# Patient Record
Sex: MALE | Race: WHITE | Employment: OTHER | URBAN - METROPOLITAN AREA
[De-identification: names, ages, dates, MRNs, and addresses within clinical notes are randomized per-mention and may not be internally consistent; named-entity substitution may affect disease eponyms.]

---

## 2018-02-28 ENCOUNTER — HOSPITAL ENCOUNTER (OUTPATIENT)
Dept: GENERAL RADIOLOGY | Age: 68
Discharge: HOME | End: 2018-03-02
Payer: MEDICARE

## 2018-02-28 DIAGNOSIS — J69.0 ASPIRATION PNEUMONIA, UNSPECIFIED ASPIRATION PNEUMONIA TYPE, UNSPECIFIED LATERALITY, UNSPECIFIED PART OF LUNG (HCC): ICD-10-CM

## 2018-02-28 PROCEDURE — 71046 X-RAY EXAM CHEST 2 VIEWS: CPT

## 2018-03-05 ENCOUNTER — HOSPITAL ENCOUNTER (OUTPATIENT)
Dept: GENERAL RADIOLOGY | Age: 68
Discharge: HOME | End: 2018-03-07
Payer: MEDICARE

## 2018-03-05 DIAGNOSIS — R91.8 LUNG INFILTRATE: ICD-10-CM

## 2018-03-05 PROCEDURE — 71046 X-RAY EXAM CHEST 2 VIEWS: CPT

## 2018-03-15 ENCOUNTER — HOSPITAL ENCOUNTER (OUTPATIENT)
Dept: CT IMAGING | Age: 68
Discharge: HOME | DRG: 194 | End: 2018-03-17
Payer: MEDICARE

## 2018-03-15 ENCOUNTER — HOSPITAL ENCOUNTER (INPATIENT)
Age: 68
LOS: 5 days | Discharge: HOME | DRG: 194 | End: 2018-03-20
Attending: INTERNAL MEDICINE | Admitting: INTERNAL MEDICINE
Payer: MEDICARE

## 2018-03-15 VITALS — BODY MASS INDEX: 30.88 KG/M2 | HEIGHT: 73 IN | WEIGHT: 233 LBS

## 2018-03-15 DIAGNOSIS — J18.9 PNEUMONIA DUE TO INFECTIOUS ORGANISM, UNSPECIFIED LATERALITY, UNSPECIFIED PART OF LUNG: ICD-10-CM

## 2018-03-15 DIAGNOSIS — R05.9 COUGH: ICD-10-CM

## 2018-03-15 LAB
ALBUMIN SERPL-MCNC: 4 G/DL (ref 3.9–4.9)
ALP BLD-CCNC: 85 U/L (ref 35–104)
ALT SERPL-CCNC: 7 U/L (ref 0–41)
ANION GAP SERPL CALCULATED.3IONS-SCNC: 15 MEQ/L (ref 7–13)
AST SERPL-CCNC: 10 U/L (ref 0–40)
BASOPHILS ABSOLUTE: 0 K/UL (ref 0–0.2)
BASOPHILS RELATIVE PERCENT: 0.3 %
BILIRUB SERPL-MCNC: 0.6 MG/DL (ref 0–1.2)
BUN BLDV-MCNC: 30 MG/DL (ref 8–23)
CALCIUM SERPL-MCNC: 9 MG/DL (ref 8.6–10.2)
CHLORIDE BLD-SCNC: 101 MEQ/L (ref 98–107)
CO2: 24 MEQ/L (ref 22–29)
CREAT SERPL-MCNC: 1.6 MG/DL (ref 0.7–1.2)
EOSINOPHILS ABSOLUTE: 0.9 K/UL (ref 0–0.7)
EOSINOPHILS RELATIVE PERCENT: 10.7 %
GFR AFRICAN AMERICAN: 52.6
GFR NON-AFRICAN AMERICAN: 43.4
GLOBULIN: 2.9 G/DL (ref 2.3–3.5)
GLUCOSE BLD-MCNC: 213 MG/DL (ref 74–109)
HCT VFR BLD CALC: 41 % (ref 42–52)
HEMOGLOBIN: 13.1 G/DL (ref 14–18)
LYMPHOCYTES ABSOLUTE: 0.6 K/UL (ref 1–4.8)
LYMPHOCYTES RELATIVE PERCENT: 6.7 %
MCH RBC QN AUTO: 28.2 PG (ref 27–31.3)
MCHC RBC AUTO-ENTMCNC: 32 % (ref 33–37)
MCV RBC AUTO: 88.4 FL (ref 80–100)
MONOCYTES ABSOLUTE: 0.8 K/UL (ref 0.2–0.8)
MONOCYTES RELATIVE PERCENT: 9.5 %
NEUTROPHILS ABSOLUTE: 6.4 K/UL (ref 1.4–6.5)
NEUTROPHILS RELATIVE PERCENT: 72.8 %
PDW BLD-RTO: 15.4 % (ref 11.5–14.5)
PLATELET # BLD: 206 K/UL (ref 130–400)
POTASSIUM SERPL-SCNC: 4.7 MEQ/L (ref 3.5–5.1)
RBC # BLD: 4.64 M/UL (ref 4.7–6.1)
SODIUM BLD-SCNC: 140 MEQ/L (ref 132–144)
TOTAL PROTEIN: 6.9 G/DL (ref 6.4–8.1)
WBC # BLD: 8.7 K/UL (ref 4.8–10.8)

## 2018-03-15 PROCEDURE — 85025 COMPLETE CBC W/AUTO DIFF WBC: CPT

## 2018-03-15 PROCEDURE — 87040 BLOOD CULTURE FOR BACTERIA: CPT

## 2018-03-15 PROCEDURE — 94664 DEMO&/EVAL PT USE INHALER: CPT

## 2018-03-15 PROCEDURE — 80053 COMPREHEN METABOLIC PANEL: CPT

## 2018-03-15 PROCEDURE — 6370000000 HC RX 637 (ALT 250 FOR IP): Performed by: INTERNAL MEDICINE

## 2018-03-15 PROCEDURE — 36415 COLL VENOUS BLD VENIPUNCTURE: CPT

## 2018-03-15 PROCEDURE — 2500000003 HC RX 250 WO HCPCS: Performed by: INTERNAL MEDICINE

## 2018-03-15 PROCEDURE — 1210000000 HC MED SURG R&B

## 2018-03-15 PROCEDURE — 71250 CT THORAX DX C-: CPT

## 2018-03-15 RX ORDER — MYCOPHENOLATE MOFETIL 250 MG/1
250 CAPSULE ORAL 2 TIMES DAILY
Status: DISCONTINUED | OUTPATIENT
Start: 2018-03-15 | End: 2018-03-20 | Stop reason: HOSPADM

## 2018-03-15 RX ORDER — GUAIFENESIN 400 MG/1
400 TABLET ORAL 4 TIMES DAILY PRN
COMMUNITY
End: 2019-04-02

## 2018-03-15 RX ORDER — CARVEDILOL 6.25 MG/1
6.25 TABLET ORAL 2 TIMES DAILY WITH MEALS
Status: ON HOLD | COMMUNITY
End: 2022-06-23 | Stop reason: HOSPADM

## 2018-03-15 RX ORDER — BENZONATATE 200 MG/1
200 CAPSULE ORAL 4 TIMES DAILY PRN
Status: ON HOLD | COMMUNITY
Start: 2011-05-26 | End: 2019-07-17

## 2018-03-15 RX ORDER — PREDNISONE 1 MG/1
5 TABLET ORAL DAILY
Status: DISCONTINUED | OUTPATIENT
Start: 2018-03-15 | End: 2018-03-20 | Stop reason: HOSPADM

## 2018-03-15 RX ORDER — CLARITHROMYCIN 500 MG/1
500 TABLET, COATED ORAL 2 TIMES DAILY
Status: ON HOLD | COMMUNITY
End: 2018-03-16

## 2018-03-15 RX ORDER — CALCIUM CARBONATE 200(500)MG
1 TABLET,CHEWABLE ORAL DAILY
Status: ON HOLD | COMMUNITY
End: 2019-07-17 | Stop reason: ALTCHOICE

## 2018-03-15 RX ORDER — AMLODIPINE BESYLATE 5 MG/1
5 TABLET ORAL DAILY
Status: DISCONTINUED | OUTPATIENT
Start: 2018-03-15 | End: 2018-03-20 | Stop reason: HOSPADM

## 2018-03-15 RX ORDER — NICOTINE POLACRILEX 4 MG
15 LOZENGE BUCCAL PRN
Status: DISCONTINUED | OUTPATIENT
Start: 2018-03-15 | End: 2018-03-20 | Stop reason: HOSPADM

## 2018-03-15 RX ORDER — GUAIFENESIN/DEXTROMETHORPHAN 100-10MG/5
5 SYRUP ORAL EVERY 4 HOURS PRN
Status: DISCONTINUED | OUTPATIENT
Start: 2018-03-15 | End: 2018-03-20 | Stop reason: HOSPADM

## 2018-03-15 RX ORDER — CARVEDILOL 6.25 MG/1
6.25 TABLET ORAL 2 TIMES DAILY WITH MEALS
Status: DISCONTINUED | OUTPATIENT
Start: 2018-03-15 | End: 2018-03-20 | Stop reason: HOSPADM

## 2018-03-15 RX ORDER — DEXTROSE MONOHYDRATE 25 G/50ML
12.5 INJECTION, SOLUTION INTRAVENOUS PRN
Status: DISCONTINUED | OUTPATIENT
Start: 2018-03-15 | End: 2018-03-20 | Stop reason: HOSPADM

## 2018-03-15 RX ORDER — ACETAMINOPHEN 325 MG/1
650 TABLET ORAL EVERY 4 HOURS PRN
Status: DISCONTINUED | OUTPATIENT
Start: 2018-03-15 | End: 2018-03-20 | Stop reason: HOSPADM

## 2018-03-15 RX ORDER — PROMETHAZINE HYDROCHLORIDE AND CODEINE PHOSPHATE 6.25; 1 MG/5ML; MG/5ML
5 SYRUP ORAL 4 TIMES DAILY PRN
COMMUNITY
End: 2019-04-02

## 2018-03-15 RX ORDER — CYCLOSPORINE 25 MG/1
25 CAPSULE, LIQUID FILLED ORAL 2 TIMES DAILY
Status: DISCONTINUED | OUTPATIENT
Start: 2018-03-15 | End: 2018-03-20 | Stop reason: HOSPADM

## 2018-03-15 RX ORDER — BENZONATATE 100 MG/1
100 CAPSULE ORAL 3 TIMES DAILY PRN
Status: DISCONTINUED | OUTPATIENT
Start: 2018-03-15 | End: 2018-03-20 | Stop reason: HOSPADM

## 2018-03-15 RX ORDER — DEXTROSE MONOHYDRATE 50 MG/ML
100 INJECTION, SOLUTION INTRAVENOUS PRN
Status: DISCONTINUED | OUTPATIENT
Start: 2018-03-15 | End: 2018-03-20 | Stop reason: HOSPADM

## 2018-03-15 RX ADMIN — BENZONATATE 100 MG: 100 CAPSULE ORAL at 21:51

## 2018-03-15 RX ADMIN — GUAIFENESIN AND DEXTROMETHORPHAN 5 ML: 100; 10 SYRUP ORAL at 21:51

## 2018-03-15 RX ADMIN — TAZOBACTAM SODIUM AND PIPERACILLIN SODIUM 3.38 G: 375; 3 INJECTION, SOLUTION INTRAVENOUS at 21:51

## 2018-03-15 ASSESSMENT — PAIN SCALES - GENERAL: PAINLEVEL_OUTOF10: 0

## 2018-03-16 PROBLEM — J18.9 PNEUMONIA: Status: ACTIVE | Noted: 2018-03-16

## 2018-03-16 LAB
BASOPHILS ABSOLUTE: 0.1 K/UL (ref 0–0.2)
BASOPHILS RELATIVE PERCENT: 1.1 %
EOSINOPHILS ABSOLUTE: 1.1 K/UL (ref 0–0.7)
EOSINOPHILS RELATIVE PERCENT: 13 %
GLUCOSE BLD-MCNC: 119 MG/DL (ref 60–115)
GLUCOSE BLD-MCNC: 130 MG/DL (ref 60–115)
GLUCOSE BLD-MCNC: 145 MG/DL (ref 60–115)
GLUCOSE BLD-MCNC: 75 MG/DL (ref 60–115)
GLUCOSE BLD-MCNC: 98 MG/DL (ref 60–115)
HCT VFR BLD CALC: 36.1 % (ref 42–52)
HEMOGLOBIN: 11.9 G/DL (ref 14–18)
LYMPHOCYTES ABSOLUTE: 0.6 K/UL (ref 1–4.8)
LYMPHOCYTES RELATIVE PERCENT: 6.3 %
MCH RBC QN AUTO: 28.9 PG (ref 27–31.3)
MCHC RBC AUTO-ENTMCNC: 33 % (ref 33–37)
MCV RBC AUTO: 87.6 FL (ref 80–100)
MONOCYTES ABSOLUTE: 1.1 K/UL (ref 0.2–0.8)
MONOCYTES RELATIVE PERCENT: 12.5 %
NEUTROPHILS ABSOLUTE: 6 K/UL (ref 1.4–6.5)
NEUTROPHILS RELATIVE PERCENT: 67.1 %
PDW BLD-RTO: 14.9 % (ref 11.5–14.5)
PERFORMED ON: ABNORMAL
PERFORMED ON: NORMAL
PERFORMED ON: NORMAL
PLATELET # BLD: 180 K/UL (ref 130–400)
RBC # BLD: 4.12 M/UL (ref 4.7–6.1)
WBC # BLD: 8.9 K/UL (ref 4.8–10.8)

## 2018-03-16 PROCEDURE — 94668 MNPJ CHEST WALL SBSQ: CPT

## 2018-03-16 PROCEDURE — 94640 AIRWAY INHALATION TREATMENT: CPT

## 2018-03-16 PROCEDURE — 1210000000 HC MED SURG R&B

## 2018-03-16 PROCEDURE — 6370000000 HC RX 637 (ALT 250 FOR IP): Performed by: INTERNAL MEDICINE

## 2018-03-16 PROCEDURE — 94667 MNPJ CHEST WALL 1ST: CPT

## 2018-03-16 PROCEDURE — 36415 COLL VENOUS BLD VENIPUNCTURE: CPT

## 2018-03-16 PROCEDURE — 6360000002 HC RX W HCPCS: Performed by: INTERNAL MEDICINE

## 2018-03-16 PROCEDURE — 85025 COMPLETE CBC W/AUTO DIFF WBC: CPT

## 2018-03-16 PROCEDURE — 2500000003 HC RX 250 WO HCPCS: Performed by: INTERNAL MEDICINE

## 2018-03-16 RX ORDER — FUROSEMIDE 40 MG/1
40 TABLET ORAL 2 TIMES DAILY
Status: DISCONTINUED | OUTPATIENT
Start: 2018-03-16 | End: 2018-03-20 | Stop reason: HOSPADM

## 2018-03-16 RX ORDER — PANTOPRAZOLE SODIUM 40 MG/1
40 TABLET, DELAYED RELEASE ORAL
Status: DISCONTINUED | OUTPATIENT
Start: 2018-03-17 | End: 2018-03-20 | Stop reason: HOSPADM

## 2018-03-16 RX ORDER — SIMVASTATIN 10 MG
10 TABLET ORAL NIGHTLY
Status: DISCONTINUED | OUTPATIENT
Start: 2018-03-16 | End: 2018-03-20 | Stop reason: HOSPADM

## 2018-03-16 RX ORDER — ASPIRIN 81 MG/1
81 TABLET, CHEWABLE ORAL DAILY
Status: DISCONTINUED | OUTPATIENT
Start: 2018-03-16 | End: 2018-03-20 | Stop reason: HOSPADM

## 2018-03-16 RX ORDER — FUROSEMIDE 40 MG/1
40 TABLET ORAL DAILY
Status: ON HOLD | COMMUNITY
End: 2022-10-27 | Stop reason: HOSPADM

## 2018-03-16 RX ORDER — ALBUTEROL SULFATE 2.5 MG/3ML
2.5 SOLUTION RESPIRATORY (INHALATION) EVERY 4 HOURS PRN
Status: DISCONTINUED | OUTPATIENT
Start: 2018-03-16 | End: 2018-03-18

## 2018-03-16 RX ADMIN — ALBUTEROL SULFATE 2.5 MG: 2.5 SOLUTION RESPIRATORY (INHALATION) at 21:40

## 2018-03-16 RX ADMIN — GUAIFENESIN AND DEXTROMETHORPHAN 5 ML: 100; 10 SYRUP ORAL at 21:55

## 2018-03-16 RX ADMIN — ALBUTEROL SULFATE 2.5 MG: 2.5 SOLUTION RESPIRATORY (INHALATION) at 00:51

## 2018-03-16 RX ADMIN — TAZOBACTAM SODIUM AND PIPERACILLIN SODIUM 3.38 G: 375; 3 INJECTION, SOLUTION INTRAVENOUS at 09:17

## 2018-03-16 RX ADMIN — AMLODIPINE BESYLATE 5 MG: 5 TABLET ORAL at 09:25

## 2018-03-16 RX ADMIN — ACETAMINOPHEN 650 MG: 325 TABLET, FILM COATED ORAL at 23:39

## 2018-03-16 RX ADMIN — ALBUTEROL SULFATE 2.5 MG: 2.5 SOLUTION RESPIRATORY (INHALATION) at 15:09

## 2018-03-16 RX ADMIN — BENZONATATE 100 MG: 100 CAPSULE ORAL at 21:54

## 2018-03-16 RX ADMIN — CYCLOSPORINE 25 MG: 25 CAPSULE, LIQUID FILLED ORAL at 23:36

## 2018-03-16 ASSESSMENT — PAIN SCALES - GENERAL: PAINLEVEL_OUTOF10: 0

## 2018-03-16 NOTE — H&P
wheezing or  accessory muscle use. HEART:  Regular with 1/6 systolic murmur. ABDOMEN:  Soft. No guarding or rigidity. Bowel sounds are present. EXTREMITIES:  Lower limbs show no edema. SKIN:  Warm and dry. IMPRESSION:  1. Pneumonia suspected on CT scan. 2.  Status post kidney transplant, immunosuppressed. 3.  Diabetes mellitus type 2.  4.  Hypertension. 5.  Hyperlipidemia. PLAN:  Orders written. Zosyn to be given IV. Aerosol treatments.           Emory Arvizu DO    D: 03/16/2018 9:52:49       T: 03/16/2018 9:54:03     [id number]  Job#: [id number]     Doc#: [id number]    CC:

## 2018-03-17 ENCOUNTER — APPOINTMENT (OUTPATIENT)
Dept: GENERAL RADIOLOGY | Age: 68
DRG: 194 | End: 2018-03-17
Attending: INTERNAL MEDICINE
Payer: MEDICARE

## 2018-03-17 LAB
ANION GAP SERPL CALCULATED.3IONS-SCNC: 13 MEQ/L (ref 7–13)
BASOPHILS ABSOLUTE: 0.1 K/UL (ref 0–0.2)
BASOPHILS RELATIVE PERCENT: 0.8 %
BUN BLDV-MCNC: 28 MG/DL (ref 8–23)
CALCIUM SERPL-MCNC: 8.9 MG/DL (ref 8.6–10.2)
CHLORIDE BLD-SCNC: 106 MEQ/L (ref 98–107)
CO2: 25 MEQ/L (ref 22–29)
CREAT SERPL-MCNC: 1.61 MG/DL (ref 0.7–1.2)
EOSINOPHILS ABSOLUTE: 1.1 K/UL (ref 0–0.7)
EOSINOPHILS RELATIVE PERCENT: 12.5 %
GFR AFRICAN AMERICAN: 52.2
GFR NON-AFRICAN AMERICAN: 43.1
GLUCOSE BLD-MCNC: 108 MG/DL (ref 60–115)
GLUCOSE BLD-MCNC: 112 MG/DL (ref 60–115)
GLUCOSE BLD-MCNC: 114 MG/DL (ref 74–109)
GLUCOSE BLD-MCNC: 155 MG/DL (ref 60–115)
GLUCOSE BLD-MCNC: 201 MG/DL (ref 60–115)
HCT VFR BLD CALC: 37.3 % (ref 42–52)
HEMOGLOBIN: 12.5 G/DL (ref 14–18)
LYMPHOCYTES ABSOLUTE: 0.5 K/UL (ref 1–4.8)
LYMPHOCYTES RELATIVE PERCENT: 5.5 %
MCH RBC QN AUTO: 29.4 PG (ref 27–31.3)
MCHC RBC AUTO-ENTMCNC: 33.5 % (ref 33–37)
MCV RBC AUTO: 87.7 FL (ref 80–100)
MONOCYTES ABSOLUTE: 1.2 K/UL (ref 0.2–0.8)
MONOCYTES RELATIVE PERCENT: 13.5 %
NEUTROPHILS ABSOLUTE: 5.9 K/UL (ref 1.4–6.5)
NEUTROPHILS RELATIVE PERCENT: 67.7 %
PDW BLD-RTO: 14.8 % (ref 11.5–14.5)
PERFORMED ON: ABNORMAL
PERFORMED ON: ABNORMAL
PERFORMED ON: NORMAL
PERFORMED ON: NORMAL
PLATELET # BLD: 197 K/UL (ref 130–400)
POTASSIUM SERPL-SCNC: 4.3 MEQ/L (ref 3.5–5.1)
RBC # BLD: 4.25 M/UL (ref 4.7–6.1)
SODIUM BLD-SCNC: 144 MEQ/L (ref 132–144)
WBC # BLD: 8.7 K/UL (ref 4.8–10.8)

## 2018-03-17 PROCEDURE — 1210000000 HC MED SURG R&B

## 2018-03-17 PROCEDURE — 6370000000 HC RX 637 (ALT 250 FOR IP): Performed by: INTERNAL MEDICINE

## 2018-03-17 PROCEDURE — 6360000002 HC RX W HCPCS: Performed by: INTERNAL MEDICINE

## 2018-03-17 PROCEDURE — 80048 BASIC METABOLIC PNL TOTAL CA: CPT

## 2018-03-17 PROCEDURE — 99223 1ST HOSP IP/OBS HIGH 75: CPT | Performed by: INTERNAL MEDICINE

## 2018-03-17 PROCEDURE — 36415 COLL VENOUS BLD VENIPUNCTURE: CPT

## 2018-03-17 PROCEDURE — 71046 X-RAY EXAM CHEST 2 VIEWS: CPT

## 2018-03-17 PROCEDURE — 94668 MNPJ CHEST WALL SBSQ: CPT

## 2018-03-17 PROCEDURE — 85025 COMPLETE CBC W/AUTO DIFF WBC: CPT

## 2018-03-17 PROCEDURE — 94640 AIRWAY INHALATION TREATMENT: CPT

## 2018-03-17 PROCEDURE — 2500000003 HC RX 250 WO HCPCS: Performed by: INTERNAL MEDICINE

## 2018-03-17 RX ORDER — GUAIFENESIN 600 MG/1
600 TABLET, EXTENDED RELEASE ORAL 2 TIMES DAILY
Status: DISCONTINUED | OUTPATIENT
Start: 2018-03-17 | End: 2018-03-20 | Stop reason: HOSPADM

## 2018-03-17 RX ORDER — IPRATROPIUM BROMIDE AND ALBUTEROL SULFATE 2.5; .5 MG/3ML; MG/3ML
1 SOLUTION RESPIRATORY (INHALATION) 4 TIMES DAILY
Status: DISCONTINUED | OUTPATIENT
Start: 2018-03-17 | End: 2018-03-19

## 2018-03-17 RX ORDER — ALBUTEROL SULFATE 90 UG/1
4 AEROSOL, METERED RESPIRATORY (INHALATION) 4 TIMES DAILY
Status: DISCONTINUED | OUTPATIENT
Start: 2018-03-17 | End: 2018-03-18

## 2018-03-17 RX ADMIN — GUAIFENESIN AND DEXTROMETHORPHAN 5 ML: 100; 10 SYRUP ORAL at 21:55

## 2018-03-17 RX ADMIN — GUAIFENESIN 600 MG: 600 TABLET, EXTENDED RELEASE ORAL at 23:40

## 2018-03-17 RX ADMIN — TAZOBACTAM SODIUM AND PIPERACILLIN SODIUM 3.38 G: 375; 3 INJECTION, SOLUTION INTRAVENOUS at 05:26

## 2018-03-17 RX ADMIN — CYCLOSPORINE 25 MG: 25 CAPSULE, LIQUID FILLED ORAL at 08:47

## 2018-03-17 RX ADMIN — ALBUTEROL SULFATE 2.5 MG: 2.5 SOLUTION RESPIRATORY (INHALATION) at 20:38

## 2018-03-17 RX ADMIN — PREDNISONE 5 MG: 1 TABLET ORAL at 08:53

## 2018-03-17 RX ADMIN — PANTOPRAZOLE SODIUM 40 MG: 40 TABLET, DELAYED RELEASE ORAL at 05:26

## 2018-03-17 RX ADMIN — ALBUTEROL SULFATE 2.5 MG: 2.5 SOLUTION RESPIRATORY (INHALATION) at 20:44

## 2018-03-17 RX ADMIN — ALBUTEROL SULFATE 2.5 MG: 2.5 SOLUTION RESPIRATORY (INHALATION) at 08:07

## 2018-03-17 RX ADMIN — MYCOPHENOLATE MOFETIL 250 MG: 250 CAPSULE ORAL at 08:47

## 2018-03-17 RX ADMIN — TAZOBACTAM SODIUM AND PIPERACILLIN SODIUM 3.38 G: 375; 3 INJECTION, SOLUTION INTRAVENOUS at 12:52

## 2018-03-17 RX ADMIN — ALBUTEROL SULFATE 2.5 MG: 2.5 SOLUTION RESPIRATORY (INHALATION) at 14:03

## 2018-03-17 RX ADMIN — ALBUTEROL SULFATE 2.5 MG: 2.5 SOLUTION RESPIRATORY (INHALATION) at 08:04

## 2018-03-17 RX ADMIN — TAZOBACTAM SODIUM AND PIPERACILLIN SODIUM 3.38 G: 375; 3 INJECTION, SOLUTION INTRAVENOUS at 21:27

## 2018-03-17 ASSESSMENT — ENCOUNTER SYMPTOMS: SHORTNESS OF BREATH: 0

## 2018-03-17 ASSESSMENT — PAIN SCALES - GENERAL: PAINLEVEL_OUTOF10: 0

## 2018-03-17 NOTE — FLOWSHEET NOTE
Patient resting in bed. Attempted to restart new SL but unsuccessful. Old Sl from left arm today infiltrated this morning for day shift. . Large bruised noted and swollen. Swelling decreased some by this evening. Harish Moise RN will try. Patient taking own medication while in the hospital. T102.6 medicated with tylenol 650mg. po. Room is very hot and patient has several heavy blankets on.

## 2018-03-18 LAB
GLUCOSE BLD-MCNC: 115 MG/DL (ref 60–115)
GLUCOSE BLD-MCNC: 116 MG/DL (ref 60–115)
GLUCOSE BLD-MCNC: 125 MG/DL (ref 60–115)
GLUCOSE BLD-MCNC: 89 MG/DL (ref 60–115)
PERFORMED ON: ABNORMAL
PERFORMED ON: ABNORMAL
PERFORMED ON: NORMAL
PERFORMED ON: NORMAL

## 2018-03-18 PROCEDURE — 87070 CULTURE OTHR SPECIMN AEROBIC: CPT

## 2018-03-18 PROCEDURE — 99232 SBSQ HOSP IP/OBS MODERATE 35: CPT | Performed by: INTERNAL MEDICINE

## 2018-03-18 PROCEDURE — 6370000000 HC RX 637 (ALT 250 FOR IP): Performed by: INTERNAL MEDICINE

## 2018-03-18 PROCEDURE — 1210000000 HC MED SURG R&B

## 2018-03-18 PROCEDURE — 87205 SMEAR GRAM STAIN: CPT

## 2018-03-18 PROCEDURE — 2500000003 HC RX 250 WO HCPCS: Performed by: INTERNAL MEDICINE

## 2018-03-18 PROCEDURE — 6360000002 HC RX W HCPCS: Performed by: INTERNAL MEDICINE

## 2018-03-18 PROCEDURE — 94640 AIRWAY INHALATION TREATMENT: CPT

## 2018-03-18 PROCEDURE — 94668 MNPJ CHEST WALL SBSQ: CPT

## 2018-03-18 PROCEDURE — 94760 N-INVAS EAR/PLS OXIMETRY 1: CPT

## 2018-03-18 RX ORDER — ALBUTEROL SULFATE 2.5 MG/3ML
2.5 SOLUTION RESPIRATORY (INHALATION)
Status: DISCONTINUED | OUTPATIENT
Start: 2018-03-18 | End: 2018-03-19

## 2018-03-18 RX ADMIN — GUAIFENESIN AND DEXTROMETHORPHAN 5 ML: 100; 10 SYRUP ORAL at 20:09

## 2018-03-18 RX ADMIN — TAZOBACTAM SODIUM AND PIPERACILLIN SODIUM 3.38 G: 375; 3 INJECTION, SOLUTION INTRAVENOUS at 12:26

## 2018-03-18 RX ADMIN — TAZOBACTAM SODIUM AND PIPERACILLIN SODIUM 3.38 G: 375; 3 INJECTION, SOLUTION INTRAVENOUS at 21:12

## 2018-03-18 RX ADMIN — GUAIFENESIN 600 MG: 600 TABLET, EXTENDED RELEASE ORAL at 08:20

## 2018-03-18 RX ADMIN — BENZONATATE 100 MG: 100 CAPSULE ORAL at 20:15

## 2018-03-18 RX ADMIN — IPRATROPIUM BROMIDE AND ALBUTEROL SULFATE 1 AMPULE: .5; 3 SOLUTION RESPIRATORY (INHALATION) at 19:43

## 2018-03-18 RX ADMIN — IPRATROPIUM BROMIDE AND ALBUTEROL SULFATE 1 AMPULE: .5; 3 SOLUTION RESPIRATORY (INHALATION) at 15:12

## 2018-03-18 RX ADMIN — PANTOPRAZOLE SODIUM 40 MG: 40 TABLET, DELAYED RELEASE ORAL at 05:35

## 2018-03-18 RX ADMIN — TAZOBACTAM SODIUM AND PIPERACILLIN SODIUM 3.38 G: 375; 3 INJECTION, SOLUTION INTRAVENOUS at 05:35

## 2018-03-18 RX ADMIN — IPRATROPIUM BROMIDE AND ALBUTEROL SULFATE 1 AMPULE: .5; 3 SOLUTION RESPIRATORY (INHALATION) at 10:57

## 2018-03-18 RX ADMIN — ALBUTEROL SULFATE 2.5 MG: 2.5 SOLUTION RESPIRATORY (INHALATION) at 06:04

## 2018-03-18 RX ADMIN — GUAIFENESIN 600 MG: 600 TABLET, EXTENDED RELEASE ORAL at 20:09

## 2018-03-18 ASSESSMENT — ENCOUNTER SYMPTOMS: SHORTNESS OF BREATH: 1

## 2018-03-18 ASSESSMENT — PAIN SCALES - GENERAL: PAINLEVEL_OUTOF10: 0

## 2018-03-18 NOTE — CONSULTS
Consults   Pulmonary Medicine  Consult Note      Reason for consultation: Pneumonia    Consulting physician: Dr. Roberth Gregory:      This is a 62-year-old who was admitted directly to the hospital after a CT scan showed evidence of probable  infiltrates - pneumonia. The patient has been suffering from upper respiratory tract symptoms for a month. He was given 2 antibiotics without improvement. he said he has been having cough with thick mucus. Chest x-ray initially showed an infiltrate and repeat chest x-ray showed resolution. 10 days later, CT scan was performed which again showed infiltrates. He was admitted to the hospital for treatment. Started on IV Zosyn 3.375 g every 8 hourly . he has been feeling cold but not having any fever. No chest pain or pleuritic pain. No nausea vomiting diarrhea or abdominal pain. No hemoptysis     He has a kidney transplant and is on immunosuppressive drugs. Transplant was in 2015. Past Medical History:    No past medical history on file. Past Surgical History:        Procedure Laterality Date    KIDNEY TRANSPLANT  2015       Social History:     reports that he quit smoking about 2 years ago. He does not have any smokeless tobacco history on file. Family History:   No family history on file. Allergies:  Patient has no known allergies.         MEDICATIONS during current hospitalization:    Continuous Infusions:   dextrose         Scheduled Meds:   albuterol  2.5 mg Nebulization Q6H WA    pantoprazole  40 mg Oral QAM AC    simvastatin  10 mg Oral Nightly    aspirin  81 mg Oral Daily    furosemide  40 mg Oral BID    piperacillin-tazobactam  3.375 g Intravenous Q8H    mycophenolate  250 mg Oral BID    insulin lispro  0-12 Units Subcutaneous TID WC    insulin lispro  0-6 Units Subcutaneous Nightly    amLODIPine  5 mg Oral Daily    predniSONE  5 mg Oral Daily    carvedilol  6.25 mg Oral BID WC    cycloSPORINE modified  25 mg

## 2018-03-19 ENCOUNTER — APPOINTMENT (OUTPATIENT)
Dept: GENERAL RADIOLOGY | Age: 68
DRG: 194 | End: 2018-03-19
Attending: INTERNAL MEDICINE
Payer: MEDICARE

## 2018-03-19 LAB
GLUCOSE BLD-MCNC: 209 MG/DL (ref 60–115)
PERFORMED ON: ABNORMAL

## 2018-03-19 PROCEDURE — 1210000000 HC MED SURG R&B

## 2018-03-19 PROCEDURE — 71046 X-RAY EXAM CHEST 2 VIEWS: CPT

## 2018-03-19 PROCEDURE — 2500000003 HC RX 250 WO HCPCS: Performed by: INTERNAL MEDICINE

## 2018-03-19 PROCEDURE — 6370000000 HC RX 637 (ALT 250 FOR IP): Performed by: INTERNAL MEDICINE

## 2018-03-19 PROCEDURE — APPSS30 APP SPLIT SHARED TIME 16-30 MINUTES: Performed by: PHYSICIAN ASSISTANT

## 2018-03-19 PROCEDURE — 99233 SBSQ HOSP IP/OBS HIGH 50: CPT | Performed by: INTERNAL MEDICINE

## 2018-03-19 PROCEDURE — 94664 DEMO&/EVAL PT USE INHALER: CPT

## 2018-03-19 RX ORDER — IPRATROPIUM BROMIDE AND ALBUTEROL SULFATE 2.5; .5 MG/3ML; MG/3ML
1 SOLUTION RESPIRATORY (INHALATION) EVERY 4 HOURS PRN
Status: DISCONTINUED | OUTPATIENT
Start: 2018-03-19 | End: 2018-03-20 | Stop reason: HOSPADM

## 2018-03-19 RX ADMIN — TAZOBACTAM SODIUM AND PIPERACILLIN SODIUM 3.38 G: 375; 3 INJECTION, SOLUTION INTRAVENOUS at 05:21

## 2018-03-19 RX ADMIN — GUAIFENESIN AND DEXTROMETHORPHAN 5 ML: 100; 10 SYRUP ORAL at 23:21

## 2018-03-19 RX ADMIN — TAZOBACTAM SODIUM AND PIPERACILLIN SODIUM 3.38 G: 375; 3 INJECTION, SOLUTION INTRAVENOUS at 22:53

## 2018-03-19 RX ADMIN — GUAIFENESIN 600 MG: 600 TABLET, EXTENDED RELEASE ORAL at 10:42

## 2018-03-19 RX ADMIN — GUAIFENESIN 600 MG: 600 TABLET, EXTENDED RELEASE ORAL at 22:53

## 2018-03-19 RX ADMIN — TAZOBACTAM SODIUM AND PIPERACILLIN SODIUM 3.38 G: 375; 3 INJECTION, SOLUTION INTRAVENOUS at 13:47

## 2018-03-19 ASSESSMENT — PAIN SCALES - GENERAL
PAINLEVEL_OUTOF10: 0
PAINLEVEL_OUTOF10: 0

## 2018-03-19 ASSESSMENT — ENCOUNTER SYMPTOMS: SHORTNESS OF BREATH: 0

## 2018-03-19 NOTE — FLOWSHEET NOTE
Patient up and about in room. Lung clear and diminished with some rales in the bases. Lung doctor visited and will possibly do a Bronoscopy; so will be NPO after midnight.

## 2018-03-20 ENCOUNTER — ANESTHESIA (OUTPATIENT)
Dept: OPERATING ROOM | Age: 68
DRG: 194 | End: 2018-03-20
Payer: MEDICARE

## 2018-03-20 ENCOUNTER — ANESTHESIA EVENT (OUTPATIENT)
Dept: OPERATING ROOM | Age: 68
DRG: 194 | End: 2018-03-20
Payer: MEDICARE

## 2018-03-20 VITALS
SYSTOLIC BLOOD PRESSURE: 163 MMHG | OXYGEN SATURATION: 94 % | RESPIRATION RATE: 14 BRPM | TEMPERATURE: 97.2 F | HEART RATE: 63 BPM | WEIGHT: 221.12 LBS | DIASTOLIC BLOOD PRESSURE: 75 MMHG | BODY MASS INDEX: 29.17 KG/M2

## 2018-03-20 VITALS — SYSTOLIC BLOOD PRESSURE: 162 MMHG | OXYGEN SATURATION: 96 % | DIASTOLIC BLOOD PRESSURE: 76 MMHG

## 2018-03-20 LAB
CULTURE, RESPIRATORY: ABNORMAL
CULTURE, RESPIRATORY: ABNORMAL
EKG ATRIAL RATE: 56 BPM
EKG P AXIS: 61 DEGREES
EKG P-R INTERVAL: 162 MS
EKG Q-T INTERVAL: 434 MS
EKG QRS DURATION: 72 MS
EKG QTC CALCULATION (BAZETT): 418 MS
EKG R AXIS: 17 DEGREES
EKG T AXIS: 50 DEGREES
EKG VENTRICULAR RATE: 56 BPM
GLUCOSE BLD-MCNC: 109 MG/DL (ref 60–115)
GLUCOSE BLD-MCNC: 112 MG/DL (ref 60–115)
GLUCOSE BLD-MCNC: 119 MG/DL (ref 60–115)
GLUCOSE BLD-MCNC: 99 MG/DL (ref 60–115)
GRAM STAIN RESULT: ABNORMAL
ORGANISM: ABNORMAL
PERFORMED ON: ABNORMAL
PERFORMED ON: NORMAL

## 2018-03-20 PROCEDURE — 0BC58ZZ EXTIRPATION OF MATTER FROM RIGHT MIDDLE LOBE BRONCHUS, VIA NATURAL OR ARTIFICIAL OPENING ENDOSCOPIC: ICD-10-PCS | Performed by: INTERNAL MEDICINE

## 2018-03-20 PROCEDURE — 87116 MYCOBACTERIA CULTURE: CPT

## 2018-03-20 PROCEDURE — 88312 SPECIAL STAINS GROUP 1: CPT

## 2018-03-20 PROCEDURE — 88305 TISSUE EXAM BY PATHOLOGIST: CPT

## 2018-03-20 PROCEDURE — 6370000000 HC RX 637 (ALT 250 FOR IP): Performed by: INTERNAL MEDICINE

## 2018-03-20 PROCEDURE — 99232 SBSQ HOSP IP/OBS MODERATE 35: CPT | Performed by: INTERNAL MEDICINE

## 2018-03-20 PROCEDURE — 93005 ELECTROCARDIOGRAM TRACING: CPT

## 2018-03-20 PROCEDURE — 88112 CYTOPATH CELL ENHANCE TECH: CPT

## 2018-03-20 PROCEDURE — 3600000013 HC SURGERY LEVEL 3 ADDTL 15MIN: Performed by: INTERNAL MEDICINE

## 2018-03-20 PROCEDURE — 87496 CYTOMEG DNA AMP PROBE: CPT

## 2018-03-20 PROCEDURE — 2500000003 HC RX 250 WO HCPCS: Performed by: INTERNAL MEDICINE

## 2018-03-20 PROCEDURE — 87070 CULTURE OTHR SPECIMN AEROBIC: CPT

## 2018-03-20 PROCEDURE — 2580000003 HC RX 258: Performed by: INTERNAL MEDICINE

## 2018-03-20 PROCEDURE — 6360000002 HC RX W HCPCS: Performed by: NURSE ANESTHETIST, CERTIFIED REGISTERED

## 2018-03-20 PROCEDURE — 31646 BRNCHSC W/THER ASPIR SBSQ: CPT | Performed by: INTERNAL MEDICINE

## 2018-03-20 PROCEDURE — 0BC68ZZ EXTIRPATION OF MATTER FROM RIGHT LOWER LOBE BRONCHUS, VIA NATURAL OR ARTIFICIAL OPENING ENDOSCOPIC: ICD-10-PCS | Performed by: INTERNAL MEDICINE

## 2018-03-20 PROCEDURE — 7100000000 HC PACU RECOVERY - FIRST 15 MIN: Performed by: INTERNAL MEDICINE

## 2018-03-20 PROCEDURE — 2580000003 HC RX 258: Performed by: ANESTHESIOLOGY

## 2018-03-20 PROCEDURE — 0B968ZX DRAINAGE OF RIGHT LOWER LOBE BRONCHUS, VIA NATURAL OR ARTIFICIAL OPENING ENDOSCOPIC, DIAGNOSTIC: ICD-10-PCS | Performed by: INTERNAL MEDICINE

## 2018-03-20 PROCEDURE — 7100000001 HC PACU RECOVERY - ADDTL 15 MIN: Performed by: INTERNAL MEDICINE

## 2018-03-20 PROCEDURE — 3700000001 HC ADD 15 MINUTES (ANESTHESIA): Performed by: INTERNAL MEDICINE

## 2018-03-20 PROCEDURE — 3700000000 HC ANESTHESIA ATTENDED CARE: Performed by: INTERNAL MEDICINE

## 2018-03-20 PROCEDURE — 2500000003 HC RX 250 WO HCPCS: Performed by: NURSE ANESTHETIST, CERTIFIED REGISTERED

## 2018-03-20 PROCEDURE — 87102 FUNGUS ISOLATION CULTURE: CPT

## 2018-03-20 PROCEDURE — 3600000003 HC SURGERY LEVEL 3 BASE: Performed by: INTERNAL MEDICINE

## 2018-03-20 PROCEDURE — 87252 VIRUS INOCULATION TISSUE: CPT

## 2018-03-20 PROCEDURE — 87205 SMEAR GRAM STAIN: CPT

## 2018-03-20 RX ORDER — LIDOCAINE HYDROCHLORIDE 40 MG/ML
INJECTION, SOLUTION RETROBULBAR; TOPICAL PRN
Status: DISCONTINUED | OUTPATIENT
Start: 2018-03-20 | End: 2018-03-20 | Stop reason: HOSPADM

## 2018-03-20 RX ORDER — KETAMINE HYDROCHLORIDE 100 MG/ML
INJECTION, SOLUTION INTRAMUSCULAR; INTRAVENOUS PRN
Status: DISCONTINUED | OUTPATIENT
Start: 2018-03-20 | End: 2018-03-20 | Stop reason: SDUPTHER

## 2018-03-20 RX ORDER — FENTANYL CITRATE 50 UG/ML
50 INJECTION, SOLUTION INTRAMUSCULAR; INTRAVENOUS EVERY 10 MIN PRN
Status: DISCONTINUED | OUTPATIENT
Start: 2018-03-20 | End: 2018-03-20 | Stop reason: HOSPADM

## 2018-03-20 RX ORDER — HYDROCODONE BITARTRATE AND ACETAMINOPHEN 5; 325 MG/1; MG/1
1 TABLET ORAL PRN
Status: DISCONTINUED | OUTPATIENT
Start: 2018-03-20 | End: 2018-03-20 | Stop reason: HOSPADM

## 2018-03-20 RX ORDER — MAGNESIUM HYDROXIDE 1200 MG/15ML
LIQUID ORAL CONTINUOUS PRN
Status: DISCONTINUED | OUTPATIENT
Start: 2018-03-20 | End: 2018-03-20 | Stop reason: HOSPADM

## 2018-03-20 RX ORDER — MEPERIDINE HYDROCHLORIDE 25 MG/ML
12.5 INJECTION INTRAMUSCULAR; INTRAVENOUS; SUBCUTANEOUS EVERY 5 MIN PRN
Status: DISCONTINUED | OUTPATIENT
Start: 2018-03-20 | End: 2018-03-20 | Stop reason: HOSPADM

## 2018-03-20 RX ORDER — METOCLOPRAMIDE HYDROCHLORIDE 5 MG/ML
10 INJECTION INTRAMUSCULAR; INTRAVENOUS
Status: DISCONTINUED | OUTPATIENT
Start: 2018-03-20 | End: 2018-03-20 | Stop reason: HOSPADM

## 2018-03-20 RX ORDER — HYDROCODONE BITARTRATE AND ACETAMINOPHEN 5; 325 MG/1; MG/1
2 TABLET ORAL PRN
Status: DISCONTINUED | OUTPATIENT
Start: 2018-03-20 | End: 2018-03-20 | Stop reason: HOSPADM

## 2018-03-20 RX ORDER — LEVOFLOXACIN 500 MG/1
500 TABLET, FILM COATED ORAL DAILY
Qty: 28 TABLET | Refills: 0 | Status: SHIPPED | OUTPATIENT
Start: 2018-03-20 | End: 2018-04-03

## 2018-03-20 RX ORDER — GLYCOPYRROLATE 1 MG/5 ML
SYRINGE (ML) INTRAVENOUS PRN
Status: DISCONTINUED | OUTPATIENT
Start: 2018-03-20 | End: 2018-03-20 | Stop reason: SDUPTHER

## 2018-03-20 RX ORDER — ONDANSETRON 2 MG/ML
4 INJECTION INTRAMUSCULAR; INTRAVENOUS
Status: DISCONTINUED | OUTPATIENT
Start: 2018-03-20 | End: 2018-03-20 | Stop reason: HOSPADM

## 2018-03-20 RX ORDER — MIDAZOLAM HYDROCHLORIDE 1 MG/ML
INJECTION INTRAMUSCULAR; INTRAVENOUS PRN
Status: DISCONTINUED | OUTPATIENT
Start: 2018-03-20 | End: 2018-03-20 | Stop reason: SDUPTHER

## 2018-03-20 RX ORDER — SODIUM CHLORIDE, SODIUM LACTATE, POTASSIUM CHLORIDE, CALCIUM CHLORIDE 600; 310; 30; 20 MG/100ML; MG/100ML; MG/100ML; MG/100ML
INJECTION, SOLUTION INTRAVENOUS CONTINUOUS
Status: DISCONTINUED | OUTPATIENT
Start: 2018-03-20 | End: 2018-03-20 | Stop reason: HOSPADM

## 2018-03-20 RX ORDER — LIDOCAINE HYDROCHLORIDE 20 MG/ML
INJECTION, SOLUTION EPIDURAL; INFILTRATION; INTRACAUDAL; PERINEURAL PRN
Status: DISCONTINUED | OUTPATIENT
Start: 2018-03-20 | End: 2018-03-20 | Stop reason: HOSPADM

## 2018-03-20 RX ORDER — DIPHENHYDRAMINE HYDROCHLORIDE 50 MG/ML
12.5 INJECTION INTRAMUSCULAR; INTRAVENOUS
Status: DISCONTINUED | OUTPATIENT
Start: 2018-03-20 | End: 2018-03-20 | Stop reason: HOSPADM

## 2018-03-20 RX ORDER — PROPOFOL 10 MG/ML
INJECTION, EMULSION INTRAVENOUS PRN
Status: DISCONTINUED | OUTPATIENT
Start: 2018-03-20 | End: 2018-03-20 | Stop reason: SDUPTHER

## 2018-03-20 RX ADMIN — CYCLOSPORINE 25 MG: 25 CAPSULE, LIQUID FILLED ORAL at 08:31

## 2018-03-20 RX ADMIN — GUAIFENESIN 600 MG: 600 TABLET, EXTENDED RELEASE ORAL at 09:24

## 2018-03-20 RX ADMIN — SODIUM CHLORIDE, POTASSIUM CHLORIDE, SODIUM LACTATE AND CALCIUM CHLORIDE: 600; 310; 30; 20 INJECTION, SOLUTION INTRAVENOUS at 13:52

## 2018-03-20 RX ADMIN — Medication 0.2 MG: at 14:31

## 2018-03-20 RX ADMIN — PROPOFOL 10 MG: 10 INJECTION, EMULSION INTRAVENOUS at 14:58

## 2018-03-20 RX ADMIN — PROPOFOL 40 MG: 10 INJECTION, EMULSION INTRAVENOUS at 14:49

## 2018-03-20 RX ADMIN — CARVEDILOL 6.25 MG: 6.25 TABLET, FILM COATED ORAL at 08:31

## 2018-03-20 RX ADMIN — KETAMINE HYDROCHLORIDE 25 MG: 100 INJECTION, SOLUTION, CONCENTRATE INTRAMUSCULAR; INTRAVENOUS at 14:49

## 2018-03-20 RX ADMIN — MYCOPHENOLATE MOFETIL 250 MG: 250 CAPSULE ORAL at 08:31

## 2018-03-20 RX ADMIN — TAZOBACTAM SODIUM AND PIPERACILLIN SODIUM 3.38 G: 375; 3 INJECTION, SOLUTION INTRAVENOUS at 09:14

## 2018-03-20 RX ADMIN — PROPOFOL 10 MG: 10 INJECTION, EMULSION INTRAVENOUS at 14:54

## 2018-03-20 RX ADMIN — MIDAZOLAM HYDROCHLORIDE 2 MG: 1 INJECTION, SOLUTION INTRAMUSCULAR; INTRAVENOUS at 14:31

## 2018-03-20 RX ADMIN — PREDNISONE 5 MG: 1 TABLET ORAL at 08:32

## 2018-03-20 ASSESSMENT — PULMONARY FUNCTION TESTS
PIF_VALUE: 0

## 2018-03-20 ASSESSMENT — ENCOUNTER SYMPTOMS: SHORTNESS OF BREATH: 0

## 2018-03-20 ASSESSMENT — PAIN SCALES - GENERAL
PAINLEVEL_OUTOF10: 0

## 2018-03-20 NOTE — PROCEDURES
withdrawn out to the galileo and completely withdrawn out of the patient. Patient tolerated the procedure very well with no complications noted. Bronchial washing sent to lab for Gram stain and culture, fungus culture, AFB stain and culture, CMV culture, PCP and cytology     Postoperative diagnosis:   right middle and lower lobe and left lower lobe pneumonia with bronchitis with moderate amount of thick mucous in tracheobronchial tree.     Verna Marquez MD  3/20/2018

## 2018-03-20 NOTE — PROGRESS NOTES
Assessment documented. Pt continues to refuse to take the facilities meds and will only take his own which he will not allow us to send to pharmacy. He denies and pain at this time. Moist productive cough noted. Lungs had fine diminished crackles present. Denies any other needs. Will monitor. Sputum culture sent.  Electronically signed by Sabrina Ash RN on 3/18/2018 at 10:23 AM
Assessment documented. Pt resting in bed alert. Pt refusing to take our medication. Stated \"I will take my own when I want and they are not being sent to pharmacy\". Pt also stated \"If you have a problem with it I will get on the phone right now and call Ed Ohley\". Denies any pain or needs. Will monitor.  Electronically signed by Maru Dunn RN on 3/17/2018 at 10:07 AM
Assumed care of patient from [facility]. Agree with assessment. Patient very independent and bale to be up around the room. Patient is coughing up think green sputum in large amounts. Lungs sounds clear but diminished throughout. No edema to lower extremities. Heart Sounds Regular.
Lennie Monge is a 78 y.o. male patient. doing better still with cough CXR basilar infiltrates    Current Facility-Administered Medications   Medication Dose Route Frequency Provider Last Rate Last Dose    albuterol sulfate  (90 Base) MCG/ACT inhaler 4 puff  4 puff Inhalation 4x daily MD Chuyita Grier [facility]) extended release tablet 600 mg  600 mg Oral BID Madi Puga MD   600 mg at 03/18/18 0820    ipratropium-albuterol (DUONEB) nebulizer solution 1 ampule  1 ampule Inhalation 4x daily Madi Puga MD   1 ampule at 03/18/18 1057    albuterol (PROVENTIL) nebulizer solution 2.5 mg  2.5 mg Nebulization Q4H PRN Ti Ugalde, DO   2.5 mg at 03/18/18 0604    albuterol (PROVENTIL) nebulizer solution 2.5 mg  2.5 mg Nebulization Q6H WA Sofie Peal Bescak, DO   2.5 mg at 03/17/18 2044    pantoprazole (PROTONIX) tablet 40 mg  40 mg Oral QAM AC Sofie Peal Bescak, DO   40 mg at 03/18/18 0535    simvastatin (ZOCOR) tablet 10 mg  10 mg Oral Nightly Ti Ugalde, DO        aspirin chewable tablet 81 mg  81 mg Oral Daily Tico M Bescak, DO        furosemide (LASIX) tablet 40 mg  40 mg Oral BID Sofie Peal Bescak, DO        piperacillin-tazobactam (ZOSYN) 3.375 g in dextrose 50 mL IVPB extended infusion (premix)  3.375 g Intravenous Q8H Sofie Peal Bescak, DO 12.5 mL/hr at 03/18/18 0535 3.375 g at 03/18/18 0535    mycophenolate (CELLCEPT) capsule 250 mg  250 mg Oral BID Sofie Peal Bescak, DO   250 mg at 03/17/18 0847    insulin lispro (HUMALOG) injection vial 0-12 Units  0-12 Units Subcutaneous TID WC Sofie Peal Bescak, DO        insulin lispro (HUMALOG) injection vial 0-6 Units  0-6 Units Subcutaneous Nightly Sofie Peal Bescak, DO        amLODIPine (NORVASC) tablet 5 mg  5 mg Oral Daily Sofie Peal Bescak, DO   5 mg at 03/16/18 7892    predniSONE (DELTASONE) tablet 5 mg  5 mg Oral Daily Sofie Peal Bescak, DO   5 mg at 03/17/18 0853    acetaminophen (TYLENOL) tablet 650 mg  650 mg Oral
[facility] [place] Respiratory Therapy Evaluation   Current Order:  Albuterol Q PRN     Home Regimen: none     Ordering Physician: Marylene Clarke  Re-evaluation Date:  3/18     Diagnosis: na      Patient Status: Stable / Unstable + Physician notified    The following MDI Criteria must be met in order to convert aerosol to MDI with spacer. If unable to meet, MDI will be converted to aerosol:  []  Patient able to demonstrate the ability to use MDI effectively  []  Patient alert and cooperative  []  Patient able to take deep breath with 5-10 second hold  []  Medication(s) available in this delivery method   []  Peak flow greater than or equal to 200 ml/min            Current Order Substituted To  (same drug, same frequency)   Aerosol to MDI [] Albuterol Sulfate 0.083% unit dose by aerosol Albuterol Sulfate MDI 2 puffs by inhalation with spacer    [] Levalbuterol 1.25 mg unit dose by aerosol Levalbuterol MDI 2 puffs by inhalation with spacer    [] Levalbuterol 0.63 mg unit dose by aerosol Levalbuterol MDI 2 puffs by inhalation with spacer    [] Ipratropium Bromide 0.02% unit dose by aerosol Ipratropium Bromide MDI 2 puffs by inhalation with spacer    [] Duoneb (Ipratropium + Albuterol) unit dose by aerosol Ipratropium MDI + Albuterol MDI 2 puffs by inhalation w/spacer   MDI to Aerosol [] Albuterol Sulfate MDI Albuterol Sulfate 0.083% unit dose by aerosol    [] Levalbuterol MDI 2 puffs by inhalation Levalbuterol 1.25 mg unit dose by aerosol    [] Ipratropium Bromide MDI by inhalation Ipratropium Bromide 0.02% unit dose by aerosol    [] Combivent (Ipratropium + Albuterol) MDI by inhalation Duoneb (Ipratropium + Albuterol) unit dose by aerosol   Treatment Assessment [Frequency/Schedule]:  Change frequency to: albuterol q2 prn __________________________________________________per Protocol, P&T, MEC      Points 0 1 2 3 4   Pulmonary Status  Non-Smoker  []   Smoking history   < 20 pack years  []   Smoking history  ?  20 pack years  [x]
but consider follow-up in selected patients at high risk at 2 and 4 years (recommendation 5A). Note. --These recommendations do not apply to lung cancer screening, patients with immunosuppression, or patients with known primary cancer. * Dimensions are average of long and short axes, rounded to the nearest millimeter. ** Consider all relevant risk factors (see Risk Factors). ____________________________________________________________ All CT scans at this facility use dose modulation, iterative reconstruction, and/or weight based dosing when appropriate to reduce radiation dose to as low as reasonably achievable. ASSESSMENT /Plan:  1. Right middle, right lower lobe and left lower lobe pneumonia. Patient is on chronic immunosuppressive therapy as he is status post kidney transplant in 2015  2. Immunosuppressed state, rule out atypical infection  3. COPD, emphysema  4. Status post  kidney transplant  5. Diabetes  6. Hypertension    Bronchoscopic scheduled due to Due to immunosuppressed state and persistent symptoms with right middle lower leg and infiltration-NPO after midnight due to Bronchoscopy . Patient was explained about risks benefits and options of the procedure and patient agree for bronchoscopy. Continue current treatment plan for now.      Electronically signed by Yasmine Shen MD on 3/20/2018 at 3:26 PM
as left lower lobe atelectasis or infiltration. Continue nebulizer but changed to DuoNeb 4 times a day and albuterol every 2 hours when necessary. Add Mucinex 600 mg twice a day. .  Acapella.   Will follow         Electronically signed by Don Orozco MD, FCCP on 3/18/2018 at 3:33 PM

## 2018-03-21 LAB — CULTURE, BLOOD 2: NORMAL

## 2018-03-21 PROCEDURE — 93010 ELECTROCARDIOGRAM REPORT: CPT | Performed by: INTERNAL MEDICINE

## 2018-03-21 NOTE — DISCHARGE SUMMARY
prior to discharge. Given Levaquin by Dr Laurel Haddad and will follow with Pulmonary for results of their testing. Assessment:  Active Hospital Problems    Diagnosis Date Noted    Pneumonia [J18.9] 03/16/2018         Plan:   Medications:  Discharge Medication List as of 3/20/2018  5:46 PM      START taking these medications    Details   levofloxacin (LEVAQUIN) 500 MG tablet Take 1 tablet by mouth daily for 14 days, Disp-28 tablet, R-0Normal         CONTINUE these medications which have NOT CHANGED    Details   furosemide (LASIX) 40 MG tablet Take 40 mg by mouth See Admin InstructionsHistorical Med      carvedilol (COREG) 6.25 MG tablet Take 6.25 mg by mouth 2 times daily (with meals)Historical Med      calcium carbonate (TUMS) 500 MG chewable tablet Take 1 tablet by mouth dailyHistorical Med      guaiFENesin 400 MG tablet Take 400 mg by mouth 4 times daily as needed for CoughHistorical Med      promethazine-codeine (PHENERGAN WITH CODEINE) 6.25-10 MG/5ML syrup Take 5 mLs by mouth 4 times daily as needed for Cough. Historical Med      benzonatate (TESSALON) 200 MG capsule Take 200 mg by mouth 4 times daily as neededHistorical Med      amLODIPine (NORVASC) 10 MG tablet Take 10 mg by mouth daily , R-1Historical Med      aspirin 81 MG EC tablet R-3      cyclobenzaprine (FLEXERIL) 10 MG tablet Take 10 mg by mouth , R-0Historical Med      cycloSPORINE modified (NEORAL) 25 MG capsule R-2      gabapentin (NEURONTIN) 300 MG capsule R-2      mycophenolate (CELLCEPT) 250 MG capsule R-1      pantoprazole (PROTONIX) 40 MG tablet R-4      predniSONE (DELTASONE) 5 MG tablet R-2      simvastatin (ZOCOR) 10 MG tablet R-1         STOP taking these medications       clarithromycin (BIAXIN) 500 MG tablet Comments:   Reason for Stopping:         glipiZIDE (GLUCOTROL) 10 MG tablet Comments:   Reason for Stopping:         sodium bicarbonate 650 MG tablet Comments:   Reason for Stopping:              Follow-up visits: Yefri Wheeler

## 2018-03-22 LAB — GRAM STAIN RESULT: NORMAL

## 2018-03-23 LAB
CULTURE, RESPIRATORY: ABNORMAL
CULTURE, RESPIRATORY: ABNORMAL
CYTOMEGALOVIRUS PCR DETECTION: NOT DETECTED
CYTOMEGALOVIRUS SOURCE: NORMAL
ORGANISM: ABNORMAL

## 2018-03-29 ENCOUNTER — OFFICE VISIT (OUTPATIENT)
Dept: PULMONOLOGY | Age: 68
End: 2018-03-29
Payer: MEDICARE

## 2018-03-29 VITALS
OXYGEN SATURATION: 98 % | DIASTOLIC BLOOD PRESSURE: 72 MMHG | HEART RATE: 63 BPM | HEIGHT: 74 IN | WEIGHT: 229 LBS | SYSTOLIC BLOOD PRESSURE: 132 MMHG | TEMPERATURE: 95.8 F | BODY MASS INDEX: 29.39 KG/M2

## 2018-03-29 DIAGNOSIS — J44.9 CHRONIC OBSTRUCTIVE PULMONARY DISEASE, UNSPECIFIED COPD TYPE (HCC): ICD-10-CM

## 2018-03-29 DIAGNOSIS — J18.9 PNEUMONIA OF RIGHT LUNG DUE TO INFECTIOUS ORGANISM, UNSPECIFIED PART OF LUNG: Primary | ICD-10-CM

## 2018-03-29 DIAGNOSIS — D84.9 IMMUNOCOMPROMISED PATIENT (HCC): ICD-10-CM

## 2018-03-29 PROCEDURE — G8419 CALC BMI OUT NRM PARAM NOF/U: HCPCS | Performed by: INTERNAL MEDICINE

## 2018-03-29 PROCEDURE — G8926 SPIRO NO PERF OR DOC: HCPCS | Performed by: INTERNAL MEDICINE

## 2018-03-29 PROCEDURE — 1036F TOBACCO NON-USER: CPT | Performed by: INTERNAL MEDICINE

## 2018-03-29 PROCEDURE — 3017F COLORECTAL CA SCREEN DOC REV: CPT | Performed by: INTERNAL MEDICINE

## 2018-03-29 PROCEDURE — 99214 OFFICE O/P EST MOD 30 MIN: CPT | Performed by: INTERNAL MEDICINE

## 2018-03-29 PROCEDURE — 1123F ACP DISCUSS/DSCN MKR DOCD: CPT | Performed by: INTERNAL MEDICINE

## 2018-03-29 PROCEDURE — G8427 DOCREV CUR MEDS BY ELIG CLIN: HCPCS | Performed by: INTERNAL MEDICINE

## 2018-03-29 PROCEDURE — 3023F SPIROM DOC REV: CPT | Performed by: INTERNAL MEDICINE

## 2018-03-29 PROCEDURE — 1111F DSCHRG MED/CURRENT MED MERGE: CPT | Performed by: INTERNAL MEDICINE

## 2018-03-29 PROCEDURE — 4040F PNEUMOC VAC/ADMIN/RCVD: CPT | Performed by: INTERNAL MEDICINE

## 2018-03-29 PROCEDURE — G8484 FLU IMMUNIZE NO ADMIN: HCPCS | Performed by: INTERNAL MEDICINE

## 2018-03-29 ASSESSMENT — ENCOUNTER SYMPTOMS
NAUSEA: 0
ABDOMINAL PAIN: 0
RHINORRHEA: 0
SORE THROAT: 0
WHEEZING: 0
EYE ITCHING: 0
SHORTNESS OF BREATH: 0
VOICE CHANGE: 0
DIARRHEA: 0
CHEST TIGHTNESS: 0
COUGH: 1
VOMITING: 0

## 2018-04-02 LAB
FINAL REPORT: NORMAL
PRELIMINARY: NORMAL

## 2018-04-05 ENCOUNTER — HOSPITAL ENCOUNTER (OUTPATIENT)
Dept: GENERAL RADIOLOGY | Age: 68
Discharge: HOME | End: 2018-04-07
Payer: MEDICARE

## 2018-04-05 DIAGNOSIS — J18.9 PNEUMONIA OF RIGHT LUNG DUE TO INFECTIOUS ORGANISM, UNSPECIFIED PART OF LUNG: ICD-10-CM

## 2018-04-05 PROCEDURE — 71046 X-RAY EXAM CHEST 2 VIEWS: CPT

## 2018-04-17 LAB
FINAL REPORT: NORMAL
PRELIMINARY: NORMAL

## 2018-04-24 ENCOUNTER — HOSPITAL ENCOUNTER (OUTPATIENT)
Dept: CT IMAGING | Age: 68
Discharge: HOME | End: 2018-04-26
Payer: MEDICARE

## 2018-04-24 ENCOUNTER — OFFICE VISIT (OUTPATIENT)
Dept: PULMONOLOGY | Age: 68
End: 2018-04-24
Payer: MEDICARE

## 2018-04-24 VITALS
HEART RATE: 73 BPM | DIASTOLIC BLOOD PRESSURE: 84 MMHG | BODY MASS INDEX: 29.77 KG/M2 | RESPIRATION RATE: 16 BRPM | WEIGHT: 232 LBS | SYSTOLIC BLOOD PRESSURE: 186 MMHG | HEIGHT: 74 IN

## 2018-04-24 VITALS
HEART RATE: 58 BPM | SYSTOLIC BLOOD PRESSURE: 128 MMHG | OXYGEN SATURATION: 98 % | HEIGHT: 73 IN | BODY MASS INDEX: 31.28 KG/M2 | WEIGHT: 236 LBS | DIASTOLIC BLOOD PRESSURE: 74 MMHG | TEMPERATURE: 96.2 F

## 2018-04-24 DIAGNOSIS — J18.9 ATYPICAL PNEUMONIA: ICD-10-CM

## 2018-04-24 DIAGNOSIS — J18.9 ATYPICAL PNEUMONIA: Primary | ICD-10-CM

## 2018-04-24 PROCEDURE — 99214 OFFICE O/P EST MOD 30 MIN: CPT | Performed by: INTERNAL MEDICINE

## 2018-04-24 PROCEDURE — G8417 CALC BMI ABV UP PARAM F/U: HCPCS | Performed by: INTERNAL MEDICINE

## 2018-04-24 PROCEDURE — G8427 DOCREV CUR MEDS BY ELIG CLIN: HCPCS | Performed by: INTERNAL MEDICINE

## 2018-04-24 PROCEDURE — 4040F PNEUMOC VAC/ADMIN/RCVD: CPT | Performed by: INTERNAL MEDICINE

## 2018-04-24 PROCEDURE — 1123F ACP DISCUSS/DSCN MKR DOCD: CPT | Performed by: INTERNAL MEDICINE

## 2018-04-24 PROCEDURE — 1036F TOBACCO NON-USER: CPT | Performed by: INTERNAL MEDICINE

## 2018-04-24 PROCEDURE — 71250 CT THORAX DX C-: CPT

## 2018-04-24 PROCEDURE — 3017F COLORECTAL CA SCREEN DOC REV: CPT | Performed by: INTERNAL MEDICINE

## 2018-04-24 ASSESSMENT — ENCOUNTER SYMPTOMS
RHINORRHEA: 0
DIARRHEA: 0
CHEST TIGHTNESS: 0
SHORTNESS OF BREATH: 0
ABDOMINAL PAIN: 0
COUGH: 0
SORE THROAT: 0
NAUSEA: 0
VOICE CHANGE: 0
EYE ITCHING: 0
WHEEZING: 0
VOMITING: 0

## 2018-05-01 ENCOUNTER — OFFICE VISIT (OUTPATIENT)
Dept: PULMONOLOGY | Age: 68
End: 2018-05-01
Payer: MEDICARE

## 2018-05-01 VITALS
BODY MASS INDEX: 30.24 KG/M2 | WEIGHT: 235.6 LBS | SYSTOLIC BLOOD PRESSURE: 180 MMHG | TEMPERATURE: 95.9 F | OXYGEN SATURATION: 95 % | HEIGHT: 74 IN | DIASTOLIC BLOOD PRESSURE: 72 MMHG | HEART RATE: 57 BPM

## 2018-05-01 DIAGNOSIS — J18.9 ATYPICAL PNEUMONIA: Primary | ICD-10-CM

## 2018-05-01 PROCEDURE — 1123F ACP DISCUSS/DSCN MKR DOCD: CPT | Performed by: INTERNAL MEDICINE

## 2018-05-01 PROCEDURE — G8417 CALC BMI ABV UP PARAM F/U: HCPCS | Performed by: INTERNAL MEDICINE

## 2018-05-01 PROCEDURE — 4040F PNEUMOC VAC/ADMIN/RCVD: CPT | Performed by: INTERNAL MEDICINE

## 2018-05-01 PROCEDURE — 3017F COLORECTAL CA SCREEN DOC REV: CPT | Performed by: INTERNAL MEDICINE

## 2018-05-01 PROCEDURE — 1036F TOBACCO NON-USER: CPT | Performed by: INTERNAL MEDICINE

## 2018-05-01 PROCEDURE — 99214 OFFICE O/P EST MOD 30 MIN: CPT | Performed by: INTERNAL MEDICINE

## 2018-05-01 PROCEDURE — G8427 DOCREV CUR MEDS BY ELIG CLIN: HCPCS | Performed by: INTERNAL MEDICINE

## 2018-05-01 ASSESSMENT — ENCOUNTER SYMPTOMS
RHINORRHEA: 0
NAUSEA: 0
VOMITING: 0
EYE ITCHING: 0
DIARRHEA: 0
CHEST TIGHTNESS: 0
COUGH: 0
ABDOMINAL PAIN: 0
SORE THROAT: 0
SHORTNESS OF BREATH: 0
WHEEZING: 0
VOICE CHANGE: 0

## 2018-05-15 ENCOUNTER — OFFICE VISIT (OUTPATIENT)
Dept: INFECTIOUS DISEASES | Age: 68
End: 2018-05-15
Payer: MEDICARE

## 2018-05-15 VITALS
SYSTOLIC BLOOD PRESSURE: 174 MMHG | WEIGHT: 238 LBS | HEART RATE: 60 BPM | HEIGHT: 74 IN | DIASTOLIC BLOOD PRESSURE: 75 MMHG | TEMPERATURE: 97.4 F | RESPIRATION RATE: 20 BRPM | BODY MASS INDEX: 30.54 KG/M2

## 2018-05-15 DIAGNOSIS — J18.9 PNEUMONIA OF RIGHT LUNG DUE TO INFECTIOUS ORGANISM, UNSPECIFIED PART OF LUNG: Primary | ICD-10-CM

## 2018-05-15 DIAGNOSIS — B44.9 POSITIVE SPUTUM CULTURE FOR ASPERGILLUS (HCC): ICD-10-CM

## 2018-05-15 PROCEDURE — 1123F ACP DISCUSS/DSCN MKR DOCD: CPT | Performed by: INTERNAL MEDICINE

## 2018-05-15 PROCEDURE — 3017F COLORECTAL CA SCREEN DOC REV: CPT | Performed by: INTERNAL MEDICINE

## 2018-05-15 PROCEDURE — 1036F TOBACCO NON-USER: CPT | Performed by: INTERNAL MEDICINE

## 2018-05-15 PROCEDURE — 99203 OFFICE O/P NEW LOW 30 MIN: CPT | Performed by: INTERNAL MEDICINE

## 2018-05-15 PROCEDURE — G8427 DOCREV CUR MEDS BY ELIG CLIN: HCPCS | Performed by: INTERNAL MEDICINE

## 2018-05-15 PROCEDURE — G8417 CALC BMI ABV UP PARAM F/U: HCPCS | Performed by: INTERNAL MEDICINE

## 2018-05-15 PROCEDURE — 4040F PNEUMOC VAC/ADMIN/RCVD: CPT | Performed by: INTERNAL MEDICINE

## 2018-05-16 LAB
AFB STAIN: NORMAL
FINAL REPORT: NORMAL
PRELIMINARY: NORMAL

## 2018-08-14 ENCOUNTER — HOSPITAL ENCOUNTER (OUTPATIENT)
Dept: PHYSICAL THERAPY | Age: 68
Setting detail: THERAPIES SERIES
Discharge: HOME | End: 2018-08-14
Payer: MEDICARE

## 2018-08-14 PROCEDURE — G8978 MOBILITY CURRENT STATUS: HCPCS

## 2018-08-14 PROCEDURE — 97162 PT EVAL MOD COMPLEX 30 MIN: CPT

## 2018-08-14 PROCEDURE — G8979 MOBILITY GOAL STATUS: HCPCS

## 2018-08-14 NOTE — PROGRESS NOTES
decreased balance and gait deviations. Pt would benefit from further skilled PT to improve his strength, balance and overall mobility. Prognosis: Good  Discharge Recommendations: Continue to assess pending progress    G-Codes  PT G-Codes  Functional Assessment Tool Used: Hemphill and clinical judgement  Score: 44/56  Functional Limitation: Mobility: Walking and moving around  Mobility: Walking and Moving Around Current Status (): At least 20 percent but less than 40 percent impaired, limited or restricted  Mobility: Walking and Moving Around Goal Status (): At least 1 percent but less than 20 percent impaired, limited or restricted    PLAN: [x] Evaluate and Treat  Frequency/Duration:  Plan  Times per week: 1-2  Plan weeks: 4-5  Current Treatment Recommendations: Strengthening, Balance Training, Functional Mobility Training, Gait Training, Neuromuscular Re-education, Manual Therapy - Soft Tissue Mobilization, Home Exercise Program, Stair training, Patient/Caregiver Education & Training, Equipment Evaluation, Education, & procurement, Modalities    Patient Status:[x] Continue/ Initiate plan of Care    [] Discharge PT. Recommend pt continue with HEP. [] Additional visits requested, Please re-certify for additional visits:          Signature: Electronically signed by Margie Thomson PT on 8/14/18 at 12:00 PM      If you have any questions or concerns, please don't hesitate to call. Thank you for your referral.    I have reviewed this plan of care and certify a need for medically necessary rehabilitation services.     Physician Signature:__________________________________________________________  Date:  Please sign and return

## 2019-02-14 ENCOUNTER — CLINICAL DOCUMENTATION (OUTPATIENT)
Dept: PHYSICAL THERAPY | Age: 69
End: 2019-02-14

## 2019-04-02 ENCOUNTER — OFFICE VISIT (OUTPATIENT)
Dept: ENDOCRINOLOGY | Age: 69
End: 2019-04-02
Payer: COMMERCIAL

## 2019-04-02 VITALS
HEIGHT: 74 IN | BODY MASS INDEX: 30.42 KG/M2 | WEIGHT: 237 LBS | SYSTOLIC BLOOD PRESSURE: 164 MMHG | DIASTOLIC BLOOD PRESSURE: 77 MMHG | HEART RATE: 63 BPM

## 2019-04-02 DIAGNOSIS — E11.65 UNCONTROLLED TYPE 2 DIABETES MELLITUS WITH HYPERGLYCEMIA (HCC): ICD-10-CM

## 2019-04-02 DIAGNOSIS — E11.65 UNCONTROLLED TYPE 2 DIABETES MELLITUS WITH HYPERGLYCEMIA (HCC): Primary | ICD-10-CM

## 2019-04-02 LAB
ANION GAP SERPL CALCULATED.3IONS-SCNC: 13 MEQ/L (ref 9–15)
BUN BLDV-MCNC: 35 MG/DL (ref 8–23)
CALCIUM SERPL-MCNC: 9.4 MG/DL (ref 8.5–9.9)
CHLORIDE BLD-SCNC: 105 MEQ/L (ref 95–107)
CO2: 24 MEQ/L (ref 20–31)
CREAT SERPL-MCNC: 1.7 MG/DL (ref 0.7–1.2)
GFR AFRICAN AMERICAN: 48.8
GFR NON-AFRICAN AMERICAN: 40.4
GLUCOSE BLD-MCNC: 123 MG/DL (ref 70–99)
GLUCOSE BLD-MCNC: 170 MG/DL
HBA1C MFR BLD: 7.4 % (ref 4.8–5.9)
POTASSIUM SERPL-SCNC: 6.1 MEQ/L (ref 3.4–4.9)
SODIUM BLD-SCNC: 142 MEQ/L (ref 135–144)

## 2019-04-02 PROCEDURE — 3045F PR MOST RECENT HEMOGLOBIN A1C LEVEL 7.0-9.0%: CPT | Performed by: INTERNAL MEDICINE

## 2019-04-02 PROCEDURE — 82962 GLUCOSE BLOOD TEST: CPT | Performed by: INTERNAL MEDICINE

## 2019-04-02 PROCEDURE — 1123F ACP DISCUSS/DSCN MKR DOCD: CPT | Performed by: INTERNAL MEDICINE

## 2019-04-02 PROCEDURE — 2022F DILAT RTA XM EVC RTNOPTHY: CPT | Performed by: INTERNAL MEDICINE

## 2019-04-02 PROCEDURE — G8427 DOCREV CUR MEDS BY ELIG CLIN: HCPCS | Performed by: INTERNAL MEDICINE

## 2019-04-02 PROCEDURE — 3017F COLORECTAL CA SCREEN DOC REV: CPT | Performed by: INTERNAL MEDICINE

## 2019-04-02 PROCEDURE — 1036F TOBACCO NON-USER: CPT | Performed by: INTERNAL MEDICINE

## 2019-04-02 PROCEDURE — G8417 CALC BMI ABV UP PARAM F/U: HCPCS | Performed by: INTERNAL MEDICINE

## 2019-04-02 PROCEDURE — 99203 OFFICE O/P NEW LOW 30 MIN: CPT | Performed by: INTERNAL MEDICINE

## 2019-04-02 PROCEDURE — 4040F PNEUMOC VAC/ADMIN/RCVD: CPT | Performed by: INTERNAL MEDICINE

## 2019-04-02 NOTE — PROGRESS NOTES
Subjective:      Patient ID: Misael Hernandez is a 80 y.o. male. Pt referred for uncontrolled diabetes   Diabetes   He presents for his initial diabetic visit. He has type 2 diabetes mellitus. Onset time: 13 yrs plus diagnosed in 2005  Associated symptoms include fatigue. Diabetic complications include nephropathy and peripheral neuropathy. Risk factors for coronary artery disease include dyslipidemia. Current diabetic treatment includes insulin injections (treshiba/novolog plus trulicity ). He is currently taking insulin pre-breakfast, pre-lunch, pre-dinner and at bedtime. His overall blood glucose range is 140-180 mg/dl. (Lab Results       Component                Value               Date                       LABA1C                   7.4 (H)             04/02/2019            )       Reviewed labs  Results for Santino Berkowitz (MRN [id number]) as of 4/7/2019 18:05   Ref.  Range 4/2/2019 17:17   Sodium Latest Ref Range: 135 - 144 mEq/L 142   Potassium Latest Ref Range: 3.4 - 4.9 mEq/L 6.1 (HH)   Chloride Latest Ref Range: 95 - 107 mEq/L 105   CO2 Latest Ref Range: 20 - 31 mEq/L 24   BUN Latest Ref Range: 8 - 23 mg/dL 35 (H)   Creatinine Latest Ref Range: 0.70 - 1.20 mg/dL 1.70 (H)   Anion Gap Latest Ref Range: 9 - 15 mEq/L 13   GFR Non- Latest Ref Range: >60  40.4 (L)   GFR  Latest Ref Range: >60  48.8 (L)   Glucose Latest Ref Range: 70 - 99 mg/dL 123 (H)   Calcium Latest Ref Range: 8.5 - 9.9 mg/dL 9.4   Hemoglobin A1C Latest Ref Range: 4.8 - 5.9 % 7.4 (H)         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    Anemia    Essential hypertension    Nonspecific abnormal results of thyroid function study    Mixed hyperlipidemia    Kidney replaced by transplant    Intra-abdominal abscess post-procedure    Infection due to enterococcus    Fever and other physiologic disturbances of temperature regulation    Chronic kidney disease (CKD)    Type II or unspecified type diabetes mellitus without mention of complication, uncontrolled    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     Social History     Socioeconomic History    Marital status:      Spouse name: Not on file    Number of children: Not on file    Years of education: Not on file    Highest education level: Not on file   Occupational History    Not on file   Social Needs    Financial resource strain: Not on file    Food insecurity:     Worry: Not on file     Inability: Not on file    Transportation needs:     Medical: Not on file     Non-medical: Not on file   Tobacco Use    Smoking status: Former Smoker     Last attempt to quit: 6/17/2015     Years since quitting: 3.7    Smokeless tobacco: Former User   Substance and Sexual Activity    Alcohol use: No     Alcohol/week: 0.0 oz    Drug use: No    Sexual activity: Not on file   Lifestyle    Physical activity:     Days per week: Not on file     Minutes per session: Not on file    Stress: Not on file   Relationships    Social connections:     Talks on phone: Not on file     Gets together: Not on file     Attends Restorationist service: Not on file     Active member of club or organization: Not on file     Attends meetings of clubs or organizations: Not on file     Relationship status: Not on file    Intimate partner violence:     Fear of current or ex partner: Not on file     Emotionally abused: Not on file     Physically abused: Not on file     Forced sexual activity: Not on file   Other Topics Concern    Not on file   Social History Narrative    Not on file     Past Surgical History:   Procedure Laterality Date    KIDNEY TRANSPLANT  2015    [place] [address] INCL FLUOR GDNCE DX W/CELL [address] N/A 3/20/2018    BRONCHOSCOPY performed by [id number] Caitlyn Arizmendi Se, MD at [facility]         Review of Systems   Constitutional: Positive for fatigue. Cardiovascular: Negative. Endocrine: Negative. All other systems reviewed and are negative. Vitals:    04/02/19 1556   BP: (!) 164/77   Site: Left Upper Arm   Position: Sitting   Cuff Size: Large Adult   Pulse: 63   Weight: 237 lb (107.5 kg)   Height: 6' 1.5\" (1.867 m)       Objective:   Physical Exam   Constitutional: He appears well-developed and well-nourished. Eyes: EOM are normal.   Neck: Neck supple. Cardiovascular: Normal rate, regular rhythm, normal heart sounds and intact distal pulses. Musculoskeletal: He exhibits edema. Feet:    Skin: Skin is warm. Results for Santino Berkowitz (MRN [id number]) as of 4/2/2019 16:38   Ref. Range 3/20/2019 12:31 3/20/2019 13:23   Sodium Latest Ref Range: 135 - 144 mEq/L 143    Potassium Latest Ref Range: 3.4 - 4.9 mEq/L 5.1 (H)    Chloride Latest Ref Range: 95 - 107 mEq/L 106    CO2 Latest Ref Range: 20 - 31 mEq/L 23    BUN Latest Ref Range: 8 - 23 mg/dL 43 (H)    Creatinine Latest Ref Range: 0.70 - 1.20 mg/dL 1.68 (H)    Anion Gap Latest Ref Range: 9 - 15 mEq/L 14    GFR Non- Latest Ref Range: >60  40.9 (L)    GFR  Latest Ref Range: >60  49.5 (L)    Glucose Latest Ref Range: 70 - 99 mg/dL 169 (H)    Calcium Latest Ref Range: 8.5 - 9.9 mg/dL 9.4    Phosphorus Latest Ref Range: 2.3 - 4.8 mg/dL 3.6    Albumin Latest Ref Range: 3.5 - 4.6 g/dL 4.2    WBC Latest Ref Range: 4.8 - 10.8 K/uL  7.2   RBC Latest Ref Range: 4.70 - 6.10 M/uL  4.40 (L)   Hemoglobin Quant Latest Ref Range: 14.0 - 18.0 g/dL  13.1 (L)   Hematocrit Latest Ref Range: 42.0 - 52.0 %  40.3 (L)   MCV Latest Ref Range: 80.0 - 100.0 fL  91.6   MCH Latest Ref Range: 27.0 - 31.3 pg  29.9   MCHC Latest Ref Range: 33.0 - 37.0 %  32.6 (L)   RDW Latest Ref Range: 11.5 - 14.5 %  14.6 (H)   Platelet Count Latest Ref Range: 130 - 400 K/uL  203     Assessment:       Diagnosis Orders   1.  Uncontrolled type 2 diabetes mellitus with hyperglycemia (HCC)  POCT Glucose    Basic Metabolic Panel    Hemoglobin A1C           Plan:      Orders Placed This Encounter   Procedures    Basic Metabolic Panel     Standing Status:   Future     Standing Expiration Date:   2020    Hemoglobin A1C     Standing Status:   Future     Standing Expiration Date:   2020    POCT Glucose     Orders Placed This Encounter   Medications    Insulin Degludec (TRESIBA FLEXTOUCH) 100 UNIT/ML SOPN     Si units at night     Dispense:  5 pen     Refill:  2    insulin aspart (NOVOLOG) 100 UNIT/ML injection vial     Sig: Indications: sliding fee scale 4 units am 6 units lunch and 6 with dinner     Dispense:  1 vial     Refill:  2    insulin NPH (NOVOLIN N) 100 UNIT/ML injection vial     Si units at bedtime     Dispense:  1 vial     Refill:  3    insulin regular (NOVOLIN R) 100 UNIT/ML injection     Si units am and 6 units lunch and dinner     Dispense:  1 vial     Refill:  3     Medications Discontinued During This Encounter   Medication Reason    promethazine-codeine (PHENERGAN WITH CODEINE) 6.25-10 MG/5ML syrup LIST CLEANUP    guaiFENesin 400 MG tablet LIST CLEANUP    Dulaglutide (TRULICITY) 5.57 OE/1.2YG SOPN Therapy completed    Insulin Degludec (TRESIBA FLEXTOUCH) 100 UNIT/ML SOPN REORDER    insulin aspart (NOVOLOG) 100 UNIT/ML injection vial REORDER     Stop trulicity   Discussed new insulin regimen   hbaic goal of 6.5 or lower   Low potassium diet  More than 50 % of 30 min spend in pt education/counseling         Veronica Byers MD

## 2019-04-08 ENCOUNTER — TELEPHONE (OUTPATIENT)
Dept: ENDOCRINOLOGY | Age: 69
End: 2019-04-08

## 2019-04-08 DIAGNOSIS — E87.5 HIGH POTASSIUM: Primary | ICD-10-CM

## 2019-04-08 NOTE — TELEPHONE ENCOUNTER
----- Message from Maria Fernanda Hill MD sent at 4/7/2019  6:14 PM EDT -----  Call pt last potassium was 6.1 from 4/2/19  Call in kayexalate 30 gm one dose and rpt bmp in 7 days

## 2019-04-09 DIAGNOSIS — E87.5 HIGH POTASSIUM: ICD-10-CM

## 2019-04-09 LAB
ANION GAP SERPL CALCULATED.3IONS-SCNC: 17 MEQ/L (ref 9–15)
BUN BLDV-MCNC: 44 MG/DL (ref 8–23)
CALCIUM SERPL-MCNC: 9.3 MG/DL (ref 8.5–9.9)
CHLORIDE BLD-SCNC: 107 MEQ/L (ref 95–107)
CO2: 23 MEQ/L (ref 20–31)
CREAT SERPL-MCNC: 1.78 MG/DL (ref 0.7–1.2)
GFR AFRICAN AMERICAN: 46.3
GFR NON-AFRICAN AMERICAN: 38.3
GLUCOSE BLD-MCNC: 118 MG/DL (ref 70–99)
POTASSIUM SERPL-SCNC: 4.4 MEQ/L (ref 3.4–4.9)
SODIUM BLD-SCNC: 147 MEQ/L (ref 135–144)

## 2019-04-30 ENCOUNTER — OFFICE VISIT (OUTPATIENT)
Dept: ENDOCRINOLOGY | Age: 69
End: 2019-04-30
Payer: COMMERCIAL

## 2019-04-30 VITALS
SYSTOLIC BLOOD PRESSURE: 185 MMHG | WEIGHT: 238 LBS | DIASTOLIC BLOOD PRESSURE: 90 MMHG | HEIGHT: 74 IN | HEART RATE: 65 BPM | BODY MASS INDEX: 30.54 KG/M2

## 2019-04-30 DIAGNOSIS — E11.65 UNCONTROLLED TYPE 2 DIABETES MELLITUS WITH HYPERGLYCEMIA (HCC): Primary | ICD-10-CM

## 2019-04-30 LAB
CHP ED QC CHECK: NORMAL
GLUCOSE BLD-MCNC: 243 MG/DL

## 2019-04-30 PROCEDURE — G8427 DOCREV CUR MEDS BY ELIG CLIN: HCPCS | Performed by: INTERNAL MEDICINE

## 2019-04-30 PROCEDURE — 82962 GLUCOSE BLOOD TEST: CPT | Performed by: INTERNAL MEDICINE

## 2019-04-30 PROCEDURE — 3017F COLORECTAL CA SCREEN DOC REV: CPT | Performed by: INTERNAL MEDICINE

## 2019-04-30 PROCEDURE — 3045F PR MOST RECENT HEMOGLOBIN A1C LEVEL 7.0-9.0%: CPT | Performed by: INTERNAL MEDICINE

## 2019-04-30 PROCEDURE — 1123F ACP DISCUSS/DSCN MKR DOCD: CPT | Performed by: INTERNAL MEDICINE

## 2019-04-30 PROCEDURE — 99213 OFFICE O/P EST LOW 20 MIN: CPT | Performed by: INTERNAL MEDICINE

## 2019-04-30 PROCEDURE — G8417 CALC BMI ABV UP PARAM F/U: HCPCS | Performed by: INTERNAL MEDICINE

## 2019-04-30 PROCEDURE — 4040F PNEUMOC VAC/ADMIN/RCVD: CPT | Performed by: INTERNAL MEDICINE

## 2019-04-30 PROCEDURE — 2022F DILAT RTA XM EVC RTNOPTHY: CPT | Performed by: INTERNAL MEDICINE

## 2019-04-30 PROCEDURE — 1036F TOBACCO NON-USER: CPT | Performed by: INTERNAL MEDICINE

## 2019-04-30 NOTE — PROGRESS NOTES
Subjective:      Patient ID: Kortney William is a 80 y.o. male. 4 week f/u on diabetes   Diabetes   He presents for his follow-up diabetic visit. He has type 2 diabetes mellitus. Diabetic complications include nephropathy and peripheral neuropathy. Current diabetic treatment includes insulin injections (novolin n plus r ). He is currently taking insulin pre-breakfast, pre-lunch, pre-dinner and at bedtime. Frequency home blood tests: Patient testing 4 times a day. His overall blood glucose range is 140-180 mg/dl. (Average glucose in 170-180     Taking 4 injections a day and also testing 4 times a day to make frequent adjustment to his insulin)     Reviewed labs potassium was high recently     Results for Lavelle Kocher (MRN [id number]) as of 5/5/2019 15:33   Ref.  Range 4/2/2019 17:17 4/9/2019 07:34   Sodium Latest Ref Range: 135 - 144 mEq/L 142 147 (H)   Potassium Latest Ref Range: 3.4 - 4.9 mEq/L 6.1 (HH) 4.4   Chloride Latest Ref Range: 95 - 107 mEq/L 105 107   CO2 Latest Ref Range: 20 - 31 mEq/L 24 23   BUN Latest Ref Range: 8 - 23 mg/dL 35 (H) 44 (H)   Creatinine Latest Ref Range: 0.70 - 1.20 mg/dL 1.70 (H) 1.78 (H)   Anion Gap Latest Ref Range: 9 - 15 mEq/L 13 17 (H)   GFR Non- Latest Ref Range: >60  40.4 (L) 38.3 (L)   GFR  Latest Ref Range: >60  48.8 (L) 46.3 (L)   Glucose Latest Ref Range: 70 - 99 mg/dL 123 (H) 118 (H)   Calcium Latest Ref Range: 8.5 - 9.9 mg/dL 9.4 9.3   Hemoglobin A1C Latest Ref Range: 4.8 - 5.9 % 7.4 (H)          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    Anemia    Essential hypertension    Nonspecific abnormal results of thyroid function study    Mixed hyperlipidemia    Kidney replaced by transplant    Intra-abdominal abscess post-procedure    Infection due to enterococcus    Fever and other physiologic disturbances of temperature regulation    Chronic kidney disease (CKD)    Type II or unspecified type diabetes mellitus without mention of complication, uncontrolled    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 Endocrine: Negative. All other systems reviewed and are negative. Vitals:    04/30/19 1551   BP: (!) 185/90   Pulse: 65   Weight: 238 lb (108 kg)   Height: 6' 1.5\" (1.867 m)       Objective:   Physical Exam   Constitutional: He appears well-developed and well-nourished. Cardiovascular: Normal rate. Musculoskeletal: He exhibits edema. Skin: Skin is warm. Assessment:       Diagnosis Orders   1.  Uncontrolled type 2 diabetes mellitus with hyperglycemia (HCC)  Basic Metabolic Panel    Microalbumin / Creatinine Urine Ratio     DIABETES FOOT EXAM    Hemoglobin A1C    POCT Glucose           Plan:      Orders Placed This Encounter   Procedures    Basic Metabolic Panel     Standing Status:   Future     Standing Expiration Date:   4/30/2020    Microalbumin / Creatinine Urine Ratio     Standing Status:   Future     Standing Expiration Date:   4/30/2020    Hemoglobin A1C     Standing Status:   Future     Standing Expiration Date:   4/30/2020    POCT Glucose     DIABETES FOOT EXAM     Continue novolin n 16 units hs plus novolin r 4 am 6 units lunch and dinner     Tomas Hunter MD

## 2019-06-27 ENCOUNTER — TELEPHONE (OUTPATIENT)
Dept: ENDOCRINOLOGY | Age: 69
End: 2019-06-27

## 2019-06-27 NOTE — TELEPHONE ENCOUNTER
Loose stools for about two weeks but now is completely diarrhea started this morning. He is wondering if one of his medications is causing this.     Please advise at [phone]

## 2019-07-01 DIAGNOSIS — E11.65 UNCONTROLLED TYPE 2 DIABETES MELLITUS WITH HYPERGLYCEMIA (HCC): ICD-10-CM

## 2019-07-01 LAB
ANION GAP SERPL CALCULATED.3IONS-SCNC: 15 MEQ/L (ref 9–15)
BUN BLDV-MCNC: 35 MG/DL (ref 8–23)
CALCIUM SERPL-MCNC: 8.9 MG/DL (ref 8.5–9.9)
CHLORIDE BLD-SCNC: 113 MEQ/L (ref 95–107)
CO2: 20 MEQ/L (ref 20–31)
CREAT SERPL-MCNC: 1.95 MG/DL (ref 0.7–1.2)
GFR AFRICAN AMERICAN: 41.7
GFR NON-AFRICAN AMERICAN: 34.4
GLUCOSE BLD-MCNC: 133 MG/DL (ref 70–99)
HBA1C MFR BLD: 7.1 % (ref 4.8–5.9)
POTASSIUM SERPL-SCNC: 4.9 MEQ/L (ref 3.4–4.9)
SODIUM BLD-SCNC: 148 MEQ/L (ref 135–144)

## 2019-07-02 LAB
CREATININE URINE: 85.5 MG/DL
MICROALBUMIN UR-MCNC: 2.8 MG/DL
MICROALBUMIN/CREAT UR-RTO: 32.7 MG/G (ref 0–30)

## 2019-07-17 ENCOUNTER — APPOINTMENT (OUTPATIENT)
Dept: GENERAL RADIOLOGY | Age: 69
End: 2019-07-17
Payer: MEDICARE

## 2019-07-17 ENCOUNTER — HOSPITAL ENCOUNTER (OUTPATIENT)
Age: 69
Setting detail: OBSERVATION
Discharge: HOME | End: 2019-07-18
Attending: STUDENT IN AN ORGANIZED HEALTH CARE EDUCATION/TRAINING PROGRAM | Admitting: INTERNAL MEDICINE
Payer: MEDICARE

## 2019-07-17 DIAGNOSIS — I20.8 ANGINAL CHEST PAIN AT REST (HCC): ICD-10-CM

## 2019-07-17 DIAGNOSIS — I20.9 ANGINA PECTORIS (HCC): Primary | ICD-10-CM

## 2019-07-17 DIAGNOSIS — E11.65 TYPE 2 DIABETES MELLITUS WITH HYPERGLYCEMIA, UNSPECIFIED WHETHER LONG TERM INSULIN USE (HCC): ICD-10-CM

## 2019-07-17 DIAGNOSIS — N18.2 CHRONIC RENAL IMPAIRMENT, STAGE 2 (MILD): ICD-10-CM

## 2019-07-17 PROBLEM — I20.89 ANGINAL CHEST PAIN AT REST: Status: ACTIVE | Noted: 2019-07-17

## 2019-07-17 LAB
ALBUMIN SERPL-MCNC: 4.2 G/DL (ref 3.5–4.6)
ALP BLD-CCNC: 84 U/L (ref 35–104)
ALT SERPL-CCNC: 11 U/L (ref 0–41)
ANION GAP SERPL CALCULATED.3IONS-SCNC: 14 MEQ/L (ref 9–15)
APTT: 31.5 SEC (ref 24.4–36.8)
AST SERPL-CCNC: 12 U/L (ref 0–40)
BASOPHILS ABSOLUTE: 0 K/UL (ref 0–0.2)
BASOPHILS RELATIVE PERCENT: 0.5 %
BILIRUB SERPL-MCNC: 1.1 MG/DL (ref 0.2–0.7)
BUN BLDV-MCNC: 31 MG/DL (ref 8–23)
C-REACTIVE PROTEIN, HIGH SENSITIVITY: 177.5 MG/L (ref 0–5)
CALCIUM SERPL-MCNC: 9.1 MG/DL (ref 8.5–9.9)
CHLORIDE BLD-SCNC: 106 MEQ/L (ref 95–107)
CHOLESTEROL, TOTAL: 160 MG/DL (ref 0–199)
CHP ED QC CHECK: NORMAL
CO2: 20 MEQ/L (ref 20–31)
CREAT SERPL-MCNC: 1.77 MG/DL (ref 0.7–1.2)
EOSINOPHILS ABSOLUTE: 0.4 K/UL (ref 0–0.7)
EOSINOPHILS RELATIVE PERCENT: 4.1 %
GFR AFRICAN AMERICAN: 46.6
GFR NON-AFRICAN AMERICAN: 38.5
GLOBULIN: 3.4 G/DL (ref 2.3–3.5)
GLUCOSE BLD-MCNC: 131 MG/DL (ref 60–115)
GLUCOSE BLD-MCNC: 140 MG/DL
GLUCOSE BLD-MCNC: 140 MG/DL (ref 60–115)
GLUCOSE BLD-MCNC: 167 MG/DL (ref 70–99)
HCT VFR BLD CALC: 35.3 % (ref 42–52)
HDLC SERPL-MCNC: 44 MG/DL (ref 40–59)
HEMOGLOBIN: 11.8 G/DL (ref 14–18)
INR BLD: 1.2
LDL CHOLESTEROL CALCULATED: 92 MG/DL (ref 0–129)
LYMPHOCYTES ABSOLUTE: 0.3 K/UL (ref 1–4.8)
LYMPHOCYTES RELATIVE PERCENT: 3.2 %
MAGNESIUM: 1.9 MG/DL (ref 1.7–2.4)
MCH RBC QN AUTO: 30 PG (ref 27–31.3)
MCHC RBC AUTO-ENTMCNC: 33.4 % (ref 33–37)
MCV RBC AUTO: 90 FL (ref 80–100)
MONOCYTES ABSOLUTE: 1 K/UL (ref 0.2–0.8)
MONOCYTES RELATIVE PERCENT: 9.9 %
NEUTROPHILS ABSOLUTE: 8 K/UL (ref 1.4–6.5)
NEUTROPHILS RELATIVE PERCENT: 82.3 %
PDW BLD-RTO: 14.7 % (ref 11.5–14.5)
PERFORMED ON: ABNORMAL
PERFORMED ON: ABNORMAL
PLATELET # BLD: 167 K/UL (ref 130–400)
POTASSIUM SERPL-SCNC: 4.6 MEQ/L (ref 3.4–4.9)
PRO-BNP: 1451 PG/ML
PRO-BNP: 1618 PG/ML
PROTHROMBIN TIME: 15.3 SEC (ref 12.3–14.9)
RBC # BLD: 3.92 M/UL (ref 4.7–6.1)
SODIUM BLD-SCNC: 140 MEQ/L (ref 135–144)
TOTAL CK: 45 U/L (ref 0–190)
TOTAL CK: 54 U/L (ref 0–190)
TOTAL PROTEIN: 7.6 G/DL (ref 6.3–8)
TRIGL SERPL-MCNC: 120 MG/DL (ref 0–150)
TROPONIN: <0.01 NG/ML (ref 0–0.01)
TSH SERPL DL<=0.05 MIU/L-ACNC: 0.4 UIU/ML (ref 0.44–3.86)
WBC # BLD: 9.8 K/UL (ref 4.8–10.8)

## 2019-07-17 PROCEDURE — 80061 LIPID PANEL: CPT

## 2019-07-17 PROCEDURE — 99285 EMERGENCY DEPT VISIT HI MDM: CPT

## 2019-07-17 PROCEDURE — 84443 ASSAY THYROID STIM HORMONE: CPT

## 2019-07-17 PROCEDURE — 36415 COLL VENOUS BLD VENIPUNCTURE: CPT

## 2019-07-17 PROCEDURE — 85730 THROMBOPLASTIN TIME PARTIAL: CPT

## 2019-07-17 PROCEDURE — 85610 PROTHROMBIN TIME: CPT

## 2019-07-17 PROCEDURE — 80053 COMPREHEN METABOLIC PANEL: CPT

## 2019-07-17 PROCEDURE — 2580000003 HC RX 258: Performed by: INTERNAL MEDICINE

## 2019-07-17 PROCEDURE — 85025 COMPLETE CBC W/AUTO DIFF WBC: CPT

## 2019-07-17 PROCEDURE — 80180 DRUG SCRN QUAN MYCOPHENOLATE: CPT

## 2019-07-17 PROCEDURE — 86141 C-REACTIVE PROTEIN HS: CPT

## 2019-07-17 PROCEDURE — G0378 HOSPITAL OBSERVATION PER HR: HCPCS

## 2019-07-17 PROCEDURE — 6370000000 HC RX 637 (ALT 250 FOR IP): Performed by: INTERNAL MEDICINE

## 2019-07-17 PROCEDURE — 84484 ASSAY OF TROPONIN QUANT: CPT

## 2019-07-17 PROCEDURE — 99204 OFFICE O/P NEW MOD 45 MIN: CPT | Performed by: INTERNAL MEDICINE

## 2019-07-17 PROCEDURE — 83880 ASSAY OF NATRIURETIC PEPTIDE: CPT

## 2019-07-17 PROCEDURE — 6360000002 HC RX W HCPCS: Performed by: INTERNAL MEDICINE

## 2019-07-17 PROCEDURE — 82550 ASSAY OF CK (CPK): CPT

## 2019-07-17 PROCEDURE — 6370000000 HC RX 637 (ALT 250 FOR IP): Performed by: STUDENT IN AN ORGANIZED HEALTH CARE EDUCATION/TRAINING PROGRAM

## 2019-07-17 PROCEDURE — 71046 X-RAY EXAM CHEST 2 VIEWS: CPT

## 2019-07-17 PROCEDURE — 93005 ELECTROCARDIOGRAM TRACING: CPT | Performed by: STUDENT IN AN ORGANIZED HEALTH CARE EDUCATION/TRAINING PROGRAM

## 2019-07-17 PROCEDURE — 83735 ASSAY OF MAGNESIUM: CPT

## 2019-07-17 RX ORDER — CYCLOSPORINE 25 MG/1
75 CAPSULE, LIQUID FILLED ORAL 2 TIMES DAILY
Status: DISCONTINUED | OUTPATIENT
Start: 2019-07-17 | End: 2019-07-18 | Stop reason: HOSPADM

## 2019-07-17 RX ORDER — ASPIRIN 81 MG/1
81 TABLET, CHEWABLE ORAL DAILY
Status: DISCONTINUED | OUTPATIENT
Start: 2019-07-17 | End: 2019-07-18 | Stop reason: HOSPADM

## 2019-07-17 RX ORDER — ASPIRIN 81 MG/1
81 TABLET, CHEWABLE ORAL DAILY
Status: DISCONTINUED | OUTPATIENT
Start: 2019-07-18 | End: 2019-07-17 | Stop reason: SDUPTHER

## 2019-07-17 RX ORDER — SODIUM CHLORIDE 0.9 % (FLUSH) 0.9 %
10 SYRINGE (ML) INJECTION EVERY 12 HOURS SCHEDULED
Status: DISCONTINUED | OUTPATIENT
Start: 2019-07-17 | End: 2019-07-18 | Stop reason: HOSPADM

## 2019-07-17 RX ORDER — NICOTINE POLACRILEX 4 MG
15 LOZENGE BUCCAL PRN
Status: DISCONTINUED | OUTPATIENT
Start: 2019-07-17 | End: 2019-07-18 | Stop reason: HOSPADM

## 2019-07-17 RX ORDER — FUROSEMIDE 20 MG/1
20 TABLET ORAL DAILY
Status: DISCONTINUED | OUTPATIENT
Start: 2019-07-17 | End: 2019-07-18 | Stop reason: HOSPADM

## 2019-07-17 RX ORDER — NITROGLYCERIN 0.4 MG/1
0.4 TABLET SUBLINGUAL EVERY 5 MIN PRN
Status: DISCONTINUED | OUTPATIENT
Start: 2019-07-17 | End: 2019-07-18 | Stop reason: HOSPADM

## 2019-07-17 RX ORDER — ONDANSETRON 2 MG/ML
4 INJECTION INTRAMUSCULAR; INTRAVENOUS EVERY 6 HOURS PRN
Status: DISCONTINUED | OUTPATIENT
Start: 2019-07-17 | End: 2019-07-18 | Stop reason: HOSPADM

## 2019-07-17 RX ORDER — DEXTROSE MONOHYDRATE 50 MG/ML
100 INJECTION, SOLUTION INTRAVENOUS PRN
Status: DISCONTINUED | OUTPATIENT
Start: 2019-07-17 | End: 2019-07-18 | Stop reason: HOSPADM

## 2019-07-17 RX ORDER — PREDNISONE 10 MG/1
5 TABLET ORAL DAILY
Status: DISCONTINUED | OUTPATIENT
Start: 2019-07-17 | End: 2019-07-18 | Stop reason: HOSPADM

## 2019-07-17 RX ORDER — ACETAMINOPHEN 325 MG/1
650 TABLET ORAL EVERY 4 HOURS PRN
Status: DISCONTINUED | OUTPATIENT
Start: 2019-07-17 | End: 2019-07-18 | Stop reason: HOSPADM

## 2019-07-17 RX ORDER — ASPIRIN 81 MG/1
81 TABLET, CHEWABLE ORAL ONCE
Status: COMPLETED | OUTPATIENT
Start: 2019-07-17 | End: 2019-07-17

## 2019-07-17 RX ORDER — DEXTROSE MONOHYDRATE 25 G/50ML
12.5 INJECTION, SOLUTION INTRAVENOUS PRN
Status: DISCONTINUED | OUTPATIENT
Start: 2019-07-17 | End: 2019-07-18 | Stop reason: HOSPADM

## 2019-07-17 RX ORDER — CARVEDILOL 6.25 MG/1
6.25 TABLET ORAL 2 TIMES DAILY WITH MEALS
Status: DISCONTINUED | OUTPATIENT
Start: 2019-07-17 | End: 2019-07-18 | Stop reason: HOSPADM

## 2019-07-17 RX ORDER — GABAPENTIN 300 MG/1
300 CAPSULE ORAL 3 TIMES DAILY
Status: DISCONTINUED | OUTPATIENT
Start: 2019-07-17 | End: 2019-07-18 | Stop reason: HOSPADM

## 2019-07-17 RX ORDER — MYCOPHENOLATE MOFETIL 250 MG/1
750 CAPSULE ORAL 2 TIMES DAILY
Status: DISCONTINUED | OUTPATIENT
Start: 2019-07-17 | End: 2019-07-18 | Stop reason: HOSPADM

## 2019-07-17 RX ORDER — ZOLPIDEM TARTRATE 5 MG/1
5 TABLET ORAL NIGHTLY PRN
Status: DISCONTINUED | OUTPATIENT
Start: 2019-07-17 | End: 2019-07-18 | Stop reason: HOSPADM

## 2019-07-17 RX ORDER — ATORVASTATIN CALCIUM 20 MG/1
20 TABLET, FILM COATED ORAL NIGHTLY
Status: DISCONTINUED | OUTPATIENT
Start: 2019-07-17 | End: 2019-07-17 | Stop reason: SDUPTHER

## 2019-07-17 RX ORDER — INSULIN GLARGINE 100 [IU]/ML
16 INJECTION, SOLUTION SUBCUTANEOUS NIGHTLY
Status: DISCONTINUED | OUTPATIENT
Start: 2019-07-17 | End: 2019-07-18 | Stop reason: HOSPADM

## 2019-07-17 RX ORDER — SODIUM CHLORIDE 0.9 % (FLUSH) 0.9 %
10 SYRINGE (ML) INJECTION PRN
Status: DISCONTINUED | OUTPATIENT
Start: 2019-07-17 | End: 2019-07-18 | Stop reason: HOSPADM

## 2019-07-17 RX ORDER — AMLODIPINE BESYLATE 5 MG/1
5 TABLET ORAL DAILY
Status: DISCONTINUED | OUTPATIENT
Start: 2019-07-17 | End: 2019-07-18 | Stop reason: HOSPADM

## 2019-07-17 RX ORDER — ATORVASTATIN CALCIUM 20 MG/1
20 TABLET, FILM COATED ORAL NIGHTLY
Status: DISCONTINUED | OUTPATIENT
Start: 2019-07-17 | End: 2019-07-18 | Stop reason: HOSPADM

## 2019-07-17 RX ORDER — ACETAMINOPHEN 500 MG
1000 TABLET ORAL ONCE
Status: COMPLETED | OUTPATIENT
Start: 2019-07-17 | End: 2019-07-17

## 2019-07-17 RX ORDER — ASPIRIN 81 MG/1
162 TABLET, CHEWABLE ORAL ONCE
Status: COMPLETED | OUTPATIENT
Start: 2019-07-17 | End: 2019-07-17

## 2019-07-17 RX ADMIN — MYCOPHENOLATE MOFETIL 750 MG: 250 CAPSULE ORAL at 21:02

## 2019-07-17 RX ADMIN — FUROSEMIDE 20 MG: 20 TABLET ORAL at 18:35

## 2019-07-17 RX ADMIN — Medication 10 ML: at 22:48

## 2019-07-17 RX ADMIN — INSULIN GLARGINE 16 UNITS: 100 INJECTION, SOLUTION SUBCUTANEOUS at 21:03

## 2019-07-17 RX ADMIN — GABAPENTIN 300 MG: 300 CAPSULE ORAL at 19:42

## 2019-07-17 RX ADMIN — NITROGLYCERIN 0.4 MG: 0.4 TABLET, ORALLY DISINTEGRATING SUBLINGUAL at 13:25

## 2019-07-17 RX ADMIN — Medication 10 ML: at 19:43

## 2019-07-17 RX ADMIN — ASPIRIN 81 MG 81 MG: 81 TABLET ORAL at 12:56

## 2019-07-17 RX ADMIN — CYCLOSPORINE 75 MG: 25 CAPSULE, LIQUID FILLED ORAL at 21:02

## 2019-07-17 RX ADMIN — NITROGLYCERIN 0.4 MG: 0.4 TABLET, ORALLY DISINTEGRATING SUBLINGUAL at 13:09

## 2019-07-17 RX ADMIN — ACETAMINOPHEN 1000 MG: 500 TABLET ORAL at 13:29

## 2019-07-17 RX ADMIN — ASPIRIN 81 MG 81 MG: 81 TABLET ORAL at 12:55

## 2019-07-17 RX ADMIN — ATORVASTATIN CALCIUM 20 MG: 20 TABLET, FILM COATED ORAL at 19:43

## 2019-07-17 RX ADMIN — NITROGLYCERIN 0.4 MG: 0.4 TABLET, ORALLY DISINTEGRATING SUBLINGUAL at 12:58

## 2019-07-17 RX ADMIN — ACETAMINOPHEN 650 MG: 325 TABLET ORAL at 21:02

## 2019-07-17 ASSESSMENT — PAIN DESCRIPTION - LOCATION
LOCATION: HEAD;CHEST
LOCATION: CHEST;HEAD
LOCATION: CHEST

## 2019-07-17 ASSESSMENT — PAIN SCALES - GENERAL
PAINLEVEL_OUTOF10: 6
PAINLEVEL_OUTOF10: 4
PAINLEVEL_OUTOF10: 7
PAINLEVEL_OUTOF10: 6
PAINLEVEL_OUTOF10: 7
PAINLEVEL_OUTOF10: 7
PAINLEVEL_OUTOF10: 8

## 2019-07-17 ASSESSMENT — ENCOUNTER SYMPTOMS
VOMITING: 0
WHEEZING: 0
EYES NEGATIVE: 1
ABDOMINAL PAIN: 0
SHORTNESS OF BREATH: 0
BACK PAIN: 0
COUGH: 1
SINUS PRESSURE: 0
GASTROINTESTINAL NEGATIVE: 1
DIARRHEA: 0
TROUBLE SWALLOWING: 0
NAUSEA: 0
ALLERGIC/IMMUNOLOGIC NEGATIVE: 1
CHEST TIGHTNESS: 0

## 2019-07-17 ASSESSMENT — PAIN DESCRIPTION - PAIN TYPE
TYPE: ACUTE PAIN
TYPE: ACUTE PAIN

## 2019-07-17 ASSESSMENT — PAIN DESCRIPTION - FREQUENCY
FREQUENCY: INTERMITTENT
FREQUENCY: INTERMITTENT
FREQUENCY: CONTINUOUS
FREQUENCY: INTERMITTENT

## 2019-07-17 ASSESSMENT — PAIN DESCRIPTION - ORIENTATION
ORIENTATION: LEFT;ANTERIOR
ORIENTATION: LEFT;MID

## 2019-07-17 ASSESSMENT — HEART SCORE
ECG: 0
ECG: 0

## 2019-07-17 ASSESSMENT — PAIN DESCRIPTION - DESCRIPTORS
DESCRIPTORS: SORE;PRESSURE
DESCRIPTORS: PRESSURE
DESCRIPTORS: ACHING

## 2019-07-17 ASSESSMENT — PAIN DESCRIPTION - ONSET: ONSET: ON-GOING

## 2019-07-17 ASSESSMENT — PAIN DESCRIPTION - PROGRESSION: CLINICAL_PROGRESSION: OTHER (COMMENT)

## 2019-07-17 NOTE — ED PROVIDER NOTES
for back pain and myalgias. Skin: Negative for pallor and rash. Neurological: Negative for syncope, weakness and headaches. Hematological: Does not bruise/bleed easily. All other systems reviewed and are negative. Except as noted above the remainder of the review of systems was reviewed and negative.        PAST MEDICAL HISTORY     Past Medical History:   Diagnosis Date    Diabetes mellitus ([facility]     Hypertension          SURGICALHISTORY       Past Surgical History:   Procedure Laterality Date    KIDNEY TRANSPLANT  2015    [facility] [address] INCL FLUOR GDNCE DX W/CELL WASHG SPX N/A 3/20/2018    BRONCHOSCOPY performed by Juany Chanec MD at [address] [place] [address]       Previous Medications    AMLODIPINE (NORVASC) 10 MG TABLET    Take 10 mg by mouth daily     ASPIRIN 81 MG EC TABLET        BENZONATATE (TESSALON) 200 MG CAPSULE    Take 200 mg by mouth 4 times daily as needed    CALCIUM CARBONATE (TUMS) 500 MG CHEWABLE TABLET    Take 1 tablet by mouth daily    CARVEDILOL (COREG) 6.25 MG TABLET    Take 6.25 mg by mouth 2 times daily (with meals)    CYCLOBENZAPRINE (FLEXERIL) 10 MG TABLET    Take 10 mg by mouth     CYCLOSPORINE MODIFIED (NEORAL) 25 MG CAPSULE        FUROSEMIDE (LASIX) 40 MG TABLET    Take 40 mg by mouth See Admin Instructions    GABAPENTIN (NEURONTIN) 300 MG CAPSULE        INSULIN ASPART (NOVOLOG) 100 UNIT/ML INJECTION VIAL    Indications: sliding fee scale 4 units am 6 units lunch and 6 with dinner    INSULIN DEGLUDEC (TRESIBA FLEXTOUCH) 100 UNIT/ML SOPN    16 units at night    INSULIN DEGLUDEC (TRESIBA FLEXTOUCH) 100 UNIT/ML SOPN    INJECT 16 UNITS UNDER SKIN AT NIGHT    INSULIN NPH (NOVOLIN N) 100 UNIT/ML INJECTION VIAL    16 units at bedtime    INSULIN REGULAR (NOVOLIN R) 100 UNIT/ML INJECTION    4 units am and 6 units lunch and dinner    MYCOPHENOLATE (CELLCEPT) 250 MG CAPSULE    Take 250 mg by mouth 2 times daily     PANTOPRAZOLE (PROTONIX) 40 MG TABLET        PREDNISONE (DELTASONE) 5 MG TABLET        SIMVASTATIN (ZOCOR) 10 MG TABLET           ALLERGIES     Patient has no known allergies. FAMILY HISTORY     History reviewed. No pertinent family history. SOCIAL HISTORY       Social History     Socioeconomic History    Marital status:      Spouse name: None    Number of children: None    Years of education: None    Highest education level: None   Occupational History    None   Social Needs    Financial resource strain: None    Food insecurity:     Worry: None     Inability: None    Transportation needs:     Medical: None     Non-medical: None   Tobacco Use    Smoking status: Former Smoker     Last attempt to quit: 2015     Years since quittin.0    Smokeless tobacco: Former User   Substance and Sexual Activity    Alcohol use: No     Alcohol/week: 0.0 standard drinks    Drug use: No    Sexual activity: None   Lifestyle    Physical activity:     Days per week: None     Minutes per session: None    Stress: None   Relationships    Social connections:     Talks on phone: None     Gets together: None     Attends [employer] service: None     Active member of club or organization: None     Attends meetings of clubs or organizations: None     Relationship status: None    Intimate partner violence:     Fear of current or ex partner: None     Emotionally abused: None     Physically abused: None     Forced sexual activity: None   Other Topics Concern    None   Social History Narrative    None       SCREENINGS      @FLOW([id number])@      PHYSICAL EXAM    (up to 7 for level 4, 8 or more for level 5)     ED Triage Vitals [19 1202]   BP Temp Temp Source Pulse Resp SpO2 Height Weight   (!) 162/67 99.4 °F (37.4 °C) Oral 63 17 96 % 6' 2\" (1.88 m) 233 lb (105.7 kg)       Physical Exam   Constitutional: He is oriented to person, place, and time. He appears well-developed and well-nourished. No distress. HENT:   Head: Normocephalic and atraumatic.  Head is MAGNESIUM   TROPONIN       All other labs were within normal range or not returned as of this dictation. EMERGENCY DEPARTMENT COURSE and DIFFERENTIAL DIAGNOSIS/MDM:   Vitals:    Vitals:    07/17/19 1304 07/17/19 1321 07/17/19 1332 07/17/19 1420   BP: (!) 162/73 (!) 161/79 (!) 153/82 137/60   Pulse: 58 55 58 55   Resp: 21 24 16 19   Temp:       TempSrc:       SpO2: 94% 94%     Weight:       Height:               MDM  Improvement of discomfort with aspirin and nitroglycerin. Patient has chronic renal failure. Physician spoke with Dr. Giovanna Johnson who  accepted the patient. He is requested [facility] cardiology for consultation, and for the ER physician to write transitional orders. Heart Score is 6. CONSULTS:  IP CONSULT TO HOSPITALIST  IP CONSULT TO CARDIOLOGY    PROCEDURES:  Unless otherwise noted below, none     Procedures    FINAL IMPRESSION      1. Angina pectoris ([address]    2. Chronic renal impairment, stage 2 (mild)    3. Type 2 diabetes mellitus with hyperglycemia, unspecified whether long term insulin use ([address]          DISPOSITION/PLAN   DISPOSITION Decision To Admit 07/17/2019 02:45:47 PM      PATIENT REFERRED TO:  No follow-up provider specified.     DISCHARGE MEDICATIONS:  New Prescriptions    No medications on file          (Please note that portions of this note were completed with a voice recognition program.  Efforts were made to edit the dictations but occasionally words are mis-transcribed.)    Paulette White DO (electronically signed)  Attending Emergency Physician          Paulette White DO  07/17/19 [id number]

## 2019-07-17 NOTE — CONSULTS
MD [address] INCL FLUOR GDNCE DX W/CELL WASHG SPX N/A 3/20/2018    BRONCHOSCOPY performed by Vale Berumen MD at [facility] History     Socioeconomic History    Marital status:      Spouse name: None    Number of children: None    Years of education: None    Highest education level: None   Occupational History    None   Social Needs    Financial resource strain: None    Food insecurity:     Worry: None     Inability: None    Transportation needs:     Medical: None     Non-medical: None   Tobacco Use    Smoking status: Former Smoker     Packs/day: 0.25     Types: Cigarettes     Last attempt to quit: 2015     Years since quittin.0    Smokeless tobacco: Former User   Substance and Sexual Activity    Alcohol use: No     Alcohol/week: 0.0 standard drinks    Drug use: No    Sexual activity: None   Lifestyle    Physical activity:     Days per week: None     Minutes per session: None    Stress: None   Relationships    Social connections:     Talks on phone: None     Gets together: None     Attends [employer] service: None     Active member of club or organization: None     Attends meetings of clubs or organizations: None     Relationship status: None    Intimate partner violence:     Fear of current or ex partner: None     Emotionally abused: None     Physically abused: None     Forced sexual activity: None   Other Topics Concern    None   Social History Narrative    None       History reviewed. No pertinent family history.     Current Facility-Administered Medications   Medication Dose Route Frequency Provider Last Rate Last Dose    nitroGLYCERIN (NITROSTAT) SL tablet 0.4 mg  0.4 mg Sublingual Q5 Min PRN Darl Loth, DO   0.4 mg at 19 1325    sodium chloride flush 0.9 % injection 10 mL  10 mL Intravenous 2 times per day Darl Loth, DO        sodium chloride flush 0.9 % injection 10 mL  10 mL Intravenous PRN Darl Loth, DO        magnesium hydroxide (MILK OF MAGNESIA) 400 MG/5ML suspension 30 mL  30 mL Oral Daily PRN Marda Bream, DO        ondansetron [facility]) injection 4 mg  4 mg Intravenous Q6H PRN Marda Bream, DO        nitroGLYCERIN (NITROSTAT) SL tablet 0.4 mg  0.4 mg Sublingual Q5 Min PRN Dianelys Orf Bescak, DO        insulin lispro (HUMALOG) injection vial 0-12 Units  0-12 Units Subcutaneous TID  Marda Bream, DO        insulin lispro (HUMALOG) injection vial 0-6 Units  0-6 Units Subcutaneous Nightly Marda Bream, DO        atorvastatin (LIPITOR) tablet 20 mg  20 mg Oral Nightly Marda Bream, DO        insulin glargine (LANTUS) injection vial 16 Units  16 Units Subcutaneous Nightly Marda Bream, DO        aspirin chewable tablet 81 mg  81 mg Oral Daily [facility] Bescak, DO        carvedilol (COREG) tablet 6.25 mg  6.25 mg Oral BID  Marda Bream, DO        furosemide (LASIX) tablet 20 mg  20 mg Oral Daily Dianelys Orf Bescak, DO        predniSONE (DELTASONE) tablet 5 mg  5 mg Oral Daily Marda Bream, DO        cycloSPORINE modified (NEORAL) capsule 75 mg  75 mg Oral BID Marda Bream, DO        gabapentin (NEURONTIN) capsule 300 mg  300 mg Oral TID Marda Bream, DO        amLODIPine (NORVASC) tablet 5 mg  5 mg Oral Daily Marda Bream, DO        zolpidem (AMBIEN) tablet 5 mg  5 mg Oral Nightly PRN Marda Bream, DO        acetaminophen (TYLENOL) tablet 650 mg  650 mg Oral Q4H PRN Dianelys Orf Bescak, DO        glucose (GLUTOSE) 40 % oral gel 15 g  15 g Oral PRN Marda Bream, DO        dextrose 50 % IV solution  12.5 g Intravenous PRN Marda Bream, DO        glucagon (rDNA) injection 1 mg  1 mg Intramuscular PRN Marda Bream, DO        dextrose 5 % solution  100 mL/hr Intravenous PRN Marda Bream, DO        mycophenolate (CELLCEPT) capsule 750 mg  750 mg Oral BID Marda Bream, DO           ALLERGIES: Patient has no known allergies. Review of Systems   Constitutional: Negative.   Negative for chills and fever. HENT: Negative. Eyes: Negative. Respiratory: Negative for shortness of breath and wheezing. Cardiovascular: Positive for chest pain. Negative for palpitations and leg swelling. Gastrointestinal: Negative. Negative for abdominal pain, nausea and vomiting. Endocrine: Negative. Genitourinary: Negative. Musculoskeletal: Negative. Skin: Negative. Negative for rash. Allergic/Immunologic: Negative. Neurological: Negative for dizziness, weakness and headaches. Hematological: Negative. Psychiatric/Behavioral: Negative. VITALS:  Blood pressure (!) 146/57, pulse 50, temperature 97.7 °F (36.5 °C), temperature source Oral, resp. rate 18, height 6' 2\" (1.88 m), weight 233 lb 4.8 oz (105.8 kg), SpO2 100 %. Body mass index is 29.95 kg/m². Physical Exam   Constitutional: He is oriented to person, place, and time. He appears well-developed and well-nourished. HENT:   Head: Normocephalic and atraumatic. Eyes: Pupils are equal, round, and reactive to light. Neck: Normal range of motion. Neck supple. No JVD present. No tracheal deviation present. No thyromegaly present. Cardiovascular: Normal rate, regular rhythm, normal heart sounds and intact distal pulses. PMI is not displaced. Exam reveals no gallop, no S3, no distant heart sounds and no friction rub. No murmur heard. Pulmonary/Chest: No respiratory distress. He has no wheezes. He has no rales. He exhibits no tenderness. Abdominal: Soft. Bowel sounds are normal. He exhibits no distension and no mass. There is no tenderness. There is no rebound and no guarding. Musculoskeletal: He exhibits no edema. Neurological: He is alert and oriented to person, place, and time. No cranial nerve deficit. Skin: Skin is warm and dry. No rash noted. He is not diaphoretic. No erythema. No pallor. Psychiatric: He has a normal mood and affect.  His behavior is normal. Judgment and thought content normal.       LABS:  Recent Results (from the past 24 hour(s))   EKG 12 Lead    Collection Time: 07/17/19 12:16 PM   Result Value Ref Range    Ventricular Rate 58 BPM    Atrial Rate 58 BPM    P-R Interval 166 ms    QRS Duration 74 ms    Q-T Interval 404 ms    QTc Calculation (Bazett) 396 ms    P Axis 49 degrees    R Axis 12 degrees    T Axis 47 degrees   Brain Natriuretic Peptide    Collection Time: 07/17/19 12:39 PM   Result Value Ref Range    Pro-BNP 1,451 pg/mL   CK    Collection Time: 07/17/19 12:39 PM   Result Value Ref Range    Total CK 54 0 - 190 U/L   Comprehensive Metabolic Panel    Collection Time: 07/17/19 12:39 PM   Result Value Ref Range    Sodium 140 135 - 144 mEq/L    Potassium 4.6 3.4 - 4.9 mEq/L    Chloride 106 95 - 107 mEq/L    CO2 20 20 - 31 mEq/L    Anion Gap 14 9 - 15 mEq/L    Glucose 167 (H) 70 - 99 mg/dL    BUN 31 (H) 8 - 23 mg/dL    CREATININE 1.77 (H) 0.70 - 1.20 mg/dL    GFR Non-African American 38.5 (L) >60    GFR  46.6 (L) >60    Calcium 9.1 8.5 - 9.9 mg/dL    Total Protein 7.6 6.3 - 8.0 g/dL    Alb 4.2 3.5 - 4.6 g/dL    Total Bilirubin 1.1 (H) 0.2 - 0.7 mg/dL    Alkaline Phosphatase 84 35 - 104 U/L    ALT 11 0 - 41 U/L    AST 12 0 - 40 U/L    Globulin 3.4 2.3 - 3.5 g/dL   Lipid Panel    Collection Time: 07/17/19 12:39 PM   Result Value Ref Range    Cholesterol, Total 160 0 - 199 mg/dL    Triglycerides 120 0 - 150 mg/dL    HDL 44 40 - 59 mg/dL    LDL Calculated 92 0 - 129 mg/dL   Magnesium    Collection Time: 07/17/19 12:39 PM   Result Value Ref Range    Magnesium 1.9 1.7 - 2.4 mg/dL   Troponin    Collection Time: 07/17/19 12:39 PM   Result Value Ref Range    Troponin <0.010 0.000 - 0.010 ng/mL   TSH without Reflex    Collection Time: 07/17/19 12:39 PM   Result Value Ref Range    TSH 0.397 (L) 0.440 - 3.860 uIU/mL   APTT    Collection Time: 07/17/19 12:40 PM   Result Value Ref Range    aPTT 31.5 24.4 - 36.8 sec   CBC Auto Differential    Collection Time: 07/17/19 12:40 PM   Result Value Ref Range

## 2019-07-18 VITALS
HEART RATE: 59 BPM | BODY MASS INDEX: 29.94 KG/M2 | OXYGEN SATURATION: 99 % | HEIGHT: 74 IN | SYSTOLIC BLOOD PRESSURE: 164 MMHG | WEIGHT: 233.3 LBS | TEMPERATURE: 98.4 F | RESPIRATION RATE: 19 BRPM | DIASTOLIC BLOOD PRESSURE: 45 MMHG

## 2019-07-18 LAB
ALBUMIN SERPL-MCNC: 3.7 G/DL (ref 3.5–4.6)
ALP BLD-CCNC: 77 U/L (ref 35–104)
ALT SERPL-CCNC: 11 U/L (ref 0–41)
ANION GAP SERPL CALCULATED.3IONS-SCNC: 13 MEQ/L (ref 9–15)
AST SERPL-CCNC: 11 U/L (ref 0–40)
BILIRUB SERPL-MCNC: 0.6 MG/DL (ref 0.2–0.7)
BUN BLDV-MCNC: 34 MG/DL (ref 8–23)
CALCIUM SERPL-MCNC: 9.4 MG/DL (ref 8.5–9.9)
CHLORIDE BLD-SCNC: 111 MEQ/L (ref 95–107)
CHOLESTEROL, TOTAL: 148 MG/DL (ref 0–199)
CO2: 20 MEQ/L (ref 20–31)
CREAT SERPL-MCNC: 1.76 MG/DL (ref 0.7–1.2)
EKG ATRIAL RATE: 58 BPM
EKG ATRIAL RATE: 63 BPM
EKG P AXIS: 49 DEGREES
EKG P AXIS: 53 DEGREES
EKG P-R INTERVAL: 160 MS
EKG P-R INTERVAL: 166 MS
EKG Q-T INTERVAL: 402 MS
EKG Q-T INTERVAL: 404 MS
EKG QRS DURATION: 72 MS
EKG QRS DURATION: 74 MS
EKG QTC CALCULATION (BAZETT): 396 MS
EKG QTC CALCULATION (BAZETT): 411 MS
EKG R AXIS: 12 DEGREES
EKG R AXIS: 25 DEGREES
EKG T AXIS: 36 DEGREES
EKG T AXIS: 47 DEGREES
EKG VENTRICULAR RATE: 58 BPM
EKG VENTRICULAR RATE: 63 BPM
GFR AFRICAN AMERICAN: 46.9
GFR NON-AFRICAN AMERICAN: 38.8
GLOBULIN: 3.5 G/DL (ref 2.3–3.5)
GLUCOSE BLD-MCNC: 135 MG/DL (ref 60–115)
GLUCOSE BLD-MCNC: 141 MG/DL (ref 70–99)
HCT VFR BLD CALC: 38.2 % (ref 42–52)
HDLC SERPL-MCNC: 41 MG/DL (ref 40–59)
HEMOGLOBIN: 12.5 G/DL (ref 14–18)
LDL CHOLESTEROL CALCULATED: 84 MG/DL (ref 0–129)
MAGNESIUM: 2.1 MG/DL (ref 1.7–2.4)
MCH RBC QN AUTO: 30.4 PG (ref 27–31.3)
MCHC RBC AUTO-ENTMCNC: 32.8 % (ref 33–37)
MCV RBC AUTO: 92.8 FL (ref 80–100)
PDW BLD-RTO: 14.7 % (ref 11.5–14.5)
PERFORMED ON: ABNORMAL
PLATELET # BLD: 171 K/UL (ref 130–400)
POTASSIUM SERPL-SCNC: 4.2 MEQ/L (ref 3.4–4.9)
RBC # BLD: 4.12 M/UL (ref 4.7–6.1)
SODIUM BLD-SCNC: 144 MEQ/L (ref 135–144)
TOTAL PROTEIN: 7.2 G/DL (ref 6.3–8)
TRIGL SERPL-MCNC: 114 MG/DL (ref 0–150)
WBC # BLD: 7.5 K/UL (ref 4.8–10.8)

## 2019-07-18 PROCEDURE — G0378 HOSPITAL OBSERVATION PER HR: HCPCS

## 2019-07-18 PROCEDURE — 80061 LIPID PANEL: CPT

## 2019-07-18 PROCEDURE — 36415 COLL VENOUS BLD VENIPUNCTURE: CPT

## 2019-07-18 PROCEDURE — 93005 ELECTROCARDIOGRAM TRACING: CPT | Performed by: INTERNAL MEDICINE

## 2019-07-18 PROCEDURE — 80053 COMPREHEN METABOLIC PANEL: CPT

## 2019-07-18 PROCEDURE — 83735 ASSAY OF MAGNESIUM: CPT

## 2019-07-18 PROCEDURE — 93010 ELECTROCARDIOGRAM REPORT: CPT | Performed by: INTERNAL MEDICINE

## 2019-07-18 PROCEDURE — 85027 COMPLETE CBC AUTOMATED: CPT

## 2019-07-18 RX ORDER — ISOSORBIDE MONONITRATE 30 MG/1
30 TABLET, EXTENDED RELEASE ORAL DAILY
Qty: 30 TABLET | Refills: 3 | Status: ON HOLD | OUTPATIENT
Start: 2019-07-18 | End: 2022-06-21

## 2019-07-18 RX ORDER — NITROGLYCERIN 0.4 MG/1
TABLET SUBLINGUAL
Qty: 25 TABLET | Refills: 3 | Status: ON HOLD | OUTPATIENT
Start: 2019-07-18 | End: 2022-10-28

## 2019-07-18 RX ORDER — ISOSORBIDE MONONITRATE 30 MG/1
30 TABLET, EXTENDED RELEASE ORAL DAILY
Status: DISCONTINUED | OUTPATIENT
Start: 2019-07-18 | End: 2019-07-18 | Stop reason: HOSPADM

## 2019-07-18 NOTE — PROGRESS NOTES
Chief Complaint   Patient presents with    Chest Pain     pt c/o chest pain for the past 3 days       SUBJECTIVE:  Pt denies chest pain, dyspnea, dyspnea on exertion, change in exercise capacity, fatigue,  nausea, vomiting, diarrhea, constipation, motor weakness, insomnia, weight loss, syncope, dizziness, lightheadedness, palpitations, PND, orthopnea, or claudication.     Past Medical History:   Diagnosis Date    [facility]     Hemodialysis access, fistula mature ([facility] 12/2002    Hypertension     Seizures ([facility]      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    Anemia    Essential hypertension    Nonspecific abnormal results of thyroid function study    Mixed hyperlipidemia    Kidney replaced by transplant    Intra-abdominal abscess post-procedure    Infection due to enterococcus    Fever and other physiologic disturbances of temperature regulation    Chronic kidney disease (CKD)    Type II or unspecified type diabetes mellitus without mention of complication, uncontrolled    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    Anginal chest pain at rest [facility])       Current Facility-Administered Medications   Medication Dose Route Frequency Provider Last Rate Last Dose    nitroGLYCERIN (NITROSTAT) SL tablet 0.4 mg  0.4 mg Sublingual Q5 Min PRN Dash Garcia Bescak, DO   0.4 mg at 07/17/19 1325    sodium chloride flush 0.9 % injection 10 mL  10 mL Intravenous 2 times per day Peter Hinds, DO   10 mL at 07/17/19 2248    sodium chloride flush 0.9 % injection 10 mL  10 mL Intravenous PRN Peter Hensleyot, DO   10 mL at 07/17/19 1943    magnesium hydroxide (MILK OF MAGNESIA) 400 MG/5ML suspension 30 mL  30 mL Oral Daily PRN Peter Hensleyot, DO        ondansetron [facility]) injection 4 mg  4 mg Intravenous Q6H PRN Peter Hoot, DO        Collection Time: 07/17/19 12:40 PM   Result Value Ref Range    aPTT 31.5 24.4 - 36.8 sec   CBC Auto Differential    Collection Time: 07/17/19 12:40 PM   Result Value Ref Range    WBC 9.8 4.8 - 10.8 K/uL    RBC 3.92 (L) 4.70 - 6.10 M/uL    Hemoglobin 11.8 (L) 14.0 - 18.0 g/dL    Hematocrit 35.3 (L) 42.0 - 52.0 %    MCV 90.0 80.0 - 100.0 fL    MCH 30.0 27.0 - 31.3 pg    MCHC 33.4 33.0 - 37.0 %    RDW 14.7 (H) 11.5 - 14.5 %    Platelets 403 543 - 560 K/uL    Neutrophils % 82.3 %    Lymphocytes % 3.2 %    Monocytes % 9.9 %    Eosinophils % 4.1 %    Basophils % 0.5 %    Neutrophils # 8.0 (H) 1.4 - 6.5 K/uL    Lymphocytes # 0.3 (L) 1.0 - 4.8 K/uL    Monocytes # 1.0 (H) 0.2 - 0.8 K/uL    Eosinophils # 0.4 0.0 - 0.7 K/uL    Basophils # 0.0 0.0 - 0.2 K/uL   Protime-INR    Collection Time: 07/17/19 12:40 PM   Result Value Ref Range    Protime 15.3 (H) 12.3 - 14.9 sec    INR 1.2    High sensitivity CRP    Collection Time: 07/17/19 12:42 PM   Result Value Ref Range    CRP High Sensitivity 177.5 (H) 0.0 - 5.0 mg/L   POCT Glucose    Collection Time: 07/17/19 12:43 PM   Result Value Ref Range    POC Glucose 140 (H) 60 - 115 mg/dl    Performed on ACCU-CHEK    POCT Glucose    Collection Time: 07/17/19 12:47 PM   Result Value Ref Range    Glucose 140 mg/dL    QC OK? ok    POCT Glucose    Collection Time: 07/17/19  4:17 PM   Result Value Ref Range    POC Glucose 131 (H) 60 - 115 mg/dl    Performed on ACCU-CHEK    Troponin    Collection Time: 07/17/19  4:28 PM   Result Value Ref Range    Troponin <0.010 0.000 - 0.010 ng/mL   CK    Collection Time: 07/17/19  4:28 PM   Result Value Ref Range    Total CK 45 0 - 190 U/L   Brain natriuretic peptide    Collection Time: 07/17/19  4:29 PM   Result Value Ref Range    Pro-BNP 1,618 pg/mL   MISC ARUP 1    Collection Time: 07/17/19  5:58 PM   Result Value Ref Range    [place] Prompt 5,114,[id number]    Troponin    Collection Time: 07/17/19  7:50 PM   Result Value Ref Range    Troponin <0.010 0.000 -

## 2019-07-18 NOTE — FLOWSHEET NOTE
Dr. Yari Domingo and cardiology saw patient. Patient ok for discharge and will follow up fopr out patient testing. SL and tele D/C'd. Patient and wife given discharge instructions and RX's . Both verbalized understanding of instructions. Patient D/C'd per w/c to wife's car.

## 2019-07-24 ENCOUNTER — OFFICE VISIT (OUTPATIENT)
Dept: PULMONOLOGY | Age: 69
End: 2019-07-24
Payer: MEDICARE

## 2019-07-24 VITALS
RESPIRATION RATE: 16 BRPM | WEIGHT: 228 LBS | BODY MASS INDEX: 29.26 KG/M2 | DIASTOLIC BLOOD PRESSURE: 60 MMHG | OXYGEN SATURATION: 95 % | HEART RATE: 66 BPM | TEMPERATURE: 97.9 F | HEIGHT: 74 IN | SYSTOLIC BLOOD PRESSURE: 122 MMHG

## 2019-07-24 DIAGNOSIS — R07.89 ATYPICAL CHEST PAIN: Primary | ICD-10-CM

## 2019-07-24 DIAGNOSIS — J20.9 ACUTE BRONCHITIS, UNSPECIFIED ORGANISM: ICD-10-CM

## 2019-07-24 PROBLEM — R05.3 CHRONIC COUGH: Status: ACTIVE | Noted: 2019-07-24

## 2019-07-24 LAB
MISCELLANEOUS LAB TEST ORDER: NORMAL
WHOPPER PROMPT: NORMAL

## 2019-07-24 PROCEDURE — 3017F COLORECTAL CA SCREEN DOC REV: CPT | Performed by: INTERNAL MEDICINE

## 2019-07-24 PROCEDURE — G8417 CALC BMI ABV UP PARAM F/U: HCPCS | Performed by: INTERNAL MEDICINE

## 2019-07-24 PROCEDURE — G8598 ASA/ANTIPLAT THER USED: HCPCS | Performed by: INTERNAL MEDICINE

## 2019-07-24 PROCEDURE — 99214 OFFICE O/P EST MOD 30 MIN: CPT | Performed by: INTERNAL MEDICINE

## 2019-07-24 PROCEDURE — G8427 DOCREV CUR MEDS BY ELIG CLIN: HCPCS | Performed by: INTERNAL MEDICINE

## 2019-07-24 PROCEDURE — 1123F ACP DISCUSS/DSCN MKR DOCD: CPT | Performed by: INTERNAL MEDICINE

## 2019-07-24 PROCEDURE — 1036F TOBACCO NON-USER: CPT | Performed by: INTERNAL MEDICINE

## 2019-07-24 PROCEDURE — 4040F PNEUMOC VAC/ADMIN/RCVD: CPT | Performed by: INTERNAL MEDICINE

## 2019-07-24 RX ORDER — DOXYCYCLINE HYCLATE 100 MG
100 TABLET ORAL 2 TIMES DAILY
Qty: 20 TABLET | Refills: 0 | Status: SHIPPED | OUTPATIENT
Start: 2019-07-24 | End: 2019-08-03

## 2019-07-24 ASSESSMENT — ENCOUNTER SYMPTOMS
COUGH: 1
NAUSEA: 0
WHEEZING: 0
RHINORRHEA: 0
EYE ITCHING: 0
VOMITING: 0
CHEST TIGHTNESS: 0
SHORTNESS OF BREATH: 0
ABDOMINAL PAIN: 0
SORE THROAT: 0
VOICE CHANGE: 0
DIARRHEA: 0

## 2019-07-24 NOTE — PROGRESS NOTES
Physical Exam   Constitutional: He is oriented to person, place, and time. He appears well-developed and well-nourished. HENT:   Head: Normocephalic and atraumatic. Nose: Nose normal.   Mouth/Throat: Oropharynx is clear and moist.   Eyes: Pupils are equal, round, and reactive to light. Conjunctivae and EOM are normal.   Neck: No JVD present. No tracheal deviation present. No thyromegaly present. Cardiovascular: Normal rate and regular rhythm. Exam reveals no gallop and no friction rub. No murmur heard. Pulmonary/Chest: Effort normal and breath sounds normal. No respiratory distress. He has no wheezes. He has no rales. He exhibits no tenderness. diminished Breath sound bilaterally. Abdominal: He exhibits no distension. Musculoskeletal: Normal range of motion. Lymphadenopathy:     He has no cervical adenopathy. Neurological: He is alert and oriented to person, place, and time. No cranial nerve deficit. Skin: Skin is warm and dry. No rash noted. Psychiatric: He has a normal mood and affect. His behavior is normal.       Current Outpatient Medications   Medication Sig Dispense Refill    doxycycline hyclate (VIBRA-TABS) 100 MG tablet Take 1 tablet by mouth 2 times daily for 10 days 20 tablet 0    isosorbide mononitrate (IMDUR) 30 MG extended release tablet Take 1 tablet by mouth daily 30 tablet 3    nitroGLYCERIN (NITROSTAT) 0.4 MG SL tablet up to max of 3 total doses.  If no relief after 1 dose, call 911. 25 tablet 3    Insulin Degludec (TRESIBA FLEXTOUCH) 100 UNIT/ML SOPN INJECT 16 UNITS UNDER SKIN AT NIGHT 45 pen 3    insulin aspart (NOVOLOG) 100 UNIT/ML injection vial Indications: sliding fee scale 4 units am 6 units lunch and 6 with dinner 1 vial 2    furosemide (LASIX) 40 MG tablet Take 40 mg by mouth See Admin Instructions      carvedilol (COREG) 6.25 MG tablet Take 6.25 mg by mouth 2 times daily (with meals)      aspirin 81 MG EC tablet Take 81 mg by mouth daily   3    clear. The areas of reticular nodular infiltrate in the right lower lobe has resolved. There is some chronic pleural-based soft tissue density posteriorly on the right with   pleural reaction consistent with an area of rounded atelectasis. The area of reticulonodular infiltrate at the left base has also improved. Residual density may reflect some underlying chronic atelectatic change at this area. Pleura: Right pleural thickening with atelectasis. Heart and mediastinum:Heart, mediastinum and gisell are unremarkable. Chest wall and lower neck:Normal                     Vessels:Thoracic aorta is intact. Bones:Normal      Impression SIGNIFICANT CLEARING OF BILATERAL INFILTRATES. PROBABLE CHRONIC ATELECTATIC CHANGES AT THE LUNG BASES RIGHT GREATER THAN LEFT. Assessment/Plan:     1. Atypical chest pain  He was admitted to the hospital with chest pain and found having atypical chest pain mostly musculoskeletal.  He had a chest x-ray showing mild pulmonary interstitial edema. No infiltration. 2.  Acute bronchitis, unspecified organism    He is been coughing for the last 3-week. He does have a cough with white to gray mucus. He has been having short of breath with exertion mostly when he has coughing jag. Chest x-ray showing no definite infiltration likely bronchitis related cough. I will start him on doxycycline. Patient advised to call office if no improvement otherwise I will see him in 6-month    - doxycycline hyclate (VIBRA-TABS) 100 MG tablet; Take 1 tablet by mouth 2 times daily for 10 days  Dispense: 20 tablet; Refill: 0      Return in about 6 months (around 1/24/2020) for cough.       Jennifer Guardado MD

## 2019-07-30 ENCOUNTER — OFFICE VISIT (OUTPATIENT)
Dept: ENDOCRINOLOGY | Age: 69
End: 2019-07-30
Payer: MEDICARE

## 2019-07-30 VITALS
HEIGHT: 74 IN | WEIGHT: 221 LBS | BODY MASS INDEX: 28.36 KG/M2 | DIASTOLIC BLOOD PRESSURE: 61 MMHG | SYSTOLIC BLOOD PRESSURE: 139 MMHG | HEART RATE: 64 BPM

## 2019-07-30 DIAGNOSIS — E11.65 UNCONTROLLED TYPE 2 DIABETES MELLITUS WITH HYPERGLYCEMIA (HCC): Primary | ICD-10-CM

## 2019-07-30 LAB
CHP ED QC CHECK: NORMAL
GLUCOSE BLD-MCNC: 230 MG/DL

## 2019-07-30 PROCEDURE — 1123F ACP DISCUSS/DSCN MKR DOCD: CPT | Performed by: INTERNAL MEDICINE

## 2019-07-30 PROCEDURE — G8428 CUR MEDS NOT DOCUMENT: HCPCS | Performed by: INTERNAL MEDICINE

## 2019-07-30 PROCEDURE — 99213 OFFICE O/P EST LOW 20 MIN: CPT | Performed by: INTERNAL MEDICINE

## 2019-07-30 PROCEDURE — 3017F COLORECTAL CA SCREEN DOC REV: CPT | Performed by: INTERNAL MEDICINE

## 2019-07-30 PROCEDURE — 1036F TOBACCO NON-USER: CPT | Performed by: INTERNAL MEDICINE

## 2019-07-30 PROCEDURE — 2022F DILAT RTA XM EVC RTNOPTHY: CPT | Performed by: INTERNAL MEDICINE

## 2019-07-30 PROCEDURE — G8417 CALC BMI ABV UP PARAM F/U: HCPCS | Performed by: INTERNAL MEDICINE

## 2019-07-30 PROCEDURE — 3045F PR MOST RECENT HEMOGLOBIN A1C LEVEL 7.0-9.0%: CPT | Performed by: INTERNAL MEDICINE

## 2019-07-30 PROCEDURE — 4040F PNEUMOC VAC/ADMIN/RCVD: CPT | Performed by: INTERNAL MEDICINE

## 2019-07-30 PROCEDURE — G8598 ASA/ANTIPLAT THER USED: HCPCS | Performed by: INTERNAL MEDICINE

## 2019-07-30 PROCEDURE — 82962 GLUCOSE BLOOD TEST: CPT | Performed by: INTERNAL MEDICINE

## 2019-07-30 ASSESSMENT — ENCOUNTER SYMPTOMS: COUGH: 1

## 2019-07-30 NOTE — PROGRESS NOTES
Subjective:      Patient ID: Lacho Morejon is a 80 y.o. male. 3 month f/u on diabetes   Diabetes   He presents for his follow-up diabetic visit. He has type 2 diabetes mellitus. Diabetic complications include heart disease and nephropathy. Current diabetic treatment includes insulin injections. He is currently taking insulin pre-breakfast, at bedtime, pre-dinner and pre-lunch. His overall blood glucose range is 180-200 mg/dl. 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    Anemia    Essential hypertension    Nonspecific abnormal results of thyroid function study    Mixed hyperlipidemia    Kidney replaced by transplant    Intra-abdominal abscess post-procedure    Infection due to enterococcus    Fever and other physiologic disturbances of temperature regulation    Chronic kidney disease (CKD)    Type II or unspecified type diabetes mellitus without mention of complication, uncontrolled    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

## 2019-08-02 ENCOUNTER — TELEPHONE (OUTPATIENT)
Dept: PULMONOLOGY | Age: 69
End: 2019-08-02

## 2019-08-05 ENCOUNTER — HOSPITAL ENCOUNTER (OUTPATIENT)
Dept: NUCLEAR MEDICINE | Age: 69
Discharge: HOME | End: 2019-08-07
Payer: MEDICARE

## 2019-08-05 DIAGNOSIS — I20.8 ANGINAL CHEST PAIN AT REST (HCC): ICD-10-CM

## 2019-08-05 PROCEDURE — 93017 CV STRESS TEST TRACING ONLY: CPT

## 2019-08-05 PROCEDURE — 2580000003 HC RX 258: Performed by: NURSE PRACTITIONER

## 2019-08-05 PROCEDURE — 78452 HT MUSCLE IMAGE SPECT MULT: CPT

## 2019-08-05 PROCEDURE — 3430000000 HC RX DIAGNOSTIC RADIOPHARMACEUTICAL: Performed by: NURSE PRACTITIONER

## 2019-08-05 PROCEDURE — A9502 TC99M TETROFOSMIN: HCPCS | Performed by: NURSE PRACTITIONER

## 2019-08-05 PROCEDURE — 6360000002 HC RX W HCPCS: Performed by: NURSE PRACTITIONER

## 2019-08-05 RX ORDER — SODIUM CHLORIDE 0.9 % (FLUSH) 0.9 %
10 SYRINGE (ML) INJECTION PRN
Status: DISCONTINUED | OUTPATIENT
Start: 2019-08-05 | End: 2019-08-08 | Stop reason: HOSPADM

## 2019-08-05 RX ADMIN — REGADENOSON 0.4 MG: 0.08 INJECTION, SOLUTION INTRAVENOUS at 10:15

## 2019-08-05 RX ADMIN — TETROFOSMIN 30.5 MILLICURIE: 1.38 INJECTION, POWDER, LYOPHILIZED, FOR SOLUTION INTRAVENOUS at 10:16

## 2019-08-05 RX ADMIN — Medication 10 ML: at 10:15

## 2019-08-05 RX ADMIN — Medication 10 ML: at 10:16

## 2019-08-05 RX ADMIN — Medication 10 ML: at 08:55

## 2019-08-05 RX ADMIN — TETROFOSMIN 11.6 MILLICURIE: 1.38 INJECTION, POWDER, LYOPHILIZED, FOR SOLUTION INTRAVENOUS at 08:55

## 2019-08-08 PROCEDURE — 93018 CV STRESS TEST I&R ONLY: CPT | Performed by: INTERNAL MEDICINE

## 2019-08-21 ENCOUNTER — TELEPHONE (OUTPATIENT)
Dept: ENDOCRINOLOGY | Age: 69
End: 2019-08-21

## 2019-08-21 NOTE — TELEPHONE ENCOUNTER
Patient came into office to talk to someone about his Free style harriett meter and readings. He is insisting on speaking to some one.  To help get clarification

## 2019-08-29 ENCOUNTER — OFFICE VISIT (OUTPATIENT)
Dept: CARDIOLOGY CLINIC | Age: 69
End: 2019-08-29
Payer: MEDICARE

## 2019-08-29 VITALS
OXYGEN SATURATION: 98 % | WEIGHT: 231.6 LBS | HEIGHT: 74 IN | HEART RATE: 84 BPM | BODY MASS INDEX: 29.72 KG/M2 | DIASTOLIC BLOOD PRESSURE: 80 MMHG | SYSTOLIC BLOOD PRESSURE: 120 MMHG

## 2019-08-29 DIAGNOSIS — Z94.0 KIDNEY TRANSPLANTED: ICD-10-CM

## 2019-08-29 DIAGNOSIS — I10 ESSENTIAL HYPERTENSION: ICD-10-CM

## 2019-08-29 DIAGNOSIS — N18.9 CHRONIC RENAL FAILURE, UNSPECIFIED CKD STAGE: Primary | ICD-10-CM

## 2019-08-29 DIAGNOSIS — R07.89 ATYPICAL CHEST PAIN: ICD-10-CM

## 2019-08-29 PROCEDURE — 3017F COLORECTAL CA SCREEN DOC REV: CPT | Performed by: INTERNAL MEDICINE

## 2019-08-29 PROCEDURE — G8427 DOCREV CUR MEDS BY ELIG CLIN: HCPCS | Performed by: INTERNAL MEDICINE

## 2019-08-29 PROCEDURE — 1036F TOBACCO NON-USER: CPT | Performed by: INTERNAL MEDICINE

## 2019-08-29 PROCEDURE — 99215 OFFICE O/P EST HI 40 MIN: CPT | Performed by: INTERNAL MEDICINE

## 2019-08-29 PROCEDURE — 1123F ACP DISCUSS/DSCN MKR DOCD: CPT | Performed by: INTERNAL MEDICINE

## 2019-08-29 PROCEDURE — 4040F PNEUMOC VAC/ADMIN/RCVD: CPT | Performed by: INTERNAL MEDICINE

## 2019-08-29 PROCEDURE — G8417 CALC BMI ABV UP PARAM F/U: HCPCS | Performed by: INTERNAL MEDICINE

## 2019-08-29 PROCEDURE — G8598 ASA/ANTIPLAT THER USED: HCPCS | Performed by: INTERNAL MEDICINE

## 2019-08-29 ASSESSMENT — ENCOUNTER SYMPTOMS
VOMITING: 0
CHEST TIGHTNESS: 0
DIARRHEA: 0
NAUSEA: 0
APNEA: 0
ABDOMINAL DISTENTION: 0
SHORTNESS OF BREATH: 0
COLOR CHANGE: 0

## 2019-08-29 NOTE — PROGRESS NOTES
distension and no mass. There is no splenomegaly or hepatomegaly. There is no tenderness. No hernia. Neurological: He is alert and oriented to person, place, and time. He has normal motor skills. Gait normal.   Skin: Skin is warm and dry. No cyanosis. No jaundice. Nails show no clubbing. 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    Anemia    Essential hypertension    Nonspecific abnormal results of thyroid function study    Mixed hyperlipidemia    Kidney replaced by transplant    Intra-abdominal abscess post-procedure    Infection due to enterococcus    Fever and other physiologic disturbances of temperature regulation    Chronic kidney disease (CKD)    Type II or unspecified type diabetes mellitus without mention of complication, uncontrolled    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 No orders of the defined types were placed in this encounter. Assessment:    1. Chronic renal failure, unspecified CKD stage    2. Kidney transplanted    3. Essential hypertension    4. Atypical chest pain       Plan:   Stay on same medications. See me in 6 months. This note was partially generated using Dragon voice recognition system, and there may be some incorrect words, spellings, punctuation that were not noticed in checking the note before saving.         Electronically signed by Felipe Ayala MD on 8/30/2019 at 3:02 PM

## 2019-11-18 ENCOUNTER — OFFICE VISIT (OUTPATIENT)
Dept: ENDOCRINOLOGY | Age: 69
End: 2019-11-18
Payer: MEDICARE

## 2019-11-18 VITALS
HEART RATE: 65 BPM | HEIGHT: 74 IN | SYSTOLIC BLOOD PRESSURE: 167 MMHG | OXYGEN SATURATION: 98 % | DIASTOLIC BLOOD PRESSURE: 83 MMHG | RESPIRATION RATE: 16 BRPM | BODY MASS INDEX: 31.06 KG/M2 | WEIGHT: 242 LBS

## 2019-11-18 DIAGNOSIS — E11.65 UNCONTROLLED TYPE 2 DIABETES MELLITUS WITH HYPERGLYCEMIA (HCC): ICD-10-CM

## 2019-11-18 DIAGNOSIS — Z23 ENCOUNTER FOR IMMUNIZATION: ICD-10-CM

## 2019-11-18 LAB — HBA1C MFR BLD: 8.6 % (ref 4.8–5.9)

## 2019-11-18 PROCEDURE — G8417 CALC BMI ABV UP PARAM F/U: HCPCS | Performed by: INTERNAL MEDICINE

## 2019-11-18 PROCEDURE — 2022F DILAT RTA XM EVC RTNOPTHY: CPT | Performed by: INTERNAL MEDICINE

## 2019-11-18 PROCEDURE — G8427 DOCREV CUR MEDS BY ELIG CLIN: HCPCS | Performed by: INTERNAL MEDICINE

## 2019-11-18 PROCEDURE — 90662 IIV NO PRSV INCREASED AG IM: CPT | Performed by: INTERNAL MEDICINE

## 2019-11-18 PROCEDURE — 4040F PNEUMOC VAC/ADMIN/RCVD: CPT | Performed by: INTERNAL MEDICINE

## 2019-11-18 PROCEDURE — 1123F ACP DISCUSS/DSCN MKR DOCD: CPT | Performed by: INTERNAL MEDICINE

## 2019-11-18 PROCEDURE — G8482 FLU IMMUNIZE ORDER/ADMIN: HCPCS | Performed by: INTERNAL MEDICINE

## 2019-11-18 PROCEDURE — G8598 ASA/ANTIPLAT THER USED: HCPCS | Performed by: INTERNAL MEDICINE

## 2019-11-18 PROCEDURE — 99213 OFFICE O/P EST LOW 20 MIN: CPT | Performed by: INTERNAL MEDICINE

## 2019-11-18 PROCEDURE — G0008 ADMIN INFLUENZA VIRUS VAC: HCPCS | Performed by: INTERNAL MEDICINE

## 2019-11-18 PROCEDURE — 3017F COLORECTAL CA SCREEN DOC REV: CPT | Performed by: INTERNAL MEDICINE

## 2019-11-18 PROCEDURE — 1036F TOBACCO NON-USER: CPT | Performed by: INTERNAL MEDICINE

## 2019-11-18 PROCEDURE — 3045F PR MOST RECENT HEMOGLOBIN A1C LEVEL 7.0-9.0%: CPT | Performed by: INTERNAL MEDICINE

## 2019-11-18 RX ORDER — CYCLOSPORINE 25 MG/1
CAPSULE, GELATIN COATED ORAL
Refills: 3 | Status: ON HOLD | COMMUNITY
Start: 2019-11-15 | End: 2022-06-21

## 2019-11-18 RX ORDER — PANTOPRAZOLE SODIUM 40 MG/1
40 TABLET, DELAYED RELEASE ORAL DAILY
Status: ON HOLD | COMMUNITY
Start: 2019-11-16

## 2019-11-18 RX ORDER — FLASH GLUCOSE SENSOR
KIT MISCELLANEOUS
Qty: 2 EACH | Refills: 6 | Status: SHIPPED | OUTPATIENT
Start: 2019-11-18 | End: 2019-11-27 | Stop reason: SDUPTHER

## 2019-11-22 ENCOUNTER — APPOINTMENT (OUTPATIENT)
Dept: CT IMAGING | Age: 69
End: 2019-11-22
Payer: MEDICARE

## 2019-11-22 ENCOUNTER — APPOINTMENT (OUTPATIENT)
Dept: GENERAL RADIOLOGY | Age: 69
End: 2019-11-22
Payer: MEDICARE

## 2019-11-22 ENCOUNTER — HOSPITAL ENCOUNTER (EMERGENCY)
Age: 69
Discharge: HOME | End: 2019-11-22
Attending: EMERGENCY MEDICINE
Payer: MEDICARE

## 2019-11-22 VITALS
OXYGEN SATURATION: 100 % | BODY MASS INDEX: 31.18 KG/M2 | WEIGHT: 243 LBS | DIASTOLIC BLOOD PRESSURE: 68 MMHG | TEMPERATURE: 96.8 F | RESPIRATION RATE: 18 BRPM | SYSTOLIC BLOOD PRESSURE: 157 MMHG | HEIGHT: 74 IN | HEART RATE: 63 BPM

## 2019-11-22 DIAGNOSIS — R10.9 ABDOMINAL PAIN, UNSPECIFIED ABDOMINAL LOCATION: ICD-10-CM
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
## 2019-11-22 DIAGNOSIS — N30.00 ACUTE CYSTITIS WITHOUT HEMATURIA: ICD-10-CM

## 2019-11-22 DIAGNOSIS — M25.561 ACUTE PAIN OF RIGHT KNEE: ICD-10-CM

## 2019-11-22 LAB
ALBUMIN SERPL-MCNC: 4.6 G/DL (ref 3.5–4.6)
ALP BLD-CCNC: 92 U/L (ref 35–104)
ALT SERPL-CCNC: 17 U/L (ref 0–41)
ANION GAP SERPL CALCULATED.3IONS-SCNC: 14 MEQ/L (ref 9–15)
AST SERPL-CCNC: 17 U/L (ref 0–40)
BACTERIA: ABNORMAL /HPF
BASOPHILS ABSOLUTE: 0.1 K/UL (ref 0–0.2)
BASOPHILS RELATIVE PERCENT: 1.1 %
BILIRUB SERPL-MCNC: 0.7 MG/DL (ref 0.2–0.7)
BILIRUBIN URINE: NEGATIVE
BLOOD, URINE: NEGATIVE
BUN BLDV-MCNC: 41 MG/DL (ref 8–23)
CALCIUM SERPL-MCNC: 9.6 MG/DL (ref 8.5–9.9)
CHLORIDE BLD-SCNC: 104 MEQ/L (ref 95–107)
CLARITY: ABNORMAL
CO2: 26 MEQ/L (ref 20–31)
COLOR: YELLOW
CREAT SERPL-MCNC: 2.1 MG/DL (ref 0.7–1.2)
EOSINOPHILS ABSOLUTE: 0.3 K/UL (ref 0–0.7)
EOSINOPHILS RELATIVE PERCENT: 3.5 %
EPITHELIAL CELLS, UA: ABNORMAL /HPF
GFR AFRICAN AMERICAN: 38.2
GFR NON-AFRICAN AMERICAN: 31.6
GLOBULIN: 3 G/DL (ref 2.3–3.5)
GLUCOSE BLD-MCNC: 159 MG/DL (ref 70–99)
GLUCOSE URINE: NEGATIVE MG/DL
HCT VFR BLD CALC: 40.8 % (ref 42–52)
HEMOGLOBIN: 12.8 G/DL (ref 14–18)
KETONES, URINE: NEGATIVE MG/DL
LEUKOCYTE ESTERASE, URINE: ABNORMAL
LIPASE: 29 U/L (ref 12–95)
LYMPHOCYTES ABSOLUTE: 0.4 K/UL (ref 1–4.8)
LYMPHOCYTES RELATIVE PERCENT: 4.4 %
MCH RBC QN AUTO: 28.9 PG (ref 27–31.3)
MCHC RBC AUTO-ENTMCNC: 31.4 % (ref 33–37)
MCV RBC AUTO: 92 FL (ref 80–100)
MONOCYTES ABSOLUTE: 0.6 K/UL (ref 0.2–0.8)
MONOCYTES RELATIVE PERCENT: 7.4 %
NEUTROPHILS ABSOLUTE: 7.2 K/UL (ref 1.4–6.5)
NEUTROPHILS RELATIVE PERCENT: 83.6 %
NITRITE, URINE: NEGATIVE
PDW BLD-RTO: 15.4 % (ref 11.5–14.5)
PH UA: 6 (ref 5–9)
PLATELET # BLD: 184 K/UL (ref 130–400)
POC CREATININE WHOLE BLOOD: 2.2
POTASSIUM SERPL-SCNC: 5.1 MEQ/L (ref 3.4–4.9)
PROTEIN UA: NEGATIVE MG/DL
RBC # BLD: 4.43 M/UL (ref 4.7–6.1)
SODIUM BLD-SCNC: 144 MEQ/L (ref 135–144)
SPECIFIC GRAVITY UA: 1.01 (ref 1–1.03)
TOTAL PROTEIN: 7.6 G/DL (ref 6.3–8)
UROBILINOGEN, URINE: 0.2 E.U./DL
WBC # BLD: 8.6 K/UL (ref 4.8–10.8)
WBC UA: ABNORMAL /HPF (ref 0–5)

## 2019-11-22 PROCEDURE — 99284 EMERGENCY DEPT VISIT MOD MDM: CPT

## 2019-11-22 PROCEDURE — 6360000002 HC RX W HCPCS: Performed by: EMERGENCY MEDICINE

## 2019-11-22 PROCEDURE — 73562 X-RAY EXAM OF KNEE 3: CPT

## 2019-11-22 PROCEDURE — 2580000003 HC RX 258: Performed by: EMERGENCY MEDICINE

## 2019-11-22 PROCEDURE — 85025 COMPLETE CBC W/AUTO DIFF WBC: CPT

## 2019-11-22 PROCEDURE — 81001 URINALYSIS AUTO W/SCOPE: CPT

## 2019-11-22 PROCEDURE — 96365 THER/PROPH/DIAG IV INF INIT: CPT

## 2019-11-22 PROCEDURE — 74176 CT ABD & PELVIS W/O CONTRAST: CPT

## 2019-11-22 PROCEDURE — 36415 COLL VENOUS BLD VENIPUNCTURE: CPT

## 2019-11-22 PROCEDURE — 80053 COMPREHEN METABOLIC PANEL: CPT

## 2019-11-22 PROCEDURE — 83690 ASSAY OF LIPASE: CPT

## 2019-11-22 PROCEDURE — 96361 HYDRATE IV INFUSION ADD-ON: CPT

## 2019-11-22 RX ORDER — MORPHINE SULFATE 2 MG/ML
4 INJECTION, SOLUTION INTRAMUSCULAR; INTRAVENOUS
Status: DISCONTINUED | OUTPATIENT
Start: 2019-11-22 | End: 2019-11-22 | Stop reason: HOSPADM

## 2019-11-22 RX ORDER — ONDANSETRON 2 MG/ML
4 INJECTION INTRAMUSCULAR; INTRAVENOUS ONCE
Status: DISCONTINUED | OUTPATIENT
Start: 2019-11-22 | End: 2019-11-22 | Stop reason: HOSPADM

## 2019-11-22 RX ORDER — CIPROFLOXACIN 250 MG/1
250 TABLET, FILM COATED ORAL 2 TIMES DAILY
Qty: 14 TABLET | Refills: 0 | Status: SHIPPED | OUTPATIENT
Start: 2019-11-22 | End: 2019-11-29

## 2019-11-22 RX ORDER — 0.9 % SODIUM CHLORIDE 0.9 %
1000 INTRAVENOUS SOLUTION INTRAVENOUS ONCE
Status: COMPLETED | OUTPATIENT
Start: 2019-11-22 | End: 2019-11-22

## 2019-11-22 RX ADMIN — CEFTRIAXONE SODIUM 1 G: 1 INJECTION, POWDER, FOR SOLUTION INTRAMUSCULAR; INTRAVENOUS at 14:21

## 2019-11-22 RX ADMIN — SODIUM CHLORIDE 1000 ML: 9 INJECTION, SOLUTION INTRAVENOUS at 12:00

## 2019-11-22 ASSESSMENT — PAIN DESCRIPTION - DESCRIPTORS: DESCRIPTORS: ACHING

## 2019-11-22 ASSESSMENT — PAIN DESCRIPTION - LOCATION: LOCATION: KNEE;HAND

## 2019-11-22 ASSESSMENT — PAIN DESCRIPTION - PAIN TYPE: TYPE: ACUTE PAIN

## 2019-11-22 ASSESSMENT — ENCOUNTER SYMPTOMS
NAUSEA: 0
BACK PAIN: 0
SHORTNESS OF BREATH: 0
SORE THROAT: 0
COUGH: 0
VOMITING: 0
DIARRHEA: 0
ABDOMINAL PAIN: 1

## 2019-11-22 ASSESSMENT — PAIN DESCRIPTION - ORIENTATION: ORIENTATION: RIGHT;LEFT

## 2019-11-22 ASSESSMENT — PAIN DESCRIPTION - FREQUENCY: FREQUENCY: CONTINUOUS

## 2019-11-22 ASSESSMENT — PAIN SCALES - GENERAL: PAINLEVEL_OUTOF10: 1

## 2019-11-23 LAB
GFR AFRICAN AMERICAN: 36
GFR NON-AFRICAN AMERICAN: 30
PERFORMED ON: ABNORMAL
POC CREATININE: 2.2 MG/DL (ref 0.8–1.3)
POC SAMPLE TYPE: ABNORMAL

## 2019-11-24 ASSESSMENT — ENCOUNTER SYMPTOMS
WHEEZING: 1
SHORTNESS OF BREATH: 1

## 2019-11-27 RX ORDER — FLASH GLUCOSE SENSOR
KIT MISCELLANEOUS
Qty: 2 EACH | Refills: 6 | OUTPATIENT
Start: 2019-11-27 | End: 2020-05-20 | Stop reason: SDUPTHER

## 2019-11-29 ENCOUNTER — TELEPHONE (OUTPATIENT)
Dept: ENDOCRINOLOGY | Age: 69
End: 2019-11-29

## 2019-12-04 ENCOUNTER — TELEPHONE (OUTPATIENT)
Dept: ENDOCRINOLOGY | Age: 69
End: 2019-12-04

## 2019-12-11 ENCOUNTER — TELEPHONE (OUTPATIENT)
Dept: ENDOCRINOLOGY | Age: 69
End: 2019-12-11

## 2020-01-22 ENCOUNTER — TELEPHONE (OUTPATIENT)
Dept: ENDOCRINOLOGY | Age: 70
End: 2020-01-22

## 2020-01-23 ENCOUNTER — TELEPHONE (OUTPATIENT)
Dept: ENDOCRINOLOGY | Age: 70
End: 2020-01-23

## 2020-01-23 NOTE — TELEPHONE ENCOUNTER
Patient called for the 2nd time - his BG's  Have beenAre ranging mid to high 300's w/no change in diet or medication. He is currently staying with his daughter in [place] for the winter.   Please advise - he is very anxious

## 2020-01-27 ENCOUNTER — TELEPHONE (OUTPATIENT)
Dept: ENDOCRINOLOGY | Age: 70
End: 2020-01-27

## 2020-01-27 NOTE — TELEPHONE ENCOUNTER
Patient has increased his medication sussy instructed and he is still having spikes up to 300 and one went to 500. He wants advise as to what to do now? He did go to urgent care yesterday and he has a urinary tract infection and is now on Bactrim DS for 7 days to treat that.     Please advise patient at [phone]

## 2020-01-28 ENCOUNTER — TELEPHONE (OUTPATIENT)
Dept: ENDOCRINOLOGY | Age: 70
End: 2020-01-28

## 2020-01-28 NOTE — TELEPHONE ENCOUNTER
Increase Tresiba to 60 units at bedtime plus NovoLog 20 with each meals patient also should see somebody in [place] if there is any infection or any reason for high sugars

## 2020-01-28 NOTE — TELEPHONE ENCOUNTER
Patient states that he increased his novolog and tresiba medications. He is not feeling well. Lightheaded.      [facility]  Call to advise [phone]

## 2020-01-29 NOTE — TELEPHONE ENCOUNTER
Spoke to pt sugars are back down to a normal range around  does not want to increase insulin any more he will try and find an endocrinologist while he is down in [place] since he stays there 6 months out of the year

## 2020-04-06 ENCOUNTER — TELEPHONE (OUTPATIENT)
Dept: ENDOCRINOLOGY | Age: 70
End: 2020-04-06

## 2020-04-07 ENCOUNTER — TELEPHONE (OUTPATIENT)
Dept: ENDOCRINOLOGY | Age: 70
End: 2020-04-07

## 2020-05-18 ENCOUNTER — OFFICE VISIT (OUTPATIENT)
Dept: ENDOCRINOLOGY | Age: 70
End: 2020-05-18
Payer: MEDICARE

## 2020-05-18 VITALS
HEART RATE: 61 BPM | SYSTOLIC BLOOD PRESSURE: 130 MMHG | BODY MASS INDEX: 31.53 KG/M2 | OXYGEN SATURATION: 97 % | WEIGHT: 245.6 LBS | DIASTOLIC BLOOD PRESSURE: 80 MMHG

## 2020-05-18 LAB
CHP ED QC CHECK: NORMAL
GLUCOSE BLD-MCNC: 220 MG/DL
HBA1C MFR BLD: 8.7 %

## 2020-05-18 PROCEDURE — 82962 GLUCOSE BLOOD TEST: CPT | Performed by: INTERNAL MEDICINE

## 2020-05-18 PROCEDURE — 1123F ACP DISCUSS/DSCN MKR DOCD: CPT | Performed by: INTERNAL MEDICINE

## 2020-05-18 PROCEDURE — 2022F DILAT RTA XM EVC RTNOPTHY: CPT | Performed by: INTERNAL MEDICINE

## 2020-05-18 PROCEDURE — 4040F PNEUMOC VAC/ADMIN/RCVD: CPT | Performed by: INTERNAL MEDICINE

## 2020-05-18 PROCEDURE — G8427 DOCREV CUR MEDS BY ELIG CLIN: HCPCS | Performed by: INTERNAL MEDICINE

## 2020-05-18 PROCEDURE — 83036 HEMOGLOBIN GLYCOSYLATED A1C: CPT | Performed by: INTERNAL MEDICINE

## 2020-05-18 PROCEDURE — 3017F COLORECTAL CA SCREEN DOC REV: CPT | Performed by: INTERNAL MEDICINE

## 2020-05-18 PROCEDURE — 99213 OFFICE O/P EST LOW 20 MIN: CPT | Performed by: INTERNAL MEDICINE

## 2020-05-18 PROCEDURE — 1036F TOBACCO NON-USER: CPT | Performed by: INTERNAL MEDICINE

## 2020-05-18 PROCEDURE — G8417 CALC BMI ABV UP PARAM F/U: HCPCS | Performed by: INTERNAL MEDICINE

## 2020-05-18 PROCEDURE — 3052F HG A1C>EQUAL 8.0%<EQUAL 9.0%: CPT | Performed by: INTERNAL MEDICINE

## 2020-05-18 RX ORDER — HUMAN INSULIN 100 [IU]/ML
INJECTION, SOLUTION SUBCUTANEOUS
Qty: 5 PEN | Refills: 3 | Status: ON HOLD | OUTPATIENT
Start: 2020-05-18

## 2020-05-18 RX ORDER — FLASH GLUCOSE SENSOR
KIT MISCELLANEOUS
Qty: 2 EACH | Refills: 6 | Status: ON HOLD | OUTPATIENT
Start: 2020-05-18

## 2020-05-18 RX ORDER — HUMAN INSULIN 100 [IU]/ML
INJECTION, SUSPENSION SUBCUTANEOUS
Qty: 5 PEN | Refills: 3 | Status: ON HOLD | OUTPATIENT
Start: 2020-05-18

## 2020-05-18 NOTE — PROGRESS NOTES
Subjective:      Patient ID: Mortimer Can is a 77 y.o. male. 6 months follow-up on type 2 diabetes patient concerned about cost of insulin wants to get less expensive insulin using freestyle harriett testing blood sugars only to 6 PM after that he does not test his sugars averages have been around 187 occasionally also has had hypoglycemia complications diabetes include chronic kidney disease  Diabetes   He presents for his follow-up diabetic visit. He has type 2 diabetes mellitus. Diabetic complications include nephropathy. Current diabetic treatment includes insulin injections Kenny Graf plus NovoLog). He is currently taking insulin pre-breakfast, pre-lunch, pre-dinner and at bedtime. His overall blood glucose range is 180-200 mg/dl. (Using freestyle harriett scanning multiple times daily only to 6 PM  Lab Results       Component                Value               Date                       LABA1C                   8.7                 05/18/2020              )       Results for Aditi Culp (MRN [id number]) as of 5/18/2020 09:15   Ref.  Range 4/6/2020 09:32   Sodium Latest Ref Range: 135 - 144 mEq/L 145 (H)   Potassium Latest Ref Range: 3.4 - 4.9 mEq/L 4.5   Chloride Latest Ref Range: 95 - 107 mEq/L 108 (H)   CO2 Latest Ref Range: 20 - 31 mEq/L 22   BUN Latest Ref Range: 8 - 23 mg/dL 35 (H)   Creatinine Latest Ref Range: 0.70 - 1.20 mg/dL 1.90 (H)   Anion Gap Latest Ref Range: 9 - 15 mEq/L 15   GFR Non- Latest Ref Range: >60  35.4 (L)   GFR  Latest Ref Range: >60  42.8 (L)   Glucose Latest Ref Range: 70 - 99 mg/dL 154 (H)   Calcium Latest Ref Range: 8.5 - 9.9 mg/dL 9.1   Phosphorus Latest Ref Range: 2.3 - 4.8 mg/dL 3.5   Albumin Latest Ref Range: 3.5 - 4.6 g/dL 4.0     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)    Atypical chest pain    Chronic cough     No Known Allergies      Review of Systems    Vitals:    20 0855   BP: 130/80   Site: Left Upper Arm   Position: Sitting   Cuff Size: Large Adult   Pulse: 61   SpO2: 97%   Weight: 245 lb 9.6 oz (111.4 kg)       Objective:   Physical Exam  Constitutional:       Appearance: Normal appearance. HENT:      Head: Normocephalic and atraumatic. Neck:      Musculoskeletal: Normal range of motion and neck supple. Cardiovascular:      Rate and Rhythm: Normal rate. Musculoskeletal: Normal range of motion. Neurological:      Mental Status: He is alert. Psychiatric:         Mood and Affect: Mood normal.         Assessment:       Diagnosis Orders   1.  Uncontrolled type 2 diabetes mellitus with hyperglycemia (HCC)  POCT Glucose    POCT glycosylated hemoglobin (Hb A1C)           Plan:      Orders Placed This Encounter   Procedures    POCT Glucose    POCT glycosylated hemoglobin (Hb A1C)     Orders Placed This Encounter   Medications    insulin NPH (NOVOLIN N FLEXPEN) 100 UNIT/ML injection pen     Si units at bedtime     Dispense:  5 pen     Refill:  3    Insulin Regular Human (NOVOLIN R FLEXPEN) 100 UNIT/ML SOPN     Si units am 8 units lunch and dinner     Dispense:  5 pen     Refill:  3    Continuous Blood Gluc Sensor (FREESTYLE KEV 14 DAY SENSOR) MISC     Sig: Every 2 weeks     Dispense:  2 each     Refill:  06     Patient to switch to Novolin NPH and

## 2020-05-20 ENCOUNTER — TELEPHONE (OUTPATIENT)
Dept: ENDOCRINOLOGY | Age: 70
End: 2020-05-20

## 2020-05-20 RX ORDER — FLASH GLUCOSE SENSOR
KIT MISCELLANEOUS
Qty: 2 EACH | Refills: 6 | Status: ON HOLD | OUTPATIENT
Start: 2020-05-20

## 2020-05-20 RX ORDER — FLASH GLUCOSE SENSOR
KIT MISCELLANEOUS
Qty: 2 EACH | Refills: 6 | Status: CANCELLED | OUTPATIENT
Start: 2020-05-20

## 2020-05-29 ENCOUNTER — TELEPHONE (OUTPATIENT)
Dept: ENDOCRINOLOGY | Age: 70
End: 2020-05-29

## 2020-05-29 RX ORDER — BLOOD-GLUCOSE TRANSMITTER
EACH MISCELLANEOUS
Qty: 1 EACH | Refills: 3 | Status: ON HOLD | OUTPATIENT
Start: 2020-05-29

## 2020-05-29 RX ORDER — BLOOD-GLUCOSE,RECEIVER,CONT
EACH MISCELLANEOUS
Qty: 1 DEVICE | Refills: 0 | Status: ON HOLD | OUTPATIENT
Start: 2020-05-29 | End: 2022-09-15

## 2020-05-29 RX ORDER — BLOOD-GLUCOSE SENSOR
EACH MISCELLANEOUS
Qty: 1 EACH | Refills: 11 | Status: ON HOLD | OUTPATIENT
Start: 2020-05-29 | End: 2022-09-15

## 2020-06-06 ENCOUNTER — HOSPITAL ENCOUNTER (OUTPATIENT)
Dept: GENERAL RADIOLOGY | Age: 70
Discharge: HOME | End: 2020-06-08
Payer: MEDICARE

## 2020-06-06 PROCEDURE — 73501 X-RAY EXAM HIP UNI 1 VIEW: CPT

## 2020-06-06 PROCEDURE — 73630 X-RAY EXAM OF FOOT: CPT

## 2020-06-06 PROCEDURE — 73030 X-RAY EXAM OF SHOULDER: CPT

## 2020-11-09 ENCOUNTER — OFFICE VISIT (OUTPATIENT)
Dept: FAMILY MEDICINE CLINIC | Age: 70
End: 2020-11-09
Payer: MEDICARE

## 2020-11-09 VITALS
BODY MASS INDEX: 29.4 KG/M2 | TEMPERATURE: 98.7 F | SYSTOLIC BLOOD PRESSURE: 138 MMHG | WEIGHT: 229 LBS | DIASTOLIC BLOOD PRESSURE: 62 MMHG | HEART RATE: 82 BPM | RESPIRATION RATE: 18 BRPM | OXYGEN SATURATION: 96 %

## 2020-11-09 PROCEDURE — 90471 IMMUNIZATION ADMIN: CPT | Performed by: PHYSICIAN ASSISTANT

## 2020-11-09 PROCEDURE — 90715 TDAP VACCINE 7 YRS/> IM: CPT | Performed by: PHYSICIAN ASSISTANT

## 2020-11-09 PROCEDURE — 99213 OFFICE O/P EST LOW 20 MIN: CPT | Performed by: PHYSICIAN ASSISTANT

## 2020-11-09 RX ORDER — CEPHALEXIN 500 MG/1
500 CAPSULE ORAL 4 TIMES DAILY
Qty: 28 CAPSULE | Refills: 0 | Status: SHIPPED | OUTPATIENT
Start: 2020-11-09 | End: 2020-11-16

## 2020-11-09 RX ORDER — SULFAMETHOXAZOLE AND TRIMETHOPRIM 800; 160 MG/1; MG/1
1 TABLET ORAL 2 TIMES DAILY
Qty: 20 TABLET | Refills: 0 | Status: SHIPPED | OUTPATIENT
Start: 2020-11-09 | End: 2020-11-19

## 2020-11-09 ASSESSMENT — PATIENT HEALTH QUESTIONNAIRE - PHQ9
2. FEELING DOWN, DEPRESSED OR HOPELESS: 0
SUM OF ALL RESPONSES TO PHQ QUESTIONS 1-9: 0
1. LITTLE INTEREST OR PLEASURE IN DOING THINGS: 0
SUM OF ALL RESPONSES TO PHQ QUESTIONS 1-9: 0
SUM OF ALL RESPONSES TO PHQ QUESTIONS 1-9: 0
SUM OF ALL RESPONSES TO PHQ9 QUESTIONS 1 & 2: 0

## 2020-11-09 ASSESSMENT — ENCOUNTER SYMPTOMS
COUGH: 0
VOMITING: 0
SINUS PRESSURE: 0
EYE PAIN: 0
ABDOMINAL PAIN: 0
EYE DISCHARGE: 0
CHEST TIGHTNESS: 0
BACK PAIN: 0
EYE ITCHING: 0
SHORTNESS OF BREATH: 0
DIARRHEA: 0
TROUBLE SWALLOWING: 0

## 2020-11-09 NOTE — PROGRESS NOTES
Subjective:      Patient ID: Luis Felipe Hou is a 77 y.o. male who presents today for:  Chief Complaint   Patient presents with    Hand Injury     Presents today for an injury on right thhumb requesting a tetnus shot        HPI  77year old male who presents after cutting the tip of his thumb while using a power tool. He is a kidney transplant patient and is concerned about tetanus.      Past Medical History:   Diagnosis Date    Diabetes mellitus ([facility]     Hemodialysis access, fistula mature ([facility] 2002    Hypertension     Seizures ([facility]      Past Surgical History:   Procedure Laterality Date    BRAIN SURGERY Left 1990    to eliminate seizures    KIDNEY TRANSPLANT      ME [address] INCL FLUOR GDNCE DX W/CELL WASHG SPX N/A 3/20/2018    BRONCHOSCOPY performed by Ti Boss MD at [address] [place] [address] Marital status:      Spouse name: Not on file    Number of children: Not on file    Years of education: Not on file    Highest education level: Not on file   Occupational History    Not on file   Social Needs    Financial resource strain: Not on file    Food insecurity     Worry: Not on file     Inability: Not on file    Transportation needs     Medical: Not on file     Non-medical: Not on file   Tobacco Use    Smoking status: Former Smoker     Packs/day: 0.25     Types: Cigarettes     Last attempt to quit: 2015     Years since quittin.4    Smokeless tobacco: Former User   Substance and Sexual Activity    Alcohol use: No     Alcohol/week: 0.0 standard drinks    Drug use: No    Sexual activity: Not on file   Lifestyle    Physical activity     Days per week: Not on file     Minutes per session: Not on file    Stress: Not on file   Relationships    Social connections     Talks on phone: Not on file     Gets together: Not on file     Attends [employer] service: Not on file     Active member of club or organization: Not on file Attends meetings of clubs or organizations: Not on file     Relationship status: Not on file    Intimate partner violence     Fear of current or ex partner: Not on file     Emotionally abused: Not on file     Physically abused: Not on file     Forced sexual activity: Not on file   Other Topics Concern    Not on file   Social History Narrative    Not on file     No family history on file. No Known Allergies  Current Outpatient Medications   Medication Sig Dispense Refill    cephALEXin (KEFLEX) 500 MG capsule Take 1 capsule by mouth 4 times daily for 7 days 28 capsule 0    sulfamethoxazole-trimethoprim (BACTRIM DS;SEPTRA DS) 800-160 MG per tablet Take 1 tablet by mouth 2 times daily for 10 days 20 tablet 0    Continuous Blood Gluc Transmit (DEXCOM G6 TRANSMITTER) MISC Change every 3 months 1 each 3    Continuous Blood Gluc Sensor (DEXCOM G6 SENSOR) MISC Change every 10 days 1 each 11    Continuous Blood Gluc  (DEXCOM G6 ) YANIRA Continuous 1 Device 0    Continuous Blood Gluc Sensor (FREESTYLE KEV 14 DAY SENSOR) MISC Every 2 weeks Dx E11.65 2 each 06    insulin NPH (NOVOLIN N FLEXPEN) 100 UNIT/ML injection pen 16 units at bedtime 5 pen 3    Insulin Regular Human (NOVOLIN R FLEXPEN) 100 UNIT/ML SOPN 6 units am 8 units lunch and dinner 5 pen 3    Continuous Blood Gluc Sensor (FREESTYLE KEV 14 DAY SENSOR) MISC Every 2 weeks 2 each 06    insulin aspart (NOVOLOG) 100 UNIT/ML injection vial 6 units am 8 units lunch and 8 with dinner Dx E11.65 1 vial 2    cycloSPORINE (SANDIMMUNE) 25 MG capsule TAKE 3 CAPSULES BY MOUTH TWICE DAILY FOR 90 DAYS  3    pantoprazole (PROTONIX) 40 MG tablet       Insulin Degludec (TRESIBA FLEXTOUCH) 100 UNIT/ML SOPN INJECT 16 UNITS UNDER SKIN AT NIGHT 45 pen 1    isosorbide mononitrate (IMDUR) 30 MG extended release tablet Take 1 tablet by mouth daily 30 tablet 3    nitroGLYCERIN (NITROSTAT) 0.4 MG SL tablet up to max of 3 total doses.  If no relief after 1 dose, call 911. 25 tablet 3    Insulin Degludec (TRESIBA FLEXTOUCH) 100 UNIT/ML SOPN INJECT 16 UNITS UNDER SKIN AT NIGHT 45 pen 3    furosemide (LASIX) 40 MG tablet Take 40 mg by mouth See Admin Instructions      carvedilol (COREG) 6.25 MG tablet Take 6.25 mg by mouth 2 times daily (with meals)      aspirin 81 MG EC tablet Take 81 mg by mouth daily   3    cyclobenzaprine (FLEXERIL) 10 MG tablet Take 10 mg by mouth daily as needed Indications: as needed. 0    cycloSPORINE modified (NEORAL) 25 MG capsule Take 75 mg by mouth 2 times daily   2    gabapentin (NEURONTIN) 300 MG capsule Take 300 mg by mouth 3 times daily. 2    mycophenolate (CELLCEPT) 250 MG capsule Take 750 mg by mouth 2 times daily   1    predniSONE (DELTASONE) 5 MG tablet Take 5 mg by mouth daily   2    simvastatin (ZOCOR) 10 MG tablet Take 10 mg by mouth nightly   1     No current facility-administered medications for this visit. Review of Systems   Constitutional: Negative for activity change, appetite change, chills, fever and unexpected weight change. HENT: Negative for drooling, ear pain, nosebleeds, sinus pressure and trouble swallowing. Eyes: Negative for pain, discharge and itching. Respiratory: Negative for cough, chest tightness and shortness of breath. Cardiovascular: Negative for chest pain and leg swelling. Gastrointestinal: Negative for abdominal pain, diarrhea and vomiting. Endocrine: Negative for polydipsia and polyphagia. Genitourinary: Negative for dysuria, flank pain and frequency. Musculoskeletal: Negative for back pain and myalgias. Skin: Negative for pallor and rash. Neurological: Negative for syncope, weakness and headaches. Hematological: Does not bruise/bleed easily. Psychiatric/Behavioral: Negative for agitation, behavioral problems and confusion. All other systems reviewed and are negative.       Objective:   /62   Pulse 82   Temp 98.7 °F (37.1 °C)   Resp 18   Wt 229 lb (103.9 kg)   SpO2 96%   BMI 29.40 kg/m²     Physical Exam  Vitals signs and nursing note reviewed. Constitutional:       General: He is awake. He is not in acute distress. Appearance: Normal appearance. He is well-developed and normal weight. He is not ill-appearing, toxic-appearing or diaphoretic. Comments: No photophobia. No phonophobia. HENT:      Head: Normocephalic and atraumatic. No Miller's sign. Right Ear: External ear normal.      Left Ear: External ear normal.      Nose: Nose normal. No congestion or rhinorrhea. Mouth/Throat:      Mouth: Mucous membranes are moist.      Pharynx: Oropharynx is clear. No oropharyngeal exudate or posterior oropharyngeal erythema. Eyes:      General: No scleral icterus. Right eye: No foreign body or discharge. Left eye: No discharge. Extraocular Movements: Extraocular movements intact. Conjunctiva/sclera: Conjunctivae normal.      Left eye: No exudate. Pupils: Pupils are equal, round, and reactive to light. Neck:      Musculoskeletal: Normal range of motion and neck supple. No neck rigidity. Vascular: No JVD. Trachea: No tracheal deviation. Comments: No meningismus. Cardiovascular:      Rate and Rhythm: Normal rate and regular rhythm. Heart sounds: Normal heart sounds. Heart sounds not distant. No murmur. No friction rub. No gallop. Pulmonary:      Effort: Pulmonary effort is normal. No respiratory distress. Breath sounds: Normal breath sounds. No stridor. No wheezing, rhonchi or rales. Chest:      Chest wall: No tenderness. Abdominal:      General: Abdomen is flat. Bowel sounds are normal. There is no distension. Palpations: Abdomen is soft. There is no mass. Tenderness: There is no abdominal tenderness. There is no right CVA tenderness, left CVA tenderness, guarding or rebound. Hernia: No hernia is present. Musculoskeletal: Normal range of motion.          General: No swelling, tenderness, deformity or signs of injury. Lymphadenopathy:      Head:      Right side of head: No submental adenopathy. Left side of head: No submental adenopathy. Skin:     General: Skin is warm and dry. Capillary Refill: Capillary refill takes less than 2 seconds. Coloration: Skin is not jaundiced or pale. Findings: Laceration (tip of thumb) present. No bruising, erythema, lesion or rash. Neurological:      General: No focal deficit present. Mental Status: He is alert and oriented to person, place, and time. Mental status is at baseline. Cranial Nerves: No cranial nerve deficit. Sensory: No sensory deficit. Motor: No weakness. Coordination: Coordination normal.      Deep Tendon Reflexes: Reflexes are normal and symmetric. Psychiatric:         Mood and Affect: Mood normal.         Behavior: Behavior normal. Behavior is cooperative. Thought Content: Thought content normal.         Judgment: Judgment normal.         Assessment:       Diagnosis Orders   1. Laceration of right thumb without foreign body without damage to nail, initial encounter  Tdap (age 6y and older) IM (BOOSTRIX)    cephALEXin (KEFLEX) 500 MG capsule    sulfamethoxazole-trimethoprim (BACTRIM DS;SEPTRA DS) 800-160 MG per tablet     No results found for this visit on 11/09/20. Plan:     Assessment & Plan   Fly Brooks was seen today for hand injury. Diagnoses and all orders for this visit:    Laceration of right thumb without foreign body without damage to nail, initial encounter  -     Tdap (age 6y and older) IM (BOOSTRIX)  -     cephALEXin (KEFLEX) 500 MG capsule; Take 1 capsule by mouth 4 times daily for 7 days  -     sulfamethoxazole-trimethoprim (BACTRIM DS;SEPTRA DS) 800-160 MG per tablet;  Take 1 tablet by mouth 2 times daily for 10 days      Orders Placed This Encounter   Procedures    Tdap (age 6y and older) IM ([address])     Orders Placed This Encounter Medications    cephALEXin (KEFLEX) 500 MG capsule     Sig: Take 1 capsule by mouth 4 times daily for 7 days     Dispense:  28 capsule     Refill:  0    sulfamethoxazole-trimethoprim (BACTRIM DS;SEPTRA DS) 800-160 MG per tablet     Sig: Take 1 tablet by mouth 2 times daily for 10 days     Dispense:  20 tablet     Refill:  0     There are no discontinued medications. Return if symptoms worsen or fail to improve. Reviewed with the patient/family: current clinical status & medications. Side effects of the medication prescribed today, as well as treatment plan/rationale and result expectations have been discussed with the patient/family who expresses understanding. Patient will be discharged home in stable condition. Follow up with PCP to evaluate treatment results or return if symptoms worsen or fail to improve. Discussed signs and symptoms which require immediate follow-up in ED/call to 911. Understanding verbalized. I have reviewed the patient's medical history in detail and updated the computerized patient record.     PATRICA Wadsworth

## 2020-12-10 ENCOUNTER — HOSPITAL ENCOUNTER (EMERGENCY)
Age: 70
Discharge: HOME | End: 2020-12-10
Payer: MEDICARE

## 2020-12-10 ENCOUNTER — APPOINTMENT (OUTPATIENT)
Dept: CT IMAGING | Age: 70
End: 2020-12-10
Payer: MEDICARE

## 2020-12-10 VITALS
HEIGHT: 74 IN | RESPIRATION RATE: 18 BRPM | DIASTOLIC BLOOD PRESSURE: 89 MMHG | BODY MASS INDEX: 29 KG/M2 | HEART RATE: 63 BPM | WEIGHT: 226 LBS | TEMPERATURE: 98 F | OXYGEN SATURATION: 99 % | SYSTOLIC BLOOD PRESSURE: 174 MMHG

## 2020-12-10 LAB
ALBUMIN SERPL-MCNC: 4.3 G/DL (ref 3.5–4.6)
ALP BLD-CCNC: 73 U/L (ref 35–104)
ALT SERPL-CCNC: 10 U/L (ref 0–41)
ANION GAP SERPL CALCULATED.3IONS-SCNC: 12 MEQ/L (ref 9–15)
AST SERPL-CCNC: 12 U/L (ref 0–40)
BASOPHILS ABSOLUTE: 0.1 K/UL (ref 0–0.2)
BASOPHILS RELATIVE PERCENT: 0.8 %
BILIRUB SERPL-MCNC: 0.5 MG/DL (ref 0.2–0.7)
BILIRUBIN URINE: NEGATIVE
BLOOD, URINE: NEGATIVE
BUN BLDV-MCNC: 45 MG/DL (ref 8–23)
C-REACTIVE PROTEIN: 0.7 MG/L (ref 0–5)
CALCIUM SERPL-MCNC: 8.9 MG/DL (ref 8.5–9.9)
CHLORIDE BLD-SCNC: 100 MEQ/L (ref 95–107)
CLARITY: CLEAR
CO2: 27 MEQ/L (ref 20–31)
COLOR: YELLOW
CREAT SERPL-MCNC: 2.01 MG/DL (ref 0.7–1.2)
EKG ATRIAL RATE: 63 BPM
EKG P AXIS: 74 DEGREES
EKG P-R INTERVAL: 182 MS
EKG Q-T INTERVAL: 408 MS
EKG QRS DURATION: 76 MS
EKG QTC CALCULATION (BAZETT): 417 MS
EKG R AXIS: 11 DEGREES
EKG T AXIS: 49 DEGREES
EKG VENTRICULAR RATE: 63 BPM
EOSINOPHILS ABSOLUTE: 0.2 K/UL (ref 0–0.7)
EOSINOPHILS RELATIVE PERCENT: 2.9 %
GFR AFRICAN AMERICAN: 40.1
GFR NON-AFRICAN AMERICAN: 33.1
GLOBULIN: 2.8 G/DL (ref 2.3–3.5)
GLUCOSE BLD-MCNC: 267 MG/DL (ref 70–99)
GLUCOSE URINE: 100 MG/DL
HCT VFR BLD CALC: 39.5 % (ref 42–52)
HEMOGLOBIN: 12.9 G/DL (ref 14–18)
KETONES, URINE: NEGATIVE MG/DL
LACTIC ACID: 1 MMOL/L (ref 0.5–2.2)
LEUKOCYTE ESTERASE, URINE: NEGATIVE
LYMPHOCYTES ABSOLUTE: 0.6 K/UL (ref 1–4.8)
LYMPHOCYTES RELATIVE PERCENT: 7.5 %
MAGNESIUM: 2.1 MG/DL (ref 1.7–2.4)
MCH RBC QN AUTO: 30.1 PG (ref 27–31.3)
MCHC RBC AUTO-ENTMCNC: 32.6 % (ref 33–37)
MCV RBC AUTO: 92.3 FL (ref 80–100)
MONOCYTES ABSOLUTE: 0.4 K/UL (ref 0.2–0.8)
MONOCYTES RELATIVE PERCENT: 4.8 %
NEUTROPHILS ABSOLUTE: 7.1 K/UL (ref 1.4–6.5)
NEUTROPHILS RELATIVE PERCENT: 84 %
NITRITE, URINE: NEGATIVE
PDW BLD-RTO: 14.4 % (ref 11.5–14.5)
PH UA: 5 (ref 5–9)
PLATELET # BLD: 155 K/UL (ref 130–400)
POC CREATININE WHOLE BLOOD: 1.9
POTASSIUM SERPL-SCNC: 4.6 MEQ/L (ref 3.4–4.9)
PROTEIN UA: NEGATIVE MG/DL
RBC # BLD: 4.28 M/UL (ref 4.7–6.1)
SEDIMENTATION RATE, ERYTHROCYTE: 17 MM (ref 0–20)
SODIUM BLD-SCNC: 139 MEQ/L (ref 135–144)
SPECIFIC GRAVITY UA: 1.01 (ref 1–1.03)
TOTAL PROTEIN: 7.1 G/DL (ref 6.3–8)
TROPONIN: 0.01 NG/ML (ref 0–0.01)
URINE REFLEX TO CULTURE: ABNORMAL
UROBILINOGEN, URINE: 0.2 E.U./DL
WBC # BLD: 8.4 K/UL (ref 4.8–10.8)

## 2020-12-10 PROCEDURE — 85025 COMPLETE CBC W/AUTO DIFF WBC: CPT

## 2020-12-10 PROCEDURE — 83735 ASSAY OF MAGNESIUM: CPT

## 2020-12-10 PROCEDURE — 84484 ASSAY OF TROPONIN QUANT: CPT

## 2020-12-10 PROCEDURE — 83605 ASSAY OF LACTIC ACID: CPT

## 2020-12-10 PROCEDURE — 93005 ELECTROCARDIOGRAM TRACING: CPT | Performed by: NURSE PRACTITIONER

## 2020-12-10 PROCEDURE — 81003 URINALYSIS AUTO W/O SCOPE: CPT

## 2020-12-10 PROCEDURE — 86140 C-REACTIVE PROTEIN: CPT

## 2020-12-10 PROCEDURE — 85652 RBC SED RATE AUTOMATED: CPT

## 2020-12-10 PROCEDURE — 99285 EMERGENCY DEPT VISIT HI MDM: CPT

## 2020-12-10 PROCEDURE — 80053 COMPREHEN METABOLIC PANEL: CPT

## 2020-12-10 PROCEDURE — 36415 COLL VENOUS BLD VENIPUNCTURE: CPT

## 2020-12-10 PROCEDURE — 74176 CT ABD & PELVIS W/O CONTRAST: CPT

## 2020-12-10 ASSESSMENT — ENCOUNTER SYMPTOMS
COUGH: 0
ABDOMINAL PAIN: 1
EYE PAIN: 0
VOMITING: 0
SHORTNESS OF BREATH: 0
CONSTIPATION: 0
RHINORRHEA: 0
DIARRHEA: 0
COLOR CHANGE: 0
TROUBLE SWALLOWING: 0
SORE THROAT: 0
BLOOD IN STOOL: 0
EYE DISCHARGE: 0
WHEEZING: 0
BACK PAIN: 0
EYE REDNESS: 0
NAUSEA: 0

## 2020-12-10 ASSESSMENT — PAIN SCALES - GENERAL: PAINLEVEL_OUTOF10: 5

## 2020-12-10 NOTE — ED NOTES
Pt to have CT without contrast per NP Cleatus Fill after she was made aware of Creat.      Robert Kraft RN  12/10/20 [id number]

## 2020-12-10 NOTE — ED TRIAGE NOTES
Pt comes to er with c/o lower left sided abdominal pain. Pt has had 2 kidney transplants. Last one was 2015. Denies n/v/d.  Denies dysuria

## 2020-12-10 NOTE — ED NOTES
Pt requesting diet pepsi. Aware he is having CT abdomen and needs to wait for results. Pt verbalized understanding.      Ana Laura Cai RN  12/10/20 [id number]

## 2020-12-10 NOTE — ED NOTES
Returned from [employer]. Placed back onto monitor. No acute distress noted at this time. Aware we are waiting on results. Call light within reach. Lights turned off.       Ana Laura Cai RN  12/10/20 0112

## 2020-12-10 NOTE — ED PROVIDER NOTES
[facility] ED  EMERGENCY DEPARTMENT ENCOUNTER      Pt Name: Sofía Ponce  MRN: [id number]  [place] 1950  Date of evaluation: 12/10/2020  Provider: Richie Coombs, [address] [place] [address]       Chief Complaint   Patient presents with    Abdominal Pain     left sided abdominal pain.  h/o kidney transplant in 2015. sent over by Stu Lorenz         HISTORY OF PRESENT ILLNESS   (Location/Symptom, Timing/Onset,Context/Setting, Quality, Duration, Modifying Factors, Severity)  Note limiting factors. Sofía Ponce is a 77 y.o. male who presents to the emergency department with a chart reviewed past medical history of pneumonia, hypertension, kidney replaced by transplant,  intra-abdominal abscess, and diabetes for chief complaint of left lower quadrant 5 out of 10 constant abdominal pain that has been ongoing since 10 AM this morning. Patient reports that the pain has not improved or changed since then. He reports that he called 's office and that his nurse Keri told him to come to the emergency department. He denies any fevers, chills, nausea vomiting, or other complaints. Nursing Notes were reviewed. REVIEW OF SYSTEMS    (2-9 systems for level 4, 10 or more for level 5)     Review of Systems   Constitutional: Negative for activity change, appetite change, fatigue and fever. HENT: Negative for congestion, ear pain, rhinorrhea, sore throat and trouble swallowing. Eyes: Negative for pain, discharge and redness. Respiratory: Negative for cough, shortness of breath and wheezing. Cardiovascular: Negative for chest pain and palpitations. Gastrointestinal: Positive for abdominal pain. Negative for blood in stool, constipation, diarrhea, nausea and vomiting. Endocrine: Negative for polydipsia and polyuria. Genitourinary: Negative for decreased urine volume, dysuria, flank pain and hematuria. Musculoskeletal: Negative for arthralgias, back pain and myalgias.    Skin: Negative for color change, rash and wound. Neurological: Negative for dizziness, syncope, weakness, light-headedness and headaches. Psychiatric/Behavioral: Negative for behavioral problems. All other systems reviewed and are negative. Except as noted above the remainder of the review of systems was reviewed and negative.        PAST MEDICAL HISTORY     Past Medical History:   Diagnosis Date    [facility]     Hemodialysis access, fistula mature ([facility] 2002    Hypertension     [facility]      Past Surgical History:   Procedure Laterality Date    BRAIN SURGERY Left 1990    to eliminate seizures    KIDNEY TRANSPLANT      [address] INCL FLUOR GDNCE DX W/CELL WASHG SPX N/A 3/20/2018    [facility] History     Socioeconomic History    Marital status:      Spouse name: None    Number of children: None    Years of education: None    Highest education level: None   Occupational History    None   Social Needs    Financial resource strain: None    Food insecurity     Worry: None     Inability: None    Transportation needs     Medical: None     Non-medical: None   Tobacco Use    Smoking status: Former Smoker     Packs/day: 0.25     Types: Cigarettes     Last attempt to quit: 2015     Years since quittin.4    Smokeless tobacco: Former User   Substance and Sexual Activity    Alcohol use: No     Alcohol/week: 0.0 standard drinks    Drug use: No    Sexual activity: None   Lifestyle    Physical activity     Days per week: None     Minutes per session: None    Stress: None   Relationships    Social connections     Talks on phone: None     Gets together: None     Attends Anglican service: None     Active member of club or organization: None     Attends meetings of clubs or organizations: None     Relationship status: None    Intimate partner violence     Fear of current or ex partner: None the time ofthis note:    CT ABDOMEN PELVIS WO CONTRAST Additional Contrast? None   Final Result      Atrophic bilateral native kidneys. Atrophic right pelvic transplant kidney. Left pelvic transplant kidney without hydronephrosis, masses, or calculi. Transplant left ureter without calculi, and decompressed. Partially imaged subsegmental right lower lobe atelectasis/pneumonia. All CT scans at this facility use dose modulation, iterative reconstruction, and/or weight based dosing when appropriate to reduce radiation dose to as low as reasonably achievable. ED BEDSIDE ULTRASOUND:   Performed by ED Physician - none    LABS:  Labs Reviewed   COMPREHENSIVE METABOLIC PANEL - Abnormal; Notable for the following components:       Result Value    Glucose 267 (*)     BUN 45 (*)     CREATININE 2.01 (*)     GFR Non- 33.1 (*)     GFR  40.1 (*)     All other components within normal limits   CBC WITH AUTO DIFFERENTIAL - Abnormal; Notable for the following components:    RBC 4.28 (*)     Hemoglobin 12.9 (*)     Hematocrit 39.5 (*)     MCHC 32.6 (*)     Neutrophils Absolute 7.1 (*)     Lymphocytes Absolute 0.6 (*)     All other components within normal limits   TROPONIN - Abnormal; Notable for the following components:    Troponin 0.011 (*)     All other components within normal limits    Narrative:     Maricel Leyva tel. [phone],  TROP results called to and read back by Preston Oh, 12/10/2020 18:23, by  Tomy Wood   URINE RT REFLEX TO CULTURE - Abnormal; Notable for the following components:    Glucose, Ur 100 (*)     All other components within normal limits   POCT CREATININE - URINE - Normal   SEDIMENTATION RATE   C-REACTIVE PROTEIN   LACTIC ACID, PLASMA   MAGNESIUM       All other labs were within normal range or not returned as of this dictation.     EMERGENCY DEPARTMENT COURSE and DIFFERENTIAL DIAGNOSIS/MDM:   Vitals:    Vitals:    12/10/20 1743 12/10/20 1800 12/10/20 1830 12/10/20 1900   BP: (!) 146/95 (!) 193/81 (!) 159/81 (!) 166/76   Pulse: 66 65 66 61   Resp: 18 21 18 15   Temp:       TempSrc:       SpO2: 98% 97% 98% 98%   Weight:       Height:                MDM   Patient is a 70-year-old male presenting to the ER with a chief complaint of left lower quadrant abdominal pain sent over from Dr. Mckeon Conception nurse office. Patient is hemodynamically stable nontoxic-appearing. He was saying that it might be a kidney transplant issue and he is not sure. CT abdomen shows no acute process. BUN 45, creatinine 2.01 (baseline), troponin 0.01. Patient has no complaints of chest pain, shortness of breath. EKG is normal sinus rhythm. Elevated troponin most likely due to kidney function. Glucose 267. Patient on reassessment states pain has improved. There is no abdominal tenderness to palpation. He appears nontoxic and apparent distress. Standard anticipatory guidance given to patient upon discharge. Have given them a specific time frame in which to follow-up and who to follow-up with. I have also advised them that they should return to the emergency department if they get worse, or not getting better or develop any new or concerning symptoms. Patient demonstrates understanding. CRITICAL CARE TIME       CONSULTS:  None    PROCEDURES:  Unless otherwise noted below, none     Procedures    FINAL IMPRESSION      1. Abdominal pain, left lower quadrant          DISPOSITION/PLAN   DISPOSITION        PATIENT REFERRED TO:  Grzegorz Arceo, DO  [address] [facility] [place] [address] [phone]            DISCHARGE MEDICATIONS:  New Prescriptions    No medications on file          (Please notethat portions of this note were completed with a voice recognition program.  Efforts were made to edit the dictations but occasionally words are mis-transcribed. )    PATRICA Overton (electronically signed)  Attending Emergency Physician          Aydin Raymundo

## 2020-12-11 LAB
GFR AFRICAN AMERICAN: 43
GFR NON-AFRICAN AMERICAN: 35
PERFORMED ON: ABNORMAL
POC CREATININE: 1.9 MG/DL (ref 0.8–1.3)
POC SAMPLE TYPE: ABNORMAL

## 2020-12-11 PROCEDURE — 93010 ELECTROCARDIOGRAM REPORT: CPT | Performed by: INTERNAL MEDICINE

## 2021-06-22 ENCOUNTER — HOSPITAL ENCOUNTER (OUTPATIENT)
Dept: ULTRASOUND IMAGING | Age: 71
Discharge: HOME | End: 2021-06-24
Payer: MEDICARE

## 2021-06-22 DIAGNOSIS — R35.1 NOCTURIA MORE THAN TWICE PER NIGHT: ICD-10-CM

## 2021-06-22 PROCEDURE — 51798 US URINE CAPACITY MEASURE: CPT

## 2021-06-22 PROCEDURE — 76857 US EXAM PELVIC LIMITED: CPT

## 2021-07-28 NOTE — TELEPHONE ENCOUNTER
[facility] calling for the 5th or 6th time and complaining that they have not received their forms back yet - CMN for this patient. The man that called was rude and asking to speak to a supervisor. I offered repeatedly to take a message for her explaining that she is not in the office at this time. He refused and asked when she would be in the office. Again I explained that she is not in the office at regular times because she is over many offices. He stated he would prefer to call back when he could speak to her. I again explained and her said he would call back and told me just to get the forms done.     Please call them or send the CMN to them
This wasn't for our office and was faxed back to them by [facility] or [employer]
HLD (hyperlipidemia)

## 2021-08-17 ENCOUNTER — OFFICE VISIT (OUTPATIENT)
Dept: UROLOGY | Age: 71
End: 2021-08-17
Payer: MEDICARE

## 2021-08-17 VITALS
SYSTOLIC BLOOD PRESSURE: 128 MMHG | HEIGHT: 74 IN | WEIGHT: 209 LBS | HEART RATE: 64 BPM | DIASTOLIC BLOOD PRESSURE: 70 MMHG | BODY MASS INDEX: 26.82 KG/M2

## 2021-08-17 DIAGNOSIS — R35.0 FREQUENCY OF URINATION: Primary | ICD-10-CM

## 2021-08-17 LAB
BILIRUBIN, POC: ABNORMAL
BLOOD URINE, POC: ABNORMAL
CLARITY, POC: ABNORMAL
COLOR, POC: YELLOW
GLUCOSE URINE, POC: ABNORMAL
KETONES, POC: ABNORMAL
LEUKOCYTE EST, POC: ABNORMAL
NITRITE, POC: ABNORMAL
PH, POC: 6
PROTEIN, POC: ABNORMAL
SPECIFIC GRAVITY, POC: 1.01
UROBILINOGEN, POC: 0.2

## 2021-08-17 PROCEDURE — 3017F COLORECTAL CA SCREEN DOC REV: CPT | Performed by: UROLOGY

## 2021-08-17 PROCEDURE — G8427 DOCREV CUR MEDS BY ELIG CLIN: HCPCS | Performed by: UROLOGY

## 2021-08-17 PROCEDURE — 99204 OFFICE O/P NEW MOD 45 MIN: CPT | Performed by: UROLOGY

## 2021-08-17 PROCEDURE — 81003 URINALYSIS AUTO W/O SCOPE: CPT | Performed by: UROLOGY

## 2021-08-17 PROCEDURE — 1123F ACP DISCUSS/DSCN MKR DOCD: CPT | Performed by: UROLOGY

## 2021-08-17 PROCEDURE — 4040F PNEUMOC VAC/ADMIN/RCVD: CPT | Performed by: UROLOGY

## 2021-08-17 PROCEDURE — G8417 CALC BMI ABV UP PARAM F/U: HCPCS | Performed by: UROLOGY

## 2021-08-17 PROCEDURE — 1036F TOBACCO NON-USER: CPT | Performed by: UROLOGY

## 2021-08-17 RX ORDER — CEPHALEXIN 500 MG/1
500 CAPSULE ORAL 2 TIMES DAILY
Qty: 20 CAPSULE | Refills: 0 | Status: SHIPPED | OUTPATIENT
Start: 2021-08-17 | End: 2021-09-02

## 2021-08-17 ASSESSMENT — ENCOUNTER SYMPTOMS
BACK PAIN: 1
ABDOMINAL DISTENTION: 0
RESPIRATORY NEGATIVE: 1
ABDOMINAL PAIN: 0

## 2021-08-17 NOTE — PROGRESS NOTES
Subjective:      Patient ID: Theresa Yuan is a 72 y.o. male. HPI  This is a 73 yo male with h/o DM, HTN/lasix, CAD, Seizures, CRF s/p kidney transplant (x 2 last at [facility]) on immunosuppressives, Anemia, and here for evaluation of frequency and incomplete bladder emptying noted on recent U/S (260 cc PVR). He has NF 2-3 and a fair flow. He has no hematuria but over the last 3-4 days has noticed some dysuria. He just traveled from [address] [place] [address]. He has no fever. Gets some Lt upper back pain after the drib=ve form [place]. He has no PVD and no intermittency. He takes no BPH medications. He reports that he gets about 1 UTI per year and is treated with Keflex normally. He is here with his wife. He does take prednisone daily. He quit ciggs yrs ago and has no family h/o prostate cancer. I reviewed the medical records from his PCP.       Past Medical History:   Diagnosis Date    Diabetes mellitus ([facility]     Hemodialysis access, fistula mature ([facility] 2002    Hypertension     [facility]      Past Surgical History:   Procedure Laterality Date    BRAIN SURGERY Left 1990    to eliminate seizures    KIDNEY TRANSPLANT      CT [address] INCL FLUOR GDNCE DX W/CELL WASHG SPX N/A 3/20/2018    [facility] History     Socioeconomic History    Marital status:      Spouse name: None    Number of children: None    Years of education: None    Highest education level: None   Occupational History    None   Tobacco Use    Smoking status: Former Smoker     Packs/day: 0.25     Types: Cigarettes     Quit date: 2015     Years since quittin.1    Smokeless tobacco: Former User   Vaping Use    Vaping Use: Never used   Substance and Sexual Activity    Alcohol use: No     Alcohol/week: 0.0 standard drinks    Drug use: No    Sexual activity: None   Other Topics Concern    None   Social History Narrative    None     Social Determinants of Health     Financial Resource Strain:     Difficulty of Paying Living Expenses:    Food Insecurity:     Worried About Running Out of Food in the Last Year:     [address] in the Last Year:    Transportation Needs:     Lack of Transportation (Medical):  Lack of Transportation (Non-Medical):    Physical Activity:     Days of Exercise per Week:     Minutes of Exercise per Session:    Stress:     Feeling of Stress :    Social Connections:     Frequency of Communication with Friends and Family:     Frequency of Social Gatherings with Friends and Family:     Attends Sikh Services:     Active Member of Clubs or Organizations:     Attends Club or Organization Meetings:     Marital Status:    Intimate Partner Violence:     Fear of Current or Ex-Partner:     Emotionally Abused:     Physically Abused:     Sexually Abused:      History reviewed. No pertinent family history.   Current Outpatient Medications   Medication Sig Dispense Refill    Continuous Blood Gluc Transmit (DEXCOM G6 TRANSMITTER) MISC Change every 3 months 1 each 3    Continuous Blood Gluc Sensor (DEXCOM G6 SENSOR) MISC Change every 10 days 1 each 11    Continuous Blood Gluc  (DEXCOM G6 ) YANIRA Continuous 1 Device 0    Continuous Blood Gluc Sensor (FREESTYLE KEV 14 DAY SENSOR) MISC Every 2 weeks Dx E11.65 2 each 06    insulin NPH (NOVOLIN N FLEXPEN) 100 UNIT/ML injection pen 16 units at bedtime 5 pen 3    Insulin Regular Human (NOVOLIN R FLEXPEN) 100 UNIT/ML SOPN 6 units am 8 units lunch and dinner 5 pen 3    Continuous Blood Gluc Sensor (FREESTYLE KEV 14 DAY SENSOR) MISC Every 2 weeks 2 each 06    insulin aspart (NOVOLOG) 100 UNIT/ML injection vial 6 units am 8 units lunch and 8 with dinner Dx E11.65 1 vial 2    cycloSPORINE (SANDIMMUNE) 25 MG capsule TAKE 3 CAPSULES BY MOUTH TWICE DAILY FOR 90 DAYS  3    pantoprazole (PROTONIX) 40 MG tablet       Insulin Degludec (TRESIBA FLEXTOUCH) 100 UNIT/ML SOPN INJECT 16 UNITS UNDER SKIN AT NIGHT 45 pen 1    isosorbide mononitrate (IMDUR) 30 MG extended release tablet Take 1 tablet by mouth daily 30 tablet 3    nitroGLYCERIN (NITROSTAT) 0.4 MG SL tablet up to max of 3 total doses. If no relief after 1 dose, call 911. 25 tablet 3    Insulin Degludec (TRESIBA FLEXTOUCH) 100 UNIT/ML SOPN INJECT 16 UNITS UNDER SKIN AT NIGHT 45 pen 3    furosemide (LASIX) 40 MG tablet Take 40 mg by mouth See Admin Instructions      carvedilol (COREG) 6.25 MG tablet Take 6.25 mg by mouth 2 times daily (with meals)      aspirin 81 MG EC tablet Take 81 mg by mouth daily   3    cyclobenzaprine (FLEXERIL) 10 MG tablet Take 10 mg by mouth daily as needed Indications: as needed. 0    cycloSPORINE modified (NEORAL) 25 MG capsule Take 75 mg by mouth 2 times daily   2    gabapentin (NEURONTIN) 300 MG capsule Take 300 mg by mouth 3 times daily. 2    mycophenolate (CELLCEPT) 250 MG capsule Take 750 mg by mouth 2 times daily   1    predniSONE (DELTASONE) 5 MG tablet Take 5 mg by mouth daily   2    simvastatin (ZOCOR) 10 MG tablet Take 10 mg by mouth nightly   1     No current facility-administered medications for this visit. Patient has no known allergies. reviewed      Review of Systems   Constitutional: Negative. Negative for fever. HENT: Negative. Eyes: Positive for visual disturbance. Respiratory: Negative. Cardiovascular: Negative. Gastrointestinal: Negative for abdominal distention and abdominal pain. Genitourinary: Positive for dysuria and frequency. Negative for decreased urine volume, enuresis, hematuria, penile pain, scrotal swelling and testicular pain. Musculoskeletal: Positive for back pain. Neurological: Positive for seizures. Hematological: Negative. Psychiatric/Behavioral: Negative. Objective:   Physical Exam  Constitutional:       Appearance: Normal appearance. HENT:      Head: Normocephalic and atraumatic.    Eyes:      Conjunctiva/sclera: Conjunctivae normal.   Cardiovascular:      Rate and Rhythm: Normal rate and regular rhythm. Heart sounds: Normal heart sounds. No murmur heard. Pulmonary:      Effort: Pulmonary effort is normal. No respiratory distress. Breath sounds: Normal breath sounds. No stridor. No wheezing, rhonchi or rales. Abdominal:      General: Abdomen is flat. Bowel sounds are normal. There is no distension. Palpations: Abdomen is soft. There is no mass. Tenderness: There is no abdominal tenderness. There is no right CVA tenderness, guarding or rebound. Hernia: No hernia is present. There is no hernia in the left inguinal area or right inguinal area. Genitourinary:     Pubic Area: No rash. Penis: Normal. No phimosis, hypospadias, erythema or discharge. Testes:         Right: Mass, tenderness, swelling, testicular hydrocele or varicocele not present. Right testis is descended. Left: Mass, tenderness, swelling, testicular hydrocele or varicocele not present. Left testis is descended. Epididymis:      Right: Not inflamed or enlarged. No mass or tenderness. Left: Not inflamed or enlarged. No mass or tenderness. Prostate: Enlarged. Not tender and no nodules present. Rectum: External hemorrhoid present. Comments: The testis are atrophic bilaterally. Musculoskeletal:      Cervical back: Neck supple. No tenderness. Neurological:      Mental Status: He is alert.    Psychiatric:         Mood and Affect: Mood normal.         Behavior: Behavior normal.         CT ABDOMEN PELVIS WO CONTRAST Additional Contrast? None  Status: Final result   Order Providers    Authorizing Billing   [facility], APRN - CNP Susanna Sullivan MD   Replaced: CT ABDOMEN PELVIS W IV CONTRAST Additional Contrast? None          Signed by    Signed Date/Time Phone Pager   Anthony Khalil 12/10/2020  6:03 [phone]    Reading Providers    Read Date Phone Pager   Anthony Khalil Thu Dec 10, 2020  6:03 [phone]    Radiation Dose Estimates    No radiation information found for this patient   Narrative   CT of the Abdomen and Pelvis without intravenous contrast medium       History:  Left-sided abdominal pain. History of renal transplant x2.       Technical Factors:       CT imaging of the abdomen and pelvis were obtained and formatted as 5 mm contiguous axial images from the domes of the diaphragm to the symphysis pubis.  Sagittal and coronal reconstructions were also obtained. CT abdomen pelvis, November 22, 2019, June 20, 2011   Oral contrast medium:  None. Intravenous contrast medium:  None.       Comparison:  CT abdomen pelvis, June 20, 2011.       Findings:       Lungs:  Initial image shows dependent consolidation, right lower lobe.       Liver:  Normal in size, shape, and attenuation.         Bile Ducts:  Normal in caliber.        Gallbladder:  No stones or wall thickening.        Pancreas:  Fatty infiltrated without masses, cysts, ductal dilatation or calcification.        Spleen:  Normal in size without masses or calcifications.  No splenules.        Kidneys:  Bilateral atrophic native kidneys no hydronephrosis or calculi. Bilateral renal vascular calcification. Noncalcified high attenuation exophytic cystlike area posterior mid to lower pole right kidney (series 2, image 31) not present in 2011.        Renal transplant, identified in right pelvis. Craniocaudal dimension 8.3 cm. Diffuse cortical thinning. Lateral mid to lower pole right transplant kidney shows fibrotic change, hearing to lateral right lower abdominal wall (series 601, images 39 through    42). No hydronephrosis, no calculi.       Renal transplant, identified in left pelvis.  No pelvocaliectasis, masses, or calculi identified.       Adrenals:  Normal.        Small bowel:  Normal in caliber.        Appendix:  Normal.        Colon:  Normal in caliber.        Peritoneum:  No ascites, free air, or fluid collections.      Vessels:  Aorta normal in course and caliber.   Portal vein, splenic vein, superior mesenteric vein are patent.        Lymph nodes:         Retroperitoneal:  No enlarged retroperitoneal lymph nodes. Mesenteric:  No enlarged mesenteric lymph nodes. Pelvic: No enlarged pelvic lymph nodes.        Ureters: Left pelvic transplant kidney ureter nondilated, decompressed, and without calcification.       Bladder: No wall thickening.        Reproductive organs: No pelvic masses.        Abdominal Wall: Focal curvilinear area high attenuation, right lower anterior abdominal wall, stable, most likely representing postsurgical change.       Bones:  No bone lesions. No degenerative changes.     No post operative changes.            Impression       Atrophic bilateral native kidneys.       Atrophic right pelvic transplant kidney.       Left pelvic transplant kidney without hydronephrosis, masses, or calculi.       Transplant left ureter without calculi, and decompressed.       Partially imaged subsegmental right lower lobe atelectasis/pneumonia.           All CT scans at this facility use dose modulation, iterative reconstruction, and/or weight based dosing when appropriate to reduce radiation dose to as low as reasonably achievable.         8/17/2021 11:01 AM - Gregoria Wang CMA ([facility])    Component Collected Lab   Color, UA 08/17/2021 11:00 AM Unknown   yellow    Clarity, UA 08/17/2021 11:00 AM Unknown   cloudy    Glucose, UA POC 08/17/2021 11:00 AM Unknown   neg    Bilirubin, UA 08/17/2021 11:00 AM Unknown   neg    Ketones, UA 08/17/2021 11:00 AM Unknown   neg    Spec Grav, UA 08/17/2021 11:00 AM Unknown   1.015    Blood, UA POC 08/17/2021 11:00 AM Unknown   trace    pH, UA 08/17/2021 11:00 AM Unknown   6.0    Protein, UA POC 08/17/2021 11:00 AM Unknown   small    Urobilinogen, UA 08/17/2021 11:00 AM Unknown   0.2    Leukocytes, UA 08/17/2021 11:00 AM Unknown   large    Nitrite, UA 08/17/2021 11:00 AM Unknown   neg Lab and Collection    POCT Urinalysis No Micro (Auto) - 8/17/2021      PSA   Date Value Ref Range Status   11/23/2020 0.07 0.00 - 5.40 ng/mL Final     Comment:     When the Total PSA is between 3.00 and 10.00 ng/mL, consider  requesting a Free PSA to aid in diagnosis. 11/18/2019 0.10 0.00 - 5.40 ng/mL Final     Comment:     When the Total PSA is between 3.00 and 10.00 ng/mL, consider  requesting a Free PSA to aid in diagnosis. 09/15/2019 0.08 0.00 - 5.40 ng/mL Final     Comment:     When the Total PSA is between 3.00 and 10.00 ng/mL, consider  requesting a Free PSA to aid in diagnosis. 09/10/2019 0.09 0.00 - 5.40 ng/mL Final     Comment:     When the Total PSA is between 3.00 and 10.00 ng/mL, consider  requesting a Free PSA to aid in diagnosis. Assessment: This is a 73 yo male with h/o DM, HTN/lasix, CAD, Seizures, CRF s/p kidney transplant (x 2 last at [facility]) on immunosuppressives, Anemia, and with baseline NF and BPH with LUTs and now has some dysuria and may have a UTI given U/A. He wants to start Keflex 500 mg po BID as this has worked for his UTI in past and will check a C/S and call if need to change antibiotic. He may benefit from an ALpha blocker trial in the future given incomplete emptying and BPH symptoms. Plan:      1. Urine for C/S  2. F/U 1 week for Flow/PVR and U/A  3.    Orders Placed This Encounter   Medications    cephALEXin (KEFLEX) 500 MG capsule     Sig: Take 1 capsule by mouth 2 times daily     Dispense:  20 capsule     Refill:  0             Gilmer Leavitt MD

## 2021-08-20 LAB
ORGANISM: ABNORMAL
URINE CULTURE, ROUTINE: ABNORMAL

## 2021-08-30 ENCOUNTER — TELEPHONE (OUTPATIENT)
Dept: UROLOGY | Age: 71
End: 2021-08-30

## 2021-08-30 NOTE — TELEPHONE ENCOUNTER
Patient missed his appt on 8/26/21 and was wondering if he could come in this week sometime.  Patient will not be in town next week

## 2021-09-02 ENCOUNTER — OFFICE VISIT (OUTPATIENT)
Dept: UROLOGY | Age: 71
End: 2021-09-02
Payer: MEDICARE

## 2021-09-02 VITALS
SYSTOLIC BLOOD PRESSURE: 132 MMHG | HEART RATE: 63 BPM | WEIGHT: 204 LBS | BODY MASS INDEX: 27.04 KG/M2 | DIASTOLIC BLOOD PRESSURE: 80 MMHG | HEIGHT: 73 IN

## 2021-09-02 DIAGNOSIS — N13.8 BPH WITH OBSTRUCTION/LOWER URINARY TRACT SYMPTOMS: Primary | ICD-10-CM

## 2021-09-02 DIAGNOSIS — N40.1 BPH WITH OBSTRUCTION/LOWER URINARY TRACT SYMPTOMS: Primary | ICD-10-CM

## 2021-09-02 DIAGNOSIS — R35.0 URINARY FREQUENCY: ICD-10-CM

## 2021-09-02 PROCEDURE — 3017F COLORECTAL CA SCREEN DOC REV: CPT | Performed by: UROLOGY

## 2021-09-02 PROCEDURE — 99214 OFFICE O/P EST MOD 30 MIN: CPT | Performed by: UROLOGY

## 2021-09-02 PROCEDURE — 51798 US URINE CAPACITY MEASURE: CPT | Performed by: UROLOGY

## 2021-09-02 PROCEDURE — 4040F PNEUMOC VAC/ADMIN/RCVD: CPT | Performed by: UROLOGY

## 2021-09-02 PROCEDURE — G8417 CALC BMI ABV UP PARAM F/U: HCPCS | Performed by: UROLOGY

## 2021-09-02 PROCEDURE — 1036F TOBACCO NON-USER: CPT | Performed by: UROLOGY

## 2021-09-02 PROCEDURE — 1123F ACP DISCUSS/DSCN MKR DOCD: CPT | Performed by: UROLOGY

## 2021-09-02 PROCEDURE — G8427 DOCREV CUR MEDS BY ELIG CLIN: HCPCS | Performed by: UROLOGY

## 2021-09-02 RX ORDER — TAMSULOSIN HYDROCHLORIDE 0.4 MG/1
0.4 CAPSULE ORAL DAILY
Qty: 90 CAPSULE | Refills: 3 | Status: SHIPPED | OUTPATIENT
Start: 2021-09-02 | End: 2021-12-22 | Stop reason: SDUPTHER

## 2021-09-02 ASSESSMENT — ENCOUNTER SYMPTOMS
ABDOMINAL DISTENTION: 0
ABDOMINAL PAIN: 0

## 2021-09-02 NOTE — PROGRESS NOTES
Subjective:      Patient ID: Alan Perales is a 72 y.o. male. HPI  This is a 73 yo male with h/o DM, HTN/lasix, CAD, Seizures, CRF s/p kidney transplant (x 2 last at [facility]) on immunosuppressives, Anemia, and back for evaluation of frequency and incomplete bladder emptying noted on recent U/S (260 cc PVR). When last seen he was treated for a UTI with Keflex and has no significant changes in his voiding. An interval Urine C/S is now negative. He has no F/C or hematuria. He is here with his wife. Past Medical History:   Diagnosis Date    Diabetes mellitus ([facility]     Hemodialysis access, fistula mature ([facility] 2002    Hypertension     Seizures ([facility]      Past Surgical History:   Procedure Laterality Date    BRAIN SURGERY Left 1990    to eliminate seizures    KIDNEY TRANSPLANT      ND [address] INCL FLUOR GDNCE DX W/CELL WASHG SPX N/A 3/20/2018    [facility] History     Socioeconomic History    Marital status:      Spouse name: None    Number of children: None    Years of education: None    Highest education level: None   Occupational History    None   Tobacco Use    Smoking status: Former Smoker     Packs/day: 0.25     Types: Cigarettes     Quit date: 2015     Years since quittin.2    Smokeless tobacco: Former User   Vaping Use    Vaping Use: Never used   Substance and Sexual Activity    Alcohol use: No     Alcohol/week: 0.0 standard drinks    Drug use: No    Sexual activity: None   Other Topics Concern    None   Social History Narrative    None     Social Determinants of Health     Financial Resource Strain:     Difficulty of Paying Living Expenses:    Food Insecurity:     Worried About Running Out of Food in the Last Year:     Ran Out of Food in the Last Year:    Transportation Needs:     Lack of Transportation (Medical):      Lack of Transportation (Non-Medical):    Physical Activity:     Days of Exercise per Week:     Minutes of Exercise per Session:    Stress:     Feeling of Stress :    Social Connections:     Frequency of Communication with Friends and Family:     Frequency of Social Gatherings with Friends and Family:     Attends Denominational Services:     Active Member of Clubs or Organizations:     Attends Club or Organization Meetings:     Marital Status:    Intimate Partner Violence:     Fear of Current or Ex-Partner:     Emotionally Abused:     Physically Abused:     Sexually Abused:      History reviewed. No pertinent family history. Current Outpatient Medications   Medication Sig Dispense Refill    tamsulosin (FLOMAX) 0.4 MG capsule Take 1 capsule by mouth daily 90 capsule 3    Continuous Blood Gluc Transmit (DEXCOM G6 TRANSMITTER) MISC Change every 3 months 1 each 3    Continuous Blood Gluc Sensor (DEXCOM G6 SENSOR) MISC Change every 10 days 1 each 11    Continuous Blood Gluc  (DEXCOM G6 ) YANIRA Continuous 1 Device 0    Continuous Blood Gluc Sensor (FREESTYLE KEV 14 DAY SENSOR) MISC Every 2 weeks Dx E11.65 2 each 06    insulin NPH (NOVOLIN N FLEXPEN) 100 UNIT/ML injection pen 16 units at bedtime 5 pen 3    Insulin Regular Human (NOVOLIN R FLEXPEN) 100 UNIT/ML SOPN 6 units am 8 units lunch and dinner 5 pen 3    Continuous Blood Gluc Sensor (FREESTYLE KEV 14 DAY SENSOR) MISC Every 2 weeks 2 each 06    insulin aspart (NOVOLOG) 100 UNIT/ML injection vial 6 units am 8 units lunch and 8 with dinner Dx E11.65 1 vial 2    cycloSPORINE (SANDIMMUNE) 25 MG capsule TAKE 3 CAPSULES BY MOUTH TWICE DAILY FOR 90 DAYS  3    pantoprazole (PROTONIX) 40 MG tablet       Insulin Degludec (TRESIBA FLEXTOUCH) 100 UNIT/ML SOPN INJECT 16 UNITS UNDER SKIN AT NIGHT 45 pen 1    isosorbide mononitrate (IMDUR) 30 MG extended release tablet Take 1 tablet by mouth daily 30 tablet 3    nitroGLYCERIN (NITROSTAT) 0.4 MG SL tablet up to max of 3 total doses.  If no relief after 1 dose, call 911. 25 tablet 3    Insulin Degludec (TRESIBA FLEXTOUCH) 100 UNIT/ML SOPN INJECT 16 UNITS UNDER SKIN AT NIGHT 45 pen 3    furosemide (LASIX) 40 MG tablet Take 40 mg by mouth See Admin Instructions      carvedilol (COREG) 6.25 MG tablet Take 6.25 mg by mouth 2 times daily (with meals)      aspirin 81 MG EC tablet Take 81 mg by mouth daily   3    cyclobenzaprine (FLEXERIL) 10 MG tablet Take 10 mg by mouth daily as needed Indications: as needed. 0    cycloSPORINE modified (NEORAL) 25 MG capsule Take 75 mg by mouth 2 times daily   2    gabapentin (NEURONTIN) 300 MG capsule Take 300 mg by mouth 3 times daily. 2    mycophenolate (CELLCEPT) 250 MG capsule Take 750 mg by mouth 2 times daily   1    predniSONE (DELTASONE) 5 MG tablet Take 5 mg by mouth daily   2    simvastatin (ZOCOR) 10 MG tablet Take 10 mg by mouth nightly   1     No current facility-administered medications for this visit. Patient has no known allergies. reviewed    Review of Systems   Gastrointestinal: Negative for abdominal distention and abdominal pain. Genitourinary: Negative for dysuria, flank pain and hematuria. Objective:   Physical Exam  Constitutional:       Appearance: Normal appearance. Abdominal:      General: There is no distension. Neurological:      Mental Status: He is alert. Psychiatric:         Mood and Affect: Mood normal.           8/20/2021 12:13 PM - [facility] Incoming Lab Results From [facility]    Specimen Information: Urine, clean catch        Component Collected Lab   Organism Abnormal  08/17/2021  3:57 PM [address] [facility]   Aerococcus urinae    Urine Culture, Routine 08/17/2021  3:57 PM [address] [facility]   >100,000 CFU/ml   This isolate is often of questionable clinical significance. Validated   susceptibility testing methods do not exist for this isolate.     Testing Performed By    Radha Llanos Name Director Address Valid Date Range   [address]

## 2021-12-22 RX ORDER — TAMSULOSIN HYDROCHLORIDE 0.4 MG/1
0.4 CAPSULE ORAL DAILY
Qty: 90 CAPSULE | Refills: 0 | Status: SHIPPED | OUTPATIENT
Start: 2021-12-22

## 2022-04-21 ENCOUNTER — OFFICE VISIT (OUTPATIENT)
Dept: UROLOGY | Age: 72
End: 2022-04-21
Payer: COMMERCIAL

## 2022-04-21 VITALS
WEIGHT: 208 LBS | HEIGHT: 73 IN | BODY MASS INDEX: 27.57 KG/M2 | HEART RATE: 57 BPM | SYSTOLIC BLOOD PRESSURE: 132 MMHG | DIASTOLIC BLOOD PRESSURE: 76 MMHG

## 2022-04-21 DIAGNOSIS — N13.8 BPH WITH OBSTRUCTION/LOWER URINARY TRACT SYMPTOMS: ICD-10-CM

## 2022-04-21 DIAGNOSIS — N40.1 BPH WITH OBSTRUCTION/LOWER URINARY TRACT SYMPTOMS: ICD-10-CM

## 2022-04-21 DIAGNOSIS — N40.1 BPH WITH OBSTRUCTION/LOWER URINARY TRACT SYMPTOMS: Primary | ICD-10-CM

## 2022-04-21 DIAGNOSIS — N13.8 BPH WITH OBSTRUCTION/LOWER URINARY TRACT SYMPTOMS: Primary | ICD-10-CM

## 2022-04-21 LAB — PROSTATE SPECIFIC ANTIGEN: 0.06 NG/ML (ref 0–4)

## 2022-04-21 PROCEDURE — 51798 US URINE CAPACITY MEASURE: CPT | Performed by: UROLOGY

## 2022-04-21 PROCEDURE — 99214 OFFICE O/P EST MOD 30 MIN: CPT | Performed by: UROLOGY

## 2022-04-21 ASSESSMENT — ENCOUNTER SYMPTOMS: ABDOMINAL PAIN: 0

## 2022-04-21 NOTE — PROGRESS NOTES
Subjective:      Patient ID: Leodan Lobato is a 80 y.o. male    HPI  This is a 81 yo male with h/o DM, HTN/lasix, CAD, Seizures, CRF s/p kidney transplant (x 2 last at [facility] immunosuppressives, Anemia, and back for evaluation of frequency and incomplete bladder emptying. Since last seen on 21, he has no interval UTI's but now reports that he is having intermittent nighttime incontinence. He was started on Flomax when last seen and does feel this may have helped his voidng but admits to not using it every day. he has no SE. He has no interval F/C or hematuria. He has no other complaints.      Past Medical History:   Diagnosis Date    Diabetes mellitus ([facility]     Hemodialysis access, fistula mature ([facility] [facility] 2002    Hypertension     Seizures ([facility]      Past Surgical History:   Procedure Laterality Date    BRAIN SURGERY Left 1990    to eliminate seizures    KIDNEY TRANSPLANT      ND [address] INCL FLUOR GDNCE DX W/CELL WASHG SPX N/A 3/20/2018    BRONCHOSCOPY performed by Amy Rabago MD at [address] [facility] History     Socioeconomic History    Marital status:      Spouse name: None    Number of children: None    Years of education: None    Highest education level: None   Occupational History    None   Tobacco Use    Smoking status: Former Smoker     Packs/day: 0.25     Types: Cigarettes     Quit date: 2015     Years since quittin.8    Smokeless tobacco: Former User   Vaping Use    Vaping Use: Never used   Substance and Sexual Activity    Alcohol use: No     Alcohol/week: 0.0 standard drinks    Drug use: No    Sexual activity: None   Other Topics Concern    None   Social History Narrative    None     Social Determinants of Health     Financial Resource Strain:     Difficulty of Paying Living Expenses: Not on file   Food Insecurity:     Worried About Running Out of Food in the Last Year: Not on file    Lisa of Food in the Last Year: Not on file Transportation Needs:     Lack of Transportation (Medical): Not on file    Lack of Transportation (Non-Medical): Not on file   Physical Activity:     Days of Exercise per Week: Not on file    Minutes of Exercise per Session: Not on file   Stress:     Feeling of Stress : Not on file   Social Connections:     Frequency of Communication with Friends and Family: Not on file    Frequency of Social Gatherings with Friends and Family: Not on file    Attends Faith Services: Not on file    Active Member of [address] [place] [address] or Organizations: Not on file    Attends Club or Organization Meetings: Not on file    Marital Status: Not on file   Intimate Partner Violence:     Fear of Current or Ex-Partner: Not on file    Emotionally Abused: Not on file    Physically Abused: Not on file    Sexually Abused: Not on file   Housing Stability:     Unable to Pay for Housing in the Last Year: Not on file    Number of [place] in the Last Year: Not on file    Unstable Housing in the Last Year: Not on file     History reviewed. No pertinent family history.   Current Outpatient Medications   Medication Sig Dispense Refill    tamsulosin (FLOMAX) 0.4 MG capsule Take 1 capsule by mouth daily 90 capsule 0    Continuous Blood Gluc Transmit (DEXCOM G6 TRANSMITTER) MISC Change every 3 months 1 each 3    Continuous Blood Gluc Sensor (DEXCOM G6 SENSOR) MISC Change every 10 days 1 each 11    Continuous Blood Gluc  (DEXCOM G6 ) YANIRA Continuous 1 Device 0    Continuous Blood Gluc Sensor (FREESTYLE KEV 14 DAY SENSOR) MISC Every 2 weeks Dx E11.65 2 each 06    insulin NPH (NOVOLIN N FLEXPEN) 100 UNIT/ML injection pen 16 units at bedtime 5 pen 3    Insulin Regular Human (NOVOLIN R FLEXPEN) 100 UNIT/ML SOPN 6 units am 8 units lunch and dinner 5 pen 3    Continuous Blood Gluc Sensor (FREESTYLE KEV 14 DAY SENSOR) MISC Every 2 weeks 2 each 06    insulin aspart (NOVOLOG) 100 UNIT/ML injection vial 6 units am 8 units 11/23/2020 0.07 0.00 - 5.40 ng/mL Final     Comment:     When the Total PSA is between 3.00 and 10.00 ng/mL, consider  requesting a Free PSA to aid in diagnosis. 11/18/2019 0.10 0.00 - 5.40 ng/mL Final     Comment:     When the Total PSA is between 3.00 and 10.00 ng/mL, consider  requesting a Free PSA to aid in diagnosis. 09/15/2019 0.08 0.00 - 5.40 ng/mL Final     Comment:     When the Total PSA is between 3.00 and 10.00 ng/mL, consider  requesting a Free PSA to aid in diagnosis. 09/10/2019 0.09 0.00 - 5.40 ng/mL Final     Comment:     When the Total PSA is between 3.00 and 10.00 ng/mL, consider  requesting a Free PSA to aid in diagnosis. post void residual by U/S: 521 cc (voided prior to visit)    Assessment: This is a 79 yo male with h/o DM, HTN/lasix, CAD, Seizures, CRF s/p kidney transplant (x 2 last at [facility] immunosuppressives, Anemia, and with  BPH with LUTs and continued incomplete bladder emptying by PVR and now with nighttime incontinence despite use of Flomax. He has had prior UTII's as well likely related to incomplete bladder emptying. I explained my concern given his renal transplant and he may want to consider [facility] for management given risk that the incomplete bladder emptying may present in future to health of his transplant and infection risk and he agrees. He also needs a PSA. Plan:      1. PSA today and F/U 1 yr for PSA  2.  Refer to Dr Quentin Swain to consider Farida De MD

## 2022-06-21 ENCOUNTER — HOSPITAL ENCOUNTER (OUTPATIENT)
Age: 72
Setting detail: OBSERVATION
Discharge: HOME | End: 2022-06-23
Attending: INTERNAL MEDICINE | Admitting: INTERNAL MEDICINE
Payer: COMMERCIAL

## 2022-06-21 ENCOUNTER — APPOINTMENT (OUTPATIENT)
Dept: GENERAL RADIOLOGY | Age: 72
End: 2022-06-21
Payer: COMMERCIAL

## 2022-06-21 ENCOUNTER — APPOINTMENT (OUTPATIENT)
Dept: NUCLEAR MEDICINE | Age: 72
End: 2022-06-21
Payer: COMMERCIAL

## 2022-06-21 DIAGNOSIS — N18.9 CHRONIC KIDNEY DISEASE, UNSPECIFIED CKD STAGE: ICD-10-CM

## 2022-06-21 DIAGNOSIS — I20.0 UNSTABLE ANGINA (HCC): Primary | ICD-10-CM

## 2022-06-21 DIAGNOSIS — R77.8 ELEVATED TROPONIN: ICD-10-CM

## 2022-06-21 PROBLEM — R07.9 CHEST PAIN: Status: ACTIVE | Noted: 2022-06-21

## 2022-06-21 LAB
ALBUMIN SERPL-MCNC: 4.5 G/DL (ref 3.5–4.6)
ALP BLD-CCNC: 64 U/L (ref 35–104)
ALT SERPL-CCNC: 8 U/L (ref 0–41)
ANION GAP SERPL CALCULATED.3IONS-SCNC: 15 MEQ/L (ref 9–15)
APTT: 25.2 SEC (ref 24.4–36.8)
AST SERPL-CCNC: 11 U/L (ref 0–40)
BASOPHILS ABSOLUTE: 0 K/UL (ref 0–0.2)
BASOPHILS RELATIVE PERCENT: 0.6 %
BILIRUB SERPL-MCNC: 0.7 MG/DL (ref 0.2–0.7)
BUN BLDV-MCNC: 59 MG/DL (ref 8–23)
CALCIUM SERPL-MCNC: 9.1 MG/DL (ref 8.5–9.9)
CHLORIDE BLD-SCNC: 102 MEQ/L (ref 95–107)
CO2: 21 MEQ/L (ref 20–31)
CREAT SERPL-MCNC: 2.61 MG/DL (ref 0.7–1.2)
D DIMER: >20 MG/L FEU (ref 0–0.5)
EOSINOPHILS ABSOLUTE: 0.1 K/UL (ref 0–0.7)
EOSINOPHILS RELATIVE PERCENT: 1.2 %
GFR AFRICAN AMERICAN: 29.5
GFR NON-AFRICAN AMERICAN: 24.4
GLOBULIN: 2.3 G/DL (ref 2.3–3.5)
GLUCOSE BLD-MCNC: 235 MG/DL (ref 70–99)
HCT VFR BLD CALC: 35.4 % (ref 42–52)
HCT VFR BLD CALC: 36.3 % (ref 42–52)
HEMOGLOBIN: 11.3 G/DL (ref 14–18)
HEMOGLOBIN: 11.7 G/DL (ref 14–18)
INR BLD: 1.1
LYMPHOCYTES ABSOLUTE: 0.4 K/UL (ref 1–4.8)
LYMPHOCYTES RELATIVE PERCENT: 6.5 %
MAGNESIUM: 1.9 MG/DL (ref 1.7–2.4)
MCH RBC QN AUTO: 28.6 PG (ref 27–31.3)
MCH RBC QN AUTO: 28.9 PG (ref 27–31.3)
MCHC RBC AUTO-ENTMCNC: 32 % (ref 33–37)
MCHC RBC AUTO-ENTMCNC: 32.4 % (ref 33–37)
MCV RBC AUTO: 89.2 FL (ref 80–100)
MCV RBC AUTO: 89.3 FL (ref 80–100)
MONOCYTES ABSOLUTE: 0.4 K/UL (ref 0.2–0.8)
MONOCYTES RELATIVE PERCENT: 5.3 %
NEUTROPHILS ABSOLUTE: 5.8 K/UL (ref 1.4–6.5)
NEUTROPHILS RELATIVE PERCENT: 86.4 %
PDW BLD-RTO: 14.8 % (ref 11.5–14.5)
PDW BLD-RTO: 15.2 % (ref 11.5–14.5)
PLATELET # BLD: 127 K/UL (ref 130–400)
PLATELET # BLD: 134 K/UL (ref 130–400)
POTASSIUM SERPL-SCNC: 4.7 MEQ/L (ref 3.4–4.9)
PROTHROMBIN TIME: 14.4 SEC (ref 12.3–14.9)
RBC # BLD: 3.97 M/UL (ref 4.7–6.1)
RBC # BLD: 4.07 M/UL (ref 4.7–6.1)
SODIUM BLD-SCNC: 138 MEQ/L (ref 135–144)
TOTAL PROTEIN: 6.8 G/DL (ref 6.3–8)
TROPONIN: 0.02 NG/ML (ref 0–0.01)
TROPONIN: 0.03 NG/ML (ref 0–0.01)
TROPONIN: 0.03 NG/ML (ref 0–0.01)
WBC # BLD: 6.4 K/UL (ref 4.8–10.8)
WBC # BLD: 6.7 K/UL (ref 4.8–10.8)

## 2022-06-21 PROCEDURE — G0378 HOSPITAL OBSERVATION PER HR: HCPCS

## 2022-06-21 PROCEDURE — 3430000000 HC RX DIAGNOSTIC RADIOPHARMACEUTICAL

## 2022-06-21 PROCEDURE — 6370000000 HC RX 637 (ALT 250 FOR IP)

## 2022-06-21 PROCEDURE — 80053 COMPREHEN METABOLIC PANEL: CPT

## 2022-06-21 PROCEDURE — 78582 LUNG VENTILAT&PERFUS IMAGING: CPT

## 2022-06-21 PROCEDURE — 71045 X-RAY EXAM CHEST 1 VIEW: CPT

## 2022-06-21 PROCEDURE — 85610 PROTHROMBIN TIME: CPT

## 2022-06-21 PROCEDURE — 85025 COMPLETE CBC W/AUTO DIFF WBC: CPT

## 2022-06-21 PROCEDURE — 84484 ASSAY OF TROPONIN QUANT: CPT

## 2022-06-21 PROCEDURE — 85027 COMPLETE CBC AUTOMATED: CPT

## 2022-06-21 PROCEDURE — 2580000003 HC RX 258

## 2022-06-21 PROCEDURE — 83735 ASSAY OF MAGNESIUM: CPT

## 2022-06-21 PROCEDURE — 2580000003 HC RX 258: Performed by: INTERNAL MEDICINE

## 2022-06-21 PROCEDURE — 93005 ELECTROCARDIOGRAM TRACING: CPT | Performed by: EMERGENCY MEDICINE

## 2022-06-21 PROCEDURE — 36415 COLL VENOUS BLD VENIPUNCTURE: CPT

## 2022-06-21 PROCEDURE — 6370000000 HC RX 637 (ALT 250 FOR IP): Performed by: INTERNAL MEDICINE

## 2022-06-21 PROCEDURE — 99285 EMERGENCY DEPT VISIT HI MDM: CPT

## 2022-06-21 PROCEDURE — A9539 TC99M PENTETATE: HCPCS

## 2022-06-21 PROCEDURE — 85730 THROMBOPLASTIN TIME PARTIAL: CPT

## 2022-06-21 PROCEDURE — 85379 FIBRIN DEGRADATION QUANT: CPT

## 2022-06-21 PROCEDURE — A9540 TC99M MAA: HCPCS

## 2022-06-21 RX ORDER — HYDRALAZINE HYDROCHLORIDE 20 MG/ML
10 INJECTION INTRAMUSCULAR; INTRAVENOUS ONCE
Status: DISCONTINUED | OUTPATIENT
Start: 2022-06-21 | End: 2022-06-21

## 2022-06-21 RX ORDER — CARVEDILOL 12.5 MG/1
12.5 TABLET ORAL 2 TIMES DAILY
Status: DISCONTINUED | OUTPATIENT
Start: 2022-06-21 | End: 2022-06-23 | Stop reason: HOSPADM

## 2022-06-21 RX ORDER — SODIUM CHLORIDE 0.9 % (FLUSH) 0.9 %
5-40 SYRINGE (ML) INJECTION PRN
Status: DISCONTINUED | OUTPATIENT
Start: 2022-06-21 | End: 2022-06-23 | Stop reason: HOSPADM

## 2022-06-21 RX ORDER — PANTOPRAZOLE SODIUM 40 MG/1
40 TABLET, DELAYED RELEASE ORAL
Status: DISCONTINUED | OUTPATIENT
Start: 2022-06-22 | End: 2022-06-23 | Stop reason: HOSPADM

## 2022-06-21 RX ORDER — ONDANSETRON 4 MG/1
4 TABLET, ORALLY DISINTEGRATING ORAL EVERY 8 HOURS PRN
Status: DISCONTINUED | OUTPATIENT
Start: 2022-06-21 | End: 2022-06-21 | Stop reason: SDUPTHER

## 2022-06-21 RX ORDER — ONDANSETRON 2 MG/ML
4 INJECTION INTRAMUSCULAR; INTRAVENOUS EVERY 6 HOURS PRN
Status: DISCONTINUED | OUTPATIENT
Start: 2022-06-21 | End: 2022-06-23 | Stop reason: HOSPADM

## 2022-06-21 RX ORDER — ZOLPIDEM TARTRATE 5 MG/1
5 TABLET ORAL NIGHTLY PRN
Status: DISCONTINUED | OUTPATIENT
Start: 2022-06-21 | End: 2022-06-23 | Stop reason: HOSPADM

## 2022-06-21 RX ORDER — ONDANSETRON 2 MG/ML
4 INJECTION INTRAMUSCULAR; INTRAVENOUS EVERY 6 HOURS PRN
Status: DISCONTINUED | OUTPATIENT
Start: 2022-06-21 | End: 2022-06-21 | Stop reason: SDUPTHER

## 2022-06-21 RX ORDER — POLYETHYLENE GLYCOL 3350 17 G/17G
17 POWDER, FOR SOLUTION ORAL DAILY PRN
Status: DISCONTINUED | OUTPATIENT
Start: 2022-06-21 | End: 2022-06-23 | Stop reason: HOSPADM

## 2022-06-21 RX ORDER — DEXTROSE MONOHYDRATE 50 MG/ML
100 INJECTION, SOLUTION INTRAVENOUS PRN
Status: DISCONTINUED | OUTPATIENT
Start: 2022-06-21 | End: 2022-06-23 | Stop reason: HOSPADM

## 2022-06-21 RX ORDER — SODIUM CHLORIDE 9 MG/ML
INJECTION, SOLUTION INTRAVENOUS PRN
Status: DISCONTINUED | OUTPATIENT
Start: 2022-06-21 | End: 2022-06-23 | Stop reason: HOSPADM

## 2022-06-21 RX ORDER — ACETAMINOPHEN 325 MG/1
650 TABLET ORAL EVERY 4 HOURS PRN
Status: DISCONTINUED | OUTPATIENT
Start: 2022-06-21 | End: 2022-06-23 | Stop reason: SDUPTHER

## 2022-06-21 RX ORDER — INSULIN LISPRO 100 [IU]/ML
0-6 INJECTION, SOLUTION INTRAVENOUS; SUBCUTANEOUS
Status: DISCONTINUED | OUTPATIENT
Start: 2022-06-21 | End: 2022-06-23 | Stop reason: HOSPADM

## 2022-06-21 RX ORDER — SODIUM CHLORIDE 9 MG/ML
INJECTION, SOLUTION INTRAVENOUS CONTINUOUS
Status: DISCONTINUED | OUTPATIENT
Start: 2022-06-21 | End: 2022-06-23

## 2022-06-21 RX ORDER — NITROGLYCERIN 0.4 MG/1
0.4 TABLET SUBLINGUAL EVERY 5 MIN PRN
Status: DISCONTINUED | OUTPATIENT
Start: 2022-06-21 | End: 2022-06-23 | Stop reason: HOSPADM

## 2022-06-21 RX ORDER — ASPIRIN 81 MG/1
81 TABLET, CHEWABLE ORAL DAILY
Status: DISCONTINUED | OUTPATIENT
Start: 2022-06-22 | End: 2022-06-23 | Stop reason: HOSPADM

## 2022-06-21 RX ORDER — ACETAMINOPHEN 325 MG/1
650 TABLET ORAL EVERY 4 HOURS PRN
Status: DISCONTINUED | OUTPATIENT
Start: 2022-06-21 | End: 2022-06-23 | Stop reason: HOSPADM

## 2022-06-21 RX ORDER — PREDNISONE 1 MG/1
5 TABLET ORAL DAILY
Status: DISCONTINUED | OUTPATIENT
Start: 2022-06-21 | End: 2022-06-23 | Stop reason: HOSPADM

## 2022-06-21 RX ORDER — CYCLOSPORINE 25 MG/1
75 CAPSULE, LIQUID FILLED ORAL 2 TIMES DAILY
Status: DISCONTINUED | OUTPATIENT
Start: 2022-06-21 | End: 2022-06-23 | Stop reason: HOSPADM

## 2022-06-21 RX ORDER — SIMVASTATIN 10 MG
20 TABLET ORAL NIGHTLY
Status: DISCONTINUED | OUTPATIENT
Start: 2022-06-21 | End: 2022-06-21 | Stop reason: ALTCHOICE

## 2022-06-21 RX ORDER — KIT FOR THE PREPARATION OF TECHNETIUM TC 99M PENTETATE 20 MG/1
1 INJECTION, POWDER, LYOPHILIZED, FOR SOLUTION INTRAVENOUS; RESPIRATORY (INHALATION)
Status: COMPLETED | OUTPATIENT
Start: 2022-06-21 | End: 2022-06-21

## 2022-06-21 RX ORDER — ATORVASTATIN CALCIUM 80 MG/1
80 TABLET, FILM COATED ORAL NIGHTLY
Status: DISCONTINUED | OUTPATIENT
Start: 2022-06-21 | End: 2022-06-21 | Stop reason: ALTCHOICE

## 2022-06-21 RX ORDER — ONDANSETRON 4 MG/1
4 TABLET, ORALLY DISINTEGRATING ORAL EVERY 8 HOURS PRN
Status: DISCONTINUED | OUTPATIENT
Start: 2022-06-21 | End: 2022-06-23 | Stop reason: HOSPADM

## 2022-06-21 RX ORDER — DULAGLUTIDE 1.5 MG/.5ML
1.5 INJECTION, SOLUTION SUBCUTANEOUS WEEKLY
COMMUNITY

## 2022-06-21 RX ORDER — SODIUM CHLORIDE 0.9 % (FLUSH) 0.9 %
5-40 SYRINGE (ML) INJECTION EVERY 12 HOURS SCHEDULED
Status: DISCONTINUED | OUTPATIENT
Start: 2022-06-21 | End: 2022-06-23 | Stop reason: HOSPADM

## 2022-06-21 RX ORDER — ACETAMINOPHEN 325 MG/1
650 TABLET ORAL EVERY 6 HOURS PRN
Status: DISCONTINUED | OUTPATIENT
Start: 2022-06-21 | End: 2022-06-23 | Stop reason: HOSPADM

## 2022-06-21 RX ORDER — TAMSULOSIN HYDROCHLORIDE 0.4 MG/1
0.4 CAPSULE ORAL DAILY
Status: DISCONTINUED | OUTPATIENT
Start: 2022-06-21 | End: 2022-06-23 | Stop reason: HOSPADM

## 2022-06-21 RX ORDER — INSULIN LISPRO 100 [IU]/ML
0-3 INJECTION, SOLUTION INTRAVENOUS; SUBCUTANEOUS NIGHTLY
Status: DISCONTINUED | OUTPATIENT
Start: 2022-06-21 | End: 2022-06-23 | Stop reason: HOSPADM

## 2022-06-21 RX ORDER — ASPIRIN 81 MG/1
324 TABLET, CHEWABLE ORAL ONCE
Status: COMPLETED | OUTPATIENT
Start: 2022-06-21 | End: 2022-06-21

## 2022-06-21 RX ORDER — ROSUVASTATIN CALCIUM 20 MG/1
20 TABLET, COATED ORAL NIGHTLY
Status: DISCONTINUED | OUTPATIENT
Start: 2022-06-21 | End: 2022-06-23 | Stop reason: HOSPADM

## 2022-06-21 RX ORDER — ASPIRIN 81 MG/1
81 TABLET, CHEWABLE ORAL DAILY
Status: DISCONTINUED | OUTPATIENT
Start: 2022-06-21 | End: 2022-06-21 | Stop reason: SDUPTHER

## 2022-06-21 RX ORDER — MYCOPHENOLATE MOFETIL 250 MG/1
750 CAPSULE ORAL 2 TIMES DAILY
Status: DISCONTINUED | OUTPATIENT
Start: 2022-06-21 | End: 2022-06-23 | Stop reason: HOSPADM

## 2022-06-21 RX ORDER — SODIUM CHLORIDE 0.9 % (FLUSH) 0.9 %
10 SYRINGE (ML) INJECTION PRN
Status: DISCONTINUED | OUTPATIENT
Start: 2022-06-21 | End: 2022-06-23 | Stop reason: HOSPADM

## 2022-06-21 RX ORDER — ACETAMINOPHEN 650 MG/1
650 SUPPOSITORY RECTAL EVERY 6 HOURS PRN
Status: DISCONTINUED | OUTPATIENT
Start: 2022-06-21 | End: 2022-06-23 | Stop reason: HOSPADM

## 2022-06-21 RX ORDER — GABAPENTIN 300 MG/1
300 CAPSULE ORAL 2 TIMES DAILY
Status: DISCONTINUED | OUTPATIENT
Start: 2022-06-21 | End: 2022-06-23

## 2022-06-21 RX ORDER — ENOXAPARIN SODIUM 100 MG/ML
1 INJECTION SUBCUTANEOUS 2 TIMES DAILY
Status: DISCONTINUED | OUTPATIENT
Start: 2022-06-21 | End: 2022-06-23 | Stop reason: HOSPADM

## 2022-06-21 RX ADMIN — KIT FOR THE PREPARATION OF TECHNETIUM TC 99M PENTETATE 1 MILLICURIE: 20 INJECTION, POWDER, LYOPHILIZED, FOR SOLUTION INTRAVENOUS; RESPIRATORY (INHALATION) at 18:50

## 2022-06-21 RX ADMIN — Medication 10 ML: at 19:05

## 2022-06-21 RX ADMIN — GABAPENTIN 300 MG: 300 CAPSULE ORAL at 21:13

## 2022-06-21 RX ADMIN — SODIUM CHLORIDE, PRESERVATIVE FREE 10 ML: 5 INJECTION INTRAVENOUS at 21:12

## 2022-06-21 RX ADMIN — TAMSULOSIN HYDROCHLORIDE 0.4 MG: 0.4 CAPSULE ORAL at 21:24

## 2022-06-21 RX ADMIN — SODIUM CHLORIDE: 9 INJECTION, SOLUTION INTRAVENOUS at 19:42

## 2022-06-21 RX ADMIN — ROSUVASTATIN CALCIUM 20 MG: 20 TABLET, FILM COATED ORAL at 21:12

## 2022-06-21 RX ADMIN — CARVEDILOL 12.5 MG: 12.5 TABLET, FILM COATED ORAL at 21:13

## 2022-06-21 RX ADMIN — MYCOPHENOLATE MOFETIL 750 MG: 250 CAPSULE ORAL at 21:21

## 2022-06-21 RX ADMIN — CYCLOSPORINE 75 MG: 25 CAPSULE, LIQUID FILLED ORAL at 21:22

## 2022-06-21 RX ADMIN — SODIUM CHLORIDE, PRESERVATIVE FREE 10 ML: 5 INJECTION INTRAVENOUS at 21:11

## 2022-06-21 RX ADMIN — ASPIRIN 324 MG: 81 TABLET, CHEWABLE ORAL at 15:41

## 2022-06-21 RX ADMIN — Medication 6 MILLICURIE: at 19:05

## 2022-06-21 ASSESSMENT — ENCOUNTER SYMPTOMS
DIARRHEA: 0
COUGH: 0
SHORTNESS OF BREATH: 1
VOMITING: 0
ABDOMINAL PAIN: 0
NAUSEA: 0
CHOKING: 0
CHEST TIGHTNESS: 0
WHEEZING: 0
PHOTOPHOBIA: 0

## 2022-06-21 ASSESSMENT — PAIN DESCRIPTION - ORIENTATION: ORIENTATION: RIGHT

## 2022-06-21 ASSESSMENT — PAIN SCALES - GENERAL
PAINLEVEL_OUTOF10: 0
PAINLEVEL_OUTOF10: 7

## 2022-06-21 ASSESSMENT — LIFESTYLE VARIABLES: HOW OFTEN DO YOU HAVE A DRINK CONTAINING ALCOHOL: NEVER

## 2022-06-21 ASSESSMENT — PAIN DESCRIPTION - DESCRIPTORS: DESCRIPTORS: ACHING

## 2022-06-21 ASSESSMENT — PAIN DESCRIPTION - LOCATION: LOCATION: CHEST

## 2022-06-21 ASSESSMENT — PAIN - FUNCTIONAL ASSESSMENT: PAIN_FUNCTIONAL_ASSESSMENT: 0-10

## 2022-06-21 ASSESSMENT — PAIN DESCRIPTION - PAIN TYPE: TYPE: ACUTE PAIN

## 2022-06-21 NOTE — PROGRESS NOTES
PHARMACY NOTE  Jonah Steve was ordered simvastatin 20mg PO nightly . Per the [address], this medication is non-formulary and not stocked by pharmacy. Order was marked as 'do not substitute due to risk of drug-drug interactions' per physician. Patient can utilize their own supply during inpatient stay if approved by attending physician and pharmacy is able to verify medication.      Maria De Jesus Ashton PharmD   6/21/2022 7:37 PM

## 2022-06-21 NOTE — ED PROVIDER NOTES
[address] ED  eMERGENCY dEPARTMENT eNCOUnter      Pt Name: Mercedes Pandya  MRN: [id number]  [place] 1950  Date of evaluation: 6/21/2022  Provider: PATRICA Denton        HISTORY OF PRESENT ILLNESS    Mercedes Pandya is a 80 y.o. male per chart review has ah/o HTN, DM, seizure disorder. Patient presents to the emergency department for 4-day history of intermittent chest pains. Has been having right-sided chest pain, without radiation anywhere else. States the pain only onset when he is up and being active, relieved when he rests. At present he is resting so there is no pain. Reports associated shortness of breath. Pain worse with breathing deeply. Reports he has not been able to walk as much as he usually does, or do activities like mow the lawn without getting short of breath and having to rest.  No CAD history per patient, has hypertension and diabetes. Does not follow with cardiology. No peripheral edema. No orthopnea. Tobacco use hx. REVIEW OF SYSTEMS       Review of Systems   Constitutional: Negative for chills and fever. HENT: Negative for congestion. Eyes: Negative for photophobia. Respiratory: Positive for shortness of breath. Negative for cough, choking, chest tightness and wheezing. Cardiovascular: Positive for chest pain. Negative for leg swelling. Gastrointestinal: Negative for abdominal pain, diarrhea, nausea and vomiting. Genitourinary: Negative for difficulty urinating. Musculoskeletal: Negative for myalgias. Neurological: Negative for headaches. Psychiatric/Behavioral: Negative for confusion. Except as noted above the remainder of the review of systems was reviewed and negative.        PAST MEDICAL HISTORY     Past Medical History:   Diagnosis Date    Diabetes mellitus ([address]     Hemodialysis access, fistula mature ([facility] [facility] 12/2002    Hypertension     Seizures ([facility]          SURGICAL HISTORY       Past Surgical History:   Procedure Laterality Date    BRAIN SURGERY Left 12/06/1990    to eliminate seizures    KIDNEY TRANSPLANT  2015    MD [address] INCL FLUOR GDNCE DX W/CELL WASHG SPX N/A 3/20/2018    BRONCHOSCOPY performed by Miladis Gentile MD at [address] [place] [address]       Previous Medications    ASPIRIN 81 MG EC TABLET    Take 81 mg by mouth daily     CARVEDILOL (COREG) 6.25 MG TABLET    Take 6.25 mg by mouth 2 times daily (with meals)    CONTINUOUS BLOOD GLUC  (DEXCOM G6 ) YANIRA    Continuous    CONTINUOUS BLOOD GLUC SENSOR (DEXCOM G6 SENSOR) MISC    Change every 10 days    CONTINUOUS BLOOD GLUC SENSOR (FREESTYLE KEV 14 DAY SENSOR) MISC    Every 2 weeks    CONTINUOUS BLOOD GLUC SENSOR (FREESTYLE KEV 14 DAY SENSOR) MISC    Every 2 weeks Dx E11.65    CONTINUOUS BLOOD GLUC TRANSMIT (DEXCOM G6 TRANSMITTER) MISC    Change every 3 months    CYCLOBENZAPRINE (FLEXERIL) 10 MG TABLET    Take 10 mg by mouth daily as needed Indications: as needed. CYCLOSPORINE (SANDIMMUNE) 25 MG CAPSULE    TAKE 3 CAPSULES BY MOUTH TWICE DAILY FOR 90 DAYS    CYCLOSPORINE MODIFIED (NEORAL) 25 MG CAPSULE    Take 75 mg by mouth 2 times daily     FUROSEMIDE (LASIX) 40 MG TABLET    Take 40 mg by mouth See Admin Instructions    GABAPENTIN (NEURONTIN) 300 MG CAPSULE    Take 300 mg by mouth 3 times daily.      INSULIN ASPART (NOVOLOG) 100 UNIT/ML INJECTION VIAL    6 units am 8 units lunch and 8 with dinner Dx E11.65    INSULIN DEGLUDEC (TRESIBA FLEXTOUCH) 100 UNIT/ML SOPN    INJECT 16 UNITS UNDER SKIN AT NIGHT    INSULIN DEGLUDEC (TRESIBA FLEXTOUCH) 100 UNIT/ML SOPN    INJECT 16 UNITS UNDER SKIN AT NIGHT    INSULIN NPH (NOVOLIN N FLEXPEN) 100 UNIT/ML INJECTION PEN    16 units at bedtime    INSULIN REGULAR HUMAN (NOVOLIN R FLEXPEN) 100 UNIT/ML SOPN    6 units am 8 units lunch and dinner    ISOSORBIDE MONONITRATE (IMDUR) 30 MG EXTENDED RELEASE TABLET    Take 1 tablet by mouth daily    MYCOPHENOLATE (CELLCEPT) 250 MG CAPSULE    Take 750 mg by mouth 2 times daily     NITROGLYCERIN (NITROSTAT) 0.4 MG SL TABLET    up to max of 3 total doses. If no relief after 1 dose, call 911. PANTOPRAZOLE (PROTONIX) 40 MG TABLET        PREDNISONE (DELTASONE) 5 MG TABLET    Take 5 mg by mouth daily     SIMVASTATIN (ZOCOR) 10 MG TABLET    Take 10 mg by mouth nightly     TAMSULOSIN (FLOMAX) 0.4 MG CAPSULE    Take 1 capsule by mouth daily       ALLERGIES     Atorvastatin    FAMILY HISTORY     History reviewed. No pertinent family history. SOCIAL HISTORY       Social History     Socioeconomic History    Marital status:      Spouse name: None    Number of children: None    Years of education: None    Highest education level: None   Occupational History    None   Tobacco Use    Smoking status: Former Smoker     Packs/day: 0.25     Types: Cigarettes     Quit date: 2015     Years since quittin.0    Smokeless tobacco: Former User   Vaping Use    Vaping Use: Never used   Substance and Sexual Activity    Alcohol use: No     Alcohol/week: 0.0 standard drinks    Drug use: No    Sexual activity: None   Other Topics Concern    None   Social History Narrative    None     Social Determinants of Health     Financial Resource Strain:     Difficulty of Paying Living Expenses: Not on file   Food Insecurity:     Worried About Running Out of Food in the Last Year: Not on file    Lisa of Food in the Last Year: Not on file   Transportation Needs:     Lack of Transportation (Medical): Not on file    Lack of Transportation (Non-Medical):  Not on file   Physical Activity:     Days of Exercise per Week: Not on file    Minutes of Exercise per Session: Not on file   Stress:     Feeling of Stress : Not on file   Social Connections:     Frequency of Communication with Friends and Family: Not on file    Frequency of Social Gatherings with Friends and Family: Not on file    Attends Scientology Services: Not on file    Active Member of Clubs or Organizations: Not on file    Attends Club or Organization Meetings: Not on file    Marital Status: Not on file   Intimate Partner Violence:     Fear of Current or Ex-Partner: Not on file    Emotionally Abused: Not on file    Physically Abused: Not on file    Sexually Abused: Not on file   Housing Stability:     Unable to Pay for Housing in the Last Year: Not on file    Number of [place] in the Last Year: Not on file    Unstable Housing in the Last Year: Not on file         PHYSICAL EXAM        ED Triage Vitals [06/21/22 1415]   BP Temp Temp Source Heart Rate Resp SpO2 Height Weight   (!) 207/109 98.5 °F (36.9 °C) Oral 76 16 97 % 6' 2\" (1.88 m) 202 lb (91.6 kg)       Physical Exam  Constitutional:       General: He is not in acute distress. Appearance: Normal appearance. He is not ill-appearing, toxic-appearing or diaphoretic. HENT:      Head: Normocephalic and atraumatic. Right Ear: External ear normal.      Left Ear: External ear normal.      Nose: Nose normal.      Mouth/Throat:      Mouth: Mucous membranes are moist.      Pharynx: Oropharynx is clear. Eyes:      Extraocular Movements: Extraocular movements intact. Conjunctiva/sclera: Conjunctivae normal.      Pupils: Pupils are equal, round, and reactive to light. Cardiovascular:      Rate and Rhythm: Normal rate and regular rhythm. Pulmonary:      Effort: Pulmonary effort is normal. No respiratory distress. Breath sounds: Normal breath sounds. No wheezing, rhonchi or rales. Chest:      Chest wall: No tenderness. Abdominal:      General: Bowel sounds are normal. There is no distension. Palpations: Abdomen is soft. Tenderness: There is no abdominal tenderness. Musculoskeletal:         General: No tenderness. Normal range of motion. Cervical back: Normal range of motion. Right lower leg: No edema. Left lower leg: No edema. Skin:     General: Skin is warm.       Capillary Refill: Capillary refill takes less than 2 seconds. Findings: No lesion or rash. Neurological:      Mental Status: He is alert and oriented to person, place, and time. Psychiatric:         Mood and Affect: Mood normal.         Behavior: Behavior normal.           LABS:  Labs Reviewed   COMPREHENSIVE METABOLIC PANEL - Abnormal; Notable for the following components:       Result Value    Glucose 235 (*)     BUN 59 (*)     CREATININE 2.61 (*)     GFR Non- 24.4 (*)     GFR  29.5 (*)     All other components within normal limits   CBC WITH AUTO DIFFERENTIAL - Abnormal; Notable for the following components:    RBC 3.97 (*)     Hemoglobin 11.3 (*)     Hematocrit 35.4 (*)     MCHC 32.0 (*)     RDW 14.8 (*)     Platelets 828 (*)     Lymphocytes Absolute 0.4 (*)     All other components within normal limits   TROPONIN - Abnormal; Notable for the following components:    Troponin 0.027 (*)     All other components within normal limits    Narrative:     CALL  [facility]  [facility] tel. [phone],  Chemistry results called to and read back by DORI Monaco, 06/21/2022 15:22, by  [facility]   D-DIMER, QUANTITATIVE - Abnormal; Notable for the following components:    D-Dimer, Quant >20.00 (*)     All other components within normal limits    Narrative:     Ferol Fossa  [facility] tel. [phone],  Dimer results called to and read back by Dr. Gracie Mcgee, 06/21/2022 15:40, by [facility]   MAGNESIUM   PROTIME-INR   APTT     EKG NSR HR 72 sinus arrhythmia no axis deviation no acute ST changes    MDM:   Vitals:    Vitals:    06/21/22 1415 06/21/22 1515 06/21/22 1518 06/21/22 1530   BP: (!) 207/109 (!) 125/96 (!) 125/90 (!) 161/76   Pulse: 76 71 58 57   Resp: 16 16 16 15   Temp: 98.5 °F (36.9 °C)      TempSrc: Oral      SpO2: 97% 100% 98% 100%   Weight: 202 lb (91.6 kg)      Height: 6' 2\" (1.88 m)          69-year-old male patient to the emergency department for 4-day history of intermittent right-sided chest pain shortness of breath.   Patient states symptoms have been reliably induced by exertion and reliably relieved by rest.  No CAD history. Concerning for unstable angina. Chest x-ray without acute process. EKG not showing acute ischemic changes. Troponin is 0.027 which is an increase from prior, most recently 011 otherwise has had negative troponins to this date. D-dimer is greater than 20. He does have CKD his creatinine is at his baseline today, creatinine 2.61, BUN 59, with his CKD V/Q perfusion scan is ordered. Labs also remarkable for stable anemia, hemoglobin 11.3, glucose 235, . Will be admitted to cardiology for management. Patient reports no pain while in the emergency department so did not receive any nitro or pain control did receive 324 chewable aspirin. Cardiology Dr. Solomon Ramirez accepts and will follow with V/Q scan. Initiated on Lovenox, NPO after midnight per Dr. Pamela Contreras. CRITICAL CARE TIME   Total CriticalCare time was 0 minutes, excluding separately reportable procedures. There was a high probability of clinically significant/life threatening deterioration in the patient's condition which required my urgent intervention. PROCEDURES:  Unlessotherwise noted below, none      Procedures      FINAL IMPRESSION      1. Unstable angina (HCC)    2. Elevated troponin    3.  Chronic kidney disease, unspecified CKD stage          DISPOSITION/PLAN   DISPOSITION Decision To Admit 06/21/2022 03:44:15 PM          PATRICA Acevedo (electronically signed)  Attending Emergency Physician          Chintan Acevedo  06/21/22 3303

## 2022-06-21 NOTE — ED TRIAGE NOTES
Pt to the ED via walk into triage with c/o right sided chest pain with exertion. Pt denies any pain at this time. Pt states that he has to stop and rest and the pain goes away.   Pt denies any heart problems other than HTN

## 2022-06-22 ENCOUNTER — APPOINTMENT (OUTPATIENT)
Dept: CARDIAC CATH/INVASIVE PROCEDURES | Age: 72
End: 2022-06-22
Payer: COMMERCIAL

## 2022-06-22 LAB
ALBUMIN SERPL-MCNC: 4 G/DL (ref 3.5–4.6)
ALP BLD-CCNC: 60 U/L (ref 35–104)
ALT SERPL-CCNC: 8 U/L (ref 0–41)
ANION GAP SERPL CALCULATED.3IONS-SCNC: 12 MEQ/L (ref 9–15)
AST SERPL-CCNC: 12 U/L (ref 0–40)
BILIRUB SERPL-MCNC: 0.8 MG/DL (ref 0.2–0.7)
BUN BLDV-MCNC: 54 MG/DL (ref 8–23)
CALCIUM SERPL-MCNC: 9 MG/DL (ref 8.5–9.9)
CHLORIDE BLD-SCNC: 110 MEQ/L (ref 95–107)
CO2: 22 MEQ/L (ref 20–31)
CREAT SERPL-MCNC: 2.27 MG/DL (ref 0.7–1.2)
EKG ATRIAL RATE: 55 BPM
EKG ATRIAL RATE: 72 BPM
EKG P AXIS: 78 DEGREES
EKG P AXIS: 79 DEGREES
EKG P-R INTERVAL: 162 MS
EKG P-R INTERVAL: 184 MS
EKG Q-T INTERVAL: 396 MS
EKG Q-T INTERVAL: 448 MS
EKG QRS DURATION: 74 MS
EKG QRS DURATION: 86 MS
EKG QTC CALCULATION (BAZETT): 428 MS
EKG QTC CALCULATION (BAZETT): 433 MS
EKG R AXIS: 20 DEGREES
EKG R AXIS: 6 DEGREES
EKG T AXIS: 40 DEGREES
EKG T AXIS: 42 DEGREES
EKG VENTRICULAR RATE: 55 BPM
EKG VENTRICULAR RATE: 72 BPM
GFR AFRICAN AMERICAN: 34.7
GFR NON-AFRICAN AMERICAN: 28.6
GLOBULIN: 2 G/DL (ref 2.3–3.5)
GLUCOSE BLD-MCNC: 102 MG/DL (ref 70–99)
HCT VFR BLD CALC: 32.8 % (ref 42–52)
HEMOGLOBIN: 10.9 G/DL (ref 14–18)
IRON SATURATION: 28 % (ref 20–55)
IRON: 80 UG/DL (ref 59–158)
LV EF: 65 %
LVEF MODALITY: NORMAL
MCH RBC QN AUTO: 29.2 PG (ref 27–31.3)
MCHC RBC AUTO-ENTMCNC: 33.2 % (ref 33–37)
MCV RBC AUTO: 88.1 FL (ref 80–100)
PDW BLD-RTO: 15.2 % (ref 11.5–14.5)
PLATELET # BLD: 133 K/UL (ref 130–400)
POTASSIUM SERPL-SCNC: 4.2 MEQ/L (ref 3.4–4.9)
RBC # BLD: 3.72 M/UL (ref 4.7–6.1)
SARS-COV-2, NAAT: NOT DETECTED
SODIUM BLD-SCNC: 144 MEQ/L (ref 135–144)
TOTAL IRON BINDING CAPACITY: 285 UG/DL (ref 250–450)
TOTAL PROTEIN: 6 G/DL (ref 6.3–8)
UNSATURATED IRON BINDING CAPACITY: 205 UG/DL (ref 112–347)
WBC # BLD: 5.9 K/UL (ref 4.8–10.8)

## 2022-06-22 PROCEDURE — 6360000002 HC RX W HCPCS

## 2022-06-22 PROCEDURE — 2500000003 HC RX 250 WO HCPCS

## 2022-06-22 PROCEDURE — G0378 HOSPITAL OBSERVATION PER HR: HCPCS

## 2022-06-22 PROCEDURE — 6370000000 HC RX 637 (ALT 250 FOR IP): Performed by: INTERNAL MEDICINE

## 2022-06-22 PROCEDURE — 36415 COLL VENOUS BLD VENIPUNCTURE: CPT

## 2022-06-22 PROCEDURE — 87635 SARS-COV-2 COVID-19 AMP PRB: CPT

## 2022-06-22 PROCEDURE — 93010 ELECTROCARDIOGRAM REPORT: CPT | Performed by: INTERNAL MEDICINE

## 2022-06-22 PROCEDURE — 93005 ELECTROCARDIOGRAM TRACING: CPT

## 2022-06-22 PROCEDURE — 2580000003 HC RX 258: Performed by: INTERNAL MEDICINE

## 2022-06-22 PROCEDURE — 96372 THER/PROPH/DIAG INJ SC/IM: CPT

## 2022-06-22 PROCEDURE — 83550 IRON BINDING TEST: CPT

## 2022-06-22 PROCEDURE — 80053 COMPREHEN METABOLIC PANEL: CPT

## 2022-06-22 PROCEDURE — 99220 PR INITIAL OBSERVATION CARE/DAY 70 MINUTES: CPT | Performed by: INTERNAL MEDICINE

## 2022-06-22 PROCEDURE — 83540 ASSAY OF IRON: CPT

## 2022-06-22 PROCEDURE — 85027 COMPLETE CBC AUTOMATED: CPT

## 2022-06-22 PROCEDURE — 93306 TTE W/DOPPLER COMPLETE: CPT

## 2022-06-22 RX ORDER — HYDRALAZINE HYDROCHLORIDE 25 MG/1
25 TABLET, FILM COATED ORAL EVERY 8 HOURS SCHEDULED
Status: DISCONTINUED | OUTPATIENT
Start: 2022-06-22 | End: 2022-06-23 | Stop reason: HOSPADM

## 2022-06-22 RX ADMIN — SODIUM CHLORIDE, PRESERVATIVE FREE 10 ML: 5 INJECTION INTRAVENOUS at 21:21

## 2022-06-22 RX ADMIN — TAMSULOSIN HYDROCHLORIDE 0.4 MG: 0.4 CAPSULE ORAL at 08:08

## 2022-06-22 RX ADMIN — PANTOPRAZOLE SODIUM 40 MG: 40 TABLET, DELAYED RELEASE ORAL at 06:00

## 2022-06-22 RX ADMIN — ENOXAPARIN SODIUM 90 MG: 100 INJECTION SUBCUTANEOUS at 21:20

## 2022-06-22 RX ADMIN — CYCLOSPORINE 75 MG: 25 CAPSULE, LIQUID FILLED ORAL at 22:15

## 2022-06-22 RX ADMIN — HYDRALAZINE HYDROCHLORIDE 25 MG: 25 TABLET, FILM COATED ORAL at 14:47

## 2022-06-22 RX ADMIN — INSULIN HUMAN 16 UNITS: 100 INJECTION, SUSPENSION SUBCUTANEOUS at 22:19

## 2022-06-22 RX ADMIN — GABAPENTIN 300 MG: 300 CAPSULE ORAL at 08:08

## 2022-06-22 RX ADMIN — MYCOPHENOLATE MOFETIL 750 MG: 250 CAPSULE ORAL at 22:15

## 2022-06-22 RX ADMIN — MYCOPHENOLATE MOFETIL 750 MG: 250 CAPSULE ORAL at 08:09

## 2022-06-22 RX ADMIN — CYCLOSPORINE 75 MG: 25 CAPSULE, LIQUID FILLED ORAL at 08:09

## 2022-06-22 RX ADMIN — CARVEDILOL 12.5 MG: 12.5 TABLET, FILM COATED ORAL at 21:19

## 2022-06-22 RX ADMIN — SODIUM CHLORIDE: 9 INJECTION, SOLUTION INTRAVENOUS at 08:01

## 2022-06-22 RX ADMIN — ACETAMINOPHEN 650 MG: 325 TABLET ORAL at 18:07

## 2022-06-22 RX ADMIN — HYDRALAZINE HYDROCHLORIDE 25 MG: 25 TABLET, FILM COATED ORAL at 21:19

## 2022-06-22 RX ADMIN — ASPIRIN 81 MG: 81 TABLET, CHEWABLE ORAL at 08:08

## 2022-06-22 RX ADMIN — ROSUVASTATIN CALCIUM 20 MG: 20 TABLET, FILM COATED ORAL at 21:19

## 2022-06-22 RX ADMIN — CARVEDILOL 12.5 MG: 12.5 TABLET, FILM COATED ORAL at 08:08

## 2022-06-22 RX ADMIN — PREDNISONE 5 MG: 5 TABLET ORAL at 08:08

## 2022-06-22 RX ADMIN — GABAPENTIN 300 MG: 300 CAPSULE ORAL at 21:19

## 2022-06-22 ASSESSMENT — ENCOUNTER SYMPTOMS
ABDOMINAL PAIN: 0
SHORTNESS OF BREATH: 1
CHEST TIGHTNESS: 1
WHEEZING: 0
VOMITING: 0
DIARRHEA: 0
COUGH: 0
APNEA: 0
COLOR CHANGE: 0
CONSTIPATION: 0
RHINORRHEA: 0
NAUSEA: 0
EYE REDNESS: 0

## 2022-06-22 ASSESSMENT — PAIN SCALES - GENERAL
PAINLEVEL_OUTOF10: 0
PAINLEVEL_OUTOF10: 6

## 2022-06-22 NOTE — ACP (ADVANCE CARE PLANNING)
Advance Care Planning     Advance Care Planning Activator (Inpatient)  Conversation Note      Date of ACP Conversation: 6/21/2022     Conversation Conducted with: Patient with Decision Making Capacity    ACP Activator: Didi Cole RN    Pt states that he does have a living will and that it is current with his wishes. Health Care Decision Maker:     Current Designated Health Care Decision Maker:     Primary Decision Maker: Simona Upton - Spouse - [phone]    Secondary Decision Maker: Hamilton Kim - Child - [phone]      Care Preferences    Ventilation: \"If you were in your present state of health and suddenly became very ill and were unable to breathe on your own, what would your preference be about the use of a ventilator (breathing machine) if it were available to you? \"      Would the patient desire the use of ventilator (breathing machine)?: yes    \"If your health worsens and it becomes clear that your chance of recovery is unlikely, what would your preference be about the use of a ventilator (breathing machine) if it were available to you? \"     Would the patient desire the use of ventilator (breathing machine)?: No      Resuscitation  \"CPR works best to restart the heart when there is a sudden event, like a heart attack, in someone who is otherwise healthy. Unfortunately, CPR does not typically restart the heart for people who have serious health conditions or who are very sick. \"    \"In the event your heart stopped as a result of an underlying serious health condition, would you want attempts to be made to restart your heart (answer \"yes\" for attempt to resuscitate) or would you prefer a natural death (answer \"no\" for do not attempt to resuscitate)? \" yes       [x] Yes   [] No   Educated Patient / Chris Sun regarding differences between Advance Directives and portable DNR orders.     Length of ACP Conversation in minutes:  10  Conversation Outcomes:  [x] ACP discussion completed  [] Existing advance directive reviewed with patient; no changes to patient's previously recorded wishes  [] New Advance Directive completed  [] Portable Do Not Rescitate prepared for Provider review and signature  [] POLST/POST/MOLST/MOST prepared for Provider review and signature      Follow-up plan:    [] Schedule follow-up conversation to continue planning  [] Referred individual to Provider for additional questions/concerns   [] Advised patient/agent/surrogate to review completed ACP document and update if needed with changes in condition, patient preferences or care setting    [x] This note routed to one or more involved healthcare providers

## 2022-06-22 NOTE — H&P
DEPARTMENT OF CARDIOLOGY  HISTORY AND PHYSICAL EXAM    PATIENT NAME:  Marlin Rea    MRN:  [id number]  SERVICE DATE:  6/22/2022   SERVICE TIME:  9:08 AM    Primary Care Physician: Henrique Borden DO     SUBJECTIVE  CHIEF COMPLAINT:   Chest pain    HPI:    78-year-old male who used to follow-up in clinic with a former cardiology in our group Dr. [place], has history of end-stage renal disease status post kidney transplant in 2003 and most recently 2015 at [facility], diabetes, hypertension, hyperlipidemia, who was admitted to the hospital last night for chest pain    Pain is reported as right-sided without radiation. Reports that this chest pain happens when he is active and exercising. Along with this chest pain patient also endorses shortness of breath. States that because of the chest pain and shortness of breath he has not been active doing his activities at home like mowing the lawn. EKG sinus rhythm normal sinus rhythm without active signs of ischemia  Troponins detectable at 0.032 and 0.23      PAST MEDICAL HISTORY:    Past Medical History:   Diagnosis Date    Diabetes mellitus ([facility]     Hemodialysis access, fistula mature ([facility] 12/2002    Hypertension     Seizures ([facility]     no seizure since 1995     PAST SURGICAL HISTORY:    Past Surgical History:   Procedure Laterality Date    BRAIN SURGERY Left 12/06/1990    to eliminate seizures    KIDNEY TRANSPLANT  2015    MA [address] INCL FLUOR GDNCE DX W/CELL WASHG SPX N/A 3/20/2018    BRONCHOSCOPY performed by Bonnie Owens MD at [address] [place] [address]:  History reviewed. No pertinent family history.   SOCIAL HISTORY:    Social History     Socioeconomic History    Marital status:      Spouse name: Not on file    Number of children: Not on file    Years of education: Not on file    Highest education level: Not on file   Occupational History    Not on file   Tobacco Use    Smoking status: Former Smoker     Packs/day: 0.25     Types: Cigarettes Quit date: 2015     Years since quittin.0    Smokeless tobacco: Former User   Vaping Use    Vaping Use: Never used   Substance and Sexual Activity    Alcohol use: No     Alcohol/week: 0.0 standard drinks    Drug use: No    Sexual activity: Not on file   Other Topics Concern    Not on file   Social History Narrative    Not on file     Social Determinants of Health     Financial Resource Strain:     Difficulty of Paying Living Expenses: Not on file   Food Insecurity:     Worried About Running Out of Food in the Last Year: Not on file    Lisa of Food in the Last Year: Not on file   Transportation Needs:     Lack of Transportation (Medical): Not on file    Lack of Transportation (Non-Medical): Not on file   Physical Activity:     Days of Exercise per Week: Not on file    Minutes of Exercise per Session: Not on file   Stress:     Feeling of Stress : Not on file   Social Connections:     Frequency of Communication with Friends and Family: Not on file    Frequency of Social Gatherings with Friends and Family: Not on file    Attends Rastafarian Services: Not on file    Active Member of [address] [place] [address] or Organizations: Not on file    Attends Club or Organization Meetings: Not on file    Marital Status: Not on file   Intimate Partner Violence:     Fear of Current or Ex-Partner: Not on file    Emotionally Abused: Not on file    Physically Abused: Not on file    Sexually Abused: Not on file   Housing Stability:     Unable to Pay for Housing in the Last Year: Not on file    Number of [place] in the Last Year: Not on file    Unstable Housing in the Last Year: Not on file     MEDICATIONS:   Prior to Admission medications    Medication Sig Start Date End Date Taking?  Authorizing Provider   Dulaglutide (TRULICITY) 1.5 IM/8.6CC SOPN Inject 1.5 mg into the skin once a week Weekly on Monday   Yes Historical Provider, MD   tamsulosin (FLOMAX) 0.4 MG capsule Take 1 capsule by mouth daily  Patient taking differently: Take 0.4 mg by mouth 2 times daily  12/22/21   Shakila Slater MD   Continuous Blood Gluc Transmit (DEXCOM G6 TRANSMITTER) MISC Change every 3 months 5/29/20   Juan Segal MD   Continuous Blood Gluc Sensor (DEXCOM G6 SENSOR) MISC Change every 10 days 5/29/20   Juan Segal MD   Continuous Blood Gluc  (DEXCOM G6 ) YANIRA Continuous 5/29/20   Juan Segal MD   Continuous Blood Gluc Sensor (FREESTYLE KEV 14 DAY SENSOR) MISC Every 2 weeks Dx E11.65 5/20/20   Juan Segal MD   insulin NPH (NOVOLIN N FLEXPEN) 100 UNIT/ML injection pen 16 units at bedtime  Patient taking differently: 9 units in AM, 5 units at bedtime 5/18/20   Juan Segal MD   Insulin Regular Human (NOVOLIN R FLEXPEN) 100 UNIT/ML SOPN 6 units am 8 units lunch and dinner  Patient taking differently: Sliding Scale 5/18/20   Juan Segal MD   Continuous Blood Gluc Sensor (FREESTYLE KEV 14 DAY SENSOR) MISC Every 2 weeks 5/18/20   Juan Segal MD   pantoprazole (PROTONIX) 40 MG tablet Take 40 mg by mouth daily  11/16/19   Historical Provider, MD   nitroGLYCERIN (NITROSTAT) 0.4 MG SL tablet up to max of 3 total doses. If no relief after 1 dose, call 911. 7/18/19   Aniceto Maddox APRN - CNP   furosemide (LASIX) 40 MG tablet Take 40 mg by mouth daily Indications: 1/2 to 1 tab. as needed for leg swelling     Historical Provider, MD   carvedilol (COREG) 6.25 MG tablet Take 6.25 mg by mouth 2 times daily (with meals)    Historical Provider, MD   aspirin 81 MG EC tablet Take 81 mg by mouth daily  5/11/16   Historical Provider, MD   cyclobenzaprine (FLEXERIL) 10 MG tablet Take 10 mg by mouth daily as needed Indications: as needed. 4/5/16   Historical Provider, MD   cycloSPORINE modified (NEORAL) 25 MG capsule Take 75 mg by mouth 2 times daily  5/24/16   Historical Provider, MD   gabapentin (NEURONTIN) 300 MG capsule Take 300 mg by mouth 2 times daily.  Indications: 1 capsule in am, 2 capsule in pm  5/24/16   Historical Provider, MD   mycophenolate (CELLCEPT) 250 MG capsule Take 750 mg by mouth 2 times daily  5/24/16   Historical Provider, MD   predniSONE (DELTASONE) 5 MG tablet Take 5 mg by mouth daily  5/24/16   Historical Provider, MD   simvastatin (ZOCOR) 10 MG tablet Take 10 mg by mouth nightly  5/24/16   Historical Provider, MD       ALLERGIES: Atorvastatin    REVIEW OF SYSTEM:   Review of Systems   Constitutional: Negative for activity change, chills, diaphoresis and fever. HENT: Negative for congestion, ear pain, nosebleeds and rhinorrhea. Eyes: Negative for redness and visual disturbance. Respiratory: Positive for chest tightness and shortness of breath. Negative for apnea, cough and wheezing. Cardiovascular: Negative for chest pain, palpitations and leg swelling. Gastrointestinal: Negative for abdominal pain, constipation, diarrhea, nausea and vomiting. Genitourinary: Negative for difficulty urinating and dysuria. Musculoskeletal: Negative. Negative for joint swelling. Skin: Negative for color change, rash and wound. Neurological: Negative for dizziness, syncope, weakness, numbness and headaches. Psychiatric/Behavioral: Negative. OBJECTIVE  PHYSICAL EXAM:   Physical Exam  Vitals and nursing note reviewed. Constitutional:       Appearance: Normal appearance. HENT:      Head: Normocephalic and atraumatic. Mouth/Throat:      Mouth: Mucous membranes are moist.      Pharynx: Oropharynx is clear. Eyes:      Extraocular Movements: Extraocular movements intact. Conjunctiva/sclera: Conjunctivae normal.      Pupils: Pupils are equal, round, and reactive to light. Cardiovascular:      Rate and Rhythm: Normal rate and regular rhythm. Pulses: Normal pulses. Heart sounds: Normal heart sounds. Pulmonary:      Effort: Pulmonary effort is normal.      Breath sounds: Normal breath sounds. Abdominal:      General: Abdomen is flat. Bowel sounds are normal.      Palpations: Abdomen is soft. Musculoskeletal:         General: Normal range of motion. Cervical back: Normal range of motion and neck supple. Skin:     General: Skin is warm. Neurological:      General: No focal deficit present. Mental Status: He is alert and oriented to person, place, and time. Mental status is at baseline. Psychiatric:         Mood and Affect: Mood normal.          BP (!) 197/77   Pulse 53   Temp 97.6 °F (36.4 °C) (Oral)   Resp 17   Ht 6' 2\" (1.88 m)   Wt 209 lb 7 oz (95 kg)   SpO2 100%   BMI 26.89 kg/m²     DATA:     Diagnostic tests reviewed for today's visit:    Most recent labs and imaging results reviewed.      LABS:    Recent Results (from the past 24 hour(s))   EKG 12 Lead    Collection Time: 06/21/22  2:26 PM   Result Value Ref Range    Ventricular Rate 72 BPM    Atrial Rate 72 BPM    P-R Interval 162 ms    QRS Duration 74 ms    Q-T Interval 396 ms    QTc Calculation (Bazett) 433 ms    P Axis 79 degrees    R Axis 6 degrees    T Axis 42 degrees   Comprehensive Metabolic Panel    Collection Time: 06/21/22  2:45 PM   Result Value Ref Range    Sodium 138 135 - 144 mEq/L    Potassium 4.7 3.4 - 4.9 mEq/L    Chloride 102 95 - 107 mEq/L    CO2 21 20 - 31 mEq/L    Anion Gap 15 9 - 15 mEq/L    Glucose 235 (H) 70 - 99 mg/dL    BUN 59 (H) 8 - 23 mg/dL    CREATININE 2.61 (H) 0.70 - 1.20 mg/dL    GFR Non-African American 24.4 (L) >60    GFR  29.5 (L) >60    Calcium 9.1 8.5 - 9.9 mg/dL    Total Protein 6.8 6.3 - 8.0 g/dL    Albumin 4.5 3.5 - 4.6 g/dL    Total Bilirubin 0.7 0.2 - 0.7 mg/dL    Alkaline Phosphatase 64 35 - 104 U/L    ALT 8 0 - 41 U/L    AST 11 0 - 40 U/L    Globulin 2.3 2.3 - 3.5 g/dL   CBC with Auto Differential    Collection Time: 06/21/22  2:45 PM   Result Value Ref Range    WBC 6.7 4.8 - 10.8 K/uL    RBC 3.97 (L) 4.70 - 6.10 M/uL    Hemoglobin 11.3 (L) 14.0 - 18.0 g/dL    Hematocrit 35.4 (L) 42.0 - 52.0 %    MCV 89.3 80.0 - 100.0 fL    MCH 28.6 27.0 - 31.3 pg    MCHC 32.0 (L) Range    WBC 5.9 4.8 - 10.8 K/uL    RBC 3.72 (L) 4.70 - 6.10 M/uL    Hemoglobin 10.9 (L) 14.0 - 18.0 g/dL    Hematocrit 32.8 (L) 42.0 - 52.0 %    MCV 88.1 80.0 - 100.0 fL    MCH 29.2 27.0 - 31.3 pg    MCHC 33.2 33.0 - 37.0 %    RDW 15.2 (H) 11.5 - 14.5 %    Platelets 782 236 - 941 K/uL   Comprehensive Metabolic Panel    Collection Time: 06/22/22  4:46 AM   Result Value Ref Range    Sodium 144 135 - 144 mEq/L    Potassium 4.2 3.4 - 4.9 mEq/L    Chloride 110 (H) 95 - 107 mEq/L    CO2 22 20 - 31 mEq/L    Anion Gap 12 9 - 15 mEq/L    Glucose 102 (H) 70 - 99 mg/dL    BUN 54 (H) 8 - 23 mg/dL    CREATININE 2.27 (H) 0.70 - 1.20 mg/dL    GFR Non-African American 28.6 (L) >60    GFR  34.7 (L) >60    Calcium 9.0 8.5 - 9.9 mg/dL    Total Protein 6.0 (L) 6.3 - 8.0 g/dL    Albumin 4.0 3.5 - 4.6 g/dL    Total Bilirubin 0.8 (H) 0.2 - 0.7 mg/dL    Alkaline Phosphatase 60 35 - 104 U/L    ALT 8 0 - 41 U/L    AST 12 0 - 40 U/L    Globulin 2.0 (L) 2.3 - 3.5 g/dL       VTE Prophylaxis: unfractionated heparin -  start    ASSESSMENT AND PLAN  Chest pain. Sounds typical, described as pressure-like exacerbated by physical activity associated with shortness of breath. Patient has high risk factors for CAD. However EKG without active signs of ischemia troponins detected at 0.032 and 0.02  History of end-stage renal disease status post 2 renal transplants most recent 1/2015 at [facility]  Diabetes  Hypertension  Hyperlipidemia    Plan:  Admit patient to telemetry  There was a plan this morning for coronary angiogram.  However patient would like nephrology input to weigh in risks of coronary angiogram in the setting of end-stage renal disease. We will place a consult to nephrology  Continue aspirin and atorvastatin for presumptive CAD  Continue Coreg  I will order a nuclear stress test.  Nuclear stress test will be postponed until tomorrow given that patient underwent a VQ scan last night.   Obtain an echocardiogram        Plan of

## 2022-06-22 NOTE — PROGRESS NOTES
Arrived to pre/post from [address] by wheelchair. Name and date of birth verified along with consents and orders.   Oriented to surroundings and wife shown to the waiting area

## 2022-06-22 NOTE — CONSULTS
[facility]  Consult Note           Reason for Consult:  Risk of contrast induced nephropathy  Requesting Physician:  Dr. Perdomo Public    History Obtained From:  patient, electronic medical record    History of Present Ilness:    80 y.o. male with history s/f ESRD s/p renal transplant x2 (2003, 2015), T2DM, seizures, HTN, CAD who presented for chest pain. Pt of Dr. Moody Triana. Had elevated troponin. Plan was for angiogram this AM however pt requested that he be seen by nephrology prior to getting contrast. Notably pt has a transplant nephrologist as well at [facility]. His baseline Scr prior to 2022 was ~1.8-2 w/ eGFR low 30s however Scr in 4/22 was 2.6 w/ eGFR low 20s. Presented w/ similar value as well w/ Scr 2.6 now, currently on IVFs w/ Scr down to 2.2. On lasix aas outpatient, takes it daily. On cyclosporine, cellcept and prednisone    Past Medical History:        Diagnosis Date    Diabetes mellitus ([facility]     Hemodialysis access, fistula mature ([facility] 12/2002    Hypertension     Seizures ([facility]     no seizure since 1995       Past Surgical History:        Procedure Laterality Date    BRAIN SURGERY Left 12/06/1990    to eliminate seizures    KIDNEY TRANSPLANT  2015    CO [address] INCL FLUOR GDNCE DX W/CELL WASHG SPX N/A 3/20/2018    BRONCHOSCOPY performed by Bonnie Owens MD at [facility] Medications:    No current facility-administered medications on file prior to encounter.      Current Outpatient Medications on File Prior to Encounter   Medication Sig Dispense Refill    Dulaglutide (TRULICITY) 1.5 VL/7.1YN SOPN Inject 1.5 mg into the skin once a week Weekly on Monday      tamsulosin (FLOMAX) 0.4 MG capsule Take 1 capsule by mouth daily (Patient taking differently: Take 0.4 mg by mouth 2 times daily ) 90 capsule 0    Continuous Blood Gluc Transmit (DEXCOM G6 TRANSMITTER) MISC Change every 3 months 1 each 3    Continuous Blood Gluc Sensor (DEXCOM G6 SENSOR) MISC Change every 10 days 1 each 11    Continuous Blood Gluc  (DEXCOM G6 ) YANIRA Continuous 1 Device 0    Continuous Blood Gluc Sensor (FREESTYLE KEV 14 DAY SENSOR) MISC Every 2 weeks Dx E11.65 2 each 06    insulin NPH (NOVOLIN N FLEXPEN) 100 UNIT/ML injection pen 16 units at bedtime (Patient taking differently: 9 units in AM, 5 units at bedtime) 5 pen 3    Insulin Regular Human (NOVOLIN R FLEXPEN) 100 UNIT/ML SOPN 6 units am 8 units lunch and dinner (Patient taking differently: Sliding Scale) 5 pen 3    Continuous Blood Gluc Sensor (FREESTYLE KEV 14 DAY SENSOR) MISC Every 2 weeks 2 each 06    pantoprazole (PROTONIX) 40 MG tablet Take 40 mg by mouth daily       nitroGLYCERIN (NITROSTAT) 0.4 MG SL tablet up to max of 3 total doses. If no relief after 1 dose, call 911. 25 tablet 3    furosemide (LASIX) 40 MG tablet Take 40 mg by mouth daily Indications: 1/2 to 1 tab. as needed for leg swelling       carvedilol (COREG) 6.25 MG tablet Take 6.25 mg by mouth 2 times daily (with meals)      aspirin 81 MG EC tablet Take 81 mg by mouth daily   3    cyclobenzaprine (FLEXERIL) 10 MG tablet Take 10 mg by mouth daily as needed Indications: as needed. 0    cycloSPORINE modified (NEORAL) 25 MG capsule Take 75 mg by mouth 2 times daily   2    gabapentin (NEURONTIN) 300 MG capsule Take 300 mg by mouth 2 times daily.  Indications: 1 capsule in am, 2 capsule in pm   2    mycophenolate (CELLCEPT) 250 MG capsule Take 750 mg by mouth 2 times daily   1    predniSONE (DELTASONE) 5 MG tablet Take 5 mg by mouth daily   2    simvastatin (ZOCOR) 10 MG tablet Take 10 mg by mouth nightly   1       Allergies:  Atorvastatin    Social History:    Social History     Socioeconomic History    Marital status:      Spouse name: Not on file    Number of children: Not on file    Years of education: Not on file    Highest education level: Not on file   Occupational History    Not on file   Tobacco Use    Smoking status: Former Smoker Packs/day: 0.25     Types: Cigarettes     Quit date: 2015     Years since quittin.0    Smokeless tobacco: Former User   Vaping Use    Vaping Use: Never used   Substance and Sexual Activity    Alcohol use: No     Alcohol/week: 0.0 standard drinks    Drug use: No    Sexual activity: Not on file   Other Topics Concern    Not on file   Social History Narrative    Not on file     Social Determinants of Health     Financial Resource Strain:     Difficulty of Paying Living Expenses: Not on file   Food Insecurity:     Worried About Running Out of Food in the Last Year: Not on file    Lisa of Food in the Last Year: Not on file   Transportation Needs:     Lack of Transportation (Medical): Not on file    Lack of Transportation (Non-Medical): Not on file   Physical Activity:     Days of Exercise per Week: Not on file    Minutes of Exercise per Session: Not on file   Stress:     Feeling of Stress : Not on file   Social Connections:     Frequency of Communication with Friends and Family: Not on file    Frequency of Social Gatherings with Friends and Family: Not on file    Attends Jehovah's witness Services: Not on file    Active Member of [address] [place] [address] or Organizations: Not on file    Attends Club or Organization Meetings: Not on file    Marital Status: Not on file   Intimate Partner Violence:     Fear of Current or Ex-Partner: Not on file    Emotionally Abused: Not on file    Physically Abused: Not on file    Sexually Abused: Not on file   Housing Stability:     Unable to Pay for Housing in the Last Year: Not on file    Number of [place] in the Last Year: Not on file    Unstable Housing in the Last Year: Not on file       Family History:   History reviewed. No pertinent family history.     Review of Systems:   Positives in bold  Constitutional: fever, chills, fatigue, malaise   HENT:  rhinorrhea, sinus pain, sore throat, epistaxis    Eyes:  photophobia, visual disturbance, eye redness  Respiratory: shortness of breath, cough, hemoptysis    Cardiovascular: chest pain, palpitations, orthopnea, leg swelling   Gastrointestinal: abdominal pain, nausea, vomiting, diarrhea, constipation   Endocrine: polydipsia, polyphagia, cold intolerance, heat intolerance  Genitourinary: dysuria, flank pain, frequency, urgency   Hematologic: easy bruising, easy bleeding  Musculoskeletal: back pain, neck pain, myalgias, arthalgias  Neurological: syncope, lightheadedness, dizziness, seizures, tremors, weakness  Psychiatric/Behavioral: anxiety, depression, hallucinations  Skin: rash, itching    Physical exam:   Constitutional:  VITALS:  BP (!) 197/77   Pulse 53   Temp 97.6 °F (36.4 °C) (Oral)   Resp 17   Ht 6' 2\" (1.88 m)   Wt 209 lb 7 oz (95 kg)   SpO2 100%   BMI 26.89 kg/m²     General: alert, in no apparent distress  HEENT: normocephalic, atraumatic, anicteric  Neck: supple, no mass  Lungs: non-labored respirations, clear to auscultation bilaterally  Heart: regular rate and rhythm, no murmurs or rubs  Abdomen: soft, non-tender, non-distended  MSK: no joint swelling or tenderness  Ext: no cyanosis, no peripheral edema  Neuro: alert and oriented, no gross abnormalities  Psych: normal mood and affect    Data/  CBC:   Lab Results   Component Value Date    WBC 5.9 06/22/2022    RBC 3.72 06/22/2022    RBC 4.18 09/15/2011    HGB 10.9 06/22/2022    HCT 32.8 06/22/2022    MCV 88.1 06/22/2022    MCH 29.2 06/22/2022    MCHC 33.2 06/22/2022    RDW 15.2 06/22/2022     06/22/2022    MPV 8.8 09/15/2011     BMP:    Lab Results   Component Value Date     06/22/2022    K 4.2 06/22/2022     06/22/2022    CO2 22 06/22/2022    BUN 54 06/22/2022    LABALBU 4.0 06/22/2022    LABALBU 3.9 09/15/2011    CREATININE 2.27 06/22/2022    CALCIUM 9.0 06/22/2022    GFRAA 34.7 06/22/2022    LABGLOM 28.6 06/22/2022    GLUCOSE 102 06/22/2022    GLUCOSE 172 09/16/2011     Echocardiogram complete 2D with doppler with color    Result Date: 6/22/2022  Transthoracic Echocardiography Report (TTE)  Demographics   Patient Name   Tankia Levy Gender              Male                 R   Patient Number [id number]         Race                                                   Ethnicity   Visit Number   [id number]        Room Number         O447   Corporate ID                    Date of Study       06/22/2022   Accession      [id number]       Referring Physician  Number   Date of Birth  1950       Sonographer         Kera Goff   Age            80 year(s)       Interpreting        [facility])                                  Physician           Cardiology                                                      Maxx Bowling MD  Procedure Type of Study   TTE procedure:ECHO COMPLETE 2D W/DOP W/COLOR. Procedure Date Date: 06/22/2022 Start: 11:10 AM Study Location: Portable Technical Quality: Adequate visualization Indications:Chest pain. Patient Status: Routine Height: 74.02 inches Weight: 209.44 pounds BSA: 2.22 m^2 BMI: 26.88 kg/m^2 BP: 188/64 mmHg  Conclusions   Summary  Normal left ventricle structure and function. Left ventricular ejection fraction is visually estimated at 65%. E/A flow reversal noted. Suggestive of diastolic dysfunction. Signature   ----------------------------------------------------------------  Electronically signed by Maxx Bowling MD(Interpreting  physician) on 06/22/2022 12:07 PM  ----------------------------------------------------------------   Findings  Left Ventricle Normal left ventricle structure and function. Left ventricular ejection fraction is visually estimated at 65%. E/A flow reversal noted. Suggestive of diastolic dysfunction. Right Ventricle Normal right ventricle structure and function. Normal right ventricle systolic pressure. Left Atrium Normal left atrium. Right Atrium Normal right atrium. Mitral Valve Normal mitral valve structure and function.  Tricuspid Valve Normal tricuspid valve structure and function. RSVP 26 mmHg Aortic Valve Normal aortic valve structure and function. Pulmonic Valve The pulmonic valve was not well visualized . Pericardial Effusion No evidence of pericardial effusion. Pleural Effusion No evidence of pleural effusion. Aorta \ Miscellaneous The aorta is within normal limits. M-Mode Measurements (cm)   LVIDd: 4.55 cm                         LVIDs: 3.01 cm  IVSd: 1.57 cm                          IVSs: 1.8 cm  LVPWd: 1.26 cm                         AO Root Dimension: 2.95 cm  Rt. Vent.  Dimension: 2.32 cm                                         LVOT: 1.98 cm  Doppler Measurements:   AV Velocity:0.02 m/s                    MV Peak E-Wave: 0.9 m/s  AV Peak Gradient: 5.77 mmHg             MV Peak A-Wave: 0.93 m/s  AV Mean Gradient: 3.64 mmHg  AV Area (Continuity):2.01 cm^2  TR Velocity:2.02 m/s                    Estimated RAP:10 mmHg  TR Gradient:16.25 mmHg                  RVSP:26.25 mmHg  Valves  Mitral Valve   Peak E-Wave: 0.9 m/s                  Peak A-Wave: 0.93 m/s                                        E/A Ratio: 0.97                                        Peak Gradient: 3.2 mmHg                                        Deceleration Time: 270.5 msec   Tissue Doppler   E' Septal Velocity: 0.06 m/s  E' Lateral Velocity: 0.07 m/s   Aortic Valve   Peak Velocity: 1.2 m/s                 Mean Velocity: 0.91 m/s  Peak Gradient: 5.77 mmHg               Mean Gradient: 3.64 mmHg  Area (continuity): 2.01 cm^2  AV VTI: 36.96 cm   Tricuspid Valve   Estimated RVSP: 26.25 mmHg              Estimated RAP: 10 mmHg  TR Velocity: 2.02 m/s                   TR Gradient: 16.25 mmHg   Pulmonic Valve   Peak Velocity: 0.91 m/s           Peak Gradient: 3.32 mmHg                                    Estimated PASP: 26.25 mmHg   LVOT   Peak Velocity: 0.88 m/s              Mean Velocity: 0.57 m/s  Peak Gradient: 3.09 mmHg             Mean Gradient: 1.52 mmHg  LVOT Diameter: 1.98 cm               LVOT VTI: 24.19 cm Structures  Left Atrium   LA Volume/Index: 35.39 ml /16 m^2             LA Area: 15.09 cm^2   Left Ventricle   Diastolic Dimension: 7.82 cm         Systolic Dimension: 4.27 cm  Septum Diastolic: 1.75 cm            Septum Systolic: 1.8 cm  PW Diastolic: 2.62 cm                                       FS: 33.9 %  LV EDV/LV EDV Index: 95.01 ml/43 m^2 LV ESV/LV ESV Index: 35.21 ml/16 m^2  EF Calculated: 62.9 %   LVOT Diameter: 1.98 cm   Right Atrium   RA Systolic Pressure: 10 mmHg   Right Ventricle   Diastolic Dimension: 5.36 cm                                    RV Systolic Pressure: 94.17 mmHg  Aorta/ Miscellaneous Aorta   Aortic Root: 2.95 cm  LVOT Diameter: 1.98 cm      NM LUNG VENT/PERFUSION (VQ)    Result Date: 6/21/2022  NM LUNG VENT/PERFUSION (VQ) : 6/21/2022 CLINICAL HISTORY:  elev d dimer, CKD . COMPARISON: Portable chest from earlier 6/21/2022. TECHNIQUE: Planar images of the chest were obtained in multiple projections after the inhalation of approximately 1 mCi of technetium 99m DTPA aerosol. The planar images were obtained after the intravenous injection of approximately 5 mCi of technetium 99m MAA. FINDINGS: Small matched predominantly mid lung syndrome ventilation and perfusion defects are present. There are no significant mismatched perfusion defects identified. LOW PROBABILITY OF PULMONARY EMBOLISM. XR CHEST PORTABLE    Result Date: 6/21/2022  TECHNIQUE: Single portable view of the chest. CLINICAL INDICATION: Chest pain. COMPARISON: Chest x-ray obtained on July 17, 2019 PROCEDURE AND FINDINGS: The cardiomediastinal silhouette is slightly prominent in size but demonstrates no significant change in comparison to the prior study. Prominence of the bronchovascular and interstitial lung markings is visualized however there is improved aeration seen in comparison to the prior studies. Mild blunting of bilateral costophrenic angles is visualized with suggestion of right Pleural fluid.  No evidence of pneumothorax or parenchymal lung mass. The bony thorax unremarkable for the patient's age. No evidence of acute cardiopulmonary disease. Assessment:  80 y.o. male with history s/f ESRD s/p renal transplant x2 (2003, 2015), T2DM, seizures, HTN, CAD who presented for chest pain. 1. ESRD s/p renal transplant (x2, 2003, 2015, has transplant nephrologist at [facility])  - allograft function: baseline Scr ~1.8-2 w/ eGFR low 30s up to 11/21, currently w/ DELORES vs progressive CKD stage IV w/ Scr now 2.6 w/ eGFR low 20s  - IS: on cyclosporine 75 mg BID, cellcept 750 mg BID, prednisone 10 mg QD    2. Chest pain: admitted by cardiology, plan for angiogram, at risk for contrast induced nephropathy ~25%  3. Anemia  4. Hypertensive urgency: improved  5. T2DM    Plan:  - continue IVF at current rate and continue for 6-12 hours following procedure  - ok to keep lasix off  - continue IS at current home doses    Thank you for the consultation. Will continue to follow  Please do not hesitate to call with questions.     Melissa Carlson MD, MD

## 2022-06-22 NOTE — PROGRESS NOTES
Dr. Gus Reyna on unit and notified of pt's and wife's request to be transferred to [facility].

## 2022-06-22 NOTE — CARE COORDINATION
[facility] [place] Case Management Initial Discharge Assessment    Met with Patient to discuss discharge plan. PCP: Inge Nichols DO                                Date of Last Visit: 2 months    [facility] Patient: No        [facility] Notified: no    If no PCP, list provided? N/A    Discharge Planning    Living Arrangements: independently at home    Who do you live with? wife    Who helps you with your care:  self or spouse    If lives at home:     Do you have any barriers navigating in your home? Has a problem with his balance now    Patient can perform ADL? Yes    Current Services (outpatient and in home) :  None    Dialysis: No    Is transportation available to get to your appointments? Yes    DME Equipment:  yes - cane prn, rollator    Respiratory equipment: None    Respiratory provider:  no     Pharmacy:  yes - [facility]    Consult with Medication Assistance Program?  No      Patient agreeable to [facility]? Declined    Patient agreeable to SNF/Rehab? Declined    Other discharge needs identified? N/A    Does Patient Have a High-Risk for Readmission Diagnosis (CHF, PN, MI, COPD)? No    Initial Discharge Plan? (Note: please see concurrent daily documentation for any updates after initial note). Pt denied any d/c needs in ER. Cm to assess for further d/c needs.     Readmission Risk              Risk of Unplanned Readmission:  21         Electronically signed by Param Vail RN on 6/21/2022 at 9:52 PM

## 2022-06-23 ENCOUNTER — APPOINTMENT (OUTPATIENT)
Dept: NUCLEAR MEDICINE | Age: 72
End: 2022-06-23
Payer: COMMERCIAL

## 2022-06-23 VITALS
RESPIRATION RATE: 18 BRPM | WEIGHT: 229.28 LBS | TEMPERATURE: 97.3 F | SYSTOLIC BLOOD PRESSURE: 160 MMHG | HEART RATE: 57 BPM | BODY MASS INDEX: 29.43 KG/M2 | OXYGEN SATURATION: 99 % | DIASTOLIC BLOOD PRESSURE: 65 MMHG | HEIGHT: 74 IN

## 2022-06-23 LAB
EKG ATRIAL RATE: 55 BPM
EKG P AXIS: 78 DEGREES
EKG P-R INTERVAL: 182 MS
EKG Q-T INTERVAL: 452 MS
EKG QRS DURATION: 80 MS
EKG QTC CALCULATION (BAZETT): 432 MS
EKG R AXIS: 15 DEGREES
EKG T AXIS: 40 DEGREES
EKG VENTRICULAR RATE: 55 BPM
GLUCOSE BLD-MCNC: 226 MG/DL (ref 70–99)
LV EF: 75 %
LVEF MODALITY: NORMAL
PERFORMED ON: ABNORMAL

## 2022-06-23 PROCEDURE — 6360000002 HC RX W HCPCS: Performed by: INTERNAL MEDICINE

## 2022-06-23 PROCEDURE — G0378 HOSPITAL OBSERVATION PER HR: HCPCS

## 2022-06-23 PROCEDURE — 3430000000 HC RX DIAGNOSTIC RADIOPHARMACEUTICAL: Performed by: INTERNAL MEDICINE

## 2022-06-23 PROCEDURE — 93017 CV STRESS TEST TRACING ONLY: CPT

## 2022-06-23 PROCEDURE — 2580000003 HC RX 258

## 2022-06-23 PROCEDURE — 78452 HT MUSCLE IMAGE SPECT MULT: CPT

## 2022-06-23 PROCEDURE — 2580000003 HC RX 258: Performed by: INTERNAL MEDICINE

## 2022-06-23 PROCEDURE — A9502 TC99M TETROFOSMIN: HCPCS | Performed by: INTERNAL MEDICINE

## 2022-06-23 PROCEDURE — APPSS30 APP SPLIT SHARED TIME 16-30 MINUTES: Performed by: NURSE PRACTITIONER

## 2022-06-23 PROCEDURE — 93005 ELECTROCARDIOGRAM TRACING: CPT | Performed by: INTERNAL MEDICINE

## 2022-06-23 PROCEDURE — 6370000000 HC RX 637 (ALT 250 FOR IP): Performed by: INTERNAL MEDICINE

## 2022-06-23 PROCEDURE — 93010 ELECTROCARDIOGRAM REPORT: CPT | Performed by: INTERNAL MEDICINE

## 2022-06-23 PROCEDURE — 78452 HT MUSCLE IMAGE SPECT MULT: CPT | Performed by: INTERNAL MEDICINE

## 2022-06-23 RX ORDER — CARVEDILOL 12.5 MG/1
12.5 TABLET ORAL 2 TIMES DAILY
Qty: 60 TABLET | Refills: 3 | Status: ON HOLD | OUTPATIENT
Start: 2022-06-23 | End: 2022-10-03

## 2022-06-23 RX ORDER — GABAPENTIN 300 MG/1
600 CAPSULE ORAL NIGHTLY
Status: DISCONTINUED | OUTPATIENT
Start: 2022-06-23 | End: 2022-06-23 | Stop reason: HOSPADM

## 2022-06-23 RX ORDER — ISOSORBIDE MONONITRATE 30 MG/1
30 TABLET, EXTENDED RELEASE ORAL DAILY
Qty: 30 TABLET | Refills: 3 | Status: ON HOLD | OUTPATIENT
Start: 2022-06-23 | End: 2022-09-15

## 2022-06-23 RX ORDER — HYDRALAZINE HYDROCHLORIDE 25 MG/1
25 TABLET, FILM COATED ORAL EVERY 8 HOURS SCHEDULED
Qty: 90 TABLET | Refills: 3 | Status: ON HOLD | OUTPATIENT
Start: 2022-06-23 | End: 2022-09-15 | Stop reason: CLARIF

## 2022-06-23 RX ORDER — SODIUM CHLORIDE 0.9 % (FLUSH) 0.9 %
10 SYRINGE (ML) INJECTION PRN
Status: DISCONTINUED | OUTPATIENT
Start: 2022-06-23 | End: 2022-06-23 | Stop reason: HOSPADM

## 2022-06-23 RX ORDER — GABAPENTIN 300 MG/1
300 CAPSULE ORAL EVERY MORNING
Status: DISCONTINUED | OUTPATIENT
Start: 2022-06-24 | End: 2022-06-23 | Stop reason: HOSPADM

## 2022-06-23 RX ADMIN — PREDNISONE 5 MG: 5 TABLET ORAL at 08:07

## 2022-06-23 RX ADMIN — Medication 10 ML: at 08:30

## 2022-06-23 RX ADMIN — SODIUM CHLORIDE, PRESERVATIVE FREE 10 ML: 5 INJECTION INTRAVENOUS at 09:55

## 2022-06-23 RX ADMIN — HYDRALAZINE HYDROCHLORIDE 25 MG: 25 TABLET, FILM COATED ORAL at 06:03

## 2022-06-23 RX ADMIN — CYCLOSPORINE 75 MG: 25 CAPSULE, LIQUID FILLED ORAL at 08:06

## 2022-06-23 RX ADMIN — TAMSULOSIN HYDROCHLORIDE 0.4 MG: 0.4 CAPSULE ORAL at 08:07

## 2022-06-23 RX ADMIN — MYCOPHENOLATE MOFETIL 750 MG: 250 CAPSULE ORAL at 08:06

## 2022-06-23 RX ADMIN — GABAPENTIN 300 MG: 300 CAPSULE ORAL at 08:07

## 2022-06-23 RX ADMIN — REGADENOSON 0.4 MG: 0.08 INJECTION, SOLUTION INTRAVENOUS at 09:55

## 2022-06-23 RX ADMIN — CARVEDILOL 12.5 MG: 12.5 TABLET, FILM COATED ORAL at 08:07

## 2022-06-23 RX ADMIN — PANTOPRAZOLE SODIUM 40 MG: 40 TABLET, DELAYED RELEASE ORAL at 06:03

## 2022-06-23 RX ADMIN — SODIUM CHLORIDE, PRESERVATIVE FREE 10 ML: 5 INJECTION INTRAVENOUS at 08:07

## 2022-06-23 RX ADMIN — Medication 10 ML: at 09:56

## 2022-06-23 RX ADMIN — TETROFOSMIN 11.9 MILLICURIE: 1.38 INJECTION, POWDER, LYOPHILIZED, FOR SOLUTION INTRAVENOUS at 08:30

## 2022-06-23 RX ADMIN — TETROFOSMIN 32.1 MILLICURIE: 1.38 INJECTION, POWDER, LYOPHILIZED, FOR SOLUTION INTRAVENOUS at 09:55

## 2022-06-23 RX ADMIN — ASPIRIN 81 MG: 81 TABLET, CHEWABLE ORAL at 08:07

## 2022-06-23 ASSESSMENT — ENCOUNTER SYMPTOMS
RHINORRHEA: 0
DIARRHEA: 0
NAUSEA: 0
COLOR CHANGE: 0
CHEST TIGHTNESS: 1
CONSTIPATION: 0
ABDOMINAL PAIN: 0
SHORTNESS OF BREATH: 1
APNEA: 0
EYE REDNESS: 0
COUGH: 0
WHEEZING: 0
VOMITING: 0

## 2022-06-23 ASSESSMENT — PAIN SCALES - GENERAL: PAINLEVEL_OUTOF10: 0

## 2022-06-23 NOTE — PROGRESS NOTES
DEPARTMENT OF CARDIOLOGY  Progress note    PATIENT NAME:  Kassi Sharp    MRN:  [id number]  SERVICE DATE:  6/23/2022   SERVICE TIME:  9:49 AM    Primary Care Physician: Maritza Hargrove DO     SUBJECTIVE  CHIEF COMPLAINT:   Chest pain    HPI:    59-year-old male who used to follow-up in clinic with a former cardiology in our group Dr. Misti Olivier, has history of end-stage renal disease status post kidney transplant in 2003 and most recently 2015 at [facility], diabetes, hypertension, hyperlipidemia, who was admitted to the hospital last night for chest pain    Pain is reported as right-sided without radiation. Reports that this chest pain happens when he is active and exercising. Along with this chest pain patient also endorses shortness of breath. States that because of the chest pain and shortness of breath he has not been active doing his activities at home like mowing the lawn. EKG sinus rhythm normal sinus rhythm without active signs of ischemia  Troponins detectable at 0.032 and 0.23    6/23/22:   Monitored on tele, SB-SR. BP elevated this morning at 160/65. Patient underwent nuclear stress test this morning which came back as normal, no ischemia EF 70%    Pt had echo yesterday which shows:  Normal left ventricle structure and function. Left ventricular ejection fraction is visually estimated at 65%. E/A flow reversal noted. Suggestive of diastolic dysfunction. PAST MEDICAL HISTORY:    Past Medical History:   Diagnosis Date    Diabetes mellitus ([address]     Hemodialysis access, fistula mature ([facility] 12/2002    Hypertension     Seizures ([facility]     no seizure since 1995     PAST SURGICAL HISTORY:    Past Surgical History:   Procedure Laterality Date    BRAIN SURGERY Left 12/06/1990    to eliminate seizures    KIDNEY TRANSPLANT  2015    MS [address] INCL FLUOR GDNCE DX W/CELL WASHG SPX N/A 3/20/2018    BRONCHOSCOPY performed by Jessica Ledesma MD at [address] [place] [address]:  History reviewed.  No pertinent family history. SOCIAL HISTORY:    Social History     Socioeconomic History    Marital status:      Spouse name: Not on file    Number of children: Not on file    Years of education: Not on file    Highest education level: Not on file   Occupational History    Not on file   Tobacco Use    Smoking status: Former Smoker     Packs/day: 0.25     Types: Cigarettes     Quit date: 2015     Years since quittin.0    Smokeless tobacco: Former User   Vaping Use    Vaping Use: Never used   Substance and Sexual Activity    Alcohol use: No     Alcohol/week: 0.0 standard drinks    Drug use: No    Sexual activity: Not on file   Other Topics Concern    Not on file   Social History Narrative    Not on file     Social Determinants of Health     Financial Resource Strain:     Difficulty of Paying Living Expenses: Not on file   Food Insecurity:     Worried About [address] in the Last Year: Not on file    Lisa of Food in the Last Year: Not on file   Transportation Needs:     Lack of Transportation (Medical): Not on file    Lack of Transportation (Non-Medical):  Not on file   Physical Activity:     Days of Exercise per Week: Not on file    Minutes of Exercise per Session: Not on file   Stress:     Feeling of Stress : Not on file   Social Connections:     Frequency of Communication with Friends and Family: Not on file    Frequency of Social Gatherings with Friends and Family: Not on file    Attends Taoism Services: Not on file    Active Member of Clubs or Organizations: Not on file    Attends Club or Organization Meetings: Not on file    Marital Status: Not on file   Intimate Partner Violence:     Fear of Current or Ex-Partner: Not on file    Emotionally Abused: Not on file    Physically Abused: Not on file    Sexually Abused: Not on file   Housing Stability:     Unable to Pay for Housing in the Last Year: Not on file    Number of [place] in the Last Year: Not on file    Unstable Housing in the Last Year: Not on file     MEDICATIONS:   Prior to Admission medications    Medication Sig Start Date End Date Taking? Authorizing Provider   Dulaglutide (TRULICITY) 1.5 XD/3.2NL SOPN Inject 1.5 mg into the skin once a week Weekly on Monday   Yes Historical Provider, MD   tamsulosin (FLOMAX) 0.4 MG capsule Take 1 capsule by mouth daily  Patient taking differently: Take 0.4 mg by mouth 2 times daily  12/22/21   Luis Felipe Frank MD   Continuous Blood Gluc Transmit (DEXCOM G6 TRANSMITTER) MISC Change every 3 months 5/29/20   Lesli Wilkerson MD   Continuous Blood Gluc Sensor (DEXCOM G6 SENSOR) MISC Change every 10 days 5/29/20   Lesli Wilkerson MD   Continuous Blood Gluc  (DEXCOM G6 ) YANIRA Continuous 5/29/20   Lesli Wilkerson MD   Continuous Blood Gluc Sensor (FREESTYLE KEV 14 DAY SENSOR) MISC Every 2 weeks Dx E11.65 5/20/20   Lesli Wilkerson MD   insulin NPH (NOVOLIN N FLEXPEN) 100 UNIT/ML injection pen 16 units at bedtime  Patient taking differently: 9 units in AM, 5 units at bedtime 5/18/20   Lesli Wilkerson MD   Insulin Regular Human (NOVOLIN R FLEXPEN) 100 UNIT/ML SOPN 6 units am 8 units lunch and dinner  Patient taking differently: Sliding Scale 5/18/20   Lesli Wilkerson MD   Continuous Blood Gluc Sensor (FREESTYLE KEV 14 DAY SENSOR) MISC Every 2 weeks 5/18/20   Lesli Wilkerson MD   pantoprazole (PROTONIX) 40 MG tablet Take 40 mg by mouth daily  11/16/19   Historical Provider, MD   nitroGLYCERIN (NITROSTAT) 0.4 MG SL tablet up to max of 3 total doses. If no relief after 1 dose, call 911. 7/18/19   Che Moy, APRN - CNP   furosemide (LASIX) 40 MG tablet Take 40 mg by mouth daily Indications: 1/2 to 1 tab.  as needed for leg swelling     Historical Provider, MD   carvedilol (COREG) 6.25 MG tablet Take 6.25 mg by mouth 2 times daily (with meals)    Historical Provider, MD   aspirin 81 MG EC tablet Take 81 mg by mouth daily  5/11/16   Historical Provider, MD   cyclobenzaprine (FLEXERIL) 10 MG tablet Take 10 mg by mouth daily as needed Indications: as needed. 4/5/16   Historical Provider, MD   cycloSPORINE modified (NEORAL) 25 MG capsule Take 75 mg by mouth 2 times daily  5/24/16   Historical Provider, MD   gabapentin (NEURONTIN) 300 MG capsule Take 300 mg by mouth 2 times daily. Indications: 1 capsule in am, 2 capsule in pm  5/24/16   Historical Provider, MD   mycophenolate (CELLCEPT) 250 MG capsule Take 750 mg by mouth 2 times daily  5/24/16   Historical Provider, MD   predniSONE (DELTASONE) 5 MG tablet Take 5 mg by mouth daily  5/24/16   Historical Provider, MD   simvastatin (ZOCOR) 10 MG tablet Take 10 mg by mouth nightly  5/24/16   Historical Provider, MD       ALLERGIES: Atorvastatin    REVIEW OF SYSTEM:   Review of Systems   Constitutional: Negative for activity change, chills, diaphoresis and fever. HENT: Negative for congestion, ear pain, nosebleeds and rhinorrhea. Eyes: Negative for redness and visual disturbance. Respiratory: Positive for chest tightness and shortness of breath. Negative for apnea, cough and wheezing. Cardiovascular: Negative for chest pain, palpitations and leg swelling. Gastrointestinal: Negative for abdominal pain, constipation, diarrhea, nausea and vomiting. Genitourinary: Negative for difficulty urinating and dysuria. Musculoskeletal: Negative. Negative for joint swelling. Skin: Negative for color change, rash and wound. Neurological: Negative for dizziness, syncope, weakness, numbness and headaches. Psychiatric/Behavioral: Negative. OBJECTIVE  PHYSICAL EXAM:   Physical Exam  Vitals and nursing note reviewed. Constitutional:       Appearance: Normal appearance. HENT:      Head: Normocephalic and atraumatic. Mouth/Throat:      Mouth: Mucous membranes are moist.      Pharynx: Oropharynx is clear. Eyes:      Extraocular Movements: Extraocular movements intact.       Conjunctiva/sclera: Conjunctivae normal. Pupils: Pupils are equal, round, and reactive to light. Cardiovascular:      Rate and Rhythm: Normal rate and regular rhythm. Pulses: Normal pulses. Heart sounds: Normal heart sounds. Pulmonary:      Effort: Pulmonary effort is normal.      Breath sounds: Normal breath sounds. Abdominal:      General: Abdomen is flat. Bowel sounds are normal.      Palpations: Abdomen is soft. Musculoskeletal:         General: Normal range of motion. Cervical back: Normal range of motion and neck supple. Skin:     General: Skin is warm. Neurological:      General: No focal deficit present. Mental Status: He is alert and oriented to person, place, and time. Mental status is at baseline. Psychiatric:         Mood and Affect: Mood normal.          BP (!) 160/65   Pulse 57   Temp 97.3 °F (36.3 °C) (Oral)   Resp 18   Ht 6' 2\" (1.88 m)   Wt 229 lb 4.5 oz (104 kg)   SpO2 99%   BMI 29.44 kg/m²     DATA:     Diagnostic tests reviewed for today's visit:    Most recent labs and imaging results reviewed. LABS:    Recent Results (from the past 24 hour(s))   COVID-19, Rapid    Collection Time: 06/22/22  2:58 PM    Specimen: Nasopharyngeal Swab; Nasal   Result Value Ref Range    SARS-CoV-2, NAAT Not Detected Not Detected   EKG 12 lead    Collection Time: 06/23/22  5:19 AM   Result Value Ref Range    Ventricular Rate 55 BPM    Atrial Rate 55 BPM    P-R Interval 182 ms    QRS Duration 80 ms    Q-T Interval 452 ms    QTc Calculation (Bazett) 432 ms    P Axis 78 degrees    R Axis 15 degrees    T Axis 40 degrees       VTE Prophylaxis: unfractionated heparin -  start    ASSESSMENT AND PLAN  Chest pain. Sounds typical, described as pressure-like exacerbated by physical activity associated with shortness of breath. Patient has high risk factors for CAD.   However EKG without active signs of ischemia troponins detected at 0.032 and 0.02  History of end-stage renal disease status post 2 renal transplants most recent 1/2015 at [facility]  Diabetes  Hypertension  Hyperlipidemia    Plan:  Cont telemetry  Continue aspirin and atorvastatin for presumptive CAD  Continue Coreg  Nuclear stress tests today was normal EF 70% no evidence of ischemia  Okay for patient to be discharged.   Per patient he would like to see Madeline Ko as an outpatient, at that time they will discuss which cardiologist they would like to follow-up with   Family agreed to make follow-up cardiology appointment arrangements    Plan of care discussed with: patient      SIGNATURE: HAJA Cazares CNP  DATE: June 23, 2022  TIME: 9:49 AM

## 2022-06-23 NOTE — FLOWSHEET NOTE
7440- Assessment complete. Transporter at bedside to take patient to stress lab. Patient states he is suppose to have a heart catheterization today. During report this nurse was told it was not scheduled due to patient refusing. Patient states DR. Matute spoke with his wife yesterday and stated the catheterization would be done today and he was agreeable. Message sent out to Dr. Scott Andersen for clarification. Patient states if the procedure is not done today he will be leaving. Electronically signed by Robbi Johnson on 6/23/2022 at 8:22 AM  80- Dr. Scott Andersen in and spoke with patient and wife. DR. Shahzad Dacosta on the floor and ordered lab work. The phlebotomist missed the first poke and the patient refused and more tries. Electronically signed by Robbi Johnson on 6/23/2022 at 12:38 PM'

## 2022-06-23 NOTE — PROGRESS NOTES
Reviewed history, allergies, and medications. See [facility]  Consent confirmed. Lexiscan exam explained. Placed patient on monitor. @ [address] [place] [address]  here to inject [place]. SOB noted during recovery phase. Denied chest pain. No ectopy noted. Doctor to further review and interpret results. Patient off monitor and instructed to eat, will have last part of exam in 1 hour.

## 2022-06-23 NOTE — FLOWSHEET NOTE
1330- Discharge instructions given to patient and wife who verbalize understanding. Transport put in to wheel patient out. Electronically signed by Lopez Rodriguez on 6/23/2022 at 1:34 PM

## 2022-06-23 NOTE — DISCHARGE INSTR - DIET

## 2022-06-23 NOTE — PROGRESS NOTES
Nephrology Progress Note    Assessment:  80 y.o. male with history s/f ESRD s/p renal transplant x2 (2003, 2015), T2DM, seizures, HTN, CAD who presented for chest pain.      1. ESRD s/p renal transplant (x2, 2003, 2015, has transplant nephrologist at [facility])  - allograft function: baseline Scr ~1.8-2 w/ eGFR low 30s up to 11/21, currently w/ DELORES vs progressive CKD stage IV w/ Scr now 2.6 w/ eGFR low 20s  - IS: on cyclosporine 75 mg BID, cellcept 750 mg BID, prednisone 10 mg QD     2. Chest pain: admitted by cardiology, plan for angiogram, at risk for contrast induced nephropathy ~25%  3. Anemia  4. Hypertensive urgency: improved  5.  T2DM     Plan:  - getting stress test today, if positive will get [facility], will reduce risk for ISABELA w/ IVFs at that time   - if negative per cardiology can be discharged, would be ok from renal standpoint as well w/ f/u w/ Dr. Hannon Screws   - ok to keep lasix off as outpatient       Patient Active Problem List:     Pneumonia     Abdominal pain, other specified site     Acute pyelonephritis without lesion of renal medullary necrosis     Anemia     Essential hypertension     Nonspecific abnormal results of thyroid function study     Mixed hyperlipidemia     Kidney replaced by transplant     Intra-abdominal abscess post-procedure     Infection due to enterococcus     Fever and other physiologic disturbances of temperature regulation     Chronic kidney disease (CKD)     Type II or unspecified type diabetes mellitus without mention of complication, uncontrolled     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     Anginal chest pain at rest [facility])     Atypical chest pain     Chronic cough     Unstable angina (HCC)     Chest pain      Subjective:  Admit Date: 6/21/2022    Interval History: no BMP today, getting stress test today    Medications:  Scheduled Meds:   [START ON 6/24/2022] gabapentin  300 mg Oral QAM    gabapentin  600 mg Oral Nightly    insulin NPH  5 Units SubCUTAneous Nightly    [START ON 6/24/2022] insulin NPH  9 Units SubCUTAneous QAM    hydrALAZINE  25 mg Oral 3 times per day    enoxaparin  1 mg/kg SubCUTAneous BID    sodium chloride flush  5-40 mL IntraVENous 2 times per day    sodium chloride flush  5-40 mL IntraVENous 2 times per day    aspirin  81 mg Oral Daily    pantoprazole  40 mg Oral QAM AC    insulin lispro  0-6 Units SubCUTAneous TID WC    insulin lispro  0-3 Units SubCUTAneous Nightly    sodium chloride flush  5-40 mL IntraVENous 2 times per day    mycophenolate  750 mg Oral BID    cycloSPORINE modified  75 mg Oral BID    carvedilol  12.5 mg Oral BID    tamsulosin  0.4 mg Oral Daily    predniSONE  5 mg Oral Daily    rosuvastatin  20 mg Oral Nightly     Continuous Infusions:   sodium chloride      sodium chloride      dextrose      sodium chloride      sodium chloride 75 mL/hr at 06/22/22 1801       CBC:   Recent Labs     06/21/22 2105 06/22/22  0446   WBC 6.4 5.9   HGB 11.7* 10.9*    133     CMP:    Recent Labs     06/21/22  1445 06/22/22  0446    144   K 4.7 4.2    110*   CO2 21 22   BUN 59* 54*   CREATININE 2.61* 2.27*   GLUCOSE 235* 102*   CALCIUM 9.1 9.0   LABGLOM 24.4* 28.6*     Troponin:   Recent Labs     06/21/22 2104   TROPONINI 0.023*     BNP: No results for input(s): BNP in the last 72 hours. INR:   Recent Labs     06/21/22  1600   INR 1.1     Lipids: No results for input(s): CHOL, LDLDIRECT, TRIG, HDL, AMYLASE, LIPASE in the last 72 hours. Liver:   Recent Labs     06/22/22  0446   AST 12   ALT 8   ALKPHOS 60   PROT 6.0*   LABALBU 4.0   BILITOT 0.8*     Iron:  No results for input(s): IRONS, FERRITIN in the last 72 hours. Invalid input(s): LABIRONS  Urinalysis: No results for input(s): UA in the last 72 hours.     Objective:  Vitals: BP (!) 160/65   Pulse 57   Temp 97.3 °F (36.3 °C) (Oral)   Resp 18   Ht 6' 2\" (1.88 m)   Wt 229 lb 4.5 oz (104 kg)   SpO2 99%   BMI 29.44 kg/m²    Wt Readings from Last 3 Encounters:   06/23/22 229 lb 4.5 oz (104 kg)   04/21/22 208 lb (94.3 kg)   09/02/21 204 lb (92.5 kg)      24HR INTAKE/OUTPUT:      Intake/Output Summary (Last 24 hours) at 6/23/2022 1209  Last data filed at 6/23/2022 0601  Gross per 24 hour   Intake 900 ml   Output 800 ml   Net 100 ml       General: alert, in no apparent distress  HEENT: normocephalic, atraumatic, anicteric  Lungs: non-labored respirations, clear to auscultation bilaterally  Heart: regular rate and rhythm, no murmurs or rubs  Abdomen: soft, non-tender, non-distended  Ext: no cyanosis, no peripheral edema  Psych: normal mood and affect      Electronically signed by Angela Lezama MD, MD

## 2022-06-23 NOTE — DISCHARGE SUMMARY
160/65   Pulse 57   Temp 97.3 °F (36.3 °C) (Oral)   Resp 18   Ht 6' 2\" (1.88 m)   Wt 229 lb 4.5 oz (104 kg)   SpO2 99%   BMI 29.44 kg/m²    Physical Exam  Vitals and nursing note reviewed. Constitutional:       Appearance: Normal appearance. HENT:      Head: Normocephalic and atraumatic. Mouth/Throat:      Mouth: Mucous membranes are moist.      Pharynx: Oropharynx is clear. Eyes:      Extraocular Movements: Extraocular movements intact. Conjunctiva/sclera: Conjunctivae normal.      Pupils: Pupils are equal, round, and reactive to light. Cardiovascular:      Rate and Rhythm: Normal rate and regular rhythm. Pulses: Normal pulses. Heart sounds: Normal heart sounds. Pulmonary:      Effort: Pulmonary effort is normal.      Breath sounds: Normal breath sounds. Abdominal:      General: Abdomen is flat. Bowel sounds are normal.      Palpations: Abdomen is soft. Musculoskeletal:         General: Normal range of motion. Cervical back: Normal range of motion and neck supple. Skin:     General: Skin is warm. Neurological:      General: No focal deficit present. Mental Status: He is alert and oriented to person, place, and time. Mental status is at baseline.    Psychiatric:         Mood and Affect: Mood normal.         Medications:  Current Discharge Medication List      START taking these medications    Details   hydrALAZINE (APRESOLINE) 25 MG tablet Take 1 tablet by mouth every 8 hours  Qty: 90 tablet, Refills: 3         CONTINUE these medications which have CHANGED    Details   carvedilol (COREG) 12.5 MG tablet Take 1 tablet by mouth 2 times daily  Qty: 60 tablet, Refills: 3      isosorbide mononitrate (IMDUR) 30 MG extended release tablet Take 1 tablet by mouth daily  Qty: 30 tablet, Refills: 3         CONTINUE these medications which have NOT CHANGED    Details   Dulaglutide (TRULICITY) 1.5 EX/0.6SU SOPN Inject 1.5 mg into the skin once a week Weekly on Monday tamsulosin (FLOMAX) 0.4 MG capsule Take 1 capsule by mouth daily  Qty: 90 capsule, Refills: 0      Continuous Blood Gluc Transmit (DEXCOM G6 TRANSMITTER) MISC Change every 3 months  Qty: 1 each, Refills: 3      !! Continuous Blood Gluc Sensor (DEXCOM G6 SENSOR) MISC Change every 10 days  Qty: 1 each, Refills: 11      Continuous Blood Gluc  (DEXCOM G6 ) YANIRA Continuous  Qty: 1 Device, Refills: 0      !! Continuous Blood Gluc Sensor (FREESTYLE KEV 14 DAY SENSOR) MISC Every 2 weeks Dx E11.65  Qty: 2 each, Refills: 06      insulin NPH (NOVOLIN N FLEXPEN) 100 UNIT/ML injection pen 16 units at bedtime  Qty: 5 pen, Refills: 3      Insulin Regular Human (NOVOLIN R FLEXPEN) 100 UNIT/ML SOPN 6 units am 8 units lunch and dinner  Qty: 5 pen, Refills: 3      !! Continuous Blood Gluc Sensor (FREESTYLE KEV 14 DAY SENSOR) MISC Every 2 weeks  Qty: 2 each, Refills: 06      pantoprazole (PROTONIX) 40 MG tablet Take 40 mg by mouth daily       nitroGLYCERIN (NITROSTAT) 0.4 MG SL tablet up to max of 3 total doses. If no relief after 1 dose, call 911. Qty: 25 tablet, Refills: 3      furosemide (LASIX) 40 MG tablet Take 40 mg by mouth daily Indications: 1/2 to 1 tab. as needed for leg swelling       aspirin 81 MG EC tablet Take 81 mg by mouth daily   Refills: 3      cyclobenzaprine (FLEXERIL) 10 MG tablet Take 10 mg by mouth daily as needed Indications: as needed. Refills: 0      cycloSPORINE modified (NEORAL) 25 MG capsule Take 75 mg by mouth 2 times daily   Refills: 2      gabapentin (NEURONTIN) 300 MG capsule Take 300 mg by mouth 2 times daily. Indications: 1 capsule in am, 2 capsule in pm   Refills: 2      mycophenolate (CELLCEPT) 250 MG capsule Take 750 mg by mouth 2 times daily   Refills: 1      simvastatin (ZOCOR) 10 MG tablet Take 10 mg by mouth nightly   Refills: 1       !! - Potential duplicate medications found. Please discuss with provider.       STOP taking these medications       insulin aspart (NOVOLOG) 100 UNIT/ML injection vial Comments:   Reason for Stopping:         cycloSPORINE (SANDIMMUNE) 25 MG capsule Comments:   Reason for Stopping:         Insulin Degludec (TRESIBA FLEXTOUCH) 100 UNIT/ML SOPN Comments:   Reason for Stopping:         Insulin Degludec (TRESIBA FLEXTOUCH) 100 UNIT/ML SOPN Comments:   Reason for Stopping:         predniSONE (DELTASONE) 5 MG tablet Comments:   Reason for Stopping:               Significant Diagnostics:   Radiology: Echocardiogram complete 2D with doppler with color    Result Date: 6/22/2022  Transthoracic Echocardiography Report (TTE)  Demographics   Patient Name   Mariano Davidson Gender              Male                 R   Patient Number [id number]         Race                                                   Ethnicity   Visit Number   [id number]        Room Number         I086   Corporate ID                    Date of Study       06/22/2022   Accession      [id number]       Referring Physician  Number   Date of Birth  1950       Sonographer         Sugar Hobbs   Age            80 year(s)       Interpreting        [facility])                                  Physician           Cardiology                                                      Claudia Craft MD  Procedure Type of Study   TTE procedure:ECHO COMPLETE 2D W/DOP W/COLOR. Procedure Date Date: 06/22/2022 Start: 11:10 AM Study Location: Portable Technical Quality: Adequate visualization Indications:Chest pain. Patient Status: Routine Height: 74.02 inches Weight: 209.44 pounds BSA: 2.22 m^2 BMI: 26.88 kg/m^2 BP: 188/64 mmHg  Conclusions   Summary  Normal left ventricle structure and function. Left ventricular ejection fraction is visually estimated at 65%. E/A flow reversal noted. Suggestive of diastolic dysfunction.    Signature   ----------------------------------------------------------------  Electronically signed by Claudia Craft MD(Interpreting  physician) on 06/22/2022 12:07 PM ----------------------------------------------------------------   Findings  Left Ventricle Normal left ventricle structure and function. Left ventricular ejection fraction is visually estimated at 65%. E/A flow reversal noted. Suggestive of diastolic dysfunction. Right Ventricle Normal right ventricle structure and function. Normal right ventricle systolic pressure. Left Atrium Normal left atrium. Right Atrium Normal right atrium. Mitral Valve Normal mitral valve structure and function. Tricuspid Valve Normal tricuspid valve structure and function. RSVP 26 mmHg Aortic Valve Normal aortic valve structure and function. Pulmonic Valve The pulmonic valve was not well visualized . Pericardial Effusion No evidence of pericardial effusion. Pleural Effusion No evidence of pleural effusion. Aorta \ Miscellaneous The aorta is within normal limits. M-Mode Measurements (cm)   LVIDd: 4.55 cm                         LVIDs: 3.01 cm  IVSd: 1.57 cm                          IVSs: 1.8 cm  LVPWd: 1.26 cm                         AO Root Dimension: 2.95 cm  Rt. Vent.  Dimension: 2.32 cm                                         LVOT: 1.98 cm  Doppler Measurements:   AV Velocity:0.02 m/s                    MV Peak E-Wave: 0.9 m/s  AV Peak Gradient: 5.77 mmHg             MV Peak A-Wave: 0.93 m/s  AV Mean Gradient: 3.64 mmHg  AV Area (Continuity):2.01 cm^2  TR Velocity:2.02 m/s                    Estimated RAP:10 mmHg  TR Gradient:16.25 mmHg                  RVSP:26.25 mmHg  Valves  Mitral Valve   Peak E-Wave: 0.9 m/s                  Peak A-Wave: 0.93 m/s                                        E/A Ratio: 0.97                                        Peak Gradient: 3.2 mmHg                                        Deceleration Time: 270.5 msec   Tissue Doppler   E' Septal Velocity: 0.06 m/s  E' Lateral Velocity: 0.07 m/s   Aortic Valve   Peak Velocity: 1.2 m/s                 Mean Velocity: 0.91 m/s  Peak Gradient: 5.77 mmHg               Mean Gradient: 3.64 mmHg  Area (continuity): 2.01 cm^2  AV VTI: 36.96 cm   Tricuspid Valve   Estimated RVSP: 26.25 mmHg              Estimated RAP: 10 mmHg  TR Velocity: 2.02 m/s                   TR Gradient: 16.25 mmHg   Pulmonic Valve   Peak Velocity: 0.91 m/s           Peak Gradient: 3.32 mmHg                                    Estimated PASP: 26.25 mmHg   LVOT   Peak Velocity: 0.88 m/s              Mean Velocity: 0.57 m/s  Peak Gradient: 3.09 mmHg             Mean Gradient: 1.52 mmHg  LVOT Diameter: 1.98 cm               LVOT VTI: 24.19 cm  Structures  Left Atrium   LA Volume/Index: 35.39 ml /16 m^2             LA Area: 15.09 cm^2   Left Ventricle   Diastolic Dimension: 8.90 cm         Systolic Dimension: 1.66 cm  Septum Diastolic: 5.41 cm            Septum Systolic: 1.8 cm  PW Diastolic: 1.56 cm                                       FS: 33.9 %  LV EDV/LV EDV Index: 95.01 ml/43 m^2 LV ESV/LV ESV Index: 35.21 ml/16 m^2  EF Calculated: 62.9 %   LVOT Diameter: 1.98 cm   Right Atrium   RA Systolic Pressure: 10 mmHg   Right Ventricle   Diastolic Dimension: 4.41 cm                                    RV Systolic Pressure: 73.42 mmHg  Aorta/ Miscellaneous Aorta   Aortic Root: 2.95 cm  LVOT Diameter: 1.98 cm      NM LUNG VENT/PERFUSION (VQ)    Result Date: 6/21/2022  NM LUNG VENT/PERFUSION (VQ) : 6/21/2022 CLINICAL HISTORY:  elev d dimer, CKD . COMPARISON: Portable chest from earlier 6/21/2022. TECHNIQUE: Planar images of the chest were obtained in multiple projections after the inhalation of approximately 1 mCi of technetium 99m DTPA aerosol. The planar images were obtained after the intravenous injection of approximately 5 mCi of technetium 99m MAA. FINDINGS: Small matched predominantly mid lung syndrome ventilation and perfusion defects are present. There are no significant mismatched perfusion defects identified. LOW PROBABILITY OF PULMONARY EMBOLISM.      XR CHEST PORTABLE    Result Date: 6/21/2022  TECHNIQUE: Single portable view of the chest. CLINICAL INDICATION: Chest pain. COMPARISON: Chest x-ray obtained on July 17, 2019 PROCEDURE AND FINDINGS: The cardiomediastinal silhouette is slightly prominent in size but demonstrates no significant change in comparison to the prior study. Prominence of the bronchovascular and interstitial lung markings is visualized however there is improved aeration seen in comparison to the prior studies. Mild blunting of bilateral costophrenic angles is visualized with suggestion of right Pleural fluid. No evidence of pneumothorax or parenchymal lung mass. The bony thorax unremarkable for the patient's age. No evidence of acute cardiopulmonary disease.       Labs:   Recent Results (from the past 72 hour(s))   EKG 12 Lead    Collection Time: 06/21/22  2:26 PM   Result Value Ref Range    Ventricular Rate 72 BPM    Atrial Rate 72 BPM    P-R Interval 162 ms    QRS Duration 74 ms    Q-T Interval 396 ms    QTc Calculation (Bazett) 433 ms    P Axis 79 degrees    R Axis 6 degrees    T Axis 42 degrees   Comprehensive Metabolic Panel    Collection Time: 06/21/22  2:45 PM   Result Value Ref Range    Sodium 138 135 - 144 mEq/L    Potassium 4.7 3.4 - 4.9 mEq/L    Chloride 102 95 - 107 mEq/L    CO2 21 20 - 31 mEq/L    Anion Gap 15 9 - 15 mEq/L    Glucose 235 (H) 70 - 99 mg/dL    BUN 59 (H) 8 - 23 mg/dL    CREATININE 2.61 (H) 0.70 - 1.20 mg/dL    GFR Non-African American 24.4 (L) >60    GFR  29.5 (L) >60    Calcium 9.1 8.5 - 9.9 mg/dL    Total Protein 6.8 6.3 - 8.0 g/dL    Albumin 4.5 3.5 - 4.6 g/dL    Total Bilirubin 0.7 0.2 - 0.7 mg/dL    Alkaline Phosphatase 64 35 - 104 U/L    ALT 8 0 - 41 U/L    AST 11 0 - 40 U/L    Globulin 2.3 2.3 - 3.5 g/dL   CBC with Auto Differential    Collection Time: 06/21/22  2:45 PM   Result Value Ref Range    WBC 6.7 4.8 - 10.8 K/uL    RBC 3.97 (L) 4.70 - 6.10 M/uL    Hemoglobin 11.3 (L) 14.0 - 18.0 g/dL    Hematocrit 35.4 (L) 42.0 - 52.0 %    MCV 89.3 80.0 - 100.0 fL    MCH 28.6 27.0 - 31.3 pg    MCHC 32.0 (L) 33.0 - 37.0 %    RDW 14.8 (H) 11.5 - 14.5 %    Platelets 925 (L) 763 - 400 K/uL    Neutrophils % 86.4 %    Lymphocytes % 6.5 %    Monocytes % 5.3 %    Eosinophils % 1.2 %    Basophils % 0.6 %    Neutrophils Absolute 5.8 1.4 - 6.5 K/uL    Lymphocytes Absolute 0.4 (L) 1.0 - 4.8 K/uL    Monocytes Absolute 0.4 0.2 - 0.8 K/uL    Eosinophils Absolute 0.1 0.0 - 0.7 K/uL    Basophils Absolute 0.0 0.0 - 0.2 K/uL   Magnesium    Collection Time: 06/21/22  2:45 PM   Result Value Ref Range    Magnesium 1.9 1.7 - 2.4 mg/dL   Troponin    Collection Time: 06/21/22  2:45 PM   Result Value Ref Range    Troponin 0.027 (HH) 0.000 - 0.010 ng/mL   D-Dimer, Quantitative    Collection Time: 06/21/22  2:45 PM   Result Value Ref Range    D-Dimer, Quant >20.00 (HH) 0.00 - 0.50 mg/L FEU   Protime-INR    Collection Time: 06/21/22  4:00 PM   Result Value Ref Range    Protime 14.4 12.3 - 14.9 sec    INR 1.1    APTT    Collection Time: 06/21/22  4:00 PM   Result Value Ref Range    aPTT 25.2 24.4 - 36.8 sec   Troponin    Collection Time: 06/21/22  6:16 PM   Result Value Ref Range    Troponin 0.032 (HH) 0.000 - 0.010 ng/mL   Troponin    Collection Time: 06/21/22  9:04 PM   Result Value Ref Range    Troponin 0.023 (HH) 0.000 - 0.010 ng/mL   CBC    Collection Time: 06/21/22  9:05 PM   Result Value Ref Range    WBC 6.4 4.8 - 10.8 K/uL    RBC 4.07 (L) 4.70 - 6.10 M/uL    Hemoglobin 11.7 (L) 14.0 - 18.0 g/dL    Hematocrit 36.3 (L) 42.0 - 52.0 %    MCV 89.2 80.0 - 100.0 fL    MCH 28.9 27.0 - 31.3 pg    MCHC 32.4 (L) 33.0 - 37.0 %    RDW 15.2 (H) 11.5 - 14.5 %    Platelets 587 746 - 532 K/uL   EKG 12 Lead - Chest Pain    Collection Time: 06/22/22  1:45 AM   Result Value Ref Range    Ventricular Rate 55 BPM    Atrial Rate 55 BPM    P-R Interval 184 ms    QRS Duration 86 ms    Q-T Interval 448 ms    QTc Calculation (Bazett) 428 ms    P Axis 78 degrees    R Axis 20 degrees    T Axis 40 degrees   CBC Collection Time: 06/22/22  4:46 AM   Result Value Ref Range    WBC 5.9 4.8 - 10.8 K/uL    RBC 3.72 (L) 4.70 - 6.10 M/uL    Hemoglobin 10.9 (L) 14.0 - 18.0 g/dL    Hematocrit 32.8 (L) 42.0 - 52.0 %    MCV 88.1 80.0 - 100.0 fL    MCH 29.2 27.0 - 31.3 pg    MCHC 33.2 33.0 - 37.0 %    RDW 15.2 (H) 11.5 - 14.5 %    Platelets 435 687 - 300 K/uL   Comprehensive Metabolic Panel    Collection Time: 06/22/22  4:46 AM   Result Value Ref Range    Sodium 144 135 - 144 mEq/L    Potassium 4.2 3.4 - 4.9 mEq/L    Chloride 110 (H) 95 - 107 mEq/L    CO2 22 20 - 31 mEq/L    Anion Gap 12 9 - 15 mEq/L    Glucose 102 (H) 70 - 99 mg/dL    BUN 54 (H) 8 - 23 mg/dL    CREATININE 2.27 (H) 0.70 - 1.20 mg/dL    GFR Non-African American 28.6 (L) >60    GFR  34.7 (L) >60    Calcium 9.0 8.5 - 9.9 mg/dL    Total Protein 6.0 (L) 6.3 - 8.0 g/dL    Albumin 4.0 3.5 - 4.6 g/dL    Total Bilirubin 0.8 (H) 0.2 - 0.7 mg/dL    Alkaline Phosphatase 60 35 - 104 U/L    ALT 8 0 - 41 U/L    AST 12 0 - 40 U/L    Globulin 2.0 (L) 2.3 - 3.5 g/dL   Iron and TIBC    Collection Time: 06/22/22  4:46 AM   Result Value Ref Range    Iron 80 59 - 158 ug/dL    TIBC 285 250 - 450 ug/dL    Iron Saturation 28 20 - 55 %    UIBC 205 112 - 347 ug/dL   COVID-19, Rapid    Collection Time: 06/22/22  2:58 PM    Specimen: Nasopharyngeal Swab; Nasal   Result Value Ref Range    SARS-CoV-2, NAAT Not Detected Not Detected   EKG 12 lead    Collection Time: 06/23/22  5:19 AM   Result Value Ref Range    Ventricular Rate 55 BPM    Atrial Rate 55 BPM    P-R Interval 182 ms    QRS Duration 80 ms    Q-T Interval 452 ms    QTc Calculation (Bazett) 432 ms    P Axis 78 degrees    R Axis 15 degrees    T Axis 40 degrees   POCT Glucose    Collection Time: 06/23/22 12:24 PM   Result Value Ref Range    POC Glucose 226 (H) 70 - 99 mg/dl    Performed on ACCU-CHEK           normal EKG, normal sinus rhythm    Follow-up visits:   Lj Delgado MD  [address] [facility] [address] [place] [id number]  [phone]    In 2 weeks  For hospital follow-up    Cindi MonzonDO rg  [address] [place] [id number]    In 2 weeks  For hospital follow-up       Assessment:  Active Hospital Problems    Diagnosis Date Noted    Chest pain [R07.9] 06/21/2022     Priority: High    Unstable angina [facility]) [I20.0] 06/21/2022     Priority: Medium         Plan:   Follow-up with Dr. Carol Caldera as an outpatient  Patient would like to discuss with Dr. Carol Caldera what cardiologist to follow-up with        Electronically signed by [facility], [address] [place] [address] 6/23/2022 at 12:42 PM

## 2022-06-29 ENCOUNTER — TELEPHONE (OUTPATIENT)
Dept: CARDIOLOGY CLINIC | Age: 72
End: 2022-06-29

## 2022-06-29 NOTE — TELEPHONE ENCOUNTER
Patient was put on hydralazine and imdur. Patient is Dizzy, light headed, and having  vision issues      Are these medications the reason/? Any interactions?

## 2022-06-29 NOTE — TELEPHONE ENCOUNTER
Dontrell Castillo,    Please instruct patient to stop both medications, he should monitor his blood pressures twice a day, he should keep a log and he should see   or his new cardiologist soon for further recommendations  Thanks

## 2022-09-14 ENCOUNTER — APPOINTMENT (OUTPATIENT)
Dept: GENERAL RADIOLOGY | Age: 72
DRG: 309 | End: 2022-09-14
Payer: COMMERCIAL

## 2022-09-14 ENCOUNTER — HOSPITAL ENCOUNTER (INPATIENT)
Age: 72
LOS: 3 days | Discharge: HOME | DRG: 309 | End: 2022-09-17
Attending: INTERNAL MEDICINE | Admitting: INTERNAL MEDICINE
Payer: COMMERCIAL

## 2022-09-14 DIAGNOSIS — I48.91 NEW ONSET ATRIAL FIBRILLATION (HCC): Primary | ICD-10-CM

## 2022-09-14 DIAGNOSIS — I48.91 ATRIAL FIBRILLATION WITH RVR (HCC): ICD-10-CM

## 2022-09-14 DIAGNOSIS — N18.9 CHRONIC KIDNEY DISEASE, UNSPECIFIED CKD STAGE: ICD-10-CM

## 2022-09-14 DIAGNOSIS — R07.9 CHEST PAIN, UNSPECIFIED TYPE: ICD-10-CM

## 2022-09-14 LAB
ALBUMIN SERPL-MCNC: 4.2 G/DL (ref 3.5–4.6)
ALP BLD-CCNC: 66 U/L (ref 35–104)
ALT SERPL-CCNC: 8 U/L (ref 0–41)
ANION GAP SERPL CALCULATED.3IONS-SCNC: 14 MEQ/L (ref 9–15)
ANTI-XA UNFRAC HEPARIN: 0.49 IU/ML
APTT: 22.4 SEC (ref 24.4–36.8)
AST SERPL-CCNC: 11 U/L (ref 0–40)
BASOPHILS ABSOLUTE: 0.1 K/UL (ref 0–0.2)
BASOPHILS RELATIVE PERCENT: 0.8 %
BILIRUB SERPL-MCNC: 0.5 MG/DL (ref 0.2–0.7)
BUN BLDV-MCNC: 57 MG/DL (ref 8–23)
CALCIUM SERPL-MCNC: 9.3 MG/DL (ref 8.5–9.9)
CHLORIDE BLD-SCNC: 108 MEQ/L (ref 95–107)
CHP ED QC CHECK: NORMAL
CO2: 22 MEQ/L (ref 20–31)
CREAT SERPL-MCNC: 2.79 MG/DL (ref 0.7–1.2)
EOSINOPHILS ABSOLUTE: 0.3 K/UL (ref 0–0.7)
EOSINOPHILS RELATIVE PERCENT: 4 %
GFR AFRICAN AMERICAN: 27.3
GFR NON-AFRICAN AMERICAN: 22.6
GLOBULIN: 2.5 G/DL (ref 2.3–3.5)
GLUCOSE BLD-MCNC: 208 MG/DL (ref 70–99)
GLUCOSE BLD-MCNC: 227 MG/DL
GLUCOSE BLD-MCNC: 227 MG/DL (ref 70–99)
GLUCOSE BLD-MCNC: 234 MG/DL (ref 70–99)
GLUCOSE BLD-MCNC: 310 MG/DL (ref 70–99)
HBA1C MFR BLD: 9 % (ref 4.8–5.9)
HCT VFR BLD CALC: 31.5 % (ref 42–52)
HCT VFR BLD CALC: 33 % (ref 42–52)
HEMOGLOBIN: 10 G/DL (ref 14–18)
HEMOGLOBIN: 10.7 G/DL (ref 14–18)
INR BLD: 1.1
LYMPHOCYTES ABSOLUTE: 0.7 K/UL (ref 1–4.8)
LYMPHOCYTES RELATIVE PERCENT: 10.4 %
MAGNESIUM: 1.8 MG/DL (ref 1.7–2.4)
MCH RBC QN AUTO: 28.5 PG (ref 27–31.3)
MCH RBC QN AUTO: 28.9 PG (ref 27–31.3)
MCHC RBC AUTO-ENTMCNC: 32 % (ref 33–37)
MCHC RBC AUTO-ENTMCNC: 32.6 % (ref 33–37)
MCV RBC AUTO: 88.7 FL (ref 80–100)
MCV RBC AUTO: 89.3 FL (ref 80–100)
MONOCYTES ABSOLUTE: 0.5 K/UL (ref 0.2–0.8)
MONOCYTES RELATIVE PERCENT: 7.1 %
NEUTROPHILS ABSOLUTE: 5.5 K/UL (ref 1.4–6.5)
NEUTROPHILS RELATIVE PERCENT: 77.7 %
PDW BLD-RTO: 14.8 % (ref 11.5–14.5)
PDW BLD-RTO: 15.2 % (ref 11.5–14.5)
PERFORMED ON: ABNORMAL
PLATELET # BLD: 117 K/UL (ref 130–400)
PLATELET # BLD: 127 K/UL (ref 130–400)
POTASSIUM SERPL-SCNC: 4.9 MEQ/L (ref 3.4–4.9)
PRO-BNP: 1641 PG/ML
PROTHROMBIN TIME: 14.2 SEC (ref 12.3–14.9)
RBC # BLD: 3.52 M/UL (ref 4.7–6.1)
RBC # BLD: 3.72 M/UL (ref 4.7–6.1)
SODIUM BLD-SCNC: 144 MEQ/L (ref 135–144)
TOTAL PROTEIN: 6.7 G/DL (ref 6.3–8)
TROPONIN: 0.02 NG/ML (ref 0–0.01)
TSH REFLEX: 0.97 UIU/ML (ref 0.44–3.86)
WBC # BLD: 7.1 K/UL (ref 4.8–10.8)
WBC # BLD: 8.2 K/UL (ref 4.8–10.8)

## 2022-09-14 PROCEDURE — 93005 ELECTROCARDIOGRAM TRACING: CPT | Performed by: INTERNAL MEDICINE

## 2022-09-14 PROCEDURE — 99223 1ST HOSP IP/OBS HIGH 75: CPT | Performed by: INTERNAL MEDICINE

## 2022-09-14 PROCEDURE — 83735 ASSAY OF MAGNESIUM: CPT

## 2022-09-14 PROCEDURE — 6370000000 HC RX 637 (ALT 250 FOR IP): Performed by: NURSE PRACTITIONER

## 2022-09-14 PROCEDURE — 2500000003 HC RX 250 WO HCPCS: Performed by: PHYSICIAN ASSISTANT

## 2022-09-14 PROCEDURE — 85520 HEPARIN ASSAY: CPT

## 2022-09-14 PROCEDURE — 99285 EMERGENCY DEPT VISIT HI MDM: CPT

## 2022-09-14 PROCEDURE — 6370000000 HC RX 637 (ALT 250 FOR IP): Performed by: INTERNAL MEDICINE

## 2022-09-14 PROCEDURE — 80053 COMPREHEN METABOLIC PANEL: CPT

## 2022-09-14 PROCEDURE — 85027 COMPLETE CBC AUTOMATED: CPT

## 2022-09-14 PROCEDURE — 83036 HEMOGLOBIN GLYCOSYLATED A1C: CPT

## 2022-09-14 PROCEDURE — 6360000002 HC RX W HCPCS: Performed by: NURSE PRACTITIONER

## 2022-09-14 PROCEDURE — 85610 PROTHROMBIN TIME: CPT

## 2022-09-14 PROCEDURE — 96374 THER/PROPH/DIAG INJ IV PUSH: CPT

## 2022-09-14 PROCEDURE — 71045 X-RAY EXAM CHEST 1 VIEW: CPT

## 2022-09-14 PROCEDURE — 2060000000 HC ICU INTERMEDIATE R&B

## 2022-09-14 PROCEDURE — 6360000002 HC RX W HCPCS: Performed by: PHYSICIAN ASSISTANT

## 2022-09-14 PROCEDURE — 2580000003 HC RX 258: Performed by: NURSE PRACTITIONER

## 2022-09-14 PROCEDURE — 85730 THROMBOPLASTIN TIME PARTIAL: CPT

## 2022-09-14 PROCEDURE — 2580000003 HC RX 258: Performed by: PHYSICIAN ASSISTANT

## 2022-09-14 PROCEDURE — 93005 ELECTROCARDIOGRAM TRACING: CPT | Performed by: PHYSICIAN ASSISTANT

## 2022-09-14 PROCEDURE — 83880 ASSAY OF NATRIURETIC PEPTIDE: CPT

## 2022-09-14 PROCEDURE — 96375 TX/PRO/DX INJ NEW DRUG ADDON: CPT

## 2022-09-14 PROCEDURE — 36415 COLL VENOUS BLD VENIPUNCTURE: CPT

## 2022-09-14 PROCEDURE — 6370000000 HC RX 637 (ALT 250 FOR IP): Performed by: PHYSICIAN ASSISTANT

## 2022-09-14 PROCEDURE — 84443 ASSAY THYROID STIM HORMONE: CPT

## 2022-09-14 PROCEDURE — 85025 COMPLETE CBC W/AUTO DIFF WBC: CPT

## 2022-09-14 PROCEDURE — 84484 ASSAY OF TROPONIN QUANT: CPT

## 2022-09-14 RX ORDER — MYCOPHENOLATE MOFETIL 250 MG/1
750 CAPSULE ORAL 2 TIMES DAILY
Status: DISCONTINUED | OUTPATIENT
Start: 2022-09-14 | End: 2022-09-17 | Stop reason: HOSPADM

## 2022-09-14 RX ORDER — HYDRALAZINE HYDROCHLORIDE 50 MG/1
25 TABLET, FILM COATED ORAL EVERY 8 HOURS SCHEDULED
Status: DISCONTINUED | OUTPATIENT
Start: 2022-09-14 | End: 2022-09-15

## 2022-09-14 RX ORDER — ASPIRIN 81 MG/1
324 TABLET, CHEWABLE ORAL ONCE
Status: COMPLETED | OUTPATIENT
Start: 2022-09-14 | End: 2022-09-14

## 2022-09-14 RX ORDER — HEPARIN SODIUM 1000 [USP'U]/ML
2000 INJECTION, SOLUTION INTRAVENOUS; SUBCUTANEOUS PRN
Status: DISCONTINUED | OUTPATIENT
Start: 2022-09-14 | End: 2022-09-17 | Stop reason: ALTCHOICE

## 2022-09-14 RX ORDER — INSULIN LISPRO 100 [IU]/ML
0-8 INJECTION, SOLUTION INTRAVENOUS; SUBCUTANEOUS
Status: DISCONTINUED | OUTPATIENT
Start: 2022-09-14 | End: 2022-09-17 | Stop reason: HOSPADM

## 2022-09-14 RX ORDER — 0.9 % SODIUM CHLORIDE 0.9 %
500 INTRAVENOUS SOLUTION INTRAVENOUS ONCE
Status: COMPLETED | OUTPATIENT
Start: 2022-09-14 | End: 2022-09-14

## 2022-09-14 RX ORDER — DEXTROSE MONOHYDRATE 100 MG/ML
INJECTION, SOLUTION INTRAVENOUS CONTINUOUS PRN
Status: DISCONTINUED | OUTPATIENT
Start: 2022-09-14 | End: 2022-09-17 | Stop reason: HOSPADM

## 2022-09-14 RX ORDER — CYCLOSPORINE 25 MG/1
75 CAPSULE, LIQUID FILLED ORAL 2 TIMES DAILY
Status: DISCONTINUED | OUTPATIENT
Start: 2022-09-14 | End: 2022-09-17 | Stop reason: HOSPADM

## 2022-09-14 RX ORDER — ONDANSETRON 4 MG/1
4 TABLET, ORALLY DISINTEGRATING ORAL EVERY 8 HOURS PRN
Status: DISCONTINUED | OUTPATIENT
Start: 2022-09-14 | End: 2022-09-17 | Stop reason: HOSPADM

## 2022-09-14 RX ORDER — HEPARIN SODIUM 1000 [USP'U]/ML
4000 INJECTION, SOLUTION INTRAVENOUS; SUBCUTANEOUS ONCE
Status: COMPLETED | OUTPATIENT
Start: 2022-09-14 | End: 2022-09-14

## 2022-09-14 RX ORDER — ASPIRIN 81 MG/1
81 TABLET ORAL DAILY
Status: DISCONTINUED | OUTPATIENT
Start: 2022-09-14 | End: 2022-09-14

## 2022-09-14 RX ORDER — POLYETHYLENE GLYCOL 3350 17 G/17G
17 POWDER, FOR SOLUTION ORAL DAILY PRN
Status: DISCONTINUED | OUTPATIENT
Start: 2022-09-14 | End: 2022-09-17 | Stop reason: HOSPADM

## 2022-09-14 RX ORDER — PANTOPRAZOLE SODIUM 40 MG/1
40 TABLET, DELAYED RELEASE ORAL DAILY
Status: DISCONTINUED | OUTPATIENT
Start: 2022-09-14 | End: 2022-09-17 | Stop reason: HOSPADM

## 2022-09-14 RX ORDER — ATORVASTATIN CALCIUM 10 MG/1
10 TABLET, FILM COATED ORAL DAILY
Status: DISCONTINUED | OUTPATIENT
Start: 2022-09-14 | End: 2022-09-17 | Stop reason: HOSPADM

## 2022-09-14 RX ORDER — INSULIN GLARGINE 100 [IU]/ML
0.15 INJECTION, SOLUTION SUBCUTANEOUS NIGHTLY
Status: DISCONTINUED | OUTPATIENT
Start: 2022-09-14 | End: 2022-09-14

## 2022-09-14 RX ORDER — ACETAMINOPHEN 650 MG/1
650 SUPPOSITORY RECTAL EVERY 6 HOURS PRN
Status: DISCONTINUED | OUTPATIENT
Start: 2022-09-14 | End: 2022-09-17 | Stop reason: HOSPADM

## 2022-09-14 RX ORDER — TAMSULOSIN HYDROCHLORIDE 0.4 MG/1
0.4 CAPSULE ORAL DAILY
Status: DISCONTINUED | OUTPATIENT
Start: 2022-09-14 | End: 2022-09-17 | Stop reason: HOSPADM

## 2022-09-14 RX ORDER — SODIUM CHLORIDE 0.9 % (FLUSH) 0.9 %
5-40 SYRINGE (ML) INJECTION PRN
Status: DISCONTINUED | OUTPATIENT
Start: 2022-09-14 | End: 2022-09-17 | Stop reason: HOSPADM

## 2022-09-14 RX ORDER — FUROSEMIDE 20 MG/1
40 TABLET ORAL DAILY
Status: DISCONTINUED | OUTPATIENT
Start: 2022-09-14 | End: 2022-09-16

## 2022-09-14 RX ORDER — METOPROLOL TARTRATE 50 MG/1
50 TABLET, FILM COATED ORAL 2 TIMES DAILY
Status: DISCONTINUED | OUTPATIENT
Start: 2022-09-14 | End: 2022-09-15

## 2022-09-14 RX ORDER — HEPARIN SODIUM 1000 [USP'U]/ML
4000 INJECTION, SOLUTION INTRAVENOUS; SUBCUTANEOUS PRN
Status: DISCONTINUED | OUTPATIENT
Start: 2022-09-14 | End: 2022-09-17 | Stop reason: ALTCHOICE

## 2022-09-14 RX ORDER — NITROGLYCERIN 0.4 MG/1
0.4 TABLET SUBLINGUAL EVERY 5 MIN PRN
Status: DISCONTINUED | OUTPATIENT
Start: 2022-09-14 | End: 2022-09-17 | Stop reason: HOSPADM

## 2022-09-14 RX ORDER — SENNOSIDES 8.8 MG/5ML
5 LIQUID ORAL 2 TIMES DAILY PRN
Status: DISCONTINUED | OUTPATIENT
Start: 2022-09-14 | End: 2022-09-17 | Stop reason: HOSPADM

## 2022-09-14 RX ORDER — DILTIAZEM HYDROCHLORIDE 5 MG/ML
10 INJECTION INTRAVENOUS ONCE
Status: COMPLETED | OUTPATIENT
Start: 2022-09-14 | End: 2022-09-14

## 2022-09-14 RX ORDER — HEPARIN SODIUM 10000 [USP'U]/100ML
5-30 INJECTION, SOLUTION INTRAVENOUS CONTINUOUS
Status: DISCONTINUED | OUTPATIENT
Start: 2022-09-14 | End: 2022-09-17

## 2022-09-14 RX ORDER — ISOSORBIDE MONONITRATE 30 MG/1
30 TABLET, EXTENDED RELEASE ORAL DAILY
Status: DISCONTINUED | OUTPATIENT
Start: 2022-09-14 | End: 2022-09-15

## 2022-09-14 RX ORDER — CYCLOBENZAPRINE HCL 10 MG
10 TABLET ORAL DAILY PRN
Status: DISCONTINUED | OUTPATIENT
Start: 2022-09-14 | End: 2022-09-17 | Stop reason: HOSPADM

## 2022-09-14 RX ORDER — INSULIN LISPRO 100 [IU]/ML
0-4 INJECTION, SOLUTION INTRAVENOUS; SUBCUTANEOUS NIGHTLY
Status: DISCONTINUED | OUTPATIENT
Start: 2022-09-14 | End: 2022-09-17 | Stop reason: HOSPADM

## 2022-09-14 RX ORDER — SODIUM CHLORIDE 0.9 % (FLUSH) 0.9 %
5-40 SYRINGE (ML) INJECTION EVERY 12 HOURS SCHEDULED
Status: DISCONTINUED | OUTPATIENT
Start: 2022-09-14 | End: 2022-09-17 | Stop reason: HOSPADM

## 2022-09-14 RX ORDER — WARFARIN SODIUM 5 MG/1
5 TABLET ORAL ONCE
Status: COMPLETED | OUTPATIENT
Start: 2022-09-14 | End: 2022-09-14

## 2022-09-14 RX ORDER — GABAPENTIN 100 MG/1
300 CAPSULE ORAL 2 TIMES DAILY
Status: DISCONTINUED | OUTPATIENT
Start: 2022-09-14 | End: 2022-09-17 | Stop reason: HOSPADM

## 2022-09-14 RX ORDER — SODIUM CHLORIDE 9 MG/ML
INJECTION, SOLUTION INTRAVENOUS PRN
Status: DISCONTINUED | OUTPATIENT
Start: 2022-09-14 | End: 2022-09-17 | Stop reason: HOSPADM

## 2022-09-14 RX ORDER — ONDANSETRON 2 MG/ML
4 INJECTION INTRAMUSCULAR; INTRAVENOUS EVERY 6 HOURS PRN
Status: DISCONTINUED | OUTPATIENT
Start: 2022-09-14 | End: 2022-09-17 | Stop reason: HOSPADM

## 2022-09-14 RX ORDER — ONDANSETRON 2 MG/ML
4 INJECTION INTRAMUSCULAR; INTRAVENOUS ONCE
Status: COMPLETED | OUTPATIENT
Start: 2022-09-14 | End: 2022-09-14

## 2022-09-14 RX ORDER — PREDNISONE 1 MG/1
5 TABLET ORAL DAILY
COMMUNITY

## 2022-09-14 RX ORDER — ACETAMINOPHEN 325 MG/1
650 TABLET ORAL EVERY 6 HOURS PRN
Status: DISCONTINUED | OUTPATIENT
Start: 2022-09-14 | End: 2022-09-17 | Stop reason: HOSPADM

## 2022-09-14 RX ADMIN — NITROGLYCERIN 0.4 MG: 0.4 TABLET SUBLINGUAL at 09:43

## 2022-09-14 RX ADMIN — SODIUM CHLORIDE 500 ML: 900 INJECTION, SOLUTION INTRAVENOUS at 08:39

## 2022-09-14 RX ADMIN — INSULIN HUMAN 5 UNITS: 100 INJECTION, SUSPENSION SUBCUTANEOUS at 23:19

## 2022-09-14 RX ADMIN — TAMSULOSIN HYDROCHLORIDE 0.4 MG: 0.4 CAPSULE ORAL at 16:19

## 2022-09-14 RX ADMIN — CYCLOSPORINE 75 MG: 25 CAPSULE, LIQUID FILLED ORAL at 21:37

## 2022-09-14 RX ADMIN — MYCOPHENOLATE MOFETIL 750 MG: 250 CAPSULE ORAL at 16:18

## 2022-09-14 RX ADMIN — PANTOPRAZOLE SODIUM 40 MG: 40 TABLET, DELAYED RELEASE ORAL at 16:18

## 2022-09-14 RX ADMIN — HEPARIN SODIUM 4000 UNITS: 1000 INJECTION INTRAVENOUS; SUBCUTANEOUS at 10:25

## 2022-09-14 RX ADMIN — ONDANSETRON 4 MG: 2 INJECTION INTRAMUSCULAR; INTRAVENOUS at 08:46

## 2022-09-14 RX ADMIN — ASPIRIN 81 MG 324 MG: 81 TABLET ORAL at 09:43

## 2022-09-14 RX ADMIN — MYCOPHENOLATE MOFETIL 750 MG: 250 CAPSULE ORAL at 21:35

## 2022-09-14 RX ADMIN — GABAPENTIN 300 MG: 100 CAPSULE ORAL at 21:35

## 2022-09-14 RX ADMIN — HYDRALAZINE HYDROCHLORIDE 25 MG: 50 TABLET, FILM COATED ORAL at 21:35

## 2022-09-14 RX ADMIN — GABAPENTIN 300 MG: 100 CAPSULE ORAL at 16:19

## 2022-09-14 RX ADMIN — DILTIAZEM HYDROCHLORIDE 10 MG: 5 INJECTION, SOLUTION INTRAVENOUS at 08:39

## 2022-09-14 RX ADMIN — METOPROLOL TARTRATE 25 MG: 25 TABLET, FILM COATED ORAL at 16:20

## 2022-09-14 RX ADMIN — INSULIN LISPRO 2 UNITS: 100 INJECTION, SOLUTION INTRAVENOUS; SUBCUTANEOUS at 18:18

## 2022-09-14 RX ADMIN — FUROSEMIDE 40 MG: 20 TABLET ORAL at 16:18

## 2022-09-14 RX ADMIN — INSULIN LISPRO 2 UNITS: 100 INJECTION, SOLUTION INTRAVENOUS; SUBCUTANEOUS at 13:53

## 2022-09-14 RX ADMIN — ATORVASTATIN CALCIUM 10 MG: 10 TABLET, FILM COATED ORAL at 21:35

## 2022-09-14 RX ADMIN — METOPROLOL TARTRATE 50 MG: 50 TABLET ORAL at 21:35

## 2022-09-14 RX ADMIN — Medication 40 ML: at 20:02

## 2022-09-14 RX ADMIN — HEPARIN SODIUM AND DEXTROSE 12 UNITS/KG/HR: 10000; 5 INJECTION INTRAVENOUS at 10:31

## 2022-09-14 RX ADMIN — WARFARIN SODIUM 5 MG: 5 TABLET ORAL at 19:52

## 2022-09-14 RX ADMIN — CYCLOSPORINE 75 MG: 25 CAPSULE, LIQUID FILLED ORAL at 17:39

## 2022-09-14 ASSESSMENT — ENCOUNTER SYMPTOMS
ABDOMINAL PAIN: 0
SORE THROAT: 0
NAUSEA: 1
BACK PAIN: 0
PHOTOPHOBIA: 0
SHORTNESS OF BREATH: 0
VOMITING: 1
RHINORRHEA: 0
EYE PAIN: 0
COUGH: 0
DIARRHEA: 0

## 2022-09-14 ASSESSMENT — PAIN SCALES - GENERAL
PAINLEVEL_OUTOF10: 7
PAINLEVEL_OUTOF10: 0
PAINLEVEL_OUTOF10: 0

## 2022-09-14 ASSESSMENT — PAIN DESCRIPTION - DESCRIPTORS: DESCRIPTORS: ACHING;SORE

## 2022-09-14 ASSESSMENT — PAIN DESCRIPTION - LOCATION: LOCATION: CHEST

## 2022-09-14 ASSESSMENT — PAIN - FUNCTIONAL ASSESSMENT: PAIN_FUNCTIONAL_ASSESSMENT: 0-10

## 2022-09-14 ASSESSMENT — PAIN DESCRIPTION - PAIN TYPE: TYPE: ACUTE PAIN

## 2022-09-14 NOTE — CARE COORDINATION
Met with pt and wife for CMI assessment. POC relayed with need for anti coag. Explanation of need for anti coag at d/c pt also has questions why he just can't take coumadin since cost of most current meds available even with coupons provided he was adamant about this. Electronically signed by Lurdes Cochran RN on 9/14/2022 at 11:54 AM      [facility] EM[facility] Case Management Initial Discharge Assessment    Met with Patient and WIFE to discuss discharge plan. PT PROVIDING LEAST INFORMATION WHEN QUESTIONS ASKED      PCP: Maritza Hargrove DO                                Date of Last Visit: 2-3 WEEKS AGO    [facility] Patient: No        [facility] Notified: no    If no PCP, list provided? N/A    Discharge Planning    Living Arrangements: independently at home    Who do you live with? SPOUSE    Who helps you with your care:  self    If lives at home:     Do you have any barriers navigating in your home? no    Patient can perform ADL? Yes INDEPENDENT W ALL ADL'S    Current Services (outpatient and in home) :  None    Dialysis: No HAS A FUNCTIONING RIGHT ARM FISTULA THAT HAS NOT BEEN ACCESSED SINCE KIDNEY TRANSPLANT 7 YEARS AGO PER PT     Is transportation available to get to your appointments? Yes    DME Equipment:  yes - [employer] HE DOES NOT USE [employer] FREESTYLE     Respiratory equipment: None    Respiratory provider:  no     Ph[facility]:  [facility]    Consult with Medication Assistance Program?  No  COUPONS FOR ANTI COAG AT D/C    Patient agreeable to [facility] [place]? N/A    Patient agreeable to SNF/Rehab? N/A    Other discharge needs identified? Other ANTI COAG COUPON AT D/C    Does Patient Have a High-Risk for Readmission Diagnosis (CHF, PN, MI, COPD)? No      Initial Discharge Plan? (Note: please see concurrent daily documentation for any updates after initial note).     RETURN HOME W WIFE WILL NEED ANTI COAG AT D/C NEW A-FIB    Readmission Risk              Risk of Unplanned Readmission:  0 Electronically signed by Jay Jay Grayson RN on 9/14/2022 at 11:53 AM

## 2022-09-14 NOTE — PROGRESS NOTES
[facility] Occupational Therapy      Date: 2022  Patient Name: Rubin Peraza        MRN: [id number]  Account: [de-identified]   : 1950  (80 y.o.)  Room: [place] [id number]    Chart reviewed, attempted OT at [address] [place] [address] for evaluation. Patient not seen 2° to: Other: Nursing completing admission to floor with patient    Will attempt again when able.     Electronically signed by KARLA Segovia on 2632 at 3:51 PM

## 2022-09-14 NOTE — ED PROVIDER NOTES
[facility] ED  eMERGENCY dEPARTMENTeNCOUnter      Pt Name: Myranda De La Paz  MRN: [id number]  Ldgfdeya 1950  Date ofevaluation: 9/14/2022  Provider: Micheal Ash, [address] [place] [address]       Chief Complaint   Patient presents with    Chest Pain     Pt to the ED via walk into triage with c/o CP and feeling nausea. Pt HR is irregular in triage. Pt denies any hx of A-fib. Pt is a Kidney transplant patient 7 years ago. Pt states that the feeling that he is having started this morning. HISTORY OF PRESENT ILLNESS   (Location/Symptom, Timing/Onset,Context/Setting, Quality, Duration, Modifying Factors, Severity)  Note limiting factors. Myranda De La Paz is a 80 y.o. male who presents to the emergency department and in his chest that he does not think his chest pain and thinks is coming from his esophagus. Patient states that it is midsternal epigastric up to his neck and feels like a heaviness. This started when he woke up this morning around 6 AM he became nauseated and got all sweaty his wife took his vitals his blood pressure systolic was 898 and his heart rate was in the 160s. He denies any leg swelling recent travel history of DVT or PE. Denies any history of A. fib. He is not on any blood thinners. He does have a history of kidney transplant and is on antirejection meds. HPI    NursingNotes were reviewed. REVIEW OF SYSTEMS    (2-9 systems for level 4, 10 or more for level 5)     Review of Systems   Constitutional:  Positive for diaphoresis. Negative for chills, fatigue and fever. HENT:  Negative for congestion, rhinorrhea and sore throat. Eyes:  Negative for photophobia and pain. Respiratory:  Negative for cough and shortness of breath. Cardiovascular:  Positive for chest pain. Negative for palpitations. Gastrointestinal:  Positive for nausea and vomiting. Negative for abdominal pain and diarrhea. Genitourinary:  Negative for dysuria and flank pain. Musculoskeletal:  Negative for back pain. Skin:  Negative for rash. Neurological:  Negative for dizziness, light-headedness and headaches. Psychiatric/Behavioral: Negative. All other systems reviewed and are negative. Except as noted above the remainder of the review of systems was reviewed and negative. PAST MEDICAL HISTORY     Past Medical History:   Diagnosis Date    Diabetes mellitus ([facility]     Hemodialysis access, fistula mature ([facility] 12/2002    Hypertension     Seizures ([facility]     no seizure since 1995         SURGICALHISTORY       Past Surgical History:   Procedure Laterality Date    BRAIN SURGERY Left 12/06/1990    to eliminate seizures    KIDNEY TRANSPLANT  2015    [place] [address] INCL FLUOR GDNCE DX W/CELL WASHG SPX N/A 3/20/2018    BRONCHOSCOPY performed by Steve White MD at [facility]       Previous Medications    ASPIRIN 81 MG EC TABLET    Take 81 mg by mouth daily     CARVEDILOL (COREG) 12.5 MG TABLET    Take 1 tablet by mouth 2 times daily    CONTINUOUS BLOOD GLUC  (DEXCOM G6 ) YANIRA    Continuous    CONTINUOUS BLOOD GLUC SENSOR (DEXCOM G6 SENSOR) MISC    Change every 10 days    CONTINUOUS BLOOD GLUC SENSOR (FREESTYLE KEV 14 DAY SENSOR) MISC    Every 2 weeks    CONTINUOUS BLOOD GLUC SENSOR (FREESTYLE KEV 14 DAY SENSOR) MISC    Every 2 weeks Dx E11.65    CONTINUOUS BLOOD GLUC TRANSMIT (DEXCOM G6 TRANSMITTER) MISC    Change every 3 months    CYCLOBENZAPRINE (FLEXERIL) 10 MG TABLET    Take 10 mg by mouth daily as needed Indications: as needed. CYCLOSPORINE MODIFIED (NEORAL) 25 MG CAPSULE    Take 75 mg by mouth 2 times daily     DULAGLUTIDE (TRULICITY) 1.5 QF/0.9SI SOPN    Inject 1.5 mg into the skin once a week Weekly on Monday    FUROSEMIDE (LASIX) 40 MG TABLET    Take 40 mg by mouth daily Indications: 1/2 to 1 tab. as needed for leg swelling     GABAPENTIN (NEURONTIN) 300 MG CAPSULE    Take 300 mg by mouth 2 times daily.  Indications: 1 capsule in am, 2 capsule in pm     HYDRALAZINE (APRESOLINE) 25 MG TABLET    Take 1 tablet by mouth every 8 hours    INSULIN NPH (NOVOLIN N FLEXPEN) 100 UNIT/ML INJECTION PEN    16 units at bedtime    INSULIN REGULAR HUMAN (NOVOLIN R FLEXPEN) 100 UNIT/ML SOPN    6 units am 8 units lunch and dinner    ISOSORBIDE MONONITRATE (IMDUR) 30 MG EXTENDED RELEASE TABLET    Take 1 tablet by mouth daily    MYCOPHENOLATE (CELLCEPT) 250 MG CAPSULE    Take 750 mg by mouth 2 times daily     NITROGLYCERIN (NITROSTAT) 0.4 MG SL TABLET    up to max of 3 total doses. If no relief after 1 dose, call 911. PANTOPRAZOLE (PROTONIX) 40 MG TABLET    Take 40 mg by mouth daily     SIMVASTATIN (ZOCOR) 10 MG TABLET    Take 10 mg by mouth nightly     TAMSULOSIN (FLOMAX) 0.4 MG CAPSULE    Take 1 capsule by mouth daily       ALLERGIES     Atorvastatin    FAMILY HISTORY     History reviewed. No pertinent family history. SOCIAL HISTORY       Social History     Socioeconomic History    Marital status:      Spouse name: None    Number of children: None    Years of education: None    Highest education level: None   Tobacco Use    Smoking status: Former     Packs/day: 0.25     Types: Cigarettes     Quit date: 2015     Years since quittin.2    Smokeless tobacco: Former   Vaping Use    Vaping Use: Never used   Substance and Sexual Activity    Alcohol use: No     Alcohol/week: 0.0 standard drinks    Drug use: No       SCREENINGS    [place] Coma Scale  Eye Opening: Spontaneous  Best Verbal Response: Oriented  Best Motor Response: Obeys commands  [place] Coma Scale Score: 15 @FLOW([id number])@      PHYSICAL EXAM    (up to 7 for level 4, 8 or more for level 5)     ED Triage Vitals [22 0805]   BP Temp Temp Source Heart Rate Resp SpO2 Height Weight   107/71 97.5 °F (36.4 °C) Oral (!) 129 16 96 % 6' 2\" (1.88 m) 195 lb (88.5 kg)       Physical Exam  Vitals and nursing note reviewed.    Constitutional:       General: He is not in acute distress. Appearance: Normal appearance. He is well-developed. He is not diaphoretic. HENT:      Head: Normocephalic and atraumatic. Nose: Nose normal.      Mouth/Throat:      Mouth: Mucous membranes are moist.   Eyes:      General: Lids are normal.      Conjunctiva/sclera: Conjunctivae normal.   Cardiovascular:      Rate and Rhythm: Tachycardia present. Rhythm irregularly irregular. Pulses: Normal pulses. Heart sounds: Normal heart sounds. Pulmonary:      Effort: Pulmonary effort is normal.      Breath sounds: Normal breath sounds. Abdominal:      General: Bowel sounds are normal.      Palpations: Abdomen is soft. Tenderness: There is no abdominal tenderness. Musculoskeletal:         General: Normal range of motion. Cervical back: Normal range of motion and neck supple. Lymphadenopathy:      Cervical: No cervical adenopathy. Skin:     General: Skin is warm and dry. Capillary Refill: Capillary refill takes less than 2 seconds. Findings: No rash. Neurological:      Mental Status: He is alert and oriented to person, place, and time. Psychiatric:         Thought Content: Thought content normal.         Judgment: Judgment normal.       DIAGNOSTIC RESULTS     EKG: All EKG's are interpreted by the Emergency Department Physician who either signs or Co-signsthis chart in the absence of a cardiologist.    A. fib with RVR rate of 127 with some ST depressions in V3 V4 V5 V6 and PVC. Ekg 2 after 10cardizem. Afib 86bpm no ischemic changes no ectopy     RADIOLOGY:   Non-plain filmimages such as CT, Ultrasound and MRI are read by the radiologist. Plain radiographic images are visualized and preliminarily interpreted by the emergency physician with the below findings:        Interpretation per the Radiologist below, if available at the time ofthis note:    XR CHEST PORTABLE   Final Result   Borderline cardiomegaly and chronic-appearing pulmonary interstitial changes. ED BEDSIDE ULTRASOUND:   Performed by ED Physician - none    LABS:  Labs Reviewed   COMPREHENSIVE METABOLIC PANEL - Abnormal; Notable for the following components:       Result Value    Chloride 108 (*)     Glucose 310 (*)     BUN 57 (*)     Creatinine 2.79 (*)     GFR Non- 22.6 (*)     GFR  27.3 (*)     All other components within normal limits   CBC WITH AUTO DIFFERENTIAL - Abnormal; Notable for the following components:    RBC 3.72 (*)     Hemoglobin 10.7 (*)     Hematocrit 33.0 (*)     MCHC 32.6 (*)     RDW 14.8 (*)     Platelets 808 (*)     Lymphocytes Absolute 0.7 (*)     All other components within normal limits   TROPONIN - Abnormal; Notable for the following components:    Troponin 0.024 (*)     All other components within normal limits    Narrative:     CALL  [facility]  ED tel. [phone],  Troponin results called to and read back by [facility], 09/14/2022 09:24, by  Oziel Diallo   CBC - Abnormal; Notable for the following components:    RBC 3.52 (*)     Hemoglobin 10.0 (*)     Hematocrit 31.5 (*)     MCHC 32.0 (*)     RDW 15.2 (*)     Platelets 372 (*)     All other components within normal limits   APTT - Abnormal; Notable for the following components:    aPTT 22.4 (*)     All other components within normal limits   MAGNESIUM   BRAIN NATRIURETIC PEPTIDE    Narrative:     CALL  [facility]  ED tel. [phone],  Troponin results called to and read back by [facility], 09/14/2022 09:24, by  Nica Joy   ANTI-XA, UNFRACTIONATED HEPARIN   ANTI-XA, UNFRACTIONATED HEPARIN       All other labs were within normal range or not returned as of this dictation.     EMERGENCY DEPARTMENT COURSE and DIFFERENTIAL DIAGNOSIS/MDM:   Vitals:    Vitals:    09/14/22 0945 09/14/22 1000 09/14/22 1015 09/14/22 1030   BP: (!) 140/73 123/68 (!) 144/74 130/71   Pulse: 97 96 93 91   Resp: 17 12 15 14   Temp:       TempSrc:       SpO2: 98% 97% 98% 98%   Weight:       Height: MDM    Presents to the emergency department with chest pain sweating and diaphoresis. Patient is in new onset A. fib with RVR he does have some ST depressions and V3 4 5 and 6. Elevation of troponin of 0.  024 which is around his baseline actually. He does have CKD and is a kidney transplant patient follows with Dr. Yadi Barney. She was provided with 1 dose of Cardizem and rate is controlled and his pain went from a 10 down to a 6. Patient was then provided with full dose aspirin and 1 nitro and he is resting comfortably pain and pain is much better. Spoke to Dr. Yadi Barney who is requesting patient be admitted to cardiology or hospitalist service and he will consult for kidney transplant. . Cardiology Dr. Vickey Bah who declined patient to his service. Dr. Deandre Willams declined. Cardiology called again and will not accept to service. Another call placed to hospitalist Dr. Honorio Velázquez accepted. Dr. Vickey Bah after reviewing chart and labs rec'd 5000 hep bolus and weight based drip which was ordered in the ED. Pt is stable improved and ready for admission. REASSESSMENT          CRITICAL CARE TIME   Total Critical Care time was  minutes, excluding separatelyreportable procedures. There was a high probability ofclinically significant/life threatening deterioration in the patient's condition which required my urgent intervention. CONSULTS:  None    PROCEDURES:  Unless otherwise noted below, none     Procedures    FINAL IMPRESSION      1. New onset atrial fibrillation (HCC)    2. Chest pain, unspecified type    3. Chronic kidney disease, unspecified CKD stage          DISPOSITION/PLAN   DISPOSITION Decision To Admit 09/14/2022 09:27:06 AM      PATIENT REFERREDTO:  No follow-up provider specified.     DISCHARGEMEDICATIONS:  New Prescriptions    No medications on file          (Please note that portions of this note were completed with a voice recognition program.  Efforts were made to edit the dictations but occasionally words are mis-transcribed.)    Danielle Ghosh (electronically signed)  Attending Emergency Physician         Arnaldo Pacheco PA-C  09/14/22 8523

## 2022-09-14 NOTE — H&P
[place] [address] MEDICINE    HISTORY AND PHYSICAL EXAM    PATIENT NAME:  Jamil To    MRN:  [id number]  SERVICE DATE:  2022   SERVICE TIME:  11:27 AM    Primary Care Physician: Joshua Wick DO     SUBJECTIVE  CHIEF COMPLAINT:  chest pain    HPI:  Jamil To is a 80 y.o., male who has a PMH of end stage renal disease s/p kidney transplant ( and ), DM2, HTN, HPL who presented with above CC. Patient reports he developed mid-sternal/epigastric chest pain this am around 0630. Reports feeling like something was traveling up and down his esophagus and he began profusely sweating. His wife checked his BP and is was 190 with HR 160s. In the ED was found to be in AFIb with RVR. He was given cardizem, ASA, NS bolus, and started on insulin drip. Upon exam, he reports his pain has improved. Denies further CP and SOB. HR 90s. Admitted for further management. PAST MEDICAL HISTORY:    Past Medical History:   Diagnosis Date    Diabetes mellitus ([address]     Hemodialysis access, fistula mature ([address] 2002    Hypertension     Seizures ([address]     no seizure since      PAST SURGICAL HISTORY:    Past Surgical History:   Procedure Laterality Date    BRAIN SURGERY Left 1990    to eliminate seizures    KIDNEY TRANSPLANT      [place] [address] INCL FLUOR GDNCE DX W/CELL WASHG SPX N/A 3/20/2018    BRONCHOSCOPY performed by Valeria Shields MD at [address] [place] [address]:  History reviewed. No pertinent family history.   SOCIAL HISTORY:    Social History     Socioeconomic History    Marital status:      Spouse name: Not on file    Number of children: Not on file    Years of education: Not on file    Highest education level: Not on file   Occupational History    Not on file   Tobacco Use    Smoking status: Former     Packs/day: 0.25     Types: Cigarettes     Quit date: 2015     Years since quittin.2    Smokeless tobacco: Former   Vaping Use    Vaping Use: Never used   Substance and Sexual Activity    Alcohol use: No     Alcohol/week: 0.0 standard drinks    Drug use: No    Sexual activity: Not on file   Other Topics Concern    Not on file   Social History Narrative    Not on file     Social Determinants of Health     Financial Resource Strain: Not on file   Food Insecurity: Not on file   Transportation Needs: Not on file   Physical Activity: Not on file   Stress: Not on file   Social Connections: Not on file   Intimate Partner Violence: Not on file   Housing Stability: Not on file     MEDICATIONS:   Prior to Admission medications    Medication Sig Start Date End Date Taking?  Authorizing Provider   carvedilol (COREG) 12.5 MG tablet Take 1 tablet by mouth 2 times daily 6/23/22   Magdy Salvage Repko, APRN - CNP   hydrALAZINE (APRESOLINE) 25 MG tablet Take 1 tablet by mouth every 8 hours 6/23/22   Magdy Salvage Repko, APRN - CNP   isosorbide mononitrate (IMDUR) 30 MG extended release tablet Take 1 tablet by mouth daily 6/23/22   Christian Salvage Repko, APRN - CNP   Dulaglutide (TRULICITY) 1.5 FZ/1.8XS SOPN Inject 1.5 mg into the skin once a week Weekly on Monday    Historical Provider, MD   tamsulosin (FLOMAX) 0.4 MG capsule Take 1 capsule by mouth daily  Patient taking differently: Take 0.4 mg by mouth 2 times daily  12/22/21   Deena Martinez MD   Continuous Blood Gluc Transmit (DEXCOM G6 TRANSMITTER) MISC Change every 3 months 5/29/20   Kyle Jim MD   Continuous Blood Gluc Sensor (DEXCOM G6 SENSOR) MISC Change every 10 days 5/29/20   Kyle Jim MD   Continuous Blood Gluc  (DEXCOM G6 ) YANIRA Continuous 5/29/20   Kyle Jim MD   Continuous Blood Gluc Sensor (FREESTYLE KEV 14 DAY SENSOR) MISC Every 2 weeks Dx E11.65 5/20/20   Kyle Jim MD   insulin NPH (NOVOLIN N FLEXPEN) 100 UNIT/ML injection pen 16 units at bedtime  Patient taking differently: 9 units in AM, 5 units at bedtime 5/18/20   Kyle Jim MD   Insulin Regular Human (NOVOLIN R FLEXPEN) 100 UNIT/ML SOPN 6 units am 8 units lunch and dinner  Patient taking differently: Sliding Scale 5/18/20   Lesli Wilkerson MD   Continuous Blood Gluc Sensor (FREESTYLE KEV 14 DAY SENSOR) MISC Every 2 weeks 5/18/20   Lesli Wilkerson MD   pantoprazole (PROTONIX) 40 MG tablet Take 40 mg by mouth daily  11/16/19   Historical Provider, MD   nitroGLYCERIN (NITROSTAT) 0.4 MG SL tablet up to max of 3 total doses. If no relief after 1 dose, call 911. 7/18/19   Che Moy, APRN - CNP   furosemide (LASIX) 40 MG tablet Take 40 mg by mouth daily Indications: 1/2 to 1 tab. as needed for leg swelling     Historical Provider, MD   aspirin 81 MG EC tablet Take 81 mg by mouth daily  5/11/16   Historical Provider, MD   cyclobenzaprine (FLEXERIL) 10 MG tablet Take 10 mg by mouth daily as needed Indications: as needed. 4/5/16   Historical Provider, MD   cycloSPORINE modified (NEORAL) 25 MG capsule Take 75 mg by mouth 2 times daily  5/24/16   Historical Provider, MD   gabapentin (NEURONTIN) 300 MG capsule Take 300 mg by mouth 2 times daily. Indications: 1 capsule in am, 2 capsule in pm  5/24/16   Historical Provider, MD   mycophenolate (CELLCEPT) 250 MG capsule Take 750 mg by mouth 2 times daily  5/24/16   Historical Provider, MD   simvastatin (ZOCOR) 10 MG tablet Take 10 mg by mouth nightly  5/24/16   Historical Provider, MD       ALLERGIES: Atorvastatin    REVIEW OF SYSTEM:   12 point ROS negative unless indicated below or in the HPI    OBJECTIVE  PHYSICAL EXAM:   Physical Exam  Vitals reviewed. Constitutional:       General: He is not in acute distress. Appearance: He is not toxic-appearing. HENT:      Head: Normocephalic and atraumatic. Nose: Nose normal.      Mouth/Throat:      Mouth: Mucous membranes are dry. Pharynx: Oropharynx is clear. Eyes:      Conjunctiva/sclera: Conjunctivae normal.   Cardiovascular:      Rate and Rhythm: Normal rate. Rhythm irregular. Pulses: Normal pulses. Heart sounds: Normal heart sounds.    Pulmonary: Effort: Pulmonary effort is normal.      Breath sounds: Normal breath sounds. Abdominal:      General: Bowel sounds are normal. There is distension. Palpations: Abdomen is soft. Tenderness: There is no abdominal tenderness. Musculoskeletal:         General: Normal range of motion. Cervical back: Normal range of motion and neck supple. Skin:     General: Skin is warm and dry. Capillary Refill: Capillary refill takes less than 2 seconds. Neurological:      Mental Status: He is alert and oriented to person, place, and time. Mental status is at baseline. Psychiatric:         Mood and Affect: Mood normal.        BP (!) 152/73   Pulse (!) 106   Temp 97.5 °F (36.4 °C) (Oral)   Resp 20   Ht 6' 2\" (1.88 m)   Wt 195 lb (88.5 kg)   SpO2 97%   BMI 25.04 kg/m²     Vitals:    09/14/22 1000 09/14/22 1015 09/14/22 1030 09/14/22 1100   BP: 123/68 (!) 144/74 130/71 (!) 152/73   Pulse: 96 93 91 (!) 106   Resp: 12 15 14 20   Temp:       TempSrc:       SpO2: 97% 98% 98% 97%   Weight:       Height:            DATA:     Diagnostic tests reviewed for today's visit:    Most recent labs and imaging results reviewed.      LABS:    Recent Results (from the past 24 hour(s))   Comprehensive Metabolic Panel    Collection Time: 09/14/22  8:30 AM   Result Value Ref Range    Sodium 144 135 - 144 mEq/L    Potassium 4.9 3.4 - 4.9 mEq/L    Chloride 108 (H) 95 - 107 mEq/L    CO2 22 20 - 31 mEq/L    Anion Gap 14 9 - 15 mEq/L    Glucose 310 (H) 70 - 99 mg/dL    BUN 57 (H) 8 - 23 mg/dL    Creatinine 2.79 (H) 0.70 - 1.20 mg/dL    GFR Non-African American 22.6 (L) >60    GFR  27.3 (L) >60    Calcium 9.3 8.5 - 9.9 mg/dL    Total Protein 6.7 6.3 - 8.0 g/dL    Albumin 4.2 3.5 - 4.6 g/dL    Total Bilirubin 0.5 0.2 - 0.7 mg/dL    Alkaline Phosphatase 66 35 - 104 U/L    ALT 8 0 - 41 U/L    AST 11 0 - 40 U/L    Globulin 2.5 2.3 - 3.5 g/dL   CBC with Auto Differential    Collection Time: 09/14/22  8:30 AM   Result Value Ref Range    WBC 7.1 4.8 - 10.8 K/uL    RBC 3.72 (L) 4.70 - 6.10 M/uL    Hemoglobin 10.7 (L) 14.0 - 18.0 g/dL    Hematocrit 33.0 (L) 42.0 - 52.0 %    MCV 88.7 80.0 - 100.0 fL    MCH 28.9 27.0 - 31.3 pg    MCHC 32.6 (L) 33.0 - 37.0 %    RDW 14.8 (H) 11.5 - 14.5 %    Platelets 125 (L) 314 - 400 K/uL    Neutrophils % 77.7 %    Lymphocytes % 10.4 %    Monocytes % 7.1 %    Eosinophils % 4.0 %    Basophils % 0.8 %    Neutrophils Absolute 5.5 1.4 - 6.5 K/uL    Lymphocytes Absolute 0.7 (L) 1.0 - 4.8 K/uL    Monocytes Absolute 0.5 0.2 - 0.8 K/uL    Eosinophils Absolute 0.3 0.0 - 0.7 K/uL    Basophils Absolute 0.1 0.0 - 0.2 K/uL   Magnesium    Collection Time: 09/14/22  8:30 AM   Result Value Ref Range    Magnesium 1.8 1.7 - 2.4 mg/dL   Troponin    Collection Time: 09/14/22  8:30 AM   Result Value Ref Range    Troponin 0.024 (HH) 0.000 - 0.010 ng/mL   Brain Natriuretic Peptide    Collection Time: 09/14/22  8:30 AM   Result Value Ref Range    Pro-BNP 1,641 pg/mL   EKG 12 Lead - Chest Pain    Collection Time: 09/14/22  9:16 AM   Result Value Ref Range    Ventricular Rate 86 BPM    Atrial Rate 125 BPM    QRS Duration 74 ms    Q-T Interval 386 ms    QTc Calculation (Bazett) 461 ms    R Axis 19 degrees    T Axis 45 degrees   CBC    Collection Time: 09/14/22 10:00 AM   Result Value Ref Range    WBC 8.2 4.8 - 10.8 K/uL    RBC 3.52 (L) 4.70 - 6.10 M/uL    Hemoglobin 10.0 (L) 14.0 - 18.0 g/dL    Hematocrit 31.5 (L) 42.0 - 52.0 %    MCV 89.3 80.0 - 100.0 fL    MCH 28.5 27.0 - 31.3 pg    MCHC 32.0 (L) 33.0 - 37.0 %    RDW 15.2 (H) 11.5 - 14.5 %    Platelets 149 (L) 588 - 400 K/uL   APTT    Collection Time: 09/14/22 10:00 AM   Result Value Ref Range    aPTT 22.4 (L) 24.4 - 36.8 sec   Protime-INR    Collection Time: 09/14/22 10:00 AM   Result Value Ref Range    Protime 14.2 12.3 - 14.9 sec    INR 1.1        IMAGING:  XR CHEST PORTABLE    Result Date: 9/14/2022  EXAMINATION: ONE XRAY VIEW OF THE CHEST 9/14/2022 8:50 am COMPARISON: None. HISTORY: ORDERING SYSTEM PROVIDED HISTORY: cp new onset afib TECHNOLOGIST PROVIDED HISTORY: Reason for exam:->cp new onset afib FINDINGS: The cardiac silhouette is borderline enlarged. The pulmonary vasculature is within normal limits. The lungs demonstrate mild diffuse interstitial changes. This finding appears chronic. No pulmonary consolidation or collapse is identified. No pneumothorax or pleural effusion is seen. Borderline cardiomegaly and chronic-appearing pulmonary interstitial changes.      ASSESSMENT AND PLAN    80 y.o. male with a PMH of end stage renal disease s/p kidney transplant (2003 and 2015), DM2, HTN, HPL presented with:    New onset Afib with RVR  - continue heparin drip  - start metoprolol BID  - consult cardiology  - goal mag >2 potassium >4 and <5  - telemetry monitoring  - check TSH    ESRD   - s/p transplant 2003 and 2015  - nephrology consult  - resume home medications    HTN  - Resume home medications    DM2  - POCT ACHS  - SSI, basal insulin  - hypoglycemia protocol  - resume home meds once verified    Plan of care discussed with: patient    SIGNATURE: HAJA Burgess NP  DATE: September 14, 2022  TIME: 11:27 AM   Dontae Restrepo MD  - supervising physician

## 2022-09-14 NOTE — CONSULTS
[facility] REPORT      DATE OF CONSULTATION  9/14/2022    Scot Borden DO     REQUESTING PHYSICIAN  Valery Thurston MD     PRIMARY CARDIOLOGIST  Chata Arnold MD    [address] [place] [address]  Chief Complaint   Patient presents with    Chest Pain     Pt to the ED via walk into triage with c/o CP and feeling nausea. Pt HR is irregular in triage. Pt denies any hx of A-fib. Pt is a Kidney transplant patient 7 years ago. Pt states that the feeling that he is having started this morning. HISTORY OF PRESENT ILLNESS  Tiffani Franco is a 80 y.o. male with history of CKD, prior renal transplant x 2 (last 2015) on chronic immuno suppressive therapy, seizure disorder, and IDDM who presents with chest pain. He reports sudden onset midepigastric/chest pressure after getting up this morning. It was associated with nausea/vomiting, and diaphoresis. He reports the pain was nonradiating. He had mild shortness of breath. He rated the pain 10 out of 10 at maximum. He came to ER. He was given 4 baby aspirin and 1 sublingual nitroglycerin which slightly improved the pain. Pain gradually resolved on its own within 30 minutes. His EKG in ER revealed atrial fibrillation with RVR which is new. He denies any monse palpitations from atrial fibrillation. No near-syncope or syncope. He denies any similar chest pain in the past.  No similar chest pain or limiting exertional dyspnea with physical activities like mowing the lawn. He was admitted here in June 2022 with right-sided chest pain. His chronic troponin elevation. A cardiac catheterization was considered at the last visit however given his high risk for contrast-induced nephropathy, he underwent stress testing which was negative for ischemia. Echocardiogram showed preserved LV function. He denies that he actually had any actual chest pain back in June.   Again he denies any chest pain at all with physical activities like mowing the lawn. He is a previous Dr. Gracie Balbuena patient who was planning to follow with Dr. Kyle Jackson at the recommendation of his PCP. Allergies  Allergies   Allergen Reactions    Atorvastatin Other (See Comments) and Headaches     Other reaction(s): Headache       Medications   sodium chloride flush, 5-40 mL, 2 times per day  metoprolol tartrate, 25 mg, BID  insulin lispro, 0-8 Units, TID WC  insulin lispro, 0-4 Units, Nightly  aspirin, 81 mg, Daily  cycloSPORINE modified, 75 mg, BID  furosemide, 40 mg, Daily  gabapentin, 300 mg, BID  hydrALAZINE, 25 mg, 3 times per day  insulin NPH, 8 Units, QAM  isosorbide mononitrate, 30 mg, Daily  mycophenolate, 750 mg, BID  pantoprazole, 40 mg, Daily  atorvastatin, 10 mg, Daily  tamsulosin, 0.4 mg, Daily        Past Medical History:   Diagnosis Date    Diabetes mellitus ([facility]     Hemodialysis access, fistula mature ([facility] 2002    Hypertension     Seizures ([facility]     no seizure since       Past Surgical History:   Procedure Laterality Date    BRAIN SURGERY Left 1990    to eliminate seizures    KIDNEY TRANSPLANT      [place] [address] INCL FLUOR GDNCE DX W/CELL WASHG SPX N/A 3/20/2018    BRONCHOSCOPY performed by Chuyita Bowens MD at [address] [place] [address] History     Tobacco Use    Smoking status: Former     Packs/day: 0.25     Types: Cigarettes     Quit date: 2015     Years since quittin.2    Smokeless tobacco: Former   Substance Use Topics    Alcohol use: No     Alcohol/week: 0.0 standard drinks      History reviewed. No pertinent family history. Review of Systems  General: Fatigued at times. Cardiovascular: See HPI. No orthopnea or PND. Respiratory: Dyspnea is present with moderate degrees of activity, non limiting. Gastrointestinal: No melena or hematochezia. Genitourinary: No hematuria. Hematological: + easy bruising  but no excessive bleeding. Vascular: + occasional lower extremity edema. No claudication.   Neurological: No TIA or CVA symptoms. + paresthesias. + seizure disorder  Musculoskeletal: No chest wall pain. Psychiatric: No anxiety. Skin: No rashes or lesions. No cyanosis. PHYSICAL EXAM  BP (!) 141/72   Pulse (!) 104   Temp 97.3 °F (36.3 °C) (Oral)   Resp 17   Ht 6' 2\" (1.88 m)   Wt 195 lb (88.5 kg)   SpO2 99%   BMI 25.04 kg/m²   Constitutional: alert, cooperative, in no distress  Eyes: Conjunctiva clear; no scleral icturus. PERRLA   Respiratory: clear to auscultation bilaterally  Musculoskeletal: No chest wall tenderness. No clubbing or cyanosis. Cardiovascular: regular rate and rhythm, S1, S2 normal, no murmur, click, rub or gallop. No carotid bruit. No JVD. Pulses 2+ and symmetric. GI: soft, non-tender, non-distended. Bowel sounds normal. No masses,  no organomegaly  MSK: extremities normal, atraumatic, no clubbing. No chest wall pain. Neurologic: Grossly normal  Skin: No rashes or lesions. No cyanosis. Recent Labs     09/14/22  0830 09/14/22  1000   HGB 10.7* 10.0*   WBC 7.1 8.2   * 117*     --    K 4.9  --    CO2 22  --    BUN 57*  --    MG 1.8  --    INR  --  1.1   TROPONINI 0.024*  --      Creatinine 2.79 (at baseline)    EKG: Atrial fibrillation with RVR.  bpm. Nonspecific ST depressions. Telemetry: Afib, low 100's.     2D Echo: 6/22/22   Normal left ventricle structure and function. Left ventricular ejection fraction is visually estimated at 65%. E/A flow reversal noted. Suggestive of diastolic dysfunction. CXR: Borderline cardiomegaly and chronic-appearing pulmonary interstitial changes. Lexiscan Myoview Stress Test: 6/22/22 Negative for ischemia or infarct. Assessment/Impression:   Newly recognized atrial fibrillation with RVR   [id number] score of 3 indicating high risk for stroke. Mild nonspecific troponin elevation, chronic, likely in the setting of CKD.    Historically normal LV systolic function, EF 45%  Hypertension  IDDM   Hyperlipidemia  CKD,

## 2022-09-14 NOTE — CARE COORDINATION
09/14/22    From:HOME W SPOUSE INDEPENDENT    Admit:NEW A-FIB      PMH:H/O KIDNEY TRANSPLANT (FUNCTIONING NON-USE FISTUAL X 7 YEARS) DM -STN, SEZIURES    Anticipated Discharge Disposition:RETURN HOME    Patient Mobility or PT/OT ordered:YES    Consults: ARMANI (SEEN IN ED)    Clinical: CARDIZEM IVP X 1 IN ED A-FIB (80'S) RA-- TROP 0.024--CXR--MILD CARDIOMEGALY    Barriers to Discharge:  HEP GTT  60/ 2.80--57/ 2.59  APTT-22.4    Assessments: CMI DONE

## 2022-09-14 NOTE — PROGRESS NOTES
Warfarin Dosing - Pharmacy Consult Note  Consulting Provider: [facility]  Indication:  Atrial Fibrillation  Warfarin Dose prior to admission: NEW START to warfarin   Concurrent anticoagulants/antiplatelets: Heparin- bridging therapy  Significant Drug Interactions: No obvious interactions and Cyclosporine interaction  Recent Labs     09/14/22  0830 09/14/22  1000   INR  --  1.1   HGB 10.7* 10.0*   * 117*   LABALBU 4.2  --      Recent warfarin administrations        No warfarin orders with administrations found. Orders not given:            warfarin (COUMADIN) tablet 5 mg    warfarin placeholder: dosing by pharmacy                   Date           INR    Dose  09/14/22        1.1             5 mg    Assessment/Plan  (Goal INR: 2 - 3)  Pt is a new start to warfarin. Please give Warfarin 5 mg per our protocol. Pt is also on Heparin IV. Continue IV heparin until INR is within therapeutic range Note: Overlap IV heparin with warfarin until INR is ?2 for at least 2 measurements taken ~24 hours apart (duration of overlap is ~5 days    Active problem list reviewed. INR orders are placed. Chart reviewed for pertinent labs, drug/diet interactions, and past doses. Documentation of patient's clinical condition was reviewed. Pharmacy Dosing:  Pharmacy will continue to follow.      Thank you,  Jessica Singer [facility]  Staff Pharmacist, [facility]  9/14/2022  6:41 PM

## 2022-09-14 NOTE — ED TRIAGE NOTES
Pt to the ED via walk into triage with c/o CP and feeling nausea. Pt HR is irregular in triage. Pt denies any hx of A-fib. Pt is a Kidney transplant patient 7 years ago. Pt states that the feeling that he is having started this morning.

## 2022-09-15 LAB
ALBUMIN SERPL-MCNC: 4 G/DL (ref 3.5–4.6)
ALP BLD-CCNC: 56 U/L (ref 35–104)
ALT SERPL-CCNC: 7 U/L (ref 0–41)
ANION GAP SERPL CALCULATED.3IONS-SCNC: 13 MEQ/L (ref 9–15)
ANION GAP SERPL CALCULATED.3IONS-SCNC: 14 MEQ/L (ref 9–15)
ANTI-XA UNFRAC HEPARIN: 0.27 IU/ML
ANTI-XA UNFRAC HEPARIN: 0.31 IU/ML
ANTI-XA UNFRAC HEPARIN: 0.74 IU/ML
AST SERPL-CCNC: 10 U/L (ref 0–40)
BILIRUB SERPL-MCNC: 0.6 MG/DL (ref 0.2–0.7)
BUN BLDV-MCNC: 56 MG/DL (ref 8–23)
BUN BLDV-MCNC: 59 MG/DL (ref 8–23)
CALCIUM SERPL-MCNC: 8.9 MG/DL (ref 8.5–9.9)
CALCIUM SERPL-MCNC: 9 MG/DL (ref 8.5–9.9)
CHLORIDE BLD-SCNC: 106 MEQ/L (ref 95–107)
CHLORIDE BLD-SCNC: 107 MEQ/L (ref 95–107)
CO2: 19 MEQ/L (ref 20–31)
CO2: 20 MEQ/L (ref 20–31)
CREAT SERPL-MCNC: 3.18 MG/DL (ref 0.7–1.2)
CREAT SERPL-MCNC: 3.6 MG/DL (ref 0.7–1.2)
GFR AFRICAN AMERICAN: 20.3
GFR AFRICAN AMERICAN: 23.5
GFR NON-AFRICAN AMERICAN: 16.8
GFR NON-AFRICAN AMERICAN: 19.4
GLOBULIN: 2.1 G/DL (ref 2.3–3.5)
GLUCOSE BLD-MCNC: 197 MG/DL (ref 70–99)
GLUCOSE BLD-MCNC: 199 MG/DL (ref 70–99)
GLUCOSE BLD-MCNC: 202 MG/DL (ref 70–99)
GLUCOSE BLD-MCNC: 202 MG/DL (ref 70–99)
GLUCOSE BLD-MCNC: 244 MG/DL (ref 70–99)
GLUCOSE BLD-MCNC: 264 MG/DL (ref 70–99)
HCT VFR BLD CALC: 33.5 % (ref 42–52)
HEMOGLOBIN: 10.7 G/DL (ref 14–18)
INR BLD: 1.1
MCH RBC QN AUTO: 28.6 PG (ref 27–31.3)
MCHC RBC AUTO-ENTMCNC: 32 % (ref 33–37)
MCV RBC AUTO: 89.4 FL (ref 80–100)
PDW BLD-RTO: 14.7 % (ref 11.5–14.5)
PERFORMED ON: ABNORMAL
PLATELET # BLD: 131 K/UL (ref 130–400)
POTASSIUM SERPL-SCNC: 4.9 MEQ/L (ref 3.4–4.9)
POTASSIUM SERPL-SCNC: 5 MEQ/L (ref 3.4–4.9)
PROTHROMBIN TIME: 14.4 SEC (ref 12.3–14.9)
RBC # BLD: 3.75 M/UL (ref 4.7–6.1)
SODIUM BLD-SCNC: 138 MEQ/L (ref 135–144)
SODIUM BLD-SCNC: 141 MEQ/L (ref 135–144)
TOTAL PROTEIN: 6.1 G/DL (ref 6.3–8)
TROPONIN: 0.04 NG/ML (ref 0–0.01)
WBC # BLD: 6.4 K/UL (ref 4.8–10.8)

## 2022-09-15 PROCEDURE — 85520 HEPARIN ASSAY: CPT

## 2022-09-15 PROCEDURE — 6360000002 HC RX W HCPCS: Performed by: NURSE PRACTITIONER

## 2022-09-15 PROCEDURE — 6370000000 HC RX 637 (ALT 250 FOR IP): Performed by: NURSE PRACTITIONER

## 2022-09-15 PROCEDURE — 6360000002 HC RX W HCPCS: Performed by: PHYSICIAN ASSISTANT

## 2022-09-15 PROCEDURE — 84484 ASSAY OF TROPONIN QUANT: CPT

## 2022-09-15 PROCEDURE — 85610 PROTHROMBIN TIME: CPT

## 2022-09-15 PROCEDURE — 6370000000 HC RX 637 (ALT 250 FOR IP): Performed by: INTERNAL MEDICINE

## 2022-09-15 PROCEDURE — 97166 OT EVAL MOD COMPLEX 45 MIN: CPT

## 2022-09-15 PROCEDURE — 80158 DRUG ASSAY CYCLOSPORINE: CPT

## 2022-09-15 PROCEDURE — 36415 COLL VENOUS BLD VENIPUNCTURE: CPT

## 2022-09-15 PROCEDURE — 80053 COMPREHEN METABOLIC PANEL: CPT

## 2022-09-15 PROCEDURE — 2060000000 HC ICU INTERMEDIATE R&B

## 2022-09-15 PROCEDURE — 99233 SBSQ HOSP IP/OBS HIGH 50: CPT | Performed by: INTERNAL MEDICINE

## 2022-09-15 PROCEDURE — 2580000003 HC RX 258: Performed by: NURSE PRACTITIONER

## 2022-09-15 PROCEDURE — 85027 COMPLETE CBC AUTOMATED: CPT

## 2022-09-15 RX ORDER — WARFARIN SODIUM 5 MG/1
5 TABLET ORAL
Status: COMPLETED | OUTPATIENT
Start: 2022-09-15 | End: 2022-09-15

## 2022-09-15 RX ADMIN — INSULIN LISPRO 2 UNITS: 100 INJECTION, SOLUTION INTRAVENOUS; SUBCUTANEOUS at 11:31

## 2022-09-15 RX ADMIN — METOPROLOL TARTRATE 75 MG: 25 TABLET, FILM COATED ORAL at 20:37

## 2022-09-15 RX ADMIN — WARFARIN SODIUM 5 MG: 5 TABLET ORAL at 18:15

## 2022-09-15 RX ADMIN — GABAPENTIN 300 MG: 100 CAPSULE ORAL at 20:36

## 2022-09-15 RX ADMIN — INSULIN HUMAN 9 UNITS: 100 INJECTION, SUSPENSION SUBCUTANEOUS at 08:31

## 2022-09-15 RX ADMIN — ATORVASTATIN CALCIUM 10 MG: 10 TABLET, FILM COATED ORAL at 08:31

## 2022-09-15 RX ADMIN — HYDRALAZINE HYDROCHLORIDE 25 MG: 50 TABLET, FILM COATED ORAL at 05:43

## 2022-09-15 RX ADMIN — Medication 10 ML: at 08:52

## 2022-09-15 RX ADMIN — MYCOPHENOLATE MOFETIL 750 MG: 250 CAPSULE ORAL at 08:30

## 2022-09-15 RX ADMIN — TAMSULOSIN HYDROCHLORIDE 0.4 MG: 0.4 CAPSULE ORAL at 08:31

## 2022-09-15 RX ADMIN — ISOSORBIDE MONONITRATE 30 MG: 30 TABLET, EXTENDED RELEASE ORAL at 08:30

## 2022-09-15 RX ADMIN — GABAPENTIN 300 MG: 100 CAPSULE ORAL at 08:30

## 2022-09-15 RX ADMIN — INSULIN LISPRO 2 UNITS: 100 INJECTION, SOLUTION INTRAVENOUS; SUBCUTANEOUS at 08:32

## 2022-09-15 RX ADMIN — Medication 10 ML: at 20:36

## 2022-09-15 RX ADMIN — METOPROLOL TARTRATE 50 MG: 50 TABLET ORAL at 08:31

## 2022-09-15 RX ADMIN — CYCLOSPORINE 75 MG: 25 CAPSULE, LIQUID FILLED ORAL at 08:35

## 2022-09-15 RX ADMIN — CYCLOSPORINE 75 MG: 25 CAPSULE, LIQUID FILLED ORAL at 20:37

## 2022-09-15 RX ADMIN — FUROSEMIDE 40 MG: 20 TABLET ORAL at 08:31

## 2022-09-15 RX ADMIN — MYCOPHENOLATE MOFETIL 750 MG: 250 CAPSULE ORAL at 20:36

## 2022-09-15 RX ADMIN — PANTOPRAZOLE SODIUM 40 MG: 40 TABLET, DELAYED RELEASE ORAL at 08:30

## 2022-09-15 RX ADMIN — HEPARIN SODIUM 2000 UNITS: 1000 INJECTION INTRAVENOUS; SUBCUTANEOUS at 08:43

## 2022-09-15 RX ADMIN — INSULIN HUMAN 5 UNITS: 100 INJECTION, SUSPENSION SUBCUTANEOUS at 20:34

## 2022-09-15 NOTE — PLAN OF CARE
See OT evaluation for all goals and OT POC.  Electronically signed by FRANCISCO Renee/L on 9/15/2022 at 10:35 AM

## 2022-09-15 NOTE — CONSULTS
Renal consult    S/P kidney transplant x2  Hypotension  DM type-2  Hypertension  low today  Chest pain non cardiac    Plan  adjust med   follow bmp

## 2022-09-15 NOTE — PLAN OF CARE
Problem: Discharge Planning  Goal: Discharge to home or other facility with appropriate resources  Outcome: Progressing  Flowsheets (Taken 9/14/2022 1634 by Pebbles Dumont RN)  Discharge to home or other facility with appropriate resources:   Identify barriers to discharge with patient and caregiver   Arrange for needed discharge resources and transportation as appropriate   Identify discharge learning needs (meds, wound care, etc)   Arrange for interpreters to assist at discharge as needed   Refer to discharge planning if patient needs post-hospital services based on physician order or complex needs related to functional status, cognitive ability or social support system     Problem: Pain  Goal: Verbalizes/displays adequate comfort level or baseline comfort level  Outcome: Progressing     Problem: Safety - Adult  Goal: Free from fall injury  Outcome: Progressing     Problem: ABCDS Injury Assessment  Goal: Absence of physical injury  Outcome: Progressing

## 2022-09-15 NOTE — PROGRESS NOTES
[facility] OCCUPATIONAL THERAPY EVALUATION - ACUTE     NAME: Kirstin Dear  : 1950 (80 y.o.)  MRN: [id number]  CODE STATUS: Full Code  Room: E290/I312-55    Date of Service: 9/15/2022    Patient Diagnosis(es): New onset atrial fibrillation ([address] [I48.91]  Atrial fibrillation with RVR ([address] [I48.91]  Chest pain, unspecified type [R07.9]  Chronic kidney disease, unspecified CKD stage [N18.9]   Patient Active Problem List    Diagnosis Date Noted    Chest pain 2022    Atrial fibrillation with RVR ([address] 2022    Unstable angina ([address] 2022    Atypical chest pain 2019    Chronic cough 2019    Anginal chest pain at rest [facility]) 2019    Pneumonia 2018    Abdominal pain, other specified site 06/15/2011    Intra-abdominal abscess post-procedure 06/15/2011    Infection due to enterococcus 06/15/2011    Nonspecific abnormal results of thyroid function study 2011    Anemia 2009    Essential hypertension 2008    Mixed hyperlipidemia 2008    Type II or unspecified type diabetes mellitus without mention of complication, uncontrolled 10/03/2008    Fever and other physiologic disturbances of temperature regulation 2008    Kidney replaced by transplant 2007    Acute pyelonephritis without lesion of renal medullary necrosis 2007    Chronic kidney disease (CKD) 10/31/2003    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 10/31/2003        Past Medical History:   Diagnosis Date    Diabetes mellitus ([address]     Hemodialysis access, fistula mature ([address] 2002    Hypertension     Seizures ([address]     no seizure since      Past Surgical History:   Procedure Laterality Date    BRAIN SURGERY Left 1990    to eliminate seizures    KIDNEY TRANSPLANT      [place] [address] INCL FLUOR GDNCE DX W/CELL WASHG SPX N/A 3/20/2018    BRONCHOSCOPY performed by Vince Wang MD at [facility] Restrictions  Restrictions/Precautions: Fall Risk, Up as Tolerated (Medium per zamora)        Safety Devices: Safety Devices  Type of Devices: All fall risk precautions in place;Call light within reach; Left in bed;Nurse notified     Patient's date of birth confirmed: Yes    General:  Chart Reviewed: Yes  Patient assessed for rehabilitation services?: Yes    Subjective  Subjective: \"I can't stand up without losing my balance\"       Pain at start of treatment: No    Pain at end of treatment: No    Location:   Nursing notified: Not Applicable  RN:    Intervention: Repositioned    Prior Level of Function:  Social/Functional History  Lives With: Spouse  Type of Home: House  Home Layout: Two level, Performs ADL's on one level  Home Access: Stairs to enter with rails  Entrance Stairs - Number of Steps: 4  Bathroom Shower/Tub: Tub/Shower unit  Home Equipment: thi Drummond  ADL Assistance: Independent  Homemaking Assistance: Independent  Ambulation Assistance: Independent (No AD)  Transfer Assistance: Independent  Active : Yes  Occupation: Full time employment  Type of Occupation:     OBJECTIVE:     Orientation Status:  Orientation  Overall Orientation Status: Within Functional Limits    Observation:  Observation/Palpation  Posture: Good  Observation: Pt alert and attentive, agreeable to OT eval. On standing, pt states he feels 'a rush of heat going through me' and increased dizziness. Sits EOB and BP checked 82/59 MAP 67 Pulse . Pt reports feeling dizziness improve with return to sitting and return to supine. RN present and aware.     Cognition Status:  Cognition  Overall Cognitive Status: [facility]  Cognition Comment: Distractible    Perception Status:  Perception  Overall Perceptual Status: [facility]    Vision and Hearing Status:  Vision  Vision Exceptions: Wears glasses at all times  Hearing  Hearing: Within functional limits   Vision - Basic Assessment  Prior Vision: Wears glasses all the time  Visual History: No significant visual history  Patient Visual Report: No visual complaint reported. Visual Field Cut: No  Oculo Motor Control: WNL    GROSS ASSESSMENT AROM/PROM:  AROM: Within functional limits       ROM:   LUE AROM (degrees)  LUE AROM : WFL  Left Hand AROM (degrees)  Left Hand AROM: WFL  RUE AROM (degrees)  RUE AROM : WFL  Right Hand AROM (degrees)  Right Hand AROM: WFL    UE STRENGTH:  Strength: Within functional limits    UE COORDINATION:  Coordination: Within functional limits    UE TONE:  Tone: Normal    UE SENSATION:  Sensation: Intact    Hand Dominance:  Hand Dominance  Hand Dominance: Left    ADL Status:  ADL  Feeding: Independent  Grooming: Supervision  UE Bathing: Stand by assistance  LE Bathing: Stand by assistance  UE Dressing: Stand by assistance  LE Dressing: Stand by assistance  Toileting: Stand by assistance  Additional Comments: Simulated ADLs as above. Pt limited d/t dizziness and weakness. Toilet Transfers  Toilet Transfer: Unable to assess  Toilet Transfers Comments: Anticipate supervision    Functional Mobility:    Transfers  Sit to stand: Supervision  Stand to sit: Supervision    Patient ambulated to head of bed with Handheld assist at [facility] level. Did not ambulate further d/t as pt started to take steps he stated he felt hot and dizzy. Returned to EOB to check BP.     Bed Mobility  Bed mobility  Supine to Sit: Modified independent  Sit to Supine: Modified independent    Seated and Standing Balance:  Balance  Sitting: Intact  Standing: Impaired  Standing - Static: Good  Standing - Dynamic: Fair    Functional Endurance:  Activity Tolerance  Activity Tolerance: Patient Tolerated treatment well    D/C Recommendations:  OT D/C RECOMMENDATIONS  REQUIRES OT FOLLOW-UP: Yes    Equipment Recommendations:  OT Equipment Recommendations  Other: Continue to assess    OT Education:   Patient Education  Education Given To: Patient  Education Provided: Role of Therapy  Education Method: Verbal  Barriers to Learning: None  Education Outcome: Verbalized understanding    OT Follow Up:    OT D/C RECOMMENDATIONS  REQUIRES OT FOLLOW-UP: Yes       Assessment/Discharge Disposition:  Assessment: Pt is a 80year old man from home with spouse who presents to [facility] with the above deficits which impact his ability to perform ADLs and IADLs. Pt. limited d/t fatigue and weakness. Pt would benefit from continued OT to maximize independence and safety with ADL tasks.   Performance deficits / Impairments: Decreased functional mobility , Decreased ADL status, Decreased endurance, Decreased strength, Decreased balance, Decreased safe awareness  Prognosis: Good  Discharge Recommendations: Continue to assess pending progress  Decision Making: Medium Complexity  History: Pt's medical history is moderately complex  Exam: Pt. has 6 performance deficits  Assistance / Modification: Pt requires SBA-min A    AMPAC (Six Click) Self care Score   How much help for putting on and taking off regular lower body clothing?: A Little  How much help for Bathing?: A Little  How much help for Toileting?: A Little  How much help for putting on and taking off regular upper body clothing?: A Little  How much help for taking care of personal grooming?: A Little  How much help for eating meals?: None  [facility] Inpatient Daily Activity Raw Score: 19  AM-PAC Inpatient ADL T-Scale Score : 40.22  ADL Inpatient CMS 0-100% Score: 42.8    Therapy key for assistance levels -   Independent/Mod I = Pt. is able to perform task with no assistance but may require a device   Stand by assistance = Pt. does not perform task at an independent level but does not need physical assistance, requires verbal cues  Minimal, Moderate, Maximal Assistance = Pt. requires physical assistance (25%, 50%, 75% assist from helper) for task but is able to actively participate in task   Dependent = Pt. requires total assistance with task and is not able to actively participate with task completion     Plan:  Plan  Times per Week: 1-4x  Plan Weeks: Length of acute stay  Current Treatment Recommendations: Strengthening, Balance training, Functional mobility training, Endurance training, Pain management, Patient/Caregiver education & training, Equipment evaluation, education, & procurement, Safety education & training, Self-Care / ADL, Home management training    Goals:   Patient will:    - Improve functional endurance to tolerate/complete 30 mins of ADL's  - Be Mod I in UB ADLs   - Be Mod I in LB ADLs  - Be Mod I in ADL transfers without LOB  - Be Mod I in toileting tasks  - Improve B UE strength and endurance to 4/5 in order to participate in self-care activities as projected. - Access appropriate D/C site with as few architectural barriers as possible. - Sequence self-care tasks with no verbal cues for safety    Patient Goal: Patient goals : \"I want to feel better and get home\"     Discussed and agreed upon: Yes Comments:       Therapy Time:   Individual   Time In 0833   Time Out 0847   Minutes 14     Eval: 14 minutes     Electronically signed by:     KARLA Renee,   9/15/2022, 10:31 AM

## 2022-09-15 NOTE — PROGRESS NOTES
Warfarin Dosing - Pharmacy Consult Note  Consulting Provider: [facility]  Indication:  Atrial Fibrillation  Warfarin Dose prior to admission: new start   Concurrent anticoagulants/antiplatelets: heparin bridging  Significant Drug Interactions: No obvious interactions  Recent Labs     09/14/22  0830 09/14/22  1000 09/15/22  0453 09/15/22  0454   INR  --  1.1 1.1  --    HGB 10.7* 10.0*  --  10.7*   * 117*  --  131   LABALBU 4.2  --   --  4.0     Recent warfarin administrations                     warfarin (COUMADIN) tablet 5 mg (mg) 5 mg Given 09/14/22 1952                   Date       INR    Dose  09/15/22    1.1                5 mg    Assessment/Plan  (Goal INR: 2 - 3)  INR of 1.1 is SUB-therapeutic. Patient continues on heparin drip bridging. Will again dose with warfarin 5 mg today. Active problem list reviewed. INR orders are placed. Chart reviewed for pertinent labs, drug/diet interactions, and past doses. Documentation of patient's clinical condition was reviewed. Pharmacy Dosing:  Pharmacy will continue to follow. MIKE Ga. Ph.  9/15/2022  1:53 PM

## 2022-09-15 NOTE — PROGRESS NOTES
Hospitalist Progress Note      PCP: Sherren Mayans, DO    Date of Admission: 9/14/2022    Chief Complaint:    Chief Complaint   Patient presents with    Chest Pain     Pt to the ED via walk into triage with c/o CP and feeling nausea. Pt HR is irregular in triage. Pt denies any hx of A-fib. Pt is a Kidney transplant patient 7 years ago. Pt states that the feeling that he is having started this morning. Subjective:  Patient denies fevers, chills, sweats, CP, SOB, diarrhea or burning micturition. Felt light headed after his medications today.  12 point ROS negative other than mentioned above     Medications:  Reviewed    Infusion Medications    heparin (PORCINE) Infusion 14 Units/kg/hr (09/15/22 0843)    sodium chloride      dextrose       Scheduled Medications    warfarin  5 mg Oral Once    sodium chloride flush  5-40 mL IntraVENous 2 times per day    insulin lispro  0-8 Units SubCUTAneous TID WC    insulin lispro  0-4 Units SubCUTAneous Nightly    cycloSPORINE modified  75 mg Oral BID    furosemide  40 mg Oral Daily    gabapentin  300 mg Oral BID    mycophenolate  750 mg Oral BID    pantoprazole  40 mg Oral Daily    atorvastatin  10 mg Oral Daily    tamsulosin  0.4 mg Oral Daily    metoprolol tartrate  50 mg Oral BID    warfarin placeholder: dosing by pharmacy   Other RX Placeholder    insulin NPH  9 Units SubCUTAneous QAM    insulin NPH  5 Units SubCUTAneous Nightly     PRN Meds: nitroGLYCERIN, heparin (porcine), heparin (porcine), sodium chloride flush, sodium chloride, ondansetron **OR** ondansetron, polyethylene glycol, acetaminophen **OR** acetaminophen, senna, glucose, dextrose bolus **OR** dextrose bolus, glucagon (rDNA), dextrose, cyclobenzaprine      Intake/Output Summary (Last 24 hours) at 9/15/2022 1401  Last data filed at 9/15/2022 0543  Gross per 24 hour   Intake --   Output 900 ml   Net -900 ml       Exam:    BP (!) 108/58   Pulse 100   Temp 98 °F (36.7 °C) (Oral)   Resp 16   Ht 6' 2\" (1.88 m)   Wt 195 lb (88.5 kg)   SpO2 99%   BMI 25.04 kg/m²     General appearance: No apparent distress, appears stated age and cooperative. HEENT:  Conjunctivae/corneas clear. Neck: Trachea midline. Respiratory:  Normal respiratory effort. Clear to auscultation  Cardiovascular: irregularly irregular  Abdomen: Soft, non-tender, non-distended with normal bowel sounds. Musculoskeletal: No clubbing, cyanosis or edema bilaterally  Neuro: Non Focal.   Capillary Refill: Brisk,< 3 seconds   Peripheral Pulses: +2 palpable, equal bilaterally       Labs:   Recent Labs     09/14/22  0830 09/14/22  1000 09/15/22  0454   WBC 7.1 8.2 6.4   HGB 10.7* 10.0* 10.7*   HCT 33.0* 31.5* 33.5*   * 117* 131     Recent Labs     09/14/22  0830 09/15/22  0454    141   K 4.9 4.9   * 107   CO2 22 20   BUN 57* 56*   CREATININE 2.79* 3.18*   CALCIUM 9.3 8.9     Recent Labs     09/14/22  0830 09/15/22  0454   AST 11 10   ALT 8 7   BILITOT 0.5 0.6   ALKPHOS 66 56     Recent Labs     09/14/22  1000 09/15/22  0453   INR 1.1 1.1     Recent Labs     09/14/22  0830 09/15/22  0924   TROPONINI 0.024* 0.043*       Urinalysis:      Lab Results   Component Value Date/Time    NITRU Negative 08/27/2021 10:29 AM    WBCUA 20-50 08/27/2021 10:29 AM    BACTERIA Negative 08/27/2021 10:29 AM    RBCUA 0-2 08/27/2021 10:29 AM    BLOODU Negative 08/27/2021 10:29 AM    SPECGRAV 1.012 08/27/2021 10:29 AM    GLUCOSEU Negative 08/27/2021 10:29 AM    GLUCOSEU GT 1 09/14/2011 08:57 AM       Radiology:  XR CHEST PORTABLE   Final Result   Borderline cardiomegaly and chronic-appearing pulmonary interstitial changes.            Assessment/Plan:    #A Fib with RVR    - per cardiology    #ESRD    - per nephrology; s/p transplant    #HTN    - continue home meds; readjusted med rec    #DMII    - SSI    Active Hospital Problems    Diagnosis Date Noted    Atrial fibrillation with RVR ([facility] [address] [I48.91] 09/14/2022     Priority: Medium        Additional work up or/and treatment plan may be added today or then after based on clinical progression. I am managing a portion of pt care. Some medical issues are handled by other specialists. Additional work up and treatment should be done in out pt setting by pt PCP and other out pt providers. In addition to examining and evaluating pt, I spent additional time explaining care, normal and abnormal findings, and treatment plan. All of pt questions were answered. Counseling, diet and education were  provided. Case will be discussed with nursing staff when appropriate. Family will be updated if and when appropriate. Diet: ADULT DIET;  Regular; 4 carb choices (60 gm/meal)    Code Status: Full Code    PT/OT Eval     Electronically signed by Yaritza Beal MD on 9/15/2022 at 2:01 PM

## 2022-09-15 NOTE — FLOWSHEET NOTE
Am nursing  assessment completed. Pt. Alert and oriented. BP: 125/71  Breakfast:  RESP: 16 HR: 89              Lung sounds:                                 Oxygen: 99% on room air  Complaints of: being light headed Physician notified hydralazine and Imdur discontinued due to low Blood pressure  Pain: 0/10  IV: 20 right A/C  TELE: atrial fib  Dressings:   Precautions:              Falls: 35       Jeremi: 22  Chart and meds reviewed. Noted Labs: Bun: 56; Creatinine: 3.18; Hgb: 10.7; HCT: 33.5; HGA1c 9.0; Troponin: 0.024  Plan for today:    Call light in reach. NOTES:  1200 up to [facility]. Large BM. Assist with pericare. Pt continues to complain of dizziness. Vitals charted. Wife at bedside.  Electronically signed by Elvia Patrick RN on 9/15/2022 at 12:11 PM

## 2022-09-15 NOTE — PROGRESS NOTES
2000: Assumed care of pt at this time. Pt resting comfortably in bed. Hep gtt running.      0200: 2 therapeutic anti xa's

## 2022-09-15 NOTE — CONSULTS
[address]                      [address] [place], [address] [facility]                                  CONSULTATION    PATIENT NAME: Aylin Olea                 :        1950  MED REC NO:   [id number]                            ROOM:       [id number]  ACCOUNT NO:   [de-identified]                           ADMIT DATE: 2022  PROVIDER:     Jessica Paez DO    CONSULT DATE:  09/15/2022    RENAL CONSULTATION    HISTORY OF PRESENT ILLNESS:  A 80year-old admitted to the hospital with  chest discomfort in the midsternum. The patient felt a fullness like  something was caught in his feeding tube. He had significant  diaphoresis. He was brought to the emergency room. He has a known  history of kidney transplant in  and again in . He is being  treated for hypertension. The patient denies any chest discomfort at  the present time. He denies any flank pain, gross hematuria. No fever  or chills. The patient is noted to have a low blood pressure on nursing  summaries. PAST MEDICAL HISTORY:  Status post kidney transplant x2, diabetes  mellitus type 2, right arm fistula, hypertension, history of seizures,  none since . PAST SURGICAL HISTORY:  _____ x2 as noted above, brain surgery for  history of seizures, bronchoscopy. SOCIAL HISTORY/HABITS:  . Former smoker, quit . No alcohol  use. MEDICATIONS:  At the time of his admission to the hospital; Coreg,  Apresoline, Imdur, Trulicity, Flomax, OneTouch, Flexeril, CellCept,  Zocor, cyclosporin. PHYSICAL EXAMINATION:  VITAL SIGNS:  Height 6 feet 2 inches, 195 pounds. Blood pressure  126/50, heart rate 80, respirations 16, afebrile. HEENT:  Normocephalic. Pupils equal and reactive to light. Extraocular  muscles intact. NECK:  Supple. No JVD, bruits, or adenopathy. CHEST:  Lungs are clear. No wheezing, rales, or rhonchi.   Chest wall  shows some minimal tenderness in the sternal area.  CARDIOVASCULAR:  Heart is regular with a 1/6 systolic murmur. ABDOMEN:  Soft. No guarding or rigidity. Bowel sounds are present. EXTREMITIES:  Show no edema. Skin is warm and dry. IMPRESSION:  1.  CKD III with DELORES secondary to hypotension, possible dehydration. 2.  Status post kidney transplant x2, last one 2015. 3.  History of diabetes mellitus type 2.  4.  Hypertension. 5.  Hyperlipidemia. PLAN:  Encouraged oral intake of fluids. Heparin drip onboard. Beta-blocker started. Awaiting Cardiology consult to determine further  intervention. Obtain OneTouch before meals and bedtime with insulin  coverage. BMP in the morning.         Joe Body, DO    D: 09/15/2022 13:10:58       T: 09/15/2022 13:14:28     [email or web address]  Job#: [id number]     Doc#: [id number]    CC:

## 2022-09-15 NOTE — FLOWSHEET NOTE
Secure message sent to physician for med review. Complaints of feeling dizzy after am meds. Bp 85/42. Lying down bp now 108/58.

## 2022-09-15 NOTE — PROGRESS NOTES
Cardiology Follow up Note    Subjective:  Peyton Salmon is a 80 y.o. male with history of CKD, prior renal transplant x 2 (last 2015) on chronic immuno suppressive therapy, seizure disorder, and IDDM who presents with chest pain. He reports sudden onset midepigastric/chest pressure after getting up this morning. It was associated with nausea/vomiting, and diaphoresis. He reports the pain was nonradiating. He had mild shortness of breath. He rated the pain 10 out of 10 at maximum. He came to ER. He was given 4 baby aspirin and 1 sublingual nitroglycerin which slightly improved the pain. Pain gradually resolved on its own within 30 minutes. His EKG in ER revealed atrial fibrillation with RVR which is new. He denies any monse palpitations from atrial fibrillation. No near-syncope or syncope. He denies any similar chest pain in the past.  No similar chest pain or limiting exertional dyspnea with physical activities like mowing the lawn. He was admitted here in June 2022 with right-sided chest pain. His chronic troponin elevation. A cardiac catheterization was considered at the last visit however given his high risk for contrast-induced nephropathy, he underwent stress testing which was negative for ischemia. Echocardiogram showed preserved LV function. He denies that he actually had any actual chest pain back in June. Again he denies any chest pain at all with physical activities like mowing the lawn. He is a previous Dr. Hector Acosta patient who was planning to follow with Dr. Lola Espino at the recommendation of his PCP.    9/15/22: Reports feeling lightheaded and weak today. Episode of low BP after taking morning medications, BP down to 85/42, but now resolved. Last /71. He says his hydralazine and Imdur were stopped as outpatient and those shouldn't have been given to him. HR's somewhat improved, running 90s-low 100s on telemetry. No palpitations. Still on IV heparin bridging to Coumadin. INR 1.1 today. Discussed \"no blood\" order on chart. PATIENT AND WIFE ARE OPEN TO BLOOD TRANSFUSIONS IF NEEDED. Troponin trend flat. Review of Systems:   General: Fatigued/weak. Cardiovascular: See HPI. No orthopnea or PND. Respiratory: Dyspnea is present with mild to moderate degrees of activity. Gastrointestinal: No melena or hematochezia. Genitourinary: No hematuria. Hematological: + easy bruising but no excessive bleeding. Vascular: No lower extremity edema or claudication. Neurological: No TIA or CVA symptoms. No paresthesias. Musculoskeletal: No chest wall pain. Psychiatric: No anxiety. *All other systems were reviewed and found to be negative unless otherwise noted in the HPI*    Objective:  BP (!) 108/58   Pulse 100   Temp 98 °F (36.7 °C) (Oral)   Resp 16   Ht 6' 2\" (1.88 m)   Wt 195 lb (88.5 kg)   SpO2 99%   BMI 25.04 kg/m²   Constitutional: alert, cooperative, in no distress  Eyes: Conjunctiva clear; no scleral icturus. PERRLA   Respiratory: clear to auscultation bilaterally  Musculoskeletal: No chest wall tenderness. No clubbing or cyanosis. Cardiovascular: irregular, S1, S2 normal, no murmur, click, rub or gallop. No carotid bruit. No JVD. Pulses 2+ and symmetric. No edema. GI: soft, non-tender, non-distended. Bowel sounds normal. No masses,  no organomegaly  MSK: extremities normal, atraumatic, no clubbing. No chest wall pain. Neurologic: Grossly normal  Skin: No rashes or lesions. No cyanosis.        Intake/Output Summary (Last 24 hours) at 9/15/2022 1416  Last data filed at 9/15/2022 0543  Gross per 24 hour   Intake --   Output 900 ml   Net -900 ml       Medications:  warfarin, 5 mg, Once  sodium chloride flush, 5-40 mL, 2 times per day  insulin lispro, 0-8 Units, TID WC  insulin lispro, 0-4 Units, Nightly  cycloSPORINE modified, 75 mg, BID  furosemide, 40 mg, Daily  gabapentin, 300 mg, BID  mycophenolate, 750 mg, BID  pantoprazole, 40 mg, Daily  atorvastatin, 10 mg, Daily  tamsulosin, 0.4 mg, Daily  metoprolol tartrate, 50 mg, BID  warfarin placeholder: dosing by pharmacy, , RX Placeholder  insulin NPH, 9 Units, QAM  insulin NPH, 5 Units, Nightly      heparin (PORCINE) Infusion, Last Rate: 14 Units/kg/hr (09/15/22 0843)  sodium chloride  dextrose      Recent Labs     09/14/22  0830 09/14/22  1000 09/15/22  0453 09/15/22  0454 09/15/22  0924   HGB 10.7* 10.0*  --  10.7*  --    WBC 7.1 8.2  --  6.4  --    * 117*  --  131  --      --   --  141  --    K 4.9  --   --  4.9  --    CO2 22  --   --  20  --    BUN 57*  --   --  56*  --    MG 1.8  --   --   --   --    INR  --  1.1 1.1  --   --    TROPONINI 0.024*  --   --   --  0.043*     Cr 3.18     INR 1.1 today     EKG: Atrial fibrillation with RVR.  bpm. Nonspecific ST depressions. Telemetry: Afib, 90s-low 100's.      2D Echo: 6/22/22   Normal left ventricle structure and function. Left ventricular ejection fraction is visually estimated at 65%. E/A flow reversal noted. Suggestive of diastolic dysfunction. CXR: Borderline cardiomegaly and chronic-appearing pulmonary interstitial changes. Lexiscan Myoview Stress Test: 6/22/22 Negative for ischemia or infarct. Assessment:  Newly recognized atrial fibrillation with RVR, improving. Still not optimally rate controlled. [id number] score of 3 indicating high risk for stroke, on IV heparin. Mild nonspecific troponin elevation, chronic, likely in the setting of CKD, flat pattern. Historically normal LV systolic function, EF 49%  Hypertension  IDDM   Hyperlipidemia  CKD, stage V, with history of renal transplant x 2, most recently in 2015, on chronic immuno suppression therapy   Anemia of chronic disease. Recommendations:  Troponin trend flat. No further ischemic testing needed at this time. Hydralazine and Imdur stopped. Increase metoprolol to 75 mg BID. Monitor BP.    May need to consider FREDERIC guided cardioversion (inpatient vs outpatient) pending HR response. Continue to dose Coumadin to goal INR 2-3. Ok to stop IV heparin when INR > 1.8  Reviewed risks/benefits of anticoagulation again with patient and wife. He wants to proceed with Coumadin loading. Discussed \"no blood\" order on chart. PATIENT AND WIFE ARE OPEN TO BLOOD TRANSFUSIONS IF EVER NEEDED. Thank you for allowing me to participate in your patient's cardiac care. Should you have questions, please do not hesitate to contact me.      Apolinar Daniels DO, [facility] [address] [facility]

## 2022-09-16 ENCOUNTER — APPOINTMENT (OUTPATIENT)
Dept: ULTRASOUND IMAGING | Age: 72
DRG: 309 | End: 2022-09-16
Payer: COMMERCIAL

## 2022-09-16 LAB
ANTI-XA UNFRAC HEPARIN: 0.15 IU/ML
ANTI-XA UNFRAC HEPARIN: 0.84 IU/ML
ANTI-XA UNFRAC HEPARIN: 0.85 IU/ML
GLUCOSE BLD-MCNC: 180 MG/DL (ref 70–99)
GLUCOSE BLD-MCNC: 233 MG/DL (ref 70–99)
GLUCOSE BLD-MCNC: 238 MG/DL (ref 70–99)
GLUCOSE BLD-MCNC: 263 MG/DL (ref 70–99)
HCT VFR BLD CALC: 32.5 % (ref 42–52)
HEMOGLOBIN: 10.5 G/DL (ref 14–18)
INR BLD: 1.2
MCH RBC QN AUTO: 28.7 PG (ref 27–31.3)
MCHC RBC AUTO-ENTMCNC: 32.3 % (ref 33–37)
MCV RBC AUTO: 88.7 FL (ref 80–100)
PDW BLD-RTO: 15 % (ref 11.5–14.5)
PERFORMED ON: ABNORMAL
PLATELET # BLD: 131 K/UL (ref 130–400)
PROTHROMBIN TIME: 15.3 SEC (ref 12.3–14.9)
RBC # BLD: 3.66 M/UL (ref 4.7–6.1)
WBC # BLD: 6.2 K/UL (ref 4.8–10.8)

## 2022-09-16 PROCEDURE — 2580000003 HC RX 258: Performed by: NURSE PRACTITIONER

## 2022-09-16 PROCEDURE — 99233 SBSQ HOSP IP/OBS HIGH 50: CPT | Performed by: INTERNAL MEDICINE

## 2022-09-16 PROCEDURE — 36415 COLL VENOUS BLD VENIPUNCTURE: CPT

## 2022-09-16 PROCEDURE — 6360000002 HC RX W HCPCS: Performed by: PHYSICIAN ASSISTANT

## 2022-09-16 PROCEDURE — 6370000000 HC RX 637 (ALT 250 FOR IP): Performed by: NURSE PRACTITIONER

## 2022-09-16 PROCEDURE — 76776 US EXAM K TRANSPL W/DOPPLER: CPT

## 2022-09-16 PROCEDURE — 85027 COMPLETE CBC AUTOMATED: CPT

## 2022-09-16 PROCEDURE — 6370000000 HC RX 637 (ALT 250 FOR IP): Performed by: INTERNAL MEDICINE

## 2022-09-16 PROCEDURE — 85610 PROTHROMBIN TIME: CPT

## 2022-09-16 PROCEDURE — 97110 THERAPEUTIC EXERCISES: CPT

## 2022-09-16 PROCEDURE — 6360000002 HC RX W HCPCS: Performed by: NURSE PRACTITIONER

## 2022-09-16 PROCEDURE — 2060000000 HC ICU INTERMEDIATE R&B

## 2022-09-16 PROCEDURE — 85520 HEPARIN ASSAY: CPT

## 2022-09-16 PROCEDURE — 97161 PT EVAL LOW COMPLEX 20 MIN: CPT

## 2022-09-16 RX ORDER — WARFARIN SODIUM 5 MG/1
10 TABLET ORAL
Status: COMPLETED | OUTPATIENT
Start: 2022-09-16 | End: 2022-09-16

## 2022-09-16 RX ORDER — METOPROLOL TARTRATE 50 MG/1
100 TABLET, FILM COATED ORAL 2 TIMES DAILY
Status: DISCONTINUED | OUTPATIENT
Start: 2022-09-16 | End: 2022-09-17

## 2022-09-16 RX ORDER — METOPROLOL TARTRATE 50 MG/1
50 TABLET, FILM COATED ORAL 2 TIMES DAILY
Status: DISCONTINUED | OUTPATIENT
Start: 2022-09-16 | End: 2022-09-16

## 2022-09-16 RX ADMIN — ATORVASTATIN CALCIUM 10 MG: 10 TABLET, FILM COATED ORAL at 08:18

## 2022-09-16 RX ADMIN — MYCOPHENOLATE MOFETIL 750 MG: 250 CAPSULE ORAL at 21:00

## 2022-09-16 RX ADMIN — CYCLOSPORINE 75 MG: 25 CAPSULE, LIQUID FILLED ORAL at 20:59

## 2022-09-16 RX ADMIN — HEPARIN SODIUM 2000 UNITS: 1000 INJECTION INTRAVENOUS; SUBCUTANEOUS at 03:01

## 2022-09-16 RX ADMIN — WARFARIN SODIUM 10 MG: 5 TABLET ORAL at 17:10

## 2022-09-16 RX ADMIN — Medication 10 ML: at 20:58

## 2022-09-16 RX ADMIN — INSULIN LISPRO 4 UNITS: 100 INJECTION, SOLUTION INTRAVENOUS; SUBCUTANEOUS at 11:43

## 2022-09-16 RX ADMIN — FUROSEMIDE 40 MG: 20 TABLET ORAL at 08:16

## 2022-09-16 RX ADMIN — PANTOPRAZOLE SODIUM 40 MG: 40 TABLET, DELAYED RELEASE ORAL at 08:18

## 2022-09-16 RX ADMIN — METOPROLOL TARTRATE 100 MG: 50 TABLET ORAL at 21:00

## 2022-09-16 RX ADMIN — INSULIN HUMAN 5 UNITS: 100 INJECTION, SUSPENSION SUBCUTANEOUS at 21:20

## 2022-09-16 RX ADMIN — Medication 10 ML: at 09:34

## 2022-09-16 RX ADMIN — INSULIN HUMAN 9 UNITS: 100 INJECTION, SUSPENSION SUBCUTANEOUS at 08:20

## 2022-09-16 RX ADMIN — CYCLOSPORINE 75 MG: 25 CAPSULE, LIQUID FILLED ORAL at 08:27

## 2022-09-16 RX ADMIN — INSULIN LISPRO 2 UNITS: 100 INJECTION, SOLUTION INTRAVENOUS; SUBCUTANEOUS at 17:07

## 2022-09-16 RX ADMIN — TAMSULOSIN HYDROCHLORIDE 0.4 MG: 0.4 CAPSULE ORAL at 08:15

## 2022-09-16 RX ADMIN — HEPARIN SODIUM AND DEXTROSE 10.73 UNITS/KG/HR: 10000; 5 INJECTION INTRAVENOUS at 21:20

## 2022-09-16 RX ADMIN — GABAPENTIN 300 MG: 100 CAPSULE ORAL at 08:15

## 2022-09-16 RX ADMIN — METOPROLOL TARTRATE 75 MG: 25 TABLET, FILM COATED ORAL at 08:16

## 2022-09-16 RX ADMIN — GABAPENTIN 300 MG: 100 CAPSULE ORAL at 21:00

## 2022-09-16 RX ADMIN — MYCOPHENOLATE MOFETIL 750 MG: 250 CAPSULE ORAL at 08:16

## 2022-09-16 ASSESSMENT — PAIN SCALES - GENERAL: PAINLEVEL_OUTOF10: 0

## 2022-09-16 NOTE — PROGRESS NOTES
Physical Therapy Med Surg Initial Assessment  Facility/Department: [facility]  [facility]       NAME: Kirstin Dear  : 1950 (80 y.o.)  MRN: [id number]  CODE STATUS: Full Code    Date of Service: 2022    Patient Diagnosis(es): New onset atrial fibrillation ([facility] [address] [I48.91]  Atrial fibrillation with RVR ([facility] [address] [I48.91]  Chest pain, unspecified type [R07.9]  Chronic kidney disease, unspecified CKD stage [N18.9]   Chief Complaint   Patient presents with    Chest Pain     Pt to the ED via walk into triage with c/o CP and feeling nausea. Pt HR is irregular in triage. Pt denies any hx of A-fib. Pt is a Kidney transplant patient 7 years ago. Pt states that the feeling that he is having started this morning.      Patient Active Problem List    Diagnosis Date Noted    Chest pain 2022    Atrial fibrillation with RVR ([facility] [address] 2022    Unstable angina ([facility] [address] 2022    Atypical chest pain 2019    Chronic cough 2019    Anginal chest pain at rest [facility]) 2019    Pneumonia 2018    Abdominal pain, other specified site 06/15/2011    Intra-abdominal abscess post-procedure 06/15/2011    Infection due to enterococcus 06/15/2011    Nonspecific abnormal results of thyroid function study 2011    Anemia 2009    Essential hypertension 2008    Mixed hyperlipidemia 2008    Type II or unspecified type diabetes mellitus without mention of complication, uncontrolled 10/03/2008    Fever and other physiologic disturbances of temperature regulation 2008    Kidney replaced by transplant 2007    Acute pyelonephritis without lesion of renal medullary necrosis 2007    Chronic kidney disease (CKD) 10/31/2003    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)     no seizure since 1995     Past Surgical History:   Procedure Laterality Date    BRAIN SURGERY Left 12/06/1990    to eliminate seizures    KIDNEY TRANSPLANT  2015    [address] INCL FLUOR GDNCE DX W/CELL WASHG SPX N/A 3/20/2018    BRONCHOSCOPY performed by Vince Wang MD at [facility]       Chart Reviewed: Yes  Family / Caregiver Present: Yes (wife at end of session)    Restrictions:  Restrictions/Precautions: Fall Risk, Up as Tolerated     SUBJECTIVE:        Pain  Pain: no pain reported before or after tx    Prior Level of Function:  Social/Functional History  Lives With: Spouse  Type of Home: House  Home Layout: Two level, Performs ADL's on one level  Home Access: Stairs to enter with rails  Entrance Stairs - Number of Steps: 4  Bathroom Shower/Tub: Tub/Shower unit  Home Equipment: thi Wilder  ADL Assistance: Independent  Homemaking Assistance: Independent  Ambulation Assistance: Independent (No AD)  Transfer Assistance: Independent  Active : Yes  Occupation: Full time employment  Type of Occupation:     OBJECTIVE:   Vision  Vision: Impaired  Vision Exceptions: Wears glasses at all times  Hearing: Within functional limits    Cognition:  Overall Orientation Status: Within Functional Limits  Follows Commands: Within Functional Limits  Overall Cognitive Status: WFL  Cognition Comment: Distractible         ROM:  AROM: Within functional limits  PROM: Within functional limits    Strength:  Strength: Generally decreased, functional    Neuro:  Balance  Sitting - Static: Good  Sitting - Dynamic: Good  Standing - Static: Good (patient able to stand 2 min with no UE support or LOB)  Standing - Dynamic: Good;-                      Tone: Normal    Sensation: Intact    Bed mobility  Rolling to Left: Independent  Rolling to Right: Independent  Supine to Sit: Independent  Sit to Supine: Independent    Transfers  Sit to Stand: Modified independent  Stand to sit: Modified independent  Comment: mildly unsteady initially with no assistance required    Ambulation  Surface: level tile  Device: No Device  Assistance: Stand by assistance  Quality of Gait: decreased pace and step length, mild increase GALEN, mildly unsteady with SBA required for safe judgement regarding environment and IV, mmild knee stability delay at end of second gait due to fatigue  Distance: 25 feet x2    Stairs/Curb  Stairs?: No (feel pt will be able to perform stairs without trial for return to home)         PT Exercises  Exercise Treatment: instructed pt in bridging and SLR as HEP to improve LE endurance while adjusting for medical concerns - pt demonstrated indep performance following instructions    Activity Tolerance  Activity Tolerance: Patient tolerated evaluation without incident    Patient Education  Education Given To: Patient; Family  Education Provided: Role of Therapy;Plan of Care;Home Exercise Program  Education Method: Demonstration;Verbal  Barriers to Learning: None  Education Outcome: Demonstrated understanding;Verbalized understanding       ASSESSMENT:   Body Structures, Functions, Activity Limitations Requiring Skilled Therapeutic Intervention: Decreased functional mobility   Decision Making: Low Complexity  History: high  Exam: low  Clinical Presentation: low    Barriers to Learning: none         DISCHARGE RECOMMENDATIONS:  Discharge Recommendations: Outpatient PT    Assessment: Pt demonstrates mild deficits in mobility. Pt and his wife indicate that they are comfortable with his current level of function for return to home.  Pt would benefit from follow up PT at this level of care and an OP following discharge  Requires PT Follow-Up: Yes      PLAN OF CARE:  Plan  Plan: 1 time a day 3-6 times a week    Safety Devices  Type of Devices: Call light within reach, Left in bed (mod fall risk)    Goals:  Short Term Goals  Short term goal 1: indep transfers  Short term goal 2: indep gait in protected environment 50 feet with no device    AMPAC (6 CLICK) BASIC MOBILITY  AM-PAC Inpatient Mobility Raw Score : 21     Therapy Time:   Individual   Time In 1119   Time Out 1145   Minutes 26      Eval: 16 min  There. Ex: 10min       Julianne Yuan PT, 09/16/22 at 11:55 AM         Definitions for assistance levels  Independent = pt does not require any physical supervision or assistance from another person for activity completion. Device may be needed.   Stand by assistance = pt requires verbal cues or instructions from another person, close to but not touching, to perform the activity  Minimal assistance= pt performs 75% or more of the activity; assistance is required to complete the activity  Moderate assistance= pt performs 50% of the activity; assistance is required to complete the activity  Maximal assistance = pt performs 25% of the activity; assistance is required to complete the activity  Dependent = pt requires total physical assistance to accomplish the task

## 2022-09-16 NOTE — CARE COORDINATION
PT REMAINS ON HEP GTT AND COUMADIN, INR 1.2. COUMADIN CLINIC FORM ON BLUE CHART FOR CARDIOLOGY TO SIGN. CREAT ^3.6, RENAL CONSULTED. PT REMAINS IN AFIB, PER CARDIOLOGY NOTE MAY NEED FREDERIC/CV.  LSW/CM TO FOLLOW.

## 2022-09-16 NOTE — PROGRESS NOTES
Cardiology Follow up Note    Subjective:  Zachery Rust is a 80 y.o. male with history of CKD, prior renal transplant x 2 (last 2015) on chronic immuno suppressive therapy, seizure disorder, and IDDM who presents with chest pain. He reports sudden onset midepigastric/chest pressure after getting up this morning. It was associated with nausea/vomiting, and diaphoresis. He reports the pain was nonradiating. He had mild shortness of breath. He rated the pain 10 out of 10 at maximum. He came to ER. He was given 4 baby aspirin and 1 sublingual nitroglycerin which slightly improved the pain. Pain gradually resolved on its own within 30 minutes. His EKG in ER revealed atrial fibrillation with RVR which is new. He denies any monse palpitations from atrial fibrillation. No near-syncope or syncope. He denies any similar chest pain in the past.  No similar chest pain or limiting exertional dyspnea with physical activities like mowing the lawn. He was admitted here in June 2022 with right-sided chest pain. His chronic troponin elevation. A cardiac catheterization was considered at the last visit however given his high risk for contrast-induced nephropathy, he underwent stress testing which was negative for ischemia. Echocardiogram showed preserved LV function. He denies that he actually had any actual chest pain back in June. Again he denies any chest pain at all with physical activities like mowing the lawn. He is a previous Dr. Humble Wilks patient who was planning to follow with Dr. Dany York at the recommendation of his PCP.    9/15/22: Reports feeling lightheaded and weak today. Episode of low BP after taking morning medications, BP down to 85/42, but now resolved. Last /71. He says his hydralazine and Imdur were stopped as outpatient and those shouldn't have been given to him. HR's somewhat improved, running 90s-low 100s on telemetry. No palpitations. Still on IV heparin bridging to Coumadin. INR 1.1 today. Discussed \"no blood\" order on chart. PATIENT AND WIFE ARE OPEN TO BLOOD TRANSFUSIONS IF NEEDED. Troponin trend flat. 9/16/22: Feels better today. HR still running 90s-100's at rest and 130's when up ambulating to bathroom despite up titration of beta blocker yesterday. No palpitations. BP stable. INR 1.2 today. Review of Systems:   General: Feels better  Cardiovascular: See HPI. No orthopnea or PND. Respiratory: Dyspnea is present with mild to moderate degrees of activity. Gastrointestinal: No melena or hematochezia. Genitourinary: No hematuria. Hematological: + easy bruising but no excessive bleeding. Vascular: No lower extremity edema or claudication. Neurological: No TIA or CVA symptoms. No paresthesias. Musculoskeletal: No chest wall pain. Psychiatric: No anxiety. *All other systems were reviewed and found to be negative unless otherwise noted in the HPI*    Objective:  /77   Pulse 100   Temp 97.2 °F (36.2 °C) (Oral)   Resp 16   Ht 6' 2\" (1.88 m)   Wt 196 lb (88.9 kg)   SpO2 100%   BMI 25.16 kg/m²   Constitutional: alert, cooperative, in no distress  Eyes: Conjunctiva clear; no scleral icturus. PERRLA   Respiratory: clear to auscultation bilaterally  Musculoskeletal: No chest wall tenderness. No clubbing or cyanosis. Cardiovascular: irregular, S1, S2 normal, no murmur, click, rub or gallop. No carotid bruit. No JVD. Pulses 2+ and symmetric. No edema. GI: soft, non-tender, non-distended. Bowel sounds normal. No masses,  no organomegaly  MSK: extremities normal, atraumatic, no clubbing. No chest wall pain. Neurologic: Grossly normal  Skin: No rashes or lesions. No cyanosis.        Intake/Output Summary (Last 24 hours) at 9/16/2022 1510  Last data filed at 9/16/2022 0658  Gross per 24 hour   Intake 608.8 ml   Output 900 ml   Net -291.2 ml         Medications:  metoprolol tartrate, 50 mg, BID  warfarin, 10 mg, Once  sodium chloride flush, 5-40 mL, 2 times per day  insulin lispro, 0-8 Units, TID WC  insulin lispro, 0-4 Units, Nightly  cycloSPORINE modified, 75 mg, BID  gabapentin, 300 mg, BID  mycophenolate, 750 mg, BID  pantoprazole, 40 mg, Daily  atorvastatin, 10 mg, Daily  tamsulosin, 0.4 mg, Daily  warfarin placeholder: dosing by pharmacy, , RX Placeholder  insulin NPH, 9 Units, QAM  insulin NPH, 5 Units, Nightly    heparin (PORCINE) Infusion, Last Rate: 13 Units/kg/hr (09/16/22 1219)  sodium chloride  dextrose    Recent Labs     09/14/22  0830 09/14/22  1000 09/15/22  0453 09/15/22  0454 09/15/22  0924 09/15/22  1528 09/16/22  0452 09/16/22  0600   HGB 10.7* 10.0*  --  10.7*  --   --  10.5*  --    WBC 7.1 8.2  --  6.4  --   --  6.2  --    * 117*  --  131  --   --  131  --      --   --  141  --  138  --   --    K 4.9  --   --  4.9  --  5.0*  --   --    CO2 22  --   --  20  --  19*  --   --    BUN 57*  --   --  56*  --  59*  --   --    MG 1.8  --   --   --   --   --   --   --    INR  --  1.1 1.1  --   --   --   --  1.2   TROPONINI 0.024*  --   --   --  0.043*  --   --   --        Cr 3.6 today. INR 1.2 today     EKG: Atrial fibrillation with RVR.  bpm. Nonspecific ST depressions. Telemetry: Afib, 90s-low 100's, up to 130-140s with activity. 2D Echo: 6/22/22   Normal left ventricle structure and function. Left ventricular ejection fraction is visually estimated at 65%. E/A flow reversal noted. Suggestive of diastolic dysfunction. CXR: Borderline cardiomegaly and chronic-appearing pulmonary interstitial changes. Lexiscan Myoview Stress Test: 6/22/22 Negative for ischemia or infarct. Assessment:  Newly recognized atrial fibrillation. Still not optimally rate controlled. [id number] score of 3 indicating high risk for stroke, on IV heparin. Mild nonspecific troponin elevation, chronic, likely in the setting of CKD, flat pattern.    Historically normal LV systolic function, EF 58%  Hypertension  IDDM   Hyperlipidemia  A/CKD, stage V, with history of renal transplant x 2, most recently in 2015, on chronic immuno suppression therapy   Anemia of chronic disease. Recommendations:  Still not optimally rate controlled, particularly with any activity. Increase metoprolol to 100 mg BID. If heart rate is controlled on higher dose beta blocker, ok to discharge home over weekend. OK to switch to SQ Lovenox as a bridge for discharge if needed. Continue Coumadin. Goal INR 2-3. Stop IV heparin when INR > 1.8. If heart rate still not optimally rate controlled, then plan for FREDERIC guided cardioversion on Monday. Risks/benefits reviewed. He is agreeable if needed. Follow up with Dr. Shakila De La Cruz (patient preference) after discharge. Thank you for allowing me to participate in your patient's cardiac care. Should you have questions, please do not hesitate to contact me.      Arnoldo Schilling DO, [facility] [address] [facility]

## 2022-09-16 NOTE — PROGRESS NOTES
Comprehensive Nutrition Assessment    Type and Reason for Visit:  Initial, Positive Nutrition Screen    Nutrition Recommendations/Plan:   Increase to Carb Control 5 diet    Change ONS to diabetic BID    Monitor K+ for need to add potassium restriction     Malnutrition Assessment:  Malnutrition Status: At risk for malnutrition (Comment) (09/16/22 0909)    Context:  Social/Environmental Circumstances     Findings of the 6 clinical characteristics of malnutrition:  Energy Intake:  50% or less estimated energy requirements for 1 month or longer  Weight Loss:  Mild weight loss (specify amount and time period) (6% over 3 months)     Body Fat Loss:  No significant body fat loss     Muscle Mass Loss:  No significant muscle mass loss    Fluid Accumulation:  No significant fluid accumulation     Strength:  Not Performed    Nutrition Assessment:    Pt considered to be at risk for malnutrition due to inadequate po intake x 1 month with wt loss. Wife reported poor po intake for past month due to new ill-fitting dentures, a high calorie start a nutrition supplement was started yesterday. Will monitor adequacy of po intake for adjustments to ONS    Nutrition Related Findings:    PMH of end stage renal disease s/p kidney transplant (2005 and 2015), DM2, HTN, HPL who presented with chest pain. Patient reports he developed mid-sternal/epigastric chest.  labs (9/16): K+ 5.0, BUN/CR: 59/3.60, glucose (180-264), HbA1C: 9.0. Meds reviewed, no edema noted, BM pta Wound Type: None       Current Nutrition Intake & Therapies:    Average Meal Intake: 51-75%  Average Supplements Intake: Unable to assess  ADULT DIET;  Regular; 4 carb choices (60 gm/meal)  ADULT ORAL NUTRITION SUPPLEMENT; Breakfast, Lunch; Standard High Calorie/High Protein Oral Supplement    Anthropometric Measures:  Height: 6' 2\" (188 cm)  Ideal Body Weight (IBW): 190 lbs (86 kg)    Admission Body Weight: 195 lb (88.5 kg) (stated)  Current Body Weight: 196 lb (88.9 kg) (9/16),   IBW. Weight Source: Bed Scale  Current BMI (kg/m2): 25.2  Usual Body Weight: 209 lb (94.8 kg) (6/22-bedscale)  % Weight Change (Calculated): -6.2                    BMI Categories: Overweight (BMI 25.0-29. 9)    Estimated Daily Nutrient Needs:  Energy Requirements Based On: Kcal/kg  Weight Used for Energy Requirements: Current (88.9 kg)  Energy (kcal/day): 3323-4708 (kg x 22-25)  Weight Used for Protein Requirements: Ideal (86.3 kg)  Protein (g/day): 86 gm (kg x 1.0) Monitor renal labs  Method Used for Fluid Requirements: 1 ml/kcal  Fluid (ml/day): ~2100 ml (or as per MD)    Nutrition Diagnosis:   Inadequate oral intake related to biting/chewing (masticatory) difficulty as evidenced by poor intake prior to admission, weight loss    Nutrition Interventions:   Food and/or Nutrient Delivery: Modify Oral Nutrition Supplement, Modify Current Diet (Increase to Carb Control 5 diet  Change ONS to diabetic BID  Monitor K+ for need to add potassium restriction)  Nutrition Education/Counseling: No recommendation at this time  Coordination of Nutrition Care: Continue to monitor while inpatient       Goals:     Goals: PO intake 75% or greater       Nutrition Monitoring and Evaluation:      Food/Nutrient Intake Outcomes: Food and Nutrient Intake, Supplement Intake  Physical Signs/Symptoms Outcomes: Chewing or Swallowing, Biochemical Data, Weight, Meal Time Behavior    Discharge Planning:     Too soon to determine     Enrike Herman RD, LD

## 2022-09-16 NOTE — PROGRESS NOTES
appearance: No apparent distress, appears stated age and cooperative. HEENT:  Conjunctivae/corneas clear. Neck: Trachea midline. Respiratory:  Normal respiratory effort. Clear to auscultation  Cardiovascular: irregularly irregular  Abdomen: Soft, non-tender, non-distended with normal bowel sounds. Musculoskeletal: No clubbing, cyanosis or edema bilaterally  Neuro: Non Focal.   Capillary Refill: Brisk,< 3 seconds   Peripheral Pulses: +2 palpable, equal bilaterally       Labs:   Recent Labs     09/14/22  1000 09/15/22  0454 09/16/22  0452   WBC 8.2 6.4 6.2   HGB 10.0* 10.7* 10.5*   HCT 31.5* 33.5* 32.5*   * 131 131       Recent Labs     09/14/22  0830 09/15/22  0454 09/15/22  1528    141 138   K 4.9 4.9 5.0*   * 107 106   CO2 22 20 19*   BUN 57* 56* 59*   CREATININE 2.79* 3.18* 3.60*   CALCIUM 9.3 8.9 9.0       Recent Labs     09/14/22  0830 09/15/22  0454   AST 11 10   ALT 8 7   BILITOT 0.5 0.6   ALKPHOS 66 56       Recent Labs     09/14/22  1000 09/15/22  0453 09/16/22  0600   INR 1.1 1.1 1.2       Recent Labs     09/14/22  0830 09/15/22  0924   TROPONINI 0.024* 0.043*         Urinalysis:      Lab Results   Component Value Date/Time    NITRU Negative 08/27/2021 10:29 AM    WBCUA 20-50 08/27/2021 10:29 AM    BACTERIA Negative 08/27/2021 10:29 AM    RBCUA 0-2 08/27/2021 10:29 AM    BLOODU Negative 08/27/2021 10:29 AM    SPECGRAV 1.012 08/27/2021 10:29 AM    GLUCOSEU Negative 08/27/2021 10:29 AM    GLUCOSEU GT 1 09/14/2011 08:57 AM       Radiology:  [facility]   Final Result   1. Mildly prominent renal transplant upper pole calyx measuring 12 mm versus   peripelvic cyst.  Renal pelvis is prominent however there is no evidence of   severe hydronephrosis. 2.  No shadowing renal calculus or suspicious mass. 3.  Patent transplant renal artery and vein.          XR CHEST PORTABLE   Final Result   Borderline cardiomegaly and chronic-appearing pulmonary interstitial changes. Assessment/Plan:    #A Fib with RVR    - per cardiology    #ESRD    - per nephrology; s/p transplant    #HTN    - continue home meds; readjusted med rec    #DMII    - SSI    Active Hospital Problems    Diagnosis Date Noted    Atrial fibrillation with RVR ([facility] [address] [I48.91] 09/14/2022     Priority: Medium        Additional work up or/and treatment plan may be added today or then after based on clinical progression. I am managing a portion of pt care. Some medical issues are handled by other specialists. Additional work up and treatment should be done in out pt setting by pt PCP and other out pt providers. In addition to examining and evaluating pt, I spent additional time explaining care, normal and abnormal findings, and treatment plan. All of pt questions were answered. Counseling, diet and education were  provided. Case will be discussed with nursing staff when appropriate. Family will be updated if and when appropriate. Diet: ADULT ORAL NUTRITION SUPPLEMENT; Breakfast, Lunch; Diabetic Oral Supplement  ADULT DIET;  Regular; 5 carb choices (75 gm/meal)    Code Status: Full Code    Ok for d/c when ok with cardiology    Electronically signed by Yaritza Beal MD on 9/16/2022 at 2:55 PM

## 2022-09-16 NOTE — FLOWSHEET NOTE
Am nursing  assessment completed. Alert and oriented. BP: 137/65; HR: 90  Breakfast:  RESP: 16              Lung sounds:                            Oxygen: 99% room air  Complaints of:  Pain: 0/10 complaint of pain  IV: Right A/C  TELE: iv heparin per order  Dressings:   Precautions:              Falls: 35        Jeremi: 22  Chart and meds reviewed. Noted Labs:   Plan for today:    Call light in reach. NOTES:     1020 pt returned from ultrasound. Linen changed. Electronically signed by Cady Casas RN on 9/16/2022 at 10:55 AM    [address] Dr [address] Fuelling by for rounds. Spouse at bedside.  Electronically signed by Cady Casas RN on 9/16/2022 at 2:31 PM

## 2022-09-16 NOTE — PROGRESS NOTES
Nephrology Progress Note    Assessment:  CKD-3 with delores  DELORES hypotension  S/P Kidney transplant x2  Anemia  DM type-2      Plan: hold lasix decrease lopressor dose  labs in morning    Patient Active Problem List:     Pneumonia     Abdominal pain, other specified site     Acute pyelonephritis without lesion of renal medullary necrosis     Anemia     Essential hypertension     Nonspecific abnormal results of thyroid function study     Mixed hyperlipidemia     Kidney replaced by transplant     Intra-abdominal abscess post-procedure     Infection due to enterococcus     Fever and other physiologic disturbances of temperature regulation     Chronic kidney disease (CKD)     Type II or unspecified type diabetes mellitus without mention of complication, uncontrolled     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     Anginal chest pain at rest [facility])     Atypical chest pain     Chronic cough     Unstable angina (HCC)     Chest pain     Atrial fibrillation with RVR ([facility])      Subjective:  Admit Date: 9/14/2022    Interval History: no major issues    Medications:  Scheduled Meds:   metoprolol tartrate  50 mg Oral BID    sodium chloride flush  5-40 mL IntraVENous 2 times per day    insulin lispro  0-8 Units SubCUTAneous TID WC    insulin lispro  0-4 Units SubCUTAneous Nightly    cycloSPORINE modified  75 mg Oral BID    gabapentin  300 mg Oral BID    mycophenolate  750 mg Oral BID    pantoprazole  40 mg Oral Daily    atorvastatin  10 mg Oral Daily    tamsulosin  0.4 mg Oral Daily    warfarin placeholder: dosing by pharmacy   Other RX Placeholder    insulin NPH  9 Units SubCUTAneous QAM    insulin NPH  5 Units SubCUTAneous Nightly     Continuous Infusions:   heparin (PORCINE) Infusion 15 Units/kg/hr (09/16/22 0300)    sodium chloride      dextrose         CBC:   Recent Labs     09/15/22  0454 09/16/22  0452   WBC 6.4 6.2   HGB 10.7* 10.5*    131     CMP:    Recent Labs     09/14/22  0830 09/14/22  1348 09/15/22  0454 09/15/22  1528     --  141 138   K 4.9  --  4.9 5.0*   *  --  107 106   CO2 22  --  20 19*   BUN 57*  --  56* 59*   CREATININE 2.79*  --  3.18* 3.60*   GLUCOSE 310* 227 244* 197*   CALCIUM 9.3  --  8.9 9.0   LABGLOM 22.6*  --  19.4* 16.8*     Troponin:   Recent Labs     09/15/22  0924   TROPONINI 0.043*     BNP: No results for input(s): BNP in the last 72 hours. INR:   Recent Labs     09/16/22  0600   INR 1.2     Lipids: No results for input(s): CHOL, LDLDIRECT, TRIG, HDL, AMYLASE, LIPASE in the last 72 hours. Liver:   Recent Labs     09/15/22  0454   AST 10   ALT 7   ALKPHOS 56   PROT 6.1*   LABALBU 4.0   BILITOT 0.6     Iron:  No results for input(s): IRONS, FERRITIN in the last 72 hours. Invalid input(s): LABIRONS  Urinalysis: No results for input(s): UA in the last 72 hours.     Objective:  Vitals: /65   Pulse 90   Temp 97.5 °F (36.4 °C) (Oral)   Resp 16   Ht 6' 2\" (1.88 m)   Wt 196 lb (88.9 kg)   SpO2 99%   BMI 25.16 kg/m²    Wt Readings from Last 3 Encounters:   09/16/22 196 lb (88.9 kg)   06/23/22 229 lb 4.5 oz (104 kg)   04/21/22 208 lb (94.3 kg)      24HR INTAKE/OUTPUT:    Intake/Output Summary (Last 24 hours) at 9/16/2022 0820  Last data filed at 9/16/2022 3913  Gross per 24 hour   Intake 728.8 ml   Output 900 ml   Net -171.2 ml       General: alert, in no apparent distress  HEENT: normocephalic, atraumatic, anicteric  Neck: supple, no mass  Lungs: non-labored respirations, clear to auscultation bilaterally  Heart: regular rate and rhythm, no murmurs or rubs  Abdomen: soft, non-tender, non-distended  Ext: no cyanosis, no peripheral edema  Neuro: alert and oriented, no gross abnormalities  Psych: normal mood and affect  Skin: no rash      Electronically signed by Demetrius Flores DO, MD

## 2022-09-17 VITALS
RESPIRATION RATE: 18 BRPM | BODY MASS INDEX: 20.94 KG/M2 | WEIGHT: 163.14 LBS | HEIGHT: 74 IN | HEART RATE: 94 BPM | TEMPERATURE: 97.5 F | OXYGEN SATURATION: 97 % | SYSTOLIC BLOOD PRESSURE: 123 MMHG | DIASTOLIC BLOOD PRESSURE: 65 MMHG

## 2022-09-17 LAB
ALBUMIN SERPL-MCNC: 3.7 G/DL (ref 3.5–4.6)
ALP BLD-CCNC: 65 U/L (ref 35–104)
ALT SERPL-CCNC: 7 U/L (ref 0–41)
ANION GAP SERPL CALCULATED.3IONS-SCNC: 16 MEQ/L (ref 9–15)
ANTI-XA UNFRAC HEPARIN: 0.21 IU/ML
AST SERPL-CCNC: 10 U/L (ref 0–40)
BILIRUB SERPL-MCNC: 0.4 MG/DL (ref 0.2–0.7)
BUN BLDV-MCNC: 77 MG/DL (ref 8–23)
CALCIUM SERPL-MCNC: 9.2 MG/DL (ref 8.5–9.9)
CHLORIDE BLD-SCNC: 106 MEQ/L (ref 95–107)
CO2: 20 MEQ/L (ref 20–31)
CREAT SERPL-MCNC: 3.6 MG/DL (ref 0.7–1.2)
CYCLOSPORINE A: 204.4 NG/ML
EKG ATRIAL RATE: 122 BPM
EKG ATRIAL RATE: 125 BPM
EKG Q-T INTERVAL: 294 MS
EKG Q-T INTERVAL: 386 MS
EKG QRS DURATION: 74 MS
EKG QRS DURATION: 92 MS
EKG QTC CALCULATION (BAZETT): 427 MS
EKG QTC CALCULATION (BAZETT): 461 MS
EKG R AXIS: 19 DEGREES
EKG R AXIS: 5 DEGREES
EKG T AXIS: 45 DEGREES
EKG T AXIS: 69 DEGREES
EKG VENTRICULAR RATE: 127 BPM
EKG VENTRICULAR RATE: 86 BPM
GFR AFRICAN AMERICAN: 20.3
GFR NON-AFRICAN AMERICAN: 16.8
GLOBULIN: 2.7 G/DL (ref 2.3–3.5)
GLUCOSE BLD-MCNC: 172 MG/DL (ref 70–99)
GLUCOSE BLD-MCNC: 174 MG/DL (ref 70–99)
GLUCOSE BLD-MCNC: 240 MG/DL (ref 70–99)
INR BLD: 1.2
PERFORMED ON: ABNORMAL
PERFORMED ON: ABNORMAL
POTASSIUM SERPL-SCNC: 4.7 MEQ/L (ref 3.4–4.9)
PROTHROMBIN TIME: 15.5 SEC (ref 12.3–14.9)
REASON FOR REJECTION: NORMAL
REJECTED TEST: NORMAL
SODIUM BLD-SCNC: 142 MEQ/L (ref 135–144)
TOTAL PROTEIN: 6.4 G/DL (ref 6.3–8)

## 2022-09-17 PROCEDURE — 6360000002 HC RX W HCPCS: Performed by: NURSE PRACTITIONER

## 2022-09-17 PROCEDURE — 85610 PROTHROMBIN TIME: CPT

## 2022-09-17 PROCEDURE — 99233 SBSQ HOSP IP/OBS HIGH 50: CPT | Performed by: INTERNAL MEDICINE

## 2022-09-17 PROCEDURE — 36415 COLL VENOUS BLD VENIPUNCTURE: CPT

## 2022-09-17 PROCEDURE — 85520 HEPARIN ASSAY: CPT

## 2022-09-17 PROCEDURE — 6370000000 HC RX 637 (ALT 250 FOR IP): Performed by: NURSE PRACTITIONER

## 2022-09-17 PROCEDURE — 80053 COMPREHEN METABOLIC PANEL: CPT

## 2022-09-17 PROCEDURE — 6370000000 HC RX 637 (ALT 250 FOR IP): Performed by: INTERNAL MEDICINE

## 2022-09-17 PROCEDURE — 6360000002 HC RX W HCPCS: Performed by: INTERNAL MEDICINE

## 2022-09-17 RX ORDER — ENOXAPARIN SODIUM 100 MG/ML
1 INJECTION SUBCUTANEOUS 2 TIMES DAILY
Qty: 8 ML | Refills: 0 | Status: SHIPPED | OUTPATIENT
Start: 2022-09-17 | End: 2022-09-17 | Stop reason: SDUPTHER

## 2022-09-17 RX ORDER — METOPROLOL TARTRATE 50 MG/1
100 TABLET, FILM COATED ORAL 2 TIMES DAILY
Status: DISCONTINUED | OUTPATIENT
Start: 2022-09-17 | End: 2022-09-17 | Stop reason: HOSPADM

## 2022-09-17 RX ORDER — METOPROLOL TARTRATE 100 MG/1
100 TABLET ORAL 2 TIMES DAILY
Qty: 60 TABLET | Refills: 3 | Status: SHIPPED | OUTPATIENT
Start: 2022-09-17 | End: 2022-10-14

## 2022-09-17 RX ORDER — WARFARIN SODIUM 5 MG/1
5 TABLET ORAL DAILY
Qty: 30 TABLET | Refills: 3 | Status: ON HOLD | OUTPATIENT
Start: 2022-09-17 | End: 2022-10-27 | Stop reason: HOSPADM

## 2022-09-17 RX ORDER — WARFARIN SODIUM 5 MG/1
5 TABLET ORAL
Status: DISCONTINUED | OUTPATIENT
Start: 2022-09-17 | End: 2022-09-17 | Stop reason: DRUGHIGH

## 2022-09-17 RX ORDER — ENOXAPARIN SODIUM 100 MG/ML
1 INJECTION SUBCUTANEOUS DAILY
Status: DISCONTINUED | OUTPATIENT
Start: 2022-09-17 | End: 2022-09-17 | Stop reason: HOSPADM

## 2022-09-17 RX ORDER — ENOXAPARIN SODIUM 100 MG/ML
1 INJECTION SUBCUTANEOUS 2 TIMES DAILY
Qty: 4.8 ML | Refills: 0 | Status: SHIPPED | OUTPATIENT
Start: 2022-09-17 | End: 2022-09-17 | Stop reason: SDUPTHER

## 2022-09-17 RX ORDER — METOPROLOL TARTRATE 50 MG/1
150 TABLET, FILM COATED ORAL 2 TIMES DAILY
Status: DISCONTINUED | OUTPATIENT
Start: 2022-09-17 | End: 2022-09-17

## 2022-09-17 RX ORDER — METOPROLOL TARTRATE 75 MG/1
150 TABLET, FILM COATED ORAL 2 TIMES DAILY
Qty: 60 TABLET | Refills: 3 | Status: SHIPPED | OUTPATIENT
Start: 2022-09-17 | End: 2022-09-17 | Stop reason: HOSPADM

## 2022-09-17 RX ORDER — ENOXAPARIN SODIUM 100 MG/ML
1 INJECTION SUBCUTANEOUS DAILY
Qty: 4 ML | Refills: 0 | Status: SHIPPED | OUTPATIENT
Start: 2022-09-17 | End: 2022-09-22

## 2022-09-17 RX ADMIN — GABAPENTIN 300 MG: 100 CAPSULE ORAL at 07:55

## 2022-09-17 RX ADMIN — INSULIN HUMAN 8 UNITS: 100 INJECTION, SUSPENSION SUBCUTANEOUS at 08:04

## 2022-09-17 RX ADMIN — MYCOPHENOLATE MOFETIL 750 MG: 250 CAPSULE ORAL at 07:57

## 2022-09-17 RX ADMIN — ENOXAPARIN SODIUM 70 MG: 100 INJECTION SUBCUTANEOUS at 12:22

## 2022-09-17 RX ADMIN — PANTOPRAZOLE SODIUM 40 MG: 40 TABLET, DELAYED RELEASE ORAL at 07:57

## 2022-09-17 RX ADMIN — INSULIN LISPRO 2 UNITS: 100 INJECTION, SOLUTION INTRAVENOUS; SUBCUTANEOUS at 11:55

## 2022-09-17 RX ADMIN — CYCLOSPORINE 75 MG: 25 CAPSULE, LIQUID FILLED ORAL at 08:00

## 2022-09-17 RX ADMIN — TAMSULOSIN HYDROCHLORIDE 0.4 MG: 0.4 CAPSULE ORAL at 07:57

## 2022-09-17 RX ADMIN — METOPROLOL TARTRATE 100 MG: 50 TABLET ORAL at 07:56

## 2022-09-17 ASSESSMENT — PAIN SCALES - GENERAL: PAINLEVEL_OUTOF10: 0

## 2022-09-17 NOTE — DISCHARGE INSTR - DIET
Good nutrition is important when healing from an illness, injury, or surgery. Follow any nutrition recommendations given to you during your hospital stay. If you were given an oral nutrition supplement while in the hospital, continue to take this supplement at home. You can take it with meals, in-between meals, and/or before bedtime. These supplements can be purchased at most local grocery stores, pharmacies, and chain [employer]-stores. If you have any questions about your diet or nutrition, call the hospital and ask for the dietitian. Low fat, low salt, heart healthy diet. Renal diet. Diabetic diet.

## 2022-09-17 NOTE — CARE COORDINATION
[facility] FORM FILLED OUT, SIGNED BY PT AND FAXED TO [facility]. PT WILL GO TO OP LAB UNTIL CC CAN BE ARRANGED.  PT AND SPOUSE AWARE

## 2022-09-17 NOTE — PROGRESS NOTES
09/16/22  0452   WBC 6.4 6.2   HGB 10.7* 10.5*    131     CMP:    Recent Labs     09/15/22  0454 09/15/22  1528 09/17/22  0604    138 142   K 4.9 5.0* 4.7    106 106   CO2 20 19* 20   BUN 56* 59* 77*   CREATININE 3.18* 3.60* 3.60*   GLUCOSE 244* 197* 172*   CALCIUM 8.9 9.0 9.2   LABGLOM 19.4* 16.8* 16.8*     Troponin:   Recent Labs     09/15/22  0924   TROPONINI 0.043*     BNP: No results for input(s): BNP in the last 72 hours. INR:   Recent Labs     09/17/22  0604   INR 1.2     Lipids: No results for input(s): CHOL, LDLDIRECT, TRIG, HDL, AMYLASE, LIPASE in the last 72 hours. Liver:   Recent Labs     09/17/22 0604   AST 10   ALT 7   ALKPHOS 65   PROT 6.4   LABALBU 3.7   BILITOT 0.4     Iron:  No results for input(s): IRONS, FERRITIN in the last 72 hours. Invalid input(s): LABIRONS  Urinalysis: No results for input(s): UA in the last 72 hours.     Objective:  Vitals: /62   Pulse (!) 103   Temp 97.2 °F (36.2 °C) (Oral)   Resp 16   Ht 6' 2\" (1.88 m)   Wt 163 lb 2.3 oz (74 kg)   SpO2 97%   BMI 20.95 kg/m²    Wt Readings from Last 3 Encounters:   09/17/22 163 lb 2.3 oz (74 kg)   06/23/22 229 lb 4.5 oz (104 kg)   04/21/22 208 lb (94.3 kg)      24HR INTAKE/OUTPUT:    Intake/Output Summary (Last 24 hours) at 9/17/2022 1159  Last data filed at 9/17/2022 7242  Gross per 24 hour   Intake --   Output 450 ml   Net -450 ml       General: alert, in no apparent distress  HEENT: normocephalic, atraumatic, anicteric  Neck: supple, no mass  Lungs: non-labored respirations, clear to auscultation bilaterally  Heart: regular rate and rhythm, no murmurs or rubs  Abdomen: soft, non-tender, non-distended  Ext: no cyanosis, no peripheral edema  Neuro: alert and oriented, no gross abnormalities  Psych: normal mood and affect  Skin: no rash      Electronically signed by Jarad Zimmerman DO, MD

## 2022-09-17 NOTE — DISCHARGE SUMMARY
Hospital Medicine Discharge Summary    Peyton Salmon  :  1950  MRN:  [id number]    Admit date:  2022  Discharge date:  2022    Admitting Physician:  Francisco Mccoy MD  Primary Care Physician:  Tamiko Lovelace, DO    Discharge Diagnoses:    A Fib with RVR    Chief Complaint   Patient presents with    Chest Pain     Pt to the ED via walk into triage with c/o CP and feeling nausea. Pt HR is irregular in triage. Pt denies any hx of A-fib. Pt is a Kidney transplant patient 7 years ago. Pt states that the feeling that he is having started this morning. Hospital Course:   Patient presented with A Fib with RVR. He was rate controlled by cardiology and due to being a renal transplant candidate he was bridged to coumadin. His INR is still not therapeutic so he is being discharged on lovenox (discussed with pharmacy who recommended 1mg/kg QD) for bridging and will follow closely with cardiology. Exam on discharge:   /66   Pulse 78   Temp 97.5 °F (36.4 °C) (Oral)   Resp 18   Ht 6' 2\" (1.88 m)   Wt 163 lb 2.3 oz (74 kg)   SpO2 100%   BMI 20.95 kg/m²   General appearance: No apparent distress, appears stated age and cooperative. HEENT: Conjunctivae/corneas clear. Neck: Trachea midline. Respiratory:  Normal respiratory effort. Clear to auscultation  Cardiovascular: Irregularly irregular   Abdomen: Soft, non-tender, non-distended with normal bowel sounds. Musculoskeletal: No clubbing, cyanosis or edema bilaterally.    Neuro: Non Focal.   Capillary Refill: Brisk,< 3 seconds   Peripheral Pulses: +2 palpable, equal bilaterally     Patient was seen by the following consultants   Consults:  IP CONSULT TO CARDIOLOGY  IP CONSULT TO NEPHROLOGY  IP CONSULT TO PHARMACY  PHARMACY TO DOSE WARFARIN    Significant Diagnostic Studies:    Refer to chart     Please refer to chart if no studies are shown here    XR CHEST PORTABLE    Result Date: 2022  EXAMINATION: ONE XRAY VIEW OF THE CHEST 9/14/2022 8:50 am COMPARISON: None. HISTORY: ORDERING SYSTEM PROVIDED HISTORY: cp new onset afib TECHNOLOGIST PROVIDED HISTORY: Reason for exam:->cp new onset afib FINDINGS: The cardiac silhouette is borderline enlarged. The pulmonary vasculature is within normal limits. The lungs demonstrate mild diffuse interstitial changes. This finding appears chronic. No pulmonary consolidation or collapse is identified. No pneumothorax or pleural effusion is seen. Borderline cardiomegaly and chronic-appearing pulmonary interstitial changes. Discharge Medications:         Medication List        START taking these medications      enoxaparin 80 MG/0.8ML  Commonly known as: LOVENOX  Inject 0.7 mLs into the skin 2 times daily for 5 days     Metoprolol Tartrate 75 MG Tabs  Take 150 mg by mouth 2 times daily     warfarin 5 MG tablet  Commonly known as: COUMADIN  Take 1 tablet by mouth daily            CONTINUE taking these medications      aspirin 81 MG EC tablet     cyclobenzaprine 10 MG tablet  Commonly known as: FLEXERIL     cycloSPORINE modified 25 MG capsule  Commonly known as: NEORAL     Dexcom G6 Transmitter Misc  Change every 3 months     * FreeStyle Sona 14 Day Sensor Misc  Every 2 weeks     * FreeStyle Sona 14 Day Sensor Misc  Every 2 weeks Dx E11.65     furosemide 40 MG tablet  Commonly known as: LASIX     gabapentin 300 MG capsule  Commonly known as: NEURONTIN     mycophenolate 250 MG capsule  Commonly known as: CELLCEPT     nitroGLYCERIN 0.4 MG SL tablet  Commonly known as: NITROSTAT  up to max of 3 total doses. If no relief after 1 dose, call 911. pantoprazole 40 MG tablet  Commonly known as: PROTONIX     predniSONE 5 MG tablet  Commonly known as: DELTASONE     simvastatin 10 MG tablet  Commonly known as: ZOCOR     Trulicity 1.5 WR/2.2EI Sopn  Generic drug: Dulaglutide           * This list has 2 medication(s) that are the same as other medications prescribed for you.  Read the directions carefully, and ask your doctor or other care provider to review them with you. STOP taking these medications      carvedilol 12.5 MG tablet  Commonly known as: COREG            ASK your doctor about these medications      NovoLIN N FlexPen 100 UNIT/ML injection pen  Generic drug: insulin NPH  16 units at bedtime     NovoLIN R FlexPen 100 UNIT/ML Sopn  Generic drug: Insulin Regular Human  6 units am 8 units lunch and dinner     tamsulosin 0.4 MG capsule  Commonly known as: FLOMAX  Take 1 capsule by mouth daily               Where to Get Your Medications        These medications were sent to [facility] [address], [address] [place] [address] [address], [place] [address]      Phone: [phone]   enoxaparin 80 MG/0.8ML  Metoprolol Tartrate 75 MG Tabs  warfarin 5 MG tablet         Disposition:   If discharged to Home, Any [place] [address] needs that were indicated and/or required as been addressed and set up by Social Work. Condition at discharge: good     Activity: activity as tolerated    Total time taken for discharging this patient: 40 minutes. Greater than 70% of time was spent focused exclusively on this patient. Time was taken to review chart, discuss plans with consultants, reconciling medications, discussing plan answering questions with patient.      Signed:  Arley Mane MD  9/17/2022, 11:36 AM  ----------------------------------------------------------------------------------------------------------------------    Myranda De La Paz

## 2022-09-17 NOTE — PROGRESS NOTES
Cardiology Follow up Note    Subjective:  Esthela Wiseman is a 80 y.o. male with history of CKD, prior renal transplant x 2 (last 2015) on chronic immuno suppressive therapy, seizure disorder, and IDDM who presents with chest pain. He reports sudden onset midepigastric/chest pressure after getting up this morning. It was associated with nausea/vomiting, and diaphoresis. He reports the pain was nonradiating. He had mild shortness of breath. He rated the pain 10 out of 10 at maximum. He came to ER. He was given 4 baby aspirin and 1 sublingual nitroglycerin which slightly improved the pain. Pain gradually resolved on its own within 30 minutes. His EKG in ER revealed atrial fibrillation with RVR which is new. He denies any monse palpitations from atrial fibrillation. No near-syncope or syncope. He denies any similar chest pain in the past.  No similar chest pain or limiting exertional dyspnea with physical activities like mowing the lawn. He was admitted here in June 2022 with right-sided chest pain. His chronic troponin elevation. A cardiac catheterization was considered at the last visit however given his high risk for contrast-induced nephropathy, he underwent stress testing which was negative for ischemia. Echocardiogram showed preserved LV function. He denies that he actually had any actual chest pain back in June. Again he denies any chest pain at all with physical activities like mowing the lawn. He is a previous Dr. Frederick Arizmendi patient who was planning to follow with Dr. Womack at the recommendation of his PCP.    9/15/22: Reports feeling lightheaded and weak today. Episode of low BP after taking morning medications, BP down to 85/42, but now resolved. Last /71. He says his hydralazine and Imdur were stopped as outpatient and those shouldn't have been given to him. HR's somewhat improved, running 90s-low 100s on telemetry. No palpitations. Still on IV heparin bridging to Coumadin. INR 1.1 today. setting of CKD, flat pattern. Historically normal LV systolic function, EF 71%  Hypertension  IDDM   Hyperlipidemia  A/CKD, stage V, with history of renal transplant x 2, most recently in 2015, on chronic immuno suppression therapy   Anemia of chronic disease. Recommendations:  Continue telemetry  A. fib better controlled ranging between 70s and 90s, I will increase metoprolol to 150 mg p.o. twice daily. Continue Coumadin. Goal INR 2-3. Patient can be discharged once Coumadin is at goal or consider discharging patient home with Lovenox to bridge versus Eliquis 5 mg p.o. twice daily  Follow up with Dr. Saray Ashton (patient preference) after discharge. From cardiology standpoint patient is stable to be discharged at any time    Thank you for allowing me to participate in your patient's cardiac care. Should you have questions, please do not hesitate to contact me.      Dena Pandya DO, [facility] [address] [facility]

## 2022-09-17 NOTE — DISCHARGE INSTR - COC
Continuity of Care Form    Patient Name: Mercedes Pandya   :  1950  MRN:  [id number]    Admit date:  2022  Discharge date:  ***    Code Status Order: Full Code   Advance Directives:     Admitting Physician:  George Hess MD  PCP: Jenniffer Disla DO    Discharging Nurse: [facility] Unit/Room#: I751/O871-69  Discharging Unit Phone Number: ***    Emergency Contact:   Extended Emergency Contact Information  Primary Emergency Contact: Mercedes Whittaker [address] [place] [address] Phone: [phone]  Relation: Spouse  Secondary Emergency Contact: Leda Johnson Phone: [phone]  Relation: Child    Past Surgical History:  Past Surgical History:   Procedure Laterality Date    BRAIN SURGERY Left 1990    to eliminate seizures    KIDNEY TRANSPLANT      [place] [address] INCL FLUOR GDNCE DX W/CELL WASHG [facility] N/A 3/20/2018    BRONCHOSCOPY performed by Celestine Reeves MD at [facility]       Immunization History:   Immunization History   Administered Date(s) Administered    Influenza Virus Vaccine 2003, 2010    Influenza, FLUZONE (age 72 y+), High Dose, 0.7mL 10/07/2020    Influenza, High Dose (Fluzone 65 yrs and older) 10/18/2018, 2019    Pneumococcal Polysaccharide (Ikwduvplr72) 2011    Tdap (Boostrix, Adacel) 2020       Active Problems:  Patient Active Problem List   Diagnosis Code    Pneumonia J18.9    Abdominal pain, other specified site R10.9    Acute pyelonephritis without lesion of renal medullary necrosis N10    Anemia D64.9    Essential hypertension I10    Nonspecific abnormal results of thyroid function study R94.6    Mixed hyperlipidemia E78.2    Kidney replaced by transplant Z94.0    Intra-abdominal abscess post-procedure T81.43XA    Infection due to enterococcus A49.1    Fever and other physiologic disturbances of temperature regulation R68.89    Chronic kidney disease (CKD) N18.9    Type II or unspecified type diabetes mellitus without mention of complication, uncontrolled [id number]    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    Anginal chest pain at rest [facility]) I20.8    Atypical chest pain R07.89    Chronic cough R05.3    Unstable angina (HCC) I20.0    Chest pain R07.9    Atrial fibrillation with RVR ([facility]) I48.91       Isolation/Infection:   Isolation            No Isolation          Patient Infection Status       None to display            Nurse Assessment:  Last Vital Signs: /65   Pulse 94   Temp 97.5 °F (36.4 °C) (Oral)   Resp 18   Ht 6' 2\" (1.88 m)   Wt 163 lb 2.3 oz (74 kg)   SpO2 97%   BMI 20.95 kg/m²     Last documented pain score (0-10 scale): Pain Level: 0  Last Weight:   Wt Readings from Last 1 Encounters:   09/17/22 163 lb 2.3 oz (74 kg)     Mental Status:  {IP PT MENTAL STATUS:38754}    IV Access:  { SHAHEEN IV ACCESS:745793024}    Nursing Mobility/ADLs:  Walking   {P DME DZLE:222779865}  Transfer  {P DME FSRE:614526354}  Bathing  {P DME PCVZ:794788740}  Dressing  {CHP DME ANCT:718794875}  Toileting  {CHP DME QEPR:189271775}  Feeding  {P DME CDJF:695014157}  Med Admin  {P DME XEQV:148458161}  Med Delivery   { SHAHEEN MED Delivery:916239404}    Wound Care Documentation and Therapy:        Elimination:  Continence: Bowel: {YES / FX:73615}  Bladder: {YES / AT:96125}  Urinary Catheter: {Urinary Catheter:049419337}   Colostomy/Ileostomy/Ileal Conduit: {YES / OY:27963}       Date of Last BM: ***    Intake/Output Summary (Last 24 hours) at 9/17/2022 1509  Last data filed at 9/17/2022 0800  Gross per 24 hour   Intake 480 ml   Output 450 ml   Net 30 ml     I/O last 3 completed shifts:   In: 848.8 [P.O.:480; I.V.:368.8]  Out: 1350 [Urine:1350]    Safety Concerns:     [address] SHAHEEN Safety Concerns:955129804}    Impairments/Disabilities:      [address] JAMISON Impairments/Disabilities:167534776}    Nutrition Therapy:  Current Nutrition Therapy:   [place] JAMISON Diet List:548028070}    Routes of Feeding: {P DME Other Feedings:298343327}  Liquids: {Slp liquid thickness:85548}  Daily Fluid Restriction: {CHP DME Yes amt example:997127258}  Last Modified Barium Swallow with Video (Video Swallowing Test): {Done Not Done DTXB:537536388}    Treatments at the Time of Hospital Discharge:   Respiratory Treatments: ***  Oxygen Therapy:  {Therapy; copd oxygen:71940}  Ventilator:    {[facility] Vent VZSM:535020308}    Rehab Therapies: {THERAPEUTIC INTERVENTION:2587998977}  Weight Bearing Status/Restrictions: {[facility] Weight Bearin}  Other Medical Equipment (for information only, NOT a DME order):  {EQUIPMENT:250117920}  Other Treatments: ***    Patient's personal belongings (please select all that are sent with patient):  {CHP DME Belongings:662215784}    RN SIGNATURE:  {Esignature:112294067}    CASE MANAGEMENT/SOCIAL WORK SECTION    Inpatient Status Date: ***    Readmission Risk Assessment Score:  Readmission Risk              Risk of Unplanned Readmission:  21           Discharging to Facility/ Agency   Name:   Address:  Phone:  Fax:    Dialysis Facility (if applicable)   Name:  Address:  Dialysis Schedule:  Phone:  Fax:    / signature: {Esignature:238711224}    PHYSICIAN SECTION    Prognosis: {Prognosis:7342221716}    Condition at Discharge: [address] [facility] Patient Condition:061110790}    Rehab Potential (if transferring to Rehab): {Prognosis:8098560343}    Recommended Labs or Other Treatments After Discharge: ***    Physician Certification: I certify the above information and transfer of Amando Roberto  is necessary for the continuing treatment of the diagnosis listed and that he requires {Admit to Appropriate Level of Care:55403} for {GREATER/LESS:307493048} 30 days.      Update Admission H&P: {CHP DME Changes in KNIPX:697512050}    PHYSICIAN SIGNATURE:  {Esignature:091908124}

## 2022-09-17 NOTE — FLOWSHEET NOTE
0800  AM assessment complete. VSS. Pt has no c/o chest pain or SOB. Resp even and ulabored, no distress noted. Lungs diminished. No peripheral edema noted. Pulses palpable. Bruising to bilat arms noted. AV fistula in right arm, + bruit, ,+ thrill. Pt sitting up in bed, am medications given without difficulty. Wife at bedside. 0945  AM XA was rejected. Lab back on floor to draw. 1600  Tele and saline lock d/c'd per pt being discharged home. Pt up in room to dress for discharge, wife at bedside. 1615  Discharge instructions given. Pt and wife verbalize understanding. Pt aware labwork for INR is due on Monday. Req given. Wheelchair called for discharge home.

## 2022-09-18 NOTE — PROGRESS NOTES
Physical Therapy  Facility/Department: [facility] A235/P825-49  Physical Therapy Discharge      NAME: Josee Mcfadden    : 1950 (80 y.o.)  MRN: [id number]    Account: [de-identified]  Gender: male      Patient has been discharged from acute care hospital. DC patient from current PT program.      Electronically signed by Shaina Salas PT on 22 at 3:21 PM EDT

## 2022-09-19 DIAGNOSIS — I48.91 ATRIAL FIBRILLATION WITH RVR (HCC): ICD-10-CM

## 2022-09-19 DIAGNOSIS — I48.91 NEW ONSET ATRIAL FIBRILLATION (HCC): ICD-10-CM

## 2022-09-19 LAB
INR BLD: 2
PROTHROMBIN TIME: 23 SEC (ref 12.3–14.9)

## 2022-09-20 ENCOUNTER — TELEPHONE (OUTPATIENT)
Dept: CARDIOLOGY CLINIC | Age: 72
End: 2022-09-20

## 2022-10-03 ENCOUNTER — HOSPITAL ENCOUNTER (OUTPATIENT)
Dept: PHARMACY | Age: 72
Setting detail: THERAPIES SERIES
Discharge: HOME | End: 2022-10-03
Payer: COMMERCIAL

## 2022-10-03 DIAGNOSIS — I48.91 ATRIAL FIBRILLATION WITH RVR (HCC): Primary | ICD-10-CM

## 2022-10-03 LAB — INTERNATIONAL NORMALIZATION RATIO, POC: 4.1

## 2022-10-03 PROCEDURE — 99211 OFF/OP EST MAY X REQ PHY/QHP: CPT

## 2022-10-03 PROCEDURE — 85610 PROTHROMBIN TIME: CPT

## 2022-10-03 NOTE — PROGRESS NOTES
Oral Anticoagulation Patient Education    Counseling provided to: patient and wife   Anticoagulant: Warfarin 5 mg   Handouts provided to patient include: medication, medications that increase risk of bleeding, nosebleeds    Counseling points included:  1. Indication for anticoagulation: atrial fibrillation  2. Explanation of medication  3. Education on A. Fib and stroke  4. Missed doses and timing of medication (contact healthcare provider if missed multiple doses)  5. Side effects: bruising and bleeding  6. Food or drug interactions that can increase risk of bleeding  7. OTC pain relief options: Tylenol vs NSAIDs  8. Contact provider if:   A. Changes made to medications   B. Uncontrolled bleeding/ Signs and symptoms of recurrent VTE   C. Procedures   D.  Trauma and/or fall    INR  4.1 is supra therapeutic for this patient (goal range 2-3) and is reflective of 35 mg TWD  Patient verifies current dosing regimen, patient able to verbally recall dose  Patient reports 0 missed doses since last INR   Patient denies s/sx clotting and/or stroke  Patient denies hematuria, epistaxis, rectal bleeding  Patient denies changes in diet, alcohol, or tobacco use  Reviewed medication list and drug allergies with patient, updated any medication additions or modifications accordingly  Patient also denies any pending medical or dental procedures scheduled at this time    Patient was started on warfarin 5 mg daily during hospitalization three weeks ago and continued on discharge for the past two weeks      Patient will be going to [place] for an extended period of time sometime in November     Patient was instructed to hold today and then reduce to 30 mg TWD (14% decrease) and RTC 1 week      Harpal Casey, TopherD  10/3/2022 12:49 PM      For Pharmacy Admin Tracking Only    Intervention Detail: Adherence Monitorin and Dose Adjustment: 1, reason: Therapy Optimization  Total # of Interventions Recommended: 2  Total # of Interventions Accepted: 2  Time Spent (min): 60

## 2022-10-04 ENCOUNTER — TELEPHONE (OUTPATIENT)
Dept: PHARMACY | Age: 72
End: 2022-10-04

## 2022-10-04 ENCOUNTER — HOSPITAL ENCOUNTER (EMERGENCY)
Age: 72
Discharge: HOME | End: 2022-10-04
Attending: EMERGENCY MEDICINE
Payer: COMMERCIAL

## 2022-10-04 VITALS
OXYGEN SATURATION: 100 % | RESPIRATION RATE: 17 BRPM | BODY MASS INDEX: 25.67 KG/M2 | TEMPERATURE: 97.1 F | SYSTOLIC BLOOD PRESSURE: 176 MMHG | HEIGHT: 74 IN | WEIGHT: 200 LBS | HEART RATE: 64 BPM | DIASTOLIC BLOOD PRESSURE: 62 MMHG

## 2022-10-04 DIAGNOSIS — R04.0 EPISTAXIS: Primary | ICD-10-CM

## 2022-10-04 DIAGNOSIS — I48.91 ATRIAL FIBRILLATION WITH RVR (HCC): Primary | ICD-10-CM

## 2022-10-04 LAB
ANION GAP SERPL CALCULATED.3IONS-SCNC: 16 MEQ/L (ref 9–15)
APTT: 36.8 SEC (ref 24.4–36.8)
BASOPHILS ABSOLUTE: 0.1 K/UL (ref 0–0.2)
BASOPHILS RELATIVE PERCENT: 1.1 %
BUN BLDV-MCNC: 60 MG/DL (ref 8–23)
CALCIUM SERPL-MCNC: 9.3 MG/DL (ref 8.5–9.9)
CHLORIDE BLD-SCNC: 114 MEQ/L (ref 95–107)
CO2: 17 MEQ/L (ref 20–31)
CREAT SERPL-MCNC: 2.23 MG/DL (ref 0.7–1.2)
EOSINOPHILS ABSOLUTE: 0.3 K/UL (ref 0–0.7)
EOSINOPHILS RELATIVE PERCENT: 4.6 %
GFR AFRICAN AMERICAN: 35.3
GFR NON-AFRICAN AMERICAN: 29.2
GLUCOSE BLD-MCNC: 206 MG/DL (ref 70–99)
HCT VFR BLD CALC: 31.8 % (ref 42–52)
HEMOGLOBIN: 10.1 G/DL (ref 14–18)
INR BLD: 3.9
LYMPHOCYTES ABSOLUTE: 0.9 K/UL (ref 1–4.8)
LYMPHOCYTES RELATIVE PERCENT: 11.7 %
MCH RBC QN AUTO: 28.1 PG (ref 27–31.3)
MCHC RBC AUTO-ENTMCNC: 31.7 % (ref 33–37)
MCV RBC AUTO: 88.5 FL (ref 80–100)
MONOCYTES ABSOLUTE: 0.6 K/UL (ref 0.2–0.8)
MONOCYTES RELATIVE PERCENT: 7.6 %
NEUTROPHILS ABSOLUTE: 5.5 K/UL (ref 1.4–6.5)
NEUTROPHILS RELATIVE PERCENT: 75 %
PDW BLD-RTO: 14.6 % (ref 11.5–14.5)
PLATELET # BLD: 163 K/UL (ref 130–400)
POTASSIUM REFLEX MAGNESIUM: 4.8 MEQ/L (ref 3.4–4.9)
PROTHROMBIN TIME: 38.3 SEC (ref 12.3–14.9)
RBC # BLD: 3.59 M/UL (ref 4.7–6.1)
SODIUM BLD-SCNC: 147 MEQ/L (ref 135–144)
WBC # BLD: 7.4 K/UL (ref 4.8–10.8)

## 2022-10-04 PROCEDURE — 85610 PROTHROMBIN TIME: CPT

## 2022-10-04 PROCEDURE — 30901 CONTROL OF NOSEBLEED: CPT

## 2022-10-04 PROCEDURE — 80048 BASIC METABOLIC PNL TOTAL CA: CPT

## 2022-10-04 PROCEDURE — 85025 COMPLETE CBC W/AUTO DIFF WBC: CPT

## 2022-10-04 PROCEDURE — 85730 THROMBOPLASTIN TIME PARTIAL: CPT

## 2022-10-04 PROCEDURE — 99283 EMERGENCY DEPT VISIT LOW MDM: CPT

## 2022-10-04 PROCEDURE — 36415 COLL VENOUS BLD VENIPUNCTURE: CPT

## 2022-10-04 ASSESSMENT — PAIN - FUNCTIONAL ASSESSMENT
PAIN_FUNCTIONAL_ASSESSMENT: NONE - DENIES PAIN
PAIN_FUNCTIONAL_ASSESSMENT: NONE - DENIES PAIN

## 2022-10-04 NOTE — ED TRIAGE NOTES
Pt states he blew his nose around 0300 and nose started bleeding. Pt states he was at the INR clinic today and was high so he was told to hold his nighttime dose of coumadin, which he did. Pt denies any injury or pain.

## 2022-10-04 NOTE — ED NOTES
Discharge instructions reviewed with pt. Pt verbalized understanding with no questions or concerns.      Armani Jerez RN  10/04/22 6726

## 2022-10-04 NOTE — TELEPHONE ENCOUNTER
- Valcyte for CMV prophylaxis--> holding (10/31) for leukopenia. Pt will need weekly CMV PCR while discontinued.  - CMV PCR noted to be positive at 129. .  - CMV PCR 11/8: 105  - CMV PCR 11/15 with 89 copies  - CMV PCR 11/23 95 - increased from previous. Continue to hold Valcyte & monitor pt closely.  - Bactrim for PCP prophylaxis ([facility]).--> holding (10/31) for leukopenia.   - Start Atovaquone 11/6.  - Fluconazole for fungal prophylaxis (first dose 10/6). D/c Fluc 11/16 bc pt had been on it for > 30 days post take back.     Followed up with phone call from earlier regarding ED visit due to nosebleed. Spoke with wife. Patient had recurrent nosebleed at home after ED discharge that they were able to stop. Patient held dose 10/3 after INR of 4.1 at initial appointment. Instructed wife on seeking emergency care for further bleeding and to decrease dose tonight to half tablet (2.5mg) then resume normal schedule (5 mg TWThSSu, 2.5 mg MF). Patient already scheduled for follow up 10/10.

## 2022-10-04 NOTE — ED PROVIDER NOTES
[facility] ED  EMERGENCY DEPARTMENT ENCOUNTER      Pt Name: Jesus Ramos  MRN: [id number]  Ldgfdeya 1950  Date of evaluation: 10/4/2022  Provider: Sarah Berumen MD    CHIEF COMPLAINT       Chief Complaint   Patient presents with    Epistaxis     Pt c/o nosebleed for the past 30 mins after blowing his nose         HISTORY OF PRESENT ILLNESS   (Location/Symptom, Timing/Onset, Context/Setting, Quality, Duration, Modifying Factors, Severity)  Note limiting factors. Jesus Ramos is a 80 y.o. male who presents to the emergency department of nosebleed. History of atrial fibrillation on warfarin, hypertension, hyperlipidemia, ESRD on hemodialysis, diabetes, seizures. Says that his INR recently was 4. He skipped a warfarin dose. Tonight got up and blew his nose. Started to have right nosebleed, placed tissue in the nostril. Reports passing a clot. This happened approximately 30 minutes prior to arrival.  Denies traumatic injury. No bleeding from other site. No syncope. HPI    Nursing Notes were reviewed. REVIEW OF SYSTEMS    (2-9 systems for level 4, 10 or more for level 5)     Review of Systems   Constitutional:         Nosebleed   Eyes:  Negative for visual disturbance. Neurological:  Negative for headaches. All other systems reviewed and are negative. Except as noted above the remainder of the review of systems was reviewed and negative.        PAST MEDICAL HISTORY     Past Medical History:   Diagnosis Date    Diabetes mellitus ([address]     Hemodialysis access, fistula mature ([facility] [address] 12/2002    Hypertension     Seizures ([facility] [address]     no seizure since 1995         SURGICAL HISTORY       Past Surgical History:   Procedure Laterality Date    BRAIN SURGERY Left 12/06/1990    to eliminate seizures    KIDNEY TRANSPLANT  2015    IN [address] [facility] INCL FLUOR GDNCE DX W/CELL WASHG SPX N/A 3/20/2018    BRONCHOSCOPY performed by [id number], MD at [address] [place] Current Discharge Medication List        CONTINUE these medications which have NOT CHANGED    Details   warfarin (COUMADIN) 5 MG tablet Take 1 tablet by mouth daily  Qty: 30 tablet, Refills: 3      metoprolol tartrate (LOPRESSOR) 100 MG tablet Take 1 tablet by mouth 2 times daily  Qty: 60 tablet, Refills: 3      predniSONE (DELTASONE) 5 MG tablet Take 5 mg by mouth daily      Dulaglutide (TRULICITY) 1.5 CP/0.7ZA SOPN Inject 1.5 mg into the skin once a week Weekly on Monday      tamsulosin (FLOMAX) 0.4 MG capsule Take 1 capsule by mouth daily  Qty: 90 capsule, Refills: 0      Continuous Blood Gluc Transmit (DEXCOM G6 TRANSMITTER) MISC Change every 3 months  Qty: 1 each, Refills: 3      !! Continuous Blood Gluc Sensor (FREESTYLE KEV 14 DAY SENSOR) MISC Every 2 weeks Dx E11.65  Qty: 2 each, Refills: 06      insulin NPH (NOVOLIN N FLEXPEN) 100 UNIT/ML injection pen 16 units at bedtime  Qty: 5 pen, Refills: 3      Insulin Regular Human (NOVOLIN R FLEXPEN) 100 UNIT/ML SOPN 6 units am 8 units lunch and dinner  Qty: 5 pen, Refills: 3      !! Continuous Blood Gluc Sensor (FREESTYLE KEV 14 DAY SENSOR) MISC Every 2 weeks  Qty: 2 each, Refills: 06      pantoprazole (PROTONIX) 40 MG tablet Take 40 mg by mouth daily       nitroGLYCERIN (NITROSTAT) 0.4 MG SL tablet up to max of 3 total doses. If no relief after 1 dose, call 911. Qty: 25 tablet, Refills: 3      furosemide (LASIX) 40 MG tablet Take 40 mg by mouth daily Indications: 1/2 to 1 tab. as needed for leg swelling       aspirin 81 MG EC tablet Take 81 mg by mouth daily   Refills: 3      cyclobenzaprine (FLEXERIL) 10 MG tablet Take 10 mg by mouth daily as needed Indications: as needed. Refills: 0      cycloSPORINE modified (NEORAL) 25 MG capsule Take 75 mg by mouth 2 times daily   Refills: 2      gabapentin (NEURONTIN) 300 MG capsule Take 300 mg by mouth 2 times daily.  Indications: 1 capsule in am, 2 capsule in pm   Refills: 2      mycophenolate (CELLCEPT) 250 MG capsule Take 750 mg by mouth 2 times daily   Refills: 1      simvastatin (ZOCOR) 10 MG tablet Take 10 mg by mouth nightly   Refills: 1       !! - Potential duplicate medications found. Please discuss with provider. ALLERGIES     Patient has no known allergies. FAMILY HISTORY     No family history on file. SOCIAL HISTORY       Social History     Socioeconomic History    Marital status:    Tobacco Use    Smoking status: Former     Packs/day: 0.25     Types: Cigarettes     Quit date: 2015     Years since quittin.3    Smokeless tobacco: Former   Vaping Use    Vaping Use: Never used   Substance and Sexual Activity    Alcohol use: No     Alcohol/week: 0.0 standard drinks    Drug use: No       SCREENINGS         Megan Coma Scale  Eye Opening: Spontaneous  Best Verbal Response: Oriented  Best Motor Response: Obeys commands  [place] Coma Scale Score: 15                     CIWA Assessment  BP: (!) 176/62  Heart Rate: 64                 PHYSICAL EXAM    (up to 7 for level 4, 8 or more for level 5)     ED Triage Vitals [10/04/22 0403]   BP Temp Temp src Heart Rate Resp SpO2 Height Weight   (!) 187/85 97.1 °F (36.2 °C) -- 62 18 99 % 6' 2\" (1.88 m) 200 lb (90.7 kg)       Physical Exam  Constitutional:       Appearance: He is not toxic-appearing or diaphoretic. HENT:      Head: Normocephalic and atraumatic. Nose: Nose normal.      Comments: Dried blood to right nare. Speaking in full sentences. Airway intact. Mouth/Throat:      Mouth: Mucous membranes are moist.      Pharynx: Oropharynx is clear. Eyes:      General: No scleral icterus. Pulmonary:      Effort: Pulmonary effort is normal.      Breath sounds: Normal breath sounds. Skin:     Capillary Refill: Capillary refill takes less than 2 seconds. Coloration: Skin is not pale. Neurological:      Mental Status: He is alert.       Gait: Gait normal.       DIAGNOSTIC RESULTS     RADIOLOGY:   Non-plain film images such as CT, Ultrasound and MRI are read by the radiologist. Plain radiographic images are visualized and preliminarily interpreted by the emergency physician with the below findings:      Interpretation per the Radiologist below, if available at the time of this note:    No orders to display         ED BEDSIDE ULTRASOUND:   Performed by ED Physician - none    LABS:  Labs Reviewed   CBC WITH AUTO DIFFERENTIAL - Abnormal; Notable for the following components:       Result Value    RBC 3.59 (*)     Hemoglobin 10.1 (*)     Hematocrit 31.8 (*)     MCHC 31.7 (*)     RDW 14.6 (*)     Lymphocytes Absolute 0.9 (*)     All other components within normal limits   BASIC METABOLIC PANEL W/ REFLEX TO MG FOR LOW K - Abnormal; Notable for the following components:    Sodium 147 (*)     Chloride 114 (*)     CO2 17 (*)     Anion Gap 16 (*)     Glucose 206 (*)     BUN 60 (*)     Creatinine 2.23 (*)     GFR Non- 29.2 (*)     GFR  35.3 (*)     All other components within normal limits   PROTIME-INR - Abnormal; Notable for the following components:    Protime 38.3 (*)     All other components within normal limits   APTT       All other labs were within normal range or not returned as of this dictation.     EMERGENCY DEPARTMENT COURSE and DIFFERENTIAL DIAGNOSIS/MDM:   Vitals:    Vitals:    10/04/22 0403 10/04/22 0500   BP: (!) 187/85 (!) 176/62   Pulse: 62 64   Resp: 18 17   Temp: 97.1 °F (36.2 °C)    SpO2: 99% 100%   Weight: 200 lb (90.7 kg)    Height: 6' 2\" (1.88 m)        Hgb stable  MDM  INR trending down  Clamp and topical medication applied      REASSESSMENT      Hemostasis achieved       CONSULTS:  None    PROCEDURES:  Unless otherwise noted below, none     Epistaxis Mgmt    Date/Time: 10/4/2022 5:24 AM  Performed by: Valeria Oropeza MD  Authorized by: Valeria Oropeza MD     Consent:     Consent obtained:  Verbal    Consent given by:  Patient    Risks, benefits, and alternatives were discussed: yes      Risks discussed:  Bleeding, nasal injury and pain  Universal protocol:     Patient identity confirmed:  Verbally with patient  Procedure details:     Treatment site:  R anterior    Repair method: [id number]. Treatment episode: initial    Post-procedure details:     Assessment:  Bleeding stopped    Procedure completion:  Tolerated well, no immediate complications        FINAL IMPRESSION      1. Epistaxis          DISPOSITION/PLAN   DISPOSITION Decision To Discharge 10/04/2022 05:44:50 AM      PATIENT REFERRED TO:  Goldie Hamilton DO  [address] [facility] [address]          DISCHARGE MEDICATIONS:  Current Discharge Medication List        Controlled Substances Monitoring:     No flowsheet data found.     (Please note that portions of this note were completed with a voice recognition program.  Efforts were made to edit the dictations but occasionally words are mis-transcribed.)    Parmjit Grady MD (electronically signed)  Attending Emergency Physician            Greg Moore MD  10/04/22 [address] Joseph LaneClaiborne Carp, MD  10/04/22 5725

## 2022-10-10 ENCOUNTER — HOSPITAL ENCOUNTER (OUTPATIENT)
Dept: PHARMACY | Age: 72
Setting detail: THERAPIES SERIES
Discharge: HOME | End: 2022-10-10
Payer: COMMERCIAL

## 2022-10-10 ENCOUNTER — TELEPHONE (OUTPATIENT)
Dept: CARDIOLOGY CLINIC | Age: 72
End: 2022-10-10

## 2022-10-10 DIAGNOSIS — I48.91 ATRIAL FIBRILLATION WITH RVR (HCC): Primary | ICD-10-CM

## 2022-10-10 LAB — INTERNATIONAL NORMALIZATION RATIO, POC: 3.5

## 2022-10-10 PROCEDURE — 99211 OFF/OP EST MAY X REQ PHY/QHP: CPT

## 2022-10-10 PROCEDURE — 85610 PROTHROMBIN TIME: CPT

## 2022-10-10 NOTE — TELEPHONE ENCOUNTER
Patient has an upcoming appointment (8/14)-he would like a call to discuss side effects of Lopressor (tiredness?  Loose bowel movement?)-he slept over 8 hrs Sunday (10/9)-please call to discuss

## 2022-10-10 NOTE — PROGRESS NOTES
Mr. Israel Lopez is a 80 y.o. y/o male with history of Afib with RVR who presents today for anticoagulation monitoring and adjustment. INR 3.5 is supra-therapeutic for this patient (goal range 2-3) and is reflective of 25 mg TWD  Patient verifies current dosing regimen, patient able to verbally recall dose    Patient reports no missed doses since last INR aside from intentionally held dose 10/10. Patient did swap Friday and Saturday doses but resulted in the same TWD as intended    Patient denies s/sx clotting and/or stroke  Patient denies hematuria, epistaxis, rectal bleeding  Patient denies changes in alcohol, or tobacco use  Reviewed medication list and drug allergies with patient, updated any medication additions or modifications accordingly  Patient also denies any pending medical or dental procedures scheduled at this time    Patient has been experiencing some diarrhea, no fever. Patient has had decreased intake due to not feeling well and feeling sleepy. Discussed importance of hydration and when to seek medical care. Patient and wife had questions about a-fib and why he needs to be on the warfarin. Discussed and answered questions. Patient take aspirin and wife had questions regarding potential interaction. Questions answered. Patient and wife plan to travel to [place] at some point in November, potentially as early as first week if INR becomes therapeutic. Discussed management of INR while in [place] and to speak with Dr. Tomasa Osgood about this on initial appointment later this week.      Patient was instructed to hold tonight's dose (2.5 mg) and decrease to 2.5 mg MWF and 5 mg all other days (8.3% decrease) and RTC 1 week    Haile Medina, PharmD  PGY1 Pharmacy Resident  10/10/2022 9:34 AM      For Pharmacy Admin Tracking Only    Intervention Detail: Adherence Monitorin and Dose Adjustment: 2, reason: Therapy Optimization  Total # of Interventions Recommended: 2  Total # of Interventions Accepted: 2  Time Spent (min): 30

## 2022-10-12 ENCOUNTER — APPOINTMENT (OUTPATIENT)
Dept: GENERAL RADIOLOGY | Age: 72
End: 2022-10-12
Payer: COMMERCIAL

## 2022-10-12 ENCOUNTER — HOSPITAL ENCOUNTER (EMERGENCY)
Age: 72
Discharge: HOME | End: 2022-10-12
Payer: COMMERCIAL

## 2022-10-12 VITALS
BODY MASS INDEX: 24.64 KG/M2 | HEART RATE: 68 BPM | OXYGEN SATURATION: 100 % | SYSTOLIC BLOOD PRESSURE: 163 MMHG | TEMPERATURE: 98 F | HEIGHT: 74 IN | RESPIRATION RATE: 17 BRPM | DIASTOLIC BLOOD PRESSURE: 74 MMHG | WEIGHT: 192 LBS

## 2022-10-12 DIAGNOSIS — R73.9 HYPERGLYCEMIA: ICD-10-CM

## 2022-10-12 DIAGNOSIS — R06.09 EXERTIONAL DYSPNEA: Primary | ICD-10-CM

## 2022-10-12 DIAGNOSIS — R60.9 PERIPHERAL EDEMA: ICD-10-CM

## 2022-10-12 DIAGNOSIS — N18.9 CHRONIC KIDNEY DISEASE, UNSPECIFIED CKD STAGE: ICD-10-CM

## 2022-10-12 LAB
ALBUMIN SERPL-MCNC: 4.3 G/DL (ref 3.5–4.6)
ALP BLD-CCNC: 60 U/L (ref 35–104)
ALT SERPL-CCNC: 7 U/L (ref 0–41)
ANION GAP SERPL CALCULATED.3IONS-SCNC: 13 MEQ/L (ref 9–15)
APTT: 32.7 SEC (ref 24.4–36.8)
AST SERPL-CCNC: 8 U/L (ref 0–40)
BASOPHILS ABSOLUTE: 0.1 K/UL (ref 0–0.2)
BASOPHILS RELATIVE PERCENT: 0.6 %
BILIRUB SERPL-MCNC: 0.5 MG/DL (ref 0.2–0.7)
BUN BLDV-MCNC: 66 MG/DL (ref 8–23)
CALCIUM SERPL-MCNC: 9.1 MG/DL (ref 8.5–9.9)
CHLORIDE BLD-SCNC: 110 MEQ/L (ref 95–107)
CO2: 13 MEQ/L (ref 20–31)
CREAT SERPL-MCNC: 2.71 MG/DL (ref 0.7–1.2)
EOSINOPHILS ABSOLUTE: 0.2 K/UL (ref 0–0.7)
EOSINOPHILS RELATIVE PERCENT: 1.8 %
GFR AFRICAN AMERICAN: 28.2
GFR NON-AFRICAN AMERICAN: 23.3
GLOBULIN: 2.6 G/DL (ref 2.3–3.5)
GLUCOSE BLD-MCNC: 269 MG/DL (ref 70–99)
HCT VFR BLD CALC: 24.5 % (ref 42–52)
HEMOGLOBIN: 7.8 G/DL (ref 14–18)
INR BLD: 3.1
LYMPHOCYTES ABSOLUTE: 0.4 K/UL (ref 1–4.8)
LYMPHOCYTES RELATIVE PERCENT: 3.5 %
MAGNESIUM: 1.9 MG/DL (ref 1.7–2.4)
MCH RBC QN AUTO: 28 PG (ref 27–31.3)
MCHC RBC AUTO-ENTMCNC: 31.7 % (ref 33–37)
MCV RBC AUTO: 88.3 FL (ref 80–100)
MONOCYTES ABSOLUTE: 0.5 K/UL (ref 0.2–0.8)
MONOCYTES RELATIVE PERCENT: 4.8 %
NEUTROPHILS ABSOLUTE: 9.6 K/UL (ref 1.4–6.5)
NEUTROPHILS RELATIVE PERCENT: 89.3 %
PDW BLD-RTO: 15.5 % (ref 11.5–14.5)
PLATELET # BLD: 175 K/UL (ref 130–400)
POTASSIUM SERPL-SCNC: 4.4 MEQ/L (ref 3.4–4.9)
PRO-BNP: 3003 PG/ML
PROTHROMBIN TIME: 32 SEC (ref 12.3–14.9)
RBC # BLD: 2.78 M/UL (ref 4.7–6.1)
SODIUM BLD-SCNC: 136 MEQ/L (ref 135–144)
TOTAL PROTEIN: 6.9 G/DL (ref 6.3–8)
TROPONIN: 0.04 NG/ML (ref 0–0.01)
TSH SERPL DL<=0.05 MIU/L-ACNC: 0.47 UIU/ML (ref 0.44–3.86)
WBC # BLD: 10.8 K/UL (ref 4.8–10.8)

## 2022-10-12 PROCEDURE — 80053 COMPREHEN METABOLIC PANEL: CPT

## 2022-10-12 PROCEDURE — 93005 ELECTROCARDIOGRAM TRACING: CPT | Performed by: PHYSICIAN ASSISTANT

## 2022-10-12 PROCEDURE — 83735 ASSAY OF MAGNESIUM: CPT

## 2022-10-12 PROCEDURE — 36415 COLL VENOUS BLD VENIPUNCTURE: CPT

## 2022-10-12 PROCEDURE — 71045 X-RAY EXAM CHEST 1 VIEW: CPT

## 2022-10-12 PROCEDURE — 83880 ASSAY OF NATRIURETIC PEPTIDE: CPT

## 2022-10-12 PROCEDURE — 84484 ASSAY OF TROPONIN QUANT: CPT

## 2022-10-12 PROCEDURE — 99284 EMERGENCY DEPT VISIT MOD MDM: CPT

## 2022-10-12 PROCEDURE — 85730 THROMBOPLASTIN TIME PARTIAL: CPT

## 2022-10-12 PROCEDURE — 84443 ASSAY THYROID STIM HORMONE: CPT

## 2022-10-12 PROCEDURE — 85610 PROTHROMBIN TIME: CPT

## 2022-10-12 PROCEDURE — 85025 COMPLETE CBC W/AUTO DIFF WBC: CPT

## 2022-10-12 ASSESSMENT — ENCOUNTER SYMPTOMS
ANAL BLEEDING: 0
NAUSEA: 0
EYE REDNESS: 0
VOMITING: 0
ABDOMINAL DISTENTION: 0
VOICE CHANGE: 0
APNEA: 0
SHORTNESS OF BREATH: 1

## 2022-10-12 ASSESSMENT — LIFESTYLE VARIABLES
HOW OFTEN DO YOU HAVE A DRINK CONTAINING ALCOHOL: NEVER
HOW MANY STANDARD DRINKS CONTAINING ALCOHOL DO YOU HAVE ON A TYPICAL DAY: PATIENT DOES NOT DRINK
HOW MANY STANDARD DRINKS CONTAINING ALCOHOL DO YOU HAVE ON A TYPICAL DAY: 1 OR 2
HOW OFTEN DO YOU HAVE A DRINK CONTAINING ALCOHOL: NEVER

## 2022-10-12 NOTE — ED PROVIDER NOTES
problems, self-injury and sleep disturbance. All other systems reviewed and are negative. Except as noted above the remainder of the review of systems was reviewed and negative. PAST MEDICAL HISTORY     Past Medical History:   Diagnosis Date    Diabetes mellitus ([facility] [address]     Hemodialysis access, fistula mature ([facility] [address] 12/2002    Hypertension     Seizures ([facility] [address]     no seizure since 1995         SURGICALHISTORY       Past Surgical History:   Procedure Laterality Date    BRAIN SURGERY Left 12/06/1990    to eliminate seizures    KIDNEY TRANSPLANT  2015    [place] [address] INCL FLUOR GDNCE DX W/CELL WASHG SPX N/A 3/20/2018    BRONCHOSCOPY performed by Bryanna Toledo MD at [address] [place] [address]       Previous Medications    ASPIRIN 81 MG EC TABLET    Take 81 mg by mouth daily     CONTINUOUS BLOOD GLUC SENSOR (FREESTYLE KEV 14 DAY SENSOR) MISC    Every 2 weeks    CONTINUOUS BLOOD GLUC SENSOR (FREESTYLE KEV 14 DAY SENSOR) MISC    Every 2 weeks Dx E11.65    CONTINUOUS BLOOD GLUC TRANSMIT (DEXCOM G6 TRANSMITTER) MISC    Change every 3 months    CYCLOBENZAPRINE (FLEXERIL) 10 MG TABLET    Take 10 mg by mouth daily as needed Indications: as needed. CYCLOSPORINE MODIFIED (NEORAL) 25 MG CAPSULE    Take 75 mg by mouth 2 times daily     DULAGLUTIDE (TRULICITY) 1.5 KE/9.3RP SOPN    Inject 1.5 mg into the skin once a week Weekly on Monday    FUROSEMIDE (LASIX) 40 MG TABLET    Take 40 mg by mouth daily Indications: 1/2 to 1 tab. as needed for leg swelling     GABAPENTIN (NEURONTIN) 300 MG CAPSULE    Take 300 mg by mouth 2 times daily.  Indications: 1 capsule in am, 2 capsule in pm     INSULIN NPH (NOVOLIN N FLEXPEN) 100 UNIT/ML INJECTION PEN    16 units at bedtime    INSULIN REGULAR HUMAN (NOVOLIN R FLEXPEN) 100 UNIT/ML SOPN    6 units am 8 units lunch and dinner    METOPROLOL TARTRATE (LOPRESSOR) 100 MG TABLET    Take 1 tablet by mouth 2 times daily    MYCOPHENOLATE (CELLCEPT) 250 MG CAPSULE    Take 750 mg by mouth 2 times daily     NITROGLYCERIN (NITROSTAT) 0.4 MG SL TABLET    up to max of 3 total doses. If no relief after 1 dose, call 911. PANTOPRAZOLE (PROTONIX) 40 MG TABLET    Take 40 mg by mouth daily     PREDNISONE (DELTASONE) 5 MG TABLET    Take 5 mg by mouth daily    SIMVASTATIN (ZOCOR) 10 MG TABLET    Take 10 mg by mouth nightly     TAMSULOSIN (FLOMAX) 0.4 MG CAPSULE    Take 1 capsule by mouth daily    WARFARIN (COUMADIN) 5 MG TABLET    Take 1 tablet by mouth daily            Patient has no known allergies. FAMILY HISTORY     History reviewed. No pertinent family history. SOCIAL HISTORY       Social History     Socioeconomic History    Marital status:      Spouse name: None    Number of children: None    Years of education: None    Highest education level: None   Tobacco Use    Smoking status: Former     Packs/day: 0.25     Types: Cigarettes     Quit date: 2015     Years since quittin.3    Smokeless tobacco: Former   Vaping Use    Vaping Use: Never used   Substance and Sexual Activity    Alcohol use: No     Alcohol/week: 0.0 standard drinks    Drug use: No       SCREENINGS   [place] Coma Scale  Eye Opening: Spontaneous  Best Verbal Response: Oriented  Best Motor Response: Obeys commands  [place] Coma Scale Score: 15  Ebola Virus Disease (EVD) Screening   Temp: 98 °F (36.7 °C)  CIWA Assessment  BP: (!) 163/74  Heart Rate: 68    PHYSICAL EXAM    (up to 7 for level 4, 8 or more for level 5)     ED Triage Vitals [10/12/22 1348]   BP Temp Temp Source Heart Rate Resp SpO2 Height Weight   (!) 142/68 98 °F (36.7 °C) Oral 67 20 100 % 6' 2\" (1.88 m) 192 lb (87.1 kg)       Physical Exam  Vitals and nursing note reviewed. Constitutional:       General: He is not in acute distress. Appearance: He is well-developed. HENT:      Head: Normocephalic and atraumatic.       Right Ear: External ear normal.      Left Ear: External ear normal.      Nose: Nose normal.      Mouth/Throat:      Mouth: Mucous membranes are moist.   Eyes:      General:         Right eye: No discharge. Left eye: No discharge. Pupils: Pupils are equal, round, and reactive to light. Cardiovascular:      Rate and Rhythm: Normal rate and regular rhythm. Pulses: Normal pulses. Heart sounds: Normal heart sounds. Pulmonary:      Effort: Pulmonary effort is normal. No respiratory distress. Breath sounds: No stridor. Decreased breath sounds present. No wheezing, rhonchi or rales. Chest:      Chest wall: No tenderness. Abdominal:      General: Bowel sounds are normal. There is no distension. Palpations: Abdomen is soft. Musculoskeletal:         General: Normal range of motion. Cervical back: Normal range of motion and neck supple. Right lower leg: Edema present. Left lower leg: Edema present. Skin:     General: Skin is warm. Findings: No erythema. Neurological:      Mental Status: He is alert and oriented to person, place, and time. Psychiatric:         Mood and Affect: Mood normal.       RESULTS     EKG: All EKG's are interpreted by the Emergency Department Physician who either signs or Co-signsthis chart in the absence of a cardiologist.     Nsr rate 65 neg st elevation pr 172ms, qrs 72 ms qt 408ms    RADIOLOGY:   Non-plain filmimages such as CT, Ultrasound and MRI are read by the radiologist. Plain radiographic images are visualized and preliminarily interpreted by the emergency physician with the below findings:         Interpretation per the Radiologist below, if available at the time ofthis note:    XR CHEST PORTABLE   Final Result   No acute process.                ED BEDSIDE ULTRASOUND:   Performed by ED Physician - none    LABS:  Labs Reviewed   COMPREHENSIVE METABOLIC PANEL - Abnormal; Notable for the following components:       Result Value    Chloride 110 (*)     CO2 13 (*)     Glucose 269 (*)     BUN 66 (*)     Creatinine 2.71 (*)     GFR Non-African American 23.3 (*)     GFR  28.2 (*)     All other components within normal limits   CBC WITH AUTO DIFFERENTIAL - Abnormal; Notable for the following components:    RBC 2.78 (*)     Hemoglobin 7.8 (*)     Hematocrit 24.5 (*)     MCHC 31.7 (*)     RDW 15.5 (*)     Neutrophils Absolute 9.6 (*)     Lymphocytes Absolute 0.4 (*)     All other components within normal limits   TROPONIN - Abnormal; Notable for the following components:    Troponin 0.035 (*)     All other components within normal limits    Narrative:     CALL  [place]  [facility] tel. [phone],  Troponin results called to and read back by Jessica Garces, 10/12/2022  16:36, by David Hansen - Abnormal; Notable for the following components:    Protime 32.0 (*)     All other components within normal limits   MAGNESIUM   BRAIN NATRIURETIC PEPTIDE    Narrative:     CALL  [place]  [facility] tel. [phone],  Troponin results called to and read back by Jessica Garces, 10/12/2022  16:36, by Gamal Guzmán   APTT   TSH    Narrative:     CALL  [facility] tel. [phone],  Troponin results called to and read back by Jessica Garces, 10/12/2022  16:36, by Gamal Guzmán       All other labs were within normal range or not returned as of this dictation. EMERGENCY DEPARTMENT COURSE and DIFFERENTIAL DIAGNOSIS/MDM:   Vitals:    Vitals:    10/12/22 1348 10/12/22 1430 10/12/22 1500 10/12/22 1530   BP: (!) 142/68 (!) 148/60 (!) 155/58 (!) 163/74   Pulse: 67 68 71 68   Resp: 20 18 28 17   Temp: 98 °F (36.7 °C)      TempSrc: Oral      SpO2: 100% 100% 100% 100%   Weight: 192 lb (87.1 kg)      Height: 6' 2\" (1.88 m)               MDM  Number of Diagnoses or Management Options  Chronic kidney disease, unspecified CKD stage  Exertional dyspnea  Hyperglycemia  Peripheral edema  Diagnosis management comments: Reviewed with Dr. Vipin San.   Will disposition home patient resting comfortably will take 20 mg Lasix today he takes this intermittently when needed for peripheral edema he is to hold activities including PT until followed up with Dr. Santino Estrella and Dr. Guillen Files and/or Complexity of Data Reviewed  Clinical lab tests: reviewed and ordered  Tests in the radiology section of CPT®: reviewed and ordered  Discuss the patient with other providers: yes        CRITICAL CARE TIME        CONSULTS:  None    PROCEDURES:  Unless otherwise noted below, none     Procedures    FINAL IMPRESSION      1. Exertional dyspnea    2. Peripheral edema    3. Hyperglycemia    4.  Chronic kidney disease, unspecified CKD stage          DISPOSITION/PLAN   DISPOSITION Decision To Discharge 10/12/2022 05:54:07 PM      PATIENT REFERRED TO:  Rachel Mosqueda DO  [address] [facility] 441 0444    Call in 1 day      Josette Gusman MD  [address] [address]  [phone]    Call in 1 day      [facility]) ED  [address] [facility]  [phone]  Go to   If symptoms worsen    DISCHARGE MEDICATIONS:  New Prescriptions    No medications on file          (Please note that portions of this note were completed with a voice recognition program.  Efforts were made to edit the dictations but occasionally words are mis-transcribed.)    Arie Esquivel PA-C (electronically signed)  Attending Emergency Physician        Arie Esquivel PA-C  10/12/22 4839

## 2022-10-12 NOTE — DISCHARGE INSTRUCTIONS
Follow up With primary care and cardiology. Take Lasix today as discussed for peripheral edema return to ER if any symptoms worsen or new symptoms well.

## 2022-10-14 ENCOUNTER — OFFICE VISIT (OUTPATIENT)
Dept: CARDIOLOGY CLINIC | Age: 72
End: 2022-10-14
Payer: COMMERCIAL

## 2022-10-14 VITALS
HEART RATE: 75 BPM | WEIGHT: 195.4 LBS | SYSTOLIC BLOOD PRESSURE: 160 MMHG | DIASTOLIC BLOOD PRESSURE: 72 MMHG | BODY MASS INDEX: 25.09 KG/M2 | OXYGEN SATURATION: 100 %

## 2022-10-14 DIAGNOSIS — I48.91 ATRIAL FIBRILLATION WITH RVR (HCC): ICD-10-CM

## 2022-10-14 DIAGNOSIS — E78.5 DYSLIPIDEMIA: ICD-10-CM

## 2022-10-14 DIAGNOSIS — I10 ESSENTIAL HYPERTENSION: Primary | ICD-10-CM

## 2022-10-14 DIAGNOSIS — N18.9 CHRONIC KIDNEY DISEASE, UNSPECIFIED CKD STAGE: ICD-10-CM

## 2022-10-14 PROCEDURE — 99214 OFFICE O/P EST MOD 30 MIN: CPT | Performed by: INTERNAL MEDICINE

## 2022-10-14 PROCEDURE — 1123F ACP DISCUSS/DSCN MKR DOCD: CPT | Performed by: INTERNAL MEDICINE

## 2022-10-14 RX ORDER — CARVEDILOL 12.5 MG/1
12.5 TABLET ORAL 2 TIMES DAILY
Qty: 180 TABLET | Refills: 3 | Status: ON HOLD
Start: 2022-10-14 | End: 2022-10-27 | Stop reason: HOSPADM

## 2022-10-14 ASSESSMENT — ENCOUNTER SYMPTOMS
WHEEZING: 0
SHORTNESS OF BREATH: 0
EYES NEGATIVE: 1
NAUSEA: 0
RESPIRATORY NEGATIVE: 1
STRIDOR: 0
BLOOD IN STOOL: 0
COUGH: 0
CHEST TIGHTNESS: 0
GASTROINTESTINAL NEGATIVE: 1

## 2022-10-14 NOTE — PROGRESS NOTES
Subsequent Progress Note  Patient: Dena Kaplan  YOB: 1950  MRN: [id number]    Chief Complaint: CKD kidney transplant AF  Chief Complaint   Patient presents with    Follow-Up from Hospital     Pt states that he is sleeping a lot. Loose stools   Light headed   Pain in back and front shoulders        CV Data:   Kidney Transplant     spect negative ef 75   Echo eF 72    Subjective/HPI: recent hosp for rapid AF. Started warfarin INR 3.1  Tried and cold all the time. Minimally active no cp   Having significant diarrhea and thinks it is related to Metoprolol. EKG: SR 72    Lives w wife  Nonsmoker  No etoh  Work -    Past Medical History:   Diagnosis Date    Diabetes mellitus ([facility] [address]     Hemodialysis access, fistula mature ([facility] [address] 2002    Hypertension     Seizures ([facility] [address]     no seizure since        Past Surgical History:   Procedure Laterality Date    BRAIN SURGERY Left 1990    to eliminate seizures    KIDNEY TRANSPLANT      [place] [address] INCL FLUOR GDNCE DX W/CELL WASHG SPX N/A 3/20/2018    BRONCHOSCOPY performed by Simon Carbajal MD at [facility]       No family history on file.     Social History     Socioeconomic History    Marital status:    Tobacco Use    Smoking status: Former     Packs/day: 0.25     Types: Cigarettes     Quit date: 2015     Years since quittin.3    Smokeless tobacco: Former   Vaping Use    Vaping Use: Never used   Substance and Sexual Activity    Alcohol use: No     Alcohol/week: 0.0 standard drinks    Drug use: No       No Known Allergies    Current Outpatient Medications   Medication Sig Dispense Refill    carvedilol (COREG) 12.5 MG tablet Take 1 tablet by mouth 2 times daily 180 tablet 3    warfarin (COUMADIN) 5 MG tablet Take 1 tablet by mouth daily 30 tablet 3    predniSONE (DELTASONE) 5 MG tablet Take 5 mg by mouth daily      Dulaglutide (TRULICITY) 1.5 BJ/6.0VY SOPN Inject 1.5 mg into the skin once a week Weekly on Monday      tamsulosin (FLOMAX) 0.4 MG capsule Take 1 capsule by mouth daily (Patient taking differently: Take 0.4 mg by mouth 2 times daily) 90 capsule 0    Continuous Blood Gluc Transmit (DEXCOM G6 TRANSMITTER) MISC Change every 3 months 1 each 3    Continuous Blood Gluc Sensor (FREESTYLE KEV 14 DAY SENSOR) MISC Every 2 weeks Dx E11.65 2 each 06    insulin NPH (NOVOLIN N FLEXPEN) 100 UNIT/ML injection pen 16 units at bedtime (Patient taking differently: 9 units in AM, 5 units at bedtime) 5 pen 3    Insulin Regular Human (NOVOLIN R FLEXPEN) 100 UNIT/ML SOPN 6 units am 8 units lunch and dinner (Patient taking differently: Sliding Scale) 5 pen 3    Continuous Blood Gluc Sensor (FREESTYLE KEV 14 DAY SENSOR) MISC Every 2 weeks 2 each 06    pantoprazole (PROTONIX) 40 MG tablet Take 40 mg by mouth daily       furosemide (LASIX) 40 MG tablet Take 40 mg by mouth daily Indications: 1/2 to 1 tab. as needed for leg swelling       aspirin 81 MG EC tablet Take 81 mg by mouth daily   3    cyclobenzaprine (FLEXERIL) 10 MG tablet Take 10 mg by mouth daily as needed Indications: as needed. 0    cycloSPORINE modified (NEORAL) 25 MG capsule Take 75 mg by mouth 2 times daily   2    gabapentin (NEURONTIN) 300 MG capsule Take 300 mg by mouth 2 times daily. Indications: 1 capsule in am, 2 capsule in pm   2    mycophenolate (CELLCEPT) 250 MG capsule Take 750 mg by mouth 2 times daily   1    simvastatin (ZOCOR) 10 MG tablet Take 10 mg by mouth nightly   1    nitroGLYCERIN (NITROSTAT) 0.4 MG SL tablet up to max of 3 total doses. If no relief after 1 dose, call 911. (Patient not taking: Reported on 10/14/2022) 25 tablet 3     No current facility-administered medications for this visit. Review of Systems:   Review of Systems   Constitutional:  Positive for fatigue. Negative for diaphoresis. HENT: Negative. Eyes: Negative. Respiratory: Negative.   Negative for cough, chest tightness, shortness of breath, wheezing and stridor. Cardiovascular: Negative. Negative for chest pain, palpitations and leg swelling. Gastrointestinal: Negative. Negative for blood in stool and nausea. Genitourinary: Negative. Musculoskeletal: Negative. Skin: Negative. Neurological:  Positive for weakness. Negative for dizziness, syncope and light-headedness. Hematological: Negative. Psychiatric/Behavioral: Negative. Physical Examination:    BP (!) 160/72 (Site: Left Upper Arm, Position: Sitting, Cuff Size: Medium Adult)   Pulse 75   Wt 195 lb 6.4 oz (88.6 kg)   SpO2 100%   BMI 25.09 kg/m²    Physical Exam   Constitutional: He appears healthy. No distress. HENT:   Normal cephalic and Atraumatic   Eyes: Pupils are equal, round, and reactive to light. Neck: Thyroid normal. No JVD present. No neck adenopathy. No thyromegaly present. Cardiovascular: Normal rate, regular rhythm, normal heart sounds, intact distal pulses and normal pulses. Pulmonary/Chest: Effort normal and breath sounds normal. He has no wheezes. He has no rales. He exhibits no tenderness. Abdominal: Soft. Bowel sounds are normal. There is no abdominal tenderness. Musculoskeletal:         General: No tenderness or edema. Normal range of motion. Cervical back: Normal range of motion and neck supple. Neurological: He is alert and oriented to person, place, and time. Skin: Skin is warm. No cyanosis. Nails show no clubbing.      LABS:  CBC:   Lab Results   Component Value Date/Time    WBC 10.8 10/12/2022 02:45 PM    RBC 2.78 10/12/2022 02:45 PM    RBC 4.18 09/15/2011 05:00 AM    HGB 7.8 10/12/2022 02:45 PM    HCT 24.5 10/12/2022 02:45 PM    MCV 88.3 10/12/2022 02:45 PM    MCH 28.0 10/12/2022 02:45 PM    MCHC 31.7 10/12/2022 02:45 PM    RDW 15.5 10/12/2022 02:45 PM     10/12/2022 02:45 PM    MPV 8.8 09/15/2011 05:00 AM     Lipids:  Lab Results   Component Value Date    CHOL 191 09/11/2022    CHOL 193 04/25/2022    CHOL 184 04/16/2021 Lab Results   Component Value Date    TRIG 188 (H) 09/11/2022    TRIG 174 (H) 04/25/2022    TRIG 175 (H) 04/16/2021     Lab Results   Component Value Date    HDL 40 09/11/2022    HDL 44 04/25/2022    HDL 51 04/16/2021     Lab Results   Component Value Date    LDLCALC 113 09/11/2022    LDLCALC 114 04/25/2022    LDLCALC 98 04/16/2021     No results found for: LABVLDL, VLDL  No results found for: CHOLHDLRATIO  CMP:    Lab Results   Component Value Date/Time     10/12/2022 02:45 PM    K 4.4 10/12/2022 02:45 PM    K 4.8 10/04/2022 04:15 AM     10/12/2022 02:45 PM    CO2 13 10/12/2022 02:45 PM    BUN 66 10/12/2022 02:45 PM    CREATININE 2.71 10/12/2022 02:45 PM    GFRAA 28.2 10/12/2022 02:45 PM    LABGLOM 23.3 10/12/2022 02:45 PM    GLUCOSE 269 10/12/2022 02:45 PM    GLUCOSE 172 09/16/2011 06:25 AM    PROT 6.9 10/12/2022 02:45 PM    LABALBU 4.3 10/12/2022 02:45 PM    LABALBU 3.9 09/15/2011 05:00 AM    CALCIUM 9.1 10/12/2022 02:45 PM    BILITOT 0.5 10/12/2022 02:45 PM    ALKPHOS 60 10/12/2022 02:45 PM    AST 8 10/12/2022 02:45 PM    ALT 7 10/12/2022 02:45 PM     BMP:    Lab Results   Component Value Date/Time     10/12/2022 02:45 PM    K 4.4 10/12/2022 02:45 PM    K 4.8 10/04/2022 04:15 AM     10/12/2022 02:45 PM    CO2 13 10/12/2022 02:45 PM    BUN 66 10/12/2022 02:45 PM    LABALBU 4.3 10/12/2022 02:45 PM    LABALBU 3.9 09/15/2011 05:00 AM    CREATININE 2.71 10/12/2022 02:45 PM    CALCIUM 9.1 10/12/2022 02:45 PM    GFRAA 28.2 10/12/2022 02:45 PM    LABGLOM 23.3 10/12/2022 02:45 PM    GLUCOSE 269 10/12/2022 02:45 PM    GLUCOSE 172 09/16/2011 06:25 AM     Magnesium:    Lab Results   Component Value Date/Time    MG 1.9 10/12/2022 02:45 PM     TSH:  Lab Results   Component Value Date    TSH 0.469 10/12/2022       Patient Active Problem List   Diagnosis    Pneumonia    Abdominal pain, other specified site    Acute pyelonephritis without lesion of renal medullary necrosis    Anemia    Essential hypertension    Nonspecific abnormal results of thyroid function study    Mixed hyperlipidemia    Kidney replaced by transplant    Intra-abdominal abscess post-procedure    Infection due to Enterococcus    Fever and other physiologic disturbances of temperature regulation    Chronic kidney disease (CKD)    Type II or unspecified type diabetes mellitus without mention of complication, uncontrolled    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)    Atypical chest pain    Chronic cough    Unstable angina (HCC)    Chest pain    Atrial fibrillation with RVR ([facility] [address]       Medications Discontinued During This Encounter   Medication Reason    metoprolol tartrate (LOPRESSOR) 100 MG tablet        Modified Medications    No medications on file       Orders Placed This Encounter   Medications    carvedilol (COREG) 12.5 MG tablet     Sig: Take 1 tablet by mouth 2 times daily     Dispense:  180 tablet     Refill:  3       Assessment/Plan:    1. Essential hypertension   Elevated. Low salt diet resume coreg 12.5 bid. Dc Meoprolol due to Diarrhea. 2. Chronic kidney disease, unspecified CKD stage   Transplanted on 2 occasions. 3. Atrial fibrillation with RVR (HCC)   Warfarin and rate control. 4. Dyslipidemia   Statin. Low fat diet. F/u labs. Counseling:  Heart Healthy Lifestyle, Low Salt Diet, Take Precautions to Prevent Falls, and Walk Daily    Return in about 3 months (around 1/14/2023).       Electronically signed by Dmitriy Venegas MD on 10/14/2022 at 2:36 PM

## 2022-10-15 LAB
EKG ATRIAL RATE: 65 BPM
EKG P AXIS: 68 DEGREES
EKG P-R INTERVAL: 172 MS
EKG Q-T INTERVAL: 408 MS
EKG QRS DURATION: 72 MS
EKG QTC CALCULATION (BAZETT): 424 MS
EKG R AXIS: 27 DEGREES
EKG T AXIS: 13 DEGREES
EKG VENTRICULAR RATE: 65 BPM

## 2022-10-15 PROCEDURE — 93010 ELECTROCARDIOGRAM REPORT: CPT | Performed by: INTERNAL MEDICINE

## 2022-10-17 ENCOUNTER — TELEPHONE (OUTPATIENT)
Dept: CARDIOLOGY CLINIC | Age: 72
End: 2022-10-17

## 2022-10-17 ENCOUNTER — HOSPITAL ENCOUNTER (OUTPATIENT)
Dept: PHARMACY | Age: 72
Setting detail: THERAPIES SERIES
Discharge: HOME | End: 2022-10-17
Payer: COMMERCIAL

## 2022-10-17 DIAGNOSIS — I48.91 ATRIAL FIBRILLATION WITH RVR (HCC): Primary | ICD-10-CM

## 2022-10-17 LAB — INTERNATIONAL NORMALIZATION RATIO, POC: 2.9

## 2022-10-17 PROCEDURE — 99211 OFF/OP EST MAY X REQ PHY/QHP: CPT | Performed by: PHARMACIST

## 2022-10-17 PROCEDURE — 85610 PROTHROMBIN TIME: CPT | Performed by: PHARMACIST

## 2022-10-17 NOTE — TELEPHONE ENCOUNTER
Pt wife calling to let Dr Delfina Perez know that pt is still having SOB- fine once sitting, fell during the night last night- bumped his head, pt is refusing to go to the ER, currently at coumadin clinic- 2.9.       Pt # [phone]- [id number]

## 2022-10-17 NOTE — PROGRESS NOTES
Mr. Dave Antonio is a 80 y.o. male with history of Afib who presents today for anticoagulation monitoring and adjustment. Face to face visit completed, procedural mask worn by myself as instructed: Mask worn by patient, room cleaned pre and post visit with Virex Plus spray    INR 2.9 is therapeutic for this patient (goal range 2-3) and is reflective of 25 mg TWD  Patient's current dosing schedule is 27.5 TWD  Patient verifies current dosing regimen, patient able to verbally recall dose    Patient reports no missed doses in the last seven days but held a dose 7 days ago because of an INR of 3.5    Patient denies s/sx clotting and/or stroke  Patient denies hematuria, epistaxis, rectal bleeding  Patient denies changes in diet. Discussed the role of salads in patients diet. Patient would like to add some audrey slaw to his weekly menu. Due to his INR being at the top of his range, I do not feel this will be a problem. Discussed current medications and drug allergies with patient, updated any medication additions or modifications accordingly    Patient denies any pending medical procedures scheduled at this time  Patient denies any pending dental procedures scheduled at this time    Patient was instructed to continue 27.5mg TWD and RTC in 2 weeks    For Pharmacy Admin Tracking Only    Intervention Detail: Adherence Monitorin  Total # of Interventions Recommended: 1  Total # of Interventions Accepted: 1  Time Spent (min): [address] JORGE Blackburn Ph. 10/17/2022 2:18 PM

## 2022-10-19 ENCOUNTER — APPOINTMENT (OUTPATIENT)
Dept: GENERAL RADIOLOGY | Age: 72
DRG: 393 | End: 2022-10-19
Payer: COMMERCIAL

## 2022-10-19 ENCOUNTER — HOSPITAL ENCOUNTER (INPATIENT)
Age: 72
LOS: 9 days | Discharge: NURSING, REHAB OR LONG-TERM CARE | DRG: 393 | End: 2022-10-28
Attending: STUDENT IN AN ORGANIZED HEALTH CARE EDUCATION/TRAINING PROGRAM | Admitting: INTERNAL MEDICINE
Payer: COMMERCIAL

## 2022-10-19 ENCOUNTER — APPOINTMENT (OUTPATIENT)
Dept: CT IMAGING | Age: 72
DRG: 393 | End: 2022-10-19
Payer: COMMERCIAL

## 2022-10-19 DIAGNOSIS — E11.65 TYPE 2 DIABETES MELLITUS WITH HYPERGLYCEMIA, UNSPECIFIED WHETHER LONG TERM INSULIN USE (HCC): ICD-10-CM

## 2022-10-19 DIAGNOSIS — Z94.0 HISTORY OF RENAL TRANSPLANT: ICD-10-CM

## 2022-10-19 DIAGNOSIS — S06.9X9A HEAD INJURY, CLOSED, WITH LOC OF UNKNOWN DURATION (HCC): ICD-10-CM

## 2022-10-19 DIAGNOSIS — D64.9 SYMPTOMATIC ANEMIA: Primary | ICD-10-CM

## 2022-10-19 DIAGNOSIS — R91.8 OPACITY OF LUNG ON IMAGING STUDY: ICD-10-CM

## 2022-10-19 DIAGNOSIS — N28.89 RIGHT RENAL MASS: ICD-10-CM

## 2022-10-19 DIAGNOSIS — Z79.01 CHRONIC ANTICOAGULATION: ICD-10-CM

## 2022-10-19 DIAGNOSIS — M25.561 ACUTE PAIN OF BOTH KNEES: ICD-10-CM

## 2022-10-19 DIAGNOSIS — K92.2 ACUTE UPPER GI BLEED: ICD-10-CM

## 2022-10-19 DIAGNOSIS — K86.89 PANCREATIC MASS: ICD-10-CM
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
## 2022-10-19 DIAGNOSIS — R77.8 ELEVATED TROPONIN: ICD-10-CM

## 2022-10-19 DIAGNOSIS — N18.32 CHRONIC RENAL FAILURE, STAGE 3B (HCC): ICD-10-CM
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
## 2022-10-19 DIAGNOSIS — M25.562 ACUTE PAIN OF BOTH KNEES: ICD-10-CM

## 2022-10-19 LAB
ABO/RH: NORMAL
ALBUMIN SERPL-MCNC: 3.8 G/DL (ref 3.5–4.6)
ALP BLD-CCNC: 53 U/L (ref 35–104)
ALT SERPL-CCNC: 8 U/L (ref 0–41)
AMPHETAMINE SCREEN, URINE: NORMAL
ANION GAP SERPL CALCULATED.3IONS-SCNC: 15 MEQ/L (ref 9–15)
ANISOCYTOSIS: ABNORMAL
ANTIBODY SCREEN: NORMAL
APTT: 30 SEC (ref 24.4–36.8)
AST SERPL-CCNC: 8 U/L (ref 0–40)
BACTERIA: ABNORMAL /HPF
BANDED NEUTROPHILS RELATIVE PERCENT: 1 %
BARBITURATE SCREEN URINE: NORMAL
BASOPHILS ABSOLUTE: 0.1 K/UL (ref 0–0.2)
BASOPHILS RELATIVE PERCENT: 1 %
BENZODIAZEPINE SCREEN, URINE: NORMAL
BILIRUB SERPL-MCNC: 0.4 MG/DL (ref 0.2–0.7)
BILIRUBIN URINE: NEGATIVE
BLOOD, URINE: ABNORMAL
BUN BLDV-MCNC: 91 MG/DL (ref 8–23)
C-REACTIVE PROTEIN, HIGH SENSITIVITY: 3.4 MG/L (ref 0–5)
CALCIUM SERPL-MCNC: 8.6 MG/DL (ref 8.5–9.9)
CANNABINOID SCREEN URINE: NORMAL
CHLORIDE BLD-SCNC: 111 MEQ/L (ref 95–107)
CLARITY: CLEAR
CO2: 15 MEQ/L (ref 20–31)
COCAINE METABOLITE SCREEN URINE: NORMAL
COLOR: YELLOW
CREAT SERPL-MCNC: 2.99 MG/DL (ref 0.7–1.2)
EOSINOPHILS ABSOLUTE: 0.1 K/UL (ref 0–0.7)
EOSINOPHILS RELATIVE PERCENT: 1 %
EPITHELIAL CELLS, UA: ABNORMAL /HPF (ref 0–5)
ETHANOL PERCENT: NORMAL G/DL
ETHANOL: <10 MG/DL (ref 0–0.08)
FENTANYL SCREEN, URINE: NORMAL
GFR SERPL CREATININE-BSD FRML MDRD: 21.6 ML/MIN/{1.73_M2}
GLOBULIN: 2.4 G/DL (ref 2.3–3.5)
GLUCOSE BLD-MCNC: 232 MG/DL (ref 70–99)
GLUCOSE BLD-MCNC: 274 MG/DL (ref 70–99)
GLUCOSE BLD-MCNC: 317 MG/DL (ref 70–99)
GLUCOSE URINE: NEGATIVE MG/DL
HBA1C MFR BLD: 8.5 % (ref 4.8–5.9)
HCT VFR BLD CALC: 18.8 % (ref 42–52)
HCT VFR BLD CALC: 19.6 % (ref 42–52)
HCT VFR BLD CALC: 19.9 % (ref 42–52)
HEMOGLOBIN: 6.3 G/DL (ref 14–18)
HEMOGLOBIN: 6.3 G/DL (ref 14–18)
HEMOGLOBIN: 6.4 G/DL (ref 14–18)
HYALINE CASTS: ABNORMAL /HPF (ref 0–5)
HYPOCHROMIA: ABNORMAL
INR BLD: 3.1
KETONES, URINE: NEGATIVE MG/DL
LACTIC ACID: 1 MMOL/L (ref 0.5–2.2)
LEUKOCYTE ESTERASE, URINE: ABNORMAL
LYMPHOCYTES ABSOLUTE: 0.8 K/UL (ref 1–4.8)
LYMPHOCYTES RELATIVE PERCENT: 9 %
Lab: NORMAL
MAGNESIUM: 1.7 MG/DL (ref 1.7–2.4)
MCH RBC QN AUTO: 28.4 PG (ref 27–31.3)
MCHC RBC AUTO-ENTMCNC: 32.1 % (ref 33–37)
MCV RBC AUTO: 88.4 FL (ref 79–92.2)
METHADONE SCREEN, URINE: NORMAL
MICROCYTES: ABNORMAL
MONOCYTES ABSOLUTE: 0.5 K/UL (ref 0.2–0.8)
MONOCYTES RELATIVE PERCENT: 6.4 %
NEUTROPHILS ABSOLUTE: 7.3 K/UL (ref 1.4–6.5)
NEUTROPHILS RELATIVE PERCENT: 82 %
NITRITE, URINE: NEGATIVE
OPIATE SCREEN URINE: NORMAL
OXYCODONE URINE: NORMAL
PDW BLD-RTO: 16.1 % (ref 11.5–14.5)
PERFORMED ON: ABNORMAL
PERFORMED ON: ABNORMAL
PH UA: 5.5 (ref 5–9)
PHENCYCLIDINE SCREEN URINE: NORMAL
PLATELET # BLD: 193 K/UL (ref 130–400)
PLATELET SLIDE REVIEW: NORMAL
POIKILOCYTES: ABNORMAL
POLYCHROMASIA: ABNORMAL
POTASSIUM SERPL-SCNC: 3.8 MEQ/L (ref 3.4–4.9)
PRO-BNP: 1953 PG/ML
PROCALCITONIN: 0.11 NG/ML (ref 0–0.15)
PROPOXYPHENE SCREEN: NORMAL
PROTEIN UA: NEGATIVE MG/DL
PROTHROMBIN TIME: 32.3 SEC (ref 12.3–14.9)
RBC # BLD: 2.25 M/UL (ref 4.7–6.1)
RBC UA: ABNORMAL /HPF (ref 0–5)
SODIUM BLD-SCNC: 141 MEQ/L (ref 135–144)
SPECIFIC GRAVITY UA: 1.01 (ref 1–1.03)
TOTAL CK: 36 U/L (ref 0–190)
TOTAL PROTEIN: 6.2 G/DL (ref 6.3–8)
TROPONIN: 0.06 NG/ML (ref 0–0.01)
TSH SERPL DL<=0.05 MIU/L-ACNC: 0.57 UIU/ML (ref 0.44–3.86)
URINE REFLEX TO CULTURE: YES
UROBILINOGEN, URINE: 0.2 E.U./DL
WBC # BLD: 8.8 K/UL (ref 4.8–10.8)
WBC UA: ABNORMAL /HPF (ref 0–5)

## 2022-10-19 PROCEDURE — 2580000003 HC RX 258: Performed by: STUDENT IN AN ORGANIZED HEALTH CARE EDUCATION/TRAINING PROGRAM

## 2022-10-19 PROCEDURE — 93005 ELECTROCARDIOGRAM TRACING: CPT | Performed by: STUDENT IN AN ORGANIZED HEALTH CARE EDUCATION/TRAINING PROGRAM

## 2022-10-19 PROCEDURE — 82550 ASSAY OF CK (CPK): CPT

## 2022-10-19 PROCEDURE — 85730 THROMBOPLASTIN TIME PARTIAL: CPT

## 2022-10-19 PROCEDURE — 99222 1ST HOSP IP/OBS MODERATE 55: CPT | Performed by: SPECIALIST

## 2022-10-19 PROCEDURE — 96374 THER/PROPH/DIAG INJ IV PUSH: CPT

## 2022-10-19 PROCEDURE — 2500000003 HC RX 250 WO HCPCS: Performed by: STUDENT IN AN ORGANIZED HEALTH CARE EDUCATION/TRAINING PROGRAM

## 2022-10-19 PROCEDURE — 6360000002 HC RX W HCPCS: Performed by: CLINICAL NURSE SPECIALIST

## 2022-10-19 PROCEDURE — 72128 CT CHEST SPINE W/O DYE: CPT

## 2022-10-19 PROCEDURE — 86850 RBC ANTIBODY SCREEN: CPT

## 2022-10-19 PROCEDURE — 85610 PROTHROMBIN TIME: CPT

## 2022-10-19 PROCEDURE — 82077 ASSAY SPEC XCP UR&BREATH IA: CPT

## 2022-10-19 PROCEDURE — 72131 CT LUMBAR SPINE W/O DYE: CPT

## 2022-10-19 PROCEDURE — 80053 COMPREHEN METABOLIC PANEL: CPT

## 2022-10-19 PROCEDURE — 36415 COLL VENOUS BLD VENIPUNCTURE: CPT

## 2022-10-19 PROCEDURE — 83880 ASSAY OF NATRIURETIC PEPTIDE: CPT

## 2022-10-19 PROCEDURE — 83735 ASSAY OF MAGNESIUM: CPT

## 2022-10-19 PROCEDURE — 86923 COMPATIBILITY TEST ELECTRIC: CPT

## 2022-10-19 PROCEDURE — A4216 STERILE WATER/SALINE, 10 ML: HCPCS | Performed by: STUDENT IN AN ORGANIZED HEALTH CARE EDUCATION/TRAINING PROGRAM

## 2022-10-19 PROCEDURE — 74177 CT ABD & PELVIS W/CONTRAST: CPT

## 2022-10-19 PROCEDURE — 86900 BLOOD TYPING SEROLOGIC ABO: CPT

## 2022-10-19 PROCEDURE — 70450 CT HEAD/BRAIN W/O DYE: CPT

## 2022-10-19 PROCEDURE — 87086 URINE CULTURE/COLONY COUNT: CPT

## 2022-10-19 PROCEDURE — P9016 RBC LEUKOCYTES REDUCED: HCPCS

## 2022-10-19 PROCEDURE — 84443 ASSAY THYROID STIM HORMONE: CPT

## 2022-10-19 PROCEDURE — 80307 DRUG TEST PRSMV CHEM ANLYZR: CPT

## 2022-10-19 PROCEDURE — 71275 CT ANGIOGRAPHY CHEST: CPT

## 2022-10-19 PROCEDURE — 2580000003 HC RX 258: Performed by: CLINICAL NURSE SPECIALIST

## 2022-10-19 PROCEDURE — 83036 HEMOGLOBIN GLYCOSYLATED A1C: CPT

## 2022-10-19 PROCEDURE — 6360000002 HC RX W HCPCS: Performed by: STUDENT IN AN ORGANIZED HEALTH CARE EDUCATION/TRAINING PROGRAM

## 2022-10-19 PROCEDURE — 85018 HEMOGLOBIN: CPT

## 2022-10-19 PROCEDURE — 73562 X-RAY EXAM OF KNEE 3: CPT

## 2022-10-19 PROCEDURE — C9113 INJ PANTOPRAZOLE SODIUM, VIA: HCPCS | Performed by: STUDENT IN AN ORGANIZED HEALTH CARE EDUCATION/TRAINING PROGRAM

## 2022-10-19 PROCEDURE — 85014 HEMATOCRIT: CPT

## 2022-10-19 PROCEDURE — 87077 CULTURE AEROBIC IDENTIFY: CPT

## 2022-10-19 PROCEDURE — 6830039000 HC L3 TRAUMA ALERT

## 2022-10-19 PROCEDURE — 86141 C-REACTIVE PROTEIN HS: CPT

## 2022-10-19 PROCEDURE — 6360000004 HC RX CONTRAST MEDICATION: Performed by: STUDENT IN AN ORGANIZED HEALTH CARE EDUCATION/TRAINING PROGRAM

## 2022-10-19 PROCEDURE — 6370000000 HC RX 637 (ALT 250 FOR IP): Performed by: CLINICAL NURSE SPECIALIST

## 2022-10-19 PROCEDURE — 81001 URINALYSIS AUTO W/SCOPE: CPT

## 2022-10-19 PROCEDURE — 84145 PROCALCITONIN (PCT): CPT

## 2022-10-19 PROCEDURE — 99285 EMERGENCY DEPT VISIT HI MDM: CPT

## 2022-10-19 PROCEDURE — 84484 ASSAY OF TROPONIN QUANT: CPT

## 2022-10-19 PROCEDURE — 86901 BLOOD TYPING SEROLOGIC RH(D): CPT

## 2022-10-19 PROCEDURE — 1210000000 HC MED SURG R&B

## 2022-10-19 PROCEDURE — 72125 CT NECK SPINE W/O DYE: CPT

## 2022-10-19 PROCEDURE — 83605 ASSAY OF LACTIC ACID: CPT

## 2022-10-19 PROCEDURE — 85025 COMPLETE CBC W/AUTO DIFF WBC: CPT

## 2022-10-19 RX ORDER — ACETAMINOPHEN 325 MG/1
650 TABLET ORAL EVERY 6 HOURS PRN
Status: DISCONTINUED | OUTPATIENT
Start: 2022-10-19 | End: 2022-10-28 | Stop reason: HOSPADM

## 2022-10-19 RX ORDER — PHYTONADIONE 5 MG/1
5 TABLET ORAL ONCE
Status: COMPLETED | OUTPATIENT
Start: 2022-10-19 | End: 2022-10-20

## 2022-10-19 RX ORDER — PROCHLORPERAZINE MALEATE 10 MG
10 TABLET ORAL EVERY 6 HOURS PRN
Status: DISCONTINUED | OUTPATIENT
Start: 2022-10-19 | End: 2022-10-28 | Stop reason: HOSPADM

## 2022-10-19 RX ORDER — ACETAMINOPHEN 650 MG/1
650 SUPPOSITORY RECTAL EVERY 6 HOURS PRN
Status: DISCONTINUED | OUTPATIENT
Start: 2022-10-19 | End: 2022-10-28 | Stop reason: HOSPADM

## 2022-10-19 RX ORDER — SODIUM CHLORIDE 0.9 % (FLUSH) 0.9 %
5-40 SYRINGE (ML) INJECTION PRN
Status: DISCONTINUED | OUTPATIENT
Start: 2022-10-19 | End: 2022-10-28 | Stop reason: HOSPADM

## 2022-10-19 RX ORDER — FENTANYL CITRATE 50 UG/ML
50 INJECTION, SOLUTION INTRAMUSCULAR; INTRAVENOUS ONCE
Status: COMPLETED | OUTPATIENT
Start: 2022-10-19 | End: 2022-10-19

## 2022-10-19 RX ORDER — DEXTROSE MONOHYDRATE 100 MG/ML
INJECTION, SOLUTION INTRAVENOUS CONTINUOUS PRN
Status: DISCONTINUED | OUTPATIENT
Start: 2022-10-19 | End: 2022-10-19 | Stop reason: SDUPTHER

## 2022-10-19 RX ORDER — ONDANSETRON 2 MG/ML
4 INJECTION INTRAMUSCULAR; INTRAVENOUS EVERY 6 HOURS PRN
Status: DISCONTINUED | OUTPATIENT
Start: 2022-10-19 | End: 2022-10-19 | Stop reason: ALTCHOICE

## 2022-10-19 RX ORDER — MYCOPHENOLATE MOFETIL 250 MG/1
750 CAPSULE ORAL 2 TIMES DAILY
Status: DISCONTINUED | OUTPATIENT
Start: 2022-10-19 | End: 2022-10-20

## 2022-10-19 RX ORDER — SODIUM CHLORIDE 9 MG/ML
INJECTION, SOLUTION INTRAVENOUS PRN
Status: DISCONTINUED | OUTPATIENT
Start: 2022-10-19 | End: 2022-10-28 | Stop reason: HOSPADM

## 2022-10-19 RX ORDER — INSULIN LISPRO 100 [IU]/ML
0-4 INJECTION, SOLUTION INTRAVENOUS; SUBCUTANEOUS NIGHTLY
Status: DISCONTINUED | OUTPATIENT
Start: 2022-10-19 | End: 2022-10-22

## 2022-10-19 RX ORDER — DEXTROSE MONOHYDRATE 100 MG/ML
INJECTION, SOLUTION INTRAVENOUS CONTINUOUS PRN
Status: DISCONTINUED | OUTPATIENT
Start: 2022-10-19 | End: 2022-10-28 | Stop reason: HOSPADM

## 2022-10-19 RX ORDER — ONDANSETRON 4 MG/1
4 TABLET, ORALLY DISINTEGRATING ORAL EVERY 8 HOURS PRN
Status: DISCONTINUED | OUTPATIENT
Start: 2022-10-19 | End: 2022-10-19 | Stop reason: ALTCHOICE

## 2022-10-19 RX ORDER — CYCLOSPORINE 25 MG/1
75 CAPSULE, LIQUID FILLED ORAL 2 TIMES DAILY
Status: DISCONTINUED | OUTPATIENT
Start: 2022-10-19 | End: 2022-10-28 | Stop reason: HOSPADM

## 2022-10-19 RX ORDER — CARVEDILOL 6.25 MG/1
6.25 TABLET ORAL 2 TIMES DAILY WITH MEALS
Status: ON HOLD | COMMUNITY
End: 2022-11-04 | Stop reason: HOSPADM

## 2022-10-19 RX ORDER — ATORVASTATIN CALCIUM 10 MG/1
10 TABLET, FILM COATED ORAL DAILY
Refills: 1 | Status: DISCONTINUED | OUTPATIENT
Start: 2022-10-19 | End: 2022-10-27

## 2022-10-19 RX ORDER — CARVEDILOL 6.25 MG/1
6.25 TABLET ORAL 2 TIMES DAILY
Status: DISCONTINUED | OUTPATIENT
Start: 2022-10-19 | End: 2022-10-28 | Stop reason: HOSPADM

## 2022-10-19 RX ORDER — CARVEDILOL 6.25 MG/1
TABLET ORAL
COMMUNITY
Start: 2022-10-15 | End: 2022-10-19

## 2022-10-19 RX ORDER — TAMSULOSIN HYDROCHLORIDE 0.4 MG/1
0.4 CAPSULE ORAL DAILY
Status: DISCONTINUED | OUTPATIENT
Start: 2022-10-20 | End: 2022-10-28 | Stop reason: HOSPADM

## 2022-10-19 RX ORDER — SODIUM CHLORIDE 9 MG/ML
INJECTION, SOLUTION INTRAVENOUS PRN
Status: COMPLETED | OUTPATIENT
Start: 2022-10-19 | End: 2022-10-19

## 2022-10-19 RX ORDER — GABAPENTIN 300 MG/1
300 CAPSULE ORAL 2 TIMES DAILY
Status: DISCONTINUED | OUTPATIENT
Start: 2022-10-19 | End: 2022-10-28 | Stop reason: HOSPADM

## 2022-10-19 RX ORDER — PROCHLORPERAZINE EDISYLATE 5 MG/ML
10 INJECTION INTRAMUSCULAR; INTRAVENOUS EVERY 6 HOURS PRN
Status: DISCONTINUED | OUTPATIENT
Start: 2022-10-19 | End: 2022-10-28 | Stop reason: HOSPADM

## 2022-10-19 RX ORDER — PREDNISONE 1 MG/1
5 TABLET ORAL DAILY
Status: DISCONTINUED | OUTPATIENT
Start: 2022-10-20 | End: 2022-10-28 | Stop reason: HOSPADM

## 2022-10-19 RX ORDER — FUROSEMIDE 40 MG/1
40 TABLET ORAL DAILY PRN
Status: DISCONTINUED | OUTPATIENT
Start: 2022-10-19 | End: 2022-10-28 | Stop reason: HOSPADM

## 2022-10-19 RX ORDER — SODIUM CHLORIDE 0.9 % (FLUSH) 0.9 %
5-40 SYRINGE (ML) INJECTION EVERY 12 HOURS SCHEDULED
Status: DISCONTINUED | OUTPATIENT
Start: 2022-10-19 | End: 2022-10-28 | Stop reason: HOSPADM

## 2022-10-19 RX ORDER — INSULIN LISPRO 100 [IU]/ML
0-4 INJECTION, SOLUTION INTRAVENOUS; SUBCUTANEOUS
Status: DISCONTINUED | OUTPATIENT
Start: 2022-10-19 | End: 2022-10-22

## 2022-10-19 RX ADMIN — SODIUM CHLORIDE: 9 INJECTION, SOLUTION INTRAVENOUS at 15:26

## 2022-10-19 RX ADMIN — MYCOPHENOLATE MOFETIL 750 MG: 250 CAPSULE ORAL at 23:01

## 2022-10-19 RX ADMIN — SODIUM CHLORIDE 80 MG: 9 INJECTION, SOLUTION INTRAMUSCULAR; INTRAVENOUS; SUBCUTANEOUS at 15:29

## 2022-10-19 RX ADMIN — Medication 10 ML: at 22:48

## 2022-10-19 RX ADMIN — INSULIN HUMAN 5 UNITS: 100 INJECTION, SUSPENSION SUBCUTANEOUS at 22:45

## 2022-10-19 RX ADMIN — SODIUM BICARBONATE 50 MEQ: 84 INJECTION INTRAVENOUS at 11:06

## 2022-10-19 RX ADMIN — GABAPENTIN 300 MG: 300 CAPSULE ORAL at 22:43

## 2022-10-19 RX ADMIN — ATORVASTATIN CALCIUM 10 MG: 10 TABLET, FILM COATED ORAL at 22:44

## 2022-10-19 RX ADMIN — IOPAMIDOL 75 ML: 612 INJECTION, SOLUTION INTRAVENOUS at 11:22

## 2022-10-19 RX ADMIN — INSULIN LISPRO 4 UNITS: 100 INJECTION, SOLUTION INTRAVENOUS; SUBCUTANEOUS at 22:46

## 2022-10-19 RX ADMIN — CYCLOSPORINE 75 MG: 25 CAPSULE, LIQUID FILLED ORAL at 23:00

## 2022-10-19 RX ADMIN — FENTANYL CITRATE 50 MCG: 50 INJECTION, SOLUTION INTRAMUSCULAR; INTRAVENOUS at 15:27

## 2022-10-19 RX ADMIN — CARVEDILOL 6.25 MG: 6.25 TABLET, FILM COATED ORAL at 22:44

## 2022-10-19 ASSESSMENT — ENCOUNTER SYMPTOMS
PHOTOPHOBIA: 0
VOMITING: 0
BACK PAIN: 0
SINUS PRESSURE: 0
DIARRHEA: 0
COUGH: 0
CHEST TIGHTNESS: 0
TROUBLE SWALLOWING: 0
ABDOMINAL PAIN: 0
SHORTNESS OF BREATH: 0

## 2022-10-19 ASSESSMENT — PAIN SCALES - GENERAL
PAINLEVEL_OUTOF10: 0
PAINLEVEL_OUTOF10: 6
PAINLEVEL_OUTOF10: 8

## 2022-10-19 ASSESSMENT — PAIN DESCRIPTION - FREQUENCY: FREQUENCY: INTERMITTENT

## 2022-10-19 ASSESSMENT — PAIN DESCRIPTION - ORIENTATION: ORIENTATION: LEFT

## 2022-10-19 ASSESSMENT — PAIN DESCRIPTION - DESCRIPTORS: DESCRIPTORS: SHOOTING

## 2022-10-19 ASSESSMENT — PAIN - FUNCTIONAL ASSESSMENT: PAIN_FUNCTIONAL_ASSESSMENT: 0-10

## 2022-10-19 ASSESSMENT — PAIN DESCRIPTION - PAIN TYPE: TYPE: ACUTE PAIN

## 2022-10-19 ASSESSMENT — PAIN DESCRIPTION - LOCATION: LOCATION: KNEE

## 2022-10-19 NOTE — ED NOTES
Dr Jose Archuleta called CT to waive creatinine Pt has chronic kidney failure     Saratha Hatchet, RN  10/19/22 [id number]

## 2022-10-19 NOTE — ED PROVIDER NOTES
[address] [facility] ED  eMERGENCY dEPARTMENT eNCOUnter      Pt Name: Naz Alvarez  MRN: [id number]  Ldgfdeya 1950  Date of evaluation: 10/19/2022  Provider: Harpal Madison, [facility]       Chief Complaint   Patient presents with    Fatigue     X 2 weeks          HISTORY OF PRESENT ILLNESS   (Location/Symptom, Timing/Onset,Context/Setting, Quality, Duration, Modifying Factors, Severity)  Note limiting factors. Naz Alvarez is a 80 y.o. male who presents to the emergency department complaint of being excessively tired for the past 2 weeks. Patient fell 2 days ago with loss of consciousness. Unknown duration of the loss of consciousness. Patient fell today. Patient hurt his left knee 2 days ago when he had fallen and he hurt his right knee. Patient is AOx 4. Not a good historian when telling what happened 2 days ago. Patient states he thinks he passed out then fell 2 days ago. Also c/o weak all over. Palpable thrill to AV fistula R arm/wrist.  Has not had dialysis for years. Hx of kidney transplant 2015 (second transplant), and Hx of brain surgery for epilepsy and 1990. He states he was only the seventh person and denied states to have that surgery. Patient denies any fever, chills, cough, nausea, vomiting or diarrhea. The patient denies any modifying factors that make his weakness any better or any worse. Both knees have superficial abrasions. States that they hurt. Touch or movement makes the pain worse. Patient is able to weight-bear. Patient fell today and states that his brother-in-law had to come over to the house to help lift him up. Patient denies any bloody or tarry stools. The history is provided by the patient. NursingNotes were reviewed. REVIEW OF SYSTEMS    (2-9 systems for level 4, 10 or more for level 5)     Review of Systems   Constitutional:  Positive for activity change, appetite change and fatigue.  Negative for chills, fever and unexpected weight change. HENT:  Negative for drooling, ear pain, nosebleeds, sinus pressure and trouble swallowing. Eyes:  Negative for photophobia. Respiratory:  Negative for cough, chest tightness and shortness of breath. Cardiovascular:  Negative for chest pain and leg swelling. Gastrointestinal:  Negative for abdominal pain, diarrhea and vomiting. Endocrine: Negative for polydipsia and polyphagia. Genitourinary:  Negative for dysuria, flank pain and frequency. Musculoskeletal:  Positive for arthralgias (B/L knees after falls). Negative for back pain and myalgias. Skin:  Negative for pallor and rash. Neurological:  Positive for light-headedness and headaches. Negative for syncope and weakness. Hematological:  Does not bruise/bleed easily. All other systems reviewed and are negative. Except as noted above the remainder of the review of systems was reviewed and negative.        PAST MEDICAL HISTORY     Past Medical History:   Diagnosis Date    Diabetes mellitus ([facility] [facility]     Hemodialysis access, fistula mature ([facility] [facility] 12/2002    Hypertension     Seizures ([facility] [address]     no seizure since 1995         SURGICALHISTORY       Past Surgical History:   Procedure Laterality Date    BRAIN SURGERY Left 12/06/1990    to eliminate seizures    KIDNEY TRANSPLANT  2015    [place] [address] INCL FLUOR GDNCE DX W/CELL WASHG SPX N/A 3/20/2018    BRONCHOSCOPY performed by Bryanna Toledo MD at [address]       Previous Medications    ASPIRIN 81 MG EC TABLET    Take 81 mg by mouth daily     CARVEDILOL (COREG) 12.5 MG TABLET    Take 1 tablet by mouth 2 times daily    CONTINUOUS BLOOD GLUC SENSOR (FREESTYLE KEV 14 DAY SENSOR) MISC    Every 2 weeks    CONTINUOUS BLOOD GLUC SENSOR (FREESTYLE KEV 14 DAY SENSOR) MISC    Every 2 weeks Dx E11.65    CONTINUOUS BLOOD GLUC TRANSMIT (DEXCOM G6 TRANSMITTER) MISC    Change every 3 months    CYCLOBENZAPRINE (FLEXERIL) 10 MG TABLET    Take 10 mg by mouth daily as needed Indications: as needed. CYCLOSPORINE MODIFIED (NEORAL) 25 MG CAPSULE    Take 75 mg by mouth 2 times daily     DULAGLUTIDE (TRULICITY) 1.5 CP/6.2GV SOPN    Inject 1.5 mg into the skin once a week Weekly on Monday    FUROSEMIDE (LASIX) 40 MG TABLET    Take 40 mg by mouth daily Indications: 1/2 to 1 tab. as needed for leg swelling     GABAPENTIN (NEURONTIN) 300 MG CAPSULE    Take 300 mg by mouth 2 times daily. Indications: 1 capsule in am, 2 capsule in pm     INSULIN NPH (NOVOLIN N FLEXPEN) 100 UNIT/ML INJECTION PEN    16 units at bedtime    INSULIN REGULAR HUMAN (NOVOLIN R FLEXPEN) 100 UNIT/ML SOPN    6 units am 8 units lunch and dinner    MYCOPHENOLATE (CELLCEPT) 250 MG CAPSULE    Take 750 mg by mouth 2 times daily     NITROGLYCERIN (NITROSTAT) 0.4 MG SL TABLET    up to max of 3 total doses. If no relief after 1 dose, call 911. PANTOPRAZOLE (PROTONIX) 40 MG TABLET    Take 40 mg by mouth daily     PREDNISONE (DELTASONE) 5 MG TABLET    Take 5 mg by mouth daily    SIMVASTATIN (ZOCOR) 10 MG TABLET    Take 10 mg by mouth nightly     TAMSULOSIN (FLOMAX) 0.4 MG CAPSULE    Take 1 capsule by mouth daily    WARFARIN (COUMADIN) 5 MG TABLET    Take 1 tablet by mouth daily       ALLERGIES     Patient has no known allergies. FAMILY HISTORY     No family history on file.        SOCIAL HISTORY       Social History     Socioeconomic History    Marital status:      Spouse name: None    Number of children: None    Years of education: None    Highest education level: None   Tobacco Use    Smoking status: Former     Packs/day: 0.25     Types: Cigarettes     Quit date: 2015     Years since quittin.3    Smokeless tobacco: Former   Vaping Use    Vaping Use: Never used   Substance and Sexual Activity    Alcohol use: No     Alcohol/week: 0.0 standard drinks    Drug use: No       SCREENINGS    [place] Coma Scale  Eye Opening: Spontaneous  Best Verbal Response: Oriented  Best Motor Response: Obeys commands  [place] Coma Scale Score: 15 @FLOW([id number])@      PHYSICAL EXAM    (up to 7 for level 4, 8 or more for level 5)     ED Triage Vitals   BP Temp Temp Source Heart Rate Resp SpO2 Height Weight   10/19/22 1022 10/19/22 1026 10/19/22 1026 10/19/22 1026 10/19/22 1026 10/19/22 1026 10/19/22 1026 10/19/22 1026   (!) 171/67 98.7 °F (37.1 °C) Oral 70 24 100 % 6' 2\" (1.88 m) 190 lb (86.2 kg)       Physical Exam  Vitals and nursing note reviewed. Constitutional:       General: He is awake. He is in acute distress. Appearance: Normal appearance. He is well-developed and normal weight. He is not ill-appearing, toxic-appearing or diaphoretic. Comments: No photophobia. No phonophobia. HENT:      Head: Normocephalic and atraumatic. No Miller's sign. Comments: No Step-off of the scalp. Right Ear: External ear normal.      Left Ear: External ear normal.      Nose: Nose normal. No congestion or rhinorrhea. Mouth/Throat:      Mouth: Mucous membranes are moist.      Pharynx: Oropharynx is clear. No oropharyngeal exudate or posterior oropharyngeal erythema. Eyes:      General: No scleral icterus. Right eye: No foreign body or discharge. Left eye: No discharge. Extraocular Movements: Extraocular movements intact. Conjunctiva/sclera: Conjunctivae normal.      Left eye: No exudate. Pupils: Pupils are equal, round, and reactive to light. Neck:      Thyroid: No thyroid mass. Vascular: No JVD. Trachea: Trachea and phonation normal. No tracheal deviation. Comments: No meningismus. Cardiovascular:      Rate and Rhythm: Normal rate and regular rhythm. No extrasystoles are present. Chest Wall: PMI is not displaced. No thrill. Pulses: Normal pulses. No decreased pulses. Radial pulses are 2+ on the right side and 2+ on the left side. Heart sounds: Normal heart sounds, S1 normal and S2 normal. Heart sounds not distant.  No murmur heard. No systolic murmur is present. No diastolic murmur is present. No friction rub. No gallop. No S3 or S4 sounds. Comments: Right AV fistula to the right wrist/forearm with good palpable thrill  Pulmonary:      Effort: Pulmonary effort is normal. No respiratory distress. Breath sounds: Normal breath sounds. No stridor. No wheezing, rhonchi or rales. Chest:      Chest wall: No tenderness. Abdominal:      General: Abdomen is flat. Bowel sounds are normal. There is no distension or abdominal bruit. Palpations: Abdomen is soft. There is no shifting dullness, fluid wave, hepatomegaly, splenomegaly, mass or pulsatile mass. Tenderness: There is abdominal tenderness in the left upper quadrant. There is no right CVA tenderness, left CVA tenderness, guarding or rebound. Negative signs include Couch's sign and McBurney's sign. Hernia: No hernia is present. Comments: No midline pulsatile mass. No rigidity. Musculoskeletal:         General: No swelling, tenderness, deformity or signs of injury. Cervical back: Neck supple. No rigidity. Spinous process tenderness and muscular tenderness present. Decreased range of motion. Right lower leg: No edema. Left lower leg: No edema. Lymphadenopathy:      Head:      Right side of head: No submental adenopathy. Left side of head: No submental adenopathy. Skin:     General: Skin is warm and dry. Capillary Refill: Capillary refill takes less than 2 seconds. Coloration: Skin is not cyanotic, jaundiced, mottled, pale or sallow. Findings: Abrasion present. No abscess, bruising, erythema, lesion or rash. Neurological:      General: No focal deficit present. Mental Status: He is alert and oriented to person, place, and time. Mental status is at baseline. Cranial Nerves: No cranial nerve deficit. Sensory: No sensory deficit. Motor: No weakness.       Coordination: Coordination normal.      Deep Tendon Reflexes: Reflexes are normal and symmetric. Psychiatric:         Mood and Affect: Mood normal.         Behavior: Behavior normal. Behavior is cooperative. Thought Content: Thought content normal.         Judgment: Judgment normal.       DIAGNOSTIC RESULTS     EKG: All EKG's are interpreted by the Emergency Department Physician who either signs or Co-signsthis chart in the absence of a cardiologist.    EKG: Narrow complex regular rhythm at 90 bpm.  Mild diffuse artifact throughout the EKG. Normal axis. No acute ST elevation and/or depressions. No PVCs. Suspect sinus rhythm with appears to be P waves in lead V4. RADIOLOGY:   Non-plain filmimages such as CT, Ultrasound and MRI are read by the radiologist. Plain radiographic images are visualized and preliminarily interpreted by the emergency physician with the below findings:        Interpretation per the Radiologist below, if available at the time ofthis note:    XR KNEE RIGHT (3 VIEWS)   Final Result   Degenerative changes. No evidence of acute osseous abnormality is seen. CT HEAD WO CONTRAST   Final Result   1. There is no acute intracranial abnormality. Specifically, there is no   intracranial hemorrhage. 2. Atrophy and periventricular leukomalacia,   3. Mucosal thickening within the right sphenoid sinus. 4. Previous left craniotomy defect with encephalomalacia  seen within the   anterior aspect of the left temporal lobe. .         CT CERVICAL SPINE WO CONTRAST   Final Result   1. There is no acute compression fracture or subluxation of the cervical   spine. 2. Multilevel degenerative disc and degenerative joint disease. CT THORACIC SPINE WO CONTRAST   Final Result   1. No acute fracture or subluxation within the thoracic or lumbar spine. 2. Degenerative changes in the spine without definite central canal stenosis   in the thoracic spine. There is possible central canal stenosis at L3-4. There is multilevel neural foraminal narrowing within the lumbar spine. 3. Marked atrophy of the bilateral kidneys with indeterminate exophytic   lesion from the right kidney that may represent complicated cyst or solid   mass. The patient has prior examinations which are not available for   comparison. 4. There is a dependent pleural based opacity in the right lower lobe which   may represent atelectasis. There are prior examinations which are not   available for comparison. CT LUMBAR SPINE WO CONTRAST   Final Result   1. No acute fracture or subluxation within the thoracic or lumbar spine. 2. Degenerative changes in the spine without definite central canal stenosis   in the thoracic spine. There is possible central canal stenosis at L3-4. There is multilevel neural foraminal narrowing within the lumbar spine. 3. Marked atrophy of the bilateral kidneys with indeterminate exophytic   lesion from the right kidney that may represent complicated cyst or solid   mass. The patient has prior examinations which are not available for   comparison. 4. There is a dependent pleural based opacity in the right lower lobe which   may represent atelectasis. There are prior examinations which are not   available for comparison. CTA CHEST W WO CONTRAST   Final Result   1. No large filling defects are seen on this examination to suggest saddle   embolus more in the larger primary and secondary vascular structures. 2.   There is consolidation again seen in the left lower lobe. Bronchial wall   thickening mild bronchiectasis and other changes of COPD are seen. An   ill-defined opacity is seen in the anterior left upper lobe. Recommend   follow-up to resolution to exclude any developing masses. There are multiple   nodules visualized and overall are stable.          CT ABDOMEN PELVIS W IV CONTRAST Additional Contrast? None   Preliminary Result   There is no evidence of an acute posttraumatic abnormality in the abdomen or   pelvis. Specifically, no evidence of splenic or renal injury. No   retroperitoneal hematoma. Indeterminate intermediate density 1.2 cm lesion arising from the mid right   kidney. Renal neoplasm not excluded. Correlation with renal ultrasound or   multiphasic contrast-enhanced CT or MRI of the abdomen utilizing a renal mass   protocol is recommended for further assessment. Indeterminate 1.4 cm low-density lesion arising from the inferior portion of   the pancreatic body centrally. This has imaging features most suspicious for   an IPMN. Correlation with contrast-enhanced abdominal MRI is also   recommended (unless otherwise contraindicated). 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 Linear/nodular opacities in the right lower lobe and right middle lobe. Please refer to the dedicated CT chest report for additional details.          XR KNEE LEFT (3 VIEWS)    (Results Pending)         ED BEDSIDE ULTRASOUND:   Performed by ED Physician - none    LABS:  Labs Reviewed   CBC WITH AUTO DIFFERENTIAL - Abnormal; Notable for the following components:       Result Value    RBC 2.25 (*)     Hemoglobin 6.4 (*)     Hematocrit 19.9 (*)     MCHC 32.1 (*)     RDW 16.1 (*)     All other components within normal limits    Narrative:     Lisa Hush  [facility] tel. [phone],  Hematology results called to and read back by [facility], 10/19/2022  11:43, by [place] - Abnormal; Notable for the following components:    Chloride 111 (*)     CO2 15 (*)     Glucose 274 (*)     BUN 91 (*)     Creatinine 2.99 (*)     Est, Glom Filt Rate 21.6 (*)     Total Protein 6.2 (*)     All other components within normal limits    Narrative:     [facility]  [facility] tel. [phone],  BUN results called to and read back by GUERRERO BHARDWAJ, 10/19/2022 12:43, by  Astrid Baires  trop results called to and read back by vilma, 10/19/2022 11:58, by Rosario Potst   PROTIME-INR - Abnormal; Notable for the following components:    Protime 32.3 (*)     All other components within normal limits   TROPONIN - Abnormal; Notable for the following components:    Troponin 0.057 (*)     All other components within normal limits    Narrative:     Duke Dunn tel. [phone],  trop results called to and read back by vilma, 10/19/2022 11:58, by [address] - Abnormal; Notable for the following components:    Blood, Urine SMALL (*)     Leukocyte Esterase, Urine SMALL (*)     All other components within normal limits   HEMOGLOBIN AND HEMATOCRIT - Abnormal; Notable for the following components:    Hemoglobin 6.3 (*)     Hematocrit 19.6 (*)     All other components within normal limits    Narrative:     Duke Dunn tel. [phone],  Hematology results called to and read back by Ashley, 10/19/2022 12:48, by  [address] [place], [place] [address]e [place] - Abnormal; Notable for the following components:    Bacteria, UA FEW (*)     WBC, UA 10-20 (*)     RBC, UA 6-10 (*)     All other components within normal limits   CULTURE, URINE   PROCALCITONIN    Narrative:     Christopher Ritchie  [facility] tel. [phone],  trop results called to and read back by vilma, 10/19/2022 11:58, by Rosario Potts   ETHANOL   APTT   [address]    Narrative:     Duke Dunn tel. [phone],  trop results called to and read back by vilma, 10/19/2022 11:58, by JUANA   CK    Narrative:     Duke Dunn tel. [phone],  BUN results called to and read back by Winter Miller, 10/19/2022 12:43, by  Franklyn Leon  trop results called to and read back by vilma, 10/19/2022 11:58, by JUANA   HIGH SENSITIVITY CRP   LACTIC ACID   MAGNESIUM    Narrative:     FELIPE Elliott  [facility] tel. [phone],  BUN results called to and read back by Winter Miller, 10/19/2022 12:43, by  Franklyn Leon  trop results called to and read back by vilma, 10/19/2022 11:58, by JUANA   TSH    Narrative: Emeka Montoya tel. [phone],  maritza results called to and read back by vilma, 10/19/2022 11:58, by [address]       All other labs were within normal range or not returned as of this dictation. EMERGENCY DEPARTMENT COURSE and DIFFERENTIAL DIAGNOSIS/MDM:   Vitals:    Vitals:    10/19/22 1022 10/19/22 1026 10/19/22 1052 10/19/22 1222   BP: (!) 171/67 (!) 171/67     Pulse:  70     Resp:  24     Temp:  98.7 °F (37.1 °C)     TempSrc:  Oral     SpO2:  100% 100% 100%   Weight:  190 lb (86.2 kg)     Height:  6' 2\" (1.88 m)             MDM    Patient has a critically low hemoglobin. Lab was repeated and confirms that this is actually low. Much lower than patient's normal baseline hemoglobin of around 10. There is no retroperitoneal bleed. There is no skull fracture or brain bleed. Patient's INR is 3.1. Patient had a syncopal episode 2 days ago where he fell. CT of the chest shows no pulmonary embolism. Patient does have pulmonary nodules. CAT scan of the abdomen shows a renal mass and a density in the pancreas that will need further investigation. There are opacities in the left upper lobe right middle and right lower lobe. However the procalcitonin is normal, the patient is afebrile and this is unlikely to be pneumonia. Patient has a slow decline in renal function. Her labs have been compared. There is no report of any rectal bleeding. SPoke with Dr. Alissa Clemens who accepted. Exam of the findings were discussed with the patient the patient's wife did mention of the patient was having black stools. Hemoccult was done with the ER doctor and a nurse, [facility] the ER doctor. Guaiac positive. IV Protonix was ordered. Report was given to the nurse practitioner with the hospitalist service, Theresa Gonzales. Page was placed to the GI service. Critical care time of 36 minutes.           CONSULTS:  IP CONSULT TO NEPHROLOGY  IP CONSULT TO HOSPITALIST    PROCEDURES:  Unless otherwise noted below, none     Procedures    FINAL IMPRESSION      1. Symptomatic anemia    2. Head injury, closed, with LOC of unknown duration ([facility]    3. Chronic anticoagulation    4. History of renal transplant    5. Chronic renal failure, stage 3b (HCC)    6. Opacity of lung on imaging study    7. Right renal mass    8. Pancreatic mass    9. Fall at home, initial encounter    10. Acute pain of both knees    11. Type 2 diabetes mellitus with hyperglycemia, unspecified whether long term insulin use ([facility]          DISPOSITION/PLAN   DISPOSITION Decision To Admit 10/19/2022 01:20:11 PM      PATIENT REFERRED TO:  No follow-up provider specified.     DISCHARGE MEDICATIONS:  New Prescriptions    No medications on file          (Please note that portions of this note were completed with a voice recognition program.  Efforts were made to edit the dictations but occasionally words are mis-transcribed.)    52 Violet Chung,  (electronically signed)  Attending Emergency Physician          52 Violet Chung,   10/19/22 [address], DO  10/19/22 [address], DO  10/19/22 6036

## 2022-10-19 NOTE — H&P
HISTORY AND PHYSICAL             Date: 10/19/2022        Patient Name: Ever Day     YOB: 1950      Age:  80 y.o. Chief Complaint     Chief Complaint   Patient presents with    Fatigue     X 2 weeks       Near syncope    History Obtained From   patient, spouse    History of Present Illness   76-year-old male with significant past medical history of type 2 diabetes mellitus, chronic kidney disease status post kidney transplant 2015 at [facility], atrial fibrillation rapid ventricular rate started on Coumadin about 1 month ago (converted to sinus rhythm), he received 2 doses of Lovenox and Lovenox was discontinued. He states he has had been having black stools for about a month shortly after he was started on Coumadin. He has had 2 falls recently 1 was a syncopal episode, wife found him which suspects loss of consciousness and this was a few days ago and today he got very weak and was on the floor but did not pass out. He denies any abdominal pain. He denies taking oral iron. He had a dose of Pepto-Bismol but this was more than a few days ago. He denies any fever or chills. He has been feeling very weak and has shortness of breath on exertion. Denies chest pain or palpitations. Denies any peripheral edema. Laboratory values in the emergency department showed chloride 111, CO2 15, BUN 91 and creatinine 2.99. October 12 CO2 13, BUN 66, creatinine 2.71; October 4, 2022 CO2 17, BUN 60, creatinine 2.23; September 17, 2022 BUN 77 creatinine 3.60 CO2 normal at 20. .  Troponin 0.057. Patient had rectal exam in the emergency department had black tarry stool on exam and was occult blood positive. In the emergency department he was treated with Protonix 80 mg IV once, I ordered vitamin K 5 mg I explained to patient and his wife the risk of stroke not being on oral anticoagulation with the risk of active bleeding. Patient and wife both verbalized understanding.   Patient's hemoglobin was 6.4/19.9 with a repeat at 1605 6.3/18.8. Baseline hemoglobin and hematocrit on October 12, 2022 hemoglobin 7.8/24.5, October 4 hemoglobin 10.1/hematocrit 31.8 which appears to be his baseline. In the emergency department 2 units of packed red blood cells was ordered and blood consent was obtained. He is hemodynamically stable and being admitted to the medical floor. Past Medical History     Past Medical History:   Diagnosis Date    Diabetes mellitus ([facility]     Hemodialysis access, fistula mature ([facility] 12/2002    Hypertension     Seizures ([facility]     no seizure since 1995        Past Surgical History     Past Surgical History:   Procedure Laterality Date    BRAIN SURGERY Left 12/06/1990    to eliminate seizures    KIDNEY TRANSPLANT  2015    [facility] [address] INCL FLUOR GDNCE DX W/CELL WASHG SPX N/A 3/20/2018    BRONCHOSCOPY performed by Ghassan Sexton MD at [facility]        Medications Prior to Admission     Prior to Admission medications    Medication Sig Start Date End Date Taking?  Authorizing Provider   carvedilol (COREG) 12.5 MG tablet Take 1 tablet by mouth 2 times daily  Patient not taking: Reported on 10/19/2022 10/14/22   Manish Leblanc MD   warfarin (COUMADIN) 5 MG tablet Take 1 tablet by mouth daily 9/17/22   Dada Beatty MD   predniSONE (DELTASONE) 5 MG tablet Take 5 mg by mouth daily    Historical Provider, MD   Dulaglutide (TRULICITY) 1.5 TU/7.7QC SOPN Inject 1.5 mg into the skin once a week Weekly on Monday    Historical Provider, MD   tamsulosin (FLOMAX) 0.4 MG capsule Take 1 capsule by mouth daily  Patient taking differently: Take 0.4 mg by mouth 2 times daily 12/22/21   Deidra Vega MD   Continuous Blood Gluc Transmit (DEXCOM G6 TRANSMITTER) MISC Change every 3 months 5/29/20   Zora Murphy MD   Continuous Blood Gluc Sensor (FREESTYLE KEV 14 DAY SENSOR) MISC Every 2 weeks Dx E11.65 5/20/20   Zora Murphy MD   insulin NPH (NOVOLIN N FLEXPEN) 100 UNIT/ML injection pen 16 units at bedtime  Patient taking differently: 9 units in AM, 5 units at bedtime 5/18/20   Anthony Young MD   Insulin Regular Human (NOVOLIN R FLEXPEN) 100 UNIT/ML SOPN 6 units am 8 units lunch and dinner  Patient taking differently: Sliding Scale 5/18/20   Anthony Young MD   Continuous Blood Gluc Sensor (FREESTYLE KEV 14 DAY SENSOR) MISC Every 2 weeks 5/18/20   Anthony Young MD   pantoprazole (PROTONIX) 40 MG tablet Take 40 mg by mouth daily  11/16/19   Historical Provider, MD   nitroGLYCERIN (NITROSTAT) 0.4 MG SL tablet up to max of 3 total doses. If no relief after 1 dose, call 911. Patient not taking: No sig reported 7/18/19   HAJA Renee - CNP   furosemide (LASIX) 40 MG tablet Take 40 mg by mouth daily Indications: 1/2 to 1 tab. as needed for leg swelling     Historical Provider, MD   aspirin 81 MG EC tablet Take 81 mg by mouth daily  5/11/16   Historical Provider, MD   cyclobenzaprine (FLEXERIL) 10 MG tablet Take 10 mg by mouth daily as needed Indications: as needed. 4/5/16   Historical Provider, MD   cycloSPORINE modified (NEORAL) 25 MG capsule Take 75 mg by mouth 2 times daily  5/24/16   Historical Provider, MD   gabapentin (NEURONTIN) 300 MG capsule Take 300 mg by mouth 2 times daily. Indications: 1 capsule in am, 2 capsule in pm  5/24/16   Historical Provider, MD   mycophenolate (CELLCEPT) 250 MG capsule Take 750 mg by mouth 2 times daily  5/24/16   Historical Provider, MD   simvastatin (ZOCOR) 10 MG tablet Take 10 mg by mouth nightly  5/24/16   Historical Provider, MD        Allergies   Patient has no known allergies.     Social History     Social History       Tobacco History       Smoking Status  Former Quit Date  6/17/2015 Smoking Frequency  0.25 packs/day Smoking Tobacco Type  Cigarettes quit in 6/17/2015      Smokeless Tobacco Use  Former              Alcohol History       Alcohol Use Status  No              Drug Use       Drug Use Status  No              Sexual Activity       Sexually Active  Not Asked                    Family History   No family history of PUD. 12 Point review of systems reviewed with patient pertinent positives listed in HPI otherwise review of systems    Physical Exam   BP (!) 171/67   Pulse 70   Temp 98.7 °F (37.1 °C) (Oral)   Resp 24   Ht 6' 2\" (1.88 m)   Wt 190 lb (86.2 kg)   SpO2 99%   BMI 24.39 kg/m²     CONSTITUTIONAL:  awake, alert, cooperative, no apparent distress, and appears stated age  EYES:  lids and lashes normal and pupils equal, round and reactive to light  NECK:  supple, symmetrical, trachea midline  BACK:  symmetric  LUNGS:  No increased work of breathing, good air exchange, clear to auscultation bilaterally, no crackles or wheezing  CARDIOVASCULAR:  Normal apical impulse, regular rate and rhythm, normal S1 and S2, no S3 or S4, and no murmur noted  ABDOMEN:  normal bowel sounds  MUSCULOSKELETAL:  There is no redness, warmth, or swelling of the joints. Full range of motion noted. Motor strength is 5 out of 5 all extremities bilaterally. Tone is normal.  NEUROLOGIC:  Awake, alert, oriented to name, place and time. Cranial nerves II-XII are grossly intact. Motor is 5 out of 5 bilaterally. Cerebellar finger to nose, heel to shin intact. Sensory is intact.   Babinski down going, Romberg negative, and gait is normal.  SKIN:  no bruising or bleeding and sallow,warm dry    Labs      Recent Results (from the past 24 hour(s))   EKG 12 Lead    Collection Time: 10/19/22 10:54 AM   Result Value Ref Range    Ventricular Rate 90 BPM    Atrial Rate 90 BPM    P-R Interval 172 ms    QRS Duration 82 ms    Q-T Interval 418 ms    QTc Calculation (Bazett) 511 ms    P Axis 78 degrees    R Axis 7 degrees    T Axis -4 degrees   Ethanol    Collection Time: 10/19/22 11:01 AM   Result Value Ref Range    Ethanol Lvl <10 mg/dL    Ethanol percent Not indicated G/dL   APTT    Collection Time: 10/19/22 11:01 AM   Result Value Ref Range    aPTT 30.0 24.4 - 36.8 sec   CBC with Auto Differential    Collection Time: 10/19/22 11:01 AM   Result Value Ref Range    WBC 8.8 4.8 - 10.8 K/uL    RBC 2.25 (L) 4.70 - 6.10 M/uL    Hemoglobin 6.4 (LL) 14.0 - 18.0 g/dL    Hematocrit 19.9 (LL) 42.0 - 52.0 %    MCV 88.4 79.0 - 92.2 fL    MCH 28.4 27.0 - 31.3 pg    MCHC 32.1 (L) 33.0 - 37.0 %    RDW 16.1 (H) 11.5 - 14.5 %    Platelets 542 748 - 539 K/uL    PLATELET SLIDE REVIEW Normal     Neutrophils % 82.0 %    Lymphocytes % 9.0 %    Monocytes % 6.4 %    Eosinophils % 1.0 %    Basophils % 1.0 %    Neutrophils Absolute 7.3 (H) 1.4 - 6.5 K/uL    Lymphocytes Absolute 0.8 (L) 1.0 - 4.8 K/uL    Monocytes Absolute 0.5 0.2 - 0.8 K/uL    Eosinophils Absolute 0.1 0.0 - 0.7 K/uL    Basophils Absolute 0.1 0.0 - 0.2 K/uL    Bands Relative 1 %    Anisocytosis 2+     Microcytes 2+     Polychromasia 1+     Hypochromia 1+     Poikilocytes 1+    High sensitivity CRP    Collection Time: 10/19/22 11:01 AM   Result Value Ref Range    CRP High Sensitivity 3.4 0.0 - 5.0 mg/L   Protime-INR    Collection Time: 10/19/22 11:01 AM   Result Value Ref Range    Protime 32.3 (H) 12.3 - 14.9 sec    INR 3.1    Procalcitonin    Collection Time: 10/19/22 11:02 AM   Result Value Ref Range    Procalcitonin 0.11 0.00 - 0.15 ng/mL   Brain Natriuretic Peptide    Collection Time: 10/19/22 11:02 AM   Result Value Ref Range    Pro-BNP 1,953 pg/mL   CK    Collection Time: 10/19/22 11:02 AM   Result Value Ref Range    Total CK 36 0 - 190 U/L   Comprehensive Metabolic Panel    Collection Time: 10/19/22 11:02 AM   Result Value Ref Range    Sodium 141 135 - 144 mEq/L    Potassium 3.8 3.4 - 4.9 mEq/L    Chloride 111 (H) 95 - 107 mEq/L    CO2 15 (L) 20 - 31 mEq/L    Anion Gap 15 9 - 15 mEq/L    Glucose 274 (H) 70 - 99 mg/dL    BUN 91 (HH) 8 - 23 mg/dL    Creatinine 2.99 (H) 0.70 - 1.20 mg/dL    Est, Glom Filt Rate 21.6 (L) >60    Calcium 8.6 8.5 - 9.9 mg/dL    Total Protein 6.2 (L) 6.3 - 8.0 g/dL    Albumin 3.8 3.5 - 4.6 g/dL    Total Bilirubin 0.4 0.2 - 0.7 mg/dL    Alkaline Phosphatase 53 35 - 104 U/L    ALT 8 0 - 41 U/L    AST 8 0 - 40 U/L    Globulin 2.4 2.3 - 3.5 g/dL   Lactic Acid    Collection Time: 10/19/22 11:02 AM   Result Value Ref Range    Lactic Acid 1.0 0.5 - 2.2 mmol/L   Magnesium    Collection Time: 10/19/22 11:02 AM   Result Value Ref Range    Magnesium 1.7 1.7 - 2.4 mg/dL   Troponin    Collection Time: 10/19/22 11:02 AM   Result Value Ref Range    Troponin 0.057 (HH) 0.000 - 0.010 ng/mL   TSH    Collection Time: 10/19/22 11:02 AM   Result Value Ref Range    TSH 0.574 0.440 - 3.860 uIU/mL   Hemoglobin and Hematocrit    Collection Time: 10/19/22 12:17 PM   Result Value Ref Range    Hemoglobin 6.3 (LL) 14.0 - 18.0 g/dL    Hematocrit 19.6 (LL) 42.0 - 52.0 %   Urinalysis with Reflex to Culture    Collection Time: 10/19/22  1:44 PM    Specimen: Urine, clean catch   Result Value Ref Range    Color, UA Yellow Straw/Yellow    Clarity, UA Clear Clear    Glucose, Ur Negative Negative mg/dL    Bilirubin Urine Negative Negative    Ketones, Urine Negative Negative mg/dL    Specific Gravity, UA 1.012 1.005 - 1.030    Blood, Urine SMALL (A) Negative    pH, UA 5.5 5.0 - 9.0    Protein, UA Negative Negative mg/dL    Urobilinogen, Urine 0.2 <2.0 E.U./dL    Nitrite, Urine Negative Negative    Leukocyte Esterase, Urine SMALL (A) Negative    Urine Reflex to Culture Yes    Urine Drug Screen    Collection Time: 10/19/22  1:44 PM   Result Value Ref Range    Amphetamine Screen, Urine Neg Negative <1000 ng/mL    Barbiturate Screen, Ur Neg Negative < 200 ng/mL    Benzodiazepine Screen, Urine Neg Negative < 200 ng/mL    Cannabinoid Scrn, Ur Neg Negative < 50 ng/mL    Cocaine Metabolite Screen, Urine Neg Negative < 300 ng/mL    Opiate Scrn, Ur Neg Negative < 300 ng/mL    PCP Screen, Urine Neg Negative < 25 ng/mL    Methadone Screen, Urine Neg Negative <300 ng/mL    Propoxyphene Scrn, Ur Neg Negative <300 ng/mL    Oxycodone Urine Neg Negative <100 ng/mL    FENTANYL SCREEN, URINE Neg Negative < 50 ng/mL    Drug Screen Comment: see below    Microscopic Urinalysis    Collection Time: 10/19/22  1:44 PM   Result Value Ref Range    Bacteria, UA FEW (A) Negative /HPF    Hyaline Casts, UA 0-1 0 - 5 /HPF    WBC, UA 10-20 (A) 0 - 5 /HPF    RBC, UA 6-10 (A) 0 - 5 /HPF    Epithelial Cells, UA 0-2 0 - 5 /HPF   Hemoglobin and Hematocrit    Collection Time: 10/19/22  4:05 PM   Result Value Ref Range    Hemoglobin 6.3 (LL) 14.0 - 18.0 g/dL    Hematocrit 18.8 (LL) 42.0 - 52.0 %        Imaging/Diagnostics Last 24 Hours   XR KNEE RIGHT (3 VIEWS)    Result Date: 10/19/2022  EXAMINATION: THREE XRAY VIEWS OF THE RIGHT KNEE 10/19/2022 11:30 am COMPARISON: 11/22/2019 HISTORY: ORDERING SYSTEM PROVIDED HISTORY: fall knee pain, [place] view TECHNOLOGIST PROVIDED HISTORY: Reason for exam:->fall knee pain Reason for exam:->[place] view What reading provider will be dictating this exam?->CRC FINDINGS: Mild to moderate narrowing of the medial joint space, mild narrowing of the lateral and patellofemoral joint spaces is seen. Unremarkable alignment with no evidence of dislocation. Mild degenerative changes visualized. No evidence of cortical irregularity or lucency to suggest a fracture, no evidence of lytic or sclerotic bone lesion is seen. No evidence of suprapatellar effusion. The quadriceps and patellar tendons are unremarkable. Extensive vascular calcifications are seen. Degenerative changes. No evidence of acute osseous abnormality is seen. CT HEAD WO CONTRAST    Result Date: 10/19/2022  EXAMINATION: CT OF THE HEAD WITHOUT CONTRAST  10/19/2022 11:11 am TECHNIQUE: CT of the head was performed without the administration of intravenous contrast. Automated exposure control, iterative reconstruction, and/or weight based adjustment of the mA/kV was utilized to reduce the radiation dose to as low as reasonably achievable. COMPARISON: None.  HISTORY: ORDERING SYSTEM PROVIDED HISTORY: passed out hit head; on coumadin. .. ? bleed, ? 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 ->No Reason for exam:->passed out hit head; on coumadin. .. ? bleed, ? 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 No abnormal extra-axial fluid collection. The gray-white differentiation is maintained without evidence of an acute infarct. There is no evidence of hydrocephalus. The ventricles, cisterns and sulci are prominent consistent with atrophy. There is decreased attenuation within the periventricular white matter consistent with periventricular leukomalacia. There is encephalomalacia is seen within the anterior aspect of the left temporal fossa. There is an old left craniotomy defect. ORBITS: The visualized portion of the orbits demonstrate no acute abnormality. SINUSES: The visualized paranasal sinuses and mastoid air cells demonstrate no acute abnormality. There is mucosal thickening seen within the right sphenoid sinus. SOFT TISSUES/SKULL:  No acute abnormality of the visualized skull or soft tissues. 1.  There is no acute intracranial abnormality. Specifically, there is no intracranial hemorrhage. 2. Atrophy and periventricular leukomalacia, 3. Mucosal thickening within the right sphenoid sinus. 4. Previous left craniotomy defect with encephalomalacia  seen within the anterior aspect of the left temporal lobe.  .     CTA CHEST W WO CONTRAST    Result Date: 10/19/2022  EXAMINATION: CTA OF THE CHEST WITH AND WITHOUT CONTRAST 10/19/2022 11:11 am TECHNIQUE: CTA of the chest was performed before and after the dilatation and ground-glass opacities are seen in the medial aspect of the right upper lobe. Upper Abdomen: Limited images of the upper abdomen are unremarkable. Soft Tissues/Bones: Degenerative changes are seen in the spine at multiple levels. No acute fractures are identified on this examination. 1.  No large filling defects are seen on this examination to suggest saddle embolus more in the larger primary and secondary vascular structures. 2. There is consolidation again seen in the left lower lobe. Bronchial wall thickening mild bronchiectasis and other changes of COPD are seen. An ill-defined opacity is seen in the anterior left upper lobe. Recommend follow-up to resolution to exclude any developing masses. There are multiple nodules visualized and overall are stable. CT CERVICAL SPINE WO CONTRAST    Result Date: 10/19/2022  EXAMINATION: CT OF THE CERVICAL SPINE WITHOUT CONTRAST 10/19/2022 11:11 am TECHNIQUE: CT of the cervical spine was performed without the administration of intravenous contrast. Multiplanar reformatted images are provided for review. Automated exposure control, iterative reconstruction, and/or weight based adjustment of the mA/kV was utilized to reduce the radiation dose to as low as reasonably achievable. COMPARISON: None. HISTORY: ORDERING SYSTEM PROVIDED HISTORY: fall from standing 2 days ago and again today TECHNOLOGIST PROVIDED HISTORY: Reason for exam:->fall from standing 2 days ago and again today Decision Support Exception - unselect if not a suspected or confirmed emergency medical condition->Emergency Medical Condition (MA) What reading provider will be dictating this exam?->CRC FINDINGS: The ring of C1 is intact as is the dense. There is no compression fracture of the cervical spine. No jumped or perched facet is noted. Multilevel degenerative disc and degenerative joint disease is noted.  Posterior degenerative endplate spurs are seen at the C3-4, C4-5 and C5-6 levels. There is effacement of the ventral thecal sac at the C3-4 through C5-6 levels due to the posterior degenerative endplate spurs. The prevertebral soft tissues are unremarkable. The airway is widely patent. Images through the lung apices are negative for a pneumothorax. 1. There is no acute compression fracture or subluxation of the cervical spine. 2. Multilevel degenerative disc and degenerative joint disease. CT THORACIC SPINE WO CONTRAST    Result Date: 10/19/2022  EXAMINATION: CT OF THE THORACIC SPINE WITHOUT CONTRAST; CT OF THE LUMBAR SPINE WITHOUT CONTRAST  10/19/2022 11:11 am: TECHNIQUE: CT of the thoracic spine was performed without the administration of intravenous contrast. Multiplanar reformatted images are provided for review. Automated exposure control, iterative reconstruction, and/or weight based adjustment of the mA/kV was utilized to reduce the radiation dose to as low as reasonably achievable.; CT of the lumbar spine was performed without the administration of intravenous contrast. Multiplanar reformatted images are provided for review. Adjustment of mA and/or kV according to patient size was utilized. Automated exposure control, iterative reconstruction, and/or weight based adjustment of the mA/kV was utilized to reduce the radiation dose to as low as reasonably achievable. COMPARISON: None. HISTORY: ORDERING SYSTEM PROVIDED HISTORY: Fall from standing; Trauma assessment TECHNOLOGIST PROVIDED HISTORY: Reason for exam:->Fall from standing; Trauma assessment What reading provider will be dictating this exam?->CRC FINDINGS: CT thoracic spine: There is no evidence of acute fracture. Vertebral alignment is anatomic. There are no acute compression deformities. There is minimal concavity at the superior endplates of T1 through T3. There are changes of diffuse idiopathic skeletal hyperostosis within the thoracic spine. The paravertebral soft tissue structures are unremarkable.   There is no gross evidence of central canal stenosis. There is suggestion of pleural thickening in the posterior right lower lobe with a pleural based opacity that may represent atelectasis. CT lumbar spine: There is no evidence of acute fracture. Vertebral alignment is anatomic. There are no compression deformities. The intervertebral disc space heights appear relatively preserved. There is facet hypertrophy throughout the lumbar spine. The paravertebral soft tissue structures are unremarkable. There is possible central canal stenosis at L3-4. There is multilevel neural foraminal narrowing. There is arteriosclerosis without abdominal aortic aneurysm. There is marked atrophy of the bilateral kidneys. There is exophytic lesion from the posterior right kidney which is more dense than simple cyst, measuring approximately 48 Hounsfield units. This may represent complicated cyst.  A solid mass cannot be entirely excluded. 1. No acute fracture or subluxation within the thoracic or lumbar spine. 2. Degenerative changes in the spine without definite central canal stenosis in the thoracic spine. There is possible central canal stenosis at L3-4. There is multilevel neural foraminal narrowing within the lumbar spine. 3. Marked atrophy of the bilateral kidneys with indeterminate exophytic lesion from the right kidney that may represent complicated cyst or solid mass. The patient has prior examinations which are not available for comparison. 4. There is a dependent pleural based opacity in the right lower lobe which may represent atelectasis. There are prior examinations which are not available for comparison.      CT LUMBAR SPINE WO CONTRAST    Result Date: 10/19/2022  EXAMINATION: CT OF THE THORACIC SPINE WITHOUT CONTRAST; CT OF THE LUMBAR SPINE WITHOUT CONTRAST  10/19/2022 11:11 am: TECHNIQUE: CT of the thoracic spine was performed without the administration of intravenous contrast. Multiplanar reformatted images are provided for review. Automated exposure control, iterative reconstruction, and/or weight based adjustment of the mA/kV was utilized to reduce the radiation dose to as low as reasonably achievable.; CT of the lumbar spine was performed without the administration of intravenous contrast. Multiplanar reformatted images are provided for review. Adjustment of mA and/or kV according to patient size was utilized. Automated exposure control, iterative reconstruction, and/or weight based adjustment of the mA/kV was utilized to reduce the radiation dose to as low as reasonably achievable. COMPARISON: None. HISTORY: ORDERING SYSTEM PROVIDED HISTORY: Fall from standing; Trauma assessment TECHNOLOGIST PROVIDED HISTORY: Reason for exam:->Fall from standing; Trauma assessment What reading provider will be dictating this exam?->CRC FINDINGS: CT thoracic spine: There is no evidence of acute fracture. Vertebral alignment is anatomic. There are no acute compression deformities. There is minimal concavity at the superior endplates of T1 through T3. There are changes of diffuse idiopathic skeletal hyperostosis within the thoracic spine. The paravertebral soft tissue structures are unremarkable. There is no gross evidence of central canal stenosis. There is suggestion of pleural thickening in the posterior right lower lobe with a pleural based opacity that may represent atelectasis. CT lumbar spine: There is no evidence of acute fracture. Vertebral alignment is anatomic. There are no compression deformities. The intervertebral disc space heights appear relatively preserved. There is facet hypertrophy throughout the lumbar spine. The paravertebral soft tissue structures are unremarkable. There is possible central canal stenosis at L3-4. There is multilevel neural foraminal narrowing. There is arteriosclerosis without abdominal aortic aneurysm. There is marked atrophy of the bilateral kidneys.   There is exophytic lesion from the posterior right kidney which is more dense than simple cyst, measuring approximately 48 Hounsfield units. This may represent complicated cyst.  A solid mass cannot be entirely excluded. 1. No acute fracture or subluxation within the thoracic or lumbar spine. 2. Degenerative changes in the spine without definite central canal stenosis in the thoracic spine. There is possible central canal stenosis at L3-4. There is multilevel neural foraminal narrowing within the lumbar spine. 3. Marked atrophy of the bilateral kidneys with indeterminate exophytic lesion from the right kidney that may represent complicated cyst or solid mass. The patient has prior examinations which are not available for comparison. 4. There is a dependent pleural based opacity in the right lower lobe which may represent atelectasis. There are prior examinations which are not available for comparison. CT ABDOMEN PELVIS W IV CONTRAST Additional Contrast? None    Result Date: 10/19/2022  EXAMINATION: CT OF THE ABDOMEN AND PELVIS WITH CONTRAST 10/19/2022 11:11 am TECHNIQUE: CT of the abdomen and pelvis was performed with the administration of intravenous contrast. Multiplanar reformatted images are provided for review. Automated exposure control, iterative reconstruction, and/or weight based adjustment of the mA/kV was utilized to reduce the radiation dose to as low as reasonably achievable. COMPARISON: None.  HISTORY: ORDERING SYSTEM PROVIDED HISTORY: fall from standing 2 days ago and again today; ? spleen rupture, ? kidney hematoma; ? retroparitoneal bleed, TRAUMA on coumadin, Hx of kidney transplant TECHNOLOGIST PROVIDED HISTORY: Reason for exam:->fall from standing 2 days ago and again today; ? spleen rupture, ? kidney hematoma; ? retroparitoneal bleed Reason for exam:->TRAUMA on coumadin Reason for exam:->Hx of kidney transplant Additional Contrast?->None Decision Support Exception - unselect if not a suspected or confirmed emergency medical condition->Emergency Medical Condition (MA) What reading provider will be dictating this exam?->CRC FINDINGS: Lower Chest: Heart size is within normal limits. There is focal bronchiectasis in the right lower lobe. Subpleural curvilinear opacities are partially imaged in the right lower lobe and lateral aspect of the right middle lobe. Please refer to dedicated CT chest report for additional details. Organs: The gallbladder is distended without obvious abnormality. The liver enhances homogeneously. The portal vein is patent and there is no intrahepatic biliary ductal dilatation. The spleen and adrenal glands are normal in size. There is a 1.4 cm low-density lesion in the inferior margin of the proximal body of the pancreas on axial image 46. Otherwise, the pancreas enhances homogeneously. No pancreatic ductal dilatation. There is severe bilateral renal cortical atrophy. A 1.2 cm intermediate density (potentially enhancing) lesion arises from the posterior aspect of the mid right kidney on axial image 57. There is a severely atrophic renal transplant in the right lower quadrant and a normally enhancing left lower quadrant renal transplant. GI/Bowel: No evidence of a bowel obstruction, free air or pneumatosis. Portions of the colon are decompressed, limiting assessment for mucosal based abnormalities. Stomach and duodenal sweep demonstrate no acute abnormality. The appendix is normal. Pelvis: The urinary bladder is distended without obvious abnormality. The prostate gland is mildly heterogeneous but nonenlarged. Visible but nonenlarged pelvic lymph nodes are present. Peritoneum/Retroperitoneum: The abdominal aorta is heavily calcified but nonaneurysmal.  No retroperitoneal lymphadenopathy or mass. Bones/Soft Tissues: No acute osseous abnormality or evidence of an aggressive osseous lesion.   There are moderate-to-severe degenerative changes of the lower lumbar spine mainly in the form of intervertebral disc space narrowing/facet arthropathy at L5-S1. There is no evidence of an acute posttraumatic abnormality in the abdomen or pelvis. Specifically, no evidence of splenic or renal injury. No retroperitoneal hematoma. Indeterminate intermediate density 1.2 cm lesion arising from the mid right kidney. Renal neoplasm not excluded. Correlation with renal ultrasound or multiphasic contrast-enhanced CT or MRI of the abdomen utilizing a renal mass protocol is recommended for further assessment. Indeterminate 1.4 cm low-density lesion arising from the inferior portion of the pancreatic body centrally. This has imaging features most suspicious for an IPMN. Correlation with contrast-enhanced abdominal MRI is also recommended (unless otherwise contraindicated). Renal transplants in the bilateral lower quadrants without evidence of posttraumatic findings to the allografts. Linear/nodular opacities in the right lower lobe and right middle lobe. Please refer to the dedicated CT chest report for additional details. Assessment      Hospital Problems             Last Modified POA    * (Principal) Symptomatic anemia 10/19/2022 Yes   Acute Upper GI bleed  Anemia acute blood loss secondary to GI bleeding  Acidemia suspected metabolic in setting up above   Acute kidney injury on Ckd s/p renal transplant  2008  Pancreatic lesion possibly IPMN  Multiple lung nodules  Copd/emphysema  Elevated troponin- suspect demand ischemia secondary to GI bleeding          Plan   Admit as inpatient  Protonix IV 80 mg daily  Gastroenterology consultation-defer MRCP to gastroenterology  Clear liquid diet, n.p.o. after midnight  5 mg of vitamin K given in the ED  INR in the a.m.  hemoglobin hematocrit every 6 hours  Nephrology consultedToday ct abd/pelvis: Marked atrophy of the bilateral kidneys with indeterminate exophytic lesion from the right kidney that may represent complicated cyst or solid mass. - nephrology consulted. Dec 2020-high attenuation exophytic cystlike area posterior mid to lower pole right kidney   9. Received sodium bicarb in the ED-I will defer to nephrology as his CO2 has been low since the 12th.  10.  We will leave to following hospitalist to consult cardiology for elevated troponin. Patient is asymptomatic. I suspect this is           demand ischemia secondary to anemia  11. Will defer consultation of pulmonology to primary hospitalist.  I did not inform patient of lung nodules on admission  12. SCDs for DVT prophylaxis  13.  Plan of care discussed with Dr. Lyndsey Sumner, patient and wife  Consultations Ordered:  IP CONSULT TO NEPHROLOGY  IP CONSULT TO HOSPITALIST  IP CONSULT TO GI    Electronically signed by HAJA Toussaint on 10/19/22 at 5:11 PM EDT

## 2022-10-19 NOTE — CONSULTS
Consults    Patient Name: Harsha Polancoster Date: 10/19/2022 10:22 AM  MR #: [id number]  : 1950    Attending Physician: Zoie Suazo MD  Reason for consult: Anemia and passing dark stool repeat    History of Presenting Illness:      Israel Lopez is a 80 y.o. male on hospital day 0 with a history of anemia and passing dark stool, patient states that he has been having dark stools since last 1 month , and to the hospital since he was feeling dizzy and fatigue. Found to be anemic with a hemoglobin of 6.3 , no nausea no vomiting , has been having watery diarrhea for the last few weeks , he does not take any NSAIDs takes low-dose aspirin and Coumadin , patient has history of atrial fibrillation . history of few pound weight loss , patient has history of chronic renal failure and had kidney transplant and is being followed the [facility] hospital .is also on cyclosporine and mycophenolate and prednisone . social history ex-smoker does not drink alcohol. History Obtained From:  patient      History:      Past Medical History:   Diagnosis Date    Diabetes mellitus ([facility] [address]     Hemodialysis access, fistula mature ([facility] [address] 2002    Hypertension     Seizures ([facility] [address]     no seizure since      Past Surgical History:   Procedure Laterality Date    BRAIN SURGERY Left 1990    to eliminate seizures    KIDNEY TRANSPLANT      [place] [address] INCL FLUOR GDNCE DX W/CELL WASHG SPX N/A 3/20/2018    BRONCHOSCOPY performed by Collette Pagan MD at [address] [place] [address] History  No family history on file.   [] Unable to obtain due to ventilated and/ or neurologic status    Social History     Socioeconomic History    Marital status:      Spouse name: Not on file    Number of children: Not on file    Years of education: Not on file    Highest education level: Not on file   Occupational History    Not on file   Tobacco Use    Smoking status: Former     Packs/day: 0.25     Types: Cigarettes     Quit date: 2015 Years since quittin.3    Smokeless tobacco: Former   Vaping Use    Vaping Use: Never used   Substance and Sexual Activity    Alcohol use: No     Alcohol/week: 0.0 standard drinks    Drug use: No    Sexual activity: Not on file   Other Topics Concern    Not on file   Social History Narrative    Not on file     Social Determinants of Health     Financial Resource Strain: Not on file   Food Insecurity: Not on file   Transportation Needs: Not on file   Physical Activity: Not on file   Stress: Not on file   Social Connections: Not on file   Intimate Partner Violence: Not on file   Housing Stability: Not on file      [] Unable to obtain due to ventilated and/ or neurologic status      Home Medications:      Medications Prior to Admission: carvedilol (COREG) 6.25 MG tablet, Take 6.25 mg by mouth 2 times daily (with meals)  carvedilol (COREG) 12.5 MG tablet, Take 1 tablet by mouth 2 times daily (Patient not taking: No sig reported)  warfarin (COUMADIN) 5 MG tablet, Take 1 tablet by mouth daily  predniSONE (DELTASONE) 5 MG tablet, Take 5 mg by mouth daily  Dulaglutide (TRULICITY) 1.5 GF/3.0LI SOPN, Inject 1.5 mg into the skin once a week Weekly on Monday  tamsulosin (FLOMAX) 0.4 MG capsule, Take 1 capsule by mouth daily (Patient taking differently: Take 0.4 mg by mouth 2 times daily)  Continuous Blood Gluc Transmit (DEXCOM G6 TRANSMITTER) MISC, Change every 3 months (Patient not taking: Reported on 10/19/2022)  Continuous Blood Gluc Sensor (FREESTYLE KEV 14 DAY SENSOR) MISC, Every 2 weeks Dx E11.65 (Patient not taking: Reported on 10/19/2022)  insulin NPH (NOVOLIN N FLEXPEN) 100 UNIT/ML injection pen, 16 units at bedtime (Patient taking differently: 9 units in AM, 5 units at bedtime)  Insulin Regular Human (NOVOLIN R FLEXPEN) 100 UNIT/ML SOPN, 6 units am 8 units lunch and dinner (Patient taking differently: Sliding Scale)  Continuous Blood Gluc Sensor (FREESTYLE KEV 14 DAY SENSOR) MISC, Every 2 weeks  pantoprazole (PROTONIX) 40 MG tablet, Take 40 mg by mouth daily   nitroGLYCERIN (NITROSTAT) 0.4 MG SL tablet, up to max of 3 total doses. If no relief after 1 dose, call 911. (Patient not taking: No sig reported)  furosemide (LASIX) 40 MG tablet, Take 40 mg by mouth daily Indications: 1/2 to 1 tab. as needed for leg swelling   aspirin 81 MG EC tablet, Take 81 mg by mouth daily   cyclobenzaprine (FLEXERIL) 10 MG tablet, Take 10 mg by mouth daily as needed Indications: as needed. cycloSPORINE modified (NEORAL) 25 MG capsule, Take 75 mg by mouth 2 times daily   gabapentin (NEURONTIN) 300 MG capsule, Take 300 mg by mouth 2 times daily.  Indications: 1 capsule in am, 2 capsule in pm   mycophenolate (CELLCEPT) 250 MG capsule, Take 750 mg by mouth 2 times daily   simvastatin (ZOCOR) 10 MG tablet, Take 10 mg by mouth nightly     Current Hospital Medications:     Scheduled Meds:   pantoprazole (PROTONIX) 40 mg injection  80 mg IntraVENous Daily    phytonadione  5 mg Oral Once    sodium chloride flush  5-40 mL IntraVENous 2 times per day    cycloSPORINE modified  75 mg Oral BID    [START ON 10/20/2022] insulin regular  6 Units SubCUTAneous Daily with breakfast    insulin regular  8 Units SubCUTAneous BID     gabapentin  300 mg Oral BID    mycophenolate  750 mg Oral BID    [START ON 10/20/2022] insulin NPH  9 Units SubCUTAneous QAM    insulin NPH  5 Units SubCUTAneous Nightly    [START ON 10/20/2022] predniSONE  5 mg Oral Daily    atorvastatin  10 mg Oral Daily    [START ON 10/20/2022] tamsulosin  0.4 mg Oral Daily    carvedilol  6.25 mg Oral BID    insulin lispro  0-4 Units SubCUTAneous TID     insulin lispro  0-4 Units SubCUTAneous Nightly     Continuous Infusions:   sodium chloride      sodium chloride      dextrose       PRN Meds:.sodium chloride, sodium chloride flush, sodium chloride, acetaminophen **OR** acetaminophen, furosemide, glucose, dextrose bolus **OR** dextrose bolus, glucagon (rDNA), dextrose, prochlorperazine **OR** prochlorperazine  . sodium chloride      sodium chloride      dextrose          Allergies:     No Known Allergies     Review of Systems:       [x] CV, Resp, Neuro, , and all other systems reviewed and negative other than listed in HPI. [] Unable to obtain due to ventilated and/ or neurologic status      Objective Findings:     Vitals:   Vitals:    10/19/22 1416 10/19/22 1446 10/19/22 1546 10/19/22 1616   BP:       Pulse:       Resp:       Temp:       TempSrc:       SpO2: 99% 99% 100% 99%   Weight:       Height:            Physical Examination:  General: In no acute distress. HEENT: Normocephalic,  scleral icterus. None  Neck: No jugular venous distention. Heart:_Irregular  Lungs: Clear to ascultation, no rales/wheezing/rhonchi. Good chest wall excursion. Abdomen: Soft nontender, no palpable mass surgical scars in the right lower quadrant area. Extremities: No clubbing/cyanosis, no edema. Skin: Warm, dry, normal turgor, no rash, no bruise, no petichiae. Neuro: No myoclonus or tremor.    Psych: Normal affect    Results/ Medications reviewed 10/19/2022, 6:36 PM     Laboratory, Microbiology, Pathology, Radiology, Cardiology, Medications and Transcriptions reviewed  Scheduled Meds:   pantoprazole (PROTONIX) 40 mg injection  80 mg IntraVENous Daily    phytonadione  5 mg Oral Once    sodium chloride flush  5-40 mL IntraVENous 2 times per day    cycloSPORINE modified  75 mg Oral BID    [START ON 10/20/2022] insulin regular  6 Units SubCUTAneous Daily with breakfast    insulin regular  8 Units SubCUTAneous BID WC    gabapentin  300 mg Oral BID    mycophenolate  750 mg Oral BID    [START ON 10/20/2022] insulin NPH  9 Units SubCUTAneous QAM    insulin NPH  5 Units SubCUTAneous Nightly    [START ON 10/20/2022] predniSONE  5 mg Oral Daily    atorvastatin  10 mg Oral Daily    [START ON 10/20/2022] tamsulosin  0.4 mg Oral Daily    carvedilol  6.25 mg Oral BID    insulin lispro  0-4 Units SubCUTAneous TID WC    insulin lispro  0-4 Units SubCUTAneous Nightly     Continuous Infusions:   sodium chloride      sodium chloride      dextrose         Recent Labs     10/19/22  1101 10/19/22  1217 10/19/22  1605   WBC 8.8  --   --    HGB 6.4* 6.3* 6.3*   HCT 19.9* 19.6* 18.8*   MCV 88.4  --   --      --   --      Recent Labs     10/19/22  1102      K 3.8   *   CO2 15*   BUN 91*   CREATININE 2.99*     Recent Labs     10/19/22  1102   AST 8   ALT 8   BILITOT 0.4   ALKPHOS 53     No results for input(s): LIPASE, AMYLASE in the last 72 hours. Recent Labs     10/17/22  1425 10/19/22  1101 10/19/22  1102   PROT  --   --  6.2*   INR 2.9 3.1  --      XR KNEE RIGHT (3 VIEWS)    Result Date: 10/19/2022  EXAMINATION: THREE XRAY VIEWS OF THE RIGHT KNEE 10/19/2022 11:30 am COMPARISON: 11/22/2019 HISTORY: ORDERING SYSTEM PROVIDED HISTORY: fall knee pain, [place] view TECHNOLOGIST PROVIDED HISTORY: Reason for exam:->fall knee pain Reason for exam:->[place] view What reading provider will be dictating this exam?->CRC FINDINGS: Mild to moderate narrowing of the medial joint space, mild narrowing of the lateral and patellofemoral joint spaces is seen. Unremarkable alignment with no evidence of dislocation. Mild degenerative changes visualized. No evidence of cortical irregularity or lucency to suggest a fracture, no evidence of lytic or sclerotic bone lesion is seen. No evidence of suprapatellar effusion. The quadriceps and patellar tendons are unremarkable. Extensive vascular calcifications are seen. Degenerative changes. No evidence of acute osseous abnormality is seen.      CT HEAD WO CONTRAST    Result Date: 10/19/2022  EXAMINATION: CT OF THE HEAD WITHOUT CONTRAST  10/19/2022 11:11 am TECHNIQUE: CT of the head was performed without the administration of intravenous contrast. Automated exposure control, iterative reconstruction, and/or weight based adjustment of the mA/kV was utilized to reduce the radiation dose to as low as reasonably achievable. COMPARISON: None. HISTORY: ORDERING SYSTEM PROVIDED HISTORY: passed out hit head; on coumadin. .. ? bleed, ? 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 ->No Reason for exam:->passed out hit head; on coumadin. .. ? bleed, ? 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 No abnormal extra-axial fluid collection. The gray-white differentiation is maintained without evidence of an acute infarct. There is no evidence of hydrocephalus. The ventricles, cisterns and sulci are prominent consistent with atrophy. There is decreased attenuation within the periventricular white matter consistent with periventricular leukomalacia. There is encephalomalacia is seen within the anterior aspect of the left temporal fossa. There is an old left craniotomy defect. ORBITS: The visualized portion of the orbits demonstrate no acute abnormality. SINUSES: The visualized paranasal sinuses and mastoid air cells demonstrate no acute abnormality. There is mucosal thickening seen within the right sphenoid sinus. SOFT TISSUES/SKULL:  No acute abnormality of the visualized skull or soft tissues. 1.  There is no acute intracranial abnormality. Specifically, there is no intracranial hemorrhage. 2. Atrophy and periventricular leukomalacia, 3. Mucosal thickening within the right sphenoid sinus. 4. Previous left craniotomy defect with encephalomalacia  seen within the anterior aspect of the left temporal lobe.  .     CTA CHEST W WO CONTRAST    Result Date: 10/19/2022  EXAMINATION: CTA OF THE CHEST WITH AND WITHOUT CONTRAST 10/19/2022 11:11 am TECHNIQUE: CTA of the chest was performed before and after the administration of intravenous contrast.  Multiplanar reformatted images are provided for review. MIP images are provided for review. Automated exposure control, iterative reconstruction, and/or weight based adjustment of the mA/kV was utilized to reduce the radiation dose to as low as reasonably achievable. COMPARISON: March 15, 2018 HISTORY: ORDERING SYSTEM PROVIDED HISTORY: syncope fell to floor 2 days ago; ? pulmonary embolus; please also mention if trauma injury. Suspect PE as cause of fall or pneumonia TECHNOLOGIST PROVIDED HISTORY: Reason for exam:->syncope fell to floor 2 days ago; ? pulmonary embolus; please also mention if trauma injury. Suspect PE as cause of fall or pneumonia Decision Support Exception - unselect if not a suspected or confirmed emergency medical condition->Emergency Medical Condition (MA) What reading provider will be dictating this exam?->CRC FINDINGS: Aorta: At the level of the left main pulmonary artery the ascending aorta measures 3.8 cm in greatest transverse dimension and 34 cm. No acute abnormality of the aorta. No large filling defects are seen in the main, 1st and 2nd order vessels. Mediastinum: No evidence of mediastinal lymphadenopathy. The heart and pericardium demonstrate no acute abnormality. Coronary calcifications and are stents are seen in place. Lungs/Pleura: In the right lower lobe there is an area of consolidation. Also  a trace of pleural fluid is seen. There are multiple nodules visualized in the lungs. Most appear stable. In the anterior right upper lobe and opacity is seen that measures 5.8 x 11 by 12 mm the uterus. Most nodules measure 4 mm or less. In the right middle lobe laterally a 6 mm nodule is seen. This was seen on previous study.  Reticulonodular opacities are seen bilaterally and are most prominent in the lateral right upper lobe.  Centrilobular emphysematous changes are seen as well as paraseptal emphysematous changes. Bronchial wall dilatation and ground-glass opacities are seen in the medial aspect of the right upper lobe. Upper Abdomen: Limited images of the upper abdomen are unremarkable. Soft Tissues/Bones: Degenerative changes are seen in the spine at multiple levels. No acute fractures are identified on this examination. 1.  No large filling defects are seen on this examination to suggest saddle embolus more in the larger primary and secondary vascular structures. 2. There is consolidation again seen in the left lower lobe. Bronchial wall thickening mild bronchiectasis and other changes of COPD are seen. An ill-defined opacity is seen in the anterior left upper lobe. Recommend follow-up to resolution to exclude any developing masses. There are multiple nodules visualized and overall are stable. CT CERVICAL SPINE WO CONTRAST    Result Date: 10/19/2022  EXAMINATION: CT OF THE CERVICAL SPINE WITHOUT CONTRAST 10/19/2022 11:11 am TECHNIQUE: CT of the cervical spine was performed without the administration of intravenous contrast. Multiplanar reformatted images are provided for review. Automated exposure control, iterative reconstruction, and/or weight based adjustment of the mA/kV was utilized to reduce the radiation dose to as low as reasonably achievable. COMPARISON: None. HISTORY: ORDERING SYSTEM PROVIDED HISTORY: fall from standing 2 days ago and again today TECHNOLOGIST PROVIDED HISTORY: Reason for exam:->fall from standing 2 days ago and again today Decision Support Exception - unselect if not a suspected or confirmed emergency medical condition->Emergency Medical Condition (MA) What reading provider will be dictating this exam?->CRC FINDINGS: The ring of C1 is intact as is the dense. There is no compression fracture of the cervical spine. No jumped or perched facet is noted.  Multilevel degenerative disc and degenerative joint disease is noted. Posterior degenerative endplate spurs are seen at the C3-4, C4-5 and C5-6 levels. There is effacement of the ventral thecal sac at the C3-4 through C5-6 levels due to the posterior degenerative endplate spurs. The prevertebral soft tissues are unremarkable. The airway is widely patent. Images through the lung apices are negative for a pneumothorax. 1. There is no acute compression fracture or subluxation of the cervical spine. 2. Multilevel degenerative disc and degenerative joint disease. CT THORACIC SPINE WO CONTRAST    Result Date: 10/19/2022  EXAMINATION: CT OF THE THORACIC SPINE WITHOUT CONTRAST; CT OF THE LUMBAR SPINE WITHOUT CONTRAST  10/19/2022 11:11 am: TECHNIQUE: CT of the thoracic spine was performed without the administration of intravenous contrast. Multiplanar reformatted images are provided for review. Automated exposure control, iterative reconstruction, and/or weight based adjustment of the mA/kV was utilized to reduce the radiation dose to as low as reasonably achievable.; CT of the lumbar spine was performed without the administration of intravenous contrast. Multiplanar reformatted images are provided for review. Adjustment of mA and/or kV according to patient size was utilized. Automated exposure control, iterative reconstruction, and/or weight based adjustment of the mA/kV was utilized to reduce the radiation dose to as low as reasonably achievable. COMPARISON: None. HISTORY: ORDERING SYSTEM PROVIDED HISTORY: Fall from standing; Trauma assessment TECHNOLOGIST PROVIDED HISTORY: Reason for exam:->Fall from standing; Trauma assessment What reading provider will be dictating this exam?->CRC FINDINGS: CT thoracic spine: There is no evidence of acute fracture. Vertebral alignment is anatomic. There are no acute compression deformities. There is minimal concavity at the superior endplates of T1 through T3.   There are changes of diffuse idiopathic skeletal hyperostosis within the thoracic spine. The paravertebral soft tissue structures are unremarkable. There is no gross evidence of central canal stenosis. There is suggestion of pleural thickening in the posterior right lower lobe with a pleural based opacity that may represent atelectasis. CT lumbar spine: There is no evidence of acute fracture. Vertebral alignment is anatomic. There are no compression deformities. The intervertebral disc space heights appear relatively preserved. There is facet hypertrophy throughout the lumbar spine. The paravertebral soft tissue structures are unremarkable. There is possible central canal stenosis at L3-4. There is multilevel neural foraminal narrowing. There is arteriosclerosis without abdominal aortic aneurysm. There is marked atrophy of the bilateral kidneys. There is exophytic lesion from the posterior right kidney which is more dense than simple cyst, measuring approximately 48 Hounsfield units. This may represent complicated cyst.  A solid mass cannot be entirely excluded. 1. No acute fracture or subluxation within the thoracic or lumbar spine. 2. Degenerative changes in the spine without definite central canal stenosis in the thoracic spine. There is possible central canal stenosis at L3-4. There is multilevel neural foraminal narrowing within the lumbar spine. 3. Marked atrophy of the bilateral kidneys with indeterminate exophytic lesion from the right kidney that may represent complicated cyst or solid mass. The patient has prior examinations which are not available for comparison. 4. There is a dependent pleural based opacity in the right lower lobe which may represent atelectasis. There are prior examinations which are not available for comparison.      CT LUMBAR SPINE WO CONTRAST    Result Date: 10/19/2022  EXAMINATION: CT OF THE THORACIC SPINE WITHOUT CONTRAST; CT OF THE LUMBAR SPINE WITHOUT CONTRAST  10/19/2022 11:11 am: TECHNIQUE: CT of the thoracic spine was performed without the administration of intravenous contrast. Multiplanar reformatted images are provided for review. Automated exposure control, iterative reconstruction, and/or weight based adjustment of the mA/kV was utilized to reduce the radiation dose to as low as reasonably achievable.; CT of the lumbar spine was performed without the administration of intravenous contrast. Multiplanar reformatted images are provided for review. Adjustment of mA and/or kV according to patient size was utilized. Automated exposure control, iterative reconstruction, and/or weight based adjustment of the mA/kV was utilized to reduce the radiation dose to as low as reasonably achievable. COMPARISON: None. HISTORY: ORDERING SYSTEM PROVIDED HISTORY: Fall from standing; Trauma assessment TECHNOLOGIST PROVIDED HISTORY: Reason for exam:->Fall from standing; Trauma assessment What reading provider will be dictating this exam?->CRC FINDINGS: CT thoracic spine: There is no evidence of acute fracture. Vertebral alignment is anatomic. There are no acute compression deformities. There is minimal concavity at the superior endplates of T1 through T3. There are changes of diffuse idiopathic skeletal hyperostosis within the thoracic spine. The paravertebral soft tissue structures are unremarkable. There is no gross evidence of central canal stenosis. There is suggestion of pleural thickening in the posterior right lower lobe with a pleural based opacity that may represent atelectasis. CT lumbar spine: There is no evidence of acute fracture. Vertebral alignment is anatomic. There are no compression deformities. The intervertebral disc space heights appear relatively preserved. There is facet hypertrophy throughout the lumbar spine. The paravertebral soft tissue structures are unremarkable. There is possible central canal stenosis at L3-4. There is multilevel neural foraminal narrowing.   There is arteriosclerosis without abdominal aortic aneurysm. There is marked atrophy of the bilateral kidneys. There is exophytic lesion from the posterior right kidney which is more dense than simple cyst, measuring approximately 48 Hounsfield units. This may represent complicated cyst.  A solid mass cannot be entirely excluded. 1. No acute fracture or subluxation within the thoracic or lumbar spine. 2. Degenerative changes in the spine without definite central canal stenosis in the thoracic spine. There is possible central canal stenosis at L3-4. There is multilevel neural foraminal narrowing within the lumbar spine. 3. Marked atrophy of the bilateral kidneys with indeterminate exophytic lesion from the right kidney that may represent complicated cyst or solid mass. The patient has prior examinations which are not available for comparison. 4. There is a dependent pleural based opacity in the right lower lobe which may represent atelectasis. There are prior examinations which are not available for comparison. CT ABDOMEN PELVIS W IV CONTRAST Additional Contrast? None    Result Date: 10/19/2022  EXAMINATION: CT OF THE ABDOMEN AND PELVIS WITH CONTRAST 10/19/2022 11:11 am TECHNIQUE: CT of the abdomen and pelvis was performed with the administration of intravenous contrast. Multiplanar reformatted images are provided for review. Automated exposure control, iterative reconstruction, and/or weight based adjustment of the mA/kV was utilized to reduce the radiation dose to as low as reasonably achievable. COMPARISON: None.  HISTORY: ORDERING SYSTEM PROVIDED HISTORY: fall from standing 2 days ago and again today; ? spleen rupture, ? kidney hematoma; ? retroparitoneal bleed, TRAUMA on coumadin, Hx of kidney transplant TECHNOLOGIST PROVIDED HISTORY: Reason for exam:->fall from standing 2 days ago and again today; ? spleen rupture, ? kidney hematoma; ? retroparitoneal bleed Reason for exam:->TRAUMA on coumadin Reason for exam:->Hx of kidney transplant Additional Contrast?->None Decision Support Exception - unselect if not a suspected or confirmed emergency medical condition->Emergency Medical Condition (MA) What reading provider will be dictating this exam?->CRC FINDINGS: Lower Chest: Heart size is within normal limits. There is focal bronchiectasis in the right lower lobe. Subpleural curvilinear opacities are partially imaged in the right lower lobe and lateral aspect of the right middle lobe. Please refer to dedicated CT chest report for additional details. Organs: The gallbladder is distended without obvious abnormality. The liver enhances homogeneously. The portal vein is patent and there is no intrahepatic biliary ductal dilatation. The spleen and adrenal glands are normal in size. There is a 1.4 cm low-density lesion in the inferior margin of the proximal body of the pancreas on axial image 46. Otherwise, the pancreas enhances homogeneously. No pancreatic ductal dilatation. There is severe bilateral renal cortical atrophy. A 1.2 cm intermediate density (potentially enhancing) lesion arises from the posterior aspect of the mid right kidney on axial image 57. There is a severely atrophic renal transplant in the right lower quadrant and a normally enhancing left lower quadrant renal transplant. GI/Bowel: No evidence of a bowel obstruction, free air or pneumatosis. Portions of the colon are decompressed, limiting assessment for mucosal based abnormalities. Stomach and duodenal sweep demonstrate no acute abnormality. The appendix is normal. Pelvis: The urinary bladder is distended without obvious abnormality. The prostate gland is mildly heterogeneous but nonenlarged. Visible but nonenlarged pelvic lymph nodes are present. Peritoneum/Retroperitoneum: The abdominal aorta is heavily calcified but nonaneurysmal.  No retroperitoneal lymphadenopathy or mass.  Bones/Soft Tissues: No acute osseous abnormality or evidence of an aggressive osseous lesion. There are moderate-to-severe degenerative changes of the lower lumbar spine mainly in the form of intervertebral disc space narrowing/facet arthropathy at L5-S1. There is no evidence of an acute posttraumatic abnormality in the abdomen or pelvis. Specifically, no evidence of splenic or renal injury. No retroperitoneal hematoma. Indeterminate intermediate density 1.2 cm lesion arising from the mid right kidney. Renal neoplasm not excluded. Correlation with renal ultrasound or multiphasic contrast-enhanced CT or MRI of the abdomen utilizing a renal mass protocol is recommended for further assessment. Indeterminate 1.4 cm low-density lesion arising from the inferior portion of the pancreatic body centrally. This has imaging features most suspicious for an IPMN. Correlation with contrast-enhanced abdominal MRI is also recommended (unless otherwise contraindicated). Renal transplants in the bilateral lower quadrants without evidence of posttraumatic findings to the allografts. Linear/nodular opacities in the right lower lobe and right middle lobe. Please refer to the dedicated CT chest report for additional details. XR CHEST PORTABLE    Result Date: 10/12/2022  EXAMINATION: ONE XRAY VIEW OF THE CHEST 10/12/2022 2:58 pm COMPARISON: 09/14/2022 HISTORY: ORDERING SYSTEM PROVIDED HISTORY: sob and shoulder pain TECHNOLOGIST PROVIDED HISTORY: Reason for exam:->sob and shoulder pain What reading provider will be dictating this exam?->CRC FINDINGS: The lungs are without acute focal process. There is no effusion or pneumothorax. The cardiomediastinal silhouette is without acute process. Heart upper limits of normal in size. The osseous structures are without acute process. No acute process.         Impression:   78-year-old male status post kidney transplant on Coumadin and low-dose aspirin now admitted with 1 month history of melena progressive weakness and significant anemia with hemoglobin of 6.3, INR was 3.1 and patient received vitamin K to reverse that , rule out ulcer disease gastritis or vascular ectasia of the GI tract, CT of the abdomen done which showed a 1.4 cm lesion in the body of the pancreas possibly an IPMN  Plan: Will repeat INR in the morning and if acceptable will schedule EGD  AM, patient is on IV Protonix , once the bleeding issues addressed patient would need further evaluation for the lesion in the pancreas possibly an outpatient follow-up with Dr. Sarahi King for EUS or MRI. Comments: Thank you for allowing us to participate in the care of this patient. Will continue to follow. Please call if questions or concerns arise.     Electronically signed by Willow Villagran MD on 10/19/2022 at 6:36 PM

## 2022-10-19 NOTE — ED NOTES
Called CT to bring patient over and they asked me to wait a few minutes.       Chelly Lan RN  10/19/22 4045

## 2022-10-19 NOTE — ED NOTES
Informed [facility], RN, primary nurse hgb 6.4 hct 19.9     Johnny Fournier V, RN  10/19/22 4460

## 2022-10-19 NOTE — ED TRIAGE NOTES
Arrived via [facility]  Pt started on new BP med and now having weakness  Doesn't know name of new med  Kidney transplant done 11/2015 pt has one kidney this was his second transport  2 different anti rejection drugs and prednisone  Fell 2 days ago hit head, knees, shoulders + blood thinners +LOC  Left knee hurts most   Vivien Favre again today onto , right hip, and lower back pain denies LOC today

## 2022-10-19 NOTE — CONSENT
Informed Consent for Blood Component Transfusion Note    I have discussed with the patient the rationale for blood component transfusion; its benefits in treating or preventing fatigue, organ damage, or death; and its risk which includes mild transfusion reactions, rare risk of blood borne infection, or more serious but rare reactions. I have discussed the alternatives to transfusion, including the risk and consequences of not receiving transfusion. The patient had an opportunity to ask questions and had agreed to proceed with transfusion of blood components.     Electronically signed by HAJA Le on 10/19/22 at 5:11 PM EDT

## 2022-10-19 NOTE — FLOWSHEET NOTE
10/19/22 @ 5 Notified Dr. Gamaliel Brady of Nephrology consult via [facility]    10/19/22 @ 46 Notified Dr. Clint Vazquez of GI consult via [facility]

## 2022-10-20 LAB
ALBUMIN SERPL-MCNC: 3.8 G/DL (ref 3.5–4.6)
ALP BLD-CCNC: 52 U/L (ref 35–104)
ALT SERPL-CCNC: 6 U/L (ref 0–41)
ANION GAP SERPL CALCULATED.3IONS-SCNC: 16 MEQ/L (ref 9–15)
APTT: 29.1 SEC (ref 24.4–36.8)
AST SERPL-CCNC: 9 U/L (ref 0–40)
BASOPHILS ABSOLUTE: 0.1 K/UL (ref 0–0.2)
BASOPHILS RELATIVE PERCENT: 1.3 %
BILIRUB SERPL-MCNC: 0.9 MG/DL (ref 0.2–0.7)
BLOOD BANK DISPENSE STATUS: NORMAL
BLOOD BANK PRODUCT CODE: NORMAL
BPU ID: NORMAL
BUN BLDV-MCNC: 83 MG/DL (ref 8–23)
CALCIUM SERPL-MCNC: 8.4 MG/DL (ref 8.5–9.9)
CHLORIDE BLD-SCNC: 110 MEQ/L (ref 95–107)
CO2: 13 MEQ/L (ref 20–31)
CREAT SERPL-MCNC: 2.81 MG/DL (ref 0.7–1.2)
DESCRIPTION BLOOD BANK: NORMAL
EKG ATRIAL RATE: 90 BPM
EKG P AXIS: 78 DEGREES
EKG P-R INTERVAL: 172 MS
EKG Q-T INTERVAL: 418 MS
EKG QRS DURATION: 82 MS
EKG QTC CALCULATION (BAZETT): 511 MS
EKG R AXIS: 7 DEGREES
EKG T AXIS: -4 DEGREES
EKG VENTRICULAR RATE: 90 BPM
EOSINOPHILS ABSOLUTE: 0.4 K/UL (ref 0–0.7)
EOSINOPHILS RELATIVE PERCENT: 4.5 %
GFR SERPL CREATININE-BSD FRML MDRD: 23.3 ML/MIN/{1.73_M2}
GLOBULIN: 2 G/DL (ref 2.3–3.5)
GLUCOSE BLD-MCNC: 118 MG/DL (ref 70–99)
GLUCOSE BLD-MCNC: 145 MG/DL (ref 70–99)
GLUCOSE BLD-MCNC: 214 MG/DL (ref 70–99)
GLUCOSE BLD-MCNC: 229 MG/DL (ref 70–99)
GLUCOSE BLD-MCNC: 257 MG/DL (ref 70–99)
HCT VFR BLD CALC: 20.8 % (ref 42–52)
HCT VFR BLD CALC: 20.8 % (ref 42–52)
HCT VFR BLD CALC: 23.3 % (ref 42–52)
HCT VFR BLD CALC: 24.8 % (ref 42–52)
HEMOGLOBIN: 6.6 G/DL (ref 14–18)
HEMOGLOBIN: 6.7 G/DL (ref 14–18)
HEMOGLOBIN: 7.8 G/DL (ref 14–18)
HEMOGLOBIN: 8.1 G/DL (ref 14–18)
INR BLD: 3.5
INR BLD: 3.5
IRON SATURATION: 59 % (ref 20–55)
IRON: 133 UG/DL (ref 59–158)
LYMPHOCYTES ABSOLUTE: 0.6 K/UL (ref 1–4.8)
LYMPHOCYTES RELATIVE PERCENT: 7 %
MAGNESIUM: 1.7 MG/DL (ref 1.7–2.4)
MCH RBC QN AUTO: 28.3 PG (ref 27–31.3)
MCHC RBC AUTO-ENTMCNC: 31.7 % (ref 33–37)
MCV RBC AUTO: 89.1 FL (ref 79–92.2)
MONOCYTES ABSOLUTE: 0.8 K/UL (ref 0.2–0.8)
MONOCYTES RELATIVE PERCENT: 9.1 %
NEUTROPHILS ABSOLUTE: 7 K/UL (ref 1.4–6.5)
NEUTROPHILS RELATIVE PERCENT: 78.1 %
PDW BLD-RTO: 16.1 % (ref 11.5–14.5)
PERFORMED ON: ABNORMAL
PHOSPHORUS: 2.8 MG/DL (ref 2.3–4.8)
PLATELET # BLD: 185 K/UL (ref 130–400)
POTASSIUM SERPL-SCNC: 4 MEQ/L (ref 3.4–4.9)
PROTHROMBIN TIME: 35.1 SEC (ref 12.3–14.9)
PROTHROMBIN TIME: 35.2 SEC (ref 12.3–14.9)
RBC # BLD: 2.36 M/UL (ref 4.7–6.1)
SODIUM BLD-SCNC: 139 MEQ/L (ref 135–144)
TOTAL IRON BINDING CAPACITY: 225 UG/DL (ref 250–450)
TOTAL PROTEIN: 5.8 G/DL (ref 6.3–8)
TROPONIN: 0.07 NG/ML (ref 0–0.01)
UNSATURATED IRON BINDING CAPACITY: 92 UG/DL (ref 112–347)
WBC # BLD: 9 K/UL (ref 4.8–10.8)

## 2022-10-20 PROCEDURE — 36430 TRANSFUSION BLD/BLD COMPNT: CPT

## 2022-10-20 PROCEDURE — 1210000000 HC MED SURG R&B

## 2022-10-20 PROCEDURE — 83735 ASSAY OF MAGNESIUM: CPT

## 2022-10-20 PROCEDURE — 84484 ASSAY OF TROPONIN QUANT: CPT

## 2022-10-20 PROCEDURE — 6360000002 HC RX W HCPCS: Performed by: INTERNAL MEDICINE

## 2022-10-20 PROCEDURE — C9113 INJ PANTOPRAZOLE SODIUM, VIA: HCPCS | Performed by: STUDENT IN AN ORGANIZED HEALTH CARE EDUCATION/TRAINING PROGRAM

## 2022-10-20 PROCEDURE — 83550 IRON BINDING TEST: CPT

## 2022-10-20 PROCEDURE — 83540 ASSAY OF IRON: CPT

## 2022-10-20 PROCEDURE — 2580000003 HC RX 258: Performed by: STUDENT IN AN ORGANIZED HEALTH CARE EDUCATION/TRAINING PROGRAM

## 2022-10-20 PROCEDURE — 85014 HEMATOCRIT: CPT

## 2022-10-20 PROCEDURE — 6360000002 HC RX W HCPCS: Performed by: STUDENT IN AN ORGANIZED HEALTH CARE EDUCATION/TRAINING PROGRAM

## 2022-10-20 PROCEDURE — 85610 PROTHROMBIN TIME: CPT

## 2022-10-20 PROCEDURE — 99232 SBSQ HOSP IP/OBS MODERATE 35: CPT | Performed by: SPECIALIST

## 2022-10-20 PROCEDURE — 85025 COMPLETE CBC W/AUTO DIFF WBC: CPT

## 2022-10-20 PROCEDURE — 2580000003 HC RX 258: Performed by: CLINICAL NURSE SPECIALIST

## 2022-10-20 PROCEDURE — 99213 OFFICE O/P EST LOW 20 MIN: CPT

## 2022-10-20 PROCEDURE — 36415 COLL VENOUS BLD VENIPUNCTURE: CPT

## 2022-10-20 PROCEDURE — 80053 COMPREHEN METABOLIC PANEL: CPT

## 2022-10-20 PROCEDURE — 84100 ASSAY OF PHOSPHORUS: CPT

## 2022-10-20 PROCEDURE — 85730 THROMBOPLASTIN TIME PARTIAL: CPT

## 2022-10-20 PROCEDURE — A4216 STERILE WATER/SALINE, 10 ML: HCPCS | Performed by: STUDENT IN AN ORGANIZED HEALTH CARE EDUCATION/TRAINING PROGRAM

## 2022-10-20 PROCEDURE — 6370000000 HC RX 637 (ALT 250 FOR IP): Performed by: CLINICAL NURSE SPECIALIST

## 2022-10-20 PROCEDURE — 93010 ELECTROCARDIOGRAM REPORT: CPT | Performed by: INTERNAL MEDICINE

## 2022-10-20 PROCEDURE — 85018 HEMOGLOBIN: CPT

## 2022-10-20 PROCEDURE — 6370000000 HC RX 637 (ALT 250 FOR IP): Performed by: INTERNAL MEDICINE

## 2022-10-20 RX ORDER — SODIUM CHLORIDE 9 MG/ML
INJECTION, SOLUTION INTRAVENOUS PRN
Status: DISCONTINUED | OUTPATIENT
Start: 2022-10-20 | End: 2022-10-28 | Stop reason: HOSPADM

## 2022-10-20 RX ORDER — MYCOPHENOLATE MOFETIL 250 MG/1
750 CAPSULE ORAL 2 TIMES DAILY
Status: DISCONTINUED | OUTPATIENT
Start: 2022-10-20 | End: 2022-10-28 | Stop reason: HOSPADM

## 2022-10-20 RX ORDER — PHYTONADIONE 5 MG/1
10 TABLET ORAL ONCE
Status: COMPLETED | OUTPATIENT
Start: 2022-10-20 | End: 2022-10-20

## 2022-10-20 RX ORDER — MYCOPHENOLATE MOFETIL 250 MG/1
500 CAPSULE ORAL 4 TIMES DAILY
Status: DISCONTINUED | OUTPATIENT
Start: 2022-10-20 | End: 2022-10-20

## 2022-10-20 RX ADMIN — CARVEDILOL 6.25 MG: 6.25 TABLET, FILM COATED ORAL at 10:21

## 2022-10-20 RX ADMIN — PREDNISONE 5 MG: 5 TABLET ORAL at 10:22

## 2022-10-20 RX ADMIN — Medication 10 ML: at 21:47

## 2022-10-20 RX ADMIN — SODIUM CHLORIDE, PRESERVATIVE FREE 10 ML: 5 INJECTION INTRAVENOUS at 09:00

## 2022-10-20 RX ADMIN — INSULIN HUMAN 6 UNITS: 100 INJECTION, SOLUTION PARENTERAL at 10:29

## 2022-10-20 RX ADMIN — INSULIN LISPRO 1 UNITS: 100 INJECTION, SOLUTION INTRAVENOUS; SUBCUTANEOUS at 12:34

## 2022-10-20 RX ADMIN — Medication 10 ML: at 09:00

## 2022-10-20 RX ADMIN — GABAPENTIN 300 MG: 300 CAPSULE ORAL at 21:34

## 2022-10-20 RX ADMIN — INSULIN HUMAN 9 UNITS: 100 INJECTION, SUSPENSION SUBCUTANEOUS at 10:28

## 2022-10-20 RX ADMIN — INSULIN LISPRO 1 UNITS: 100 INJECTION, SOLUTION INTRAVENOUS; SUBCUTANEOUS at 10:29

## 2022-10-20 RX ADMIN — PHYTONADIONE 10 MG: 5 TABLET ORAL at 17:47

## 2022-10-20 RX ADMIN — TAMSULOSIN HYDROCHLORIDE 0.4 MG: 0.4 CAPSULE ORAL at 10:22

## 2022-10-20 RX ADMIN — CYCLOSPORINE 75 MG: 25 CAPSULE, LIQUID FILLED ORAL at 10:22

## 2022-10-20 RX ADMIN — ATORVASTATIN CALCIUM 10 MG: 10 TABLET, FILM COATED ORAL at 10:22

## 2022-10-20 RX ADMIN — MYCOPHENOLATE MOFETIL 500 MG: 250 CAPSULE ORAL at 10:22

## 2022-10-20 RX ADMIN — GABAPENTIN 300 MG: 300 CAPSULE ORAL at 10:22

## 2022-10-20 RX ADMIN — MYCOPHENOLATE MOFETIL 750 MG: 250 CAPSULE ORAL at 21:34

## 2022-10-20 RX ADMIN — CYCLOSPORINE 75 MG: 25 CAPSULE, LIQUID FILLED ORAL at 21:35

## 2022-10-20 RX ADMIN — SODIUM CHLORIDE 80 MG: 9 INJECTION, SOLUTION INTRAMUSCULAR; INTRAVENOUS; SUBCUTANEOUS at 10:26

## 2022-10-20 RX ADMIN — PHYTONADIONE 5 MG: 5 TABLET ORAL at 08:38

## 2022-10-20 RX ADMIN — CARVEDILOL 6.25 MG: 6.25 TABLET, FILM COATED ORAL at 21:34

## 2022-10-20 ASSESSMENT — PAIN SCALES - GENERAL
PAINLEVEL_OUTOF10: 3
PAINLEVEL_OUTOF10: 0

## 2022-10-20 ASSESSMENT — PAIN DESCRIPTION - DESCRIPTORS: DESCRIPTORS: JABBING

## 2022-10-20 ASSESSMENT — PAIN DESCRIPTION - ORIENTATION: ORIENTATION: LEFT

## 2022-10-20 ASSESSMENT — PAIN DESCRIPTION - PAIN TYPE: TYPE: ACUTE PAIN

## 2022-10-20 ASSESSMENT — PAIN DESCRIPTION - LOCATION: LOCATION: KNEE

## 2022-10-20 NOTE — CARE COORDINATION
[facility] Case Management Initial Discharge Assessment    Met with Patient to discuss discharge plan. PCP: Jean-Claude Mcgovern DO                                Date of Last Visit: 1 MONTH AGO     [facility] Patient: No        [facility] Notified: no    If no PCP, list provided? N/A    Discharge Planning    Living Arrangements: independently at home    Who do you live with? SPOUSE     Who helps you with your care:  self    If lives at home:     Do you have any barriers navigating in your home? yes - 5 STEPS. HAS SOME DIFFICULTY CLIMBING STEPS, NEARLY FALLS. IN PROCESS OF HAVING A NEW PORCH BUILT. Patient can perform ADL? Yes    Current Services (outpatient and in home) :  None    Dialysis: No    Is transportation available to get to your appointments? Yes    DME Equipment:  yes - WALKER, CANES, WILL GET ROLATOR SOON. Respiratory equipment: None    Respiratory provider:  no     Pharmacy:  yes - [id number] with Medication Assistance Program?  No      Patient agreeable to [facility]? No    Patient agreeable to SNF/Rehab? [facility] VS [facility]. Other discharge needs identified? N/A    Does Patient Have a High-Risk for Readmission Diagnosis (CHF, PN, MI, COPD)? No    Initial Discharge Plan? MET W/PT TO ASSESS NEEDS AND DISCUSS DISCHARGE PLAN. PT STATES. \"I WANT TO GO TO REHAB TO GET STRONGER. I CAN'T EXPECT MY WIFE TO PICK ME UP OFF THE FLOOR. \" CM TO FOLLOW CLINICAL COURSE. (Note: please see concurrent daily documentation for any updates after initial note).         Readmission Risk              Risk of Unplanned Readmission:  31         Electronically signed by Divya Crouch RN on 10/20/2022 at 11:33 AM

## 2022-10-20 NOTE — PROGRESS NOTES
Nephrology Progress Note    Assessment:  Anemia  DM type-2  S/P Kidney transplant  Hypertension  Anemia GIB suspected  Fistula arm functioning      Plan:  Vitamin K given  scope tomorrow    Patient Active Problem List:     Pneumonia     Abdominal pain, other specified site     Acute pyelonephritis without lesion of renal medullary necrosis     Anemia     Essential hypertension     Nonspecific abnormal results of thyroid function study     Mixed hyperlipidemia     Kidney replaced by transplant     Intra-abdominal abscess post-procedure     Infection due to Enterococcus     Fever and other physiologic disturbances of temperature regulation     Chronic kidney disease (CKD)     Type II or unspecified type diabetes mellitus without mention of complication, uncontrolled     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     Anginal chest pain at rest [facility])     Atypical chest pain     Chronic cough     Unstable angina ([facility])     Chest pain     Atrial fibrillation with RVR ([facility])     Symptomatic anemia     Acute upper GI bleed      Subjective:  Admit Date: 10/19/2022    Interval History: alert no distress    Medications:  Scheduled Meds:   mycophenolate  500 mg Oral 4x daily    pantoprazole (PROTONIX) 40 mg injection  80 mg IntraVENous Daily    sodium chloride flush  5-40 mL IntraVENous 2 times per day    cycloSPORINE modified  75 mg Oral BID    insulin regular  6 Units SubCUTAneous Daily with breakfast    insulin regular  8 Units SubCUTAneous BID WC    gabapentin  300 mg Oral BID    insulin NPH  9 Units SubCUTAneous QAM    insulin NPH  5 Units SubCUTAneous Nightly    predniSONE  5 mg Oral Daily    atorvastatin  10 mg Oral Daily    tamsulosin  0.4 mg Oral Daily    carvedilol  6.25 mg Oral BID    insulin lispro  0-4 Units SubCUTAneous TID WC    insulin lispro  0-4 Units SubCUTAneous Nightly     Continuous Infusions:   sodium chloride      sodium chloride      sodium chloride      dextrose CBC:   Recent Labs     10/19/22  1101 10/19/22  1217 10/20/22  0721 10/20/22  1039   WBC 8.8  --   --   --    HGB 6.4*   < > 6.6* 6.7*     --   --   --     < > = values in this interval not displayed. CMP:    Recent Labs     10/19/22  1102      K 3.8   *   CO2 15*   BUN 91*   CREATININE 2.99*   GLUCOSE 274*   CALCIUM 8.6   LABGLOM 21.6*     Troponin:   Recent Labs     10/19/22  1102   TROPONINI 0.057*     BNP: No results for input(s): BNP in the last 72 hours. INR:   Recent Labs     10/20/22  0554   INR 3.5     Lipids: No results for input(s): CHOL, LDLDIRECT, TRIG, HDL, AMYLASE, LIPASE in the last 72 hours. Liver:   Recent Labs     10/19/22  1102   AST 8   ALT 8   ALKPHOS 53   PROT 6.2*   LABALBU 3.8   BILITOT 0.4     Iron:  No results for input(s): IRONS, FERRITIN in the last 72 hours. Invalid input(s): LABIRONS  Urinalysis: No results for input(s): UA in the last 72 hours.     Objective:  Vitals: BP (!) 130/48   Pulse 63   Temp 97.9 °F (36.6 °C) (Oral)   Resp 18   Ht 6' 2\" (1.88 m)   Wt 190 lb (86.2 kg)   SpO2 100%   BMI 24.39 kg/m²    Wt Readings from Last 3 Encounters:   10/19/22 190 lb (86.2 kg)   10/14/22 195 lb 6.4 oz (88.6 kg)   10/12/22 192 lb (87.1 kg)      24HR INTAKE/OUTPUT:    Intake/Output Summary (Last 24 hours) at 10/20/2022 1315  Last data filed at 10/20/2022 0407  Gross per 24 hour   Intake 366.67 ml   Output --   Net 366.67 ml       General: alert, in no apparent distress  HEENT: normocephalic, atraumatic, anicteric  Neck: supple, no mass  Lungs: non-labored respirations, clear to auscultation bilaterally  Heart: regular rate and rhythm, no murmurs or rubs  Abdomen: soft, non-tender, non-distended  Ext: no cyanosis, no peripheral edema  Neuro: alert and oriented, no gross abnormalities  Psych: normal mood and affect  Skin: no rash      Electronically signed by Rendall Najjar, DO, MD

## 2022-10-20 NOTE — PROGRESS NOTES
Wound Ostomy Continence Nurse  Consult Note       NAME:  Michelle Montoya  MEDICAL RECORD NUMBER:  [id number]  AGE: 80 y.o. GENDER: male  : 1950  TODAY'S DATE:  10/20/2022    Subjective   Reason for [id number] Nurse Evaluation and Assessment: Multiple skin tears      Michelle Montoya is a 80 y.o. male referred by:   [x] Physician  [] Nursing  [] Other:     Wound Identification:  Wound Type: skin tear  Contributing Factors:  Fall at home prior to admission    Wound History: Patient admitted to [facility] after a fall at home. Per patient, he passed out and fell onto hard wood floors which caused the skin tears. Patient states he was not down long as his wife was home and found him a short time later. Current Wound Care Treatment: Recommending 1) continue pressure I n jury prevention interventions 2) Use Promogran Meg to left elbow skin tear to stop bleeding, meg left in place and covered with xeroform and border foam - next dressing change will be Monday, consisting of xeroform gauze and border foam covering dressing 3) Left wrist and Left shin skin tears - MWF dressing change consisting of xeroform gauze with border foam dressing to cover. Patient Goal of Care:  [x] Wound Healing  [] Odor Control  [] Palliative Care  [] Pain Control   [] Other:         PAST MEDICAL HISTORY        Diagnosis Date    Diabetes mellitus ([address]     Hemodialysis access, fistula mature ([facility] 2002    Hypertension     Seizures ([facility] [address]     no seizure since        PAST SURGICAL HISTORY    Past Surgical History:   Procedure Laterality Date    BRAIN SURGERY Left 1990    to eliminate seizures    KIDNEY TRANSPLANT      PA [address] INCL FLUOR GDNCE DX W/CELL WASHG SPX N/A 3/20/2018    BRONCHOSCOPY performed by Junior Messer MD at [address]    No family history on file.     SOCIAL HISTORY    Social History     Tobacco Use    Smoking status: Former     Packs/day: 0.25     Types: Cigarettes Quit date: 2015     Years since quittin.3    Smokeless tobacco: Former   Vaping Use    Vaping Use: Never used   Substance Use Topics    Alcohol use: No     Alcohol/week: 0.0 standard drinks    Drug use: No       ALLERGIES    No Known Allergies    MEDICATIONS    No current facility-administered medications on file prior to encounter. Current Outpatient Medications on File Prior to Encounter   Medication Sig Dispense Refill    carvedilol (COREG) 6.25 MG tablet Take 6.25 mg by mouth 2 times daily (with meals)      carvedilol (COREG) 12.5 MG tablet Take 1 tablet by mouth 2 times daily (Patient not taking: No sig reported) 180 tablet 3    warfarin (COUMADIN) 5 MG tablet Take 1 tablet by mouth daily 30 tablet 3    predniSONE (DELTASONE) 5 MG tablet Take 5 mg by mouth daily      Dulaglutide (TRULICITY) 1.5 /3.7NR SOPN Inject 1.5 mg into the skin once a week Weekly on Monday      tamsulosin (FLOMAX) 0.4 MG capsule Take 1 capsule by mouth daily (Patient taking differently: Take 0.4 mg by mouth 2 times daily) 90 capsule 0    Continuous Blood Gluc Transmit (DEXCOM G6 TRANSMITTER) MISC Change every 3 months (Patient not taking: Reported on 10/19/2022) 1 each 3    Continuous Blood Gluc Sensor (FREESTYLE KEV 14 DAY SENSOR) MISC Every 2 weeks Dx E11.65 (Patient not taking: Reported on 10/19/2022) 2 each 06    insulin NPH (NOVOLIN N FLEXPEN) 100 UNIT/ML injection pen 16 units at bedtime (Patient taking differently: 9 units in AM, 5 units at bedtime) 5 pen 3    Insulin Regular Human (NOVOLIN R FLEXPEN) 100 UNIT/ML SOPN 6 units am 8 units lunch and dinner (Patient taking differently: Sliding Scale) 5 pen 3    Continuous Blood Gluc Sensor (FREESTYLE KEV 14 DAY SENSOR) MISC Every 2 weeks 2 each 06    pantoprazole (PROTONIX) 40 MG tablet Take 40 mg by mouth daily       nitroGLYCERIN (NITROSTAT) 0.4 MG SL tablet up to max of 3 total doses. If no relief after 1 dose, call 911.  (Patient not taking: No sig reported) 25 tablet 3    furosemide (LASIX) 40 MG tablet Take 40 mg by mouth daily Indications: 1/2 to 1 tab. as needed for leg swelling       aspirin 81 MG EC tablet Take 81 mg by mouth daily   3    cyclobenzaprine (FLEXERIL) 10 MG tablet Take 10 mg by mouth daily as needed Indications: as needed. 0    cycloSPORINE modified (NEORAL) 25 MG capsule Take 75 mg by mouth 2 times daily   2    gabapentin (NEURONTIN) 300 MG capsule Take 300 mg by mouth 2 times daily.  Indications: 1 capsule in am, 2 capsule in pm   2    mycophenolate (CELLCEPT) 250 MG capsule Take 750 mg by mouth 2 times daily   1    simvastatin (ZOCOR) 10 MG tablet Take 10 mg by mouth nightly   1       Objective    /61   Pulse 63   Temp 98.7 °F (37.1 °C) (Oral)   Resp 18   Ht 6' 2\" (1.88 m)   Wt 190 lb (86.2 kg)   SpO2 98%   BMI 24.39 kg/m²     LABS:  WBC:    Lab Results   Component Value Date/Time    WBC 8.8 10/19/2022 11:01 AM     H/H:    Lab Results   Component Value Date/Time    HGB 6.7 10/20/2022 10:39 AM    HCT 20.8 10/20/2022 10:39 AM     PTT:    Lab Results   Component Value Date/Time    APTT 29.1 10/20/2022 05:54 AM   [APTT}  PT/INR:    Lab Results   Component Value Date/Time    PROTIME 35.2 10/20/2022 05:54 AM    INR 3.5 10/20/2022 05:54 AM     HgBA1c:    Lab Results   Component Value Date/Time    LABA1C 8.5 10/19/2022 07:51 PM       Assessment   Jeremi Risk Score:      Patient Active Problem List   Diagnosis    Pneumonia    Abdominal pain, other specified site    Acute pyelonephritis without lesion of renal medullary necrosis    Anemia    Essential hypertension    Nonspecific abnormal results of thyroid function study    Mixed hyperlipidemia    Kidney replaced by transplant    Intra-abdominal abscess post-procedure    Infection due to Enterococcus    Fever and other physiologic disturbances of temperature regulation    Chronic kidney disease (CKD)    Type II or unspecified type diabetes mellitus without mention of complication, uncontrolled 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    Anginal chest pain at rest [facility])    Atypical chest pain    Chronic cough    Unstable angina ([facility])    Chest pain    Atrial fibrillation with RVR ([facility])    Symptomatic anemia    Acute upper GI bleed       Measurements:  Wound 10/19/22 Pretibial Left;Proximal (Active)   Wound Etiology Skin Tear 10/20/22 0945   Dressing Status New dressing applied 10/20/22 0945   Wound Cleansed Cleansed with saline 10/20/22 0945   Dressing/Treatment Xeroform; Foam 10/20/22 0945   Dressing Change Due 10/24/22 10/20/22 0945   Wound Length (cm) 0.8 cm 10/20/22 0945   Wound Width (cm) 1 cm 10/20/22 0945   Wound Depth (cm) 0 cm 10/20/22 0945   Wound Surface Area (cm^2) 0.8 cm^2 10/20/22 0945   Wound Volume (cm^3) 0 cm^3 10/20/22 0945   Wound Assessment [place]/red;Superficial 10/20/22 0945   Drainage Amount Scant 10/20/22 0945   Drainage Description Serosanguinous 10/20/22 0945   Odor None 10/20/22 0945   Herlinda-wound Assessment Ecchymosis 10/20/22 0945   Margins Defined edges 10/20/22 0945   Wound Thickness Description not for Pressure Injury Partial thickness 10/20/22 0945   Number of days: 1       Wound 10/19/22 Arm Distal;Left;Lower;Dorsal small skin tear x2 (Active)   Wound Etiology Skin Tear 10/20/22 0945   Dressing Status New dressing applied 10/20/22 0945   Wound Cleansed Cleansed with saline 10/20/22 0945   Dressing/Treatment Xeroform; Foam 10/20/22 0945   Dressing Change Due 10/24/22 10/20/22 0945   Wound Depth (cm) 0 cm 10/20/22 0945   Wound Assessment Pink/red;Superficial;Dry 10/20/22 0945   Drainage Amount None 10/20/22 0945   Odor None 10/20/22 0945   Herlinda-wound Assessment Ecchymosis 10/20/22 0945   Margins Defined edges 10/20/22 0945   Wound Thickness Description not for Pressure Injury Partial thickness 10/20/22 0945   Number of days: 1       Wound 10/19/22 Arm Left; Lower;Proximal;Dorsal (Active)   Wound Etiology Skin Tear 10/20/22 0943   Dressing Status New dressing applied 10/20/22 0945   Wound Cleansed Cleansed with saline 10/20/22 0945   Dressing/Treatment Collagen with Ag; Foam 10/20/22 0945   Dressing Change Due 10/24/22 10/20/22 0945   Wound Length (cm) 5 cm 10/20/22 0945   Wound Width (cm) 3 cm 10/20/22 0945   Wound Depth (cm) 0 cm 10/20/22 0945   Wound Surface Area (cm^2) 15 cm^2 10/20/22 0945   Wound Volume (cm^3) 0 cm^3 10/20/22 0945   Wound Assessment Pink/red;Superficial;Bleeding 10/20/22 0945   Drainage Amount Moderate 10/20/22 0945   Drainage Description Sanguinous 10/20/22 0945   Odor None 10/20/22 0945   Herlinda-wound Assessment Ecchymosis 10/20/22 0945   Margins Defined edges 10/20/22 0945   Wound Thickness Description not for Pressure Injury Partial thickness 10/20/22 0945   Number of days: 1     Assessment:    Skin tears to the left shin and left wrist (x2) are all clean, pink, superficial and have scant to no serosanguinous drainage - wounds cleansed with saline, dried with 4x4s, xeroform applied to wound bed and covered with border foam dressing. Left elbow skin tear is clean, red, superficial and bleeding moderately at this time - per nursing, INR is high. Used Promogran meg and held pressure to the left elbow which successfully stopped the bleeding. Meg left in place, covered with xeroform followed by border foam dressing. All dressings can be changed on a [facility] schedule. Plan   Plan of Care: Wound 10/19/22 Pretibial Left;Proximal-Dressing/Treatment: Xeroform, Foam  Wound 10/19/22 Arm Distal;Left;Lower;Dorsal small skin tear x2-Dressing/Treatment: Xeroform, Foam  Wound 10/19/22 Arm Left; Lower;Proximal;Dorsal-Dressing/Treatment: Collagen with Ag, Foam    Specialty Bed Required : N/A   [] Low Air Loss   [] Pressure Redistribution  [] Fluid Immersion  [] Bariatric  [] Other:     Current Diet: Diet NPO Exceptions are: Ice Chips  ADULT DIET;  Clear Liquid  Dietician consult:  Yes    Discharge Plan:  Placement for patient upon discharge: TBD   Patient appropriate for Outpatient [address]: N/A    Referrals:  []   [] [facility]  [] Supplies  [] Other    Patient/Caregiver Teaching:  Level of patient/caregiver understanding able to:   [] Indicates understanding       [] Needs reinforcement  [] Unsuccessful      [] Verbal Understanding  [] Demonstrated understanding       [] No evidence of learning  [] Refused teaching         [x] N/A       Electronically signed by LELA Reid, RN, Johanna Borden on 10/20/2022 at 1:34 PM

## 2022-10-20 NOTE — PROGRESS NOTES
Progress Note  Date:10/20/2022       [facility]:[id number]  Patient Linda Obregon     YOB: 1950     Age:71 y.o. Subjective      Hemoglobin 7.2 this morning s/p 2 total unit RBC transfusion since admission. INR improved to 2.73.5 s/p 5 mg p.o. vitamin K yesterday evening. Gastroenterology service would like INR lower before performing EGD, requested additional 5 mg IV vitamin K today followed by repeat PT/INR early this afternoon. Patient with no other new/acute complaints at present. Would like to be discharged as soon is considered medically safe/stable. Objective         Vitals Last 24 Hours:  TEMPERATURE:  Temp  Av.8 °F (37.1 °C)  Min: 98.6 °F (37 °C)  Max: 99.3 °F (37.4 °C)  RESPIRATIONS RANGE: Resp  Av.2  Min: 16  Max: 18  PULSE OXIMETRY RANGE: SpO2  Av.6 %  Min: 99 %  Max: 100 %  PULSE RANGE: Pulse  Av  Min: 68  Max: 75  BLOOD PRESSURE RANGE: Systolic (68MEH), VTD:990 , Min:127 , GJL:139   ; Diastolic (09KOW), QTC:88, Min:52, Max:71    I/O (24Hr): Intake/Output Summary (Last 24 hours) at 10/20/2022 1142  Last data filed at 10/20/2022 0407  Gross per 24 hour   Intake 366.67 ml   Output --   Net 366.67 ml     Objective:  Vital signs: (most recent): Blood pressure (!) 148/54, pulse 67, temperature 97.9 °F (36.6 °C), temperature source Oral, resp. rate 18, height 6' 2\" (1.88 m), weight 190 lb (86.2 kg), SpO2 100 %. Constitutional: Elderly adult male reclining in bed no acute distress. Head: NCAT  Eyes: PERRLA, EOMI  ENT: Hearing grossly intact. Neck: Trachea midline, phonation normal.  Cardiovascular: RRR. Warm and well perfused. Scattered ecchymosis in various stages of healing, predominantly on dorsal surfaces of bilateral arms. Pulmonary: Normal rate and effort of respiration on room air. Grossly CTAB. Abdomen: Soft, nontense, nondistended. Evidence of prior surgical intervention. Hypoactive bowel sounds.   Neurologic: Alert, grossly oriented. Non-focal.  Mentating appropriately. Psychiatric: Pleasant and cooperative. Anxious to discharge when cleared. MSK/integumentary: No gross bony abnormalities. Dorsal forearm ecchymoses in various stages of healing as noted above. Mild generalized pallor. Labs/Imaging/Diagnostics    Labs:  CBC:  Recent Labs     10/19/22  1101 10/19/22  1217 10/19/22  1605 10/20/22  0721 10/20/22  1039   WBC 8.8  --   --   --   --    RBC 2.25*  --   --   --   --    HGB 6.4*   < > 6.3* 6.6* 6.7*   HCT 19.9*   < > 18.8* 20.8* 20.8*   MCV 88.4  --   --   --   --    RDW 16.1*  --   --   --   --      --   --   --   --     < > = values in this interval not displayed. CHEMISTRIES:  Recent Labs     10/19/22  1102      K 3.8   *   CO2 15*   BUN 91*   CREATININE 2.99*   GLUCOSE 274*   MG 1.7     PT/INR:  Recent Labs     10/17/22  1425 10/19/22  1101 10/20/22  0554   PROTIME  --  32.3* 35.2*   INR 2.9 3.1 3.5     APTT:  Recent Labs     10/19/22  1101 10/20/22  0554   APTT 30.0 29.1     LIVER PROFILE:  Recent Labs     10/19/22  1102   AST 8   ALT 8   BILITOT 0.4   ALKPHOS 53       Imaging Last 24 Hours:  XR KNEE LEFT (3 VIEWS)    Result Date: 10/20/2022  EXAMINATION: THREE XRAY VIEWS OF THE LEFT KNEE 10/19/2022 11:30 am COMPARISON: None. HISTORY: ORDERING SYSTEM PROVIDED HISTORY: fell onto knee 2 days ago; pain at Knee cap, SUNRISE view TECHNOLOGIST PROVIDED HISTORY: Reason for exam:->fell onto knee 2 days ago; pain at Knee cap Reason for exam:->SUNRISE view What reading provider will be dictating this exam?->CRC FINDINGS: AP, lateral, and sunrise views of the left knee were obtained. There is no acute fracture or dislocation. There is mild joint space narrowing in the medial compartment with associated osteophyte formation. There is suggestion of chondrocalcinosis. There is possible trace suprapatellar knee joint effusion. The visualized soft tissue structures are unremarkable.   There is atherosclerotic disease. 1. 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 2. Probable chondrocalcinosis. 3. Possible trace suprapatellar knee joint effusion. XR KNEE RIGHT (3 VIEWS)    Result Date: 10/19/2022  EXAMINATION: THREE XRAY VIEWS OF THE RIGHT KNEE 10/19/2022 11:30 am COMPARISON: 11/22/2019 HISTORY: ORDERING SYSTEM PROVIDED HISTORY: fall knee pain, [place] view TECHNOLOGIST PROVIDED HISTORY: Reason for exam:->fall knee pain Reason for exam:->Sunrise view What reading provider will be dictating this exam?->CRC FINDINGS: Mild to moderate narrowing of the medial joint space, mild narrowing of the lateral and patellofemoral joint spaces is seen. Unremarkable alignment with no evidence of dislocation. Mild degenerative changes visualized. No evidence of cortical irregularity or lucency to suggest a fracture, no evidence of lytic or sclerotic bone lesion is seen. No evidence of suprapatellar effusion. The quadriceps and patellar tendons are unremarkable. Extensive vascular calcifications are seen. Degenerative changes. No evidence of acute osseous abnormality is seen. CT HEAD WO CONTRAST    Result Date: 10/19/2022  EXAMINATION: CT OF THE HEAD WITHOUT CONTRAST  10/19/2022 11:11 am TECHNIQUE: CT of the head was performed without the administration of intravenous contrast. Automated exposure control, iterative reconstruction, and/or weight based adjustment of the mA/kV was utilized to reduce the radiation dose to as low as reasonably achievable. COMPARISON: None. HISTORY: ORDERING SYSTEM PROVIDED HISTORY: passed out hit head; on coumadin. .. ? bleed, ? 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 ->No Reason for exam:->passed out hit head; on coumadin. .. ?  bleed, ? 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 No abnormal extra-axial fluid collection. The gray-white differentiation is maintained without evidence of an acute infarct. There is no evidence of hydrocephalus. The ventricles, cisterns and sulci are prominent consistent with atrophy. There is decreased attenuation within the periventricular white matter consistent with periventricular leukomalacia. There is encephalomalacia is seen within the anterior aspect of the left temporal fossa. There is an old left craniotomy defect. ORBITS: The visualized portion of the orbits demonstrate no acute abnormality. SINUSES: The visualized paranasal sinuses and mastoid air cells demonstrate no acute abnormality. There is mucosal thickening seen within the right sphenoid sinus. SOFT TISSUES/SKULL:  No acute abnormality of the visualized skull or soft tissues. 1.  There is no acute intracranial abnormality. Specifically, there is no intracranial hemorrhage. 2. Atrophy and periventricular leukomalacia, 3. Mucosal thickening within the right sphenoid sinus. 4. Previous left craniotomy defect with encephalomalacia  seen within the anterior aspect of the left temporal lobe. .     CTA CHEST W WO CONTRAST    Result Date: 10/19/2022  EXAMINATION: CTA OF THE CHEST WITH AND WITHOUT CONTRAST 10/19/2022 11:11 am TECHNIQUE: CTA of the chest was performed before and after the administration of intravenous contrast.  Multiplanar reformatted images are provided for review. MIP images are provided for review. Automated exposure control, iterative reconstruction, and/or weight based adjustment of the mA/kV was utilized to reduce the radiation dose to as low as reasonably achievable.  COMPARISON: March 15, 2018 HISTORY: ORDERING SYSTEM PROVIDED HISTORY: syncope fell to floor 2 days ago; ? pulmonary embolus; please also mention if trauma injury. Suspect PE as cause of fall or pneumonia TECHNOLOGIST PROVIDED HISTORY: Reason for exam:->syncope fell to floor 2 days ago; ? pulmonary embolus; please also mention if trauma injury. Suspect PE as cause of fall or pneumonia Decision Support Exception - unselect if not a suspected or confirmed emergency medical condition->Emergency Medical Condition (MA) What reading provider will be dictating this exam?->CRC FINDINGS: Aorta: At the level of the left main pulmonary artery the ascending aorta measures 3.8 cm in greatest transverse dimension and 34 cm. No acute abnormality of the aorta. No large filling defects are seen in the main, 1st and 2nd order vessels. Mediastinum: No evidence of mediastinal lymphadenopathy. The heart and pericardium demonstrate no acute abnormality. Coronary calcifications and are stents are seen in place. Lungs/Pleura: In the right lower lobe there is an area of consolidation. Also  a trace of pleural fluid is seen. There are multiple nodules visualized in the lungs. Most appear stable. In the anterior right upper lobe and opacity is seen that measures 5.8 x 11 by 12 mm the uterus. Most nodules measure 4 mm or less. In the right middle lobe laterally a 6 mm nodule is seen. This was seen on previous study. Reticulonodular opacities are seen bilaterally and are most prominent in the lateral right upper lobe. Centrilobular emphysematous changes are seen as well as paraseptal emphysematous changes. Bronchial wall dilatation and ground-glass opacities are seen in the medial aspect of the right upper lobe. Upper Abdomen: Limited images of the upper abdomen are unremarkable. Soft Tissues/Bones: Degenerative changes are seen in the spine at multiple levels. No acute fractures are identified on this examination.      1.  No large filling defects are seen on this examination to suggest saddle embolus more in the larger primary and secondary vascular structures. 2. There is consolidation again seen in the left lower lobe. Bronchial wall thickening mild bronchiectasis and other changes of COPD are seen. An ill-defined opacity is seen in the anterior left upper lobe. Recommend follow-up to resolution to exclude any developing masses. There are multiple nodules visualized and overall are stable. CT CERVICAL SPINE WO CONTRAST    Result Date: 10/19/2022  EXAMINATION: CT OF THE CERVICAL SPINE WITHOUT CONTRAST 10/19/2022 11:11 am TECHNIQUE: CT of the cervical spine was performed without the administration of intravenous contrast. Multiplanar reformatted images are provided for review. Automated exposure control, iterative reconstruction, and/or weight based adjustment of the mA/kV was utilized to reduce the radiation dose to as low as reasonably achievable. COMPARISON: None. HISTORY: ORDERING SYSTEM PROVIDED HISTORY: fall from standing 2 days ago and again today TECHNOLOGIST PROVIDED HISTORY: Reason for exam:->fall from standing 2 days ago and again today Decision Support Exception - unselect if not a suspected or confirmed emergency medical condition->Emergency Medical Condition (MA) What reading provider will be dictating this exam?->CRC FINDINGS: The ring of C1 is intact as is the dense. There is no compression fracture of the cervical spine. No jumped or perched facet is noted. Multilevel degenerative disc and degenerative joint disease is noted. Posterior degenerative endplate spurs are seen at the C3-4, C4-5 and C5-6 levels. There is effacement of the ventral thecal sac at the C3-4 through C5-6 levels due to the posterior degenerative endplate spurs. The prevertebral soft tissues are unremarkable. The airway is widely patent. Images through the lung apices are negative for a pneumothorax. 1. There is no acute compression fracture or subluxation of the cervical spine.  2. Multilevel degenerative disc and degenerative joint disease. CT THORACIC SPINE WO CONTRAST    Result Date: 10/19/2022  EXAMINATION: CT OF THE THORACIC SPINE WITHOUT CONTRAST; CT OF THE LUMBAR SPINE WITHOUT CONTRAST  10/19/2022 11:11 am: TECHNIQUE: CT of the thoracic spine was performed without the administration of intravenous contrast. Multiplanar reformatted images are provided for review. Automated exposure control, iterative reconstruction, and/or weight based adjustment of the mA/kV was utilized to reduce the radiation dose to as low as reasonably achievable.; CT of the lumbar spine was performed without the administration of intravenous contrast. Multiplanar reformatted images are provided for review. Adjustment of mA and/or kV according to patient size was utilized. Automated exposure control, iterative reconstruction, and/or weight based adjustment of the mA/kV was utilized to reduce the radiation dose to as low as reasonably achievable. COMPARISON: None. HISTORY: ORDERING SYSTEM PROVIDED HISTORY: Fall from standing; Trauma assessment TECHNOLOGIST PROVIDED HISTORY: Reason for exam:->Fall from standing; Trauma assessment What reading provider will be dictating this exam?->CRC FINDINGS: CT thoracic spine: There is no evidence of acute fracture. Vertebral alignment is anatomic. There are no acute compression deformities. There is minimal concavity at the superior endplates of T1 through T3. There are changes of diffuse idiopathic skeletal hyperostosis within the thoracic spine. The paravertebral soft tissue structures are unremarkable. There is no gross evidence of central canal stenosis. There is suggestion of pleural thickening in the posterior right lower lobe with a pleural based opacity that may represent atelectasis. CT lumbar spine: There is no evidence of acute fracture. Vertebral alignment is anatomic. There are no compression deformities. The intervertebral disc space heights appear relatively preserved.   There is facet hypertrophy throughout the lumbar spine. The paravertebral soft tissue structures are unremarkable. There is possible central canal stenosis at L3-4. There is multilevel neural foraminal narrowing. There is arteriosclerosis without abdominal aortic aneurysm. There is marked atrophy of the bilateral kidneys. There is exophytic lesion from the posterior right kidney which is more dense than simple cyst, measuring approximately 48 Hounsfield units. This may represent complicated cyst.  A solid mass cannot be entirely excluded. 1. No acute fracture or subluxation within the thoracic or lumbar spine. 2. Degenerative changes in the spine without definite central canal stenosis in the thoracic spine. There is possible central canal stenosis at L3-4. There is multilevel neural foraminal narrowing within the lumbar spine. 3. Marked atrophy of the bilateral kidneys with indeterminate exophytic lesion from the right kidney that may represent complicated cyst or solid mass. The patient has prior examinations which are not available for comparison. 4. There is a dependent pleural based opacity in the right lower lobe which may represent atelectasis. There are prior examinations which are not available for comparison. CT LUMBAR SPINE WO CONTRAST    Result Date: 10/19/2022  EXAMINATION: CT OF THE THORACIC SPINE WITHOUT CONTRAST; CT OF THE LUMBAR SPINE WITHOUT CONTRAST  10/19/2022 11:11 am: TECHNIQUE: CT of the thoracic spine was performed without the administration of intravenous contrast. Multiplanar reformatted images are provided for review. Automated exposure control, iterative reconstruction, and/or weight based adjustment of the mA/kV was utilized to reduce the radiation dose to as low as reasonably achievable.; CT of the lumbar spine was performed without the administration of intravenous contrast. Multiplanar reformatted images are provided for review.   Adjustment of mA and/or kV according to patient size was utilized. Automated exposure control, iterative reconstruction, and/or weight based adjustment of the mA/kV was utilized to reduce the radiation dose to as low as reasonably achievable. COMPARISON: None. HISTORY: ORDERING SYSTEM PROVIDED HISTORY: Fall from standing; Trauma assessment TECHNOLOGIST PROVIDED HISTORY: Reason for exam:->Fall from standing; Trauma assessment What reading provider will be dictating this exam?->CRC FINDINGS: CT thoracic spine: There is no evidence of acute fracture. Vertebral alignment is anatomic. There are no acute compression deformities. There is minimal concavity at the superior endplates of T1 through T3. There are changes of diffuse idiopathic skeletal hyperostosis within the thoracic spine. The paravertebral soft tissue structures are unremarkable. There is no gross evidence of central canal stenosis. There is suggestion of pleural thickening in the posterior right lower lobe with a pleural based opacity that may represent atelectasis. CT lumbar spine: There is no evidence of acute fracture. Vertebral alignment is anatomic. There are no compression deformities. The intervertebral disc space heights appear relatively preserved. There is facet hypertrophy throughout the lumbar spine. The paravertebral soft tissue structures are unremarkable. There is possible central canal stenosis at L3-4. There is multilevel neural foraminal narrowing. There is arteriosclerosis without abdominal aortic aneurysm. There is marked atrophy of the bilateral kidneys. There is exophytic lesion from the posterior right kidney which is more dense than simple cyst, measuring approximately 48 Hounsfield units. This may represent complicated cyst.  A solid mass cannot be entirely excluded. 1. No acute fracture or subluxation within the thoracic or lumbar spine. 2. Degenerative changes in the spine without definite central canal stenosis in the thoracic spine.   There is possible central canal stenosis at L3-4. There is multilevel neural foraminal narrowing within the lumbar spine. 3. Marked atrophy of the bilateral kidneys with indeterminate exophytic lesion from the right kidney that may represent complicated cyst or solid mass. The patient has prior examinations which are not available for comparison. 4. There is a dependent pleural based opacity in the right lower lobe which may represent atelectasis. There are prior examinations which are not available for comparison. CT ABDOMEN PELVIS W IV CONTRAST Additional Contrast? None    Result Date: 10/19/2022  EXAMINATION: CT OF THE ABDOMEN AND PELVIS WITH CONTRAST 10/19/2022 11:11 am TECHNIQUE: CT of the abdomen and pelvis was performed with the administration of intravenous contrast. Multiplanar reformatted images are provided for review. Automated exposure control, iterative reconstruction, and/or weight based adjustment of the mA/kV was utilized to reduce the radiation dose to as low as reasonably achievable. COMPARISON: None. HISTORY: ORDERING SYSTEM PROVIDED HISTORY: fall from standing 2 days ago and again today; ? spleen rupture, ? kidney hematoma; ? retroparitoneal bleed, TRAUMA on coumadin, Hx of kidney transplant TECHNOLOGIST PROVIDED HISTORY: Reason for exam:->fall from standing 2 days ago and again today; ? spleen rupture, ? kidney hematoma; ? retroparitoneal bleed Reason for exam:->TRAUMA on coumadin Reason for exam:->Hx of kidney transplant Additional Contrast?->None Decision Support Exception - unselect if not a suspected or confirmed emergency medical condition->Emergency Medical Condition (MA) What reading provider will be dictating this exam?->CRC FINDINGS: Lower Chest: Heart size is within normal limits. There is focal bronchiectasis in the right lower lobe. Subpleural curvilinear opacities are partially imaged in the right lower lobe and lateral aspect of the right middle lobe.   Please refer to dedicated CT chest report for additional details. Organs: The gallbladder is distended without obvious abnormality. The liver enhances homogeneously. The portal vein is patent and there is no intrahepatic biliary ductal dilatation. The spleen and adrenal glands are normal in size. There is a 1.4 cm low-density lesion in the inferior margin of the proximal body of the pancreas on axial image 46. Otherwise, the pancreas enhances homogeneously. No pancreatic ductal dilatation. There is severe bilateral renal cortical atrophy. A 1.2 cm intermediate density (potentially enhancing) lesion arises from the posterior aspect of the mid right kidney on axial image 57. There is a severely atrophic renal transplant in the right lower quadrant and a normally enhancing left lower quadrant renal transplant. GI/Bowel: No evidence of a bowel obstruction, free air or pneumatosis. Portions of the colon are decompressed, limiting assessment for mucosal based abnormalities. Stomach and duodenal sweep demonstrate no acute abnormality. The appendix is normal. Pelvis: The urinary bladder is distended without obvious abnormality. The prostate gland is mildly heterogeneous but nonenlarged. Visible but nonenlarged pelvic lymph nodes are present. Peritoneum/Retroperitoneum: The abdominal aorta is heavily calcified but nonaneurysmal.  No retroperitoneal lymphadenopathy or mass. Bones/Soft Tissues: No acute osseous abnormality or evidence of an aggressive osseous lesion. There are moderate-to-severe degenerative changes of the lower lumbar spine mainly in the form of intervertebral disc space narrowing/facet arthropathy at L5-S1. There is no evidence of an acute posttraumatic abnormality in the abdomen or pelvis. Specifically, no evidence of splenic or renal injury. No retroperitoneal hematoma. Indeterminate intermediate density 1.2 cm lesion arising from the mid right kidney. Renal neoplasm not excluded.   Correlation with renal ultrasound or multiphasic contrast-enhanced CT or MRI of the abdomen utilizing a renal mass protocol is recommended for further assessment. Indeterminate 1.4 cm low-density lesion arising from the inferior portion of the pancreatic body centrally. This has imaging features most suspicious for an IPMN. Correlation with contrast-enhanced abdominal MRI is also recommended (unless otherwise contraindicated). Renal transplants in the bilateral lower quadrants without evidence of posttraumatic findings to the allografts. Linear/nodular opacities in the right lower lobe and right middle lobe. Please refer to the dedicated CT chest report for additional details. Assessment//Plan           Hospital Problems             Last Modified POA    * (Principal) Symptomatic anemia 10/19/2022 Yes    Acute upper GI bleed 10/19/2022 Yes     Assessment & Plan    Assessment:  Acute Upper GI bleed  Anemia acute blood loss secondary to GI bleeding  Acidemia suspected metabolic in setting up above   Acute kidney injury on Ckd s/p renal transplant  2008  Pancreatic lesion possibly IPMN  Multiple lung nodules of unclear etiology  COPD/emphysema  Elevated troponin- suspect demand ischemia secondary to GI bleeding    Admit as inpatient  Protonix IV 80 mg daily  Gastroenterology consultation-defer MRCP to gastroenterology  Clear liquid diet, n.p.o. after midnight  5 mg of vitamin K given in the ED  INR in the a.m.  hemoglobin hematocrit every 6 hours  Nephrology consultedToday ct abd/pelvis: Marked atrophy of the bilateral kidneys with indeterminate exophytic lesion from the right kidney that may represent complicated cyst or solid mass. - nephrology consulted. Dec 2020-high attenuation exophytic cystlike area posterior mid to lower pole right kidney   9.   Received sodium bicarb in the ED-I will defer to nephrology as his CO2 has been low since the 12th.  10.  We will leave to following hospitalist to consult cardiology for elevated troponin. Patient is asymptomatic. I suspect this is           demand ischemia secondary to anemia  11. Will defer consultation of pulmonology to primary hospitalist.  I did not inform patient of lung nodules on admission  12. SCDs for DVT prophylaxis  13. Plan of care discussed with Dr. Les Victor, patient and wife  Consultations Ordered:  IP CONSULT TO NEPHROLOGY  IP CONSULT TO HOSPITALIST  IP CONSULT TO GI    10/20: Received 1 unit RBC transfusion overnight, with hemoglobin stable acutely anemic at <7.0 on recheck. Plan to crossmatch 2 additional units, transfuse 1 and recheck this morning. Morning labs pending at time of rounds. We will continue to follow CBC, CMP, magnesium daily, phosphorus every 3 days, one-time check PT/INR. Per CTA 10/19: Negative PE, LLL persistent consolidation, changes consistent with COPD, \"ill-defined opacity is seen in anterior left upper lobe Recommend follow-up to resolution to exclude any developing masses. There are multiple nodules visualized and overall are stable. \"  [address] nephrology and gastroenterology recommendations when available. Update 10/20: 10mg PO Vit K for INR 3.5.  GI planning possible EGD next day. 2nd Trop still elevated, will consult Cardiology. Post-transfusion Hgb pending at this time.       Electronically signed by Ildefonso Olvera DO on 10/20/2022

## 2022-10-20 NOTE — PROGRESS NOTES
Pt stated not much sleep last night, pt incontinent in diaper overnight , pt bathed this am lines and gown changed. Dressing to left forarm soaked in red bleed from continued draining from site, pt tolerated well. Sherie Pelaez RN from wound saw pt this morning and applied dressings to all sites with skin tears(see flowsheets and notes) Pt and pts wife clarifying a few medications, Dr Milo Wolff at bedside this morning notified of medication schedule changes per wife and pts instructions. Pt given 1 unit RBCs early afternoon, pt tolerating well, sire where blood infusing started to leak and site changed during infusion, pt tolerated well. Pt c/o left knee pain while Dr Vanessa Schrader at bedside, ice given for comfort measures. Wife remained at bedside throughout morning and afternoon into the evening.

## 2022-10-20 NOTE — PROGRESS NOTES
Wesley Meigs is a 80 y.o. male patient.     Current Facility-Administered Medications   Medication Dose Route Frequency Provider Last Rate Last Admin    0.9 % sodium chloride infusion   IntraVENous PRN Pervis Melbaffer, DO        mycophenolate (CELLCEPT) capsule 750 mg  750 mg Oral BID Zoraida Spikes Bescak, DO        pantoprazole (PROTONIX) 80 mg in sodium chloride (PF) 20 mL injection  80 mg IntraVENous Daily Matti Menard, DO   80 mg at 10/20/22 1026    0.9 % sodium chloride infusion   IntraVENous PRN Elizabeth Iba Dials, APRN - CNS        sodium chloride flush 0.9 % injection 5-40 mL  5-40 mL IntraVENous 2 times per day Ángel Maldonado, APRN - CNS   10 mL at 10/20/22 0900    sodium chloride flush 0.9 % injection 5-40 mL  5-40 mL IntraVENous PRN Elizabeth Iba Dials, APRN - CNS   10 mL at 10/20/22 0900    0.9 % sodium chloride infusion   IntraVENous PRN Elizabeth Iba Dials, APRN - CNS        acetaminophen (TYLENOL) tablet 650 mg  650 mg Oral Q6H PRN Elizabeth Iba Dials, APRN - CNS        Or    acetaminophen (TYLENOL) suppository 650 mg  650 mg Rectal Q6H PRN Elizabeth Iba Dials, APRN - CNS        cycloSPORINE modified (NEORAL) capsule 75 mg (Patient Supplied)  75 mg Oral BID Elizabeth Iba Dials, APRN - CNS   75 mg at 10/20/22 1022    [Held by provider] insulin regular (HUMULIN R;NOVOLIN R) injection 6 Units  6 Units SubCUTAneous Daily with breakfast Elizabeth Iba Dials, APRN - CNS   6 Units at 10/20/22 1029    [Held by provider] insulin regular (HUMULIN R;NOVOLIN R) injection 8 Units  8 Units SubCUTAneous BID WC Elizabeth Iba Dials, APRN - CNS        gabapentin (NEURONTIN) capsule 300 mg  300 mg Oral BID Elizabeth Iba Dials, APRN - CNS   300 mg at 10/20/22 1022    furosemide (LASIX) tablet 40 mg  40 mg Oral Daily PRN Elizabeth Iba Dials, APRN - CNS        insulin NPH (HUMULIN N;NOVOLIN N) injection vial 9 Units  9 Units SubCUTAneous QAM Elizabeth Iba Dials, APRN - CNS   9 Units at 10/20/22 1028    insulin NPH (HUMULIN N;NOVOLIN N) injection vial 5 Units  5 Units SubCUTAneous Nightly Elizabeth Iba Dials, APRN - CNS   5 Units at 10/19/22 2245    predniSONE (DELTASONE) tablet 5 mg  5 mg Oral Daily Evelena Sat Dials, APRN - CNS   5 mg at 10/20/22 1022    atorvastatin (LIPITOR) tablet 10 mg  10 mg Oral Daily Evelena Sat Dials, APRN - CNS   10 mg at 10/20/22 1022    tamsulosin (FLOMAX) capsule 0.4 mg  0.4 mg Oral Daily Evelena Sat Dials, APRN - CNS   0.4 mg at 10/20/22 1022    carvedilol (COREG) tablet 6.25 mg  6.25 mg Oral BID Evelena Sat Dials, APRN - CNS   6.25 mg at 10/20/22 1021    glucose chewable tablet 16 g  4 tablet Oral PRN Evelena Sat Dials, APRN - CNS        dextrose bolus 10% 125 mL  125 mL IntraVENous PRN Evelena Sat Dials, APRN - CNS        Or    dextrose bolus 10% 250 mL  250 mL IntraVENous PRN Evelena Sat Dials, APRN - CNS        glucagon (rDNA) injection 1 mg  1 mg SubCUTAneous PRN Evelena Sat Dials, APRN - CNS        dextrose 10 % infusion   IntraVENous Continuous PRN Evelena Sat Dials, APRN - CNS        insulin lispro (HUMALOG) injection vial 0-4 Units  0-4 Units SubCUTAneous TID WC Evelena Sat Dials, APRN - CNS   1 Units at 10/20/22 1234    insulin lispro (HUMALOG) injection vial 0-4 Units  0-4 Units SubCUTAneous Nightly Evelena Sat Dials, APRN - CNS   4 Units at 10/19/22 2246    prochlorperazine (COMPAZINE) tablet 10 mg  10 mg Oral Q6H PRN Evelena Sat Dials, APRN - CNS        Or    prochlorperazine (COMPAZINE) injection 10 mg  10 mg IntraVENous Q6H PRN Evelena Sat Dials, APRN - CNS         No Known Allergies  Principal Problem:    Symptomatic anemia  Active Problems:    Acute upper GI bleed  Resolved Problems:    * No resolved hospital problems. *    Blood pressure (!) 148/51, pulse 62, temperature 98.9 °F (37.2 °C), temperature source Oral, resp. rate 18, height 6' 2\" (1.88 m), weight 190 lb (86.2 kg), SpO2 100 %. Subjective no emesis. EGD was postponed because of high INR.,  Patient is getting vitamin K to reverse high INR.   Hemoglobin high-grade 6.7 and 20.8  Objective:  Vital signs: (most recent): Blood pressure (!) 148/51, pulse 62, temperature 98.9 °F (37.2 °C), temperature source Oral, resp. rate 18, height 6' 2\" (1.88 m), weight 190 lb (86.2 kg), SpO2 100 %. Assessment & Plan GI bleeding and anemia.,  Possible EGD a.m. if INR is corrected.     Wilma Norman MD  10/20/2022

## 2022-10-21 ENCOUNTER — ANESTHESIA (OUTPATIENT)
Dept: ENDOSCOPY | Age: 72
End: 2022-10-21
Payer: COMMERCIAL

## 2022-10-21 ENCOUNTER — ANESTHESIA EVENT (OUTPATIENT)
Dept: ENDOSCOPY | Age: 72
End: 2022-10-21
Payer: COMMERCIAL

## 2022-10-21 LAB
ALBUMIN SERPL-MCNC: 3.4 G/DL (ref 3.5–4.6)
ALP BLD-CCNC: 50 U/L (ref 35–104)
ALT SERPL-CCNC: 6 U/L (ref 0–41)
ANION GAP SERPL CALCULATED.3IONS-SCNC: 15 MEQ/L (ref 9–15)
AST SERPL-CCNC: 10 U/L (ref 0–40)
BASOPHILS ABSOLUTE: 0 K/UL (ref 0–0.2)
BASOPHILS RELATIVE PERCENT: 0.4 %
BILIRUB SERPL-MCNC: 0.9 MG/DL (ref 0.2–0.7)
BUN BLDV-MCNC: 87 MG/DL (ref 8–23)
CALCIUM SERPL-MCNC: 8.4 MG/DL (ref 8.5–9.9)
CHLORIDE BLD-SCNC: 112 MEQ/L (ref 95–107)
CO2: 12 MEQ/L (ref 20–31)
CREAT SERPL-MCNC: 2.83 MG/DL (ref 0.7–1.2)
EOSINOPHILS ABSOLUTE: 0.3 K/UL (ref 0–0.7)
EOSINOPHILS RELATIVE PERCENT: 4.4 %
GFR SERPL CREATININE-BSD FRML MDRD: 23.1 ML/MIN/{1.73_M2}
GLOBULIN: 2 G/DL (ref 2.3–3.5)
GLUCOSE BLD-MCNC: 169 MG/DL (ref 70–99)
GLUCOSE BLD-MCNC: 179 MG/DL (ref 70–99)
GLUCOSE BLD-MCNC: 215 MG/DL (ref 70–99)
GLUCOSE BLD-MCNC: 272 MG/DL (ref 70–99)
GLUCOSE BLD-MCNC: 319 MG/DL (ref 70–99)
HCT VFR BLD CALC: 21.4 % (ref 42–52)
HCT VFR BLD CALC: 21.8 % (ref 42–52)
HCT VFR BLD CALC: 22.1 % (ref 42–52)
HCT VFR BLD CALC: 22.7 % (ref 42–52)
HEMOGLOBIN: 7.2 G/DL (ref 14–18)
HEMOGLOBIN: 7.2 G/DL (ref 14–18)
HEMOGLOBIN: 7.4 G/DL (ref 14–18)
HEMOGLOBIN: 7.4 G/DL (ref 14–18)
INR BLD: 2.1
INR BLD: 2.7
LYMPHOCYTES ABSOLUTE: 0.5 K/UL (ref 1–4.8)
LYMPHOCYTES RELATIVE PERCENT: 7.3 %
MAGNESIUM: 1.9 MG/DL (ref 1.7–2.4)
MCH RBC QN AUTO: 29.7 PG (ref 27–31.3)
MCHC RBC AUTO-ENTMCNC: 33.6 % (ref 33–37)
MCV RBC AUTO: 88.5 FL (ref 79–92.2)
MONOCYTES ABSOLUTE: 0.6 K/UL (ref 0.2–0.8)
MONOCYTES RELATIVE PERCENT: 8 %
NEUTROPHILS ABSOLUTE: 5.8 K/UL (ref 1.4–6.5)
NEUTROPHILS RELATIVE PERCENT: 79.9 %
PDW BLD-RTO: 16 % (ref 11.5–14.5)
PERFORMED ON: ABNORMAL
PLATELET # BLD: 174 K/UL (ref 130–400)
POTASSIUM SERPL-SCNC: 4.5 MEQ/L (ref 3.4–4.9)
PROTHROMBIN TIME: 23.5 SEC (ref 12.3–14.9)
PROTHROMBIN TIME: 29.3 SEC (ref 12.3–14.9)
RBC # BLD: 2.42 M/UL (ref 4.7–6.1)
SODIUM BLD-SCNC: 139 MEQ/L (ref 135–144)
TOTAL PROTEIN: 5.4 G/DL (ref 6.3–8)
WBC # BLD: 7.3 K/UL (ref 4.8–10.8)

## 2022-10-21 PROCEDURE — 6370000000 HC RX 637 (ALT 250 FOR IP): Performed by: CLINICAL NURSE SPECIALIST

## 2022-10-21 PROCEDURE — 0W3P8ZZ CONTROL BLEEDING IN GASTROINTESTINAL TRACT, VIA NATURAL OR ARTIFICIAL OPENING ENDOSCOPIC: ICD-10-PCS | Performed by: SPECIALIST

## 2022-10-21 PROCEDURE — 6360000002 HC RX W HCPCS: Performed by: STUDENT IN AN ORGANIZED HEALTH CARE EDUCATION/TRAINING PROGRAM

## 2022-10-21 PROCEDURE — C9113 INJ PANTOPRAZOLE SODIUM, VIA: HCPCS | Performed by: STUDENT IN AN ORGANIZED HEALTH CARE EDUCATION/TRAINING PROGRAM

## 2022-10-21 PROCEDURE — 7100000010 HC PHASE II RECOVERY - FIRST 15 MIN: Performed by: SPECIALIST

## 2022-10-21 PROCEDURE — 2580000003 HC RX 258: Performed by: STUDENT IN AN ORGANIZED HEALTH CARE EDUCATION/TRAINING PROGRAM

## 2022-10-21 PROCEDURE — 6360000002 HC RX W HCPCS: Performed by: INTERNAL MEDICINE

## 2022-10-21 PROCEDURE — 85610 PROTHROMBIN TIME: CPT

## 2022-10-21 PROCEDURE — 2580000003 HC RX 258: Performed by: CLINICAL NURSE SPECIALIST

## 2022-10-21 PROCEDURE — 6360000002 HC RX W HCPCS: Performed by: NURSE ANESTHETIST, CERTIFIED REGISTERED

## 2022-10-21 PROCEDURE — 6370000000 HC RX 637 (ALT 250 FOR IP): Performed by: SPECIALIST

## 2022-10-21 PROCEDURE — 80053 COMPREHEN METABOLIC PANEL: CPT

## 2022-10-21 PROCEDURE — 3609017100 HC EGD: Performed by: SPECIALIST

## 2022-10-21 PROCEDURE — 7100000011 HC PHASE II RECOVERY - ADDTL 15 MIN: Performed by: SPECIALIST

## 2022-10-21 PROCEDURE — 2580000003 HC RX 258: Performed by: SPECIALIST

## 2022-10-21 PROCEDURE — 2709999900 HC NON-CHARGEABLE SUPPLY: Performed by: SPECIALIST

## 2022-10-21 PROCEDURE — 3700000000 HC ANESTHESIA ATTENDED CARE: Performed by: SPECIALIST

## 2022-10-21 PROCEDURE — 1210000000 HC MED SURG R&B

## 2022-10-21 PROCEDURE — 85014 HEMATOCRIT: CPT

## 2022-10-21 PROCEDURE — 85018 HEMOGLOBIN: CPT

## 2022-10-21 PROCEDURE — 2500000003 HC RX 250 WO HCPCS: Performed by: NURSE ANESTHETIST, CERTIFIED REGISTERED

## 2022-10-21 PROCEDURE — 36415 COLL VENOUS BLD VENIPUNCTURE: CPT

## 2022-10-21 PROCEDURE — 43235 EGD DIAGNOSTIC BRUSH WASH: CPT | Performed by: SPECIALIST

## 2022-10-21 PROCEDURE — 85025 COMPLETE CBC W/AUTO DIFF WBC: CPT

## 2022-10-21 PROCEDURE — 2580000003 HC RX 258: Performed by: INTERNAL MEDICINE

## 2022-10-21 PROCEDURE — A4216 STERILE WATER/SALINE, 10 ML: HCPCS | Performed by: STUDENT IN AN ORGANIZED HEALTH CARE EDUCATION/TRAINING PROGRAM

## 2022-10-21 PROCEDURE — 83735 ASSAY OF MAGNESIUM: CPT

## 2022-10-21 RX ORDER — LIDOCAINE HYDROCHLORIDE 20 MG/ML
INJECTION, SOLUTION INFILTRATION; PERINEURAL PRN
Status: DISCONTINUED | OUTPATIENT
Start: 2022-10-21 | End: 2022-10-21 | Stop reason: SDUPTHER

## 2022-10-21 RX ORDER — MAGNESIUM HYDROXIDE 1200 MG/15ML
LIQUID ORAL PRN
Status: DISCONTINUED | OUTPATIENT
Start: 2022-10-21 | End: 2022-10-21 | Stop reason: ALTCHOICE

## 2022-10-21 RX ORDER — SODIUM CHLORIDE 9 MG/ML
INJECTION, SOLUTION INTRAVENOUS CONTINUOUS
Status: DISCONTINUED | OUTPATIENT
Start: 2022-10-21 | End: 2022-10-24

## 2022-10-21 RX ORDER — SODIUM CHLORIDE 0.9 % (FLUSH) 0.9 %
5-40 SYRINGE (ML) INJECTION EVERY 12 HOURS SCHEDULED
Status: DISCONTINUED | OUTPATIENT
Start: 2022-10-21 | End: 2022-10-28 | Stop reason: HOSPADM

## 2022-10-21 RX ORDER — SODIUM CHLORIDE 9 MG/ML
INJECTION, SOLUTION INTRAVENOUS
Status: DISPENSED
Start: 2022-10-21 | End: 2022-10-22

## 2022-10-21 RX ORDER — SODIUM CHLORIDE 0.9 % (FLUSH) 0.9 %
5-40 SYRINGE (ML) INJECTION PRN
Status: DISCONTINUED | OUTPATIENT
Start: 2022-10-21 | End: 2022-10-28 | Stop reason: HOSPADM

## 2022-10-21 RX ORDER — PROPOFOL 10 MG/ML
INJECTION, EMULSION INTRAVENOUS PRN
Status: DISCONTINUED | OUTPATIENT
Start: 2022-10-21 | End: 2022-10-21 | Stop reason: SDUPTHER

## 2022-10-21 RX ORDER — SIMETHICONE 20 MG/.3ML
EMULSION ORAL PRN
Status: DISCONTINUED | OUTPATIENT
Start: 2022-10-21 | End: 2022-10-21 | Stop reason: ALTCHOICE

## 2022-10-21 RX ORDER — SODIUM CHLORIDE 9 MG/ML
25 INJECTION, SOLUTION INTRAVENOUS PRN
Status: DISCONTINUED | OUTPATIENT
Start: 2022-10-21 | End: 2022-10-28 | Stop reason: HOSPADM

## 2022-10-21 RX ADMIN — CARVEDILOL 6.25 MG: 6.25 TABLET, FILM COATED ORAL at 20:55

## 2022-10-21 RX ADMIN — PREDNISONE 5 MG: 5 TABLET ORAL at 17:34

## 2022-10-21 RX ADMIN — INSULIN HUMAN 5 UNITS: 100 INJECTION, SUSPENSION SUBCUTANEOUS at 21:01

## 2022-10-21 RX ADMIN — MYCOPHENOLATE MOFETIL 750 MG: 250 CAPSULE ORAL at 20:53

## 2022-10-21 RX ADMIN — SODIUM CHLORIDE 80 MG: 9 INJECTION, SOLUTION INTRAMUSCULAR; INTRAVENOUS; SUBCUTANEOUS at 09:30

## 2022-10-21 RX ADMIN — PHYTONADIONE 5 MG: 10 INJECTION, EMULSION INTRAMUSCULAR; INTRAVENOUS; SUBCUTANEOUS at 10:20

## 2022-10-21 RX ADMIN — INSULIN LISPRO 4 UNITS: 100 INJECTION, SOLUTION INTRAVENOUS; SUBCUTANEOUS at 21:01

## 2022-10-21 RX ADMIN — CYCLOSPORINE 75 MG: 25 CAPSULE, LIQUID FILLED ORAL at 20:57

## 2022-10-21 RX ADMIN — PROPOFOL 100 MG: 10 INJECTION, EMULSION INTRAVENOUS at 13:52

## 2022-10-21 RX ADMIN — ATORVASTATIN CALCIUM 10 MG: 10 TABLET, FILM COATED ORAL at 20:53

## 2022-10-21 RX ADMIN — Medication 10 ML: at 09:31

## 2022-10-21 RX ADMIN — GABAPENTIN 300 MG: 300 CAPSULE ORAL at 20:54

## 2022-10-21 RX ADMIN — Medication 10 ML: at 21:10

## 2022-10-21 RX ADMIN — ACETAMINOPHEN 650 MG: 325 TABLET ORAL at 19:02

## 2022-10-21 RX ADMIN — INSULIN LISPRO 2 UNITS: 100 INJECTION, SOLUTION INTRAVENOUS; SUBCUTANEOUS at 17:35

## 2022-10-21 RX ADMIN — LIDOCAINE HYDROCHLORIDE 50 MG: 20 INJECTION, SOLUTION INFILTRATION; PERINEURAL at 13:52

## 2022-10-21 ASSESSMENT — PAIN SCALES - GENERAL
PAINLEVEL_OUTOF10: 7
PAINLEVEL_OUTOF10: 0

## 2022-10-21 ASSESSMENT — PAIN - FUNCTIONAL ASSESSMENT: PAIN_FUNCTIONAL_ASSESSMENT: 0-10

## 2022-10-21 ASSESSMENT — PAIN DESCRIPTION - LOCATION: LOCATION: HEAD

## 2022-10-21 ASSESSMENT — PAIN DESCRIPTION - DESCRIPTORS: DESCRIPTORS: ACHING

## 2022-10-21 NOTE — PROGRESS NOTES
Nephrology Progress Note    Assessment:  S/Pkidney transplant  Anemia ? GIB  Hypertension  DM type-2  OHDx Hx AF        Plan: GI protocol work-up bleed multiple units RBC given    Patient Active Problem List:     Pneumonia     Abdominal pain, other specified site     Acute pyelonephritis without lesion of renal medullary necrosis     Anemia     Essential hypertension     Nonspecific abnormal results of thyroid function study     Mixed hyperlipidemia     Kidney replaced by transplant     Intra-abdominal abscess post-procedure     Infection due to Enterococcus     Fever and other physiologic disturbances of temperature regulation     Chronic kidney disease (CKD)     Type II or unspecified type diabetes mellitus without mention of complication, uncontrolled     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     Anginal chest pain at rest [facility])     Atypical chest pain     Chronic cough     Unstable angina ([facility])     Chest pain     Atrial fibrillation with RVR ([facility])     Symptomatic anemia     Acute upper GI bleed      Subjective:  Admit Date: 10/19/2022    Interval History: feeling weak but dizziness    Medications:  Scheduled Meds:   phytonadione (VITAMIN K)  IVPB  5 mg IntraVENous Once    mycophenolate  750 mg Oral BID    pantoprazole (PROTONIX) 40 mg injection  80 mg IntraVENous Daily    sodium chloride flush  5-40 mL IntraVENous 2 times per day    cycloSPORINE modified  75 mg Oral BID    [Held by provider] insulin regular  6 Units SubCUTAneous Daily with breakfast    [Held by provider] insulin regular  8 Units SubCUTAneous BID WC    gabapentin  300 mg Oral BID    insulin NPH  9 Units SubCUTAneous QAM    insulin NPH  5 Units SubCUTAneous Nightly    predniSONE  5 mg Oral Daily    atorvastatin  10 mg Oral Daily    tamsulosin  0.4 mg Oral Daily    carvedilol  6.25 mg Oral BID    insulin lispro  0-4 Units SubCUTAneous TID WC    insulin lispro  0-4 Units SubCUTAneous Nightly     Continuous Infusions:   sodium chloride      sodium chloride      sodium chloride      dextrose         CBC:   Recent Labs     10/20/22  1039 10/20/22  1815 10/20/22  2247 10/21/22  0509   WBC 9.0  --   --  7.3   HGB 6.7*   < > 7.8* 7.2*     --   --  174    < > = values in this interval not displayed. CMP:    Recent Labs     10/19/22  1102 10/20/22  1422 10/21/22  0509    139 139   K 3.8 4.0 4.5   * 110* 112*   CO2 15* 13* 12*   BUN 91* 83* 87*   CREATININE 2.99* 2.81* 2.83*   GLUCOSE 274* 145* 169*   CALCIUM 8.6 8.4* 8.4*   LABGLOM 21.6* 23.3* 23.1*     Troponin:   Recent Labs     10/20/22  1422   TROPONINI 0.066*     BNP: No results for input(s): BNP in the last 72 hours. INR:   Recent Labs     10/21/22  0509   INR 2.7     Lipids: No results for input(s): CHOL, LDLDIRECT, TRIG, HDL, AMYLASE, LIPASE in the last 72 hours. Liver:   Recent Labs     10/21/22  0509   AST 10   ALT 6   ALKPHOS 50   PROT 5.4*   LABALBU 3.4*   BILITOT 0.9*     Iron:  No results for input(s): IRONS, FERRITIN in the last 72 hours. Invalid input(s): LABIRONS  Urinalysis: No results for input(s): UA in the last 72 hours.     Objective:  Vitals: BP (!) 148/54   Pulse 67   Temp 97.9 °F (36.6 °C) (Oral)   Resp 18   Ht 6' 2\" (1.88 m)   Wt 190 lb (86.2 kg)   SpO2 100%   BMI 24.39 kg/m²    Wt Readings from Last 3 Encounters:   10/19/22 190 lb (86.2 kg)   10/14/22 195 lb 6.4 oz (88.6 kg)   10/12/22 192 lb (87.1 kg)      24HR INTAKE/OUTPUT:    Intake/Output Summary (Last 24 hours) at 10/21/2022 0943  Last data filed at 10/21/2022 0908  Gross per 24 hour   Intake 377.89 ml   Output 1 ml   Net 376.89 ml       General: alert, in no apparent distress  HEENT: normocephalic, atraumatic, anicteric  Neck: supple, no mass  Lungs: non-labored respirations, clear to auscultation bilaterally  Heart: regular rate and rhythm, no murmurs or rubs  Abdomen: soft, non-tender, non-distended  Ext: no cyanosis, no peripheral edema  Neuro: alert and oriented, no gross abnormalities  Psych: normal mood and affect  Skin: no rash      Electronically signed by Clarence Gonzalez DO, MD

## 2022-10-21 NOTE — ANESTHESIA PRE PROCEDURE
Department of Anesthesiology  Preprocedure Note       Name:  Kathy Lewis   Age:  80 y.o.  :  1950                                          MRN:  [id number]         Date:  10/21/2022      Surgeon: Jay Juares):  Dionne Bernheim, MD    Procedure: Procedure(s):  EGD DIAGNOSTIC ONLY    Medications prior to admission:   Prior to Admission medications    Medication Sig Start Date End Date Taking?  Authorizing Provider   carvedilol (COREG) 6.25 MG tablet Take 6.25 mg by mouth 2 times daily (with meals)   Yes Historical Provider, MD   carvedilol (COREG) 12.5 MG tablet Take 1 tablet by mouth 2 times daily  Patient not taking: No sig reported 10/14/22   See Light MD   warfarin (COUMADIN) 5 MG tablet Take 1 tablet by mouth daily 22   Neva Collazo MD   predniSONE (DELTASONE) 5 MG tablet Take 5 mg by mouth daily    Historical Provider, MD   Dulaglutide (TRULICITY) 1.5 PP/9.6JQ SOPN Inject 1.5 mg into the skin once a week Weekly on Monday    Historical Provider, MD   tamsulosin (FLOMAX) 0.4 MG capsule Take 1 capsule by mouth daily  Patient taking differently: Take 0.4 mg by mouth 2 times daily 21   Parminder Wheatley MD   Continuous Blood Gluc Transmit (DEXCOM G6 TRANSMITTER) MISC Change every 3 months  Patient not taking: Reported on 10/19/2022 5/29/20   Sukhjinder Paez MD   Continuous Blood Gluc Sensor (FREESTYLE KEV 14 DAY SENSOR) MISC Every 2 weeks Dx E11.65  Patient not taking: Reported on 10/19/2022 5/20/20   Sukhjinder Paez MD   insulin NPH (NOVOLIN N FLEXPEN) 100 UNIT/ML injection pen 16 units at bedtime  Patient taking differently: 9 units in AM, 5 units at bedtime 20   Sukhjinder Paez MD   Insulin Regular Human (NOVOLIN R FLEXPEN) 100 UNIT/ML SOPN 6 units am 8 units lunch and dinner  Patient taking differently: Sliding Scale 20   Sukhjinder Paez MD   Continuous Blood Gluc Sensor (FREESTYLE KEV 14 DAY SENSOR) MISC Every 2 weeks 20   Sukhjinder Paez MD   pantoprazole (PROTONIX) 40 MG tablet Take 40 mg by mouth daily  11/16/19   Historical Provider, MD   nitroGLYCERIN (NITROSTAT) 0.4 MG SL tablet up to max of 3 total doses. If no relief after 1 dose, call 911. Patient not taking: No sig reported 7/18/19   HAJA Long - CNP   furosemide (LASIX) 40 MG tablet Take 40 mg by mouth daily Indications: 1/2 to 1 tab. as needed for leg swelling     Historical Provider, MD   aspirin 81 MG EC tablet Take 81 mg by mouth daily  5/11/16   Historical Provider, MD   cyclobenzaprine (FLEXERIL) 10 MG tablet Take 10 mg by mouth daily as needed Indications: as needed. 4/5/16   Historical Provider, MD   cycloSPORINE modified (NEORAL) 25 MG capsule Take 75 mg by mouth 2 times daily  5/24/16   Historical Provider, MD   gabapentin (NEURONTIN) 300 MG capsule Take 300 mg by mouth 2 times daily.  Indications: 1 capsule in am, 2 capsule in pm  5/24/16   Historical Provider, MD   mycophenolate (CELLCEPT) 250 MG capsule Take 750 mg by mouth 2 times daily  5/24/16   Historical Provider, MD   simvastatin (ZOCOR) 10 MG tablet Take 10 mg by mouth nightly  5/24/16   Historical Provider, MD       Current medications:    Current Facility-Administered Medications   Medication Dose Route Frequency Provider Last Rate Last Admin    sodium chloride 0.9 % infusion             sterile water for irrigation    PRN Dione Marin MD   500 mL at 10/21/22 1122    simethicone (MYLICON) 40 IB/7.4PB drops    PRN Dione Marin MD   40 mg at 10/21/22 1123    0.9 % sodium chloride infusion   IntraVENous PRN Ana M Clement DO        mycophenolate (CELLCEPT) capsule 750 mg  750 mg Oral BID Orblake Lucio, DO   750 mg at 10/20/22 2134    pantoprazole (PROTONIX) 80 mg in sodium chloride (PF) 20 mL injection  80 mg IntraVENous Daily Matti Menard DO   80 mg at 10/21/22 0930    0.9 % sodium chloride infusion   IntraVENous PRN Shayy Duong, APRN - CNS        sodium chloride flush 0.9 % injection 5-40 mL  5-40 mL IntraVENous 2 times per day Justin Postin, APRN - CNS   10 mL at 10/21/22 0931    sodium chloride flush 0.9 % injection 5-40 mL  5-40 mL IntraVENous PRN Rachel Isle Dials, APRN - CNS   10 mL at 10/20/22 0900    0.9 % sodium chloride infusion   IntraVENous PRN Rachel Isle Dials, APRN - CNS        acetaminophen (TYLENOL) tablet 650 mg  650 mg Oral Q6H PRN Rachel Isle Dials, APRN - CNS        Or    acetaminophen (TYLENOL) suppository 650 mg  650 mg Rectal Q6H PRN Rachel Isle Dials, APRN - CNS        cycloSPORINE modified (NEORAL) capsule 75 mg (Patient Supplied)  75 mg Oral BID Rachel Gilbert Dials, APRN - CNS   75 mg at 10/20/22 2135    [Held by provider] insulin regular (HUMULIN R;NOVOLIN R) injection 6 Units  6 Units SubCUTAneous Daily with breakfast Rachel Gilbert Dials, APRN - CNS   6 Units at 10/20/22 1029    [Held by provider] insulin regular (HUMULIN R;NOVOLIN R) injection 8 Units  8 Units SubCUTAneous BID WC Rachel Gilbert Dials, APRN - CNS        gabapentin (NEURONTIN) capsule 300 mg  300 mg Oral BID Rachel Gilbert Dials, APRN - CNS   300 mg at 10/20/22 2134    furosemide (LASIX) tablet 40 mg  40 mg Oral Daily PRN Rachel Gilbert Dials, APRN - CNS        insulin NPH (HUMULIN N;NOVOLIN N) injection vial 9 Units  9 Units SubCUTAneous QAM Rachel Isle Dials, APRN - CNS   9 Units at 10/20/22 1028    insulin NPH (HUMULIN N;NOVOLIN N) injection vial 5 Units  5 Units SubCUTAneous Nightly Rachel Gilbert Dials, APRN - CNS   5 Units at 10/19/22 2245    predniSONE (DELTASONE) tablet 5 mg  5 mg Oral Daily Rachel Isle Dials, APRN - CNS   5 mg at 10/20/22 1022    atorvastatin (LIPITOR) tablet 10 mg  10 mg Oral Daily Rachel Isle Dials, APRN - CNS   10 mg at 10/20/22 1022    tamsulosin (FLOMAX) capsule 0.4 mg  0.4 mg Oral Daily Rachel Isle Dials, APRN - CNS   0.4 mg at 10/20/22 1022    carvedilol (COREG) tablet 6.25 mg  6.25 mg Oral BID Rachel Gilbert Dials, APRN - CNS   6.25 mg at 10/20/22 2134    glucose chewable tablet 16 g  4 tablet Oral PRN Rachel Isle Dials, APRN - CNS        dextrose bolus 10% 125 mL  125 mL IntraVENous PRN Rachel Isremy Dials, APRN - CNS        Or    dextrose bolus 10% 250 mL  250 mL IntraVENous PRN Shirline Jessy Dials, APRN - CNS        glucagon (rDNA) injection 1 mg  1 mg SubCUTAneous PRN Shirline Jessy Dials, APRN - CNS        dextrose 10 % infusion   IntraVENous Continuous PRN Shirline Jessy Dials, APRN - CNS        insulin lispro (HUMALOG) injection vial 0-4 Units  0-4 Units SubCUTAneous TID WC Shirline Jessy Dials, APRN - CNS   1 Units at 10/20/22 1234    insulin lispro (HUMALOG) injection vial 0-4 Units  0-4 Units SubCUTAneous Nightly Shirline Jessy Dials, APRN - CNS   4 Units at 10/19/22 2246    prochlorperazine (COMPAZINE) tablet 10 mg  10 mg Oral Q6H PRN Shirline Jessy Dials, APRN - CNS        Or    prochlorperazine (COMPAZINE) injection 10 mg  10 mg IntraVENous Q6H PRN Shirline Jessy Dials, APRN - CNS           Allergies:  No Known Allergies    Problem List:    Patient Active Problem List   Diagnosis Code    Pneumonia J18.9    Abdominal pain, other specified site R10.9    Acute pyelonephritis without lesion of renal medullary necrosis N10    Anemia D64.9    Essential hypertension I10    Nonspecific abnormal results of thyroid function study R94.6    Mixed hyperlipidemia E78.2    Kidney replaced by transplant Z94.0    Intra-abdominal abscess post-procedure T81.43XA    Infection due to Enterococcus A49.1    Fever and other physiologic disturbances of temperature regulation R68.89    Chronic kidney disease (CKD) N18.9    Type II or unspecified type diabetes mellitus without mention of complication, uncontrolled [id number]    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 [address] 2002    Hypertension     Seizures ([facility] [address]     no seizure since        Past Surgical History:        Procedure Laterality Date    BRAIN SURGERY Left 1990    to eliminate seizures    KIDNEY TRANSPLANT      [place] [address] INCL FLUOR GDNCE DX W/CELL WASHG SPX N/A 3/20/2018    BRONCHOSCOPY performed by Luz Aguilar MD at On license of [facility] History:    Social History     Tobacco Use    Smoking status: Former     Packs/day: 0.25     Types: Cigarettes     Quit date: 2015     Years since quittin.3    Smokeless tobacco: Former   Substance Use Topics    Alcohol use: No     Alcohol/week: 0.0 standard drinks                                Counseling given: Not Answered      Vital Signs (Current):   Vitals:    10/20/22 2002 10/20/22 2134 10/21/22 0729 10/21/22 0944   BP: (!) 148/54 (!) 148/54 (!) 176/66    Pulse: 67 67 69 68   Resp: 18   20   Temp: 36.6 °C (97.9 °F)  36.7 °C (98.1 °F)    TempSrc: Oral  Oral    SpO2: 100%  99% 100%   Weight:       Height:                                                  BP Readings from Last 3 Encounters:   10/21/22 (!) 176/66   10/14/22 (!) 160/72   10/12/22 (!) 163/74       NPO Status:                                                                                 BMI:   Wt Readings from Last 3 Encounters:   10/19/22 190 lb (86.2 kg)   10/14/22 195 lb 6.4 oz (88.6 kg)   10/12/22 192 lb (87.1 kg)     Body mass index is 24.39 kg/m².     CBC:   Lab Results   Component Value Date/Time    WBC 7.3 10/21/2022 05:09 AM    RBC 2.42 10/21/2022 05:09 AM    RBC 4.18 09/15/2011 05:00 AM    HGB 7.4 10/21/2022 11:00 AM    HCT 22.7 10/21/2022 11:00 AM    MCV 88.5 10/21/2022 05:09 AM    RDW 16.0 10/21/2022 05:09 AM     10/21/2022 05:09 AM       CMP:   Lab Results   Component Value Date/Time     10/21/2022 05:09 AM    K 4.5 10/21/2022 05:09 AM    K 4.8 10/04/2022 04:15 AM     10/21/2022 05:09 AM    CO2 12 10/21/2022 05:09 AM    BUN 87 10/21/2022 05:09 AM    CREATININE 2.83 10/21/2022 05:09 AM    GFRAA 28.2 10/12/2022 02:45 PM    LABGLOM 23.1 10/21/2022 05:09 AM    GLUCOSE 169 10/21/2022 05:09 AM    GLUCOSE 172 09/16/2011 06:25 AM    PROT 5.4 10/21/2022 05:09 AM    CALCIUM 8.4 10/21/2022 05:09 AM    BILITOT 0.9 10/21/2022 05:09 AM    ALKPHOS 50 10/21/2022 05:09 AM    AST 10 10/21/2022 05:09 AM    ALT 6 10/21/2022 05:09 AM       POC Tests:   Recent Labs     10/21/22  1141   POCGLU 215*       Coags:   Lab Results   Component Value Date/Time    PROTIME 23.5 10/21/2022 12:22 PM    INR 2.1 10/21/2022 12:22 PM    APTT 29.1 10/20/2022 05:54 AM       HCG (If Applicable): No results found for: PREGTESTUR, PREGSERUM, HCG, HCGQUANT     ABGs: No results found for: PHART, PO2ART, ZOB3OFV, BCY7TFU, BEART, B2PJLHCM     Type & Screen (If Applicable):  No results found for: LABABO, LABRH    Drug/Infectious Status (If Applicable):  No results found for: HIV, HEPCAB    COVID-19 Screening (If Applicable):   Lab Results   Component Value Date/Time    COVID19 Not Detected 06/22/2022 02:58 PM           Anesthesia Evaluation    Airway: Mallampati: III          Dental:    (+) poor dentition      Pulmonary:   (+) pneumonia: no interval change,  decreased breath sounds: bibasilar                            Cardiovascular:    (+) hypertension: no interval change, angina: no interval change,         Rhythm: regular                      Neuro/Psych:   (+) seizures: no interval change,             GI/Hepatic/Renal:   (+) renal disease: ESRD,           Endo/Other:    (+) DiabetesType I DM, no interval change, , .                 Abdominal:       Abdomen: soft. Vascular: Other Findings:           Anesthesia Plan      MAC     ASA 4       Induction: intravenous. Anesthetic plan and risks discussed with patient. Plan discussed with attending.                     Claudio Montero, HAJA - BROOK   10/21/2022

## 2022-10-21 NOTE — ANESTHESIA POSTPROCEDURE EVALUATION
Department of Anesthesiology  Postprocedure Note    Patient: Anna Nicole  MRN: [id number]  YOB: 1950  Date of evaluation: 10/21/2022      Procedure Summary     Date: 10/21/22 Room / Location: [facility] OR 01 / [facility]    Anesthesia Start: [phone] Anesthesia Stop:     Procedure: EGD DIAGNOSTIC ONLY Diagnosis:       GI bleeding      (GI bleeding [K92.2])    Surgeons: Jaziel Mcallister MD Responsible Provider: HAJA Krueger CRNA    Anesthesia Type: MAC ASA Status: 4          Anesthesia Type: No value filed.     Viridiana Phase I: Viridiana Score: 10    Viridiana Phase II:        Anesthesia Post Evaluation    Patient location during evaluation: PACU  Patient participation: complete - patient participated  Level of consciousness: awake  Pain score: 0  Airway patency: patent  Nausea & Vomiting: no nausea  Complications: no  Cardiovascular status: hemodynamically stable  Respiratory status: acceptable  Hydration status: stable

## 2022-10-22 LAB
ALBUMIN SERPL-MCNC: 3.4 G/DL (ref 3.5–4.6)
ALP BLD-CCNC: 53 U/L (ref 35–104)
ALT SERPL-CCNC: 6 U/L (ref 0–41)
ANION GAP SERPL CALCULATED.3IONS-SCNC: 15 MEQ/L (ref 9–15)
AST SERPL-CCNC: 8 U/L (ref 0–40)
BASOPHILS ABSOLUTE: 0 K/UL (ref 0–0.2)
BASOPHILS RELATIVE PERCENT: 0.3 %
BILIRUB SERPL-MCNC: 0.8 MG/DL (ref 0.2–0.7)
BUN BLDV-MCNC: 84 MG/DL (ref 8–23)
CALCIUM SERPL-MCNC: 8.5 MG/DL (ref 8.5–9.9)
CHLORIDE BLD-SCNC: 113 MEQ/L (ref 95–107)
CO2: 13 MEQ/L (ref 20–31)
CREAT SERPL-MCNC: 2.59 MG/DL (ref 0.7–1.2)
EOSINOPHILS ABSOLUTE: 0.2 K/UL (ref 0–0.7)
EOSINOPHILS RELATIVE PERCENT: 2.6 %
GFR SERPL CREATININE-BSD FRML MDRD: 25.7 ML/MIN/{1.73_M2}
GLOBULIN: 2.2 G/DL (ref 2.3–3.5)
GLUCOSE BLD-MCNC: 203 MG/DL (ref 70–99)
GLUCOSE BLD-MCNC: 239 MG/DL (ref 70–99)
GLUCOSE BLD-MCNC: 390 MG/DL (ref 70–99)
GLUCOSE BLD-MCNC: 516 MG/DL (ref 70–99)
HCT VFR BLD CALC: 22 % (ref 42–52)
HCT VFR BLD CALC: 22.3 % (ref 42–52)
HCT VFR BLD CALC: 22.4 % (ref 42–52)
HCT VFR BLD CALC: 23.4 % (ref 42–52)
HEMOGLOBIN: 7.2 G/DL (ref 14–18)
HEMOGLOBIN: 7.3 G/DL (ref 14–18)
HEMOGLOBIN: 7.5 G/DL (ref 14–18)
HEMOGLOBIN: 7.8 G/DL (ref 14–18)
INR BLD: 1.5
LYMPHOCYTES ABSOLUTE: 0.4 K/UL (ref 1–4.8)
LYMPHOCYTES RELATIVE PERCENT: 5.1 %
MAGNESIUM: 1.9 MG/DL (ref 1.7–2.4)
MCH RBC QN AUTO: 29.2 PG (ref 27–31.3)
MCHC RBC AUTO-ENTMCNC: 32.9 % (ref 33–37)
MCV RBC AUTO: 88.8 FL (ref 79–92.2)
MONOCYTES ABSOLUTE: 0.6 K/UL (ref 0.2–0.8)
MONOCYTES RELATIVE PERCENT: 8.4 %
NEUTROPHILS ABSOLUTE: 6.3 K/UL (ref 1.4–6.5)
NEUTROPHILS RELATIVE PERCENT: 83.6 %
PDW BLD-RTO: 16.5 % (ref 11.5–14.5)
PERFORMED ON: ABNORMAL
PLATELET # BLD: 191 K/UL (ref 130–400)
POTASSIUM SERPL-SCNC: 4.4 MEQ/L (ref 3.4–4.9)
PROTHROMBIN TIME: 18.3 SEC (ref 12.3–14.9)
RBC # BLD: 2.48 M/UL (ref 4.7–6.1)
SODIUM BLD-SCNC: 141 MEQ/L (ref 135–144)
TOTAL PROTEIN: 5.6 G/DL (ref 6.3–8)
URINE CULTURE, ROUTINE: NORMAL
WBC # BLD: 7.5 K/UL (ref 4.8–10.8)

## 2022-10-22 PROCEDURE — A4216 STERILE WATER/SALINE, 10 ML: HCPCS | Performed by: STUDENT IN AN ORGANIZED HEALTH CARE EDUCATION/TRAINING PROGRAM

## 2022-10-22 PROCEDURE — C9113 INJ PANTOPRAZOLE SODIUM, VIA: HCPCS | Performed by: STUDENT IN AN ORGANIZED HEALTH CARE EDUCATION/TRAINING PROGRAM

## 2022-10-22 PROCEDURE — 83735 ASSAY OF MAGNESIUM: CPT

## 2022-10-22 PROCEDURE — 99232 SBSQ HOSP IP/OBS MODERATE 35: CPT | Performed by: NURSE PRACTITIONER

## 2022-10-22 PROCEDURE — 36415 COLL VENOUS BLD VENIPUNCTURE: CPT

## 2022-10-22 PROCEDURE — 85018 HEMOGLOBIN: CPT

## 2022-10-22 PROCEDURE — 2580000003 HC RX 258: Performed by: SPECIALIST

## 2022-10-22 PROCEDURE — 2580000003 HC RX 258: Performed by: STUDENT IN AN ORGANIZED HEALTH CARE EDUCATION/TRAINING PROGRAM

## 2022-10-22 PROCEDURE — 6360000002 HC RX W HCPCS: Performed by: INTERNAL MEDICINE

## 2022-10-22 PROCEDURE — 6370000000 HC RX 637 (ALT 250 FOR IP): Performed by: INTERNAL MEDICINE

## 2022-10-22 PROCEDURE — 85025 COMPLETE CBC W/AUTO DIFF WBC: CPT

## 2022-10-22 PROCEDURE — 6370000000 HC RX 637 (ALT 250 FOR IP): Performed by: CLINICAL NURSE SPECIALIST

## 2022-10-22 PROCEDURE — 85014 HEMATOCRIT: CPT

## 2022-10-22 PROCEDURE — 6360000002 HC RX W HCPCS: Performed by: STUDENT IN AN ORGANIZED HEALTH CARE EDUCATION/TRAINING PROGRAM

## 2022-10-22 PROCEDURE — 97162 PT EVAL MOD COMPLEX 30 MIN: CPT

## 2022-10-22 PROCEDURE — 85610 PROTHROMBIN TIME: CPT

## 2022-10-22 PROCEDURE — 1210000000 HC MED SURG R&B

## 2022-10-22 PROCEDURE — 80053 COMPREHEN METABOLIC PANEL: CPT

## 2022-10-22 PROCEDURE — 2580000003 HC RX 258: Performed by: CLINICAL NURSE SPECIALIST

## 2022-10-22 RX ORDER — INSULIN LISPRO 100 [IU]/ML
10 INJECTION, SOLUTION INTRAVENOUS; SUBCUTANEOUS ONCE
Status: DISCONTINUED | OUTPATIENT
Start: 2022-10-22 | End: 2022-10-28 | Stop reason: HOSPADM

## 2022-10-22 RX ORDER — INSULIN LISPRO 100 [IU]/ML
0-4 INJECTION, SOLUTION INTRAVENOUS; SUBCUTANEOUS NIGHTLY
Status: DISCONTINUED | OUTPATIENT
Start: 2022-10-22 | End: 2022-10-22

## 2022-10-22 RX ORDER — INSULIN LISPRO 100 [IU]/ML
0-16 INJECTION, SOLUTION INTRAVENOUS; SUBCUTANEOUS
Status: DISCONTINUED | OUTPATIENT
Start: 2022-10-22 | End: 2022-10-24

## 2022-10-22 RX ORDER — INSULIN LISPRO 100 [IU]/ML
0-8 INJECTION, SOLUTION INTRAVENOUS; SUBCUTANEOUS
Status: DISCONTINUED | OUTPATIENT
Start: 2022-10-22 | End: 2022-10-22

## 2022-10-22 RX ADMIN — ATORVASTATIN CALCIUM 10 MG: 10 TABLET, FILM COATED ORAL at 20:54

## 2022-10-22 RX ADMIN — MYCOPHENOLATE MOFETIL 750 MG: 250 CAPSULE ORAL at 09:20

## 2022-10-22 RX ADMIN — ACETAMINOPHEN 650 MG: 325 TABLET ORAL at 15:43

## 2022-10-22 RX ADMIN — Medication 10 ML: at 09:13

## 2022-10-22 RX ADMIN — GABAPENTIN 300 MG: 300 CAPSULE ORAL at 08:56

## 2022-10-22 RX ADMIN — MYCOPHENOLATE MOFETIL 750 MG: 250 CAPSULE ORAL at 20:54

## 2022-10-22 RX ADMIN — TAMSULOSIN HYDROCHLORIDE 0.4 MG: 0.4 CAPSULE ORAL at 08:54

## 2022-10-22 RX ADMIN — CARVEDILOL 6.25 MG: 6.25 TABLET, FILM COATED ORAL at 20:54

## 2022-10-22 RX ADMIN — INSULIN LISPRO 16 UNITS: 100 INJECTION, SOLUTION INTRAVENOUS; SUBCUTANEOUS at 21:14

## 2022-10-22 RX ADMIN — Medication 5 ML: at 21:00

## 2022-10-22 RX ADMIN — PREDNISONE 5 MG: 5 TABLET ORAL at 08:53

## 2022-10-22 RX ADMIN — CYCLOSPORINE 75 MG: 25 CAPSULE, LIQUID FILLED ORAL at 09:02

## 2022-10-22 RX ADMIN — GABAPENTIN 300 MG: 300 CAPSULE ORAL at 20:54

## 2022-10-22 RX ADMIN — SODIUM CHLORIDE 80 MG: 9 INJECTION, SOLUTION INTRAMUSCULAR; INTRAVENOUS; SUBCUTANEOUS at 08:57

## 2022-10-22 RX ADMIN — INSULIN LISPRO 8 UNITS: 100 INJECTION, SOLUTION INTRAVENOUS; SUBCUTANEOUS at 16:47

## 2022-10-22 RX ADMIN — CARVEDILOL 6.25 MG: 6.25 TABLET, FILM COATED ORAL at 08:54

## 2022-10-22 RX ADMIN — INSULIN LISPRO 2 UNITS: 100 INJECTION, SOLUTION INTRAVENOUS; SUBCUTANEOUS at 11:36

## 2022-10-22 RX ADMIN — CYCLOSPORINE 75 MG: 25 CAPSULE, LIQUID FILLED ORAL at 20:55

## 2022-10-22 ASSESSMENT — PAIN SCALES - GENERAL
PAINLEVEL_OUTOF10: 0
PAINLEVEL_OUTOF10: 8

## 2022-10-22 ASSESSMENT — PAIN DESCRIPTION - DESCRIPTORS: DESCRIPTORS: ACHING

## 2022-10-22 ASSESSMENT — PAIN DESCRIPTION - ORIENTATION: ORIENTATION: MID

## 2022-10-22 ASSESSMENT — PAIN DESCRIPTION - LOCATION: LOCATION: HEAD

## 2022-10-22 NOTE — PROGRESS NOTES
Progress Note  Date:10/22/2022       YPIQ:[id number]  Patient Flako Leger     YOB: 1950     Age:71 y.o. Subjective      No events overnight. EGD results normal.  GI planning for Spray, but may be 1-2 day delay over weekend. Patient not happy with Spray delay, discussing wanting to leave and have done as outpatient. Feels weak, wants to get up out of bed and moving more. Objective         Vitals Last 24 Hours:  TEMPERATURE:  Temp  Av.5 °F (36.9 °C)  Min: 97.6 °F (36.4 °C)  Max: 98.8 °F (37.1 °C)  RESPIRATIONS RANGE: Resp  Av.9  Min: 16  Max: 18  PULSE OXIMETRY RANGE: SpO2  Av %  Min: 97 %  Max: 100 %  PULSE RANGE: Pulse  Av.1  Min: 61  Max: 75  BLOOD PRESSURE RANGE: Systolic (76YTB), IVJ:222 , Min:131 , LDT:227   ; Diastolic (69MCU), CJW:41, Min:52, Max:78    I/O (24Hr): Intake/Output Summary (Last 24 hours) at 10/22/2022 1248  Last data filed at 10/22/2022 0857  Gross per 24 hour   Intake 310 ml   Output --   Net 310 ml       Objective:  Vital signs: (most recent): Blood pressure (!) 131/56, pulse 68, temperature 98.8 °F (37.1 °C), temperature source Oral, resp. rate 16, height 6' 2\" (1.88 m), weight 190 lb (86.2 kg), SpO2 98 %. Constitutional: Elderly adult male reclining in bed no acute distress. Wife present at bedside. Head: NCAT  Eyes: PERRLA, EOMI  ENT: Hearing grossly intact. Neck: Trachea midline, phonation normal.  Cardiovascular: RRR. Warm and well perfused. Scattered ecchymosis in various stages of healing, predominantly on dorsal surfaces of bilateral arms. Pulmonary: Normal rate and effort of respiration on room air. Grossly CTAB. Abdomen: Soft, nontense, nondistended. Evidence of prior surgical intervention. Hypoactive bowel sounds. Neurologic: Alert, grossly oriented. Non-focal.  Mentating appropriately. Psychiatric: Cooperative. Irritated at perceived delay in care (waiting for colonoscopy).     MSK/integumentary: No gross bony abnormalities. Dorsal forearm ecchymoses in various stages of healing as noted above. Mild generalized pallor. Labs/Imaging/Diagnostics    Labs:  CBC:  Recent Labs     10/20/22  1039 10/20/22  1815 10/21/22  0509 10/21/22  1100 10/21/22  2218 10/22/22  0510 10/22/22  1034   WBC 9.0  --  7.3  --   --  7.5  --    RBC 2.36*  --  2.42*  --   --  2.48*  --    HGB 6.7*   < > 7.2*   < > 7.2* 7.2*  7.3* 7.8*   HCT 20.8*   < > 21.4*   < > 21.8* 22.3*  22.0* 23.4*   MCV 89.1  --  88.5  --   --  88.8  --    RDW 16.1*  --  16.0*  --   --  16.5*  --      --  174  --   --  191  --     < > = values in this interval not displayed. CHEMISTRIES:  Recent Labs     10/20/22  1422 10/21/22  0509 10/22/22  0510    139 141   K 4.0 4.5 4.4   * 112* 113*   CO2 13* 12* 13*   BUN 83* 87* 84*   CREATININE 2.81* 2.83* 2.59*   GLUCOSE 145* 169* 203*   PHOS 2.8  --   --    MG 1.7 1.9 1.9       PT/INR:  Recent Labs     10/21/22  0509 10/21/22  1222 10/22/22  0510   PROTIME 29.3* 23.5* 18.3*   INR 2.7 2.1 1.5       APTT:  Recent Labs     10/20/22  0554   APTT 29.1       LIVER PROFILE:  Recent Labs     10/20/22  1422 10/21/22  0509 10/22/22  0510   AST 9 10 8   ALT 6 6 6   BILITOT 0.9* 0.9* 0.8*   ALKPHOS 52 50 53         Imaging Last 24 Hours:  XR KNEE LEFT (3 VIEWS)    Result Date: 10/20/2022  EXAMINATION: THREE XRAY VIEWS OF THE LEFT KNEE 10/19/2022 11:30 am COMPARISON: None. HISTORY: ORDERING SYSTEM PROVIDED HISTORY: fell onto knee 2 days ago; pain at Knee cap, SUNRISE view TECHNOLOGIST PROVIDED HISTORY: Reason for exam:->fell onto knee 2 days ago; pain at Knee cap Reason for exam:->SUNRISE view What reading provider will be dictating this exam?->CRC FINDINGS: AP, lateral, and sunrise views of the left knee were obtained. There is no acute fracture or dislocation. There is mild joint space narrowing in the medial compartment with associated osteophyte formation. There is suggestion of chondrocalcinosis. There is possible trace suprapatellar knee joint effusion. The visualized soft tissue structures are unremarkable. There is atherosclerotic disease. 1. 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 2. Probable chondrocalcinosis. 3. Possible trace suprapatellar knee joint effusion. XR KNEE RIGHT (3 VIEWS)    Result Date: 10/19/2022  EXAMINATION: THREE XRAY VIEWS OF THE RIGHT KNEE 10/19/2022 11:30 am COMPARISON: 11/22/2019 HISTORY: ORDERING SYSTEM PROVIDED HISTORY: fall knee pain, [place] view TECHNOLOGIST PROVIDED HISTORY: Reason for exam:->fall knee pain Reason for exam:->Sunrise view What reading provider will be dictating this exam?->CRC FINDINGS: Mild to moderate narrowing of the medial joint space, mild narrowing of the lateral and patellofemoral joint spaces is seen. Unremarkable alignment with no evidence of dislocation. Mild degenerative changes visualized. No evidence of cortical irregularity or lucency to suggest a fracture, no evidence of lytic or sclerotic bone lesion is seen. No evidence of suprapatellar effusion. The quadriceps and patellar tendons are unremarkable. Extensive vascular calcifications are seen. Degenerative changes. No evidence of acute osseous abnormality is seen. CT HEAD WO CONTRAST    Result Date: 10/19/2022  EXAMINATION: CT OF THE HEAD WITHOUT CONTRAST  10/19/2022 11:11 am TECHNIQUE: CT of the head was performed without the administration of intravenous contrast. Automated exposure control, iterative reconstruction, and/or weight based adjustment of the mA/kV was utilized to reduce the radiation dose to as low as reasonably achievable. COMPARISON: None. HISTORY: ORDERING SYSTEM PROVIDED HISTORY: passed out hit head; on coumadin. .. ? bleed, ? 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 ->No Reason for exam:->passed out hit head; on coumadin. .. ?  bleed, ? 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 No abnormal extra-axial fluid collection. The gray-white differentiation is maintained without evidence of an acute infarct. There is no evidence of hydrocephalus. The ventricles, cisterns and sulci are prominent consistent with atrophy. There is decreased attenuation within the periventricular white matter consistent with periventricular leukomalacia. There is encephalomalacia is seen within the anterior aspect of the left temporal fossa. There is an old left craniotomy defect. ORBITS: The visualized portion of the orbits demonstrate no acute abnormality. SINUSES: The visualized paranasal sinuses and mastoid air cells demonstrate no acute abnormality. There is mucosal thickening seen within the right sphenoid sinus. SOFT TISSUES/SKULL:  No acute abnormality of the visualized skull or soft tissues. 1.  There is no acute intracranial abnormality. Specifically, there is no intracranial hemorrhage. 2. Atrophy and periventricular leukomalacia, 3. Mucosal thickening within the right sphenoid sinus. 4. Previous left craniotomy defect with encephalomalacia  seen within the anterior aspect of the left temporal lobe. .     CTA CHEST W WO CONTRAST    Result Date: 10/19/2022  EXAMINATION: CTA OF THE CHEST WITH AND WITHOUT CONTRAST 10/19/2022 11:11 am TECHNIQUE: CTA of the chest was performed before and after the administration of intravenous contrast.  Multiplanar reformatted images are provided for review. MIP images are provided for review.  Automated exposure control, iterative reconstruction, and/or weight based adjustment of the mA/kV was utilized to reduce the radiation dose to as low as reasonably achievable. COMPARISON: March 15, 2018 HISTORY: ORDERING SYSTEM PROVIDED HISTORY: syncope fell to floor 2 days ago; ? pulmonary embolus; please also mention if trauma injury. Suspect PE as cause of fall or pneumonia TECHNOLOGIST PROVIDED HISTORY: Reason for exam:->syncope fell to floor 2 days ago; ? pulmonary embolus; please also mention if trauma injury. Suspect PE as cause of fall or pneumonia Decision Support Exception - unselect if not a suspected or confirmed emergency medical condition->Emergency Medical Condition (MA) What reading provider will be dictating this exam?->CRC FINDINGS: Aorta: At the level of the left main pulmonary artery the ascending aorta measures 3.8 cm in greatest transverse dimension and 34 cm. No acute abnormality of the aorta. No large filling defects are seen in the main, 1st and 2nd order vessels. Mediastinum: No evidence of mediastinal lymphadenopathy. The heart and pericardium demonstrate no acute abnormality. Coronary calcifications and are stents are seen in place. Lungs/Pleura: In the right lower lobe there is an area of consolidation. Also  a trace of pleural fluid is seen. There are multiple nodules visualized in the lungs. Most appear stable. In the anterior right upper lobe and opacity is seen that measures 5.8 x 11 by 12 mm the uterus. Most nodules measure 4 mm or less. In the right middle lobe laterally a 6 mm nodule is seen. This was seen on previous study. Reticulonodular opacities are seen bilaterally and are most prominent in the lateral right upper lobe. Centrilobular emphysematous changes are seen as well as paraseptal emphysematous changes. Bronchial wall dilatation and ground-glass opacities are seen in the medial aspect of the right upper lobe. Upper Abdomen: Limited images of the upper abdomen are unremarkable. Soft Tissues/Bones: Degenerative changes are seen in the spine at multiple levels.   No acute fractures are identified on this examination. 1.  No large filling defects are seen on this examination to suggest saddle embolus more in the larger primary and secondary vascular structures. 2. There is consolidation again seen in the left lower lobe. Bronchial wall thickening mild bronchiectasis and other changes of COPD are seen. An ill-defined opacity is seen in the anterior left upper lobe. Recommend follow-up to resolution to exclude any developing masses. There are multiple nodules visualized and overall are stable. CT CERVICAL SPINE WO CONTRAST    Result Date: 10/19/2022  EXAMINATION: CT OF THE CERVICAL SPINE WITHOUT CONTRAST 10/19/2022 11:11 am TECHNIQUE: CT of the cervical spine was performed without the administration of intravenous contrast. Multiplanar reformatted images are provided for review. Automated exposure control, iterative reconstruction, and/or weight based adjustment of the mA/kV was utilized to reduce the radiation dose to as low as reasonably achievable. COMPARISON: None. HISTORY: ORDERING SYSTEM PROVIDED HISTORY: fall from standing 2 days ago and again today TECHNOLOGIST PROVIDED HISTORY: Reason for exam:->fall from standing 2 days ago and again today Decision Support Exception - unselect if not a suspected or confirmed emergency medical condition->Emergency Medical Condition (MA) What reading provider will be dictating this exam?->CRC FINDINGS: The ring of C1 is intact as is the dense. There is no compression fracture of the cervical spine. No jumped or perched facet is noted. Multilevel degenerative disc and degenerative joint disease is noted. Posterior degenerative endplate spurs are seen at the C3-4, C4-5 and C5-6 levels. There is effacement of the ventral thecal sac at the C3-4 through C5-6 levels due to the posterior degenerative endplate spurs. The prevertebral soft tissues are unremarkable. The airway is widely patent.  Images through the lung apices are negative for a pneumothorax. 1. There is no acute compression fracture or subluxation of the cervical spine. 2. Multilevel degenerative disc and degenerative joint disease. CT THORACIC SPINE WO CONTRAST    Result Date: 10/19/2022  EXAMINATION: CT OF THE THORACIC SPINE WITHOUT CONTRAST; CT OF THE LUMBAR SPINE WITHOUT CONTRAST  10/19/2022 11:11 am: TECHNIQUE: CT of the thoracic spine was performed without the administration of intravenous contrast. Multiplanar reformatted images are provided for review. Automated exposure control, iterative reconstruction, and/or weight based adjustment of the mA/kV was utilized to reduce the radiation dose to as low as reasonably achievable.; CT of the lumbar spine was performed without the administration of intravenous contrast. Multiplanar reformatted images are provided for review. Adjustment of mA and/or kV according to patient size was utilized. Automated exposure control, iterative reconstruction, and/or weight based adjustment of the mA/kV was utilized to reduce the radiation dose to as low as reasonably achievable. COMPARISON: None. HISTORY: ORDERING SYSTEM PROVIDED HISTORY: Fall from standing; Trauma assessment TECHNOLOGIST PROVIDED HISTORY: Reason for exam:->Fall from standing; Trauma assessment What reading provider will be dictating this exam?->CRC FINDINGS: CT thoracic spine: There is no evidence of acute fracture. Vertebral alignment is anatomic. There are no acute compression deformities. There is minimal concavity at the superior endplates of T1 through T3. There are changes of diffuse idiopathic skeletal hyperostosis within the thoracic spine. The paravertebral soft tissue structures are unremarkable. There is no gross evidence of central canal stenosis. There is suggestion of pleural thickening in the posterior right lower lobe with a pleural based opacity that may represent atelectasis. CT lumbar spine: There is no evidence of acute fracture.   Vertebral alignment is anatomic. There are no compression deformities. The intervertebral disc space heights appear relatively preserved. There is facet hypertrophy throughout the lumbar spine. The paravertebral soft tissue structures are unremarkable. There is possible central canal stenosis at L3-4. There is multilevel neural foraminal narrowing. There is arteriosclerosis without abdominal aortic aneurysm. There is marked atrophy of the bilateral kidneys. There is exophytic lesion from the posterior right kidney which is more dense than simple cyst, measuring approximately 48 Hounsfield units. This may represent complicated cyst.  A solid mass cannot be entirely excluded. 1. No acute fracture or subluxation within the thoracic or lumbar spine. 2. Degenerative changes in the spine without definite central canal stenosis in the thoracic spine. There is possible central canal stenosis at L3-4. There is multilevel neural foraminal narrowing within the lumbar spine. 3. Marked atrophy of the bilateral kidneys with indeterminate exophytic lesion from the right kidney that may represent complicated cyst or solid mass. The patient has prior examinations which are not available for comparison. 4. There is a dependent pleural based opacity in the right lower lobe which may represent atelectasis. There are prior examinations which are not available for comparison. CT LUMBAR SPINE WO CONTRAST    Result Date: 10/19/2022  EXAMINATION: CT OF THE THORACIC SPINE WITHOUT CONTRAST; CT OF THE LUMBAR SPINE WITHOUT CONTRAST  10/19/2022 11:11 am: TECHNIQUE: CT of the thoracic spine was performed without the administration of intravenous contrast. Multiplanar reformatted images are provided for review.  Automated exposure control, iterative reconstruction, and/or weight based adjustment of the mA/kV was utilized to reduce the radiation dose to as low as reasonably achievable.; CT of the lumbar spine was performed without the administration of intravenous contrast. Multiplanar reformatted images are provided for review. Adjustment of mA and/or kV according to patient size was utilized. Automated exposure control, iterative reconstruction, and/or weight based adjustment of the mA/kV was utilized to reduce the radiation dose to as low as reasonably achievable. COMPARISON: None. HISTORY: ORDERING SYSTEM PROVIDED HISTORY: Fall from standing; Trauma assessment TECHNOLOGIST PROVIDED HISTORY: Reason for exam:->Fall from standing; Trauma assessment What reading provider will be dictating this exam?->CRC FINDINGS: CT thoracic spine: There is no evidence of acute fracture. Vertebral alignment is anatomic. There are no acute compression deformities. There is minimal concavity at the superior endplates of T1 through T3. There are changes of diffuse idiopathic skeletal hyperostosis within the thoracic spine. The paravertebral soft tissue structures are unremarkable. There is no gross evidence of central canal stenosis. There is suggestion of pleural thickening in the posterior right lower lobe with a pleural based opacity that may represent atelectasis. CT lumbar spine: There is no evidence of acute fracture. Vertebral alignment is anatomic. There are no compression deformities. The intervertebral disc space heights appear relatively preserved. There is facet hypertrophy throughout the lumbar spine. The paravertebral soft tissue structures are unremarkable. There is possible central canal stenosis at L3-4. There is multilevel neural foraminal narrowing. There is arteriosclerosis without abdominal aortic aneurysm. There is marked atrophy of the bilateral kidneys. There is exophytic lesion from the posterior right kidney which is more dense than simple cyst, measuring approximately 48 Hounsfield units. This may represent complicated cyst.  A solid mass cannot be entirely excluded.      1. No acute fracture or subluxation within the thoracic or lumbar spine. 2. Degenerative changes in the spine without definite central canal stenosis in the thoracic spine. There is possible central canal stenosis at L3-4. There is multilevel neural foraminal narrowing within the lumbar spine. 3. Marked atrophy of the bilateral kidneys with indeterminate exophytic lesion from the right kidney that may represent complicated cyst or solid mass. The patient has prior examinations which are not available for comparison. 4. There is a dependent pleural based opacity in the right lower lobe which may represent atelectasis. There are prior examinations which are not available for comparison. CT ABDOMEN PELVIS W IV CONTRAST Additional Contrast? None    Result Date: 10/19/2022  EXAMINATION: CT OF THE ABDOMEN AND PELVIS WITH CONTRAST 10/19/2022 11:11 am TECHNIQUE: CT of the abdomen and pelvis was performed with the administration of intravenous contrast. Multiplanar reformatted images are provided for review. Automated exposure control, iterative reconstruction, and/or weight based adjustment of the mA/kV was utilized to reduce the radiation dose to as low as reasonably achievable. COMPARISON: None. HISTORY: ORDERING SYSTEM PROVIDED HISTORY: fall from standing 2 days ago and again today; ? spleen rupture, ? kidney hematoma; ? retroparitoneal bleed, TRAUMA on coumadin, Hx of kidney transplant TECHNOLOGIST PROVIDED HISTORY: Reason for exam:->fall from standing 2 days ago and again today; ? spleen rupture, ? kidney hematoma; ? retroparitoneal bleed Reason for exam:->TRAUMA on coumadin Reason for exam:->Hx of kidney transplant Additional Contrast?->None Decision Support Exception - unselect if not a suspected or confirmed emergency medical condition->Emergency Medical Condition (MA) What reading provider will be dictating this exam?->CRC FINDINGS: Lower Chest: Heart size is within normal limits. There is focal bronchiectasis in the right lower lobe.   Subpleural curvilinear opacities are partially imaged in the right lower lobe and lateral aspect of the right middle lobe. Please refer to dedicated CT chest report for additional details. Organs: The gallbladder is distended without obvious abnormality. The liver enhances homogeneously. The portal vein is patent and there is no intrahepatic biliary ductal dilatation. The spleen and adrenal glands are normal in size. There is a 1.4 cm low-density lesion in the inferior margin of the proximal body of the pancreas on axial image 46. Otherwise, the pancreas enhances homogeneously. No pancreatic ductal dilatation. There is severe bilateral renal cortical atrophy. A 1.2 cm intermediate density (potentially enhancing) lesion arises from the posterior aspect of the mid right kidney on axial image 57. There is a severely atrophic renal transplant in the right lower quadrant and a normally enhancing left lower quadrant renal transplant. GI/Bowel: No evidence of a bowel obstruction, free air or pneumatosis. Portions of the colon are decompressed, limiting assessment for mucosal based abnormalities. Stomach and duodenal sweep demonstrate no acute abnormality. The appendix is normal. Pelvis: The urinary bladder is distended without obvious abnormality. The prostate gland is mildly heterogeneous but nonenlarged. Visible but nonenlarged pelvic lymph nodes are present. Peritoneum/Retroperitoneum: The abdominal aorta is heavily calcified but nonaneurysmal.  No retroperitoneal lymphadenopathy or mass. Bones/Soft Tissues: No acute osseous abnormality or evidence of an aggressive osseous lesion. There are moderate-to-severe degenerative changes of the lower lumbar spine mainly in the form of intervertebral disc space narrowing/facet arthropathy at L5-S1. There is no evidence of an acute posttraumatic abnormality in the abdomen or pelvis. Specifically, no evidence of splenic or renal injury. No retroperitoneal hematoma.  Indeterminate intermediate density 1.2 cm lesion arising from the mid right kidney. Renal neoplasm not excluded. Correlation with renal ultrasound or multiphasic contrast-enhanced CT or MRI of the abdomen utilizing a renal mass protocol is recommended for further assessment. Indeterminate 1.4 cm low-density lesion arising from the inferior portion of the pancreatic body centrally. This has imaging features most suspicious for an IPMN. Correlation with contrast-enhanced abdominal MRI is also recommended (unless otherwise contraindicated). Renal transplants in the bilateral lower quadrants without evidence of posttraumatic findings to the allografts. Linear/nodular opacities in the right lower lobe and right middle lobe. Please refer to the dedicated CT chest report for additional details. Assessment//Plan           Hospital Problems             Last Modified POA    * (Principal) Symptomatic anemia 10/19/2022 Yes    Acute upper GI bleed 10/19/2022 Yes   Assessment & Plan    Assessment:  Acute Upper GI bleed  Anemia acute blood loss secondary to GI bleeding  Acidemia suspected metabolic in setting up above   Acute kidney injury on Ckd s/p renal transplant  2008  Pancreatic lesion possibly IPMN  Multiple lung nodules of unclear etiology  COPD/emphysema  Elevated troponin- suspect demand ischemia secondary to GI bleeding    Admit as inpatient  Protonix IV 80 mg daily  Gastroenterology consultation-defer MRCP to gastroenterology  Clear liquid diet, n.p.o. after midnight  5 mg of vitamin K given in the ED  INR in the a.m.  hemoglobin hematocrit every 6 hours  Nephrology consultedToday ct abd/pelvis: Marked atrophy of the bilateral kidneys with indeterminate exophytic lesion from the right kidney that may represent complicated cyst or solid mass. - nephrology consulted. Dec 2020-high attenuation exophytic cystlike area posterior mid to lower pole right kidney   9.   Received sodium bicarb in the ED-I will defer to nephrology as his CO2 has been low since the 12th.  10.  We will leave to following hospitalist to consult cardiology for elevated troponin. Patient is asymptomatic. I suspect this is           demand ischemia secondary to anemia  11. Will defer consultation of pulmonology to primary hospitalist.  I did not inform patient of lung nodules on admission  12. SCDs for DVT prophylaxis  13. Plan of care discussed with Dr. Karina De Guzman, patient and wife  Consultations Ordered:  IP CONSULT TO NEPHROLOGY  IP CONSULT TO HOSPITALIST  IP CONSULT TO GI    10/20: Received 1 unit RBC transfusion overnight, with hemoglobin stable acutely anemic at <7.0 on recheck. Plan to crossmatch 2 additional units, transfuse 1 and recheck this morning. Morning labs pending at time of rounds. We will continue to follow CBC, CMP, magnesium daily, phosphorus every 3 days, one-time check PT/INR. Per CTA 10/19: Negative PE, LLL persistent consolidation, changes consistent with COPD, \"ill-defined opacity is seen in anterior left upper lobe Recommend follow-up to resolution to exclude any developing masses. There are multiple nodules visualized and overall are stable. \"  [address] nephrology and gastroenterology recommendations when available. Update 10/20: 10mg PO Vit K for INR 3.5.  GI planning possible EGD next day. 2nd Trop still elevated, will consult Cardiology. Post-transfusion Hgb pending at this time. 10/21: Planned morning EGD delayed by GI in setting of INR still 2.7 (down from 3.5 previous day). Additional 5 mg IV vitamin K administered per GI request, with plan for repeat PT/INR at 1300 hrs today, with plan for subsequent EGD this afternoon if INR has improved sufficiently to satisfy GI preferences. Hemoglobin this morning 7.2 s/p 2 total units PRBCs administered so far since admission. 10/22: BG slightly above goal, changed coverage insulin from kelton to medium correction. EGD 10/21 with no acute findings.   GI planning Colonoscopy, but apparent 1-2 day delay over weekend. Patient unhappy with delay, discussing leaving and having [place] done as outpatient. INR 1.5 today, down from 3.5 early in admission. Per discussion with PCP Nephrologist, hospitalist service with remain attendings at present as PCP will be out of area again soon for a number of days. PT/OT consulted. Talked to RN about getting patient up out of bed and mobilized more starting today.       Electronically signed by Joaquin Mcdermott DO on 10/22/2022 at 12:49 PM

## 2022-10-22 NOTE — PROGRESS NOTES
Nephrology Progress Note  Fistula right arm  Assessment:  CKD 3b S/P kidney transplant   GIB  colonoscopy on hold  Anemia  Hx Hypertension  Hyperlipidemia  OHD CAD        Plan: resume diet  patient irate that colonoscopy on hold till Monday  INR 1.5    Patient Active Problem List:     Pneumonia     Abdominal pain, other specified site     Acute pyelonephritis without lesion of renal medullary necrosis     Anemia     Essential hypertension     Nonspecific abnormal results of thyroid function study     Mixed hyperlipidemia     Kidney replaced by transplant     Intra-abdominal abscess post-procedure     Infection due to Enterococcus     Fever and other physiologic disturbances of temperature regulation     Chronic kidney disease (CKD)     Type II or unspecified type diabetes mellitus without mention of complication, uncontrolled     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     Anginal chest pain at rest [facility])     Atypical chest pain     Chronic cough     Unstable angina ([facility])     Chest pain     Atrial fibrillation with RVR ([facility])     Symptomatic anemia     Acute upper GI bleed      Subjective:  Admit Date: 10/19/2022    Interval History: upset on length of stay and no diet     Medications:  Scheduled Meds:   insulin lispro  0-8 Units SubCUTAneous TID WC    insulin lispro  0-4 Units SubCUTAneous Nightly    sodium chloride flush  5-40 mL IntraVENous 2 times per day    mycophenolate  750 mg Oral BID    pantoprazole (PROTONIX) 40 mg injection  80 mg IntraVENous Daily    sodium chloride flush  5-40 mL IntraVENous 2 times per day    cycloSPORINE modified  75 mg Oral BID    [Held by provider] insulin regular  6 Units SubCUTAneous Daily with breakfast    [Held by provider] insulin regular  8 Units SubCUTAneous BID WC    gabapentin  300 mg Oral BID    insulin NPH  9 Units SubCUTAneous QAM    insulin NPH  5 Units SubCUTAneous Nightly    predniSONE  5 mg Oral Daily    atorvastatin  10 mg Oral Daily    tamsulosin  0.4 mg Oral Daily    carvedilol  6.25 mg Oral BID     Continuous Infusions:   sodium chloride      sodium chloride      sodium chloride      sodium chloride      sodium chloride      dextrose         CBC:   Recent Labs     10/21/22  0509 10/21/22  1100 10/21/22  2218 10/22/22  0510   WBC 7.3  --   --  7.5   HGB 7.2*   < > 7.2* 7.2*  7.3*     --   --  191    < > = values in this interval not displayed. CMP:    Recent Labs     10/20/22  1422 10/21/22  0509 10/22/22  0510    139 141   K 4.0 4.5 4.4   * 112* 113*   CO2 13* 12* 13*   BUN 83* 87* 84*   CREATININE 2.81* 2.83* 2.59*   GLUCOSE 145* 169* 203*   CALCIUM 8.4* 8.4* 8.5   LABGLOM 23.3* 23.1* 25.7*     Troponin:   Recent Labs     10/20/22  1422   TROPONINI 0.066*     BNP: No results for input(s): BNP in the last 72 hours. INR:   Recent Labs     10/22/22  0510   INR 1.5     Lipids: No results for input(s): CHOL, LDLDIRECT, TRIG, HDL, AMYLASE, LIPASE in the last 72 hours. Liver:   Recent Labs     10/22/22  0510   AST 8   ALT 6   ALKPHOS 53   PROT 5.6*   LABALBU 3.4*   BILITOT 0.8*     Iron:  No results for input(s): IRONS, FERRITIN in the last 72 hours. Invalid input(s): LABIRONS  Urinalysis: No results for input(s): UA in the last 72 hours.     Objective:  Vitals: BP (!) 131/56   Pulse 68   Temp 98.8 °F (37.1 °C) (Oral)   Resp 16   Ht 6' 2\" (1.88 m)   Wt 190 lb (86.2 kg)   SpO2 98%   BMI 24.39 kg/m²    Wt Readings from Last 3 Encounters:   10/19/22 190 lb (86.2 kg)   10/14/22 195 lb 6.4 oz (88.6 kg)   10/12/22 192 lb (87.1 kg)      24HR INTAKE/OUTPUT:    Intake/Output Summary (Last 24 hours) at 10/22/2022 0953  Last data filed at 10/22/2022 0857  Gross per 24 hour   Intake 310 ml   Output --   Net 310 ml       General: alert, in no apparent distress  HEENT: normocephalic, atraumatic, anicteric  Neck: supple, no mass  Lungs: non-labored respirations, clear to auscultation bilaterally  Heart: regular rate and rhythm, no murmurs or rubs  Abdomen: soft, non-tender, non-distended  Ext: no cyanosis, no peripheral edema  Neuro: alert and oriented, no gross abnormalities  Psych: normal mood and affect  Skin: no rash      Electronically signed by Rich Paul DO, MD

## 2022-10-22 NOTE — PROGRESS NOTES
Gastroenterology Progress Note    Evelyn Foreman is a 80 y.o. male patient. Hospitalization Day:3    Chief C/O: anemia and melena    SUBJECTIVE: Seen and examined, no acute events overnight, hemoglobin 7.8, status post EGD normal, no fresh or old blood identified. INR 1.5.     ROS:  Gastrointestinal ROS: no abdominal pain, change in bowel habits, or black or bloody stools    Physical    VITALS:  BP (!) 164/71   Pulse 68   Temp 98.1 °F (36.7 °C) (Oral)   Resp 16   Ht 6' 2\" (1.88 m)   Wt 190 lb (86.2 kg)   SpO2 100%   BMI 24.39 kg/m²   TEMPERATURE:  Current - Temp: 98.1 °F (36.7 °C); Max - Temp  Av.4 °F (36.9 °C)  Min: 97.6 °F (36.4 °C)  Max: 98.8 °F (37.1 °C)    General:  Alert and oriented,  No apparent distress  Skin- without jaundice  Eyes: anicteric sclera  Cardiac: RRR, Nl s1s2, without murmurs  Lungs CTA Bilaterally, normal effort  Abdomen soft, ND, NT, no HSM, Bowel sounds normal  Ext: without edema  Neuro: no asterixis     Data    Data Review:    Recent Labs     10/20/22  1039 10/20/22  1815 10/21/22  0509 10/21/22  1100 10/21/22  2218 10/22/22  0510 10/22/22  1034   WBC 9.0  --  7.3  --   --  7.5  --    HGB 6.7*   < > 7.2*   < > 7.2* 7.2*  7.3* 7.8*   HCT 20.8*   < > 21.4*   < > 21.8* 22.3*  22.0* 23.4*   MCV 89.1  --  88.5  --   --  88.8  --      --  174  --   --  191  --     < > = values in this interval not displayed. Recent Labs     10/20/22  1422 10/21/22  0509 10/22/22  0510    139 141   K 4.0 4.5 4.4   * 112* 113*   CO2 13* 12* 13*   PHOS 2.8  --   --    BUN 83* 87* 84*   CREATININE 2.81* 2.83* 2.59*     Recent Labs     10/20/22  1422 10/21/22  0509 10/22/22  0510   AST 9 10 8   ALT 6 6 6   BILITOT 0.9* 0.9* 0.8*   ALKPHOS 52 50 53     No results for input(s): LIPASE, AMYLASE in the last 72 hours.   Recent Labs     10/21/22  0509 10/21/22  1222 10/22/22  0510   PROTIME 29.3* 23.5* 18.3*   INR 2.7 2.1 1.5         ASSESSMENT:  27-year-old male admitted with anemia and melena in the context of supratherapeutic INR while on Coumadin, on arrival INR was 3.1 patient had been experiencing intermittent melena over the course of the month hemoglobin was 6.3. Had normal EGD. CT the abdomen was notable for 1.4 cm pancreatic body lesion possibly IPMN will likely need an outpatient EUS with Dr. Suad Gold for further evaluation. Has been tolerating regular diet INR now 1.5. PLAN :  -Continue course of care  -Continue serial H&H, trend and transfuse accordingly  -Okay for regular diet today  -Clear liquid diet tomorrow with plan for n.p.o. after midnight for colonoscopy on Monday. Thank you for allowing me to participate in the care of your patient. Please feel free to contact me with any concerns.     HAJA Del Rio - CNP

## 2022-10-22 NOTE — PROGRESS NOTES
Progress Note  Date:10/22/2022       GIJB:[id number]  Patient Garret Menezes     YOB: 1950     Age:71 y.o. Subjective      No events overnight. EGD later today s/p INR improvement, per GI    Objective         Vitals Last 24 Hours:  TEMPERATURE:  Temp  Av.5 °F (36.9 °C)  Min: 97.6 °F (36.4 °C)  Max: 98.8 °F (37.1 °C)  RESPIRATIONS RANGE: Resp  Av.9  Min: 16  Max: 18  PULSE OXIMETRY RANGE: SpO2  Av %  Min: 97 %  Max: 100 %  PULSE RANGE: Pulse  Av.1  Min: 61  Max: 75  BLOOD PRESSURE RANGE: Systolic (28RKL), HZK:654 , Min:131 , CKF:826   ; Diastolic (58YOO), ORB:29, Min:52, Max:78    I/O (24Hr): Intake/Output Summary (Last 24 hours) at 10/22/2022 1246  Last data filed at 10/22/2022 0857  Gross per 24 hour   Intake 310 ml   Output --   Net 310 ml       Objective:  Vital signs: (most recent): Blood pressure (!) 131/56, pulse 68, temperature 98.8 °F (37.1 °C), temperature source Oral, resp. rate 16, height 6' 2\" (1.88 m), weight 190 lb (86.2 kg), SpO2 98 %. Constitutional: Elderly adult male reclining in bed no acute distress. Head: NCAT  Eyes: PERRLA, EOMI  ENT: Hearing grossly intact. Neck: Trachea midline, phonation normal.  Cardiovascular: RRR. Warm and well perfused. Scattered ecchymosis in various stages of healing, predominantly on dorsal surfaces of bilateral arms. Pulmonary: Normal rate and effort of respiration on room air. Grossly CTAB. Abdomen: Soft, nontense, nondistended. Evidence of prior surgical intervention. Hypoactive bowel sounds. Neurologic: Alert, grossly oriented. Non-focal.  Mentating appropriately. Psychiatric: Pleasant and cooperative. Anxious to discharge when cleared. MSK/integumentary: No gross bony abnormalities. Dorsal forearm ecchymoses in various stages of healing as noted above. Mild generalized pallor.     Labs/Imaging/Diagnostics    Labs:  CBC:  Recent Labs     10/20/22  1039 10/20/22  1815 10/21/22  0672 10/21/22  1100 10/21/22  2218 10/22/22  0510 10/22/22  1034   WBC 9.0  --  7.3  --   --  7.5  --    RBC 2.36*  --  2.42*  --   --  2.48*  --    HGB 6.7*   < > 7.2*   < > 7.2* 7.2*  7.3* 7.8*   HCT 20.8*   < > 21.4*   < > 21.8* 22.3*  22.0* 23.4*   MCV 89.1  --  88.5  --   --  88.8  --    RDW 16.1*  --  16.0*  --   --  16.5*  --      --  174  --   --  191  --     < > = values in this interval not displayed. CHEMISTRIES:  Recent Labs     10/20/22  1422 10/21/22  0509 10/22/22  0510    139 141   K 4.0 4.5 4.4   * 112* 113*   CO2 13* 12* 13*   BUN 83* 87* 84*   CREATININE 2.81* 2.83* 2.59*   GLUCOSE 145* 169* 203*   PHOS 2.8  --   --    MG 1.7 1.9 1.9       PT/INR:  Recent Labs     10/21/22  0509 10/21/22  1222 10/22/22  0510   PROTIME 29.3* 23.5* 18.3*   INR 2.7 2.1 1.5       APTT:  Recent Labs     10/20/22  0554   APTT 29.1       LIVER PROFILE:  Recent Labs     10/20/22  1422 10/21/22  0509 10/22/22  0510   AST 9 10 8   ALT 6 6 6   BILITOT 0.9* 0.9* 0.8*   ALKPHOS 52 50 53         Imaging Last 24 Hours:  XR KNEE LEFT (3 VIEWS)    Result Date: 10/20/2022  EXAMINATION: THREE XRAY VIEWS OF THE LEFT KNEE 10/19/2022 11:30 am COMPARISON: None. HISTORY: ORDERING SYSTEM PROVIDED HISTORY: fell onto knee 2 days ago; pain at Knee cap, SUNRISE view TECHNOLOGIST PROVIDED HISTORY: Reason for exam:->fell onto knee 2 days ago; pain at Knee cap Reason for exam:->SUNRISE view What reading provider will be dictating this exam?->CRC FINDINGS: AP, lateral, and sunrise views of the left knee were obtained. There is no acute fracture or dislocation. There is mild joint space narrowing in the medial compartment with associated osteophyte formation. There is suggestion of chondrocalcinosis. There is possible trace suprapatellar knee joint effusion. The visualized soft tissue structures are unremarkable. There is atherosclerotic disease. 1. 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  2. Probable chondrocalcinosis. 3. Possible trace suprapatellar knee joint effusion. XR KNEE RIGHT (3 VIEWS)    Result Date: 10/19/2022  EXAMINATION: THREE XRAY VIEWS OF THE RIGHT KNEE 10/19/2022 11:30 am COMPARISON: 11/22/2019 HISTORY: ORDERING SYSTEM PROVIDED HISTORY: fall knee pain, [place] view TECHNOLOGIST PROVIDED HISTORY: Reason for exam:->fall knee pain Reason for exam:->Sunrise view What reading provider will be dictating this exam?->CRC FINDINGS: Mild to moderate narrowing of the medial joint space, mild narrowing of the lateral and patellofemoral joint spaces is seen. Unremarkable alignment with no evidence of dislocation. Mild degenerative changes visualized. No evidence of cortical irregularity or lucency to suggest a fracture, no evidence of lytic or sclerotic bone lesion is seen. No evidence of suprapatellar effusion. The quadriceps and patellar tendons are unremarkable. Extensive vascular calcifications are seen. Degenerative changes. No evidence of acute osseous abnormality is seen. CT HEAD WO CONTRAST    Result Date: 10/19/2022  EXAMINATION: CT OF THE HEAD WITHOUT CONTRAST  10/19/2022 11:11 am TECHNIQUE: CT of the head was performed without the administration of intravenous contrast. Automated exposure control, iterative reconstruction, and/or weight based adjustment of the mA/kV was utilized to reduce the radiation dose to as low as reasonably achievable. COMPARISON: None. HISTORY: ORDERING SYSTEM PROVIDED HISTORY: passed out hit head; on coumadin. .. ? bleed, ? 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 ->No Reason for exam:->passed out hit head; on coumadin. .. ?  bleed, ? 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 No abnormal extra-axial fluid collection. The gray-white differentiation is maintained without evidence of an acute infarct. There is no evidence of hydrocephalus. The ventricles, cisterns and sulci are prominent consistent with atrophy. There is decreased attenuation within the periventricular white matter consistent with periventricular leukomalacia. There is encephalomalacia is seen within the anterior aspect of the left temporal fossa. There is an old left craniotomy defect. ORBITS: The visualized portion of the orbits demonstrate no acute abnormality. SINUSES: The visualized paranasal sinuses and mastoid air cells demonstrate no acute abnormality. There is mucosal thickening seen within the right sphenoid sinus. SOFT TISSUES/SKULL:  No acute abnormality of the visualized skull or soft tissues. 1.  There is no acute intracranial abnormality. Specifically, there is no intracranial hemorrhage. 2. Atrophy and periventricular leukomalacia, 3. Mucosal thickening within the right sphenoid sinus. 4. Previous left craniotomy defect with encephalomalacia  seen within the anterior aspect of the left temporal lobe. .     CTA CHEST W WO CONTRAST    Result Date: 10/19/2022  EXAMINATION: CTA OF THE CHEST WITH AND WITHOUT CONTRAST 10/19/2022 11:11 am TECHNIQUE: CTA of the chest was performed before and after the administration of intravenous contrast.  Multiplanar reformatted images are provided for review. MIP images are provided for review. Automated exposure control, iterative reconstruction, and/or weight based adjustment of the mA/kV was utilized to reduce the radiation dose to as low as reasonably achievable.  COMPARISON: March 15, 2018 HISTORY: ORDERING SYSTEM PROVIDED HISTORY: syncope fell to floor 2 days ago; ? pulmonary embolus; please also mention if trauma injury. Suspect PE as cause of fall or pneumonia TECHNOLOGIST PROVIDED HISTORY: Reason for exam:->syncope fell to floor 2 days ago; ? pulmonary embolus; please also mention if trauma injury. Suspect PE as cause of fall or pneumonia Decision Support Exception - unselect if not a suspected or confirmed emergency medical condition->Emergency Medical Condition (MA) What reading provider will be dictating this exam?->CRC FINDINGS: Aorta: At the level of the left main pulmonary artery the ascending aorta measures 3.8 cm in greatest transverse dimension and 34 cm. No acute abnormality of the aorta. No large filling defects are seen in the main, 1st and 2nd order vessels. Mediastinum: No evidence of mediastinal lymphadenopathy. The heart and pericardium demonstrate no acute abnormality. Coronary calcifications and are stents are seen in place. Lungs/Pleura: In the right lower lobe there is an area of consolidation. Also  a trace of pleural fluid is seen. There are multiple nodules visualized in the lungs. Most appear stable. In the anterior right upper lobe and opacity is seen that measures 5.8 x 11 by 12 mm the uterus. Most nodules measure 4 mm or less. In the right middle lobe laterally a 6 mm nodule is seen. This was seen on previous study. Reticulonodular opacities are seen bilaterally and are most prominent in the lateral right upper lobe. Centrilobular emphysematous changes are seen as well as paraseptal emphysematous changes. Bronchial wall dilatation and ground-glass opacities are seen in the medial aspect of the right upper lobe. Upper Abdomen: Limited images of the upper abdomen are unremarkable. Soft Tissues/Bones: Degenerative changes are seen in the spine at multiple levels. No acute fractures are identified on this examination. 1.  No large filling defects are seen on this examination to suggest saddle embolus more in the larger primary and secondary vascular structures.   2. There is consolidation again seen in the left lower lobe. Bronchial wall thickening mild bronchiectasis and other changes of COPD are seen. An ill-defined opacity is seen in the anterior left upper lobe. Recommend follow-up to resolution to exclude any developing masses. There are multiple nodules visualized and overall are stable. CT CERVICAL SPINE WO CONTRAST    Result Date: 10/19/2022  EXAMINATION: CT OF THE CERVICAL SPINE WITHOUT CONTRAST 10/19/2022 11:11 am TECHNIQUE: CT of the cervical spine was performed without the administration of intravenous contrast. Multiplanar reformatted images are provided for review. Automated exposure control, iterative reconstruction, and/or weight based adjustment of the mA/kV was utilized to reduce the radiation dose to as low as reasonably achievable. COMPARISON: None. HISTORY: ORDERING SYSTEM PROVIDED HISTORY: fall from standing 2 days ago and again today TECHNOLOGIST PROVIDED HISTORY: Reason for exam:->fall from standing 2 days ago and again today Decision Support Exception - unselect if not a suspected or confirmed emergency medical condition->Emergency Medical Condition (MA) What reading provider will be dictating this exam?->CRC FINDINGS: The ring of C1 is intact as is the dense. There is no compression fracture of the cervical spine. No jumped or perched facet is noted. Multilevel degenerative disc and degenerative joint disease is noted. Posterior degenerative endplate spurs are seen at the C3-4, C4-5 and C5-6 levels. There is effacement of the ventral thecal sac at the C3-4 through C5-6 levels due to the posterior degenerative endplate spurs. The prevertebral soft tissues are unremarkable. The airway is widely patent. Images through the lung apices are negative for a pneumothorax. 1. There is no acute compression fracture or subluxation of the cervical spine. 2. Multilevel degenerative disc and degenerative joint disease.      CT THORACIC SPINE WO CONTRAST    Result Date: 10/19/2022  EXAMINATION: CT OF THE THORACIC SPINE WITHOUT CONTRAST; CT OF THE LUMBAR SPINE WITHOUT CONTRAST  10/19/2022 11:11 am: TECHNIQUE: CT of the thoracic spine was performed without the administration of intravenous contrast. Multiplanar reformatted images are provided for review. Automated exposure control, iterative reconstruction, and/or weight based adjustment of the mA/kV was utilized to reduce the radiation dose to as low as reasonably achievable.; CT of the lumbar spine was performed without the administration of intravenous contrast. Multiplanar reformatted images are provided for review. Adjustment of mA and/or kV according to patient size was utilized. Automated exposure control, iterative reconstruction, and/or weight based adjustment of the mA/kV was utilized to reduce the radiation dose to as low as reasonably achievable. COMPARISON: None. HISTORY: ORDERING SYSTEM PROVIDED HISTORY: Fall from standing; Trauma assessment TECHNOLOGIST PROVIDED HISTORY: Reason for exam:->Fall from standing; Trauma assessment What reading provider will be dictating this exam?->CRC FINDINGS: CT thoracic spine: There is no evidence of acute fracture. Vertebral alignment is anatomic. There are no acute compression deformities. There is minimal concavity at the superior endplates of T1 through T3. There are changes of diffuse idiopathic skeletal hyperostosis within the thoracic spine. The paravertebral soft tissue structures are unremarkable. There is no gross evidence of central canal stenosis. There is suggestion of pleural thickening in the posterior right lower lobe with a pleural based opacity that may represent atelectasis. CT lumbar spine: There is no evidence of acute fracture. Vertebral alignment is anatomic. There are no compression deformities. The intervertebral disc space heights appear relatively preserved. There is facet hypertrophy throughout the lumbar spine.   The paravertebral soft tissue structures are unremarkable. There is possible central canal stenosis at L3-4. There is multilevel neural foraminal narrowing. There is arteriosclerosis without abdominal aortic aneurysm. There is marked atrophy of the bilateral kidneys. There is exophytic lesion from the posterior right kidney which is more dense than simple cyst, measuring approximately 48 Hounsfield units. This may represent complicated cyst.  A solid mass cannot be entirely excluded. 1. No acute fracture or subluxation within the thoracic or lumbar spine. 2. Degenerative changes in the spine without definite central canal stenosis in the thoracic spine. There is possible central canal stenosis at L3-4. There is multilevel neural foraminal narrowing within the lumbar spine. 3. Marked atrophy of the bilateral kidneys with indeterminate exophytic lesion from the right kidney that may represent complicated cyst or solid mass. The patient has prior examinations which are not available for comparison. 4. There is a dependent pleural based opacity in the right lower lobe which may represent atelectasis. There are prior examinations which are not available for comparison. CT LUMBAR SPINE WO CONTRAST    Result Date: 10/19/2022  EXAMINATION: CT OF THE THORACIC SPINE WITHOUT CONTRAST; CT OF THE LUMBAR SPINE WITHOUT CONTRAST  10/19/2022 11:11 am: TECHNIQUE: CT of the thoracic spine was performed without the administration of intravenous contrast. Multiplanar reformatted images are provided for review. Automated exposure control, iterative reconstruction, and/or weight based adjustment of the mA/kV was utilized to reduce the radiation dose to as low as reasonably achievable.; CT of the lumbar spine was performed without the administration of intravenous contrast. Multiplanar reformatted images are provided for review. Adjustment of mA and/or kV according to patient size was utilized.   Automated exposure control, iterative reconstruction, and/or weight based adjustment of the mA/kV was utilized to reduce the radiation dose to as low as reasonably achievable. COMPARISON: None. HISTORY: ORDERING SYSTEM PROVIDED HISTORY: Fall from standing; Trauma assessment TECHNOLOGIST PROVIDED HISTORY: Reason for exam:->Fall from standing; Trauma assessment What reading provider will be dictating this exam?->CRC FINDINGS: CT thoracic spine: There is no evidence of acute fracture. Vertebral alignment is anatomic. There are no acute compression deformities. There is minimal concavity at the superior endplates of T1 through T3. There are changes of diffuse idiopathic skeletal hyperostosis within the thoracic spine. The paravertebral soft tissue structures are unremarkable. There is no gross evidence of central canal stenosis. There is suggestion of pleural thickening in the posterior right lower lobe with a pleural based opacity that may represent atelectasis. CT lumbar spine: There is no evidence of acute fracture. Vertebral alignment is anatomic. There are no compression deformities. The intervertebral disc space heights appear relatively preserved. There is facet hypertrophy throughout the lumbar spine. The paravertebral soft tissue structures are unremarkable. There is possible central canal stenosis at L3-4. There is multilevel neural foraminal narrowing. There is arteriosclerosis without abdominal aortic aneurysm. There is marked atrophy of the bilateral kidneys. There is exophytic lesion from the posterior right kidney which is more dense than simple cyst, measuring approximately 48 Hounsfield units. This may represent complicated cyst.  A solid mass cannot be entirely excluded. 1. No acute fracture or subluxation within the thoracic or lumbar spine. 2. Degenerative changes in the spine without definite central canal stenosis in the thoracic spine. There is possible central canal stenosis at L3-4.  There is multilevel neural foraminal narrowing within the lumbar spine. 3. Marked atrophy of the bilateral kidneys with indeterminate exophytic lesion from the right kidney that may represent complicated cyst or solid mass. The patient has prior examinations which are not available for comparison. 4. There is a dependent pleural based opacity in the right lower lobe which may represent atelectasis. There are prior examinations which are not available for comparison. CT ABDOMEN PELVIS W IV CONTRAST Additional Contrast? None    Result Date: 10/19/2022  EXAMINATION: CT OF THE ABDOMEN AND PELVIS WITH CONTRAST 10/19/2022 11:11 am TECHNIQUE: CT of the abdomen and pelvis was performed with the administration of intravenous contrast. Multiplanar reformatted images are provided for review. Automated exposure control, iterative reconstruction, and/or weight based adjustment of the mA/kV was utilized to reduce the radiation dose to as low as reasonably achievable. COMPARISON: None. HISTORY: ORDERING SYSTEM PROVIDED HISTORY: fall from standing 2 days ago and again today; ? spleen rupture, ? kidney hematoma; ? retroparitoneal bleed, TRAUMA on coumadin, Hx of kidney transplant TECHNOLOGIST PROVIDED HISTORY: Reason for exam:->fall from standing 2 days ago and again today; ? spleen rupture, ? kidney hematoma; ? retroparitoneal bleed Reason for exam:->TRAUMA on coumadin Reason for exam:->Hx of kidney transplant Additional Contrast?->None Decision Support Exception - unselect if not a suspected or confirmed emergency medical condition->Emergency Medical Condition (MA) What reading provider will be dictating this exam?->CRC FINDINGS: Lower Chest: Heart size is within normal limits. There is focal bronchiectasis in the right lower lobe. Subpleural curvilinear opacities are partially imaged in the right lower lobe and lateral aspect of the right middle lobe. Please refer to dedicated CT chest report for additional details. Organs:  The gallbladder is distended without obvious abnormality. The liver enhances homogeneously. The portal vein is patent and there is no intrahepatic biliary ductal dilatation. The spleen and adrenal glands are normal in size. There is a 1.4 cm low-density lesion in the inferior margin of the proximal body of the pancreas on axial image 46. Otherwise, the pancreas enhances homogeneously. No pancreatic ductal dilatation. There is severe bilateral renal cortical atrophy. A 1.2 cm intermediate density (potentially enhancing) lesion arises from the posterior aspect of the mid right kidney on axial image 57. There is a severely atrophic renal transplant in the right lower quadrant and a normally enhancing left lower quadrant renal transplant. GI/Bowel: No evidence of a bowel obstruction, free air or pneumatosis. Portions of the colon are decompressed, limiting assessment for mucosal based abnormalities. Stomach and duodenal sweep demonstrate no acute abnormality. The appendix is normal. Pelvis: The urinary bladder is distended without obvious abnormality. The prostate gland is mildly heterogeneous but nonenlarged. Visible but nonenlarged pelvic lymph nodes are present. Peritoneum/Retroperitoneum: The abdominal aorta is heavily calcified but nonaneurysmal.  No retroperitoneal lymphadenopathy or mass. Bones/Soft Tissues: No acute osseous abnormality or evidence of an aggressive osseous lesion. There are moderate-to-severe degenerative changes of the lower lumbar spine mainly in the form of intervertebral disc space narrowing/facet arthropathy at L5-S1. There is no evidence of an acute posttraumatic abnormality in the abdomen or pelvis. Specifically, no evidence of splenic or renal injury. No retroperitoneal hematoma. Indeterminate intermediate density 1.2 cm lesion arising from the mid right kidney. Renal neoplasm not excluded.   Correlation with renal ultrasound or multiphasic contrast-enhanced CT or MRI of the abdomen utilizing a renal mass protocol is recommended for further assessment. Indeterminate 1.4 cm low-density lesion arising from the inferior portion of the pancreatic body centrally. This has imaging features most suspicious for an IPMN. Correlation with contrast-enhanced abdominal MRI is also recommended (unless otherwise contraindicated). Renal transplants in the bilateral lower quadrants without evidence of posttraumatic findings to the allografts. Linear/nodular opacities in the right lower lobe and right middle lobe. Please refer to the dedicated CT chest report for additional details. Assessment//Plan           Hospital Problems             Last Modified POA    * (Principal) Symptomatic anemia 10/19/2022 Yes    Acute upper GI bleed 10/19/2022 Yes   Assessment & Plan    Assessment:  Acute Upper GI bleed  Anemia acute blood loss secondary to GI bleeding  Acidemia suspected metabolic in setting up above   Acute kidney injury on Ckd s/p renal transplant  2008  Pancreatic lesion possibly IPMN  Multiple lung nodules of unclear etiology  COPD/emphysema  Elevated troponin- suspect demand ischemia secondary to GI bleeding    Admit as inpatient  Protonix IV 80 mg daily  Gastroenterology consultation-defer MRCP to gastroenterology  Clear liquid diet, n.p.o. after midnight  5 mg of vitamin K given in the ED  INR in the a.m.  hemoglobin hematocrit every 6 hours  Nephrology consultedToday ct abd/pelvis: Marked atrophy of the bilateral kidneys with indeterminate exophytic lesion from the right kidney that may represent complicated cyst or solid mass. - nephrology consulted. Dec 2020-high attenuation exophytic cystlike area posterior mid to lower pole right kidney   9. Received sodium bicarb in the ED-I will defer to nephrology as his CO2 has been low since the 12th.  10.  We will leave to following hospitalist to consult cardiology for elevated troponin. Patient is asymptomatic.   I suspect this is demand ischemia secondary to anemia  11. Will defer consultation of pulmonology to primary hospitalist.  I did not inform patient of lung nodules on admission  12. SCDs for DVT prophylaxis  13. Plan of care discussed with Dr. Azeem Castellanos, patient and wife  Consultations Ordered:  IP CONSULT TO NEPHROLOGY  IP CONSULT TO HOSPITALIST  IP CONSULT TO GI    10/20: Received 1 unit RBC transfusion overnight, with hemoglobin stable acutely anemic at <7.0 on recheck. Plan to crossmatch 2 additional units, transfuse 1 and recheck this morning. Morning labs pending at time of rounds. We will continue to follow CBC, CMP, magnesium daily, phosphorus every 3 days, one-time check PT/INR. Per CTA 10/19: Negative PE, LLL persistent consolidation, changes consistent with COPD, \"ill-defined opacity is seen in anterior left upper lobe Recommend follow-up to resolution to exclude any developing masses. There are multiple nodules visualized and overall are stable. \"  [address] nephrology and gastroenterology recommendations when available. Update 10/20: 10mg PO Vit K for INR 3.5.  GI planning possible EGD next day. 2nd Trop still elevated, will consult Cardiology. Post-transfusion Hgb pending at this time. 10/21: Planned morning EGD delayed by GI in setting of INR still 2.7 (down from 3.5 previous day). Additional 5 mg IV vitamin K administered per GI request, with plan for repeat PT/INR at 1300 hrs today, with plan for subsequent EGD this afternoon if INR has improved sufficiently to satisfy GI preferences. Hemoglobin this morning 7.2 s/p 2 total units PRBCs administered so far since admission.         Electronically signed by Brianna Pryor DO on 10/21/2022

## 2022-10-22 NOTE — PROGRESS NOTES
Physical Therapy Med Surg Initial Assessment  [facility]Department[facility]  [facility]       NAME: Marii Lunsford  : 1950 (80 y.o.)  MRN: [id number]  CODE STATUS: Full Code    Date of Service: 10/22/2022    Patient Diagnosis(es): Chronic anticoagulation [Z79.01]  Pancreatic mass [K86.89]  Elevated troponin [R77.8]  Right renal mass [N28.89]  Acute upper GI bleed [K92.2]  [facility], closed, with [facility] [V84.5A4G]  History of renal transplant [Z94.0]  Fall at home, initial encounter [W19. XXXA, Y92.009]  Symptomatic anemia [D64.9]  Opacity of lung on imaging study [R91.8]  Acute pain of both knees [M25.561, M25.562]  Type 2 diabetes mellitus with hyperglycemia, unspecified whether long term insulin use ([facility] [E11.65]  Chronic renal failure, stage 3b [facility]) [N18.32]   Chief Complaint   Patient presents with    Fatigue     X 2 weeks      Patient Active Problem List    Diagnosis Date Noted    Chest pain 2022    Symptomatic anemia 10/19/2022    Acute upper GI bleed 10/19/2022    Atrial fibrillation with RVR ([facility] 2022    Unstable angina ([facility] 2022    Atypical chest pain 2019    Chronic cough 2019    Anginal chest pain at rest [facility]) 2019    Pneumonia 2018    Abdominal pain, other specified site 06/15/2011    Intra-abdominal abscess post-procedure 06/15/2011    Infection due to Enterococcus 06/15/2011    Nonspecific abnormal results of thyroid function study 2011    Anemia 2009    Essential hypertension 2008    Mixed hyperlipidemia 2008    Type II or unspecified type diabetes mellitus without mention of complication, uncontrolled 10/03/2008    Fever and other physiologic disturbances of temperature regulation 2008    Kidney replaced by transplant 2007    Acute pyelonephritis without lesion of renal medullary necrosis 2007    Chronic kidney disease (CKD) 10/31/2003    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 10/21/2022    EGD DIAGNOSTIC ONLY performed by Porsha Kwok MD at [facility]       Patient assessed for rehabilitation services?: Yes  Family / Caregiver Present: No    Restrictions:  Restrictions/Precautions: Fall Risk (high zamora score)     SUBJECTIVE:   Subjective: \"I hit my whole L side when I fell 2xs at home\"    Pain  Pain: 5/10 buttocks- pt declines need for pain meds    Prior Level of Function:  Social/Functional History  Lives With: Spouse (wife can assist if needed)  Type of Home: House  Home Layout: Laundry in basement, Two level, Able to Live on Main level with bedroom/bathroom  Home Access: Stairs to enter with rails  Entrance Stairs - Number of Steps: 4  Entrance Stairs - Rails: Left  Bathroom Shower/Tub: Tub/Shower unit  Bathroom Equipment: Shower chair, Grab bars in shower  Home Equipment: Walker, rolling, [employer], [employer]  Has the patient had two or more falls in the past year or any fall with injury in the past year?: Yes  ADL Assistance: Independent  Homemaking Responsibilities: No (wife completes)  Ambulation Assistance: Independent (quad cane)  Transfer Assistance: Independent  Active :  Yes  Additional Comments: able to amb community and household distances- [employer]- per pt report    OBJECTIVE:   Vision  Vision: Impaired  Vision Exceptions: Wears glasses at all times  Hearing: Within functional limits    Cognition:  Overall Orientation Status: Within Functional Limits  Follows Commands: Within Functional Limits    Observation/Palpation  Posture: Fair  Observation: mild SOB after amb with quick recovery; mild dizziness with intially sitting edge of bed    ROM:  RLE AROM: WFL  LLE AROM : WFL    Strength:  Strength RLE  Comment: grossly 4/5  Strength LLE  Comment: grossly 4/5    Neuro:  Balance  Sitting - Static: Good  Sitting - Dynamic: Good  Standing - Static: Fair;-  Standing - Dynamic: Fair;-    Sensation: Impaired (B feet)    Bed mobility  Supine to Sit: Supervision  Sit to Supine: Supervision    Transfers  Sit to Stand: Stand by assistance  Stand to Sit: Stand by assistance  Comment: [address] used    Ambulation  Surface: Level tile  Device: Rolling Walker  Assistance: Stand by assistance  Quality of Gait: scissoring R LE  Gait Deviations: Slow Sara  Distance: 70ft- fatigue with this distance including mild SOB    Stairs/Curb  Stairs?: No    Activity Tolerance  Activity Tolerance: Patient limited by fatigue;Patient limited by endurance    Patient Education  Education Given To: Patient  Education Provided: Role of Therapy;Plan of Care  Education Method: Verbal  Education Outcome: Continued education needed       ASSESSMENT:   Body Structures, Functions, Activity Limitations Requiring Skilled Therapeutic Intervention: Decreased functional mobility ; Decreased strength;Decreased endurance;Decreased balance; Increased pain  Decision Making: Medium Complexity  History: high  Exam: med  Clinical Presentation: med    Therapy Prognosis: Good    DISCHARGE RECOMMENDATIONS:  Discharge Recommendations: Continue to assess pending progress, Patient would benefit from continued therapy after discharge    Assessment: Pt is 80 y.o. male to hosptial due to progressive weakness and 2 falls at home.   Pt c/o buttock pain and exhibits decreased strength, balance, and activity tolerance with carryover to requiring increased assistance for mobility at this time. Continued PT is required for safe d/c home with wife. Requires PT Follow-Up: Yes      PLAN OF CARE:  Physcial Therapy Plan  General Plan: 1 time a day 3-6 times a week  Current Treatment Recommendations: Strengthening, Balance training, ROM, Functional mobility training, Transfer training, Endurance training, Gait training, Stair training, Neuromuscular re-education, Pain management, Home exercise program, Safety education & training, Patient/Caregiver education & training, Equipment evaluation, education, & procurement, Positioning, Therapeutic activities    Safety Devices  Type of Devices: Call light within reach, Bed alarm in place, Left in bed    Goals:  Short Term Goals  Short Term Goal 1: Pt will demonstrate HEP indep. Long Term Goals  Long Term Goal 1: Pt will demonstrate bed mobility indep  Long Term Goal 2: Pt will demonstrate transfers mod indep with safest AD  Long Term Goal 3: Pt will demonstrate amb >/= 150ft mod indep with safest AD  Long Term Goal 4: Pt will demonstrate stair negotiation 4 steps with 1 rail mod indep    AMPAC (6 CLICK) BASIC MOBILITY  AM-PAC Inpatient Mobility Raw Score : 18     Therapy Time:   Individual   Time In 1338   Time Out 1358   Minutes 20       Eval x 20 min    Chayo Ambrose PT, 10/22/22 at 2:26 PM         Definitions for assistance levels  Independent = pt does not require any physical supervision or assistance from another person for activity completion. Device may be needed.   Stand by assistance = pt requires verbal cues or instructions from another person, close to but not touching, to perform the activity  Minimal assistance= pt performs 75% or more of the activity; assistance is required to complete the activity  Moderate assistance= pt performs 50% of the activity; assistance is required to complete the activity  Maximal assistance = pt performs 25% of the activity; assistance is required to complete the activity  Dependent = pt requires total physical assistance to accomplish the task

## 2022-10-22 NOTE — FLOWSHEET NOTE
Assessment completed. VS obtained. Alert et oriented x4. No c/o chest pain,sob or nausea. Up to bedside commode with stand by assist.Family at bedside. Electronically signed by Gianna Eagle RN on 10/22/2022 at 1:04 PM

## 2022-10-23 LAB
ABO/RH: NORMAL
ALBUMIN SERPL-MCNC: 3.2 G/DL (ref 3.5–4.6)
ALP BLD-CCNC: 49 U/L (ref 35–104)
ALT SERPL-CCNC: 6 U/L (ref 0–41)
ANION GAP SERPL CALCULATED.3IONS-SCNC: 11 MEQ/L (ref 9–15)
ANION GAP SERPL CALCULATED.3IONS-SCNC: 14 MEQ/L (ref 9–15)
ANTIBODY SCREEN: NORMAL
AST SERPL-CCNC: 8 U/L (ref 0–40)
BASOPHILS ABSOLUTE: 0 K/UL (ref 0–0.2)
BASOPHILS RELATIVE PERCENT: 0.5 %
BILIRUB SERPL-MCNC: 0.7 MG/DL (ref 0.2–0.7)
BLOOD BANK DISPENSE STATUS: NORMAL
BLOOD BANK PRODUCT CODE: NORMAL
BPU ID: NORMAL
BUN BLDV-MCNC: 77 MG/DL (ref 8–23)
BUN BLDV-MCNC: 80 MG/DL (ref 8–23)
CALCIUM SERPL-MCNC: 8.2 MG/DL (ref 8.5–9.9)
CALCIUM SERPL-MCNC: 8.4 MG/DL (ref 8.5–9.9)
CHLORIDE BLD-SCNC: 111 MEQ/L (ref 95–107)
CHLORIDE BLD-SCNC: 112 MEQ/L (ref 95–107)
CO2: 15 MEQ/L (ref 20–31)
CO2: 15 MEQ/L (ref 20–31)
CREAT SERPL-MCNC: 2.72 MG/DL (ref 0.7–1.2)
CREAT SERPL-MCNC: 2.74 MG/DL (ref 0.7–1.2)
DESCRIPTION BLOOD BANK: NORMAL
EOSINOPHILS ABSOLUTE: 0.3 K/UL (ref 0–0.7)
EOSINOPHILS RELATIVE PERCENT: 4.2 %
GFR SERPL CREATININE-BSD FRML MDRD: 24 ML/MIN/{1.73_M2}
GFR SERPL CREATININE-BSD FRML MDRD: 24.2 ML/MIN/{1.73_M2}
GLOBULIN: 2.1 G/DL (ref 2.3–3.5)
GLUCOSE BLD-MCNC: 158 MG/DL (ref 70–99)
GLUCOSE BLD-MCNC: 205 MG/DL (ref 70–99)
GLUCOSE BLD-MCNC: 210 MG/DL (ref 70–99)
GLUCOSE BLD-MCNC: 268 MG/DL (ref 70–99)
GLUCOSE BLD-MCNC: 272 MG/DL (ref 70–99)
GLUCOSE BLD-MCNC: 283 MG/DL (ref 70–99)
HCT VFR BLD CALC: 20.3 % (ref 42–52)
HCT VFR BLD CALC: 23.4 % (ref 42–52)
HEMOGLOBIN: 6.7 G/DL (ref 14–18)
HEMOGLOBIN: 7.7 G/DL (ref 14–18)
INR BLD: 1.3
LYMPHOCYTES ABSOLUTE: 0.6 K/UL (ref 1–4.8)
LYMPHOCYTES RELATIVE PERCENT: 7.4 %
MAGNESIUM: 1.7 MG/DL (ref 1.7–2.4)
MCH RBC QN AUTO: 29.3 PG (ref 27–31.3)
MCHC RBC AUTO-ENTMCNC: 33.2 % (ref 33–37)
MCV RBC AUTO: 88.1 FL (ref 79–92.2)
MONOCYTES ABSOLUTE: 0.7 K/UL (ref 0.2–0.8)
MONOCYTES RELATIVE PERCENT: 8.7 %
NEUTROPHILS ABSOLUTE: 6.1 K/UL (ref 1.4–6.5)
NEUTROPHILS RELATIVE PERCENT: 79.2 %
PDW BLD-RTO: 16.3 % (ref 11.5–14.5)
PERFORMED ON: ABNORMAL
PLATELET # BLD: 185 K/UL (ref 130–400)
POTASSIUM SERPL-SCNC: 4.3 MEQ/L (ref 3.4–4.9)
POTASSIUM SERPL-SCNC: 4.8 MEQ/L (ref 3.4–4.9)
PROTHROMBIN TIME: 16.5 SEC (ref 12.3–14.9)
RBC # BLD: 2.3 M/UL (ref 4.7–6.1)
SODIUM BLD-SCNC: 138 MEQ/L (ref 135–144)
SODIUM BLD-SCNC: 140 MEQ/L (ref 135–144)
TOTAL PROTEIN: 5.3 G/DL (ref 6.3–8)
WBC # BLD: 7.8 K/UL (ref 4.8–10.8)

## 2022-10-23 PROCEDURE — A4216 STERILE WATER/SALINE, 10 ML: HCPCS | Performed by: STUDENT IN AN ORGANIZED HEALTH CARE EDUCATION/TRAINING PROGRAM

## 2022-10-23 PROCEDURE — C9113 INJ PANTOPRAZOLE SODIUM, VIA: HCPCS | Performed by: STUDENT IN AN ORGANIZED HEALTH CARE EDUCATION/TRAINING PROGRAM

## 2022-10-23 PROCEDURE — 6370000000 HC RX 637 (ALT 250 FOR IP): Performed by: CLINICAL NURSE SPECIALIST

## 2022-10-23 PROCEDURE — 6360000002 HC RX W HCPCS: Performed by: INTERNAL MEDICINE

## 2022-10-23 PROCEDURE — 2580000003 HC RX 258: Performed by: CLINICAL NURSE SPECIALIST

## 2022-10-23 PROCEDURE — 86850 RBC ANTIBODY SCREEN: CPT

## 2022-10-23 PROCEDURE — 86923 COMPATIBILITY TEST ELECTRIC: CPT

## 2022-10-23 PROCEDURE — 85018 HEMOGLOBIN: CPT

## 2022-10-23 PROCEDURE — 80053 COMPREHEN METABOLIC PANEL: CPT

## 2022-10-23 PROCEDURE — P9016 RBC LEUKOCYTES REDUCED: HCPCS

## 2022-10-23 PROCEDURE — 85610 PROTHROMBIN TIME: CPT

## 2022-10-23 PROCEDURE — 99232 SBSQ HOSP IP/OBS MODERATE 35: CPT | Performed by: NURSE PRACTITIONER

## 2022-10-23 PROCEDURE — 36430 TRANSFUSION BLD/BLD COMPNT: CPT

## 2022-10-23 PROCEDURE — 6360000002 HC RX W HCPCS: Performed by: STUDENT IN AN ORGANIZED HEALTH CARE EDUCATION/TRAINING PROGRAM

## 2022-10-23 PROCEDURE — 2580000003 HC RX 258: Performed by: STUDENT IN AN ORGANIZED HEALTH CARE EDUCATION/TRAINING PROGRAM

## 2022-10-23 PROCEDURE — 6370000000 HC RX 637 (ALT 250 FOR IP): Performed by: INTERNAL MEDICINE

## 2022-10-23 PROCEDURE — 36415 COLL VENOUS BLD VENIPUNCTURE: CPT

## 2022-10-23 PROCEDURE — 83735 ASSAY OF MAGNESIUM: CPT

## 2022-10-23 PROCEDURE — 85014 HEMATOCRIT: CPT

## 2022-10-23 PROCEDURE — 6370000000 HC RX 637 (ALT 250 FOR IP): Performed by: NURSE PRACTITIONER

## 2022-10-23 PROCEDURE — 1210000000 HC MED SURG R&B

## 2022-10-23 PROCEDURE — 86901 BLOOD TYPING SEROLOGIC RH(D): CPT

## 2022-10-23 PROCEDURE — 85025 COMPLETE CBC W/AUTO DIFF WBC: CPT

## 2022-10-23 PROCEDURE — 86900 BLOOD TYPING SEROLOGIC ABO: CPT

## 2022-10-23 RX ORDER — PEG-3350, SODIUM SULFATE, SODIUM CHLORIDE, POTASSIUM CHLORIDE, SODIUM ASCORBATE AND ASCORBIC ACID 7.5-2.691G
100 KIT ORAL ONCE
Status: COMPLETED | OUTPATIENT
Start: 2022-10-23 | End: 2022-10-23

## 2022-10-23 RX ORDER — MAGNESIUM CARB/ALUMINUM HYDROX 105-160MG
296 TABLET,CHEWABLE ORAL ONCE
Status: DISCONTINUED | OUTPATIENT
Start: 2022-10-23 | End: 2022-10-23 | Stop reason: ALTCHOICE

## 2022-10-23 RX ORDER — SODIUM CHLORIDE 9 MG/ML
INJECTION, SOLUTION INTRAVENOUS PRN
Status: DISCONTINUED | OUTPATIENT
Start: 2022-10-23 | End: 2022-10-28 | Stop reason: HOSPADM

## 2022-10-23 RX ORDER — LACTULOSE 10 G/15ML
40 SOLUTION ORAL ONCE
Status: COMPLETED | OUTPATIENT
Start: 2022-10-23 | End: 2022-10-23

## 2022-10-23 RX ADMIN — GABAPENTIN 300 MG: 300 CAPSULE ORAL at 21:00

## 2022-10-23 RX ADMIN — INSULIN LISPRO 8 UNITS: 100 INJECTION, SOLUTION INTRAVENOUS; SUBCUTANEOUS at 10:08

## 2022-10-23 RX ADMIN — CYCLOSPORINE 75 MG: 25 CAPSULE, LIQUID FILLED ORAL at 09:42

## 2022-10-23 RX ADMIN — MYCOPHENOLATE MOFETIL 750 MG: 250 CAPSULE ORAL at 09:41

## 2022-10-23 RX ADMIN — GABAPENTIN 300 MG: 300 CAPSULE ORAL at 09:41

## 2022-10-23 RX ADMIN — SODIUM CHLORIDE 80 MG: 9 INJECTION, SOLUTION INTRAMUSCULAR; INTRAVENOUS; SUBCUTANEOUS at 09:42

## 2022-10-23 RX ADMIN — LACTULOSE 40 G: 20 SOLUTION ORAL at 15:49

## 2022-10-23 RX ADMIN — INSULIN HUMAN 9 UNITS: 100 INJECTION, SUSPENSION SUBCUTANEOUS at 10:07

## 2022-10-23 RX ADMIN — CARVEDILOL 6.25 MG: 6.25 TABLET, FILM COATED ORAL at 21:01

## 2022-10-23 RX ADMIN — POLYETHYLENE GLYCOL 3350, SODIUM SULFATE, SODIUM CHLORIDE, POTASSIUM CHLORIDE, SODIUM ASCORBATE, AND ASCORBIC ACID 100 G: KIT at 18:39

## 2022-10-23 RX ADMIN — CARVEDILOL 6.25 MG: 6.25 TABLET, FILM COATED ORAL at 09:41

## 2022-10-23 RX ADMIN — MYCOPHENOLATE MOFETIL 750 MG: 250 CAPSULE ORAL at 21:00

## 2022-10-23 RX ADMIN — Medication 5 ML: at 20:35

## 2022-10-23 RX ADMIN — ATORVASTATIN CALCIUM 10 MG: 10 TABLET, FILM COATED ORAL at 21:01

## 2022-10-23 RX ADMIN — TAMSULOSIN HYDROCHLORIDE 0.4 MG: 0.4 CAPSULE ORAL at 09:41

## 2022-10-23 RX ADMIN — ACETAMINOPHEN 650 MG: 325 TABLET ORAL at 21:01

## 2022-10-23 RX ADMIN — INSULIN LISPRO 8 UNITS: 100 INJECTION, SOLUTION INTRAVENOUS; SUBCUTANEOUS at 12:34

## 2022-10-23 RX ADMIN — CYCLOSPORINE 75 MG: 25 CAPSULE, LIQUID FILLED ORAL at 21:01

## 2022-10-23 RX ADMIN — PREDNISONE 5 MG: 5 TABLET ORAL at 09:41

## 2022-10-23 RX ADMIN — INSULIN LISPRO 4 UNITS: 100 INJECTION, SOLUTION INTRAVENOUS; SUBCUTANEOUS at 21:14

## 2022-10-23 ASSESSMENT — PAIN SCALES - GENERAL
PAINLEVEL_OUTOF10: 0
PAINLEVEL_OUTOF10: 5

## 2022-10-23 ASSESSMENT — PAIN DESCRIPTION - DESCRIPTORS: DESCRIPTORS: ACHING

## 2022-10-23 ASSESSMENT — PAIN DESCRIPTION - LOCATION: LOCATION: HEAD

## 2022-10-23 NOTE — PROGRESS NOTES
Gastroenterology Progress Note    Marcela Brumfield is a 80 y.o. male patient. Hospitalization Day:4    Chief C/O: melena    SUBJECTIVE: Seen and examined, no acute events overnight, hemoglobin 6.7, no abdominal pain, no further melena. Coumadin has been on hold, INR 1.3    ROS:  Gastrointestinal ROS: no abdominal pain, change in bowel habits, or black or bloody stools    Physical    VITALS:  BP (!) 150/58   Pulse 71   Temp 98.7 °F (37.1 °C)   Resp 18   Ht 6' 2\" (1.88 m)   Wt 190 lb (86.2 kg)   SpO2 100%   BMI 24.39 kg/m²   TEMPERATURE:  Current - Temp: 98.7 °F (37.1 °C); Max - Temp  Av.4 °F (36.9 °C)  Min: 98.1 °F (36.7 °C)  Max: 98.7 °F (37.1 °C)    General:  Alert and oriented,  No apparent distress  Skin- without jaundice  Eyes: anicteric sclera  Cardiac: RRR, Nl s1s2, without murmurs  Lungs CTA Bilaterally, normal effort  Abdomen soft, ND, NT, no HSM, Bowel sounds normal  Ext: without edema  Neuro: no asterixis     Data    Data Review:    Recent Labs     10/21/22  0509 10/21/22  1100 10/22/22  0510 10/22/22  1034 10/22/22  1720 10/23/22  0510   WBC 7.3  --  7.5  --   --  7.8   HGB 7.2*   < > 7.2*  7.3* 7.8* 7.5* 6.7*   HCT 21.4*   < > 22.3*  22.0* 23.4* 22.4* 20.3*   MCV 88.5  --  88.8  --   --  88.1     --  191  --   --  185    < > = values in this interval not displayed. Recent Labs     10/20/22  1422 10/21/22  0509 10/22/22  0510 10/23/22  0510    139 141 140   K 4.0 4.5 4.4 4.3   * 112* 113* 111*   CO2 13* 12* 13* 15*   PHOS 2.8  --   --   --    BUN 83* 87* 84* 80*   CREATININE 2.81* 2.83* 2.59* 2.72*     Recent Labs     10/21/22  0509 10/22/22  0510 10/23/22  0510   AST 10 8 8   ALT 6 6 6   BILITOT 0.9* 0.8* 0.7   ALKPHOS 50 53 49     No results for input(s): LIPASE, AMYLASE in the last 72 hours.   Recent Labs     10/21/22  1222 10/22/22  0510 10/23/22  0510   PROTIME 23.5* 18.3* 16.5*   INR 2.1 1.5 1.3         ASSESSMENT:  43-year-old male admitted with anemia and melena in the context of supratherapeutic INR while on Coumadin, on arrival INR was 3.1 patient had been experiencing intermittent melena over the course of the month hemoglobin was 6.3. Had normal EGD. CT the abdomen was notable for 1.4 cm pancreatic body lesion possibly IPMN will likely need an outpatient EUS with Dr. Harshad Chow for further evaluation. Has been tolerating regular diet INR now 1.5.   10/23/22 no further melena, hemoglobin 6.7,  INR 1.3, Coumadin is on hold, colonoscopy tomorrow. Planned for 1 unit PRBC today    PLAN :  -Continue course of care  -Continue serial H&H, trend and transfuse accordingly  -Clear liquid diet with plan for n.p.o. after midnight for colonoscopy on Monday    Thank you for allowing me to participate in the care of your patient. Please feel free to contact me with any concerns.     HAJA Greenberg - CNP

## 2022-10-23 NOTE — PROGRESS NOTES
Progress Note  Date:10/23/2022       [facility]:[id number]  Patient Will Iverson     YOB: 1950     Age:71 y.o. Subjective      No acute events overnight. Morning hemoglobin low again at 6.7, transfusion started 1 unit RBC. Hyper glycemic to >500 yesterday in setting of outside food brought in by family and possible \"ice cream\" from cafeteria (per patient). Endocrinology consult placed 10/22 after episode of severe hypoglycemia. Blood glucose improved but not yet at goal this morning. Objective         Vitals Last 24 Hours:  TEMPERATURE:  Temp  Av °F (37.2 °C)  Min: 98.7 °F (37.1 °C)  Max: 99.2 °F (37.3 °C)  RESPIRATIONS RANGE: Resp  Av  Min: 16  Max: 18  PULSE OXIMETRY RANGE: SpO2  Av.5 %  Min: 99 %  Max: 100 %  PULSE RANGE: Pulse  Av.5  Min: 66  Max: 71  BLOOD PRESSURE RANGE: Systolic (70COO), CAX:198 , Min:117 , AJW:970   ; Diastolic (84FON), RJW:21, Min:41, Max:58    I/O (24Hr): Intake/Output Summary (Last 24 hours) at 10/23/2022 1125  Last data filed at 10/22/2022 2100  Gross per 24 hour   Intake 720 ml   Output 400 ml   Net 320 ml       Objective:  Vital signs: (most recent): Blood pressure (!) 157/58, pulse 66, temperature 99.2 °F (37.3 °C), resp. rate 16, height 6' 2\" (1.88 m), weight 190 lb (86.2 kg), SpO2 99 %. Constitutional: Elderly adult male reclining in bed no acute distress. Wife again present at bedside. Head: NCAT  Eyes: PERRLA, EOMI  ENT: Hearing grossly intact. Neck: Trachea midline, phonation normal.  Cardiovascular: RRR. Warm and well perfused. Scattered ecchymosis in various stages of healing, predominantly on dorsal surfaces of bilateral arms. Pulmonary: Normal rate and effort of respiration on room air. Grossly CTAB. Abdomen: Soft, nontense, nondistended. Neurologic: Alert, grossly oriented. Non-focal.  Mentating appropriately. Psychiatric: Cooperative. Persistent mildly irritated affect.   MSK/integumentary: No gross bony abnormalities. Dorsal forearm ecchymoses in various stages of healing as noted above. Mild generalized pallor. Labs/Imaging/Diagnostics    Labs:  CBC:  Recent Labs     10/21/22  0509 10/21/22  1100 10/22/22  0510 10/22/22  1034 10/22/22  1720 10/23/22  0510   WBC 7.3  --  7.5  --   --  7.8   RBC 2.42*  --  2.48*  --   --  2.30*   HGB 7.2*   < > 7.2*  7.3* 7.8* 7.5* 6.7*   HCT 21.4*   < > 22.3*  22.0* 23.4* 22.4* 20.3*   MCV 88.5  --  88.8  --   --  88.1   RDW 16.0*  --  16.5*  --   --  16.3*     --  191  --   --  185    < > = values in this interval not displayed. CHEMISTRIES:  Recent Labs     10/20/22  1422 10/21/22  0509 10/22/22  0510 10/23/22  0510    139 141 140   K 4.0 4.5 4.4 4.3   * 112* 113* 111*   CO2 13* 12* 13* 15*   BUN 83* 87* 84* 80*   CREATININE 2.81* 2.83* 2.59* 2.72*   GLUCOSE 145* 169* 203* 268*   PHOS 2.8  --   --   --    MG 1.7 1.9 1.9 1.7       PT/INR:  Recent Labs     10/21/22  1222 10/22/22  0510 10/23/22  0510   PROTIME 23.5* 18.3* 16.5*   INR 2.1 1.5 1.3       APTT:  No results for input(s): APTT in the last 72 hours. LIVER PROFILE:  Recent Labs     10/21/22  0509 10/22/22  0510 10/23/22  0510   AST 10 8 8   ALT 6 6 6   BILITOT 0.9* 0.8* 0.7   ALKPHOS 50 53 49         Imaging Last 24 Hours:  XR KNEE LEFT (3 VIEWS)    Result Date: 10/20/2022  EXAMINATION: THREE XRAY VIEWS OF THE LEFT KNEE 10/19/2022 11:30 am COMPARISON: None. HISTORY: ORDERING SYSTEM PROVIDED HISTORY: fell onto knee 2 days ago; pain at Knee cap, SUNRISE view TECHNOLOGIST PROVIDED HISTORY: Reason for exam:->fell onto knee 2 days ago; pain at Knee cap Reason for exam:->SUNRISE view What reading provider will be dictating this exam?->CRC FINDINGS: AP, lateral, and sunrise views of the left knee were obtained. There is no acute fracture or dislocation. There is mild joint space narrowing in the medial compartment with associated osteophyte formation.  There is suggestion of chondrocalcinosis. There is possible trace suprapatellar knee joint effusion. The visualized soft tissue structures are unremarkable. There is atherosclerotic disease. 1. 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 2. Probable chondrocalcinosis. 3. Possible trace suprapatellar knee joint effusion. XR KNEE RIGHT (3 VIEWS)    Result Date: 10/19/2022  EXAMINATION: THREE XRAY VIEWS OF THE RIGHT KNEE 10/19/2022 11:30 am COMPARISON: 11/22/2019 HISTORY: ORDERING SYSTEM PROVIDED HISTORY: fall knee pain, [place] view TECHNOLOGIST PROVIDED HISTORY: Reason for exam:->fall knee pain Reason for exam:->Sunrise view What reading provider will be dictating this exam?->CRC FINDINGS: Mild to moderate narrowing of the medial joint space, mild narrowing of the lateral and patellofemoral joint spaces is seen. Unremarkable alignment with no evidence of dislocation. Mild degenerative changes visualized. No evidence of cortical irregularity or lucency to suggest a fracture, no evidence of lytic or sclerotic bone lesion is seen. No evidence of suprapatellar effusion. The quadriceps and patellar tendons are unremarkable. Extensive vascular calcifications are seen. Degenerative changes. No evidence of acute osseous abnormality is seen. CT HEAD WO CONTRAST    Result Date: 10/19/2022  EXAMINATION: CT OF THE HEAD WITHOUT CONTRAST  10/19/2022 11:11 am TECHNIQUE: CT of the head was performed without the administration of intravenous contrast. Automated exposure control, iterative reconstruction, and/or weight based adjustment of the mA/kV was utilized to reduce the radiation dose to as low as reasonably achievable. COMPARISON: None. HISTORY: ORDERING SYSTEM PROVIDED HISTORY: passed out hit head; on coumadin. .. ? bleed, ? 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 ->No Reason for exam:->passed out hit head; on coumadin. .. ?  bleed, ? 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 No abnormal extra-axial fluid collection. The gray-white differentiation is maintained without evidence of an acute infarct. There is no evidence of hydrocephalus. The ventricles, cisterns and sulci are prominent consistent with atrophy. There is decreased attenuation within the periventricular white matter consistent with periventricular leukomalacia. There is encephalomalacia is seen within the anterior aspect of the left temporal fossa. There is an old left craniotomy defect. ORBITS: The visualized portion of the orbits demonstrate no acute abnormality. SINUSES: The visualized paranasal sinuses and mastoid air cells demonstrate no acute abnormality. There is mucosal thickening seen within the right sphenoid sinus. SOFT TISSUES/SKULL:  No acute abnormality of the visualized skull or soft tissues. 1.  There is no acute intracranial abnormality. Specifically, there is no intracranial hemorrhage. 2. Atrophy and periventricular leukomalacia, 3. Mucosal thickening within the right sphenoid sinus. 4. Previous left craniotomy defect with encephalomalacia  seen within the anterior aspect of the left temporal lobe. .     CTA CHEST W WO CONTRAST    Result Date: 10/19/2022  EXAMINATION: CTA OF THE CHEST WITH AND WITHOUT CONTRAST 10/19/2022 11:11 am TECHNIQUE: CTA of the chest was performed before and after the administration of intravenous contrast.  Multiplanar reformatted images are provided for review. MIP images are provided for review.  Automated exposure control, iterative reconstruction, and/or weight based adjustment of the mA/kV was utilized to reduce the radiation dose to as low as reasonably achievable. COMPARISON: March 15, 2018 HISTORY: ORDERING SYSTEM PROVIDED HISTORY: syncope fell to floor 2 days ago; ? pulmonary embolus; please also mention if trauma injury. Suspect PE as cause of fall or pneumonia TECHNOLOGIST PROVIDED HISTORY: Reason for exam:->syncope fell to floor 2 days ago; ? pulmonary embolus; please also mention if trauma injury. Suspect PE as cause of fall or pneumonia Decision Support Exception - unselect if not a suspected or confirmed emergency medical condition->Emergency Medical Condition (MA) What reading provider will be dictating this exam?->CRC FINDINGS: Aorta: At the level of the left main pulmonary artery the ascending aorta measures 3.8 cm in greatest transverse dimension and 34 cm. No acute abnormality of the aorta. No large filling defects are seen in the main, 1st and 2nd order vessels. Mediastinum: No evidence of mediastinal lymphadenopathy. The heart and pericardium demonstrate no acute abnormality. Coronary calcifications and are stents are seen in place. Lungs/Pleura: In the right lower lobe there is an area of consolidation. Also  a trace of pleural fluid is seen. There are multiple nodules visualized in the lungs. Most appear stable. In the anterior right upper lobe and opacity is seen that measures 5.8 x 11 by 12 mm the uterus. Most nodules measure 4 mm or less. In the right middle lobe laterally a 6 mm nodule is seen. This was seen on previous study. Reticulonodular opacities are seen bilaterally and are most prominent in the lateral right upper lobe. Centrilobular emphysematous changes are seen as well as paraseptal emphysematous changes. Bronchial wall dilatation and ground-glass opacities are seen in the medial aspect of the right upper lobe. Upper Abdomen: Limited images of the upper abdomen are unremarkable. Soft Tissues/Bones: Degenerative changes are seen in the spine at multiple levels.   No acute fractures are identified on this examination. 1.  No large filling defects are seen on this examination to suggest saddle embolus more in the larger primary and secondary vascular structures. 2. There is consolidation again seen in the left lower lobe. Bronchial wall thickening mild bronchiectasis and other changes of COPD are seen. An ill-defined opacity is seen in the anterior left upper lobe. Recommend follow-up to resolution to exclude any developing masses. There are multiple nodules visualized and overall are stable. CT CERVICAL SPINE WO CONTRAST    Result Date: 10/19/2022  EXAMINATION: CT OF THE CERVICAL SPINE WITHOUT CONTRAST 10/19/2022 11:11 am TECHNIQUE: CT of the cervical spine was performed without the administration of intravenous contrast. Multiplanar reformatted images are provided for review. Automated exposure control, iterative reconstruction, and/or weight based adjustment of the mA/kV was utilized to reduce the radiation dose to as low as reasonably achievable. COMPARISON: None. HISTORY: ORDERING SYSTEM PROVIDED HISTORY: fall from standing 2 days ago and again today TECHNOLOGIST PROVIDED HISTORY: Reason for exam:->fall from standing 2 days ago and again today Decision Support Exception - unselect if not a suspected or confirmed emergency medical condition->Emergency Medical Condition (MA) What reading provider will be dictating this exam?->CRC FINDINGS: The ring of C1 is intact as is the dense. There is no compression fracture of the cervical spine. No jumped or perched facet is noted. Multilevel degenerative disc and degenerative joint disease is noted. Posterior degenerative endplate spurs are seen at the C3-4, C4-5 and C5-6 levels. There is effacement of the ventral thecal sac at the C3-4 through C5-6 levels due to the posterior degenerative endplate spurs. The prevertebral soft tissues are unremarkable. The airway is widely patent.  Images through the lung apices are negative for a pneumothorax. 1. There is no acute compression fracture or subluxation of the cervical spine. 2. Multilevel degenerative disc and degenerative joint disease. CT THORACIC SPINE WO CONTRAST    Result Date: 10/19/2022  EXAMINATION: CT OF THE THORACIC SPINE WITHOUT CONTRAST; CT OF THE LUMBAR SPINE WITHOUT CONTRAST  10/19/2022 11:11 am: TECHNIQUE: CT of the thoracic spine was performed without the administration of intravenous contrast. Multiplanar reformatted images are provided for review. Automated exposure control, iterative reconstruction, and/or weight based adjustment of the mA/kV was utilized to reduce the radiation dose to as low as reasonably achievable.; CT of the lumbar spine was performed without the administration of intravenous contrast. Multiplanar reformatted images are provided for review. Adjustment of mA and/or kV according to patient size was utilized. Automated exposure control, iterative reconstruction, and/or weight based adjustment of the mA/kV was utilized to reduce the radiation dose to as low as reasonably achievable. COMPARISON: None. HISTORY: ORDERING SYSTEM PROVIDED HISTORY: Fall from standing; Trauma assessment TECHNOLOGIST PROVIDED HISTORY: Reason for exam:->Fall from standing; Trauma assessment What reading provider will be dictating this exam?->CRC FINDINGS: CT thoracic spine: There is no evidence of acute fracture. Vertebral alignment is anatomic. There are no acute compression deformities. There is minimal concavity at the superior endplates of T1 through T3. There are changes of diffuse idiopathic skeletal hyperostosis within the thoracic spine. The paravertebral soft tissue structures are unremarkable. There is no gross evidence of central canal stenosis. There is suggestion of pleural thickening in the posterior right lower lobe with a pleural based opacity that may represent atelectasis. CT lumbar spine: There is no evidence of acute fracture.   Vertebral alignment is anatomic. There are no compression deformities. The intervertebral disc space heights appear relatively preserved. There is facet hypertrophy throughout the lumbar spine. The paravertebral soft tissue structures are unremarkable. There is possible central canal stenosis at L3-4. There is multilevel neural foraminal narrowing. There is arteriosclerosis without abdominal aortic aneurysm. There is marked atrophy of the bilateral kidneys. There is exophytic lesion from the posterior right kidney which is more dense than simple cyst, measuring approximately 48 Hounsfield units. This may represent complicated cyst.  A solid mass cannot be entirely excluded. 1. No acute fracture or subluxation within the thoracic or lumbar spine. 2. Degenerative changes in the spine without definite central canal stenosis in the thoracic spine. There is possible central canal stenosis at L3-4. There is multilevel neural foraminal narrowing within the lumbar spine. 3. Marked atrophy of the bilateral kidneys with indeterminate exophytic lesion from the right kidney that may represent complicated cyst or solid mass. The patient has prior examinations which are not available for comparison. 4. There is a dependent pleural based opacity in the right lower lobe which may represent atelectasis. There are prior examinations which are not available for comparison. CT LUMBAR SPINE WO CONTRAST    Result Date: 10/19/2022  EXAMINATION: CT OF THE THORACIC SPINE WITHOUT CONTRAST; CT OF THE LUMBAR SPINE WITHOUT CONTRAST  10/19/2022 11:11 am: TECHNIQUE: CT of the thoracic spine was performed without the administration of intravenous contrast. Multiplanar reformatted images are provided for review.  Automated exposure control, iterative reconstruction, and/or weight based adjustment of the mA/kV was utilized to reduce the radiation dose to as low as reasonably achievable.; CT of the lumbar spine was performed without the administration of intravenous contrast. Multiplanar reformatted images are provided for review. Adjustment of mA and/or kV according to patient size was utilized. Automated exposure control, iterative reconstruction, and/or weight based adjustment of the mA/kV was utilized to reduce the radiation dose to as low as reasonably achievable. COMPARISON: None. HISTORY: ORDERING SYSTEM PROVIDED HISTORY: Fall from standing; Trauma assessment TECHNOLOGIST PROVIDED HISTORY: Reason for exam:->Fall from standing; Trauma assessment What reading provider will be dictating this exam?->CRC FINDINGS: CT thoracic spine: There is no evidence of acute fracture. Vertebral alignment is anatomic. There are no acute compression deformities. There is minimal concavity at the superior endplates of T1 through T3. There are changes of diffuse idiopathic skeletal hyperostosis within the thoracic spine. The paravertebral soft tissue structures are unremarkable. There is no gross evidence of central canal stenosis. There is suggestion of pleural thickening in the posterior right lower lobe with a pleural based opacity that may represent atelectasis. CT lumbar spine: There is no evidence of acute fracture. Vertebral alignment is anatomic. There are no compression deformities. The intervertebral disc space heights appear relatively preserved. There is facet hypertrophy throughout the lumbar spine. The paravertebral soft tissue structures are unremarkable. There is possible central canal stenosis at L3-4. There is multilevel neural foraminal narrowing. There is arteriosclerosis without abdominal aortic aneurysm. There is marked atrophy of the bilateral kidneys. There is exophytic lesion from the posterior right kidney which is more dense than simple cyst, measuring approximately 48 Hounsfield units. This may represent complicated cyst.  A solid mass cannot be entirely excluded.      1. No acute fracture or subluxation within the thoracic or lumbar spine. 2. Degenerative changes in the spine without definite central canal stenosis in the thoracic spine. There is possible central canal stenosis at L3-4. There is multilevel neural foraminal narrowing within the lumbar spine. 3. Marked atrophy of the bilateral kidneys with indeterminate exophytic lesion from the right kidney that may represent complicated cyst or solid mass. The patient has prior examinations which are not available for comparison. 4. There is a dependent pleural based opacity in the right lower lobe which may represent atelectasis. There are prior examinations which are not available for comparison. CT ABDOMEN PELVIS W IV CONTRAST Additional Contrast? None    Result Date: 10/19/2022  EXAMINATION: CT OF THE ABDOMEN AND PELVIS WITH CONTRAST 10/19/2022 11:11 am TECHNIQUE: CT of the abdomen and pelvis was performed with the administration of intravenous contrast. Multiplanar reformatted images are provided for review. Automated exposure control, iterative reconstruction, and/or weight based adjustment of the mA/kV was utilized to reduce the radiation dose to as low as reasonably achievable. COMPARISON: None. HISTORY: ORDERING SYSTEM PROVIDED HISTORY: fall from standing 2 days ago and again today; ? spleen rupture, ? kidney hematoma; ? retroparitoneal bleed, TRAUMA on coumadin, Hx of kidney transplant TECHNOLOGIST PROVIDED HISTORY: Reason for exam:->fall from standing 2 days ago and again today; ? spleen rupture, ? kidney hematoma; ? retroparitoneal bleed Reason for exam:->TRAUMA on coumadin Reason for exam:->Hx of kidney transplant Additional Contrast?->None Decision Support Exception - unselect if not a suspected or confirmed emergency medical condition->Emergency Medical Condition (MA) What reading provider will be dictating this exam?->CRC FINDINGS: Lower Chest: Heart size is within normal limits. There is focal bronchiectasis in the right lower lobe.   Subpleural curvilinear opacities are partially imaged in the right lower lobe and lateral aspect of the right middle lobe. Please refer to dedicated CT chest report for additional details. Organs: The gallbladder is distended without obvious abnormality. The liver enhances homogeneously. The portal vein is patent and there is no intrahepatic biliary ductal dilatation. The spleen and adrenal glands are normal in size. There is a 1.4 cm low-density lesion in the inferior margin of the proximal body of the pancreas on axial image 46. Otherwise, the pancreas enhances homogeneously. No pancreatic ductal dilatation. There is severe bilateral renal cortical atrophy. A 1.2 cm intermediate density (potentially enhancing) lesion arises from the posterior aspect of the mid right kidney on axial image 57. There is a severely atrophic renal transplant in the right lower quadrant and a normally enhancing left lower quadrant renal transplant. GI/Bowel: No evidence of a bowel obstruction, free air or pneumatosis. Portions of the colon are decompressed, limiting assessment for mucosal based abnormalities. Stomach and duodenal sweep demonstrate no acute abnormality. The appendix is normal. Pelvis: The urinary bladder is distended without obvious abnormality. The prostate gland is mildly heterogeneous but nonenlarged. Visible but nonenlarged pelvic lymph nodes are present. Peritoneum/Retroperitoneum: The abdominal aorta is heavily calcified but nonaneurysmal.  No retroperitoneal lymphadenopathy or mass. Bones/Soft Tissues: No acute osseous abnormality or evidence of an aggressive osseous lesion. There are moderate-to-severe degenerative changes of the lower lumbar spine mainly in the form of intervertebral disc space narrowing/facet arthropathy at L5-S1. There is no evidence of an acute posttraumatic abnormality in the abdomen or pelvis. Specifically, no evidence of splenic or renal injury. No retroperitoneal hematoma. Indeterminate intermediate density 1.2 cm lesion arising from the mid right kidney. Renal neoplasm not excluded. Correlation with renal ultrasound or multiphasic contrast-enhanced CT or MRI of the abdomen utilizing a renal mass protocol is recommended for further assessment. Indeterminate 1.4 cm low-density lesion arising from the inferior portion of the pancreatic body centrally. This has imaging features most suspicious for an IPMN. Correlation with contrast-enhanced abdominal MRI is also recommended (unless otherwise contraindicated). Renal transplants in the bilateral lower quadrants without evidence of posttraumatic findings to the allografts. Linear/nodular opacities in the right lower lobe and right middle lobe. Please refer to the dedicated CT chest report for additional details. Assessment//Plan           Hospital Problems             Last Modified POA    * (Principal) Symptomatic anemia 10/19/2022 Yes    Acute upper GI bleed 10/19/2022 Yes   Assessment & Plan    Assessment:  Acute Upper GI bleed  Anemia acute blood loss secondary to GI bleeding  Acidemia suspected metabolic in setting up above   Acute kidney injury on Ckd s/p renal transplant  2008  Pancreatic lesion possibly IPMN  Multiple lung nodules of unclear etiology  COPD/emphysema  Elevated troponin- suspect demand ischemia secondary to GI bleeding    Admit as inpatient  Protonix IV 80 mg daily  Gastroenterology consultation-defer MRCP to gastroenterology  Clear liquid diet, n.p.o. after midnight  5 mg of vitamin K given in the ED  INR in the a.m.  hemoglobin hematocrit every 6 hours  Nephrology consultedToday ct abd/pelvis: Marked atrophy of the bilateral kidneys with indeterminate exophytic lesion from the right kidney that may represent complicated cyst or solid mass. - nephrology consulted. Dec 2020-high attenuation exophytic cystlike area posterior mid to lower pole right kidney   9.   Received sodium bicarb in the ED-I will defer to nephrology as his CO2 has been low since the 12th.  10.  We will leave to following hospitalist to consult cardiology for elevated troponin. Patient is asymptomatic. I suspect this is           demand ischemia secondary to anemia  11. Will defer consultation of pulmonology to primary hospitalist.  I did not inform patient of lung nodules on admission  12. SCDs for DVT prophylaxis  13. Plan of care discussed with Dr. Korey Quezada, patient and wife  Consultations Ordered:  IP CONSULT TO NEPHROLOGY  IP CONSULT TO HOSPITALIST  IP CONSULT TO GI    10/20: Received 1 unit RBC transfusion overnight, with hemoglobin stable acutely anemic at <7.0 on recheck. Plan to crossmatch 2 additional units, transfuse 1 and recheck this morning. Morning labs pending at time of rounds. We will continue to follow CBC, CMP, magnesium daily, phosphorus every 3 days, one-time check PT/INR. Per CTA 10/19: Negative PE, LLL persistent consolidation, changes consistent with COPD, \"ill-defined opacity is seen in anterior left upper lobe Recommend follow-up to resolution to exclude any developing masses. There are multiple nodules visualized and overall are stable. \"  [address] nephrology and gastroenterology recommendations when available. Update 10/20: 10mg PO Vit K for INR 3.5.  GI planning possible EGD next day. 2nd Trop still elevated, will consult Cardiology. Post-transfusion Hgb pending at this time. 10/21: Planned morning EGD delayed by GI in setting of INR still 2.7 (down from 3.5 previous day). Additional 5 mg IV vitamin K administered per GI request, with plan for repeat PT/INR at 1300 hrs today, with plan for subsequent EGD this afternoon if INR has improved sufficiently to satisfy GI preferences. Hemoglobin this morning 7.2 s/p 2 total units PRBCs administered so far since admission. 10/22: BG slightly above goal, changed coverage insulin from kelton to medium correction. EGD 10/21 with no acute findings.   GI planning Colonoscopy, but apparent 1-2 day delay over weekend. Patient unhappy with delay, discussing leaving and having [place] done as outpatient. INR 1.5 today, down from 3.5 early in admission. Per discussion with PCP Nephrologist, hospitalist service with remain attendings at present as PCP will be out of area again soon for a number of days. PT/OT consulted. Talked to RN about getting patient up out of bed and mobilized more starting today. 10/23: Hyperglycemic yesterday afternoon to >500, for which additional insulin was given. Endocrinology consult placed. Blood glucose improved but not yet at goal on morning labs. Hemoglobin decreased to 6.7 on morning labs today, 1 unit RBC transfusion ordered. GI planning for transition back to clear liquid diets this afternoon, with bowel prep also, with plan for colonoscopy tomorrow (Monday 10/24) morning.     Electronically signed by Lily Batista DO on 10/23/2022 at 11:25 AM

## 2022-10-23 NOTE — PROGRESS NOTES
Nightly    predniSONE  5 mg Oral Daily    atorvastatin  10 mg Oral Daily    tamsulosin  0.4 mg Oral Daily    carvedilol  6.25 mg Oral BID     Continuous Infusions:   sodium chloride      sodium chloride      sodium chloride      sodium chloride      sodium chloride      sodium chloride      dextrose         CBC:   Recent Labs     10/22/22  0510 10/22/22  1034 10/22/22  1720 10/23/22  0510   WBC 7.5  --   --  7.8   HGB 7.2*  7.3*   < > 7.5* 6.7*     --   --  185    < > = values in this interval not displayed. CMP:    Recent Labs     10/21/22  0509 10/22/22  0510 10/23/22  0510    141 140   K 4.5 4.4 4.3   * 113* 111*   CO2 12* 13* 15*   BUN 87* 84* 80*   CREATININE 2.83* 2.59* 2.72*   GLUCOSE 169* 203* 268*   CALCIUM 8.4* 8.5 8.2*   LABGLOM 23.1* 25.7* 24.2*     Troponin:   Recent Labs     10/20/22  1422   TROPONINI 0.066*     BNP: No results for input(s): BNP in the last 72 hours. INR:   Recent Labs     10/23/22  0510   INR 1.3     Lipids: No results for input(s): CHOL, LDLDIRECT, TRIG, HDL, AMYLASE, LIPASE in the last 72 hours. Liver:   Recent Labs     10/23/22  0510   AST 8   ALT 6   ALKPHOS 49   PROT 5.3*   LABALBU 3.2*   BILITOT 0.7     Iron:  No results for input(s): IRONS, FERRITIN in the last 72 hours. Invalid input(s): LABIRONS  Urinalysis: No results for input(s): UA in the last 72 hours.     Objective:  Vitals: BP (!) 150/58   Pulse 71   Temp 98.7 °F (37.1 °C)   Resp 18   Ht 6' 2\" (1.88 m)   Wt 190 lb (86.2 kg)   SpO2 100%   BMI 24.39 kg/m²    Wt Readings from Last 3 Encounters:   10/19/22 190 lb (86.2 kg)   10/14/22 195 lb 6.4 oz (88.6 kg)   10/12/22 192 lb (87.1 kg)      24HR INTAKE/OUTPUT:    Intake/Output Summary (Last 24 hours) at 10/23/2022 0844  Last data filed at 10/22/2022 2100  Gross per 24 hour   Intake 730 ml   Output 400 ml   Net 330 ml       General: alert, in no apparent distress  HEENT: normocephalic, atraumatic, anicteric  Neck: supple, no mass  Lungs: non-labored respirations, clear to auscultation bilaterally  Heart: regular rate and rhythm, no murmurs or rubs  Abdomen: soft, non-tender, non-distended  Ext: no cyanosis, no peripheral edema  Neuro: alert and oriented, no gross abnormalities  Psych: normal mood and affect  Skin: no rash      Electronically signed by Rachel Mosqueda DO, MD

## 2022-10-24 ENCOUNTER — ANESTHESIA EVENT (OUTPATIENT)
Dept: ENDOSCOPY | Age: 72
DRG: 393 | End: 2022-10-24
Payer: COMMERCIAL

## 2022-10-24 ENCOUNTER — ANESTHESIA (OUTPATIENT)
Dept: ENDOSCOPY | Age: 72
DRG: 393 | End: 2022-10-24
Payer: COMMERCIAL

## 2022-10-24 PROBLEM — K63.81 DIEULAFOY LESION OF COLON: Status: ACTIVE | Noted: 2022-10-24

## 2022-10-24 PROBLEM — E11.65 POORLY CONTROLLED DIABETES MELLITUS (HCC): Status: ACTIVE | Noted: 2022-10-24

## 2022-10-24 LAB
ALBUMIN SERPL-MCNC: 3.3 G/DL (ref 3.5–4.6)
ALP BLD-CCNC: 48 U/L (ref 35–104)
ALT SERPL-CCNC: <5 U/L (ref 0–41)
ANION GAP SERPL CALCULATED.3IONS-SCNC: 14 MEQ/L (ref 9–15)
AST SERPL-CCNC: 8 U/L (ref 0–40)
BASOPHILS ABSOLUTE: 0 K/UL (ref 0–0.2)
BASOPHILS RELATIVE PERCENT: 0.5 %
BILIRUB SERPL-MCNC: 0.7 MG/DL (ref 0.2–0.7)
BUN BLDV-MCNC: 70 MG/DL (ref 8–23)
CALCIUM SERPL-MCNC: 8.6 MG/DL (ref 8.5–9.9)
CHLORIDE BLD-SCNC: 114 MEQ/L (ref 95–107)
CO2: 16 MEQ/L (ref 20–31)
CREAT SERPL-MCNC: 2.43 MG/DL (ref 0.7–1.2)
EOSINOPHILS ABSOLUTE: 0.5 K/UL (ref 0–0.7)
EOSINOPHILS RELATIVE PERCENT: 5.3 %
GFR SERPL CREATININE-BSD FRML MDRD: 27.8 ML/MIN/{1.73_M2}
GLOBULIN: 2.1 G/DL (ref 2.3–3.5)
GLUCOSE BLD-MCNC: 140 MG/DL (ref 70–99)
GLUCOSE BLD-MCNC: 194 MG/DL (ref 70–99)
GLUCOSE BLD-MCNC: 206 MG/DL (ref 70–99)
GLUCOSE BLD-MCNC: 216 MG/DL (ref 70–99)
GLUCOSE BLD-MCNC: 251 MG/DL (ref 70–99)
HCT VFR BLD CALC: 22.3 % (ref 42–52)
HCT VFR BLD CALC: 22.6 % (ref 42–52)
HCT VFR BLD CALC: 23.7 % (ref 42–52)
HEMOGLOBIN: 7.4 G/DL (ref 14–18)
HEMOGLOBIN: 7.5 G/DL (ref 14–18)
HEMOGLOBIN: 7.6 G/DL (ref 14–18)
INR BLD: 1.3
LYMPHOCYTES ABSOLUTE: 0.8 K/UL (ref 1–4.8)
LYMPHOCYTES RELATIVE PERCENT: 8.6 %
MAGNESIUM: 1.8 MG/DL (ref 1.7–2.4)
MCH RBC QN AUTO: 28.8 PG (ref 27–31.3)
MCHC RBC AUTO-ENTMCNC: 32.2 % (ref 33–37)
MCV RBC AUTO: 89.6 FL (ref 79–92.2)
MONOCYTES ABSOLUTE: 0.9 K/UL (ref 0.2–0.8)
MONOCYTES RELATIVE PERCENT: 9.4 %
NEUTROPHILS ABSOLUTE: 7 K/UL (ref 1.4–6.5)
NEUTROPHILS RELATIVE PERCENT: 76.2 %
PDW BLD-RTO: 16.4 % (ref 11.5–14.5)
PERFORMED ON: ABNORMAL
PLATELET # BLD: 177 K/UL (ref 130–400)
POTASSIUM SERPL-SCNC: 4.3 MEQ/L (ref 3.4–4.9)
PROTHROMBIN TIME: 16.2 SEC (ref 12.3–14.9)
RBC # BLD: 2.64 M/UL (ref 4.7–6.1)
SODIUM BLD-SCNC: 144 MEQ/L (ref 135–144)
TOTAL PROTEIN: 5.4 G/DL (ref 6.3–8)
WBC # BLD: 9.2 K/UL (ref 4.8–10.8)

## 2022-10-24 PROCEDURE — 83735 ASSAY OF MAGNESIUM: CPT

## 2022-10-24 PROCEDURE — 6370000000 HC RX 637 (ALT 250 FOR IP): Performed by: SPECIALIST

## 2022-10-24 PROCEDURE — 3700000001 HC ADD 15 MINUTES (ANESTHESIA): Performed by: INTERNAL MEDICINE

## 2022-10-24 PROCEDURE — A4216 STERILE WATER/SALINE, 10 ML: HCPCS | Performed by: STUDENT IN AN ORGANIZED HEALTH CARE EDUCATION/TRAINING PROGRAM

## 2022-10-24 PROCEDURE — 45382 COLONOSCOPY W/CONTROL BLEED: CPT | Performed by: INTERNAL MEDICINE

## 2022-10-24 PROCEDURE — 6360000002 HC RX W HCPCS: Performed by: INTERNAL MEDICINE

## 2022-10-24 PROCEDURE — 2709999900 HC NON-CHARGEABLE SUPPLY: Performed by: INTERNAL MEDICINE

## 2022-10-24 PROCEDURE — 80053 COMPREHEN METABOLIC PANEL: CPT

## 2022-10-24 PROCEDURE — 6370000000 HC RX 637 (ALT 250 FOR IP): Performed by: INTERNAL MEDICINE

## 2022-10-24 PROCEDURE — 84484 ASSAY OF TROPONIN QUANT: CPT

## 2022-10-24 PROCEDURE — 6360000002 HC RX W HCPCS: Performed by: STUDENT IN AN ORGANIZED HEALTH CARE EDUCATION/TRAINING PROGRAM

## 2022-10-24 PROCEDURE — 2580000003 HC RX 258: Performed by: INTERNAL MEDICINE

## 2022-10-24 PROCEDURE — 7100000011 HC PHASE II RECOVERY - ADDTL 15 MIN: Performed by: INTERNAL MEDICINE

## 2022-10-24 PROCEDURE — 2580000003 HC RX 258: Performed by: SPECIALIST

## 2022-10-24 PROCEDURE — 85610 PROTHROMBIN TIME: CPT

## 2022-10-24 PROCEDURE — 6370000000 HC RX 637 (ALT 250 FOR IP): Performed by: CLINICAL NURSE SPECIALIST

## 2022-10-24 PROCEDURE — 99232 SBSQ HOSP IP/OBS MODERATE 35: CPT | Performed by: NURSE PRACTITIONER

## 2022-10-24 PROCEDURE — 3700000000 HC ANESTHESIA ATTENDED CARE: Performed by: INTERNAL MEDICINE

## 2022-10-24 PROCEDURE — 2500000003 HC RX 250 WO HCPCS: Performed by: NURSE ANESTHETIST, CERTIFIED REGISTERED

## 2022-10-24 PROCEDURE — 6360000002 HC RX W HCPCS: Performed by: NURSE ANESTHETIST, CERTIFIED REGISTERED

## 2022-10-24 PROCEDURE — A4216 STERILE WATER/SALINE, 10 ML: HCPCS | Performed by: INTERNAL MEDICINE

## 2022-10-24 PROCEDURE — 1210000000 HC MED SURG R&B

## 2022-10-24 PROCEDURE — 85014 HEMATOCRIT: CPT

## 2022-10-24 PROCEDURE — 36415 COLL VENOUS BLD VENIPUNCTURE: CPT

## 2022-10-24 PROCEDURE — 0DJD8ZZ INSPECTION OF LOWER INTESTINAL TRACT, VIA NATURAL OR ARTIFICIAL OPENING ENDOSCOPIC: ICD-10-PCS | Performed by: INTERNAL MEDICINE

## 2022-10-24 PROCEDURE — C9113 INJ PANTOPRAZOLE SODIUM, VIA: HCPCS | Performed by: STUDENT IN AN ORGANIZED HEALTH CARE EDUCATION/TRAINING PROGRAM

## 2022-10-24 PROCEDURE — 3609027000 HC COLONOSCOPY: Performed by: INTERNAL MEDICINE

## 2022-10-24 PROCEDURE — 99222 1ST HOSP IP/OBS MODERATE 55: CPT | Performed by: INTERNAL MEDICINE

## 2022-10-24 PROCEDURE — 85018 HEMOGLOBIN: CPT

## 2022-10-24 PROCEDURE — C9113 INJ PANTOPRAZOLE SODIUM, VIA: HCPCS | Performed by: INTERNAL MEDICINE

## 2022-10-24 PROCEDURE — 85025 COMPLETE CBC W/AUTO DIFF WBC: CPT

## 2022-10-24 PROCEDURE — 7100000010 HC PHASE II RECOVERY - FIRST 15 MIN: Performed by: INTERNAL MEDICINE

## 2022-10-24 PROCEDURE — 2580000003 HC RX 258: Performed by: STUDENT IN AN ORGANIZED HEALTH CARE EDUCATION/TRAINING PROGRAM

## 2022-10-24 RX ORDER — LIDOCAINE HYDROCHLORIDE 10 MG/ML
1 INJECTION, SOLUTION EPIDURAL; INFILTRATION; INTRACAUDAL; PERINEURAL
Status: ACTIVE | OUTPATIENT
Start: 2022-10-24 | End: 2022-10-25

## 2022-10-24 RX ORDER — SODIUM CHLORIDE 0.9 % (FLUSH) 0.9 %
5-40 SYRINGE (ML) INJECTION PRN
Status: DISCONTINUED | OUTPATIENT
Start: 2022-10-24 | End: 2022-10-28 | Stop reason: HOSPADM

## 2022-10-24 RX ORDER — LIDOCAINE HYDROCHLORIDE 20 MG/ML
INJECTION, SOLUTION INFILTRATION; PERINEURAL PRN
Status: DISCONTINUED | OUTPATIENT
Start: 2022-10-24 | End: 2022-10-24 | Stop reason: SDUPTHER

## 2022-10-24 RX ORDER — MAGNESIUM HYDROXIDE 1200 MG/15ML
LIQUID ORAL PRN
Status: DISCONTINUED | OUTPATIENT
Start: 2022-10-24 | End: 2022-10-24 | Stop reason: ALTCHOICE

## 2022-10-24 RX ORDER — SODIUM CHLORIDE 9 MG/ML
INJECTION, SOLUTION INTRAVENOUS PRN
Status: DISCONTINUED | OUTPATIENT
Start: 2022-10-24 | End: 2022-10-28 | Stop reason: HOSPADM

## 2022-10-24 RX ORDER — SODIUM CHLORIDE, SODIUM LACTATE, POTASSIUM CHLORIDE, CALCIUM CHLORIDE 600; 310; 30; 20 MG/100ML; MG/100ML; MG/100ML; MG/100ML
INJECTION, SOLUTION INTRAVENOUS CONTINUOUS
Status: DISCONTINUED | OUTPATIENT
Start: 2022-10-24 | End: 2022-10-25

## 2022-10-24 RX ORDER — INSULIN LISPRO 100 [IU]/ML
0-8 INJECTION, SOLUTION INTRAVENOUS; SUBCUTANEOUS 4 TIMES DAILY
Status: DISCONTINUED | OUTPATIENT
Start: 2022-10-24 | End: 2022-10-28 | Stop reason: HOSPADM

## 2022-10-24 RX ORDER — ONDANSETRON 2 MG/ML
4 INJECTION INTRAMUSCULAR; INTRAVENOUS
Status: ACTIVE | OUTPATIENT
Start: 2022-10-24 | End: 2022-10-25

## 2022-10-24 RX ORDER — INSULIN GLARGINE 100 [IU]/ML
10 INJECTION, SOLUTION SUBCUTANEOUS 2 TIMES DAILY
Status: DISCONTINUED | OUTPATIENT
Start: 2022-10-24 | End: 2022-10-28 | Stop reason: HOSPADM

## 2022-10-24 RX ORDER — SODIUM PHOSPHATE, DIBASIC AND SODIUM PHOSPHATE, MONOBASIC 7; 19 G/133ML; G/133ML
1 ENEMA RECTAL ONCE
Status: COMPLETED | OUTPATIENT
Start: 2022-10-24 | End: 2022-10-24

## 2022-10-24 RX ORDER — EPINEPHRINE 1 MG/ML
INJECTION, SOLUTION, CONCENTRATE INTRAVENOUS
Status: DISPENSED
Start: 2022-10-24 | End: 2022-10-25

## 2022-10-24 RX ORDER — PROPOFOL 10 MG/ML
INJECTION, EMULSION INTRAVENOUS PRN
Status: DISCONTINUED | OUTPATIENT
Start: 2022-10-24 | End: 2022-10-24 | Stop reason: SDUPTHER

## 2022-10-24 RX ORDER — SIMETHICONE 20 MG/.3ML
EMULSION ORAL PRN
Status: DISCONTINUED | OUTPATIENT
Start: 2022-10-24 | End: 2022-10-24 | Stop reason: ALTCHOICE

## 2022-10-24 RX ORDER — SODIUM CHLORIDE 0.9 % (FLUSH) 0.9 %
5-40 SYRINGE (ML) INJECTION EVERY 12 HOURS SCHEDULED
Status: DISCONTINUED | OUTPATIENT
Start: 2022-10-24 | End: 2022-10-28 | Stop reason: HOSPADM

## 2022-10-24 RX ADMIN — TAMSULOSIN HYDROCHLORIDE 0.4 MG: 0.4 CAPSULE ORAL at 09:06

## 2022-10-24 RX ADMIN — PROPOFOL 420 MG: 10 INJECTION, EMULSION INTRAVENOUS at 14:06

## 2022-10-24 RX ADMIN — LIDOCAINE HYDROCHLORIDE 40 MG: 20 INJECTION, SOLUTION INFILTRATION; PERINEURAL at 14:06

## 2022-10-24 RX ADMIN — SODIUM CHLORIDE: 9 INJECTION, SOLUTION INTRAVENOUS at 14:34

## 2022-10-24 RX ADMIN — MYCOPHENOLATE MOFETIL 750 MG: 250 CAPSULE ORAL at 21:58

## 2022-10-24 RX ADMIN — PREDNISONE 5 MG: 5 TABLET ORAL at 09:06

## 2022-10-24 RX ADMIN — CARVEDILOL 6.25 MG: 6.25 TABLET, FILM COATED ORAL at 21:59

## 2022-10-24 RX ADMIN — ATORVASTATIN CALCIUM 10 MG: 10 TABLET, FILM COATED ORAL at 21:58

## 2022-10-24 RX ADMIN — CARVEDILOL 6.25 MG: 6.25 TABLET, FILM COATED ORAL at 09:06

## 2022-10-24 RX ADMIN — SODIUM CHLORIDE, POTASSIUM CHLORIDE, SODIUM LACTATE AND CALCIUM CHLORIDE: 600; 310; 30; 20 INJECTION, SOLUTION INTRAVENOUS at 15:52

## 2022-10-24 RX ADMIN — CYCLOSPORINE 75 MG: 25 CAPSULE, LIQUID FILLED ORAL at 21:59

## 2022-10-24 RX ADMIN — SODIUM CHLORIDE 80 MG: 9 INJECTION, SOLUTION INTRAMUSCULAR; INTRAVENOUS; SUBCUTANEOUS at 09:06

## 2022-10-24 RX ADMIN — Medication 10 ML: at 09:09

## 2022-10-24 RX ADMIN — Medication 10 ML: at 22:18

## 2022-10-24 RX ADMIN — MYCOPHENOLATE MOFETIL 750 MG: 250 CAPSULE ORAL at 09:16

## 2022-10-24 RX ADMIN — SODIUM CHLORIDE 500 ML: 9 INJECTION, SOLUTION INTRAVENOUS at 13:55

## 2022-10-24 RX ADMIN — EPOETIN ALFA-EPBX 10000 UNITS: 10000 INJECTION, SOLUTION INTRAVENOUS; SUBCUTANEOUS at 22:01

## 2022-10-24 RX ADMIN — INSULIN GLARGINE 10 UNITS: 100 INJECTION, SOLUTION SUBCUTANEOUS at 22:00

## 2022-10-24 RX ADMIN — Medication 1 ENEMA: at 12:00

## 2022-10-24 RX ADMIN — CYCLOSPORINE 75 MG: 25 CAPSULE, LIQUID FILLED ORAL at 09:07

## 2022-10-24 RX ADMIN — GABAPENTIN 300 MG: 300 CAPSULE ORAL at 21:59

## 2022-10-24 RX ADMIN — SODIUM CHLORIDE 40 MG: 9 INJECTION INTRAMUSCULAR; INTRAVENOUS; SUBCUTANEOUS at 22:02

## 2022-10-24 RX ADMIN — INSULIN LISPRO 2 UNITS: 100 INJECTION, SOLUTION INTRAVENOUS; SUBCUTANEOUS at 22:01

## 2022-10-24 RX ADMIN — INSULIN LISPRO 4 UNITS: 100 INJECTION, SOLUTION INTRAVENOUS; SUBCUTANEOUS at 18:41

## 2022-10-24 ASSESSMENT — PAIN DESCRIPTION - DESCRIPTORS: DESCRIPTORS: SHARP;SHOOTING

## 2022-10-24 ASSESSMENT — PAIN SCALES - GENERAL
PAINLEVEL_OUTOF10: 0
PAINLEVEL_OUTOF10: 9
PAINLEVEL_OUTOF10: 0

## 2022-10-24 ASSESSMENT — PAIN - FUNCTIONAL ASSESSMENT: PAIN_FUNCTIONAL_ASSESSMENT: 0-10

## 2022-10-24 ASSESSMENT — PAIN DESCRIPTION - LOCATION: LOCATION: CHEST

## 2022-10-24 ASSESSMENT — PAIN DESCRIPTION - PAIN TYPE: TYPE: ACUTE PAIN

## 2022-10-24 NOTE — ANESTHESIA POSTPROCEDURE EVALUATION
Called and spoke with patient.  Scheduled at [facility] on 09/03/2021 at 8:45am - arrive 7:30am.  Will mail instructions.    COVID test on 09/01/2021, arrive 8am at [facility].  Need to self quarantine until after procedure.  She understands.   Department of Anesthesiology  Postprocedure Note    Patient: Zo Anthony  MRN: [id number]  YOB: 1950  Date of evaluation: 10/24/2022      Procedure Summary     Date: 10/24/22 Room / Location: [facility] OR [address] [place] [address]    Anesthesia Start: 1400 Anesthesia Stop: 1449    Procedure: COLONOSCOPY DIAGNOSTIC Diagnosis:       Anemia, unspecified type      Melena      (Anemia, unspecified type [D64.9])      (Melena [K92.1])    Surgeons: Estelita Ng MD Responsible Provider: HAJA Joiner CRNA    Anesthesia Type: MAC ASA Status: 4          Anesthesia Type: No value filed.     Viridiana Phase I: Viridiana Score: 10    Viridiana Phase II: Viridiana Score: 10      Anesthesia Post Evaluation    Patient location during evaluation: PACU  Patient participation: complete - patient participated  Level of consciousness: awake  Pain score: 0  Airway patency: patent  Nausea & Vomiting: no nausea and no vomiting  Complications: no  Cardiovascular status: hemodynamically stable  Respiratory status: acceptable  Hydration status: euvolemic

## 2022-10-24 NOTE — DISCHARGE INSTR - COC
Continuity of Care Form    Patient Name: Israel Lopez   :  1950  MRN:  [id number]    Admit date:  10/19/2022  Discharge date:  ***    Code Status Order: Full Code   Advance Directives:   Advance Care Flowsheet Documentation       Date/Time Healthcare Directive Type of Healthcare Directive Copy in [address] Agent's Name Healthcare Agent's Phone Number    10/21/22 9836 Yes, patient has an advance directive for healthcare treatment -- -- -- -- --            Admitting Physician:  Taniya Magana MD  PCP: Rich Paul DO    Discharging Nurse: [facility] Unit/Room#: R243/C193-43  Discharging Unit Phone Number: ***    Emergency Contact:   Extended Emergency Contact Information  Primary Emergency Contact: Mercedes [address]nikhil [address] [place] [address] Phone: [phone]  Relation: Spouse  Secondary Emergency Contact: Greg Daly  Aline Phone: [phone]  Relation: Child    Past Surgical History:  Past Surgical History:   Procedure Laterality Date    BRAIN SURGERY Left 1990    to eliminate seizures    KIDNEY TRANSPLANT      [place] [address] INCL FLUOR GDNCE DX W/CELL WASHG SPX N/A 3/20/2018    BRONCHOSCOPY performed by Collette Pagan MD at [address] [place] [address] 10/21/2022    EGD DIAGNOSTIC ONLY performed by Kristi Workman MD at [facility]       Immunization History:   Immunization History   Administered Date(s) Administered    Influenza Virus Vaccine 2003, 2010    Influenza, FLUZONE (age 72 y+), High Dose, 0.7mL 10/07/2020    Influenza, High Dose (Fluzone 65 yrs and older) 10/18/2018, 2019    Pneumococcal Polysaccharide (Xptaenrsw86) 2011    Tdap (Boostrix, Adacel) 2020       Active Problems:  Patient Active Problem List   Diagnosis Code    Pneumonia J18.9    Abdominal pain, other specified site R10.9    Acute pyelonephritis without lesion of renal medullary necrosis N10    Anemia D64.9    Essential hypertension I10    Nonspecific abnormal results of thyroid function study R94.6    Mixed hyperlipidemia E78.2    Kidney replaced by transplant Z94.0    Intra-abdominal abscess post-procedure T81.43XA    Infection due to Enterococcus A49.1    Fever and other physiologic disturbances of temperature regulation R68.89    Chronic kidney disease (CKD) N18.9    Type II or unspecified type diabetes mellitus without mention of complication, uncontrolled [id number]    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       Isolation/Infection:   Isolation            No Isolation          Patient Infection Status       None to display            Nurse Assessment:  Last Vital Signs: BP (!) 168/57   Pulse 73   Temp 98.9 °F (37.2 °C) (Temporal)   Resp 16   Ht 6' 2\" (1.88 m)   Wt 185 lb (83.9 kg)   SpO2 99%   BMI 23.75 kg/m²     Last documented pain score (0-10 scale): Pain Level: 0  Last Weight:   Wt Readings from Last 1 Encounters:   10/24/22 185 lb (83.9 kg)     Mental Status:  {IP PT MENTAL STATUS:20030}    IV Access:  { SHAHEEN IV ACCESS:812378212}    Nursing Mobility/ADLs:  Walking   {P DME WTRW:755974350}  Transfer  {P DME UKAM:355710566}  Bathing  {CHP DME QEOT:434245715}  Dressing  {CHP DME PGNK:299346999}  Toileting  {P DME DAIM:242040309}  Feeding  {P DME OLOV:091016193}  Med Admin  {P DME XMCN:215188675}  Med Delivery   { SHAHEEN MED Delivery:888220639}    Wound Care Documentation and Therapy:  Wound 10/19/22 Pretibial Left;Proximal (Active)   Wound Etiology Skin Tear 10/22/22 2030   Dressing Status Dry; Intact; Clean 10/21/22 2110   Wound Cleansed Cleansed with saline 10/20/22 0945   Dressing/Treatment Xeroform; Foam 10/22/22 2030   Dressing Change Due 10/24/22 10/20/22 0945   Wound Length (cm) 0.8 cm 10/20/22 0945   Wound Width (cm) 1 cm 10/20/22 0945   Wound Depth (cm) 0 cm 10/20/22 0945   Wound Surface Area (cm^2) 0.8 cm^2 10/20/22 0945   Wound Volume (cm^3) 0 cm^3 10/20/22 0945   Wound Assessment [place]/red;Superficial 10/20/22 0945   Drainage Amount Scant 10/20/22 0945   Drainage Description Serosanguinous 10/20/22 0945   Odor None 10/20/22 0945   Herlinda-wound Assessment Ecchymosis 10/20/22 0945   Margins Defined edges 10/20/22 0945   Wound Thickness Description not for Pressure Injury Partial thickness 10/20/22 0945   Number of days: 5       Wound 10/19/22 Arm Distal;Left;Lower;Dorsal small skin tear x2 (Active)   Wound Etiology Skin Tear 10/22/22 2030   Dressing Status Clean;Dry; Intact 10/21/22 2110   Wound Cleansed Cleansed with saline 10/20/22 0945   Dressing/Treatment Xeroform; Foam 10/22/22 2030   Dressing Change Due 10/24/22 10/20/22 0945   Wound Depth (cm) 0 cm 10/20/22 0945   Wound Assessment Pink/red;Superficial;Dry 10/20/22 0945   Drainage Amount None 10/20/22 0945   Odor None 10/20/22 0945   Herlinda-wound Assessment Ecchymosis 10/20/22 0945   Margins Defined edges 10/20/22 0945   Wound Thickness Description not for Pressure Injury Partial thickness 10/20/22 0945   Number of days: 5       Wound 10/19/22 Arm Left; Lower;Proximal;Dorsal (Active)   Wound Etiology Skin Tear 10/22/22 2030   Dressing Status Clean;Dry; Intact 10/21/22 2110   Wound Cleansed Cleansed with saline 10/20/22 0945   Dressing/Treatment Collagen with Ag; Foam 10/20/22 0945   Dressing Change Due 10/24/22 10/20/22 0945   Wound Length (cm) 5 cm 10/20/22 0945   Wound Width (cm) 3 cm 10/20/22 0945   Wound Depth (cm) 0 cm 10/20/22 0945   Wound Surface Area (cm^2) 15 cm^2 10/20/22 0945   Wound Volume (cm^3) 0 cm^3 10/20/22 0945   Wound Assessment Pink/red;Superficial;Bleeding 10/20/22 0945   Drainage Amount Moderate 10/20/22 0945   Drainage Description Sanguinous 10/20/22 0945   Odor None 10/20/22 0945   Herlinda-wound Assessment Ecchymosis 10/20/22 0945   Margins Defined edges 10/20/22 0945   Wound Thickness Description not for Pressure Injury Partial thickness 10/20/22 0945   Number of days: 5        Elimination:  Continence: Bowel: {YES / FV:53051}  Bladder: {YES / JF:65719}  Urinary Catheter: {Urinary Catheter:815422936}   Colostomy/Ileostomy/Ileal Conduit: {YES / TA:42235}       Date of Last BM: ***    Intake/Output Summary (Last 24 hours) at 10/24/2022 1636  Last data filed at 10/24/2022 1513  Gross per 24 hour   Intake 1340 ml   Output 350 ml   Net 990 ml     I/O last 3 completed shifts:   In: 1318.3 [P.O.:960; Blood:358.3]  Out: 750 [Urine:750]    Safety Concerns:     {[facility] Safety Concerns:157829963}    Impairments/Disabilities:      [facility] Impairments/Disabilities:348037277}    Nutrition Therapy:  Current Nutrition Therapy:   [facility] Diet List:939938333}    Routes of Feeding: {[facility] DME Other Feedings:454202060}  Liquids: {Slp liquid thickness:08773}  Daily Fluid Restriction: {[facility] DME Yes amt example:474409237}  Last Modified Barium Swallow with Video (Video Swallowing Test): {Done Not Done XTQR:181899518}    Treatments at the Time of Hospital Discharge:   Respiratory Treatments: ***  Oxygen Therapy:  {Therapy; copd oxygen:52731}  Ventilator:    {[facility] Vent TCHM:341571184}    Rehab Therapies: {THERAPEUTIC INTERVENTION:0110241090}  Weight Bearing Status/Restrictions: [facility] Weight Bearin}  Other Medical Equipment (for information only, NOT a DME order):  {EQUIPMENT:254080818}  Other Treatments: ***    Patient's personal belongings (please select all that are sent with patient):  {[facility] DME Belongings:382651632}    RN SIGNATURE:  {Esignature:400453681}    CASE MANAGEMENT/SOCIAL WORK SECTION    Inpatient Status Date: 10/19/22    Readmission Risk Assessment Score:  Readmission Risk              Risk of Unplanned Readmission:  36           Discharging to Facility/ Agency   Name: [facility]  Address:  Phone:  Fax:    Dialysis Facility (if applicable)   Name:  Address:  Dialysis Schedule:  Phone:  Fax:    / signature: Electronically signed by LOLI Holly on 10/24/22 at 4:36 PM EDT    PHYSICIAN SECTION    Prognosis: Good    Condition at Discharge: Stable    Rehab Potential (if transferring to Rehab): Good    Recommended Labs or Other Treatments After Discharge: Close follow up with nephrology and cardiology    Physician Certification: I certify the above information and transfer of Leanne Storey  is necessary for the continuing treatment of the diagnosis listed and that he requires [place] for less 30 days.      Update Admission H&P: No change in H&P    PHYSICIAN SIGNATURE:  Electronically signed by Gray Grissom MD on 10/28/22 at 1:52 PM EDT

## 2022-10-24 NOTE — PROGRESS NOTES
[facility] Occupational Therapy      Date: 10/24/2022  Patient Name: Michelle Montoya        MRN: [id number]  Account: [de-identified]   : 1950  (80 y.o.)  Room: [facility]/[place] [address]    Chart reviewed, attempted OT at 0926 for eval. Patient not seen 2° to:    Hold per nursing request due to: pt for procedure this AM.    Spoke to LISANDRA Tinsley RN aware. Will attempt again when able.     Electronically signed by KARLA Ji on 10/24/2022 at 9:26 AM

## 2022-10-24 NOTE — PROGRESS NOTES
STAT [address] drawn in [address]. Pt awake. Spoke w Dr. Shubham Gandara.

## 2022-10-24 NOTE — PROGRESS NOTES
Nephrology Progress Note    Assessment:  CKD-4  Anemia GIB    DM type-2  S/P  kidney transplant - 2015. On neoral, cellcept prednisone  Hypertension      Plan: got blood yesterday  Add retacrit  For colonoscopy  Change to LR due to acidosis    Patient Active Problem List:     Pneumonia     Abdominal pain, other specified site     Acute pyelonephritis without lesion of renal medullary necrosis     Anemia     Essential hypertension     Nonspecific abnormal results of thyroid function study     Mixed hyperlipidemia     Kidney replaced by transplant     Intra-abdominal abscess post-procedure     Infection due to Enterococcus     Fever and other physiologic disturbances of temperature regulation     Chronic kidney disease (CKD)     Type II or unspecified type diabetes mellitus without mention of complication, uncontrolled     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     Anginal chest pain at rest [facility])     Atypical chest pain     Chronic cough     Unstable angina (HCC)     Chest pain     Atrial fibrillation with RVR ([facility])     Symptomatic anemia     Acute upper GI bleed      Subjective:  Admit Date: 10/19/2022    Interval History: for colonoscopy today. No bleeding.   Got blood yesterday    Medications:  Scheduled Meds:   pantoprazole (PROTONIX) 40 mg injection  40 mg IntraVENous Q12H    insulin lispro  0-16 Units SubCUTAneous 4x Daily AC & HS    insulin lispro  10 Units SubCUTAneous Once    sodium chloride flush  5-40 mL IntraVENous 2 times per day    mycophenolate  750 mg Oral BID    sodium chloride flush  5-40 mL IntraVENous 2 times per day    cycloSPORINE modified  75 mg Oral BID    [Held by provider] insulin regular  6 Units SubCUTAneous Daily with breakfast    [Held by provider] insulin regular  8 Units SubCUTAneous BID WC    gabapentin  300 mg Oral BID    insulin NPH  9 Units SubCUTAneous QAM    insulin NPH  5 Units SubCUTAneous Nightly    predniSONE  5 mg Oral Daily atorvastatin  10 mg Oral Daily    tamsulosin  0.4 mg Oral Daily    carvedilol  6.25 mg Oral BID     Continuous Infusions:   lactated ringers      sodium chloride      sodium chloride      sodium chloride      sodium chloride      sodium chloride      dextrose         CBC:   Recent Labs     10/23/22  0510 10/23/22  1438 10/24/22  0521   WBC 7.8  --  9.2   HGB 6.7* 7.7* 7.6*     --  177       CMP:    Recent Labs     10/23/22  0510 10/23/22  1438 10/24/22  0521    138 144   K 4.3 4.8 4.3   * 112* 114*   CO2 15* 15* 16*   BUN 80* 77* 70*   CREATININE 2.72* 2.74* 2.43*   GLUCOSE 268* 205* 140*   CALCIUM 8.2* 8.4* 8.6   LABGLOM 24.2* 24.0* 27.8*       Troponin:   No results for input(s): TROPONINI in the last 72 hours. BNP: No results for input(s): BNP in the last 72 hours. INR:   Recent Labs     10/24/22  0521   INR 1.3       Lipids: No results for input(s): CHOL, LDLDIRECT, TRIG, HDL, AMYLASE, LIPASE in the last 72 hours. Liver:   Recent Labs     10/24/22  0521   AST 8   ALT <5   ALKPHOS 48   PROT 5.4*   LABALBU 3.3*   BILITOT 0.7       Iron:  No results for input(s): IRONS, FERRITIN in the last 72 hours. Invalid input(s): LABIRONS  Urinalysis: No results for input(s): UA in the last 72 hours.     Objective:  Vitals: BP (!) 163/62   Pulse 66   Temp 97.9 °F (36.6 °C)   Resp 18   Ht 6' 2\" (1.88 m)   Wt 190 lb (86.2 kg)   SpO2 99%   BMI 24.39 kg/m²    Wt Readings from Last 3 Encounters:   10/19/22 190 lb (86.2 kg)   10/14/22 195 lb 6.4 oz (88.6 kg)   10/12/22 192 lb (87.1 kg)      24HR INTAKE/OUTPUT:    Intake/Output Summary (Last 24 hours) at 10/24/2022 1115  Last data filed at 10/23/2022 2035  Gross per 24 hour   Intake 598.33 ml   Output 350 ml   Net 248.33 ml         General: alert, in no apparent distress  HEENT: normocephalic, atraumatic, anicteric  Neck: supple, no mass  Lungs: non-labored respirations, clear to auscultation bilaterally  Heart: regular rate and rhythm, no murmurs or rubs  Abdomen: soft, non-tender, non-distended  Ext: no cyanosis, no peripheral edema  Neuro: alert and oriented, no gross abnormalities  Psych: normal mood and affect  Skin: no rash      Electronically signed by Nikole Kearney MD, MD

## 2022-10-24 NOTE — PROGRESS NOTES
Physical Therapy Missed Treatment   Facility/Department: [facility] W201/Z736-29    NAME: Amari Katz  Patient Status:   : 1950 (80 y.o.)  MRN: [id number]  Account: [de-identified]  Gender: male        [] Patient Declines PT Treatment            [x] Patient Unavailable:     Nurse reports pt going for procedure this am and to hold tx. Will attempt PT Treatment again at earliest convenience.         Electronically signed by El Bradford PTA on 10/24/22 at 8:54 AM EDT

## 2022-10-24 NOTE — CARE COORDINATION
Pt would like to go to rehab vs SNF at [place]. With pt therapy notes and diagnosis as well as his insurance provider it does not seem like an acute rehab would likely be approved. Pt was agreeable to SNF. First choice is [facility] and a refeerral was called to [address] [place] [address] today for review. Precert will be needed. Second choice is [facility] or [facility] [employer].

## 2022-10-24 NOTE — PROGRESS NOTES
Hospitalist Progress Note      PCP: Seda Moore DO    Date of Admission: 10/19/2022    Chief Complaint:    Chief Complaint   Patient presents with    Fatigue     X 2 weeks        Subjective:  Patient denies fevers, chills, sweats, CP, SOB, diarrhea or burning micturition.   12 point ROS negative other than mentioned above     Medications:  Reviewed    Infusion Medications    lactated ringers 100 mL/hr at 10/24/22 1552    sodium chloride      sodium chloride      sodium chloride 100 mL/hr at 10/24/22 1400    sodium chloride      sodium chloride      sodium chloride      dextrose       Scheduled Medications    pantoprazole (PROTONIX) 40 mg injection  40 mg IntraVENous Q12H    epoetin megan-epbx  10,000 Units SubCUTAneous Q7 Days    insulin glargine  10 Units SubCUTAneous BID    insulin lispro  0-8 Units SubCUTAneous 4x Daily    sodium chloride flush  5-40 mL IntraVENous 2 times per day    EPINEPHrine PF        insulin lispro  10 Units SubCUTAneous Once    sodium chloride flush  5-40 mL IntraVENous 2 times per day    mycophenolate  750 mg Oral BID    sodium chloride flush  5-40 mL IntraVENous 2 times per day    cycloSPORINE modified  75 mg Oral BID    [Held by provider] insulin regular  6 Units SubCUTAneous Daily with breakfast    [Held by provider] insulin regular  8 Units SubCUTAneous BID     gabapentin  300 mg Oral BID    predniSONE  5 mg Oral Daily    atorvastatin  10 mg Oral Daily    tamsulosin  0.4 mg Oral Daily    carvedilol  6.25 mg Oral BID     PRN Meds: sodium chloride flush, sodium chloride, ondansetron, lidocaine 1 % injection, sodium chloride, sodium chloride flush, sodium chloride, sodium chloride, sodium chloride, sodium chloride flush, sodium chloride, acetaminophen **OR** acetaminophen, furosemide, glucose, dextrose bolus **OR** dextrose bolus, glucagon (rDNA), dextrose, prochlorperazine **OR** prochlorperazine      Intake/Output Summary (Last 24 hours) at 10/24/2022 1617  Last data filed at 10/24/2022 1513  Gross per 24 hour   Intake 1340 ml   Output 350 ml   Net 990 ml       Exam:    BP (!) 168/57   Pulse 73   Temp 98.9 °F (37.2 °C) (Temporal)   Resp 16   Ht 6' 2\" (1.88 m)   Wt 185 lb (83.9 kg)   SpO2 99%   BMI 23.75 kg/m²     General appearance: No apparent distress, appears stated age and cooperative. HEENT:  Conjunctivae/corneas clear. Neck: Trachea midline. Respiratory:  Normal respiratory effort. Clear to auscultation  Cardiovascular: Regular rate and rhythm  Abdomen: Soft, non-tender, non-distended with normal bowel sounds. Musculoskeletal: No clubbing, cyanosis or edema bilaterally  Neuro: Non Focal.   Capillary Refill: Brisk,< 3 seconds   Peripheral Pulses: +2 palpable, equal bilaterally       Labs:   Recent Labs     10/22/22  0510 10/22/22  1034 10/23/22  0510 10/23/22  1438 10/24/22  0521   WBC 7.5  --  7.8  --  9.2   HGB 7.2*  7.3*   < > 6.7* 7.7* 7.6*   HCT 22.3*  22.0*   < > 20.3* 23.4* 23.7*     --  185  --  177    < > = values in this interval not displayed. Recent Labs     10/23/22  0510 10/23/22  1438 10/24/22  0521    138 144   K 4.3 4.8 4.3   * 112* 114*   CO2 15* 15* 16*   BUN 80* 77* 70*   CREATININE 2.72* 2.74* 2.43*   CALCIUM 8.2* 8.4* 8.6     Recent Labs     10/22/22  0510 10/23/22  0510 10/24/22  0521   AST 8 8 8   ALT 6 6 <5   BILITOT 0.8* 0.7 0.7   ALKPHOS 53 49 48     Recent Labs     10/22/22  0510 10/23/22  0510 10/24/22  0521   INR 1.5 1.3 1.3     No results for input(s): Mariluz Rachel in the last 72 hours. Urinalysis:      Lab Results   Component Value Date/Time    NITRU Negative 10/19/2022 01:44 PM    WBCUA 10-20 10/19/2022 01:44 PM    BACTERIA FEW 10/19/2022 01:44 PM    RBCUA 6-10 10/19/2022 01:44 PM    BLOODU SMALL 10/19/2022 01:44 PM    SPECGRAV 1.012 10/19/2022 01:44 PM    GLUCOSEU Negative 10/19/2022 01:44 PM    GLUCOSEU GT 1 09/14/2011 08:57 AM       Radiology:  XR KNEE LEFT (3 VIEWS)   Final Result   1.  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 2. Probable chondrocalcinosis. 3. Possible trace suprapatellar knee joint effusion. XR KNEE RIGHT (3 VIEWS)   Final Result   Degenerative changes. No evidence of acute osseous abnormality is seen. CT HEAD WO CONTRAST   Final Result   1. There is no acute intracranial abnormality. Specifically, there is no   intracranial hemorrhage. 2. Atrophy and periventricular leukomalacia,   3. Mucosal thickening within the right sphenoid sinus. 4. Previous left craniotomy defect with encephalomalacia  seen within the   anterior aspect of the left temporal lobe. .         CT CERVICAL SPINE WO CONTRAST   Final Result   1. There is no acute compression fracture or subluxation of the cervical   spine. 2. Multilevel degenerative disc and degenerative joint disease. CT THORACIC SPINE WO CONTRAST   Final Result   1. No acute fracture or subluxation within the thoracic or lumbar spine. 2. Degenerative changes in the spine without definite central canal stenosis   in the thoracic spine. There is possible central canal stenosis at L3-4. There is multilevel neural foraminal narrowing within the lumbar spine. 3. Marked atrophy of the bilateral kidneys with indeterminate exophytic   lesion from the right kidney that may represent complicated cyst or solid   mass. The patient has prior examinations which are not available for   comparison. 4. There is a dependent pleural based opacity in the right lower lobe which   may represent atelectasis. There are prior examinations which are not   available for comparison. CT LUMBAR SPINE WO CONTRAST   Final Result   1. No acute fracture or subluxation within the thoracic or lumbar spine. 2. Degenerative changes in the spine without definite central canal stenosis   in the thoracic spine. There is possible central canal stenosis at L3-4. There is multilevel neural foraminal narrowing within the lumbar spine.    3. Marked atrophy of the bilateral kidneys with indeterminate exophytic   lesion from the right kidney that may represent complicated cyst or solid   mass. The patient has prior examinations which are not available for   comparison. 4. There is a dependent pleural based opacity in the right lower lobe which   may represent atelectasis. There are prior examinations which are not   available for comparison. CTA CHEST W WO CONTRAST   Final Result   1. No large filling defects are seen on this examination to suggest saddle   embolus more in the larger primary and secondary vascular structures. 2.   There is consolidation again seen in the left lower lobe. Bronchial wall   thickening mild bronchiectasis and other changes of COPD are seen. An   ill-defined opacity is seen in the anterior left upper lobe. Recommend   follow-up to resolution to exclude any developing masses. There are multiple   nodules visualized and overall are stable. CT ABDOMEN PELVIS W IV CONTRAST Additional Contrast? None   Final Result   There is no evidence of an acute posttraumatic abnormality in the abdomen or   pelvis. Specifically, no evidence of splenic or renal injury. No   retroperitoneal hematoma. Indeterminate intermediate density 1.2 cm lesion arising from the mid right   kidney. Renal neoplasm not excluded. Correlation with renal ultrasound or   multiphasic contrast-enhanced CT or MRI of the abdomen utilizing a renal mass   protocol is recommended for further assessment. Indeterminate 1.4 cm low-density lesion arising from the inferior portion of   the pancreatic body centrally. This has imaging features most suspicious for   an IPMN. Correlation with contrast-enhanced abdominal MRI is also   recommended (unless otherwise contraindicated). 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       Linear/nodular opacities in the right lower lobe and right middle lobe. Please refer to the dedicated CT chest report for additional details. 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 I am managing a portion of pt care. Some medical issues are handled by other specialists. Additional work up and treatment should be done in out pt setting by pt PCP and other out pt providers. In addition to examining and evaluating pt, I spent additional time explaining care, normal and abnormal findings, and treatment plan. All of pt questions were answered. Counseling, diet and education were  provided. Case will be discussed with nursing staff when appropriate. Family will be updated if and when appropriate.       Diet: Diet NPO Exceptions are: Sips of Water with Meds    Code Status: Full Code    Electronically signed by Rema Mendez MD on 10/24/2022 at 4:17 PM

## 2022-10-24 NOTE — PROGRESS NOTES
Gastroenterology Progress Note    Boy Juárez is a 80 y.o. male patient. Hospitalization Day:5    Chief C/O: anemia GIB    SUBJECTIVE: Seen and examined, no acute events overnight, hemoglobin 7.7, tolerated colonoscopy prep, INR 1.3    ROS:  Gastrointestinal ROS: no abdominal pain, change in bowel habits, or black or bloody stools    Physical    VITALS:  BP (!) 163/62   Pulse 66   Temp 97.9 °F (36.6 °C)   Resp 18   Ht 6' 2\" (1.88 m)   Wt 190 lb (86.2 kg)   SpO2 99%   BMI 24.39 kg/m²   TEMPERATURE:  Current - Temp: 97.9 °F (36.6 °C); Max - Temp  Av.2 °F (36.8 °C)  Min: 97.9 °F (36.6 °C)  Max: 98.7 °F (37.1 °C)    General:  Alert and oriented,  No apparent distress  Skin- without jaundice  Eyes: anicteric sclera  Cardiac: RRR, Nl s1s2, without murmurs  Lungs CTA Bilaterally, normal effort  Abdomen soft, ND, NT, no HSM, Bowel sounds normal  Ext: without edema  Neuro: no asterixis     Data    Data Review:    Recent Labs     10/22/22  0510 10/22/22  1034 10/23/22  0510 10/23/22  1438 10/24/22  0521   WBC 7.5  --  7.8  --  9.2   HGB 7.2*  7.3*   < > 6.7* 7.7* 7.6*   HCT 22.3*  22.0*   < > 20.3* 23.4* 23.7*   MCV 88.8  --  88.1  --  89.6     --  185  --  177    < > = values in this interval not displayed. Recent Labs     10/23/22  0510 10/23/22  1438 10/24/22  0521    138 144   K 4.3 4.8 4.3   * 112* 114*   CO2 15* 15* 16*   BUN 80* 77* 70*   CREATININE 2.72* 2.74* 2.43*     Recent Labs     10/22/22  0510 10/23/22  0510 10/24/22  0521   AST 8 8 8   ALT 6 6 <5   BILITOT 0.8* 0.7 0.7   ALKPHOS 53 49 48     No results for input(s): LIPASE, AMYLASE in the last 72 hours.   Recent Labs     10/22/22  0510 10/23/22  0510 10/24/22  0521   PROTIME 18.3* 16.5* 16.2*   INR 1.5 1.3 1.3           ASSESSMENT:80year-old male admitted with anemia and melena in the context of supratherapeutic INR while on Coumadin, on arrival INR was 3.1 patient had been experiencing intermittent melena over the course of the month hemoglobin was 6.3. Had normal EGD. CT the abdomen was notable for 1.4 cm pancreatic body lesion possibly IPMN will likely need an outpatient EUS with Dr. Jesusita Colorado for further evaluation. Has been tolerating regular diet INR now 1.5.   10/23/22 no further melena, hemoglobin 6.7,  INR 1.3, Coumadin is on hold, colonoscopy tomorrow. Planned for 1 unit PRBC today  10/24/22 reports melena with colonoscopy prep. Hemoglobin 7.7, INR 1.3, Coumadin is on hold    PLAN :  -Continue course of care  -Continue serial H&H, trend and transfuse accordingly  -n.p.o.  for colonoscopy today    Thank you for allowing me to participate in the care of your patient. Please feel free to contact me with any concerns.     HAJA Copeland - CNP

## 2022-10-24 NOTE — ANESTHESIA PRE PROCEDURE
Department of Anesthesiology  Preprocedure Note       Name:  Yasmine Watson   Age:  80 y.o.  :  1950                                          MRN:  [id number]         Date:  10/24/2022      Surgeon: Branden Molina):  Adrian Melchor MD    Procedure: Procedure(s):  COLONOSCOPY DIAGNOSTIC    Medications prior to admission:   Prior to Admission medications    Medication Sig Start Date End Date Taking?  Authorizing Provider   carvedilol (COREG) 6.25 MG tablet Take 6.25 mg by mouth 2 times daily (with meals)   Yes Historical Provider, MD   carvedilol (COREG) 12.5 MG tablet Take 1 tablet by mouth 2 times daily  Patient not taking: No sig reported 10/14/22   Makenzie Pavon MD   warfarin (COUMADIN) 5 MG tablet Take 1 tablet by mouth daily 22   Srinath Calix MD   predniSONE (DELTASONE) 5 MG tablet Take 5 mg by mouth daily    Historical Provider, MD   Dulaglutide (TRULICITY) 1.5 XG/7.2KG SOPN Inject 1.5 mg into the skin once a week Weekly on Monday    Historical Provider, MD   tamsulosin (FLOMAX) 0.4 MG capsule Take 1 capsule by mouth daily  Patient taking differently: Take 0.4 mg by mouth 2 times daily 21   Franca Ac MD   Continuous Blood Gluc Transmit (DEXCOM G6 TRANSMITTER) MISC Change every 3 months  Patient not taking: Reported on 10/19/2022 5/29/20   Madhav Bullard MD   Continuous Blood Gluc Sensor (FREESTYLE KEV 14 DAY SENSOR) MISC Every 2 weeks Dx E11.65  Patient not taking: Reported on 10/19/2022 5/20/20   Madhav Bullard MD   insulin NPH (NOVOLIN N FLEXPEN) 100 UNIT/ML injection pen 16 units at bedtime  Patient taking differently: 9 units in AM, 5 units at bedtime 20   Madhav Bullard MD   Insulin Regular Human (NOVOLIN R FLEXPEN) 100 UNIT/ML SOPN 6 units am 8 units lunch and dinner  Patient taking differently: Sliding Scale 20   Madhav Bullard MD   Continuous Blood Gluc Sensor (FREESTYLE KEV 14 DAY SENSOR) MISC Every 2 weeks 20   Madhav Bullard MD   pantoprazole (PROTONIX) 40 MG tablet Take 40 mg by mouth daily  11/16/19   Historical Provider, MD   nitroGLYCERIN (NITROSTAT) 0.4 MG SL tablet up to max of 3 total doses. If no relief after 1 dose, call 911. Patient not taking: No sig reported 7/18/19   HAJA Otero - CNP   furosemide (LASIX) 40 MG tablet Take 40 mg by mouth daily Indications: 1/2 to 1 tab. as needed for leg swelling     Historical Provider, MD   aspirin 81 MG EC tablet Take 81 mg by mouth daily  5/11/16   Historical Provider, MD   cyclobenzaprine (FLEXERIL) 10 MG tablet Take 10 mg by mouth daily as needed Indications: as needed. 4/5/16   Historical Provider, MD   cycloSPORINE modified (NEORAL) 25 MG capsule Take 75 mg by mouth 2 times daily  5/24/16   Historical Provider, MD   gabapentin (NEURONTIN) 300 MG capsule Take 300 mg by mouth 2 times daily.  Indications: 1 capsule in am, 2 capsule in pm  5/24/16   Historical Provider, MD   mycophenolate (CELLCEPT) 250 MG capsule Take 750 mg by mouth 2 times daily  5/24/16   Historical Provider, MD   simvastatin (ZOCOR) 10 MG tablet Take 10 mg by mouth nightly  5/24/16   Historical Provider, MD       Current medications:    Current Facility-Administered Medications   Medication Dose Route Frequency Provider Last Rate Last Admin    lactated ringers infusion   IntraVENous Continuous Dada Beatty MD        pantoprazole (PROTONIX) 40 mg in sodium chloride (PF) 10 mL injection  40 mg IntraVENous Q12H Dada Beatty MD        epoetin megan-epbx (RETACRIT) injection 10,000 Units  10,000 Units SubCUTAneous Q7 Days Kasey Manzano MD        insulin glargine (LANTUS) injection vial 10 Units  10 Units SubCUTAneous BID Kirk Diamond MD        insulin lispro (HUMALOG) injection vial 0-8 Units  0-8 Units SubCUTAneous 4x Daily Kirk Diamond MD        0.9 % sodium chloride infusion   IntraVENous PRN Pastor Bryan MD        insulin lispro (HUMALOG) injection vial 10 Units  10 Units SubCUTAneous Once Houston Prader, DO        sodium chloride flush 0.9 % injection 5-40 mL  5-40 mL IntraVENous 2 times per day Willow Villagran MD   10 mL at 10/24/22 0909    sodium chloride flush 0.9 % injection 5-40 mL  5-40 mL IntraVENous PRN Willow Villagran MD        0.9 % sodium chloride infusion  25 mL IntraVENous PRN Willow Villagran  mL/hr at 10/24/22 1355 500 mL at 10/24/22 1355    0.9 % sodium chloride infusion   IntraVENous PRN Earma Orf, DO        mycophenolate (CELLCEPT) capsule 750 mg  750 mg Oral BID Kristen Ennis, DO   750 mg at 10/24/22 9556    0.9 % sodium chloride infusion   IntraVENous PRN Nitin Cordia Dials, APRN - CNS        sodium chloride flush 0.9 % injection 5-40 mL  5-40 mL IntraVENous 2 times per day Lavellemariselanedra Marquisks, APRN - CNS   5 mL at 10/23/22 2035    sodium chloride flush 0.9 % injection 5-40 mL  5-40 mL IntraVENous PRN Nitin Cordia Dials, APRN - CNS   10 mL at 10/20/22 0900    0.9 % sodium chloride infusion   IntraVENous PRN Nitin Cordia Dials, APRN - CNS        acetaminophen (TYLENOL) tablet 650 mg  650 mg Oral Q6H PRN Nitin Cordia Dials, APRN - CNS   650 mg at 10/23/22 2101    Or    acetaminophen (TYLENOL) suppository 650 mg  650 mg Rectal Q6H PRN Nitin Cordia Dials, APRN - CNS        cycloSPORINE modified (NEORAL) capsule 75 mg (Patient Supplied)  75 mg Oral BID Nitin Cordia Dials, APRN - CNS   75 mg at 10/24/22 0907    [Held by provider] insulin regular (HUMULIN R;NOVOLIN R) injection 6 Units  6 Units SubCUTAneous Daily with breakfast Nitin Cordia Dials, APRN - CNS   6 Units at 10/20/22 1029    [Held by provider] insulin regular (HUMULIN R;NOVOLIN R) injection 8 Units  8 Units SubCUTAneous BID WC Nitin Cordia Dials, APRN - CNS        gabapentin (NEURONTIN) capsule 300 mg  300 mg Oral BID Nitin Cordia Dials, APRN - CNS   300 mg at 10/23/22 2100    furosemide (LASIX) tablet 40 mg  40 mg Oral Daily PRN Nitin Cordia Dials, APRN - CNS        predniSONE (DELTASONE) tablet 5 mg  5 mg Oral Daily Nitin Duong, APRN - CNS   5 mg at 10/24/22 0906    atorvastatin (LIPITOR) tablet 10 mg 10 mg Oral Daily Katheleen Monday Dials, APRN - CNS   10 mg at 10/23/22 2101    tamsulosin (FLOMAX) capsule 0.4 mg  0.4 mg Oral Daily Katheleen Monday Dials, APRN - CNS   0.4 mg at 10/24/22 5917    carvedilol (COREG) tablet 6.25 mg  6.25 mg Oral BID Katheleen Monday Dials, APRN - CNS   6.25 mg at 10/24/22 5096    glucose chewable tablet 16 g  4 tablet Oral PRN Katheleen Monday Dials, APRN - CNS        dextrose bolus 10% 125 mL  125 mL IntraVENous PRN Katheleen Monday Dials, APRN - CNS        Or    dextrose bolus 10% 250 mL  250 mL IntraVENous PRN Katheleen Monday Dials, APRN - CNS        glucagon (rDNA) injection 1 mg  1 mg SubCUTAneous PRN Katheleen Monday Dials, APRN - CNS        dextrose 10 % infusion   IntraVENous Continuous PRN Katheleen Monday Dials, APRN - CNS        prochlorperazine (COMPAZINE) tablet 10 mg  10 mg Oral Q6H PRN Katheleen Monday Dials, APRN - CNS        Or    prochlorperazine (COMPAZINE) injection 10 mg  10 mg IntraVENous Q6H PRN Katheleen Monday Dials, APRN - CNS           Allergies:  No Known Allergies    Problem List:    Patient Active Problem List   Diagnosis Code    Pneumonia J18.9    Abdominal pain, other specified site R10.9    Acute pyelonephritis without lesion of renal medullary necrosis N10    Anemia D64.9    Essential hypertension I10    Nonspecific abnormal results of thyroid function study R94.6    Mixed hyperlipidemia E78.2    Kidney replaced by transplant Z94.0    Intra-abdominal abscess post-procedure T81.43XA    Infection due to Enterococcus A49.1    Fever and other physiologic disturbances of temperature regulation R68.89    Chronic kidney disease (CKD) N18.9    Type II or unspecified type diabetes mellitus without mention of complication, uncontrolled [id number]    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     [facility]    Hypertension     Seizures ([facility]     no seizure since        Past Surgical History:        Procedure Laterality Date    BRAIN SURGERY Left 1990    to eliminate seizures    KIDNEY TRANSPLANT      [place] [address] INCL FLUOR GDNCE DX W/CELL WASHG SPX N/A 3/20/2018    BRONCHOSCOPY performed by Courtney Huang MD at [facility] 10/21/2022    EGD DIAGNOSTIC ONLY performed by Mayito Kennedy MD at [facility] History:    Social History     Tobacco Use    Smoking status: Former     Packs/day: 0.25     Types: Cigarettes     Quit date: 2015     Years since quittin.3    Smokeless tobacco: Former   Substance Use Topics    Alcohol use: No     Alcohol/week: 0.0 standard drinks                                Counseling given: Not Answered      Vital Signs (Current):   Vitals:    10/23/22 1949 10/24/22 0712 10/24/22 0904 10/24/22 1353   BP: (!) 155/58 (!) 163/62  (!) 193/82   Pulse: 65 70 66 77   Resp: 18   18   Temp: 36.7 °C (98 °F) 36.6 °C (97.9 °F)  37.2 °C (98.9 °F)   TempSrc: Oral   Temporal   SpO2: (!) 83% 100% 99% 98%   Weight:    185 lb (83.9 kg)   Height:    6' 2\" (1.88 m)                                              BP Readings from Last 3 Encounters:   10/24/22 (!) 193/82   10/14/22 (!) 160/72   10/12/22 (!) 163/74       NPO Status: Time of last liquid consumption: 0900 (sip of liquid at 0900 with medications )                        Time of last solid consumption: 1500                        Date of last liquid consumption: 10/24/22                        Date of last solid food consumption: 10/21/22    BMI:   Wt Readings from Last 3 Encounters:   10/24/22 185 lb (83.9 kg)   10/14/22 195 lb 6.4 oz (88.6 kg)   10/12/22 192 lb (87.1 kg)     Body mass index is 23.75 kg/m².     CBC:   Lab Results   Component Value Date/Time    WBC 9.2 10/24/2022 05:21 AM    RBC 2.64 10/24/2022 05:21 AM    RBC 4.18 09/15/2011 05:00 AM    HGB 7.6 10/24/2022 05:21 AM    HCT 23.7 10/24/2022 05:21 AM    MCV 89.6 10/24/2022 05:21 AM    RDW 16.4 10/24/2022 05:21 AM     10/24/2022 05:21 AM       CMP:   Lab Results   Component Value Date/Time     10/24/2022 05:21 AM    K 4.3 10/24/2022 05:21 AM    K 4.8 10/04/2022 04:15 AM     10/24/2022 05:21 AM    CO2 16 10/24/2022 05:21 AM    BUN 70 10/24/2022 05:21 AM    CREATININE 2.43 10/24/2022 05:21 AM    GFRAA 28.2 10/12/2022 02:45 PM    LABGLOM 27.8 10/24/2022 05:21 AM    GLUCOSE 140 10/24/2022 05:21 AM    GLUCOSE 172 09/16/2011 06:25 AM    PROT 5.4 10/24/2022 05:21 AM    CALCIUM 8.6 10/24/2022 05:21 AM    BILITOT 0.7 10/24/2022 05:21 AM    ALKPHOS 48 10/24/2022 05:21 AM    AST 8 10/24/2022 05:21 AM    ALT <5 10/24/2022 05:21 AM       POC Tests:   Recent Labs     10/24/22  1112   POCGLU 216*       Coags:   Lab Results   Component Value Date/Time    PROTIME 16.2 10/24/2022 05:21 AM    INR 1.3 10/24/2022 05:21 AM    APTT 29.1 10/20/2022 05:54 AM       HCG (If Applicable): No results found for: PREGTESTUR, PREGSERUM, HCG, HCGQUANT     ABGs: No results found for: PHART, PO2ART, LZR8UVS, OAC0QBI, BEART, Y3XTDPPW     Type & Screen (If Applicable):  No results found for: LABABO, LABRH    Drug/Infectious Status (If Applicable):  No results found for: HIV, HEPCAB    COVID-19 Screening (If Applicable):   Lab Results   Component Value Date/Time    COVID19 Not Detected 06/22/2022 02:58 PM           Anesthesia Evaluation  Patient summary reviewed and Nursing notes reviewed no history of anesthetic complications:   Airway: Mallampati: II  TM distance: >3 FB   Neck ROM: full  Mouth opening: > = 3 FB   Dental:    (+) upper dentures and lower dentures      Pulmonary:                              Cardiovascular:    (+) hypertension:, dysrhythmias: atrial fibrillation,                   Neuro/Psych:   (+) seizures (none since surgery in 90s):,             GI/Hepatic/Renal:   (+) renal disease (kidney trasplant 2015, no issues since):,           Endo/Other:    (+) Diabetes, . Abdominal:             Vascular: Other Findings:           Anesthesia Plan      MAC     ASA 4       Induction: intravenous. Anesthetic plan and risks discussed with patient. Plan discussed with attending.                     HAJA Rodriguez - CRNA   10/24/2022

## 2022-10-25 PROBLEM — R91.8 LUNG NODULES: Status: ACTIVE | Noted: 2022-10-25

## 2022-10-25 PROBLEM — R93.89 ABNORMAL CT OF THE CHEST: Status: ACTIVE | Noted: 2022-10-25

## 2022-10-25 PROBLEM — J47.9 BRONCHIECTASIS WITHOUT COMPLICATION (HCC): Status: ACTIVE | Noted: 2022-10-25

## 2022-10-25 PROBLEM — J44.9 COPD WITHOUT EXACERBATION (HCC): Status: ACTIVE | Noted: 2022-10-25

## 2022-10-25 LAB
ALBUMIN SERPL-MCNC: 3.1 G/DL (ref 3.5–4.6)
ALP BLD-CCNC: 46 U/L (ref 35–104)
ALT SERPL-CCNC: <5 U/L (ref 0–41)
ANION GAP SERPL CALCULATED.3IONS-SCNC: 15 MEQ/L (ref 9–15)
ANISOCYTOSIS: ABNORMAL
AST SERPL-CCNC: 7 U/L (ref 0–40)
BASOPHILS ABSOLUTE: 0 K/UL (ref 0–0.2)
BASOPHILS RELATIVE PERCENT: 0.5 %
BILIRUB SERPL-MCNC: 0.9 MG/DL (ref 0.2–0.7)
BLOOD BANK DISPENSE STATUS: NORMAL
BLOOD BANK PRODUCT CODE: NORMAL
BPU ID: NORMAL
BUN BLDV-MCNC: 52 MG/DL (ref 8–23)
CALCIUM SERPL-MCNC: 8.3 MG/DL (ref 8.5–9.9)
CHLORIDE BLD-SCNC: 115 MEQ/L (ref 95–107)
CO2: 14 MEQ/L (ref 20–31)
CREAT SERPL-MCNC: 2.11 MG/DL (ref 0.7–1.2)
DESCRIPTION BLOOD BANK: NORMAL
EOSINOPHILS ABSOLUTE: 0.4 K/UL (ref 0–0.7)
EOSINOPHILS RELATIVE PERCENT: 4.9 %
GFR SERPL CREATININE-BSD FRML MDRD: 32.9 ML/MIN/{1.73_M2}
GLOBULIN: 2.1 G/DL (ref 2.3–3.5)
GLUCOSE BLD-MCNC: 106 MG/DL (ref 70–99)
GLUCOSE BLD-MCNC: 129 MG/DL (ref 70–99)
GLUCOSE BLD-MCNC: 142 MG/DL (ref 70–99)
GLUCOSE BLD-MCNC: 249 MG/DL (ref 70–99)
GLUCOSE BLD-MCNC: 293 MG/DL (ref 70–99)
HCT VFR BLD CALC: 20.4 % (ref 42–52)
HCT VFR BLD CALC: 25.1 % (ref 42–52)
HEMOGLOBIN: 6.5 G/DL (ref 14–18)
HEMOGLOBIN: 8.1 G/DL (ref 14–18)
INR BLD: 1.2
LYMPHOCYTES ABSOLUTE: 0.7 K/UL (ref 1–4.8)
LYMPHOCYTES RELATIVE PERCENT: 9.2 %
MAGNESIUM: 1.7 MG/DL (ref 1.7–2.4)
MCH RBC QN AUTO: 28.8 PG (ref 27–31.3)
MCHC RBC AUTO-ENTMCNC: 32.1 % (ref 33–37)
MCV RBC AUTO: 89.6 FL (ref 79–92.2)
MICROCYTES: ABNORMAL
MONOCYTES ABSOLUTE: 0.6 K/UL (ref 0.2–0.8)
MONOCYTES RELATIVE PERCENT: 8.3 %
NEUTROPHILS ABSOLUTE: 5.9 K/UL (ref 1.4–6.5)
NEUTROPHILS RELATIVE PERCENT: 77.1 %
PDW BLD-RTO: 16.4 % (ref 11.5–14.5)
PERFORMED ON: ABNORMAL
PLATELET # BLD: 166 K/UL (ref 130–400)
PLATELET SLIDE REVIEW: NORMAL
POIKILOCYTES: ABNORMAL
POTASSIUM SERPL-SCNC: 3.8 MEQ/L (ref 3.4–4.9)
PROTHROMBIN TIME: 15.6 SEC (ref 12.3–14.9)
RBC # BLD: 2.28 M/UL (ref 4.7–6.1)
SODIUM BLD-SCNC: 144 MEQ/L (ref 135–144)
TOTAL PROTEIN: 5.2 G/DL (ref 6.3–8)
TROPONIN: 0.04 NG/ML (ref 0–0.01)
WBC # BLD: 7.7 K/UL (ref 4.8–10.8)

## 2022-10-25 PROCEDURE — 83735 ASSAY OF MAGNESIUM: CPT

## 2022-10-25 PROCEDURE — 6370000000 HC RX 637 (ALT 250 FOR IP): Performed by: INTERNAL MEDICINE

## 2022-10-25 PROCEDURE — 6360000002 HC RX W HCPCS: Performed by: INTERNAL MEDICINE

## 2022-10-25 PROCEDURE — 2580000003 HC RX 258: Performed by: INTERNAL MEDICINE

## 2022-10-25 PROCEDURE — 84484 ASSAY OF TROPONIN QUANT: CPT

## 2022-10-25 PROCEDURE — 85025 COMPLETE CBC W/AUTO DIFF WBC: CPT

## 2022-10-25 PROCEDURE — 2580000003 HC RX 258: Performed by: SPECIALIST

## 2022-10-25 PROCEDURE — 6370000000 HC RX 637 (ALT 250 FOR IP): Performed by: CLINICAL NURSE SPECIALIST

## 2022-10-25 PROCEDURE — 85014 HEMATOCRIT: CPT

## 2022-10-25 PROCEDURE — 1210000000 HC MED SURG R&B

## 2022-10-25 PROCEDURE — 99232 SBSQ HOSP IP/OBS MODERATE 35: CPT | Performed by: PHYSICIAN ASSISTANT

## 2022-10-25 PROCEDURE — C9113 INJ PANTOPRAZOLE SODIUM, VIA: HCPCS | Performed by: INTERNAL MEDICINE

## 2022-10-25 PROCEDURE — 85018 HEMOGLOBIN: CPT

## 2022-10-25 PROCEDURE — 36415 COLL VENOUS BLD VENIPUNCTURE: CPT

## 2022-10-25 PROCEDURE — 99223 1ST HOSP IP/OBS HIGH 75: CPT | Performed by: INTERNAL MEDICINE

## 2022-10-25 PROCEDURE — A4216 STERILE WATER/SALINE, 10 ML: HCPCS | Performed by: INTERNAL MEDICINE

## 2022-10-25 PROCEDURE — 85610 PROTHROMBIN TIME: CPT

## 2022-10-25 PROCEDURE — 36430 TRANSFUSION BLD/BLD COMPNT: CPT

## 2022-10-25 PROCEDURE — 99232 SBSQ HOSP IP/OBS MODERATE 35: CPT | Performed by: NURSE PRACTITIONER

## 2022-10-25 PROCEDURE — 80053 COMPREHEN METABOLIC PANEL: CPT

## 2022-10-25 RX ORDER — SODIUM CHLORIDE 9 MG/ML
INJECTION, SOLUTION INTRAVENOUS PRN
Status: DISCONTINUED | OUTPATIENT
Start: 2022-10-25 | End: 2022-10-28 | Stop reason: HOSPADM

## 2022-10-25 RX ADMIN — CYCLOSPORINE 75 MG: 25 CAPSULE, LIQUID FILLED ORAL at 21:30

## 2022-10-25 RX ADMIN — INSULIN GLARGINE 10 UNITS: 100 INJECTION, SOLUTION SUBCUTANEOUS at 11:31

## 2022-10-25 RX ADMIN — SODIUM CHLORIDE 40 MG: 9 INJECTION INTRAMUSCULAR; INTRAVENOUS; SUBCUTANEOUS at 10:56

## 2022-10-25 RX ADMIN — MYCOPHENOLATE MOFETIL 750 MG: 250 CAPSULE ORAL at 21:33

## 2022-10-25 RX ADMIN — INSULIN LISPRO 2 UNITS: 100 INJECTION, SOLUTION INTRAVENOUS; SUBCUTANEOUS at 18:23

## 2022-10-25 RX ADMIN — GABAPENTIN 300 MG: 300 CAPSULE ORAL at 21:32

## 2022-10-25 RX ADMIN — CARVEDILOL 6.25 MG: 6.25 TABLET, FILM COATED ORAL at 11:31

## 2022-10-25 RX ADMIN — Medication 10 ML: at 10:59

## 2022-10-25 RX ADMIN — CARVEDILOL 6.25 MG: 6.25 TABLET, FILM COATED ORAL at 21:33

## 2022-10-25 RX ADMIN — MYCOPHENOLATE MOFETIL 750 MG: 250 CAPSULE ORAL at 11:31

## 2022-10-25 RX ADMIN — ATORVASTATIN CALCIUM 10 MG: 10 TABLET, FILM COATED ORAL at 21:33

## 2022-10-25 RX ADMIN — TAMSULOSIN HYDROCHLORIDE 0.4 MG: 0.4 CAPSULE ORAL at 10:56

## 2022-10-25 RX ADMIN — INSULIN GLARGINE 10 UNITS: 100 INJECTION, SOLUTION SUBCUTANEOUS at 21:30

## 2022-10-25 RX ADMIN — SODIUM CHLORIDE 40 MG: 9 INJECTION INTRAMUSCULAR; INTRAVENOUS; SUBCUTANEOUS at 21:32

## 2022-10-25 RX ADMIN — GABAPENTIN 300 MG: 300 CAPSULE ORAL at 10:56

## 2022-10-25 RX ADMIN — Medication 5 ML: at 13:00

## 2022-10-25 RX ADMIN — Medication 10 ML: at 23:16

## 2022-10-25 RX ADMIN — INSULIN LISPRO 4 UNITS: 100 INJECTION, SOLUTION INTRAVENOUS; SUBCUTANEOUS at 21:31

## 2022-10-25 RX ADMIN — PREDNISONE 5 MG: 5 TABLET ORAL at 10:56

## 2022-10-25 RX ADMIN — CYCLOSPORINE 75 MG: 25 CAPSULE, LIQUID FILLED ORAL at 10:57

## 2022-10-25 ASSESSMENT — ENCOUNTER SYMPTOMS
SHORTNESS OF BREATH: 0
COUGH: 0
STRIDOR: 0
RESPIRATORY NEGATIVE: 1
CHEST TIGHTNESS: 0
WHEEZING: 0
BLOOD IN STOOL: 1
NAUSEA: 0
EYES NEGATIVE: 1

## 2022-10-25 NOTE — FLOWSHEET NOTE
10/25/22 @ 200 Notified Dr. Angela Uriostegui of Cardiology consult via [address] [place] [address]

## 2022-10-25 NOTE — CONSULTS
[address]                      [address], [address] [facility]                                  CONSULTATION    PATIENT NAME: Jonelle Multani                 :        1950  MED REC NO:   [id number]                            ROOM:       T281  ACCOUNT NO:   [de-identified]                           ADMIT DATE: 10/19/2022  PROVIDER:     David Arora MD    CONSULT DATE:  10/24/2022    ENDOCRINE CONSULTATION    REFERRING PROVIDER:  Pura Noel DO    REASON FOR CONSULTATION:  Management of uncontrolled labile diabetes. CHIEF COMPLAINT AND HISTORY OF PRESENT ILLNESS:  The patient is a  21-year-old male with known history of diabetes with multiple  complications including history of kidney transplant on insulin NPH and  regular insulin in addition to Trulicity, was admitted to [facility] with fatigue for 2 weeks and possible syncopal episode,  known history of atrial fibrillation with rapid ventricular rate. Had  two falls recently. The patient also progressively getting worse. Initial glucose was elevated in the 200 to 500 range at the time of  admission, has improved steadily. The patient is also n.p.o., scheduled  for possible colonoscopy. Currently, he is on NPH insulin twice daily  plus Humalog coverage every 6 hours. Most current chemistries; sodium 144, potassium 4.3, chloride 140, CO2  was 16, BUN 70, creatinine 2.43. The patient's hemoglobin A1c was 8.5. Prior A1c have been between 8-10 range. The patient also seen here by  Nephrology and Gastroenterology. PAST MEDICAL HISTORY:  Significant for type 2 diabetes, history of brain  surgery, colonoscopy, kidney transplant, upper GI endoscopy. FAMILY HISTORY:  Reviewed, noncontributory. PERSONAL AND SOCIAL HISTORY:  Former cigarette smoker. Denies any  alcohol, substance abuse.     MEDICATIONS:  Here include NPH twice a day 8-10 units, Lipitor, Coreg,  cyclosporine, Retacrit, gabapentin, CellCept, Protonix, prednisone 5 mg  daily. ALLERGIES:  None. REVIEW OF SYSTEMS:  Other than weakness, falls, bruising, hyperglycemia,  14-point review of system was essentially unremarkable. PHYSICAL EXAMINATION:  GENERAL:  The patient is alert, awake, oriented x 3, in no obvious  distress. VITAL SIGNS:  Blood pressure 148/55, pulse rate was 72, respiratory rate  16, temperature 98. 1. HEENT:  Normocephalic, atraumatic. Pupils equal and reactive to light. Oral mucosa was slightly dry. NECK:  Supple. Trachea in midline. CHEST:  Lungs were clear to auscultation bilaterally. No wheezing or  crackles. CARDIOVASCULAR:  Heart sounds were normal.  No murmurs or thrills were  present. ABDOMEN:  Soft, nonobese. Bowel sounds are present. EXTREMITIES:  Lower extremities reveal chronic changes with bruising  bilateral upper and lower extremities and skin abrasions from fall. MUSCULOSKELETAL:  No joint swelling. SKIN:  Showing multiple bruising. NEUROLOGIC:  The patient unable to cooperate. PSYCHIATRIC:  Appears anxious and depressed. LABORATORY DATA:  As above. ASSESSMENT:  Uncontrolled type 2 diabetes, acute kidney injury, anemia,  GI bleed, history of kidney transplant. PLAN:  Change insulin to Lantus 10 units twice a day, Humalog coverage  medium dose every 4 hours. The patient to benefit for continuous  glucose monitoring. Might discuss insulin pump therapy. The patient  would like to follow up with [facility].  Has seen our practice  in the past.  Long-term A1c goal of 7 or lower. Total time spent 50 minutes. Thank you for the consult.         Alan Argueta MD    D: 10/24/2022 21:29:04       T: 10/24/2022 21:31:38     [email or web address]  Job#: [id number]     Doc#: [id number]    CC:

## 2022-10-25 NOTE — PROGRESS NOTES
Endocrinology Progress Note    Assessment and Plan:   Assessment-  Type 2 insulin-dependent diabetes  Anemia  GI bleed    Plan-  Continue Lantus 10 units twice daily  Continue Humalog 4 units 3 times daily before meals  Medium dose sliding scale Humalog coverage  Monitor glycemic control closely  Patient may be discharged home from endocrinology standpoint    POC Glucose:   Recent Labs     10/24/22  1112 10/24/22  1612 10/24/22  2126 10/25/22  0802 10/25/22  1135   POCGLU 216* 251* 206* 129* 142*     HGBA1C:  Lab Results   Component Value Date    LABA1C 8.5 (H) 10/19/2022    LABA1C 9.0 (H) 09/14/2022    LABA1C 8.7 (H) 09/11/2022     CBC:   Recent Labs     10/24/22  0521 10/24/22  1514 10/24/22  2012 10/25/22  0708   WBC 9.2  --   --  7.7   HGB 7.6*   < > 7.5* 6.5*     --   --  166    < > = values in this interval not displayed. CMP:    Lab Results   Component Value Date     10/25/2022    K 3.8 10/25/2022     (H) 10/25/2022    CO2 14 (L) 10/25/2022    BUN 52 (H) 10/25/2022    CREATININE 2.11 (H) 10/25/2022    GLUCOSE 106 (H) 10/25/2022    CALCIUM 8.3 (L) 10/25/2022    PROT 5.2 (L) 10/25/2022    LABALBU 3.1 (L) 10/25/2022    BILITOT 0.9 (H) 10/25/2022    ALKPHOS 46 10/25/2022    AST 7 10/25/2022    ALT <5 10/25/2022    LABGLOM 32.9 (L) 10/25/2022    GFRAA 28.2 (L) 10/12/2022    GLOB 2.1 (L) 10/25/2022       Lab Results   Component Value Date    TSH 0.574 10/19/2022    TSH 0.469 10/12/2022    TSH 0.789 09/11/2022    TSHREFLEX 0.972 09/14/2022     No results found for: TPOABS      CC:   Chief Complaint   Patient presents with    Fatigue     X 2 weeks        Subjective: Interval History: Patient is a 78-year-old gentleman presents emergency room after 2 weeks of weakness and fatigue. He has a history of atrial fibrillation, has had falls recently, history of multiple diabetic complications including kidney transplant.   His glycemic control is improving on current insulin dosing regiment. Review of systems: denies polyuria, polydipsia, ABD pain, flank pain, N/V/D, or diaphoresis  Medications:   Scheduled Meds:   pantoprazole (PROTONIX) 40 mg injection  40 mg IntraVENous Q12H    epoetin megan-epbx  10,000 Units SubCUTAneous Q7 Days    insulin glargine  10 Units SubCUTAneous BID    insulin lispro  0-8 Units SubCUTAneous 4x Daily    sodium chloride flush  5-40 mL IntraVENous 2 times per day    insulin lispro  10 Units SubCUTAneous Once    sodium chloride flush  5-40 mL IntraVENous 2 times per day    mycophenolate  750 mg Oral BID    sodium chloride flush  5-40 mL IntraVENous 2 times per day    cycloSPORINE modified  75 mg Oral BID    [Held by provider] insulin regular  6 Units SubCUTAneous Daily with breakfast    [Held by provider] insulin regular  8 Units SubCUTAneous BID WC    gabapentin  300 mg Oral BID    predniSONE  5 mg Oral Daily    atorvastatin  10 mg Oral Daily    tamsulosin  0.4 mg Oral Daily    carvedilol  6.25 mg Oral BID     Continuous Infusions:   sodium chloride      lactated ringers 100 mL/hr at 10/24/22 1929    sodium chloride      sodium chloride      sodium chloride 100 mL/hr at 10/24/22 1400    sodium chloride      sodium chloride      sodium chloride      dextrose         Objective:   Vitals: BP (!) 154/53   Pulse 67   Temp 98.9 °F (37.2 °C) (Oral)   Resp 20   Ht 6' 2\" (1.88 m)   Wt 185 lb (83.9 kg)   SpO2 99%   BMI 23.75 kg/m²    Wt Readings from Last 3 Encounters:   10/24/22 185 lb (83.9 kg)   10/14/22 195 lb 6.4 oz (88.6 kg)   10/12/22 192 lb (87.1 kg)        General appearance: alert, appears stated age, cooperative, and no distress  Skin: Skin color, texture, turgor normal. No rashes or lesions. Neck: thyroid normal size, non-tender,  without nodularity  Lungs: clear to auscultation bilaterally  Heart: regular rate and rhythm, S1, S2 normal, no murmur, click, rub or gallop  Abdomen: soft, non-tender.  Bowel sounds normal. No masses,  no

## 2022-10-25 NOTE — PROGRESS NOTES
Hospitalist Progress Note      PCP: Kayley Fontana DO    Date of Admission: 10/19/2022    Chief Complaint:    Chief Complaint   Patient presents with    Fatigue     X 2 weeks        Subjective:  Patient denies fevers, chills, sweats, CP, SOB, diarrhea or burning micturition.   12 point ROS negative other than mentioned above     Medications:  Reviewed    Infusion Medications    sodium chloride      lactated ringers 100 mL/hr at 10/24/22 1929    sodium chloride      sodium chloride      sodium chloride 100 mL/hr at 10/24/22 1400    sodium chloride      sodium chloride      sodium chloride      dextrose       Scheduled Medications    pantoprazole (PROTONIX) 40 mg injection  40 mg IntraVENous Q12H    epoetin megan-epbx  10,000 Units SubCUTAneous Q7 Days    insulin glargine  10 Units SubCUTAneous BID    insulin lispro  0-8 Units SubCUTAneous 4x Daily    sodium chloride flush  5-40 mL IntraVENous 2 times per day    insulin lispro  10 Units SubCUTAneous Once    sodium chloride flush  5-40 mL IntraVENous 2 times per day    mycophenolate  750 mg Oral BID    sodium chloride flush  5-40 mL IntraVENous 2 times per day    cycloSPORINE modified  75 mg Oral BID    [Held by provider] insulin regular  6 Units SubCUTAneous Daily with breakfast    [Held by provider] insulin regular  8 Units SubCUTAneous BID     gabapentin  300 mg Oral BID    predniSONE  5 mg Oral Daily    atorvastatin  10 mg Oral Daily    tamsulosin  0.4 mg Oral Daily    carvedilol  6.25 mg Oral BID     PRN Meds: sodium chloride, sodium chloride flush, sodium chloride, ondansetron, lidocaine 1 % injection, sodium chloride, sodium chloride flush, sodium chloride, sodium chloride, sodium chloride, sodium chloride flush, sodium chloride, acetaminophen **OR** acetaminophen, furosemide, glucose, dextrose bolus **OR** dextrose bolus, glucagon (rDNA), dextrose, prochlorperazine **OR** prochlorperazine      Intake/Output Summary (Last 24 hours) at 10/25/2022 1202  Last data filed at 10/24/2022 2218  Gross per 24 hour   Intake 1949.22 ml   Output --   Net 1949.22 ml       Exam:    BP (!) 153/49   Pulse 63   Temp 98.4 °F (36.9 °C)   Resp 16   Ht 6' 2\" (1.88 m)   Wt 185 lb (83.9 kg)   SpO2 97%   BMI 23.75 kg/m²     General appearance: No apparent distress, appears stated age and cooperative. HEENT:  Conjunctivae/corneas clear. Neck: Trachea midline. Respiratory:  Normal respiratory effort. Clear to auscultation  Cardiovascular: Regular rate and rhythm  Abdomen: Soft, non-tender, non-distended with normal bowel sounds. Musculoskeletal: No clubbing, cyanosis or edema bilaterally  Neuro: Non Focal.   Capillary Refill: Brisk,< 3 seconds   Peripheral Pulses: +2 palpable, equal bilaterally     Labs:   Recent Labs     10/23/22  0510 10/23/22  1438 10/24/22  0521 10/24/22  1514 10/24/22  2012 10/25/22  0708   WBC 7.8  --  9.2  --   --  7.7   HGB 6.7*   < > 7.6* 7.4* 7.5* 6.5*   HCT 20.3*   < > 23.7* 22.6* 22.3* 20.4*     --  177  --   --  166    < > = values in this interval not displayed. Recent Labs     10/23/22  1438 10/24/22  0521 10/25/22  0708    144 144   K 4.8 4.3 3.8   * 114* 115*   CO2 15* 16* 14*   BUN 77* 70* 52*   CREATININE 2.74* 2.43* 2.11*   CALCIUM 8.4* 8.6 8.3*       Recent Labs     10/23/22  0510 10/24/22  0521 10/25/22  0708   AST 8 8 7   ALT 6 <5 <5   BILITOT 0.7 0.7 0.9*   ALKPHOS 49 48 46       Recent Labs     10/23/22  0510 10/24/22  0521 10/25/22  0708   INR 1.3 1.3 1.2       Recent Labs     10/24/22  2334   TROPONINI 0.042*     Urinalysis:      Lab Results   Component Value Date/Time    NITRU Negative 10/19/2022 01:44 PM    WBCUA 10-20 10/19/2022 01:44 PM    BACTERIA FEW 10/19/2022 01:44 PM    RBCUA 6-10 10/19/2022 01:44 PM    BLOODU SMALL 10/19/2022 01:44 PM    SPECGRAV 1.012 10/19/2022 01:44 PM    GLUCOSEU Negative 10/19/2022 01:44 PM    GLUCOSEU GT 1 09/14/2011 08:57 AM     Radiology:  XR KNEE LEFT (3 VIEWS)   Final Result   1. 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 2. Probable chondrocalcinosis. 3. Possible trace suprapatellar knee joint effusion. XR KNEE RIGHT (3 VIEWS)   Final Result   Degenerative changes. No evidence of acute osseous abnormality is seen. CT HEAD WO CONTRAST   Final Result   1. There is no acute intracranial abnormality. Specifically, there is no   intracranial hemorrhage. 2. Atrophy and periventricular leukomalacia,   3. Mucosal thickening within the right sphenoid sinus. 4. Previous left craniotomy defect with encephalomalacia  seen within the   anterior aspect of the left temporal lobe. .         CT CERVICAL SPINE WO CONTRAST   Final Result   1. There is no acute compression fracture or subluxation of the cervical   spine. 2. Multilevel degenerative disc and degenerative joint disease. CT THORACIC SPINE WO CONTRAST   Final Result   1. No acute fracture or subluxation within the thoracic or lumbar spine. 2. Degenerative changes in the spine without definite central canal stenosis   in the thoracic spine. There is possible central canal stenosis at L3-4. There is multilevel neural foraminal narrowing within the lumbar spine. 3. Marked atrophy of the bilateral kidneys with indeterminate exophytic   lesion from the right kidney that may represent complicated cyst or solid   mass. The patient has prior examinations which are not available for   comparison. 4. There is a dependent pleural based opacity in the right lower lobe which   may represent atelectasis. There are prior examinations which are not   available for comparison. CT LUMBAR SPINE WO CONTRAST   Final Result   1. No acute fracture or subluxation within the thoracic or lumbar spine. 2. Degenerative changes in the spine without definite central canal stenosis   in the thoracic spine. There is possible central canal stenosis at L3-4.    There is multilevel neural foraminal narrowing within the lumbar spine. 3. Marked atrophy of the bilateral kidneys with indeterminate exophytic   lesion from the right kidney that may represent complicated cyst or solid   mass. The patient has prior examinations which are not available for   comparison. 4. There is a dependent pleural based opacity in the right lower lobe which   may represent atelectasis. There are prior examinations which are not   available for comparison. CTA CHEST W WO CONTRAST   Final Result   1. No large filling defects are seen on this examination to suggest saddle   embolus more in the larger primary and secondary vascular structures. 2.   There is consolidation again seen in the left lower lobe. Bronchial wall   thickening mild bronchiectasis and other changes of COPD are seen. An   ill-defined opacity is seen in the anterior left upper lobe. Recommend   follow-up to resolution to exclude any developing masses. There are multiple   nodules visualized and overall are stable. CT ABDOMEN PELVIS W IV CONTRAST Additional Contrast? None   Final Result   There is no evidence of an acute posttraumatic abnormality in the abdomen or   pelvis. Specifically, no evidence of splenic or renal injury. No   retroperitoneal hematoma. Indeterminate intermediate density 1.2 cm lesion arising from the mid right   kidney. Renal neoplasm not excluded. Correlation with renal ultrasound or   multiphasic contrast-enhanced CT or MRI of the abdomen utilizing a renal mass   protocol is recommended for further assessment. Indeterminate 1.4 cm low-density lesion arising from the inferior portion of   the pancreatic body centrally. This has imaging features most suspicious for   an IPMN. Correlation with contrast-enhanced abdominal MRI is also   recommended (unless otherwise contraindicated). 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       Linear/nodular opacities in the right lower lobe and right middle lobe. Please refer to the dedicated CT chest report for additional details. Assessment/Plan:    #Acute blood loss anemia secondary to dieulafoy ulcer     - s/p clipping by GI    - monitor; awaiting further GI recommendations    - transfusion goal > 7; PRBC ordered    #CKD    - per nephrology    #Lung nodules    - Pulm consulted    Active Hospital Problems    Diagnosis Date Noted    Dieulafoy lesion of colon [K63.81] 10/24/2022     Priority: Medium    Poorly controlled diabetes mellitus ([facility] [address] [E11.65] 10/24/2022     Priority: Medium    Symptomatic anemia [D64.9] 10/19/2022     Priority: Medium    Acute upper GI bleed [K92.2] 10/19/2022     Priority: Medium        Additional work up or/and treatment plan may be added today or then after based on clinical progression. I am managing a portion of pt care. Some medical issues are handled by other specialists. Additional work up and treatment should be done in out pt setting by pt PCP and other out pt providers. In addition to examining and evaluating pt, I spent additional time explaining care, normal and abnormal findings, and treatment plan. All of pt questions were answered. Counseling, diet and education were  provided. Case will be discussed with nursing staff when appropriate. Family will be updated if and when appropriate. Diet: ADULT DIET;  Clear Liquid    Code Status: Full Code    Electronically signed by Ginette Sexton MD on 10/25/2022 at 12:02 PM

## 2022-10-25 NOTE — PROGRESS NOTES
Gastroenterology Progress Note    Leopoldo Sahni is a 80 y.o. male patient. Hospitalization Day:6    Chief C/O: GIB    SUBJECTIVE: Seen and examined, no acute events overnight, hemoglobin 6.5, no overt bleeding, status post colonoscopy with active bleeding for Dula Tien/AVM found in the cecum requiring endoscopic hemostasis with epinephrine and clip. ROS:  Gastrointestinal ROS: no abdominal pain, change in bowel habits, or black or bloody stools    Physical    VITALS:  BP (!) 153/49   Pulse 63   Temp 98.4 °F (36.9 °C)   Resp 16   Ht 6' 2\" (1.88 m)   Wt 185 lb (83.9 kg)   SpO2 97%   BMI 23.75 kg/m²   TEMPERATURE:  Current - Temp: 98.4 °F (36.9 °C); Max - Temp  Av.6 °F (37 °C)  Min: 98.1 °F (36.7 °C)  Max: 98.9 °F (37.2 °C)    General:  Alert and oriented,  No apparent distress  Skin- without jaundice  Eyes: anicteric sclera  Cardiac: RRR, Nl s1s2, without murmurs  Lungs CTA Bilaterally, normal effort  Abdomen soft, ND, NT, no HSM, Bowel sounds normal  Ext: without edema  Neuro: no asterixis     Data    Data Review:    Recent Labs     10/23/22  0510 10/23/22  1438 10/24/22  0521 10/24/22  1514 10/24/22  2012 10/25/22  0708   WBC 7.8  --  9.2  --   --  7.7   HGB 6.7*   < > 7.6* 7.4* 7.5* 6.5*   HCT 20.3*   < > 23.7* 22.6* 22.3* 20.4*   MCV 88.1  --  89.6  --   --  89.6     --  177  --   --  166    < > = values in this interval not displayed. Recent Labs     10/23/22  1438 10/24/22  0521 10/25/22  0708    144 144   K 4.8 4.3 3.8   * 114* 115*   CO2 15* 16* 14*   BUN 77* 70* 52*   CREATININE 2.74* 2.43* 2.11*     Recent Labs     10/10 10/24/22  0521 10/25/22  0708   AST 8 8 7   ALT 6 <5 <5   BILITOT 0.7 0.7 0.9*   ALKPHOS 49 48 46     No results for input(s): LIPASE, AMYLASE in the last 72 hours.   Recent Labs     10/23/22  0510 10/24/22  0521 10/25/22  0708   PROTIME 16.5* 16.2* 15.6*   INR 1.3 1.3 1.2           ASSESSMENT:  67-year-old male admitted with anemia and melena in the context of supratherapeutic INR while on Coumadin, on arrival INR was 3.1 patient had been experiencing intermittent melena over the course of the month hemoglobin was 6.3. Had normal EGD. CT the abdomen was notable for 1.4 cm pancreatic body lesion possibly IPMN will likely need an outpatient EUS with Dr. Casey Hendricks for further evaluation. Has been tolerating regular diet INR now 1.5.   10/23/22 no further melena, hemoglobin 6.7,  INR 1.3, Coumadin is on hold, colonoscopy tomorrow. Planned for 1 unit PRBC today  10/24/22 reports melena with colonoscopy prep. Hemoglobin 7.7, INR 1.3, Coumadin is on hold  10/25/22 Hgb 6.5 no overt bleeding overnight, tolerated clear liquids, S/P colonoscopy which noted A pulsating intermittent bleeding noted. A single small Dieulafoy Vs AVM with bleeding was found in the ascending colon. Area was successfully injected with 4 mL of a 0.1 mg/mL solution of epinephrine for hemostasis. To stop active bleeding, two hemostatic clips were successfully placed. There was no bleeding at the end of the procedure. PLAN :  -Continue course of care  -Continue serial H&H, trend and transfuse accordingly  -advance to full liquids    Thank you for allowing me to participate in the care of your patient. Please feel free to contact me with any concerns.     HAJA Alcala - CNP

## 2022-10-25 NOTE — CONSULTS
Pulmonary Medicine  Consult Note      Reason for consultation: Abnormal CT chest     Consulting physician: Dr. Patrick Delay:    This is 69-year-old male with past medical history significant for renal transplant, diabetes mellitus, chronic kidney disease s/p renal transplant in 2015 at [facility], atrial fibrillation on anticoagulation therapy. He has complaint of blood in stool for about 1 month shortly after he was started on Coumadin. He has 2 fall recently and 1 was syncopal episode , wife found him with suspected loss of consciousness. He has been very weak. He was brought to ER and admitted. He was discovered to be severely anemic. He has total 5 unit PRBC transfusion since admission. receiving procrit injections. He denies any cough or sputum production. No fever or chills. No hemoptysis. He had CT chest done in ER shows right lower lobe consolidation and also trace of pleural fluid. Right upper lobe opacity 5.8 x 11 x 12 mm. Most nodule measuring 4 mm or loss right middle lobe laterally 6 mm nodule seen reticulonodular opacity most prominent lateral right upper lobe and central emphysematous changes. Bronchial wall dilators and groundglass opacity seen in medial aspect of right upper lobe. Negative for PE.         Past Medical History:        Diagnosis Date    Diabetes mellitus ([address]     Hemodialysis access, fistula mature ([address] 12/2002    Hypertension     Seizures ([address]     no seizure since 1995       Past Surgical History:        Procedure Laterality Date    BRAIN SURGERY Left 12/06/1990    to eliminate seizures    COLONOSCOPY N/A 10/24/2022    COLONOSCOPY DIAGNOSTIC performed by Lianet Abel MD at [facility]  2015    DC [address] INCL FLUOR GDNCE DX W/CELL [address] N/A 3/20/2018    BRONCHOSCOPY performed by Javier Vazquez MD at [address] 10/21/2022    EGD DIAGNOSTIC ONLY performed by Lawrence Montelongo MD at [address] History:     reports that he quit smoking about 7 years ago. His smoking use included cigarettes. He smoked an average of .25 packs per day. He has quit using smokeless tobacco. He reports that he does not drink alcohol and does not use drugs. Family History:       Problem Relation Age of Onset    Colon Cancer Neg Hx        Allergies:  Patient has no known allergies.         MEDICATIONS during current hospitalization:    Continuous Infusions:   sodium chloride      sodium chloride      sodium chloride      sodium chloride 100 mL/hr at 10/24/22 1400    sodium chloride      sodium chloride      sodium chloride      dextrose         Scheduled Meds:   pantoprazole (PROTONIX) 40 mg injection  40 mg IntraVENous Q12H    epoetin megan-epbx  10,000 Units SubCUTAneous Q7 Days    insulin glargine  10 Units SubCUTAneous BID    insulin lispro  0-8 Units SubCUTAneous 4x Daily    sodium chloride flush  5-40 mL IntraVENous 2 times per day    insulin lispro  10 Units SubCUTAneous Once    sodium chloride flush  5-40 mL IntraVENous 2 times per day    mycophenolate  750 mg Oral BID    sodium chloride flush  5-40 mL IntraVENous 2 times per day    cycloSPORINE modified  75 mg Oral BID    [Held by provider] insulin regular  6 Units SubCUTAneous Daily with breakfast    [Held by provider] insulin regular  8 Units SubCUTAneous BID WC    gabapentin  300 mg Oral BID    predniSONE  5 mg Oral Daily    atorvastatin  10 mg Oral Daily    tamsulosin  0.4 mg Oral Daily    carvedilol  6.25 mg Oral BID       PRN Meds:sodium chloride, sodium chloride flush, sodium chloride, sodium chloride, sodium chloride flush, sodium chloride, sodium chloride, sodium chloride, sodium chloride flush, sodium chloride, acetaminophen **OR** acetaminophen, furosemide, glucose, dextrose bolus **OR** dextrose bolus, glucagon (rDNA), dextrose, prochlorperazine **OR** prochlorperazine    REVIEW OF SYSTEMS:  As in history of present illness  Other 14 point review of system is negative. PHYSICAL EXAM:    Vitals:  BP (!) 170/58   Pulse 58   Temp 98.4 °F (36.9 °C) (Oral)   Resp 18   Ht 6' 2\" (1.88 m)   Wt 185 lb (83.9 kg)   SpO2 100%   BMI 23.75 kg/m²   General:   Alert, awake, Oriented x3  .comfortable in bed, No distress. Head: Atraumatic , Normocephalic   Eyes: PERRL. No sclera icterus. No conjunctival injection. No discharge   ENT: No nasal  discharge. Pharynx clear. Neck:  Trachea midline. No thyromegaly, no JVD, No cervical adenopathy. Chest : Bilaterally symmetrical ,Normal effort,  No accessory muscle use  Lung : Diminished BS bilateraly. No Rales. No wheezing. No rhonchi. Heart[de-identified] Normal  rate. Regular rhythm. No mumur ,  Rub or gallop  ABD: Non-tender. Non-distended. No masses. No organmegaly. Normal bowel sounds. No hernia. Musculoskeleton: normal range of motion in all extremites, strength and tone Extremities: No pitting in both lower leg , No Cyanosis ,No clubbing  Neuro: no cranial nerve abnormality, normal reflex and sensation, no focal weakness   Skin: Warm and dry. No erythema rash on exposed extremities. Data Review  Recent Labs     10/23/22  0510 10/23/22  1438 10/24/22  0521 10/24/22  1514 10/24/22  2012 10/25/22  0708 10/25/22  2029   WBC 7.8  --  9.2  --   --  7.7  --    HGB 6.7*   < > 7.6*   < > 7.5* 6.5* 8.1*   HCT 20.3*   < > 23.7*   < > 22.3* 20.4* 25.1*     --  177  --   --  166  --     < > = values in this interval not displayed. Recent Labs     10/23/22  1438 10/24/22  0521 10/25/22  0708    144 144   K 4.8 4.3 3.8   * 114* 115*   CO2 15* 16* 14*   BUN 77* 70* 52*   CREATININE 2.74* 2.43* 2.11*   GLUCOSE 205* 140* 106*       MV Settings: ABGs: No results for input(s): PHART, FWO9OJP, PO2ART, WYU8LRR, BEART, A8WAEDMY, GAE5ICM in the last 72 hours.   O2 Device: None (Room air)  O2 Flow Rate (L/min): 2 L/min  Lab Results   Component Value Date/Time LACTA 1.0 10/19/2022 11:02 AM    LACTA 1.0 12/10/2020 05:00 PM       Radiology  XR KNEE LEFT (3 VIEWS)    Result Date: 10/20/2022  EXAMINATION: THREE XRAY VIEWS OF THE LEFT KNEE 10/19/2022 11:30 am COMPARISON: None. HISTORY: ORDERING SYSTEM PROVIDED HISTORY: fell onto knee 2 days ago; pain at Knee cap, SUNRISE view TECHNOLOGIST PROVIDED HISTORY: Reason for exam:->fell onto knee 2 days ago; pain at Knee cap Reason for exam:->SUNRISE view What reading provider will be dictating this exam?->CRC FINDINGS: AP, lateral, and sunrise views of the left knee were obtained. There is no acute fracture or dislocation. There is mild joint space narrowing in the medial compartment with associated osteophyte formation. There is suggestion of chondrocalcinosis. There is possible trace suprapatellar knee joint effusion. The visualized soft tissue structures are unremarkable. There is atherosclerotic disease. 1. 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 2. Probable chondrocalcinosis. 3. Possible trace suprapatellar knee joint effusion. XR KNEE RIGHT (3 VIEWS)    Result Date: 10/19/2022  EXAMINATION: THREE XRAY VIEWS OF THE RIGHT KNEE 10/19/2022 11:30 am COMPARISON: 11/22/2019 HISTORY: ORDERING SYSTEM PROVIDED HISTORY: fall knee pain, [place] view TECHNOLOGIST PROVIDED HISTORY: Reason for exam:->fall knee pain Reason for exam:->Sunrise view What reading provider will be dictating this exam?->CRC FINDINGS: Mild to moderate narrowing of the medial joint space, mild narrowing of the lateral and patellofemoral joint spaces is seen. Unremarkable alignment with no evidence of dislocation. Mild degenerative changes visualized. No evidence of cortical irregularity or lucency to suggest a fracture, no evidence of lytic or sclerotic bone lesion is seen. No evidence of suprapatellar effusion. The quadriceps and patellar tendons are unremarkable. Extensive vascular calcifications are seen. Degenerative changes.   No evidence of acute osseous abnormality is seen. CT HEAD WO CONTRAST    Result Date: 10/19/2022  EXAMINATION: CT OF THE HEAD WITHOUT CONTRAST  10/19/2022 11:11 am TECHNIQUE: CT of the head was performed without the administration of intravenous contrast. Automated exposure control, iterative reconstruction, and/or weight based adjustment of the mA/kV was utilized to reduce the radiation dose to as low as reasonably achievable. COMPARISON: None. HISTORY: ORDERING SYSTEM PROVIDED HISTORY: passed out hit head; on coumadin. .. ? bleed, ? 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 ->No Reason for exam:->passed out hit head; on coumadin. .. ? bleed, ? 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 No abnormal extra-axial fluid collection. The gray-white differentiation is maintained without evidence of an acute infarct. There is no evidence of hydrocephalus. The ventricles, cisterns and sulci are prominent consistent with atrophy. There is decreased attenuation within the periventricular white matter consistent with periventricular leukomalacia. There is encephalomalacia is seen within the anterior aspect of the left temporal fossa. There is an old left craniotomy defect. ORBITS: The visualized portion of the orbits demonstrate no acute abnormality. SINUSES: The visualized paranasal sinuses and mastoid air cells demonstrate no acute abnormality. There is mucosal thickening seen within the right sphenoid sinus. SOFT TISSUES/SKULL:  No acute abnormality of the visualized skull or soft tissues.      1.  There is no acute intracranial abnormality. Specifically, there is no intracranial hemorrhage. 2. Atrophy and periventricular leukomalacia, 3. Mucosal thickening within the right sphenoid sinus. 4. Previous left craniotomy defect with encephalomalacia  seen within the anterior aspect of the left temporal lobe. .     CTA CHEST W WO CONTRAST    Result Date: 10/19/2022  EXAMINATION: CTA OF THE CHEST WITH AND WITHOUT CONTRAST 10/19/2022 11:11 am TECHNIQUE: CTA of the chest was performed before and after the administration of intravenous contrast.  Multiplanar reformatted images are provided for review. MIP images are provided for review. Automated exposure control, iterative reconstruction, and/or weight based adjustment of the mA/kV was utilized to reduce the radiation dose to as low as reasonably achievable. COMPARISON: March 15, 2018 HISTORY: ORDERING SYSTEM PROVIDED HISTORY: syncope fell to floor 2 days ago; ? pulmonary embolus; please also mention if trauma injury. Suspect PE as cause of fall or pneumonia TECHNOLOGIST PROVIDED HISTORY: Reason for exam:->syncope fell to floor 2 days ago; ? pulmonary embolus; please also mention if trauma injury. Suspect PE as cause of fall or pneumonia Decision Support Exception - unselect if not a suspected or confirmed emergency medical condition->Emergency Medical Condition (MA) What reading provider will be dictating this exam?->CRC FINDINGS: Aorta: At the level of the left main pulmonary artery the ascending aorta measures 3.8 cm in greatest transverse dimension and 34 cm. No acute abnormality of the aorta. No large filling defects are seen in the main, 1st and 2nd order vessels. Mediastinum: No evidence of mediastinal lymphadenopathy. The heart and pericardium demonstrate no acute abnormality. Coronary calcifications and are stents are seen in place. Lungs/Pleura: In the right lower lobe there is an area of consolidation. Also  a trace of pleural fluid is seen.  There are multiple nodules visualized in the lungs. Most appear stable. In the anterior right upper lobe and opacity is seen that measures 5.8 x 11 by 12 mm the uterus. Most nodules measure 4 mm or less. In the right middle lobe laterally a 6 mm nodule is seen. This was seen on previous study. Reticulonodular opacities are seen bilaterally and are most prominent in the lateral right upper lobe. Centrilobular emphysematous changes are seen as well as paraseptal emphysematous changes. Bronchial wall dilatation and ground-glass opacities are seen in the medial aspect of the right upper lobe. Upper Abdomen: Limited images of the upper abdomen are unremarkable. Soft Tissues/Bones: Degenerative changes are seen in the spine at multiple levels. No acute fractures are identified on this examination. 1.  No large filling defects are seen on this examination to suggest saddle embolus more in the larger primary and secondary vascular structures. 2. There is consolidation again seen in the left lower lobe. Bronchial wall thickening mild bronchiectasis and other changes of COPD are seen. An ill-defined opacity is seen in the anterior left upper lobe. Recommend follow-up to resolution to exclude any developing masses. There are multiple nodules visualized and overall are stable. CT CERVICAL SPINE WO CONTRAST    Result Date: 10/19/2022  EXAMINATION: CT OF THE CERVICAL SPINE WITHOUT CONTRAST 10/19/2022 11:11 am TECHNIQUE: CT of the cervical spine was performed without the administration of intravenous contrast. Multiplanar reformatted images are provided for review. Automated exposure control, iterative reconstruction, and/or weight based adjustment of the mA/kV was utilized to reduce the radiation dose to as low as reasonably achievable. COMPARISON: None.  HISTORY: ORDERING SYSTEM PROVIDED HISTORY: fall from standing 2 days ago and again today TECHNOLOGIST PROVIDED HISTORY: Reason for exam:->fall from standing 2 days ago and again today Decision Support Exception - unselect if not a suspected or confirmed emergency medical condition->Emergency Medical Condition (MA) What reading provider will be dictating this exam?->CRC FINDINGS: The ring of C1 is intact as is the dense. There is no compression fracture of the cervical spine. No jumped or perched facet is noted. Multilevel degenerative disc and degenerative joint disease is noted. Posterior degenerative endplate spurs are seen at the C3-4, C4-5 and C5-6 levels. There is effacement of the ventral thecal sac at the C3-4 through C5-6 levels due to the posterior degenerative endplate spurs. The prevertebral soft tissues are unremarkable. The airway is widely patent. Images through the lung apices are negative for a pneumothorax. 1. There is no acute compression fracture or subluxation of the cervical spine. 2. Multilevel degenerative disc and degenerative joint disease. CT THORACIC SPINE WO CONTRAST    Result Date: 10/19/2022  EXAMINATION: CT OF THE THORACIC SPINE WITHOUT CONTRAST; CT OF THE LUMBAR SPINE WITHOUT CONTRAST  10/19/2022 11:11 am: TECHNIQUE: CT of the thoracic spine was performed without the administration of intravenous contrast. Multiplanar reformatted images are provided for review. Automated exposure control, iterative reconstruction, and/or weight based adjustment of the mA/kV was utilized to reduce the radiation dose to as low as reasonably achievable.; CT of the lumbar spine was performed without the administration of intravenous contrast. Multiplanar reformatted images are provided for review. Adjustment of mA and/or kV according to patient size was utilized. Automated exposure control, iterative reconstruction, and/or weight based adjustment of the mA/kV was utilized to reduce the radiation dose to as low as reasonably achievable. COMPARISON: None.  HISTORY: ORDERING SYSTEM PROVIDED HISTORY: Fall from standing; Trauma assessment TECHNOLOGIST PROVIDED HISTORY: Reason for exam:->Fall from standing; Trauma assessment What reading provider will be dictating this exam?->CRC FINDINGS: CT thoracic spine: There is no evidence of acute fracture. Vertebral alignment is anatomic. There are no acute compression deformities. There is minimal concavity at the superior endplates of T1 through T3. There are changes of diffuse idiopathic skeletal hyperostosis within the thoracic spine. The paravertebral soft tissue structures are unremarkable. There is no gross evidence of central canal stenosis. There is suggestion of pleural thickening in the posterior right lower lobe with a pleural based opacity that may represent atelectasis. CT lumbar spine: There is no evidence of acute fracture. Vertebral alignment is anatomic. There are no compression deformities. The intervertebral disc space heights appear relatively preserved. There is facet hypertrophy throughout the lumbar spine. The paravertebral soft tissue structures are unremarkable. There is possible central canal stenosis at L3-4. There is multilevel neural foraminal narrowing. There is arteriosclerosis without abdominal aortic aneurysm. There is marked atrophy of the bilateral kidneys. There is exophytic lesion from the posterior right kidney which is more dense than simple cyst, measuring approximately 48 Hounsfield units. This may represent complicated cyst.  A solid mass cannot be entirely excluded. 1. No acute fracture or subluxation within the thoracic or lumbar spine. 2. Degenerative changes in the spine without definite central canal stenosis in the thoracic spine. There is possible central canal stenosis at L3-4. There is multilevel neural foraminal narrowing within the lumbar spine. 3. Marked atrophy of the bilateral kidneys with indeterminate exophytic lesion from the right kidney that may represent complicated cyst or solid mass. The patient has prior examinations which are not available for comparison.  4. There is a dependent pleural based opacity in the right lower lobe which may represent atelectasis. There are prior examinations which are not available for comparison. CT LUMBAR SPINE WO CONTRAST    Result Date: 10/19/2022  EXAMINATION: CT OF THE THORACIC SPINE WITHOUT CONTRAST; CT OF THE LUMBAR SPINE WITHOUT CONTRAST  10/19/2022 11:11 am: TECHNIQUE: CT of the thoracic spine was performed without the administration of intravenous contrast. Multiplanar reformatted images are provided for review. Automated exposure control, iterative reconstruction, and/or weight based adjustment of the mA/kV was utilized to reduce the radiation dose to as low as reasonably achievable.; CT of the lumbar spine was performed without the administration of intravenous contrast. Multiplanar reformatted images are provided for review. Adjustment of mA and/or kV according to patient size was utilized. Automated exposure control, iterative reconstruction, and/or weight based adjustment of the mA/kV was utilized to reduce the radiation dose to as low as reasonably achievable. COMPARISON: None. HISTORY: ORDERING SYSTEM PROVIDED HISTORY: Fall from standing; Trauma assessment TECHNOLOGIST PROVIDED HISTORY: Reason for exam:->Fall from standing; Trauma assessment What reading provider will be dictating this exam?->CRC FINDINGS: CT thoracic spine: There is no evidence of acute fracture. Vertebral alignment is anatomic. There are no acute compression deformities. There is minimal concavity at the superior endplates of T1 through T3. There are changes of diffuse idiopathic skeletal hyperostosis within the thoracic spine. The paravertebral soft tissue structures are unremarkable. There is no gross evidence of central canal stenosis. There is suggestion of pleural thickening in the posterior right lower lobe with a pleural based opacity that may represent atelectasis. CT lumbar spine: There is no evidence of acute fracture.   Vertebral alignment is anatomic. There are no compression deformities. The intervertebral disc space heights appear relatively preserved. There is facet hypertrophy throughout the lumbar spine. The paravertebral soft tissue structures are unremarkable. There is possible central canal stenosis at L3-4. There is multilevel neural foraminal narrowing. There is arteriosclerosis without abdominal aortic aneurysm. There is marked atrophy of the bilateral kidneys. There is exophytic lesion from the posterior right kidney which is more dense than simple cyst, measuring approximately 48 Hounsfield units. This may represent complicated cyst.  A solid mass cannot be entirely excluded. 1. No acute fracture or subluxation within the thoracic or lumbar spine. 2. Degenerative changes in the spine without definite central canal stenosis in the thoracic spine. There is possible central canal stenosis at L3-4. There is multilevel neural foraminal narrowing within the lumbar spine. 3. Marked atrophy of the bilateral kidneys with indeterminate exophytic lesion from the right kidney that may represent complicated cyst or solid mass. The patient has prior examinations which are not available for comparison. 4. There is a dependent pleural based opacity in the right lower lobe which may represent atelectasis. There are prior examinations which are not available for comparison. CT ABDOMEN PELVIS W IV CONTRAST Additional Contrast? None    Result Date: 10/19/2022  EXAMINATION: CT OF THE ABDOMEN AND PELVIS WITH CONTRAST 10/19/2022 11:11 am TECHNIQUE: CT of the abdomen and pelvis was performed with the administration of intravenous contrast. Multiplanar reformatted images are provided for review. Automated exposure control, iterative reconstruction, and/or weight based adjustment of the mA/kV was utilized to reduce the radiation dose to as low as reasonably achievable. COMPARISON: None.  HISTORY: ORDERING SYSTEM PROVIDED HISTORY: fall from standing 2 days ago and again today; ? spleen rupture, ? kidney hematoma; ? retroparitoneal bleed, TRAUMA on coumadin, Hx of kidney transplant TECHNOLOGIST PROVIDED HISTORY: Reason for exam:->fall from standing 2 days ago and again today; ? spleen rupture, ? kidney hematoma; ? retroparitoneal bleed Reason for exam:->TRAUMA on coumadin Reason for exam:->Hx of kidney transplant Additional Contrast?->None Decision Support Exception - unselect if not a suspected or confirmed emergency medical condition->Emergency Medical Condition (MA) What reading provider will be dictating this exam?->CRC FINDINGS: Lower Chest: Heart size is within normal limits. There is focal bronchiectasis in the right lower lobe. Subpleural curvilinear opacities are partially imaged in the right lower lobe and lateral aspect of the right middle lobe. Please refer to dedicated CT chest report for additional details. Organs: The gallbladder is distended without obvious abnormality. The liver enhances homogeneously. The portal vein is patent and there is no intrahepatic biliary ductal dilatation. The spleen and adrenal glands are normal in size. There is a 1.4 cm low-density lesion in the inferior margin of the proximal body of the pancreas on axial image 46. Otherwise, the pancreas enhances homogeneously. No pancreatic ductal dilatation. There is severe bilateral renal cortical atrophy. A 1.2 cm intermediate density (potentially enhancing) lesion arises from the posterior aspect of the mid right kidney on axial image 57. There is a severely atrophic renal transplant in the right lower quadrant and a normally enhancing left lower quadrant renal transplant. GI/Bowel: No evidence of a bowel obstruction, free air or pneumatosis. Portions of the colon are decompressed, limiting assessment for mucosal based abnormalities. Stomach and duodenal sweep demonstrate no acute abnormality. The appendix is normal. Pelvis:  The urinary bladder is distended without obvious abnormality. The prostate gland is mildly heterogeneous but nonenlarged. Visible but nonenlarged pelvic lymph nodes are present. Peritoneum/Retroperitoneum: The abdominal aorta is heavily calcified but nonaneurysmal.  No retroperitoneal lymphadenopathy or mass. Bones/Soft Tissues: No acute osseous abnormality or evidence of an aggressive osseous lesion. There are moderate-to-severe degenerative changes of the lower lumbar spine mainly in the form of intervertebral disc space narrowing/facet arthropathy at L5-S1. There is no evidence of an acute posttraumatic abnormality in the abdomen or pelvis. Specifically, no evidence of splenic or renal injury. No retroperitoneal hematoma. Indeterminate intermediate density 1.2 cm lesion arising from the mid right kidney. Renal neoplasm not excluded. Correlation with renal ultrasound or multiphasic contrast-enhanced CT or MRI of the abdomen utilizing a renal mass protocol is recommended for further assessment. Indeterminate 1.4 cm low-density lesion arising from the inferior portion of the pancreatic body centrally. This has imaging features most suspicious for an IPMN. Correlation with contrast-enhanced abdominal MRI is also recommended (unless otherwise contraindicated). Renal transplants in the bilateral lower quadrants without evidence of posttraumatic findings to the allografts. Linear/nodular opacities in the right lower lobe and right middle lobe. Please refer to the dedicated CT chest report for additional details. XR CHEST PORTABLE    Result Date: 10/12/2022  EXAMINATION: ONE XRAY VIEW OF THE CHEST 10/12/2022 2:58 pm COMPARISON: 09/14/2022 HISTORY: ORDERING SYSTEM PROVIDED HISTORY: sob and shoulder pain TECHNOLOGIST PROVIDED HISTORY: Reason for exam:->sob and shoulder pain What reading provider will be dictating this exam?->CRC FINDINGS: The lungs are without acute focal process. There is no effusion or pneumothorax.  The cardiomediastinal silhouette is without acute process. Heart upper limits of normal in size. The osseous structures are without acute process. No acute process. EGD    Result Date: 10/21/2022  Site: [address] [place] [address] Patient Name: Kay Cantor Procedure Date: 10/21/2022 MRN: [id number] YOB: 1950 Gender: Male Attending MD: Laura Litten Indications:        - melena. Medications:        -  See the Anesthesia note for documentation of the administered medications Complications:        -  No immediate complications. Estimated Blood Loss:        -  Estimated blood loss: none. Procedure:        - The Gastroscope was introduced through the mouth and advanced to the third           part of the duodenum.        -  The upper GI endoscopy was accomplished without difficulty. -  The patient tolerated the procedure well. Findings:        -  The examined esophagus was normal.        -  The Z-line was regular and was found 42 cm from the incisors. -  The entire examined stomach was normal.        -  The examined duodenum was normal. Impression:        -  Normal esophagus. -  Z-line regular, 42 cm from the incisors. -  Normal stomach. -  Normal examined duodenum.        -  No specimens collected. Recommendation:        - colonoscopy in the next day or two, Dr Corbin Corea will follow patient starting           this evening. Procedure Code(s):        - [id number], Esophagogastroduodenoscopy, flexible, transoral; diagnostic,           including collection of specimen(s) by brushing or washing, when performed           (separate procedure)       CPT(R) - 2021 copyright [employer]. All Rights Reserved. The CPT codes, CCI edits and ICD codes generated are intended as suggestions       and were generated based on input data. These codes are preliminary and upon        review may be revised to meet current compliance and payer requirements.        The 4 mL of a 0.1 mg/mL solution of epinephrine for hemostasis. To stop           active bleeding, two hemostatic clips were successfully placed. There was no           bleeding at the end of the procedure. -  A single bleeding colonic small Dieulafoy Vs AVM  with bleeding . Injected. Clips were placed. -  External non-bleeding hemorrhoids.        -  No specimens collected. Recommendation:        -  Clear liquid diet today. - Continue serial H/H        - Further recommendations per inpatient team Procedure Code(s):        - 04381, Colonoscopy, flexible; with control of bleeding, any method Diagnosis Code(s):        - K92.1, Melena (includes Hematochezia)        - K92.2, Gastrointestinal hemorrhage, unspecified        - K55.21, Angiodysplasia of colon with hemorrhage        - K64.4, Residual hemorrhoidal skin tags       CPT(R) - 2021 copyright [employer]. All Rights Reserved. The CPT codes, CCI edits and ICD codes generated are intended as suggestions       and were generated based on input data. These codes are preliminary and upon        review may be revised to meet current compliance and payer requirements. The provider is responsible for the final determination of appropriate codes,       and modifiers. Scope Withdrawal Time:       00:30:15 Signature Name: Alvin Apple MD Signature Statement: This document has been electronically signed. Note Initiated On:10/24/2022 Signature Date:10/24/2022 2:59 PM      Assessment and  plan:     Right lower lobe consolidation, pneumonia versus rounded atelectasis  COPD without exacerbation  Multiple lung nodules, stable  Mild bronchiectasis  Acute blood loss anemia secondary to GI bleed  S/p  renal transplant in 2015, chronic kidney disease    Patient had CT chest in ER shows right lower lobe pleural-based consolidation, no symptoms of pneumonia, denies having any cough or shortness of breath.   He has no fever or chills. WBC is within normal range. Possible rounded atelectasis organizing pneumonia. Also has multiple small lung nodules which appear to be stable. Has a changes of COPD. He is mainly admitted with generalized weakness with fall due to severe anemia and appeared to be since he has been started on anticoagulation therapy he has been having black tarry stool. Hehas received 5 units of PRBC. GI is following. He is on room air and O2 saturation is 97%. .  Recommend follow-up CT chest in 4 to 8 weeks if patient has persistent right lower lobe consolidation may consider further evaluation including biopsy or PET scan. We will follow    Thank you for consult  NOTE: This report was transcribed using voice recognition software. Every effort was made to ensure accuracy; however, inadvertent computerized transcription errors may be present.     Electronically signed by Phyllistine Romberg, MD, FCCP on 10/25/2022 at 11:13 PM

## 2022-10-25 NOTE — CONSULTS
Inpatient consult to Cardiology  Consult performed by: Ila Glasgow MD  Consult ordered by: Chago Hammond MD        Patient Name: Arsenio Burris Date: 10/19/2022 10:22 AM  MR #: [id number]  : 1950    Attending Physician: Ila Glasgow MD  Reason for consult: CP    History of Presenting Illness:      Anna Nicole is a 80 y.o. male on hospital day 6 with a history of . History Obtained From:  patient, spouse, electronic medical record    Pt has been in hosp 1 week. Initially admitted with fall at home. He was discovered to be severely anemic. Has been transfused. Receiving procrit injections. Last night during injection he felt chest pressure left side. No radiation. It has since resolved. Initial Trop elevated but he has chronic CKD s/o Renal Transplant. He has SPECT in 2022 which was negative. He has PAF and has been on warfarin and rate control. W/U disclosed an ulcer and he underwent clipping.    History:      EKG: SR on Telemetry   Past Medical History:   Diagnosis Date    Diabetes mellitus ([facility]     Hemodialysis access, fistula mature ([facility] 2002    Hypertension     Seizures ([facility]     no seizure since      Past Surgical History:   Procedure Laterality Date    BRAIN SURGERY Left 1990    to eliminate seizures    COLONOSCOPY N/A 10/24/2022    COLONOSCOPY DIAGNOSTIC performed by Nava Elena MD at [facility]      OR [address] INCL FLUOR GDNCE DX W/CELL WASHG [facility] N/A 3/20/2018    BRONCHOSCOPY performed by Genesis Batista MD at [facility] [address] N/A 10/21/2022    EGD DIAGNOSTIC ONLY performed by Jaziel Mcallister MD at [facility]       Family History  Family History   Problem Relation Age of Onset    Colon Cancer Neg Hx      [] Unable to obtain due to ventilated and/ or neurologic status    Social History     Socioeconomic History    Marital status:      Spouse name: Not on file Number of children: Not on file    Years of education: Not on file    Highest education level: Not on file   Occupational History    Not on file   Tobacco Use    Smoking status: Former     Packs/day: 0.25     Types: Cigarettes     Quit date: 2015     Years since quittin.3    Smokeless tobacco: Former   Vaping Use    Vaping Use: Never used   Substance and Sexual Activity    Alcohol use: No     Alcohol/week: 0.0 standard drinks    Drug use: No    Sexual activity: Not on file   Other Topics Concern    Not on file   Social History Narrative    Not on file     Social Determinants of Health     Financial Resource Strain: Not on file   Food Insecurity: Not on file   Transportation Needs: Not on file   Physical Activity: Not on file   Stress: Not on file   Social Connections: Not on file   Intimate Partner Violence: Not on file   Housing Stability: Not on file      [] Unable to obtain due to ventilated and/ or neurologic status      Home Medications:      Medications Prior to Admission: carvedilol (COREG) 6.25 MG tablet, Take 6.25 mg by mouth 2 times daily (with meals)  carvedilol (COREG) 12.5 MG tablet, Take 1 tablet by mouth 2 times daily (Patient not taking: No sig reported)  warfarin (COUMADIN) 5 MG tablet, Take 1 tablet by mouth daily  predniSONE (DELTASONE) 5 MG tablet, Take 5 mg by mouth daily  Dulaglutide (TRULICITY) 1.5 EG/2.7WX SOPN, Inject 1.5 mg into the skin once a week Weekly on Monday  tamsulosin (FLOMAX) 0.4 MG capsule, Take 1 capsule by mouth daily (Patient taking differently: Take 0.4 mg by mouth 2 times daily)  Continuous Blood Gluc Transmit (DEXCOM G6 TRANSMITTER) MISC, Change every 3 months (Patient not taking: Reported on 10/19/2022)  Continuous Blood Gluc Sensor (FREESTYLE KEV 14 DAY SENSOR) MISC, Every 2 weeks Dx E11.65 (Patient not taking: Reported on 10/19/2022)  insulin NPH (NOVOLIN N FLEXPEN) 100 UNIT/ML injection pen, 16 units at bedtime (Patient taking differently: 9 units in AM, 5 units at bedtime)  Insulin Regular Human (NOVOLIN R FLEXPEN) 100 UNIT/ML SOPN, 6 units am 8 units lunch and dinner (Patient taking differently: Sliding Scale)  Continuous Blood Gluc Sensor (FREESTYLE KEV 14 DAY SENSOR) MISC, Every 2 weeks  pantoprazole (PROTONIX) 40 MG tablet, Take 40 mg by mouth daily   nitroGLYCERIN (NITROSTAT) 0.4 MG SL tablet, up to max of 3 total doses. If no relief after 1 dose, call 911. (Patient not taking: No sig reported)  furosemide (LASIX) 40 MG tablet, Take 40 mg by mouth daily Indications: 1/2 to 1 tab. as needed for leg swelling   aspirin 81 MG EC tablet, Take 81 mg by mouth daily   cyclobenzaprine (FLEXERIL) 10 MG tablet, Take 10 mg by mouth daily as needed Indications: as needed. cycloSPORINE modified (NEORAL) 25 MG capsule, Take 75 mg by mouth 2 times daily   gabapentin (NEURONTIN) 300 MG capsule, Take 300 mg by mouth 2 times daily.  Indications: 1 capsule in am, 2 capsule in pm   mycophenolate (CELLCEPT) 250 MG capsule, Take 750 mg by mouth 2 times daily   simvastatin (ZOCOR) 10 MG tablet, Take 10 mg by mouth nightly     Current Hospital Medications:     Scheduled Meds:   pantoprazole (PROTONIX) 40 mg injection  40 mg IntraVENous Q12H    epoetin megan-epbx  10,000 Units SubCUTAneous Q7 Days    insulin glargine  10 Units SubCUTAneous BID    insulin lispro  0-8 Units SubCUTAneous 4x Daily    sodium chloride flush  5-40 mL IntraVENous 2 times per day    insulin lispro  10 Units SubCUTAneous Once    sodium chloride flush  5-40 mL IntraVENous 2 times per day    mycophenolate  750 mg Oral BID    sodium chloride flush  5-40 mL IntraVENous 2 times per day    cycloSPORINE modified  75 mg Oral BID    [Held by provider] insulin regular  6 Units SubCUTAneous Daily with breakfast    [Held by provider] insulin regular  8 Units SubCUTAneous BID     gabapentin  300 mg Oral BID    predniSONE  5 mg Oral Daily    atorvastatin  10 mg Oral Daily    tamsulosin  0.4 mg Oral Daily    carvedilol  6.25 mg Oral BID     Continuous Infusions:   sodium chloride      sodium chloride      sodium chloride      sodium chloride 100 mL/hr at 10/24/22 1400    sodium chloride      sodium chloride      sodium chloride      dextrose       PRN Meds:.sodium chloride, sodium chloride flush, sodium chloride, sodium chloride, sodium chloride flush, sodium chloride, sodium chloride, sodium chloride, sodium chloride flush, sodium chloride, acetaminophen **OR** acetaminophen, furosemide, glucose, dextrose bolus **OR** dextrose bolus, glucagon (rDNA), dextrose, prochlorperazine **OR** prochlorperazine  . sodium chloride      sodium chloride      sodium chloride      sodium chloride 100 mL/hr at 10/24/22 1400    sodium chloride      sodium chloride      sodium chloride      dextrose          Allergies:     No Known Allergies     Review of Systems:       Review of Systems   Constitutional:  Positive for fatigue. Negative for diaphoresis. HENT: Negative. Eyes: Negative. Respiratory: Negative. Negative for cough, chest tightness, shortness of breath, wheezing and stridor. Cardiovascular:  Positive for chest pain. Negative for palpitations and leg swelling. Gastrointestinal:  Positive for blood in stool. Negative for nausea. Genitourinary: Negative. Musculoskeletal: Negative. Skin: Negative. Neurological:  Positive for weakness. Negative for dizziness, syncope and light-headedness. Hematological: Negative. Psychiatric/Behavioral: Negative. Objective Findings:     Vitals:BP (!) 154/53   Pulse 67   Temp 98.9 °F (37.2 °C) (Oral)   Resp 20   Ht 6' 2\" (1.88 m)   Wt 185 lb (83.9 kg)   SpO2 99%   BMI 23.75 kg/m²      Physical Examination:    Physical Exam   Constitutional: No distress. He appears chronically ill and acutely ill. HENT:   Normal cephalic and Atraumatic   Eyes: Pupils are equal, round, and reactive to light. Neck: Thyroid normal. No JVD present. No neck adenopathy.  No thyromegaly present. Cardiovascular: Normal rate, regular rhythm, intact distal pulses and normal pulses. Murmur heard. Pulmonary/Chest: Effort normal and breath sounds normal. He has no wheezes. He has no rales. He exhibits no tenderness. Abdominal: Soft. Bowel sounds are normal. There is no abdominal tenderness. Musculoskeletal:         General: No tenderness or edema. Normal range of motion. Cervical back: Normal range of motion and neck supple. Neurological: He is alert and oriented to person, place, and time. Skin: Skin is warm. No cyanosis. Nails show no clubbing.        Results/ Medications reviewed 10/25/2022, 4:59 PM     Laboratory, Microbiology, Pathology, Radiology, Cardiology, Medications and Transcriptions reviewed  Scheduled Meds:   pantoprazole (PROTONIX) 40 mg injection  40 mg IntraVENous Q12H    epoetin megan-epbx  10,000 Units SubCUTAneous Q7 Days    insulin glargine  10 Units SubCUTAneous BID    insulin lispro  0-8 Units SubCUTAneous 4x Daily    sodium chloride flush  5-40 mL IntraVENous 2 times per day    insulin lispro  10 Units SubCUTAneous Once    sodium chloride flush  5-40 mL IntraVENous 2 times per day    mycophenolate  750 mg Oral BID    sodium chloride flush  5-40 mL IntraVENous 2 times per day    cycloSPORINE modified  75 mg Oral BID    [Held by provider] insulin regular  6 Units SubCUTAneous Daily with breakfast    [Held by provider] insulin regular  8 Units SubCUTAneous BID WC    gabapentin  300 mg Oral BID    predniSONE  5 mg Oral Daily    atorvastatin  10 mg Oral Daily    tamsulosin  0.4 mg Oral Daily    carvedilol  6.25 mg Oral BID     Continuous Infusions:   sodium chloride      sodium chloride      sodium chloride      sodium chloride 100 mL/hr at 10/24/22 1400    sodium chloride      sodium chloride      sodium chloride      dextrose         Recent Labs     10/23/22  0510 10/23/22  1438 10/24/22  0521 10/24/22  1514 10/24/22  2012 10/25/22  0708   WBC 7.8  --  9.2  --   -- 7. 7   HGB 6.7*   < > 7.6* 7.4* 7.5* 6.5*   HCT 20.3*   < > 23.7* 22.6* 22.3* 20.4*   MCV 88.1  --  89.6  --   --  89.6     --  177  --   --  166    < > = values in this interval not displayed. Recent Labs     10/23/22  1438 10/24/22  0521 10/25/22  0708    144 144   K 4.8 4.3 3.8   * 114* 115*   CO2 15* 16* 14*   BUN 77* 70* 52*   CREATININE 2.74* 2.43* 2.11*     Recent Labs     10/23/22  0510 10/24/22  0521 10/25/22  0708   AST 8 8 7   ALT 6 <5 <5   BILITOT 0.7 0.7 0.9*   ALKPHOS 49 48 46     No results for input(s): LIPASE, AMYLASE in the last 72 hours. Recent Labs     10/23/22  0510 10/24/22  0521 10/25/22  0708   PROT 5.3* 5.4* 5.2*   INR 1.3 1.3 1.2     XR KNEE LEFT (3 VIEWS)    Result Date: 10/20/2022  EXAMINATION: THREE XRAY VIEWS OF THE LEFT KNEE 10/19/2022 11:30 am COMPARISON: None. HISTORY: ORDERING SYSTEM PROVIDED HISTORY: fell onto knee 2 days ago; pain at Knee cap, SUNRISE view TECHNOLOGIST PROVIDED HISTORY: Reason for exam:->fell onto knee 2 days ago; pain at Knee cap Reason for exam:->SUNRISE view What reading provider will be dictating this exam?->CRC FINDINGS: AP, lateral, and sunrise views of the left knee were obtained. There is no acute fracture or dislocation. There is mild joint space narrowing in the medial compartment with associated osteophyte formation. There is suggestion of chondrocalcinosis. There is possible trace suprapatellar knee joint effusion. The visualized soft tissue structures are unremarkable. There is atherosclerotic disease. 1. 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 2. Probable chondrocalcinosis. 3. Possible trace suprapatellar knee joint effusion.      XR KNEE RIGHT (3 VIEWS)    Result Date: 10/19/2022  EXAMINATION: THREE XRAY VIEWS OF THE RIGHT KNEE 10/19/2022 11:30 am COMPARISON: 11/22/2019 HISTORY: ORDERING SYSTEM PROVIDED HISTORY: fall knee pain, [place] view TECHNOLOGIST PROVIDED HISTORY: Reason for exam:->fall knee pain Reason for exam:->[place] view What reading provider will be dictating this exam?->CRC FINDINGS: Mild to moderate narrowing of the medial joint space, mild narrowing of the lateral and patellofemoral joint spaces is seen. Unremarkable alignment with no evidence of dislocation. Mild degenerative changes visualized. No evidence of cortical irregularity or lucency to suggest a fracture, no evidence of lytic or sclerotic bone lesion is seen. No evidence of suprapatellar effusion. The quadriceps and patellar tendons are unremarkable. Extensive vascular calcifications are seen. Degenerative changes. No evidence of acute osseous abnormality is seen. CT HEAD WO CONTRAST    Result Date: 10/19/2022  EXAMINATION: CT OF THE HEAD WITHOUT CONTRAST  10/19/2022 11:11 am TECHNIQUE: CT of the head was performed without the administration of intravenous contrast. Automated exposure control, iterative reconstruction, and/or weight based adjustment of the mA/kV was utilized to reduce the radiation dose to as low as reasonably achievable. COMPARISON: None. HISTORY: ORDERING SYSTEM PROVIDED HISTORY: passed out hit head; on coumadin. .. ? bleed, ? 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 ->No Reason for exam:->passed out hit head; on coumadin. .. ? bleed, ? 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 No abnormal extra-axial fluid collection. The gray-white differentiation is maintained without evidence of an acute infarct. There is no evidence of hydrocephalus.  The ventricles, cisterns and sulci are prominent consistent with atrophy. There is decreased attenuation within the periventricular white matter consistent with periventricular leukomalacia. There is encephalomalacia is seen within the anterior aspect of the left temporal fossa. There is an old left craniotomy defect. ORBITS: The visualized portion of the orbits demonstrate no acute abnormality. SINUSES: The visualized paranasal sinuses and mastoid air cells demonstrate no acute abnormality. There is mucosal thickening seen within the right sphenoid sinus. SOFT TISSUES/SKULL:  No acute abnormality of the visualized skull or soft tissues. 1.  There is no acute intracranial abnormality. Specifically, there is no intracranial hemorrhage. 2. Atrophy and periventricular leukomalacia, 3. Mucosal thickening within the right sphenoid sinus. 4. Previous left craniotomy defect with encephalomalacia  seen within the anterior aspect of the left temporal lobe. .     CTA CHEST W WO CONTRAST    Result Date: 10/19/2022  EXAMINATION: CTA OF THE CHEST WITH AND WITHOUT CONTRAST 10/19/2022 11:11 am TECHNIQUE: CTA of the chest was performed before and after the administration of intravenous contrast.  Multiplanar reformatted images are provided for review. MIP images are provided for review. Automated exposure control, iterative reconstruction, and/or weight based adjustment of the mA/kV was utilized to reduce the radiation dose to as low as reasonably achievable. COMPARISON: March 15, 2018 HISTORY: ORDERING SYSTEM PROVIDED HISTORY: syncope fell to floor 2 days ago; ? pulmonary embolus; please also mention if trauma injury. Suspect PE as cause of fall or pneumonia TECHNOLOGIST PROVIDED HISTORY: Reason for exam:->syncope fell to floor 2 days ago; ? pulmonary embolus; please also mention if trauma injury.  Suspect PE as cause of fall or pneumonia Decision Support Exception - unselect if not a suspected or confirmed emergency medical condition->Emergency Medical Condition (MA) What reading provider will be dictating this exam?->CRC FINDINGS: Aorta: At the level of the left main pulmonary artery the ascending aorta measures 3.8 cm in greatest transverse dimension and 34 cm. No acute abnormality of the aorta. No large filling defects are seen in the main, 1st and 2nd order vessels. Mediastinum: No evidence of mediastinal lymphadenopathy. The heart and pericardium demonstrate no acute abnormality. Coronary calcifications and are stents are seen in place. Lungs/Pleura: In the right lower lobe there is an area of consolidation. Also  a trace of pleural fluid is seen. There are multiple nodules visualized in the lungs. Most appear stable. In the anterior right upper lobe and opacity is seen that measures 5.8 x 11 by 12 mm the uterus. Most nodules measure 4 mm or less. In the right middle lobe laterally a 6 mm nodule is seen. This was seen on previous study. Reticulonodular opacities are seen bilaterally and are most prominent in the lateral right upper lobe. Centrilobular emphysematous changes are seen as well as paraseptal emphysematous changes. Bronchial wall dilatation and ground-glass opacities are seen in the medial aspect of the right upper lobe. Upper Abdomen: Limited images of the upper abdomen are unremarkable. Soft Tissues/Bones: Degenerative changes are seen in the spine at multiple levels. No acute fractures are identified on this examination. 1.  No large filling defects are seen on this examination to suggest saddle embolus more in the larger primary and secondary vascular structures. 2. There is consolidation again seen in the left lower lobe. Bronchial wall thickening mild bronchiectasis and other changes of COPD are seen. An ill-defined opacity is seen in the anterior left upper lobe. Recommend follow-up to resolution to exclude any developing masses. There are multiple nodules visualized and overall are stable.      CT CERVICAL SPINE WO CONTRAST    Result Date: 10/19/2022  EXAMINATION: CT OF THE CERVICAL SPINE WITHOUT CONTRAST 10/19/2022 11:11 am TECHNIQUE: CT of the cervical spine was performed without the administration of intravenous contrast. Multiplanar reformatted images are provided for review. Automated exposure control, iterative reconstruction, and/or weight based adjustment of the mA/kV was utilized to reduce the radiation dose to as low as reasonably achievable. COMPARISON: None. HISTORY: ORDERING SYSTEM PROVIDED HISTORY: fall from standing 2 days ago and again today TECHNOLOGIST PROVIDED HISTORY: Reason for exam:->fall from standing 2 days ago and again today Decision Support Exception - unselect if not a suspected or confirmed emergency medical condition->Emergency Medical Condition (MA) What reading provider will be dictating this exam?->CRC FINDINGS: The ring of C1 is intact as is the dense. There is no compression fracture of the cervical spine. No jumped or perched facet is noted. Multilevel degenerative disc and degenerative joint disease is noted. Posterior degenerative endplate spurs are seen at the C3-4, C4-5 and C5-6 levels. There is effacement of the ventral thecal sac at the C3-4 through C5-6 levels due to the posterior degenerative endplate spurs. The prevertebral soft tissues are unremarkable. The airway is widely patent. Images through the lung apices are negative for a pneumothorax. 1. There is no acute compression fracture or subluxation of the cervical spine. 2. Multilevel degenerative disc and degenerative joint disease. CT THORACIC SPINE WO CONTRAST    Result Date: 10/19/2022  EXAMINATION: CT OF THE THORACIC SPINE WITHOUT CONTRAST; CT OF THE LUMBAR SPINE WITHOUT CONTRAST  10/19/2022 11:11 am: TECHNIQUE: CT of the thoracic spine was performed without the administration of intravenous contrast. Multiplanar reformatted images are provided for review.  Automated exposure control, iterative reconstruction, and/or weight based adjustment of the mA/kV was utilized to reduce the radiation dose to as low as reasonably achievable.; CT of the lumbar spine was performed without the administration of intravenous contrast. Multiplanar reformatted images are provided for review. Adjustment of mA and/or kV according to patient size was utilized. Automated exposure control, iterative reconstruction, and/or weight based adjustment of the mA/kV was utilized to reduce the radiation dose to as low as reasonably achievable. COMPARISON: None. HISTORY: ORDERING SYSTEM PROVIDED HISTORY: Fall from standing; Trauma assessment TECHNOLOGIST PROVIDED HISTORY: Reason for exam:->Fall from standing; Trauma assessment What reading provider will be dictating this exam?->CRC FINDINGS: CT thoracic spine: There is no evidence of acute fracture. Vertebral alignment is anatomic. There are no acute compression deformities. There is minimal concavity at the superior endplates of T1 through T3. There are changes of diffuse idiopathic skeletal hyperostosis within the thoracic spine. The paravertebral soft tissue structures are unremarkable. There is no gross evidence of central canal stenosis. There is suggestion of pleural thickening in the posterior right lower lobe with a pleural based opacity that may represent atelectasis. CT lumbar spine: There is no evidence of acute fracture. Vertebral alignment is anatomic. There are no compression deformities. The intervertebral disc space heights appear relatively preserved. There is facet hypertrophy throughout the lumbar spine. The paravertebral soft tissue structures are unremarkable. There is possible central canal stenosis at L3-4. There is multilevel neural foraminal narrowing. There is arteriosclerosis without abdominal aortic aneurysm. There is marked atrophy of the bilateral kidneys.   There is exophytic lesion from the posterior right kidney which is more dense than simple cyst, measuring approximately 48 Hounsfield units. This may represent complicated cyst.  A solid mass cannot be entirely excluded. 1. No acute fracture or subluxation within the thoracic or lumbar spine. 2. Degenerative changes in the spine without definite central canal stenosis in the thoracic spine. There is possible central canal stenosis at L3-4. There is multilevel neural foraminal narrowing within the lumbar spine. 3. Marked atrophy of the bilateral kidneys with indeterminate exophytic lesion from the right kidney that may represent complicated cyst or solid mass. The patient has prior examinations which are not available for comparison. 4. There is a dependent pleural based opacity in the right lower lobe which may represent atelectasis. There are prior examinations which are not available for comparison. CT LUMBAR SPINE WO CONTRAST    Result Date: 10/19/2022  EXAMINATION: CT OF THE THORACIC SPINE WITHOUT CONTRAST; CT OF THE LUMBAR SPINE WITHOUT CONTRAST  10/19/2022 11:11 am: TECHNIQUE: CT of the thoracic spine was performed without the administration of intravenous contrast. Multiplanar reformatted images are provided for review. Automated exposure control, iterative reconstruction, and/or weight based adjustment of the mA/kV was utilized to reduce the radiation dose to as low as reasonably achievable.; CT of the lumbar spine was performed without the administration of intravenous contrast. Multiplanar reformatted images are provided for review. Adjustment of mA and/or kV according to patient size was utilized. Automated exposure control, iterative reconstruction, and/or weight based adjustment of the mA/kV was utilized to reduce the radiation dose to as low as reasonably achievable. COMPARISON: None.  HISTORY: ORDERING SYSTEM PROVIDED HISTORY: Fall from standing; Trauma assessment TECHNOLOGIST PROVIDED HISTORY: Reason for exam:->Fall from standing; Trauma assessment What reading provider will be dictating this exam?->CRC FINDINGS: CT thoracic spine: There is no evidence of acute fracture. Vertebral alignment is anatomic. There are no acute compression deformities. There is minimal concavity at the superior endplates of T1 through T3. There are changes of diffuse idiopathic skeletal hyperostosis within the thoracic spine. The paravertebral soft tissue structures are unremarkable. There is no gross evidence of central canal stenosis. There is suggestion of pleural thickening in the posterior right lower lobe with a pleural based opacity that may represent atelectasis. CT lumbar spine: There is no evidence of acute fracture. Vertebral alignment is anatomic. There are no compression deformities. The intervertebral disc space heights appear relatively preserved. There is facet hypertrophy throughout the lumbar spine. The paravertebral soft tissue structures are unremarkable. There is possible central canal stenosis at L3-4. There is multilevel neural foraminal narrowing. There is arteriosclerosis without abdominal aortic aneurysm. There is marked atrophy of the bilateral kidneys. There is exophytic lesion from the posterior right kidney which is more dense than simple cyst, measuring approximately 48 Hounsfield units. This may represent complicated cyst.  A solid mass cannot be entirely excluded. 1. No acute fracture or subluxation within the thoracic or lumbar spine. 2. Degenerative changes in the spine without definite central canal stenosis in the thoracic spine. There is possible central canal stenosis at L3-4. There is multilevel neural foraminal narrowing within the lumbar spine. 3. Marked atrophy of the bilateral kidneys with indeterminate exophytic lesion from the right kidney that may represent complicated cyst or solid mass. The patient has prior examinations which are not available for comparison. 4. There is a dependent pleural based opacity in the right lower lobe which may represent atelectasis.   There are prior examinations which are not available for comparison. CT ABDOMEN PELVIS W IV CONTRAST Additional Contrast? None    Result Date: 10/19/2022  EXAMINATION: CT OF THE ABDOMEN AND PELVIS WITH CONTRAST 10/19/2022 11:11 am TECHNIQUE: CT of the abdomen and pelvis was performed with the administration of intravenous contrast. Multiplanar reformatted images are provided for review. Automated exposure control, iterative reconstruction, and/or weight based adjustment of the mA/kV was utilized to reduce the radiation dose to as low as reasonably achievable. COMPARISON: None. HISTORY: ORDERING SYSTEM PROVIDED HISTORY: fall from standing 2 days ago and again today; ? spleen rupture, ? kidney hematoma; ? retroparitoneal bleed, TRAUMA on coumadin, Hx of kidney transplant TECHNOLOGIST PROVIDED HISTORY: Reason for exam:->fall from standing 2 days ago and again today; ? spleen rupture, ? kidney hematoma; ? retroparitoneal bleed Reason for exam:->TRAUMA on coumadin Reason for exam:->Hx of kidney transplant Additional Contrast?->None Decision Support Exception - unselect if not a suspected or confirmed emergency medical condition->Emergency Medical Condition (MA) What reading provider will be dictating this exam?->CRC FINDINGS: Lower Chest: Heart size is within normal limits. There is focal bronchiectasis in the right lower lobe. Subpleural curvilinear opacities are partially imaged in the right lower lobe and lateral aspect of the right middle lobe. Please refer to dedicated CT chest report for additional details. Organs: The gallbladder is distended without obvious abnormality. The liver enhances homogeneously. The portal vein is patent and there is no intrahepatic biliary ductal dilatation. The spleen and adrenal glands are normal in size. There is a 1.4 cm low-density lesion in the inferior margin of the proximal body of the pancreas on axial image 46. Otherwise, the pancreas enhances homogeneously.   No pancreatic ductal dilatation. There is severe bilateral renal cortical atrophy. A 1.2 cm intermediate density (potentially enhancing) lesion arises from the posterior aspect of the mid right kidney on axial image 57. There is a severely atrophic renal transplant in the right lower quadrant and a normally enhancing left lower quadrant renal transplant. GI/Bowel: No evidence of a bowel obstruction, free air or pneumatosis. Portions of the colon are decompressed, limiting assessment for mucosal based abnormalities. Stomach and duodenal sweep demonstrate no acute abnormality. The appendix is normal. Pelvis: The urinary bladder is distended without obvious abnormality. The prostate gland is mildly heterogeneous but nonenlarged. Visible but nonenlarged pelvic lymph nodes are present. Peritoneum/Retroperitoneum: The abdominal aorta is heavily calcified but nonaneurysmal.  No retroperitoneal lymphadenopathy or mass. Bones/Soft Tissues: No acute osseous abnormality or evidence of an aggressive osseous lesion. There are moderate-to-severe degenerative changes of the lower lumbar spine mainly in the form of intervertebral disc space narrowing/facet arthropathy at L5-S1. There is no evidence of an acute posttraumatic abnormality in the abdomen or pelvis. Specifically, no evidence of splenic or renal injury. No retroperitoneal hematoma. Indeterminate intermediate density 1.2 cm lesion arising from the mid right kidney. Renal neoplasm not excluded. Correlation with renal ultrasound or multiphasic contrast-enhanced CT or MRI of the abdomen utilizing a renal mass protocol is recommended for further assessment. Indeterminate 1.4 cm low-density lesion arising from the inferior portion of the pancreatic body centrally. This has imaging features most suspicious for an IPMN. Correlation with contrast-enhanced abdominal MRI is also recommended (unless otherwise contraindicated).  Renal transplants in the bilateral lower quadrants without evidence of posttraumatic findings to the allografts. Linear/nodular opacities in the right lower lobe and right middle lobe. Please refer to the dedicated CT chest report for additional details. XR CHEST PORTABLE    Result Date: 10/12/2022  EXAMINATION: ONE XRAY VIEW OF THE CHEST 10/12/2022 2:58 pm COMPARISON: 09/14/2022 HISTORY: ORDERING SYSTEM PROVIDED HISTORY: sob and shoulder pain TECHNOLOGIST PROVIDED HISTORY: Reason for exam:->sob and shoulder pain What reading provider will be dictating this exam?->CRC FINDINGS: The lungs are without acute focal process. There is no effusion or pneumothorax. The cardiomediastinal silhouette is without acute process. Heart upper limits of normal in size. The osseous structures are without acute process. No acute process. EGD    Result Date: 10/21/2022  Site: [address] [place] [address] Patient Name: J Carlos Garnica Procedure Date: 10/21/2022 MRN: [id number] YOB: 1950 Gender: Male Attending MD: Guero Hoyt Indications:        - melena. Medications:        -  See the Anesthesia note for documentation of the administered medications Complications:        -  No immediate complications. Estimated Blood Loss:        -  Estimated blood loss: none. Procedure:        - The Gastroscope was introduced through the mouth and advanced to the third           part of the duodenum.        -  The upper GI endoscopy was accomplished without difficulty. -  The patient tolerated the procedure well. Findings:        -  The examined esophagus was normal.        -  The Z-line was regular and was found 42 cm from the incisors. -  The entire examined stomach was normal.        -  The examined duodenum was normal. Impression:        -  Normal esophagus. -  Z-line regular, 42 cm from the incisors. -  Normal stomach. -  Normal examined duodenum.        -  No specimens collected.  Recommendation:        - colonoscopy in the next day or two, Dr Tayler Gross will follow patient starting           this evening. Procedure Code(s):        - [id number], Esophagogastroduodenoscopy, flexible, transoral; diagnostic,           including collection of specimen(s) by brushing or washing, when performed           (separate procedure)       CPT(R) - 2021 copyright American [employer] Association. All Rights Reserved. The CPT codes, CCI edits and ICD codes generated are intended as suggestions       and were generated based on input data. These codes are preliminary and upon        review may be revised to meet current compliance and payer requirements. The provider is responsible for the final determination of appropriate codes,       and modifiers. Diagnosis Code(s): Signature Name: David Pascual MD Signature Statement: This document has been electronically signed. Note Initiated On:10/21/2022 Signature Date:10/21/2022 2:02 PM    Colonoscopy    Result Date: 10/24/2022  Site: [address] [place] [address] Patient Name: Sadie Sepulveda Procedure Date: 10/24/2022 MRN: [id number] YOB: 1950 Gender: Male Attending MD: Adrian Melchor Indications:        -  Hematochezia Medications:        -  Monitored Anesthesia Care Complications:        -  No immediate complications. Estimated Blood Loss:        -  Estimated blood loss: none. Procedure:        - The Colonoscope was introduced through the anus and advanced to the cecum,           identified by appendiceal orifice and ileocecal valve. -  The colonoscopy was somewhat difficult due to excessive bleeding. Successful completion of the procedure was aided by lavage and controlling the           bleeding. Findings:        -  Red blood was found in the entire colon. Successful completion of the           procedure was aided by lavage and controlling the bleeding.        -  A pulsating intermittent bleeding noted.  A single small Dieulafoy Vs AVM           with bleeding was found in the ascending colon. Area was successfully injected           with 4 mL of a 0.1 mg/mL solution of epinephrine for hemostasis. To stop           active bleeding, two hemostatic clips were successfully placed. There was no           bleeding at the end of the procedure. -  External non-bleeding hemorrhoids were found during retroflexion. The           hemorrhoids were moderate. Impression:        -  Blood in the entire examined colon. -  A pulsating intermittent bleeding noted. A single small Dieulafoy Vs AVM           with bleeding was found in the ascending colon. Area was successfully injected           with 4 mL of a 0.1 mg/mL solution of epinephrine for hemostasis. To stop           active bleeding, two hemostatic clips were successfully placed. There was no           bleeding at the end of the procedure. -  A single bleeding colonic small Dieulafoy Vs AVM  with bleeding . Injected. Clips were placed. -  External non-bleeding hemorrhoids.        -  No specimens collected. Recommendation:        -  Clear liquid diet today. - Continue serial H/H        - Further recommendations per inpatient team Procedure Code(s):        - 22249, Colonoscopy, flexible; with control of bleeding, any method Diagnosis Code(s):        - K92.1, Melena (includes Hematochezia)        - K92.2, Gastrointestinal hemorrhage, unspecified        - K55.21, Angiodysplasia of colon with hemorrhage        - K64.4, Residual hemorrhoidal skin tags       CPT(R) - 2021 copyright American Medical Association. All Rights Reserved. The CPT codes, CCI edits and ICD codes generated are intended as suggestions       and were generated based on input data. These codes are preliminary and upon        review may be revised to meet current compliance and payer requirements. The provider is responsible for the final determination of appropriate codes,       and modifiers.  Scope Withdrawal Time: 00:30:15 Signature Name: Vanda Delgado MD Signature Statement: This document has been electronically signed. Note Initiated On:10/24/2022 Signature Date:10/24/2022 2:59 PM      Active Hospital Problems    Diagnosis Date Noted    Dieulafoy lesion of colon [K63.81] 10/24/2022     Priority: Medium    Poorly controlled diabetes mellitus ([address] [E11.65] 10/24/2022     Priority: Medium    Symptomatic anemia [D64.9] 10/19/2022     Priority: Medium    Acute upper GI bleed [K92.2] 10/19/2022     Priority: Medium         Impression/Plan:   CP- will assess for ACS by serial Trops to see where it peaks. He will have baseline elevated Trop due to CKD. No ASA due to GIB. Keep on Telemetry. Continue BB and Statin  PAF - in SR now on Telemetry. Hold Warfarin for now. HTN Stable. Continue meds. Low salt diet  CKD s/p Transplant. LVEF 65%   GIB and continued Anemia- transfuse as needed. Keep Hb > 7     Thank you for allowing us to participate in the care of this patient. Will continue to follow. Please call if questions or concerns arise.     Electronically signed by Jossie Sanchez MD on 10/25/2022 at 4:59 PM

## 2022-10-25 NOTE — PROGRESS NOTES
Nephrology Progress Note    Assessment:  CKD-4  Anemia GIB    DM type-2  S/P  kidney transplant - 2015. On neoral, cellcept prednisone  Hypertension      Plan: getting blood again today  Add retacrit  Stop any ivf  Gfr improving    Patient Active Problem List:     Pneumonia     Abdominal pain, other specified site     Acute pyelonephritis without lesion of renal medullary necrosis     Anemia     Essential hypertension     Nonspecific abnormal results of thyroid function study     Mixed hyperlipidemia     Kidney replaced by transplant     Intra-abdominal abscess post-procedure     Infection due to Enterococcus     Fever and other physiologic disturbances of temperature regulation     Chronic kidney disease (CKD)     Type II or unspecified type diabetes mellitus without mention of complication, uncontrolled     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 Getting blood now.   Got epo as well    Medications:  Scheduled Meds:   pantoprazole (PROTONIX) 40 mg injection  40 mg IntraVENous Q12H    epoetin megan-epbx  10,000 Units SubCUTAneous Q7 Days    insulin glargine  10 Units SubCUTAneous BID    insulin lispro  0-8 Units SubCUTAneous 4x Daily    sodium chloride flush  5-40 mL IntraVENous 2 times per day    insulin lispro  10 Units SubCUTAneous Once    sodium chloride flush  5-40 mL IntraVENous 2 times per day    mycophenolate  750 mg Oral BID    sodium chloride flush  5-40 mL IntraVENous 2 times per day    cycloSPORINE modified  75 mg Oral BID    [Held by provider] insulin regular  6 Units SubCUTAneous Daily with breakfast    [Held by provider] insulin regular  8 Units SubCUTAneous BID WC    gabapentin  300 mg Oral BID    predniSONE  5 mg Oral Daily    atorvastatin  10 mg Oral Daily    tamsulosin  0.4 mg Oral Daily    carvedilol  6.25 mg Oral BID     Continuous Infusions:   sodium chloride      lactated ringers 100 mL/hr at 10/24/22 1929    sodium chloride      sodium chloride      sodium chloride 100 mL/hr at 10/24/22 1400    sodium chloride      sodium chloride      sodium chloride      dextrose         CBC:   Recent Labs     10/24/22  0521 10/24/22  1514 10/24/22  2012 10/25/22  0708   WBC 9.2  --   --  7.7   HGB 7.6*   < > 7.5* 6.5*     --   --  166    < > = values in this interval not displayed. CMP:    Recent Labs     10/23/22  1438 10/24/22  0521 10/25/22  0708    144 144   K 4.8 4.3 3.8   * 114* 115*   CO2 15* 16* 14*   BUN 77* 70* 52*   CREATININE 2.74* 2.43* 2.11*   GLUCOSE 205* 140* 106*   CALCIUM 8.4* 8.6 8.3*   LABGLOM 24.0* 27.8* 32.9*       Troponin:   Recent Labs     10/24/22  2334   TROPONINI 0.042*       BNP: No results for input(s): BNP in the last 72 hours. INR:   Recent Labs     10/25/22  0708   INR 1.2       Lipids: No results for input(s): CHOL, LDLDIRECT, TRIG, HDL, AMYLASE, LIPASE in the last 72 hours. Liver:   Recent Labs     10/25/22  0708   AST 7   ALT <5   ALKPHOS 46   PROT 5.2*   LABALBU 3.1*   BILITOT 0.9*       Iron:  No results for input(s): IRONS, FERRITIN in the last 72 hours. Invalid input(s): LABIRONS  Urinalysis: No results for input(s): UA in the last 72 hours.     Objective:  Vitals: BP (!) 154/53   Pulse 67   Temp 98.9 °F (37.2 °C) (Oral)   Resp 20   Ht 6' 2\" (1.88 m)   Wt 185 lb (83.9 kg)   SpO2 99%   BMI 23.75 kg/m²    Wt Readings from Last 3 Encounters:   10/24/22 185 lb (83.9 kg)   10/14/22 195 lb 6.4 oz (88.6 kg)   10/12/22 192 lb (87.1 kg)      24HR INTAKE/OUTPUT:    Intake/Output Summary (Last 24 hours) at 10/25/2022 1532  Last data filed at 10/24/2022 2218  Gross per 24 hour   Intake 849.22 ml   Output --   Net 849.22 ml         General: alert, in no apparent distress  HEENT: normocephalic, atraumatic, anicteric  Neck: supple, no mass  Lungs: non-labored respirations, clear to auscultation bilaterally  Heart: regular rate and rhythm, no murmurs or rubs  Abdomen: soft, non-tender, non-distended  Ext: no cyanosis, no peripheral edema  Neuro: alert and oriented, no gross abnormalities  Psych: normal mood and affect  Skin: no rash      Electronically signed by Abbi Murphy MD, MD

## 2022-10-25 NOTE — PROGRESS NOTES
3250 - Answered pt call light. Pt states he is having left sided chest pain at breast level. States it is sharp and stabbing. Pt also states the pain is shooting down the left arm which is now going numb. Pt also states left leg is hurting. [place] notified of pt complaints. EKG done and given to Corwin Hudson NP.    5153 - VSS. Pt pk wm dry. No distress noted.

## 2022-10-26 LAB
ALBUMIN SERPL-MCNC: 3.3 G/DL (ref 3.5–4.6)
ALP BLD-CCNC: 50 U/L (ref 35–104)
ALT SERPL-CCNC: 6 U/L (ref 0–41)
ANION GAP SERPL CALCULATED.3IONS-SCNC: 13 MEQ/L (ref 9–15)
AST SERPL-CCNC: 9 U/L (ref 0–40)
BASOPHILS ABSOLUTE: 0.1 K/UL (ref 0–0.2)
BASOPHILS RELATIVE PERCENT: 0.8 %
BILIRUB SERPL-MCNC: 1 MG/DL (ref 0.2–0.7)
BUN BLDV-MCNC: 38 MG/DL (ref 8–23)
CALCIUM SERPL-MCNC: 8.7 MG/DL (ref 8.5–9.9)
CHLORIDE BLD-SCNC: 113 MEQ/L (ref 95–107)
CO2: 15 MEQ/L (ref 20–31)
CREAT SERPL-MCNC: 2.1 MG/DL (ref 0.7–1.2)
EOSINOPHILS ABSOLUTE: 0.2 K/UL (ref 0–0.7)
EOSINOPHILS RELATIVE PERCENT: 2.6 %
GFR SERPL CREATININE-BSD FRML MDRD: 33.1 ML/MIN/{1.73_M2}
GLOBULIN: 2 G/DL (ref 2.3–3.5)
GLUCOSE BLD-MCNC: 139 MG/DL (ref 70–99)
GLUCOSE BLD-MCNC: 147 MG/DL (ref 70–99)
GLUCOSE BLD-MCNC: 231 MG/DL (ref 70–99)
GLUCOSE BLD-MCNC: 253 MG/DL (ref 70–99)
GLUCOSE BLD-MCNC: 300 MG/DL (ref 70–99)
HCT VFR BLD CALC: 24.9 % (ref 42–52)
HEMOGLOBIN: 8.1 G/DL (ref 14–18)
LYMPHOCYTES ABSOLUTE: 0.4 K/UL (ref 1–4.8)
LYMPHOCYTES RELATIVE PERCENT: 5.1 %
MAGNESIUM: 1.7 MG/DL (ref 1.7–2.4)
MCH RBC QN AUTO: 28.6 PG (ref 27–31.3)
MCHC RBC AUTO-ENTMCNC: 32.6 % (ref 33–37)
MCV RBC AUTO: 87.8 FL (ref 79–92.2)
MONOCYTES ABSOLUTE: 0.5 K/UL (ref 0.2–0.8)
MONOCYTES RELATIVE PERCENT: 7.5 %
NEUTROPHILS ABSOLUTE: 6.1 K/UL (ref 1.4–6.5)
NEUTROPHILS RELATIVE PERCENT: 84 %
PDW BLD-RTO: 16.4 % (ref 11.5–14.5)
PERFORMED ON: ABNORMAL
PHOSPHORUS: 2.7 MG/DL (ref 2.3–4.8)
PLATELET # BLD: 171 K/UL (ref 130–400)
POTASSIUM SERPL-SCNC: 4 MEQ/L (ref 3.4–4.9)
RBC # BLD: 2.83 M/UL (ref 4.7–6.1)
SODIUM BLD-SCNC: 141 MEQ/L (ref 135–144)
TOTAL PROTEIN: 5.3 G/DL (ref 6.3–8)
TROPONIN: 0.03 NG/ML (ref 0–0.01)
TROPONIN: 0.04 NG/ML (ref 0–0.01)
WBC # BLD: 7.2 K/UL (ref 4.8–10.8)

## 2022-10-26 PROCEDURE — 97116 GAIT TRAINING THERAPY: CPT

## 2022-10-26 PROCEDURE — 36415 COLL VENOUS BLD VENIPUNCTURE: CPT

## 2022-10-26 PROCEDURE — A4216 STERILE WATER/SALINE, 10 ML: HCPCS | Performed by: INTERNAL MEDICINE

## 2022-10-26 PROCEDURE — 99232 SBSQ HOSP IP/OBS MODERATE 35: CPT | Performed by: INTERNAL MEDICINE

## 2022-10-26 PROCEDURE — 2580000003 HC RX 258: Performed by: CLINICAL NURSE SPECIALIST

## 2022-10-26 PROCEDURE — 6370000000 HC RX 637 (ALT 250 FOR IP): Performed by: INTERNAL MEDICINE

## 2022-10-26 PROCEDURE — 97166 OT EVAL MOD COMPLEX 45 MIN: CPT

## 2022-10-26 PROCEDURE — 2580000003 HC RX 258: Performed by: INTERNAL MEDICINE

## 2022-10-26 PROCEDURE — 84100 ASSAY OF PHOSPHORUS: CPT

## 2022-10-26 PROCEDURE — 6360000002 HC RX W HCPCS: Performed by: INTERNAL MEDICINE

## 2022-10-26 PROCEDURE — 1210000000 HC MED SURG R&B

## 2022-10-26 PROCEDURE — C9113 INJ PANTOPRAZOLE SODIUM, VIA: HCPCS | Performed by: INTERNAL MEDICINE

## 2022-10-26 PROCEDURE — 99232 SBSQ HOSP IP/OBS MODERATE 35: CPT | Performed by: NURSE PRACTITIONER

## 2022-10-26 PROCEDURE — 83735 ASSAY OF MAGNESIUM: CPT

## 2022-10-26 PROCEDURE — 80053 COMPREHEN METABOLIC PANEL: CPT

## 2022-10-26 PROCEDURE — 6370000000 HC RX 637 (ALT 250 FOR IP): Performed by: CLINICAL NURSE SPECIALIST

## 2022-10-26 PROCEDURE — 84484 ASSAY OF TROPONIN QUANT: CPT

## 2022-10-26 PROCEDURE — 93005 ELECTROCARDIOGRAM TRACING: CPT | Performed by: INTERNAL MEDICINE

## 2022-10-26 PROCEDURE — 2580000003 HC RX 258: Performed by: SPECIALIST

## 2022-10-26 RX ORDER — SODIUM BICARBONATE 650 MG/1
650 TABLET ORAL 2 TIMES DAILY
Qty: 60 TABLET | Refills: 5 | Status: ON HOLD | OUTPATIENT
Start: 2022-10-26 | End: 2022-11-04 | Stop reason: SDUPTHER

## 2022-10-26 RX ORDER — AMOXICILLIN AND CLAVULANATE POTASSIUM 875; 125 MG/1; MG/1
1 TABLET, FILM COATED ORAL EVERY 12 HOURS SCHEDULED
Status: DISCONTINUED | OUTPATIENT
Start: 2022-10-26 | End: 2022-10-28 | Stop reason: HOSPADM

## 2022-10-26 RX ORDER — SODIUM BICARBONATE 650 MG/1
650 TABLET ORAL 2 TIMES DAILY
Status: DISCONTINUED | OUTPATIENT
Start: 2022-10-26 | End: 2022-10-28 | Stop reason: HOSPADM

## 2022-10-26 RX ADMIN — CARVEDILOL 6.25 MG: 6.25 TABLET, FILM COATED ORAL at 21:34

## 2022-10-26 RX ADMIN — CARVEDILOL 6.25 MG: 6.25 TABLET, FILM COATED ORAL at 09:04

## 2022-10-26 RX ADMIN — PREDNISONE 5 MG: 5 TABLET ORAL at 09:04

## 2022-10-26 RX ADMIN — SODIUM BICARBONATE 650 MG: 650 TABLET ORAL at 21:33

## 2022-10-26 RX ADMIN — INSULIN LISPRO 2 UNITS: 100 INJECTION, SOLUTION INTRAVENOUS; SUBCUTANEOUS at 13:12

## 2022-10-26 RX ADMIN — MYCOPHENOLATE MOFETIL 750 MG: 250 CAPSULE ORAL at 09:04

## 2022-10-26 RX ADMIN — ATORVASTATIN CALCIUM 10 MG: 10 TABLET, FILM COATED ORAL at 21:34

## 2022-10-26 RX ADMIN — SODIUM CHLORIDE 40 MG: 9 INJECTION INTRAMUSCULAR; INTRAVENOUS; SUBCUTANEOUS at 09:05

## 2022-10-26 RX ADMIN — INSULIN GLARGINE 10 UNITS: 100 INJECTION, SOLUTION SUBCUTANEOUS at 09:05

## 2022-10-26 RX ADMIN — CYCLOSPORINE 75 MG: 25 CAPSULE, LIQUID FILLED ORAL at 21:37

## 2022-10-26 RX ADMIN — AMOXICILLIN AND CLAVULANATE POTASSIUM 1 TABLET: 875; 125 TABLET, FILM COATED ORAL at 21:33

## 2022-10-26 RX ADMIN — Medication 10 ML: at 21:47

## 2022-10-26 RX ADMIN — TAMSULOSIN HYDROCHLORIDE 0.4 MG: 0.4 CAPSULE ORAL at 09:04

## 2022-10-26 RX ADMIN — GABAPENTIN 300 MG: 300 CAPSULE ORAL at 21:33

## 2022-10-26 RX ADMIN — Medication 10 ML: at 02:03

## 2022-10-26 RX ADMIN — INSULIN LISPRO 6 UNITS: 100 INJECTION, SOLUTION INTRAVENOUS; SUBCUTANEOUS at 21:41

## 2022-10-26 RX ADMIN — GABAPENTIN 300 MG: 300 CAPSULE ORAL at 09:04

## 2022-10-26 RX ADMIN — INSULIN GLARGINE 10 UNITS: 100 INJECTION, SOLUTION SUBCUTANEOUS at 21:41

## 2022-10-26 RX ADMIN — Medication 10 ML: at 02:02

## 2022-10-26 RX ADMIN — MYCOPHENOLATE MOFETIL 750 MG: 250 CAPSULE ORAL at 21:33

## 2022-10-26 RX ADMIN — CYCLOSPORINE 75 MG: 25 CAPSULE, LIQUID FILLED ORAL at 09:05

## 2022-10-26 RX ADMIN — SODIUM CHLORIDE 40 MG: 9 INJECTION INTRAMUSCULAR; INTRAVENOUS; SUBCUTANEOUS at 21:36

## 2022-10-26 RX ADMIN — INSULIN LISPRO 4 UNITS: 100 INJECTION, SOLUTION INTRAVENOUS; SUBCUTANEOUS at 17:38

## 2022-10-26 ASSESSMENT — ENCOUNTER SYMPTOMS
EYES NEGATIVE: 1
SHORTNESS OF BREATH: 0
NAUSEA: 0
WHEEZING: 0
STRIDOR: 0
COUGH: 0
CHEST TIGHTNESS: 0
RESPIRATORY NEGATIVE: 1
BLOOD IN STOOL: 0
GASTROINTESTINAL NEGATIVE: 1

## 2022-10-26 NOTE — PROGRESS NOTES
Physical Therapy Med Surg Daily Treatment Note  Facility/Department: [facility]  Room: [place] [id number]       NAME: Jesus Ramos  : 1950 (80 y.o.)  MRN: [id number]  CODE STATUS: Full Code    Date of Service: 10/26/2022    Patient Diagnosis(es): Chronic anticoagulation [Z79.01]  Pancreatic mass [K86.89]  Elevated troponin [R77.8]  Right renal mass [N28.89]  Acute upper GI bleed [K92.2]  Head injury, closed, with LOC of unknown duration ([facility] [X21.5F7F]  History of renal transplant [Z94.0]  Fall at home, initial encounter [W19. XXXA, Y92.009]  Symptomatic anemia [D64.9]  Opacity of lung on imaging study [R91.8]  Acute pain of both knees [M25.561, M25.562]  Type 2 diabetes mellitus with hyperglycemia, unspecified whether long term insulin use ([facility] [E11.65]  Chronic renal failure, stage 3b [facility]) [N18.32]   Chief Complaint   Patient presents with    Fatigue     X 2 weeks      Patient Active Problem List    Diagnosis Date Noted    Chest pain 2022    Lung nodules 10/25/2022    COPD without exacerbation ([address] 10/25/2022    Abnormal CT of the chest 10/25/2022    Bronchiectasis without complication ([facility]     Dieulafoy lesion of colon 10/24/2022    Poorly controlled diabetes mellitus ([address] 10/24/2022    Symptomatic anemia 10/19/2022    Acute upper GI bleed 10/19/2022    Atrial fibrillation with RVR ([address] 2022    Unstable angina ([facility] 2022    Atypical chest pain 2019    Chronic cough 2019    Anginal chest pain at rest [facility]) 2019    Pneumonia 2018    Abdominal pain, other specified site 06/15/2011    Intra-abdominal abscess post-procedure 06/15/2011    Infection due to Enterococcus 06/15/2011    Nonspecific abnormal results of thyroid function study 2011    Anemia 2009    Essential hypertension 2008    Mixed hyperlipidemia 2008    Type II or unspecified type diabetes mellitus without mention of complication, uncontrolled 10/03/2008 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  2015    [place] [address] INCL FLUOR GDNCE DX W/CELL WASHG [facility] N/A 3/20/2018    BRONCHOSCOPY performed by Bryn Renae MD at [address] [place] [address] 10/21/2022    EGD DIAGNOSTIC ONLY performed by Wali Wright MD at [facility]       Chart Reviewed: Yes    Restrictions:  Restrictions/Precautions: Fall Risk (high zamora score)    SUBJECTIVE:   Subjective: Lets walk! Pain  Pain: denies pain pre/post    OBJECTIVE:   Orientation  Overall Orientation Status: Within Functional Limits  Cognition  Overall Cognitive Status: WFL    Bed mobility  Supine to Sit: Supervision  Sit to Supine: Supervision  Scooting: Supervision  Bed Mobility Comments: HOB elevated, good technique and safety throughout    Transfers  Sit to Stand: Supervision  Stand to Sit: Supervision  Comment: STS x 10 performed without need for rest breaks between reps, WW in front of pt for safety, however pt able to complete without use of UE support.  vc's for hand placement and eccentric control    Ambulation  Surface: Level tile  Device: Rolling Walker  Assistance: Stand by assistance  Quality of Gait: scissoring R LE  Gait Deviations: Slow Sara  Distance: 150 ft  Comments: Pt with improved tolerance to longer distance gait this session, [employer] utilized for safety but pt minimally relys on use. vc's given to keep [employer] in close proximity with inconsistent carryover. Pt reports fatigue after activity but no evident SOB. Activity Tolerance  Activity Tolerance: Patient tolerated treatment well          ASSESSMENT   Assessment: Pt with improved tolerance to mobility this session. Improved tolerance to on feet activity and reports fatigue but able to perform tasks without need for rest breaks. Discharge Recommendations:  Continue to assess pending progress, Patient would benefit from continued therapy after discharge         Goals  Short Term Goals  Short Term Goal 1: Pt will demonstrate HEP indep. Long Term Goals  Long Term Goal 1: Pt will demonstrate bed mobility indep  Long Term Goal 2: Pt will demonstrate transfers mod indep with safest AD  Long Term Goal 3: Pt will demonstrate amb >/= 150ft mod indep with safest AD  Long Term Goal 4: Pt will demonstrate stair negotiation 4 steps with 1 rail mod indep    PLAN    General Plan: 1 time a day 3-6 times a week  Safety Devices  Type of Devices: All fall risk precautions in place, Bed alarm in place, Left in bed, Call light within reach     [facility] (6 CLICK) Raphael 95 Raw Score : 18     Therapy Time   Individual   Time In 1053   Time Out 1113   Minutes 20      [address], 10/26/22 at 11:45 AM         Definitions for assistance levels  Independent = pt does not require any physical supervision or assistance from another person for activity completion. Device may be needed.   Stand by assistance = pt requires verbal cues or instructions from another person, close to but not touching, to perform the activity  Minimal assistance= pt performs 75% or more of the activity; assistance is required to complete the activity  Moderate assistance= pt performs 50% of the activity; assistance is required to complete the activity  Maximal assistance = pt performs 25% of the activity; assistance is required to complete the activity  Dependent = pt requires total physical assistance to accomplish the task

## 2022-10-26 NOTE — FLOWSHEET NOTE
Shift assessment complete. VSS. A/Ox4. Patient denies chest pain, shortness of breath or nausea at this time. Patient interested in getting diet increased to solid foods currently on clear liquid diet. Wife at bedside. Call light with in reach no further needs at this time.

## 2022-10-26 NOTE — PROGRESS NOTES
Progress Note  Patient: Leopoldo Sahni  Unit/Bed: Q399/N049-91  YOB: 1950  MRN: [id number]  Acct: [de-identified]   Admitting Diagnosis: Chronic anticoagulation [Z79.01]  Pancreatic mass [K86.89]  Elevated troponin [R77.8]  Right renal mass [N28.89]  Acute upper GI bleed [K92.2]  Head injury, closed, with LOC of unknown duration ([address] [Z64.5S7B]  History of renal transplant [Z94.0]  Fall at home, initial encounter [W19. XXXA, Y92.009]  Symptomatic anemia [D64.9]  Opacity of lung on imaging study [R91.8]  Acute pain of both knees [M25.561, M25.562]  Type 2 diabetes mellitus with hyperglycemia, unspecified whether long term insulin use ([address] [E11.65]  Chronic renal failure, stage 3b ([address] [N18.32]  Admit Date:  10/19/2022  Hospital Day: 7    Chief Complaint: CP    Histories:  Past Medical History:   Diagnosis Date    Diabetes mellitus ([address]     Hemodialysis access, fistula mature ([address] 2002    Hypertension     Seizures ([address]     no seizure since      Past Surgical History:   Procedure Laterality Date    BRAIN SURGERY Left 1990    to eliminate seizures    COLONOSCOPY N/A 10/24/2022    COLONOSCOPY DIAGNOSTIC performed by Mike Toscano MD at [facility]      [place] [address] INCL FLUOR GDNCE DX W/CELL [facility] N/A 3/20/2018    BRONCHOSCOPY performed by Alexy Irvin MD at [address] [place] [address] N/A 10/21/2022    EGD DIAGNOSTIC ONLY performed by Karissa Cummings MD at [facility]     Family History   Problem Relation Age of Onset    Colon Cancer Neg Hx      Social History     Socioeconomic History    Marital status:      Spouse name: None    Number of children: None    Years of education: None    Highest education level: None   Tobacco Use    Smoking status: Former     Packs/day: 0.25     Types: Cigarettes     Quit date: 2015     Years since quittin.3    Smokeless tobacco: Former   Vaping Use    Vaping Use: Never used Substance and Sexual Activity    Alcohol use: No     Alcohol/week: 0.0 standard drinks    Drug use: No       Subjective/HPI no further CP. Slept well. Taking clears. No complaints this am. Trops trending down. Wife in room. EKG:  NSR 61        Review of Systems:   Review of Systems   Constitutional: Negative. Negative for diaphoresis and fatigue. HENT: Negative. Eyes: Negative. Respiratory: Negative. Negative for cough, chest tightness, shortness of breath, wheezing and stridor. Cardiovascular: Negative. Negative for chest pain, palpitations and leg swelling. Gastrointestinal: Negative. Negative for blood in stool and nausea. Genitourinary: Negative. Musculoskeletal: Negative. Skin: Negative. Neurological:  Positive for weakness. Negative for dizziness, syncope and light-headedness. Hematological: Negative. Psychiatric/Behavioral: Negative. Physical Examination:    BP (!) 170/58   Pulse 58   Temp 98.4 °F (36.9 °C) (Oral)   Resp 18   Ht 6' 2\" (1.88 m)   Wt 185 lb (83.9 kg)   SpO2 100%   BMI 23.75 kg/m²    Physical Exam   Constitutional: He appears healthy. No distress. HENT:   Normal cephalic and Atraumatic   Eyes: Pupils are equal, round, and reactive to light. Neck: Thyroid normal. No JVD present. No neck adenopathy. No thyromegaly present. Cardiovascular: Normal rate, regular rhythm, intact distal pulses and normal pulses. Murmur heard. Pulmonary/Chest: Effort normal and breath sounds normal. He has no wheezes. He has no rales. He exhibits no tenderness. Abdominal: Soft. Bowel sounds are normal. There is no abdominal tenderness. Musculoskeletal:         General: No tenderness or edema. Normal range of motion. Cervical back: Normal range of motion and neck supple. Neurological: He is alert and oriented to person, place, and time. Skin: Skin is warm. No cyanosis. Nails show no clubbing.      LABS:  CBC:   Lab Results   Component Value 10/26/2022 05:14 AM    LABALBU 3.3 10/26/2022 05:14 AM    LABALBU 3.9 09/15/2011 05:00 AM    CREATININE 2.10 10/26/2022 05:14 AM    CALCIUM 8.7 10/26/2022 05:14 AM    GFRAA 28.2 10/12/2022 02:45 PM    LABGLOM 33.1 10/26/2022 05:14 AM    GLUCOSE 139 10/26/2022 05:14 AM    GLUCOSE 172 09/16/2011 06:25 AM     Magnesium:    Lab Results   Component Value Date/Time    MG 1.7 10/26/2022 05:14 AM     Troponin:    Lab Results   Component Value Date/Time    TROPONINI 0.032 10/26/2022 05:14 AM        Active Hospital Problems    Diagnosis Date Noted    Lung nodules [R91.8] 10/25/2022     Priority: Medium    COPD without exacerbation ([facility] [J44.9] 10/25/2022     Priority: Medium    Abnormal CT of the chest [R93.89] 10/25/2022     Priority: Medium    Bronchiectasis without complication ([facility] [X17.0] 10/25/2022     Priority: Medium    Dieulafoy lesion of colon [K63.81] 10/24/2022     Priority: Medium    Poorly controlled diabetes mellitus ([facility] [E11.65] 10/24/2022     Priority: Medium    Symptomatic anemia [D64.9] 10/19/2022     Priority: Medium    Acute upper GI bleed [K92.2] 10/19/2022     Priority: Medium        Assessment/Plan:  CP- No  ACS by serial Trops - trending lower. He will have baseline elevated Trop due to CKD. No ASA due to GIB. Keep on Telemetry. Continue BB and Statin  PAF - in SR now on Telemetry. Hold Warfarin for now. HTN elevated but did not yet receive AM meds. If BP remains elevated then adjust meds. CKD s/p Transplant. LVEF 65%   GIB and continued Anemia- transfuse as needed. Keep Hb > 7. Stable this am after transfusion yesterday.           Electronically signed by Rachelle Alonzo MD on 10/26/2022 at 9:02 AM

## 2022-10-26 NOTE — PLAN OF CARE
See OT evaluation for all goals and OT POC.  Electronically signed by Robbi Pabon OT on 10/26/2022 at 1:22 PM

## 2022-10-26 NOTE — PROGRESS NOTES
[facility] OCCUPATIONAL THERAPY EVALUATION - ACUTE     NAME: Leanne Storey  : 1950 (80 y.o.)  MRN: [id number]  CODE STATUS: Full Code  Room: U2/Z469-85    Date of Service: 10/26/2022    Patient Diagnosis(es): Chronic anticoagulation [Z79.01]  Pancreatic mass [K86.89]  Elevated troponin [R77.8]  Right renal mass [N28.89]  Acute upper GI bleed [K92.2]  Head injury, closed, with LOC of unknown duration ([address] [Q06.9Y7O]  History of renal transplant [Z94.0]  Fall at home, initial encounter [W19. XXXA, Y92.009]  Symptomatic anemia [D64.9]  Opacity of lung on imaging study [R91.8]  Acute pain of both knees [M25.561, M25.562]  Type 2 diabetes mellitus with hyperglycemia, unspecified whether long term insulin use ([address] [E11.65]  Chronic renal failure, stage 3b (HCC) [N18.32]   Patient Active Problem List    Diagnosis Date Noted    Chest pain 2022    Lung nodules 10/25/2022    COPD without exacerbation ([address] 10/25/2022    Abnormal CT of the chest 10/25/2022    Bronchiectasis without complication ([address]     Dieulafoy lesion of colon 10/24/2022    Poorly controlled diabetes mellitus ([address] 10/24/2022    Symptomatic anemia 10/19/2022    Acute upper GI bleed 10/19/2022    Atrial fibrillation with RVR ([address] 2022    Unstable angina ([address] 2022    Atypical chest pain 2019    Chronic cough 2019    Anginal chest pain at rest [facility]) 2019    Pneumonia 2018    Abdominal pain, other specified site 06/15/2011    Intra-abdominal abscess post-procedure 06/15/2011    Infection due to Enterococcus 06/15/2011    Nonspecific abnormal results of thyroid function study 2011    Anemia 2009    Essential hypertension 2008    Mixed hyperlipidemia 2008    Type II or unspecified type diabetes mellitus without mention of complication, uncontrolled 10/03/2008    Fever and other physiologic disturbances of temperature regulation 2008    Kidney replaced by transplant 07/26/2007    Acute pyelonephritis without lesion of renal medullary 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     Hemodialysis access, fistula mature ([facility] 12/2002    Hypertension     Seizures ([facility]     no seizure since 1995     Past Surgical History:   Procedure Laterality Date    BRAIN SURGERY Left 12/06/1990    to eliminate seizures    COLONOSCOPY N/A 10/24/2022    COLONOSCOPY DIAGNOSTIC performed by Alvin Apple MD at [facility]  2015    [place] [address] INCL FLUOR GDNCE DX W/CELL [facility] N/A 3/20/2018    BRONCHOSCOPY performed by Tony Victor MD at [address] [place] [address] 10/21/2022    EGD DIAGNOSTIC ONLY performed by Itzel Elder MD at [facility]        Restrictions  Restrictions/Precautions: Fall Risk              Safety Devices: Safety Devices  Type of Devices:  All fall risk precautions in place     Patient's date of birth confirmed: Yes    General:       Subjective          Pain at start of treatment: No    Pain at end of treatment: No      Prior Level of Function:  Social/Functional History  Lives With: Spouse  Type of Home: House  Home Layout: Laundry in basement, Two level, Able to Live on Main level with bedroom/bathroom  Home Access: Stairs to enter with rails  Entrance Stairs - Number of Steps: 4  Entrance Stairs - Rails: Left  Bathroom Shower/Tub: Tub/Shower unit  Bathroom Equipment: Grab bars in shower, Shower chair  Home Equipment: Jayme Hammers, quad, Sharion Oziel, rolling  Has the patient had two or more falls in the past year or any fall with injury in the past year?: Yes  ADL Assistance: [address]: Independent (completes outdoor IADL tasks, spouse completes indoor)  Homemaking Responsibilities: No (spouse completes most)  Ambulation Assistance: Independent (No AD)  Transfer Assistance: Independent  Active : Yes  Type of Occupation:   Leisure & Hobbies: building, outdoor tasks  Additional Comments: able to [employer] and household distances- [employer]- per pt report    OBJECTIVE:     Orientation Status:  Orientation  Overall Orientation Status: Within Functional Limits    Observation:  Observation/Palpation  Observation: mild lightheaded with mobility; pt returned to bed level /53    Cognition Status:  Cognition  Overall Cognitive Status: WFL    Perception Status:       Vision and Hearing Status:  Vision  Vision Exceptions: Wears glasses at all times  Hearing  Hearing: Within functional limits        GROSS ASSESSMENT AROM/PROM:  AROM: Within functional limits       ROM:   LUE AROM (degrees)  LUE AROM : WFL  Left Hand AROM (degrees)  Left Hand AROM: WFL  RUE AROM (degrees)  RUE AROM : WFL  Right Hand AROM (degrees)  Right Hand AROM: WFL    UE STRENGTH:  Strength: Generally decreased, functional    UE COORDINATION:  Coordination: Within functional limits    UE TONE:  Tone: Normal    UE SENSATION:  Sensation: Impaired (neuropathy in BLE per report)    Hand Dominance:  Hand Dominance  Hand Dominance: Right    ADL Status:  ADL  Feeding: Unable to assess(comment)  Grooming: Stand by assistance  UE Bathing: Stand by assistance  LE Bathing: Minimal assistance  UE Dressing: Stand by assistance  LE Dressing: Minimal assistance  Toileting: Minimal assistance  Additional Comments: simulated ADL seated EOB levels as anticipated. Pt demonstrated decreased functional acitivity tolerance and decreased balance limiting functional independence with all ADL tasks.   Toilet Transfers  Toilet Transfer: Stand by assistance  Toilet Transfers Comments: anticipated level    Functional Mobility:    Transfers  Sit to stand: Stand by assistance  Stand to sit: Stand by assistance    Patient ambulated to/from sink with No device at [facility] level. Pt became lightheaded with mobility. Pt returned bed level with HOB elevated /53. Bed Mobility  Bed mobility  Supine to Sit: Supervision  Sit to Supine: Supervision    Seated and Standing Balance:  Balance  Sitting: Intact  Standing: Impaired (CGA)    Functional Endurance:  Activity Tolerance  Activity Tolerance: Treatment limited secondary to medical complications (free text)  Activity Tolerance Comments: lightheaded with mobility this date;  BP as previously noted 147/53    D/C Recommendations:  OT D/C RECOMMENDATIONS  REQUIRES OT FOLLOW-UP: Yes    Equipment Recommendations:  OT Equipment Recommendations  Other: continue to assess    OT Education:   Patient Education  Education Given To: Patient  Education Provided: Role of Therapy;Plan of Care    OT Follow Up:   OT D/C RECOMMENDATIONS  REQUIRES OT FOLLOW-UP: Yes       Assessment/Discharge Disposition:  Assessment: Pt is a 80 yr old male presenting to [place] with the above functional deficits. Pt would benefit from occupational therapy services to maximize safety and independence with ADL tasks, improve overall strength/endurance and balance for functional tasks.   Performance deficits / Impairments: Decreased functional mobility , Decreased safe awareness, Decreased balance, Decreased ADL status, Decreased endurance, Decreased strength, Decreased high-level IADLs  Prognosis: Good  Discharge Recommendations: Continue to assess pending progress  Decision Making: Medium Complexity  History: multiple  Exam: 7 perf deficits  Assistance / Modification: Mohinder    [facility] ([facility] [employer]) Self care Score   How much help for putting on and taking off regular lower body clothing?: A Little  How much help for Bathing?: A Little  How much help for Toileting?: A Little  How much help for putting on and taking off regular upper body clothing?: A Little  How much help for taking care of personal grooming?: A Little  How much help for eating meals?: A Bere  AM-[facility] Inpatient Daily Activity Raw Score: 18  AM-[facility] Inpatient ADL T-Scale Score : 38.66  ADL Inpatient CMS 0-100% Score: 46.65    Therapy key for assistance levels -   Independent/Mod I = Pt. is able to perform task with no assistance but may require a device   Stand by assistance = Pt. does not perform task at an independent level but does not need physical assistance, requires verbal cues  Minimal, Moderate, Maximal Assistance = Pt. requires physical assistance (25%, 50%, 75% assist from helper) for task but is able to actively participate in task   Dependent = Pt. requires total assistance with task and is not able to actively participate with task completion     Plan:  Occupational Therapy Plan  Times Per Week: 1-4x/wk  Therapy Duration:  (length of acute stay)  Current Treatment Recommendations: Strengthening, Functional mobility training, Endurance training, Safety education & training, Equipment evaluation, education, & procurement, Home management training, Self-Care / ADL    Goals:   Patient will:    - Improve functional endurance to tolerate/complete 30 mins of ADL's  - Be JESUS in UB ADLs   - Be JESUS in LB ADLs  - Be JESUS in ADL transfers without LOB  - Be JESUS in toileting tasks  - Improve B UE strength and endurance to 5/5 in order to participate in self-care activities as projected. - Access appropriate D/C site with as few architectural barriers as possible.     Patient Goal: Patient goals : to get back in shape    Discussed and agreed upon: Yes Comments:       Therapy Time:   Individual   Time In 1124   Time Out 1152   Minutes 28          Eval: 28 minutes     Electronically signed by:    Ashok Mane OT,   10/26/2022, 1:20 PM

## 2022-10-26 NOTE — PROGRESS NOTES
Patient calm and cooperative throughout the day, able to verbalize and make needs known, ambulates with a walker, stand by assist,feeds self, continues on clear liquid diet, would like Gastroenterology to upgrade his diet. No black tarry stools, or abdominal pain, continent of bowel and bladder, oral fluid intake encouraged. Patient expresses he is looking forward to eating, and is looking forward to returning home.     Received 1 unit of PRBs, patient tolerated well    Spoke with lab, post infusion hemoglobin 8.1

## 2022-10-26 NOTE — PROGRESS NOTES
Nephrology Progress Note    Assessment:  CKD-4  Anemia GIB    DM type-2  S/P  kidney transplant - 2015. On neoral, cellcept prednisone  Hypertension      Plan: Okay for discharge  Follow-up with transplant and Dr. Steffanie Cardenas sodium bicarbonate. We will send into the pharmacy    Patient Active Problem List:     Pneumonia     Abdominal pain, other specified site     Acute pyelonephritis without lesion of renal medullary necrosis     Anemia     Essential hypertension     Nonspecific abnormal results of thyroid function study     Mixed hyperlipidemia     Kidney replaced by transplant     Intra-abdominal abscess post-procedure     Infection due to Enterococcus     Fever and other physiologic disturbances of temperature regulation     Chronic kidney disease (CKD)     Type II or unspecified type diabetes mellitus without mention of complication, uncontrolled     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     Anginal chest pain at rest [facility])     Atypical chest pain     Chronic cough     Unstable angina (HCC)     Chest pain     Atrial fibrillation with RVR ([facility])     Symptomatic anemia     Acute upper GI bleed      Subjective:  Admit Date: 10/19/2022    Interval History: pt had lesion on colonoscopy clipped. Getting blood now.   Got epo as well    Medications:  Scheduled Meds:   pantoprazole (PROTONIX) 40 mg injection  40 mg IntraVENous Q12H    epoetin megan-epbx  10,000 Units SubCUTAneous Q7 Days    insulin glargine  10 Units SubCUTAneous BID    insulin lispro  0-8 Units SubCUTAneous 4x Daily    sodium chloride flush  5-40 mL IntraVENous 2 times per day    insulin lispro  10 Units SubCUTAneous Once    sodium chloride flush  5-40 mL IntraVENous 2 times per day    mycophenolate  750 mg Oral BID    sodium chloride flush  5-40 mL IntraVENous 2 times per day    cycloSPORINE modified  75 mg Oral BID    [Held by provider] insulin regular  6 Units SubCUTAneous Daily with breakfast    [Held by provider] insulin regular  8 Units SubCUTAneous BID WC    gabapentin  300 mg Oral BID    predniSONE  5 mg Oral Daily    atorvastatin  10 mg Oral Daily    tamsulosin  0.4 mg Oral Daily    carvedilol  6.25 mg Oral BID     Continuous Infusions:   sodium chloride      sodium chloride      sodium chloride      sodium chloride 100 mL/hr at 10/24/22 1400    sodium chloride      sodium chloride      sodium chloride      dextrose         CBC:   Recent Labs     10/25/22  0708 10/25/22  2029 10/25/22  2306   WBC 7.7  --  7.2   HGB 6.5* 8.1* 8.1*     --  171       CMP:    Recent Labs     10/24/22  0521 10/25/22  0708 10/26/22  0514    144 141   K 4.3 3.8 4.0   * 115* 113*   CO2 16* 14* 15*   BUN 70* 52* 38*   CREATININE 2.43* 2.11* 2.10*   GLUCOSE 140* 106* 139*   CALCIUM 8.6 8.3* 8.7   LABGLOM 27.8* 32.9* 33.1*       Troponin:   Recent Labs     10/26/22  0514   TROPONINI 0.032*       BNP: No results for input(s): BNP in the last 72 hours. INR:   Recent Labs     10/25/22  0708   INR 1.2       Lipids: No results for input(s): CHOL, LDLDIRECT, TRIG, HDL, AMYLASE, LIPASE in the last 72 hours. Liver:   Recent Labs     10/26/22  0514   AST 9   ALT 6   ALKPHOS 50   PROT 5.3*   LABALBU 3.3*   BILITOT 1.0*       Iron:  No results for input(s): IRONS, FERRITIN in the last 72 hours. Invalid input(s): LABIRONS  Urinalysis: No results for input(s): UA in the last 72 hours.     Objective:  Vitals: BP (!) 158/61   Pulse 63   Temp 98.7 °F (37.1 °C) (Oral)   Resp 18   Ht 6' 2\" (1.88 m)   Wt 185 lb (83.9 kg)   SpO2 100%   BMI 23.75 kg/m²    Wt Readings from Last 3 Encounters:   10/24/22 185 lb (83.9 kg)   10/14/22 195 lb 6.4 oz (88.6 kg)   10/12/22 192 lb (87.1 kg)      24HR INTAKE/OUTPUT:    Intake/Output Summary (Last 24 hours) at 10/26/2022 1451  Last data filed at 10/26/2022 0905  Gross per 24 hour   Intake 338.33 ml   Output 400 ml   Net -61.67 ml         General: alert, in no apparent distress  HEENT: normocephalic, atraumatic, anicteric  Neck: supple, no mass  Lungs: non-labored respirations, clear to auscultation bilaterally  Heart: regular rate and rhythm, no murmurs or rubs  Abdomen: soft, non-tender, non-distended  Ext: no cyanosis, no peripheral edema  Neuro: alert and oriented, no gross abnormalities  Psych: normal mood and affect  Skin: no rash      Electronically signed by Vannessa Chau MD, MD

## 2022-10-26 NOTE — PROGRESS NOTES
Pulmonary Progress Note    10/26/2022 3:32 PM    Subjective:   Admit Date: 10/19/2022  PCP: David Carpenter DO    Chief Complaint   Patient presents with    Fatigue     X 2 weeks      Interval History: Has been on room air. Denies any cough, shortness of breath or wheezing. Asking about his CT of the chest.  Wife is at the bedside. 12 points review of systems has been obtained and negative except to was mentioned in HPI.      Medications:   Scheduled Meds:   sodium bicarbonate  650 mg Oral BID    pantoprazole (PROTONIX) 40 mg injection  40 mg IntraVENous Q12H    epoetin megan-epbx  10,000 Units SubCUTAneous Q7 Days    insulin glargine  10 Units SubCUTAneous BID    insulin lispro  0-8 Units SubCUTAneous 4x Daily    sodium chloride flush  5-40 mL IntraVENous 2 times per day    insulin lispro  10 Units SubCUTAneous Once    sodium chloride flush  5-40 mL IntraVENous 2 times per day    mycophenolate  750 mg Oral BID    sodium chloride flush  5-40 mL IntraVENous 2 times per day    cycloSPORINE modified  75 mg Oral BID    [Held by provider] insulin regular  6 Units SubCUTAneous Daily with breakfast    [Held by provider] insulin regular  8 Units SubCUTAneous BID WC    gabapentin  300 mg Oral BID    predniSONE  5 mg Oral Daily    atorvastatin  10 mg Oral Daily    tamsulosin  0.4 mg Oral Daily    carvedilol  6.25 mg Oral BID     Continuous Infusions:   sodium chloride      sodium chloride      sodium chloride      sodium chloride 100 mL/hr at 10/24/22 1400    sodium chloride      sodium chloride      sodium chloride      dextrose           Objective:   Vitals:   Temp (24hrs), Av.6 °F (37 °C), Min:98.4 °F (36.9 °C), Max:98.7 °F (37.1 °C)    BP (!) 158/61   Pulse 63   Temp 98.7 °F (37.1 °C) (Oral)   Resp 18   Ht 6' 2\" (1.88 m)   Wt 185 lb (83.9 kg)   SpO2 100%   BMI 23.75 kg/m²   I/O:24HR INTAKE/OUTPUT:    Intake/Output Summary (Last 24 hours) at 10/26/2022 1532  Last data filed at 10/26/2022 0905  Gross per 24 hour   Intake 338.33 ml   Output 400 ml   Net -61.67 ml     10/25 0701 - 10/26 0700  In: 338.3   Out: -   CVP:             Physical Exam:  General appearance - alert, well appearing, and in no distress  Mental status - alert, oriented to person, place, and time  Eyes - pupils equal and reactive, extraocular eye movements intact  Nose - normal and patent, no erythema, discharge or polyps  Neck - supple, no significant adenopathy  Chest -diminished bilaterally to auscultation, no wheezes, rales or rhonchi, symmetric air entry  Heart - normal rate, regular rhythm, normal S1, S2, no murmurs, rubs, clicks or gallops  Abdomen - soft, nontender, nondistended, no masses or organomegaly  Rectal - deferred, not clinically indicated  Neurological - alert, oriented, normal speech, no focal findings or movement disorder noted, motor and sensory grossly normal bilaterally  Musculoskeletal - no joint tenderness, deformity or swelling  Extremities - peripheral pulses normal, no pedal edema, no clubbing or cyanosis  Skin - normal coloration and turgor, no rashes, no suspicious skin lesions noted              BMP:    Recent Labs     10/24/22  0521 10/25/22  0708 10/26/22  0514    144 141   K 4.3 3.8 4.0   * 115* 113*   CO2 16* 14* 15*   BUN 70* 52* 38*   CREATININE 2.43* 2.11* 2.10*   GLUCOSE 140* 106* 139*    . MG:3,PHOS:3)@  Ionized Calcium: No results found for: IONCA  CBC:   Recent Labs     10/25/22  0708 10/25/22  2029 10/25/22  2306   WBC 7.7  --  7.2   HGB 6.5* 8.1* 8.1*     --  171      ABG: No results for input(s): PH, PCO2, PO2 in the last 72 hours. Assessment and Plan:       Impression:    -Acute blood loss anemia due to  GI bleeding. Hemoglobin today is 8.1.  -Abnormal CT of the chest.  Multiple findings. There is a small nodule in the right upper lobe adjacent to a bulla. The solid part of it measures about six 7 mm with some groundglass component.  -Right lower lobe consolidation.   This

## 2022-10-26 NOTE — PROGRESS NOTES
duodenum was normal  Colonoscopy Dr Rui Pedersen 10/24/22  A pulsating intermittent bleeding noted. A single small Dieulafoy Vs AVM with bleeding was found in the ascending colon. Area was successfully injected with 4 mL of a 0.1 mg/mL solution of epinephrine for hemostasis. To stop active bleeding, two hemostatic clips were successfully placed. There was no bleeding at the end of the procedure. ASSESSMENT:  71-year-old male admitted with anemia and melena in the context of supratherapeutic INR while on Coumadin, on arrival INR was 3.1 patient had been experiencing intermittent melena over the course of the month hemoglobin was 6.3. Had normal EGD. CT the abdomen was notable for 1.4 cm pancreatic body lesion possibly IPMN will likely need an outpatient EUS with Dr. Cora Wilburn for further evaluation. Has been tolerating regular diet INR now 1.5.   10/23/22 no further melena, hemoglobin 6.7,  INR 1.3, Coumadin is on hold, colonoscopy tomorrow. Planned for 1 unit PRBC today  10/24/22 reports melena with colonoscopy prep. Hemoglobin 7.7, INR 1.3, Coumadin is on hold  10/25/22 Hgb 6.5 no overt bleeding overnight, tolerated clear liquids, S/P colonoscopy which noted A pulsating intermittent bleeding noted. A single small Dieulafoy Vs AVM with bleeding was found in the ascending colon. Area was successfully injected with 4 mL of a 0.1 mg/mL solution of epinephrine for hemostasis. To stop active bleeding, two hemostatic clips were successfully placed. There was no bleeding at the end of the procedure. 10/26/22 hgb 8.1 no overt bleeding, tolerated full liquid diet. No abdominal pain. No BM      PLAN :  Continue course of care  -Continue serial H&H, trend and transfuse accordingly  -advance to soft diet    Thank you for allowing me to participate in the care of your patient. Please feel free to contact me with any concerns.     Markus Mcmanus, HAJA - CNP

## 2022-10-26 NOTE — PROGRESS NOTES
Hospitalist Progress Note      PCP: Rodrigo Miguel DO    Date of Admission: 10/19/2022    Chief Complaint:    Chief Complaint   Patient presents with    Fatigue     X 2 weeks        Subjective:  Patient denies fevers, chills, sweats, CP, SOB, diarrhea or burning micturition.   12 point ROS negative other than mentioned above     Medications:  Reviewed    Infusion Medications    sodium chloride      sodium chloride      sodium chloride      sodium chloride 100 mL/hr at 10/24/22 1400    sodium chloride      sodium chloride      sodium chloride      dextrose       Scheduled Medications    sodium bicarbonate  650 mg Oral BID    amoxicillin-clavulanate  1 tablet Oral 2 times per day    pantoprazole (PROTONIX) 40 mg injection  40 mg IntraVENous Q12H    epoetin megan-epbx  10,000 Units SubCUTAneous Q7 Days    insulin glargine  10 Units SubCUTAneous BID    insulin lispro  0-8 Units SubCUTAneous 4x Daily    sodium chloride flush  5-40 mL IntraVENous 2 times per day    insulin lispro  10 Units SubCUTAneous Once    sodium chloride flush  5-40 mL IntraVENous 2 times per day    mycophenolate  750 mg Oral BID    sodium chloride flush  5-40 mL IntraVENous 2 times per day    cycloSPORINE modified  75 mg Oral BID    [Held by provider] insulin regular  6 Units SubCUTAneous Daily with breakfast    [Held by provider] insulin regular  8 Units SubCUTAneous BID     gabapentin  300 mg Oral BID    predniSONE  5 mg Oral Daily    atorvastatin  10 mg Oral Daily    tamsulosin  0.4 mg Oral Daily    carvedilol  6.25 mg Oral BID     PRN Meds: sodium chloride, sodium chloride flush, sodium chloride, sodium chloride, sodium chloride flush, sodium chloride, sodium chloride, sodium chloride, sodium chloride flush, sodium chloride, acetaminophen **OR** acetaminophen, furosemide, glucose, dextrose bolus **OR** dextrose bolus, glucagon (rDNA), dextrose, prochlorperazine **OR** prochlorperazine      Intake/Output Summary (Last 24 hours) at 10/26/2022 Jose Forman filed at 10/26/2022 0905  Gross per 24 hour   Intake --   Output 400 ml   Net -400 ml       Exam:    BP (!) 149/52   Pulse 63   Temp 98.7 °F (37.1 °C) (Oral)   Resp 18   Ht 6' 2\" (1.88 m)   Wt 185 lb (83.9 kg)   SpO2 100%   BMI 23.75 kg/m²     General appearance: No apparent distress, appears stated age and cooperative. HEENT:  Conjunctivae/corneas clear. Neck: Trachea midline. Respiratory:  Normal respiratory effort. Clear to auscultation  Cardiovascular: Regular rate and rhythm  Abdomen: Soft, non-tender, non-distended with normal bowel sounds. Musculoskeletal: No clubbing, cyanosis or edema bilaterally  Neuro: Non Focal.   Capillary Refill: Brisk,< 3 seconds   Peripheral Pulses: +2 palpable, equal bilaterally     Labs:   Recent Labs     10/24/22  0521 10/24/22  1514 10/25/22  0708 10/25/22  2029 10/25/22  2306   WBC 9.2  --  7.7  --  7.2   HGB 7.6*   < > 6.5* 8.1* 8.1*   HCT 23.7*   < > 20.4* 25.1* 24.9*     --  166  --  171    < > = values in this interval not displayed.        Recent Labs     10/24/22  0521 10/25/22  0708 10/26/22  0514 10/26/22  1051    144 141  --    K 4.3 3.8 4.0  --    * 115* 113*  --    CO2 16* 14* 15*  --    BUN 70* 52* 38*  --    CREATININE 2.43* 2.11* 2.10*  --    CALCIUM 8.6 8.3* 8.7  --    PHOS  --   --   --  2.7       Recent Labs     10/24/22  0521 10/25/22  0708 10/26/22  0514   AST 8 7 9   ALT <5 <5 6   BILITOT 0.7 0.9* 1.0*   ALKPHOS 48 46 50       Recent Labs     10/24/22  0521 10/25/22  0708   INR 1.3 1.2       Recent Labs     10/25/22  2306 10/26/22  0514 10/26/22  1051   TROPONINI 0.029* 0.032* 0.036*       Urinalysis:      Lab Results   Component Value Date/Time    NITRU Negative 10/19/2022 01:44 PM    WBCUA 10-20 10/19/2022 01:44 PM    BACTERIA FEW 10/19/2022 01:44 PM    RBCUA 6-10 10/19/2022 01:44 PM    BLOODU SMALL 10/19/2022 01:44 PM    SPECGRAV 1.012 10/19/2022 01:44 PM    GLUCOSEU Negative 10/19/2022 01:44 PM    GLUCOSEU GT 1 09/14/2011 08:57 AM     Radiology:  XR KNEE LEFT (3 VIEWS)   Final Result   1. 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 2. Probable chondrocalcinosis. 3. Possible trace suprapatellar knee joint effusion. XR KNEE RIGHT (3 VIEWS)   Final Result   Degenerative changes. No evidence of acute osseous abnormality is seen. CT HEAD WO CONTRAST   Final Result   1. There is no acute intracranial abnormality. Specifically, there is no   intracranial hemorrhage. 2. Atrophy and periventricular leukomalacia,   3. Mucosal thickening within the right sphenoid sinus. 4. Previous left craniotomy defect with encephalomalacia  seen within the   anterior aspect of the left temporal lobe. .         CT CERVICAL SPINE WO CONTRAST   Final Result   1. There is no acute compression fracture or subluxation of the cervical   spine. 2. Multilevel degenerative disc and degenerative joint disease. CT THORACIC SPINE WO CONTRAST   Final Result   1. No acute fracture or subluxation within the thoracic or lumbar spine. 2. Degenerative changes in the spine without definite central canal stenosis   in the thoracic spine. There is possible central canal stenosis at L3-4. There is multilevel neural foraminal narrowing within the lumbar spine. 3. Marked atrophy of the bilateral kidneys with indeterminate exophytic   lesion from the right kidney that may represent complicated cyst or solid   mass. The patient has prior examinations which are not available for   comparison. 4. There is a dependent pleural based opacity in the right lower lobe which   may represent atelectasis. There are prior examinations which are not   available for comparison. CT LUMBAR SPINE WO CONTRAST   Final Result   1. No acute fracture or subluxation within the thoracic or lumbar spine. 2. Degenerative changes in the spine without definite central canal stenosis   in the thoracic spine.   There is possible central canal stenosis at L3-4. There is multilevel neural foraminal narrowing within the lumbar spine. 3. Marked atrophy of the bilateral kidneys with indeterminate exophytic   lesion from the right kidney that may represent complicated cyst or solid   mass. The patient has prior examinations which are not available for   comparison. 4. There is a dependent pleural based opacity in the right lower lobe which   may represent atelectasis. There are prior examinations which are not   available for comparison. CTA CHEST W WO CONTRAST   Final Result   1. No large filling defects are seen on this examination to suggest saddle   embolus more in the larger primary and secondary vascular structures. 2.   There is consolidation again seen in the left lower lobe. Bronchial wall   thickening mild bronchiectasis and other changes of COPD are seen. An   ill-defined opacity is seen in the anterior left upper lobe. Recommend   follow-up to resolution to exclude any developing masses. There are multiple   nodules visualized and overall are stable. CT ABDOMEN PELVIS W IV CONTRAST Additional Contrast? None   Final Result   There is no evidence of an acute posttraumatic abnormality in the abdomen or   pelvis. Specifically, no evidence of splenic or renal injury. No   retroperitoneal hematoma. Indeterminate intermediate density 1.2 cm lesion arising from the mid right   kidney. Renal neoplasm not excluded. Correlation with renal ultrasound or   multiphasic contrast-enhanced CT or MRI of the abdomen utilizing a renal mass   protocol is recommended for further assessment. Indeterminate 1.4 cm low-density lesion arising from the inferior portion of   the pancreatic body centrally. This has imaging features most suspicious for   an IPMN. Correlation with contrast-enhanced abdominal MRI is also   recommended (unless otherwise contraindicated).       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 Linear/nodular opacities in the right lower lobe and right middle lobe. Please refer to the dedicated CT chest report for additional details. Assessment/Plan:    #Acute blood loss anemia secondary to dieulafoy ulcer     - s/p clipping by GI    - monitor; ok for d/c when ok with GI    - transfusion goal > 7; PRBC ordered    #PAF    - per cardiology    #CKD    - per nephrology    #Lung nodules    - Pulm consulted; plan for augmentin then outpatient follow up as ahsan would like to go [facility]    Diagnosis Date Noted    Lung nodules [R91.8] 10/25/2022     Priority: Medium    COPD without exacerbation ([facility] [address] [J44.9] 10/25/2022     Priority: Medium    Abnormal CT of the chest [R93.89] 10/25/2022     Priority: Medium    Bronchiectasis without complication ([facility] [address] [T50.2] 10/25/2022     Priority: Medium    Dieulafoy lesion of colon [K63.81] 10/24/2022     Priority: Medium    Poorly controlled diabetes mellitus ([facility] [address] [E11.65] 10/24/2022     Priority: Medium    Symptomatic anemia [D64.9] 10/19/2022     Priority: Medium    Acute upper GI bleed [K92.2] 10/19/2022     Priority: Medium        Additional work up or/and treatment plan may be added today or then after based on clinical progression. I am managing a portion of pt care. Some medical issues are handled by other specialists. Additional work up and treatment should be done in out pt setting by pt PCP and other out pt providers. In addition to examining and evaluating pt, I spent additional time explaining care, normal and abnormal findings, and treatment plan. All of pt questions were answered. Counseling, diet and education were  provided. Case will be discussed with nursing staff when appropriate. Family will be updated if and when appropriate.       Diet: ADULT DIET; Easy to Chew; 5 carb choices (75 gm/meal)    Code Status: Full Code    Electronically signed by Collins Dior MD on 10/26/2022 at 6:54 PM

## 2022-10-27 ENCOUNTER — TELEPHONE (OUTPATIENT)
Dept: PULMONOLOGY | Age: 72
End: 2022-10-27

## 2022-10-27 DIAGNOSIS — R91.1 LUNG NODULE: Primary | ICD-10-CM

## 2022-10-27 LAB
ALBUMIN SERPL-MCNC: 3.1 G/DL (ref 3.5–4.6)
ALP BLD-CCNC: 50 U/L (ref 35–104)
ALT SERPL-CCNC: 7 U/L (ref 0–41)
ANION GAP SERPL CALCULATED.3IONS-SCNC: 12 MEQ/L (ref 9–15)
AST SERPL-CCNC: 8 U/L (ref 0–40)
BASOPHILS ABSOLUTE: 0.1 K/UL (ref 0–0.2)
BASOPHILS RELATIVE PERCENT: 0.9 %
BILIRUB SERPL-MCNC: 0.7 MG/DL (ref 0.2–0.7)
BUN BLDV-MCNC: 37 MG/DL (ref 8–23)
CALCIUM SERPL-MCNC: 8.2 MG/DL (ref 8.5–9.9)
CHLORIDE BLD-SCNC: 115 MEQ/L (ref 95–107)
CO2: 16 MEQ/L (ref 20–31)
CREAT SERPL-MCNC: 2.43 MG/DL (ref 0.7–1.2)
EKG ATRIAL RATE: 61 BPM
EKG P AXIS: 85 DEGREES
EKG P-R INTERVAL: 186 MS
EKG Q-T INTERVAL: 398 MS
EKG QRS DURATION: 76 MS
EKG QTC CALCULATION (BAZETT): 400 MS
EKG R AXIS: 33 DEGREES
EKG T AXIS: 32 DEGREES
EKG VENTRICULAR RATE: 61 BPM
EOSINOPHILS ABSOLUTE: 0.3 K/UL (ref 0–0.7)
EOSINOPHILS RELATIVE PERCENT: 4.6 %
GFR SERPL CREATININE-BSD FRML MDRD: 27.8 ML/MIN/{1.73_M2}
GLOBULIN: 2.1 G/DL (ref 2.3–3.5)
GLUCOSE BLD-MCNC: 156 MG/DL (ref 70–99)
GLUCOSE BLD-MCNC: 181 MG/DL (ref 70–99)
GLUCOSE BLD-MCNC: 185 MG/DL (ref 70–99)
GLUCOSE BLD-MCNC: 297 MG/DL (ref 70–99)
GLUCOSE BLD-MCNC: 324 MG/DL (ref 70–99)
HCT VFR BLD CALC: 24.3 % (ref 42–52)
HEMOGLOBIN: 8.1 G/DL (ref 14–18)
LYMPHOCYTES ABSOLUTE: 0.6 K/UL (ref 1–4.8)
LYMPHOCYTES RELATIVE PERCENT: 9.2 %
MAGNESIUM: 1.7 MG/DL (ref 1.7–2.4)
MCH RBC QN AUTO: 29.4 PG (ref 27–31.3)
MCHC RBC AUTO-ENTMCNC: 33.3 % (ref 33–37)
MCV RBC AUTO: 88.5 FL (ref 79–92.2)
MONOCYTES ABSOLUTE: 0.6 K/UL (ref 0.2–0.8)
MONOCYTES RELATIVE PERCENT: 9.4 %
NEUTROPHILS ABSOLUTE: 5.1 K/UL (ref 1.4–6.5)
NEUTROPHILS RELATIVE PERCENT: 75.9 %
PDW BLD-RTO: 16.3 % (ref 11.5–14.5)
PERFORMED ON: ABNORMAL
PLATELET # BLD: 171 K/UL (ref 130–400)
POTASSIUM SERPL-SCNC: 4 MEQ/L (ref 3.4–4.9)
RBC # BLD: 2.75 M/UL (ref 4.7–6.1)
SODIUM BLD-SCNC: 143 MEQ/L (ref 135–144)
TOTAL PROTEIN: 5.2 G/DL (ref 6.3–8)
TROPONIN: 0.04 NG/ML (ref 0–0.01)
WBC # BLD: 6.8 K/UL (ref 4.8–10.8)

## 2022-10-27 PROCEDURE — 6370000000 HC RX 637 (ALT 250 FOR IP): Performed by: CLINICAL NURSE SPECIALIST

## 2022-10-27 PROCEDURE — 83735 ASSAY OF MAGNESIUM: CPT

## 2022-10-27 PROCEDURE — 36415 COLL VENOUS BLD VENIPUNCTURE: CPT

## 2022-10-27 PROCEDURE — 99232 SBSQ HOSP IP/OBS MODERATE 35: CPT | Performed by: INTERNAL MEDICINE

## 2022-10-27 PROCEDURE — A4216 STERILE WATER/SALINE, 10 ML: HCPCS | Performed by: INTERNAL MEDICINE

## 2022-10-27 PROCEDURE — 6360000002 HC RX W HCPCS: Performed by: INTERNAL MEDICINE

## 2022-10-27 PROCEDURE — 6370000000 HC RX 637 (ALT 250 FOR IP): Performed by: INTERNAL MEDICINE

## 2022-10-27 PROCEDURE — 80053 COMPREHEN METABOLIC PANEL: CPT

## 2022-10-27 PROCEDURE — 2580000003 HC RX 258: Performed by: INTERNAL MEDICINE

## 2022-10-27 PROCEDURE — C9113 INJ PANTOPRAZOLE SODIUM, VIA: HCPCS | Performed by: INTERNAL MEDICINE

## 2022-10-27 PROCEDURE — 1210000000 HC MED SURG R&B

## 2022-10-27 PROCEDURE — 85025 COMPLETE CBC W/AUTO DIFF WBC: CPT

## 2022-10-27 PROCEDURE — 97116 GAIT TRAINING THERAPY: CPT

## 2022-10-27 PROCEDURE — 99232 SBSQ HOSP IP/OBS MODERATE 35: CPT | Performed by: PHYSICIAN ASSISTANT

## 2022-10-27 PROCEDURE — 84484 ASSAY OF TROPONIN QUANT: CPT

## 2022-10-27 RX ORDER — AMLODIPINE BESYLATE 10 MG/1
10 TABLET ORAL DAILY
Qty: 30 TABLET | Refills: 3 | Status: ON HOLD | OUTPATIENT
Start: 2022-10-27 | End: 2022-11-04 | Stop reason: SDUPTHER

## 2022-10-27 RX ORDER — SIMVASTATIN 20 MG
20 TABLET ORAL NIGHTLY
Status: DISCONTINUED | OUTPATIENT
Start: 2022-10-27 | End: 2022-10-28 | Stop reason: HOSPADM

## 2022-10-27 RX ORDER — PANTOPRAZOLE SODIUM 40 MG/1
40 TABLET, DELAYED RELEASE ORAL
Status: DISCONTINUED | OUTPATIENT
Start: 2022-10-27 | End: 2022-10-28 | Stop reason: HOSPADM

## 2022-10-27 RX ORDER — AMLODIPINE BESYLATE 10 MG/1
10 TABLET ORAL DAILY
Status: DISCONTINUED | OUTPATIENT
Start: 2022-10-27 | End: 2022-10-28 | Stop reason: HOSPADM

## 2022-10-27 RX ADMIN — INSULIN LISPRO 6 UNITS: 100 INJECTION, SOLUTION INTRAVENOUS; SUBCUTANEOUS at 17:53

## 2022-10-27 RX ADMIN — INSULIN GLARGINE 10 UNITS: 100 INJECTION, SOLUTION SUBCUTANEOUS at 22:28

## 2022-10-27 RX ADMIN — MYCOPHENOLATE MOFETIL 750 MG: 250 CAPSULE ORAL at 21:49

## 2022-10-27 RX ADMIN — CARVEDILOL 6.25 MG: 6.25 TABLET, FILM COATED ORAL at 09:37

## 2022-10-27 RX ADMIN — PREDNISONE 5 MG: 5 TABLET ORAL at 09:37

## 2022-10-27 RX ADMIN — TAMSULOSIN HYDROCHLORIDE 0.4 MG: 0.4 CAPSULE ORAL at 09:37

## 2022-10-27 RX ADMIN — CARVEDILOL 6.25 MG: 6.25 TABLET, FILM COATED ORAL at 21:09

## 2022-10-27 RX ADMIN — GABAPENTIN 300 MG: 300 CAPSULE ORAL at 21:09

## 2022-10-27 RX ADMIN — AMOXICILLIN AND CLAVULANATE POTASSIUM 1 TABLET: 875; 125 TABLET, FILM COATED ORAL at 09:37

## 2022-10-27 RX ADMIN — INSULIN LISPRO 4 UNITS: 100 INJECTION, SOLUTION INTRAVENOUS; SUBCUTANEOUS at 22:28

## 2022-10-27 RX ADMIN — SODIUM BICARBONATE 650 MG: 650 TABLET ORAL at 09:37

## 2022-10-27 RX ADMIN — MYCOPHENOLATE MOFETIL 750 MG: 250 CAPSULE ORAL at 09:36

## 2022-10-27 RX ADMIN — GABAPENTIN 300 MG: 300 CAPSULE ORAL at 09:37

## 2022-10-27 RX ADMIN — AMOXICILLIN AND CLAVULANATE POTASSIUM 1 TABLET: 875; 125 TABLET, FILM COATED ORAL at 21:09

## 2022-10-27 RX ADMIN — AMLODIPINE BESYLATE 10 MG: 10 TABLET ORAL at 13:55

## 2022-10-27 RX ADMIN — PANTOPRAZOLE SODIUM 40 MG: 40 TABLET, DELAYED RELEASE ORAL at 16:57

## 2022-10-27 RX ADMIN — SODIUM CHLORIDE 40 MG: 9 INJECTION INTRAMUSCULAR; INTRAVENOUS; SUBCUTANEOUS at 09:38

## 2022-10-27 RX ADMIN — Medication 20 MG: at 21:15

## 2022-10-27 RX ADMIN — CYCLOSPORINE 75 MG: 25 CAPSULE, LIQUID FILLED ORAL at 21:14

## 2022-10-27 RX ADMIN — INSULIN GLARGINE 10 UNITS: 100 INJECTION, SOLUTION SUBCUTANEOUS at 09:42

## 2022-10-27 RX ADMIN — SODIUM BICARBONATE 650 MG: 650 TABLET ORAL at 21:09

## 2022-10-27 RX ADMIN — CYCLOSPORINE 75 MG: 25 CAPSULE, LIQUID FILLED ORAL at 09:38

## 2022-10-27 ASSESSMENT — ENCOUNTER SYMPTOMS
WHEEZING: 0
COUGH: 0
EYES NEGATIVE: 1
BLOOD IN STOOL: 0
CHEST TIGHTNESS: 0
STRIDOR: 0
SHORTNESS OF BREATH: 0
RESPIRATORY NEGATIVE: 1
NAUSEA: 0
GASTROINTESTINAL NEGATIVE: 1

## 2022-10-27 ASSESSMENT — PAIN SCALES - GENERAL: PAINLEVEL_OUTOF10: 2

## 2022-10-27 NOTE — PLAN OF CARE
Problem: Pain  Goal: Verbalizes/displays adequate comfort level or baseline comfort level  Outcome: Progressing     Problem: Safety - Adult  Goal: Free from fall injury  Outcome: Progressing     Problem: ABCDS Injury Assessment  Goal: Absence of physical injury  Outcome: Progressing     Problem: Chronic Conditions and Co-morbidities  Goal: Patient's chronic conditions and co-morbidity symptoms are monitored and maintained or improved  Outcome: Progressing     Problem: Occupational Therapy - Adult  Goal: By Discharge: Performs self-care activities at highest level of function for planned discharge setting. See evaluation for individualized goals.   10/26/2022 1322 by Dottie Toribio OT  Outcome: Progressing

## 2022-10-27 NOTE — PROGRESS NOTES
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 10/31/2003        Past Medical History:   Diagnosis Date    Diabetes mellitus ([facility] [address]     Hemodialysis access, fistula mature ([facility] [address] 12/2002    Hypertension     Seizures ([facility] [address]     no seizure since 1995     Past Surgical History:   Procedure Laterality Date    BRAIN SURGERY Left 12/06/1990    to eliminate seizures    COLONOSCOPY N/A 10/24/2022    COLONOSCOPY DIAGNOSTIC performed by Lu Birch MD at [facility]  2015    SD [address] INCL FLUOR GDNCE DX W/CELL WASHG [facility] N/A 3/20/2018    BRONCHOSCOPY performed by Renu Ramírez MD at [address] [facility] N/A 10/21/2022    EGD DIAGNOSTIC ONLY performed by Bernie Cornelius MD at [facility]       Chart Reviewed: Yes    Restrictions:  Restrictions/Precautions: Fall Risk    SUBJECTIVE:   Subjective: I want to try the stairs    Pain  Pain: denies pain pre/post    OBJECTIVE:   Orientation  Overall Orientation Status: Within Functional Limits  Cognition  Overall Cognitive Status: WFL    Bed mobility  Supine to Sit: Supervision  Sit to Supine: Supervision  Scooting: Supervision  Bed Mobility Comments: HOB elevated, good technique and safety throughout    Transfers  Sit to Stand: Contact guard assistance  Stand to Sit: Contact guard assistance  Comment: Performed without AD this session per pt request, pt demos good initiation and ability to perform however requires CGA for stabilization, pt given vc's for safety and stability before ambulating    Ambulation  Surface: Level tile  Device: No Device  Assistance: Contact guard assistance;Minimal assistance  Quality of Gait: scissoring R LE, lateral path deviations  Gait Deviations: Slow Sara  Distance: 100 ft  Comments: Trialed ambulation without AD this session. pt with good ability to perform, however demos difficulty with turns and changes in visual field during ambulation and requires constant CGA and occasional [place] to prevent lateral LOB's. Pt reports increased fatigue without AD. Pt lacks awareness of deficits and needs vc's for safety. Stairs/Curb  Stairs?: Yes  Stairs  # Steps : 8  Stairs Height: 8\"  Rails: Right ascending  Assistance: Contact guard assistance  Comment: Pt able to perform stairs x2 this session with right rail to simulate entry into pt's home. Pt given vc's for safety throughout, howeve able to complete without LOB. Pt performs reciprocally but requires constant CGA for stabilization. Activity Tolerance  Activity Tolerance: Patient tolerated treatment well          ASSESSMENT   Assessment: Pt with good participation and effort. Able to perform stair training to simulate entry into pt's home and ambulation without an AD to test pt's balance. Pt would benefit from use of an AD for improved stability during gait. Discharge Recommendations:  Continue to assess pending progress, Patient would benefit from continued therapy after discharge         Goals  Short Term Goals  Short Term Goal 1: Pt will demonstrate HEP indep. Long Term Goals  Long Term Goal 1: Pt will demonstrate bed mobility indep  Long Term Goal 2: Pt will demonstrate transfers mod indep with safest AD  Long Term Goal 3: Pt will demonstrate amb >/= 150ft mod indep with safest AD  Long Term Goal 4: Pt will demonstrate stair negotiation 4 steps with 1 rail mod indep    PLAN    General Plan: 1 time a day 3-6 times a week  Safety Devices  Type of Devices:  All fall risk precautions in place, Call light within reach, Bed alarm in place, Left in bed     AMPAC (6 CLICK) BASIC MOBILITY  AM-PAC Inpatient Mobility Raw Score : 18     Therapy Time   Individual Time In 1006   Time Out 1032   Minutes 26      BM/trsf- 6  Gait- 20       [place], [place], 10/27/22 at 11:03 AM         Definitions for assistance levels  Independent = pt does not require any physical supervision or assistance from another person for activity completion. Device may be needed.   Stand by assistance = pt requires verbal cues or instructions from another person, close to but not touching, to perform the activity  Minimal assistance= pt performs 75% or more of the activity; assistance is required to complete the activity  Moderate assistance= pt performs 50% of the activity; assistance is required to complete the activity  Maximal assistance = pt performs 25% of the activity; assistance is required to complete the activity  Dependent = pt requires total physical assistance to accomplish the task

## 2022-10-27 NOTE — PROGRESS NOTES
Gastroenterology Progress Note    Amari Katz is a 80 y.o. male patient. Hospitalization Day:8    Chief C/O: GIB    SUBJECTIVE: seen and examined, no acute events overnight, hgb 8.1, tolerating diet. ROS:  Gastrointestinal ROS: no abdominal pain, change in bowel habits, or black or bloody stools    Physical    VITALS:  BP (!) 159/58   Pulse 70   Temp 98.6 °F (37 °C) (Oral)   Resp 18   Ht 6' 2\" (1.88 m)   Wt 185 lb (83.9 kg)   SpO2 99%   BMI 23.75 kg/m²   TEMPERATURE:  Current - Temp: 98.6 °F (37 °C); Max - Temp  Av °F (37.2 °C)  Min: 98.6 °F (37 °C)  Max: 99.3 °F (37.4 °C)    General:  Alert and oriented,  No apparent distress  Skin- without jaundice  Eyes: anicteric sclera  Cardiac: RRR, Nl s1s2, without murmurs  Lungs CTA Bilaterally, normal effort  Abdomen soft, ND, NT, no HSM, Bowel sounds normal  Ext: without edema  Neuro: no asterixis     Data    Data Review:    Recent Labs     10/25/22  0708 10/25/22  2029 10/25/22  2306 10/27/22  0517   WBC 7.7  --  7.2 6.8   HGB 6.5* 8.1* 8.1* 8.1*   HCT 20.4* 25.1* 24.9* 24.3*   MCV 89.6  --  87.8 88.5     --  171 171     Recent Labs     10/25/22  0708 10/26/22  0514 10/26/22  1051 10/27/22  0517    141  --  143   K 3.8 4.0  --  4.0   * 113*  --  115*   CO2 14* 15*  --  16*   PHOS  --   --  2.7  --    BUN 52* 38*  --  37*   CREATININE 2.11* 2.10*  --  2.43*     Recent Labs     10/25/22  0708 10/26/22  0514 10/27/22  0517   AST 7 9 8   ALT <5 6 7   BILITOT 0.9* 1.0* 0.7   ALKPHOS 46 50 50     No results for input(s): LIPASE, AMYLASE in the last 72 hours. Recent Labs     10/25/22  0708   PROTIME 15.6*   INR 1.2     Endoscopic hx:  EGD 10/21/22   The examined esophagus was normal.         -  The Z-line was regular and was found 42 cm from the incisors. -  The entire examined stomach was normal.         -  The examined duodenum was normal  Colonoscopy Dr Rui Pedersen 10/24/22  A pulsating intermittent bleeding noted.  A single small Dieulafoy Vs AVM with bleeding was found in the ascending colon. Area was successfully injected with 4 mL of a 0.1 mg/mL solution of epinephrine for hemostasis. To stop active bleeding, two hemostatic clips were successfully placed. There was no bleeding at the end of the procedure. ASSESSMENT:  26-year-old male admitted with anemia and melena in the context of supratherapeutic INR while on Coumadin, on arrival INR was 3.1 patient had been experiencing intermittent melena over the course of the month hemoglobin was 6.3. Had normal EGD. CT the abdomen was notable for 1.4 cm pancreatic body lesion possibly IPMN will likely need an outpatient EUS with Dr. Abi Madera for further evaluation. Has been tolerating regular diet INR now 1.5.   10/23/22 no further melena, hemoglobin 6.7,  INR 1.3, Coumadin is on hold, colonoscopy tomorrow. Planned for 1 unit PRBC today  10/24/22 reports melena with colonoscopy prep. Hemoglobin 7.7, INR 1.3, Coumadin is on hold  10/25/22 Hgb 6.5 no overt bleeding overnight, tolerated clear liquids, S/P colonoscopy which noted A pulsating intermittent bleeding noted. A single small Dieulafoy Vs AVM with bleeding was found in the ascending colon. Area was successfully injected with 4 mL of a 0.1 mg/mL solution of epinephrine for hemostasis. To stop active bleeding, two hemostatic clips were successfully placed. There was no bleeding at the end of the procedure. 10/26/22 hgb 8.1 no overt bleeding, tolerated full liquid diet. No abdominal pain. No BM    PLAN :  -Continue course of care  -Continue serial H&H, trend and transfuse accordingly  -advance to soft diet  Clearance prior to Warfarin: At this time, there is no contraindications of starting / resuming anticoagulations. He is a high risk for re-bleeding and anticoagulation should be optimized  Given the comorbid conditions- A fib and stroke risk stratification, baseline risk of thrombosis seems elevated .  Patient has also higher risk of GI bleeding based on clinical presentation and endoscopic findings. Patient is currently asymptomatic with no overt bleeding noted, Hg 8.1. Discussed at  length with patient the natural history of GIB as well as risk of recurrent bleeding. Mentioned that the risk of bleeding is highest during the initial period of anticoagulation. Also discussed the benefit / stroke reduction risks  with anticoagulants. If decided regarding anticoagulants,  We should avoid routine use of NSAIDs for pain  ( ibuprofen. ..),adding DOAC  may also convery higher risk of bleeding       Thank you for allowing me to participate in the care of your patient. Please feel free to contact me with any concerns.     Colletta Pepper, APRN - CNP

## 2022-10-27 NOTE — PLAN OF CARE
Nutrition Problem #1: Unintended weight loss  Intervention: Food and/or Nutrient Delivery: Modify Current Diet

## 2022-10-27 NOTE — PROGRESS NOTES
Hospitalist Progress Note      PCP: Lucy Galindo DO    Date of Admission: 10/19/2022    Chief Complaint:    Chief Complaint   Patient presents with    Fatigue     X 2 weeks        Subjective:  Patient denies fevers, chills, sweats, CP, SOB, diarrhea or burning micturition.   12 point ROS negative other than mentioned above     Medications:  Reviewed    Infusion Medications    sodium chloride      sodium chloride      sodium chloride      sodium chloride 100 mL/hr at 10/24/22 1400    sodium chloride      sodium chloride      sodium chloride      dextrose       Scheduled Medications    amLODIPine  10 mg Oral Daily    pantoprazole  40 mg Oral BID AC    sodium bicarbonate  650 mg Oral BID    amoxicillin-clavulanate  1 tablet Oral 2 times per day    epoetin megan-epbx  10,000 Units SubCUTAneous Q7 Days    insulin glargine  10 Units SubCUTAneous BID    insulin lispro  0-8 Units SubCUTAneous 4x Daily    sodium chloride flush  5-40 mL IntraVENous 2 times per day    insulin lispro  10 Units SubCUTAneous Once    sodium chloride flush  5-40 mL IntraVENous 2 times per day    mycophenolate  750 mg Oral BID    sodium chloride flush  5-40 mL IntraVENous 2 times per day    cycloSPORINE modified  75 mg Oral BID    [Held by provider] insulin regular  6 Units SubCUTAneous Daily with breakfast    [Held by provider] insulin regular  8 Units SubCUTAneous BID     gabapentin  300 mg Oral BID    predniSONE  5 mg Oral Daily    atorvastatin  10 mg Oral Daily    tamsulosin  0.4 mg Oral Daily    carvedilol  6.25 mg Oral BID     PRN Meds: sodium chloride, sodium chloride flush, sodium chloride, sodium chloride, sodium chloride flush, sodium chloride, sodium chloride, sodium chloride, sodium chloride flush, sodium chloride, acetaminophen **OR** acetaminophen, furosemide, glucose, dextrose bolus **OR** dextrose bolus, glucagon (rDNA), dextrose, prochlorperazine **OR** prochlorperazine    No intake or output data in the 24 hours ending 10/27/22 1542    Exam:    BP (!) 148/65   Pulse 66   Temp 97.9 °F (36.6 °C) (Oral)   Resp 18   Ht 6' 2\" (1.88 m)   Wt 185 lb (83.9 kg)   SpO2 99%   BMI 23.75 kg/m²     General appearance: No apparent distress, appears stated age and cooperative. HEENT:  Conjunctivae/corneas clear. Neck: Trachea midline. Respiratory:  Normal respiratory effort. Clear to auscultation  Cardiovascular: Regular rate and rhythm  Abdomen: Soft, non-tender, non-distended with normal bowel sounds. Musculoskeletal: No clubbing, cyanosis or edema bilaterally  Neuro: Non Focal.   Capillary Refill: Brisk,< 3 seconds   Peripheral Pulses: +2 palpable, equal bilaterally     Labs:   Recent Labs     10/25/22  0708 10/25/22  2029 10/25/22  2306 10/27/22  0517   WBC 7.7  --  7.2 6.8   HGB 6.5* 8.1* 8.1* 8.1*   HCT 20.4* 25.1* 24.9* 24.3*     --  171 171       Recent Labs     10/25/22  0708 10/26/22  0514 10/26/22  1051 10/27/22  0517    141  --  143   K 3.8 4.0  --  4.0   * 113*  --  115*   CO2 14* 15*  --  16*   BUN 52* 38*  --  37*   CREATININE 2.11* 2.10*  --  2.43*   CALCIUM 8.3* 8.7  --  8.2*   PHOS  --   --  2.7  --        Recent Labs     10/25/22  0708 10/26/22  0514 10/27/22  0517   AST 7 9 8   ALT <5 6 7   BILITOT 0.9* 1.0* 0.7   ALKPHOS 46 50 50       Recent Labs     10/25/22  0708   INR 1.2       Recent Labs     10/26/22  1659 10/26/22  2248 10/27/22  1019   TROPONINI 0.033* 0.028* 0.040*       Urinalysis:      Lab Results   Component Value Date/Time    NITRU Negative 10/19/2022 01:44 PM    WBCUA 10-20 10/19/2022 01:44 PM    BACTERIA FEW 10/19/2022 01:44 PM    RBCUA 6-10 10/19/2022 01:44 PM    BLOODU SMALL 10/19/2022 01:44 PM    SPECGRAV 1.012 10/19/2022 01:44 PM    GLUCOSEU Negative 10/19/2022 01:44 PM    GLUCOSEU GT 1 09/14/2011 08:57 AM     Radiology:  XR KNEE LEFT (3 VIEWS)   Final Result   1. 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 2. Probable chondrocalcinosis.    3. Possible trace suprapatellar knee joint effusion. XR KNEE RIGHT (3 VIEWS)   Final Result   Degenerative changes. No evidence of acute osseous abnormality is seen. CT HEAD WO CONTRAST   Final Result   1. There is no acute intracranial abnormality. Specifically, there is no   intracranial hemorrhage. 2. Atrophy and periventricular leukomalacia,   3. Mucosal thickening within the right sphenoid sinus. 4. Previous left craniotomy defect with encephalomalacia  seen within the   anterior aspect of the left temporal lobe. .         CT CERVICAL SPINE WO CONTRAST   Final Result   1. There is no acute compression fracture or subluxation of the cervical   spine. 2. Multilevel degenerative disc and degenerative joint disease. CT THORACIC SPINE WO CONTRAST   Final Result   1. No acute fracture or subluxation within the thoracic or lumbar spine. 2. Degenerative changes in the spine without definite central canal stenosis   in the thoracic spine. There is possible central canal stenosis at L3-4. There is multilevel neural foraminal narrowing within the lumbar spine. 3. Marked atrophy of the bilateral kidneys with indeterminate exophytic   lesion from the right kidney that may represent complicated cyst or solid   mass. The patient has prior examinations which are not available for   comparison. 4. There is a dependent pleural based opacity in the right lower lobe which   may represent atelectasis. There are prior examinations which are not   available for comparison. CT LUMBAR SPINE WO CONTRAST   Final Result   1. No acute fracture or subluxation within the thoracic or lumbar spine. 2. Degenerative changes in the spine without definite central canal stenosis   in the thoracic spine. There is possible central canal stenosis at L3-4. There is multilevel neural foraminal narrowing within the lumbar spine.    3. Marked atrophy of the bilateral kidneys with indeterminate exophytic   lesion from the right kidney that may represent complicated cyst or solid   mass. The patient has prior examinations which are not available for   comparison. 4. There is a dependent pleural based opacity in the right lower lobe which   may represent atelectasis. There are prior examinations which are not   available for comparison. CTA CHEST W WO CONTRAST   Final Result   1. No large filling defects are seen on this examination to suggest saddle   embolus more in the larger primary and secondary vascular structures. 2.   There is consolidation again seen in the left lower lobe. Bronchial wall   thickening mild bronchiectasis and other changes of COPD are seen. An   ill-defined opacity is seen in the anterior left upper lobe. Recommend   follow-up to resolution to exclude any developing masses. There are multiple   nodules visualized and overall are stable. CT ABDOMEN PELVIS W IV CONTRAST Additional Contrast? None   Final Result   There is no evidence of an acute posttraumatic abnormality in the abdomen or   pelvis. Specifically, no evidence of splenic or renal injury. No   retroperitoneal hematoma. Indeterminate intermediate density 1.2 cm lesion arising from the mid right   kidney. Renal neoplasm not excluded. Correlation with renal ultrasound or   multiphasic contrast-enhanced CT or MRI of the abdomen utilizing a renal mass   protocol is recommended for further assessment. Indeterminate 1.4 cm low-density lesion arising from the inferior portion of   the pancreatic body centrally. This has imaging features most suspicious for   an IPMN. Correlation with contrast-enhanced abdominal MRI is also   recommended (unless otherwise contraindicated). 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 Linear/nodular opacities in the right lower lobe and right middle lobe. Please refer to the dedicated CT chest report for additional details. Assessment/Plan:    #Acute blood loss anemia secondary to dieulafoy ulcer     - s/p clipping by GI    - monitor; ok for d/c; awaiting precert    #PAF    - per cardiology    #CKD    - per nephrology    #Lung nodules    - Pulm consulted; plan for augmentin then outpatient follow up as ahsan would like to go to [facility] or [facility]    Diagnosis Date Noted    Lung nodules [R91.8] 10/25/2022     Priority: Medium    COPD without exacerbation ([facility] [J44.9] 10/25/2022     Priority: Medium    Abnormal CT of the chest [R93.89] 10/25/2022     Priority: Medium    Bronchiectasis without complication ([facility] [J52.6] 10/25/2022     Priority: Medium    Dieulafoy lesion of colon [K63.81] 10/24/2022     Priority: Medium    Poorly controlled diabetes mellitus ([facility] [E11.65] 10/24/2022     Priority: Medium    Symptomatic anemia [D64.9] 10/19/2022     Priority: Medium    Acute upper GI bleed [K92.2] 10/19/2022     Priority: Medium        Additional work up or/and treatment plan may be added today or then after based on clinical progression. I am managing a portion of pt care. Some medical issues are handled by other specialists. Additional work up and treatment should be done in out pt setting by pt PCP and other out pt providers. In addition to examining and evaluating pt, I spent additional time explaining care, normal and abnormal findings, and treatment plan. All of pt questions were answered. Counseling, diet and education were  provided. Case will be discussed with nursing staff when appropriate. Family will be updated if and when appropriate.       Diet: ADULT DIET; Easy to Chew; 5 carb choices (75 gm/meal)    Code Status: Full Code    Electronically signed by Kolby Sims MD on 10/27/2022 at 3:42 PM

## 2022-10-27 NOTE — PROGRESS NOTES
Progress Note  Patient: Yasmine Watson  Unit/Bed: C679/L135-91  YOB: 1950  MRN: [id number]  Acct: [de-identified]   Admitting Diagnosis: Chronic anticoagulation [Z79.01]  Pancreatic mass [K86.89]  Elevated troponin [R77.8]  Right renal mass [N28.89]  Acute upper GI bleed [K92.2]  Head injury, closed, with LOC of unknown duration ([address] [V51.7O4H]  History of renal transplant [Z94.0]  Fall at home, initial encounter [W19. XXXA, Y92.009]  Symptomatic anemia [D64.9]  Opacity of lung on imaging study [R91.8]  Acute pain of both knees [M25.561, M25.562]  Type 2 diabetes mellitus with hyperglycemia, unspecified whether long term insulin use ([address] [E11.65]  Chronic renal failure, stage 3b ([address] [N18.32]  Admit Date:  10/19/2022  Hospital Day: 8    Chief Complaint: CP    Histories:  Past Medical History:   Diagnosis Date    Diabetes mellitus ([address]     Hemodialysis access, fistula mature ([address] 2002    Hypertension     Seizures ([address]     no seizure since      Past Surgical History:   Procedure Laterality Date    BRAIN SURGERY Left 1990    to eliminate seizures    COLONOSCOPY N/A 10/24/2022    COLONOSCOPY DIAGNOSTIC performed by Adrian Melchor MD at [facility]      AL [address] INCL FLUOR GDNCE DX W/CELL [address] N/A 3/20/2018    BRONCHOSCOPY performed by Debbie Hinojosa MD at . [address] N/A 10/21/2022    EGD DIAGNOSTIC ONLY performed by Wolf Domingo MD at [facility]     Family History   Problem Relation Age of Onset    Colon Cancer Neg Hx      Social History     Socioeconomic History    Marital status:      Spouse name: None    Number of children: None    Years of education: None    Highest education level: None   Tobacco Use    Smoking status: Former     Packs/day: 0.25     Types: Cigarettes     Quit date: 2015     Years since quittin.3    Smokeless tobacco: Former   Vaping Use    Vaping Use: Never used Substance and Sexual Activity    Alcohol use: No     Alcohol/week: 0.0 standard drinks    Drug use: No       Subjective/HPI no further CP. Slept well. Eating well. No complaints this am.  Wife in room. Jimbo Sesay yesterday. EKG:  NSR 61        Review of Systems:   Review of Systems   Constitutional: Negative. Negative for diaphoresis and fatigue. HENT: Negative. Eyes: Negative. Respiratory: Negative. Negative for cough, chest tightness, shortness of breath, wheezing and stridor. Cardiovascular: Negative. Negative for chest pain, palpitations and leg swelling. Gastrointestinal: Negative. Negative for blood in stool and nausea. Genitourinary: Negative. Musculoskeletal: Negative. Skin: Negative. Neurological:  Positive for weakness. Negative for dizziness, syncope and light-headedness. Hematological: Negative. Psychiatric/Behavioral: Negative. Physical Examination:    BP (!) 159/58   Pulse 70   Temp 98.6 °F (37 °C) (Oral)   Resp 18   Ht 6' 2\" (1.88 m)   Wt 185 lb (83.9 kg)   SpO2 99%   BMI 23.75 kg/m²    Physical Exam   Constitutional: He appears healthy. No distress. HENT:   Normal cephalic and Atraumatic   Eyes: Pupils are equal, round, and reactive to light. Neck: Thyroid normal. No JVD present. No neck adenopathy. No thyromegaly present. Cardiovascular: Normal rate, regular rhythm, intact distal pulses and normal pulses. Murmur heard. Pulmonary/Chest: Effort normal and breath sounds normal. He has no wheezes. He has no rales. He exhibits no tenderness. Abdominal: Soft. Bowel sounds are normal. There is no abdominal tenderness. Musculoskeletal:         General: No tenderness or edema. Normal range of motion. Cervical back: Normal range of motion and neck supple. Neurological: He is alert and oriented to person, place, and time. Skin: Skin is warm. No cyanosis. Nails show no clubbing.      LABS:  CBC:   Lab Results   Component Value Date/Time WBC 6.8 10/27/2022 05:17 AM    RBC 2.75 10/27/2022 05:17 AM    RBC 4.18 09/15/2011 05:00 AM    HGB 8.1 10/27/2022 05:17 AM    HCT 24.3 10/27/2022 05:17 AM    MCV 88.5 10/27/2022 05:17 AM    MCH 29.4 10/27/2022 05:17 AM    MCHC 33.3 10/27/2022 05:17 AM    RDW 16.3 10/27/2022 05:17 AM     10/27/2022 05:17 AM    MPV 8.8 09/15/2011 05:00 AM     CBC with Differential:    Lab Results   Component Value Date/Time    WBC 6.8 10/27/2022 05:17 AM    RBC 2.75 10/27/2022 05:17 AM    RBC 4.18 09/15/2011 05:00 AM    HGB 8.1 10/27/2022 05:17 AM    HCT 24.3 10/27/2022 05:17 AM     10/27/2022 05:17 AM    MCV 88.5 10/27/2022 05:17 AM    MCH 29.4 10/27/2022 05:17 AM    MCHC 33.3 10/27/2022 05:17 AM    RDW 16.3 10/27/2022 05:17 AM    BANDSPCT 1 10/19/2022 11:01 AM    LYMPHOPCT 9.2 10/27/2022 05:17 AM    MONOPCT 9.4 10/27/2022 05:17 AM    EOSPCT 2.7 09/14/2011 08:57 AM    BASOPCT 0.9 10/27/2022 05:17 AM    MONOSABS 0.6 10/27/2022 05:17 AM    LYMPHSABS 0.6 10/27/2022 05:17 AM    EOSABS 0.3 10/27/2022 05:17 AM    BASOSABS 0.1 10/27/2022 05:17 AM     CMP:    Lab Results   Component Value Date/Time     10/27/2022 05:17 AM    K 4.0 10/27/2022 05:17 AM    K 4.8 10/04/2022 04:15 AM     10/27/2022 05:17 AM    CO2 16 10/27/2022 05:17 AM    BUN 37 10/27/2022 05:17 AM    CREATININE 2.43 10/27/2022 05:17 AM    GFRAA 28.2 10/12/2022 02:45 PM    LABGLOM 27.8 10/27/2022 05:17 AM    GLUCOSE 185 10/27/2022 05:17 AM    GLUCOSE 172 09/16/2011 06:25 AM    PROT 5.2 10/27/2022 05:17 AM    LABALBU 3.1 10/27/2022 05:17 AM    LABALBU 3.9 09/15/2011 05:00 AM    CALCIUM 8.2 10/27/2022 05:17 AM    BILITOT 0.7 10/27/2022 05:17 AM    ALKPHOS 50 10/27/2022 05:17 AM    AST 8 10/27/2022 05:17 AM    ALT 7 10/27/2022 05:17 AM     BMP:    Lab Results   Component Value Date/Time     10/27/2022 05:17 AM    K 4.0 10/27/2022 05:17 AM    K 4.8 10/04/2022 04:15 AM     10/27/2022 05:17 AM    CO2 16 10/27/2022 05:17 AM    BUN 37 10/27/2022 05:17 AM    LABALBU 3.1 10/27/2022 05:17 AM    LABALBU 3.9 09/15/2011 05:00 AM    CREATININE 2.43 10/27/2022 05:17 AM    CALCIUM 8.2 10/27/2022 05:17 AM    GFRAA 28.2 10/12/2022 02:45 PM    LABGLOM 27.8 10/27/2022 05:17 AM    GLUCOSE 185 10/27/2022 05:17 AM    GLUCOSE 172 09/16/2011 06:25 AM     Magnesium:    Lab Results   Component Value Date/Time    MG 1.7 10/27/2022 05:17 AM     Troponin:    Lab Results   Component Value Date/Time    TROPONINI 0.028 10/26/2022 10:48 PM        Active Hospital Problems    Diagnosis Date Noted    Lung nodules [R91.8] 10/25/2022     Priority: Medium    COPD without exacerbation ([facility] [J44.9] 10/25/2022     Priority: Medium    Abnormal CT of the chest [R93.89] 10/25/2022     Priority: Medium    Bronchiectasis without complication ([facility] [M01.7] 10/25/2022     Priority: Medium    Dieulafoy lesion of colon [K63.81] 10/24/2022     Priority: Medium    Poorly controlled diabetes mellitus ([facility] [E11.65] 10/24/2022     Priority: Medium    Symptomatic anemia [D64.9] 10/19/2022     Priority: Medium    Acute upper GI bleed [K92.2] 10/19/2022     Priority: Medium        Assessment/Plan:  CP- No  ACS by serial Trops - trending lower. He will have baseline elevated Trop due to CKD. No ASA due to GIB. Keep on Telemetry. Continue BB and Statin  PAF - in SR now on Telemetry. Hold Warfarin for now. HTN elevated but did not yet receive AM meds. If BP remains elevated then adjust meds. CKD s/p Transplant. LVEF 65%   GIB and continued Anemia- transfuse as needed. Keep Hb > 7.   Stable this am.          Electronically signed by Brent Concepcion MD on 10/27/2022 at 9:52 AM

## 2022-10-27 NOTE — PROGRESS NOTES
Pulmonary Progress Note    10/27/2022 6:02 PM    Subjective:   Admit Date: 10/19/2022  PCP: Rodrigo Miguel DO    Chief Complaint   Patient presents with    Fatigue     X 2 weeks      Interval History: No major issues overnight. Wants to go home. Has been ambulatory and on room air. 12 points review of systems has been obtained and negative except to was mentioned in HPI.      Medications:   Scheduled Meds:   amLODIPine  10 mg Oral Daily    pantoprazole  40 mg Oral BID AC    simvastatin  20 mg Oral Nightly    sodium bicarbonate  650 mg Oral BID    amoxicillin-clavulanate  1 tablet Oral 2 times per day    epoetin megan-epbx  10,000 Units SubCUTAneous Q7 Days    insulin glargine  10 Units SubCUTAneous BID    insulin lispro  0-8 Units SubCUTAneous 4x Daily    sodium chloride flush  5-40 mL IntraVENous 2 times per day    insulin lispro  10 Units SubCUTAneous Once    sodium chloride flush  5-40 mL IntraVENous 2 times per day    mycophenolate  750 mg Oral BID    sodium chloride flush  5-40 mL IntraVENous 2 times per day    cycloSPORINE modified  75 mg Oral BID    [Held by provider] insulin regular  6 Units SubCUTAneous Daily with breakfast    [Held by provider] insulin regular  8 Units SubCUTAneous BID WC    gabapentin  300 mg Oral BID    predniSONE  5 mg Oral Daily    tamsulosin  0.4 mg Oral Daily    carvedilol  6.25 mg Oral BID     Continuous Infusions:   sodium chloride      sodium chloride      sodium chloride      sodium chloride 100 mL/hr at 10/24/22 1400    sodium chloride      sodium chloride      sodium chloride      dextrose           Objective:   Vitals:   Temp (24hrs), Av.6 °F (37 °C), Min:97.9 °F (36.6 °C), Max:99.3 °F (37.4 °C)    BP (!) 148/65   Pulse 66   Temp 97.9 °F (36.6 °C) (Oral)   Resp 18   Ht 6' 2\" (1.88 m)   Wt 185 lb (83.9 kg)   SpO2 99%   BMI 23.75 kg/m²   I/O:24HR INTAKE/OUTPUT:  No intake or output data in the 24 hours ending 10/27/22 1802    10/26 0701 - 10/27 0700  In: - Out: 400 [Urine:400]  CVP:             Physical Exam:  General appearance - alert, well appearing, and in no distress  Mental status - alert, oriented to person, place, and time  Eyes - pupils equal and reactive, extraocular eye movements intact  Nose - normal and patent, no erythema, discharge or polyps  Neck - supple, no significant adenopathy  Chest -diminished bilaterally to auscultation, no wheezes, rales or rhonchi, symmetric air entry  Heart - normal rate, regular rhythm, normal S1, S2, no murmurs, rubs, clicks or gallops  Abdomen - soft, nontender, nondistended, no masses or organomegaly  Rectal - deferred, not clinically indicated  Neurological - alert, oriented, normal speech, no focal findings or movement disorder noted, motor and sensory grossly normal bilaterally  Musculoskeletal - no joint tenderness, deformity or swelling  Extremities - peripheral pulses normal, no pedal edema, no clubbing or cyanosis  Skin - normal coloration and turgor, no rashes, no suspicious skin lesions noted              BMP:    Recent Labs     10/25/22  0708 10/26/22  0514 10/27/22  0517    141 143   K 3.8 4.0 4.0   * 113* 115*   CO2 14* 15* 16*   BUN 52* 38* 37*   CREATININE 2.11* 2.10* 2.43*   GLUCOSE 106* 139* 185*      . MG:3,PHOS:3)@  Ionized Calcium: No results found for: IONCA  CBC:   Recent Labs     10/25/22  2306 10/27/22  0517   WBC 7.2 6.8   HGB 8.1* 8.1*    171        ABG: No results for input(s): PH, PCO2, PO2 in the last 72 hours. Assessment and Plan:       Impression:    -Acute blood loss anemia due to  GI bleeding. Hemoglobin today is 8.1.  -Abnormal CT of the chest.  Multiple findings. There is a small nodule in the right upper lobe adjacent to a bulla. The solid part of it measures about six 7 mm with some groundglass component.  -Right lower lobe consolidation. This could represent atelectasis versus pneumonia.   -Emphysema of the lung.  -Chronic kidney disease.  -Status post kidney transplant. Recommendations:    -Augmentin for total 7 days.  -Again will need CT chest in about 4 weeks. Will order as an outpatient.  - DVT/GI prophylaxis. No further pulmonary recommendations.      Electronically signed by Sameer Deal MD on 10/27/2022 at 6:02 PM

## 2022-10-27 NOTE — CARE COORDINATION
LOLI spoke at length with the pt and his wife to discuss his DC needs. It was very difficult to get the pt to focus on DC plan but he finally decided to try a precert with [facility] and if it is denied then he will go home with out patient rehab. LOLI spoke with [id number] Duval at [facility] and a precert was started today.

## 2022-10-27 NOTE — PROGRESS NOTES
Endocrinology Progress Note    Assessment and Plan:   Assessment-  Type 2 insulin-dependent diabetes  Anemia- improving  GI bleed    Plan-  Continue Lantus 10 units twice daily  Continue Humalog 4 units 3 times daily before meals  No changes to insulin dosing today   Medium dose sliding scale Humalog coverage  Monitor glycemic control closely  Patient may be discharged home from endocrinology standpoint    POC Glucose:   Recent Labs     10/26/22  1148 10/26/22  1538 10/26/22  1925 10/27/22  0829 10/27/22  1118   POCGLU 231* 253* 300* 156* 181*     HGBA1C:  Lab Results   Component Value Date    LABA1C 8.5 (H) 10/19/2022    LABA1C 9.0 (H) 09/14/2022    LABA1C 8.7 (H) 09/11/2022     CBC:   Recent Labs     10/25/22  2306 10/27/22  0517   WBC 7.2 6.8   HGB 8.1* 8.1*    171     CMP:    Lab Results   Component Value Date     10/27/2022    K 4.0 10/27/2022     (H) 10/27/2022    CO2 16 (L) 10/27/2022    BUN 37 (H) 10/27/2022    CREATININE 2.43 (H) 10/27/2022    GLUCOSE 185 (H) 10/27/2022    CALCIUM 8.2 (L) 10/27/2022    PROT 5.2 (L) 10/27/2022    LABALBU 3.1 (L) 10/27/2022    BILITOT 0.7 10/27/2022    ALKPHOS 50 10/27/2022    AST 8 10/27/2022    ALT 7 10/27/2022    LABGLOM 27.8 (L) 10/27/2022    GFRAA 28.2 (L) 10/12/2022    GLOB 2.1 (L) 10/27/2022       Lab Results   Component Value Date    TSH 0.574 10/19/2022    TSH 0.469 10/12/2022    TSH 0.789 09/11/2022    TSHREFLEX 0.972 09/14/2022     No results found for: TPOABS      CC:   Chief Complaint   Patient presents with    Fatigue     X 2 weeks        Subjective: Interval History: Patient is a 78-year-old gentleman presents emergency room after 2 weeks of weakness and fatigue. He has a history of atrial fibrillation, has had falls recently, history of multiple diabetic complications including kidney transplant. His glycemic control is good  on current insulin dosing regiment.     Review of systems: denies polyuria, polydipsia, ABD pain, flank pain, N/V/D, or diaphoresis  Medications:   Scheduled Meds:   amLODIPine  10 mg Oral Daily    pantoprazole  40 mg Oral BID AC    sodium bicarbonate  650 mg Oral BID    amoxicillin-clavulanate  1 tablet Oral 2 times per day    epoetin megan-epbx  10,000 Units SubCUTAneous Q7 Days    insulin glargine  10 Units SubCUTAneous BID    insulin lispro  0-8 Units SubCUTAneous 4x Daily    sodium chloride flush  5-40 mL IntraVENous 2 times per day    insulin lispro  10 Units SubCUTAneous Once    sodium chloride flush  5-40 mL IntraVENous 2 times per day    mycophenolate  750 mg Oral BID    sodium chloride flush  5-40 mL IntraVENous 2 times per day    cycloSPORINE modified  75 mg Oral BID    [Held by provider] insulin regular  6 Units SubCUTAneous Daily with breakfast    [Held by provider] insulin regular  8 Units SubCUTAneous BID WC    gabapentin  300 mg Oral BID    predniSONE  5 mg Oral Daily    atorvastatin  10 mg Oral Daily    tamsulosin  0.4 mg Oral Daily    carvedilol  6.25 mg Oral BID     Continuous Infusions:   sodium chloride      sodium chloride      sodium chloride      sodium chloride 100 mL/hr at 10/24/22 1400    sodium chloride      sodium chloride      sodium chloride      dextrose         Objective:   Vitals: BP (!) 148/65   Pulse 66   Temp 97.9 °F (36.6 °C) (Oral)   Resp 18   Ht 6' 2\" (1.88 m)   Wt 185 lb (83.9 kg)   SpO2 99%   BMI 23.75 kg/m²    Wt Readings from Last 3 Encounters:   10/24/22 185 lb (83.9 kg)   10/14/22 195 lb 6.4 oz (88.6 kg)   10/12/22 192 lb (87.1 kg)        General appearance: alert, appears stated age, cooperative, and no distress  Skin: Skin color, texture, turgor normal. No rashes or lesions. Neck: thyroid normal size, non-tender,  without nodularity  Lungs: clear to auscultation bilaterally  Heart: regular rate and rhythm, S1, S2 normal, no murmur, click, rub or gallop  Abdomen: soft, non-tender. Bowel sounds normal. No masses,  no organomegaly.   Extremities: extremities normal, atraumatic, no cyanosis or edema    Patient Active Problem List:     Pneumonia     Abdominal pain, other specified site     Acute pyelonephritis without lesion of renal medullary necrosis     Anemia     Essential hypertension     Nonspecific abnormal results of thyroid function study     Mixed hyperlipidemia     Kidney replaced by transplant     Intra-abdominal abscess post-procedure     Infection due to Enterococcus     Fever and other physiologic disturbances of temperature regulation     Chronic kidney disease (CKD)     Type II or unspecified type diabetes mellitus without mention of complication, uncontrolled     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

## 2022-10-27 NOTE — PROGRESS NOTES
Comprehensive Nutrition Assessment    Type and Reason for Visit:  Initial, RD Nutrition Re-Screen/LOS    Nutrition Recommendations/Plan:   Modify Current Diet     Malnutrition Assessment:  Malnutrition Status:  Insufficient data (10/27/22 1522)    Context:  Chronic Illness       Nutrition Assessment:    Pt presents at high nutritional risk d/t prolonged sub-optimal oral intake and diarrhea pta, with significant weight loss and pt also NPO/Clear status x 6 days this admission. Carb control caloric level increased to better meet estimated needs. To continue to monitor adequacy of intake/GI status. Nutrition Related Findings:    PMH-DB, htn, Sz; adm s/p falls. 1 0/24 colonoscopy-Acute blood loss anemia secondary to dieulafoy ulcer s/p clipping by GI. (10/27) Labs: K-4.0, BUN/Cr-37/2.43; Gluc-139-300 x last 3 days. +1 Gen edema noted. Last BM noted 10/25. Pt reports decreased po intake with weight loss x ~4 months; also c/o 'dark' diarrhea x few weeks' pta. Pt stated good appetite currently/requested 'more food'. Wound Type: Skin Tears (L arm)       Current Nutrition Intake & Therapies:    Average Meal Intake: %  ADULT DIET; Easy to Chew; 5 carb choices (75 gm/meal); (Previously Carb Control 3)    Anthropometric Measures:  Height: 6' 2\" (188 cm)  Ideal Body Weight (IBW): 190 lbs (86 kg)    Admission Body Weight: 190 lb (86.2 kg) (stated)  Current Body Weight: 185 lb (83.9 kg) ((10/24). Weight Source: Stated. Please obtain actual weight after bed is zeroed. Current BMI (kg/m2): 23.7  Usual Body Weight: 196 lb (88.9 kg) (9/6 bedscale; 229lb (6/23 bedscale))  % Weight Change (Calculated): -5.6                    BMI Categories: Normal Weight (BMI 18.5-24. 9)    Estimated Daily Nutrient Needs:  Energy Requirements Based On: Kcal/kg  Weight Used for Energy Requirements: Current  Energy (kcal/day): 7029-6351 (kg x 26-28)  Weight Used for Protein Requirements: Ideal  Protein (g/day): 103-112 (kg x 1.2-1.3)  Method Used for Fluid Requirements: ml/Kg  Fluid (ml/day): ~2500 (kg x 30)    Nutrition Diagnosis:   Unintended weight loss related to inadequate protein-energy intake, altered GI function as evidenced by weight loss, diarrhea  Altered nutrition-related lab values related to endocrine dysfuntion as evidenced by lab values    Nutrition Interventions:   Food and/or Nutrient Delivery: Modify Current Diet  Nutrition Education/Counseling: Education declined  Coordination of Nutrition Care: Continue to monitor while inpatient       Goals:     Goals: PO intake 75% or greater (weight gain.  gluc <160.)       Nutrition Monitoring and Evaluation:      Food/Nutrient Intake Outcomes: Food and Nutrient Intake  Physical Signs/Symptoms Outcomes: Weight, Biochemical Data    Renae Kelley RD, LD

## 2022-10-27 NOTE — FLOWSHEET NOTE
Dr. Von Moreno notified via perfect serve of critical trop. Per Dr. Von Moreno okay to cancel further troponin draws.

## 2022-10-28 ENCOUNTER — HOSPITAL ENCOUNTER (INPATIENT)
Age: 72
LOS: 7 days | Discharge: HOME | DRG: 378 | End: 2022-11-04
Attending: INTERNAL MEDICINE | Admitting: INTERNAL MEDICINE
Payer: COMMERCIAL

## 2022-10-28 VITALS
HEIGHT: 74 IN | HEART RATE: 63 BPM | WEIGHT: 185 LBS | RESPIRATION RATE: 18 BRPM | DIASTOLIC BLOOD PRESSURE: 54 MMHG | SYSTOLIC BLOOD PRESSURE: 149 MMHG | BODY MASS INDEX: 23.74 KG/M2 | TEMPERATURE: 98.8 F | OXYGEN SATURATION: 97 %

## 2022-10-28 DIAGNOSIS — K92.0 GASTROINTESTINAL HEMORRHAGE WITH HEMATEMESIS: Primary | ICD-10-CM

## 2022-10-28 LAB
ALBUMIN SERPL-MCNC: 3.2 G/DL (ref 3.5–4.6)
ALP BLD-CCNC: 54 U/L (ref 35–104)
ALT SERPL-CCNC: 7 U/L (ref 0–41)
ANION GAP SERPL CALCULATED.3IONS-SCNC: 16 MEQ/L (ref 9–15)
AST SERPL-CCNC: 10 U/L (ref 0–40)
BASOPHILS ABSOLUTE: 0.1 K/UL (ref 0–0.2)
BASOPHILS RELATIVE PERCENT: 1.3 %
BILIRUB SERPL-MCNC: 0.7 MG/DL (ref 0.2–0.7)
BUN BLDV-MCNC: 37 MG/DL (ref 8–23)
CALCIUM SERPL-MCNC: 8.3 MG/DL (ref 8.5–9.9)
CHLORIDE BLD-SCNC: 112 MEQ/L (ref 95–107)
CO2: 14 MEQ/L (ref 20–31)
CREAT SERPL-MCNC: 2.03 MG/DL (ref 0.7–1.2)
EKG ATRIAL RATE: 66 BPM
EKG P AXIS: 82 DEGREES
EKG P-R INTERVAL: 172 MS
EKG Q-T INTERVAL: 366 MS
EKG QRS DURATION: 76 MS
EKG QTC CALCULATION (BAZETT): 383 MS
EKG R AXIS: 12 DEGREES
EKG T AXIS: 16 DEGREES
EKG VENTRICULAR RATE: 66 BPM
EOSINOPHILS ABSOLUTE: 0.4 K/UL (ref 0–0.7)
EOSINOPHILS RELATIVE PERCENT: 5 %
GFR SERPL CREATININE-BSD FRML MDRD: 34.4 ML/MIN/{1.73_M2}
GLOBULIN: 2.3 G/DL (ref 2.3–3.5)
GLUCOSE BLD-MCNC: 150 MG/DL (ref 70–99)
GLUCOSE BLD-MCNC: 154 MG/DL (ref 70–99)
GLUCOSE BLD-MCNC: 200 MG/DL (ref 70–99)
GLUCOSE BLD-MCNC: 212 MG/DL (ref 70–99)
GLUCOSE BLD-MCNC: 265 MG/DL (ref 70–99)
HCT VFR BLD CALC: 25.7 % (ref 42–52)
HEMOGLOBIN: 8.3 G/DL (ref 14–18)
LYMPHOCYTES ABSOLUTE: 0.7 K/UL (ref 1–4.8)
LYMPHOCYTES RELATIVE PERCENT: 10.1 %
MAGNESIUM: 1.8 MG/DL (ref 1.7–2.4)
MCH RBC QN AUTO: 29.1 PG (ref 27–31.3)
MCHC RBC AUTO-ENTMCNC: 32.3 % (ref 33–37)
MCV RBC AUTO: 90 FL (ref 79–92.2)
MONOCYTES ABSOLUTE: 0.6 K/UL (ref 0.2–0.8)
MONOCYTES RELATIVE PERCENT: 8.9 %
NEUTROPHILS ABSOLUTE: 5.3 K/UL (ref 1.4–6.5)
NEUTROPHILS RELATIVE PERCENT: 74.7 %
PDW BLD-RTO: 16.9 % (ref 11.5–14.5)
PERFORMED ON: ABNORMAL
PLATELET # BLD: 184 K/UL (ref 130–400)
POTASSIUM SERPL-SCNC: 4.2 MEQ/L (ref 3.4–4.9)
RBC # BLD: 2.85 M/UL (ref 4.7–6.1)
SODIUM BLD-SCNC: 142 MEQ/L (ref 135–144)
TOTAL PROTEIN: 5.5 G/DL (ref 6.3–8)
WBC # BLD: 7.1 K/UL (ref 4.8–10.8)

## 2022-10-28 PROCEDURE — 1200000002 HC SEMI PRIVATE SWING BED

## 2022-10-28 PROCEDURE — 6370000000 HC RX 637 (ALT 250 FOR IP): Performed by: INTERNAL MEDICINE

## 2022-10-28 PROCEDURE — 85025 COMPLETE CBC W/AUTO DIFF WBC: CPT

## 2022-10-28 PROCEDURE — 83735 ASSAY OF MAGNESIUM: CPT

## 2022-10-28 PROCEDURE — 6370000000 HC RX 637 (ALT 250 FOR IP): Performed by: CLINICAL NURSE SPECIALIST

## 2022-10-28 PROCEDURE — 99232 SBSQ HOSP IP/OBS MODERATE 35: CPT | Performed by: INTERNAL MEDICINE

## 2022-10-28 PROCEDURE — 6360000002 HC RX W HCPCS: Performed by: INTERNAL MEDICINE

## 2022-10-28 PROCEDURE — 80053 COMPREHEN METABOLIC PANEL: CPT

## 2022-10-28 PROCEDURE — 36415 COLL VENOUS BLD VENIPUNCTURE: CPT

## 2022-10-28 RX ORDER — FUROSEMIDE 40 MG/1
40 TABLET ORAL DAILY PRN
Status: CANCELLED | OUTPATIENT
Start: 2022-10-28

## 2022-10-28 RX ORDER — GABAPENTIN 300 MG/1
300 CAPSULE ORAL DAILY
Status: DISCONTINUED | OUTPATIENT
Start: 2022-10-29 | End: 2022-11-04 | Stop reason: HOSPADM

## 2022-10-28 RX ORDER — INSULIN LISPRO 100 [IU]/ML
0-8 INJECTION, SOLUTION INTRAVENOUS; SUBCUTANEOUS 4 TIMES DAILY
Status: DISCONTINUED | OUTPATIENT
Start: 2022-10-28 | End: 2022-11-04 | Stop reason: HOSPADM

## 2022-10-28 RX ORDER — MYCOPHENOLATE MOFETIL 250 MG/1
750 CAPSULE ORAL 2 TIMES DAILY
Status: DISCONTINUED | OUTPATIENT
Start: 2022-10-28 | End: 2022-11-04 | Stop reason: HOSPADM

## 2022-10-28 RX ORDER — PREDNISONE 1 MG/1
5 TABLET ORAL DAILY
Status: DISCONTINUED | OUTPATIENT
Start: 2022-10-29 | End: 2022-11-04 | Stop reason: HOSPADM

## 2022-10-28 RX ORDER — INSULIN GLARGINE 100 [IU]/ML
10 INJECTION, SOLUTION SUBCUTANEOUS 2 TIMES DAILY
Status: CANCELLED | OUTPATIENT
Start: 2022-10-28

## 2022-10-28 RX ORDER — PREDNISONE 1 MG/1
5 TABLET ORAL DAILY
Status: CANCELLED | OUTPATIENT
Start: 2022-10-29

## 2022-10-28 RX ORDER — ACETAMINOPHEN 650 MG/1
650 SUPPOSITORY RECTAL EVERY 6 HOURS PRN
Status: CANCELLED | OUTPATIENT
Start: 2022-10-28

## 2022-10-28 RX ORDER — ACETAMINOPHEN 325 MG/1
650 TABLET ORAL EVERY 6 HOURS PRN
Status: CANCELLED | OUTPATIENT
Start: 2022-10-28

## 2022-10-28 RX ORDER — GABAPENTIN 600 MG/1
600 TABLET ORAL NIGHTLY
Status: ON HOLD | COMMUNITY
End: 2022-11-04 | Stop reason: HOSPADM

## 2022-10-28 RX ORDER — INSULIN GLARGINE 100 [IU]/ML
10 INJECTION, SOLUTION SUBCUTANEOUS 2 TIMES DAILY
Status: DISCONTINUED | OUTPATIENT
Start: 2022-10-28 | End: 2022-11-04 | Stop reason: HOSPADM

## 2022-10-28 RX ORDER — MYCOPHENOLATE MOFETIL 250 MG/1
750 CAPSULE ORAL 2 TIMES DAILY
Status: DISCONTINUED | OUTPATIENT
Start: 2022-10-28 | End: 2022-10-28

## 2022-10-28 RX ORDER — GABAPENTIN 300 MG/1
300 CAPSULE ORAL DAILY
COMMUNITY

## 2022-10-28 RX ORDER — SODIUM BICARBONATE 650 MG/1
650 TABLET ORAL 2 TIMES DAILY
Status: CANCELLED | OUTPATIENT
Start: 2022-10-28

## 2022-10-28 RX ORDER — PROCHLORPERAZINE EDISYLATE 5 MG/ML
10 INJECTION INTRAMUSCULAR; INTRAVENOUS EVERY 6 HOURS PRN
Status: DISCONTINUED | OUTPATIENT
Start: 2022-10-28 | End: 2022-11-04 | Stop reason: HOSPADM

## 2022-10-28 RX ORDER — MYCOPHENOLATE MOFETIL 250 MG/1
750 CAPSULE ORAL 2 TIMES DAILY
Status: CANCELLED | OUTPATIENT
Start: 2022-10-28

## 2022-10-28 RX ORDER — TAMSULOSIN HYDROCHLORIDE 0.4 MG/1
0.4 CAPSULE ORAL DAILY
Status: CANCELLED | OUTPATIENT
Start: 2022-10-29

## 2022-10-28 RX ORDER — SODIUM BICARBONATE 650 MG/1
650 TABLET ORAL 2 TIMES DAILY
Status: DISCONTINUED | OUTPATIENT
Start: 2022-10-28 | End: 2022-11-04 | Stop reason: HOSPADM

## 2022-10-28 RX ORDER — INSULIN LISPRO 100 [IU]/ML
0-8 INJECTION, SOLUTION INTRAVENOUS; SUBCUTANEOUS 4 TIMES DAILY
Status: CANCELLED | OUTPATIENT
Start: 2022-10-28

## 2022-10-28 RX ORDER — AMOXICILLIN AND CLAVULANATE POTASSIUM 875; 125 MG/1; MG/1
1 TABLET, FILM COATED ORAL EVERY 12 HOURS SCHEDULED
Status: COMPLETED | OUTPATIENT
Start: 2022-10-28 | End: 2022-11-02

## 2022-10-28 RX ORDER — AMLODIPINE BESYLATE 10 MG/1
10 TABLET ORAL DAILY
Status: DISCONTINUED | OUTPATIENT
Start: 2022-10-29 | End: 2022-11-04 | Stop reason: HOSPADM

## 2022-10-28 RX ORDER — ATORVASTATIN CALCIUM 40 MG/1
40 TABLET, FILM COATED ORAL DAILY
Status: DISCONTINUED | OUTPATIENT
Start: 2022-10-28 | End: 2022-10-28

## 2022-10-28 RX ORDER — CYCLOSPORINE 25 MG/1
75 CAPSULE, LIQUID FILLED ORAL 2 TIMES DAILY
Status: CANCELLED | OUTPATIENT
Start: 2022-10-28

## 2022-10-28 RX ORDER — ATORVASTATIN CALCIUM 40 MG/1
40 TABLET, FILM COATED ORAL DAILY
Status: CANCELLED | OUTPATIENT
Start: 2022-10-28

## 2022-10-28 RX ORDER — AMLODIPINE BESYLATE 10 MG/1
10 TABLET ORAL DAILY
Status: CANCELLED | OUTPATIENT
Start: 2022-10-29

## 2022-10-28 RX ORDER — MYCOPHENOLATE MOFETIL 250 MG/1
750 CAPSULE ORAL 2 TIMES DAILY
Status: DISCONTINUED | OUTPATIENT
Start: 2022-10-29 | End: 2022-10-28

## 2022-10-28 RX ORDER — PROCHLORPERAZINE EDISYLATE 5 MG/ML
10 INJECTION INTRAMUSCULAR; INTRAVENOUS EVERY 6 HOURS PRN
Status: CANCELLED | OUTPATIENT
Start: 2022-10-28

## 2022-10-28 RX ORDER — FUROSEMIDE 40 MG/1
40 TABLET ORAL DAILY PRN
Status: DISCONTINUED | OUTPATIENT
Start: 2022-10-28 | End: 2022-11-04 | Stop reason: HOSPADM

## 2022-10-28 RX ORDER — GABAPENTIN 300 MG/1
300 CAPSULE ORAL 2 TIMES DAILY
Status: DISCONTINUED | OUTPATIENT
Start: 2022-10-28 | End: 2022-10-28

## 2022-10-28 RX ORDER — DEXTROSE MONOHYDRATE 100 MG/ML
INJECTION, SOLUTION INTRAVENOUS CONTINUOUS PRN
Status: DISCONTINUED | OUTPATIENT
Start: 2022-10-28 | End: 2022-11-04 | Stop reason: HOSPADM

## 2022-10-28 RX ORDER — TAMSULOSIN HYDROCHLORIDE 0.4 MG/1
0.4 CAPSULE ORAL DAILY
Status: DISCONTINUED | OUTPATIENT
Start: 2022-10-29 | End: 2022-11-04 | Stop reason: HOSPADM

## 2022-10-28 RX ORDER — SODIUM CHLORIDE 0.9 % (FLUSH) 0.9 %
5-40 SYRINGE (ML) INJECTION EVERY 12 HOURS SCHEDULED
Status: CANCELLED | OUTPATIENT
Start: 2022-10-28

## 2022-10-28 RX ORDER — DEXTROSE MONOHYDRATE 100 MG/ML
INJECTION, SOLUTION INTRAVENOUS CONTINUOUS PRN
Status: CANCELLED | OUTPATIENT
Start: 2022-10-28

## 2022-10-28 RX ORDER — AMOXICILLIN AND CLAVULANATE POTASSIUM 875; 125 MG/1; MG/1
1 TABLET, FILM COATED ORAL EVERY 12 HOURS SCHEDULED
Status: CANCELLED | OUTPATIENT
Start: 2022-10-28 | End: 2022-11-02

## 2022-10-28 RX ORDER — SIMVASTATIN 20 MG
20 TABLET ORAL NIGHTLY
Status: DISCONTINUED | OUTPATIENT
Start: 2022-10-28 | End: 2022-11-04 | Stop reason: HOSPADM

## 2022-10-28 RX ORDER — SODIUM CHLORIDE 0.9 % (FLUSH) 0.9 %
5-40 SYRINGE (ML) INJECTION EVERY 12 HOURS SCHEDULED
Status: DISCONTINUED | OUTPATIENT
Start: 2022-10-28 | End: 2022-10-28

## 2022-10-28 RX ORDER — PROCHLORPERAZINE MALEATE 10 MG
10 TABLET ORAL EVERY 6 HOURS PRN
Status: CANCELLED | OUTPATIENT
Start: 2022-10-28

## 2022-10-28 RX ORDER — CYCLOSPORINE 25 MG/1
75 CAPSULE, LIQUID FILLED ORAL 2 TIMES DAILY
Status: DISCONTINUED | OUTPATIENT
Start: 2022-10-28 | End: 2022-11-04 | Stop reason: HOSPADM

## 2022-10-28 RX ORDER — GABAPENTIN 300 MG/1
300 CAPSULE ORAL 2 TIMES DAILY
Status: CANCELLED | OUTPATIENT
Start: 2022-10-28

## 2022-10-28 RX ORDER — PROCHLORPERAZINE MALEATE 5 MG/1
10 TABLET ORAL EVERY 6 HOURS PRN
Status: DISCONTINUED | OUTPATIENT
Start: 2022-10-28 | End: 2022-11-04 | Stop reason: HOSPADM

## 2022-10-28 RX ORDER — PANTOPRAZOLE SODIUM 40 MG/1
40 TABLET, DELAYED RELEASE ORAL
Status: CANCELLED | OUTPATIENT
Start: 2022-10-28

## 2022-10-28 RX ORDER — CARVEDILOL 6.25 MG/1
6.25 TABLET ORAL 2 TIMES DAILY
Status: DISCONTINUED | OUTPATIENT
Start: 2022-10-28 | End: 2022-11-04 | Stop reason: HOSPADM

## 2022-10-28 RX ORDER — GABAPENTIN 300 MG/1
600 CAPSULE ORAL NIGHTLY
Status: DISCONTINUED | OUTPATIENT
Start: 2022-10-28 | End: 2022-11-04 | Stop reason: HOSPADM

## 2022-10-28 RX ORDER — ACETAMINOPHEN 650 MG/1
650 SUPPOSITORY RECTAL EVERY 6 HOURS PRN
Status: DISCONTINUED | OUTPATIENT
Start: 2022-10-28 | End: 2022-11-04 | Stop reason: HOSPADM

## 2022-10-28 RX ORDER — PANTOPRAZOLE SODIUM 40 MG/1
40 TABLET, DELAYED RELEASE ORAL
Status: DISCONTINUED | OUTPATIENT
Start: 2022-10-28 | End: 2022-11-04 | Stop reason: HOSPADM

## 2022-10-28 RX ORDER — ACETAMINOPHEN 325 MG/1
650 TABLET ORAL EVERY 6 HOURS PRN
Status: DISCONTINUED | OUTPATIENT
Start: 2022-10-28 | End: 2022-11-04 | Stop reason: HOSPADM

## 2022-10-28 RX ORDER — CARVEDILOL 3.12 MG/1
6.25 TABLET ORAL 2 TIMES DAILY
Status: CANCELLED | OUTPATIENT
Start: 2022-10-28

## 2022-10-28 RX ADMIN — INSULIN LISPRO 2 UNITS: 100 INJECTION, SOLUTION INTRAVENOUS; SUBCUTANEOUS at 20:36

## 2022-10-28 RX ADMIN — SODIUM BICARBONATE 650 MG: 650 TABLET ORAL at 09:12

## 2022-10-28 RX ADMIN — PANTOPRAZOLE SODIUM 40 MG: 40 TABLET, DELAYED RELEASE ORAL at 06:12

## 2022-10-28 RX ADMIN — MYCOPHENOLATE MOFETIL 750 MG: 250 CAPSULE ORAL at 09:12

## 2022-10-28 RX ADMIN — AMOXICILLIN AND CLAVULANATE POTASSIUM 1 TABLET: 875; 125 TABLET, FILM COATED ORAL at 20:30

## 2022-10-28 RX ADMIN — PANTOPRAZOLE SODIUM 40 MG: 40 TABLET, DELAYED RELEASE ORAL at 17:06

## 2022-10-28 RX ADMIN — Medication 20 MG: at 20:30

## 2022-10-28 RX ADMIN — PREDNISONE 5 MG: 5 TABLET ORAL at 09:12

## 2022-10-28 RX ADMIN — INSULIN LISPRO 4 UNITS: 100 INJECTION, SOLUTION INTRAVENOUS; SUBCUTANEOUS at 17:02

## 2022-10-28 RX ADMIN — AMLODIPINE BESYLATE 10 MG: 10 TABLET ORAL at 09:12

## 2022-10-28 RX ADMIN — SODIUM BICARBONATE TAB 650 MG 650 MG: 650 TAB at 20:29

## 2022-10-28 RX ADMIN — AMOXICILLIN AND CLAVULANATE POTASSIUM 1 TABLET: 875; 125 TABLET, FILM COATED ORAL at 09:12

## 2022-10-28 RX ADMIN — CYCLOSPORINE 75 MG: 25 CAPSULE, LIQUID FILLED ORAL at 20:31

## 2022-10-28 RX ADMIN — MYCOPHENOLATE MOFETIL 750 MG: 250 CAPSULE ORAL at 21:31

## 2022-10-28 RX ADMIN — TAMSULOSIN HYDROCHLORIDE 0.4 MG: 0.4 CAPSULE ORAL at 09:11

## 2022-10-28 RX ADMIN — CARVEDILOL 6.25 MG: 6.25 TABLET, FILM COATED ORAL at 20:29

## 2022-10-28 RX ADMIN — GABAPENTIN 300 MG: 300 CAPSULE ORAL at 09:11

## 2022-10-28 RX ADMIN — GABAPENTIN 600 MG: 300 CAPSULE ORAL at 20:30

## 2022-10-28 RX ADMIN — INSULIN GLARGINE 10 UNITS: 100 INJECTION, SOLUTION SUBCUTANEOUS at 20:37

## 2022-10-28 RX ADMIN — CYCLOSPORINE 75 MG: 25 CAPSULE, LIQUID FILLED ORAL at 09:12

## 2022-10-28 RX ADMIN — INSULIN GLARGINE 10 UNITS: 100 INJECTION, SOLUTION SUBCUTANEOUS at 09:12

## 2022-10-28 RX ADMIN — CARVEDILOL 6.25 MG: 6.25 TABLET, FILM COATED ORAL at 09:12

## 2022-10-28 ASSESSMENT — ENCOUNTER SYMPTOMS
BLOOD IN STOOL: 0
RESPIRATORY NEGATIVE: 1
COUGH: 0
STRIDOR: 0
CHEST TIGHTNESS: 0
NAUSEA: 0
GASTROINTESTINAL NEGATIVE: 1
SHORTNESS OF BREATH: 0
EYES NEGATIVE: 1
WHEEZING: 0

## 2022-10-28 ASSESSMENT — PAIN SCALES - GENERAL
PAINLEVEL_OUTOF10: 2
PAINLEVEL_OUTOF10: 0

## 2022-10-28 ASSESSMENT — LIFESTYLE VARIABLES: HOW OFTEN DO YOU HAVE A DRINK CONTAINING ALCOHOL: NEVER

## 2022-10-28 NOTE — PROGRESS NOTES
INPATIENT PROGRESS NOTES    PATIENT NAME: Naz Alvarez  MRN: [id number]  SERVICE DATE:  October 28, 2022   SERVICE TIME:  1:42 PM      PRIMARY SERVICE: Pulmonary Disease    CHIEF COMPLAIN:Fatigue,       INTERVAL HPI: Patient seen and examined at bedside, Interval Notes, orders reviewed. Nursing notes noted  Patient going to [facility] for rehab. Discussed with wife at bedside. They are aware about abnormal CT chest.  He is going to have a follow-up CT chest as outpatient. He denies having any cough. No shortness of breath. No chest pain. No fever. No hemoptysis. Denies having nausea, vomiting or diarrhea. He will follow-up with pulmonary office in 3-4 weeks. He is not sure he wants to go to [facility] or come here    OBJECTIVE    Body mass index is 23.75 kg/m². PHYSICAL EXAM:  Vitals:  BP (!) 149/54   Pulse 63   Temp 98.8 °F (37.1 °C)   Resp 18   Ht 6' 2\" (1.88 m)   Wt 185 lb (83.9 kg)   SpO2 97%   BMI 23.75 kg/m²   General: Alert, awake . comfortable in bed, No distress. Head: Atraumatic , Normocephalic   Eyes: PERRL. No sclera icterus. No conjunctival injection. No discharge   ENT: No nasal  discharge. Pharynx clear. Neck:  Trachea midline. No thyromegaly, no JVD, No cervical adenopathy. Chest : Bilaterally symmetrical ,Normal effort,  No accessory muscle use  Lung : . Fair BS bilateral, decreased BS at bases. No Rales. No wheezing. No rhonchi. Heart[de-identified] Normal  rate. Regular rhythm. No mumur ,  Rub or gallop  ABD: Non-tender. Non-distended. No masses. No organmegaly. Normal bowel sounds. No hernia.   Ext : No Pitting both leg , No Cyanosis No clubbing  Neuro: no focal weakness          DATA:   Recent Labs     10/27/22  0517 10/28/22  0511   WBC 6.8 7.1   HGB 8.1* 8.3*   HCT 24.3* 25.7*   MCV 88.5 90.0    184     Recent Labs     10/27/22  0517 10/28/22  0511    142   K 4.0 4.2   * 112*   CO2 16* 14*   BUN 37* 37*   CREATININE 2.43* 2.03*   GLUCOSE 185* 150*   CALCIUM 8.2* 8.3*   PROT 5.2* 5.5*   LABALBU 3.1* 3.2*   BILITOT 0.7 0.7   ALKPHOS 50 54   AST 8 10   ALT 7 7   LABGLOM 27.8* 34.4*   GLOB 2.1* 2.3       MV Settings:          No results for input(s): PHART, XLP1KLU, PO2ART, TJU7QJR, BEART, M3IOHLGY in the last 72 hours. O2 Device: None (Room air)  O2 Flow Rate (L/min): 0 L/min    ADULT DIET;  Regular; 4 carb choices (60 gm/meal)     MEDICATIONS during current hospitalization:    Continuous Infusions:   sodium chloride      sodium chloride      sodium chloride      sodium chloride 100 mL/hr at 10/24/22 1400    sodium chloride      sodium chloride      sodium chloride      dextrose         Scheduled Meds:   amLODIPine  10 mg Oral Daily    pantoprazole  40 mg Oral BID AC    simvastatin  20 mg Oral Nightly    sodium bicarbonate  650 mg Oral BID    amoxicillin-clavulanate  1 tablet Oral 2 times per day    epoetin megan-epbx  10,000 Units SubCUTAneous Q7 Days    insulin glargine  10 Units SubCUTAneous BID    insulin lispro  0-8 Units SubCUTAneous 4x Daily    sodium chloride flush  5-40 mL IntraVENous 2 times per day    insulin lispro  10 Units SubCUTAneous Once    sodium chloride flush  5-40 mL IntraVENous 2 times per day    mycophenolate  750 mg Oral BID    sodium chloride flush  5-40 mL IntraVENous 2 times per day    cycloSPORINE modified  75 mg Oral BID    [Held by provider] insulin regular  6 Units SubCUTAneous Daily with breakfast    [Held by provider] insulin regular  8 Units SubCUTAneous BID WC    gabapentin  300 mg Oral BID    predniSONE  5 mg Oral Daily    tamsulosin  0.4 mg Oral Daily    carvedilol  6.25 mg Oral BID       PRN Meds:sodium chloride, sodium chloride flush, sodium chloride, sodium chloride, sodium chloride flush, sodium chloride, sodium chloride, sodium chloride, sodium chloride flush, sodium chloride, acetaminophen **OR** acetaminophen, furosemide, glucose, dextrose bolus **OR** dextrose bolus, glucagon (rDNA), dextrose, prochlorperazine **OR** prochlorperazine    Radiology  XR KNEE LEFT (3 VIEWS)    Result Date: 10/20/2022  EXAMINATION: THREE XRAY VIEWS OF THE LEFT KNEE 10/19/2022 11:30 am COMPARISON: None. HISTORY: ORDERING SYSTEM PROVIDED HISTORY: fell onto knee 2 days ago; pain at Knee cap, SUNRISE view TECHNOLOGIST PROVIDED HISTORY: Reason for exam:->fell onto knee 2 days ago; pain at Knee cap Reason for exam:->SUNRISE view What reading provider will be dictating this exam?->CRC FINDINGS: AP, lateral, and sunrise views of the left knee were obtained. There is no acute fracture or dislocation. There is mild joint space narrowing in the medial compartment with associated osteophyte formation. There is suggestion of chondrocalcinosis. There is possible trace suprapatellar knee joint effusion. The visualized soft tissue structures are unremarkable. There is atherosclerotic disease. 1. 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 2. Probable chondrocalcinosis. 3. Possible trace suprapatellar knee joint effusion. XR KNEE RIGHT (3 VIEWS)    Result Date: 10/19/2022  EXAMINATION: THREE XRAY VIEWS OF THE RIGHT KNEE 10/19/2022 11:30 am COMPARISON: 11/22/2019 HISTORY: ORDERING SYSTEM PROVIDED HISTORY: fall knee pain, [place] view TECHNOLOGIST PROVIDED HISTORY: Reason for exam:->fall knee pain Reason for exam:->Sunrise view What reading provider will be dictating this exam?->CRC FINDINGS: Mild to moderate narrowing of the medial joint space, mild narrowing of the lateral and patellofemoral joint spaces is seen. Unremarkable alignment with no evidence of dislocation. Mild degenerative changes visualized. No evidence of cortical irregularity or lucency to suggest a fracture, no evidence of lytic or sclerotic bone lesion is seen. No evidence of suprapatellar effusion. The quadriceps and patellar tendons are unremarkable. Extensive vascular calcifications are seen. Degenerative changes. No evidence of acute osseous abnormality is seen.      CT HEAD WO CONTRAST    Result Date: 10/19/2022  EXAMINATION: CT OF THE HEAD WITHOUT CONTRAST  10/19/2022 11:11 am TECHNIQUE: CT of the head was performed without the administration of intravenous contrast. Automated exposure control, iterative reconstruction, and/or weight based adjustment of the mA/kV was utilized to reduce the radiation dose to as low as reasonably achievable. COMPARISON: None. HISTORY: ORDERING SYSTEM PROVIDED HISTORY: passed out hit head; on coumadin. .. ? bleed, ? 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 ->No Reason for exam:->passed out hit head; on coumadin. .. ? bleed, ? 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 No abnormal extra-axial fluid collection. The gray-white differentiation is maintained without evidence of an acute infarct. There is no evidence of hydrocephalus. The ventricles, cisterns and sulci are prominent consistent with atrophy. There is decreased attenuation within the periventricular white matter consistent with periventricular leukomalacia. There is encephalomalacia is seen within the anterior aspect of the left temporal fossa. There is an old left craniotomy defect. ORBITS: The visualized portion of the orbits demonstrate no acute abnormality. SINUSES: The visualized paranasal sinuses and mastoid air cells demonstrate no acute abnormality. There is mucosal thickening seen within the right sphenoid sinus. SOFT TISSUES/SKULL:  No acute abnormality of the visualized skull or soft tissues. 1.  There is no acute intracranial abnormality.   Specifically, there is no intracranial hemorrhage. 2. Atrophy and periventricular leukomalacia, 3. Mucosal thickening within the right sphenoid sinus. 4. Previous left craniotomy defect with encephalomalacia  seen within the anterior aspect of the left temporal lobe. .     CTA CHEST W WO CONTRAST    Result Date: 10/19/2022  EXAMINATION: CTA OF THE CHEST WITH AND WITHOUT CONTRAST 10/19/2022 11:11 am TECHNIQUE: CTA of the chest was performed before and after the administration of intravenous contrast.  Multiplanar reformatted images are provided for review. MIP images are provided for review. Automated exposure control, iterative reconstruction, and/or weight based adjustment of the mA/kV was utilized to reduce the radiation dose to as low as reasonably achievable. COMPARISON: March 15, 2018 HISTORY: ORDERING SYSTEM PROVIDED HISTORY: syncope fell to floor 2 days ago; ? pulmonary embolus; please also mention if trauma injury. Suspect PE as cause of fall or pneumonia TECHNOLOGIST PROVIDED HISTORY: Reason for exam:->syncope fell to floor 2 days ago; ? pulmonary embolus; please also mention if trauma injury. Suspect PE as cause of fall or pneumonia Decision Support Exception - unselect if not a suspected or confirmed emergency medical condition->Emergency Medical Condition (MA) What reading provider will be dictating this exam?->CRC FINDINGS: Aorta: At the level of the left main pulmonary artery the ascending aorta measures 3.8 cm in greatest transverse dimension and 34 cm. No acute abnormality of the aorta. No large filling defects are seen in the main, 1st and 2nd order vessels. Mediastinum: No evidence of mediastinal lymphadenopathy. The heart and pericardium demonstrate no acute abnormality. Coronary calcifications and are stents are seen in place. Lungs/Pleura: In the right lower lobe there is an area of consolidation. Also  a trace of pleural fluid is seen. There are multiple nodules visualized in the lungs. Most appear stable.   In the anterior right upper lobe and opacity is seen that measures 5.8 x 11 by 12 mm the uterus. Most nodules measure 4 mm or less. In the right middle lobe laterally a 6 mm nodule is seen. This was seen on previous study. Reticulonodular opacities are seen bilaterally and are most prominent in the lateral right upper lobe. Centrilobular emphysematous changes are seen as well as paraseptal emphysematous changes. Bronchial wall dilatation and ground-glass opacities are seen in the medial aspect of the right upper lobe. Upper Abdomen: Limited images of the upper abdomen are unremarkable. Soft Tissues/Bones: Degenerative changes are seen in the spine at multiple levels. No acute fractures are identified on this examination. 1.  No large filling defects are seen on this examination to suggest saddle embolus more in the larger primary and secondary vascular structures. 2. There is consolidation again seen in the left lower lobe. Bronchial wall thickening mild bronchiectasis and other changes of COPD are seen. An ill-defined opacity is seen in the anterior left upper lobe. Recommend follow-up to resolution to exclude any developing masses. There are multiple nodules visualized and overall are stable. CT CERVICAL SPINE WO CONTRAST    Result Date: 10/19/2022  EXAMINATION: CT OF THE CERVICAL SPINE WITHOUT CONTRAST 10/19/2022 11:11 am TECHNIQUE: CT of the cervical spine was performed without the administration of intravenous contrast. Multiplanar reformatted images are provided for review. Automated exposure control, iterative reconstruction, and/or weight based adjustment of the mA/kV was utilized to reduce the radiation dose to as low as reasonably achievable. COMPARISON: None.  HISTORY: ORDERING SYSTEM PROVIDED HISTORY: fall from standing 2 days ago and again today TECHNOLOGIST PROVIDED HISTORY: Reason for exam:->fall from standing 2 days ago and again today Decision Support Exception - unselect if not a suspected or confirmed emergency medical condition->Emergency Medical Condition (MA) What reading provider will be dictating this exam?->CRC FINDINGS: The ring of C1 is intact as is the dense. There is no compression fracture of the cervical spine. No jumped or perched facet is noted. Multilevel degenerative disc and degenerative joint disease is noted. Posterior degenerative endplate spurs are seen at the C3-4, C4-5 and C5-6 levels. There is effacement of the ventral thecal sac at the C3-4 through C5-6 levels due to the posterior degenerative endplate spurs. The prevertebral soft tissues are unremarkable. The airway is widely patent. Images through the lung apices are negative for a pneumothorax. 1. There is no acute compression fracture or subluxation of the cervical spine. 2. Multilevel degenerative disc and degenerative joint disease. CT THORACIC SPINE WO CONTRAST    Result Date: 10/19/2022  EXAMINATION: CT OF THE THORACIC SPINE WITHOUT CONTRAST; CT OF THE LUMBAR SPINE WITHOUT CONTRAST  10/19/2022 11:11 am: TECHNIQUE: CT of the thoracic spine was performed without the administration of intravenous contrast. Multiplanar reformatted images are provided for review. Automated exposure control, iterative reconstruction, and/or weight based adjustment of the mA/kV was utilized to reduce the radiation dose to as low as reasonably achievable.; CT of the lumbar spine was performed without the administration of intravenous contrast. Multiplanar reformatted images are provided for review. Adjustment of mA and/or kV according to patient size was utilized. Automated exposure control, iterative reconstruction, and/or weight based adjustment of the mA/kV was utilized to reduce the radiation dose to as low as reasonably achievable. COMPARISON: None.  HISTORY: ORDERING SYSTEM PROVIDED HISTORY: Fall from standing; Trauma assessment TECHNOLOGIST PROVIDED HISTORY: Reason for exam:->Fall from standing; Trauma assessment What reading provider will be dictating this exam?->CRC FINDINGS: CT thoracic spine: There is no evidence of acute fracture. Vertebral alignment is anatomic. There are no acute compression deformities. There is minimal concavity at the superior endplates of T1 through T3. There are changes of diffuse idiopathic skeletal hyperostosis within the thoracic spine. The paravertebral soft tissue structures are unremarkable. There is no gross evidence of central canal stenosis. There is suggestion of pleural thickening in the posterior right lower lobe with a pleural based opacity that may represent atelectasis. CT lumbar spine: There is no evidence of acute fracture. Vertebral alignment is anatomic. There are no compression deformities. The intervertebral disc space heights appear relatively preserved. There is facet hypertrophy throughout the lumbar spine. The paravertebral soft tissue structures are unremarkable. There is possible central canal stenosis at L3-4. There is multilevel neural foraminal narrowing. There is arteriosclerosis without abdominal aortic aneurysm. There is marked atrophy of the bilateral kidneys. There is exophytic lesion from the posterior right kidney which is more dense than simple cyst, measuring approximately 48 Hounsfield units. This may represent complicated cyst.  A solid mass cannot be entirely excluded. 1. No acute fracture or subluxation within the thoracic or lumbar spine. 2. Degenerative changes in the spine without definite central canal stenosis in the thoracic spine. There is possible central canal stenosis at L3-4. There is multilevel neural foraminal narrowing within the lumbar spine. 3. Marked atrophy of the bilateral kidneys with indeterminate exophytic lesion from the right kidney that may represent complicated cyst or solid mass. The patient has prior examinations which are not available for comparison.  4. There is a dependent pleural based opacity in the right lower lobe which may represent atelectasis. There are prior examinations which are not available for comparison. CT LUMBAR SPINE WO CONTRAST    Result Date: 10/19/2022  EXAMINATION: CT OF THE THORACIC SPINE WITHOUT CONTRAST; CT OF THE LUMBAR SPINE WITHOUT CONTRAST  10/19/2022 11:11 am: TECHNIQUE: CT of the thoracic spine was performed without the administration of intravenous contrast. Multiplanar reformatted images are provided for review. Automated exposure control, iterative reconstruction, and/or weight based adjustment of the mA/kV was utilized to reduce the radiation dose to as low as reasonably achievable.; CT of the lumbar spine was performed without the administration of intravenous contrast. Multiplanar reformatted images are provided for review. Adjustment of mA and/or kV according to patient size was utilized. Automated exposure control, iterative reconstruction, and/or weight based adjustment of the mA/kV was utilized to reduce the radiation dose to as low as reasonably achievable. COMPARISON: None. HISTORY: ORDERING SYSTEM PROVIDED HISTORY: Fall from standing; Trauma assessment TECHNOLOGIST PROVIDED HISTORY: Reason for exam:->Fall from standing; Trauma assessment What reading provider will be dictating this exam?->CRC FINDINGS: CT thoracic spine: There is no evidence of acute fracture. Vertebral alignment is anatomic. There are no acute compression deformities. There is minimal concavity at the superior endplates of T1 through T3. There are changes of diffuse idiopathic skeletal hyperostosis within the thoracic spine. The paravertebral soft tissue structures are unremarkable. There is no gross evidence of central canal stenosis. There is suggestion of pleural thickening in the posterior right lower lobe with a pleural based opacity that may represent atelectasis. CT lumbar spine: There is no evidence of acute fracture. Vertebral alignment is anatomic.  There are no compression deformities. The intervertebral disc space heights appear relatively preserved. There is facet hypertrophy throughout the lumbar spine. The paravertebral soft tissue structures are unremarkable. There is possible central canal stenosis at L3-4. There is multilevel neural foraminal narrowing. There is arteriosclerosis without abdominal aortic aneurysm. There is marked atrophy of the bilateral kidneys. There is exophytic lesion from the posterior right kidney which is more dense than simple cyst, measuring approximately 48 Hounsfield units. This may represent complicated cyst.  A solid mass cannot be entirely excluded. 1. No acute fracture or subluxation within the thoracic or lumbar spine. 2. Degenerative changes in the spine without definite central canal stenosis in the thoracic spine. There is possible central canal stenosis at L3-4. There is multilevel neural foraminal narrowing within the lumbar spine. 3. Marked atrophy of the bilateral kidneys with indeterminate exophytic lesion from the right kidney that may represent complicated cyst or solid mass. The patient has prior examinations which are not available for comparison. 4. There is a dependent pleural based opacity in the right lower lobe which may represent atelectasis. There are prior examinations which are not available for comparison. CT ABDOMEN PELVIS W IV CONTRAST Additional Contrast? None    Result Date: 10/19/2022  EXAMINATION: CT OF THE ABDOMEN AND PELVIS WITH CONTRAST 10/19/2022 11:11 am TECHNIQUE: CT of the abdomen and pelvis was performed with the administration of intravenous contrast. Multiplanar reformatted images are provided for review. Automated exposure control, iterative reconstruction, and/or weight based adjustment of the mA/kV was utilized to reduce the radiation dose to as low as reasonably achievable. COMPARISON: None.  HISTORY: ORDERING SYSTEM PROVIDED HISTORY: fall from standing 2 days ago and again today; ? spleen rupture, ? kidney hematoma; ? retroparitoneal bleed, TRAUMA on coumadin, Hx of kidney transplant TECHNOLOGIST PROVIDED HISTORY: Reason for exam:->fall from standing 2 days ago and again today; ? spleen rupture, ? kidney hematoma; ? retroparitoneal bleed Reason for exam:->TRAUMA on coumadin Reason for exam:->Hx of kidney transplant Additional Contrast?->None Decision Support Exception - unselect if not a suspected or confirmed emergency medical condition->Emergency Medical Condition (MA) What reading provider will be dictating this exam?->CRC FINDINGS: Lower Chest: Heart size is within normal limits. There is focal bronchiectasis in the right lower lobe. Subpleural curvilinear opacities are partially imaged in the right lower lobe and lateral aspect of the right middle lobe. Please refer to dedicated CT chest report for additional details. Organs: The gallbladder is distended without obvious abnormality. The liver enhances homogeneously. The portal vein is patent and there is no intrahepatic biliary ductal dilatation. The spleen and adrenal glands are normal in size. There is a 1.4 cm low-density lesion in the inferior margin of the proximal body of the pancreas on axial image 46. Otherwise, the pancreas enhances homogeneously. No pancreatic ductal dilatation. There is severe bilateral renal cortical atrophy. A 1.2 cm intermediate density (potentially enhancing) lesion arises from the posterior aspect of the mid right kidney on axial image 57. There is a severely atrophic renal transplant in the right lower quadrant and a normally enhancing left lower quadrant renal transplant. GI/Bowel: No evidence of a bowel obstruction, free air or pneumatosis. Portions of the colon are decompressed, limiting assessment for mucosal based abnormalities. Stomach and duodenal sweep demonstrate no acute abnormality. The appendix is normal. Pelvis: The urinary bladder is distended without obvious abnormality.   The prostate gland is mildly heterogeneous but nonenlarged. Visible but nonenlarged pelvic lymph nodes are present. Peritoneum/Retroperitoneum: The abdominal aorta is heavily calcified but nonaneurysmal.  No retroperitoneal lymphadenopathy or mass. Bones/Soft Tissues: No acute osseous abnormality or evidence of an aggressive osseous lesion. There are moderate-to-severe degenerative changes of the lower lumbar spine mainly in the form of intervertebral disc space narrowing/facet arthropathy at L5-S1. There is no evidence of an acute posttraumatic abnormality in the abdomen or pelvis. Specifically, no evidence of splenic or renal injury. No retroperitoneal hematoma. Indeterminate intermediate density 1.2 cm lesion arising from the mid right kidney. Renal neoplasm not excluded. Correlation with renal ultrasound or multiphasic contrast-enhanced CT or MRI of the abdomen utilizing a renal mass protocol is recommended for further assessment. Indeterminate 1.4 cm low-density lesion arising from the inferior portion of the pancreatic body centrally. This has imaging features most suspicious for an IPMN. Correlation with contrast-enhanced abdominal MRI is also recommended (unless otherwise contraindicated). Renal transplants in the bilateral lower quadrants without evidence of posttraumatic findings to the allografts. Linear/nodular opacities in the right lower lobe and right middle lobe. Please refer to the dedicated CT chest report for additional details. XR CHEST PORTABLE    Result Date: 10/12/2022  EXAMINATION: ONE XRAY VIEW OF THE CHEST 10/12/2022 2:58 pm COMPARISON: 09/14/2022 HISTORY: ORDERING SYSTEM PROVIDED HISTORY: sob and shoulder pain TECHNOLOGIST PROVIDED HISTORY: Reason for exam:->sob and shoulder pain What reading provider will be dictating this exam?->CRC FINDINGS: The lungs are without acute focal process. There is no effusion or pneumothorax.  The cardiomediastinal silhouette is without acute process. Heart upper limits of normal in size. The osseous structures are without acute process. No acute process. EGD    Result Date: 10/21/2022  Site: [address] [place] [address] Patient Name: Mili Vigil Procedure Date: 10/21/2022 MRN: [id number] YOB: 1950 Gender: Male Attending MD: Indira Maldonado Indications:        - melena. Medications:        -  See the Anesthesia note for documentation of the administered medications Complications:        -  No immediate complications. Estimated Blood Loss:        -  Estimated blood loss: none. Procedure:        - The Gastroscope was introduced through the mouth and advanced to the third           part of the duodenum.        -  The upper GI endoscopy was accomplished without difficulty. -  The patient tolerated the procedure well. Findings:        -  The examined esophagus was normal.        -  The Z-line was regular and was found 42 cm from the incisors. -  The entire examined stomach was normal.        -  The examined duodenum was normal. Impression:        -  Normal esophagus. -  Z-line regular, 42 cm from the incisors. -  Normal stomach. -  Normal examined duodenum.        -  No specimens collected. Recommendation:        - colonoscopy in the next day or two, Dr Donnell Leach will follow patient starting           this evening. Procedure Code(s):        - [id number], Esophagogastroduodenoscopy, flexible, transoral; diagnostic,           including collection of specimen(s) by brushing or washing, when performed           (separate procedure)       CPT(R) - 2021 copyright [employer]. All Rights Reserved. The CPT codes, CCI edits and ICD codes generated are intended as suggestions       and were generated based on input data. These codes are preliminary and upon        review may be revised to meet current compliance and payer requirements.        The provider is responsible for the final determination of appropriate codes,       and modifiers. Diagnosis Code(s): Signature Name: Brian Elaine MD Signature Statement: This document has been electronically signed. Note Initiated On:10/21/2022 Signature Date:10/21/2022 2:02 PM    Colonoscopy    Result Date: 10/24/2022  Site: [address] [place] [address] Patient Name: Gia Hu Procedure Date: 10/24/2022 MRN: [id number] YOB: 1950 Gender: Male Attending MD: Claude Mile Indications:        -  Hematochezia Medications:        -  Monitored Anesthesia Care Complications:        -  No immediate complications. Estimated Blood Loss:        -  Estimated blood loss: none. Procedure:        - The Colonoscope was introduced through the anus and advanced to the cecum,           identified by appendiceal orifice and ileocecal valve. -  The colonoscopy was somewhat difficult due to excessive bleeding. Successful completion of the procedure was aided by lavage and controlling the           bleeding. Findings:        -  Red blood was found in the entire colon. Successful completion of the           procedure was aided by lavage and controlling the bleeding.        -  A pulsating intermittent bleeding noted. A single small Dieulafoy Vs AVM           with bleeding was found in the ascending colon. Area was successfully injected           with 4 mL of a 0.1 mg/mL solution of epinephrine for hemostasis. To stop           active bleeding, two hemostatic clips were successfully placed. There was no           bleeding at the end of the procedure. -  External non-bleeding hemorrhoids were found during retroflexion. The           hemorrhoids were moderate. Impression:        -  Blood in the entire examined colon. -  A pulsating intermittent bleeding noted. A single small Dieulafoy Vs AVM           with bleeding was found in the ascending colon.   Area was successfully injected           with 4 mL of a 0.1 mg/mL solution of epinephrine for hemostasis. To stop           active bleeding, two hemostatic clips were successfully placed. There was no           bleeding at the end of the procedure. -  A single bleeding colonic small Dieulafoy Vs AVM  with bleeding . Injected. Clips were placed. -  External non-bleeding hemorrhoids.        -  No specimens collected. Recommendation:        -  Clear liquid diet today. - Continue serial H/H        - Further recommendations per inpatient team Procedure Code(s):        - 01982, Colonoscopy, flexible; with control of bleeding, any method Diagnosis Code(s):        - K92.1, Melena (includes Hematochezia)        - K92.2, Gastrointestinal hemorrhage, unspecified        - K55.21, Angiodysplasia of colon with hemorrhage        - K64.4, Residual hemorrhoidal skin tags       CPT(R) - 2021 copyright [employer]. All Rights Reserved. The CPT codes, CCI edits and ICD codes generated are intended as suggestions       and were generated based on input data. These codes are preliminary and upon        review may be revised to meet current compliance and payer requirements. The provider is responsible for the final determination of appropriate codes,       and modifiers. Scope Withdrawal Time:       00:30:15 Signature Name: Fady Marcial MD Signature Statement: This document has been electronically signed. Note Initiated On:10/24/2022 Signature Date:10/24/2022 2:59 PM    IMPRESSION AND SUGGESTION:  Acute blood loss anemia due to GI bleed  Abnormal CT chest,  Right lower lobe consolidation  Emphysema of lung  Chronic kidney disease  Status post kidney transplant    He is going to get antibiotic with Augmentin for 7 days. Follow-up CT chest in 4 weeks and follow-up with Dr. Daryl Wyatt as outpatient. Wife at bedside and she is aware about need for follow-up CT chest and follow-up office visit with pulmonology.     NOTE: This report was transcribed using voice recognition software. Every effort was made to ensure accuracy; however, inadvertent computerized transcription errors may be present.       Electronically signed by Dorcas Boudreaux MD, FCCP on 10/28/2022 at 1:42 PM

## 2022-10-28 NOTE — PROGRESS NOTES
Progress Note  Patient: Willow Claudio  Unit/Bed: V685/H755-85  YOB: 1950  MRN: [id number]  Acct: [de-identified]   Admitting Diagnosis: Chronic anticoagulation [Z79.01]  Pancreatic mass [K86.89]  Elevated troponin [R77.8]  Right renal mass [N28.89]  Acute upper GI bleed [K92.2]  Head injury, closed, with LOC of unknown duration ([address] [B13.1Z9H]  History of renal transplant [Z94.0]  Fall at home, initial encounter [W19. XXXA, Y92.009]  Symptomatic anemia [D64.9]  Opacity of lung on imaging study [R91.8]  Acute pain of both knees [M25.561, M25.562]  Type 2 diabetes mellitus with hyperglycemia, unspecified whether long term insulin use ([address] [E11.65]  Chronic renal failure, stage 3b ([address] [N18.32]  Admit Date:  10/19/2022  Hospital Day: 9    Chief Complaint: CP    Histories:  Past Medical History:   Diagnosis Date    Diabetes mellitus ([address]     Hemodialysis access, fistula mature ([address] 2002    Hypertension     Seizures ([address]     no seizure since      Past Surgical History:   Procedure Laterality Date    BRAIN SURGERY Left 1990    to eliminate seizures    COLONOSCOPY N/A 10/24/2022    COLONOSCOPY DIAGNOSTIC performed by Elvira Echevarria MD at [facility]      [place] [address] INCL FLUOR GDNCE DX W/CELL [address] [facility] N/A 3/20/2018    BRONCHOSCOPY performed by Prem Coronado MD at [address] [place] [address] N/A 10/21/2022    EGD DIAGNOSTIC ONLY performed by Beckey Gaucher, MD at [facility]     Family History   Problem Relation Age of Onset    Colon Cancer Neg Hx      Social History     Socioeconomic History    Marital status:      Spouse name: None    Number of children: None    Years of education: None    Highest education level: None   Tobacco Use    Smoking status: Former     Packs/day: 0.25     Types: Cigarettes     Quit date: 2015     Years since quittin.3    Smokeless tobacco: Former   Vaping Use    Vaping Use: Never used Substance and Sexual Activity    Alcohol use: No     Alcohol/week: 0.0 standard drinks    Drug use: No       Subjective/HPI no further CP. Slept well. Eating well. No complaints this am.  Wife in room. Walked yesterday. No new bleed. Remains in SR    EKG:  NSR 61        Review of Systems:   Review of Systems   Constitutional: Negative. Negative for diaphoresis and fatigue. HENT: Negative. Eyes: Negative. Respiratory: Negative. Negative for cough, chest tightness, shortness of breath, wheezing and stridor. Cardiovascular: Negative. Negative for chest pain, palpitations and leg swelling. Gastrointestinal: Negative. Negative for blood in stool and nausea. Genitourinary: Negative. Musculoskeletal: Negative. Skin: Negative. Neurological:  Positive for weakness. Negative for dizziness, syncope and light-headedness. Hematological: Negative. Psychiatric/Behavioral: Negative. Physical Examination:    BP (!) 147/55   Pulse 63   Temp 97.9 °F (36.6 °C) (Oral)   Resp 18   Ht 6' 2\" (1.88 m)   Wt 185 lb (83.9 kg)   SpO2 99%   BMI 23.75 kg/m²    Physical Exam   Constitutional: He appears healthy. No distress. HENT:   Normal cephalic and Atraumatic   Eyes: Pupils are equal, round, and reactive to light. Neck: Thyroid normal. No JVD present. No neck adenopathy. No thyromegaly present. Cardiovascular: Normal rate, regular rhythm, intact distal pulses and normal pulses. Murmur heard. Pulmonary/Chest: Effort normal and breath sounds normal. He has no wheezes. He has no rales. He exhibits no tenderness. Abdominal: Soft. Bowel sounds are normal. There is no abdominal tenderness. Musculoskeletal:         General: No tenderness or edema. Normal range of motion. Cervical back: Normal range of motion and neck supple. Neurological: He is alert and oriented to person, place, and time. Skin: Skin is warm. No cyanosis. Nails show no clubbing.      LABS:  CBC:   Lab Results Component Value Date/Time    WBC 7.1 10/28/2022 05:11 AM    RBC 2.85 10/28/2022 05:11 AM    RBC 4.18 09/15/2011 05:00 AM    HGB 8.3 10/28/2022 05:11 AM    HCT 25.7 10/28/2022 05:11 AM    MCV 90.0 10/28/2022 05:11 AM    MCH 29.1 10/28/2022 05:11 AM    MCHC 32.3 10/28/2022 05:11 AM    RDW 16.9 10/28/2022 05:11 AM     10/28/2022 05:11 AM    MPV 8.8 09/15/2011 05:00 AM     CBC with Differential:    Lab Results   Component Value Date/Time    WBC 7.1 10/28/2022 05:11 AM    RBC 2.85 10/28/2022 05:11 AM    RBC 4.18 09/15/2011 05:00 AM    HGB 8.3 10/28/2022 05:11 AM    HCT 25.7 10/28/2022 05:11 AM     10/28/2022 05:11 AM    MCV 90.0 10/28/2022 05:11 AM    MCH 29.1 10/28/2022 05:11 AM    MCHC 32.3 10/28/2022 05:11 AM    RDW 16.9 10/28/2022 05:11 AM    BANDSPCT 1 10/19/2022 11:01 AM    LYMPHOPCT 10.1 10/28/2022 05:11 AM    MONOPCT 8.9 10/28/2022 05:11 AM    EOSPCT 2.7 09/14/2011 08:57 AM    BASOPCT 1.3 10/28/2022 05:11 AM    MONOSABS 0.6 10/28/2022 05:11 AM    LYMPHSABS 0.7 10/28/2022 05:11 AM    EOSABS 0.4 10/28/2022 05:11 AM    BASOSABS 0.1 10/28/2022 05:11 AM     CMP:    Lab Results   Component Value Date/Time     10/28/2022 05:11 AM    K 4.2 10/28/2022 05:11 AM    K 4.8 10/04/2022 04:15 AM     10/28/2022 05:11 AM    CO2 14 10/28/2022 05:11 AM    BUN 37 10/28/2022 05:11 AM    CREATININE 2.03 10/28/2022 05:11 AM    GFRAA 28.2 10/12/2022 02:45 PM    LABGLOM 34.4 10/28/2022 05:11 AM    GLUCOSE 150 10/28/2022 05:11 AM    GLUCOSE 172 09/16/2011 06:25 AM    PROT 5.5 10/28/2022 05:11 AM    LABALBU 3.2 10/28/2022 05:11 AM    LABALBU 3.9 09/15/2011 05:00 AM    CALCIUM 8.3 10/28/2022 05:11 AM    BILITOT 0.7 10/28/2022 05:11 AM    ALKPHOS 54 10/28/2022 05:11 AM    AST 10 10/28/2022 05:11 AM    ALT 7 10/28/2022 05:11 AM     BMP:    Lab Results   Component Value Date/Time     10/28/2022 05:11 AM    K 4.2 10/28/2022 05:11 AM    K 4.8 10/04/2022 04:15 AM     10/28/2022 05:11 AM    CO2 14 10/28/2022 05:11 AM    BUN 37 10/28/2022 05:11 AM    LABALBU 3.2 10/28/2022 05:11 AM    LABALBU 3.9 09/15/2011 05:00 AM    CREATININE 2.03 10/28/2022 05:11 AM    CALCIUM 8.3 10/28/2022 05:11 AM    GFRAA 28.2 10/12/2022 02:45 PM    LABGLOM 34.4 10/28/2022 05:11 AM    GLUCOSE 150 10/28/2022 05:11 AM    GLUCOSE 172 09/16/2011 06:25 AM     Magnesium:    Lab Results   Component Value Date/Time    MG 1.8 10/28/2022 05:11 AM     Troponin:    Lab Results   Component Value Date/Time    TROPONINI 0.040 10/27/2022 10:19 AM        Active Hospital Problems    Diagnosis Date Noted    Lung nodules [R91.8] 10/25/2022     Priority: Medium    COPD without exacerbation ([facility] [J44.9] 10/25/2022     Priority: Medium    Abnormal CT of the chest [R93.89] 10/25/2022     Priority: Medium    Bronchiectasis without complication ([facility] [V37.5] 10/25/2022     Priority: Medium    Dieulafoy lesion of colon [K63.81] 10/24/2022     Priority: Medium    Poorly controlled diabetes mellitus ([facility] [E11.65] 10/24/2022     Priority: Medium    Symptomatic anemia [D64.9] 10/19/2022     Priority: Medium    Acute upper GI bleed [K92.2] 10/19/2022     Priority: Medium        Assessment/Plan:  CP- No  ACS by serial Trops - trending lower. He will have baseline elevated Trop due to CKD. No ASA due to GIB. Keep on Telemetry. Continue BB and Statin  PAF - in SR now on Telemetry. Hold Warfarin for now. I will plan to see pt in 3 weeks and if stable will resume Warfarin 5 mg QD. HTN better. Continue meds. Low salt diet. Titrate meds as needed. CKD s/p Transplant. LVEF 65%   GIB and continued Anemia- transfuse as needed. Keep Hb > 7.   Stable this am.          Electronically signed by Dmitriy Venegas MD on 10/28/2022 at 9:23 AM

## 2022-10-28 NOTE — DISCHARGE SUMMARY
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 The ulcer was clipped; he was evaluated by GI and cardiology who will make a decision on when to resume anticoagulation as an outpatient. Exam on discharge:   BP (!) 149/54   Pulse 63   Temp 98.8 °F (37.1 °C)   Resp 18   Ht 6' 2\" (1.88 m)   Wt 185 lb (83.9 kg)   SpO2 97%   BMI 23.75 kg/m²   General appearance: No apparent distress, appears stated age and cooperative. HEENT:  Conjunctivae/corneas clear. Neck: Trachea midline. Respiratory:  Normal respiratory effort. Clear to auscultation  Cardiovascular: Regular rate and rhythm  Abdomen: Soft, non-tender, non-distended with normal bowel sounds. Musculoskeletal: No clubbing, cyanosis or edema bilaterally  Neuro: Non Focal.   Capillary Refill: Brisk,< 3 seconds   Peripheral Pulses: +2 palpable, equal bilaterally     Patient was seen by the following consultants   Consults:  IP CONSULT TO NEPHROLOGY  IP CONSULT TO HOSPITALIST  IP CONSULT TO GI  IP CONSULT TO ENDOCRINOLOGY  IP CONSULT TO PULMONOLOGY  IP CONSULT TO CARDIOLOGY  IP CONSULT TO CASE MANAGEMENT  IP CONSULT TO HOSPITALIST  IP CONSULT TO CASE MANAGEMENT    Significant Diagnostic Studies:    Refer to chart     Please refer to chart if no studies are shown here    XR KNEE LEFT (3 VIEWS)    Result Date: 10/20/2022  EXAMINATION: THREE XRAY VIEWS OF THE LEFT KNEE 10/19/2022 11:30 am COMPARISON: None.  HISTORY: ORDERING SYSTEM PROVIDED HISTORY: fell onto knee 2 days ago; pain at Knee cap, SUNRISE view TECHNOLOGIST PROVIDED HISTORY: Reason for exam:->fell onto knee 2 days ago; pain at Knee cap Reason for exam:->SUNRISE view What reading provider will be dictating this exam?->CRC FINDINGS: AP, lateral, and sunrise views of the left knee were obtained. There is no acute fracture or dislocation. There is mild joint space narrowing in the medial compartment with associated osteophyte formation. There is suggestion of chondrocalcinosis. There is possible trace suprapatellar knee joint effusion. The visualized soft tissue structures are unremarkable. There is atherosclerotic disease. 1. 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 2. Probable chondrocalcinosis. 3. Possible trace suprapatellar knee joint effusion. XR KNEE RIGHT (3 VIEWS)    Result Date: 10/19/2022  EXAMINATION: THREE XRAY VIEWS OF THE RIGHT KNEE 10/19/2022 11:30 am COMPARISON: 11/22/2019 HISTORY: ORDERING SYSTEM PROVIDED HISTORY: fall knee pain, [place] view TECHNOLOGIST PROVIDED HISTORY: Reason for exam:->fall knee pain Reason for exam:->Sunrise view What reading provider will be dictating this exam?->CRC FINDINGS: Mild to moderate narrowing of the medial joint space, mild narrowing of the lateral and patellofemoral joint spaces is seen. Unremarkable alignment with no evidence of dislocation. Mild degenerative changes visualized. No evidence of cortical irregularity or lucency to suggest a fracture, no evidence of lytic or sclerotic bone lesion is seen. No evidence of suprapatellar effusion. The quadriceps and patellar tendons are unremarkable. Extensive vascular calcifications are seen. Degenerative changes. No evidence of acute osseous abnormality is seen.      CT HEAD WO CONTRAST    Result Date: 10/19/2022  EXAMINATION: CT OF THE HEAD WITHOUT CONTRAST  10/19/2022 11:11 am TECHNIQUE: CT of the head was performed without the administration of intravenous contrast. Automated exposure control, iterative reconstruction, and/or weight based adjustment of the mA/kV was utilized to reduce the radiation dose to as low as reasonably achievable. COMPARISON: None. HISTORY: ORDERING SYSTEM PROVIDED HISTORY: passed out hit head; on coumadin. .. ? bleed, ? 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 ->No Reason for exam:->passed out hit head; on coumadin. .. ? bleed, ? 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 No abnormal extra-axial fluid collection. The gray-white differentiation is maintained without evidence of an acute infarct. There is no evidence of hydrocephalus. The ventricles, cisterns and sulci are prominent consistent with atrophy. There is decreased attenuation within the periventricular white matter consistent with periventricular leukomalacia. There is encephalomalacia is seen within the anterior aspect of the left temporal fossa. There is an old left craniotomy defect. ORBITS: The visualized portion of the orbits demonstrate no acute abnormality. SINUSES: The visualized paranasal sinuses and mastoid air cells demonstrate no acute abnormality. There is mucosal thickening seen within the right sphenoid sinus. SOFT TISSUES/SKULL:  No acute abnormality of the visualized skull or soft tissues. 1.  There is no acute intracranial abnormality. Specifically, there is no intracranial hemorrhage. 2. Atrophy and periventricular leukomalacia, 3. Mucosal thickening within the right sphenoid sinus. 4. Previous left craniotomy defect with encephalomalacia  seen within the anterior aspect of the left temporal lobe.  .     CTA CHEST W WO CONTRAST    Result Date: 10/19/2022  EXAMINATION: CTA OF THE CHEST WITH AND WITHOUT CONTRAST 10/19/2022 11:11 am TECHNIQUE: CTA of the chest was performed before and after the administration of intravenous contrast.  Multiplanar reformatted images are provided for review. MIP images are provided for review. Automated exposure control, iterative reconstruction, and/or weight based adjustment of the mA/kV was utilized to reduce the radiation dose to as low as reasonably achievable. COMPARISON: March 15, 2018 HISTORY: ORDERING SYSTEM PROVIDED HISTORY: syncope fell to floor 2 days ago; ? pulmonary embolus; please also mention if trauma injury. Suspect PE as cause of fall or pneumonia TECHNOLOGIST PROVIDED HISTORY: Reason for exam:->syncope fell to floor 2 days ago; ? pulmonary embolus; please also mention if trauma injury. Suspect PE as cause of fall or pneumonia Decision Support Exception - unselect if not a suspected or confirmed emergency medical condition->Emergency Medical Condition (MA) What reading provider will be dictating this exam?->CRC FINDINGS: Aorta: At the level of the left main pulmonary artery the ascending aorta measures 3.8 cm in greatest transverse dimension and 34 cm. No acute abnormality of the aorta. No large filling defects are seen in the main, 1st and 2nd order vessels. Mediastinum: No evidence of mediastinal lymphadenopathy. The heart and pericardium demonstrate no acute abnormality. Coronary calcifications and are stents are seen in place. Lungs/Pleura: In the right lower lobe there is an area of consolidation. Also  a trace of pleural fluid is seen. There are multiple nodules visualized in the lungs. Most appear stable. In the anterior right upper lobe and opacity is seen that measures 5.8 x 11 by 12 mm the uterus. Most nodules measure 4 mm or less. In the right middle lobe laterally a 6 mm nodule is seen. This was seen on previous study.  Reticulonodular opacities are seen bilaterally and are most prominent in the lateral right upper lobe. Centrilobular emphysematous changes are seen as well as paraseptal emphysematous changes. Bronchial wall dilatation and ground-glass opacities are seen in the medial aspect of the right upper lobe. Upper Abdomen: Limited images of the upper abdomen are unremarkable. Soft Tissues/Bones: Degenerative changes are seen in the spine at multiple levels. No acute fractures are identified on this examination. 1.  No large filling defects are seen on this examination to suggest saddle embolus more in the larger primary and secondary vascular structures. 2. There is consolidation again seen in the left lower lobe. Bronchial wall thickening mild bronchiectasis and other changes of COPD are seen. An ill-defined opacity is seen in the anterior left upper lobe. Recommend follow-up to resolution to exclude any developing masses. There are multiple nodules visualized and overall are stable. CT CERVICAL SPINE WO CONTRAST    Result Date: 10/19/2022  EXAMINATION: CT OF THE CERVICAL SPINE WITHOUT CONTRAST 10/19/2022 11:11 am TECHNIQUE: CT of the cervical spine was performed without the administration of intravenous contrast. Multiplanar reformatted images are provided for review. Automated exposure control, iterative reconstruction, and/or weight based adjustment of the mA/kV was utilized to reduce the radiation dose to as low as reasonably achievable. COMPARISON: None. HISTORY: ORDERING SYSTEM PROVIDED HISTORY: fall from standing 2 days ago and again today TECHNOLOGIST PROVIDED HISTORY: Reason for exam:->fall from standing 2 days ago and again today Decision Support Exception - unselect if not a suspected or confirmed emergency medical condition->Emergency Medical Condition (MA) What reading provider will be dictating this exam?->CRC FINDINGS: The ring of C1 is intact as is the dense. There is no compression fracture of the cervical spine.   No jumped or perched facet is noted. Multilevel degenerative disc and degenerative joint disease is noted. Posterior degenerative endplate spurs are seen at the C3-4, C4-5 and C5-6 levels. There is effacement of the ventral thecal sac at the C3-4 through C5-6 levels due to the posterior degenerative endplate spurs. The prevertebral soft tissues are unremarkable. The airway is widely patent. Images through the lung apices are negative for a pneumothorax. 1. There is no acute compression fracture or subluxation of the cervical spine. 2. Multilevel degenerative disc and degenerative joint disease. CT THORACIC SPINE WO CONTRAST    Result Date: 10/19/2022  EXAMINATION: CT OF THE THORACIC SPINE WITHOUT CONTRAST; CT OF THE LUMBAR SPINE WITHOUT CONTRAST  10/19/2022 11:11 am: TECHNIQUE: CT of the thoracic spine was performed without the administration of intravenous contrast. Multiplanar reformatted images are provided for review. Automated exposure control, iterative reconstruction, and/or weight based adjustment of the mA/kV was utilized to reduce the radiation dose to as low as reasonably achievable.; CT of the lumbar spine was performed without the administration of intravenous contrast. Multiplanar reformatted images are provided for review. Adjustment of mA and/or kV according to patient size was utilized. Automated exposure control, iterative reconstruction, and/or weight based adjustment of the mA/kV was utilized to reduce the radiation dose to as low as reasonably achievable. COMPARISON: None. HISTORY: ORDERING SYSTEM PROVIDED HISTORY: Fall from standing; Trauma assessment TECHNOLOGIST PROVIDED HISTORY: Reason for exam:->Fall from standing; Trauma assessment What reading provider will be dictating this exam?->CRC FINDINGS: CT thoracic spine: There is no evidence of acute fracture. Vertebral alignment is anatomic. There are no acute compression deformities.   There is minimal concavity at the superior endplates of T1 through T3. There are changes of diffuse idiopathic skeletal hyperostosis within the thoracic spine. The paravertebral soft tissue structures are unremarkable. There is no gross evidence of central canal stenosis. There is suggestion of pleural thickening in the posterior right lower lobe with a pleural based opacity that may represent atelectasis. CT lumbar spine: There is no evidence of acute fracture. Vertebral alignment is anatomic. There are no compression deformities. The intervertebral disc space heights appear relatively preserved. There is facet hypertrophy throughout the lumbar spine. The paravertebral soft tissue structures are unremarkable. There is possible central canal stenosis at L3-4. There is multilevel neural foraminal narrowing. There is arteriosclerosis without abdominal aortic aneurysm. There is marked atrophy of the bilateral kidneys. There is exophytic lesion from the posterior right kidney which is more dense than simple cyst, measuring approximately 48 Hounsfield units. This may represent complicated cyst.  A solid mass cannot be entirely excluded. 1. No acute fracture or subluxation within the thoracic or lumbar spine. 2. Degenerative changes in the spine without definite central canal stenosis in the thoracic spine. There is possible central canal stenosis at L3-4. There is multilevel neural foraminal narrowing within the lumbar spine. 3. Marked atrophy of the bilateral kidneys with indeterminate exophytic lesion from the right kidney that may represent complicated cyst or solid mass. The patient has prior examinations which are not available for comparison. 4. There is a dependent pleural based opacity in the right lower lobe which may represent atelectasis. There are prior examinations which are not available for comparison.      CT LUMBAR SPINE WO CONTRAST    Result Date: 10/19/2022  EXAMINATION: CT OF THE THORACIC SPINE WITHOUT CONTRAST; CT OF THE LUMBAR SPINE WITHOUT CONTRAST 10/19/2022 11:11 am: TECHNIQUE: CT of the thoracic spine was performed without the administration of intravenous contrast. Multiplanar reformatted images are provided for review. Automated exposure control, iterative reconstruction, and/or weight based adjustment of the mA/kV was utilized to reduce the radiation dose to as low as reasonably achievable.; CT of the lumbar spine was performed without the administration of intravenous contrast. Multiplanar reformatted images are provided for review. Adjustment of mA and/or kV according to patient size was utilized. Automated exposure control, iterative reconstruction, and/or weight based adjustment of the mA/kV was utilized to reduce the radiation dose to as low as reasonably achievable. COMPARISON: None. HISTORY: ORDERING SYSTEM PROVIDED HISTORY: Fall from standing; Trauma assessment TECHNOLOGIST PROVIDED HISTORY: Reason for exam:->Fall from standing; Trauma assessment What reading provider will be dictating this exam?->CRC FINDINGS: CT thoracic spine: There is no evidence of acute fracture. Vertebral alignment is anatomic. There are no acute compression deformities. There is minimal concavity at the superior endplates of T1 through T3. There are changes of diffuse idiopathic skeletal hyperostosis within the thoracic spine. The paravertebral soft tissue structures are unremarkable. There is no gross evidence of central canal stenosis. There is suggestion of pleural thickening in the posterior right lower lobe with a pleural based opacity that may represent atelectasis. CT lumbar spine: There is no evidence of acute fracture. Vertebral alignment is anatomic. There are no compression deformities. The intervertebral disc space heights appear relatively preserved. There is facet hypertrophy throughout the lumbar spine. The paravertebral soft tissue structures are unremarkable. There is possible central canal stenosis at L3-4.   There is multilevel neural foraminal narrowing. There is arteriosclerosis without abdominal aortic aneurysm. There is marked atrophy of the bilateral kidneys. There is exophytic lesion from the posterior right kidney which is more dense than simple cyst, measuring approximately 48 Hounsfield units. This may represent complicated cyst.  A solid mass cannot be entirely excluded. 1. No acute fracture or subluxation within the thoracic or lumbar spine. 2. Degenerative changes in the spine without definite central canal stenosis in the thoracic spine. There is possible central canal stenosis at L3-4. There is multilevel neural foraminal narrowing within the lumbar spine. 3. Marked atrophy of the bilateral kidneys with indeterminate exophytic lesion from the right kidney that may represent complicated cyst or solid mass. The patient has prior examinations which are not available for comparison. 4. There is a dependent pleural based opacity in the right lower lobe which may represent atelectasis. There are prior examinations which are not available for comparison. CT ABDOMEN PELVIS W IV CONTRAST Additional Contrast? None    Result Date: 10/19/2022  EXAMINATION: CT OF THE ABDOMEN AND PELVIS WITH CONTRAST 10/19/2022 11:11 am TECHNIQUE: CT of the abdomen and pelvis was performed with the administration of intravenous contrast. Multiplanar reformatted images are provided for review. Automated exposure control, iterative reconstruction, and/or weight based adjustment of the mA/kV was utilized to reduce the radiation dose to as low as reasonably achievable. COMPARISON: None.  HISTORY: ORDERING SYSTEM PROVIDED HISTORY: fall from standing 2 days ago and again today; ? spleen rupture, ? kidney hematoma; ? retroparitoneal bleed, TRAUMA on coumadin, Hx of kidney transplant TECHNOLOGIST PROVIDED HISTORY: Reason for exam:->fall from standing 2 days ago and again today; ? spleen rupture, ? kidney hematoma; ? retroparitoneal bleed Reason for exam:->TRAUMA on coumadin Reason for exam:->Hx of kidney transplant Additional Contrast?->None Decision Support Exception - unselect if not a suspected or confirmed emergency medical condition->Emergency Medical Condition (MA) What reading provider will be dictating this exam?->CRC FINDINGS: Lower Chest: Heart size is within normal limits. There is focal bronchiectasis in the right lower lobe. Subpleural curvilinear opacities are partially imaged in the right lower lobe and lateral aspect of the right middle lobe. Please refer to dedicated CT chest report for additional details. Organs: The gallbladder is distended without obvious abnormality. The liver enhances homogeneously. The portal vein is patent and there is no intrahepatic biliary ductal dilatation. The spleen and adrenal glands are normal in size. There is a 1.4 cm low-density lesion in the inferior margin of the proximal body of the pancreas on axial image 46. Otherwise, the pancreas enhances homogeneously. No pancreatic ductal dilatation. There is severe bilateral renal cortical atrophy. A 1.2 cm intermediate density (potentially enhancing) lesion arises from the posterior aspect of the mid right kidney on axial image 57. There is a severely atrophic renal transplant in the right lower quadrant and a normally enhancing left lower quadrant renal transplant. GI/Bowel: No evidence of a bowel obstruction, free air or pneumatosis. Portions of the colon are decompressed, limiting assessment for mucosal based abnormalities. Stomach and duodenal sweep demonstrate no acute abnormality. The appendix is normal. Pelvis: The urinary bladder is distended without obvious abnormality. The prostate gland is mildly heterogeneous but nonenlarged. Visible but nonenlarged pelvic lymph nodes are present. Peritoneum/Retroperitoneum: The abdominal aorta is heavily calcified but nonaneurysmal.  No retroperitoneal lymphadenopathy or mass.  Bones/Soft Tissues: No acute osseous abnormality or evidence of an aggressive osseous lesion. There are moderate-to-severe degenerative changes of the lower lumbar spine mainly in the form of intervertebral disc space narrowing/facet arthropathy at L5-S1. There is no evidence of an acute posttraumatic abnormality in the abdomen or pelvis. Specifically, no evidence of splenic or renal injury. No retroperitoneal hematoma. Indeterminate intermediate density 1.2 cm lesion arising from the mid right kidney. Renal neoplasm not excluded. Correlation with renal ultrasound or multiphasic contrast-enhanced CT or MRI of the abdomen utilizing a renal mass protocol is recommended for further assessment. Indeterminate 1.4 cm low-density lesion arising from the inferior portion of the pancreatic body centrally. This has imaging features most suspicious for an IPMN. Correlation with contrast-enhanced abdominal MRI is also recommended (unless otherwise contraindicated). Renal transplants in the bilateral lower quadrants without evidence of posttraumatic findings to the allografts. Linear/nodular opacities in the right lower lobe and right middle lobe. Please refer to the dedicated CT chest report for additional details. Discharge Medications:         Medication List        START taking these medications      amLODIPine 10 MG tablet  Commonly known as: NORVASC  Take 1 tablet by mouth daily     sodium bicarbonate 650 MG tablet  Take 1 tablet by mouth 2 times daily            CHANGE how you take these medications      carvedilol 6.25 MG tablet  Commonly known as: COREG  What changed: Another medication with the same name was removed. Continue taking this medication, and follow the directions you see here.             CONTINUE taking these medications      cycloSPORINE modified 25 MG capsule  Commonly known as: NEORAL     Dexcom G6 Transmitter Misc  Change every 3 months     * FreeStyle Sona 14 Day Sensor Misc  Every 2 weeks     * FreeStyle Sona 14 Day Sensor Misc  Every 2 weeks Dx E11.65     mycophenolate 250 MG capsule  Commonly known as: CELLCEPT     predniSONE 5 MG tablet  Commonly known as: DELTASONE     simvastatin 10 MG tablet  Commonly known as: ZOCOR           * This list has 2 medication(s) that are the same as other medications prescribed for you. Read the directions carefully, and ask your doctor or other care provider to review them with you. STOP taking these medications      aspirin 81 MG EC tablet     furosemide 40 MG tablet  Commonly known as: LASIX     warfarin 5 MG tablet  Commonly known as: COUMADIN            ASK your doctor about these medications      cyclobenzaprine 10 MG tablet  Commonly known as: FLEXERIL     gabapentin 300 MG capsule  Commonly known as: NEURONTIN     nitroGLYCERIN 0.4 MG SL tablet  Commonly known as: NITROSTAT  up to max of 3 total doses. If no relief after 1 dose, call 911.      NovoLIN N FlexPen 100 UNIT/ML injection pen  Generic drug: insulin NPH  16 units at bedtime     NovoLIN R FlexPen 100 UNIT/ML Sopn  Generic drug: Insulin Regular Human  6 units am 8 units lunch and dinner     pantoprazole 40 MG tablet  Commonly known as: PROTONIX     tamsulosin 0.4 MG capsule  Commonly known as: FLOMAX  Take 1 capsule by mouth daily     Trulicity 1.5 VL/8.9RK SC injection  Generic drug: dulaglutide               Where to Get Your Medications        These medications were sent to [place] [id number]      Phone: [phone]   sodium bicarbonate 650 MG tablet       These medications were sent to [place] [address] [facility]      Phone: [phone]   amLODIPine 10 MG tablet         Disposition:   If discharged to Home, Any [place] [address] needs that were indicated and/or required as been addressed and set up by Social Work. Condition at discharge: good     Activity: activity as tolerated    Total time taken for discharging this patient: 40 minutes. Greater than 70% of time was spent focused exclusively on this patient. Time was taken to review chart, discuss plans with consultants, reconciling medications, discussing plan answering questions with patient.      Signed:  Sadaf Espinoza MD  10/28/2022, 4:00 PM  ----------------------------------------------------------------------------------------------------------------------    Alicia Blevins

## 2022-10-28 NOTE — PROGRESS NOTES
Nephrology Progress Note    Assessment:  CKD-4  Kidney transplant  Major GIB with clip colon  Hypertension  Hyperlipidmeia  DM type-2  ?  History AF not documented      Plan: stop anti coagulants  patient understands   will transfer to [facility]    Patient Active Problem List:     Pneumonia     Abdominal pain, other specified site     Acute pyelonephritis without lesion of renal medullary necrosis     Anemia     Essential hypertension     Nonspecific abnormal results of thyroid function study     Mixed hyperlipidemia     Kidney replaced by transplant     Intra-abdominal abscess post-procedure     Infection due to Enterococcus     Fever and other physiologic disturbances of temperature regulation     Chronic kidney disease (CKD)     Type II or unspecified type diabetes mellitus without mention of complication, uncontrolled     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     Anginal chest pain at rest [facility])     Atypical chest pain     Chronic cough     Unstable angina (HCC)     Chest pain     Atrial fibrillation with RVR (HCC)     Symptomatic anemia     Acute upper GI bleed     Dieulafoy lesion of colon     Poorly controlled diabetes mellitus (HCC)     Lung nodules     COPD without exacerbation (HCC)     Abnormal CT of the chest     Bronchiectasis without complication (HCC)      Subjective:  Admit Date: 10/19/2022    Interval History: overall doing well anxiety about blood thinners    Medications:  Scheduled Meds:   amLODIPine  10 mg Oral Daily    pantoprazole  40 mg Oral BID AC    simvastatin  20 mg Oral Nightly    sodium bicarbonate  650 mg Oral BID    amoxicillin-clavulanate  1 tablet Oral 2 times per day    epoetin megan-epbx  10,000 Units SubCUTAneous Q7 Days    insulin glargine  10 Units SubCUTAneous BID    insulin lispro  0-8 Units SubCUTAneous 4x Daily    sodium chloride flush  5-40 mL IntraVENous 2 times per day    insulin lispro  10 Units SubCUTAneous Once    sodium chloride flush 5-40 mL IntraVENous 2 times per day    mycophenolate  750 mg Oral BID    sodium chloride flush  5-40 mL IntraVENous 2 times per day    cycloSPORINE modified  75 mg Oral BID    [Held by provider] insulin regular  6 Units SubCUTAneous Daily with breakfast    [Held by provider] insulin regular  8 Units SubCUTAneous BID WC    gabapentin  300 mg Oral BID    predniSONE  5 mg Oral Daily    tamsulosin  0.4 mg Oral Daily    carvedilol  6.25 mg Oral BID     Continuous Infusions:   sodium chloride      sodium chloride      sodium chloride      sodium chloride 100 mL/hr at 10/24/22 1400    sodium chloride      sodium chloride      sodium chloride      dextrose         CBC:   Recent Labs     10/27/22  0517 10/28/22  0511   WBC 6.8 7.1   HGB 8.1* 8.3*    184     CMP:    Recent Labs     10/26/22  0514 10/27/22  0517 10/28/22  0511    143 142   K 4.0 4.0 4.2   * 115* 112*   CO2 15* 16* 14*   BUN 38* 37* 37*   CREATININE 2.10* 2.43* 2.03*   GLUCOSE 139* 185* 150*   CALCIUM 8.7 8.2* 8.3*   LABGLOM 33.1* 27.8* 34.4*     Troponin:   Recent Labs     10/27/22  1019   TROPONINI 0.040*     BNP: No results for input(s): BNP in the last 72 hours. INR: No results for input(s): INR in the last 72 hours. Lipids: No results for input(s): CHOL, LDLDIRECT, TRIG, HDL, AMYLASE, LIPASE in the last 72 hours. Liver:   Recent Labs     10/28/22  0511   AST 10   ALT 7   ALKPHOS 54   PROT 5.5*   LABALBU 3.2*   BILITOT 0.7     Iron:  No results for input(s): IRONS, FERRITIN in the last 72 hours. Invalid input(s): LABIRONS  Urinalysis: No results for input(s): UA in the last 72 hours.     Objective:  Vitals: BP (!) 147/55   Pulse 63   Temp 97.9 °F (36.6 °C) (Oral)   Resp 18   Ht 6' 2\" (1.88 m)   Wt 185 lb (83.9 kg)   SpO2 99%   BMI 23.75 kg/m²    Wt Readings from Last 3 Encounters:   10/24/22 185 lb (83.9 kg)   10/14/22 195 lb 6.4 oz (88.6 kg)   10/12/22 192 lb (87.1 kg)      24HR INTAKE/OUTPUT:  No intake or output data in the 24 hours ending 10/28/22 0848    General: alert, in no apparent distress  HEENT: normocephalic, atraumatic, anicteric  Neck: supple, no mass  Lungs: non-labored respirations, clear to auscultation bilaterally  Heart: regular rate and rhythm, no murmurs or rubs  Abdomen: soft, non-tender, non-distended  Ext: no cyanosis, no peripheral edema  Neuro: alert and oriented, no gross abnormalities  Psych: normal mood and affect  Skin: no rash      Electronically signed by Remberto Luevano DO, MD

## 2022-10-28 NOTE — CARE COORDINATION
WE HAVE [employer] APPROVAL FOR THE PT TO TRANSFER TO [facility] TODAY. PT'S WIFE WILL DRIVE HIM THERE.

## 2022-10-28 NOTE — PROGRESS NOTES
Obtained [employer] approval for transfer to [facility]. Approval # [id number]. Approved 10/28 to 11/4/22. NRD 11/3/22. [address] [facility] [address] aware.

## 2022-10-28 NOTE — PROGRESS NOTES
Gastroenterology Progress Note    Marii Lunsford is a 80 y.o. male patient. Hospitalization Day:9    Chief C/O: GIB    SUBJECTIVE: Seen and examined, overnight events noted, hemoglobin 8.3, no overt bleeding, tolerating regular diet. ROS:  Gastrointestinal ROS: no abdominal pain, change in bowel habits, or black or bloody stools    Physical    VITALS:  BP (!) 147/55   Pulse 63   Temp 97.9 °F (36.6 °C) (Oral)   Resp 18   Ht 6' 2\" (1.88 m)   Wt 185 lb (83.9 kg)   SpO2 99%   BMI 23.75 kg/m²   TEMPERATURE:  Current - Temp: 97.9 °F (36.6 °C); Max - Temp  Av.3 °F (36.8 °C)  Min: 97.9 °F (36.6 °C)  Max: 98.8 °F (37.1 °C)    General:  Alert and oriented,  No apparent distress  Skin- without jaundice  Eyes: anicteric sclera  Cardiac: RRR, Nl s1s2, without murmurs  Lungs CTA Bilaterally, normal effort  Abdomen soft, ND, NT, no HSM, Bowel sounds normal  Ext: without edema  Neuro: no asterixis     Data    Data Review:    Recent Labs     10/25/22  2306 10/27/22  0517 10/28/22  0511   WBC 7.2 6.8 7.1   HGB 8.1* 8.1* 8.3*   HCT 24.9* 24.3* 25.7*   MCV 87.8 88.5 90.0    171 184     Recent Labs     10/26/22  0514 10/26/22  1051 10/27/22  0517 10/28/22  05     --  143 142   K 4.0  --  4.0 4.2   *  --  115* 112*   CO2 15*  --  16* 14*   PHOS  --  2.7  --   --    BUN 38*  --  37* 37*   CREATININE 2.10*  --  2.43* 2.03*     Recent Labs     10/26/22  0514 10/27/22  0517 10/28/22  0511   AST 9 8 10   ALT 6 7 7   BILITOT 1.0* 0.7 0.7   ALKPHOS 50 50 54     No results for input(s): LIPASE, AMYLASE in the last 72 hours. No results for input(s): PROTIME, INR in the last 72 hours. Endoscopic hx:  EGD 10/21/22   The examined esophagus was normal.         -  The Z-line was regular and was found 42 cm from the incisors. -  The entire examined stomach was normal.         -  The examined duodenum was normal  Colonoscopy Dr Daysi Reyes 10/24/22  A pulsating intermittent bleeding noted.  A single small Dieulafoy Vs AVM with bleeding was found in the ascending colon. Area was successfully injected with 4 mL of a 0.1 mg/mL solution of epinephrine for hemostasis. To stop active bleeding, two hemostatic clips were successfully placed. There was no bleeding at the end of the procedure. ASSESSMENT:  67-year-old male admitted with anemia and melena in the context of supratherapeutic INR while on Coumadin, on arrival INR was 3.1 patient had been experiencing intermittent melena over the course of the month hemoglobin was 6.3. Had normal EGD. CT the abdomen was notable for 1.4 cm pancreatic body lesion possibly IPMN will likely need an outpatient EUS with Dr. Papito Coy for further evaluation. Has been tolerating regular diet INR now 1.5.   10/23/22 no further melena, hemoglobin 6.7,  INR 1.3, Coumadin is on hold, colonoscopy tomorrow. Planned for 1 unit PRBC today  10/24/22 reports melena with colonoscopy prep. Hemoglobin 7.7, INR 1.3, Coumadin is on hold  10/25/22 Hgb 6.5 no overt bleeding overnight, tolerated clear liquids, S/P colonoscopy which noted A pulsating intermittent bleeding noted. A single small Dieulafoy Vs AVM with bleeding was found in the ascending colon. Area was successfully injected with 4 mL of a 0.1 mg/mL solution of epinephrine for hemostasis. To stop active bleeding, two hemostatic clips were successfully placed. There was no bleeding at the end of the procedure. 10/26/22 hgb 8.1 no overt bleeding, tolerated full liquid diet. No abdominal pain. No BM  10/27/22 hgb 8.3 tolerating diet, no overt bleeding      PLAN :  -Continue course of care  -Continue serial H&H, trend and transfuse accordingly  -advance to soft diet  Clearance prior to Warfarin: At this time, there is no contraindications of starting / resuming anticoagulations.  He is a high risk for re-bleeding and anticoagulation should be optimized  Given the comorbid conditions- A fib and stroke risk stratification, baseline risk of thrombosis seems elevated . Patient has also higher risk of GI bleeding based on clinical presentation and endoscopic findings. Patient is currently asymptomatic with no overt bleeding noted, Hg 8.1. Discussed at  length with patient the natural history of GIB as well as risk of recurrent bleeding. Mentioned that the risk of bleeding is highest during the initial period of anticoagulation. Also discussed the benefit / stroke reduction risks  with anticoagulants. If decided regarding anticoagulants,  We should avoid routine use of NSAIDs for pain  ( ibuprofen. ..),adding DOAC  may also convery higher risk of bleeding    -GI will s/o please call if needed    Thank you for allowing me to participate in the care of your patient. Please feel free to contact me with any concerns.     Judy Saucedo, HAJA - CNP

## 2022-10-29 PROBLEM — K92.2 GI BLEEDING: Status: ACTIVE | Noted: 2022-10-29

## 2022-10-29 LAB
ALBUMIN SERPL-MCNC: 3.5 G/DL (ref 3.5–4.6)
ALP BLD-CCNC: 60 U/L (ref 35–104)
ALT SERPL-CCNC: 7 U/L (ref 0–41)
ANION GAP SERPL CALCULATED.3IONS-SCNC: 13 MEQ/L (ref 9–15)
AST SERPL-CCNC: 7 U/L (ref 0–40)
BASOPHILS ABSOLUTE: 0 K/UL (ref 0–0.1)
BASOPHILS RELATIVE PERCENT: 0.6 % (ref 0.2–1.2)
BILIRUB SERPL-MCNC: 0.6 MG/DL (ref 0.2–0.7)
BUN BLDV-MCNC: 36 MG/DL (ref 8–23)
CALCIUM SERPL-MCNC: 8.7 MG/DL (ref 8.5–9.9)
CHLORIDE BLD-SCNC: 111 MEQ/L (ref 95–107)
CO2: 19 MEQ/L (ref 20–31)
CREAT SERPL-MCNC: 2.14 MG/DL (ref 0.7–1.2)
EOSINOPHILS ABSOLUTE: 0.3 K/UL (ref 0–0.5)
EOSINOPHILS RELATIVE PERCENT: 4.6 % (ref 0.8–7)
GFR SERPL CREATININE-BSD FRML MDRD: 32.3 ML/MIN/{1.73_M2}
GLOBULIN: 2.2 G/DL (ref 2.3–3.5)
GLUCOSE BLD-MCNC: 153 MG/DL (ref 70–99)
GLUCOSE BLD-MCNC: 163 MG/DL (ref 70–99)
GLUCOSE BLD-MCNC: 184 MG/DL (ref 70–99)
GLUCOSE BLD-MCNC: 202 MG/DL (ref 70–99)
GLUCOSE BLD-MCNC: 207 MG/DL (ref 70–99)
HCT VFR BLD CALC: 26.5 % (ref 42–52)
HEMOGLOBIN: 8.5 G/DL (ref 13.7–17.5)
IMMATURE GRANULOCYTES #: 0 K/UL
IMMATURE GRANULOCYTES %: 0.4 %
LYMPHOCYTES ABSOLUTE: 0.8 K/UL (ref 1.3–3.6)
LYMPHOCYTES RELATIVE PERCENT: 10.9 %
MAGNESIUM: 1.5 MG/DL (ref 1.7–2.4)
MCH RBC QN AUTO: 29 PG (ref 25.7–32.2)
MCHC RBC AUTO-ENTMCNC: 32.1 % (ref 32.3–36.5)
MCV RBC AUTO: 90.4 FL (ref 79–92.2)
MONOCYTES ABSOLUTE: 0.5 K/UL (ref 0.3–0.8)
MONOCYTES RELATIVE PERCENT: 7.5 % (ref 5.3–12.2)
NEUTROPHILS ABSOLUTE: 5.5 K/UL (ref 1.8–5.4)
NEUTROPHILS RELATIVE PERCENT: 76 % (ref 34–67.9)
PDW BLD-RTO: 16.9 % (ref 11.6–14.4)
PERFORMED ON: ABNORMAL
PHOSPHORUS: 3.2 MG/DL (ref 2.3–4.8)
PLATELET # BLD: 182 K/UL (ref 163–337)
POTASSIUM SERPL-SCNC: 4.5 MEQ/L (ref 3.4–4.9)
RBC # BLD: 2.93 M/UL (ref 4.63–6.08)
SODIUM BLD-SCNC: 143 MEQ/L (ref 135–144)
TOTAL PROTEIN: 5.7 G/DL (ref 6.3–8)
WBC # BLD: 7.2 K/UL (ref 4.2–9)

## 2022-10-29 PROCEDURE — 83735 ASSAY OF MAGNESIUM: CPT

## 2022-10-29 PROCEDURE — 84100 ASSAY OF PHOSPHORUS: CPT

## 2022-10-29 PROCEDURE — 80053 COMPREHEN METABOLIC PANEL: CPT

## 2022-10-29 PROCEDURE — 6370000000 HC RX 637 (ALT 250 FOR IP): Performed by: INTERNAL MEDICINE

## 2022-10-29 PROCEDURE — 97162 PT EVAL MOD COMPLEX 30 MIN: CPT

## 2022-10-29 PROCEDURE — 85025 COMPLETE CBC W/AUTO DIFF WBC: CPT

## 2022-10-29 PROCEDURE — 1200000002 HC SEMI PRIVATE SWING BED

## 2022-10-29 PROCEDURE — 36415 COLL VENOUS BLD VENIPUNCTURE: CPT

## 2022-10-29 PROCEDURE — 6360000002 HC RX W HCPCS: Performed by: INTERNAL MEDICINE

## 2022-10-29 PROCEDURE — 97116 GAIT TRAINING THERAPY: CPT

## 2022-10-29 PROCEDURE — 97110 THERAPEUTIC EXERCISES: CPT

## 2022-10-29 PROCEDURE — 97530 THERAPEUTIC ACTIVITIES: CPT

## 2022-10-29 RX ADMIN — CARVEDILOL 6.25 MG: 6.25 TABLET, FILM COATED ORAL at 20:44

## 2022-10-29 RX ADMIN — INSULIN GLARGINE 10 UNITS: 100 INJECTION, SOLUTION SUBCUTANEOUS at 08:00

## 2022-10-29 RX ADMIN — GABAPENTIN 600 MG: 300 CAPSULE ORAL at 20:43

## 2022-10-29 RX ADMIN — INSULIN LISPRO 2 UNITS: 100 INJECTION, SOLUTION INTRAVENOUS; SUBCUTANEOUS at 11:49

## 2022-10-29 RX ADMIN — INSULIN LISPRO 2 UNITS: 100 INJECTION, SOLUTION INTRAVENOUS; SUBCUTANEOUS at 20:49

## 2022-10-29 RX ADMIN — AMLODIPINE BESYLATE 10 MG: 10 TABLET ORAL at 07:53

## 2022-10-29 RX ADMIN — GABAPENTIN 300 MG: 300 CAPSULE ORAL at 07:52

## 2022-10-29 RX ADMIN — INSULIN HUMAN 8 UNITS: 100 INJECTION, SOLUTION PARENTERAL at 11:50

## 2022-10-29 RX ADMIN — CARVEDILOL 6.25 MG: 6.25 TABLET, FILM COATED ORAL at 07:52

## 2022-10-29 RX ADMIN — INSULIN HUMAN 8 UNITS: 100 INJECTION, SOLUTION PARENTERAL at 17:16

## 2022-10-29 RX ADMIN — MYCOPHENOLATE MOFETIL 750 MG: 250 CAPSULE ORAL at 20:44

## 2022-10-29 RX ADMIN — Medication 20 MG: at 20:47

## 2022-10-29 RX ADMIN — PREDNISONE 5 MG: 5 TABLET ORAL at 07:53

## 2022-10-29 RX ADMIN — PANTOPRAZOLE SODIUM 40 MG: 40 TABLET, DELAYED RELEASE ORAL at 16:06

## 2022-10-29 RX ADMIN — MYCOPHENOLATE MOFETIL 750 MG: 250 CAPSULE ORAL at 07:52

## 2022-10-29 RX ADMIN — SODIUM BICARBONATE TAB 650 MG 650 MG: 650 TAB at 07:52

## 2022-10-29 RX ADMIN — PANTOPRAZOLE SODIUM 40 MG: 40 TABLET, DELAYED RELEASE ORAL at 05:48

## 2022-10-29 RX ADMIN — CYCLOSPORINE 75 MG: 25 CAPSULE, LIQUID FILLED ORAL at 07:55

## 2022-10-29 RX ADMIN — CYCLOSPORINE 75 MG: 25 CAPSULE, LIQUID FILLED ORAL at 20:45

## 2022-10-29 RX ADMIN — AMOXICILLIN AND CLAVULANATE POTASSIUM 1 TABLET: 875; 125 TABLET, FILM COATED ORAL at 07:52

## 2022-10-29 RX ADMIN — TAMSULOSIN HYDROCHLORIDE 0.4 MG: 0.4 CAPSULE ORAL at 07:53

## 2022-10-29 RX ADMIN — INSULIN GLARGINE 10 UNITS: 100 INJECTION, SOLUTION SUBCUTANEOUS at 20:49

## 2022-10-29 RX ADMIN — INSULIN HUMAN 6 UNITS: 100 INJECTION, SOLUTION PARENTERAL at 08:00

## 2022-10-29 RX ADMIN — AMOXICILLIN AND CLAVULANATE POTASSIUM 1 TABLET: 875; 125 TABLET, FILM COATED ORAL at 20:44

## 2022-10-29 RX ADMIN — SODIUM BICARBONATE TAB 650 MG 650 MG: 650 TAB at 20:44

## 2022-10-29 NOTE — PROGRESS NOTES
Patient was up with therapy and is now dangling on side of bed, states he feels slight sob from walking. Pt denies any pain, meds given as ordered. Family coming to visit this morning per patient. Call light in reach.

## 2022-10-29 NOTE — PLAN OF CARE
Problem: Discharge Planning  Goal: Discharge to home or other facility with appropriate resources  Outcome: Progressing  Flowsheets  Taken 10/28/2022 2155 by Esmer Hendricks RN  Discharge to home or other facility with appropriate resources: Identify barriers to discharge with patient and caregiver  Taken 10/28/2022 1719 by Kolton Pérez RN  Discharge to home or other facility with appropriate resources:   Identify barriers to discharge with patient and caregiver   Identify discharge learning needs (meds, wound care, etc)   Refer to discharge planning if patient needs post-hospital services based on physician order or complex needs related to functional status, cognitive ability or social support system     Problem: Safety - Adult  Goal: Free from fall injury  Outcome: Progressing     Problem: ABCDS Injury Assessment  Goal: Absence of physical injury  Outcome: Progressing  Flowsheets (Taken 10/28/2022 1657 by Kolton Pérez RN)  Absence of Physical Injury: Implement safety measures based on patient assessment     Problem: Chronic Conditions and Co-morbidities  Goal: Patient's chronic conditions and co-morbidity symptoms are monitored and maintained or improved  Outcome: Progressing  Flowsheets (Taken 10/28/2022 2155)  Care Plan - Patient's Chronic Conditions and Co-Morbidity Symptoms are Monitored and Maintained or Improved: Monitor and assess patient's chronic conditions and comorbid symptoms for stability, deterioration, or improvement

## 2022-10-29 NOTE — H&P
Hospital Medicine History & Physical      PCP: Tonia Grace DO    Date of Admission: 10/28/2022    Date of Service: 10/29/22      Chief Complaint:  weakness/bleeding, syncope      History Of Present Illness:  80 y.o. male who presented to [facility] after acute admission to [facility] with above complains. 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 The ulcer was clipped; he was evaluated by GI and cardiology who will make a decision on when to resume anticoagulation as an outpatient in 3 weeks. at this point no ASA/coumadin. Denied fever, dyspnea, CP           Past Medical History:          Diagnosis Date    Diabetes mellitus ([facility]     Hemodialysis access, fistula mature ([facility] 12/2002    Hypertension     Seizures ([facility] [address]     no seizure since 1995       Past Surgical History:          Procedure Laterality Date    BRAIN SURGERY Left 12/06/1990    to eliminate seizures    COLONOSCOPY N/A 10/24/2022    COLONOSCOPY DIAGNOSTIC performed by Curtis Laguerre MD at [facility]  2015    [address] INCL FLUOR GDNCE DX W/CELL [address] [facility] N/A 3/20/2018    BRONCHOSCOPY performed by Peyton Markham MD at [address] 10/21/2022    EGD DIAGNOSTIC ONLY performed by Liseth Rodgers MD at [facility]       Medications Prior to Admission:      Prior to Admission medications    Medication Sig Start Date End Date Taking? Authorizing Provider   gabapentin (NEURONTIN) 600 MG tablet Take 600 mg by mouth nightly. Yes Historical Provider, MD   gabapentin (NEURONTIN) 300 MG capsule Take 300 mg by mouth daily.    Yes Historical Provider, MD   amLODIPine (NORVASC) 10 MG tablet Take 1 tablet by mouth daily 10/27/22   Naveen Oconnor MD   sodium bicarbonate 650 MG tablet Take 1 tablet by mouth 2 times daily 10/26/22   Yudi Sanchez MD   carvedilol (COREG) 6.25 MG tablet Take 6.25 mg by mouth 2 times daily (with meals)    Historical Provider, MD   predniSONE (DELTASONE) 5 MG tablet Take 5 mg by mouth daily    Historical Provider, MD   Dulaglutide (TRULICITY) 1.5 RI/1.5LQ SOPN Inject 1.5 mg into the skin once a week Weekly on Monday    Historical Provider, MD   tamsulosin (FLOMAX) 0.4 MG capsule Take 1 capsule by mouth daily  Patient taking differently: Take 0.4 mg by mouth 2 times daily 12/22/21   Ambar Voss MD   Continuous Blood Gluc Transmit (DEXCOM G6 TRANSMITTER) MISC Change every 3 months  Patient not taking: Reported on 10/19/2022 5/29/20   Tristan Rangel MD   Continuous Blood Gluc Sensor (FREESTYLE KEV 14 DAY SENSOR) MISC Every 2 weeks Dx E11.65  Patient not taking: Reported on 10/19/2022 5/20/20   Tristan Rangel MD   insulin NPH (NOVOLIN N FLEXPEN) 100 UNIT/ML injection pen 16 units at bedtime  Patient taking differently: 9 units in AM, 5 units at bedtime 5/18/20   Tristan Rangel MD   Insulin Regular Human (NOVOLIN R FLEXPEN) 100 UNIT/ML SOPN 6 units am 8 units lunch and dinner  Patient taking differently: Sliding Scale 5/18/20   Tristan Rangel MD   Continuous Blood Gluc Sensor (FREESTYLE KEV 14 DAY SENSOR) MISC Every 2 weeks 5/18/20   Tristan Rangel MD   pantoprazole (PROTONIX) 40 MG tablet Take 40 mg by mouth daily  11/16/19   Historical Provider, MD   cyclobenzaprine (FLEXERIL) 10 MG tablet Take 10 mg by mouth daily as needed Indications: as needed. 4/5/16   Historical Provider, MD   cycloSPORINE modified (NEORAL) 25 MG capsule Take 75 mg by mouth 2 times daily  5/24/16   Historical Provider, MD   mycophenolate (CELLCEPT) 250 MG capsule Take 750 mg by mouth 2 times daily  5/24/16   Historical Provider, MD   simvastatin (ZOCOR) 10 MG tablet Take 20 mg by mouth nightly 5/24/16   Historical Provider, MD       Allergies:  Patient has no known allergies. Social History:      The patient currently lives home    TOBACCO:   reports that he quit smoking about 7 years ago.  His smoking use included cigarettes. He smoked an average of .25 packs per day. He has quit using smokeless tobacco.  ETOH:   reports no history of alcohol use. Family History:       Reviewed in detail and negative for DM, CAD, Cancer, CVA. Positive as follows:        Problem Relation Age of Onset    Colon Cancer Neg Hx        REVIEW OF SYSTEMS:   Pertinent positives as noted in the HPI. All other systems reviewed and negative. PHYSICAL EXAM:    BP (!) 154/72   Pulse 65   Temp 98.6 °F (37 °C) (Oral)   Resp 16   Ht 6' 2\" (1.88 m)   Wt 185 lb (83.9 kg)   SpO2 97%   BMI 23.75 kg/m²     General appearance:  No apparent distress, appears stated age and cooperative. HEENT:  Normal cephalic, atraumatic without obvious deformity. Pupils equal, round, and reactive to light. Extra ocular muscles intact. Conjunctivae/corneas clear. Neck: Supple, with full range of motion. No jugular venous distention. Trachea midline. Respiratory:  Normal respiratory effort. Clear to auscultation, bilaterally without Rales/Wheezes/Rhonchi. Cardiovascular:  Regular rate and rhythm with normal S1/S2 without murmurs, rubs or gallops. Abdomen: Soft, non-tender, non-distended with normal bowel sounds. Musculoskeletal:  No clubbing, cyanosis or edema bilaterally. Full range of motion without deformity. Skin: Skin color, texture, turgor normal.  No rashes or lesions. Neurologic:  Neurovascularly intact without any focal sensory/motor deficits.  Cranial nerves: II-XII intact, grossly non-focal.  Psychiatric:  Alert and oriented, thought content appropriate, normal insight  Capillary Refill: Brisk,< 3 seconds   Peripheral Pulses: +2 palpable, equal bilaterally       Labs:     Recent Labs     10/27/22  0517 10/28/22  0511 10/29/22  0520   WBC 6.8 7.1 7.2   HGB 8.1* 8.3* 8.5*   HCT 24.3* 25.7* 26.5*    184 182     Recent Labs     10/26/22  1051 10/27/22  0517 10/28/22  0511 10/29/22  0520   NA  --  143 142 143   K  --  4.0 4.2 4.5   CL  --  115* 112* 111*   CO2  --  16* 14* 19*   BUN  --  37* 37* 36*   CREATININE  --  2.43* 2.03* 2.14*   CALCIUM  --  8.2* 8.3* 8.7   PHOS 2.7  --   --   --      Recent Labs     10/27/22  0517 10/28/22  0511 10/29/22  0520   AST 8 10 7   ALT 7 7 7   BILITOT 0.7 0.7 0.6   ALKPHOS 50 54 60     No results for input(s): INR in the last 72 hours. Recent Labs     10/26/22  1659 10/26/22  2248 10/27/22  1019   TROPONINI 0.033* 0.028* 0.040*       Urinalysis:      Lab Results   Component Value Date/Time    NITRU Negative 10/19/2022 01:44 PM    WBCUA 10-20 10/19/2022 01:44 PM    BACTERIA FEW 10/19/2022 01:44 PM    RBCUA 6-10 10/19/2022 01:44 PM    BLOODU SMALL 10/19/2022 01:44 PM    SPECGRAV 1.012 10/19/2022 01:44 PM    GLUCOSEU Negative 10/19/2022 01:44 PM    GLUCOSEU GT 1 09/14/2011 08:57 AM       Radiology:     CXR: I have reviewed the CXR with the following interpretation:   EKG:  I have reviewed the EKG with the following interpretation:     No orders to display       ASSESSMENT:    Active Hospital Problems    Diagnosis Date Noted    GI bleeding [K92.2] 10/29/2022     Priority: Medium       PLAN:        DVT Prophylaxis:   Diet: ADULT DIET; Regular; 4 carb choices (60 gm/meal)  Code Status: Full Code    PT/OT Eval Status:     Dispo - A fib- rate controlled, off anticoagulation due to GI bleeding  Dieulafoy ulcer- pot EGD/clipping  History of renal transplant- on anti rejection medications  DM- continue with current Tx, follow up with ACCU check   Medically stable for sub acute admission at Shauna Elder MD    [facility] be [facility] in this patient's care. If you have any questions or concerns please feel free to contact me.

## 2022-10-29 NOTE — PROGRESS NOTES
Facility/Department: [facility] MED SURG UNIT  Physical Therapy Initial Assessment    Name: Lauro Abad  : 1950  MRN: [id number]  Date of Service: 10/29/2022    Discharge Recommendations:  Continue to assess pending progress, Outpatient PT          Patient Diagnosis(es): There were no encounter diagnoses. Past Medical History:  has a past medical history of Diabetes mellitus ([facility] [address] Hemodialysis access, fistula mature ([facility] [address] Hypertension, and Seizures ([facility] [address] Past Surgical History:  has a past surgical history that includes Kidney transplant (); pr [facility] incl fluor gdnce dx w/cell washg spx (N/A, 3/20/2018); brain surgery (Left, 1990); Upper gastrointestinal endoscopy (N/A, 10/21/2022); and Colonoscopy (N/A, 10/24/2022). Assessment   Body Structures, Functions, Activity Limitations Requiring Skilled Therapeutic Intervention: Decreased functional mobility ; Decreased strength;Decreased endurance;Decreased balance; Increased pain;Decreased sensation  Treatment Diagnosis: difficulty walking - gait instabilty post GI hemorrhage  Decision Making: Medium Complexity  Activity Tolerance  Activity Tolerance: Patient tolerated treatment well;Patient limited by endurance     Plan   Physcial Therapy Plan  General Plan: 5-7 times per week  Current Treatment Recommendations: Strengthening, Balance training, ROM, Functional mobility training, Transfer training, Endurance training, Gait training, Stair training, Pain management, Home exercise program, Safety education & training, Patient/Caregiver education & training, Equipment evaluation, education, & procurement, Positioning, Therapeutic activities, Neuromuscular re-education     Restrictions  Restrictions/Precautions  Restrictions/Precautions: Fall Risk     Subjective   General  Chart Reviewed: Yes  Patient assessed for rehabilitation services?: Yes  Family / Caregiver Present: No  Referring Practitioner: Dr Theo Ennis  Referral Date : 10/28/22  Diagnosis: weakness with GI hemorrhage  Follows Commands: Within Functional Limits  General Comment  Comments: pt reports kidney transplant and diabetic with B feet neuropathy  Subjective  Subjective: \"I am willing to work with therapy. \" \"i need to have my shoes on to walk. \"         Social/Functional History  Social/Functional History  Type of Home: House  Home Layout: Laundry in basement, Two level, Able to Live on Main level with bedroom/bathroom  Home Access: Stairs to enter with rails  Entrance Stairs - Number of Steps: 4  Entrance Stairs - Rails: Left  Bathroom Shower/Tub: Tub/Shower unit  Bathroom Equipment: Grab bars in shower, Shower chair  Home Equipment: [place] [employer], quad, [place], rolling  Has the patient had two or more falls in the past year or any fall with injury in the past year?: Yes (falls with blood thinner,  passing out- fell Monday and Wednesday back to hospital)  ADL Assistance: [address] [place] [address] Avenue: Independent  Homemaking Responsibilities: No  Ambulation Assistance: Independent (no AD)  Transfer Assistance: Independent  Mode of Transportation: Car  Type of Occupation:  20-25 hours a week  Leisure & Hobbies: building, outdoor tasks  Additional Comments: able to [employer] and household distances- [employer]- per pt report  Vision/Hearing       Cognition WFL        Objective   Observation/Palpation  Posture: Fair  Observation: talkative  Edema: none noted LE        AROM RLE (degrees)  RLE AROM: WFL  AROM LLE (degrees)  LLE AROM : WFL  AROM LUE (degrees)  LUE AROM : WFL  Strength RLE  Comment: supine hip, knee and ankle >=4+/5  Strength LLE  Comment: supine hip, knee and ankle >=4+/5  Strength RUE  Strength RUE: WFL  Strength LUE  Strength LUE: WFL           Bed mobility  Supine to Sit: Independent  Sit to Supine: Independent  Scooting: Independent  Transfers  Sit to Stand: Stand by assistance  Stand to Sit: Stand by assistance  Bed to Chair: Stand by assistance  Comment: Unsteady with neuropathy, tends to scissor if turns head with transitions and walking  Ambulation  Surface: Level tile  Device: No Device  Assistance: Contact guard assistance;Minimal assistance  Quality of Gait: Unsteady with neuropathy, tends to scissor if turns head with transitions and walking, good pace, self corrects x 2 with Min A to correct and uses wall rail 2 times to correct, mild short of breath post standing LE OPHELIA  Distance: 70ft x 2, 50ft x 2, pulse ox post 99% and 97%, mild to mod short of breath   Comments: no AD - may need to try cane if unsteady gait persists, pt owns cane but preers not to use, complicated by neuropathy -  per pt fi\"finally got right amount of Neurontin last night at [employer]. \"  Stairs/Curb  Stairs?: Yes  Stairs  # Steps : 10  Stairs Height:  (four 6 inch and six 4 inch stairs)  Rails: Bilateral  Assistance: Contact guard assistance  Comment: difficutly fitting size 13 shoes, one rail at home back, 2 rails at home front     Balance  Sitting - Static: Good  Sitting - Dynamic: Good  Standing - Static: Fair;+  Standing - Dynamic: Fair (mild unsteadiness, self corrects majority of time)       Standing OPHELIA heel toe lifts B hands on rail x 10, alt hip abd x 10 each leg with short of breath, 3 minutes to recover      Goals  Short Term Goals  Time Frame for Short Term Goals: 2-3 days  Short Term Goal 1: transfers steadier with <= CS  Short Term Goal 2: amb >= 100ft with pusle ox post >= 95% less scissoring , least AD, <=SBA  Short Term Goal 3: 10 stairs one rail <= SBA  Short Term Goal 4: tolerate x 10 standing OPHELIA for strength and balance  Long Term Goals  Time Frame for Long Term Goals : 5-7 days  Long Term Goal 1: Transfers and bed mobiltiy modified independent  Long Term Goal 2: amb >= 150ft with least AD modidifed indpendnet , steadier  Long Term Goal 3: one flight of stairs on rail, <= supervision  Long Term Goal 4:  Increase LE strength, balance and endurance any amount to achieve functional goalas  Patient Goals   Patient Goals : \"I want to be steadier and be stronger to go home. \"       Education   Plan , goals       Therapy Time   Individual Concurrent Group Co-treatment   Time In  664         Time Out  755         Minutes  Ashwini [id number], [id number]

## 2022-10-30 LAB
GLUCOSE BLD-MCNC: 126 MG/DL (ref 70–99)
GLUCOSE BLD-MCNC: 126 MG/DL (ref 70–99)
GLUCOSE BLD-MCNC: 177 MG/DL (ref 70–99)
PERFORMED ON: ABNORMAL

## 2022-10-30 PROCEDURE — 6370000000 HC RX 637 (ALT 250 FOR IP): Performed by: INTERNAL MEDICINE

## 2022-10-30 PROCEDURE — 97116 GAIT TRAINING THERAPY: CPT

## 2022-10-30 PROCEDURE — 97110 THERAPEUTIC EXERCISES: CPT

## 2022-10-30 PROCEDURE — 97530 THERAPEUTIC ACTIVITIES: CPT

## 2022-10-30 PROCEDURE — 97166 OT EVAL MOD COMPLEX 45 MIN: CPT

## 2022-10-30 PROCEDURE — 1200000002 HC SEMI PRIVATE SWING BED

## 2022-10-30 PROCEDURE — 6360000002 HC RX W HCPCS: Performed by: INTERNAL MEDICINE

## 2022-10-30 RX ADMIN — GABAPENTIN 600 MG: 300 CAPSULE ORAL at 20:22

## 2022-10-30 RX ADMIN — SODIUM BICARBONATE TAB 650 MG 650 MG: 650 TAB at 09:48

## 2022-10-30 RX ADMIN — INSULIN HUMAN 6 UNITS: 100 INJECTION, SOLUTION PARENTERAL at 08:05

## 2022-10-30 RX ADMIN — CARVEDILOL 6.25 MG: 6.25 TABLET, FILM COATED ORAL at 20:23

## 2022-10-30 RX ADMIN — AMOXICILLIN AND CLAVULANATE POTASSIUM 1 TABLET: 875; 125 TABLET, FILM COATED ORAL at 20:22

## 2022-10-30 RX ADMIN — CARVEDILOL 6.25 MG: 6.25 TABLET, FILM COATED ORAL at 09:47

## 2022-10-30 RX ADMIN — GABAPENTIN 300 MG: 300 CAPSULE ORAL at 09:47

## 2022-10-30 RX ADMIN — Medication 20 MG: at 20:25

## 2022-10-30 RX ADMIN — CYCLOSPORINE 75 MG: 25 CAPSULE, LIQUID FILLED ORAL at 20:23

## 2022-10-30 RX ADMIN — CYCLOSPORINE 75 MG: 25 CAPSULE, LIQUID FILLED ORAL at 09:51

## 2022-10-30 RX ADMIN — INSULIN GLARGINE 10 UNITS: 100 INJECTION, SOLUTION SUBCUTANEOUS at 08:04

## 2022-10-30 RX ADMIN — AMOXICILLIN AND CLAVULANATE POTASSIUM 1 TABLET: 875; 125 TABLET, FILM COATED ORAL at 09:47

## 2022-10-30 RX ADMIN — SODIUM BICARBONATE TAB 650 MG 650 MG: 650 TAB at 20:29

## 2022-10-30 RX ADMIN — MYCOPHENOLATE MOFETIL 750 MG: 250 CAPSULE ORAL at 20:23

## 2022-10-30 RX ADMIN — INSULIN HUMAN 8 UNITS: 100 INJECTION, SOLUTION PARENTERAL at 11:53

## 2022-10-30 RX ADMIN — AMLODIPINE BESYLATE 10 MG: 10 TABLET ORAL at 09:47

## 2022-10-30 RX ADMIN — PANTOPRAZOLE SODIUM 40 MG: 40 TABLET, DELAYED RELEASE ORAL at 17:11

## 2022-10-30 RX ADMIN — PREDNISONE 5 MG: 5 TABLET ORAL at 09:48

## 2022-10-30 RX ADMIN — INSULIN GLARGINE 10 UNITS: 100 INJECTION, SOLUTION SUBCUTANEOUS at 20:33

## 2022-10-30 RX ADMIN — INSULIN HUMAN 8 UNITS: 100 INJECTION, SOLUTION PARENTERAL at 17:05

## 2022-10-30 RX ADMIN — MYCOPHENOLATE MOFETIL 750 MG: 250 CAPSULE ORAL at 09:53

## 2022-10-30 RX ADMIN — TAMSULOSIN HYDROCHLORIDE 0.4 MG: 0.4 CAPSULE ORAL at 09:47

## 2022-10-30 RX ADMIN — PANTOPRAZOLE SODIUM 40 MG: 40 TABLET, DELAYED RELEASE ORAL at 06:28

## 2022-10-30 ASSESSMENT — PAIN SCALES - GENERAL: PAINLEVEL_OUTOF10: 0

## 2022-10-30 NOTE — PROGRESS NOTES
Pt is A+Ox4. He is a stand by assist. Pt has a R arm fistula. No IV access. Currently lying in bed with call light within reach. Pt participated in PT today walked the hallway. Pt is currently resting comfortably voices no concerns.      Arley Patel RN

## 2022-10-30 NOTE — PROGRESS NOTES
Hospitalist Progress Note      PCP: Rodrigo Miguel DO    Date of Admission: 10/28/2022    Chief Complaint: weakness    Subjective: pt awake/alert     Medications:  Reviewed    Infusion Medications    dextrose       Scheduled Medications    cycloSPORINE modified  75 mg Oral BID    insulin regular  6 Units SubCUTAneous Daily with breakfast    insulin regular  8 Units SubCUTAneous BID WC    predniSONE  5 mg Oral Daily    tamsulosin  0.4 mg Oral Daily    carvedilol  6.25 mg Oral BID    [START ON 10/31/2022] epoetin megan-epbx  10,000 Units SubCUTAneous Q7 Days    insulin glargine  10 Units SubCUTAneous BID    insulin lispro  0-8 Units SubCUTAneous 4x Daily    sodium bicarbonate  650 mg Oral BID    amoxicillin-clavulanate  1 tablet Oral 2 times per day    amLODIPine  10 mg Oral Daily    pantoprazole  40 mg Oral BID AC    simvastatin  20 mg Oral Nightly    gabapentin  300 mg Oral Daily    gabapentin  600 mg Oral Nightly    mycophenolate  750 mg Oral BID     PRN Meds: acetaminophen **OR** acetaminophen, [Held by provider] furosemide, glucose, glucagon (rDNA), dextrose, prochlorperazine **OR** prochlorperazine      Intake/Output Summary (Last 24 hours) at 10/30/2022 0818  Last data filed at 10/29/2022 0906  Gross per 24 hour   Intake 200 ml   Output --   Net 200 ml       Exam:    BP (!) 162/82   Pulse 67   Temp 98.5 °F (36.9 °C) (Oral)   Resp 18   Ht 6' 2\" (1.88 m)   Wt 185 lb (83.9 kg)   SpO2 98%   BMI 23.75 kg/m²     General appearance: No apparent distress, appears stated age and cooperative. HEENT: Pupils equal, round, and reactive to light. Conjunctivae/corneas clear. Neck: Supple, with full range of motion. No jugular venous distention. Trachea midline. Respiratory:  Normal respiratory effort. Clear to auscultation, bilaterally without Rales/Wheezes/Rhonchi. Cardiovascular: Regular rate and rhythm with normal S1/S2 without murmurs, rubs or gallops.   Abdomen: Soft, non-tender, non-distended with normal bowel sounds. Musculoskeletal: No clubbing, cyanosis or edema bilaterally. Full range of motion without deformity. Skin: Skin color, texture, turgor normal.  No rashes or lesions. Neurologic:  Neurovascularly intact without any focal sensory/motor deficits. Cranial nerves: II-XII intact, grossly non-focal.  Psychiatric: Alert and oriented, thought content appropriate, normal insight  Capillary Refill: Brisk,< 3 seconds   Peripheral Pulses: +2 palpable, equal bilaterally       Labs:   Recent Labs     10/28/22  0511 10/29/22  0520   WBC 7.1 7.2   HGB 8.3* 8.5*   HCT 25.7* 26.5*    182     Recent Labs     10/28/22  0511 10/29/22  0520    143   K 4.2 4.5   * 111*   CO2 14* 19*   BUN 37* 36*   CREATININE 2.03* 2.14*   CALCIUM 8.3* 8.7   PHOS  --  3.2     Recent Labs     10/28/22  0511 10/29/22  0520   AST 10 7   ALT 7 7   BILITOT 0.7 0.6   ALKPHOS 54 60     No results for input(s): INR in the last 72 hours. Recent Labs     10/27/22  1019   TROPONINI 0.040*       Urinalysis:      Lab Results   Component Value Date/Time    NITRU Negative 10/19/2022 01:44 PM    WBCUA 10-20 10/19/2022 01:44 PM    BACTERIA FEW 10/19/2022 01:44 PM    RBCUA 6-10 10/19/2022 01:44 PM    BLOODU SMALL 10/19/2022 01:44 PM    SPECGRAV 1.012 10/19/2022 01:44 PM    GLUCOSEU Negative 10/19/2022 01:44 PM    GLUCOSEU GT 1 09/14/2011 08:57 AM       Radiology:  No orders to display           Assessment/Plan:    Active Hospital Problems    Diagnosis Date Noted    GI bleeding [K92.2] 10/29/2022     Priority: Medium         DVT Prophylaxis:   Diet: ADULT DIET;  Regular; 4 carb choices (60 gm/meal)  Code Status: Full Code    PT/OT Eval Status:     Dispo -  A fib- rate controlled, off anticoagulation due to GI bleeding x 3 weeks   Dieulafoy ulcer- pot EGD/clipping, will follow up with CBC AM   HTN- BP elevated before meds given, recheck BP during the day   History of renal transplant- on anti rejection medications  DM- continue with current Tx, follow up with ACCU check   Medically stable for sub acute admission at [facility]           Electronically signed by Savita Sierra MD on 10/30/2022 at 8:18 AM

## 2022-10-30 NOTE — PROGRESS NOTES
Pt A&Ox3 in bed. Pt standby assist to restroom and then returned to bed. Vitals and assessment are as charted. Medications given as ordered. Pt given apple and cranberry juice per request. Pt states no further needs at this time. Call light and table ar within reach, bed alarm on for safety.

## 2022-10-30 NOTE — PROGRESS NOTES
Occupational Therapy  Facility/Department: [id number] [place] [address]  Occupational Therapy Initial Assessment    Name: Martina Hale  : 1950  MRN: [id number]  Date of Service: 10/30/2022    Discharge Recommendations:   D/c home with home health services    Patient Diagnosis(es): GI bleed  Past Medical History:  has a past medical history of Diabetes mellitus ([facility] [address] Hemodialysis access, fistula mature ([facility] [address] Hypertension, and Seizures ([facility] [address] Past Surgical History:  has a past surgical history that includes Kidney transplant (); pr [facility] incl fluor gdnce dx w/cell washg spx (N/A, 3/20/2018); brain surgery (Left, 1990); Upper gastrointestinal endoscopy (N/A, 10/21/2022); and Colonoscopy (N/A, 10/24/2022). Treatment Diagnosis: lack of coordination      Assessment   Performance deficits / Impairments: Decreased functional mobility ; Decreased strength;Decreased endurance;Decreased ADL status; Decreased safe awareness;Decreased balance  Assessment: 80year old male admitted to [facility] [place] after a fall. Pt. was diagnosed with a GI bleed and weakness. OT to address maximziing safety and activity toelrance to engage in self care routine.   Treatment Diagnosis: lack of coordination  Prognosis: Good  REQUIRES OT FOLLOW-UP: Yes        Plan   Occupational Therapy Plan  Times Per Week: 3-7x/wk  Current Treatment Recommendations: Strengthening, Balance training, Functional mobility training, Endurance training, Neuromuscular re-education, Safety education & training, Patient/Caregiver education & training, Self-Care / ADL     Restrictions  Restrictions/Precautions  Restrictions/Precautions: Fall Risk    Subjective   General  Chart Reviewed: Yes  Patient assessed for rehabilitation services?: Yes     Social/Functional History  Social/Functional History  Lives With: Spouse (wife can assist if needed)  Type of Home: House  Home Layout: Laundry in basement, Two level, Able to Live on Main level with bedroom/bathroom  Home Access: Stairs to enter with rails  Entrance Stairs - Number of Steps: 4  Entrance Stairs - Rails: Left  Bathroom Shower/Tub: Tub/Shower unit  Bathroom Equipment: Shower chair, Grab bars in shower  Bathroom Accessibility: Not accessible  Home Equipment: Walker, rolling, [employer], [employer]  Has the patient had two or more falls in the past year or any fall with injury in the past year?: Yes  ADL Assistance: [address]: Independent  Homemaking Responsibilities: No (pt. cleans and cooks at times, does yardwork)  Ambulation Assistance: Independent (quad cane)  Transfer Assistance: Independent  Active : Yes  Mode of Transportation: Car  Occupation: Retired  Type of Occupation:   Leisure & Hobbies: woodworking, landscaping  IADL Comments: Pt. does some cleaning and cooking. Pt. does yardwork.   Additional Comments: able to amb community and household distances- [employer]- per pt report       Objective        ADL  Feeding: Modified independent   Grooming: Setup  UE Bathing: Stand by assistance  LE Bathing: Contact guard assistance  UE Dressing: Stand by assistance  LE Dressing: Contact guard assistance  Toileting: Contact guard assistance  Additional Comments: CGA transfers and functional mobility        Bed mobility  Supine to Sit: Independent  Sit to Supine: Independent  Scooting: Independent  Transfers  Sit to stand: Contact guard assistance  Stand to sit: Contact guard assistance  Vision  Vision: Impaired  Vision Exceptions: Wears glasses at all times  Hearing  Hearing: Within functional limits  Cognition  Overall Cognitive Status: WFL  Orientation  Overall Orientation Status: Within Functional Limits       LUE AROM (degrees)  LUE AROM : WFL  Left Hand AROM (degrees)  Left Hand AROM: WFL  RUE AROM (degrees)  RUE AROM : WFL  RUE General AROM: WFL except shoulder at 90*  Right Hand AROM (degrees)  Right Hand AROM: WFL  LUE Strength  LUE Strength Comment: sh 4/5, bicep 4/5, tricep 4-/5  RUE Strength  RUE Strength Comment: sh 2+/5, bicep 4/5, tricep 3+/5         Goals  Short Term Goals  Short Term Goal 1: Mod I self care routine with good safety  Short Term Goal 2: mod I stand x 15 minutes to engage in daily routine  Short Term Goal 3: mod I BUE HEP to improve strength and activity toelrance for self care routine  Short Term Goal 4: Mod I item retrieval and transport to complete ADL and IADL  Long Term Goals  Long Term Goal 1: Mod I simple home making tasks with good balance       Therapy Time   Individual Concurrent Group Co-treatment   Time In 0848         Time Out 0948         Minutes [facility]

## 2022-10-30 NOTE — PROGRESS NOTES
Physical Therapy  Facility/Department: [facility] MED SURG UNIT  Daily Treatment Note  NAME: Madeline Kratf  : 1950  MRN: [id number]    Date of Service: 10/30/2022    Discharge Recommendations:  Continue to assess pending progress, Outpatient PT        Patient Diagnosis(es): There were no encounter diagnoses. Assessment   Assessment: Patient did demonstrate ability to walk without device although tends to overestimate abilitties. Fair static balance demonstrated. Activity Tolerance: Patient tolerated treatment well;Patient limited by endurance     Plan          Restrictions  Restrictions/Precautions  Restrictions/Precautions: Fall Risk     Subjective    Subjective  Subjective: Patient reports that he knows his (expletive) and is ready to do some walking. Orientation  Overall Orientation Status: Within Functional Limits  Cognition  Overall Cognitive Status: WFL     Objective   Vitals     Balance  Sitting: Intact  Standing: Intact (occasional stagger walking backwards)  Standing - Static: Good  Standing - Dynamic: Fair  Transfer Training  Transfer Training: Yes  Overall Level of Assistance: Stand-by assistance  Interventions: Safety awareness training  Sit to Stand: Stand-by assistance;Supervision  Stand to Sit: Stand-by assistance  Gait Training  Gait Training: Yes  Right Side Weight Bearing: As tolerated  Left Side Weight Bearing: As tolerated  Gait  Overall Level of Assistance: Stand-by assistance;Contact-guard assistance  Interventions: Safety awareness training;Visual cues; Tactile cues; Weight shifting training/pressure relief  Base of Support: Widened  Speed/Sara: Accelerated; Fluctuations  Gait Abnormalities: Altered arm swing; Ataxic  Distance (ft): 50 Feet  Assistive Device:  (None, patient insisted on putting hands in pocket)     PT Exercises  Static Standing Balance Exercises: Standing, perturbations, then looking multiple directions, eyes closed  x 60 sec ea  Dynamic Standing Balance Exercises: Reaching, side step at window, retro steps at window for balance             Goals  Short Term Goals  Time Frame for Short Term Goals: 2-3 days  Short Term Goal 1: transfers steadier with <= CS  Short Term Goal 2: amb >= 100ft with pusle ox post >= 95% less scissoring , least AD, <=SBA  Short Term Goal 3: 10 stairs one rail <= SBA  Short Term Goal 4: tolerate x 10 standing OPHELIA for strength and balance  Long Term Goals  Time Frame for Long Term Goals : 5-7 days  Long Term Goal 1: Transfers and bed mobiltiy modified independent  Long Term Goal 2: amb >= 150ft with least AD modidifed indpendnet , steadier  Long Term Goal 3: one flight of stairs on rail, <= supervision  Long Term Goal 4: Increase LE strength, balance and endurance any amount to achieve functional goalas  Patient Goals   Patient Goals : \"I want to be steadier and be stronger to go home. \"    Education  Patient Education  Education Given To: Patient  Education Provided: Role of Therapy;Plan of Care  Education Method: Verbal  Education Outcome: Continued education needed    Therapy Time   Individual Concurrent Group Co-treatment   Time In 1130         Time Out 1155         Minutes [address] [place] [address]

## 2022-10-30 NOTE — PROGRESS NOTES
Nephrology Progress Note    Assessment:  CKD-4  S/P Kidney transplant  Hypertension  DM type-2  Anemia    Plan: continue anti-rejection meds  watch bmp    Patient Active Problem List:     Pneumonia     Abdominal pain, other specified site     Acute pyelonephritis without lesion of renal medullary necrosis     Anemia     Essential hypertension     Nonspecific abnormal results of thyroid function study     Mixed hyperlipidemia     Kidney replaced by transplant     Intra-abdominal abscess post-procedure     Infection due to Enterococcus     Fever and other physiologic disturbances of temperature regulation     Chronic kidney disease (CKD)     Type II or unspecified type diabetes mellitus without mention of complication, uncontrolled     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     Anginal chest pain at rest [facility])     Atypical chest pain     Chronic cough     Unstable angina (HCC)     Chest pain     Atrial fibrillation with RVR (HCC)     Symptomatic anemia     Acute upper GI bleed     Dieulafoy lesion of colon     Poorly controlled diabetes mellitus (HCC)     Lung nodules     COPD without exacerbation (HCC)     Abnormal CT of the chest     Bronchiectasis without complication (HCC)     GI bleeding      Subjective:  Admit Date: 10/28/2022    Interval History: no changes    Medications:  Scheduled Meds:   cycloSPORINE modified  75 mg Oral BID    insulin regular  6 Units SubCUTAneous Daily with breakfast    insulin regular  8 Units SubCUTAneous BID WC    predniSONE  5 mg Oral Daily    tamsulosin  0.4 mg Oral Daily    carvedilol  6.25 mg Oral BID    [START ON 10/31/2022] epoetin megan-epbx  10,000 Units SubCUTAneous Q7 Days    insulin glargine  10 Units SubCUTAneous BID    insulin lispro  0-8 Units SubCUTAneous 4x Daily    sodium bicarbonate  650 mg Oral BID    amoxicillin-clavulanate  1 tablet Oral 2 times per day    amLODIPine  10 mg Oral Daily    pantoprazole  40 mg Oral BID AC simvastatin  20 mg Oral Nightly    gabapentin  300 mg Oral Daily    gabapentin  600 mg Oral Nightly    mycophenolate  750 mg Oral BID     Continuous Infusions:   dextrose         CBC:   Recent Labs     10/28/22  0511 10/29/22  0520   WBC 7.1 7.2   HGB 8.3* 8.5*    182     CMP:    Recent Labs     10/28/22  0511 10/29/22  0520    143   K 4.2 4.5   * 111*   CO2 14* 19*   BUN 37* 36*   CREATININE 2.03* 2.14*   GLUCOSE 150* 184*   CALCIUM 8.3* 8.7   LABGLOM 34.4* 32.3*     Troponin:   Recent Labs     10/27/22  1019   TROPONINI 0.040*     BNP: No results for input(s): BNP in the last 72 hours. INR: No results for input(s): INR in the last 72 hours. Lipids: No results for input(s): CHOL, LDLDIRECT, TRIG, HDL, AMYLASE, LIPASE in the last 72 hours. Liver:   Recent Labs     10/29/22  0520   AST 7   ALT 7   ALKPHOS 60   PROT 5.7*   LABALBU 3.5   BILITOT 0.6     Iron:  No results for input(s): IRONS, FERRITIN in the last 72 hours. Invalid input(s): LABIRONS  Urinalysis: No results for input(s): UA in the last 72 hours.     Objective:  Vitals: BP (!) 162/82   Pulse 67   Temp 98.5 °F (36.9 °C) (Oral)   Resp 18   Ht 6' 2\" (1.88 m)   Wt 185 lb (83.9 kg)   SpO2 98%   BMI 23.75 kg/m²    Wt Readings from Last 3 Encounters:   10/28/22 185 lb (83.9 kg)   10/24/22 185 lb (83.9 kg)   10/14/22 195 lb 6.4 oz (88.6 kg)      24HR INTAKE/OUTPUT:    Intake/Output Summary (Last 24 hours) at 10/30/2022 9697  Last data filed at 10/29/2022 2004  Gross per 24 hour   Intake 200 ml   Output --   Net 200 ml       General: alert, in no apparent distress  HEENT: normocephalic, atraumatic, anicteric  Neck: supple, no mass  Lungs: non-labored respirations, clear to auscultation bilaterally  Heart: regular rate and rhythm, no murmurs or rubs  Abdomen: soft, non-tender, non-distended  Ext: no cyanosis, no peripheral edema  Neuro: alert and oriented, no gross abnormalities  Psych: normal mood and affect  Skin: no rash      Electronically signed by Cristian Luu DO, MD

## 2022-10-31 ENCOUNTER — APPOINTMENT (OUTPATIENT)
Dept: PHARMACY | Age: 72
End: 2022-10-31
Payer: COMMERCIAL

## 2022-10-31 ENCOUNTER — TELEPHONE (OUTPATIENT)
Dept: PHARMACY | Age: 72
End: 2022-10-31

## 2022-10-31 DIAGNOSIS — I48.91 ATRIAL FIBRILLATION WITH RVR (HCC): Primary | ICD-10-CM

## 2022-10-31 LAB
ALBUMIN SERPL-MCNC: 3.4 G/DL (ref 3.5–4.6)
ALP BLD-CCNC: 59 U/L (ref 35–104)
ALT SERPL-CCNC: 7 U/L (ref 0–41)
ANION GAP SERPL CALCULATED.3IONS-SCNC: 14 MEQ/L (ref 9–15)
AST SERPL-CCNC: 7 U/L (ref 0–40)
BASOPHILS ABSOLUTE: 0 K/UL (ref 0–0.1)
BASOPHILS RELATIVE PERCENT: 0.5 % (ref 0.2–1.2)
BILIRUB SERPL-MCNC: 0.5 MG/DL (ref 0.2–0.7)
BUN BLDV-MCNC: 43 MG/DL (ref 8–23)
CALCIUM SERPL-MCNC: 9 MG/DL (ref 8.5–9.9)
CHLORIDE BLD-SCNC: 112 MEQ/L (ref 95–107)
CO2: 18 MEQ/L (ref 20–31)
CREAT SERPL-MCNC: 2.09 MG/DL (ref 0.7–1.2)
EOSINOPHILS ABSOLUTE: 0.3 K/UL (ref 0–0.5)
EOSINOPHILS RELATIVE PERCENT: 4.6 % (ref 0.8–7)
GFR SERPL CREATININE-BSD FRML MDRD: 33.3 ML/MIN/{1.73_M2}
GLOBULIN: 2.1 G/DL (ref 2.3–3.5)
GLUCOSE BLD-MCNC: 103 MG/DL (ref 70–99)
GLUCOSE BLD-MCNC: 124 MG/DL (ref 70–99)
GLUCOSE BLD-MCNC: 136 MG/DL (ref 70–99)
GLUCOSE BLD-MCNC: 158 MG/DL (ref 70–99)
GLUCOSE BLD-MCNC: 169 MG/DL (ref 70–99)
HCT VFR BLD CALC: 25 % (ref 42–52)
HEMOGLOBIN: 7.9 G/DL (ref 13.7–17.5)
IMMATURE GRANULOCYTES #: 0.1 K/UL
IMMATURE GRANULOCYTES %: 0.7 %
LYMPHOCYTES ABSOLUTE: 0.9 K/UL (ref 1.3–3.6)
LYMPHOCYTES RELATIVE PERCENT: 12.1 %
MCH RBC QN AUTO: 28.8 PG (ref 25.7–32.2)
MCHC RBC AUTO-ENTMCNC: 31.6 % (ref 32.3–36.5)
MCV RBC AUTO: 91.2 FL (ref 79–92.2)
MONOCYTES ABSOLUTE: 0.7 K/UL (ref 0.3–0.8)
MONOCYTES RELATIVE PERCENT: 8.7 % (ref 5.3–12.2)
NEUTROPHILS ABSOLUTE: 5.5 K/UL (ref 1.8–5.4)
NEUTROPHILS RELATIVE PERCENT: 73.4 % (ref 34–67.9)
PDW BLD-RTO: 17.1 % (ref 11.6–14.4)
PERFORMED ON: ABNORMAL
PLATELET # BLD: 169 K/UL (ref 163–337)
POTASSIUM SERPL-SCNC: 4.9 MEQ/L (ref 3.4–4.9)
RBC # BLD: 2.74 M/UL (ref 4.63–6.08)
SODIUM BLD-SCNC: 144 MEQ/L (ref 135–144)
TOTAL PROTEIN: 5.5 G/DL (ref 6.3–8)
WBC # BLD: 7.5 K/UL (ref 4.2–9)

## 2022-10-31 PROCEDURE — 97116 GAIT TRAINING THERAPY: CPT

## 2022-10-31 PROCEDURE — 6370000000 HC RX 637 (ALT 250 FOR IP): Performed by: INTERNAL MEDICINE

## 2022-10-31 PROCEDURE — 97530 THERAPEUTIC ACTIVITIES: CPT

## 2022-10-31 PROCEDURE — 36415 COLL VENOUS BLD VENIPUNCTURE: CPT

## 2022-10-31 PROCEDURE — 1200000002 HC SEMI PRIVATE SWING BED

## 2022-10-31 PROCEDURE — 6360000002 HC RX W HCPCS: Performed by: INTERNAL MEDICINE

## 2022-10-31 PROCEDURE — 97110 THERAPEUTIC EXERCISES: CPT

## 2022-10-31 PROCEDURE — 80053 COMPREHEN METABOLIC PANEL: CPT

## 2022-10-31 PROCEDURE — 97535 SELF CARE MNGMENT TRAINING: CPT

## 2022-10-31 PROCEDURE — 85025 COMPLETE CBC W/AUTO DIFF WBC: CPT

## 2022-10-31 RX ADMIN — GABAPENTIN 300 MG: 300 CAPSULE ORAL at 08:18

## 2022-10-31 RX ADMIN — SODIUM BICARBONATE TAB 650 MG 650 MG: 650 TAB at 20:33

## 2022-10-31 RX ADMIN — CARVEDILOL 6.25 MG: 6.25 TABLET, FILM COATED ORAL at 20:33

## 2022-10-31 RX ADMIN — INSULIN HUMAN 6 UNITS: 100 INJECTION, SOLUTION PARENTERAL at 08:01

## 2022-10-31 RX ADMIN — EPOETIN ALFA-EPBX 10000 UNITS: 10000 INJECTION, SOLUTION INTRAVENOUS; SUBCUTANEOUS at 11:54

## 2022-10-31 RX ADMIN — CARVEDILOL 6.25 MG: 6.25 TABLET, FILM COATED ORAL at 08:21

## 2022-10-31 RX ADMIN — PANTOPRAZOLE SODIUM 40 MG: 40 TABLET, DELAYED RELEASE ORAL at 17:19

## 2022-10-31 RX ADMIN — SODIUM BICARBONATE TAB 650 MG 650 MG: 650 TAB at 08:17

## 2022-10-31 RX ADMIN — INSULIN GLARGINE 10 UNITS: 100 INJECTION, SOLUTION SUBCUTANEOUS at 08:01

## 2022-10-31 RX ADMIN — PREDNISONE 5 MG: 5 TABLET ORAL at 08:17

## 2022-10-31 RX ADMIN — PANTOPRAZOLE SODIUM 40 MG: 40 TABLET, DELAYED RELEASE ORAL at 05:37

## 2022-10-31 RX ADMIN — GABAPENTIN 600 MG: 300 CAPSULE ORAL at 20:32

## 2022-10-31 RX ADMIN — MYCOPHENOLATE MOFETIL 750 MG: 250 CAPSULE ORAL at 20:33

## 2022-10-31 RX ADMIN — AMOXICILLIN AND CLAVULANATE POTASSIUM 1 TABLET: 875; 125 TABLET, FILM COATED ORAL at 20:33

## 2022-10-31 RX ADMIN — INSULIN HUMAN 8 UNITS: 100 INJECTION, SOLUTION PARENTERAL at 11:53

## 2022-10-31 RX ADMIN — TAMSULOSIN HYDROCHLORIDE 0.4 MG: 0.4 CAPSULE ORAL at 08:17

## 2022-10-31 RX ADMIN — CYCLOSPORINE 75 MG: 25 CAPSULE, LIQUID FILLED ORAL at 20:38

## 2022-10-31 RX ADMIN — AMOXICILLIN AND CLAVULANATE POTASSIUM 1 TABLET: 875; 125 TABLET, FILM COATED ORAL at 08:17

## 2022-10-31 RX ADMIN — MYCOPHENOLATE MOFETIL 750 MG: 250 CAPSULE ORAL at 08:17

## 2022-10-31 RX ADMIN — Medication 20 MG: at 20:36

## 2022-10-31 RX ADMIN — INSULIN GLARGINE 10 UNITS: 100 INJECTION, SOLUTION SUBCUTANEOUS at 20:43

## 2022-10-31 RX ADMIN — CYCLOSPORINE 75 MG: 25 CAPSULE, LIQUID FILLED ORAL at 08:20

## 2022-10-31 RX ADMIN — AMLODIPINE BESYLATE 10 MG: 10 TABLET ORAL at 08:18

## 2022-10-31 ASSESSMENT — PAIN SCALES - GENERAL: PAINLEVEL_OUTOF10: 0

## 2022-10-31 NOTE — PROGRESS NOTES
Physical Therapy  Facility/Department: [facility] MED SURG UNIT  Daily Treatment Note  NAME: Michelle Montoya  : 1950  MRN: [id number]    Date of Service: 10/31/2022    Discharge Recommendations:  Continue to assess pending progress, Outpatient PT        Patient Diagnosis(es): There were no encounter diagnoses. Assessment   Assessment: (P) Focus on strength and balance with dynamic activity. Pt. tends to demo decrease weight shift on R LE and c/o ankle weakness L > R. 1 LOB demo'd with dynamic step with L LE indicating limited R LE weight bear or weight shift tolerance. Pt. indicates he would benefit with continuing therapy to assist with increasing strength and endurance. Multiple rest breaks needed in p.m. due to increase fatigue with limited endurance. Activity Tolerance: (P) Patient tolerated treatment well;Patient limited by endurance     Plan    Physcial Therapy Plan  General Plan: (P) 5-7 times per week  Current Treatment Recommendations: (P) Strengthening;Balance training;ROM; Functional mobility training;Transfer training; Endurance training;Gait training;Stair training;Pain management;Home exercise program;Safety education & training;Patient/Caregiver education & training;Equipment evaluation, education, & procurement;Positioning; Therapeutic activities; Neuromuscular re-education     Restrictions  Restrictions/Precautions  Restrictions/Precautions: Fall Risk     Subjective    Subjective  Subjective: (P) Pt. up in recliner and agreeable to therapy. Pt. reports getting winded easy. I was in bed for 10 days and did nothing.      Objective   Vitals  Heart Rate: 69  BP: 133/66  MAP (Calculated): 88.33  SpO2: 100 %  O2 Device: None (Room air)  Bed Mobility Training  Bed Mobility Training: Yes  Supine to Sit: Stand-by assistance  Transfer Training  Sit to Stand: (P) Stand-by assistance;Supervision  Stand to Sit: (P) Stand-by assistance  Gait Training  Gait Training: (P) Yes  Gait  Overall Level of Assistance: (P) Stand-by assistance;Contact-guard assistance  Interventions: (P) Safety awareness training; Tactile cues; Verbal cues; Weight shifting training/pressure relief  Speed/Sara: (P) Accelerated; Fluctuations  Stance:  (Decrease quad control with aceleration and deceleration with rigid close chain control.)  Gait Abnormalities: (P) Altered arm swing; Ataxic;Scissoring (Pt. staggers and tends to demo more sway and unsteadiness with fatigue. However. pt. able to self right requiring only contact guard assist in p.m.)  Distance (ft): (P)  (50'x4)  Assistive Device: (P) Other (comment) (Training wihtout AD for balance. VC's to focus on reducing scissor gait and arm swing.)  Rail Use: Right  Stairs - Level of Assistance: Contact-guard assistance;Minimum assistance (occasional min assistance for balance.)  Number of Stairs Trained: 7     PT Exercises  A/AROM Exercises: (P) Sit to stand x 10 reps with and without use of arms to push off. Static Standing Balance Exercises: (P) Dynamic stand activity fro strength and balance with ambulating using hallway handrail ~ 12 feet 5 laps forward/retro and B side stepping. 1 seated rest break needed due to fatigue and slight increase unsteadiness. Close SBA/ CGA with actiity. Dynamic stand clock drill with B LE x 3 laps with CGA to assist with strength and balance. Pt. demo's decrease tolerance to shift weight on R LE. 1 LOB demo'd with one step to side with L LE with eigth shifting on L LE.  Pt. reports feeling more comfortable to weight bear on L LE. Pt. also c/o B ankle weakness L > R.             Goals  Short Term Goals  Time Frame for Short Term Goals: 2-3 days  Short Term Goal 1: transfers steadier with <= CS  Short Term Goal 2: amb >= 100ft with pusle ox post >= 95% less scissoring , least AD, <=SBA  Short Term Goal 3: 10 stairs one rail <= SBA  Short Term Goal 4: tolerate x 10 standing OPHELIA for strength and balance  Long Term Goals  Time Frame for Long Term Goals : 5-7 days  Long Term Goal 1: Transfers and bed mobiltiy modified independent  Long Term Goal 2: amb >= 150ft with least AD modidifed indpendnet , steadier  Long Term Goal 3: one flight of stairs on rail, <= supervision  Long Term Goal 4: Increase LE strength, balance and endurance any amount to achieve functional goalas  Patient Goals   Patient Goals : \"I want to be steadier and be stronger to go home. \"    Education  Patient Education  Education Given To: Patient  Education Provided: IADL Safety;Role of Therapy;Plan of Care    Therapy Time   Individual Concurrent Group Co-treatment   Time In  110         Time Out  205         Minutes  [facility]. [address] .[address]

## 2022-10-31 NOTE — TELEPHONE ENCOUNTER
Patient currently at [facility]  Currently off warfarin per cardiology  Reset tracker for two week follow up

## 2022-10-31 NOTE — PROGRESS NOTES
weight loss to GI bleed. Patient reports decreased muscle mass d/t weight loss. On carb controlled diet, reports he has a good appetite now, and is agreable to nutritional supplement to increase kcal/protein intake will add diabetic nutritional supplement once daily. Meds reviewed, skin- no pressure areas- generalized +1/trace edema noted per EMR. Labs reviewed from 10/31- elevated BUN/Creat and glucose. Will monitor. Last BM 10/30. Patient requesting DM diet education material prior to d/c home. Stated he hasn't had any diet education since kidney transplant about 7 years ago. Wound Type: Skin Tears       Current Nutrition Intake & Therapies:    Average Meal Intake: %  Average Supplements Intake: None Ordered  ADULT DIET; Regular; 4 carb choices (60 gm/meal)    Anthropometric Measures:  Height: 6' 2\" (188 cm)  Ideal Body Weight (IBW): 190 lbs (86 kg)    Admission Body Weight: 190 lb (86.2 kg)  Current Body Weight: 185 lb (83.9 kg) (10/28 admit weight), 97.4 % IBW.  Weight Source: Stated  Current BMI (kg/m2): 23.7  Usual Body Weight: 196 lb (88.9 kg) (9/15/22 bedscale weight; 6/22/22 bedscale weight 209#; 6/23/22 bedscale weight 229#)  % Weight Change (Calculated): -5.6  BMI Categories: Normal Weight (BMI 22.0 to 24.9) age over 72    Estimated Daily Nutrient Needs:  Energy Requirements Based On: Kcal/kg  Weight Used for Energy Requirements: Current  Energy (kcal/day): 2871-4450  kcal/day based on 26-28kcal/kg  Weight Used for Protein Requirements: Current  Protein (g/day): > 101gm/day based on 1.2gm/kg  Method Used for Fluid Requirements: 1 ml/kcal  Fluid (ml/day): 2185-2350ml/day    Nutrition Diagnosis:   Moderate malnutrition, In context of chronic, non-illness related related to inadequate protein-energy intake, altered GI function as evidenced by weight loss, weight loss greater than or equal to 5% in 1 month, poor intake prior to admission  Altered nutrition-related lab values related to endocrine dysfuntion as evidenced by lab values    Nutrition Interventions:   Food and/or Nutrient Delivery: Continue Current Diet, Start Oral Nutrition Supplement  Nutrition Education/Counseling: Education initiated  Coordination of Nutrition Care: Continue to monitor while inpatient  Plan of Care discussed with: Patient    Goals:     Goals: Meet at least 75% of estimated needs       Nutrition Monitoring and Evaluation:   Behavioral-Environmental Outcomes: Beliefs and Attitutes, Knowledge or Skill  Food/Nutrient Intake Outcomes: Food and Nutrient Intake, Supplement Intake  Physical Signs/Symptoms Outcomes: Weight, Biochemical Data    Discharge Planning:     Too soon to determine     [address], [address] LD

## 2022-10-31 NOTE — PROGRESS NOTES
Hospitalist Progress Note      PCP: Rich Paul DO    Date of Admission: 10/28/2022    Chief Complaint: weakness    Subjective: pt eating breakfast     Medications:  Reviewed    Infusion Medications    dextrose       Scheduled Medications    cycloSPORINE modified  75 mg Oral BID    insulin regular  6 Units SubCUTAneous Daily with breakfast    insulin regular  8 Units SubCUTAneous BID WC    predniSONE  5 mg Oral Daily    tamsulosin  0.4 mg Oral Daily    carvedilol  6.25 mg Oral BID    epoetin megan-epbx  10,000 Units SubCUTAneous Q7 Days    insulin glargine  10 Units SubCUTAneous BID    insulin lispro  0-8 Units SubCUTAneous 4x Daily    sodium bicarbonate  650 mg Oral BID    amoxicillin-clavulanate  1 tablet Oral 2 times per day    amLODIPine  10 mg Oral Daily    pantoprazole  40 mg Oral BID AC    simvastatin  20 mg Oral Nightly    gabapentin  300 mg Oral Daily    gabapentin  600 mg Oral Nightly    mycophenolate  750 mg Oral BID     PRN Meds: acetaminophen **OR** acetaminophen, [Held by provider] furosemide, glucose, glucagon (rDNA), dextrose, prochlorperazine **OR** prochlorperazine      Intake/Output Summary (Last 24 hours) at 10/31/2022 0859  Last data filed at 10/30/2022 2019  Gross per 24 hour   Intake 1080 ml   Output --   Net 1080 ml       Exam:    BP (!) 146/52   Pulse 64   Temp 98.5 °F (36.9 °C) (Oral)   Resp 16   Ht 6' 2\" (1.88 m)   Wt 185 lb (83.9 kg)   SpO2 98%   BMI 23.75 kg/m²     General appearance: No apparent distress, appears stated age and cooperative. HEENT: Pupils equal, round, and reactive to light. Conjunctivae/corneas clear. Neck: Supple, with full range of motion. No jugular venous distention. Trachea midline. Respiratory:  Normal respiratory effort. Clear to auscultation, bilaterally without Rales/Wheezes/Rhonchi. Cardiovascular: Regular rate and rhythm with normal S1/S2 without murmurs, rubs or gallops.   Abdomen: Soft, non-tender, non-distended with normal bowel sounds. Musculoskeletal: No clubbing, cyanosis or edema bilaterally. Full range of motion without deformity. Skin: Skin color, texture, turgor normal.  No rashes or lesions. Neurologic:  Neurovascularly intact without any focal sensory/motor deficits. Cranial nerves: II-XII intact, grossly non-focal.  Psychiatric: Alert and oriented, thought content appropriate, normal insight  Capillary Refill: Brisk,< 3 seconds   Peripheral Pulses: +2 palpable, equal bilaterally       Labs:   Recent Labs     10/29/22  0520 10/31/22  0610   WBC 7.2 7.5   HGB 8.5* 7.9*   HCT 26.5* 25.0*    169     Recent Labs     10/29/22  0520 10/31/22  0610    144   K 4.5 4.9   * 112*   CO2 19* 18*   BUN 36* 43*   CREATININE 2.14* 2.09*   CALCIUM 8.7 9.0   PHOS 3.2  --      Recent Labs     10/29/22  0520 10/31/22  0610   AST 7 7   ALT 7 7   BILITOT 0.6 0.5   ALKPHOS 60 59     No results for input(s): INR in the last 72 hours. No results for input(s): Aaron Mario in the last 72 hours. Urinalysis:      Lab Results   Component Value Date/Time    NITRU Negative 10/19/2022 01:44 PM    WBCUA 10-20 10/19/2022 01:44 PM    BACTERIA FEW 10/19/2022 01:44 PM    RBCUA 6-10 10/19/2022 01:44 PM    BLOODU SMALL 10/19/2022 01:44 PM    SPECGRAV 1.012 10/19/2022 01:44 PM    GLUCOSEU Negative 10/19/2022 01:44 PM    GLUCOSEU GT 1 09/14/2011 08:57 AM       Radiology:  No orders to display           Assessment/Plan:    Active Hospital Problems    Diagnosis Date Noted    GI bleeding [K92.2] 10/29/2022     Priority: Medium         DVT Prophylaxis: hold  Diet: ADULT DIET;  Regular; 4 carb choices (60 gm/meal)  Code Status: Full Code    PT/OT Eval Status: done    Dispo -  A fib- rate controlled, off anticoagulation due to GI bleeding x 3 weeks per cardiology recommendations    Dieulafoy ulcer- pot EGD/clipping, will follow up with CBC biweekly, stable H/H today  Abnormal CT chest report- atbs per pulmonary service, will follow up with pulmonary to repeat CT chest x 4 weeks    HTN- controlled with current Tx  History of renal transplant- on anti rejection medications  DM- continue with current Tx, follow up with ACCU check   Medically stable for sub acute admission at [facility]        Electronically signed by Jenae Jauregui MD on 10/31/2022 at 8:59 AM

## 2022-10-31 NOTE — PROGRESS NOTES
Facility/Department: [facility] MED SURG UNIT  Daily Treatment Note  NAME: Willow Claudio  : 1950  MRN: [id number]    Date of Service: 10/31/2022    Discharge Recommendations:  Continue to assess pending progress, Home with Home health OT         Patient Diagnosis(es): There were no encounter diagnoses. Assessment    Assessment: Shower completed with OT this date. Pt presents seated in chair, talkative and informing OT of hospital course over the past few months. [place] to work with therapy and get better. Agreeable to shower. Requires SBA/supervision. A little unsteady, reaches for wall and environmental support at times. Overall did well. All needs within reach and questions answered at session conclusion. Activity Tolerance: Patient tolerated treatment well;Patient limited by endurance  Discharge Recommendations: Continue to assess pending progress;Home with Home health OT      Plan   Occupational Therapy Plan  Times Per Week: 3-7x/wk  Times Per Day: Once a day  Current Treatment Recommendations: Strengthening;Balance training;Functional mobility training; Endurance training;Neuromuscular re-education; Safety education & training;Patient/Caregiver education & training;Self-Care / ADL     Restrictions   Fistula RUE, no BP    Subjective   Subjective  Subjective: \"I've been through a lot. \"  Pain: Denies pre/post pain  Orientation  Overall Orientation Status: Within Functional Limits  Orientation Level: Oriented X4           Objective    Vitals  Vitals  Heart Rate: 69  SpO2: 100 %  O2 Device: None (Room air)  Bed Mobility Training  Bed Mobility Training: Yes  Overall Level of Assistance: Stand-by assistance  Supine to Sit: Stand-by assistance  Balance  Sitting: Intact  Standing: Intact (Occassional wobbling, able to correct SBA.  Reaches for environmental support.)  Transfer Training  Transfer Training: Yes  Overall Level of Assistance: Stand-by assistance (With no AD)  Sit to Stand: Stand-by assistance;Supervision  Stand to Sit: Stand-by assistance  Gait Training  Gait Training: Yes  Gait  Overall Level of Assistance: Stand-by assistance;Contact-guard assistance  Interventions: Safety awareness training; Tactile cues; Verbal cues; Weight shifting training/pressure relief  Speed/Sara: Accelerated; Fluctuations  Stance:  (Decrease quad control with aceleration and deceleration with rigid close chain control.)  Gait Abnormalities: Altered arm swing; Ataxic;Scissoring (Pt. staggers and tends to demo more sway and unsteadiness with fatigue. However. pt. able to self right requiring only contact guard assist in p.m.)  Distance (ft):  (50'x4)  Assistive Device: Other (comment) (Training wihtout AD for balance. VC's to focus on reducing scissor gait and arm swing.)  Rail Use: Right  Stairs - Level of Assistance: Contact-guard assistance;Minimum assistance (occasional min assistance for balance.)  Number of Stairs Trained: 7     ADL  Feeding: Independent  Grooming: Stand by assistance  UE Bathing: Supervision  LE Bathing: Supervision  UE Dressing: Supervision  LE Dressing: Supervision  Toileting: Supervision  Additional Comments: Shower completed with OT completed this date, requires SBA/supervision. Safety Devices  Type of Devices: All fall risk precautions in place;Call light within reach; Bed alarm in place; Left in bed; Chair alarm in place     Patient Education  Education Given To: Patient  Education Provided: Role of Therapy;Plan of Care;Precautions;Transfer Training  Education Method: Demonstration;Verbal  Barriers to Learning: None  Education Outcome: Verbalized understanding;Demonstrated understanding    Goals  Short Term Goals  Short Term Goal 1: Mod I self care routine with good safety  Short Term Goal 2: mod I stand x 15 minutes to engage in daily routine  Short Term Goal 3: mod I BUE HEP to improve strength and activity toelrance for self care routine  Short Term Goal 4: Mod I item retrieval and transport to complete ADL and IADL  Long Term Goals  Long Term Goal 1: Mod I simple home making tasks with good balance       Therapy Time   Individual Concurrent Group Co-treatment   Time In [facility]         Time Out 1525         Minutes [id number] [facility], [id number]

## 2022-10-31 NOTE — PROGRESS NOTES
Pt a and o x4. Ambulate to BR to urinate with 1 SBA no device. Tolerates well. HS meds given. Some were given using pt own supply. See MAR. HS bg 170. Lantus given. HS snack sugar free jello given and crackers. Fresh ice water given. Denies pain. Last bm 10/30. No needs at this time. Call light in reach and bed alarm on.

## 2022-10-31 NOTE — PROGRESS NOTES
Physical Therapy  Facility/Department: [facility] MED SURG UNIT  Daily Treatment Note  NAME: Boy Juárez  : 1950  MRN: [id number]    Date of Service: 10/31/2022    Discharge Recommendations:  Continue to assess pending progress, Outpatient PT        Patient Diagnosis(es): There were no encounter diagnoses. Assessment   Assessment: (P) Pt. appears to demo decrease safety awareness with wanting to perform self functional activity without assistance. Continued to educate pt. on safety awareness with use of assistance during all tsf and gait function to reduce fall risk. Pt. did not want writer to hold on to pt. Focus on education and the improtance for proper assist to reduce risk of incident. Pt. did tolerate each function activity and ther ex to assist with improving qulaity of function. Pt. demo'd ~ 3 slight LOB requiring occasional min assistance to correct balance and reduce falling. Pt. indicates decrease LE strength and coordination with ataxic rigid functional ROM. Will continue plan of care to assist improving safety and quality of function to reduce risk of incident. Activity Tolerance: Patient tolerated treatment well;Patient limited by endurance     Plan    Physcial Therapy Plan  General Plan: 5-7 times per week  Current Treatment Recommendations: Strengthening;Balance training;ROM; Functional mobility training;Transfer training; Endurance training;Gait training;Stair training;Pain management;Home exercise program;Safety education & training;Patient/Caregiver education & training;Equipment evaluation, education, & procurement;Positioning; Therapeutic activities; Neuromuscular re-education     Restrictions  Restrictions/Precautions  Restrictions/Precautions: Fall Risk     Subjective    Subjective  Subjective: I feel tired this morning. Pt. agreeable to therapy.      Objective   Vitals  Heart Rate: 69  BP: 133/66  MAP (Calculated): 88.33  SpO2: 100 %  O2 Device: None (Room air)  Bed Mobility Training  Bed Mobility Training: Yes  Transfer Training  Sit to Stand: Stand-by assistance;Supervision  Stand to Sit: Stand-by assistance  Gait Training  Gait Training: (P) Yes  Gait  Overall Level of Assistance: (P) Stand-by assistance;Contact-guard assistance  Interventions: (P) Safety awareness training; Tactile cues; Verbal cues; Weight shifting training/pressure relief (Decrease safety awareness)  Speed/Sara: (P) Accelerated; Fluctuations  Stance: (P)  (Decrease quad control with aceleration and deceleration with rigid close chain control.)  Gait Abnormalities: (P) Altered arm swing; Ataxic;Scissoring (Left lateral sway with LOB > R. 3 slight LOB demo'd.)  Assistive Device: (P) Other (comment) (Traiing without AD due to use of no device prior. Pt. may benefit with AD prn.)  Rail Use: (P) Right  Stairs - Level of Assistance: (P) Contact-guard assistance;Minimum assistance (occasional min assistance for balance.)  Number of Stairs Trained: (P) 7     PT Exercises  A/AROM Exercises: (P) Seated LAQ 5# x 10' ( rigid) Decrease control and coordination). Static Standing Balance Exercises: (P) //bars: Standing ther ex B LE 5#: DF/PF x 10', Hamstring curls x 10', hip extension x 10', hip abduction x 10', hip flexion x 10'. Pt. fatigues quickly and required 2 seated rest breaks. Pt. demo's rigid LE ROM with decrease coordination demonstrating weakness. Standing ther ex performed with use of B UE support and close SBA/CGA for safety.              Goals  Short Term Goals  Time Frame for Short Term Goals: 2-3 days  Short Term Goal 1: transfers steadier with <= CS  Short Term Goal 2: amb >= 100ft with pusle ox post >= 95% less scissoring , least AD, <=SBA  Short Term Goal 3: 10 stairs one rail <= SBA  Short Term Goal 4: tolerate x 10 standing OPHELIA for strength and balance  Long Term Goals  Time Frame for Long Term Goals : 5-7 days  Long Term Goal 1: Transfers and bed mobiltiy modified independent  Long Term Goal 2: amb >= 150ft with least AD modidifed indpendnet , steadier  Long Term Goal 3: one flight of stairs on rail, <= supervision  Long Term Goal 4: Increase LE strength, balance and endurance any amount to achieve functional goalas  Patient Goals   Patient Goals : \"I want to be steadier and be stronger to go home. \"    Education  Patient Education  Education Given To: (P) Patient  Education Provided: (P) IADL Safety;Role of Therapy;Plan of Care    Therapy Time   Individual Concurrent Group Co-treatment   Time In  1050         Time Out  1150         Minutes  [address] .[id number]

## 2022-10-31 NOTE — PROGRESS NOTES
Psychosocial Assessment     Physical Appearance: Average    Dress: Neat    Hygiene: Good    Speech: Coherent    Affect/Mood: Calm and Cooperative    Alert and Orientated: Person and Place and situation    Thought Process: Relevant    Behavior: Cooperative    Resides:Patient reports residing with spouse in [facility]     DME: Lindsay Gray , and Shower Chair. Patient identifies no DME needs at this time     Support System: spouse / significant other    History of Mental Health:Patient reports feeling depressed at times however reports \"everyone gets depressed. \"  Patient reports feeling mood is well managed and desires no additional supports while at [facility]    Suicidal/ Homicidal:  No    Food: Patient reports that spouse is able to aid with grocery shopping and meal prep     Medication: [id number] in [facility] or [facility] in [address] [place] [address]/[facility] at home needs: none reported at this time    DC Plan: Patient reports desire to return home independently    DC Recommendations:  SS to follow and monitor patient's progress for DC needs    Plan for transition of care is related to the following treatment goals: Patient reports desire to go home and follow up with aquatics therapy    The patient was provided with choice of provider, and agrees with the discharge plan: on-going     The patient was provided with choice of all post acute providers and agrees with the discharge plan: on-going     Reviewed Care conference with patient and spouse. Meeting arranged for Wed. 11/2    Electronically signed by LOLI Nice on 10/31/2022 at 4:14 PM

## 2022-11-01 LAB
GLUCOSE BLD-MCNC: 112 MG/DL (ref 70–99)
GLUCOSE BLD-MCNC: 135 MG/DL (ref 70–99)
GLUCOSE BLD-MCNC: 138 MG/DL (ref 70–99)
GLUCOSE BLD-MCNC: 145 MG/DL (ref 70–99)
GLUCOSE BLD-MCNC: 167 MG/DL (ref 70–99)
PERFORMED ON: ABNORMAL

## 2022-11-01 PROCEDURE — 6370000000 HC RX 637 (ALT 250 FOR IP): Performed by: INTERNAL MEDICINE

## 2022-11-01 PROCEDURE — 97110 THERAPEUTIC EXERCISES: CPT

## 2022-11-01 PROCEDURE — 1200000002 HC SEMI PRIVATE SWING BED

## 2022-11-01 PROCEDURE — 97530 THERAPEUTIC ACTIVITIES: CPT

## 2022-11-01 PROCEDURE — 97116 GAIT TRAINING THERAPY: CPT

## 2022-11-01 PROCEDURE — 6360000002 HC RX W HCPCS: Performed by: INTERNAL MEDICINE

## 2022-11-01 RX ADMIN — AMOXICILLIN AND CLAVULANATE POTASSIUM 1 TABLET: 875; 125 TABLET, FILM COATED ORAL at 21:06

## 2022-11-01 RX ADMIN — CARVEDILOL 6.25 MG: 6.25 TABLET, FILM COATED ORAL at 21:06

## 2022-11-01 RX ADMIN — AMLODIPINE BESYLATE 10 MG: 10 TABLET ORAL at 08:16

## 2022-11-01 RX ADMIN — INSULIN GLARGINE 10 UNITS: 100 INJECTION, SOLUTION SUBCUTANEOUS at 08:07

## 2022-11-01 RX ADMIN — INSULIN HUMAN 8 UNITS: 100 INJECTION, SOLUTION PARENTERAL at 17:11

## 2022-11-01 RX ADMIN — PANTOPRAZOLE SODIUM 40 MG: 40 TABLET, DELAYED RELEASE ORAL at 06:10

## 2022-11-01 RX ADMIN — SODIUM BICARBONATE TAB 650 MG 650 MG: 650 TAB at 08:16

## 2022-11-01 RX ADMIN — CYCLOSPORINE 75 MG: 25 CAPSULE, LIQUID FILLED ORAL at 21:08

## 2022-11-01 RX ADMIN — TAMSULOSIN HYDROCHLORIDE 0.4 MG: 0.4 CAPSULE ORAL at 08:16

## 2022-11-01 RX ADMIN — SODIUM BICARBONATE TAB 650 MG 650 MG: 650 TAB at 21:06

## 2022-11-01 RX ADMIN — MYCOPHENOLATE MOFETIL 750 MG: 250 CAPSULE ORAL at 08:22

## 2022-11-01 RX ADMIN — INSULIN HUMAN 6 UNITS: 100 INJECTION, SOLUTION PARENTERAL at 08:08

## 2022-11-01 RX ADMIN — MYCOPHENOLATE MOFETIL 750 MG: 250 CAPSULE ORAL at 21:06

## 2022-11-01 RX ADMIN — PANTOPRAZOLE SODIUM 40 MG: 40 TABLET, DELAYED RELEASE ORAL at 17:10

## 2022-11-01 RX ADMIN — INSULIN HUMAN 8 UNITS: 100 INJECTION, SOLUTION PARENTERAL at 12:10

## 2022-11-01 RX ADMIN — GABAPENTIN 600 MG: 300 CAPSULE ORAL at 21:06

## 2022-11-01 RX ADMIN — CARVEDILOL 6.25 MG: 6.25 TABLET, FILM COATED ORAL at 08:17

## 2022-11-01 RX ADMIN — CYCLOSPORINE 75 MG: 25 CAPSULE, LIQUID FILLED ORAL at 08:20

## 2022-11-01 RX ADMIN — PREDNISONE 5 MG: 5 TABLET ORAL at 08:16

## 2022-11-01 RX ADMIN — AMOXICILLIN AND CLAVULANATE POTASSIUM 1 TABLET: 875; 125 TABLET, FILM COATED ORAL at 08:16

## 2022-11-01 RX ADMIN — Medication 20 MG: at 21:09

## 2022-11-01 RX ADMIN — GABAPENTIN 300 MG: 300 CAPSULE ORAL at 08:16

## 2022-11-01 ASSESSMENT — PAIN SCALES - GENERAL: PAINLEVEL_OUTOF10: 0

## 2022-11-01 NOTE — PROGRESS NOTES
Physical Therapy  Facility/Department: [facility] MED SURG UNIT  Daily Treatment Note  NAME: Leatha Burks  : 1950  MRN: [id number]    Date of Service: 2022    Discharge Recommendations:  (P) Continue to assess pending progress, Outpatient PT        Patient Diagnosis(es): There were no encounter diagnoses. Assessment   Assessment: (P) Pt. easily agitated but cooperative to perform each functional task. Pt. demo's quick fatigue, limited endurance, and decrease LE coordination or control with LE's with gait training, stair training, and standing ther ex. Mulitple manual, verbal, and tactile cues attempting to try and correct pt. form and technique to benefit with rehabilitation. Pt. demo's difficulty and limited tolerance to correct form with LE's with cues. However, pt. demo's slight improve response to try and correct at times. Recommend one person at all times for tsf's and gait to reduce falls. Pt. demo'd 2 LOB with gait training this a.m. to the left. Activity Tolerance: (P) Patient tolerated treatment well     Plan    Physcial Therapy Plan  General Plan: (P) 5-7 times per week  Current Treatment Recommendations: (P) Strengthening;Balance training;ROM; Functional mobility training;Transfer training; Endurance training;Gait training;Stair training;Pain management;Home exercise program;Safety education & training;Patient/Caregiver education & training;Equipment evaluation, education, & procurement;Positioning; Therapeutic activities; Neuromuscular re-education     Restrictions  Restrictions/Precautions  Restrictions/Precautions: Fall Risk     Subjective    Subjective  Subjective: (P) I am cold and agreeable to therapy.      Objective   Vitals     Balance  Sitting: Intact  Standing: Intact  Standing - Static: Good  Standing - Dynamic: Fair  Transfer Training  Transfer Training: Yes  Overall Level of Assistance: (P) Stand-by assistance  Interventions: (P) Safety awareness training;Weight shifting training/pressure relief  Sit to Stand: (P) Stand-by assistance  Stand to Sit: (P) Supervision  Gait Training  Gait Training: (P) Yes  Gait  Overall Level of Assistance: (P) Stand-by assistance;Contact-guard assistance  Interventions: (P) Safety awareness training; Tactile cues; Verbal cues; Weight shifting training/pressure relief  Gait Abnormalities: (P) Altered arm swing; Ataxic;Scissoring (2 LOB to Left with decrease weight shift to R tolerated. Pt. tends to demo LOB with scissor gait. CGA with manual cues to maintain balance.)  Distance (ft): (P)  (70'x6. Time spent to try to correct quality of gait pattern and increase balance.)  Assistive Device: (P) Other (comment) (No AD to assist with improving righting response to improve balance.)  Rail Use: (P) Right  Stairs - Level of Assistance: (P) Contact-guard assistance  Number of Stairs Trained: (P)  (7. Slow decrease eccentric control with ataxic recipical ascend/descend. Fatigues quickly with c/o R LE tightness and quad weakness. Pt. demo's struggle more with ascend and risk of falling with descend. )     PT Exercises  A/AROM Exercises: (P) Supported Standing B LE ther ex 5# x 10 - 15' reps with DF/PF, hamstring curls, hip flexion, hip abduction, hip extension, mini squats. Multiple cues needed to assist pt to correct form to benefit with strengthening. Pt. tends to demo limited tolerance and difficulty to correct form with cues at this time. Pt. demo's rigid functional ROM and decrease coordination with limited ROM tolerance. 3 seated rest breaks needed due to quick fatigue and limited endurance.              Goals  Short Term Goals  Time Frame for Short Term Goals: 2-3 days  Short Term Goal 1: transfers steadier with <= CS  Short Term Goal 2: amb >= 100ft with pusle ox post >= 95% less scissoring , least AD, <=SBA  Short Term Goal 3: 10 stairs one rail <= SBA  Short Term Goal 4: tolerate x 10 standing OPHELIA for strength and balance  Long Term Goals  Time Frame for Long Term Goals : 5-7 days  Long Term Goal 1: Transfers and bed mobiltiy modified independent  Long Term Goal 2: amb >= 150ft with least AD modidifed indpendnet , steadier  Long Term Goal 3: one flight of stairs on rail, <= supervision  Long Term Goal 4: Increase LE strength, balance and endurance any amount to achieve functional goalas  Patient Goals   Patient Goals : \"I want to be steadier and be stronger to go home. \"    Education       Therapy Time   Individual Concurrent Group Co-treatment   Time In  1030         Time Out  1130         Minutes  [address] [place], [place] .[address]

## 2022-11-01 NOTE — PROGRESS NOTES
Physical Therapy  Facility/Department: [facility] MED SURG UNIT  Daily Treatment Note  NAME: Margaret Aguirre  : 1950  MRN: [id number]    Date of Service: 2022    Discharge Recommendations:  Continue to assess pending progress, Outpatient PT        Patient Diagnosis(es): There were no encounter diagnoses. Assessment   Assessment: (P) Pt. needs extra time with max vc's to focus on improving step width and righting response awareness to reduce LOB. Pt. continues to demo scissor gait with LOB. However, pty. demo's slight improve step width with less scissor gait with use of cane. Pt. seems fustrated with less tolerance to use cane. However, pt. cooperative with redirecting. Pt. education continued to need address on safety awareness to cooperate with tolerating appropriate assist level need with contac guard assistance with gait and stair training. 3 LOB demo'd requiring assist for safety. Pt. continues to indicate decrease LE strength and control R > L. Will continue therapy to assist with increasing strength, endurance, and safety awareness to improve towards goals and quality of function. Activity Tolerance: Patient tolerated treatment well     Plan    Physcial Therapy Plan  General Plan: 5-7 times per week  Current Treatment Recommendations: Strengthening;Balance training;ROM; Functional mobility training;Transfer training; Endurance training;Gait training;Stair training;Pain management;Home exercise program;Safety education & training;Patient/Caregiver education & training;Equipment evaluation, education, & procurement;Positioning; Therapeutic activities; Neuromuscular re-education     Restrictions  Restrictions/Precautions  Restrictions/Precautions: Fall Risk     Subjective    Subjective  Subjective: Pt. up in chair and reports he is cold. Pt. agreeable to therapy. Pt. c/o R LE weakness with stairs.      Objective   Vitals     Balance  Sitting: Intact  Standing: Intact  Standing - Static: Good  Standing - Dynamic: Fair  Transfer Training  Transfer Training: Yes  Overall Level of Assistance: Stand-by assistance  Interventions: Safety awareness training;Weight shifting training/pressure relief  Sit to Stand: Stand-by assistance  Stand to Sit: Supervision  Stand Pivot Transfers: Stand-by assistance;Contact-guard assistance (VC's to complete stand pivot to line u before sitting to increase control and safe tsf technique.)  Gait Training  Gait Training: Yes  Gait  Overall Level of Assistance: Stand-by assistance;Contact-guard assistance  Interventions: Safety awareness training; Tactile cues; Verbal cues; Weight shifting training/pressure relief  Gait Abnormalities: Altered arm swing; Ataxic;Scissoring (Slight improvement with decrease scissor gait with use of cane. Pt. still continues to demo occasional LOB to left > R. Decrease R LE weight shift with reports of weakness.)  Distance (ft):  (150'x4 with 3 LOB. 3 seated rest breaks.)  Assistive Device: Cane, straight  Rail Use: Right  Stairs - Level of Assistance: Contact-guard assistance  Number of Stairs Trained: (P) 19 (Pt. struggles with decrease control with concentric and eccentric function indicating decrease strength. Pt. requires CGA for safety. Pt. demo's decrease safety awareness with attempting stairs without assistance and demo's LOB with Close SBA)     PT Exercises  A/AROM Exercises: (P) Recipical LE bike Level 5 resistance x 8 minutes. Pt. continued to need assistance with hoolding feet on pedals with decrease self functional awareness to maintain. Assist eventually with straps. Sit to stand x 10'. Seated LAQ x 20' Pt. demo's limited ROM participation with fatigue as reps increase.              Goals  Short Term Goals  Time Frame for Short Term Goals: 2-3 days  Short Term Goal 1: transfers steadier with <= CS  Short Term Goal 2: amb >= 100ft with pusle ox post >= 95% less scissoring , least AD, <=SBA  Short Term Goal 3: 10 stairs one rail <= SBA  Short Term Goal 4: tolerate x 10 standing OPHELIA for strength and balance  Long Term Goals  Time Frame for Long Term Goals : 5-7 days  Long Term Goal 1: Transfers and bed mobiltiy modified independent  Long Term Goal 2: amb >= 150ft with least AD modidifed indpendnet , steadier  Long Term Goal 3: one flight of stairs on rail, <= supervision  Long Term Goal 4: Increase LE strength, balance and endurance any amount to achieve functional goalas  Patient Goals   Patient Goals : \"I want to be steadier and be stronger to go home. \"    Education       Therapy Time   Individual Concurrent Group Co-treatment   Time In  115         Time Out  [address]         Minutes  [address] [place], [place] .[id number]

## 2022-11-01 NOTE — PROGRESS NOTES
Occupational Therapy  Facility/Department: [facility] MED SURG UNIT  Daily Treatment Note  NAME: Michelle Montoya  : 1950  MRN: [id number]    Date of Service: 2022    Discharge Recommendations:  Continue to assess pending progress, Home with Home health OT         Patient Diagnosis(es): There were no encounter diagnoses. Assessment    Assessment: pt performed exercise and demod good understanding of exercise and baallance poses reconended equipment and activity to persue at home  Activity Tolerance: Patient tolerated treatment well  Discharge Recommendations: Continue to assess pending progress;Home with Home health OT      Plan   Occupational Therapy Plan  Times Per Week: 3-7x/wk  Times Per Day: Once a day  Current Treatment Recommendations: Strengthening;Balance training;Functional mobility training; Endurance training;Neuromuscular re-education; Safety education & training;Patient/Caregiver education & training;Self-Care / ADL     Restrictions       Subjective   Subjective  Subjective: offered ADl shower ,prefers altrnate UE exer.            Objective    Vitals     Balance  Sitting: Intact  Standing: Intact  Standing - Static: Good  Standing - Dynamic: Fair  Transfer Training  Transfer Training: Yes  Overall Level of Assistance: Stand-by assistance  Interventions: Safety awareness training;Weight shifting training/pressure relief  Sit to Stand: Stand-by assistance  Stand to Sit: Supervision     ADL  LE Dressing: Supervision;Setup  LE Dressing Skilled Clinical Factors: donns shoes seated EOB  OT Exercises  A/AROM Exercises: 10 reps seaed ans standing HEP provided #1 weight  Resistive Exercises: resistance band push out ce press  Dynamic Sitting Balance Exercises: balloon volley return unsupported sit  Static Standing Balance Exercises: balance pose 2 min  Dynamic Standing Balance Exercises: R,L rotation head and torso           Patient Education  Education Given To: Patient  Education Provided: Home Exercise Program;Precautions;Transfer Training;Energy Conservation  Education Provided Comments: pt demos performance of HEP and ballance exercises  Education Method: Demonstration;Verbal;Printed Information/Hand-outs  Barriers to Learning: None  Education Outcome: Demonstrated understanding;Verbalized understanding    Goals  Short Term Goals  Short Term Goal 1: Mod I self care routine with good safety  Short Term Goal 2: mod I stand x 15 minutes to engage in daily routine  Short Term Goal 3: mod I BUE HEP to improve strength and activity toelrance for self care routine  Short Term Goal 4: Mod I item retrieval and transport to complete ADL and IADL  Long Term Goals  Long Term Goal 1: Mod I simple home making tasks with good balance       Therapy Time   Individual Concurrent Group Co-treatment   Time In 0830         Time Out 0930         Minutes [address]

## 2022-11-01 NOTE — PROGRESS NOTES
1800 Left arm drsg changed. Cleansed with NS. Wound bed pink , shallow edges defined. Adaptic nonadherent pad placed on top of wound with mepiplex covering wound. Pt tolerated procedure well. Pt is resting comfortably in bed currently voices no concerns with call light in place.      Sabrina Dumas RN

## 2022-11-02 LAB
GLUCOSE BLD-MCNC: 108 MG/DL (ref 70–99)
GLUCOSE BLD-MCNC: 118 MG/DL (ref 70–99)
GLUCOSE BLD-MCNC: 124 MG/DL (ref 70–99)
GLUCOSE BLD-MCNC: 220 MG/DL (ref 70–99)
PERFORMED ON: ABNORMAL

## 2022-11-02 PROCEDURE — 6370000000 HC RX 637 (ALT 250 FOR IP): Performed by: INTERNAL MEDICINE

## 2022-11-02 PROCEDURE — 97110 THERAPEUTIC EXERCISES: CPT

## 2022-11-02 PROCEDURE — 97116 GAIT TRAINING THERAPY: CPT

## 2022-11-02 PROCEDURE — 97530 THERAPEUTIC ACTIVITIES: CPT

## 2022-11-02 PROCEDURE — 6360000002 HC RX W HCPCS: Performed by: INTERNAL MEDICINE

## 2022-11-02 PROCEDURE — 1200000002 HC SEMI PRIVATE SWING BED

## 2022-11-02 RX ADMIN — AMOXICILLIN AND CLAVULANATE POTASSIUM 1 TABLET: 875; 125 TABLET, FILM COATED ORAL at 09:07

## 2022-11-02 RX ADMIN — MYCOPHENOLATE MOFETIL 750 MG: 250 CAPSULE ORAL at 21:11

## 2022-11-02 RX ADMIN — GABAPENTIN 300 MG: 300 CAPSULE ORAL at 09:07

## 2022-11-02 RX ADMIN — MYCOPHENOLATE MOFETIL 750 MG: 250 CAPSULE ORAL at 09:13

## 2022-11-02 RX ADMIN — TAMSULOSIN HYDROCHLORIDE 0.4 MG: 0.4 CAPSULE ORAL at 09:08

## 2022-11-02 RX ADMIN — SODIUM BICARBONATE TAB 650 MG 650 MG: 650 TAB at 09:07

## 2022-11-02 RX ADMIN — INSULIN HUMAN 6 UNITS: 100 INJECTION, SOLUTION PARENTERAL at 08:49

## 2022-11-02 RX ADMIN — INSULIN HUMAN 8 UNITS: 100 INJECTION, SOLUTION PARENTERAL at 17:10

## 2022-11-02 RX ADMIN — CYCLOSPORINE 75 MG: 25 CAPSULE, LIQUID FILLED ORAL at 09:10

## 2022-11-02 RX ADMIN — CARVEDILOL 6.25 MG: 6.25 TABLET, FILM COATED ORAL at 09:07

## 2022-11-02 RX ADMIN — INSULIN GLARGINE 10 UNITS: 100 INJECTION, SOLUTION SUBCUTANEOUS at 09:04

## 2022-11-02 RX ADMIN — PANTOPRAZOLE SODIUM 40 MG: 40 TABLET, DELAYED RELEASE ORAL at 06:29

## 2022-11-02 RX ADMIN — GABAPENTIN 600 MG: 300 CAPSULE ORAL at 21:12

## 2022-11-02 RX ADMIN — Medication 20 MG: at 21:17

## 2022-11-02 RX ADMIN — SODIUM BICARBONATE TAB 650 MG 650 MG: 650 TAB at 21:13

## 2022-11-02 RX ADMIN — INSULIN GLARGINE 10 UNITS: 100 INJECTION, SOLUTION SUBCUTANEOUS at 21:25

## 2022-11-02 RX ADMIN — CYCLOSPORINE 75 MG: 25 CAPSULE, LIQUID FILLED ORAL at 21:16

## 2022-11-02 RX ADMIN — PANTOPRAZOLE SODIUM 40 MG: 40 TABLET, DELAYED RELEASE ORAL at 16:07

## 2022-11-02 RX ADMIN — PREDNISONE 5 MG: 5 TABLET ORAL at 09:07

## 2022-11-02 RX ADMIN — INSULIN LISPRO 2 UNITS: 100 INJECTION, SOLUTION INTRAVENOUS; SUBCUTANEOUS at 21:28

## 2022-11-02 RX ADMIN — CARVEDILOL 6.25 MG: 6.25 TABLET, FILM COATED ORAL at 21:13

## 2022-11-02 RX ADMIN — INSULIN HUMAN 8 UNITS: 100 INJECTION, SOLUTION PARENTERAL at 12:04

## 2022-11-02 RX ADMIN — AMLODIPINE BESYLATE 10 MG: 10 TABLET ORAL at 09:07

## 2022-11-02 NOTE — PROGRESS NOTES
Facility/Department: [facility] SUBACUTE UNIT  Daily Treatment Note  NAME: Alicia Blevins  : 1950  MRN: [id number]    Date of Service: 2022    Discharge Recommendations:  Outpatient PT , PRNA at home       Patient Diagnosis(es): There were no encounter diagnoses. Difficulty walking, LE weakness    Assessment       Pt making good progress, on target for discharge this 22. Pt agreeable to discharge 22    Plan      5-7x week 1-2 x per day PT for strengthening , gait, stairs, balance, endurance    Restrictions  Restrictions/Precautions  Restrictions/Precautions: Fall Risk     Subjective      \"I feel good enough to leave on Friday    Objective   Pt and spouse present for Interdisciplinary Care Conference this date. See separate note for full report. Goals  Short Term Goals  Time Frame for Short Term Goals: 2-3 days  Short Term Goal 1: transfers steadier with <= CS  Short Term Goal 2: amb >= 100ft with pusle ox post >= 95% less scissoring , least AD, <=SBA  Short Term Goal 3: 10 stairs one rail <= SBA  Short Term Goal 4: tolerate x 10 standing OPHELIA for strength and balance  Long Term Goals  Time Frame for Long Term Goals : 5-7 days  Long Term Goal 1: Transfers and bed mobiltiy modified independent  Long Term Goal 2: amb >= 150ft with least AD modidifed indpendnet , steadier  Long Term Goal 3: one flight of stairs on rail, <= supervision  Long Term Goal 4: Increase LE strength, balance and endurance any amount to achieve functional goalas  Patient Goals   Patient Goals : \"I want to be steadier and be stronger to go home. \"    Education- plan , goals, OP PT for gait , balance, strength and endurance       Therapy Time   Individual Concurrent Group Co-treatment   Time In  1000         Time Out  1030         Minutes  [address] [place], [id number]

## 2022-11-02 NOTE — PROGRESS NOTES
Nephrology Progress Note    Assessment:  CKD3b  S/P  kidney transplant chronic rejection  DM type-2  Anemia      Plan: tolerating rehab at [facility] discharge tomorrow    Patient Active Problem List:     Pneumonia     Abdominal pain, other specified site     Acute pyelonephritis without lesion of renal medullary necrosis     Anemia     Essential hypertension     Nonspecific abnormal results of thyroid function study     Mixed hyperlipidemia     Kidney replaced by transplant     Intra-abdominal abscess post-procedure     Infection due to Enterococcus     Fever and other physiologic disturbances of temperature regulation     Chronic kidney disease (CKD)     Type II or unspecified type diabetes mellitus without mention of complication, uncontrolled     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     Anginal chest pain at rest [facility])     Atypical chest pain     Chronic cough     Unstable angina (HCC)     Chest pain     Atrial fibrillation with RVR (HCC)     Symptomatic anemia     Acute upper GI bleed     Dieulafoy lesion of colon     Poorly controlled diabetes mellitus (HCC)     Lung nodules     COPD without exacerbation (HCC)     Abnormal CT of the chest     Bronchiectasis without complication (HCC)     GI bleeding      Subjective:  Admit Date: 10/28/2022    Interval History: leans to left while ambulating      Medications:  Scheduled Meds:   cycloSPORINE modified  75 mg Oral BID    insulin regular  6 Units SubCUTAneous Daily with breakfast    insulin regular  8 Units SubCUTAneous BID WC    predniSONE  5 mg Oral Daily    tamsulosin  0.4 mg Oral Daily    carvedilol  6.25 mg Oral BID    epoetin megan-epbx  10,000 Units SubCUTAneous Q7 Days    insulin glargine  10 Units SubCUTAneous BID    insulin lispro  0-8 Units SubCUTAneous 4x Daily    sodium bicarbonate  650 mg Oral BID    amLODIPine  10 mg Oral Daily    pantoprazole  40 mg Oral BID AC    simvastatin  20 mg Oral Nightly    gabapentin  300 mg Oral Daily    gabapentin  600 mg Oral Nightly    mycophenolate  750 mg Oral BID     Continuous Infusions:   dextrose         CBC:   Recent Labs     10/31/22  0610   WBC 7.5   HGB 7.9*        CMP:    Recent Labs     10/31/22  0610      K 4.9   *   CO2 18*   BUN 43*   CREATININE 2.09*   GLUCOSE 169*   CALCIUM 9.0   LABGLOM 33.3*     Troponin: No results for input(s): TROPONINI in the last 72 hours. BNP: No results for input(s): BNP in the last 72 hours. INR: No results for input(s): INR in the last 72 hours. Lipids: No results for input(s): CHOL, LDLDIRECT, TRIG, HDL, AMYLASE, LIPASE in the last 72 hours. Liver:   Recent Labs     10/31/22  0610   AST 7   ALT 7   ALKPHOS 59   PROT 5.5*   LABALBU 3.4*   BILITOT 0.5     Iron:  No results for input(s): IRONS, FERRITIN in the last 72 hours. Invalid input(s): LABIRONS  Urinalysis: No results for input(s): UA in the last 72 hours.     Objective:  Vitals: BP (!) 155/61   Pulse 64   Temp 98.5 °F (36.9 °C) (Oral)   Resp 18   Ht 6' 2\" (1.88 m)   Wt 185 lb (83.9 kg)   SpO2 99%   BMI 23.75 kg/m²    Wt Readings from Last 3 Encounters:   10/28/22 185 lb (83.9 kg)   10/24/22 185 lb (83.9 kg)   10/14/22 195 lb 6.4 oz (88.6 kg)      24HR INTAKE/OUTPUT:    Intake/Output Summary (Last 24 hours) at 11/2/2022 1124  Last data filed at 11/1/2022 2104  Gross per 24 hour   Intake 480 ml   Output --   Net 480 ml       General: alert, in no apparent distress  HEENT: normocephalic, atraumatic, anicteric  Neck: supple, no mass  Lungs: non-labored respirations, clear to auscultation bilaterally  Heart: regular rate and rhythm, no murmurs or rubs  Abdomen: soft, non-tender, non-distended  Ext: no cyanosis, no peripheral edema  Neuro: alert and oriented, no gross abnormalities  Psych: normal mood and affect  Skin: no rash      Electronically signed by Kristen Ennis DO, MD

## 2022-11-02 NOTE — DISCHARGE INSTR - COC
Continuity of Care Form    Patient Name: Madeline Kraft   :  1950  MRN:  [id number]    Admit date:  10/28/2022  Discharge date:  ***    Code Status Order: Full Code   Advance Directives:     Admitting Physician:  Edgardo Quezada MD  PCP: Piper Zarco DO    Discharging Nurse: [facility] Unit/Room#: 6897/9177-70  Discharging Unit Phone Number: ***    Emergency Contact:   Extended Emergency Contact Information  Primary Emergency Contact: Mercedes Whittaker [address] [place] [address] Phone: [phone]  Relation: Spouse  Secondary Emergency Contact: Judi Cassette  Home Phone: [phone]  Relation: Child    Past Surgical History:  Past Surgical History:   Procedure Laterality Date    BRAIN SURGERY Left 1990    to eliminate seizures    COLONOSCOPY N/A 10/24/2022    COLONOSCOPY DIAGNOSTIC performed by Nga Traore MD at [facility]      [place] [address] INCL FLUOR GDNCE DX W/CELL [address] Ferr[address] N/A 3/20/2018    BRONCHOSCOPY performed by Dorcas Boudreaux MD at [address] [place] [address] 10/21/2022    EGD DIAGNOSTIC ONLY performed by Olga Light MD at [facility]       Immunization History:   Immunization History   Administered Date(s) Administered    Influenza Virus Vaccine 2003, 2010    Influenza, FLUZONE (age 72 y+), High Dose, 0.7mL 10/07/2020    Influenza, High Dose (Fluzone 65 yrs and older) 10/18/2018, 2019    Pneumococcal Polysaccharide ([id number]) 2011    Tdap (Boostrix, Adacel) 2020       Active Problems:  Patient Active Problem List   Diagnosis Code    Pneumonia J18.9    Abdominal pain, other specified site R10.9    Acute pyelonephritis without lesion of renal medullary necrosis N10    Anemia D64.9    Essential hypertension I10    Nonspecific abnormal results of thyroid function study R94.6    Mixed hyperlipidemia E78.2    Kidney replaced by transplant Z94.0    Intra-abdominal abscess post-procedure T81.43XA    Infection due to Enterococcus A49.1    Fever and other physiologic disturbances of temperature regulation R68.89    Chronic kidney disease (CKD) N18.9    Type II or unspecified type diabetes mellitus without mention of complication, uncontrolled [id number]    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    Anginal chest pain at rest [facility]) I20.8    Atypical chest pain R07.89    Chronic cough R05.3    Unstable angina (HCC) I20.0    Chest pain R07.9    Atrial fibrillation with RVR ([facility]) I48.91    Symptomatic anemia D64.9    Acute upper GI bleed K92.2    Dieulafoy lesion of colon K63.81    Poorly controlled diabetes mellitus ([facility]) E11.65    Lung nodules R91.8    COPD without exacerbation ([facility]) J44.9    Abnormal CT of the chest R93.89    Bronchiectasis without complication ([facility]) T47.0    GI bleeding K92.2       Isolation/Infection:   Isolation            No Isolation          Patient Infection Status       None to display            Nurse Assessment:  Last Vital Signs: BP (!) 155/61   Pulse 64   Temp 98.5 °F (36.9 °C) (Oral)   Resp 18   Ht 6' 2\" (1.88 m)   Wt 185 lb (83.9 kg)   SpO2 99%   BMI 23.75 kg/m²     Last documented pain score (0-10 scale): Pain Level: 0  Last Weight:   Wt Readings from Last 1 Encounters:   10/28/22 185 lb (83.9 kg)     Mental Status:  {IP PT MENTAL STATUS:85226}    IV Access:  { SHAHEEN IV ACCESS:312653695}    Nursing Mobility/ADLs:  Walking   {P DME SBVX:270041019}  Transfer  {P DME IYNT:410075185}  Bathing  {P DME RTIR:059574924}  Dressing  {[facility] DME YCJW:477015276}  Toileting  {P DME AZIJ:050710620}  Feeding  {P DME QGC}  Med Admin  {[facility] DME CYAR:396886227}  Med Delivery   { SHAHEEN MED Delivery:230275704}    Wound Care Documentation and Therapy:  Wound 10/19/22 Pretibial Left;Proximal (Active)   Wound Etiology Skin Tear 2215   Dressing Status Clean;Dry; Intact 22 3647   Wound Cleansed Soap and water 11/01/22 0815   Dressing/Treatment Open to air 11/01/22 0815   Dressing Change Due 10/26/22 11/01/22 0815   Wound Length (cm) 0.8 cm 10/20/22 0945   Wound Width (cm) 1 cm 10/20/22 0945   Wound Depth (cm) 0 cm 10/20/22 0945   Wound Surface Area (cm^2) 0.8 cm^2 10/20/22 0945   Wound Volume (cm^3) 0 cm^3 10/20/22 0945   Wound Assessment [place]/red;Superficial 11/01/22 0815   Drainage Amount None 11/01/22 0815   Drainage Description Serosanguinous 11/01/22 0815   Odor None 11/01/22 0815   Herlinda-wound Assessment Ecchymosis 11/01/22 0815   Margins Defined edges 11/01/22 0815   Wound Thickness Description not for Pressure Injury Partial thickness 11/01/22 0815   Number of days: 14       Wound 10/19/22 Arm Distal;Left;Lower;Dorsal small skin tear x2 (Active)   Wound Etiology Skin Tear 11/01/22 0815   Dressing Status Clean;Dry; Intact 11/01/22 0815   Wound Cleansed Cleansed with saline 11/01/22 0815   Dressing/Treatment Open to air 11/01/22 0815   Dressing Change Due 10/26/22 11/01/22 0815   Wound Depth (cm) 0 cm 10/20/22 0945   Wound Assessment Pink/red;Superficial;Dry 11/01/22 0815   Drainage Amount None 11/01/22 0815   Odor None 11/01/22 0815   Herlinda-wound Assessment Ecchymosis 11/01/22 0815   Margins Defined edges 11/01/22 0815   Wound Thickness Description not for Pressure Injury Partial thickness 11/01/22 0815   Number of days: 14       Wound 10/19/22 Arm Left; Lower;Proximal;Dorsal (Active)   Wound Etiology Skin Tear 11/01/22 0815   Dressing Status New dressing applied;Clean;Dry; Intact 11/01/22 0815   Wound Cleansed Cleansed with saline 11/01/22 0815   Dressing/Treatment Collagen with Ag; Foam 11/01/22 0815   Dressing Change Due 11/02/22 11/01/22 0815   Wound Length (cm) 5 cm 10/20/22 0945   Wound Width (cm) 3 cm 10/20/22 0945   Wound Depth (cm) 0 cm 10/20/22 0945   Wound Surface Area (cm^2) 15 cm^2 10/20/22 0945   Wound Volume (cm^3) 0 cm^3 10/20/22 0945   Wound Assessment Pink/red;Superficial;Bleeding 10/24/22 1915 Drainage Amount None 22   Drainage Description Sanguinous 2215   Odor None 22   Herlinda-wound Assessment Ecchymosis 22   Margins Defined edges 22   Wound Thickness Description not for Pressure Injury Partial thickness 22   Number of days: 14        Elimination:  Continence: Bowel: {YES / GY:60415}  Bladder: {YES / MK:16751}  Urinary Catheter: {Urinary Catheter:956655376}   Colostomy/Ileostomy/Ileal Conduit: {YES / KZ:37263}       Date of Last BM: ***    Intake/Output Summary (Last 24 hours) at 2022 1529  Last data filed at 2022 210  Gross per 24 hour   Intake 120 ml   Output --   Net 120 ml     I/O last 3 completed shifts:   In: 1080 [P.O.:1080]  Out: -     Safety Concerns:     [address] [employer] Safety Concerns:398120614}    Impairments/Disabilities:      [address] [employer] Impairments/Disabilities:876361319}    Nutrition Therapy:  Current Nutrition Therapy:   [address] [employer] Diet List:851816956}    Routes of Feeding: {CHP DME Other Feedings:267008259}  Liquids: {Slp liquid thickness:85632}  Daily Fluid Restriction: {CHP DME Yes amt example:963670381}  Last Modified Barium Swallow with Video (Video Swallowing Test): {Done Not Done DDV}    Treatments at the Time of Hospital Discharge:   Respiratory Treatments: ***  Oxygen Therapy:  {Therapy; copd oxygen:71042}  Ventilator:    { CC Vent VVVS:537636905}    Rehab Therapies: {THERAPEUTIC INTERVENTION:7183466682}  Weight Bearing Status/Restrictions: [employer] Weight Bearin}  Other Medical Equipment (for information only, NOT a DME order):  {EQUIPMENT:851660286}  Other Treatments: ***    Patient's personal belongings (please select all that are sent with patient):  {CHP DME Belongings:940891277}    RN SIGNATURE:  {Esignature:968512777}    CASE MANAGEMENT/SOCIAL WORK SECTION    Inpatient Status Date: 10/28/22    Readmission Risk Assessment Score:  Readmission Risk              Risk of Unplanned Readmission: 28           Discharging to Facility/ Agency   Name: [facility]  Address: [address]  Phone: [phone]  Fax:[phone]    / signature: Electronically signed by LOLI Arrington on 11/2/22 at 3:32 PM EDT    PHYSICIAN SECTION    Prognosis: Good    Condition at Discharge: Stable    Rehab Potential (if transferring to Rehab): Good    Recommended Labs or Other Treatments After Discharge:     Physician Certification: I certify the above information and transfer of Brian Paez  is necessary for the continuing treatment of the diagnosis listed and that he requires Home Care for greater 30 days.      Update Admission H&P: No change in H&P    PHYSICIAN SIGNATURE:  Electronically signed by Angus Holland MD on 11/4/22 at 7:35 AM EDT

## 2022-11-02 NOTE — PROGRESS NOTES
Spiritual Care Services     Summary of Visit:  Pt shared the story of his multiple visits to the ED and multiple resulting admissions to hospital. Afraid at one point that his kidney transplant had failed. Described an emotional roller coaster and fear of end of life. Active listening and supportive presence provided. Named places of hope, laura, and meaning in his healing journey. Encouraged continued connection with family, friends, and his Mosque community as those things have helped him significantly during this time. Family is Faith, with ministers in the family. Provided prayer and encouragement upon request. Feeling safe and secure at this time, however some lingering emotion from the trauma yet present. Encounter Summary  Encounter Overview/Reason : Spiritual/Emotional Needs, Grief, Loss, and Adjustments  Service Provided For[de-identified] Patient and family together  [facility]  Support System: Spouse, Children, Temple/steven community, Family members  Last Encounter : 09/26/22  Complexity of Encounter: Moderate  Encounter   Type:  Follow up     Spiritual/Emotional needs  Type: Emotional Distress, Spiritual Distress     Grief, Loss, and Adjustments  Type: Life Adjustments, Adjustment to illness      Spiritual Assessment/Intervention/Outcomes:    Assessment: Concerns with suffering, Anxious, Compromised coping, Despair, Impaired resilience, Interrupted family processes, Stress overload    Intervention: Active listening, Discussed illness injury and its impact, Discussed meaning/purpose, Discussed relationship with God, Explored/Affirmed feelings, thoughts, concerns, Explored Coping Skills/Resources, Prayer (assurance of)/[place], Sustaining Presence/Ministry of presence    Outcome: Comfort      Care Plan:    Plan and Referrals  Plan/Referrals: Continue Support (comment)    Spiritual Care Services   Electronically signed by Radha Carlson [facility] on 11/2/2022 at 3:40 PM.    To reach a  for emotional and spiritual support, place an [facility] consult request.   If a  is needed immediately, dial 0 and ask to page the on-call .

## 2022-11-02 NOTE — PROGRESS NOTES
Facility/Department: [facility] MED SURG UNIT  Daily Treatment Note  NAME: Fanny Howard  : 1950  MRN: [id number]    Date of Service: 2022    Discharge Recommendations:  Continue to assess pending progress, Home with assist PRN         Patient Diagnosis(es): There were no encounter diagnoses. Assessment    Assessment: Pt completed BUE ther ex, reviewed HEP, standing tolerance/balance during session. Ther ex completed in stance to challenge balance and activity tolerance. Answered pt and spouse's questions about follow up therapy, was able to provide education. Recommended pt see ortho for R shoulder concerns. Activity Tolerance: Patient tolerated treatment well;Patient limited by fatigue;Patient limited by endurance  Discharge Recommendations: Continue to assess pending progress;Home with assist PRN      Plan   Occupational Therapy Plan  Times Per Week: 3-7x/wk  Times Per Day: Once a day  Current Treatment Recommendations: Strengthening;Balance training;Functional mobility training; Endurance training;Neuromuscular re-education; Safety education & training;Patient/Caregiver education & training;Self-Care / ADL       Subjective   Subjective  Subjective: \"what are we gonna do? \"  Pain: Denies pre/post pain  Orientation  Overall Orientation Status: Within Normal Limits  Orientation Level: Oriented X4  Cognition  Cognition Comment: Tangential and irritable at times        Objective    Vitals  Vitals  Heart Rate: 64  SpO2: 99 %  O2 Device: None (Room air)  Bed Mobility Training  Bed Mobility Training: No  Transfer Training  Transfer Training: Yes  Overall Level of Assistance: Supervision (Sit<>stand from chair with no device supervision)  Interventions: Safety awareness training;Verbal cues (Low tolerance to respond to vc's to correct hand placement with stand to sit with use of hurricane.  Decrease response to reach to sit.)  Sit to Stand: Stand-by assistance  Stand to Sit: Stand-by assistance  Gait Training  Gait Training: Yes  Gait  Overall Level of Assistance: Stand-by assistance;Contact-guard assistance  Interventions: Safety awareness training; Tactile cues; Verbal cues; Weight shifting training/pressure relief  Speed/Sara: Accelerated; Fluctuations  Gait Abnormalities: Altered arm swing; Ataxic;Scissoring  Distance (ft):  (150'x1, 100'x1, 150'x1, 50'x1)  Assistive Device:  (No device due to pt. insisting not to use AD for support. Pt. refusing to use cane with recommendation.)  Rail Use: Right  Stairs - Level of Assistance: Contact-guard assistance;Stand-by assistance (Attempt to use stand by assistance/contact guard assist with descending and ascending stairs with recipical ROM. Pt. demo's slight improve control descending this date. Pt. struggled more with ascending with increase fatigue last 5 steps. SOB, SpO2 100%)  Number of Stairs Trained: 19     ADL  Feeding: Independent  Grooming: Supervision  UE Bathing: Supervision  LE Bathing: Supervision  UE Dressing: Supervision  LE Dressing: Supervision  Toileting: Supervision  Additional Comments: Pt reports he has been up to bathroom and completed shower this date without assistance. OT Exercises  Exercise Treatment: BUE ther ex with 2# dowel x10 reps all available planes to increase strength and activity tolerance for ADLs. Safety Devices  Type of Devices: All fall risk precautions in place;Call light within reach; Bed alarm in place; Left in bed; Chair alarm in place  Restraints  Restraints Initially in Place: No     Patient Education  Education Given To: Patient; Family  Education Provided: Home Exercise Program;Precautions;Transfer Training;Energy Conservation;Plan of Care;Role of Therapy; Family Education  Education Provided Comments: Written HEP provided with pt and OT going over each exercise  Education Method: Demonstration;Verbal;Printed Information/Hand-outs  Barriers to Learning: None  Education Outcome: Demonstrated understanding;Verbalized understanding    Goals  Short Term Goals  Short Term Goal 1: Mod I self care routine with good safety  Short Term Goal 2: mod I stand x 15 minutes to engage in daily routine  Short Term Goal 3: mod I BUE HEP to improve strength and activity toelrance for self care routine  Short Term Goal 4: Mod I item retrieval and transport to complete ADL and IADL  Long Term Goals  Long Term Goal 1: Mod I simple home making tasks with good balance       Therapy Time   Individual Concurrent Group Co-treatment   Time In 1350         Time Out 1425         Minutes [address] [place] [address]

## 2022-11-02 NOTE — PROGRESS NOTES
Patient attended care conference, accompanied by wife. I reviewed recent labs with patient. He verbalized no questions and no concerns. We will continue to follow. He was made aware to contact the [facility] supervisor with any concerns. Plan is discharge Friday.

## 2022-11-02 NOTE — PROGRESS NOTES
Physical Therapy  Facility/Department: [facility] MED SURG UNIT  Daily Treatment Note  NAME: Eddie Martini  : 1950  MRN: [id number]    Date of Service: 2022    Discharge Recommendations:  (P) Continue to assess pending progress, Outpatient PT        Patient Diagnosis(es): There were no encounter diagnoses. Assessment   Assessment: (P) Pt. able to slightly improve step width with mild stagger in p.m. with gait pattern until increase fatigue demo'd with SOB. Pt. began to demo scissor gait following ascending stairway. Seated rest breaks used to assist with reducing exertion. Focus on limited rest break time to assist with improving endurance. Continued to focus on improving  LE strengthening to assist with strength and balance. Activity Tolerance: (P) Patient tolerated treatment well;Patient limited by fatigue;Patient limited by endurance     Plan    Physcial Therapy Plan  General Plan: (P) 5-7 times per week  Current Treatment Recommendations: (P) Strengthening;Balance training;ROM; Functional mobility training;Transfer training; Endurance training;Gait training;Stair training;Pain management;Home exercise program;Safety education & training;Patient/Caregiver education & training;Equipment evaluation, education, & procurement;Positioning; Therapeutic activities; Neuromuscular re-education     Restrictions  Restrictions/Precautions  Restrictions/Precautions: Fall Risk     Subjective    Subjective  Subjective: Pt. up in chair and agreeable to therapy. No complaints. Objective   Vitals     Transfer Training  Transfer Training: Yes  Interventions: Safety awareness training;Verbal cues (Low tolerance to respond to vc's to correct hand placement with stand to sit with use of hurricane.  Decrease response to reach to sit.)  Sit to Stand: Stand-by assistance  Stand to Sit: Stand-by assistance  Gait Training  Gait Training: Yes  Gait  Overall Level of Assistance: Stand-by assistance;Contact-guard assistance  Interventions: Safety awareness training; Tactile cues; Verbal cues; Weight shifting training/pressure relief  Speed/Sara: Accelerated; Fluctuations  Gait Abnormalities: Altered arm swing; Ataxic;Scissoring  Distance (ft): (P)  (150'x1, 100'x1, 150'x1, 50'x1)  Assistive Device:  (No device due to pt. insisting not to use AD for support. Pt. refusing to use cane with recommendation.)  Rail Use: Right  Stairs - Level of Assistance: (P) Contact-guard assistance;Stand-by assistance (Attempt to use stand by assistance/contact guard assist with descending and ascending stairs with recipical ROM. Pt. demo's slight improve control descending this date. Pt. struggled more with ascending with increase fatigue last 5 steps. SOB, SpO2 100%)  Number of Stairs Trained: (P) 19     PT Exercises  A/AROM Exercises: (P) Recipical bike level 7 x 8 minutes to assist with strength and endurance. SOB SpO2 100%  Static Standing Balance Exercises: Supported stand ther ex B LE: 5# 3 x 10' with DF, PF, hamstring curls, hip extension, hip abduction, hip flexion. mini squats. 2 seated rest breaks with reports of fatigue and SOB. Dynamic Standing Balance Exercises: (P) 6 inch step up and over  and B side stepping in // bars x 10 laps each with UE support. two seated rest break to reduce exertion. SpO2 100%             Goals  Short Term Goals  Time Frame for Short Term Goals: 2-3 days  Short Term Goal 1: transfers steadier with <= CS  Short Term Goal 2: amb >= 100ft with pusle ox post >= 95% less scissoring , least AD, <=SBA  Short Term Goal 3: 10 stairs one rail <= SBA  Short Term Goal 4: tolerate x 10 standing OPHELIA for strength and balance  Long Term Goals  Time Frame for Long Term Goals : 5-7 days  Long Term Goal 1: Transfers and bed mobiltiy modified independent  Long Term Goal 2: amb >= 150ft with least AD modidifed indpendnet , steadier  Long Term Goal 3: one flight of stairs on rail, <= supervision  Long Term Goal 4:  Increase LE strength, balance and endurance any amount to achieve functional goalas  Patient Goals   Patient Goals : \"I want to be steadier and be stronger to go home. \"    Education  Patient Education  Education Given To: Patient  Education Provided: IADL Safety;Role of Therapy;Plan of Care  Education Method: Verbal  Barriers to Learning: Other (Comment) (Pt. tends to talk over writer while attempting to educate and redirect pt.  Pt appears fustrated and convinced to do things his way.)  Education Outcome: Continued education needed    Therapy Time   Individual Concurrent Group Co-treatment   Time In  1230         Time Out  130         Minutes  [address] [place], [place] .[address]

## 2022-11-02 NOTE — PROGRESS NOTES
Met with patient and family for weekly care conference, current diet remains appropriate and well tolerated . Intake appears adequate to meet estimated needs at this time. D/C needs- recommend continue with diabetic oral nutrition supplement. RDN to continue to monitor for duration of admission.

## 2022-11-02 NOTE — PROGRESS NOTES
Chart reviewed. Patient continues to receive skilled therapies at [facility]. Multidisciplinary team met with patient and spouse in care conference. Discussed patient's care and DC planning. Patient reports plan to return home with spouse and go to out patient therapy upon DC from [facility]. Patient identifies [facility] as facility of choice. Patient and family identify no further help at home or DC needs at this time. Patient's tentative DC date is Friday 11/4. Pateint's spouse plans to transport patient home via private car. This  assisted with obtaining out patient PT order from [facility] hospitalist.  This  then contacted [facility] in [place] via phone and provided new referral to [place]. Out patient PT order along with supporting documents faxed to [facility] patient therapy and rehab in [place] as requested. [facility] [place] reports that rehab will contact patient or family to arrange first appointment. SHAHEEN completed. SS to continue to follow as needed while patient is at [facility].

## 2022-11-02 NOTE — PROGRESS NOTES
Physical Therapy  Facility/Department: [facility] MED SURG UNIT  Daily Treatment Note  NAME: Michelle Montoya  : 1950  MRN: [id number]    Date of Service: 2022    Discharge Recommendations:  Continue to assess pending progress, Outpatient PT        Patient Diagnosis(es): There were no encounter diagnoses. Assessment   Assessment: (P) Pt. fustrated at first arrival. Moreover, pt. followed with cooperation during intervention with gait training and ther ex. Pt. demo's occasional slight LOB with gait. Pt. was able to self correct with use of cane and righting of feet this a.m. with shorter gait distance. Pt. demo's low tolerance to want to use cane. Trialed third gait trial without cane with pt. demonstrating one stagger with scssoring of feet. Pt. able to self correct with close stand by assistance. Pt. tolerated added 3 sets of 10 reps to assist with improving quality of form and technique for LE strengthening. Intermittent rest breaks used to reduce exertion and safety. Pt. reports fatigue. Activity Tolerance: Patient tolerated treatment well     Plan    Physcial Therapy Plan  General Plan: 5-7 times per week  Current Treatment Recommendations: Strengthening;Balance training;ROM; Functional mobility training;Transfer training; Endurance training;Gait training;Stair training;Pain management;Home exercise program;Safety education & training;Patient/Caregiver education & training;Equipment evaluation, education, & procurement;Positioning; Therapeutic activities; Neuromuscular re-education     Restrictions  Restrictions/Precautions  Restrictions/Precautions: Fall Risk     Subjective    Subjective  Subjective: Pt. aggrivated at arrival reporting limited tolerance.      Objective   Vitals     Transfer Training  Transfer Training: Yes  Interventions: Safety awareness training;Verbal cues ( occasional decrease completion of stand pivot to sit and decrease use of UE hand placement with use of AD. VC's to encourage lining up to seated surface to reduce risk of incident. Sit to Stand: Stand-by assistance  Stand to Sit: Stand-by assistance  Gait Training  Gait Training: Yes  Gait  Overall Level of Assistance: Stand-by assistance;Contact-guard assistance  Interventions: Safety awareness training; Tactile cues; Verbal cues; Weight shifting training/pressure relief  Speed/Sara: Accelerated; Fluctuations  Gait Abnormalities: (P) Altered arm swing; Ataxic;Scissoring (Pt. occasional staggers and demo's scissor gait pattern. Pt. tneds to demo more of L lateral LOB > R. Pt. indicates decrease functional weight bear on R LE. Pt. reports R LE and R UE weakness > L extremities.)  Distance (ft): (P)  (60'x1, 80' x1, 60'x1)  Assistive Device: (P) Cane, straight;Other (comment) (hurricane with first 2 trials. Pt. insisted to ambulate back to room without cane. )     PT Exercises  Static Standing Balance Exercises: (P) Supported stand ther ex B LE: 5# 3 x 10' with DF, PF, hamstring curls, hip extension, hip abduction, hip flexion. mini squats. 2 seated rest breaks with reports of fatigue and SOB. Goals  Short Term Goals  Time Frame for Short Term Goals: 2-3 days  Short Term Goal 1: transfers steadier with <= CS  Short Term Goal 2: amb >= 100ft with pusle ox post >= 95% less scissoring , least AD, <=SBA  Short Term Goal 3: 10 stairs one rail <= SBA  Short Term Goal 4: tolerate x 10 standing OPHELIA for strength and balance  Long Term Goals  Time Frame for Long Term Goals : 5-7 days  Long Term Goal 1: Transfers and bed mobiltiy modified independent  Long Term Goal 2: amb >= 150ft with least AD modidifed indpendnet , steadier  Long Term Goal 3: one flight of stairs on rail, <= supervision  Long Term Goal 4: Increase LE strength, balance and endurance any amount to achieve functional goalas  Patient Goals   Patient Goals : \"I want to be steadier and be stronger to go home. \"    Education  Patient Education  Education Given To: Patient  Education Provided: IADL Safety;Role of Therapy;Plan of Care  Education Method: Verbal  Barriers to Learning: Other (Comment) (Pt. tends to talk over writer while attempting to educate and redirect pt.  Pt appears fustrated and convinced to do things his way.)  Education Outcome: Continued education needed    Therapy Time   Individual Concurrent Group Co-treatment   Time In  835         Time Out  935         Minutes  Julia Pyle [address] [place] [address] [phone]

## 2022-11-03 LAB
ANION GAP SERPL CALCULATED.3IONS-SCNC: 15 MEQ/L (ref 9–15)
BASOPHILS ABSOLUTE: 0 K/UL (ref 0–0.1)
BASOPHILS RELATIVE PERCENT: 0.6 % (ref 0.2–1.2)
BUN BLDV-MCNC: 48 MG/DL (ref 8–23)
CALCIUM SERPL-MCNC: 9 MG/DL (ref 8.5–9.9)
CHLORIDE BLD-SCNC: 109 MEQ/L (ref 95–107)
CO2: 20 MEQ/L (ref 20–31)
CREAT SERPL-MCNC: 2.03 MG/DL (ref 0.7–1.2)
EOSINOPHILS ABSOLUTE: 0.3 K/UL (ref 0–0.5)
EOSINOPHILS RELATIVE PERCENT: 4.8 % (ref 0.8–7)
GFR SERPL CREATININE-BSD FRML MDRD: 34.4 ML/MIN/{1.73_M2}
GLUCOSE BLD-MCNC: 142 MG/DL (ref 70–99)
GLUCOSE BLD-MCNC: 159 MG/DL (ref 70–99)
GLUCOSE BLD-MCNC: 194 MG/DL (ref 70–99)
GLUCOSE BLD-MCNC: 201 MG/DL (ref 70–99)
GLUCOSE BLD-MCNC: 85 MG/DL (ref 70–99)
HCT VFR BLD CALC: 26.1 % (ref 42–52)
HEMOGLOBIN: 8.2 G/DL (ref 13.7–17.5)
IMMATURE GRANULOCYTES #: 0 K/UL
IMMATURE GRANULOCYTES %: 0.4 %
LYMPHOCYTES ABSOLUTE: 0.8 K/UL (ref 1.3–3.6)
LYMPHOCYTES RELATIVE PERCENT: 11.5 %
MCH RBC QN AUTO: 28.7 PG (ref 25.7–32.2)
MCHC RBC AUTO-ENTMCNC: 31.4 % (ref 32.3–36.5)
MCV RBC AUTO: 91.3 FL (ref 79–92.2)
MONOCYTES ABSOLUTE: 0.6 K/UL (ref 0.3–0.8)
MONOCYTES RELATIVE PERCENT: 9.1 % (ref 5.3–12.2)
NEUTROPHILS ABSOLUTE: 5.2 K/UL (ref 1.8–5.4)
NEUTROPHILS RELATIVE PERCENT: 73.6 % (ref 34–67.9)
PDW BLD-RTO: 16.4 % (ref 11.6–14.4)
PERFORMED ON: ABNORMAL
PERFORMED ON: NORMAL
PLATELET # BLD: 161 K/UL (ref 163–337)
POTASSIUM SERPL-SCNC: 4.5 MEQ/L (ref 3.4–4.9)
RBC # BLD: 2.86 M/UL (ref 4.63–6.08)
SODIUM BLD-SCNC: 144 MEQ/L (ref 135–144)
WBC # BLD: 7.1 K/UL (ref 4.2–9)

## 2022-11-03 PROCEDURE — 80048 BASIC METABOLIC PNL TOTAL CA: CPT

## 2022-11-03 PROCEDURE — 6370000000 HC RX 637 (ALT 250 FOR IP): Performed by: INTERNAL MEDICINE

## 2022-11-03 PROCEDURE — 1200000002 HC SEMI PRIVATE SWING BED

## 2022-11-03 PROCEDURE — 85025 COMPLETE CBC W/AUTO DIFF WBC: CPT

## 2022-11-03 PROCEDURE — 97116 GAIT TRAINING THERAPY: CPT

## 2022-11-03 PROCEDURE — 6360000002 HC RX W HCPCS: Performed by: INTERNAL MEDICINE

## 2022-11-03 PROCEDURE — 97530 THERAPEUTIC ACTIVITIES: CPT

## 2022-11-03 PROCEDURE — 36415 COLL VENOUS BLD VENIPUNCTURE: CPT

## 2022-11-03 PROCEDURE — 97110 THERAPEUTIC EXERCISES: CPT

## 2022-11-03 RX ADMIN — MYCOPHENOLATE MOFETIL 750 MG: 250 CAPSULE ORAL at 08:53

## 2022-11-03 RX ADMIN — CARVEDILOL 6.25 MG: 6.25 TABLET, FILM COATED ORAL at 08:50

## 2022-11-03 RX ADMIN — INSULIN HUMAN 8 UNITS: 100 INJECTION, SOLUTION PARENTERAL at 12:04

## 2022-11-03 RX ADMIN — CYCLOSPORINE 75 MG: 25 CAPSULE, LIQUID FILLED ORAL at 21:41

## 2022-11-03 RX ADMIN — INSULIN HUMAN 6 UNITS: 100 INJECTION, SOLUTION PARENTERAL at 08:46

## 2022-11-03 RX ADMIN — CYCLOSPORINE 75 MG: 25 CAPSULE, LIQUID FILLED ORAL at 08:54

## 2022-11-03 RX ADMIN — PANTOPRAZOLE SODIUM 40 MG: 40 TABLET, DELAYED RELEASE ORAL at 05:56

## 2022-11-03 RX ADMIN — INSULIN GLARGINE 10 UNITS: 100 INJECTION, SOLUTION SUBCUTANEOUS at 21:51

## 2022-11-03 RX ADMIN — GABAPENTIN 600 MG: 300 CAPSULE ORAL at 21:42

## 2022-11-03 RX ADMIN — GABAPENTIN 300 MG: 300 CAPSULE ORAL at 08:51

## 2022-11-03 RX ADMIN — PREDNISONE 5 MG: 5 TABLET ORAL at 08:50

## 2022-11-03 RX ADMIN — MYCOPHENOLATE MOFETIL 750 MG: 250 CAPSULE ORAL at 21:42

## 2022-11-03 RX ADMIN — PANTOPRAZOLE SODIUM 40 MG: 40 TABLET, DELAYED RELEASE ORAL at 16:37

## 2022-11-03 RX ADMIN — INSULIN GLARGINE 10 UNITS: 100 INJECTION, SOLUTION SUBCUTANEOUS at 08:46

## 2022-11-03 RX ADMIN — SODIUM BICARBONATE TAB 650 MG 650 MG: 650 TAB at 21:42

## 2022-11-03 RX ADMIN — CARVEDILOL 6.25 MG: 6.25 TABLET, FILM COATED ORAL at 21:42

## 2022-11-03 RX ADMIN — SODIUM BICARBONATE TAB 650 MG 650 MG: 650 TAB at 08:52

## 2022-11-03 RX ADMIN — AMLODIPINE BESYLATE 10 MG: 10 TABLET ORAL at 08:51

## 2022-11-03 RX ADMIN — TAMSULOSIN HYDROCHLORIDE 0.4 MG: 0.4 CAPSULE ORAL at 08:52

## 2022-11-03 RX ADMIN — INSULIN LISPRO 2 UNITS: 100 INJECTION, SOLUTION INTRAVENOUS; SUBCUTANEOUS at 21:50

## 2022-11-03 NOTE — PROGRESS NOTES
Hospitalist Progress Note      PCP: Bruce Ventura DO    Date of Admission: 10/28/2022    Chief Complaint: weakness    Subjective: pt awake, alert     Medications:  Reviewed    Infusion Medications    dextrose       Scheduled Medications    cycloSPORINE modified  75 mg Oral BID    insulin regular  6 Units SubCUTAneous Daily with breakfast    insulin regular  8 Units SubCUTAneous BID WC    predniSONE  5 mg Oral Daily    tamsulosin  0.4 mg Oral Daily    carvedilol  6.25 mg Oral BID    epoetin megan-epbx  10,000 Units SubCUTAneous Q7 Days    insulin glargine  10 Units SubCUTAneous BID    insulin lispro  0-8 Units SubCUTAneous 4x Daily    sodium bicarbonate  650 mg Oral BID    amLODIPine  10 mg Oral Daily    pantoprazole  40 mg Oral BID AC    simvastatin  20 mg Oral Nightly    gabapentin  300 mg Oral Daily    gabapentin  600 mg Oral Nightly    mycophenolate  750 mg Oral BID     PRN Meds: acetaminophen **OR** acetaminophen, [Held by provider] furosemide, glucose, glucagon (rDNA), dextrose, prochlorperazine **OR** prochlorperazine      Intake/Output Summary (Last 24 hours) at 11/3/2022 0751  Last data filed at 11/3/2022 0557  Gross per 24 hour   Intake 1500 ml   Output --   Net 1500 ml       Exam:    /69   Pulse 62   Temp 98.5 °F (36.9 °C) (Oral)   Resp 18   Ht 6' 2\" (1.88 m)   Wt 185 lb (83.9 kg)   SpO2 99%   BMI 23.75 kg/m²     General appearance: No apparent distress, appears stated age and cooperative. HEENT: Pupils equal, round, and reactive to light. Conjunctivae/corneas clear. Neck: Supple, with full range of motion. No jugular venous distention. Trachea midline. Respiratory:  Normal respiratory effort. Clear to auscultation, bilaterally without Rales/Wheezes/Rhonchi. Cardiovascular: Regular rate and rhythm with normal S1/S2 without murmurs, rubs or gallops. Abdomen: Soft, non-tender, non-distended with normal bowel sounds. Musculoskeletal: No clubbing, cyanosis or edema bilaterally.   Full range of motion without deformity. Skin: Skin color, texture, turgor normal.  No rashes or lesions. Neurologic:  Neurovascularly intact without any focal sensory/motor deficits. Cranial nerves: II-XII intact, grossly non-focal.  Psychiatric: Alert and oriented, thought content appropriate, normal insight  Capillary Refill: Brisk,< 3 seconds   Peripheral Pulses: +2 palpable, equal bilaterally       Labs:   Recent Labs     11/03/22  0602   WBC 7.1   HGB 8.2*   HCT 26.1*   *     Recent Labs     11/03/22  0602      K 4.5   *   CO2 20   BUN 48*   CREATININE 2.03*   CALCIUM 9.0     No results for input(s): AST, ALT, BILIDIR, BILITOT, ALKPHOS in the last 72 hours. No results for input(s): INR in the last 72 hours. No results for input(s): Georgie Earnest in the last 72 hours. Urinalysis:      Lab Results   Component Value Date/Time    NITRU Negative 10/19/2022 01:44 PM    WBCUA 10-20 10/19/2022 01:44 PM    BACTERIA FEW 10/19/2022 01:44 PM    RBCUA 6-10 10/19/2022 01:44 PM    BLOODU SMALL 10/19/2022 01:44 PM    SPECGRAV 1.012 10/19/2022 01:44 PM    GLUCOSEU Negative 10/19/2022 01:44 PM    GLUCOSEU GT 1 09/14/2011 08:57 AM       Radiology:  No orders to display           Assessment/Plan:    Active Hospital Problems    Diagnosis Date Noted    GI bleeding [K92.2] 10/29/2022     Priority: Medium         DVT Prophylaxis: hold  Diet: ADULT DIET;  Regular; 4 carb choices (60 gm/meal)  ADULT ORAL NUTRITION SUPPLEMENT; Breakfast; Diabetic Oral Supplement  Code Status: Full Code    PT/OT Eval Status: done    Dispo - A fib- rate controlled, off anticoagulation due to GI bleeding x 3 weeks per cardiology recommendations    Dieulafoy ulcer- pot EGD/clipping, will follow up with CBC biweekly, stable H/H today  Abnormal CT chest report- atbs per pulmonary service, will follow up with pulmonary to repeat CT chest x 4 weeks    HTN- controlled with current Tx  History of renal transplant- on anti rejection medications, following by nephrology service as outpatient   DM- continue with current Tx, follow up with ACCU check   Medically stable for sub acute admission at [facility]        Electronically signed by Anna Tovar MD on 11/3/2022 at 7:51 AM

## 2022-11-03 NOTE — PROGRESS NOTES
Spiritual Care Services     Summary of Visit:  Pt experiencing the effects of trauma from his time in hospital prior to coming to [facility]. Conversation about feelings of vulnerability and fear of end of life and how challenging those experiences are for us. Explored coping skills and encouraged healing practices for his spirit even as he continues outpatient therapy to strengthen his body. Has written about difficult experiences in the past and set them to music. Intends to ask his doctor for a referral to someone to continue to accompany him. Encounter Summary  Encounter Overview/Reason : Spiritual/Emotional Needs  Service Provided For[de-identified] Patient and family together  Referral/Consult From[de-identified] [facility]  Support System: Spouse, Children, Friends/neighbors  Last Encounter : 11/02/22  Complexity of Encounter: Moderate  Encounter   Type: Follow up     Spiritual/Emotional needs  Type: Emotional Distress, Spiritual Distress     Grief, Loss, and Adjustments  Type: Life Adjustments, Adjustment to illness      Spiritual Assessment/Intervention/Outcomes:    Assessment: Compromised coping, Impaired resilience, Interrupted family processes, Moral distress    Intervention: Active listening, Empowerment, Explored Coping Skills/Resources, Prayer (assurance of)/Wallback    Outcome: Comfort      Care Plan:    Plan and Referrals  Plan/Referrals: No future visits requested      Spiritual Care Services   Electronically signed by [facility] on 11/3/2022 at 1:04 PM.    To reach a  for emotional and spiritual support, place an [facility] consult request.   If a  is needed immediately, dial 0 and ask to page the on-call .

## 2022-11-03 NOTE — PROGRESS NOTES
Physical Therapy  Facility/Department: [facility] MED SURG UNIT  Daily Treatment Note  NAME: Anna Nicole  : 1950  MRN: [id number]    Date of Service: 11/3/2022    Discharge Recommendations:  Continue to assess pending progress, Outpatient PT        Patient Diagnosis(es): There were no encounter diagnoses. Assessment   Assessment: (P) Pt agreeable to therapy. Standing ther ex in // with 2 UE support to increase B LE strength with attention to slow, controlled movements and to keep breathing thoughout exercises. 3 seated Rb's required between exercises due to pt's complaints of fatigue. pt required need for PTA to keep count of reps. Pt required continual VC'ing for safety with transfers in order to push off chair with B UE's as well as to reach back prior to sitting. Pt requested need to use restroom to make a bowel movement, ending PT for the day. Pt was instructed to ring the nurse after completion with pt verbally replying with good understanding. pt requires more safety training with gait and transfers. Activity Tolerance: Patient tolerated treatment well;Patient limited by fatigue;Patient limited by endurance     Plan    Physcial Therapy Plan  General Plan: 5-7 times per week  Current Treatment Recommendations: Strengthening;Balance training;ROM; Functional mobility training;Transfer training; Endurance training;Gait training;Stair training;Pain management;Home exercise program;Safety education & training;Patient/Caregiver education & training;Equipment evaluation, education, & procurement;Positioning; Therapeutic activities; Neuromuscular re-education     Restrictions  Restrictions/Precautions  Restrictions/Precautions: Fall Risk     Subjective    Subjective  Subjective: Pt seated in bed upon arrival. Pt states he had a bad sleep but agreeable to therapy.      Objective   Vitals     Bed Mobility Training  Bed Mobility Training: Yes  Overall Level of Assistance: Stand-by assistance;Supervision  Supine to Sit: Supervision  Balance  Sitting: Intact  Standing: Intact  Standing - Static: Good  Standing - Dynamic: Fair  Transfer Training  Transfer Training: Yes  Overall Level of Assistance: Supervision  Interventions: Safety awareness training;Verbal cues  Sit to Stand: (P) Stand-by assistance  Stand to Sit: (P) Stand-by assistance  Gait Training  Gait Training: (P) Yes  Gait  Overall Level of Assistance: (P) Stand-by assistance;Contact-guard assistance  Interventions: (P) Safety awareness training; Tactile cues; Verbal cues; Weight shifting training/pressure relief  Speed/Sara: (P) Accelerated; Fluctuations  Gait Abnormalities: (P) Altered arm swing; Ataxic;Scissoring  Distance (ft): (P)  (45' x 2)     PT Exercises  Static Standing Balance Exercises: (P) Supported stand ther ex B LE: 5# 2x10 LAQ's 3 sec H, 3 x 10' with DF, PF, hamstring curls, hip extension, hip abduction, hip flexion. mini squats. 3 seated rest breaks with reports of fatigue and SOB. Goals  Short Term Goals  Time Frame for Short Term Goals: 2-3 days  Short Term Goal 1: transfers steadier with <= CS  Short Term Goal 2: amb >= 100ft with pusle ox post >= 95% less scissoring , least AD, <=SBA  Short Term Goal 3: 10 stairs one rail <= SBA  Short Term Goal 4: tolerate x 10 standing OPHELIA for strength and balance  Long Term Goals  Time Frame for Long Term Goals : 5-7 days  Long Term Goal 1: Transfers and bed mobiltiy modified independent  Long Term Goal 2: amb >= 150ft with least AD modidifed indpendnet , steadier  Long Term Goal 3: one flight of stairs on rail, <= supervision  Long Term Goal 4: Increase LE strength, balance and endurance any amount to achieve functional goalas  Patient Goals   Patient Goals : \"I want to be steadier and be stronger to go home. \"    Education  Patient Education  Education Given To: Patient  Education Provided: IADL Safety;Role of Therapy;Plan of Care  Education Method: Verbal  Education Outcome: Continued education needed    Therapy Time   Individual Concurrent Group Co-treatment   Time In (P) 4391         Time Out (P) 1025         Minutes (P) [facility]

## 2022-11-03 NOTE — PROGRESS NOTES
1730 Pt is sitting comfortably in hair. A+0X4. BS for evening meal was 85. Pt states he does not give insulin at home if BS is under 110. All insulin was held. Pt ate his dinner. Will continue to monitor pt. Drsg to elbow was changed today. Pt tolerated well.      Claudia Connolly RN

## 2022-11-03 NOTE — PROGRESS NOTES
Physical Therapy  Facility/Department: [facility] MED SURG UNIT  Daily Treatment Note  NAME: Evelyn Foreman  : 1950  MRN: [id number]    Date of Service: 11/3/2022    Discharge Recommendations:  (P) Continue to assess pending progress, Outpatient PT        Patient Diagnosis(es): There were no encounter diagnoses. Assessment   Assessment: (P) Pt. tolerated gait and stair training, Pt. sebastien'carlton one LOB with attempting stand by assistance with fatigue, Pt. refused to use cane. Time spent to focus on the improtance to use cane to reduce fall risk or incident. Pt. sebastien'carlton good recipica controlled ascend and descend of 9 steps this date with ue of one assist rail. HEP handout educated and issued. Activity Tolerance: (P) Patient tolerated treatment well;Patient limited by fatigue;Patient limited by endurance     Plan    Physcial Therapy Plan  General Plan: (P) 5-7 times per week  Current Treatment Recommendations: (P) Strengthening;Balance training;ROM; Functional mobility training;Transfer training; Endurance training;Gait training;Stair training;Pain management;Home exercise program;Safety education & training;Patient/Caregiver education & training;Equipment evaluation, education, & procurement;Positioning; Therapeutic activities; Neuromuscular re-education     Restrictions  Restrictions/Precautions  Restrictions/Precautions: Fall Risk     Subjective    Subjective  Subjective: (P) I don't know why I have to do the stairs. Its not like I will have to do those ever again. However, I can do them if you want me too. Wife reports Pt. only has to go up and down 4 steps in and out of house.      Objective   Vitals     Bed Mobility Training  Bed Mobility Training: Yes  Overall Level of Assistance: Stand-by assistance;Supervision  Supine to Sit: Supervision  Balance  Sitting: Intact  Standing: Intact  Standing - Static: Good  Standing - Dynamic: Fair  Transfer Training  Transfer Training: Yes  Overall Level of Assistance: Supervision  Interventions: Safety awareness training;Verbal cues  Sit to Stand: (P) Supervision;Stand-by assistance  Stand to Sit: (P) Supervision;Stand-by assistance  Gait Training  Gait Training: (P) Yes  Gait  Overall Level of Assistance: (P) Stand-by assistance;Contact-guard assistance  Interventions: (P) Safety awareness training; Tactile cues; Verbal cues; Weight shifting training/pressure relief (Education on the importance to use [facility] for balance and safety awareness.)  Speed/Sara: (P) Accelerated; Fluctuations  Gait Abnormalities: (P) Altered arm swing; Ataxic;Scissoring (one LOB with second gait trial with fatigue demo'd. SOB. SpO2 100%)  Distance (ft): (P)  (200'x2)  Assistive Device: (P) Other (comment) (Pt. refused to use cane.)  Rail Use: (P) Right  Stairs - Level of Assistance: (P) Contact-guard assistance;Stand-by assistance  Number of Stairs Trained: (P) 9     PT Exercises  Static Standing Balance Exercises: Supported stand ther ex B LE: 5# 2x10 LAQ's 3 sec H, 3 x 10' with DF, PF, hamstring curls, hip extension, hip abduction, hip flexion. mini squats. 3 seated rest breaks with reports of fatigue and SOB. Goals  Short Term Goals  Time Frame for Short Term Goals: 2-3 days  Short Term Goal 1: transfers steadier with <= CS  Short Term Goal 2: amb >= 100ft with pusle ox post >= 95% less scissoring , least AD, <=SBA  Short Term Goal 3: 10 stairs one rail <= SBA  Short Term Goal 4: tolerate x 10 standing OPHELIA for strength and balance  Long Term Goals  Time Frame for Long Term Goals : 5-7 days  Long Term Goal 1: Transfers and bed mobiltiy modified independent  Long Term Goal 2: amb >= 150ft with least AD modidifed indpendnet , steadier  Long Term Goal 3: one flight of stairs on rail, <= supervision  Long Term Goal 4:  Increase LE strength, balance and endurance any amount to achieve functional goalas  Patient Goals   Patient Goals : \"I want to be steadier and be stronger to go home.\"    Education  Patient Education  Education Given To: (P) Patient  Education Provided: (P) Home Exercise Program;ADL Adaptive Strategies (Educated the importance to use cane to reduce fall risk.)  Education Method: (P) Verbal  Education Outcome: (P) Continued education needed    Therapy Time   Individual Concurrent Group Co-treatment   Time In 130         Time Out 215         Minutes Julia Pyle [address] [place] [address] [phone]

## 2022-11-04 VITALS
SYSTOLIC BLOOD PRESSURE: 162 MMHG | RESPIRATION RATE: 18 BRPM | HEART RATE: 76 BPM | TEMPERATURE: 98.4 F | OXYGEN SATURATION: 99 % | BODY MASS INDEX: 23.74 KG/M2 | WEIGHT: 185 LBS | DIASTOLIC BLOOD PRESSURE: 71 MMHG | HEIGHT: 74 IN

## 2022-11-04 LAB
GLUCOSE BLD-MCNC: 114 MG/DL (ref 70–99)
PERFORMED ON: ABNORMAL

## 2022-11-04 PROCEDURE — 6360000002 HC RX W HCPCS: Performed by: INTERNAL MEDICINE

## 2022-11-04 PROCEDURE — 97530 THERAPEUTIC ACTIVITIES: CPT

## 2022-11-04 PROCEDURE — 97116 GAIT TRAINING THERAPY: CPT

## 2022-11-04 PROCEDURE — 6370000000 HC RX 637 (ALT 250 FOR IP): Performed by: INTERNAL MEDICINE

## 2022-11-04 RX ORDER — SODIUM BICARBONATE 650 MG/1
650 TABLET ORAL 2 TIMES DAILY
Qty: 60 TABLET | Refills: 5 | Status: SHIPPED | OUTPATIENT
Start: 2022-11-04

## 2022-11-04 RX ORDER — AMLODIPINE BESYLATE 10 MG/1
10 TABLET ORAL DAILY
Qty: 30 TABLET | Refills: 3 | Status: SHIPPED | OUTPATIENT
Start: 2022-11-04

## 2022-11-04 RX ORDER — CARVEDILOL 6.25 MG/1
6.25 TABLET ORAL 2 TIMES DAILY
Qty: 60 TABLET | Refills: 3 | Status: SHIPPED | OUTPATIENT
Start: 2022-11-04

## 2022-11-04 RX ORDER — SIMVASTATIN 20 MG
20 TABLET ORAL NIGHTLY
Qty: 30 TABLET | Refills: 3 | Status: SHIPPED | OUTPATIENT
Start: 2022-11-04

## 2022-11-04 RX ADMIN — TAMSULOSIN HYDROCHLORIDE 0.4 MG: 0.4 CAPSULE ORAL at 08:30

## 2022-11-04 RX ADMIN — PREDNISONE 5 MG: 5 TABLET ORAL at 08:30

## 2022-11-04 RX ADMIN — AMLODIPINE BESYLATE 10 MG: 10 TABLET ORAL at 08:30

## 2022-11-04 RX ADMIN — CARVEDILOL 6.25 MG: 6.25 TABLET, FILM COATED ORAL at 08:30

## 2022-11-04 RX ADMIN — GABAPENTIN 300 MG: 300 CAPSULE ORAL at 08:30

## 2022-11-04 RX ADMIN — CYCLOSPORINE 75 MG: 25 CAPSULE, LIQUID FILLED ORAL at 08:34

## 2022-11-04 RX ADMIN — SODIUM BICARBONATE TAB 650 MG 650 MG: 650 TAB at 08:30

## 2022-11-04 RX ADMIN — MYCOPHENOLATE MOFETIL 750 MG: 250 CAPSULE ORAL at 08:30

## 2022-11-04 RX ADMIN — PANTOPRAZOLE SODIUM 40 MG: 40 TABLET, DELAYED RELEASE ORAL at 07:02

## 2022-11-04 NOTE — PROGRESS NOTES
Physical Therapy  Facility/Department: [facility] MED SURG UNIT  Daily Treatment Note  NAME: Eddie Martini  : 1950  MRN: [id number]    Date of Service: 2022    Discharge Recommendations:  Continue to assess pending progress, Outpatient PT        Patient Diagnosis(es): The encounter diagnosis was Gastrointestinal hemorrhage with hematemesis. Assessment   Assessment: (P) Pt. demo's good control with sit to stand and bed mobility; However, pt. demo's decrease safety awareness with wanting to ambulate without use of AD (cane). Pt. continued to stagger with L lateral sway > R throughout gait distance. Close stand assistance with occasional CGA needed to reduce fall risk. Pt. swayed to L bumping into computer wow whild trying to ambulate around obstacle into room. Pt. education with vc's to increase functional spacial awareness to reduce risk of incident. Decrease UE response demo'd to right self. Reviewed HEP handout. Pt. reports no questions and understanding of HEP. Pt. more concerned to get cleaned up before going home. Activity Tolerance: (P) Patient tolerated treatment well;Patient limited by fatigue;Patient limited by endurance     Plan    Physcial Therapy Plan  General Plan: (P) 5-7 times per week  Current Treatment Recommendations: (P) Strengthening;Balance training;ROM; Functional mobility training;Transfer training; Endurance training;Gait training;Stair training;Pain management;Home exercise program;Safety education & training;Patient/Caregiver education & training;Equipment evaluation, education, & procurement;Positioning; Therapeutic activities; Neuromuscular re-education     Restrictions  Restrictions/Precautions  Restrictions/Precautions: Fall Risk     Subjective    Subjective  Subjective: (P) I am leaving at 10 am now. Objective   Vitals   Bed Mobility:  Supine -> sit: supervision.    Transfer Training  Sit to Stand: (P) Supervision;Stand-by assistance  Stand to Sit: (P) Supervision;Stand-by assistance  Gait Training  Gait Training: (P) Yes  Gait  Overall Level of Assistance: (P) Stand-by assistance;Contact-guard assistance  Interventions: (P) Verbal cues; Safety awareness training;Weight shifting training/pressure relief (VC's to encourage using cane to improve safety and reduce risk of falls.)  Gait Abnormalities: (P) Altered arm swing; Ataxic;Scissoring (Pt. staggers to Left > Right with scissor gait. Pt. bumped into compute wow with turning left to walk into room. CGA needed for safety with education to increase spacial awareness around obstacles.)  Distance (ft): (P)  (240'x2)  Assistive Device: (P) Other (comment) (Pt. refuses to use cane. Pt. reports I will use cane at home when I need to.)     PT Exercises  Exercise Treatment: (P) Pt. reports understanding of HEP handout. Goals  Short Term Goals  Time Frame for Short Term Goals: 2-3 days  Short Term Goal 1: transfers steadier with <= CS  Short Term Goal 2: amb >= 100ft with pusle ox post >= 95% less scissoring , least AD, <=SBA  Short Term Goal 3: 10 stairs one rail <= SBA  Short Term Goal 4: tolerate x 10 standing OPHELIA for strength and balance  Long Term Goals  Time Frame for Long Term Goals : 5-7 days  Long Term Goal 1: Transfers and bed mobiltiy modified independent  Long Term Goal 2: amb >= 150ft with least AD modidifed indpendnet , steadier  Long Term Goal 3: one flight of stairs on rail, <= supervision  Long Term Goal 4: Increase LE strength, balance and endurance any amount to achieve functional goalas  Patient Goals   Patient Goals : \"I want to be steadier and be stronger to go home. \"    Education  Patient Education  Education Given To: (P) Patient  Education Provided: (P) ADL Adaptive Strategies  Education Method: (P) Verbal  Education Outcome: (P) Continued education needed    Therapy Time   Individual Concurrent Group Co-treatment   Time In  850         Time Out  925         Minutes  [address] .[phone]

## 2022-11-04 NOTE — DISCHARGE SUMMARY
Discharge Summary    Patient:  Wale Goodwin  YOB: 1950    MRN: [id number]   Acct: [de-identified]    Primary Care Physician: You Byrd DO    Admit date:  10/28/2022    Discharge date:   11/04/22      Discharge Diagnoses:   GI bleeding  Principal Problem:    GI bleeding  Resolved Problems:    * No resolved hospital problems. *      Admitted for: [facility] Course: 80 y.o. male who presented to [facility] after acute admission to [facility] with above complains. 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 The ulcer was clipped; he was evaluated by GI and cardiology who will make a decision on when to resume anticoagulation as an outpatient in 3 weeks. at this point no ASA/coumadin. Also had abnormal CT chest- was treated with atbs and recommended to follow up with pulmonary service x 3 weeks after DC     Consultants:      Discharge Medications:       Medication List        CHANGE how you take these medications      carvedilol 6.25 MG tablet  Commonly known as: COREG  Take 1 tablet by mouth 2 times daily  What changed: when to take this     gabapentin 300 MG capsule  Commonly known as: NEURONTIN  What changed: Another medication with the same name was removed. Continue taking this medication, and follow the directions you see here.      NovoLIN N FlexPen 100 UNIT/ML injection pen  Generic drug: insulin NPH  16 units at bedtime  What changed: additional instructions     NovoLIN R FlexPen 100 UNIT/ML Sopn  Generic drug: Insulin Regular Human  6 units am 8 units lunch and dinner  What changed: additional instructions     simvastatin 20 MG tablet  Commonly known as: ZOCOR  Take 1 tablet by mouth nightly  What changed: medication strength     tamsulosin 0.4 MG capsule  Commonly known as: FLOMAX  Take 1 capsule by mouth daily  What changed: when to take this            CONTINUE taking these medications amLODIPine 10 MG tablet  Commonly known as: NORVASC  Take 1 tablet by mouth daily     cycloSPORINE modified 25 MG capsule  Commonly known as: NEORAL     Dexcom G6 Transmitter Misc  Change every 3 months     * FreeStyle Sona 14 Day Sensor Misc  Every 2 weeks     * FreeStyle Sona 14 Day Sensor Misc  Every 2 weeks Dx E11.65     mycophenolate 250 MG capsule  Commonly known as: CELLCEPT     pantoprazole 40 MG tablet  Commonly known as: PROTONIX     predniSONE 5 MG tablet  Commonly known as: DELTASONE     sodium bicarbonate 650 MG tablet  Take 1 tablet by mouth 2 times daily     Trulicity 1.5 LB/1.8HX SC injection  Generic drug: dulaglutide           * This list has 2 medication(s) that are the same as other medications prescribed for you. Read the directions carefully, and ask your doctor or other care provider to review them with you.                 STOP taking these medications      cyclobenzaprine 10 MG tablet  Commonly known as: FLEXERIL               Where to Get Your Medications        These medications were sent to [address] - F [id number] [place] [address], [facility] [address]      Phone: [phone]   amLODIPine 10 MG tablet  carvedilol 6.25 MG tablet  simvastatin 20 MG tablet  sodium bicarbonate 650 MG tablet         Physical Exam:    Vitals:  Vitals:    11/03/22 0850 11/03/22 1700 11/03/22 1930 11/04/22 0533   BP: 138/69 (!) 147/65 (!) 146/51 (!) 162/71   Pulse: 62 62 69 76   Resp:  18 18 18   Temp:  98.4 °F (36.9 °C) 98.4 °F (36.9 °C) 98.4 °F (36.9 °C)   TempSrc:  Oral Oral Oral   SpO2:  100% 100% 99%   Weight:       Height:         Weight: Weight: 185 lb (83.9 kg)     24 hour intake/output:  Intake/Output Summary (Last 24 hours) at 11/4/2022 0739  Last data filed at 11/4/2022 0703  Gross per 24 hour   Intake 800 ml   Output --   Net 800 ml       General appearance - alert, well appearing, and in no distress  Chest - clear to auscultation, no wheezes, rales or rhonchi, symmetric air entry  Heart - normal rate, regular rhythm, normal S1, S2, no murmurs, rubs, clicks or gallops  Abdomen - soft, nontender, nondistended, no masses or organomegaly  Obese: No; Protuberant: No   Neurological - alert, oriented, normal speech, no focal findings or movement disorder noted  Extremities - peripheral pulses normal, no pedal edema, no clubbing or cyanosis  Skin - normal coloration and turgor, no rashes, no suspicious skin lesions noted    Procedures:      Diagnostic Test:      Radiology reports as per the Radiologist  Radiology: No results found.      Results for orders placed or performed during the hospital encounter of 10/28/22   CBC with Auto Differential   Result Value Ref Range    WBC 7.2 4.2 - 9.0 K/uL    RBC 2.93 (L) 4.63 - 6.08 M/uL    Hemoglobin 8.5 (L) 13.7 - 17.5 g/dL    Hematocrit 26.5 (L) 42.0 - 52.0 %    MCV 90.4 79.0 - 92.2 fL    MCH 29.0 25.7 - 32.2 pg    MCHC 32.1 (L) 32.3 - 36.5 %    RDW 16.9 (H) 11.6 - 14.4 %    Platelets 865 734 - 759 K/uL    Neutrophils % 76.0 (H) 34.0 - 67.9 %    Immature Granulocytes % 0.4 %    Lymphocytes % 10.9 %    Monocytes % 7.5 5.3 - 12.2 %    Eosinophils % 4.6 0.8 - 7.0 %    Basophils % 0.6 0.2 - 1.2 %    Neutrophils Absolute 5.5 (H) 1.8 - 5.4 K/uL    Immature Granulocytes # 0.0 K/uL    Lymphocytes Absolute 0.8 (L) 1.3 - 3.6 K/uL    Monocytes Absolute 0.5 0.3 - 0.8 K/uL    Eosinophils Absolute 0.3 0.0 - 0.5 K/uL    Basophils Absolute 0.0 0.0 - 0.1 K/uL   Comprehensive Metabolic Panel   Result Value Ref Range    Sodium 143 135 - 144 mEq/L    Potassium 4.5 3.4 - 4.9 mEq/L    Chloride 111 (H) 95 - 107 mEq/L    CO2 19 (L) 20 - 31 mEq/L    Anion Gap 13 9 - 15 mEq/L    Glucose 184 (H) 70 - 99 mg/dL    BUN 36 (H) 8 - 23 mg/dL    Creatinine 2.14 (H) 0.70 - 1.20 mg/dL    Est, Glom Filt Rate 32.3 (L) >60    Calcium 8.7 8.5 - 9.9 mg/dL    Total Protein 5.7 (L) 6.3 - 8.0 g/dL    Albumin 3.5 3.5 - 4.6 g/dL    Total Bilirubin 0.6 0.2 - 0.7 mg/dL    Alkaline Phosphatase 60 35 - 104 U/L    ALT 7 0 - 41 U/L    AST 7 0 - 40 U/L    Globulin 2.2 (L) 2.3 - 3.5 g/dL   Magnesium   Result Value Ref Range    Magnesium 1.5 (L) 1.7 - 2.4 mg/dL   Phosphorus   Result Value Ref Range    Phosphorus 3.2 2.3 - 4.8 mg/dL   CBC with Auto Differential   Result Value Ref Range    WBC 7.5 4.2 - 9.0 K/uL    RBC 2.74 (L) 4.63 - 6.08 M/uL    Hemoglobin 7.9 (L) 13.7 - 17.5 g/dL    Hematocrit 25.0 (L) 42.0 - 52.0 %    MCV 91.2 79.0 - 92.2 fL    MCH 28.8 25.7 - 32.2 pg    MCHC 31.6 (L) 32.3 - 36.5 %    RDW 17.1 (H) 11.6 - 14.4 %    Platelets 973 102 - 092 K/uL    Neutrophils % 73.4 (H) 34.0 - 67.9 %    Immature Granulocytes % 0.7 %    Lymphocytes % 12.1 %    Monocytes % 8.7 5.3 - 12.2 %    Eosinophils % 4.6 0.8 - 7.0 %    Basophils % 0.5 0.2 - 1.2 %    Neutrophils Absolute 5.5 (H) 1.8 - 5.4 K/uL    Immature Granulocytes # 0.1 K/uL    Lymphocytes Absolute 0.9 (L) 1.3 - 3.6 K/uL    Monocytes Absolute 0.7 0.3 - 0.8 K/uL    Eosinophils Absolute 0.3 0.0 - 0.5 K/uL    Basophils Absolute 0.0 0.0 - 0.1 K/uL   Comprehensive Metabolic Panel   Result Value Ref Range    Sodium 144 135 - 144 mEq/L    Potassium 4.9 3.4 - 4.9 mEq/L    Chloride 112 (H) 95 - 107 mEq/L    CO2 18 (L) 20 - 31 mEq/L    Anion Gap 14 9 - 15 mEq/L    Glucose 169 (H) 70 - 99 mg/dL    BUN 43 (H) 8 - 23 mg/dL    Creatinine 2.09 (H) 0.70 - 1.20 mg/dL    Est, Glom Filt Rate 33.3 (L) >60    Calcium 9.0 8.5 - 9.9 mg/dL    Total Protein 5.5 (L) 6.3 - 8.0 g/dL    Albumin 3.4 (L) 3.5 - 4.6 g/dL    Total Bilirubin 0.5 0.2 - 0.7 mg/dL    Alkaline Phosphatase 59 35 - 104 U/L    ALT 7 0 - 41 U/L    AST 7 0 - 40 U/L    Globulin 2.1 (L) 2.3 - 3.5 g/dL   CBC with Auto Differential   Result Value Ref Range    WBC 7.1 4.2 - 9.0 K/uL    RBC 2.86 (L) 4.63 - 6.08 M/uL    Hemoglobin 8.2 (L) 13.7 - 17.5 g/dL    Hematocrit 26.1 (L) 42.0 - 52.0 %    MCV 91.3 79.0 - 92.2 fL    MCH 28.7 25.7 - 32.2 pg    MCHC 31.4 (L) 32.3 - 36.5 %    RDW 16.4 (H) 11.6 - 14.4 %    Platelets 399 (L) 591 - 337 K/uL    Neutrophils % 73.6 (H) 34.0 - 67.9 %    Immature Granulocytes % 0.4 %    Lymphocytes % 11.5 %    Monocytes % 9.1 5.3 - 12.2 %    Eosinophils % 4.8 0.8 - 7.0 %    Basophils % 0.6 0.2 - 1.2 %    Neutrophils Absolute 5.2 1.8 - 5.4 K/uL    Immature Granulocytes # 0.0 K/uL    Lymphocytes Absolute 0.8 (L) 1.3 - 3.6 K/uL    Monocytes Absolute 0.6 0.3 - 0.8 K/uL    Eosinophils Absolute 0.3 0.0 - 0.5 K/uL    Basophils Absolute 0.0 0.0 - 0.1 K/uL   Basic Metabolic Panel   Result Value Ref Range    Sodium 144 135 - 144 mEq/L    Potassium 4.5 3.4 - 4.9 mEq/L    Chloride 109 (H) 95 - 107 mEq/L    CO2 20 20 - 31 mEq/L    Anion Gap 15 9 - 15 mEq/L    Glucose 159 (H) 70 - 99 mg/dL    BUN 48 (H) 8 - 23 mg/dL    Creatinine 2.03 (H) 0.70 - 1.20 mg/dL    Est, Glom Filt Rate 34.4 (L) >60    Calcium 9.0 8.5 - 9.9 mg/dL   POCT Glucose   Result Value Ref Range    POC Glucose 265 (H) 70 - 99 mg/dl    Performed on ACCU-CHEK    POCT Glucose   Result Value Ref Range    POC Glucose 212 (H) 70 - 99 mg/dl    Performed on ACCU-CHEK    POCT Glucose   Result Value Ref Range    POC Glucose 163 (H) 70 - 99 mg/dl    Performed on ACCU-CHEK    POCT Glucose   Result Value Ref Range    POC Glucose 202 (H) 70 - 99 mg/dl    Performed on ACCU-CHEK    POCT Glucose   Result Value Ref Range    POC Glucose 153 (H) 70 - 99 mg/dl    Performed on ACCU-CHEK    POCT Glucose   Result Value Ref Range    POC Glucose 207 (H) 70 - 99 mg/dl    Performed on ACCU-CHEK    POCT Glucose   Result Value Ref Range    POC Glucose 126 (H) 70 - 99 mg/dl    Performed on ACCU-CHEK    POCT Glucose   Result Value Ref Range    POC Glucose 126 (H) 70 - 99 mg/dl    Performed on ACCU-CHEK    POCT Glucose   Result Value Ref Range    POC Glucose 177 (H) 70 - 99 mg/dl    Performed on ACCU-CHEK    POCT Glucose   Result Value Ref Range    POC Glucose 158 (H) 70 - 99 mg/dl    Performed on ACCU-CHEK    POCT Glucose   Result Value Ref Range POC Glucose 124 (H) 70 - 99 mg/dl    Performed on ACCU-CHEK    POCT Glucose   Result Value Ref Range    POC Glucose 103 (H) 70 - 99 mg/dl    Performed on ACCU-CHEK    POCT Glucose   Result Value Ref Range    POC Glucose 136 (H) 70 - 99 mg/dl    Performed on ACCU-CHEK    POCT Glucose   Result Value Ref Range    POC Glucose 135 (H) 70 - 99 mg/dl    Performed on ACCU-CHEK    POCT Glucose   Result Value Ref Range    POC Glucose 138 (H) 70 - 99 mg/dl    Performed on ACCU-CHEK    POCT Glucose   Result Value Ref Range    POC Glucose 167 (H) 70 - 99 mg/dl    Performed on ACCU-CHEK    POCT Glucose   Result Value Ref Range    POC Glucose 145 (H) 70 - 99 mg/dl    Performed on ACCU-CHEK    POCT Glucose   Result Value Ref Range    POC Glucose 112 (H) 70 - 99 mg/dl    Performed on ACCU-CHEK    POCT Glucose   Result Value Ref Range    POC Glucose 124 (H) 70 - 99 mg/dl    Performed on ACCU-CHEK    POCT Glucose   Result Value Ref Range    POC Glucose 118 (H) 70 - 99 mg/dl    Performed on ACCU-CHEK    POCT Glucose   Result Value Ref Range    POC Glucose 108 (H) 70 - 99 mg/dl    Performed on ACCU-CHEK    POCT Glucose   Result Value Ref Range    POC Glucose 220 (H) 70 - 99 mg/dl    Performed on ACCU-CHEK    POCT Glucose   Result Value Ref Range    POC Glucose 142 (H) 70 - 99 mg/dl    Performed on ACCU-CHEK    POCT Glucose   Result Value Ref Range    POC Glucose 194 (H) 70 - 99 mg/dl    Performed on ACCU-CHEK    POCT Glucose   Result Value Ref Range    POC Glucose 85 70 - 99 mg/dl    Performed on ACCU-CHEK    POCT Glucose   Result Value Ref Range    POC Glucose 201 (H) 70 - 99 mg/dl    Performed on ACCU-CHEK    POCT Glucose   Result Value Ref Range    POC Glucose 114 (H) 70 - 99 mg/dl    Performed on ACCU-CHEK        Diet:  ADULT DIET;  Regular; 4 carb choices (60 gm/meal)  ADULT ORAL NUTRITION SUPPLEMENT; Breakfast; Diabetic Oral Supplement    Activity:  Activity as tolerated (Patient may move about with assist as indicated or with supervision.)    Follow-up:  in 1 weeks with Jose Noe DO,      Disposition: home    Condition: Stable    Time Spent: 45 minutes    Electronically signed by Mario James MD on 11/4/2022 at 7:39 AM    Discharging Hospitalist

## 2022-11-09 ENCOUNTER — HOSPITAL ENCOUNTER (OUTPATIENT)
Dept: PHYSICAL THERAPY | Age: 72
Setting detail: THERAPIES SERIES
Discharge: HOME | End: 2022-11-09
Payer: COMMERCIAL

## 2022-11-09 PROCEDURE — 97110 THERAPEUTIC EXERCISES: CPT

## 2022-11-09 PROCEDURE — 97162 PT EVAL MOD COMPLEX 30 MIN: CPT

## 2022-11-10 NOTE — PROGRESS NOTES
[facility]  PHYSICAL THERAPY EVALUATION    Physical Therapy: Initial Evaluation    Patient: Gisella Arango (8 y.o.     male)   Examination Date: 2022   :  1950 ;    Confirmed: Yes MRN: [id number]  CSN: [id number]   Insurance: Payor:  [facility] / Plan:  [facility] / Product Type: *No Product type* /   Insurance ID: Reinier Olson (Medicare Managed) Secondary Insurance (if applicable):    Referring Physician: Jac Crews MD       Visits to Date/Visits Approved: 1 /  (bmn)    No Show/Cancelled Appts: 0 / 0     Medical Diagnosis: Difficulty walking [R26.2]  Weakness [R53.1]        Treatment Diagnosis: Weakness, Difficulty walking     PERTINENT MEDICAL HISTORY           Medical History: Chart Reviewed: Yes   Past Medical History:   Diagnosis Date    Diabetes mellitus ([facility]     Hemodialysis access, fistula mature ([facility] 2002    Hypertension     Seizures ([facility]     no seizure since      Surgical History:   Past Surgical History:   Procedure Laterality Date    BRAIN SURGERY Left 1990    to eliminate seizures    COLONOSCOPY N/A 10/24/2022    COLONOSCOPY DIAGNOSTIC performed by Kolton Flores MD at [facility]      [place] [address] INCL FLUOR GDNCE DX W/CELL [address] N/A 3/20/2018    BRONCHOSCOPY performed by Ofelia Edwards MD at [address] [place] [address] 10/21/2022    EGD DIAGNOSTIC ONLY performed by Radames Rivas MD at [facility]       Medications:   Current Outpatient Medications:     amLODIPine (NORVASC) 10 MG tablet, Take 1 tablet by mouth daily, Disp: 30 tablet, Rfl: 3    carvedilol (COREG) 6.25 MG tablet, Take 1 tablet by mouth 2 times daily, Disp: 60 tablet, Rfl: 3    simvastatin (ZOCOR) 20 MG tablet, Take 1 tablet by mouth nightly, Disp: 30 tablet, Rfl: 3    sodium bicarbonate 650 MG tablet, Take 1 tablet by mouth 2 times daily, Disp: 60 tablet, Rfl: 5 gabapentin (NEURONTIN) 300 MG capsule, Take 300 mg by mouth daily. , Disp: , Rfl:     predniSONE (DELTASONE) 5 MG tablet, Take 5 mg by mouth daily, Disp: , Rfl:     Dulaglutide (TRULICITY) 1.5 MV/7.4ZT SOPN, Inject 1.5 mg into the skin once a week Weekly on Monday, Disp: , Rfl:     tamsulosin (FLOMAX) 0.4 MG capsule, Take 1 capsule by mouth daily, Disp: 90 capsule, Rfl: 0    Continuous Blood Gluc Transmit (DEXCOM G6 TRANSMITTER) MISC, Change every 3 months (Patient not taking: Reported on 10/19/2022), Disp: 1 each, Rfl: 3    Continuous Blood Gluc Sensor (FREESTYLE KEV 14 DAY SENSOR) MISC, Every 2 weeks Dx E11.65 (Patient not taking: Reported on 10/19/2022), Disp: 2 each, Rfl: 06    insulin NPH (NOVOLIN N FLEXPEN) 100 UNIT/ML injection pen, 16 units at bedtime (Patient taking differently: 9 units in AM, 5 units at bedtime), Disp: 5 pen, Rfl: 3    Insulin Regular Human (NOVOLIN R FLEXPEN) 100 UNIT/ML SOPN, 6 units am 8 units lunch and dinner (Patient taking differently: Sliding Scale), Disp: 5 pen, Rfl: 3    Continuous Blood Gluc Sensor (FREESTYLE KEV 14 DAY SENSOR) MISC, Every 2 weeks, Disp: 2 each, Rfl: 06    pantoprazole (PROTONIX) 40 MG tablet, Take 40 mg by mouth daily , Disp: , Rfl:     cycloSPORINE modified (NEORAL) 25 MG capsule, Take 75 mg by mouth 2 times daily , Disp: , Rfl: 2    mycophenolate (CELLCEPT) 250 MG capsule, Take 750 mg by mouth 2 times daily , Disp: , Rfl: 1  Allergies: Patient has no known allergies. SUBJECTIVE EXAMINATION     History obtained from[de-identified] Patient,           Subjective History:    Subjective: Diagnosed with Afib and was placed on meds - was misdiagnosed and in the ER 6 times. Was admitted 4 times until they realized pt did not have Afib. Pt lost ~30 lbs in a few weeks. Pt was having internal bleeding and had to have multiple blood transfusions. In hospital for 26 days out of the month. Had inpatient rehab at [facility] for 8 days. D/C'd on Friday.   Still working on building up strength. Pt reports feeling apprehensive with balance. Pt reports 2 falls in the past 6 months before hospitalization.   Additional Pertinent Hx (if applicable): HTN, h/o seizures with brain surgery, DM, h/o kidney transplant   Previous treatments prior to current episode?: Inpatient rehab      Learning/Language: Learning  Does the patient/guardian have any barriers to learning?: No barriers  Will there be a co-learner?: No  What is the preferred language of the patient/guardian?: English  Is an  required?: No  How does the patient/guardian prefer to learn new concepts?: Listening     Pain Screening    Pain Screening  Patient Currently in Pain: No    Functional Status    Social History:    Social History  Lives With: Spouse  Type of Home: House  Home Layout: Two level, Able to Live on Main level with bedroom/bathroom  Home Access: Stairs to enter with rails  Entrance Stairs - Number of Steps: 4  Home Equipment: Rollator, Cane, quad, Cane (Walking stick)    Occupation/Interests:   Occupation: Full time employment    Prior Level of Function:     Independent     Current Level of Function:   ADL Assistance: Independent  Homemaking Assistance: Independent  Ambulation Assistance: Independent  Transfer Assistance: Independent  Active : Yes (Hasn't driven since coming home)         OBJECTIVE EXAMINATION     Review of Systems:  Vision: Impaired  Visual Deficits: Wears glasses, Bifocals or Progressive lenses  Hearing: Within functional limits  Overall Orientation Status: Within Normal Limits  Patient affect[de-identified] Normal  Follows Commands: Within Functional Limits    Mobility:   Bed mobility  Rolling to Left: Independent  Rolling to Right: Independent  Supine to Sit: Supervision, Independent  Sit to Supine: Supervision, Independent     Transfers  Sit to Stand: Supervision, Independent (Increased difficulty without UEs from a lower surface, increased Lt lateral lean and Lt LE WBing to complete)  Stand to Sit: Supervision, Independent  Ambulation  Surface: Carpet  Device: No Device  Assistance: Supervision, Independent  Quality of Gait: Lt compensated Trendelenburg, occasional deviated path  Gait Deviations: Slow Sara  Distance: 80'      Balance Screen:    Hemphill Balance Score: 45    Neuro Screen: Sensation  Overall Sensation Status: Impaired (N&T in timothy lower legs, feet and hands)    Left Strength  Right Strength         Strength LLE  L Hip Flexion: 5/5  L Hip Extension: 3+/5  L Hip ABduction: 4-/5  L Knee Flexion: 5/5  L Knee Extension: 5/5  L Ankle Dorsiflexion: 5/5    Strength RLE  R Hip Flexion: 5/5  R Hip Extension: 3+/5  R Hip ABduction: 4-/5  R Knee Flexion: 5/5  R Knee Extension: 5/5  R Ankle Dorsiflexion: 5/5     Outcomes Score:  Exam: Decreased strength and balance impacting mobility and safety. 5 Times Sit to Stand  5 TIMES SIT TO STAND: 17.67 seconds (from mat without UEs with increased Lt lateral lean)    Treatment:    Exercises:   Exercises  Exercise 1: Single stepping*  Exercise 2: Foam*  Exercise 3: STS*  Exercise 4: Step ups*  Exercise 5: DGI tasks*  Exercise 6: Gait drills*  Exercise 7: 2 way SLR x10 timothy - add prone hip ext nv*  Exercise 8: Bridges 5s x10  Exercise 20: HEP: 2 way SLR, bridges     *Indicates exercise,modality, or manual techniques to be initiated when appropriate       ASSESSMENT     Impression: Assessment: Pt presents with a decline in his strength, balance and mobility after a month's long illness with hospitalizations. Pt demonstrates weakness in timothy LEs primarily in timothy hips. Pt with decreased functional strength as seen with 5xSTS. Pt tends to complete sit to stand transfers with decreased Rt LE WBing with an increased Lt lateral lean. Pt demonstrates good static standing balance but with some challenged noted during Hemphill testing. Pt would benefit from further skilled PT to improve his strength, balance and safety with all mobility.     Body Structures, Functions, Activity Limitations Requiring Skilled Therapeutic Intervention: Decreased functional mobility , Decreased strength, Decreased endurance, Decreased balance    Statement of Medical Necessity: Physical Therapy is both indicated and medically necessary as outlined in the POC to increase the likelihood of meeting the functionally related goals stated below. Patient's Activity Tolerance: Patient tolerated evaluation without incident      Patient's rehabilitation potential/prognosis is considered to be: Good    Factors which may impact rehabilitation potential include: None     Patient Education: Goals, PT Role, Plan of Care, Evaluative findings, Home Exercise Program      GOALS   Patient Goal(s): Patient Goals : Strengthen my legs to get back to normal    Short Term Goals Completed by 3 weeks Goal Status   Independent with HEP New   Pt will be able to complete sit to stand transfer from a chair without UEs with equal timothy LE WBing. New     Long Term Goals Completed by 6 weeks Goal Status   LTG 1 Improve timothy LE strength to >/= 4+/5 to improve stability with standing and walking. New   LTG 2 Pt will ambulate without an AD on even and uneven surfaces with good stability and foot clearance S/I. New   LTG 3 Hemphill >/= 51/56 and DGI >/= 21/24 to reduce pt's risk for falls. New   LTG 4 5xSTS from chair without UEs </= 15 sec to improve pt's functional strength.  New          TREATMENT PLAN       Requires PT Follow-Up: Yes    Treatment may include any combination of the following: Strengthening, Balance training, Functional mobility training, Transfer training, Gait training, Stair training, Neuromuscular re-education, Manual Therapy - Soft Tissue Mobilization, Home exercise program, Safety education & training, Patient/Caregiver education & training, Equipment evaluation, education, & procurement, Modalities     Frequency / Duration:  Patient to be seen 2 times per week for 6 weeks        Eval Complexity:   Decision Making: Medium Complexity  History: Personal Factors and/or Comorbidities Impacting POC: High  History: PMH: HTN, h/o seizures with brain surgery, DM, h/o kidney transplant  Examination of body system(s) including body structures and functions, activity limitations, and/or participation restrictions: High  Exam: Decreased strength and balance impacting mobility and safety. Clinical Presentation: Medium  Clinical Presentation: Evolving    POST-PAIN     Pain Rating (0-10 pain scale):  0 /10  Location and pain description same as pre-treatment unless indicated. Action: [x] NA  [] Call Physician  [] Perform HEP  [] Meds as prescribed    Evaluation and patient rights have been reviewed and patient agrees with plan of care. Yes  [x]  No  []   Explain:     [place] Fall Risk Assessment  Risk Factor Scale  Score   History of Falls [] Yes  [x] No 25  0 0   Secondary Diagnosis [] Yes  [x] No 15  0 0   Ambulatory Aid [] Furniture  [] Crutches/cane/walker  [x] None/bedrest/wheelchair/nurse 30  15  0 0   IV/Heparin Lock [] Yes  [x] No 20  0 0   Gait/Transferring [x] Impaired  [] Weak  [] Normal/bedrest/immobile 20  10  0 20   Mental Status [] Forgets limitations  [x] Oriented to own ability 15  0 0      Total:20     Based on the Assessment score: check the appropriate box.   [x]  No intervention needed   Low =   Score of 0-24  []  Use standard prevention interventions Moderate =  Score of 24-44   [] Discuss fall prevention strategies   [] Indicate moderate falls risk on eval  []  Use high risk prevention interventions High = Score of 45 and higher   [] Discuss fall prevention strategies   [] Provide supervision during treatment time      Minutes:  PT Individual Minutes  Time In: 1011  Time Out: 1100  Minutes: 49  Timed Code Treatment Minutes: 10 Minutes  Procedure Minutes:39' eval     Timed Activity Minutes Units   Ther Ex 10 1       Electronically signed by Iris Ricks PT on 11/9/22 at 8:38 PM EST

## 2022-11-10 NOTE — PROGRESS NOTES
[address]     Ph: [phone]  Fax: [phone]      [x] Certification  [] Recertification []  Plan of Care  [] Progress Note [] Discharge      Referring Provider: Manny Amor MD     From:  Patricia Olson, PT  Patient: Dena Kaplan (53 y.o. male) : 1950 Date: 2022  Medical Diagnosis: Difficulty walking [R26.2]  Weakness [R53.1]       Treatment Diagnosis: Weakness, Difficulty walking    Progress Report Period from:  2022  to 2022    Visits to Date: 1 No Show: 0 Cancelled Appts: 0    OBJECTIVE:   Short Term Goals - Time Frame for Short Term Goals: 3 weeks    Goals Current/Discharge status  Status   Short Term Goal 1: Independent with HEP  ongoing New   Short Term Goal 2: Pt will be able to complete sit to stand transfer from a chair without UEs with equal timothy LE WBing. Transfers  Sit to Stand: Supervision, Independent (Increased difficulty without UEs from a lower surface, increased Lt lateral lean and Lt LE WBing to complete)  Stand to Sit: Supervision, Independent New     Long Term Goals - Time Frame for Long Term Goals : 6 weeks  Goals Current/ Discharge status Status   Long Term Goal 1: Improve timothy LE strength to >/= 4+/5 to improve stability with standing and walking. Strength LLE  L Hip Flexion: 5/5  L Hip Extension: 3+/5  L Hip ABduction: 4-/5  L Knee Flexion: 5/5  L Knee Extension: 5/5  L Ankle Dorsiflexion: 5/5  Strength RLE  R Hip Flexion: 5/5  R Hip Extension: 3+/5  R Hip ABduction: 4-/5  R Knee Flexion: 5/5  R Knee Extension: 5/5  R Ankle Dorsiflexion: 5/5    New   Long Term Goal 2: Pt will ambulate without an AD on even and uneven surfaces with good stability and foot clearance S/I.  Ambulation  Surface: Carpet  Device: No Device  Assistance: Supervision, Independent  Quality of Gait: Lt compensated Trendelenburg, occasional deviated path  Gait Deviations: Slow Sara  Distance: [de-identified]'   New   Long Term Goal 3: Hemphill >/= 51/56 and DGI >/= 21/24 to reduce pt's risk for falls. Hemphill Balance Score: 45  DGI = NT New   Long Term Goal 4: 5xSTS from chair without UEs </= 15 sec to improve pt's functional strength. 5 Times Sit to Stand  5 TIMES SIT TO STAND: 17.67 seconds (from mat without UEs with increased Lt lateral lean) New       Body Structures, Functions, Activity Limitations Requiring Skilled Therapeutic Intervention: Decreased functional mobility , Decreased strength, Decreased endurance, Decreased balance  Assessment: Pt presents with a decline in his strength, balance and mobility after a month's long illness with hospitalizations. Pt demonstrates weakness in timothy LEs primarily in timothy hips. Pt with decreased functional strength as seen with 5xSTS. Pt tends to complete sit to stand transfers with decreased Rt LE WBing with an increased Lt lateral lean. Pt demonstrates good static standing balance but with some challenged noted during Hemphill testing. Pt would benefit from further skilled PT to improve his strength, balance and safety with all mobility. Therapy Prognosis: Good      PT Education: Goals;PT Role;Plan of Care;Evaluative findings;Home Exercise Program    PLAN: [x] Evaluate and Treat  Frequency/Duration:  Plan Frequency: 2  Plan weeks: 6  Current Treatment Recommendations: Strengthening, Balance training, Functional mobility training, Transfer training, Gait training, Stair training, Neuromuscular re-education, Manual Therapy - Soft Tissue Mobilization, Home exercise program, Safety education & training, Patient/Caregiver education & training, Equipment evaluation, education, & procurement, Modalities     Precautions:  none                          Patient Status:[x] Continue/ Initiate plan of Care    [] Discharge PT. Recommend pt continue with HEP.      [] Additional visits requested, Please re-certify for additional visits:    [] Hold         Signature: Electronically signed by Kathy Betrrand PT on 11/9/22 at 8:41 PM EST      If you have any questions or concerns, please don't hesitate to call. Thank you for your referral.    I have reviewed this plan of care and certify a need for medically necessary rehabilitation services.     Physician Signature:__________________________________________________________  Date:  Please sign and return

## 2022-11-14 ENCOUNTER — TELEPHONE (OUTPATIENT)
Dept: PHARMACY | Age: 72
End: 2022-11-14

## 2022-11-14 DIAGNOSIS — I48.91 ATRIAL FIBRILLATION WITH RVR (HCC): Primary | ICD-10-CM

## 2022-11-14 NOTE — TELEPHONE ENCOUNTER
Notes from [address] discharge - follow up with cardiology to restart warfarin on 11/18.  Tracker reset for 11/21

## 2022-11-15 ENCOUNTER — HOSPITAL ENCOUNTER (OUTPATIENT)
Dept: PHYSICAL THERAPY | Age: 72
Setting detail: THERAPIES SERIES
Discharge: HOME | End: 2022-11-15
Payer: COMMERCIAL

## 2022-11-15 PROCEDURE — 97112 NEUROMUSCULAR REEDUCATION: CPT

## 2022-11-15 PROCEDURE — 97110 THERAPEUTIC EXERCISES: CPT

## 2022-11-15 PROCEDURE — 97535 SELF CARE MNGMENT TRAINING: CPT

## 2022-11-15 NOTE — PROGRESS NOTES
Physician Progress Note      PATIENT:               Jorge Luis Moise  CSN #:                  [id number]  :                       1950  ADMIT DATE:       10/19/2022 10:22 AM  DISCH DATE:        10/28/2022 3:14 PM  RESPONDING  PROVIDER #:        Toyin Cherry MD          QUERY TEXT:    Pt noted to have right lower lobe consolidation. ? Atelectasis vs pneumonia?   noted by pulmonology on . Pt was started on Augmentin for 7 days. If   possible, please document in the progress notes and discharge summary if   pneumonia was: The medical record reflects the following:  Risk Factors: COPD/emphysema, mild bronchiectasis, DM, CKD, renal transplant,   afib, h/o smoking  Clinical Indicators: 10/25 Dr. Fran Hubbard ? Patient had CT chest in ER shows right   lower lobe pleural-based consolidation, no symptoms of pneumonia, denies   having any cough or shortness of breath. He has no fever or chills. WBC is   within normal range. Possible rounded atelectasis organizing pneumonia?, ?LS   diminished b/l? . 10/26 Dr. Gerhardt See ? Right lower lobe consolidation. This could   represent atelectasis versus pneumonia? Inocente Ogren ?Reasonable to treat with antibiotic   for a week?, ? I will give him Augmentin for 7 days. ? Procalcitonin 0.11; WBC   6.8-9.0  Treatment: Augmentin started 10/26 for total 7 days, CT chest, pulmonology   consult, outpt f/u    Thank you, Drake Garcia RN BSN CDS  [phone]  Options provided:  -- This patient was treated for probable pneumonia  -- Atelectasis only, pneumonia ruled out after study  -- Other - I will add my own diagnosis  -- Disagree - Not applicable / Not valid  -- Disagree - Clinically unable to determine / Unknown  -- Refer to Clinical Documentation Reviewer    PROVIDER RESPONSE TEXT:    Atelectasis only, pneumonia ruled out after study.     Query created by: Shawn Antoine on 11/15/2022 7:40 AM      Electronically signed by:  Toyin Cherry MD 11/15/2022 5:26 PM

## 2022-11-15 NOTE — PROGRESS NOTES
[facility]  Outpatient Physical Therapy    Treatment Note        Date: 11/15/2022  Patient: Martina Hale  : 1950   Confirmed: Yes  MRN: [id number]  Referring Provider: Nadiya Richardson MD    Medical Diagnosis: Difficulty walking [R26.2]  Weakness [R53.1]       Treatment Diagnosis: Weakness, Difficulty walking    Visit Information:  Insurance: Payor: [id number] / Plan:  [address] / Product Type: *No Product type* /   PT Visit Information  PT Insurance Information: [place]  Total # of Visits Approved:  (bmn)  Total # of Visits to Date: 2  No Show: 0  Canceled Appointment: 0  Progress Note Counter:     Subjective Information:  Subjective: Pt states he put diabetic inserts in his shoes which is making him walk a little different  HEP Compliance:  [] Good [x] Fair [] Poor [] Reports not doing due to:    Pain Screening  Patient Currently in Pain: Denies    Treatment:  Exercises:  Exercises  Exercise 1: Single stepping fwd over SC x 10  Exercise 2: Foam;FA/FT/TANDEM/EO/EC, Stepping on/off  Exercise 3: STS see functional  Exercise 4: Step ups x 10 F  Exercise 5: DGI tasks: Head turns, Horiz> Vert  Exercise 6: Gait drills;F/L/R/March/ Tandem( F/R)  Exercise 7: 3 way SLR x10 timothy  Exercise 8: Bridges 5s x10  Exercise 9: SLS 1-3secs with 0-1 ue  Exercise 20: HEP:SLS, prone hip ext, STS       *Indicates exercise, modality, or manual techniques to be initiated when appropriate    Assessment: Body Structures, Functions, Activity Limitations Requiring Skilled Therapeutic Intervention: Decreased functional mobility , Decreased strength, Decreased endurance, Decreased balance  Assessment: Initiated tx per POC with focus on balance reactions and stability and hip strengthening to improve gait quality. Pt performs STS from mat with decreased extension in knees and trunk with VCs for technique and keeping toes down to correct post lean. Pt demo's instability in SLS and NBOS activities dynamically requiring occassional UE support. Poor SLS on RLE initially. Cont with POC. Treatment Diagnosis: Weakness, Difficulty walking  Therapy Prognosis: Good     Post-Pain Assessment:       Pain Rating (0-10 pain scale):  0 /10   Location and pain description same as pre-treatment unless indicated. Action: [x] NA   [] Perform HEP  [] Meds as prescribed  [] Modalities as prescribed   [] Call Physician     GOALS   Patient Goal(s): Patient Goals : Strengthen my legs to get back to normal    Short Term Goals Completed by 3 weeks Goal Status   STG 1 Independent with HEP In progress   STG 2 Pt will be able to complete sit to stand transfer from a chair without UEs with equal timothy LE WBing. In progress       Long Term Goals Completed by 6 weeks Goal Status   LTG 1 Improve timothy LE strength to >/= 4+/5 to improve stability with standing and walking. In progress   LTG 2 Pt will ambulate without an AD on even and uneven surfaces with good stability and foot clearance S/I. In progress   LTG 3 Hemphill >/= 51/56 and DGI >/= 21/24 to reduce pt's risk for falls. In progress   LTG 4 5xSTS from chair without UEs </= 15 sec to improve pt's functional strength. In progress   $    Plan:  Frequency/Duration:  Plan  Plan Frequency: 2  Plan weeks: 6  Current Treatment Recommendations: Strengthening, Balance training, Functional mobility training, Transfer training, Gait training, Stair training, Neuromuscular re-education, Manual Therapy - Soft Tissue Mobilization, Home exercise program, Safety education & training, Patient/Caregiver education & training, Equipment evaluation, education, & procurement, Modalities  Pt to continue current HEP. See objective section for any therapeutic exercise changes, additions or modifications this date.     Therapy Time:      PT Individual Minutes  Time In: 3260  Time Out: 5816  Minutes: 55  Timed Code Treatment Minutes: 55 Minutes  Procedure Minutes:0  Timed Activity Minutes Units   Ther Ex 20 1   Neuro 25 2   Transfers 10 1     Electronically signed by Marcela Elder PTA on 11/15/22 at 5:10 PM EST

## 2022-11-18 ENCOUNTER — OFFICE VISIT (OUTPATIENT)
Dept: CARDIOLOGY CLINIC | Age: 72
End: 2022-11-18
Payer: COMMERCIAL

## 2022-11-18 ENCOUNTER — HOSPITAL ENCOUNTER (OUTPATIENT)
Dept: PHYSICAL THERAPY | Age: 72
Setting detail: THERAPIES SERIES
Discharge: HOME | End: 2022-11-18
Payer: COMMERCIAL

## 2022-11-18 VITALS
SYSTOLIC BLOOD PRESSURE: 128 MMHG | BODY MASS INDEX: 25.94 KG/M2 | DIASTOLIC BLOOD PRESSURE: 62 MMHG | WEIGHT: 202 LBS | OXYGEN SATURATION: 98 % | HEART RATE: 79 BPM

## 2022-11-18 DIAGNOSIS — I10 ESSENTIAL HYPERTENSION: Primary | ICD-10-CM

## 2022-11-18 DIAGNOSIS — I48.91 ATRIAL FIBRILLATION WITH RVR (HCC): ICD-10-CM

## 2022-11-18 DIAGNOSIS — N18.9 CHRONIC KIDNEY DISEASE, UNSPECIFIED CKD STAGE: ICD-10-CM

## 2022-11-18 DIAGNOSIS — E78.5 DYSLIPIDEMIA: ICD-10-CM

## 2022-11-18 DIAGNOSIS — R09.89 BILATERAL CAROTID BRUITS: ICD-10-CM

## 2022-11-18 PROCEDURE — 3074F SYST BP LT 130 MM HG: CPT | Performed by: INTERNAL MEDICINE

## 2022-11-18 PROCEDURE — 3078F DIAST BP <80 MM HG: CPT | Performed by: INTERNAL MEDICINE

## 2022-11-18 PROCEDURE — 99214 OFFICE O/P EST MOD 30 MIN: CPT | Performed by: INTERNAL MEDICINE

## 2022-11-18 PROCEDURE — 1123F ACP DISCUSS/DSCN MKR DOCD: CPT | Performed by: INTERNAL MEDICINE

## 2022-11-18 RX ORDER — ROSUVASTATIN CALCIUM 20 MG/1
TABLET, COATED ORAL
COMMUNITY
Start: 2022-11-08

## 2022-11-18 ASSESSMENT — ENCOUNTER SYMPTOMS
EYES NEGATIVE: 1
COUGH: 0
WHEEZING: 0
GASTROINTESTINAL NEGATIVE: 1
SHORTNESS OF BREATH: 0
NAUSEA: 0
STRIDOR: 0
RESPIRATORY NEGATIVE: 1
CHEST TIGHTNESS: 0
BLOOD IN STOOL: 0

## 2022-11-18 NOTE — PROGRESS NOTES
Subsequent Progress Note  Patient: Eddie Martini  YOB: 1950  MRN: [id number]    Chief Complaint: CKD kidney transplant AF  Chief Complaint   Patient presents with    Follow-Up from [facility]: 10/19-10/28       CV Data:   Kidney Transplant [facility]    spect negative ef 75   Echo eF 72    Subjective/HPI: recent hosp for rapid AF. Started warfarin INR 3.1  Tried and cold all the time. Minimally active no cp   Having significant diarrhea and thinks it is related to Metoprolol. 22 recent colonic ulcer required transfusions. No cp no sovb no falls no bleed doing PT and feels weak. Stopped Warfarin.  Most recetn Hb 10.0     EKG: SR 72    Lives w wife  Nonsmoker  No etoh  Work -    Past Medical History:   Diagnosis Date    Diabetes mellitus ([facility]     Hemodialysis access, fistula mature ([facility] 2002    Hypertension     Seizures ([facility]     no seizure since        Past Surgical History:   Procedure Laterality Date    BRAIN SURGERY Left 1990    to eliminate seizures    COLONOSCOPY N/A 10/24/2022    COLONOSCOPY DIAGNOSTIC performed by Shaina Macario MD at [facility]      [place] [address] INCL FLUOR GDNCE DX W/CELL WASHG [facility] N/A 3/20/2018    BRONCHOSCOPY performed by Eleno Rhodes MD at [facility] N/A 10/21/2022    EGD DIAGNOSTIC ONLY performed by Daryl Echevarria MD at [facility]       Family History   Problem Relation Age of Onset    Colon Cancer Neg Hx        Social History     Socioeconomic History    Marital status:    Tobacco Use    Smoking status: Former     Packs/day: 0.25     Types: Cigarettes     Quit date: 2015     Years since quittin.4    Smokeless tobacco: Former   Vaping Use    Vaping Use: Never used   Substance and Sexual Activity    Alcohol use: No     Alcohol/week: 0.0 standard drinks    Drug use: No    Sexual activity: Not Currently       No Known Allergies    Current Outpatient Medications   Medication Sig Dispense Refill    rosuvastatin (CRESTOR) 20 MG tablet TAKE 1 TABLET BY MOUTH EVERY DAY      amLODIPine (NORVASC) 10 MG tablet Take 1 tablet by mouth daily 30 tablet 3    carvedilol (COREG) 6.25 MG tablet Take 1 tablet by mouth 2 times daily 60 tablet 3    sodium bicarbonate 650 MG tablet Take 1 tablet by mouth 2 times daily 60 tablet 5    gabapentin (NEURONTIN) 300 MG capsule Take 300 mg by mouth daily. predniSONE (DELTASONE) 5 MG tablet Take 5 mg by mouth daily      Dulaglutide (TRULICITY) 1.5 YN/4.1XR SOPN Inject 1.5 mg into the skin once a week Weekly on Monday      tamsulosin (FLOMAX) 0.4 MG capsule Take 1 capsule by mouth daily 90 capsule 0    Continuous Blood Gluc Transmit (DEXCOM G6 TRANSMITTER) MISC Change every 3 months 1 each 3    Continuous Blood Gluc Sensor (FREESTYLE KEV 14 DAY SENSOR) MISC Every 2 weeks Dx E11.65 2 each 06    insulin NPH (NOVOLIN N FLEXPEN) 100 UNIT/ML injection pen 16 units at bedtime (Patient taking differently: 9 units in AM, 5 units at bedtime) 5 pen 3    Insulin Regular Human (NOVOLIN R FLEXPEN) 100 UNIT/ML SOPN 6 units am 8 units lunch and dinner (Patient taking differently: Sliding Scale) 5 pen 3    Continuous Blood Gluc Sensor (FREESTYLE KEV 14 DAY SENSOR) MISC Every 2 weeks 2 each 06    pantoprazole (PROTONIX) 40 MG tablet Take 40 mg by mouth daily       cycloSPORINE modified (NEORAL) 25 MG capsule Take 75 mg by mouth 2 times daily   2    mycophenolate (CELLCEPT) 250 MG capsule Take 750 mg by mouth 2 times daily   1    simvastatin (ZOCOR) 20 MG tablet Take 1 tablet by mouth nightly (Patient not taking: Reported on 11/18/2022) 30 tablet 3     No current facility-administered medications for this visit. Review of Systems:   Review of Systems   Constitutional:  Positive for fatigue. Negative for diaphoresis. HENT: Negative. Eyes: Negative. Respiratory: Negative.   Negative for cough, chest tightness, shortness of breath, wheezing and stridor. Cardiovascular: Negative. Negative for chest pain, palpitations and leg swelling. Gastrointestinal: Negative. Negative for blood in stool and nausea. Genitourinary: Negative. Musculoskeletal: Negative. Skin: Negative. Neurological:  Positive for weakness. Negative for dizziness, syncope and light-headedness. Hematological: Negative. Psychiatric/Behavioral: Negative. Physical Examination:    /62 (Site: Left Upper Arm, Position: Sitting, Cuff Size: Large Adult)   Pulse 79   Wt 202 lb (91.6 kg)   SpO2 98%   BMI 25.94 kg/m²    Physical Exam   Constitutional: He appears healthy. No distress. HENT:   Normal cephalic and Atraumatic   Eyes: Pupils are equal, round, and reactive to light. Neck: Thyroid normal. No JVD present. No neck adenopathy. No thyromegaly present. Cardiovascular: Normal rate, regular rhythm, normal heart sounds, intact distal pulses and normal pulses. Pulses:       Carotid pulses are  on the right side with bruit and  on the left side with bruit. Pulmonary/Chest: Effort normal and breath sounds normal. He has no wheezes. He has no rales. He exhibits no tenderness. Abdominal: Soft. Bowel sounds are normal. There is no abdominal tenderness. Musculoskeletal:         General: No tenderness or edema. Normal range of motion. Cervical back: Normal range of motion and neck supple. Neurological: He is alert and oriented to person, place, and time. Skin: Skin is warm. No cyanosis. Nails show no clubbing.      LABS:  CBC:   Lab Results   Component Value Date/Time    WBC 8.1 11/17/2022 10:47 AM    RBC 3.41 11/17/2022 10:47 AM    RBC 4.18 09/15/2011 05:00 AM    HGB 10.0 11/17/2022 10:47 AM    HCT 30.6 11/17/2022 10:47 AM    MCV 89.8 11/17/2022 10:47 AM    MCH 29.3 11/17/2022 10:47 AM    MCHC 32.7 11/17/2022 10:47 AM    RDW 15.3 11/17/2022 10:47 AM     11/17/2022 10:47 AM    MPV 8.8 09/15/2011 05:00 AM     Lipids:  Lab Results   Component Value Date    CHOL 191 09/11/2022    CHOL 193 04/25/2022    CHOL 184 04/16/2021     Lab Results   Component Value Date    TRIG 188 (H) 09/11/2022    TRIG 174 (H) 04/25/2022    TRIG 175 (H) 04/16/2021     Lab Results   Component Value Date    HDL 40 09/11/2022    HDL 44 04/25/2022    HDL 51 04/16/2021     Lab Results   Component Value Date    LDLCALC 113 09/11/2022    LDLCALC 114 04/25/2022    LDLCALC 98 04/16/2021     No results found for: LABVLDL, VLDL  No results found for: CHOLHDLRATIO  CMP:    Lab Results   Component Value Date/Time     11/17/2022 10:47 AM    K 4.3 11/17/2022 10:47 AM    K 4.8 10/04/2022 04:15 AM     11/17/2022 10:47 AM    CO2 22 11/17/2022 10:47 AM    BUN 56 11/17/2022 10:47 AM    CREATININE 2.34 11/17/2022 10:47 AM    GFRAA 28.2 10/12/2022 02:45 PM    LABGLOM 29.0 11/17/2022 10:47 AM    GLUCOSE 279 11/17/2022 10:47 AM    GLUCOSE 172 09/16/2011 06:25 AM    PROT 5.5 10/31/2022 06:10 AM    LABALBU 4.1 11/17/2022 10:47 AM    LABALBU 3.9 09/15/2011 05:00 AM    CALCIUM 8.9 11/17/2022 10:47 AM    BILITOT 0.5 10/31/2022 06:10 AM    ALKPHOS 59 10/31/2022 06:10 AM    AST 7 10/31/2022 06:10 AM    ALT 7 10/31/2022 06:10 AM     BMP:    Lab Results   Component Value Date/Time     11/17/2022 10:47 AM    K 4.3 11/17/2022 10:47 AM    K 4.8 10/04/2022 04:15 AM     11/17/2022 10:47 AM    CO2 22 11/17/2022 10:47 AM    BUN 56 11/17/2022 10:47 AM    LABALBU 4.1 11/17/2022 10:47 AM    LABALBU 3.9 09/15/2011 05:00 AM    CREATININE 2.34 11/17/2022 10:47 AM    CALCIUM 8.9 11/17/2022 10:47 AM    GFRAA 28.2 10/12/2022 02:45 PM    LABGLOM 29.0 11/17/2022 10:47 AM    GLUCOSE 279 11/17/2022 10:47 AM    GLUCOSE 172 09/16/2011 06:25 AM     Magnesium:    Lab Results   Component Value Date/Time    MG 1.9 11/17/2022 10:47 AM     TSH:  Lab Results   Component Value Date    TSH 0.574 10/19/2022       Patient Active Problem List   Diagnosis    Pneumonia Abdominal pain, other specified site    Acute pyelonephritis without lesion of renal medullary necrosis    Anemia    Essential hypertension    Nonspecific abnormal results of thyroid function study    Mixed hyperlipidemia    Kidney replaced by transplant    Intra-abdominal abscess post-procedure    Infection due to Enterococcus    Fever and other physiologic disturbances of temperature regulation    Chronic kidney disease (CKD)    Type II or unspecified type diabetes mellitus without mention of complication, uncontrolled    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 Modified Medications    No medications on file       No orders of the defined types were placed in this encounter. Assessment/Plan:    1. Essential hypertension   Elevated. Low salt diet resume coreg 12.5 bid. Dc Meoprolol due to Diarrhea. 2. Chronic kidney disease, unspecified CKD stage   Transplanted on 2 occasions. 3. Atrial fibrillation with RVR (HCC)  Pt declines any further A/C due to extensive bleed he experienced recently. We will refer for watchman device. 4. Dyslipidemia   Statin. Low fat diet. F/u labs. 5. Carotid Bruits - CUS   Counseling:  Heart Healthy Lifestyle, Low Salt Diet, Take Precautions to Prevent Falls, and Walk Daily    Return in about 4 months (around 3/18/2023).       Electronically signed by Rachelle Alonzo MD on 11/18/2022 at 12:13 PM

## 2022-11-18 NOTE — PROGRESS NOTES
Therapy                            Cancellation/No-show Note    Date: 2022  Patient: Leatha Burks (38 y.o. male)  : 1950  MRN:  [id number]  Referring Physician: Aubrey Clark MD    Medical Diagnosis: Difficulty walking [R26.2]  Weakness [R53.1]      Visit Information:  Insurance: Payor: [address] / Plan:  [address] / Product Type: *No Product type* /   Visits to Date: 2   No Show/Cancelled Appts: 0 /       For today's appointment patient:  [x]  Cancelled  []  Rescheduled appointment  []  No-show   []  Called pt to remind of next appointment     Reason given by patient:  [x]  Patient ill  []  Conflicting appointment  []  No transportation    []  Conflict with work  []  No reason given  []  Other:      [x] Pt has future appointments scheduled, no follow up needed  [] Pt requests to be on hold.     Reason:   If > 2 weeks please discuss with therapist.  [] Therapist to call pt for follow up     Comments:       Signature: Electronically signed by Flako Le PT on 22 at 8:20 AM EST

## 2022-11-21 ENCOUNTER — ANTI-COAG VISIT (OUTPATIENT)
Dept: PHARMACY | Age: 72
End: 2022-11-21

## 2022-11-21 ENCOUNTER — TELEPHONE (OUTPATIENT)
Dept: CARDIOLOGY CLINIC | Age: 72
End: 2022-11-21

## 2022-11-21 ENCOUNTER — TELEPHONE (OUTPATIENT)
Dept: PHARMACY | Age: 72
End: 2022-11-21

## 2022-11-21 ENCOUNTER — HOSPITAL ENCOUNTER (OUTPATIENT)
Dept: PHYSICAL THERAPY | Age: 72
Setting detail: THERAPIES SERIES
Discharge: HOME | End: 2022-11-21
Payer: COMMERCIAL

## 2022-11-21 DIAGNOSIS — I48.91 ATRIAL FIBRILLATION WITH RVR (HCC): Primary | ICD-10-CM

## 2022-11-21 PROCEDURE — 97110 THERAPEUTIC EXERCISES: CPT

## 2022-11-21 PROCEDURE — 97112 NEUROMUSCULAR REEDUCATION: CPT

## 2022-11-21 NOTE — PROGRESS NOTES
Received note from Dr. Maru Mcadams that patient is no longer on warfarin and is scheduled for a watchman procedure. Episode resolved. JORGE Jurado Ph. 11/21/2022 3:55 PM

## 2022-11-21 NOTE — TELEPHONE ENCOUNTER
Coumadin Clinic wants to know if patient is permanently off [address] they discharge him from the clinic?  Please call clinic

## 2022-11-21 NOTE — TELEPHONE ENCOUNTER
Patient had a GI bleed last month and was hospitalized. Warfarin tx was stopped at that time. Note in patient's chart from 11/18/2022 states that patient is to stop warfarin. Called to clarify if patient is to be d/c from [facility] or is the stoppage temporary. Gustavo Blackburn, MIKE. Tere. 11/21/2022 9:53 AM

## 2022-11-21 NOTE — PROGRESS NOTES
[facility]  Outpatient Physical Therapy    Treatment Note        Date: 2022  Patient: Soheila No  : 1950   Confirmed: Yes  MRN: [id number]  Referring Provider: Lokesh Mendes MD    Medical Diagnosis: Difficulty walking [R26.2]  Weakness [R53.1]       Treatment Diagnosis: Weakness, Difficulty walking    Visit Information:  Insurance: Payor: [id number] / Plan:  [address] / Product Type: *No Product type* /   PT Visit Information  PT Insurance Information: [place]  Total # of Visits Approved:  (bmn)  Total # of Visits to Date: 3  No Show: 0  Canceled Appointment: 0  Progress Note Counter: 3/12    Subjective Information:  Subjective: Pt reports he is not having a good day because his daughter's dog just . HEP Compliance:  [x] Good [] Fair [] Poor [] Reports not doing due to:    Pain Screening  Patient Currently in Pain: Denies    Treatment:  Exercises:  Exercises  Exercise 1: lateral band walks with mini squat YTB x2 laps  Exercise 3: STS with emphasis on controling descend x10  Exercise 4: Step ups x 10 F/L / heel tap downs 4\" and 6\" x10 ea  Exercise 6: Gait drills;F/L/R/ x2 laps ea  Exercise 9: SLS x 30\" ea LE with 1 UE support  Exercise 10: standing hip ext x10 YTB           *Indicates exercise, modality, or manual techniques to be initiated when appropriate    Objective Measures:       Strength: [x] NT  [] MMT completed:      ROM: [x] NT  [] ROM measurements:       Assessment: Body Structures, Functions, Activity Limitations Requiring Skilled Therapeutic Intervention: Decreased functional mobility , Decreased strength, Decreased endurance, Decreased balance  Assessment: Pt requiring occ seated RB's this session d/t increased fatigue. Step by step cues required for proper technique, as well as multiple instances of education required for purpose of tasks.  Pt also educated on importance of maintaining correct posture throughout tasks for proper strengthening and balance. Initiated heel tap downs for improving eccentric control and improving stand > sit quality. Treatment Diagnosis: Weakness, Difficulty walking  Therapy Prognosis: Good          Post-Pain Assessment:       Pain Rating (0-10 pain scale):  0 /10   Location and pain description same as pre-treatment unless indicated. Action: [] NA   [x] Perform HEP  [] Meds as prescribed  [] Modalities as prescribed   [] Call Physician     GOALS   Patient Goal(s): Patient Goals : Strengthen my legs to get back to normal    Short Term Goals Completed by 3 weeks Goal Status   STG 1 Independent with HEP In progress   STG 2 Pt will be able to complete sit to stand transfer from a chair without UEs with equal timothy LE WBing. In progress       Long Term Goals Completed by 6 weeks Goal Status   LTG 1 Improve timothy LE strength to >/= 4+/5 to improve stability with standing and walking. In progress   LTG 2 Pt will ambulate without an AD on even and uneven surfaces with good stability and foot clearance S/I. In progress   LTG 3 Hemphill >/= 51/56 and DGI >/= 21/24 to reduce pt's risk for falls. In progress   LTG 4 5xSTS from chair without UEs </= 15 sec to improve pt's functional strength. In progress            Plan:  Frequency/Duration:  Plan  Plan Frequency: 2  Plan weeks: 6  Current Treatment Recommendations: Strengthening, Balance training, Functional mobility training, Transfer training, Gait training, Stair training, Neuromuscular re-education, Manual Therapy - Soft Tissue Mobilization, Home exercise program, Safety education & training, Patient/Caregiver education & training, Equipment evaluation, education, & procurement, Modalities  Pt to continue current HEP. See objective section for any therapeutic exercise changes, additions or modifications this date.     Therapy Time:      PT Individual Minutes  Time In: 4773  Time Out: 1000  Minutes: 55  Timed Code Treatment Minutes: 55 Minutes  Procedure Minutes:0  Timed Activity Minutes Units   Ther Ex 30 2   NMR 25 2     Electronically signed by Diana Jung PTA on 11/21/22 at 11:00 AM EST

## 2022-11-23 ENCOUNTER — HOSPITAL ENCOUNTER (OUTPATIENT)
Dept: ULTRASOUND IMAGING | Age: 72
Discharge: HOME | End: 2022-11-25
Payer: COMMERCIAL

## 2022-11-23 ENCOUNTER — HOSPITAL ENCOUNTER (OUTPATIENT)
Dept: PHYSICAL THERAPY | Age: 72
Setting detail: THERAPIES SERIES
Discharge: HOME | End: 2022-11-23
Payer: COMMERCIAL

## 2022-11-23 DIAGNOSIS — R09.89 BILATERAL CAROTID BRUITS: ICD-10-CM

## 2022-11-23 PROCEDURE — 97112 NEUROMUSCULAR REEDUCATION: CPT

## 2022-11-23 PROCEDURE — 93880 EXTRACRANIAL BILAT STUDY: CPT | Performed by: INTERNAL MEDICINE

## 2022-11-23 PROCEDURE — 93880 EXTRACRANIAL BILAT STUDY: CPT

## 2022-11-23 PROCEDURE — 97110 THERAPEUTIC EXERCISES: CPT

## 2022-11-23 NOTE — PROGRESS NOTES
[facility]  Outpatient Physical Therapy    Treatment Note        Date: 2022  Patient: Soheila No  : 1950   Confirmed: Yes  MRN: [id number]  Referring Provider: Lokesh Mendes MD    Medical Diagnosis: Difficulty walking [R26.2]  Weakness [R53.1]       Treatment Diagnosis: Weakness, Difficulty walking    Visit Information:  Insurance: [address]or: [id number] / Plan:  [address] / Product Type: *No Product type* /   PT Visit Information  PT Insurance Information: [place]  Total # of Visits Approved:  (bmn)  Total # of Visits to Date: 4  No Show: 0  Canceled Appointment: 0  Progress Note Counter:     Subjective Information:  Subjective: Pt reports he feels his balance is getting better, feels his HEP is going well. HEP Compliance:  [x] Good [] Fair [] Poor [] Reports not doing due to:    Pain Screening  Patient Currently in Pain: No    Treatment:  Exercises:  Exercises  Exercise 1: lateral/BWD band walks with mini squat YTB x2 laps  Exercise 5: DGI tasks: Head turns, Horiz> Vert  Exercise 7: 3 way SLR x15 timothy - feels fatigued quicker with abduction  Exercise 8: Bridges 5s x10  Exercise 11: Dualing tasking: ambulate with bean bag toss to self, with an without category naming  Exercise 12: Standing FA and semi-tandem with head turns up/down, left/right  Exercise 20: HEP: head turns at counter  *Indicates exercise, modality, or manual techniques to be initiated when appropriate    Assessment: Body Structures, Functions, Activity Limitations Requiring Skilled Therapeutic Intervention: Decreased functional mobility , Decreased strength, Decreased endurance, Decreased balance  Assessment: Treatment focused on strength and balance while ambulating. Pt demonstrated difficulty maintaining balance with DGI tasks, up/down > left/right.  Initiated head turns while static standing to improve standing balance and dual tasking to improve balance while ambulating/multitasking. Completed lateral and backward band walks with mini squat to improve hip strength, pt has difficulty maintaining mini squat position and required verbal cues for proper form. Pt would benefit from furhter PT to continue improving balance and LE strength. Treatment Diagnosis: Weakness, Difficulty walking  Therapy Prognosis: Good    Post-Pain Assessment:       Pain Rating (0-10 pain scale):   0/10   Location and pain description same as pre-treatment unless indicated. Action: [x] NA   [] Perform HEP  [] Meds as prescribed  [] Modalities as prescribed   [] Call Physician     GOALS   Patient Goal(s): Patient Goals : Strengthen my legs to get back to normal    Short Term Goals Completed by 3 weeks Goal Status   STG 1 Independent with HEP In progress   STG 2 Pt will be able to complete sit to stand transfer from a chair without UEs with equal timothy LE WBing. In progress       Long Term Goals Completed by 6 weeks Goal Status   LTG 1 Improve timothy LE strength to >/= 4+/5 to improve stability with standing and walking. In progress   LTG 2 Pt will ambulate without an AD on even and uneven surfaces with good stability and foot clearance S/I. In progress   LTG 3 Hemphill >/= 51/56 and DGI >/= 21/24 to reduce pt's risk for falls. In progress   LTG 4 5xSTS from chair without UEs </= 15 sec to improve pt's functional strength. In progress     Plan:  Frequency/Duration:  Plan  Plan Frequency: 2  Plan weeks: 6  Current Treatment Recommendations: Strengthening, Balance training, Functional mobility training, Transfer training, Gait training, Stair training, Neuromuscular re-education, Manual Therapy - Soft Tissue Mobilization, Home exercise program, Safety education & training, Patient/Caregiver education & training, Equipment evaluation, education, & procurement, Modalities  Pt to continue current HEP. See objective section for any therapeutic exercise changes, additions or modifications this date.     Therapy

## 2022-11-29 ENCOUNTER — HOSPITAL ENCOUNTER (OUTPATIENT)
Dept: PHYSICAL THERAPY | Age: 72
Setting detail: THERAPIES SERIES
Discharge: HOME | End: 2022-11-29
Payer: COMMERCIAL

## 2022-11-29 PROCEDURE — 97112 NEUROMUSCULAR REEDUCATION: CPT

## 2022-11-29 NOTE — PROGRESS NOTES
[facility]  Outpatient Physical Therapy    Treatment Note        Date: 2022  Patient: Lang Zimmerman  : 1950   Confirmed: Yes  MRN: [id number]  Referring Provider: Margaret Miles MD    Medical Diagnosis: Difficulty walking [R26.2]  Weakness [R53.1]       Treatment Diagnosis: Weakness, Difficulty walking    Visit Information:  Insurance: [address]or: [id number] / Plan:  [address] / Product Type: *No Product type* /   PT Visit Information  PT Insurance Information: [place]  Total # of Visits to Date: 5  No Show: 0  Canceled Appointment: 0  Progress Note Counter:     Subjective Information:  Subjective: Pt reports he feels balance is doing well. Patient stated he had increased soreness after pushing cart in shopping store. HEP Compliance:  [x] Good [] Fair [] Poor [] Reports not doing due to:    Pain Screening  Patient Currently in Pain: No    Treatment:  Exercises:  Exercises  Exercise 2: Foam;FA/FT/TANDEM/EO/EC, Stepping on/off  Exercise 4: Step ups x 10 F/L / heel tap downs 4\" and 6\" x10 ea  Exercise 5: DGI tasks: Head turns, Horiz> Vert  Exercise 6: Gait drills;F/L/R/ x2 laps ea  Exercise 7: 3 way SLR x15 timothy - feels fatigued quicker with abduction  Exercise 8: Bridges 5s x10  Exercise 13: Fwd/lat single stepping over cane x10 bilat 1UE support  Exercise 20: HEP: head turns at counter     Assessment: Body Structures, Functions, Activity Limitations Requiring Skilled Therapeutic Intervention: Decreased functional mobility , Decreased strength, Decreased endurance, Decreased balance  Assessment: Treatment focused on increasing overall lower extremity strength and balance for more stability during ambulation. Patient arrived to clinic carrying/not using standard cane, states he mainly just carries is. Pt demonstrated 1-2 posterior LOB during gait drills, required 0-1 UE support d/t instability.  Complete single stepping over cane, demonstrated improvement instability able to complete with 0 UE support. Required verbal and demo cues for proper form. Patient demonstrated slight quad lag during SLR, cues for proper quad engagement. Patient will continue to benefit from skilled physical therapy to continue progressing towards goals per plan of care. Treatment Diagnosis: Weakness, Difficulty walking  Therapy Prognosis: Good     Post-Pain Assessment:       Pain Rating (0-10 pain scale):   0/10   Location and pain description same as pre-treatment unless indicated. Action: [] NA   [x] Perform HEP  [] Meds as prescribed  [] Modalities as prescribed   [] Call Physician     GOALS   Patient Goal(s): Patient Goals : Strengthen my legs to get back to normal    Short Term Goals Completed by 3 weeks Goal Status   STG 1 Independent with HEP In progress   STG 2 Pt will be able to complete sit to stand transfer from a chair without UEs with equal timothy LE WBing. In progress       Long Term Goals Completed by 6 weeks Goal Status   LTG 1 Improve timothy LE strength to >/= 4+/5 to improve stability with standing and walking. In progress   LTG 2 Pt will ambulate without an AD on even and uneven surfaces with good stability and foot clearance S/I. In progress   LTG 3 Hemphill >/= 51/56 and DGI >/= 21/24 to reduce pt's risk for falls. In progress   LTG 4 5xSTS from chair without UEs </= 15 sec to improve pt's functional strength. In progress          Plan:  Frequency/Duration:  Plan  Plan Frequency: 2  Plan weeks: 6  Current Treatment Recommendations: Strengthening, Balance training, Functional mobility training, Transfer training, Gait training, Stair training, Neuromuscular re-education, Manual Therapy - Soft Tissue Mobilization, Home exercise program, Safety education & training, Patient/Caregiver education & training, Equipment evaluation, education, & procurement, Modalities  Pt to continue current HEP.   See objective section for any therapeutic exercise changes, additions or modifications

## 2022-12-01 ENCOUNTER — HOSPITAL ENCOUNTER (OUTPATIENT)
Dept: PHYSICAL THERAPY | Age: 72
Setting detail: THERAPIES SERIES
Discharge: HOME | End: 2022-12-01
Payer: COMMERCIAL

## 2022-12-01 PROCEDURE — 97116 GAIT TRAINING THERAPY: CPT

## 2022-12-01 PROCEDURE — 97110 THERAPEUTIC EXERCISES: CPT

## 2022-12-01 PROCEDURE — 97112 NEUROMUSCULAR REEDUCATION: CPT

## 2022-12-01 NOTE — PROGRESS NOTES
[facility]  Outpatient Physical Therapy    Treatment Note        Date: 2022  Patient: Madeline Kraft  : 1950   Confirmed: Yes  MRN: [id number]  Referring Provider: Raina Joy MD    Medical Diagnosis: Difficulty walking [R26.2]  Weakness [R53.1]       Treatment Diagnosis: Weakness, Difficulty walking    Visit Information:  Insurance: Payor:  [address] / Plan:  [facility] [address] / Product Type: *No Product type* /   PT Visit Information  PT Insurance Information: [place]  Total # of Visits to Date: 6  No Show: 0  Canceled Appointment: 0  Progress Note Counter:     Subjective Information:  Subjective: Pt states he feels fatigued today  HEP Compliance:  [x] Good [] Fair [] Poor [] Reports not doing due to:    Pain Screening  Patient Currently in Pain: Denies    Treatment:  Exercises:  Exercises  Exercise 3: STS without UE x 5= 20.70\"  Exercise 5: DGI = 14  Exercise 6: Gait drills: March/ tandem ( /R)/  laps ea  Exercise 7: B4 way Hip with RTB x 10  Exercise 14: SciFit L 2.0 x 5 min  Exercise 20: HEP: 3 way hip, RTB at counter     *Indicates exercise, modality, or manual techniques to be initiated when appropriate    Objective Measures:      Ambulation  Surface: Carpet, Level tile  Device: No Device, Single point cane  Assistance: Supervision, Independent  Quality of Gait: Lt compensated Trendelenburg, occasional deviated path  Gait Deviations: Slow Sara  Distance: 200ft    Assessment: Body Structures, Functions, Activity Limitations Requiring Skilled Therapeutic Intervention: Decreased functional mobility , Decreased strength, Decreased endurance, Decreased balance  Assessment: Initiated 4 way hip and SciFit to pregress strengthening this date. Pt with baseline score of 14/24 with DGI, increased deviations with head turns. Pt very lethargic throughout session and required several RB's, possibly d/t loose stools from the day before. Pt states he is going to [place] for the holidays and wife confirmed, Discussed continuing Therapy until pt leaves for max progression. Treatment Diagnosis: Weakness, Difficulty walking  Therapy Prognosis: Good          Post-Pain Assessment:       Pain Rating (0-10 pain scale):  0 /10   Location and pain description same as pre-treatment unless indicated. Action: [x] NA   [] Perform HEP  [] Meds as prescribed  [] Modalities as prescribed   [] Call Physician     GOALS   Patient Goal(s): Patient Goals : Strengthen my legs to get back to normal    Short Term Goals Completed by 3 weeks Goal Status   STG 1 Independent with HEP In progress   STG 2 Pt will be able to complete sit to stand transfer from a chair without UEs with equal timothy LE WBing. In progress     Long Term Goals Completed by 6 weeks Goal Status   LTG 1 Improve timothy LE strength to >/= 4+/5 to improve stability with standing and walking. In progress   LTG 2 Pt will ambulate without an AD on even and uneven surfaces with good stability and foot clearance S/I. In progress   LTG 3 Hemphill >/= 51/56 and DGI >/= 21/24 to reduce pt's risk for falls. In progress   LTG 4 5xSTS from chair without UEs </= 15 sec to improve pt's functional strength. In progress       Plan:  Frequency/Duration:  Plan  Plan Frequency: 2  Plan weeks: 6  Current Treatment Recommendations: Strengthening, Balance training, Functional mobility training, Transfer training, Gait training, Stair training, Neuromuscular re-education, Manual Therapy - Soft Tissue Mobilization, Home exercise program, Safety education & training, Patient/Caregiver education & training, Equipment evaluation, education, & procurement, Modalities  Pt to continue current HEP. See objective section for any therapeutic exercise changes, additions or modifications this date.     Therapy Time:      PT Individual Minutes  Time In: 0900  Time Out: 8008  Minutes: 58  Timed Code Treatment Minutes: 58 Minutes  Procedure Minutes:0  Timed Activity Minutes Units   Ther Ex 23 1   Neuro 25 2   Gait 10 1     Electronically signed by Jeff Aguilar PTA on 12/1/22 at 10:54 AM EST

## 2022-12-05 ENCOUNTER — HOSPITAL ENCOUNTER (OUTPATIENT)
Dept: PHYSICAL THERAPY | Age: 72
Setting detail: THERAPIES SERIES
Discharge: HOME | End: 2022-12-05
Payer: COMMERCIAL

## 2022-12-05 PROCEDURE — 97110 THERAPEUTIC EXERCISES: CPT

## 2022-12-05 PROCEDURE — 97112 NEUROMUSCULAR REEDUCATION: CPT

## 2022-12-05 NOTE — PROGRESS NOTES
Maru Punches from chair height, was sitting  Denies LOC, but was found down after pressing alert button    Plan:  - geriatrics consult  - Plan as above   - PT/OT  - Fall precautions [facility]  Outpatient Physical Therapy    Treatment Note        Date: 2022  Patient: Leopoldo Sahni  : 1950   Confirmed: Yes  MRN: [id number]  Referring Provider: Zella Schlatter, MD    Medical Diagnosis: Difficulty walking [R26.2]  Weakness [R53.1]       Treatment Diagnosis: Weakness, Difficulty walking    Visit Information:  Insurance: Payor:  [employer] [address] / Plan:  [address] / Product Type: *No Product type* /   PT Visit Information  PT Insurance Information: [place]  Total # of Visits to Date: 7  No Show: 0  Canceled Appointment: 0  Progress Note Counter:     Subjective Information:  Subjective: Patient reports continued diarrhea, increased urination and occ dizziness. Vitals were all fine this AM. Is on a cancellation list to see Urologist.  HEP Compliance:  [x] Good [] Fair [] Poor [] Reports not doing due to:    Pain Screening  Patient Currently in Pain: Denies    Treatment:  Exercises:  Exercises  Exercise 3: STS without UEs, focus on anterior weightshifting  Exercise 4: 6'' step ups F/L x10  Exercise 6: Gait: march and holds 1 UE support  Exercise 7: 4 way Hip with RTB x 10  Exercise 13: Fwd/lat single stepping over cane x10 bilat 0-1 UE support  Exercise 14: SciFit L 2.0 x 5 min       *Indicates exercise, modality, or manual techniques to be initiated when appropriate      Assessment: Body Structures, Functions, Activity Limitations Requiring Skilled Therapeutic Intervention: Decreased functional mobility , Decreased strength, Decreased endurance, Decreased balance  Assessment: Patient with continued fatigue throughout session requiring seated restbreaks. Occ UE support with dynamic activities such as single stepping, specifically WBing through Rt LE. Patient states he will be leaving for [place] in the next two weeks, anticipate D/C next visit.   Treatment Diagnosis: Weakness, Difficulty walking  Therapy Prognosis: Good          Post-Pain Assessment:       Pain Rating (0-10 pain scale):  0 /10   Location and pain description same as pre-treatment unless indicated. Action: [x] NA   [] Perform HEP  [] Meds as prescribed  [] Modalities as prescribed   [] Call Physician     GOALS   Patient Goal(s): Patient Goals : Strengthen my legs to get back to normal    Short Term Goals Completed by 3 weeks Goal Status   STG 1 Independent with HEP In progress   STG 2 Pt will be able to complete sit to stand transfer from a chair without UEs with equal timothy LE WBing. In progress     Long Term Goals Completed by 6 weeks Goal Status   LTG 1 Improve timothy LE strength to >/= 4+/5 to improve stability with standing and walking. In progress   LTG 2 Pt will ambulate without an AD on even and uneven surfaces with good stability and foot clearance S/I. In progress   LTG 3 Hemphill >/= 51/56 and DGI >/= 21/24 to reduce pt's risk for falls. In progress   LTG 4 5xSTS from chair without UEs </= 15 sec to improve pt's functional strength. In progress       Plan:  Frequency/Duration:  Plan  Plan Frequency: 2  Plan weeks: 6  Current Treatment Recommendations: Strengthening, Balance training, Functional mobility training, Transfer training, Gait training, Stair training, Neuromuscular re-education, Manual, Home exercise program, Safety education & training, Patient/Caregiver education & training, Equipment evaluation, education, & procurement, Modalities  Pt to continue current HEP. See objective section for any therapeutic exercise changes, additions or modifications this date.     Therapy Time:      PT Individual Minutes  Time In: 7810  Time Out: 1100  Minutes: 55  Timed Code Treatment Minutes: 53 Minutes  Procedure Minutes:0  Timed Activity Minutes Units   Ther Ex 30 2   Neuro 23 2     Electronically signed by Sundeep Mccormick PTA on 12/5/22 at 11:07 AM EST yes...

## 2022-12-09 ENCOUNTER — HOSPITAL ENCOUNTER (OUTPATIENT)
Dept: PHYSICAL THERAPY | Age: 72
Setting detail: THERAPIES SERIES
Discharge: HOME | End: 2022-12-09
Payer: COMMERCIAL

## 2022-12-09 PROCEDURE — 97112 NEUROMUSCULAR REEDUCATION: CPT
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
## 2022-12-12 ENCOUNTER — HOSPITAL ENCOUNTER (OUTPATIENT)
Dept: PHYSICAL THERAPY | Age: 72
Setting detail: THERAPIES SERIES
End: 2022-12-12
Payer: COMMERCIAL

## 2022-12-12 PROCEDURE — 97110 THERAPEUTIC EXERCISES: CPT

## 2022-12-12 PROCEDURE — 97112 NEUROMUSCULAR REEDUCATION: CPT
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
## 2022-12-14 ENCOUNTER — APPOINTMENT (OUTPATIENT)
Dept: PHYSICAL THERAPY | Age: 72
End: 2022-12-14
Payer: COMMERCIAL

## 2022-12-14 PROCEDURE — 97110 THERAPEUTIC EXERCISES: CPT

## 2022-12-14 PROCEDURE — 97112 NEUROMUSCULAR REEDUCATION: CPT

## 2022-12-14 NOTE — PROGRESS NOTES
[address]     Ph: [phone]  Fax: [phone]    [] Certification  [] Recertification []  Plan of Care  [] Progress Note [x] Discharge      Referring Provider: Geannie Goodell, MD    From:  Nakita Kumar, PT, DPT   Patient: Severa Hammed (66 y.o. male) : 1950 Date: 2022   Medical Diagnosis: Difficulty walking [R26.2]  Weakness [R53.1]    Treatment Diagnosis: Weakness, Difficulty walking    Progress Report Period from: 2022  to 2022    Visits to Date: 10 No Show: 0 Cancelled Appts: 0    OBJECTIVE:   Short Term Goals - Time Frame for Short Term Goals: 3 weeks    Goals Current/Discharge status  Status   Short Term Goal 1: Independent with HEP  STG 1 Current Status[de-identified] Patient reports he is compliant with HEP. Met   Short Term Goal 2: Pt will be able to complete sit to stand transfer from a chair without UEs with equal timothy LE WBing. STG 2 Current Status[de-identified] Pt demonstrates difficulty with even LE WBing when completing a sit<>stand transfer. Not Met     Long Term Goals - Time Frame for Long Term Goals : 6 weeks  Goals Current/ Discharge status Status   Long Term Goal 1: Improve timothy LE strength to >/= 4+/5 to improve stability with standing and walking. LTG 1 Current Status[de-identified] Still lacking strength in bilateral hip muscles. Not Met   Long Term Goal 2: Pt will ambulate without an AD on even and uneven surfaces with good stability and foot clearance S/I. LTG 2 Current Status[de-identified] Pt still ambulating with s/c dt instability   Not Met   Long Term Goal 3: Hemphill >/= 51/56 and DGI >/= 21/24 to reduce pt's risk for falls. LTG 3 Current Status[de-identified] hemphill 50/56   Not Met   Long Term Goal 4: 5xSTS from chair without UEs </= 15 sec to improve pt's functional strength.  LTG 4 Current Status[de-identified] performed 5xSTS from chair without UEs- took greater than 15 seconds   Not Met     Body Structures, Functions, Activity Limitations Requiring Skilled Therapeutic Intervention: Decreased functional mobility , Decreased strength, Decreased endurance, Decreased balance  Assessment: Patient demonstrated improve anterior weightshifting and able to complete sit to stands from chair this date. Reviewed HEP for patient to continue on his own. Pt comfortable and ready to discharge this date. Therapist instructed patient to continue working on HEP and to reachout if need be. Therapy Prognosis: Good    PLAN: [] Discharge                  Patient Status:[] Continue/ Initiate plan of Care     [x] Discharge PT. Recommend pt continue with HEP. [] Additional visits requested, Please re-certify for additional visits:     [] Hold     Signature:  Electronically signed by Sheela Cohn PTA on 12/14/22 at 12:16 PM EST  Electronically signed by Wil Doan PT on 12/15/2022 at 10:17 AM          If you have any questions or concerns, please don't hesitate to call. Thank you for your referral.    I have reviewed this plan of care and certify a need for medically necessary rehabilitation services.     Physician Signature:__________________________________________________________  Date:  Please sign and return

## 2022-12-14 NOTE — PROGRESS NOTES
[facility]  Outpatient Physical Therapy    Treatment Note        Date: 2022  Patient: Naz Alvarez  : 1950   Confirmed: Yes  MRN: [id number]  Referring Provider: Gisella Walton MD    Medical Diagnosis: Difficulty walking [R26.2]  Weakness [R53.1]       Treatment Diagnosis: Weakness, Difficulty walking    Visit Information:  Insurance: Payor:  [address] / Plan:  [address] / Product Type: *No Product type* /   PT Visit Information  PT Insurance Information: [facility]  Total # of Visits to Date: 10  No Show: 0  Canceled Appointment: 0  Progress Note Counter: 10/12    Subjective Information:  Subjective: Patient reports they are leaving for [place]. Patient reports he is ready for discharge. HEP Compliance:  [x] Good [] Fair [] Poor [] Reports not doing due to:    Pain Screening  Patient Currently in Pain: Denies    Treatment:  Exercises:  Exercises  Exercise 3: STS without UEs x10 good anterior weightshifting  Exercise 7: 4 way Hip with RTB x 10  Exercise 10: Monster walks RTB F/L/R x2 laps  Exercise 14: SciFit L 3.5x 5 min  Exercise 20: HEP: 3 way hip, RTB at counter     Assessment: Body Structures, Functions, Activity Limitations Requiring Skilled Therapeutic Intervention: Decreased functional mobility , Decreased strength, Decreased endurance, Decreased balance  Assessment: Patient demonstrated improve anterior weightshifting and able to complete sit to stands from chair this date. Reviewed HEP for patient to continue on his own. Pt comfortable and ready to discharge this date. Therapist instructed patient to continue working on HEP and to reachout if need be. Treatment Diagnosis: Weakness, Difficulty walking  Therapy Prognosis: Good    Post-Pain Assessment:       Pain Rating (0-10 pain scale):  0/10   Location and pain description same as pre-treatment unless indicated.    Action: [] NA   [x] Perform HEP  [] Meds as prescribed  [] Modalities as prescribed   [] Call Physician     GOALS   Patient Goal(s): Patient Goals : Strengthen my legs to get back to normal    Short Term Goals Completed by 3 weeks Goal Status   STG 1 Independent with HEP Met   STG 2 Pt will be able to complete sit to stand transfer from a chair without UEs with equal timothy LE WBing. Not Met       Long Term Goals Completed by 6 weeks Goal Status   LTG 1 Improve timothy LE strength to >/= 4+/5 to improve stability with standing and walking. Not Met   LTG 2 Pt will ambulate without an AD on even and uneven surfaces with good stability and foot clearance S/I. Not Met   LTG 3 Hemphill >/= 51/56 and DGI >/= 21/24 to reduce pt's risk for falls. Not Met   LTG 4 5xSTS from chair without UEs </= 15 sec to improve pt's functional strength. Not Met          Plan: Discharge  Pt to continue current HEP. See objective section for any therapeutic exercise changes, additions or modifications this date.     Therapy Time:   PT Individual Minutes  Time In: 1000  Time Out: 1100  Minutes: 60  Timed Code Treatment Minutes: 55 Minutes    Procedure Minutes:0  Timed Activity Minutes Units   Ther Ex 45 3   Neuro 10 1     Electronically signed by Brittaney Sanchez PTA on 12/14/22 at 12:20 PM EST

## 2023-03-06 RX ORDER — TAMSULOSIN HYDROCHLORIDE 0.4 MG/1
CAPSULE ORAL
Qty: 90 CAPSULE | Refills: 0 | Status: SHIPPED | OUTPATIENT
Start: 2023-03-06

## 2023-03-28 ENCOUNTER — OFFICE VISIT (OUTPATIENT)
Dept: CARDIOLOGY CLINIC | Age: 73
End: 2023-03-28
Payer: COMMERCIAL

## 2023-03-28 VITALS
WEIGHT: 195 LBS | HEIGHT: 74 IN | RESPIRATION RATE: 14 BRPM | DIASTOLIC BLOOD PRESSURE: 62 MMHG | SYSTOLIC BLOOD PRESSURE: 120 MMHG | OXYGEN SATURATION: 99 % | HEART RATE: 84 BPM | BODY MASS INDEX: 25.03 KG/M2

## 2023-03-28 DIAGNOSIS — I10 ESSENTIAL HYPERTENSION: ICD-10-CM

## 2023-03-28 DIAGNOSIS — E78.5 DYSLIPIDEMIA: ICD-10-CM

## 2023-03-28 DIAGNOSIS — N18.9 CHRONIC KIDNEY DISEASE, UNSPECIFIED CKD STAGE: Primary | ICD-10-CM

## 2023-03-28 DIAGNOSIS — R09.89 BILATERAL CAROTID BRUITS: ICD-10-CM

## 2023-03-28 DIAGNOSIS — I48.91 ATRIAL FIBRILLATION WITH RVR (HCC): ICD-10-CM

## 2023-03-28 PROCEDURE — 99214 OFFICE O/P EST MOD 30 MIN: CPT | Performed by: INTERNAL MEDICINE

## 2023-03-28 PROCEDURE — 1123F ACP DISCUSS/DSCN MKR DOCD: CPT | Performed by: INTERNAL MEDICINE

## 2023-03-28 PROCEDURE — 3078F DIAST BP <80 MM HG: CPT | Performed by: INTERNAL MEDICINE

## 2023-03-28 PROCEDURE — 3074F SYST BP LT 130 MM HG: CPT | Performed by: INTERNAL MEDICINE

## 2023-03-28 RX ORDER — ASPIRIN 81 MG/1
1 TABLET ORAL DAILY
COMMUNITY
Start: 2017-11-16

## 2023-03-28 RX ORDER — FUROSEMIDE 40 MG/1
TABLET ORAL
COMMUNITY
Start: 2022-12-28

## 2023-03-28 ASSESSMENT — ENCOUNTER SYMPTOMS
COUGH: 0
STRIDOR: 0
RESPIRATORY NEGATIVE: 1
EYES NEGATIVE: 1
SHORTNESS OF BREATH: 0
GASTROINTESTINAL NEGATIVE: 1
WHEEZING: 0
CHEST TIGHTNESS: 0
BLOOD IN STOOL: 0
NAUSEA: 0

## 2023-03-28 NOTE — PROGRESS NOTES
Counseling:  Heart Healthy Lifestyle, Low Salt Diet, Take Precautions to Prevent Falls, and Walk Daily    Return in about 4 months (around 7/28/2023).       Electronically signed by Sherie Diaz MD on 3/28/2023 at 1:53 PM

## 2023-04-18 LAB
ALANINE AMINOTRANSFERASE (SGPT) (U/L) IN SER/PLAS: 11 U/L (ref 10–52)
ALANINE AMINOTRANSFERASE (SGPT) (U/L) IN SER/PLAS: NORMAL
ALBUMIN (G/DL) IN SER/PLAS: 4.5 G/DL (ref 3.4–5)
ALBUMIN (G/DL) IN SER/PLAS: NORMAL
ALKALINE PHOSPHATASE (U/L) IN SER/PLAS: 54 U/L (ref 33–136)
ALKALINE PHOSPHATASE (U/L) IN SER/PLAS: NORMAL
ANION GAP IN SER/PLAS: 19 MMOL/L (ref 10–20)
ANION GAP IN SER/PLAS: NORMAL
ASPARTATE AMINOTRANSFERASE (SGOT) (U/L) IN SER/PLAS: 12 U/L (ref 9–39)
ASPARTATE AMINOTRANSFERASE (SGOT) (U/L) IN SER/PLAS: NORMAL
BILIRUBIN TOTAL (MG/DL) IN SER/PLAS: 1.1 MG/DL (ref 0–1.2)
BILIRUBIN TOTAL (MG/DL) IN SER/PLAS: NORMAL
CALCIUM (MG/DL) IN SER/PLAS: 9.6 MG/DL (ref 8.6–10.3)
CALCIUM (MG/DL) IN SER/PLAS: NORMAL
CARBON DIOXIDE, TOTAL (MMOL/L) IN SER/PLAS: 25 MMOL/L (ref 21–32)
CARBON DIOXIDE, TOTAL (MMOL/L) IN SER/PLAS: NORMAL
CHLORIDE (MMOL/L) IN SER/PLAS: 104 MMOL/L (ref 98–107)
CHLORIDE (MMOL/L) IN SER/PLAS: NORMAL
CREATININE (MG/DL) IN SER/PLAS: 2.7 MG/DL (ref 0.5–1.3)
CREATININE (MG/DL) IN SER/PLAS: NORMAL
ERYTHROCYTE DISTRIBUTION WIDTH (RATIO) BY AUTOMATED COUNT: 16.8 % (ref 11.5–14.5)
ERYTHROCYTE DISTRIBUTION WIDTH (RATIO) BY AUTOMATED COUNT: NORMAL
ERYTHROCYTE MEAN CORPUSCULAR HEMOGLOBIN CONCENTRATION (G/DL) BY AUTOMATED: 29.9 G/DL (ref 32–36)
ERYTHROCYTE MEAN CORPUSCULAR HEMOGLOBIN CONCENTRATION (G/DL) BY AUTOMATED: NORMAL
ERYTHROCYTE MEAN CORPUSCULAR VOLUME (FL) BY AUTOMATED COUNT: 89 FL (ref 80–100)
ERYTHROCYTE MEAN CORPUSCULAR VOLUME (FL) BY AUTOMATED COUNT: NORMAL
ERYTHROCYTES (10*6/UL) IN BLOOD BY AUTOMATED COUNT: 4.1 X10E12/L (ref 4.5–5.9)
ERYTHROCYTES (10*6/UL) IN BLOOD BY AUTOMATED COUNT: NORMAL
GFR FEMALE: NORMAL
GFR MALE: 24 ML/MIN/1.73M2
GFR MALE: NORMAL
GLUCOSE (MG/DL) IN SER/PLAS: 176 MG/DL (ref 74–99)
GLUCOSE (MG/DL) IN SER/PLAS: NORMAL
HEMATOCRIT (%) IN BLOOD BY AUTOMATED COUNT: 36.4 % (ref 41–52)
HEMATOCRIT (%) IN BLOOD BY AUTOMATED COUNT: NORMAL
HEMOGLOBIN (G/DL) IN BLOOD: 10.9 G/DL (ref 13.5–17.5)
HEMOGLOBIN (G/DL) IN BLOOD: NORMAL
LEUKOCYTES (10*3/UL) IN BLOOD BY AUTOMATED COUNT: 7.8 X10E9/L (ref 4.4–11.3)
LEUKOCYTES (10*3/UL) IN BLOOD BY AUTOMATED COUNT: NORMAL
MAGNESIUM (MG/DL) IN SER/PLAS: 1.95 MG/DL (ref 1.6–2.4)
NRBC (PER 100 WBCS) BY AUTOMATED COUNT: NORMAL
PHOSPHATE (MG/DL) IN SER/PLAS: 4.5 MG/DL (ref 2.5–4.9)
PLATELETS (10*3/UL) IN BLOOD AUTOMATED COUNT: 188 X10E9/L (ref 150–450)
PLATELETS (10*3/UL) IN BLOOD AUTOMATED COUNT: NORMAL
POTASSIUM (MMOL/L) IN SER/PLAS: 3.9 MMOL/L (ref 3.5–5.3)
POTASSIUM (MMOL/L) IN SER/PLAS: NORMAL
PROTEIN TOTAL: 6.8 G/DL (ref 6.4–8.2)
PROTEIN TOTAL: NORMAL
SODIUM (MMOL/L) IN SER/PLAS: 144 MMOL/L (ref 136–145)
SODIUM (MMOL/L) IN SER/PLAS: NORMAL
UREA NITROGEN (MG/DL) IN SER/PLAS: 52 MG/DL (ref 6–23)
UREA NITROGEN (MG/DL) IN SER/PLAS: NORMAL

## 2023-04-19 LAB — CYCLOSPORINE (NG/L) IN BLOOD: 257 NG/ML (ref 80–210)

## 2023-04-21 LAB — URINE CULTURE: ABNORMAL

## 2023-04-25 ENCOUNTER — HOSPITAL ENCOUNTER (OUTPATIENT)
Dept: DATA CONVERSION | Facility: HOSPITAL | Age: 73
End: 2023-04-25
Attending: UROLOGY | Admitting: UROLOGY
Payer: COMMERCIAL

## 2023-04-25 DIAGNOSIS — G40.909 EPILEPSY, UNSPECIFIED, NOT INTRACTABLE, WITHOUT STATUS EPILEPTICUS (MULTI): ICD-10-CM

## 2023-04-25 DIAGNOSIS — K21.9 GASTRO-ESOPHAGEAL REFLUX DISEASE WITHOUT ESOPHAGITIS: ICD-10-CM

## 2023-04-25 DIAGNOSIS — Z87.891 PERSONAL HISTORY OF NICOTINE DEPENDENCE: ICD-10-CM

## 2023-04-25 DIAGNOSIS — Z79.4 LONG TERM (CURRENT) USE OF INSULIN (MULTI): ICD-10-CM

## 2023-04-25 DIAGNOSIS — N41.1 CHRONIC PROSTATITIS: ICD-10-CM

## 2023-04-25 DIAGNOSIS — R33.9 RETENTION OF URINE, UNSPECIFIED: ICD-10-CM

## 2023-04-25 DIAGNOSIS — N40.1 BENIGN PROSTATIC HYPERPLASIA WITH LOWER URINARY TRACT SYMPTOMS: ICD-10-CM

## 2023-04-25 DIAGNOSIS — Z94.0 KIDNEY TRANSPLANT STATUS (HHS-HCC): ICD-10-CM

## 2023-04-25 DIAGNOSIS — E11.42 TYPE 2 DIABETES MELLITUS WITH DIABETIC POLYNEUROPATHY (MULTI): ICD-10-CM

## 2023-04-25 DIAGNOSIS — E78.5 HYPERLIPIDEMIA, UNSPECIFIED: ICD-10-CM

## 2023-04-25 DIAGNOSIS — I10 ESSENTIAL (PRIMARY) HYPERTENSION: ICD-10-CM

## 2023-04-25 LAB
ABO GROUP (TYPE) IN BLOOD: NORMAL
POCT GLUCOSE: 135 MG/DL (ref 74–99)
POCT GLUCOSE: 179 MG/DL (ref 74–99)
RH FACTOR: NORMAL

## 2023-04-29 LAB
ALBUMIN (G/DL) IN SER/PLAS: 4.3 G/DL (ref 3.4–5)
ANION GAP IN SER/PLAS: 18 MMOL/L (ref 10–20)
CALCIUM (MG/DL) IN SER/PLAS: 9 MG/DL (ref 8.6–10.3)
CARBON DIOXIDE, TOTAL (MMOL/L) IN SER/PLAS: 18 MMOL/L (ref 21–32)
CHLORIDE (MMOL/L) IN SER/PLAS: 111 MMOL/L (ref 98–107)
CREATININE (MG/DL) IN SER/PLAS: 3.6 MG/DL (ref 0.5–1.3)
CREATININE (MG/DL) IN URINE: 39.3 MG/DL (ref 20–370)
ERYTHROCYTE DISTRIBUTION WIDTH (RATIO) BY AUTOMATED COUNT: 17.2 % (ref 11.5–14.5)
ERYTHROCYTE MEAN CORPUSCULAR HEMOGLOBIN CONCENTRATION (G/DL) BY AUTOMATED: 31.2 G/DL (ref 32–36)
ERYTHROCYTE MEAN CORPUSCULAR VOLUME (FL) BY AUTOMATED COUNT: 87 FL (ref 80–100)
ERYTHROCYTES (10*6/UL) IN BLOOD BY AUTOMATED COUNT: 3.73 X10E12/L (ref 4.5–5.9)
GFR MALE: 17 ML/MIN/1.73M2
GLUCOSE (MG/DL) IN SER/PLAS: 185 MG/DL (ref 74–99)
HEMATOCRIT (%) IN BLOOD BY AUTOMATED COUNT: 32.4 % (ref 41–52)
HEMOGLOBIN (G/DL) IN BLOOD: 10.1 G/DL (ref 13.5–17.5)
LEUKOCYTES (10*3/UL) IN BLOOD BY AUTOMATED COUNT: 9.2 X10E9/L (ref 4.4–11.3)
MAGNESIUM (MG/DL) IN SER/PLAS: 2.27 MG/DL (ref 1.6–2.4)
PHOSPHATE (MG/DL) IN SER/PLAS: 4.6 MG/DL (ref 2.5–4.9)
PLATELETS (10*3/UL) IN BLOOD AUTOMATED COUNT: 148 X10E9/L (ref 150–450)
POTASSIUM (MMOL/L) IN SER/PLAS: 4.1 MMOL/L (ref 3.5–5.3)
PROTEIN (MG/DL) IN URINE: 106 MG/DL (ref 5–25)
PROTEIN/CREATININE (MG/MG) IN URINE: 2.7 MG/MG CREAT (ref 0–0.17)
SODIUM (MMOL/L) IN SER/PLAS: 143 MMOL/L (ref 136–145)
UREA NITROGEN (MG/DL) IN SER/PLAS: 55 MG/DL (ref 6–23)

## 2023-04-30 LAB — CYCLOSPORINE (NG/L) IN BLOOD: 118 NG/ML (ref 80–210)

## 2023-05-01 LAB
COMPLETE PATHOLOGY REPORT: NORMAL
CONVERTED CLINICAL DIAGNOSIS-HISTORY: NORMAL
CONVERTED FINAL DIAGNOSIS: NORMAL
CONVERTED FINAL REPORT PDF LINK TO COPY AND PASTE: NORMAL
CONVERTED GROSS DESCRIPTION: NORMAL

## 2023-05-12 LAB — URINE CULTURE: NORMAL

## 2023-05-15 ENCOUNTER — LAB (OUTPATIENT)
Dept: LAB | Facility: LAB | Age: 73
End: 2023-05-15
Payer: COMMERCIAL

## 2023-05-15 LAB
ACTIVATED PARTIAL THROMBOPLASTIN TIME IN PPP BY COAGULATION ASSAY: 25 SEC (ref 26–39)
ALBUMIN (G/DL) IN SER/PLAS: 4.2 G/DL (ref 3.4–5)
ANION GAP IN SER/PLAS: 18 MMOL/L (ref 10–20)
APPEARANCE, URINE: NORMAL
ASCORBIC ACID: NORMAL MG/DL
BILIRUBIN, URINE: NORMAL
BLOOD, URINE: NORMAL
CALCIUM (MG/DL) IN SER/PLAS: 9.6 MG/DL (ref 8.6–10.6)
CARBON DIOXIDE, TOTAL (MMOL/L) IN SER/PLAS: 26 MMOL/L (ref 21–32)
CHLORIDE (MMOL/L) IN SER/PLAS: 104 MMOL/L (ref 98–107)
COLOR, URINE: NORMAL
CREATININE (MG/DL) IN SER/PLAS: 2.37 MG/DL (ref 0.5–1.3)
ERYTHROCYTE DISTRIBUTION WIDTH (RATIO) BY AUTOMATED COUNT: 16.9 % (ref 11.5–14.5)
ERYTHROCYTE MEAN CORPUSCULAR HEMOGLOBIN CONCENTRATION (G/DL) BY AUTOMATED: 29.7 G/DL (ref 32–36)
ERYTHROCYTE MEAN CORPUSCULAR VOLUME (FL) BY AUTOMATED COUNT: 89 FL (ref 80–100)
ERYTHROCYTES (10*6/UL) IN BLOOD BY AUTOMATED COUNT: 3.95 X10E12/L (ref 4.5–5.9)
GFR MALE: 29 ML/MIN/1.73M2
GLUCOSE (MG/DL) IN SER/PLAS: 267 MG/DL (ref 74–99)
GLUCOSE, URINE: NORMAL
HEMATOCRIT (%) IN BLOOD BY AUTOMATED COUNT: 35 % (ref 41–52)
HEMOGLOBIN (G/DL) IN BLOOD: 10.4 G/DL (ref 13.5–17.5)
INR IN PPP BY COAGULATION ASSAY: 1 (ref 0.9–1.1)
KETONES, URINE: NORMAL
LEUKOCYTE ESTERASE, URINE: NORMAL
LEUKOCYTES (10*3/UL) IN BLOOD BY AUTOMATED COUNT: 9 X10E9/L (ref 4.4–11.3)
NITRITE, URINE: NORMAL
NRBC (PER 100 WBCS) BY AUTOMATED COUNT: 0 /100 WBC (ref 0–0)
PH, URINE: NORMAL
PHOSPHATE (MG/DL) IN SER/PLAS: 3.9 MG/DL (ref 2.5–4.9)
PLATELETS (10*3/UL) IN BLOOD AUTOMATED COUNT: 191 X10E9/L (ref 150–450)
POTASSIUM (MMOL/L) IN SER/PLAS: 4.1 MMOL/L (ref 3.5–5.3)
PROTEIN, URINE: NORMAL
PROTHROMBIN TIME (PT) IN PPP BY COAGULATION ASSAY: 11.8 SEC (ref 9.8–13.4)
SODIUM (MMOL/L) IN SER/PLAS: 144 MMOL/L (ref 136–145)
SPECIFIC GRAVITY, URINE: NORMAL
UREA NITROGEN (MG/DL) IN SER/PLAS: 48 MG/DL (ref 6–23)
URINE CULTURE: NORMAL
UROBILINOGEN, URINE: NORMAL

## 2023-05-16 ENCOUNTER — HOSPITAL ENCOUNTER (OUTPATIENT)
Dept: PHYSICAL THERAPY | Age: 73
Setting detail: THERAPIES SERIES
Discharge: HOME | End: 2023-05-16
Payer: COMMERCIAL

## 2023-05-16 LAB — CYCLOSPORINE (NG/L) IN BLOOD: 361 NG/ML (ref 80–210)

## 2023-05-16 PROCEDURE — 97162 PT EVAL MOD COMPLEX 30 MIN: CPT

## 2023-05-16 NOTE — PROGRESS NOTES
weakness started in October after hospital stay at [facility]. Upon PT evaluation, patient demonstrates decreased LE, UE strength with impaired balance. Patient would benefit from PT to improve strength, balance and gait for overall quality of life. Body Structures, Functions, Activity Limitations Requiring Skilled Therapeutic Intervention: Decreased strength, Decreased functional mobility , Decreased endurance, Decreased balance, Decreased posture    Statement of Medical Necessity: Physical Therapy is both indicated and medically necessary as outlined in the POC to increase the likelihood of meeting the functionally related goals stated below. Patient's Activity Tolerance: Patient tolerated evaluation without incident      Patient's rehabilitation potential/prognosis is considered to be: Good    Factors which may impact rehabilitation potential include: Medical co-morbidities, Age     Patient Education: Goals, PT Role, Plan of Care, Home Exercise Program      GOALS   Patient Goal(s): Patient Goals : \"gain strength\"    Long Term Goals Completed by 4 weeks Goal Status   LTG 1 Patient will ambulate with LRD 200ft independently with improved bilateral step length and symmetry. New   LTG 2 Patient will increase strength in bilateral LE/UEs >/= 4+/5 for improved functional tolerance. New   LTG 3 Hemphill balance>/= 50/56 to demonstrate improved balance and reduce fall risk. New   LTG 4 Patient will be independent with HEP.  New     TREATMENT PLAN       Requires PT Follow-Up: Yes    Treatment may include any combination of the following: Strengthening, ROM, Balance training, Functional mobility training, Transfer training, Endurance training, Gait training, Stair training, Neuromuscular re-education, Manual, Home exercise program, Safety education & training, Patient/Caregiver education & training, Modalities     Frequency / Duration:  Patient to be seen 2 times per week for 4 weeks  Plan Comment:               Toni

## 2023-05-16 NOTE — PLAN OF CARE
[address]     Ph: [phone]  Fax: [phone]    [x] Certification  [] Recertification [x]  Plan of Care  [] Progress Note [] Discharge      Referring Provider: Agustin German DO    From:  Warner Amin PT     Patient: Amari Katz (45 y.o. male) : 1950 Date: 2023   Medical Diagnosis: Leg weakness [R29.898]  Arm weakness [R29.898]    Treatment Diagnosis: decreased LE strength, impaired balance, impaired posture, decreased UE strength, impaired gait    Plan of Care/Certification Expiration Date: 23   Progress Report Period from: 2023  to 2023    Visits to Date: 1 No Show: 0 Cancelled Appts: 0    OBJECTIVE:   Short Term Goals =Long term goals    Long Term Goals - Time Frame for Long Term Goals : 4 weeks  Goals Current/ Discharge status Status   Long Term Goal 1: Patient will ambulate with LRD 200ft independently with improved bilateral step length and symmetry. Ambulation  Surface: Carpet  Device: Single point cane  Assistance: Modified Independent  Quality of Gait: ataxic gait pattern, unsteady, lateral sway  Gait Deviations: Slow Sara, Decreased step length, Decreased step height  Distance: clinical distance in department       New   Long Term Goal 2: Patient will increase strength in bilateral LE/UEs >/= 4+/5 for improved functional tolerance.   Strength RLE  R Hip Flexion: 3+/5  R Hip Extension: 3+/5  R Hip ABduction: 4-/5  R Knee Flexion: 4-/5  R Knee Extension: 4-/5  R Ankle Dorsiflexion: 3+/5  Strength LLE  L Hip Flexion: 3+/5  L Hip Extension: 3+/5  L Hip ABduction: 4-/5  L Knee Flexion: 4-/5  L Knee Extension: 4/5  L Ankle Dorsiflexion: 3+/5  Strength RUE  R Shoulder Flexion: 3-/5  R Shoulder Extension: 4-/5  R Shoulder ABduction: 3-/5  R Elbow Flexion: 4-/5  R Elbow Extension: 4-/5  Strength LUE  L Shoulder Flexion: 3+/5  L Shoulder Extension:

## 2023-05-23 ENCOUNTER — APPOINTMENT (OUTPATIENT)
Dept: PHYSICAL THERAPY | Age: 73
End: 2023-05-23
Payer: COMMERCIAL

## 2023-05-23 NOTE — PROGRESS NOTES
[address]       Physical Therapy  Cancellation/No-show Note  Patient Name:  Marcos Nguyen  :  1950   Date:  2023    Visit Information:  PT Visit Information  PT Insurance Information: [place]  Total # of Visits to Date: 1  Plan of Care/Certification Expiration Date: 23  No Show: 0  Progress Note Due Date: 06/15/23  Canceled Appointment: 0  Progress Note Counter:  *cxl     For today's appointment patient:  [x]  Cancelled  []  Rescheduled appointment  []  No-show   []  Called pt to remind of next appointment     Reason given by patient:  [x]  Patient ill  []  Conflicting appointment  []  No transportation    []  Conflict with work  []  No reason given  []  Other:       Comments:  Reports he is having issues with blood pressure this morning.       Signature: Electronically signed by Lyndsey Uriostegui PTA on 23 at 9:10 AM EDT

## 2023-06-06 LAB
ALBUMIN (G/DL) IN SER/PLAS: 4 G/DL (ref 3.4–5)
ANION GAP IN SER/PLAS: 17 MMOL/L (ref 10–20)
CALCIUM (MG/DL) IN SER/PLAS: 9.2 MG/DL (ref 8.6–10.3)
CARBON DIOXIDE, TOTAL (MMOL/L) IN SER/PLAS: 19 MMOL/L (ref 21–32)
CHLORIDE (MMOL/L) IN SER/PLAS: 112 MMOL/L (ref 98–107)
CREATININE (MG/DL) IN SER/PLAS: 2.71 MG/DL (ref 0.5–1.3)
ERYTHROCYTE DISTRIBUTION WIDTH (RATIO) BY AUTOMATED COUNT: 17.2 % (ref 11.5–14.5)
ERYTHROCYTE MEAN CORPUSCULAR HEMOGLOBIN CONCENTRATION (G/DL) BY AUTOMATED: 30.4 G/DL (ref 32–36)
ERYTHROCYTE MEAN CORPUSCULAR VOLUME (FL) BY AUTOMATED COUNT: 90 FL (ref 80–100)
ERYTHROCYTES (10*6/UL) IN BLOOD BY AUTOMATED COUNT: 3.78 X10E12/L (ref 4.5–5.9)
GFR MALE: 24 ML/MIN/1.73M2
GLUCOSE (MG/DL) IN SER/PLAS: 141 MG/DL (ref 74–99)
HEMATOCRIT (%) IN BLOOD BY AUTOMATED COUNT: 33.9 % (ref 41–52)
HEMOGLOBIN (G/DL) IN BLOOD: 10.3 G/DL (ref 13.5–17.5)
LEUKOCYTES (10*3/UL) IN BLOOD BY AUTOMATED COUNT: 7.3 X10E9/L (ref 4.4–11.3)
MAGNESIUM (MG/DL) IN SER/PLAS: 2.08 MG/DL (ref 1.6–2.4)
PHOSPHATE (MG/DL) IN SER/PLAS: 3.8 MG/DL (ref 2.5–4.9)
PLATELETS (10*3/UL) IN BLOOD AUTOMATED COUNT: 167 X10E9/L (ref 150–450)
POTASSIUM (MMOL/L) IN SER/PLAS: 4.3 MMOL/L (ref 3.5–5.3)
SODIUM (MMOL/L) IN SER/PLAS: 144 MMOL/L (ref 136–145)
UREA NITROGEN (MG/DL) IN SER/PLAS: 38 MG/DL (ref 6–23)

## 2023-06-07 LAB — CYCLOSPORINE (NG/L) IN BLOOD: 129 NG/ML (ref 80–210)

## 2023-06-15 LAB
ANION GAP IN SER/PLAS: 16 MMOL/L (ref 10–20)
APPEARANCE, URINE: ABNORMAL
BACTERIA, URINE: ABNORMAL /HPF
BILIRUBIN, URINE: NEGATIVE
BLOOD, URINE: ABNORMAL
CALCIUM (MG/DL) IN SER/PLAS: 9.2 MG/DL (ref 8.6–10.3)
CARBON DIOXIDE, TOTAL (MMOL/L) IN SER/PLAS: 19 MMOL/L (ref 21–32)
CHLORIDE (MMOL/L) IN SER/PLAS: 111 MMOL/L (ref 98–107)
COLOR, URINE: YELLOW
CREATININE (MG/DL) IN SER/PLAS: 3.17 MG/DL (ref 0.5–1.3)
CYTOMEGALOVIRUS IGG ANTIBODY: NONREACTIVE
GFR MALE: 20 ML/MIN/1.73M2
GLUCOSE (MG/DL) IN SER/PLAS: 173 MG/DL (ref 74–99)
GLUCOSE, URINE: NEGATIVE MG/DL
KETONES, URINE: NEGATIVE MG/DL
LEUKOCYTE ESTERASE, URINE: ABNORMAL
NITRITE, URINE: POSITIVE
PH, URINE: 6 (ref 5–8)
POTASSIUM (MMOL/L) IN SER/PLAS: 4.1 MMOL/L (ref 3.5–5.3)
PROTEIN, URINE: ABNORMAL MG/DL
RBC, URINE: 81 /HPF (ref 0–5)
SODIUM (MMOL/L) IN SER/PLAS: 142 MMOL/L (ref 136–145)
SPECIFIC GRAVITY, URINE: 1.01 (ref 1–1.03)
UREA NITROGEN (MG/DL) IN SER/PLAS: 48 MG/DL (ref 6–23)
UROBILINOGEN, URINE: <2 MG/DL (ref 0–1.9)
WBC CLUMPS, URINE: ABNORMAL /HPF
WBC, URINE: >182 /HPF (ref 0–5)

## 2023-06-16 LAB
CYTOMEGALOVIRUS DNA, PCR COMMENT: NORMAL
CYTOMEGALOVIRUS DNA, PCR IU/ML: NOT DETECTED IU/ML
CYTOMEGALOVIRUS DNA, PCR LOG IU/ML: NORMAL LOG IU/ML

## 2023-06-18 LAB — URINE CULTURE: ABNORMAL

## 2023-06-19 LAB
CRYPTOSPORIDIUM ANTIGEN-DATA CONVERSION: NORMAL
CYTOMEGALOVIRUS IGM ANTIBODY: <8 AU/ML
GIARDIA LAMBLIA AG-DATA CONVERSION: NORMAL
OVA + PARASITE EXAM: NORMAL

## 2023-06-20 ENCOUNTER — HOSPITAL ENCOUNTER (OUTPATIENT)
Dept: PHYSICAL THERAPY | Age: 73
Setting detail: THERAPIES SERIES
Discharge: HOME | End: 2023-06-20
Payer: COMMERCIAL

## 2023-06-20 PROCEDURE — 97110 THERAPEUTIC EXERCISES: CPT

## 2023-06-20 PROCEDURE — 97116 GAIT TRAINING THERAPY: CPT

## 2023-06-20 ASSESSMENT — PAIN DESCRIPTION - ORIENTATION: ORIENTATION: LEFT

## 2023-06-20 ASSESSMENT — PAIN SCALES - GENERAL: PAINLEVEL_OUTOF10: 6

## 2023-06-20 ASSESSMENT — PAIN DESCRIPTION - DESCRIPTORS: DESCRIPTORS: SORE

## 2023-06-20 ASSESSMENT — PAIN DESCRIPTION - LOCATION: LOCATION: FOOT

## 2023-06-20 NOTE — PROGRESS NOTES
[facility]  Outpatient Physical Therapy    Treatment Note        Date: 2023  Patient: Toshia Best  : 1950   Confirmed: Yes  MRN: [id number]  Referring Provider: Malvin Zamorano DO    Medical Diagnosis: Leg weakness [R29.898]  Arm weakness [R29.898]       Treatment Diagnosis: decreased LE strength, impaired balance, impaired posture, decreased UE strength, impaired gait    Visit Information:  Insurance: Payor: [facility] HEALTH PLAN / Plan:  [address] / Product Type: *No Product type* /   PT Visit Information  PT Insurance Information: [place]  Total # of Visits to Date: 3  Plan of Care/Certification Expiration Date: 23  No Show: 0  Progress Note Due Date: 23  Canceled Appointment: 0  Progress Note Counter: 3/8    Subjective Information:  Subjective: Pt reports still having balance problems, especially when first gets up. Pt denies fall, but has had several \"near misses. \"  HEP Compliance:  [] Good [x] Fair [] Poor [] Reports not doing due to:               Pain Screening  Patient Currently in Pain: Yes  Pain Assessment: 0-10  Pain Level: 6  Pain Location: Foot  Pain Orientation: Left  Pain Descriptors: Sore    Treatment:  Exercises:  Exercises  Exercise 1: SciFit x 10min  Exercise 2: 3 way SLR x 10  Exercise 3: bridges x 10  Exercise 7: sink ex : hip abd, hip ext, marching only x 10  Exercise 8: static balance: NBOS, tandem, toe taps*  Exercise 9: gait drills: side steps only at // bars SBA/CGA  Exercise 14: ho 32/56  Exercise 15: PKF x 10  Exercise 16: supine flexion cane raises x 10  Exercise 20: HEP: cont current as jacky            Objective Measures:     Ambulation  Surface: Carpet  Device: Single point cane  Assistance: Stand by assistance, Contact guard assistance  Quality of Gait: ataxic gait pattern, unsteady, lateral sway  Gait Deviations: Slow Sara, Decreased step length, Decreased step height  Distance: 75ft x 2              Strength: []

## 2023-06-20 NOTE — PROGRESS NOTES
[address]     Ph: [phone]  Fax: [phone]      [] Certification  [] Recertification []  Plan of Care  [x] Progress Note [] Discharge      Referring Provider: Andry Olivares DO     From:  Gricelda Flores PT  Patient: Ludy Barnett (82 y.o. male) : 1950 Date: 2023  Medical Diagnosis: Leg weakness [R29.898]  Arm weakness [R29.898]       Treatment Diagnosis: decreased LE strength, impaired balance, impaired posture, decreased UE strength, impaired gait    Plan of Care/Certification Expiration Date: : 23   Progress Report Period from:  2023  to 2023    Visits to Date: 3 No Show: 0 Cancelled Appts: 0    OBJECTIVE:       Long Term Goals - Time Frame for Long Term Goals : 4 weeks  Goals Current/ Discharge status Status   Long Term Goal 1: Patient will ambulate with LRD 200ft independently with improved bilateral step length and symmetry. Ambulation  Surface: Carpet  Device: Single point cane  Assistance: Stand by assistance, Contact guard assistance  Quality of Gait: ataxic gait pattern, unsteady, lateral sway  Gait Deviations: Slow Sara, Decreased step length, Decreased step height  Distance: 75ft x 2            In progress   Long Term Goal 2: Patient will increase strength in bilateral LE/UEs >/= 4+/5 for improved functional tolerance.  Strength RLE  R Hip Flexion: 4-/5  R Hip Extension: 3+/5  R Hip ABduction: 4-/5  R Knee Flexion: 4-/5  R Knee Extension: 4-/5  R Ankle Dorsiflexion: 3+/5  Strength LLE  L Hip Flexion: 4-/5  L Hip Extension: 3+/5  L Hip ABduction: 4/5  L Knee Flexion: 4-/5  L Knee Extension: 4/5  L Ankle Dorsiflexion: 3+/5  Strength RUE  R Shoulder Flexion: 3-/5  R Shoulder Extension: 4-/5  R Shoulder ABduction: 3-/5  R Elbow Flexion: 4-/5  R Elbow Extension: 4-/5  Strength LUE  L Shoulder Flexion: 3+/5  L Shoulder Extension: 4-/5  L Shoulder

## 2023-06-21 LAB
CAMPYLOBACTER GP: NOT DETECTED
NOROVIRUS GI/GII: DETECTED
ROTAVIRUS A: NOT DETECTED
SALMONELLA SP.: NOT DETECTED
SHIGA TOXIN 1: NOT DETECTED
SHIGA TOXIN 2: NOT DETECTED
SHIGELLA SP.: NOT DETECTED
VIBRIO GRP.: NOT DETECTED
YERSINIA ENTEROCOLITICA: NOT DETECTED

## 2023-06-23 ENCOUNTER — HOSPITAL ENCOUNTER (OUTPATIENT)
Dept: PHYSICAL THERAPY | Age: 73
Setting detail: THERAPIES SERIES
Discharge: HOME | End: 2023-06-23
Payer: COMMERCIAL

## 2023-06-23 PROCEDURE — 97112 NEUROMUSCULAR REEDUCATION: CPT

## 2023-06-23 PROCEDURE — 97110 THERAPEUTIC EXERCISES: CPT

## 2023-06-23 ASSESSMENT — PAIN SCALES - GENERAL: PAINLEVEL_OUTOF10: 0

## 2023-06-23 NOTE — PROGRESS NOTES
[facility]  Outpatient Physical Therapy   Treatment Note        Date: 2023  Patient: Santy Daniels  : 1950   Confirmed: Yes  MRN: [id number]  Referring Provider: Francis Ho DO      Medical Diagnosis: Leg weakness [R29.898]  Arm weakness [R29.898]      Treatment Diagnosis: decreased LE strength, impaired balance, impaired posture, decreased UE strength, impaired gait    Visit Information:  Insurance: Payor: [facility] [employer] / Plan: [address] [place] [address] / Product Type: *No Product type* /   PT Visit Information  PT Insurance Information: [place]  Total # of Visits to Date: 4  Plan of Care/Certification Expiration Date: 23  No Show: 0  Progress Note Due Date: 23  Canceled Appointment: 0  Progress Note Counter:     Subjective Information:  Subjective: Patient reports he is doing ok. HEP Compliance:  [x] Good [] Fair [] Poor [] Reports not doing due to:             Pain Screening  Patient Currently in Pain: No  Pain Assessment: 0-10  Pain Level: 0    Treatment:  Exercises:  Exercises  Exercise 1: SciFit L2.5 x 10min  Exercise 2: 3 way SLR x 12  Exercise 3: bridges x 12  Exercise 7: sink exercises: hip abduction,hip ext, marching, heel raises x 15  Exercise 9: gait drills: marching, lateral, retro, tandem 1.5 laps each with SBA  Exercise 11: YTB rows x 15  Exercise 13: chest pulls supine with YTB x 20, chest press supine with 2# weights  Exercise 15: PKF x 12  Exercise 20: HEP: continue with current         *Indicates exercise, modality, or manual techniques to be initiated when appropriate    Assessment: Body Structures, Functions, Activity Limitations Requiring Skilled Therapeutic Intervention: Decreased strength, Decreased functional mobility , Decreased endurance, Decreased balance, Decreased posture  Assessment: Patient gets fatigue during session with standing exercises needing rest breaks.   Continued to work on LE/UE strength to improve balance

## 2023-06-27 LAB
CRYPTOSPORIDIUM ANTIGEN-DATA CONVERSION: NEGATIVE
GIARDIA LAMBLIA AG-DATA CONVERSION: NEGATIVE
OVA + PARASITE EXAM: NEGATIVE

## 2023-07-01 LAB
ALBUMIN (G/DL) IN SER/PLAS: 4.3 G/DL (ref 3.4–5)
ANION GAP IN SER/PLAS: 16 MMOL/L (ref 10–20)
CALCIUM (MG/DL) IN SER/PLAS: 9.4 MG/DL (ref 8.6–10.3)
CARBON DIOXIDE, TOTAL (MMOL/L) IN SER/PLAS: 20 MMOL/L (ref 21–32)
CHLORIDE (MMOL/L) IN SER/PLAS: 112 MMOL/L (ref 98–107)
CREATININE (MG/DL) IN SER/PLAS: 3.18 MG/DL (ref 0.5–1.3)
ERYTHROCYTE DISTRIBUTION WIDTH (RATIO) BY AUTOMATED COUNT: 17.7 % (ref 11.5–14.5)
ERYTHROCYTE MEAN CORPUSCULAR HEMOGLOBIN CONCENTRATION (G/DL) BY AUTOMATED: 30.4 G/DL (ref 32–36)
ERYTHROCYTE MEAN CORPUSCULAR VOLUME (FL) BY AUTOMATED COUNT: 89 FL (ref 80–100)
ERYTHROCYTES (10*6/UL) IN BLOOD BY AUTOMATED COUNT: 4.03 X10E12/L (ref 4.5–5.9)
GFR MALE: 20 ML/MIN/1.73M2
GLUCOSE (MG/DL) IN SER/PLAS: 147 MG/DL (ref 74–99)
HEMATOCRIT (%) IN BLOOD BY AUTOMATED COUNT: 35.9 % (ref 41–52)
HEMOGLOBIN (G/DL) IN BLOOD: 10.9 G/DL (ref 13.5–17.5)
LEUKOCYTES (10*3/UL) IN BLOOD BY AUTOMATED COUNT: 7.4 X10E9/L (ref 4.4–11.3)
MAGNESIUM (MG/DL) IN SER/PLAS: 2.39 MG/DL (ref 1.6–2.4)
PHOSPHATE (MG/DL) IN SER/PLAS: 4.3 MG/DL (ref 2.5–4.9)
PLATELETS (10*3/UL) IN BLOOD AUTOMATED COUNT: 172 X10E9/L (ref 150–450)
POTASSIUM (MMOL/L) IN SER/PLAS: 4.3 MMOL/L (ref 3.5–5.3)
SODIUM (MMOL/L) IN SER/PLAS: 144 MMOL/L (ref 136–145)
UREA NITROGEN (MG/DL) IN SER/PLAS: 50 MG/DL (ref 6–23)

## 2023-07-02 LAB — CYCLOSPORINE (NG/L) IN BLOOD: 181 NG/ML (ref 80–210)

## 2023-07-04 LAB
BK VIRUS LOG PCR: NORMAL
BK VIRUS PCR QUANT: NORMAL

## 2023-07-10 ENCOUNTER — HOSPITAL ENCOUNTER (OUTPATIENT)
Dept: PHYSICAL THERAPY | Age: 73
Setting detail: THERAPIES SERIES
Discharge: HOME | End: 2023-07-10
Payer: COMMERCIAL

## 2023-07-10 PROCEDURE — 97112 NEUROMUSCULAR REEDUCATION: CPT

## 2023-07-10 PROCEDURE — 97110 THERAPEUTIC EXERCISES: CPT

## 2023-07-10 NOTE — PROGRESS NOTES
SLS activity. Initiated 3 way hip with TB for increasing Hip strength and stability. PN due NV and pt instructed to get a written Resume order for PT. Treatment Diagnosis: decreased LE strength, impaired balance, impaired posture, decreased UE strength, impaired gait             Post-Pain Assessment:       Pain Rating (0-10 pain scale):  0 /10   Location and pain description same as pre-treatment unless indicated. Action: [x] NA   [] Perform HEP  [] Meds as prescribed  [] Modalities as prescribed   [] Call Physician     GOALS   Patient Goal(s): Patient Goals : \"gain strength\"    Long Term Goals Completed by 4 weeks Goal Status   LTG 1 Patient will ambulate with LRD 200ft independently with improved bilateral step length and symmetry. In progress   LTG 2 Patient will increase strength in bilateral LE/UEs >/= 4+/5 for improved functional tolerance. In progress   LTG 3 Hemphill balance>/= 50/56 to demonstrate improved balance and reduce fall risk. In progress   LTG 4 Patient will be independent with HEP. In progress     Plan:  Frequency/Duration:  Plan  Plan Frequency: 2  Plan weeks: 4  Current Treatment Recommendations: Strengthening, ROM, Balance training, Functional mobility training, Transfer training, Endurance training, Gait training, Stair training, Neuromuscular re-education, Manual, Home exercise program, Safety education & training, Patient/Caregiver education & training, Modalities  Modalities: Heat/Cold  Pt to continue current HEP. See objective section for any therapeutic exercise changes, additions or modifications this date.     Therapy Time:      PT Individual Minutes  Time In: 0876  Time Out: 5597  Minutes: 53  Timed Code Treatment Minutes: 53 Minutes  Procedure Minutes:0  Timed Activity Minutes Units   Ther Ex 20 1   Neuro 33 3     Electronically signed by Sanan Lovell PTA on 7/10/23 at 5:47 PM EDT

## 2023-07-13 ENCOUNTER — HOSPITAL ENCOUNTER (OUTPATIENT)
Dept: PHYSICAL THERAPY | Age: 73
Setting detail: THERAPIES SERIES
Discharge: HOME | End: 2023-07-13
Payer: COMMERCIAL

## 2023-07-13 NOTE — PROGRESS NOTES
Therapy                            Cancellation/No-show Note    Date: 2023  Patient: Simone Ashraf (52 y.o. male)  : 1950  MRN:  [id number]  Referring Physician: Josselyn Lindquist DO    Medical Diagnosis: Leg weakness [R29.898]  Arm weakness [R29.898]      Visit Information:  Insurance: [facility]: [facility] [place] / Plan: [facility] / Product Type: *No Product type* /   Visits to Date: 5   No Show/Cancelled Appts: 0 / 0      For today's appointment patient:  [x]  Cancelled  []  Rescheduled appointment  []  No-show   []  Called pt to remind of next appointment     Reason given by patient:  []  Patient ill  []  Conflicting appointment  []  No transportation    []  Conflict with work  [x]  No reason given  []  Other:      [] Pt has future appointments scheduled, no follow up needed  [] Pt requests to be on hold. Reason:   If > 2 weeks please discuss with therapist.  [] Therapist to call pt for follow up     Comments:   Also note patient scheduled for cardiac procedure. Will need clearance orders to resume.       Signature: Electronically signed by Sherryle Khat, PTA on 23 at 9:55 AM EDT

## 2023-07-20 LAB
ALANINE AMINOTRANSFERASE (SGPT) (U/L) IN SER/PLAS: 18 U/L (ref 10–52)
ALBUMIN (G/DL) IN SER/PLAS: 3.8 G/DL (ref 3.4–5)
ALBUMIN (G/DL) IN SER/PLAS: 3.9 G/DL (ref 3.4–5)
ALKALINE PHOSPHATASE (U/L) IN SER/PLAS: 52 U/L (ref 33–136)
AMORPHOUS CRYSTALS, URINE: NORMAL /HPF
ANION GAP IN SER/PLAS: 17 MMOL/L (ref 10–20)
ANION GAP IN SER/PLAS: 18 MMOL/L (ref 10–20)
APPEARANCE, URINE: CLEAR
ASPARTATE AMINOTRANSFERASE (SGOT) (U/L) IN SER/PLAS: 31 U/L (ref 9–39)
BASOPHILS (10*3/UL) IN BLOOD BY AUTOMATED COUNT: 0.04 X10E9/L (ref 0–0.1)
BASOPHILS/100 LEUKOCYTES IN BLOOD BY AUTOMATED COUNT: 0.5 % (ref 0–2)
BILIRUBIN TOTAL (MG/DL) IN SER/PLAS: 0.7 MG/DL (ref 0–1.2)
BILIRUBIN, URINE: NEGATIVE
BLOOD, URINE: ABNORMAL
CALCIDIOL (25 OH VITAMIN D3) (NG/ML) IN SER/PLAS: 21 NG/ML
CALCIUM (MG/DL) IN SER/PLAS: 9.2 MG/DL (ref 8.6–10.3)
CALCIUM (MG/DL) IN SER/PLAS: 9.2 MG/DL (ref 8.6–10.3)
CARBON DIOXIDE, TOTAL (MMOL/L) IN SER/PLAS: 17 MMOL/L (ref 21–32)
CARBON DIOXIDE, TOTAL (MMOL/L) IN SER/PLAS: 18 MMOL/L (ref 21–32)
CHLORIDE (MMOL/L) IN SER/PLAS: 111 MMOL/L (ref 98–107)
CHLORIDE (MMOL/L) IN SER/PLAS: 111 MMOL/L (ref 98–107)
CHOLESTEROL (MG/DL) IN SER/PLAS: 121 MG/DL (ref 0–199)
CHOLESTEROL IN HDL (MG/DL) IN SER/PLAS: 42.8 MG/DL
CHOLESTEROL/HDL RATIO: 2.8
COLOR, URINE: YELLOW
CREATININE (MG/DL) IN SER/PLAS: 3.09 MG/DL (ref 0.5–1.3)
CREATININE (MG/DL) IN SER/PLAS: 3.14 MG/DL (ref 0.5–1.3)
CREATININE (MG/DL) IN URINE: 47.6 MG/DL (ref 20–370)
EOSINOPHILS (10*3/UL) IN BLOOD BY AUTOMATED COUNT: 0.23 X10E9/L (ref 0–0.4)
EOSINOPHILS/100 LEUKOCYTES IN BLOOD BY AUTOMATED COUNT: 3.1 % (ref 0–6)
ERYTHROCYTE DISTRIBUTION WIDTH (RATIO) BY AUTOMATED COUNT: 16.6 % (ref 11.5–14.5)
ERYTHROCYTE MEAN CORPUSCULAR HEMOGLOBIN CONCENTRATION (G/DL) BY AUTOMATED: 30 G/DL (ref 32–36)
ERYTHROCYTE MEAN CORPUSCULAR VOLUME (FL) BY AUTOMATED COUNT: 88 FL (ref 80–100)
ERYTHROCYTES (10*6/UL) IN BLOOD BY AUTOMATED COUNT: 3.82 X10E12/L (ref 4.5–5.9)
GFR MALE: 20 ML/MIN/1.73M2
GFR MALE: 21 ML/MIN/1.73M2
GLUCOSE (MG/DL) IN SER/PLAS: 177 MG/DL (ref 74–99)
GLUCOSE (MG/DL) IN SER/PLAS: 179 MG/DL (ref 74–99)
GLUCOSE, URINE: 100 MG/DL
HEMATOCRIT (%) IN BLOOD BY AUTOMATED COUNT: 33.7 % (ref 41–52)
HEMOGLOBIN (G/DL) IN BLOOD: 10.1 G/DL (ref 13.5–17.5)
IMMATURE GRANULOCYTES/100 LEUKOCYTES IN BLOOD BY AUTOMATED COUNT: 0.5 % (ref 0–0.9)
KETONES, URINE: NEGATIVE MG/DL
LDL: 40 MG/DL (ref 0–99)
LEUKOCYTE ESTERASE, URINE: NEGATIVE
LEUKOCYTES (10*3/UL) IN BLOOD BY AUTOMATED COUNT: 7.3 X10E9/L (ref 4.4–11.3)
LYMPHOCYTES (10*3/UL) IN BLOOD BY AUTOMATED COUNT: 0.8 X10E9/L (ref 0.8–3)
LYMPHOCYTES/100 LEUKOCYTES IN BLOOD BY AUTOMATED COUNT: 10.9 % (ref 13–44)
MAGNESIUM (MG/DL) IN SER/PLAS: 1.92 MG/DL (ref 1.6–2.4)
MONOCYTES (10*3/UL) IN BLOOD BY AUTOMATED COUNT: 0.55 X10E9/L (ref 0.05–0.8)
MONOCYTES/100 LEUKOCYTES IN BLOOD BY AUTOMATED COUNT: 7.5 % (ref 2–10)
NEUTROPHILS (10*3/UL) IN BLOOD BY AUTOMATED COUNT: 5.65 X10E9/L (ref 1.6–5.5)
NEUTROPHILS/100 LEUKOCYTES IN BLOOD BY AUTOMATED COUNT: 77.5 % (ref 40–80)
NITRITE, URINE: NEGATIVE
PH, URINE: 6 (ref 5–8)
PHOSPHATE (MG/DL) IN SER/PLAS: 4.9 MG/DL (ref 2.5–4.9)
PLATELETS (10*3/UL) IN BLOOD AUTOMATED COUNT: 183 X10E9/L (ref 150–450)
POTASSIUM (MMOL/L) IN SER/PLAS: 4 MMOL/L (ref 3.5–5.3)
POTASSIUM (MMOL/L) IN SER/PLAS: 4.1 MMOL/L (ref 3.5–5.3)
PROSTATE SPECIFIC AG (NG/ML) IN SER/PLAS: 0.01 NG/ML (ref 0–4)
PROTEIN (MG/DL) IN URINE: 123 MG/DL (ref 5–25)
PROTEIN TOTAL: 6.4 G/DL (ref 6.4–8.2)
PROTEIN, URINE: ABNORMAL MG/DL
PROTEIN/CREATININE (MG/MG) IN URINE: 2.58 MG/MG CREAT (ref 0–0.17)
RBC, URINE: 2 /HPF (ref 0–5)
SODIUM (MMOL/L) IN SER/PLAS: 142 MMOL/L (ref 136–145)
SODIUM (MMOL/L) IN SER/PLAS: 142 MMOL/L (ref 136–145)
SPECIFIC GRAVITY, URINE: 1.01 (ref 1–1.03)
SQUAMOUS EPITHELIAL CELLS, URINE: <1 /HPF
TRIGLYCERIDE (MG/DL) IN SER/PLAS: 191 MG/DL (ref 0–149)
UREA NITROGEN (MG/DL) IN SER/PLAS: 57 MG/DL (ref 6–23)
UREA NITROGEN (MG/DL) IN SER/PLAS: 58 MG/DL (ref 6–23)
UROBILINOGEN, URINE: <2 MG/DL (ref 0–1.9)
VLDL: 38 MG/DL (ref 0–40)
WBC, URINE: 5 /HPF (ref 0–5)

## 2023-07-21 LAB
BK VIRUS LOG PCR: NORMAL LOG IU/ML
BK VIRUS PCR QUANT: NOT DETECTED IU/ML
CYCLOSPORINE (NG/L) IN BLOOD: 107 NG/ML (ref 80–210)
URINE CULTURE: NORMAL

## 2023-08-01 ENCOUNTER — HOSPITAL ENCOUNTER (OUTPATIENT)
Dept: PHYSICAL THERAPY | Age: 73
Setting detail: THERAPIES SERIES
Discharge: HOME | End: 2023-08-01

## 2023-08-01 NOTE — PROGRESS NOTES
Therapy                            Cancellation/No-show Note    Date: 2023  Patient: Naveed Valle (61 y.o. male)  : 1950  MRN:  [id number]  Referring Physician: Derick Arreola DO    Medical Diagnosis: Leg weakness [R29.898]  Arm weakness [R29.898]      Visit Information:  Insurance: Payor: [id number] [place] / Plan:  [facility] / Product Type: *No Product type* /   Visits to Date: 5   No Show/Cancelled Appts: 0 / 3      For today's appointment patient:  [x]  Cancelled  []  Rescheduled appointment  []  No-show   []  Called pt to remind of next appointment     Reason given by patient:  []  Patient ill  []  Conflicting appointment  []  No transportation    []  Conflict with work  []  No reason given  [x]  Other:  Not quite ready to return to therapy. Will call next week to schedule. [] Pt has future appointments scheduled, no follow up needed  [] Pt requests to be on hold.     Reason:   If > 2 weeks please discuss with therapist.  [] Therapist to call pt for follow up     Comments:       Signature: Electronically signed by Viraj Burnett PT on 23 at 4:00 PM EDT

## 2023-08-02 LAB
APPEARANCE, URINE: ABNORMAL
BACTERIA, URINE: ABNORMAL /HPF
BILIRUBIN, URINE: NEGATIVE
BLOOD, URINE: ABNORMAL
COLOR, URINE: YELLOW
GLUCOSE, URINE: NEGATIVE MG/DL
KETONES, URINE: NEGATIVE MG/DL
LEUKOCYTE ESTERASE, URINE: ABNORMAL
NITRITE, URINE: POSITIVE
PH, URINE: 6 (ref 5–8)
PROTEIN, URINE: ABNORMAL MG/DL
RBC, URINE: 43 /HPF (ref 0–5)
SPECIFIC GRAVITY, URINE: 1.01 (ref 1–1.03)
UROBILINOGEN, URINE: <2 MG/DL (ref 0–1.9)
WBC CLUMPS, URINE: ABNORMAL /HPF
WBC, URINE: >182 /HPF (ref 0–5)

## 2023-08-05 LAB — URINE CULTURE: ABNORMAL

## 2023-09-14 NOTE — H&P
History & Physical Reviewed:   I have reviewed the History and Physical dated:  20-Apr-2023   History and Physical reviewed and relevant findings noted. Patient examined to review pertinent physical  findings.: No significant changes   Home Medications Reviewed: no changes noted   Allergies Reviewed: no changes noted       ERAS (Enhanced Recovery After Surgery):  ·  ERAS Patient: no     Consent:   COVID-19 Consent:  ·  COVID-19 Risk Consent Surgeon has reviewed key risks related to the risk of truman COVID-19 and if they contract COVID-19 what the risks are.     Attestation:   Note Completion:  I am a:  Resident/Fellow   Attending Attestation I saw and evaluated the patient.  I personally obtained the key and critical portions of the history and physical exam or was physically present for key and  critical portions performed by the resident/fellow. I reviewed the resident/fellow?s documentation and discussed the patient with the resident/fellow.  I agree with the resident/fellow?s medical decision making as documented in the note.     I personally evaluated the patient on 25-Apr-2023         Electronic Signatures:  Elliott Calderon (Resident))  (Signed 25-Apr-2023 06:15)   Authored: History & Physical Reviewed, ERAS, Consent,  Note Completion  Ronny Lofton)  (Signed 26-Apr-2023 07:25)   Authored: Note Completion   Co-Signer: History & Physical Reviewed, ERAS, Consent, Note Completion      Last Updated: 26-Apr-2023 07:25 by Ronny Lofton)

## 2023-09-26 LAB
ANION GAP IN SER/PLAS: 19 MMOL/L (ref 10–20)
CALCIUM (MG/DL) IN SER/PLAS: 9.7 MG/DL (ref 8.6–10.3)
CARBON DIOXIDE, TOTAL (MMOL/L) IN SER/PLAS: 21 MMOL/L (ref 21–32)
CHLORIDE (MMOL/L) IN SER/PLAS: 105 MMOL/L (ref 98–107)
CREATININE (MG/DL) IN SER/PLAS: 3.56 MG/DL (ref 0.5–1.3)
GFR MALE: 17 ML/MIN/1.73M2
GLUCOSE (MG/DL) IN SER/PLAS: 156 MG/DL (ref 74–99)
HEMATOCRIT (%) IN BLOOD BY AUTOMATED COUNT: 38.5 % (ref 41–52)
HEMOGLOBIN (G/DL) IN BLOOD: 11.2 G/DL (ref 13.5–17.5)
IRON (UG/DL) IN SER/PLAS: 49 UG/DL (ref 35–150)
IRON BINDING CAPACITY (UG/DL) IN SER/PLAS: 354 UG/DL (ref 240–445)
IRON SATURATION (%) IN SER/PLAS: 14 % (ref 25–45)
POTASSIUM (MMOL/L) IN SER/PLAS: 4.5 MMOL/L (ref 3.5–5.3)
SODIUM (MMOL/L) IN SER/PLAS: 140 MMOL/L (ref 136–145)
UREA NITROGEN (MG/DL) IN SER/PLAS: 67 MG/DL (ref 6–23)

## 2023-09-26 NOTE — PROGRESS NOTES
Program disclosure discussed with patient, signed, copy given. No questions at this time. Cantharidin Counseling:  I discussed with the patient the risks of Cantharidin including but not limited to pain, redness, burning, itching, and blistering.

## 2023-09-28 PROBLEM — I48.91 ATRIAL FIBRILLATION WITH RVR (MULTI): Status: ACTIVE | Noted: 2022-09-14

## 2023-09-29 PROBLEM — R35.0 BENIGN PROSTATIC HYPERPLASIA WITH URINARY FREQUENCY: Status: ACTIVE | Noted: 2023-09-29

## 2023-09-29 PROBLEM — N18.5: Status: ACTIVE | Noted: 2023-09-29

## 2023-09-29 PROBLEM — K63.81 DIEULAFOY LESION OF COLON: Status: ACTIVE | Noted: 2022-10-24

## 2023-09-29 PROBLEM — N18.4 STAGE 4 CHRONIC KIDNEY DISEASE (MULTI): Status: ACTIVE | Noted: 2023-09-29

## 2023-09-29 PROBLEM — Z79.4 INSULIN LONG-TERM USE (MULTI): Status: ACTIVE | Noted: 2023-09-29

## 2023-09-29 PROBLEM — R82.998 URINE WBC INCREASED: Status: ACTIVE | Noted: 2023-09-29

## 2023-09-29 PROBLEM — R91.8 LUNG NODULES: Status: ACTIVE | Noted: 2022-10-25

## 2023-09-29 PROBLEM — N40.1 BENIGN PROSTATIC HYPERPLASIA WITH URINARY FREQUENCY: Status: ACTIVE | Noted: 2023-09-29

## 2023-09-29 PROBLEM — G40.909 SEIZURE DISORDER (MULTI): Status: ACTIVE | Noted: 2023-09-29

## 2023-09-29 PROBLEM — H43.812 PVD (POSTERIOR VITREOUS DETACHMENT), LEFT EYE: Status: ACTIVE | Noted: 2023-09-29

## 2023-09-29 PROBLEM — Z92.89 HISTORY OF BLOOD TRANSFUSION: Status: ACTIVE | Noted: 2023-09-29

## 2023-09-29 PROBLEM — K21.9 GERD (GASTROESOPHAGEAL REFLUX DISEASE): Status: ACTIVE | Noted: 2023-09-29

## 2023-09-29 PROBLEM — G89.29 CHRONIC RIGHT SHOULDER PAIN: Status: ACTIVE | Noted: 2020-06-30

## 2023-09-29 PROBLEM — J44.9 COPD WITHOUT EXACERBATION (MULTI): Status: ACTIVE | Noted: 2022-10-25

## 2023-09-29 PROBLEM — Z87.19 H/O LOWER GASTROINTESTINAL BLEEDING: Status: ACTIVE | Noted: 2023-09-29

## 2023-09-29 PROBLEM — R07.89 ATYPICAL CHEST PAIN: Status: ACTIVE | Noted: 2019-07-24

## 2023-09-29 PROBLEM — B34.8 BK VIREMIA: Status: ACTIVE | Noted: 2023-09-29

## 2023-09-29 PROBLEM — N40.0 BENIGN ENLARGEMENT OF PROSTATE: Status: ACTIVE | Noted: 2023-09-29

## 2023-09-29 PROBLEM — R93.89 ABNORMAL CT OF THE CHEST: Status: ACTIVE | Noted: 2022-10-25

## 2023-09-29 PROBLEM — J47.9 BRONCHIECTASIS WITHOUT COMPLICATION (MULTI): Status: ACTIVE | Noted: 2022-10-25

## 2023-09-29 PROBLEM — E78.00 HYPERCHOLESTEREMIA: Status: ACTIVE | Noted: 2023-09-29

## 2023-09-29 PROBLEM — I48.0 PAROXYSMAL ATRIAL FIBRILLATION (MULTI): Status: ACTIVE | Noted: 2023-09-29

## 2023-09-29 PROBLEM — D84.9 IMMUNOSUPPRESSION (MULTI): Status: ACTIVE | Noted: 2023-09-29

## 2023-09-29 PROBLEM — H52.4 ASTIGMATISM OF BOTH EYES WITH PRESBYOPIA: Status: ACTIVE | Noted: 2023-09-29

## 2023-09-29 PROBLEM — E13.9: Status: ACTIVE | Noted: 2023-09-29

## 2023-09-29 PROBLEM — E11.65 POORLY CONTROLLED DIABETES MELLITUS (MULTI): Status: ACTIVE | Noted: 2022-10-24

## 2023-09-29 PROBLEM — R05.3 CHRONIC COUGH: Status: ACTIVE | Noted: 2019-07-24

## 2023-09-29 PROBLEM — J18.9 PNEUMONIA: Status: ACTIVE | Noted: 2018-03-16

## 2023-09-29 PROBLEM — Z94.0: Status: ACTIVE | Noted: 2023-09-29

## 2023-09-29 PROBLEM — Z96.1 PSEUDOPHAKIA OF BOTH EYES: Status: ACTIVE | Noted: 2023-09-29

## 2023-09-29 PROBLEM — L02.619 ABSCESS, TOE: Status: ACTIVE | Noted: 2023-09-29

## 2023-09-29 PROBLEM — R60.9 EDEMA: Status: ACTIVE | Noted: 2023-09-29

## 2023-09-29 PROBLEM — R33.9 INCOMPLETE EMPTYING OF BLADDER: Status: ACTIVE | Noted: 2023-09-29

## 2023-09-29 PROBLEM — M25.511 CHRONIC RIGHT SHOULDER PAIN: Status: ACTIVE | Noted: 2020-06-30

## 2023-09-29 PROBLEM — H52.203 ASTIGMATISM OF BOTH EYES WITH PRESBYOPIA: Status: ACTIVE | Noted: 2023-09-29

## 2023-09-29 RX ORDER — CARVEDILOL 6.25 MG/1
1 TABLET ORAL 2 TIMES DAILY
COMMUNITY
Start: 2017-01-10 | End: 2024-04-16 | Stop reason: ALTCHOICE

## 2023-09-29 RX ORDER — MIDODRINE HYDROCHLORIDE 5 MG/1
TABLET ORAL
COMMUNITY
End: 2023-10-21 | Stop reason: ENTERED-IN-ERROR

## 2023-09-29 RX ORDER — SULFAMETHOXAZOLE AND TRIMETHOPRIM 800; 160 MG/1; MG/1
1 TABLET ORAL DAILY
COMMUNITY
Start: 2022-05-23 | End: 2023-10-21 | Stop reason: ENTERED-IN-ERROR

## 2023-09-29 RX ORDER — FUROSEMIDE 40 MG/1
40 TABLET ORAL DAILY
COMMUNITY
Start: 2017-05-09 | End: 2023-10-21 | Stop reason: SDUPTHER

## 2023-09-29 RX ORDER — CYCLOSPORINE 25 MG/1
2 CAPSULE, GELATIN COATED ORAL 2 TIMES DAILY
COMMUNITY
Start: 2017-02-03

## 2023-09-29 RX ORDER — INSULIN DEGLUDEC 100 U/ML
16 INJECTION, SOLUTION SUBCUTANEOUS NIGHTLY
COMMUNITY
Start: 2018-02-16 | End: 2023-10-21 | Stop reason: ENTERED-IN-ERROR

## 2023-09-29 RX ORDER — ISOSORBIDE MONONITRATE 30 MG/1
30 TABLET, EXTENDED RELEASE ORAL
COMMUNITY
Start: 2019-07-18 | End: 2023-10-21 | Stop reason: ENTERED-IN-ERROR

## 2023-09-29 RX ORDER — GABAPENTIN 300 MG/1
300 CAPSULE ORAL 4 TIMES DAILY
COMMUNITY
End: 2023-10-21 | Stop reason: ENTERED-IN-ERROR

## 2023-09-29 RX ORDER — ACETAMINOPHEN 325 MG/1
1.5 TABLET ORAL EVERY 6 HOURS PRN
COMMUNITY
End: 2024-04-17 | Stop reason: ENTERED-IN-ERROR

## 2023-09-29 RX ORDER — HUMAN INSULIN 100 [IU]/ML
INJECTION, SOLUTION SUBCUTANEOUS
COMMUNITY
Start: 2020-06-23 | End: 2023-10-21 | Stop reason: ENTERED-IN-ERROR

## 2023-09-29 RX ORDER — CYCLOSPORINE 25 MG/1
3 CAPSULE, LIQUID FILLED ORAL 2 TIMES DAILY
COMMUNITY
Start: 2016-05-24 | End: 2024-04-17 | Stop reason: ENTERED-IN-ERROR

## 2023-09-29 RX ORDER — METOPROLOL TARTRATE 100 MG/1
TABLET ORAL
COMMUNITY
Start: 2022-10-10 | End: 2023-10-21 | Stop reason: ENTERED-IN-ERROR

## 2023-09-29 RX ORDER — TAMSULOSIN HYDROCHLORIDE 0.4 MG/1
0.4 CAPSULE ORAL 2 TIMES DAILY
COMMUNITY
Start: 2022-11-11 | End: 2023-11-20

## 2023-09-29 RX ORDER — WARFARIN SODIUM 5 MG/1
TABLET ORAL
COMMUNITY
Start: 2022-10-10 | End: 2023-10-21 | Stop reason: ENTERED-IN-ERROR

## 2023-09-29 RX ORDER — HUMAN INSULIN 100 [IU]/ML
12 INJECTION, SUSPENSION SUBCUTANEOUS DAILY
COMMUNITY
Start: 2020-06-23 | End: 2023-10-21 | Stop reason: ENTERED-IN-ERROR

## 2023-09-29 RX ORDER — DULAGLUTIDE 1.5 MG/.5ML
INJECTION, SOLUTION SUBCUTANEOUS
COMMUNITY
Start: 2020-07-21 | End: 2024-04-17 | Stop reason: ENTERED-IN-ERROR

## 2023-09-29 RX ORDER — CIPROFLOXACIN 500 MG/1
500 TABLET ORAL DAILY
COMMUNITY
Start: 2023-04-24 | End: 2023-10-21 | Stop reason: ENTERED-IN-ERROR

## 2023-09-29 RX ORDER — PREDNISONE 5 MG/1
1 TABLET ORAL DAILY
COMMUNITY
Start: 2017-08-17

## 2023-09-29 RX ORDER — MYCOPHENOLATE MOFETIL 250 MG/1
3 CAPSULE ORAL 2 TIMES DAILY
COMMUNITY
Start: 2015-12-15

## 2023-09-29 RX ORDER — POLYETHYLENE GLYCOL 3350 17 G/17G
17 POWDER, FOR SOLUTION ORAL DAILY PRN
COMMUNITY
Start: 2023-04-25 | End: 2024-04-17 | Stop reason: ENTERED-IN-ERROR

## 2023-09-29 RX ORDER — LABETALOL 300 MG/1
300 TABLET, FILM COATED ORAL 2 TIMES DAILY
COMMUNITY
Start: 2010-12-30 | End: 2023-10-21 | Stop reason: ENTERED-IN-ERROR

## 2023-09-29 RX ORDER — METOPROLOL TARTRATE 75 MG/1
1 TABLET, FILM COATED ORAL EVERY 12 HOURS
COMMUNITY
End: 2024-04-17 | Stop reason: ENTERED-IN-ERROR

## 2023-09-29 RX ORDER — AMLODIPINE BESYLATE 10 MG/1
1 TABLET ORAL DAILY
COMMUNITY
Start: 2023-03-14 | End: 2024-04-17 | Stop reason: ENTERED-IN-ERROR

## 2023-09-29 RX ORDER — HUMAN INSULIN 100 [IU]/ML
INJECTION, SOLUTION SUBCUTANEOUS
COMMUNITY
Start: 2020-05-18 | End: 2023-10-21 | Stop reason: ENTERED-IN-ERROR

## 2023-09-29 RX ORDER — TAMSULOSIN HYDROCHLORIDE 0.4 MG/1
1 CAPSULE ORAL NIGHTLY
COMMUNITY
Start: 2023-03-06 | End: 2023-10-21 | Stop reason: ENTERED-IN-ERROR

## 2023-09-29 RX ORDER — ASPIRIN 81 MG/1
1 TABLET ORAL DAILY
COMMUNITY
Start: 2015-11-18 | End: 2024-04-17 | Stop reason: ENTERED-IN-ERROR

## 2023-09-29 RX ORDER — NITROGLYCERIN 0.4 MG/1
0.4 TABLET SUBLINGUAL EVERY 5 MIN PRN
COMMUNITY
Start: 2020-06-23 | End: 2023-10-21 | Stop reason: ENTERED-IN-ERROR

## 2023-09-29 RX ORDER — PEN NEEDLE, DIABETIC 30 GX3/16"
NEEDLE, DISPOSABLE MISCELLANEOUS
COMMUNITY
End: 2023-10-21 | Stop reason: ENTERED-IN-ERROR

## 2023-09-29 RX ORDER — CARVEDILOL 12.5 MG/1
12.5 TABLET ORAL 2 TIMES DAILY
COMMUNITY
End: 2023-10-21 | Stop reason: ENTERED-IN-ERROR

## 2023-09-29 RX ORDER — TRAMADOL HYDROCHLORIDE 50 MG/1
50 TABLET ORAL EVERY 6 HOURS PRN
COMMUNITY
Start: 2023-04-25 | End: 2023-10-21 | Stop reason: ENTERED-IN-ERROR

## 2023-09-29 RX ORDER — PANTOPRAZOLE SODIUM 40 MG/1
1 TABLET, DELAYED RELEASE ORAL
COMMUNITY
Start: 2017-12-07

## 2023-09-29 RX ORDER — GABAPENTIN 300 MG/1
600 CAPSULE ORAL 3 TIMES DAILY
COMMUNITY
Start: 2010-11-18 | End: 2024-04-17 | Stop reason: ENTERED-IN-ERROR

## 2023-09-29 RX ORDER — ROSUVASTATIN CALCIUM 20 MG/1
20 TABLET, COATED ORAL DAILY
COMMUNITY
Start: 2022-11-08 | End: 2023-10-21 | Stop reason: ENTERED-IN-ERROR

## 2023-09-29 RX ORDER — CYCLOBENZAPRINE HCL 10 MG
10 TABLET ORAL
COMMUNITY
Start: 2016-04-05 | End: 2023-10-21 | Stop reason: ENTERED-IN-ERROR

## 2023-09-29 RX ORDER — HUMAN INSULIN 100 [IU]/ML
16 INJECTION, SUSPENSION SUBCUTANEOUS NIGHTLY
COMMUNITY
Start: 2020-05-18 | End: 2023-10-21 | Stop reason: ENTERED-IN-ERROR

## 2023-09-29 RX ORDER — AMOXICILLIN AND CLAVULANATE POTASSIUM 875; 125 MG/1; MG/1
1 TABLET, FILM COATED ORAL 2 TIMES DAILY
COMMUNITY
Start: 2023-02-01 | End: 2023-10-21 | Stop reason: ENTERED-IN-ERROR

## 2023-09-29 RX ORDER — FERROUS SULFATE 325(65) MG
65 TABLET ORAL 2 TIMES DAILY
COMMUNITY
Start: 2010-11-18 | End: 2023-10-21 | Stop reason: ENTERED-IN-ERROR

## 2023-09-29 RX ORDER — SIMVASTATIN 10 MG/1
10 TABLET, FILM COATED ORAL
COMMUNITY
Start: 2020-06-16 | End: 2023-10-21 | Stop reason: ENTERED-IN-ERROR

## 2023-09-29 RX ORDER — SODIUM BICARBONATE 650 MG/1
1 TABLET ORAL 2 TIMES DAILY
COMMUNITY
Start: 2023-04-18 | End: 2023-10-21 | Stop reason: ENTERED-IN-ERROR

## 2023-09-29 RX ORDER — SIMVASTATIN 20 MG/1
1 TABLET, FILM COATED ORAL NIGHTLY
COMMUNITY
Start: 2015-04-11 | End: 2023-10-21 | Stop reason: ENTERED-IN-ERROR

## 2023-10-02 ENCOUNTER — APPOINTMENT (OUTPATIENT)
Dept: CARDIOLOGY | Facility: CLINIC | Age: 73
End: 2023-10-02
Payer: COMMERCIAL

## 2023-10-02 NOTE — OP NOTE
PROCEDURE DETAILS    Preoperative Diagnosis:  Retention of urine, unspecified, R33.9  Benign prostatic hyperplasia with lower urinary tract symptoms, N40.1    Postoperative Diagnosis:  Retention of urine, unspecified, R33.9  Benign prostatic hyperplasia with lower urinary tract symptoms, N40.1    Surgeon: Ronny Lofton  Resident/Fellow/Other Assistant: Elliott Calderon    Procedure:  1. HOLMIUM LASER ENUCLEATION OF THE PROSTATE    Anesthesia: No anesthesiologist associated with this case  Estimated Blood Loss: nil  Findings: see op note  Specimens(s) Collected: yes,  prostate tissue   Drains and/or Catheters: 22fr 3 way, 30cc in balloon        Operative Report:   Total Enucleation Time:  8 minutes   Total Laser Time: 3m55s  Total Energy (kJ): 22.67  Total Morcellation Time: 1 minutes   Laser Settings Used:  Cutting 2 J, 40 Hz.  Coagulation 1.5 J, 30 Hz.   Ariel or Virtual basket used? VB  Preoperative Prostate Size: small  Prostate configuration: Bilobar  Complication: none  EBL: nil ml  Catheter: 22Fr 3 way  Antibiotics: ancef, cipro  Specimen: Morcellated prostatic tissue.    DESCRIPTION OF PROCEDURE:      The patient was brought to OR #3 and after good general anesthesia was achieved, the patient was prepped and draped in dorsal lithotomy position. All pressure points were padded.  Surgical pause was performed.  The patient was identified using 2 identifiers.   The correct surgical procedure was confirmed.  All members of surgical and anesthesia team were in agreement.  The patient received prophylactic antibiotic and the procedure started.    Cystoscopy: The patient's bladder was first entered with a 22-Setswana rigid cystoscope with 30-degree lens.  Cystoscopic examination showed normal anterior urethra.  The posterior urethra showed a small prostate with bilobar enlargement of the prostate  with some intravesical protrusion.  Both ureteral orifices were identified away from the bladder neck.  The rest of the  examination was normal.  The cystoscope was removed and the meatus was dilated up to 28-Uzbek using sounds.  The urethra was then  filled with lidocaine gel and a 26-Uzbek Han continuous-flow resectoscope was placed.  The holmium laser apparatus was placed through the sheath.  Using a 550-micron end-firing quartz laser fiber with 10 cm of cladding stripped off, a laser bridge,  and a 7-Uzbek laser stabilizing catheter, the procedure was started with the above-mentioned laser setting.      A en bloc technique was performed with an initial incision at the apex and circumferentially using low energy.  We continued to develop our anterior plane at the apex, identifying the capsule and freeing up the anterior apex well within the sphincter.  We then proceeded with our posterior dissection, developing the posterior space toward the bladder neck and continuing our incision laterally to 9 and 3:00.    Then we turned our attention to the lateral attachments of the prostate.  Using laser energy, taking care to avoid any damage to the sphincter, we connected our previously dissected anterior and posterior planes to completely liberate the apex of the  prostate preserving the sphincter. We then continued our dissection circumferentially, freeing the prostate systematically from apex to its attachments at the bladder neck. The adenoma was then pushed into the bladder.     Once the prostate had been fully enucleated, the laser was defocused on any sources of bleeding to get additional hemostasis.  There was good hemostasis at the conclusion of this procedure.  A few small remaining nodular adenomas were then resected from  the capsule.  We then made aggressive bladder neck relaxing incisions at 5 and 7 to fully deobstruct the bladder    The laser bridge apparatus and the resectoscope were then removed and the offset Han nephroscope and Han - Kelly tissue morcellator were then introduced and used to completely morcellate  the tissue.  Two inflows were used during this portion of the  procedure to fully distend the bladder and to prevent any inadvertent bladder injury.  The bladder was then ellik'd out after insertion of the inner sheath and reinspected with the cystoscope showing no residual adenomas.  Hemostasis was ensured at the  conclusion of the procedure and both ureteral orifices were confirmed and identified well away from the area of resection.  No residual adenoma could be identified.    Following that, a three-way Randall catheter on a guide was placed into the bladder and the balloon was filled.  The bladder was irrigated near clear and the continuous irrigation was started,    The patient tolerated the procedure well and was shifted to recovery in good general condition.                          Electronic Signatures:  Ronny Lofton)  (Signed 25-Apr-2023 08:11)   Authored: Post-Operative Note, Chart Review, Note Completion      Last Updated: 25-Apr-2023 08:11 by Ronny Lofton)

## 2023-10-05 ENCOUNTER — APPOINTMENT (OUTPATIENT)
Dept: CARDIOLOGY | Facility: CLINIC | Age: 73
End: 2023-10-05
Payer: COMMERCIAL

## 2023-10-17 ENCOUNTER — LAB (OUTPATIENT)
Dept: LAB | Facility: LAB | Age: 73
End: 2023-10-17
Payer: COMMERCIAL

## 2023-10-17 DIAGNOSIS — N18.9 CHRONIC KIDNEY DISEASE, UNSPECIFIED: ICD-10-CM

## 2023-10-17 DIAGNOSIS — N18.4 CHRONIC KIDNEY DISEASE, STAGE 4 (SEVERE) (MULTI): ICD-10-CM

## 2023-10-17 DIAGNOSIS — E11.9 TYPE 2 DIABETES MELLITUS WITHOUT COMPLICATIONS (MULTI): ICD-10-CM

## 2023-10-17 DIAGNOSIS — I12.9 HYPERTENSIVE CHRONIC KIDNEY DISEASE WITH STAGE 1 THROUGH STAGE 4 CHRONIC KIDNEY DISEASE, OR UNSPECIFIED CHRONIC KIDNEY DISEASE: ICD-10-CM

## 2023-10-17 DIAGNOSIS — Z94.0 KIDNEY TRANSPLANT STATUS (HHS-HCC): Primary | ICD-10-CM

## 2023-10-17 DIAGNOSIS — E11.21 TYPE 2 DIABETES MELLITUS WITH DIABETIC NEPHROPATHY (MULTI): ICD-10-CM

## 2023-10-17 DIAGNOSIS — N39.0 URINARY TRACT INFECTION, SITE NOT SPECIFIED: ICD-10-CM

## 2023-10-17 DIAGNOSIS — D64.9 ANEMIA, UNSPECIFIED: ICD-10-CM

## 2023-10-17 DIAGNOSIS — E78.5 HYPERLIPIDEMIA, UNSPECIFIED: ICD-10-CM

## 2023-10-17 DIAGNOSIS — R94.6 ABNORMAL RESULTS OF THYROID FUNCTION STUDIES: ICD-10-CM

## 2023-10-17 LAB
ALBUMIN SERPL BCP-MCNC: 4.1 G/DL (ref 3.4–5)
ALP SERPL-CCNC: 49 U/L (ref 33–136)
ALT SERPL W P-5'-P-CCNC: 9 U/L (ref 10–52)
ANION GAP SERPL CALC-SCNC: 19 MMOL/L (ref 10–20)
AST SERPL W P-5'-P-CCNC: 12 U/L (ref 9–39)
BASOPHILS # BLD AUTO: 0.04 X10*3/UL (ref 0–0.1)
BASOPHILS NFR BLD AUTO: 0.7 %
BILIRUB SERPL-MCNC: 0.8 MG/DL (ref 0–1.2)
BUN SERPL-MCNC: 118 MG/DL (ref 6–23)
CALCIUM SERPL-MCNC: 9.8 MG/DL (ref 8.6–10.3)
CHLORIDE SERPL-SCNC: 112 MMOL/L (ref 98–107)
CHOLEST SERPL-MCNC: 251 MG/DL (ref 0–199)
CHOLESTEROL/HDL RATIO: 7.8
CO2 SERPL-SCNC: 15 MMOL/L (ref 21–32)
CREAT SERPL-MCNC: 3.71 MG/DL (ref 0.5–1.3)
EOSINOPHIL # BLD AUTO: 0.32 X10*3/UL (ref 0–0.4)
EOSINOPHIL NFR BLD AUTO: 5.3 %
ERYTHROCYTE [DISTWIDTH] IN BLOOD BY AUTOMATED COUNT: 17.4 % (ref 11.5–14.5)
EST. AVERAGE GLUCOSE BLD GHB EST-MCNC: 134 MG/DL
GFR SERPL CREATININE-BSD FRML MDRD: 17 ML/MIN/1.73M*2
GLUCOSE SERPL-MCNC: 131 MG/DL (ref 74–99)
HBA1C MFR BLD: 6.3 %
HCT VFR BLD AUTO: 38.8 % (ref 41–52)
HDLC SERPL-MCNC: 32.1 MG/DL
HGB BLD-MCNC: 11.9 G/DL (ref 13.5–17.5)
HYALINE CASTS #/AREA URNS AUTO: ABNORMAL /LPF
IMM GRANULOCYTES # BLD AUTO: 0.01 X10*3/UL (ref 0–0.5)
IMM GRANULOCYTES NFR BLD AUTO: 0.2 % (ref 0–0.9)
LDLC SERPL CALC-MCNC: 175 MG/DL
LYMPHOCYTES # BLD AUTO: 1.3 X10*3/UL (ref 0.8–3)
LYMPHOCYTES NFR BLD AUTO: 21.6 %
MCH RBC QN AUTO: 28.7 PG (ref 26–34)
MCHC RBC AUTO-ENTMCNC: 30.7 G/DL (ref 32–36)
MCV RBC AUTO: 94 FL (ref 80–100)
MONOCYTES # BLD AUTO: 0.5 X10*3/UL (ref 0.05–0.8)
MONOCYTES NFR BLD AUTO: 8.3 %
MUCOUS THREADS #/AREA URNS AUTO: ABNORMAL /LPF
NEUTROPHILS # BLD AUTO: 3.85 X10*3/UL (ref 1.6–5.5)
NEUTROPHILS NFR BLD AUTO: 63.9 %
NON HDL CHOLESTEROL: 219 MG/DL (ref 0–149)
NRBC BLD-RTO: 0 /100 WBCS (ref 0–0)
PLATELET # BLD AUTO: 171 X10*3/UL (ref 150–450)
PMV BLD AUTO: 12.6 FL (ref 7.5–11.5)
POTASSIUM SERPL-SCNC: 4.4 MMOL/L (ref 3.5–5.3)
PROT SERPL-MCNC: 7 G/DL (ref 6.4–8.2)
RBC # BLD AUTO: 4.15 X10*6/UL (ref 4.5–5.9)
RBC #/AREA URNS AUTO: ABNORMAL /HPF
SODIUM SERPL-SCNC: 142 MMOL/L (ref 136–145)
SQUAMOUS #/AREA URNS AUTO: ABNORMAL /HPF
TRIGL SERPL-MCNC: 222 MG/DL (ref 0–149)
TSH SERPL-ACNC: 1.77 MIU/L (ref 0.44–3.98)
VLDL: 44 MG/DL (ref 0–40)
WBC # BLD AUTO: 6 X10*3/UL (ref 4.4–11.3)
WBC #/AREA URNS AUTO: >50 /HPF
YEAST BUDDING #/AREA UR COMP ASSIST: PRESENT /HPF

## 2023-10-17 PROCEDURE — 80061 LIPID PANEL: CPT

## 2023-10-17 PROCEDURE — 84443 ASSAY THYROID STIM HORMONE: CPT

## 2023-10-17 PROCEDURE — 36415 COLL VENOUS BLD VENIPUNCTURE: CPT

## 2023-10-17 PROCEDURE — 85025 COMPLETE CBC W/AUTO DIFF WBC: CPT

## 2023-10-17 PROCEDURE — 80053 COMPREHEN METABOLIC PANEL: CPT

## 2023-10-17 PROCEDURE — 83036 HEMOGLOBIN GLYCOSYLATED A1C: CPT

## 2023-10-17 PROCEDURE — 81001 URINALYSIS AUTO W/SCOPE: CPT

## 2023-10-20 ENCOUNTER — HOSPITAL ENCOUNTER (EMERGENCY)
Facility: HOSPITAL | Age: 73
Discharge: HOME | End: 2023-10-21
Attending: EMERGENCY MEDICINE
Payer: COMMERCIAL

## 2023-10-20 ENCOUNTER — APPOINTMENT (OUTPATIENT)
Dept: RADIOLOGY | Facility: HOSPITAL | Age: 73
End: 2023-10-20
Payer: COMMERCIAL

## 2023-10-20 ENCOUNTER — TELEPHONE (OUTPATIENT)
Dept: TRANSPLANT | Facility: HOSPITAL | Age: 73
End: 2023-10-20
Payer: COMMERCIAL

## 2023-10-20 DIAGNOSIS — N18.9 ACUTE RENAL FAILURE SUPERIMPOSED ON CHRONIC KIDNEY DISEASE, UNSPECIFIED ACUTE RENAL FAILURE TYPE, UNSPECIFIED CKD STAGE (CMS-HCC): Primary | ICD-10-CM

## 2023-10-20 DIAGNOSIS — N17.9 ACUTE RENAL FAILURE SUPERIMPOSED ON CHRONIC KIDNEY DISEASE, UNSPECIFIED ACUTE RENAL FAILURE TYPE, UNSPECIFIED CKD STAGE (CMS-HCC): Primary | ICD-10-CM

## 2023-10-20 DIAGNOSIS — N19 UREMIA: ICD-10-CM

## 2023-10-20 DIAGNOSIS — N17.9 ACUTE RENAL FAILURE, UNSPECIFIED ACUTE RENAL FAILURE TYPE (CMS-HCC): ICD-10-CM

## 2023-10-20 LAB
ALBUMIN SERPL BCP-MCNC: 4.6 G/DL (ref 3.4–5)
ALP SERPL-CCNC: 51 U/L (ref 33–136)
ALT SERPL W P-5'-P-CCNC: 11 U/L (ref 10–52)
ANION GAP SERPL CALC-SCNC: 19 MMOL/L (ref 10–20)
APPEARANCE UR: ABNORMAL
AST SERPL W P-5'-P-CCNC: 13 U/L (ref 9–39)
BASOPHILS # BLD AUTO: 0.04 X10*3/UL (ref 0–0.1)
BASOPHILS NFR BLD AUTO: 0.6 %
BILIRUB SERPL-MCNC: 0.9 MG/DL (ref 0–1.2)
BILIRUB UR STRIP.AUTO-MCNC: NEGATIVE MG/DL
BUN SERPL-MCNC: 119 MG/DL (ref 6–23)
CALCIUM SERPL-MCNC: 9.7 MG/DL (ref 8.6–10.3)
CHLORIDE SERPL-SCNC: 106 MMOL/L (ref 98–107)
CO2 SERPL-SCNC: 19 MMOL/L (ref 21–32)
COLOR UR: YELLOW
CREAT SERPL-MCNC: 3.71 MG/DL (ref 0.5–1.3)
EOSINOPHIL # BLD AUTO: 0.19 X10*3/UL (ref 0–0.4)
EOSINOPHIL NFR BLD AUTO: 2.9 %
ERYTHROCYTE [DISTWIDTH] IN BLOOD BY AUTOMATED COUNT: 17.1 % (ref 11.5–14.5)
GFR SERPL CREATININE-BSD FRML MDRD: 17 ML/MIN/1.73M*2
GLUCOSE SERPL-MCNC: 161 MG/DL (ref 74–99)
GLUCOSE UR STRIP.AUTO-MCNC: NEGATIVE MG/DL
HCT VFR BLD AUTO: 39.8 % (ref 41–52)
HGB BLD-MCNC: 12.2 G/DL (ref 13.5–17.5)
HOLD SPECIMEN: NORMAL
IMM GRANULOCYTES # BLD AUTO: 0.01 X10*3/UL (ref 0–0.5)
IMM GRANULOCYTES NFR BLD AUTO: 0.2 % (ref 0–0.9)
KETONES UR STRIP.AUTO-MCNC: NEGATIVE MG/DL
LACTATE SERPL-SCNC: 1.7 MMOL/L (ref 0.4–2)
LEUKOCYTE ESTERASE UR QL STRIP.AUTO: ABNORMAL
LYMPHOCYTES # BLD AUTO: 0.78 X10*3/UL (ref 0.8–3)
LYMPHOCYTES NFR BLD AUTO: 12 %
MCH RBC QN AUTO: 28.7 PG (ref 26–34)
MCHC RBC AUTO-ENTMCNC: 30.7 G/DL (ref 32–36)
MCV RBC AUTO: 94 FL (ref 80–100)
MONOCYTES # BLD AUTO: 0.45 X10*3/UL (ref 0.05–0.8)
MONOCYTES NFR BLD AUTO: 6.9 %
NEUTROPHILS # BLD AUTO: 5.05 X10*3/UL (ref 1.6–5.5)
NEUTROPHILS NFR BLD AUTO: 77.4 %
NITRITE UR QL STRIP.AUTO: NEGATIVE
NRBC BLD-RTO: 0 /100 WBCS (ref 0–0)
PH UR STRIP.AUTO: 5 [PH]
PLATELET # BLD AUTO: 165 X10*3/UL (ref 150–450)
PMV BLD AUTO: 11.6 FL (ref 7.5–11.5)
POTASSIUM SERPL-SCNC: 5 MMOL/L (ref 3.5–5.3)
PROT SERPL-MCNC: 7.4 G/DL (ref 6.4–8.2)
PROT UR STRIP.AUTO-MCNC: ABNORMAL MG/DL
RBC # BLD AUTO: 4.25 X10*6/UL (ref 4.5–5.9)
RBC # UR STRIP.AUTO: ABNORMAL /UL
RBC #/AREA URNS AUTO: ABNORMAL /HPF
SODIUM SERPL-SCNC: 139 MMOL/L (ref 136–145)
SP GR UR STRIP.AUTO: 1.01
SQUAMOUS #/AREA URNS AUTO: ABNORMAL /HPF
UROBILINOGEN UR STRIP.AUTO-MCNC: <2 MG/DL
WBC # BLD AUTO: 6.5 X10*3/UL (ref 4.4–11.3)
WBC #/AREA URNS AUTO: ABNORMAL /HPF
YEAST BUDDING #/AREA UR COMP ASSIST: PRESENT /HPF

## 2023-10-20 PROCEDURE — 2500000004 HC RX 250 GENERAL PHARMACY W/ HCPCS (ALT 636 FOR OP/ED)

## 2023-10-20 PROCEDURE — 83605 ASSAY OF LACTIC ACID: CPT | Performed by: EMERGENCY MEDICINE

## 2023-10-20 PROCEDURE — 80053 COMPREHEN METABOLIC PANEL: CPT

## 2023-10-20 PROCEDURE — 81001 URINALYSIS AUTO W/SCOPE: CPT

## 2023-10-20 PROCEDURE — 99285 EMERGENCY DEPT VISIT HI MDM: CPT | Mod: 25 | Performed by: EMERGENCY MEDICINE

## 2023-10-20 PROCEDURE — 83735 ASSAY OF MAGNESIUM: CPT

## 2023-10-20 PROCEDURE — 99285 EMERGENCY DEPT VISIT HI MDM: CPT | Performed by: EMERGENCY MEDICINE

## 2023-10-20 PROCEDURE — 93010 ELECTROCARDIOGRAM REPORT: CPT | Performed by: EMERGENCY MEDICINE

## 2023-10-20 PROCEDURE — 2500000001 HC RX 250 WO HCPCS SELF ADMINISTERED DRUGS (ALT 637 FOR MEDICARE OP)

## 2023-10-20 PROCEDURE — 87086 URINE CULTURE/COLONY COUNT: CPT | Mod: STJLAB

## 2023-10-20 PROCEDURE — 87086 URINE CULTURE/COLONY COUNT: CPT | Mod: CMCLAB

## 2023-10-20 PROCEDURE — 87075 CULTR BACTERIA EXCEPT BLOOD: CPT | Mod: CMCLAB | Performed by: EMERGENCY MEDICINE

## 2023-10-20 PROCEDURE — 87075 CULTR BACTERIA EXCEPT BLOOD: CPT | Mod: 59,STJLAB | Performed by: EMERGENCY MEDICINE

## 2023-10-20 PROCEDURE — 36415 COLL VENOUS BLD VENIPUNCTURE: CPT

## 2023-10-20 PROCEDURE — 36415 COLL VENOUS BLD VENIPUNCTURE: CPT | Performed by: EMERGENCY MEDICINE

## 2023-10-20 PROCEDURE — 85025 COMPLETE CBC W/AUTO DIFF WBC: CPT

## 2023-10-20 PROCEDURE — 71045 X-RAY EXAM CHEST 1 VIEW: CPT | Mod: FR

## 2023-10-20 PROCEDURE — 93005 ELECTROCARDIOGRAM TRACING: CPT

## 2023-10-20 RX ADMIN — SODIUM CHLORIDE, POTASSIUM CHLORIDE, SODIUM LACTATE AND CALCIUM CHLORIDE 1000 ML: 600; 310; 30; 20 INJECTION, SOLUTION INTRAVENOUS at 22:19

## 2023-10-20 RX ADMIN — METOPROLOL TARTRATE 75 MG: 50 TABLET, FILM COATED ORAL at 22:20

## 2023-10-20 ASSESSMENT — COLUMBIA-SUICIDE SEVERITY RATING SCALE - C-SSRS
1. IN THE PAST MONTH, HAVE YOU WISHED YOU WERE DEAD OR WISHED YOU COULD GO TO SLEEP AND NOT WAKE UP?: NO
2. HAVE YOU ACTUALLY HAD ANY THOUGHTS OF KILLING YOURSELF?: NO
6. HAVE YOU EVER DONE ANYTHING, STARTED TO DO ANYTHING, OR PREPARED TO DO ANYTHING TO END YOUR LIFE?: NO

## 2023-10-20 ASSESSMENT — PAIN SCALES - GENERAL: PAINLEVEL_OUTOF10: 0 - NO PAIN

## 2023-10-20 ASSESSMENT — LIFESTYLE VARIABLES
HAVE YOU EVER FELT YOU SHOULD CUT DOWN ON YOUR DRINKING: NO
HAVE PEOPLE ANNOYED YOU BY CRITICIZING YOUR DRINKING: NO
EVER FELT BAD OR GUILTY ABOUT YOUR DRINKING: NO
EVER HAD A DRINK FIRST THING IN THE MORNING TO STEADY YOUR NERVES TO GET RID OF A HANGOVER: NO

## 2023-10-20 ASSESSMENT — PAIN - FUNCTIONAL ASSESSMENT: PAIN_FUNCTIONAL_ASSESSMENT: 0-10

## 2023-10-20 NOTE — Clinical Note
Is the patient on isolation?: No   Do you expect the patient to require telemetry (informational-only for bed management): No   Do you expect the patient to require observation or inpt? (informational-only for bed management): Observation

## 2023-10-20 NOTE — TELEPHONE ENCOUNTER
BUN critical 118 up from 67,   Cr. 3.71, 3.56  Bicarb 15 - on 650 mg BID (sodium bicarb)  -  Reviewed with Dr. Gregg, patient needs to go to the ED and be assessed for need for dialysis.  Called patient advised he was going to [facility] ED for further eval.

## 2023-10-21 VITALS
TEMPERATURE: 96.8 F | OXYGEN SATURATION: 100 % | HEIGHT: 73 IN | SYSTOLIC BLOOD PRESSURE: 114 MMHG | BODY MASS INDEX: 23.86 KG/M2 | HEART RATE: 110 BPM | DIASTOLIC BLOOD PRESSURE: 73 MMHG | RESPIRATION RATE: 18 BRPM | WEIGHT: 180 LBS

## 2023-10-21 PROBLEM — N17.9 ACUTE KIDNEY FAILURE, UNSPECIFIED (CMS-HCC): Status: ACTIVE | Noted: 2023-08-23

## 2023-10-21 PROBLEM — I48.91 ATRIAL FIBRILLATION WITH RVR (MULTI): Chronic | Status: ACTIVE | Noted: 2022-09-14

## 2023-10-21 PROBLEM — N18.9 ACUTE RENAL FAILURE SUPERIMPOSED ON CHRONIC KIDNEY DISEASE, UNSPECIFIED ACUTE RENAL FAILURE TYPE, UNSPECIFIED CKD STAGE (CMS-HCC): Status: ACTIVE | Noted: 2023-10-21

## 2023-10-21 PROBLEM — N17.9 ACUTE RENAL FAILURE SUPERIMPOSED ON CHRONIC KIDNEY DISEASE, UNSPECIFIED ACUTE RENAL FAILURE TYPE, UNSPECIFIED CKD STAGE (CMS-HCC): Status: ACTIVE | Noted: 2023-10-21

## 2023-10-21 PROBLEM — R35.0 BENIGN PROSTATIC HYPERPLASIA WITH URINARY FREQUENCY: Chronic | Status: ACTIVE | Noted: 2023-09-29

## 2023-10-21 PROBLEM — N40.1 BENIGN PROSTATIC HYPERPLASIA WITH URINARY FREQUENCY: Chronic | Status: ACTIVE | Noted: 2023-09-29

## 2023-10-21 LAB
ALBUMIN SERPL BCP-MCNC: 4.1 G/DL (ref 3.4–5)
ALP SERPL-CCNC: 49 U/L (ref 33–136)
ALT SERPL W P-5'-P-CCNC: 10 U/L (ref 10–52)
ANION GAP SERPL CALC-SCNC: 15 MMOL/L (ref 10–20)
AST SERPL W P-5'-P-CCNC: 12 U/L (ref 9–39)
BILIRUB SERPL-MCNC: 0.7 MG/DL (ref 0–1.2)
BNP SERPL-MCNC: 720 PG/ML (ref 0–99)
BUN SERPL-MCNC: 114 MG/DL (ref 6–23)
CALCIUM SERPL-MCNC: 9.4 MG/DL (ref 8.6–10.3)
CHLORIDE SERPL-SCNC: 106 MMOL/L (ref 98–107)
CO2 SERPL-SCNC: 23 MMOL/L (ref 21–32)
CREAT SERPL-MCNC: 3.59 MG/DL (ref 0.5–1.3)
ERYTHROCYTE [DISTWIDTH] IN BLOOD BY AUTOMATED COUNT: 16.9 % (ref 11.5–14.5)
GFR SERPL CREATININE-BSD FRML MDRD: 17 ML/MIN/1.73M*2
GLUCOSE BLD MANUAL STRIP-MCNC: 150 MG/DL (ref 74–99)
GLUCOSE BLD MANUAL STRIP-MCNC: 167 MG/DL (ref 74–99)
GLUCOSE BLD MANUAL STRIP-MCNC: 177 MG/DL (ref 74–99)
GLUCOSE SERPL-MCNC: 298 MG/DL (ref 74–99)
HCT VFR BLD AUTO: 35.7 % (ref 41–52)
HGB BLD-MCNC: 11.1 G/DL (ref 13.5–17.5)
MAGNESIUM SERPL-MCNC: 1.91 MG/DL (ref 1.6–2.4)
MCH RBC QN AUTO: 29 PG (ref 26–34)
MCHC RBC AUTO-ENTMCNC: 31.1 G/DL (ref 32–36)
MCV RBC AUTO: 93 FL (ref 80–100)
NRBC BLD-RTO: 0 /100 WBCS (ref 0–0)
PLATELET # BLD AUTO: 146 X10*3/UL (ref 150–450)
PMV BLD AUTO: 11.5 FL (ref 7.5–11.5)
POTASSIUM SERPL-SCNC: 4.9 MMOL/L (ref 3.5–5.3)
PROT SERPL-MCNC: 6.6 G/DL (ref 6.4–8.2)
RBC # BLD AUTO: 3.83 X10*6/UL (ref 4.5–5.9)
SODIUM SERPL-SCNC: 139 MMOL/L (ref 136–145)
WBC # BLD AUTO: 6.3 X10*3/UL (ref 4.4–11.3)

## 2023-10-21 PROCEDURE — 2500000004 HC RX 250 GENERAL PHARMACY W/ HCPCS (ALT 636 FOR OP/ED)

## 2023-10-21 PROCEDURE — 99223 1ST HOSP IP/OBS HIGH 75: CPT

## 2023-10-21 PROCEDURE — 2500000001 HC RX 250 WO HCPCS SELF ADMINISTERED DRUGS (ALT 637 FOR MEDICARE OP)

## 2023-10-21 PROCEDURE — 36415 COLL VENOUS BLD VENIPUNCTURE: CPT

## 2023-10-21 PROCEDURE — 71045 X-RAY EXAM CHEST 1 VIEW: CPT | Mod: FOREIGN READ | Performed by: RADIOLOGY

## 2023-10-21 PROCEDURE — 85027 COMPLETE CBC AUTOMATED: CPT

## 2023-10-21 PROCEDURE — 82947 ASSAY GLUCOSE BLOOD QUANT: CPT | Mod: 59

## 2023-10-21 PROCEDURE — 80053 COMPREHEN METABOLIC PANEL: CPT

## 2023-10-21 PROCEDURE — 83880 ASSAY OF NATRIURETIC PEPTIDE: CPT

## 2023-10-21 RX ORDER — CHOLECALCIFEROL (VITAMIN D3) 25 MCG
1000 TABLET ORAL DAILY
COMMUNITY

## 2023-10-21 RX ORDER — FUROSEMIDE 40 MG/1
20 TABLET ORAL DAILY
Qty: 30 TABLET | Refills: 0 | Status: SHIPPED | OUTPATIENT
Start: 2023-10-21

## 2023-10-21 RX ORDER — AMLODIPINE BESYLATE 10 MG/1
10 TABLET ORAL DAILY
Status: DISCONTINUED | OUTPATIENT
Start: 2023-10-21 | End: 2023-10-21 | Stop reason: HOSPADM

## 2023-10-21 RX ORDER — POLYETHYLENE GLYCOL 3350 17 G/17G
17 POWDER, FOR SOLUTION ORAL DAILY PRN
Status: DISCONTINUED | OUTPATIENT
Start: 2023-10-21 | End: 2023-10-21 | Stop reason: HOSPADM

## 2023-10-21 RX ORDER — DEXTROSE 50 % IN WATER (D50W) INTRAVENOUS SYRINGE
25
Status: DISCONTINUED | OUTPATIENT
Start: 2023-10-21 | End: 2023-10-21 | Stop reason: HOSPADM

## 2023-10-21 RX ORDER — ASPIRIN 81 MG/1
81 TABLET ORAL DAILY
Status: DISCONTINUED | OUTPATIENT
Start: 2023-10-21 | End: 2023-10-21 | Stop reason: HOSPADM

## 2023-10-21 RX ORDER — CHOLECALCIFEROL (VITAMIN D3) 25 MCG
1000 TABLET ORAL DAILY
Status: DISCONTINUED | OUTPATIENT
Start: 2023-10-21 | End: 2023-10-21 | Stop reason: HOSPADM

## 2023-10-21 RX ORDER — POLYETHYLENE GLYCOL 3350 17 G/17G
17 POWDER, FOR SOLUTION ORAL DAILY
Status: DISCONTINUED | OUTPATIENT
Start: 2023-10-21 | End: 2023-10-21 | Stop reason: HOSPADM

## 2023-10-21 RX ORDER — METOPROLOL TARTRATE 50 MG/1
50 TABLET ORAL 2 TIMES DAILY
COMMUNITY
End: 2024-04-17 | Stop reason: ENTERED-IN-ERROR

## 2023-10-21 RX ORDER — CYCLOSPORINE 25 MG/1
75 CAPSULE, LIQUID FILLED ORAL
Status: DISCONTINUED | OUTPATIENT
Start: 2023-10-21 | End: 2023-10-21 | Stop reason: HOSPADM

## 2023-10-21 RX ORDER — MYCOPHENOLATE MOFETIL 250 MG/1
750 CAPSULE ORAL 2 TIMES DAILY
Status: DISCONTINUED | OUTPATIENT
Start: 2023-10-21 | End: 2023-10-21 | Stop reason: HOSPADM

## 2023-10-21 RX ORDER — TAMSULOSIN HYDROCHLORIDE 0.4 MG/1
0.4 CAPSULE ORAL DAILY
Status: DISCONTINUED | OUTPATIENT
Start: 2023-10-21 | End: 2023-10-21 | Stop reason: HOSPADM

## 2023-10-21 RX ORDER — DEXTROSE MONOHYDRATE 100 MG/ML
0.3 INJECTION, SOLUTION INTRAVENOUS ONCE AS NEEDED
Status: DISCONTINUED | OUTPATIENT
Start: 2023-10-21 | End: 2023-10-21 | Stop reason: HOSPADM

## 2023-10-21 RX ORDER — INSULIN LISPRO 100 [IU]/ML
0-10 INJECTION, SOLUTION INTRAVENOUS; SUBCUTANEOUS
Status: DISCONTINUED | OUTPATIENT
Start: 2023-10-21 | End: 2023-10-21 | Stop reason: HOSPADM

## 2023-10-21 RX ORDER — MYCOPHENOLATE MOFETIL 250 MG/1
750 CAPSULE ORAL 2 TIMES DAILY
Status: DISCONTINUED | OUTPATIENT
Start: 2023-10-21 | End: 2023-10-21

## 2023-10-21 RX ORDER — PANTOPRAZOLE SODIUM 40 MG/1
40 TABLET, DELAYED RELEASE ORAL NIGHTLY
Status: DISCONTINUED | OUTPATIENT
Start: 2023-10-21 | End: 2023-10-21 | Stop reason: HOSPADM

## 2023-10-21 RX ORDER — CARVEDILOL 6.25 MG/1
6.25 TABLET ORAL 2 TIMES DAILY
Status: DISCONTINUED | OUTPATIENT
Start: 2023-10-21 | End: 2023-10-21 | Stop reason: HOSPADM

## 2023-10-21 RX ORDER — PREDNISONE 10 MG/1
5 TABLET ORAL DAILY
Status: DISCONTINUED | OUTPATIENT
Start: 2023-10-21 | End: 2023-10-21 | Stop reason: HOSPADM

## 2023-10-21 RX ORDER — CYCLOSPORINE 25 MG/1
75 CAPSULE, GELATIN COATED ORAL 2 TIMES DAILY
Status: DISCONTINUED | OUTPATIENT
Start: 2023-10-21 | End: 2023-10-21

## 2023-10-21 RX ORDER — MYCOPHENOLATE MOFETIL 250 MG/1
500 CAPSULE ORAL 2 TIMES DAILY
Status: DISCONTINUED | OUTPATIENT
Start: 2023-10-21 | End: 2023-10-21

## 2023-10-21 RX ORDER — GABAPENTIN 300 MG/1
600 CAPSULE ORAL 3 TIMES DAILY
Status: DISCONTINUED | OUTPATIENT
Start: 2023-10-21 | End: 2023-10-21 | Stop reason: HOSPADM

## 2023-10-21 RX ADMIN — Medication 1000 UNITS: at 13:58

## 2023-10-21 RX ADMIN — APIXABAN 2.5 MG: 5 TABLET, FILM COATED ORAL at 02:39

## 2023-10-21 RX ADMIN — APIXABAN 2.5 MG: 5 TABLET, FILM COATED ORAL at 13:58

## 2023-10-21 RX ADMIN — PREDNISONE 5 MG: 10 TABLET ORAL at 10:42

## 2023-10-21 RX ADMIN — ASPIRIN 81 MG: 81 TABLET, COATED ORAL at 13:58

## 2023-10-21 RX ADMIN — METOPROLOL TARTRATE 75 MG: 25 TABLET, FILM COATED ORAL at 13:57

## 2023-10-21 RX ADMIN — GABAPENTIN 600 MG: 300 CAPSULE ORAL at 10:42

## 2023-10-21 RX ADMIN — TAMSULOSIN HYDROCHLORIDE 0.4 MG: 0.4 CAPSULE ORAL at 13:57

## 2023-10-21 RX ADMIN — CYCLOSPORINE 75 MG: 25 CAPSULE, LIQUID FILLED ORAL at 06:19

## 2023-10-21 RX ADMIN — CARVEDILOL 6.25 MG: 6.25 TABLET, FILM COATED ORAL at 05:29

## 2023-10-21 RX ADMIN — MYCOPHENOLATE MOFETIL 750 MG: 250 CAPSULE ORAL at 06:17

## 2023-10-21 ASSESSMENT — ENCOUNTER SYMPTOMS
ARTHRALGIAS: 0
DIARRHEA: 0
SHORTNESS OF BREATH: 0
CONFUSION: 0
SORE THROAT: 0
COLOR CHANGE: 0
HEADACHES: 0
VOMITING: 0
DIZZINESS: 0
ABDOMINAL PAIN: 0
CONSTIPATION: 0
FEVER: 0
TREMORS: 0
NAUSEA: 0
WHEEZING: 0

## 2023-10-21 NOTE — ED PROVIDER NOTES
HPI   Chief Complaint   Patient presents with    abnormal lab results     Nov. 8th 2015 second kidney transplant done, , sent over by  main post transplant       Patient is a 72-year-old with renal transplant 2015, hyperlipidemia, diabetes, atrial fibrillation on Eliquis who presents emergency department for abnormal labs.  Patient's transplant team is at [facility].  He had routine lab work 3 days ago which showed an elevated but stable creatinine, but his BUN had increased from around 60 up to 110.  His transplant  called him today with the results instructed to go to the emergency department for evaluation.  Patient denies any sick symptoms such as fevers, shortness of breath, cough, dysuria, abdominal pain, nausea or vomiting.  He states that he has been eating eating the same amount.  He does report that he has been on Lasix for the last month, which is a fairly new medication for him.  He reports compliance with his rejection medications.  Patient still makes urine.  He has required intermittent dialysis, most recently several weeks ago when he was admitted to the hospital.  He still has a right arm fistula.      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     FSGS (focal segmental glomerulosclerosis)     Past Surgical History:   Procedure Laterality Date    CATARACT EXTRACTION  09/29/2015    Cataract Surgery    OTHER SURGICAL HISTORY  09/29/2015    Cadaver Donor Nephrectomy    OTHER SURGICAL HISTORY  09/29/2015    Incisional Hernia Repair With Implantation Of Mesh    OTHER SURGICAL HISTORY  09/29/2015    Peritoneal  Dialysis Catheter    OTHER SURGICAL HISTORY  09/29/2015    Brain Surgery    TONSILLECTOMY  09/29/2015    Tonsillectomy With Adenoidectomy     Family History   Problem Relation Name Age of Onset    Alcohol abuse Father      Alcohol abuse Sister       Social History     Tobacco Use    Smoking status: Former     Types: Cigarettes    Smokeless tobacco: Never   Substance Use Topics    Alcohol use: Never    Drug use: Never       Physical Exam   ED Triage Vitals [10/20/23 1936]   Temp Heart Rate Resp BP   36 °C (96.8 °F) (!) 126 18 108/73      SpO2 Temp src Heart Rate Source Patient Position   100 % -- -- --      BP Location FiO2 (%)     -- --       Physical Exam  Vitals and nursing note reviewed.   Constitutional:       General: He is not in acute distress.     Appearance: He is well-developed.   HENT:      Head: Normocephalic and atraumatic.      Right Ear: External ear normal.      Left Ear: External ear normal.      Nose: Nose normal.   Eyes:      General: No scleral icterus.     Conjunctiva/sclera: Conjunctivae normal.      Pupils: Pupils are equal, round, and reactive to light.   Cardiovascular:      Rate and Rhythm: Normal rate. Rhythm irregular.      Heart sounds: No murmur heard.  Pulmonary:      Effort: Pulmonary effort is normal. No respiratory distress.      Breath sounds: Normal breath sounds.   Abdominal:      Palpations: Abdomen is soft.      Tenderness: There is no abdominal tenderness.   Musculoskeletal:         General: No swelling.      Cervical back: Neck supple. No rigidity.      Comments: AV fistula of the right upper extremity.   Skin:     General: Skin is warm and dry.   Neurological:      General: No focal deficit present.      Mental Status: He is alert.   Psychiatric:         Mood and Affect: Mood normal.         ED Course & MDM   Diagnoses as of 10/21/23 0145   Acute renal failure superimposed on chronic kidney disease, unspecified acute renal failure type, unspecified CKD stage (CMS/[facility])    Uremia       Medical Decision Making  Patient is a 72-year-old male with history of renal transplant 2015 who presents to the emergency department for elevated BUN.  On arrival, patient is afebrile, awake, alert, no acute distress.  He is noted tachycardic at 126, but after being placed on the monitor, his heart rate is irregularly irregular on the monitor with rates in the 90s in the 100s.  No hypotension, no respiratory distress.  Patient reports that he is asymptomatic and has not noticed any change in his normal state of health over the last several days.  On chart review, his BUN and creatinine from 3 days ago was 118 and 3.71, respectively.  Repeat lab work is obtained today.  Urinalysis also obtained.    EKG at 2109 on 10/20/2023 as interpreted by myself and attending physician: Ventricular rate 107, QRS 78, QTc 461.  Atrial fibrillation.  Tachycardia.  No acute injury pattern.  No peaked T waves.    Results for orders placed or performed during the hospital encounter of 10/20/23  -Blood Culture:   Specimen: Peripheral Venipuncture; Blood culture       Result                      Value             Ref Range           Blood Culture                                                 Loaded on Instrument - Culture in progress  -Blood Culture:   Specimen: Peripheral Venipuncture; Blood culture       Result                      Value             Ref Range           Blood Culture                                                 Loaded on Instrument - Culture in progress  -CBC and Auto Differential:        Result                      Value             Ref Range           WBC                         6.5               4.4 - 11.3 x*       nRBC                        0.0               0.0 - 0.0 /1*       RBC                         4.25 (L)          4.50 - 5.90 *       Hemoglobin                  12.2 (L)          13.5 - 17.5 *       Hematocrit                  39.8 (L)          41.0 - 52.0 %       MCV                          94                80 - 100 fL         MCH                         28.7              26.0 - 34.0 *       MCHC                        30.7 (L)          32.0 - 36.0 *       RDW                         17.1 (H)          11.5 - 14.5 %       Platelets                   165               150 - 450 x1*       MPV                         11.6 (H)          7.5 - 11.5 fL       Neutrophils %               77.4              40.0 - 80.0 %       Immature Granulocytes *     0.2               0.0 - 0.9 %         Lymphocytes %               12.0              13.0 - 44.0 %       Monocytes %                 6.9               2.0 - 10.0 %        Eosinophils %               2.9               0.0 - 6.0 %         Basophils %                 0.6               0.0 - 2.0 %         Neutrophils Absolute        5.05              1.60 - 5.50 *       Immature Granulocytes *     0.01              0.00 - 0.50 *       Lymphocytes Absolute        0.78 (L)          0.80 - 3.00 *       Monocytes Absolute          0.45              0.05 - 0.80 *       Eosinophils Absolute        0.19              0.00 - 0.40 *       Basophils Absolute          0.04              0.00 - 0.10 *  -Comprehensive metabolic panel:        Result                      Value             Ref Range           Glucose                     161 (H)           74 - 99 mg/dL       Sodium                      139               136 - 145 mm*       Potassium                   5.0               3.5 - 5.3 mm*       Chloride                    106               98 - 107 mmo*       Bicarbonate                 19 (L)            21 - 32 mmol*       Anion Gap                   19                10 - 20 mmol*       Urea Nitrogen               119 (HH)          6 - 23 mg/dL        Creatinine                  3.71 (H)          0.50 - 1.30 *       eGFR                        17 (L)            >60 mL/min/1*       Calcium                     9.7               8.6 - 10.3 m*       Albumin                      4.6               3.4 - 5.0 g/*       Alkaline Phosphatase        51                33 - 136 U/L        Total Protein               7.4               6.4 - 8.2 g/*       AST                         13                9 - 39 U/L          Bilirubin, Total            0.9               0.0 - 1.2 mg*       ALT                         11                10 - 52 U/L    -Urinalysis with Reflex Microscopic and Culture:        Result                      Value             Ref Range           Color, Urine                Yellow            Straw, Yellow       Appearance, Urine           Hazy (N)          Clear               Specific Gravity, Urine     1.011             1.005 - 1.035       pH, Urine                   5.0               5.0, 5.5, 6.*       Protein, Urine              30 (1+) (N)       NEGATIVE mg/*       Glucose, Urine              NEGATIVE          NEGATIVE mg/*       Blood, Urine                                  NEGATIVE        MODERATE (2+) (A)       Ketones, Urine              NEGATIVE          NEGATIVE mg/*       Bilirubin, Urine            NEGATIVE          NEGATIVE            Urobilinogen, Urine         <2.0              <2.0 mg/dL          Nitrite, Urine              NEGATIVE          NEGATIVE            Leukocyte Esterase, Ur*                       NEGATIVE        MODERATE (2+) (A)  -Extra Urine Gray Tube:        Result                      Value             Ref Range           Extra Tube                                                    Hold for add-ons.  -Lactate:        Result                      Value             Ref Range           Lactate                     1.7               0.4 - 2.0 mm*  -Microscopic Only, Urine:        Result                      Value             Ref Range           WBC, Urine                  21-50 (A)         1-5, NONE /H*       RBC, Urine                  1-2               NONE, 1-2, 3*       Squamous Epithelial Ce*     1-9 (SPARSE)      Reference ra*       Budding Yeast,  Urine        PRESENT (A)       NONE /HPF      -B-type natriuretic peptide:        Result                      Value             Ref Range           BNP                         720 (H)           0 - 99 pg/mL   -POCT GLUCOSE:        Result                      Value             Ref Range           POCT Glucose                177 (H)           74 - 99 mg/dL     Patient's renal function is essentially unchanged.  However, his potassium is increasing, up to 5.0.  He is uremic, but does not have any signs of chest pain or hemodynamic stability that may suggest a pericarditis.  He is also alert and oriented without any signs of encephalopathy.  Moderate leukocyte esterase with some budding yeast, but no bacteria.  Patient is asymptomatic.  Small amount of protein.  No leukocytosis.    We discussed the patient's findings and presentation with the renal transplant team at [facility].  We spoke with Dr. Mccormick.  She is concerned that the transplant is failing, but there is nothing to be done at this time.  Patient will need dialysis, but does not have any needs for the transplant team services at this time.  If he is able to receive dialysis at [place], he can stay at this hospital.    We discussed the patient's case with Dr. Moon, nephrologist.  She agrees with Dr. Mccormick's assessment and recommendations.  Patient does not need urgent dialysis and can wait until the morning.  She recommends obtaining a chest x-ray and BNP to evaluate for volume status, and if low, can provide gentle hydration.    XR chest 1 view   Final Result    No acute pulmonary abnormality.    Signed by Hugh Haney MD      Chest x-ray does not show any signs of pleural effusion or pulmonary edema.  His BNP is elevated at 720.  However, there is no radiographic evidence of fluid overload, he does not have any lower extremity edema, is not complaining of any shortness of breath, and has actually been taking and compliant with Lasix.  Clinically, he is  not in fluid overload.  He is given a liter of IV lactated Ringer's.    Findings and plan are discussed with the patient.  He is agreeable with this plan.  Report is given to teaching service.  Patient is admitted to the medicine service.    Gael Fontenot DO, PGY 3  Emergency Medicine Resident    Amount and/or Complexity of Data Reviewed  External Data Reviewed: labs.  Labs: ordered.  Radiology: ordered and independent interpretation performed.  ECG/medicine tests: ordered and independent interpretation performed.        Procedure  Procedures     Gael Fontenot DO  Resident  10/21/23 0450

## 2023-10-21 NOTE — H&P
History Of Present Illness  Keith Lazar is a 72 y.o. male presenting with chief complaint of abnormal lab results concerning for renal transplant rejection/failure.  PMHx significant for ESRD secondary to FSGS s/p  donor renal transplant on 2005 at [facility] with graft failure having second transplant of  donor on 2015 on immunosuppression/antirejection medication, A-fib on Eliquis, posttransplant diabetes mellitus who presents with abnormal labs with an elevated BUN of 118 as of 10/17/2023 noting prior labs reveal BUN of 67 as of 2023 and with these findings patient was advised to present to [facility].  ED physician contacted patient's transplant team downw who advised the patient to be admitted to [facility] for dialysis with no need to transfer to [facility].  Nephrology consulted from the ED who agreed with the recommendations of [facility] transplant team.  Patient seen and examined at bedside in the ED denies any chest pain, shortness of breath, abdominal pain, nausea, vomiting, diarrhea, fevers or chills.    Review of clinical record shows patient follows with Dr. Parrish (Nephrology) and was last seen 2023 for routine posttransplant follow-up visit in the setting of recent hospitalization for acute kidney injury that required hemodialysis in the setting of suspected rhabdomyolysis secondary to statin adverse reaction.  It appears that hemodialysis continues twice weekly Tuesday and Saturday with the plan of stopping hemodialysis once his serum creatinine falls below 2.9 which is documented as baseline per Dr. Parrish.  Also patient has history of recent DVT of the right lower extremity patient resumes on Eliquis renally dosed.    ED course:  Vitals: Afebrile, , RR 18, /73, SPO2 100% on room air  Labs: CMP reveals serum glucose of 161, BUN of 119, creatinine of 3.71 (baseline approximately 2.9) CBC shows no evidence of leukocytosis with a WBC of 6.5, hemoglobin 12.2,  platelets 165.  UA shows evidence of moderate leukocyte esterase.  Imaging: CXR shows no acute pulmonary abnormality  Hospital course: Respiratory patient was found to be afebrile hemodynamic stable with no constitutional symptoms other than being sent over by his care team for abnormal labs concerning for worsening kidney function concerning in the setting of kidney transplant on immunosuppression, ED physician consulted transplant team downtown who recommended admission for dialysis not requiring transfer, nephrology consulted from the ED who recommended admission for dialysis in the morning as at this point Case was discussed with teaching service who agreed to admit patient to [facility].    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    Surgical History  He has a past surgical history that includes Other surgical history (09/29/2015); Other surgical history (09/29/2015); Cataract extraction (09/29/2015); Other surgical history (09/29/2015); Tonsillectomy (09/29/2015); and Other surgical history (09/29/2015).     Social History  He reports that he has quit smoking. His smoking use included cigarettes. He has never used smokeless tobacco. He reports that he does not drink alcohol and does not use drugs.    Family History  Family History   Problem Relation Name Age of Onset    Alcohol abuse Father      Alcohol abuse Sister          Allergies  Statins-hmg-coa reductase inhibitors    Review of Systems   Constitutional:  Negative for fever.   HENT:  Negative for sore throat.    Eyes:  Negative for visual disturbance.   Respiratory:  Negative for shortness of breath and wheezing.    Cardiovascular:  Negative for chest pain.   Gastrointestinal:  Negative for abdominal pain, constipation,  diarrhea, nausea and vomiting.   Musculoskeletal:  Negative for arthralgias.   Skin:  Negative for color change.   Neurological:  Negative for dizziness, tremors and headaches.   Psychiatric/Behavioral:  Negative for confusion.         Physical Exam  Vitals and nursing note reviewed.   Constitutional:       Appearance: Normal appearance. He is normal weight. He is not toxic-appearing.   HENT:      Head: Normocephalic and atraumatic.      Nose: Nose normal.      Mouth/Throat:      Mouth: Mucous membranes are moist.      Pharynx: Oropharynx is clear.   Eyes:      Extraocular Movements: Extraocular movements intact.      Conjunctiva/sclera: Conjunctivae normal.      Pupils: Pupils are equal, round, and reactive to light.   Cardiovascular:      Rate and Rhythm: Normal rate and regular rhythm.      Pulses: Normal pulses.      Heart sounds: Normal heart sounds. No murmur heard.     No gallop.   Pulmonary:      Effort: Pulmonary effort is normal. No respiratory distress.      Breath sounds: Normal breath sounds. No wheezing, rhonchi or rales.   Abdominal:      General: Abdomen is flat. Bowel sounds are normal. There is no distension.      Palpations: Abdomen is soft.      Tenderness: There is no abdominal tenderness. There is no guarding or rebound.   Musculoskeletal:         General: Normal range of motion.      Cervical back: Normal range of motion.      Right lower leg: No edema.      Left lower leg: No edema.      Comments: AV fistula to the right upper extremity with palpable thrill   Skin:     General: Skin is warm and dry.      Capillary Refill: Capillary refill takes less than 2 seconds.   Neurological:      General: No focal deficit present.      Mental Status: He is alert and oriented to person, place, and time. Mental status is at baseline.      Sensory: No sensory deficit.   Psychiatric:         Mood and Affect: Mood normal.         Behavior: Behavior normal.         Thought Content: Thought content normal.           Last Recorded Vitals  /65   Pulse 108   Temp 36 °C (96.8 °F)   Resp 17   Wt 81.6 kg (180 lb)   SpO2 99%     Relevant Results  Scheduled medications  amLODIPine, 10 mg, oral, Daily  apixaban, 2.5 mg, oral, q12h  carvedilol, 6.25 mg, oral, BID  gabapentin, 600 mg, oral, TID  metoprolol tartrate, 75 mg, oral, q12h  predniSONE, 5 mg, oral, Daily      Continuous medications     PRN medications  Results for orders placed or performed during the hospital encounter of 10/20/23 (from the past 24 hour(s))   Comprehensive metabolic panel   Result Value Ref Range    Glucose 161 (H) 74 - 99 mg/dL    Sodium 139 136 - 145 mmol/L    Potassium 5.0 3.5 - 5.3 mmol/L    Chloride 106 98 - 107 mmol/L    Bicarbonate 19 (L) 21 - 32 mmol/L    Anion Gap 19 10 - 20 mmol/L    Urea Nitrogen 119 (HH) 6 - 23 mg/dL    Creatinine 3.71 (H) 0.50 - 1.30 mg/dL    eGFR 17 (L) >60 mL/min/1.73m*2    Calcium 9.7 8.6 - 10.3 mg/dL    Albumin 4.6 3.4 - 5.0 g/dL    Alkaline Phosphatase 51 33 - 136 U/L    Total Protein 7.4 6.4 - 8.2 g/dL    AST 13 9 - 39 U/L    Bilirubin, Total 0.9 0.0 - 1.2 mg/dL    ALT 11 10 - 52 U/L   CBC and Auto Differential   Result Value Ref Range    WBC 6.5 4.4 - 11.3 x10*3/uL    nRBC 0.0 0.0 - 0.0 /100 WBCs    RBC 4.25 (L) 4.50 - 5.90 x10*6/uL    Hemoglobin 12.2 (L) 13.5 - 17.5 g/dL    Hematocrit 39.8 (L) 41.0 - 52.0 %    MCV 94 80 - 100 fL    MCH 28.7 26.0 - 34.0 pg    MCHC 30.7 (L) 32.0 - 36.0 g/dL    RDW 17.1 (H) 11.5 - 14.5 %    Platelets 165 150 - 450 x10*3/uL    MPV 11.6 (H) 7.5 - 11.5 fL    Neutrophils % 77.4 40.0 - 80.0 %    Immature Granulocytes %, Automated 0.2 0.0 - 0.9 %    Lymphocytes % 12.0 13.0 - 44.0 %    Monocytes % 6.9 2.0 - 10.0 %    Eosinophils % 2.9 0.0 - 6.0 %    Basophils % 0.6 0.0 - 2.0 %    Neutrophils Absolute 5.05 1.60 - 5.50 x10*3/uL    Immature Granulocytes Absolute, Automated 0.01 0.00 - 0.50 x10*3/uL    Lymphocytes Absolute 0.78 (L) 0.80 - 3.00 x10*3/uL    Monocytes Absolute 0.45 0.05  - 0.80 x10*3/uL    Eosinophils Absolute 0.19 0.00 - 0.40 x10*3/uL    Basophils Absolute 0.04 0.00 - 0.10 x10*3/uL   Lactate   Result Value Ref Range    Lactate 1.7 0.4 - 2.0 mmol/L   Urinalysis with Reflex Microscopic and Culture   Result Value Ref Range    Color, Urine Yellow Straw, Yellow    Appearance, Urine Hazy (N) Clear    Specific Gravity, Urine 1.011 1.005 - 1.035    pH, Urine 5.0 5.0, 5.5, 6.0, 6.5, 7.0, 7.5, 8.0    Protein, Urine 30 (1+) (N) NEGATIVE mg/dL    Glucose, Urine NEGATIVE NEGATIVE mg/dL    Blood, Urine MODERATE (2+) (A) NEGATIVE    Ketones, Urine NEGATIVE NEGATIVE mg/dL    Bilirubin, Urine NEGATIVE NEGATIVE    Urobilinogen, Urine <2.0 <2.0 mg/dL    Nitrite, Urine NEGATIVE NEGATIVE    Leukocyte Esterase, Urine MODERATE (2+) (A) NEGATIVE   Extra Urine Gray Tube   Result Value Ref Range    Extra Tube Hold for add-ons.    Microscopic Only, Urine   Result Value Ref Range    WBC, Urine 21-50 (A) 1-5, NONE /HPF    RBC, Urine 1-2 NONE, 1-2, 3-5 /HPF    Squamous Epithelial Cells, Urine 1-9 (SPARSE) Reference range not established. /HPF    Budding Yeast, Urine PRESENT (A) NONE /HPF    XR chest 1 view    Result Date: 10/21/2023  STUDY: Chest Radiograph;  10/21/2023 at 12:27 a.m. INDICATION: Evaluate for heart failure. COMPARISON: One-view CXR 8/10/2023. ACCESSION NUMBER(S): [id number] ORDERING CLINICIAN: Gael Fontenot TECHNIQUE:  Frontal chest was obtained at 00:26:00 hours. FINDINGS: CARDIOMEDIASTINAL SILHOUETTE: Cardiomediastinal silhouette is normal in size and configuration.  LUNGS: Lungs are clear.  ABDOMEN: No remarkable upper abdominal findings.  BONES: No acute osseous changes.    No acute pulmonary abnormality. Signed by Hugh Haney MD       Assessment/Plan   Principal Problem:    Acute kidney failure, unspecified (CMS/HCC)  Active Problems:    Essential hypertension    Atrial fibrillation with RVR (CMS/HCC)    Benign prostatic hyperplasia with urinary frequency    GERD (gastroesophageal reflux  disease)    Immunosuppression (CMS/[facility])    Kidney transplant status    Poorly controlled diabetes mellitus (CMS/[facility])    Stage 5 chronic kidney disease with transplanted kidney (CMS/[facility])    FSGS (focal segmental glomerulosclerosis)    Acute renal failure superimposed on chronic kidney disease, unspecified acute renal failure type, unspecified CKD stage (CMS/[facility])    72-year-old male presenting with abnormal labs concerning for worsening renal function and uremia with an increase in BUN to 118, relatively stable serum creatinine of 3.7 complicated by known history of renal transplant x2 (2005, 2015) on immunosuppression kidney transplant team consulted from the ED who recommended admission to our facility with nephrology consult for hemodialysis.    #Acute kidney failure complicated by renal transplant on immunosuppression  #H/o ESRD secondary to FSGS s/p renal transplant  #Uremia secondary to above  #H/o of rhabdomyolysis in the setting of statin adverse reaction  -Recent trend of BUN shows uptrend of 60 to 118 of the past month  -Serum creatinine baseline appears to be 2.9 but review of medical record shows worsening renal function over the past 3 months  -Recent hospitalization for ALLIE requiring hemodialysis in the setting of rhabdomyolysis secondary to statin adverse reaction  -We will continue patient's antirejection medication cyclosporine and methylphenidate noting patient has brought medications and has requested to continue pharmacy aware and will dose appropriately  -Nephrology consulted from the ED plan for HD tomorrow, recommendations pending  -We will avoid nephrotoxic agent  -We will resume patient's home medications as appropriate once med reconciliation is completed.  -Renal diet ordered  -We will hold home statin    #Essential hypertension  -We will continue patient's home medications as appropriate  -We will continue to hold patient's amlodipine as recommended by his nephrologist Dr. Parrish    #IDDM  type II  -Serum glucose reveals 161, patient endorses insulin requirement over the past 2 months  -Given patient's endorsed history we will hold his home insulin regimen  -Ordered mild-moderate insulin corrective scale with hypoglycemic protocols in place  -We will titrate insulin as appropriate and if required may order home regimen.  -Accu-Cheks before every meal and bedtime  -Diet will be renal diabetic  -Hemoglobin A1c 6.3 as of 10/17/2023    #A-fib on AC  #History of DVT on AC  #GERD  #BPH  -We will continue patient's home Eliquis 2.5 mg twice daily  -We will continue patient's medications as appropriate once med reconciliation is completed    DVT prophylaxis: Eliquis  Maintenance fluids: None  Consults: Nephrology  Diet: Diabetic renal  PT OT    CODE STATUS: Full    Dispo: Patient admitted for worsening renal function concerning for acute renal failure complicated by history of renal transplant on immunosuppression with history of rejection, requiring hemodialysis requiring 1-2 midnights.    Assessment and plan to be discussed with attending physician MD Corwin Jean, DO PGY2  Internal medicine [facility]    Day team addendum:  In short this is a 72-year-old male with past medical significant for ESRD due to FSGS s/p  donor renal transplant in  at [facility] with graft failure and second transplant in , on immunosuppressants, A-fib on Eliquis, posttransplant DM, who is presenting with abnormal lab values.  Last BUN was 119 which is elevated from prior BUN on  which was 67.  The patient has been on dialysis after a recent hospitalization for rhabdomyolysis secondary to a statin.  Last dialysis was 2-3 weeks ago.  To note the patient did appear to be in atrial fibrillation, but was asymptomatic.  Review of systems today was negative for chest pain, shortness of breath, nausea, vomiting, palpitations, syncope. Nephrology was consulted for dialysis today.  We will continue to  trend renal function, with potential discharge after dialysis pending clinical stability.  The patient has a trip to [place] planned on Tuesday and is adamant that he wants to leave the hospital by then.    Bubab Henriquez, DO PGY-1

## 2023-10-21 NOTE — DISCHARGE SUMMARY
Discharge Diagnosis  Acute kidney failure, unspecified (CMS/HCC)    Issues Requiring Follow-Up  Follow up with PCP in 1 week  Follow up with your nephrologist on Tuesday as planned    Discharge Meds     Your medication list        CHANGE how you take these medications        Instructions Last Dose Given Next Dose Due   furosemide 40 mg tablet  Commonly known as: Lasix  What changed:   how much to take  additional instructions      Take 0.5 tablets (20 mg) by mouth once daily. As needed for shortness of breath or leg swelling              CONTINUE taking these medications        Instructions Last Dose Given Next Dose Due   acetaminophen 325 mg tablet  Commonly known as: Tylenol           amLODIPine 10 mg tablet  Commonly known as: Norvasc           aspirin 81 mg EC tablet           carvedilol 6.25 mg tablet  Commonly known as: Coreg           cholecalciferol 25 MCG (1000 UT) tablet  Commonly known as: Vitamin D-3           cycloSPORINE 25 mg capsule  Commonly known as: SandIMMUNE           cycloSPORINE modified 25 mg capsule  Commonly known as: NEOral           dulaglutide 0.75 mg/0.5 mL pen injector  Commonly known as: Trulicity           Trulicity 1.5 mg/0.5 mL pen injector injection  Generic drug: dulaglutide           Eliquis 2.5 mg tablet  Generic drug: apixaban           gabapentin 300 mg capsule  Commonly known as: Neurontin           insulin NPH (Isophane) 100 unit/mL injection  Commonly known as: HumuLIN N,NovoLIN N           insulin NPH (Isophane) 100 unit/mL injection  Commonly known as: HumuLIN N,NovoLIN N           LYUMJEV KWIKPEN U-100 INSULIN SUBQ           metoprolol tartrate 75 mg tablet  Commonly known as: Lopressor           metoprolol tartrate 50 mg tablet  Commonly known as: Lopressor           mycophenolate 250 mg capsule  Commonly known as: Cellcept           pantoprazole 40 mg EC tablet  Commonly known as: ProtoNix           polyethylene glycol packet  Commonly known as: Glycolax, Miralax            predniSONE 5 mg tablet  Commonly known as: Deltasone           tamsulosin 0.4 mg 24 hr capsule  Commonly known as: Flomax                     Where to Get Your Medications        These medications were sent to [id number] - [place], [place] - [address]  [address] [place] [address]      Phone: [phone]   furosemide 40 mg tablet         Test Results Pending At Discharge  Pending Labs       Order Current Status    Urine Culture In process    Blood Culture Preliminary result    Blood Culture Preliminary result            Hospital Course   Keith Lazar is a 72 y.o. male presenting with chief complaint of abnormal lab results concerning for renal transplant rejection/failure.  PMHx significant for ESRD secondary to FSGS s/p  donor renal transplant on 2005 at [facility] with graft failure having second transplant of  donor on 2015 on immunosuppression/antirejection medication, A-fib on Eliquis, posttransplant diabetes mellitus who presents with abnormal labs with an elevated BUN of 118 as of 10/17/2023 noting prior labs reveal BUN of 67 as of 2023 and with these findings patient was advised to present to [facility].  [facility] for dialysis with no need to transfer to [facility].  Nephrology consulted from the ED who agreed with the recommendations of [facility] transplant team.  Patient seen and examined at bedside in the ED denies any chest pain, shortness of breath, abdominal pain, nausea, vomiting, diarrhea, fevers or chills.     Review of clinical record shows patient follows with Dr. Parrish (Nephrology) and was last seen 2023 for routine posttransplant follow-up visit in the setting of recent hospitalization for acute kidney injury that required hemodialysis in the setting of suspected rhabdomyolysis secondary to statin adverse reaction.  It appears that hemodialysis continues  twice weekly Tuesday and Saturday with the plan of stopping hemodialysis once his serum creatinine falls below 2.9 which is documented as baseline per Dr. Parrish.  Also patient has history of recent DVT of the right lower extremity patient resumes on Eliquis renally dosed.     ED course:  Vitals: Afebrile, , RR 18, /73, SPO2 100% on room air  Labs: CMP reveals serum glucose of 161, BUN of 119, creatinine of 3.71 (baseline approximately 2.9) CBC shows no evidence of leukocytosis with a WBC of 6.5, hemoglobin 12.2, platelets 165.  UA shows evidence of moderate leukocyte esterase.  Imaging: CXR shows no acute pulmonary abnormality  Hospital course: Respiratory patient was found to be afebrile hemodynamic stable with no constitutional symptoms other than being sent over by his care team for abnormal labs concerning for worsening kidney function concerning in the setting of kidney transplant on immunosuppression, ED physician consulted transplant team downtown who recommended admission for dialysis not requiring transfer, nephrology consulted from the ED who recommended admission for dialysis in the morning as at this point Case was discussed with teaching service who agreed to admit patient to [facility].    Hospital course:  Patient was admitted for concern for abnormal labs/renal function. He has significant hx of ESRD s/p renal transplant in the past and is on immunosuppressants. He feels great and denies any complaints. His GFR was 17. He is urinating. His K is stable. He has no need for urgent HD. Dr Moon nephrology evaluated the patient, no need for HD. Repeat labs showed stable GFR at 17. Plan to decrease lasix 40mg to 20mg daily PRN for SOB. He has an appt with his nephrologist on Tuesday. He is told to see PCP in 1 week.   He denies any complaints, vitals are stable. He is cleared by nephro for DC home with close follow up.    Pertinent Physical Exam At Time of Discharge  Physical Exam  Vitals reviewed.    Constitutional:       Appearance: Normal appearance.   HENT:      Head: Normocephalic and atraumatic.      Nose: Nose normal.   Cardiovascular:      Rate and Rhythm: Normal rate and regular rhythm.      Pulses: Normal pulses.      Heart sounds: Normal heart sounds.   Pulmonary:      Effort: Pulmonary effort is normal.      Breath sounds: Normal breath sounds. No rales.   Abdominal:      General: Abdomen is flat. Bowel sounds are normal.      Palpations: Abdomen is soft.      Tenderness: There is no abdominal tenderness.   Skin:     General: Skin is warm and dry.   Neurological:      Mental Status: He is alert and oriented to person, place, and time. Mental status is at baseline.   Psychiatric:         Mood and Affect: Mood normal.         Outpatient Follow-Up  Future Appointments   Date Time Provider Department Center   11/17/2023  9:30 AM Heydi Bell PA-C [id number] [facility]   3/13/2024 10:40 AM TXP KIDNEY PROVIDER [id number] [facility]   5/7/2024  1:30 PM Heydi Bell PA-C [id number] Ankush Gan DO

## 2023-10-21 NOTE — CARE PLAN
Brief nephrology note:    71 yo male with advanced CKD IV Scr 3.7 mg/dl, egfr 17 ml/min.    on out pt labs.   Patient has been taken off dialysis 3 weeks ago by his primary nephrologist Dr. Jackson and he has been voiding frequently since his Lasix dose increased to 40 mg p.o. daily.    Reports frequent urination.   Voided 4 times overnight.   No uremic sx.     Seen and evaluated in the emergency department, patient has no uremic symptoms and he is anxious to be discharged home today.    Plan:  To change lasix to PRN and to reduce the dose to 20 mg po daily.   Repeat BMP and if EGFR remains around 17 mL/min per 1.73 m² patient can be discharged from renal standpoint.    If the patient will be admitted, then Dr. Ryan group to follow, since the patient follows with Dr. Jackson as the primary nephrologist.    Thank you very much for the consult!

## 2023-10-22 ENCOUNTER — HOSPITAL ENCOUNTER (OUTPATIENT)
Dept: CARDIOLOGY | Facility: HOSPITAL | Age: 73
Discharge: HOME | End: 2023-10-22
Payer: COMMERCIAL

## 2023-10-22 LAB
ATRIAL RATE: 150 BPM
BACTERIA UR CULT: NORMAL
Q ONSET: 228 MS
QRS COUNT: 17 BEATS
QRS DURATION: 78 MS
QT INTERVAL: 346 MS
QTC CALCULATION(BAZETT): 461 MS
QTC FREDERICIA: 419 MS
R AXIS: 19 DEGREES
T AXIS: 49 DEGREES
T OFFSET: 401 MS
VENTRICULAR RATE: 107 BPM

## 2023-10-23 ENCOUNTER — TELEPHONE (OUTPATIENT)
Dept: TRANSPLANT | Facility: HOSPITAL | Age: 73
End: 2023-10-23

## 2023-10-25 LAB
BACTERIA BLD CULT: NORMAL
BACTERIA BLD CULT: NORMAL

## 2023-10-30 ENCOUNTER — LAB (OUTPATIENT)
Dept: LAB | Facility: LAB | Age: 73
End: 2023-10-30
Payer: COMMERCIAL

## 2023-10-30 DIAGNOSIS — G45.9 TRANSIENT CEREBRAL ISCHEMIC ATTACK, UNSPECIFIED: ICD-10-CM

## 2023-10-30 DIAGNOSIS — D64.9 ANEMIA, UNSPECIFIED: ICD-10-CM

## 2023-10-30 DIAGNOSIS — N18.9 CHRONIC KIDNEY DISEASE, UNSPECIFIED: ICD-10-CM

## 2023-10-30 DIAGNOSIS — I12.9 HYPERTENSIVE CHRONIC KIDNEY DISEASE WITH STAGE 1 THROUGH STAGE 4 CHRONIC KIDNEY DISEASE, OR UNSPECIFIED CHRONIC KIDNEY DISEASE: ICD-10-CM

## 2023-10-30 DIAGNOSIS — Z94.0 KIDNEY TRANSPLANT STATUS (HHS-HCC): ICD-10-CM

## 2023-10-30 DIAGNOSIS — E11.9 TYPE 2 DIABETES MELLITUS WITHOUT COMPLICATIONS (MULTI): ICD-10-CM

## 2023-10-30 DIAGNOSIS — N18.4 CHRONIC KIDNEY DISEASE, STAGE 4 (SEVERE) (MULTI): Primary | ICD-10-CM

## 2023-10-30 LAB
ALBUMIN SERPL BCP-MCNC: 4.2 G/DL (ref 3.4–5)
ALP SERPL-CCNC: 47 U/L (ref 33–136)
ALT SERPL W P-5'-P-CCNC: 8 U/L (ref 10–52)
ANION GAP SERPL CALC-SCNC: 16 MMOL/L (ref 10–20)
AST SERPL W P-5'-P-CCNC: 10 U/L (ref 9–39)
BASOPHILS # BLD AUTO: 0.05 X10*3/UL (ref 0–0.1)
BASOPHILS NFR BLD AUTO: 0.7 %
BILIRUB SERPL-MCNC: 0.8 MG/DL (ref 0–1.2)
BUN SERPL-MCNC: 64 MG/DL (ref 6–23)
CALCIUM SERPL-MCNC: 9.8 MG/DL (ref 8.6–10.3)
CHLORIDE SERPL-SCNC: 114 MMOL/L (ref 98–107)
CO2 SERPL-SCNC: 18 MMOL/L (ref 21–32)
CREAT SERPL-MCNC: 2.77 MG/DL (ref 0.5–1.3)
EOSINOPHIL # BLD AUTO: 0.36 X10*3/UL (ref 0–0.4)
EOSINOPHIL NFR BLD AUTO: 4.7 %
ERYTHROCYTE [DISTWIDTH] IN BLOOD BY AUTOMATED COUNT: 16.4 % (ref 11.5–14.5)
GFR SERPL CREATININE-BSD FRML MDRD: 24 ML/MIN/1.73M*2
GLUCOSE SERPL-MCNC: 139 MG/DL (ref 74–99)
HCT VFR BLD AUTO: 36.6 % (ref 41–52)
HGB BLD-MCNC: 11.3 G/DL (ref 13.5–17.5)
IMM GRANULOCYTES # BLD AUTO: 0.01 X10*3/UL (ref 0–0.5)
IMM GRANULOCYTES NFR BLD AUTO: 0.1 % (ref 0–0.9)
LYMPHOCYTES # BLD AUTO: 1.1 X10*3/UL (ref 0.8–3)
LYMPHOCYTES NFR BLD AUTO: 14.5 %
MCH RBC QN AUTO: 28.9 PG (ref 26–34)
MCHC RBC AUTO-ENTMCNC: 30.9 G/DL (ref 32–36)
MCV RBC AUTO: 94 FL (ref 80–100)
MONOCYTES # BLD AUTO: 0.73 X10*3/UL (ref 0.05–0.8)
MONOCYTES NFR BLD AUTO: 9.6 %
NEUTROPHILS # BLD AUTO: 5.33 X10*3/UL (ref 1.6–5.5)
NEUTROPHILS NFR BLD AUTO: 70.4 %
NRBC BLD-RTO: 0 /100 WBCS (ref 0–0)
PLATELET # BLD AUTO: 156 X10*3/UL (ref 150–450)
PMV BLD AUTO: 12 FL (ref 7.5–11.5)
POTASSIUM SERPL-SCNC: 4.9 MMOL/L (ref 3.5–5.3)
PROT SERPL-MCNC: 6.4 G/DL (ref 6.4–8.2)
RBC # BLD AUTO: 3.91 X10*6/UL (ref 4.5–5.9)
SODIUM SERPL-SCNC: 143 MMOL/L (ref 136–145)
WBC # BLD AUTO: 7.6 X10*3/UL (ref 4.4–11.3)

## 2023-10-30 PROCEDURE — 80053 COMPREHEN METABOLIC PANEL: CPT

## 2023-10-30 PROCEDURE — 36415 COLL VENOUS BLD VENIPUNCTURE: CPT

## 2023-10-30 PROCEDURE — 85025 COMPLETE CBC W/AUTO DIFF WBC: CPT

## 2023-11-16 DIAGNOSIS — N40.1 BENIGN PROSTATIC HYPERPLASIA WITH URINARY FREQUENCY: Primary | Chronic | ICD-10-CM

## 2023-11-16 DIAGNOSIS — R35.0 BENIGN PROSTATIC HYPERPLASIA WITH URINARY FREQUENCY: Primary | Chronic | ICD-10-CM

## 2023-11-17 ENCOUNTER — TELEMEDICINE (OUTPATIENT)
Dept: ENDOCRINOLOGY | Facility: CLINIC | Age: 73
End: 2023-11-17
Payer: COMMERCIAL

## 2023-11-17 DIAGNOSIS — E13.9 PTDM (POST-TRANSPLANT DIABETES MELLITUS) (MULTI): Primary | ICD-10-CM

## 2023-11-17 PROCEDURE — 99214 OFFICE O/P EST MOD 30 MIN: CPT | Performed by: PHYSICIAN ASSISTANT

## 2023-11-17 RX ORDER — DAPAGLIFLOZIN 10 MG/1
10 TABLET, FILM COATED ORAL DAILY
Qty: 30 TABLET | Refills: 11 | OUTPATIENT
Start: 2023-11-17 | End: 2024-04-17

## 2023-11-17 NOTE — PROGRESS NOTES
Subjective   Keith Lazar is a 72 y.o. male who presents for follow-up of Type 2 diabetes mellitus. The initial diagnosis of diabetes was made  several years ago . The patient does not have a known family history of diabetes.    Known complications due to diabetes included peripheral vascular disease and chronic kidney disease    Cardiovascular risk factors include advanced age (older than 55 for men, 65 for women), diabetes mellitus, and male gender. The patient is not on an ACE inhibitor or angiotensin II receptor blocker.  The patient has not been previously hospitalized due to diabetic ketoacidosis.     Current symptoms/problems include hyperglycemia and hypoglycemia . His clinical course has fluctuated.     Current diabetes regimen is as follows: Intensive insulin injection program: Insulin dosage review with Keith suggested compliance all of the time. Pt will often choose to hold insulin when euglycemic for concern of subsequent hypoglycemia    Pre-breakfast NPH 10 units   Pre-lunch Lyumjev sliding scale   Pre-dinner Lyumjev sliding scale   Bedtime NPH 8 units   Insulin injections are given by patient.   Rotation of sites for injection: abdominal wall and arm(s)     The patient is currently checking the blood glucose 5+ times per day.    Patient is using: continuous glucose monitor with honorio 2 reader not connected to internet for remote provider review    Glucouse has been ranging      Hypoglycemia frequency: several times weekly  Hypoglycemia awareness: Yes     Exercise: intermittently   Meal panning: He is using avoidance of concentrated sweets.    Patient was educated on risk/benefit of SGLT2 therapy as it benefits his CKD and diabetes.  SGLT2i tx may not be utilized safely unless the following conditions are met:  1) pt is eating and drinking by mouth with a total daily carb intake exceeding 60g of CHO  2) pt is urinating independently of urinary catheters  3) pt can ambulate freely to the  restroom in order to maintain personal hygiene  4) no current concern for UTI/genital or inguinal candidiasis  5) no plans for NPO within the next 48-72hr      Review of Systems   Constitutional:  Positive for fatigue.   All other systems reviewed and are negative.      Objective   There were no vitals taken for this visit.  Physical Exam  Constitutional:       Comments: Patient sounds well on the phone   Pulmonary:      Effort: Pulmonary effort is normal.   Neurological:      Mental Status: He is alert.   Psychiatric:         Mood and Affect: Mood normal.         Thought Content: Thought content normal.         Judgment: Judgment normal.         Lab Review  Glucose (mg/dL)   Date Value   10/30/2023 139 (H)   10/21/2023 298 (H)   10/20/2023 161 (H)     Hemoglobin A1C (%)   Date Value   10/17/2023 6.3 (H)   04/20/2023 8.3 (A)   10/19/2022 8.5 (H)   09/14/2022 9.0 (H)   09/11/2022 8.7 (H)     Bicarbonate (mmol/L)   Date Value   10/30/2023 18 (L)   10/21/2023 23   10/20/2023 19 (L)     Urea Nitrogen (mg/dL)   Date Value   10/30/2023 64 (H)   10/21/2023 114 (HH)   10/20/2023 119 (HH)     Creatinine (mg/dL)   Date Value   10/30/2023 2.77 (H)   10/21/2023 3.59 (H)   10/20/2023 3.71 (H)       Health Maintenance:   Foot Exam: Due for update  Eye Exam: Due for update  Lipid Panel: Up-to-date, though above goal of less than 70 LDL  Urine Albumin: Due for update, ordered today    Assessment/Plan     Type 2 diabetes mellitus, is at A1C target of <6.5% though is inaccurate in setting of CKD with Creatinine 2.77     RX changes:  continue trulicity 1.5mg once weekly, start farxiga 10mg every moring, stop all insulin when you start farxiga      Education:  interpretation of lab results and complications of diabetes mellitus  Follow up: I recommend diabetes care be 4 months.    Farxiga pill may not be utilized safely unless the following conditions are met:  1) pt is eating and drinking by mouth with a total daily carb intake  exceeding 60g of CHO  2) pt is urinating independently of urinary catheters  3) pt can ambulate freely to the restroom in order to maintain personal hygiene  4) no current concern for UTI/genital or inguinal candidiasis  5) no plans for NPO within the next 48-72hr

## 2023-11-20 RX ORDER — TAMSULOSIN HYDROCHLORIDE 0.4 MG/1
0.4 CAPSULE ORAL 2 TIMES DAILY
Qty: 120 CAPSULE | Refills: 0 | Status: SHIPPED | OUTPATIENT
Start: 2023-11-20 | End: 2024-04-15 | Stop reason: SDUPTHER

## 2023-11-21 NOTE — PROGRESS NOTES
Pt notes cramping/gurgling gut since starting eliquis in late August for DVT (seen on US). He called to report an in increase in these ssx since starting farxiga.  He reports no blood in stool, with some blood on TP when wiping. Diarrhea is improved today after 2-3 days of this concern.     We discussed that he may take Tums OTC for cramping/gassy pain and I will reach out to transplant pharmacist to re-review med list with farxiga and tums included.

## 2023-11-27 ASSESSMENT — ENCOUNTER SYMPTOMS: FATIGUE: 1

## 2024-03-13 ENCOUNTER — APPOINTMENT (OUTPATIENT)
Dept: TRANSPLANT | Facility: HOSPITAL | Age: 74
End: 2024-03-13
Payer: COMMERCIAL

## 2024-03-13 ENCOUNTER — APPOINTMENT (OUTPATIENT)
Dept: ENDOCRINOLOGY | Facility: CLINIC | Age: 74
End: 2024-03-13
Payer: COMMERCIAL

## 2024-03-28 ENCOUNTER — TELEPHONE (OUTPATIENT)
Dept: CARDIOLOGY | Facility: HOSPITAL | Age: 74
End: 2024-03-28
Payer: COMMERCIAL

## 2024-03-28 NOTE — TELEPHONE ENCOUNTER
I am returning call to patient regarding stopping plavix. Pt will need follow up testing to include ADY(transesophgeal ECHO) to determine device surveillance. Order was placed. Ppt called , unable to leave voice message will try again tomorrow.

## 2024-04-09 DIAGNOSIS — G45.9 TRANSIENT CEREBRAL ISCHEMIC ATTACK, UNSPECIFIED: ICD-10-CM

## 2024-04-09 DIAGNOSIS — Q76.49 OTHER CONGENITAL MALFORMATIONS OF SPINE, NOT ASSOCIATED WITH SCOLIOSIS: ICD-10-CM

## 2024-04-09 DIAGNOSIS — E03.9 HYPOTHYROIDISM, UNSPECIFIED: Primary | ICD-10-CM

## 2024-04-09 DIAGNOSIS — I12.9 HYPERTENSIVE CHRONIC KIDNEY DISEASE WITH STAGE 1 THROUGH STAGE 4 CHRONIC KIDNEY DISEASE, OR UNSPECIFIED CHRONIC KIDNEY DISEASE: ICD-10-CM

## 2024-04-09 DIAGNOSIS — E78.5 HYPERLIPIDEMIA, UNSPECIFIED: ICD-10-CM

## 2024-04-09 DIAGNOSIS — K21.9 GASTRO-ESOPHAGEAL REFLUX DISEASE WITHOUT ESOPHAGITIS: ICD-10-CM

## 2024-04-09 DIAGNOSIS — I10 ESSENTIAL (PRIMARY) HYPERTENSION: ICD-10-CM

## 2024-04-09 DIAGNOSIS — Z94.0 KIDNEY TRANSPLANT STATUS (HHS-HCC): Primary | ICD-10-CM

## 2024-04-09 DIAGNOSIS — N18.4 CHRONIC KIDNEY DISEASE, STAGE 4 (SEVERE) (MULTI): ICD-10-CM

## 2024-04-09 DIAGNOSIS — E11.9 TYPE 2 DIABETES MELLITUS WITHOUT COMPLICATIONS (MULTI): ICD-10-CM

## 2024-04-10 ENCOUNTER — LAB (OUTPATIENT)
Dept: LAB | Facility: LAB | Age: 74
End: 2024-04-10
Payer: COMMERCIAL

## 2024-04-10 DIAGNOSIS — E03.9 HYPOTHYROIDISM, UNSPECIFIED: ICD-10-CM

## 2024-04-10 DIAGNOSIS — I10 ESSENTIAL (PRIMARY) HYPERTENSION: ICD-10-CM

## 2024-04-10 DIAGNOSIS — K21.9 GASTRO-ESOPHAGEAL REFLUX DISEASE WITHOUT ESOPHAGITIS: ICD-10-CM

## 2024-04-10 DIAGNOSIS — I12.9 HYPERTENSIVE CHRONIC KIDNEY DISEASE WITH STAGE 1 THROUGH STAGE 4 CHRONIC KIDNEY DISEASE, OR UNSPECIFIED CHRONIC KIDNEY DISEASE: ICD-10-CM

## 2024-04-10 DIAGNOSIS — E13.9 PTDM (POST-TRANSPLANT DIABETES MELLITUS) (MULTI): ICD-10-CM

## 2024-04-10 DIAGNOSIS — G45.9 TRANSIENT CEREBRAL ISCHEMIC ATTACK, UNSPECIFIED: ICD-10-CM

## 2024-04-10 DIAGNOSIS — Q76.49 OTHER CONGENITAL MALFORMATIONS OF SPINE, NOT ASSOCIATED WITH SCOLIOSIS: ICD-10-CM

## 2024-04-10 DIAGNOSIS — E78.5 HYPERLIPIDEMIA, UNSPECIFIED: ICD-10-CM

## 2024-04-10 DIAGNOSIS — Z94.0 KIDNEY TRANSPLANT STATUS (HHS-HCC): ICD-10-CM

## 2024-04-10 DIAGNOSIS — N18.4 CHRONIC KIDNEY DISEASE, STAGE 4 (SEVERE) (MULTI): ICD-10-CM

## 2024-04-10 DIAGNOSIS — E11.9 TYPE 2 DIABETES MELLITUS WITHOUT COMPLICATIONS (MULTI): ICD-10-CM

## 2024-04-10 LAB
ALBUMIN SERPL BCP-MCNC: 4.1 G/DL (ref 3.4–5)
ALP SERPL-CCNC: 53 U/L (ref 33–136)
ALT SERPL W P-5'-P-CCNC: 6 U/L (ref 10–52)
ANION GAP SERPL CALC-SCNC: 16 MMOL/L (ref 10–20)
APPEARANCE UR: ABNORMAL
AST SERPL W P-5'-P-CCNC: 9 U/L (ref 9–39)
BASOPHILS # BLD AUTO: 0.04 X10*3/UL (ref 0–0.1)
BASOPHILS NFR BLD AUTO: 0.7 %
BILIRUB SERPL-MCNC: 1.1 MG/DL (ref 0–1.2)
BILIRUB UR STRIP.AUTO-MCNC: NEGATIVE MG/DL
BUN SERPL-MCNC: 49 MG/DL (ref 6–23)
CALCIUM SERPL-MCNC: 9.6 MG/DL (ref 8.6–10.3)
CHLORIDE SERPL-SCNC: 115 MMOL/L (ref 98–107)
CHOLEST SERPL-MCNC: 224 MG/DL (ref 0–199)
CHOLESTEROL/HDL RATIO: 6.1
CO2 SERPL-SCNC: 16 MMOL/L (ref 21–32)
COLOR UR: YELLOW
CREAT SERPL-MCNC: 2.39 MG/DL (ref 0.5–1.3)
CREAT UR-MCNC: 67 MG/DL (ref 20–370)
CRP SERPL-MCNC: 0.25 MG/DL
EGFRCR SERPLBLD CKD-EPI 2021: 28 ML/MIN/1.73M*2
EOSINOPHIL # BLD AUTO: 0.26 X10*3/UL (ref 0–0.4)
EOSINOPHIL NFR BLD AUTO: 4.8 %
ERYTHROCYTE [DISTWIDTH] IN BLOOD BY AUTOMATED COUNT: 15.4 % (ref 11.5–14.5)
GLUCOSE SERPL-MCNC: 121 MG/DL (ref 74–99)
GLUCOSE UR STRIP.AUTO-MCNC: NEGATIVE MG/DL
HCT VFR BLD AUTO: 34.5 % (ref 41–52)
HDLC SERPL-MCNC: 36.7 MG/DL
HGB BLD-MCNC: 10.7 G/DL (ref 13.5–17.5)
IMM GRANULOCYTES # BLD AUTO: 0.01 X10*3/UL (ref 0–0.5)
IMM GRANULOCYTES NFR BLD AUTO: 0.2 % (ref 0–0.9)
KETONES UR STRIP.AUTO-MCNC: NEGATIVE MG/DL
LDLC SERPL CALC-MCNC: 157 MG/DL
LEUKOCYTE ESTERASE UR QL STRIP.AUTO: ABNORMAL
LYMPHOCYTES # BLD AUTO: 0.85 X10*3/UL (ref 0.8–3)
LYMPHOCYTES NFR BLD AUTO: 15.8 %
MCH RBC QN AUTO: 29.4 PG (ref 26–34)
MCHC RBC AUTO-ENTMCNC: 31 G/DL (ref 32–36)
MCV RBC AUTO: 95 FL (ref 80–100)
MICROALBUMIN UR-MCNC: 689.1 MG/L
MICROALBUMIN/CREAT UR: 1028.5 UG/MG CREAT
MONOCYTES # BLD AUTO: 0.57 X10*3/UL (ref 0.05–0.8)
MONOCYTES NFR BLD AUTO: 10.6 %
NEUTROPHILS # BLD AUTO: 3.66 X10*3/UL (ref 1.6–5.5)
NEUTROPHILS NFR BLD AUTO: 67.9 %
NITRITE UR QL STRIP.AUTO: NEGATIVE
NON HDL CHOLESTEROL: 187 MG/DL (ref 0–149)
NRBC BLD-RTO: 0 /100 WBCS (ref 0–0)
PH UR STRIP.AUTO: 5 [PH]
PLATELET # BLD AUTO: 172 X10*3/UL (ref 150–450)
POTASSIUM SERPL-SCNC: 4.1 MMOL/L (ref 3.5–5.3)
PROT SERPL-MCNC: 6.2 G/DL (ref 6.4–8.2)
PROT UR STRIP.AUTO-MCNC: ABNORMAL MG/DL
RBC # BLD AUTO: 3.64 X10*6/UL (ref 4.5–5.9)
RBC # UR STRIP.AUTO: ABNORMAL /UL
RBC #/AREA URNS AUTO: >20 /HPF
SODIUM SERPL-SCNC: 143 MMOL/L (ref 136–145)
SP GR UR STRIP.AUTO: 1.01
SQUAMOUS #/AREA URNS AUTO: ABNORMAL /HPF
TRIGL SERPL-MCNC: 153 MG/DL (ref 0–149)
TSH SERPL-ACNC: 1.23 MIU/L (ref 0.44–3.98)
URATE SERPL-MCNC: 8.6 MG/DL (ref 4–7.5)
UROBILINOGEN UR STRIP.AUTO-MCNC: <2 MG/DL
VLDL: 31 MG/DL (ref 0–40)
WBC # BLD AUTO: 5.4 X10*3/UL (ref 4.4–11.3)
WBC #/AREA URNS AUTO: >50 /HPF
WBC CLUMPS #/AREA URNS AUTO: ABNORMAL /HPF
YEAST BUDDING #/AREA UR COMP ASSIST: PRESENT /HPF

## 2024-04-10 PROCEDURE — 36415 COLL VENOUS BLD VENIPUNCTURE: CPT

## 2024-04-10 PROCEDURE — 82043 UR ALBUMIN QUANTITATIVE: CPT

## 2024-04-10 PROCEDURE — 80061 LIPID PANEL: CPT

## 2024-04-10 PROCEDURE — 82570 ASSAY OF URINE CREATININE: CPT

## 2024-04-10 PROCEDURE — 85025 COMPLETE CBC W/AUTO DIFF WBC: CPT

## 2024-04-10 PROCEDURE — 84443 ASSAY THYROID STIM HORMONE: CPT

## 2024-04-10 PROCEDURE — 81001 URINALYSIS AUTO W/SCOPE: CPT

## 2024-04-10 PROCEDURE — 80053 COMPREHEN METABOLIC PANEL: CPT

## 2024-04-10 PROCEDURE — 84550 ASSAY OF BLOOD/URIC ACID: CPT

## 2024-04-10 PROCEDURE — 86140 C-REACTIVE PROTEIN: CPT

## 2024-04-15 DIAGNOSIS — N40.1 BENIGN PROSTATIC HYPERPLASIA WITH URINARY FREQUENCY: Chronic | ICD-10-CM

## 2024-04-15 DIAGNOSIS — R35.0 BENIGN PROSTATIC HYPERPLASIA WITH URINARY FREQUENCY: Chronic | ICD-10-CM

## 2024-04-16 ENCOUNTER — TELEPHONE (OUTPATIENT)
Dept: CARDIOLOGY | Facility: HOSPITAL | Age: 74
End: 2024-04-16

## 2024-04-16 RX ORDER — TAMSULOSIN HYDROCHLORIDE 0.4 MG/1
0.4 CAPSULE ORAL 2 TIMES DAILY
Qty: 120 CAPSULE | Refills: 0 | Status: SHIPPED | OUTPATIENT
Start: 2024-04-16

## 2024-04-17 ENCOUNTER — HOSPITAL ENCOUNTER (OUTPATIENT)
Dept: CARDIOLOGY | Facility: HOSPITAL | Age: 74
Discharge: HOME | End: 2024-04-17
Payer: COMMERCIAL

## 2024-04-17 VITALS
HEART RATE: 99 BPM | DIASTOLIC BLOOD PRESSURE: 89 MMHG | SYSTOLIC BLOOD PRESSURE: 161 MMHG | RESPIRATION RATE: 16 BRPM | OXYGEN SATURATION: 100 %

## 2024-04-17 DIAGNOSIS — I48.0 PAROXYSMAL ATRIAL FIBRILLATION (MULTI): ICD-10-CM

## 2024-04-17 LAB
ANION GAP SERPL CALC-SCNC: 15 MMOL/L (ref 10–20)
APTT PPP: 28 SECONDS (ref 27–38)
BUN SERPL-MCNC: 46 MG/DL (ref 6–23)
CALCIUM SERPL-MCNC: 9.5 MG/DL (ref 8.6–10.3)
CHLORIDE SERPL-SCNC: 112 MMOL/L (ref 98–107)
CO2 SERPL-SCNC: 20 MMOL/L (ref 21–32)
CREAT SERPL-MCNC: 2.26 MG/DL (ref 0.5–1.3)
EGFRCR SERPLBLD CKD-EPI 2021: 30 ML/MIN/1.73M*2
ERYTHROCYTE [DISTWIDTH] IN BLOOD BY AUTOMATED COUNT: 14.7 % (ref 11.5–14.5)
GLUCOSE SERPL-MCNC: 99 MG/DL (ref 74–99)
HCT VFR BLD AUTO: 34.8 % (ref 41–52)
HGB BLD-MCNC: 11 G/DL (ref 13.5–17.5)
INR PPP: 1.1 (ref 0.9–1.1)
MCH RBC QN AUTO: 29.6 PG (ref 26–34)
MCHC RBC AUTO-ENTMCNC: 31.6 G/DL (ref 32–36)
MCV RBC AUTO: 94 FL (ref 80–100)
NRBC BLD-RTO: 0 /100 WBCS (ref 0–0)
PLATELET # BLD AUTO: 156 X10*3/UL (ref 150–450)
POTASSIUM SERPL-SCNC: 4.1 MMOL/L (ref 3.5–5.3)
PROTHROMBIN TIME: 12.9 SECONDS (ref 9.8–12.8)
RBC # BLD AUTO: 3.72 X10*6/UL (ref 4.5–5.9)
SODIUM SERPL-SCNC: 143 MMOL/L (ref 136–145)
WBC # BLD AUTO: 5.6 X10*3/UL (ref 4.4–11.3)

## 2024-04-17 PROCEDURE — 80048 BASIC METABOLIC PNL TOTAL CA: CPT | Performed by: NURSE PRACTITIONER

## 2024-04-17 PROCEDURE — 93320 DOPPLER ECHO COMPLETE: CPT | Performed by: INTERNAL MEDICINE

## 2024-04-17 PROCEDURE — 93325 DOPPLER ECHO COLOR FLOW MAPG: CPT | Performed by: INTERNAL MEDICINE

## 2024-04-17 PROCEDURE — 2500000004 HC RX 250 GENERAL PHARMACY W/ HCPCS (ALT 636 FOR OP/ED): Performed by: INTERNAL MEDICINE

## 2024-04-17 PROCEDURE — 93010 ELECTROCARDIOGRAM REPORT: CPT | Performed by: INTERNAL MEDICINE

## 2024-04-17 PROCEDURE — 93320 DOPPLER ECHO COMPLETE: CPT

## 2024-04-17 PROCEDURE — 2500000004 HC RX 250 GENERAL PHARMACY W/ HCPCS (ALT 636 FOR OP/ED): Performed by: NURSE PRACTITIONER

## 2024-04-17 PROCEDURE — 36415 COLL VENOUS BLD VENIPUNCTURE: CPT | Performed by: NURSE PRACTITIONER

## 2024-04-17 PROCEDURE — 85027 COMPLETE CBC AUTOMATED: CPT | Performed by: NURSE PRACTITIONER

## 2024-04-17 PROCEDURE — 99223 1ST HOSP IP/OBS HIGH 75: CPT | Performed by: NURSE PRACTITIONER

## 2024-04-17 PROCEDURE — 93312 ECHO TRANSESOPHAGEAL: CPT | Performed by: INTERNAL MEDICINE

## 2024-04-17 PROCEDURE — 99152 MOD SED SAME PHYS/QHP 5/>YRS: CPT | Performed by: INTERNAL MEDICINE

## 2024-04-17 PROCEDURE — 7100000010 HC PHASE TWO TIME - EACH INCREMENTAL 1 MINUTE: Performed by: INTERNAL MEDICINE

## 2024-04-17 PROCEDURE — 85730 THROMBOPLASTIN TIME PARTIAL: CPT | Mod: 91 | Performed by: NURSE PRACTITIONER

## 2024-04-17 PROCEDURE — 93005 ELECTROCARDIOGRAM TRACING: CPT | Mod: 59

## 2024-04-17 PROCEDURE — 7100000009 HC PHASE TWO TIME - INITIAL BASE CHARGE: Performed by: INTERNAL MEDICINE

## 2024-04-17 PROCEDURE — 2500000005 HC RX 250 GENERAL PHARMACY W/O HCPCS: Performed by: INTERNAL MEDICINE

## 2024-04-17 RX ORDER — MIDAZOLAM HYDROCHLORIDE 1 MG/ML
INJECTION, SOLUTION INTRAMUSCULAR; INTRAVENOUS AS NEEDED
Status: DISCONTINUED | OUTPATIENT
Start: 2024-04-17 | End: 2024-04-18 | Stop reason: HOSPADM

## 2024-04-17 RX ORDER — FENTANYL CITRATE 50 UG/ML
INJECTION, SOLUTION INTRAMUSCULAR; INTRAVENOUS AS NEEDED
Status: DISCONTINUED | OUTPATIENT
Start: 2024-04-17 | End: 2024-04-18 | Stop reason: HOSPADM

## 2024-04-17 RX ORDER — METHOCARBAMOL 500 MG/1
500 TABLET, FILM COATED ORAL EVERY 8 HOURS PRN
COMMUNITY

## 2024-04-17 RX ORDER — LIDOCAINE HYDROCHLORIDE 20 MG/ML
JELLY TOPICAL AS NEEDED
Status: DISCONTINUED | OUTPATIENT
Start: 2024-04-17 | End: 2024-04-18 | Stop reason: HOSPADM

## 2024-04-17 RX ORDER — METOPROLOL SUCCINATE 50 MG/1
50 TABLET, EXTENDED RELEASE ORAL 2 TIMES DAILY
COMMUNITY

## 2024-04-17 RX ORDER — SODIUM BICARBONATE 650 MG/1
0.5 TABLET ORAL 2 TIMES DAILY
COMMUNITY

## 2024-04-17 RX ORDER — FINASTERIDE 5 MG/1
5 TABLET, FILM COATED ORAL NIGHTLY
COMMUNITY

## 2024-04-17 RX ORDER — SODIUM CHLORIDE 9 MG/ML
20 INJECTION, SOLUTION INTRAVENOUS CONTINUOUS
Status: DISCONTINUED | OUTPATIENT
Start: 2024-04-17 | End: 2024-04-17

## 2024-04-17 RX ORDER — GABAPENTIN 100 MG/1
2 CAPSULE ORAL 2 TIMES DAILY
COMMUNITY

## 2024-04-17 RX ADMIN — LIDOCAINE HYDROCHLORIDE 1 APPLICATION: 20 JELLY TOPICAL at 14:31

## 2024-04-17 RX ADMIN — MIDAZOLAM 1 MG: 1 INJECTION INTRAMUSCULAR; INTRAVENOUS at 14:48

## 2024-04-17 RX ADMIN — BENZOCAINE, BUTAMBEN, AND TETRACAINE HYDROCHLORIDE 1 SPRAY: .028; .004; .004 AEROSOL, SPRAY TOPICAL at 14:30

## 2024-04-17 RX ADMIN — SODIUM CHLORIDE 20 ML/HR: 9 INJECTION, SOLUTION INTRAVENOUS at 11:29

## 2024-04-17 RX ADMIN — FENTANYL CITRATE 50 MCG: 50 INJECTION, SOLUTION INTRAMUSCULAR; INTRAVENOUS at 14:46

## 2024-04-17 RX ADMIN — BENZOCAINE, BUTAMBEN, AND TETRACAINE HYDROCHLORIDE 1 SPRAY: .028; .004; .004 AEROSOL, SPRAY TOPICAL at 14:29

## 2024-04-17 RX ADMIN — Medication 3 L/MIN: at 14:26

## 2024-04-17 RX ADMIN — MIDAZOLAM 2 MG: 1 INJECTION INTRAMUSCULAR; INTRAVENOUS at 14:46

## 2024-04-17 ASSESSMENT — ENCOUNTER SYMPTOMS
CARDIOVASCULAR NEGATIVE: 1
SHORTNESS OF BREATH: 0
LIGHT-HEADEDNESS: 0
EYES NEGATIVE: 1
WEAKNESS: 1
FATIGUE: 1
PSYCHIATRIC NEGATIVE: 1
ENDOCRINE NEGATIVE: 1
DIZZINESS: 0
PALPITATIONS: 0
GASTROINTESTINAL NEGATIVE: 1
ALLERGIC/IMMUNOLOGIC NEGATIVE: 1
RESPIRATORY NEGATIVE: 1
CHEST TIGHTNESS: 0

## 2024-04-17 ASSESSMENT — COLUMBIA-SUICIDE SEVERITY RATING SCALE - C-SSRS
2. HAVE YOU ACTUALLY HAD ANY THOUGHTS OF KILLING YOURSELF?: NO
1. IN THE PAST MONTH, HAVE YOU WISHED YOU WERE DEAD OR WISHED YOU COULD GO TO SLEEP AND NOT WAKE UP?: NO
6. HAVE YOU EVER DONE ANYTHING, STARTED TO DO ANYTHING, OR PREPARED TO DO ANYTHING TO END YOUR LIFE?: NO

## 2024-04-17 ASSESSMENT — PAIN SCALES - GENERAL: PAINLEVEL_OUTOF10: 0 - NO PAIN

## 2024-04-17 ASSESSMENT — PAIN - FUNCTIONAL ASSESSMENT: PAIN_FUNCTIONAL_ASSESSMENT: 0-10

## 2024-04-17 NOTE — NURSING NOTE
Discharge instructions reviewed with patient and wife. He is finishing his box lunch and will call nurse to inform her when ready for w/c for discharge.

## 2024-04-17 NOTE — NURSING NOTE
Patient fully awake, sip of water given to check for return of gag reflex, patient tolerated well. Box lunch provided, IV removed. Will print discharge packet.

## 2024-04-17 NOTE — POST-PROCEDURE NOTE
Physician Transition of Care Summary  Invasive Cardiovascular Lab    Procedure Date: 04/17/24     Pre Procedure Indications:   S/p Watchman device ADY    Post Procedure Diagnosis:   Well-seated Watchman device with no obvious thrombus or residual GLADIS device leakage.    Procedure(s):   Transesophageal echocardiogram    Procedure Findings:   Normal left ventricular systolic function  S/p Watchman device which appears well-seated, with no residual peridevice leak.  No thrombus was visualized on the device.  Mild mitral regurgitation  Moderate tricuspid regurgitation  Negative bubble study with no PFO/ASD  Moderate plaque in descending thoracic aorta  Full report to follow in [facility]    Description of the Procedure:   Transesophageal echocardiogram    Complications:   None    Estimated Blood Loss:   None    Anesthesia: Conscious sedation     any Specimen(s) Removed: None      Electronically signed by: Rufino Spencer MD, 4/17/2024

## 2024-04-17 NOTE — NURSING NOTE
Patient received from cath lab into room 233 S/P ADY, negative study. Patient very talkative upon return to room, instructions given to remain NPO for first hour and after that nurse will check if gag reflex has returned. Patient nodded head in agreement. Call bell within reach. No family at bedside.

## 2024-04-17 NOTE — H&P
History Of Present Illness  Keith Lazar is a 73 y.o. male with past medical history significant for ESRD, s/p remote  donor renal transplant  with graft failure and subsequent renal transplant , on immunosuppressant/antirejection medications and follows regularly with transplant team, HTN, HLD, DM 2, DVT, PAF, maintained on rate control strategy, s/p watchman left atrial appendage closure device placed .  Patient is scheduled today for ADY to evaluate patency of the Watchman device.    Patient denies recent complaints of illness, fevers, chills, sweats or exposure to COVID-19.  Denies complaints of lightheadedness, dizziness, chest pain, shortness of breath or palpitations.  Denies complaints of nausea, vomiting, diarrhea or constipation.  Denies complaints of lower extremity edema or weakness.     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     FSGS (focal segmental glomerulosclerosis)   Paroxysmal Atrial Fibrillation  GI Bleed  DVT  HTN  GERD  Anemia  Multiple Falls    Surgical History  Past Surgical History:   Procedure Laterality Date    CATARACT EXTRACTION  2015    Cataract Surgery    OTHER SURGICAL HISTORY  2015    Cadaver Donor Nephrectomy    OTHER SURGICAL HISTORY  2015    Incisional Hernia Repair With Implantation Of Mesh    OTHER SURGICAL HISTORY  2015    Peritoneal Dialysis Catheter    OTHER SURGICAL HISTORY  2015    Brain Surgery    TONSILLECTOMY  2015    Tonsillectomy With Adenoidectomy   S/P Watchman device     Social History  Remote tobacco use  Denies alcohol use      Family  History  Family History   Problem Relation Name Age of Onset    Alcohol abuse Father      Alcohol abuse Sister       Allergies  Statins-hmg-coa reductase inhibitors    Review of Systems   Constitutional:  Positive for fatigue.   HENT: Negative.     Eyes: Negative.    Respiratory: Negative.  Negative for chest tightness and shortness of breath.    Cardiovascular: Negative.  Negative for palpitations.   Gastrointestinal: Negative.    Endocrine: Negative.    Genitourinary: Negative.    Skin: Negative.    Allergic/Immunologic: Negative.    Neurological:  Positive for weakness. Negative for dizziness and light-headedness.   Psychiatric/Behavioral: Negative.     All other systems reviewed and are negative.    Physical Exam  Vitals reviewed.   Constitutional:       Appearance: Normal appearance.   HENT:      Head: Normocephalic and atraumatic.      Nose: Nose normal.      Mouth/Throat:      Mouth: Mucous membranes are moist.      Pharynx: Oropharynx is clear.   Eyes:      Pupils: Pupils are equal, round, and reactive to light.   Cardiovascular:      Rate and Rhythm: Normal rate and regular rhythm.      Pulses: Normal pulses.      Heart sounds: Normal heart sounds.   Pulmonary:      Effort: Pulmonary effort is normal.      Breath sounds: Normal breath sounds.   Abdominal:      General: Bowel sounds are normal.      Palpations: Abdomen is soft.   Musculoskeletal:         General: Normal range of motion.      Cervical back: Normal range of motion and neck supple.   Skin:     General: Skin is warm and dry.   Neurological:      General: No focal deficit present.      Mental Status: He is alert and oriented to person, place, and time.   Psychiatric:         Mood and Affect: Mood normal.         Behavior: Behavior normal.     Last Recorded Vitals  Vital signs reviewed and are stable    Relevant Results  Labs pending at this time    Assessment/Plan   Paroxysmal atrial fibrillation   -Maintained on rate control strategy.   -S/p  Watchman left atrial appendage closure device placed    -Not on anticoagulation due to history of GI bleed  2.    Status post watchman left atrial appendage closure device placed    -ADY today to evaluate device patency  3.    History of end-stage renal disease   -S/p  donor renal transplant  at [facility] with graft failure and subsequent transplant .   -Currently on immunosuppressive antirejection medication  4.    Diabetes mellitus 2   -Insulin-dependent  5.    History of DVT   -Previously on renally dosed Eliquis 2.5 mg twice daily  6.    Anemia of chronic disease  7.    Benign essential hypertension   -Stable    I spent 75 minutes in the professional and overall care of this patient.      Radha Esteves, APRN-CNP

## 2024-04-18 LAB — RIGHT VENTRICLE PEAK SYSTOLIC PRESSURE: 28 MMHG

## 2024-04-25 LAB
ATRIAL RATE: 288 BPM
Q ONSET: 225 MS
QRS COUNT: 14 BEATS
QRS DURATION: 74 MS
QT INTERVAL: 354 MS
QTC CALCULATION(BAZETT): 433 MS
QTC FREDERICIA: 405 MS
R AXIS: -10 DEGREES
T AXIS: -42 DEGREES
T OFFSET: 402 MS
VENTRICULAR RATE: 90 BPM

## 2024-05-07 ENCOUNTER — OFFICE VISIT (OUTPATIENT)
Dept: ENDOCRINOLOGY | Facility: CLINIC | Age: 74
End: 2024-05-07
Payer: COMMERCIAL

## 2024-05-07 VITALS
HEART RATE: 90 BPM | DIASTOLIC BLOOD PRESSURE: 78 MMHG | BODY MASS INDEX: 22.27 KG/M2 | OXYGEN SATURATION: 98 % | HEIGHT: 74 IN | SYSTOLIC BLOOD PRESSURE: 127 MMHG | WEIGHT: 173.5 LBS

## 2024-05-07 DIAGNOSIS — Z79.4 INSULIN LONG-TERM USE (MULTI): ICD-10-CM

## 2024-05-07 DIAGNOSIS — E13.9 PTDM (POST-TRANSPLANT DIABETES MELLITUS) (MULTI): Primary | ICD-10-CM

## 2024-05-07 PROCEDURE — 3074F SYST BP LT 130 MM HG: CPT | Performed by: PHYSICIAN ASSISTANT

## 2024-05-07 PROCEDURE — 3062F POS MACROALBUMINURIA REV: CPT | Performed by: PHYSICIAN ASSISTANT

## 2024-05-07 PROCEDURE — 3078F DIAST BP <80 MM HG: CPT | Performed by: PHYSICIAN ASSISTANT

## 2024-05-07 PROCEDURE — 1126F AMNT PAIN NOTED NONE PRSNT: CPT | Performed by: PHYSICIAN ASSISTANT

## 2024-05-07 PROCEDURE — 3050F LDL-C >= 130 MG/DL: CPT | Performed by: PHYSICIAN ASSISTANT

## 2024-05-07 PROCEDURE — 99214 OFFICE O/P EST MOD 30 MIN: CPT | Performed by: PHYSICIAN ASSISTANT

## 2024-05-07 PROCEDURE — 1159F MED LIST DOCD IN RCRD: CPT | Performed by: PHYSICIAN ASSISTANT

## 2024-05-07 RX ORDER — ROSUVASTATIN CALCIUM 20 MG/1
20 TABLET, COATED ORAL DAILY
COMMUNITY
Start: 2024-04-15

## 2024-05-07 RX ORDER — CYCLOSPORINE 25 MG/1
CAPSULE, LIQUID FILLED ORAL
COMMUNITY
Start: 2024-04-18

## 2024-05-07 ASSESSMENT — PATIENT HEALTH QUESTIONNAIRE - PHQ9
SUM OF ALL RESPONSES TO PHQ9 QUESTIONS 1 AND 2: 0
1. LITTLE INTEREST OR PLEASURE IN DOING THINGS: NOT AT ALL
2. FEELING DOWN, DEPRESSED OR HOPELESS: NOT AT ALL

## 2024-05-07 ASSESSMENT — COLUMBIA-SUICIDE SEVERITY RATING SCALE - C-SSRS
2. HAVE YOU ACTUALLY HAD ANY THOUGHTS OF KILLING YOURSELF?: NO
6. HAVE YOU EVER DONE ANYTHING, STARTED TO DO ANYTHING, OR PREPARED TO DO ANYTHING TO END YOUR LIFE?: NO
1. IN THE PAST MONTH, HAVE YOU WISHED YOU WERE DEAD OR WISHED YOU COULD GO TO SLEEP AND NOT WAKE UP?: NO

## 2024-05-07 ASSESSMENT — PAIN SCALES - GENERAL: PAINLEVEL: 0-NO PAIN

## 2024-05-07 ASSESSMENT — ENCOUNTER SYMPTOMS
LOSS OF SENSATION IN FEET: 1
FATIGUE: 1
DEPRESSION: 1
OCCASIONAL FEELINGS OF UNSTEADINESS: 1

## 2024-05-07 NOTE — PATIENT INSTRUCTIONS
Type 2 diabetes mellitus, is at A1C target of <6.5% though is inaccurate in setting of CKD with Creatinine 2.77     RX changes: continue trulicity 1.5mg once weekly, continue NPH 7 units in the morning, and 5 units in the evening, I agree with stopping farxiga    Education:  interpretation of lab results and complications of diabetes mellitus  Follow up: I recommend diabetes care be in 4-5 months with new  Endocrinologist.

## 2024-05-07 NOTE — PROGRESS NOTES
"Subjective   Keith Lazar is a 73 y.o. male who presents for follow-up of Type 2 diabetes mellitus. The initial diagnosis of diabetes was made  several years ago . The patient does not have a known family history of diabetes.    Known complications due to diabetes included peripheral vascular disease and chronic kidney disease    Cardiovascular risk factors include advanced age (older than 55 for men, 65 for women), diabetes mellitus, and male gender. The patient is not on an ACE inhibitor or angiotensin II receptor blocker.  The patient has not been previously hospitalized due to diabetic ketoacidosis.     Current symptoms/problems include hyperglycemia and hypoglycemia . His clinical course has fluctuated.     Current diabetes regimen is as follows: Intensive insulin injection program: Insulin dosage review with Keith suggested compliance all of the time. Pt will often choose to hold insulin when euglycemic for concern of subsequent hypoglycemia    Pre-breakfast NPH 7 units   Pre-lunch Lyumjev sliding scale   Pre-dinner Lyumjev sliding scale   Bedtime NPH 5 units   Insulin injections are given by patient.   Rotation of sites for injection: abdominal wall and arm(s)     The patient is currently checking the blood glucose 5+ times per day.    Patient is using: continuous glucose monitor with honorio 2 reader not connected to internet for remote provider review    Glucouse has been ranging      Hypoglycemia frequency: several times weekly  Hypoglycemia awareness: Yes     Exercise: intermittently   Meal panning: He is using avoidance of concentrated sweets.    Review of Systems   Constitutional:  Positive for fatigue.   All other systems reviewed and are negative.      Objective   /78 (BP Location: Left arm, Patient Position: Sitting, BP Cuff Size: Large adult)   Pulse 90   Ht 1.88 m (6' 2\")   Wt 78.7 kg (173 lb 8 oz)   SpO2 98%   BMI 22.28 kg/m²   Physical Exam  Constitutional:       " Appearance: Normal appearance. He is normal weight.   HENT:      Head: Normocephalic.      Mouth/Throat:      Mouth: Mucous membranes are moist.      Pharynx: Oropharynx is clear.   Eyes:      Conjunctiva/sclera: Conjunctivae normal.   Pulmonary:      Effort: Pulmonary effort is normal.   Neurological:      Mental Status: He is alert.   Psychiatric:         Mood and Affect: Mood normal.         Thought Content: Thought content normal.         Judgment: Judgment normal.       Lab Review  Glucose (mg/dL)   Date Value   04/17/2024 99   04/10/2024 121 (H)   10/30/2023 139 (H)     Hemoglobin A1C (%)   Date Value   10/17/2023 6.3 (H)   04/20/2023 8.3 (A)   10/19/2022 8.5 (H)   09/14/2022 9.0 (H)   09/11/2022 8.7 (H)     Bicarbonate (mmol/L)   Date Value   04/17/2024 20 (L)   04/10/2024 16 (L)   10/30/2023 18 (L)     Urea Nitrogen (mg/dL)   Date Value   04/17/2024 46 (H)   04/10/2024 49 (H)   10/30/2023 64 (H)     Creatinine (mg/dL)   Date Value   04/17/2024 2.26 (H)   04/10/2024 2.39 (H)   10/30/2023 2.77 (H)       Health Maintenance:   Foot Exam: Due for update  Eye Exam: Due for update  Lipid Panel: Up-to-date, though above goal of less than 70 LDL  Urine Albumin: Due for update, ordered today    Assessment/Plan     Type 2 diabetes mellitus, is at A1C target of <6.5% though is inaccurate in setting of CKD with Creatinine 2.77     RX changes:  continue trulicity 1.5mg once weekly, continue NPH 7 units in the morning, and 5 units in the evening, I agree with stopping farxiga     Education:  interpretation of lab results and complications of diabetes mellitus  Follow up: I recommend diabetes care be in 4-5 months with new  Endocrinologist.

## 2024-06-23 DIAGNOSIS — R35.0 BENIGN PROSTATIC HYPERPLASIA WITH URINARY FREQUENCY: Chronic | ICD-10-CM

## 2024-06-23 DIAGNOSIS — N40.1 BENIGN PROSTATIC HYPERPLASIA WITH URINARY FREQUENCY: Chronic | ICD-10-CM

## 2024-06-24 ENCOUNTER — HOSPITAL ENCOUNTER (EMERGENCY)
Facility: HOSPITAL | Age: 74
Discharge: HOME | End: 2024-06-24
Attending: STUDENT IN AN ORGANIZED HEALTH CARE EDUCATION/TRAINING PROGRAM
Payer: COMMERCIAL

## 2024-06-24 ENCOUNTER — APPOINTMENT (OUTPATIENT)
Dept: RADIOLOGY | Facility: HOSPITAL | Age: 74
End: 2024-06-24
Payer: COMMERCIAL

## 2024-06-24 VITALS
TEMPERATURE: 97.9 F | RESPIRATION RATE: 16 BRPM | BODY MASS INDEX: 21.3 KG/M2 | SYSTOLIC BLOOD PRESSURE: 153 MMHG | OXYGEN SATURATION: 98 % | DIASTOLIC BLOOD PRESSURE: 84 MMHG | WEIGHT: 166 LBS | HEIGHT: 74 IN | HEART RATE: 90 BPM

## 2024-06-24 DIAGNOSIS — H93.19 TINNITUS, UNSPECIFIED LATERALITY: Primary | ICD-10-CM

## 2024-06-24 PROCEDURE — 70450 CT HEAD/BRAIN W/O DYE: CPT

## 2024-06-24 PROCEDURE — 70450 CT HEAD/BRAIN W/O DYE: CPT | Performed by: RADIOLOGY

## 2024-06-24 PROCEDURE — 99285 EMERGENCY DEPT VISIT HI MDM: CPT | Mod: 25

## 2024-06-24 PROCEDURE — 72125 CT NECK SPINE W/O DYE: CPT

## 2024-06-24 PROCEDURE — 72125 CT NECK SPINE W/O DYE: CPT | Performed by: RADIOLOGY

## 2024-06-24 ASSESSMENT — LIFESTYLE VARIABLES
EVER HAD A DRINK FIRST THING IN THE MORNING TO STEADY YOUR NERVES TO GET RID OF A HANGOVER: NO
EVER FELT BAD OR GUILTY ABOUT YOUR DRINKING: NO
TOTAL SCORE: 0
HAVE PEOPLE ANNOYED YOU BY CRITICIZING YOUR DRINKING: NO
HAVE YOU EVER FELT YOU SHOULD CUT DOWN ON YOUR DRINKING: NO

## 2024-06-24 ASSESSMENT — PAIN - FUNCTIONAL ASSESSMENT: PAIN_FUNCTIONAL_ASSESSMENT: 0-10

## 2024-06-24 ASSESSMENT — PAIN DESCRIPTION - LOCATION: LOCATION: EAR

## 2024-06-24 ASSESSMENT — PAIN SCALES - GENERAL
PAINLEVEL_OUTOF10: 5 - MODERATE PAIN
PAINLEVEL_OUTOF10: 6

## 2024-06-24 ASSESSMENT — PAIN DESCRIPTION - ORIENTATION: ORIENTATION: RIGHT

## 2024-06-24 NOTE — ED PROVIDER NOTES
"This is a 73-year-old male with a past medical history of diabetes, diabetic neuropathy, ESRD status post kidney transplant who presents to the ED from an urgent care due to tinnitus for the past 3 weeks as well as right-sided head pain following a mechanical fall.  He states that approximately 3 weeks ago he was walking with his walker and tripped over a root and fell backwards onto his bottom.  He then hit his head on the grass.  No LOC.  He is on a baby aspirin.  He is not on anticoagulation.  He states that following this he has been having pains to the right side of his head, his right ear as well as rating down the right side of his neck.  He also endorses decreased hearing as well as tinnitus in this right ear.  He denies this ever happening previously.  He denies any visual changes, weakness, paresthesias or areas of decrease sensation.  He states that he does take a baby aspirin, however has never taken more than he is prescribed and only takes 1 tablet/day.      History provided by:  Patient   used: No             Visit Vitals  /70 (BP Location: Left arm, Patient Position: Sitting)   Pulse 87   Temp 36.6 °C (97.9 °F) (Tympanic)   Resp 16   Ht 1.88 m (6' 2\")   Wt 75.3 kg (166 lb)   SpO2 100%   BMI 21.31 kg/m²   Smoking Status Former   BSA 1.98 m²          Physical Exam     Physical exam:   General: Vitals noted, no distress. Afebrile.   EENT: Eyes normal phonation. MMM. Airway patient. PERRL. EOMI. left TM and EAC clear.  Right EAC with a tan colored obstruction present with TM unable to be visualized.  Decreased hearing in the right ear.  Mild tenderness palpation over the right side of the patient's scalp surrounding his ear including the right mastoid.  No pain with mitigation of the right pinna.  Neck: No midline C-spine tenderness palpation.  Right-sided paraspinal C-spine tenderness palpation.  Cardiac: Regular, rate, rhythm. Normal S1 and S2.  No murmurs, gallops, rubs. "   Pulmonary: Good air exchange. Lungs clear bilaterally. No wheezes, rhonchi, rales. No accessory muscle use.   Abdomen: Soft, nonsurgical. Nontender. No peritoneal signs. Normoactive bowel sounds.   Back: No CVA tenderness. No midline tenderness or paraspinal tenderness. No obvious deformity.   Extremities: No peripheral edema.  Full range of motion. Moves all extremities freely. No tenderness throughout extremities.   Skin: No rash. Warm and Dry.   Neuro: No focal neurologic deficits. CN 2-12 grossly intact. Sensation equal bilaterally. No weakness.         Labs Reviewed - No data to display    CT head wo IV contrast   Final Result   No acute intracranial hemorrhage, mass effect or midline shift.        Nonspecific scattered white matter hypodensities favored to represent   sequela of small vessel ischemia.        Cervical spondylosis without acute loss of vertebral body height or   traumatic malalignment.             MACRO:   None.        Signed by: Evan Finkelstein 6/24/2024 10:07 PM   Dictation workstation:   [id number]      CT cervical spine wo IV contrast   Final Result   No acute intracranial hemorrhage, mass effect or midline shift.        Nonspecific scattered white matter hypodensities favored to represent   sequela of small vessel ischemia.        Cervical spondylosis without acute loss of vertebral body height or   traumatic malalignment.             MACRO:   None.        Signed by: Evan Finkelstein 6/24/2024 10:07 PM   Dictation workstation:   [id number]            ED Course & MDM     Medical Decision Making  This is a 73-year-old male with past medical history of diabetes, diabetic neuropathy, kidney transplant on antirejection medications who presents to the ED from an urgent care due to continued tinnitus to the right ear with associated decreased hearing and right-sided head pain as well as neck pain.  Vital stable upon arrival to the ED.  On physical examination patient neurologically intact  without deficits.  Minimally decreased hearing out of the right ear compared to the left.  Inside the right ear there was a tan-colored obstruction similar to the color of the patient's skin.  TM was unable to be visualized.  I did have some concern for possible mass versus cerumen impaction.  Due to patient's recent fall with the symptoms CT head as well as C-spine ordered.  Patient discussed with attending physician.  Patient signed out to oncoming team pending CT scan results and reevaluation.    Amount and/or Complexity of Data Reviewed  Radiology: ordered.         ED Course as of 06/24/24 2210 Mon Jun 24, 2024 2204 Fall, persistent tinnitus, CT head and dispo [AS]      ED Course User Index  [AS] Chani West, DO       Procedures    CYNDEE Candelaria, PALeathaC     Caroline Philip PA-C  06/24/24 2210

## 2024-06-25 NOTE — DISCHARGE INSTRUCTIONS
If you should develop significant fevers, pain when you try to open your mouth, if it becomes too painful to swallow your own saliva, or if you have difficulty breathing, or if you have developing double vision, blurry vision, or pain when you move the eyes, please return to the emergency department immediately for reevaluation. If you have any problems, if this does not improve, if it worsens or you have additional concerns, you may follow-up with the Ear, Nose, and Throat surgeon listed above, or keep the appointment you currently have scheduled

## 2024-07-02 ENCOUNTER — LAB (OUTPATIENT)
Dept: LAB | Facility: LAB | Age: 74
End: 2024-07-02
Payer: COMMERCIAL

## 2024-07-02 DIAGNOSIS — N18.4 CHRONIC KIDNEY DISEASE, STAGE 4 (SEVERE) (MULTI): Primary | ICD-10-CM

## 2024-07-02 DIAGNOSIS — E78.5 HYPERLIPIDEMIA, UNSPECIFIED: ICD-10-CM

## 2024-07-02 DIAGNOSIS — N18.9 CHRONIC KIDNEY DISEASE, UNSPECIFIED: ICD-10-CM

## 2024-07-02 DIAGNOSIS — Z94.0 KIDNEY TRANSPLANT STATUS (HHS-HCC): ICD-10-CM

## 2024-07-02 DIAGNOSIS — I12.9 HYPERTENSIVE CHRONIC KIDNEY DISEASE WITH STAGE 1 THROUGH STAGE 4 CHRONIC KIDNEY DISEASE, OR UNSPECIFIED CHRONIC KIDNEY DISEASE: ICD-10-CM

## 2024-07-02 DIAGNOSIS — D64.9 ANEMIA, UNSPECIFIED: ICD-10-CM

## 2024-07-02 DIAGNOSIS — R94.6 ABNORMAL RESULTS OF THYROID FUNCTION STUDIES: ICD-10-CM

## 2024-07-02 DIAGNOSIS — E11.9 TYPE 2 DIABETES MELLITUS WITHOUT COMPLICATIONS (MULTI): ICD-10-CM

## 2024-07-02 DIAGNOSIS — N40.0 BENIGN PROSTATIC HYPERPLASIA WITHOUT LOWER URINARY TRACT SYMPTOMS: ICD-10-CM

## 2024-07-02 LAB
ALBUMIN SERPL BCP-MCNC: 4.1 G/DL (ref 3.4–5)
ALP SERPL-CCNC: 52 U/L (ref 33–136)
ALT SERPL W P-5'-P-CCNC: 11 U/L (ref 10–52)
ANION GAP SERPL CALC-SCNC: 18 MMOL/L (ref 10–20)
APPEARANCE UR: ABNORMAL
AST SERPL W P-5'-P-CCNC: 14 U/L (ref 9–39)
BASOPHILS # BLD AUTO: 0.03 X10*3/UL (ref 0–0.1)
BASOPHILS NFR BLD AUTO: 0.4 %
BILIRUB SERPL-MCNC: 0.9 MG/DL (ref 0–1.2)
BILIRUB UR STRIP.AUTO-MCNC: NEGATIVE MG/DL
BUN SERPL-MCNC: 77 MG/DL (ref 6–23)
CALCIUM SERPL-MCNC: 9.5 MG/DL (ref 8.6–10.3)
CHLORIDE SERPL-SCNC: 114 MMOL/L (ref 98–107)
CHOLEST SERPL-MCNC: 144 MG/DL (ref 0–199)
CHOLESTEROL/HDL RATIO: 2.9
CO2 SERPL-SCNC: 14 MMOL/L (ref 21–32)
COLOR UR: ABNORMAL
CREAT SERPL-MCNC: 3.34 MG/DL (ref 0.5–1.3)
EGFRCR SERPLBLD CKD-EPI 2021: 19 ML/MIN/1.73M*2
EOSINOPHIL # BLD AUTO: 0.16 X10*3/UL (ref 0–0.4)
EOSINOPHIL NFR BLD AUTO: 2.2 %
ERYTHROCYTE [DISTWIDTH] IN BLOOD BY AUTOMATED COUNT: 15.5 % (ref 11.5–14.5)
GLUCOSE SERPL-MCNC: 175 MG/DL (ref 74–99)
GLUCOSE UR STRIP.AUTO-MCNC: NORMAL MG/DL
HCT VFR BLD AUTO: 34.6 % (ref 41–52)
HDLC SERPL-MCNC: 50.3 MG/DL
HGB BLD-MCNC: 11 G/DL (ref 13.5–17.5)
IMM GRANULOCYTES # BLD AUTO: 0.02 X10*3/UL (ref 0–0.5)
IMM GRANULOCYTES NFR BLD AUTO: 0.3 % (ref 0–0.9)
KETONES UR STRIP.AUTO-MCNC: NEGATIVE MG/DL
LDLC SERPL CALC-MCNC: 51 MG/DL
LEUKOCYTE ESTERASE UR QL STRIP.AUTO: ABNORMAL
LYMPHOCYTES # BLD AUTO: 0.87 X10*3/UL (ref 0.8–3)
LYMPHOCYTES NFR BLD AUTO: 12 %
MCH RBC QN AUTO: 27.9 PG (ref 26–34)
MCHC RBC AUTO-ENTMCNC: 31.8 G/DL (ref 32–36)
MCV RBC AUTO: 88 FL (ref 80–100)
MONOCYTES # BLD AUTO: 0.69 X10*3/UL (ref 0.05–0.8)
MONOCYTES NFR BLD AUTO: 9.5 %
NEUTROPHILS # BLD AUTO: 5.46 X10*3/UL (ref 1.6–5.5)
NEUTROPHILS NFR BLD AUTO: 75.6 %
NITRITE UR QL STRIP.AUTO: NEGATIVE
NON HDL CHOLESTEROL: 94 MG/DL (ref 0–149)
NRBC BLD-RTO: 0 /100 WBCS (ref 0–0)
PH UR STRIP.AUTO: 6.5 [PH]
PLATELET # BLD AUTO: 160 X10*3/UL (ref 150–450)
POTASSIUM SERPL-SCNC: 3.9 MMOL/L (ref 3.5–5.3)
PROT SERPL-MCNC: 6.5 G/DL (ref 6.4–8.2)
PROT UR STRIP.AUTO-MCNC: ABNORMAL MG/DL
PSA SERPL-MCNC: <0.01 NG/ML
RBC # BLD AUTO: 3.94 X10*6/UL (ref 4.5–5.9)
RBC # UR STRIP.AUTO: ABNORMAL /UL
RBC #/AREA URNS AUTO: ABNORMAL /HPF
SODIUM SERPL-SCNC: 142 MMOL/L (ref 136–145)
SP GR UR STRIP.AUTO: 1.01
SQUAMOUS #/AREA URNS AUTO: ABNORMAL /HPF
TRIGL SERPL-MCNC: 215 MG/DL (ref 0–149)
TSH SERPL-ACNC: 1.23 MIU/L (ref 0.44–3.98)
UROBILINOGEN UR STRIP.AUTO-MCNC: NORMAL MG/DL
VLDL: 43 MG/DL (ref 0–40)
WBC # BLD AUTO: 7.2 X10*3/UL (ref 4.4–11.3)
WBC #/AREA URNS AUTO: >50 /HPF
YEAST BUDDING #/AREA UR COMP ASSIST: PRESENT /HPF

## 2024-07-02 PROCEDURE — 84443 ASSAY THYROID STIM HORMONE: CPT

## 2024-07-02 PROCEDURE — 80061 LIPID PANEL: CPT

## 2024-07-02 PROCEDURE — 36415 COLL VENOUS BLD VENIPUNCTURE: CPT

## 2024-07-02 PROCEDURE — 80053 COMPREHEN METABOLIC PANEL: CPT

## 2024-07-02 PROCEDURE — 85025 COMPLETE CBC W/AUTO DIFF WBC: CPT

## 2024-07-02 PROCEDURE — 84153 ASSAY OF PSA TOTAL: CPT

## 2024-07-02 PROCEDURE — 81001 URINALYSIS AUTO W/SCOPE: CPT

## 2024-07-23 ENCOUNTER — OFFICE VISIT (OUTPATIENT)
Dept: GERIATRIC MEDICINE | Age: 74
End: 2024-07-23

## 2024-07-23 DIAGNOSIS — Z79.4 CONTROLLED TYPE 2 DIABETES MELLITUS WITH OTHER DIABETIC KIDNEY COMPLICATION, WITH LONG-TERM CURRENT USE OF INSULIN (HCC): ICD-10-CM

## 2024-07-23 DIAGNOSIS — I48.91 ATRIAL FIBRILLATION, UNSPECIFIED TYPE (HCC): ICD-10-CM

## 2024-07-23 DIAGNOSIS — Z94.0 KIDNEY REPLACED BY TRANSPLANT: ICD-10-CM

## 2024-07-23 DIAGNOSIS — R26.2 DIFFICULTY IN WALKING: ICD-10-CM

## 2024-07-23 DIAGNOSIS — E11.29 CONTROLLED TYPE 2 DIABETES MELLITUS WITH OTHER DIABETIC KIDNEY COMPLICATION, WITH LONG-TERM CURRENT USE OF INSULIN (HCC): ICD-10-CM

## 2024-07-23 DIAGNOSIS — E78.2 MIXED HYPERLIPIDEMIA: ICD-10-CM

## 2024-07-23 DIAGNOSIS — N18.4 STAGE 4 CHRONIC KIDNEY DISEASE (HCC): Primary | ICD-10-CM

## 2024-07-23 DIAGNOSIS — I10 ESSENTIAL HYPERTENSION: ICD-10-CM

## 2024-07-23 DIAGNOSIS — M62.81 MUSCLE WEAKNESS (GENERALIZED): ICD-10-CM

## 2024-07-24 ENCOUNTER — HOSPITAL ENCOUNTER (INPATIENT)
Age: 74
LOS: 7 days | Discharge: NURSING, REHAB OR LONG-TERM CARE | End: 2024-07-31
Attending: INTERNAL MEDICINE | Admitting: INTERNAL MEDICINE
Payer: COMMERCIAL

## 2024-07-24 DIAGNOSIS — R79.9 ELEVATED BUN: Primary | ICD-10-CM

## 2024-07-24 DIAGNOSIS — T86.12 KIDNEY TRANSPLANT FAILURE: ICD-10-CM

## 2024-07-24 DIAGNOSIS — Z94.0 KIDNEY REPLACED BY TRANSPLANT: ICD-10-CM

## 2024-07-24 DIAGNOSIS — N18.9 CHRONIC KIDNEY DISEASE, UNSPECIFIED CKD STAGE: ICD-10-CM

## 2024-07-24 DIAGNOSIS — N30.01 ACUTE CYSTITIS WITH HEMATURIA: ICD-10-CM

## 2024-07-24 PROBLEM — T86.10 COMPLICATION OF TRANSPLANTED KIDNEY: Status: ACTIVE | Noted: 2024-07-24

## 2024-07-24 LAB
ALBUMIN SERPL-MCNC: 3.2 G/DL (ref 3.5–4.6)
ALP SERPL-CCNC: 50 U/L (ref 35–104)
ALT SERPL-CCNC: 100 U/L (ref 0–41)
ANION GAP SERPL CALCULATED.3IONS-SCNC: 20 MEQ/L (ref 9–15)
AST SERPL-CCNC: 125 U/L (ref 0–40)
BACTERIA URNS QL MICRO: NEGATIVE /HPF
BASOPHILS # BLD: 0 K/UL (ref 0–0.2)
BASOPHILS NFR BLD: 0.2 %
BILIRUB SERPL-MCNC: 0.8 MG/DL (ref 0.2–0.7)
BILIRUB UR QL STRIP: NEGATIVE
BUN SERPL-MCNC: 125 MG/DL (ref 8–23)
CALCIUM SERPL-MCNC: 9.1 MG/DL (ref 8.5–9.9)
CHLORIDE SERPL-SCNC: 106 MEQ/L (ref 95–107)
CLARITY UR: ABNORMAL
CO2 SERPL-SCNC: 13 MEQ/L (ref 20–31)
COLOR UR: YELLOW
CREAT SERPL-MCNC: 4.49 MG/DL (ref 0.7–1.2)
EOSINOPHIL # BLD: 0.1 K/UL (ref 0–0.7)
EOSINOPHIL NFR BLD: 0.8 %
EPI CELLS #/AREA URNS AUTO: ABNORMAL /HPF (ref 0–5)
ERYTHROCYTE [DISTWIDTH] IN BLOOD BY AUTOMATED COUNT: 15.9 % (ref 11.5–14.5)
GLOBULIN SER CALC-MCNC: 2.7 G/DL (ref 2.3–3.5)
GLUCOSE SERPL-MCNC: 291 MG/DL (ref 70–99)
GLUCOSE UR STRIP-MCNC: NEGATIVE MG/DL
HCT VFR BLD AUTO: 30.3 % (ref 42–52)
HGB BLD-MCNC: 9.6 G/DL (ref 14–18)
HGB UR QL STRIP: ABNORMAL
HYALINE CASTS #/AREA URNS AUTO: ABNORMAL /HPF (ref 0–5)
KETONES UR STRIP-MCNC: NEGATIVE MG/DL
LEUKOCYTE ESTERASE UR QL STRIP: ABNORMAL
LYMPHOCYTES # BLD: 0.2 K/UL (ref 1–4.8)
LYMPHOCYTES NFR BLD: 2.5 %
MCH RBC QN AUTO: 28.2 PG (ref 27–31.3)
MCHC RBC AUTO-ENTMCNC: 31.7 % (ref 33–37)
MCV RBC AUTO: 88.9 FL (ref 79–92.2)
MONOCYTES # BLD: 0.4 K/UL (ref 0.2–0.8)
MONOCYTES NFR BLD: 3.7 %
NEUTROPHILS # BLD: 8.7 K/UL (ref 1.4–6.5)
NEUTS SEG NFR BLD: 92.4 %
NITRITE UR QL STRIP: NEGATIVE
PH UR STRIP: 5.5 [PH] (ref 5–9)
PLATELET # BLD AUTO: 182 K/UL (ref 130–400)
POTASSIUM SERPL-SCNC: 5.4 MEQ/L (ref 3.4–4.9)
PROT SERPL-MCNC: 5.9 G/DL (ref 6.3–8)
PROT UR STRIP-MCNC: 100 MG/DL
RBC # BLD AUTO: 3.41 M/UL (ref 4.7–6.1)
RBC #/AREA URNS HPF: ABNORMAL /HPF (ref 0–2)
SODIUM SERPL-SCNC: 139 MEQ/L (ref 135–144)
SP GR UR STRIP: 1.01 (ref 1–1.03)
URINE REFLEX TO CULTURE: YES
UROBILINOGEN UR STRIP-ACNC: 0.2 E.U./DL
WBC # BLD AUTO: 9.4 K/UL (ref 4.8–10.8)
WBC #/AREA URNS AUTO: >100 /HPF (ref 0–5)
YEAST URNS QL MICRO: PRESENT /HPF

## 2024-07-24 PROCEDURE — 6370000000 HC RX 637 (ALT 250 FOR IP)

## 2024-07-24 PROCEDURE — 81001 URINALYSIS AUTO W/SCOPE: CPT

## 2024-07-24 PROCEDURE — 87040 BLOOD CULTURE FOR BACTERIA: CPT

## 2024-07-24 PROCEDURE — 87086 URINE CULTURE/COLONY COUNT: CPT

## 2024-07-24 PROCEDURE — 87106 FUNGI IDENTIFICATION YEAST: CPT

## 2024-07-24 PROCEDURE — 2500000003 HC RX 250 WO HCPCS

## 2024-07-24 PROCEDURE — 2580000003 HC RX 258

## 2024-07-24 PROCEDURE — 36415 COLL VENOUS BLD VENIPUNCTURE: CPT

## 2024-07-24 PROCEDURE — 1210000000 HC MED SURG R&B

## 2024-07-24 PROCEDURE — 99285 EMERGENCY DEPT VISIT HI MDM: CPT

## 2024-07-24 PROCEDURE — 96365 THER/PROPH/DIAG IV INF INIT: CPT

## 2024-07-24 PROCEDURE — 80053 COMPREHEN METABOLIC PANEL: CPT

## 2024-07-24 PROCEDURE — 85025 COMPLETE CBC W/AUTO DIFF WBC: CPT

## 2024-07-24 RX ORDER — ACETAMINOPHEN 650 MG/1
650 SUPPOSITORY RECTAL EVERY 6 HOURS PRN
Status: DISCONTINUED | OUTPATIENT
Start: 2024-07-24 | End: 2024-07-31 | Stop reason: HOSPADM

## 2024-07-24 RX ORDER — ONDANSETRON 2 MG/ML
4 INJECTION INTRAMUSCULAR; INTRAVENOUS EVERY 6 HOURS PRN
Status: DISCONTINUED | OUTPATIENT
Start: 2024-07-24 | End: 2024-07-31 | Stop reason: HOSPADM

## 2024-07-24 RX ORDER — ASCORBIC ACID 500 MG
500 TABLET ORAL DAILY
Status: ON HOLD | COMMUNITY

## 2024-07-24 RX ORDER — MIDODRINE HYDROCHLORIDE 5 MG/1
5 TABLET ORAL 3 TIMES DAILY
Status: ON HOLD | COMMUNITY

## 2024-07-24 RX ORDER — INSULIN LISPRO 100 [IU]/ML
0-8 INJECTION, SOLUTION INTRAVENOUS; SUBCUTANEOUS
Status: DISCONTINUED | OUTPATIENT
Start: 2024-07-25 | End: 2024-07-31 | Stop reason: HOSPADM

## 2024-07-24 RX ORDER — ACETAMINOPHEN 325 MG/1
650 TABLET ORAL EVERY 6 HOURS PRN
Status: DISCONTINUED | OUTPATIENT
Start: 2024-07-24 | End: 2024-07-31 | Stop reason: HOSPADM

## 2024-07-24 RX ORDER — METOPROLOL SUCCINATE 50 MG/1
50 TABLET, EXTENDED RELEASE ORAL DAILY
Status: ON HOLD | COMMUNITY

## 2024-07-24 RX ORDER — METOCLOPRAMIDE 5 MG/1
5 TABLET ORAL
Status: ON HOLD | COMMUNITY

## 2024-07-24 RX ORDER — SODIUM CHLORIDE 0.9 % (FLUSH) 0.9 %
5-40 SYRINGE (ML) INJECTION PRN
Status: DISCONTINUED | OUTPATIENT
Start: 2024-07-24 | End: 2024-07-31 | Stop reason: HOSPADM

## 2024-07-24 RX ORDER — ACETAMINOPHEN 500 MG
1000 TABLET ORAL ONCE
Status: COMPLETED | OUTPATIENT
Start: 2024-07-24 | End: 2024-07-24

## 2024-07-24 RX ORDER — SODIUM CHLORIDE 0.9 % (FLUSH) 0.9 %
5-40 SYRINGE (ML) INJECTION EVERY 12 HOURS SCHEDULED
Status: DISCONTINUED | OUTPATIENT
Start: 2024-07-24 | End: 2024-07-31 | Stop reason: HOSPADM

## 2024-07-24 RX ORDER — DEXTROSE MONOHYDRATE 100 MG/ML
INJECTION, SOLUTION INTRAVENOUS CONTINUOUS PRN
Status: DISCONTINUED | OUTPATIENT
Start: 2024-07-24 | End: 2024-07-31 | Stop reason: HOSPADM

## 2024-07-24 RX ORDER — SODIUM CHLORIDE 9 MG/ML
INJECTION, SOLUTION INTRAVENOUS PRN
Status: DISCONTINUED | OUTPATIENT
Start: 2024-07-24 | End: 2024-07-31 | Stop reason: HOSPADM

## 2024-07-24 RX ORDER — HEPARIN SODIUM 5000 [USP'U]/ML
5000 INJECTION, SOLUTION INTRAVENOUS; SUBCUTANEOUS EVERY 8 HOURS SCHEDULED
Status: DISCONTINUED | OUTPATIENT
Start: 2024-07-25 | End: 2024-07-31 | Stop reason: HOSPADM

## 2024-07-24 RX ORDER — INSULIN LISPRO 100 [IU]/ML
0-4 INJECTION, SOLUTION INTRAVENOUS; SUBCUTANEOUS NIGHTLY
Status: DISCONTINUED | OUTPATIENT
Start: 2024-07-24 | End: 2024-07-31 | Stop reason: HOSPADM

## 2024-07-24 RX ORDER — GLUCAGON 1 MG/ML
1 KIT INJECTION PRN
Status: DISCONTINUED | OUTPATIENT
Start: 2024-07-24 | End: 2024-07-31 | Stop reason: HOSPADM

## 2024-07-24 RX ORDER — GLUCOSA SU 2KCL/CHONDROITIN SU 500-400 MG
1 CAPSULE ORAL DAILY
Status: ON HOLD | COMMUNITY

## 2024-07-24 RX ORDER — ONDANSETRON 4 MG/1
4 TABLET, ORALLY DISINTEGRATING ORAL EVERY 8 HOURS PRN
Status: DISCONTINUED | OUTPATIENT
Start: 2024-07-24 | End: 2024-07-31 | Stop reason: HOSPADM

## 2024-07-24 RX ADMIN — ACETAMINOPHEN 1000 MG: 500 TABLET ORAL at 22:01

## 2024-07-24 RX ADMIN — SODIUM BICARBONATE: 84 INJECTION, SOLUTION INTRAVENOUS at 22:00

## 2024-07-24 ASSESSMENT — PAIN - FUNCTIONAL ASSESSMENT: PAIN_FUNCTIONAL_ASSESSMENT: NONE - DENIES PAIN

## 2024-07-24 ASSESSMENT — LIFESTYLE VARIABLES
HOW OFTEN DO YOU HAVE A DRINK CONTAINING ALCOHOL: NEVER
HOW MANY STANDARD DRINKS CONTAINING ALCOHOL DO YOU HAVE ON A TYPICAL DAY: PATIENT DOES NOT DRINK

## 2024-07-24 NOTE — ED TRIAGE NOTES
Patient arrived via EMS due to abnormal BNP from [facility]. Patient offers c/o R ear having difficulty hearing and ringing x 2 months. His provider is aware but has not been evaluated for this. He has hx of dialysis and there is a Fistula in the R lower arm. Patient is non ambulatory

## 2024-07-25 ENCOUNTER — APPOINTMENT (OUTPATIENT)
Dept: ULTRASOUND IMAGING | Age: 74
End: 2024-07-25
Payer: COMMERCIAL

## 2024-07-25 VITALS
SYSTOLIC BLOOD PRESSURE: 121 MMHG | RESPIRATION RATE: 18 BRPM | DIASTOLIC BLOOD PRESSURE: 65 MMHG | HEART RATE: 108 BPM | TEMPERATURE: 97.5 F | OXYGEN SATURATION: 98 %

## 2024-07-25 PROBLEM — R26.2 DIFFICULTY IN WALKING: Status: ACTIVE | Noted: 2024-07-25

## 2024-07-25 PROBLEM — M62.81 MUSCLE WEAKNESS (GENERALIZED): Status: ACTIVE | Noted: 2024-07-25

## 2024-07-25 LAB
ALBUMIN SERPL-MCNC: 3 G/DL (ref 3.5–4.6)
ANION GAP SERPL CALCULATED.3IONS-SCNC: 18 MEQ/L (ref 9–15)
ANION GAP SERPL CALCULATED.3IONS-SCNC: 19 MEQ/L (ref 9–15)
BASOPHILS # BLD: 0 K/UL (ref 0–0.2)
BASOPHILS NFR BLD: 0.2 %
BUN SERPL-MCNC: 120 MG/DL (ref 8–23)
BUN SERPL-MCNC: 122 MG/DL (ref 8–23)
CALCIUM SERPL-MCNC: 8.9 MG/DL (ref 8.5–9.9)
CALCIUM SERPL-MCNC: 9.2 MG/DL (ref 8.5–9.9)
CHLORIDE SERPL-SCNC: 103 MEQ/L (ref 95–107)
CHLORIDE SERPL-SCNC: 106 MEQ/L (ref 95–107)
CK SERPL-CCNC: 3667 U/L (ref 0–190)
CO2 SERPL-SCNC: 16 MEQ/L (ref 20–31)
CO2 SERPL-SCNC: 16 MEQ/L (ref 20–31)
CREAT SERPL-MCNC: 4.41 MG/DL (ref 0.7–1.2)
CREAT SERPL-MCNC: 4.42 MG/DL (ref 0.7–1.2)
EOSINOPHIL # BLD: 0.2 K/UL (ref 0–0.7)
EOSINOPHIL NFR BLD: 2.5 %
ERYTHROCYTE [DISTWIDTH] IN BLOOD BY AUTOMATED COUNT: 15.8 % (ref 11.5–14.5)
GLUCOSE BLD-MCNC: 262 MG/DL (ref 70–99)
GLUCOSE BLD-MCNC: 275 MG/DL (ref 70–99)
GLUCOSE BLD-MCNC: 287 MG/DL (ref 70–99)
GLUCOSE BLD-MCNC: 291 MG/DL (ref 70–99)
GLUCOSE BLD-MCNC: 304 MG/DL (ref 70–99)
GLUCOSE SERPL-MCNC: 261 MG/DL (ref 70–99)
GLUCOSE SERPL-MCNC: 272 MG/DL (ref 70–99)
HCT VFR BLD AUTO: 27.6 % (ref 42–52)
HGB BLD-MCNC: 9 G/DL (ref 14–18)
LYMPHOCYTES # BLD: 0.4 K/UL (ref 1–4.8)
LYMPHOCYTES NFR BLD: 4.7 %
MCH RBC QN AUTO: 28.1 PG (ref 27–31.3)
MCHC RBC AUTO-ENTMCNC: 32.6 % (ref 33–37)
MCV RBC AUTO: 86.3 FL (ref 79–92.2)
MONOCYTES # BLD: 0.5 K/UL (ref 0.2–0.8)
MONOCYTES NFR BLD: 6 %
NEUTROPHILS # BLD: 7 K/UL (ref 1.4–6.5)
NEUTS SEG NFR BLD: 86.2 %
PERFORMED ON: ABNORMAL
PHOSPHATE SERPL-MCNC: 5.1 MG/DL (ref 2.3–4.8)
PLATELET # BLD AUTO: 171 K/UL (ref 130–400)
POTASSIUM SERPL-SCNC: 4.3 MEQ/L (ref 3.4–4.9)
POTASSIUM SERPL-SCNC: 4.3 MEQ/L (ref 3.4–4.9)
RBC # BLD AUTO: 3.2 M/UL (ref 4.7–6.1)
SODIUM SERPL-SCNC: 138 MEQ/L (ref 135–144)
SODIUM SERPL-SCNC: 140 MEQ/L (ref 135–144)
WBC # BLD AUTO: 8.2 K/UL (ref 4.8–10.8)

## 2024-07-25 PROCEDURE — 2580000003 HC RX 258

## 2024-07-25 PROCEDURE — 2500000003 HC RX 250 WO HCPCS

## 2024-07-25 PROCEDURE — 80069 RENAL FUNCTION PANEL: CPT

## 2024-07-25 PROCEDURE — 2580000003 HC RX 258: Performed by: INTERNAL MEDICINE

## 2024-07-25 PROCEDURE — 76776 US EXAM K TRANSPL W/DOPPLER: CPT

## 2024-07-25 PROCEDURE — 82550 ASSAY OF CK (CPK): CPT

## 2024-07-25 PROCEDURE — 6360000002 HC RX W HCPCS: Performed by: INTERNAL MEDICINE

## 2024-07-25 PROCEDURE — 1210000000 HC MED SURG R&B

## 2024-07-25 PROCEDURE — 6370000000 HC RX 637 (ALT 250 FOR IP): Performed by: INTERNAL MEDICINE

## 2024-07-25 PROCEDURE — 85025 COMPLETE CBC W/AUTO DIFF WBC: CPT

## 2024-07-25 PROCEDURE — 36415 COLL VENOUS BLD VENIPUNCTURE: CPT

## 2024-07-25 PROCEDURE — 6360000002 HC RX W HCPCS: Performed by: FAMILY MEDICINE

## 2024-07-25 RX ORDER — ROSUVASTATIN CALCIUM 20 MG/1
20 TABLET, COATED ORAL DAILY
Status: DISCONTINUED | OUTPATIENT
Start: 2024-07-25 | End: 2024-07-25

## 2024-07-25 RX ORDER — MIDODRINE HYDROCHLORIDE 5 MG/1
5 TABLET ORAL
Status: DISCONTINUED | OUTPATIENT
Start: 2024-07-25 | End: 2024-07-31 | Stop reason: HOSPADM

## 2024-07-25 RX ORDER — PANTOPRAZOLE SODIUM 40 MG/1
40 TABLET, DELAYED RELEASE ORAL DAILY
Status: DISCONTINUED | OUTPATIENT
Start: 2024-07-25 | End: 2024-07-31 | Stop reason: HOSPADM

## 2024-07-25 RX ORDER — FINASTERIDE 5 MG/1
5 TABLET, FILM COATED ORAL DAILY
Status: DISCONTINUED | OUTPATIENT
Start: 2024-07-25 | End: 2024-07-31 | Stop reason: HOSPADM

## 2024-07-25 RX ORDER — CYCLOSPORINE 25 MG/1
75 CAPSULE, LIQUID FILLED ORAL 2 TIMES DAILY
Status: DISCONTINUED | OUTPATIENT
Start: 2024-07-25 | End: 2024-07-31 | Stop reason: HOSPADM

## 2024-07-25 RX ORDER — ASPIRIN 81 MG/1
81 TABLET ORAL DAILY
Status: DISCONTINUED | OUTPATIENT
Start: 2024-07-25 | End: 2024-07-31 | Stop reason: HOSPADM

## 2024-07-25 RX ORDER — GABAPENTIN 300 MG/1
300 CAPSULE ORAL 2 TIMES DAILY
Status: DISCONTINUED | OUTPATIENT
Start: 2024-07-25 | End: 2024-07-28

## 2024-07-25 RX ORDER — MYCOPHENOLATE MOFETIL 250 MG/1
750 CAPSULE ORAL 2 TIMES DAILY
Status: DISCONTINUED | OUTPATIENT
Start: 2024-07-25 | End: 2024-07-31 | Stop reason: HOSPADM

## 2024-07-25 RX ORDER — MYCOPHENOLATE MOFETIL 250 MG/1
750 CAPSULE ORAL 3 TIMES DAILY
Status: DISCONTINUED | OUTPATIENT
Start: 2024-07-25 | End: 2024-07-25

## 2024-07-25 RX ORDER — METOPROLOL SUCCINATE 50 MG/1
50 TABLET, EXTENDED RELEASE ORAL DAILY
Status: DISCONTINUED | OUTPATIENT
Start: 2024-07-25 | End: 2024-07-31 | Stop reason: HOSPADM

## 2024-07-25 RX ORDER — FLUCONAZOLE 100 MG/1
200 TABLET ORAL DAILY
Status: DISCONTINUED | OUTPATIENT
Start: 2024-07-25 | End: 2024-07-25 | Stop reason: ALTCHOICE

## 2024-07-25 RX ORDER — SODIUM BICARBONATE 325 MG/1
325 TABLET ORAL 2 TIMES DAILY
COMMUNITY

## 2024-07-25 RX ORDER — ROSUVASTATIN CALCIUM 10 MG/1
10 TABLET, COATED ORAL DAILY
Status: DISCONTINUED | OUTPATIENT
Start: 2024-07-26 | End: 2024-07-31 | Stop reason: HOSPADM

## 2024-07-25 RX ORDER — PREDNISONE 5 MG/1
5 TABLET ORAL DAILY
Status: DISCONTINUED | OUTPATIENT
Start: 2024-07-25 | End: 2024-07-31 | Stop reason: HOSPADM

## 2024-07-25 RX ORDER — TAMSULOSIN HYDROCHLORIDE 0.4 MG/1
0.4 CAPSULE ORAL NIGHTLY
Status: DISCONTINUED | OUTPATIENT
Start: 2024-07-25 | End: 2024-07-31 | Stop reason: HOSPADM

## 2024-07-25 RX ORDER — FINASTERIDE 5 MG/1
5 TABLET, FILM COATED ORAL DAILY
COMMUNITY

## 2024-07-25 RX ORDER — GABAPENTIN 300 MG/1
300 CAPSULE ORAL 2 TIMES DAILY
COMMUNITY

## 2024-07-25 RX ORDER — EXENATIDE 2 MG/.85ML
2 INJECTION, SUSPENSION, EXTENDED RELEASE SUBCUTANEOUS
COMMUNITY

## 2024-07-25 RX ADMIN — ACETAMINOPHEN 325MG 650 MG: 325 TABLET ORAL at 02:11

## 2024-07-25 RX ADMIN — CYCLOSPORINE 75 MG: 25 CAPSULE, LIQUID FILLED ORAL at 09:55

## 2024-07-25 RX ADMIN — INSULIN LISPRO 4 UNITS: 100 INJECTION, SOLUTION INTRAVENOUS; SUBCUTANEOUS at 17:01

## 2024-07-25 RX ADMIN — MIDODRINE HYDROCHLORIDE 5 MG: 5 TABLET ORAL at 14:01

## 2024-07-25 RX ADMIN — MYCOPHENOLATE MOFETIL 750 MG: 250 CAPSULE ORAL at 22:18

## 2024-07-25 RX ADMIN — INSULIN LISPRO 4 UNITS: 100 INJECTION, SOLUTION INTRAVENOUS; SUBCUTANEOUS at 22:38

## 2024-07-25 RX ADMIN — CEFTRIAXONE SODIUM 1000 MG: 1 INJECTION, POWDER, FOR SOLUTION INTRAMUSCULAR; INTRAVENOUS at 02:10

## 2024-07-25 RX ADMIN — METOPROLOL SUCCINATE 50 MG: 50 TABLET, EXTENDED RELEASE ORAL at 09:54

## 2024-07-25 RX ADMIN — FINASTERIDE 5 MG: 5 TABLET, FILM COATED ORAL at 09:54

## 2024-07-25 RX ADMIN — SODIUM BICARBONATE: 84 INJECTION, SOLUTION INTRAVENOUS at 14:06

## 2024-07-25 RX ADMIN — SODIUM ZIRCONIUM CYCLOSILICATE 10 G: 5 POWDER, FOR SUSPENSION ORAL at 09:55

## 2024-07-25 RX ADMIN — SODIUM ZIRCONIUM CYCLOSILICATE 10 G: 5 POWDER, FOR SUSPENSION ORAL at 02:11

## 2024-07-25 RX ADMIN — ASPIRIN 81 MG: 81 TABLET, COATED ORAL at 09:54

## 2024-07-25 RX ADMIN — CYCLOSPORINE 75 MG: 25 CAPSULE, LIQUID FILLED ORAL at 22:09

## 2024-07-25 RX ADMIN — MIDODRINE HYDROCHLORIDE 5 MG: 5 TABLET ORAL at 09:54

## 2024-07-25 RX ADMIN — TAMSULOSIN HYDROCHLORIDE 0.4 MG: 0.4 CAPSULE ORAL at 22:09

## 2024-07-25 RX ADMIN — PREDNISONE 5 MG: 5 TABLET ORAL at 09:55

## 2024-07-25 RX ADMIN — GABAPENTIN 300 MG: 300 CAPSULE ORAL at 22:37

## 2024-07-25 RX ADMIN — Medication 10 ML: at 22:10

## 2024-07-25 RX ADMIN — Medication 10 ML: at 02:10

## 2024-07-25 RX ADMIN — INSULIN LISPRO 4 UNITS: 100 INJECTION, SOLUTION INTRAVENOUS; SUBCUTANEOUS at 09:56

## 2024-07-25 RX ADMIN — HEPARIN SODIUM 5000 UNITS: 5000 INJECTION INTRAVENOUS; SUBCUTANEOUS at 05:16

## 2024-07-25 RX ADMIN — MYCOPHENOLATE MOFETIL 750 MG: 250 CAPSULE ORAL at 09:54

## 2024-07-25 RX ADMIN — GABAPENTIN 300 MG: 300 CAPSULE ORAL at 09:55

## 2024-07-25 RX ADMIN — HEPARIN SODIUM 5000 UNITS: 5000 INJECTION INTRAVENOUS; SUBCUTANEOUS at 22:08

## 2024-07-25 RX ADMIN — PANTOPRAZOLE SODIUM 40 MG: 40 TABLET, DELAYED RELEASE ORAL at 09:55

## 2024-07-25 RX ADMIN — HEPARIN SODIUM 5000 UNITS: 5000 INJECTION INTRAVENOUS; SUBCUTANEOUS at 14:01

## 2024-07-25 RX ADMIN — INSULIN LISPRO 4 UNITS: 100 INJECTION, SOLUTION INTRAVENOUS; SUBCUTANEOUS at 14:02

## 2024-07-25 RX ADMIN — SODIUM ZIRCONIUM CYCLOSILICATE 10 G: 5 POWDER, FOR SUSPENSION ORAL at 14:02

## 2024-07-25 RX ADMIN — MIDODRINE HYDROCHLORIDE 5 MG: 5 TABLET ORAL at 17:01

## 2024-07-25 ASSESSMENT — PAIN SCALES - GENERAL
PAINLEVEL_OUTOF10: 2
PAINLEVEL_OUTOF10: 8

## 2024-07-25 ASSESSMENT — PAIN DESCRIPTION - LOCATION: LOCATION: HEAD

## 2024-07-25 NOTE — PROGRESS NOTES
Renal Adjustment Per Protocol:     rosuvastatin 20mg changed to rosuvastatin 10mg based on  crcl < 30    Recent Labs     07/25/24  0625   CREATININE 4.41*  4.42*   Estimated Creatinine Clearance: 16 mL/min (A) (based on SCr of 4.42 mg/dL (H)).  .    Digna Morgan, [facility] PharmD

## 2024-07-25 NOTE — ED NOTES
[facility] paged @ 2046.  [facility] responded @ 3180.  [facility] paged @ 9666.  [facility] responded @ 5720.

## 2024-07-25 NOTE — ED PROVIDER NOTES
[facility]  4W MED SURG UNIT  EMERGENCY DEPARTMENT ENCOUNTER      Pt Name: Remy Upton  MRN: [id number]  Birthdate 1950  Date of evaluation: 7/24/2024  Provider: MURTAZA Mcallister  10:18 PM EDT    CHIEF COMPLAINT       Chief Complaint   Patient presents with    abnormal bloodwork     Per Skilled Facility abnormal BUN          HISTORY OF PRESENT ILLNESS   (Location/Symptom, Timing/Onset, Context/Setting, Quality, Duration, Modifying Factors, Severity)  Note limiting factors.   Remy Upton is a 73 y.o. male whom per chart review has a PMHx of COPD, hyperlipidemia, CKD s/p kidney transplantation, type II DM, seizures, hypertension presents to ED via EMS from [facility] for evaluation of abnormal labs.  Per EMS report, NH staff was concerned that patient's renal function is worsening.  Patient had laboratory evaluations completed on 07/02 which were remarkable for creatinine of 3.34 with GFR of 19, BUN 77.  Laboratory evaluations were completed today and were noted to be , GFR 13, creatinine 4.5.  Patient has no complaints on arrival to the emergency department.      HPI    Nursing Notes were reviewed.    REVIEW OF SYSTEMS    (2-9 systems for level 4, 10 or more for level 5)     Review of Systems   Constitutional:  Negative for activity change, appetite change, chills, fatigue and fever.   HENT:  Negative for congestion.    Eyes:  Negative for pain, discharge and itching.   Respiratory:  Negative for cough and shortness of breath.    Cardiovascular:  Negative for chest pain, palpitations and leg swelling.   Gastrointestinal:  Negative for abdominal pain, constipation, diarrhea, nausea and vomiting.   Endocrine: Negative for polydipsia, polyphagia and polyuria.   Genitourinary:  Negative for difficulty urinating and dysuria.   Musculoskeletal:  Negative for arthralgias and myalgias.   Skin:  Negative for rash and wound.   Allergic/Immunologic: Negative.    Neurological:  Negative for

## 2024-07-25 NOTE — PROGRESS NOTES
Patient assessment and vitals complete and per flowsheets. Patient medicated per emar with home meds, verbalized understanding of medications and education of such. Critical BUN to Dr. Barrera and Dr. Gonzalez. No new orders. Patient tolerating diet, has no needs.

## 2024-07-25 NOTE — H&P
DEPARTMENT OF HOSPITAL MEDICINE    HISTORY AND PHYSICAL EXAM    PATIENT NAME:  Remy Upton    MRN:  [id number]  SERVICE DATE:  7/24/2024   SERVICE TIME:  11:27 PM    Primary Care Physician: Tico Rodriguez DO     SUBJECTIVE  CHIEF COMPLAINT: \"Abnormal labs\"    HPI:  Remy Upton is a 73 y.o. male who presents with above complaints.    73-year-old male brought from other [facility] due to \"abnormal lab\".  Reportedly had newly elevated BUN and elevated potassium.  Workup in the ED demonstrating significant BUN elevation, potassium 5.4, likely UTI on urinalysis (appropriate clean-catch).  Patient has history of ESRD status post 2 renal transplants (first transplant failed, received a second).  On mycophenolate,, cyclosporine, and low-dose prednisone antirejection regimen.  Endorses compliance with home medications as prescribed.  ED provider spoke with on-call nephrologist who recommended initiation of bicarb drip at 100 cc/hour, and plans to see patient in the morning.  ED collected urine culture, contacted hospitalist for admission.  Hospitalist recommendation for ED to immediately collect blood cultures x 2 and initiate ceftriaxone therapy for likely UTI on urinalysis.  Patient is a full code.    On exam, patient denies any acute abdominal pain, either at rest or with palpation.  No nausea or vomiting.  No chills or fevers.  No other acute complaints at time of exam.         PAST MEDICAL HISTORY:    Past Medical History:   Diagnosis Date    Diabetes mellitus (HCC)     Hemodialysis access, fistula mature ([facility]) 12/2002    Hypertension     Seizures ([facility])     no seizure since 1995     PAST SURGICAL HISTORY:    Past Surgical History:   Procedure Laterality Date    BRAIN SURGERY Left 12/06/1990    to eliminate seizures    COLONOSCOPY N/A 10/24/2022    COLONOSCOPY DIAGNOSTIC performed by Va Ordoñez MD at [facility]    KIDNEY TRANSPLANT  2015    [place] [facility] INCL FLUOR GDNCE DX W/CELL WASHG SPX N/A  cooperative.  MSK/Integumentary: No gross bony abnormalities.        DATA:     Diagnostic tests reviewed for today's visit:    Most recent labs and imaging results reviewed.     LABS:      Recent Results (from the past 24 hour(s))   CBC with Auto Differential    Collection Time: 07/24/24  7:15 PM   Result Value Ref Range    WBC 9.4 4.8 - 10.8 K/uL    RBC 3.41 (L) 4.70 - 6.10 M/uL    Hemoglobin 9.6 (L) 14.0 - 18.0 g/dL    Hematocrit 30.3 (L) 42.0 - 52.0 %    MCV 88.9 79.0 - 92.2 fL    MCH 28.2 27.0 - 31.3 pg    MCHC 31.7 (L) 33.0 - 37.0 %    RDW 15.9 (H) 11.5 - 14.5 %    Platelets 182 130 - 400 K/uL    Neutrophils % 92.4 %    Lymphocytes % 2.5 %    Monocytes % 3.7 %    Eosinophils % 0.8 %    Basophils % 0.2 %    Neutrophils Absolute 8.7 (H) 1.4 - 6.5 K/uL    Lymphocytes Absolute 0.2 (L) 1.0 - 4.8 K/uL    Monocytes Absolute 0.4 0.2 - 0.8 K/uL    Eosinophils Absolute 0.1 0.0 - 0.7 K/uL    Basophils Absolute 0.0 0.0 - 0.2 K/uL   Comprehensive Metabolic Panel    Collection Time: 07/24/24  7:15 PM   Result Value Ref Range    Sodium 139 135 - 144 mEq/L    Potassium 5.4 (H) 3.4 - 4.9 mEq/L    Chloride 106 95 - 107 mEq/L    CO2 13 (L) 20 - 31 mEq/L    Anion Gap 20 (H) 9 - 15 mEq/L    Glucose 291 (H) 70 - 99 mg/dL     (HH) 8 - 23 mg/dL    Creatinine 4.49 (H) 0.70 - 1.20 mg/dL    Est, Glom Filt Rate 13.1 (L) >60    Calcium 9.1 8.5 - 9.9 mg/dL    Total Protein 5.9 (L) 6.3 - 8.0 g/dL    Albumin 3.2 (L) 3.5 - 4.6 g/dL    Total Bilirubin 0.8 (H) 0.2 - 0.7 mg/dL    Alkaline Phosphatase 50 35 - 104 U/L     (H) 0 - 41 U/L     (H) 0 - 40 U/L    Globulin 2.7 2.3 - 3.5 g/dL   Urinalysis with Reflex to Culture    Collection Time: 07/24/24  7:15 PM    Specimen: Urine   Result Value Ref Range    Color, UA Yellow Straw/Yellow    Clarity, UA CLOUDY (A) Clear    Glucose, Ur Negative Negative mg/dL    Bilirubin, Urine Negative Negative    Ketones, Urine Negative Negative mg/dL    Specific Gravity, UA 1.014 1.005 - 1.030     Mckayla (cardiology) infomed of the consult

## 2024-07-25 NOTE — ED NOTES
Np Mayelin aware of pt's hr, 0 new orders at this time.   Pt resting in bed,a&ox4, repositioned up in bed with pillow behind neck and behind rt upper ext. Per pt's request.  Wife at bedside.

## 2024-07-25 NOTE — PROGRESS NOTES
ordered.    8. Weight loss  Dietician consult, recommend supplements    9. GERD  Continue Protonix and Reglan as ordered.    10. BPH  Continue Flomax and Proscar as ordered.    11. Stage II pressure ulcer coccyx  Wound team to follow until healed.      Reviewed labs:  Yes      Time based visit:  time spent 40 minutes .  Reviewed with the patient: current clinical status, medications, activities and diet. Side effects, adverse effects of the medication prescribed, as well as treatment plan and result expectations.  Discussed diagnostic results, other suggested diagnostic testing, prognosis, risk and benefits of treatment options, importance of compliance with treatment options with resident and nursing staff.  Reviewed therapy plan and progress with therapists.  Reviewed medical record, labs, medications, etc.      I have reviewed the patient's medical history in detail and updated the computerized patient record.    This note was partially generated using Dragon voice recognition system, and there may be some incorrect words, spellings, punctuation that were not noticed in checking the note before saving.      Electronically signed by: HAJA Figueroa CNP on 7/23/2024

## 2024-07-25 NOTE — PROGRESS NOTES
Hospitalist Daily Progress Note  Name: Remy Upton  Age: 73 y.o.  Gender: male  CodeStatus: Full Code  Allergies: Statins    Chief Complaint:abnormal bloodwork (Per Skilled Facility abnormal BUN )      Primary Care Provider: Tico Rodriguez DO    InpatientTreatment Team: Treatment Team: Attending Provider: Mendoza Gonzalez MD; Consulting Physician: Malick Blair MD; Registered Nurse: Marilyn Allen RN; Utilization Reviewer: Wyatt Ace RN; : Julieta David    Admission Date: 7/24/2024      Subjective: No chest pain, sob, nausea.  Resting in bed with friend at bedside.    Physical Exam  Vitals and nursing note reviewed.   Constitutional:       Appearance: Normal appearance.   Cardiovascular:      Rate and Rhythm: Normal rate and regular rhythm.   Pulmonary:      Effort: Pulmonary effort is normal.      Breath sounds: Normal breath sounds.   Abdominal:      General: Bowel sounds are normal.      Palpations: Abdomen is soft.   Musculoskeletal:         General: Normal range of motion.   Skin:     General: Skin is warm and dry.   Neurological:      Mental Status: He is alert and oriented to person, place, and time. Mental status is at baseline.         Medications:  Reviewed    Infusion Medications:    sodium bicarbonate 150 mEq in dextrose 5 % 1,000 mL infusion 100 mL/hr at 07/24/24 2200    sodium chloride      dextrose       Scheduled Medications:    aspirin  81 mg Oral Daily    cycloSPORINE modified  75 mg Oral BID    finasteride  5 mg Oral Daily    gabapentin  300 mg Oral BID    metoprolol succinate  50 mg Oral Daily    midodrine  5 mg Oral TID WC    mycophenolate  750 mg Oral TID    pantoprazole  40 mg Oral Daily    predniSONE  5 mg Oral Daily    tamsulosin  0.4 mg Oral Nightly    [START ON 7/26/2024] rosuvastatin  10 mg Oral Daily    sodium chloride flush  5-40 mL IntraVENous 2 times per day    heparin (porcine)  5,000 Units SubCUTAneous 3 times per day    sodium zirconium    Plan:  Admit to inpatient status on telemetry  Follow-up on urine and blood cultures collected in ED for any demonstration of growth  Bicarbonate drip at 100 cc/hour per nephrology recommendations to ED, pending further nephrology recommendations  I/O tracking  Empiric ceftriaxone for presumed UTI, pending CM for C&S  Correction insulin, goal -180s during admission  Continue/hold home medications as ordered after medication history completed  7/25 - add antifungal for candiduria       Electronically signed by ALEXIA RICHARDSON MD on 7/25/2024 at 1:53 PM

## 2024-07-25 NOTE — CONSULTS
Located within [facility] Nephrology  Consult Note           Reason for Consult:  Jian on CKD, renal transplant   Requesting Physician:  Dr. Chavez     Chief Complaint:  abnormal labs   History Obtained From:  patient, electronic medical record    History of Present Ilness:    73 y.o. male with history s/f ESRD s/p KT x2, T2DM, HTN, seizures who presented from [facility] for abnormal labs. Had hyperkalemia, azotemia, worsening renal function. Pt on cyclosporine, cellcept and prednisone. Notably had 2nd transplant at  on 11/8/2015. Renal function in 4/24 was stage IV w/ Scr 2.4 w/ eGFR high 20s/low 30s, however since then function has worsened. Scr 3.3 on 7/2 and now up to 4.4. Pt states was having good PO intake until recently. Has also not been sleeping well. His CK elevated to 3667. On crestor. His liver enzymes are elevated as well, UA appears infected w/ yeast. Not hypotensive but tachycardic on presentation. Has been compliant w/ his anti-rejection medications. Sees Dr. Rodriguez as outpatient     Past Medical History:        Diagnosis Date    Diabetes mellitus (HCC)     Hemodialysis access, fistula mature (HCC) 12/2002    Hypertension     Seizures (HCC)     no seizure since 1995       Past Surgical History:        Procedure Laterality Date    BRAIN SURGERY Left 12/06/1990    to eliminate seizures    COLONOSCOPY N/A 10/24/2022    COLONOSCOPY DIAGNOSTIC performed by Va Ordoñez MD at [facility]    KIDNEY TRANSPLANT  2015    [place] [facility] INCL FLUOR GDNCE DX W/CELL WASHG SPX N/A 3/20/2018    BRONCHOSCOPY performed by Cristopher Conde MD at [facility]    UPPER GASTROINTESTINAL ENDOSCOPY N/A 10/21/2022    EGD DIAGNOSTIC ONLY performed by Jaime Martinez MD at [facility]       Home Medications:    No current facility-administered medications on file prior to encounter.     Current Outpatient Medications on File Prior to Encounter   Medication Sig Dispense Refill    Exenatide ER (BYDUREON BCISE) 2 MG/0.85ML injection Inject  (Non-Medical): No   Physical Activity: Not on file   Stress: Not on file   Social Connections: Not on file   Intimate Partner Violence: Not on file   Housing Stability: Low Risk  (7/25/2024)    Housing Stability Vital Sign     Unable to Pay for Housing in the Last Year: No     Number of Places Lived in the Last Year: 2     Unstable Housing in the Last Year: No       Family History:   Family History   Problem Relation Age of Onset    Colon Cancer Neg Hx        Review of Systems:   Positives in bold  Constitutional: fever, chills, fatigue, malaise   HENT:  rhinorrhea, sinus pain, sore throat, epistaxis    Eyes:  photophobia, visual disturbance, eye redness  Respiratory: shortness of breath, cough, hemoptysis    Cardiovascular: chest pain, palpitations, orthopnea, leg swelling   Gastrointestinal: abdominal pain, nausea, vomiting, diarrhea, constipation   Endocrine: polydipsia, polyphagia, cold intolerance, heat intolerance  Genitourinary: dysuria, flank pain, frequency, urgency   Hematologic: easy bruising, easy bleeding  Musculoskeletal: back pain, neck pain, myalgias, arthalgias  Neurological: syncope, lightheadedness, dizziness, seizures, tremors, weakness, LE neuropathy   Psychiatric/Behavioral: anxiety, depression, hallucinations  Skin: rash, itching    Physical exam:   Constitutional:  VITALS:  /82   Pulse (!) 106   Temp 97.2 °F (36.2 °C) (Oral)   Resp 18   Ht 1.88 m (6' 2\")   Wt 76.7 kg (169 lb)   SpO2 100%   BMI 21.70 kg/m²     General: alert, in no apparent distress  HEENT: normocephalic, atraumatic, anicteric  Lungs: non-labored respirations, clear to auscultation bilaterally  Heart: regular rate and rhythm, no murmurs or rubs  Abdomen: soft, non-tender, non-distended  Ext: no cyanosis, no peripheral edema  Neuro: alert and oriented, no gross abnormalities      Data/  CBC:   Lab Results   Component Value Date/Time    WBC 8.2 07/25/2024 06:25 AM    RBC 3.20 07/25/2024 06:25 AM    RBC 4.18

## 2024-07-26 PROBLEM — R33.9 URINARY RETENTION: Status: ACTIVE | Noted: 2024-07-26

## 2024-07-26 PROBLEM — R79.9 ELEVATED BUN: Status: ACTIVE | Noted: 2024-07-26

## 2024-07-26 LAB
ALBUMIN SERPL-MCNC: 2.9 G/DL (ref 3.5–4.6)
ANION GAP SERPL CALCULATED.3IONS-SCNC: 18 MEQ/L (ref 9–15)
BASOPHILS # BLD: 0 K/UL (ref 0–0.2)
BASOPHILS NFR BLD: 0.2 %
BUN SERPL-MCNC: 108 MG/DL (ref 8–23)
CALCIUM SERPL-MCNC: 8.8 MG/DL (ref 8.5–9.9)
CHLORIDE SERPL-SCNC: 103 MEQ/L (ref 95–107)
CO2 SERPL-SCNC: 20 MEQ/L (ref 20–31)
CREAT SERPL-MCNC: 4.01 MG/DL (ref 0.7–1.2)
EOSINOPHIL # BLD: 0.2 K/UL (ref 0–0.7)
EOSINOPHIL NFR BLD: 2.1 %
ERYTHROCYTE [DISTWIDTH] IN BLOOD BY AUTOMATED COUNT: 15.7 % (ref 11.5–14.5)
GLUCOSE BLD-MCNC: 266 MG/DL (ref 70–99)
GLUCOSE BLD-MCNC: 277 MG/DL (ref 70–99)
GLUCOSE BLD-MCNC: 279 MG/DL (ref 70–99)
GLUCOSE BLD-MCNC: 361 MG/DL (ref 70–99)
GLUCOSE SERPL-MCNC: 236 MG/DL (ref 70–99)
HCT VFR BLD AUTO: 27.4 % (ref 42–52)
HGB BLD-MCNC: 8.7 G/DL (ref 14–18)
LYMPHOCYTES # BLD: 0.4 K/UL (ref 1–4.8)
LYMPHOCYTES NFR BLD: 4.7 %
MCH RBC QN AUTO: 27.7 PG (ref 27–31.3)
MCHC RBC AUTO-ENTMCNC: 31.8 % (ref 33–37)
MCV RBC AUTO: 87.3 FL (ref 79–92.2)
MONOCYTES # BLD: 0.5 K/UL (ref 0.2–0.8)
MONOCYTES NFR BLD: 6.2 %
NEUTROPHILS # BLD: 7 K/UL (ref 1.4–6.5)
NEUTS SEG NFR BLD: 86.6 %
PERFORMED ON: ABNORMAL
PHOSPHATE SERPL-MCNC: 4.7 MG/DL (ref 2.3–4.8)
PLATELET # BLD AUTO: 167 K/UL (ref 130–400)
POTASSIUM SERPL-SCNC: 3.6 MEQ/L (ref 3.4–4.9)
RBC # BLD AUTO: 3.14 M/UL (ref 4.7–6.1)
SODIUM SERPL-SCNC: 141 MEQ/L (ref 135–144)
WBC # BLD AUTO: 8.1 K/UL (ref 4.8–10.8)

## 2024-07-26 PROCEDURE — 6370000000 HC RX 637 (ALT 250 FOR IP): Performed by: INTERNAL MEDICINE

## 2024-07-26 PROCEDURE — 6360000002 HC RX W HCPCS: Performed by: INTERNAL MEDICINE

## 2024-07-26 PROCEDURE — 80069 RENAL FUNCTION PANEL: CPT

## 2024-07-26 PROCEDURE — 99221 1ST HOSP IP/OBS SF/LOW 40: CPT | Performed by: PHYSICIAN ASSISTANT

## 2024-07-26 PROCEDURE — 2580000003 HC RX 258: Performed by: INTERNAL MEDICINE

## 2024-07-26 PROCEDURE — 36415 COLL VENOUS BLD VENIPUNCTURE: CPT

## 2024-07-26 PROCEDURE — 2580000003 HC RX 258: Performed by: FAMILY MEDICINE

## 2024-07-26 PROCEDURE — 2580000003 HC RX 258: Performed by: STUDENT IN AN ORGANIZED HEALTH CARE EDUCATION/TRAINING PROGRAM

## 2024-07-26 PROCEDURE — 1210000000 HC MED SURG R&B

## 2024-07-26 PROCEDURE — 6360000002 HC RX W HCPCS: Performed by: FAMILY MEDICINE

## 2024-07-26 PROCEDURE — 85025 COMPLETE CBC W/AUTO DIFF WBC: CPT

## 2024-07-26 RX ORDER — SODIUM CHLORIDE, SODIUM LACTATE, POTASSIUM CHLORIDE, CALCIUM CHLORIDE 600; 310; 30; 20 MG/100ML; MG/100ML; MG/100ML; MG/100ML
INJECTION, SOLUTION INTRAVENOUS CONTINUOUS
Status: DISPENSED | OUTPATIENT
Start: 2024-07-26 | End: 2024-07-27

## 2024-07-26 RX ADMIN — HEPARIN SODIUM 5000 UNITS: 5000 INJECTION INTRAVENOUS; SUBCUTANEOUS at 06:08

## 2024-07-26 RX ADMIN — GABAPENTIN 300 MG: 300 CAPSULE ORAL at 22:00

## 2024-07-26 RX ADMIN — CYCLOSPORINE 75 MG: 25 CAPSULE, LIQUID FILLED ORAL at 22:00

## 2024-07-26 RX ADMIN — MIDODRINE HYDROCHLORIDE 5 MG: 5 TABLET ORAL at 08:20

## 2024-07-26 RX ADMIN — HEPARIN SODIUM 5000 UNITS: 5000 INJECTION INTRAVENOUS; SUBCUTANEOUS at 12:58

## 2024-07-26 RX ADMIN — MYCOPHENOLATE MOFETIL 750 MG: 250 CAPSULE ORAL at 08:20

## 2024-07-26 RX ADMIN — MIDODRINE HYDROCHLORIDE 5 MG: 5 TABLET ORAL at 12:58

## 2024-07-26 RX ADMIN — INSULIN LISPRO 4 UNITS: 100 INJECTION, SOLUTION INTRAVENOUS; SUBCUTANEOUS at 12:58

## 2024-07-26 RX ADMIN — CEFTRIAXONE SODIUM 1000 MG: 1 INJECTION, POWDER, FOR SOLUTION INTRAMUSCULAR; INTRAVENOUS at 01:08

## 2024-07-26 RX ADMIN — INSULIN LISPRO 4 UNITS: 100 INJECTION, SOLUTION INTRAVENOUS; SUBCUTANEOUS at 20:57

## 2024-07-26 RX ADMIN — CYCLOSPORINE 75 MG: 25 CAPSULE, LIQUID FILLED ORAL at 08:20

## 2024-07-26 RX ADMIN — METOPROLOL SUCCINATE 50 MG: 50 TABLET, EXTENDED RELEASE ORAL at 08:20

## 2024-07-26 RX ADMIN — TAMSULOSIN HYDROCHLORIDE 0.4 MG: 0.4 CAPSULE ORAL at 20:57

## 2024-07-26 RX ADMIN — FINASTERIDE 5 MG: 5 TABLET, FILM COATED ORAL at 08:20

## 2024-07-26 RX ADMIN — Medication 10 ML: at 22:00

## 2024-07-26 RX ADMIN — GABAPENTIN 300 MG: 300 CAPSULE ORAL at 08:20

## 2024-07-26 RX ADMIN — ROSUVASTATIN CALCIUM 10 MG: 10 TABLET, FILM COATED ORAL at 08:20

## 2024-07-26 RX ADMIN — MIDODRINE HYDROCHLORIDE 5 MG: 5 TABLET ORAL at 17:01

## 2024-07-26 RX ADMIN — PREDNISONE 5 MG: 5 TABLET ORAL at 08:20

## 2024-07-26 RX ADMIN — MICAFUNGIN SODIUM 100 MG: 100 INJECTION, POWDER, LYOPHILIZED, FOR SOLUTION INTRAVENOUS at 20:55

## 2024-07-26 RX ADMIN — PANTOPRAZOLE SODIUM 40 MG: 40 TABLET, DELAYED RELEASE ORAL at 08:20

## 2024-07-26 RX ADMIN — INSULIN LISPRO 4 UNITS: 100 INJECTION, SOLUTION INTRAVENOUS; SUBCUTANEOUS at 08:19

## 2024-07-26 RX ADMIN — INSULIN LISPRO 4 UNITS: 100 INJECTION, SOLUTION INTRAVENOUS; SUBCUTANEOUS at 17:00

## 2024-07-26 RX ADMIN — SODIUM CHLORIDE, POTASSIUM CHLORIDE, SODIUM LACTATE AND CALCIUM CHLORIDE: 600; 310; 30; 20 INJECTION, SOLUTION INTRAVENOUS at 15:21

## 2024-07-26 RX ADMIN — ASPIRIN 81 MG: 81 TABLET, COATED ORAL at 08:20

## 2024-07-26 RX ADMIN — MYCOPHENOLATE MOFETIL 750 MG: 250 CAPSULE ORAL at 20:57

## 2024-07-26 RX ADMIN — HEPARIN SODIUM 5000 UNITS: 5000 INJECTION INTRAVENOUS; SUBCUTANEOUS at 22:00

## 2024-07-26 NOTE — PROGRESS NOTES
Nephrology Progress Note    Assessment:  73 y.o. male with history s/f ESRD s/p KT x2, T2DM, HTN, seizures who [facility] from [facility] for [facility] labs. Had hyperkalemia, azotemia, worsening renal function.      ESRD s/p 2 KTs (2nd at  on 11/8/2015)  - allograft function: has DELORES on CKD stage III/IV: possibly 2/2 urinary retention (noted on transplant renal U/S) +/- volume depletion vs. Rhabdomyolysis vs. Transplant related, not hypotensive but tachycardic on presentation  - IS: cyclosporine 75 mg BID, cellcept 750 mg BID, prednisone 5 mg daily   Rhabdomyolysis: on crestor as outpatient   Hyperkalemia   Metabolic acidosis  Elevated liver enzymes  Yeast UTI      Plan:  - change fluids to LR at 75 ml/hr for today and then stop  - means catheter   - consult urology for urinary retention  - keep holding crestor for now   - continue gabapentin 300 mg daily       Patient Active Problem List:     Pneumonia     Abdominal pain, other specified site     Acute pyelonephritis without lesion of renal medullary necrosis     Anemia     Essential hypertension     Nonspecific abnormal results of thyroid function study     Mixed hyperlipidemia     Kidney replaced by transplant     Intra-abdominal abscess post-procedure     Infection due to Enterococcus     Fever and other physiologic disturbances of temperature regulation     Chronic kidney disease (CKD)     Diabetes mellitus type II, controlled (HCC)     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 (HCC)     Abnormal CT of the chest     Bronchiectasis without complication (HCC)     GI bleeding     Complication of transplanted kidney        Recent Labs     07/24/24 1915   *   *   ALKPHOS 50   BILITOT 0.8*     Iron:  No results for input(s): \"FERRITIN\" in the last 72 hours.    Invalid input(s): \"IRONS\", \"LABIRONS\"  Urinalysis: No results for input(s): \"UA\" in the last 72 hours.    Objective:  Vitals: /65   Pulse (!) 105   Temp 98.4 °F (36.9 °C) (Oral)   Resp 16   Ht 1.88 m (6' 2\")   Wt 76.7 kg (169 lb)   SpO2 99%   BMI 21.70 kg/m²    Wt Readings from Last 3 Encounters:   07/26/24 76.7 kg (169 lb)   03/28/23 88.5 kg (195 lb)   11/18/22 91.6 kg (202 lb)      24HR INTAKE/OUTPUT:    Intake/Output Summary (Last 24 hours) at 7/26/2024 1316  Last data filed at 7/26/2024 0736  Gross per 24 hour   Intake 3347.24 ml   Output 100 ml   Net 3247.24 ml       General: alert, in no apparent distress  HEENT: normocephalic, atraumatic, anicteric  Lungs: non-labored respirations, clear to auscultation bilaterally  Heart: regular rate and rhythm, no murmurs or rubs  Abdomen: soft, non-tender, non-distended  Ext: no cyanosis, no peripheral edema  Neuro: alert and oriented, no gross abnormalities      Electronically signed by Molly Barrera MD

## 2024-07-26 NOTE — PROGRESS NOTES
Patient assessment and vitals complete and per flowsheets. Dr. Barrera made aware of critical BUN. No other needs at this time.

## 2024-07-26 NOTE — FLOWSHEET NOTE
Patient is resting quietly in bed,he denies pain or shortness of breath,iv site is discolored but no swelling,right AV fistula with good thrill and bruit.his call light is within his easy reach.    0050: patient care is taken over by Karla MASSEYthe patient remain awake and no complain of pain or discomfort.

## 2024-07-26 NOTE — CONSULTS
Urology Consult      Remy Upton  1950  [id number]    Date of Admission:  7/24/2024  6:53 PM  Date of Consultation:  7/26/2024    Consultant: Liban Martínez PA-C  SupervisingPhysician: Dr. Leyva  PCP:  Tico Rodriguez DO       Reason for Consultation: urinary retention      C/C:   Chief Complaint   Patient presents with    abnormal bloodwork     Per Skilled Facility abnormal BUN        History of Present Illness: Urinary Retention  Patient complains of urinary retention. Onset of retention was 1 day ago and was sudden in onset. Patient currently does have a urinary catheter in place.  >500 ml of urine were drained when catheter was placed. Prior to this event voiding symptoms consisted of slow stream. Prior treatments include tamulosin and Proscar. Recent medications that may have affected his voiding include none.    Allergies:  Allergies   Allergen Reactions    Statins Headaches and Other (See Comments)       PMH: Patient has a past medical history of Diabetes mellitus (HCC), Hemodialysis access, fistula mature (HCC), Hypertension, and Seizures (HCC).    PSH: Patient has a past surgical history that includes Kidney transplant (2015); pr brncc incl fluor gdnce dx w/cell washg spx (N/A, 3/20/2018); brain surgery (Left, 12/06/1990); Upper gastrointestinal endoscopy (N/A, 10/21/2022); and Colonoscopy (N/A, 10/24/2022).    Social History: Patient reports that he quit smoking about 9 years ago. His smoking use included cigarettes. He has quit using smokeless tobacco. He reports that he does not drink alcohol and does not use drugs.    Family History: Patientsfamily history is not on file.    ROS:  Review of Systems   Constitutional:  Negative for fever.   HENT:  Negative for congestion.    Respiratory:  Negative for apnea.    Genitourinary:  Positive for difficulty urinating. Negative for hematuria.   Neurological:  Negative for speech difficulty.       Current Meds: aspirin EC tablet 81 mg,  Daily  cycloSPORINE modified (NEORAL) capsule 75 mg, BID  finasteride (PROSCAR) tablet 5 mg, Daily  gabapentin (NEURONTIN) capsule 300 mg, BID  metoprolol succinate (TOPROL XL) extended release tablet 50 mg, Daily  midodrine (PROAMATINE) tablet 5 mg, TID WC  pantoprazole (PROTONIX) tablet 40 mg, Daily  predniSONE (DELTASONE) tablet 5 mg, Daily  tamsulosin (FLOMAX) capsule 0.4 mg, Nightly  rosuvastatin (CRESTOR) tablet 10 mg, Daily  mycophenolate (CELLCEPT) capsule 750 mg, BID  micafungin (MYCAMINE) 100 mg in sodium chloride 0.9 % 100 mL IVPB, Daily  sodium bicarbonate 150 mEq in dextrose 5 % 1,000 mL infusion, Continuous  sodium chloride flush 0.9 % injection 5-40 mL, 2 times per day  sodium chloride flush 0.9 % injection 5-40 mL, PRN  0.9 % sodium chloride infusion, PRN  ondansetron (ZOFRAN-ODT) disintegrating tablet 4 mg, Q8H PRN   Or  ondansetron (ZOFRAN) injection 4 mg, Q6H PRN  acetaminophen (TYLENOL) tablet 650 mg, Q6H PRN   Or  acetaminophen (TYLENOL) suppository 650 mg, Q6H PRN  heparin (porcine) injection 5,000 Units, 3 times per day  cefTRIAXone (ROCEPHIN) 1,000 mg in sodium chloride 0.9 % 50 mL IVPB (mini-bag), Q24H  glucose chewable tablet 16 g, PRN  dextrose bolus 10% 125 mL, PRN   Or  dextrose bolus 10% 250 mL, PRN  glucagon injection 1 mg, PRN  dextrose 10 % infusion, Continuous PRN  insulin lispro (HUMALOG,ADMELOG) injection vial 0-8 Units, TID WC  insulin lispro (HUMALOG,ADMELOG) injection vial 0-4 Units, Nightly          Vitals:  /65   Pulse (!) 105   Temp 98.4 °F (36.9 °C) (Oral)   Resp 16   Ht 1.88 m (6' 2\")   Wt 76.7 kg (169 lb)   SpO2 99%   BMI 21.70 kg/m²   I/O (24Hr):    Intake/Output Summary (Last 24 hours) at 7/26/2024 1234  Last data filed at 7/26/2024 0736  Gross per 24 hour   Intake 3347.24 ml   Output 100 ml   Net 3247.24 ml             Physical Exam  HENT:      Mouth/Throat:      Mouth: Mucous membranes are moist.   Pulmonary:      Effort: Pulmonary effort is normal.    Neurological:      Mental Status: He is alert and oriented to person, place, and time.         Labs:  Hematology:  Recent Labs     07/26/24  0526   WBC 8.1   HGB 8.7*   HCT 27.4*        :  Recent Labs     07/26/24  0526      K 3.6      CO2 20   GLUCOSE 236*   *   CREATININE 4.01*   ANIONGAP 18*   LABGLOM 15.1*   CALCIUM 8.8   PHOS 4.7       Urinalysis:  Recent Labs     07/24/24  1915   COLORU Yellow   PHUR 5.5   WBCUA >100*   RBCUA 20-50*   YEAST Present*   BACTERIA Negative   CLARITYU CLOUDY*   LEUKOCYTESUR MODERATE*   UROBILINOGEN 0.2   BILIRUBINUR Negative   BLOODU LARGE*        Urine Culture   Lab Results   Component Value Date/Time    LABURIN  07/24/2024 07:15 PM     Cult,Urine:  YEAST  Cult,Urine:  >100,000 CFU/ML  Performed at "[facility]" [address] [place] [id number] ([phone]         Radiology:       Assessment:   Urinary retention  BPH with obstruction- s/p Holep per Dr. Marin in 2023      Plan:  Cleansed meatus with betadine. Numbed with urojet. Inserted a #16 fr means catheter under sterile measures. Immediately draining a large amount of clear yellow urine. Balloon inflated with 10 cc's of sterile water. Tolerated well  Continue flomax and proscar        TheHistory and Physical exam, radiologic and laboratory findings have all been discussed with Dr Dr. Leyva who agrees with the assessment and plan as described above.   signed by PATRICA Maloney on 7/26/2024 at 12:34 PM

## 2024-07-26 NOTE — PROGRESS NOTES
Hospitalist Daily Progress Note  Name: Remy Upton  Age: 73 y.o.  Gender: male  CodeStatus: Full Code  Allergies: Statins    Chief Complaint:abnormal bloodwork (Per Skilled Facility abnormal BUN )      Primary Care Provider: Tico Rodriguez DO    InpatientTreatment Team: Treatment Team: Attending Provider: Mendoza Gonzalez MD; Consulting Physician: Malick Blair MD; Utilization Reviewer: Wyatt Ace RN; Registered Nurse: Marilyn Allen RN; : Екатерина Leos RN    Admission Date: 7/24/2024      Subjective: No chest pain, sob, nausea.  Resting in bed with friend at bedside.    Physical Exam  Vitals and nursing note reviewed.   Constitutional:       Appearance: Normal appearance.   Cardiovascular:      Rate and Rhythm: Normal rate and regular rhythm.   Pulmonary:      Effort: Pulmonary effort is normal.      Breath sounds: Normal breath sounds.   Abdominal:      General: Bowel sounds are normal.      Palpations: Abdomen is soft.   Musculoskeletal:         General: Normal range of motion.   Skin:     General: Skin is warm and dry.   Neurological:      Mental Status: He is alert and oriented to person, place, and time. Mental status is at baseline.         Medications:  Reviewed    Infusion Medications:    sodium bicarbonate 150 mEq in dextrose 5 % 1,000 mL infusion 100 mL/hr at 07/26/24 0617    sodium chloride      dextrose       Scheduled Medications:    aspirin  81 mg Oral Daily    cycloSPORINE modified  75 mg Oral BID    finasteride  5 mg Oral Daily    gabapentin  300 mg Oral BID    metoprolol succinate  50 mg Oral Daily    midodrine  5 mg Oral TID WC    pantoprazole  40 mg Oral Daily    predniSONE  5 mg Oral Daily    tamsulosin  0.4 mg Oral Nightly    rosuvastatin  10 mg Oral Daily    mycophenolate  750 mg Oral BID    micafungin  100 mg IntraVENous Daily    sodium chloride flush  5-40 mL IntraVENous 2 times per day    heparin (porcine)  5,000 Units SubCUTAneous 3 times per day     modulation, iterative reconstruction, and/or weight based dosing when appropriate to reduce radiation dose to as low as reasonably  achievable.    FINDINGS:    Lungs: The area of pleural-based opacity in the left apex is clear. The areas of reticular nodular infiltrate in the right lower lobe has resolved. There is some chronic pleural-based soft tissue density posteriorly on the right with  pleural reaction consistent with an area of rounded atelectasis. The area of reticulonodular infiltrate at the left base has also improved. Residual density may reflect some underlying chronic atelectatic change at this area.    Pleura: Right pleural thickening with atelectasis.    Heart and mediastinum:Heart, mediastinum and gisell are unremarkable.    Chest wall and lower neck:Normal    Vessels:Thoracic aorta is intact.    Bones:Normal    Impression  SIGNIFICANT CLEARING OF BILATERAL INFILTRATES. PROBABLE CHRONIC ATELECTATIC CHANGES AT THE LUNG BASES RIGHT GREATER THAN LEFT.      CXR      2-view: Results for orders placed during the hospital encounter of 07/17/19    XR CHEST STANDARD (2 VW)    Narrative  XR CHEST (2 VW)    Exam Date/Time:  7/17/2019 12:45 PM  Clinical History:   CP off and on x 3 days; cough x 4 weeks  Comparison:  4/5/2018    RESULT:    Lines, tubes, and devices:  None.    Lungs and pleura:  No consolidation. No pleural effusion. No pneumothorax. Mild prominence of the perihilar interstitial markings, similar to some prior radiographs.    Cardiomediastinal silhouette:  Stable mildly enlarged.    Other:  No acute osseous findings.    Impression  Mild pulmonary interstitial edema, with at least some chronic component, but appears worsened from most recent prior. No focal consolidation.      Results for orders placed during the hospital encounter of 04/05/18    XR CHEST STANDARD (2 VW)    Narrative  EXAMINATION: XR CHEST (2 VW)    CLINICAL HISTORY: J18.9 Pneumonia of right lung due to infectious organism,

## 2024-07-27 LAB
ALBUMIN SERPL-MCNC: 3 G/DL (ref 3.5–4.6)
ANION GAP SERPL CALCULATED.3IONS-SCNC: 15 MEQ/L (ref 9–15)
BACTERIA UR CULT: ABNORMAL
BACTERIA UR CULT: ABNORMAL
BASOPHILS # BLD: 0 K/UL (ref 0–0.2)
BASOPHILS NFR BLD: 0.2 %
BUN SERPL-MCNC: 98 MG/DL (ref 8–23)
CALCIUM SERPL-MCNC: 8.5 MG/DL (ref 8.5–9.9)
CHLORIDE SERPL-SCNC: 102 MEQ/L (ref 95–107)
CO2 SERPL-SCNC: 23 MEQ/L (ref 20–31)
CREAT SERPL-MCNC: 3.41 MG/DL (ref 0.7–1.2)
EOSINOPHIL # BLD: 0.2 K/UL (ref 0–0.7)
EOSINOPHIL NFR BLD: 2.1 %
ERYTHROCYTE [DISTWIDTH] IN BLOOD BY AUTOMATED COUNT: 15.9 % (ref 11.5–14.5)
GLUCOSE BLD-MCNC: 226 MG/DL (ref 70–99)
GLUCOSE BLD-MCNC: 264 MG/DL (ref 70–99)
GLUCOSE BLD-MCNC: 285 MG/DL (ref 70–99)
GLUCOSE SERPL-MCNC: 205 MG/DL (ref 70–99)
HCT VFR BLD AUTO: 26.9 % (ref 42–52)
HGB BLD-MCNC: 8.5 G/DL (ref 14–18)
LYMPHOCYTES # BLD: 0.5 K/UL (ref 1–4.8)
LYMPHOCYTES NFR BLD: 6.5 %
MCH RBC QN AUTO: 27.6 PG (ref 27–31.3)
MCHC RBC AUTO-ENTMCNC: 31.6 % (ref 33–37)
MCV RBC AUTO: 87.3 FL (ref 79–92.2)
MONOCYTES # BLD: 0.4 K/UL (ref 0.2–0.8)
MONOCYTES NFR BLD: 5.4 %
NEUTROPHILS # BLD: 6.9 K/UL (ref 1.4–6.5)
NEUTS SEG NFR BLD: 85.3 %
ORGANISM: ABNORMAL
PERFORMED ON: ABNORMAL
PHOSPHATE SERPL-MCNC: 4.2 MG/DL (ref 2.3–4.8)
PLATELET # BLD AUTO: 164 K/UL (ref 130–400)
POTASSIUM SERPL-SCNC: 3.8 MEQ/L (ref 3.4–4.9)
RBC # BLD AUTO: 3.08 M/UL (ref 4.7–6.1)
SODIUM SERPL-SCNC: 140 MEQ/L (ref 135–144)
WBC # BLD AUTO: 8.1 K/UL (ref 4.8–10.8)

## 2024-07-27 PROCEDURE — 2580000003 HC RX 258: Performed by: INTERNAL MEDICINE

## 2024-07-27 PROCEDURE — 6370000000 HC RX 637 (ALT 250 FOR IP): Performed by: INTERNAL MEDICINE

## 2024-07-27 PROCEDURE — 36415 COLL VENOUS BLD VENIPUNCTURE: CPT

## 2024-07-27 PROCEDURE — 6360000002 HC RX W HCPCS: Performed by: INTERNAL MEDICINE

## 2024-07-27 PROCEDURE — 6360000002 HC RX W HCPCS: Performed by: FAMILY MEDICINE

## 2024-07-27 PROCEDURE — 1210000000 HC MED SURG R&B

## 2024-07-27 PROCEDURE — 2580000003 HC RX 258: Performed by: FAMILY MEDICINE

## 2024-07-27 PROCEDURE — 85025 COMPLETE CBC W/AUTO DIFF WBC: CPT

## 2024-07-27 PROCEDURE — 87086 URINE CULTURE/COLONY COUNT: CPT

## 2024-07-27 PROCEDURE — 80069 RENAL FUNCTION PANEL: CPT

## 2024-07-27 RX ORDER — METOCLOPRAMIDE 5 MG/1
5 TABLET ORAL
Status: DISCONTINUED | OUTPATIENT
Start: 2024-07-28 | End: 2024-07-31 | Stop reason: HOSPADM

## 2024-07-27 RX ADMIN — PREDNISONE 5 MG: 5 TABLET ORAL at 09:24

## 2024-07-27 RX ADMIN — INSULIN LISPRO 4 UNITS: 100 INJECTION, SOLUTION INTRAVENOUS; SUBCUTANEOUS at 17:12

## 2024-07-27 RX ADMIN — INSULIN LISPRO 4 UNITS: 100 INJECTION, SOLUTION INTRAVENOUS; SUBCUTANEOUS at 12:12

## 2024-07-27 RX ADMIN — CYCLOSPORINE 75 MG: 25 CAPSULE, LIQUID FILLED ORAL at 21:17

## 2024-07-27 RX ADMIN — FINASTERIDE 5 MG: 5 TABLET, FILM COATED ORAL at 09:24

## 2024-07-27 RX ADMIN — MYCOPHENOLATE MOFETIL 750 MG: 250 CAPSULE ORAL at 21:16

## 2024-07-27 RX ADMIN — CYCLOSPORINE 75 MG: 25 CAPSULE, LIQUID FILLED ORAL at 09:24

## 2024-07-27 RX ADMIN — PANTOPRAZOLE SODIUM 40 MG: 40 TABLET, DELAYED RELEASE ORAL at 09:24

## 2024-07-27 RX ADMIN — ACETAMINOPHEN 325MG 650 MG: 325 TABLET ORAL at 05:55

## 2024-07-27 RX ADMIN — INSULIN LISPRO 2 UNITS: 100 INJECTION, SOLUTION INTRAVENOUS; SUBCUTANEOUS at 09:23

## 2024-07-27 RX ADMIN — MICAFUNGIN SODIUM 100 MG: 100 INJECTION, POWDER, LYOPHILIZED, FOR SOLUTION INTRAVENOUS at 21:15

## 2024-07-27 RX ADMIN — Medication 10 ML: at 21:20

## 2024-07-27 RX ADMIN — TAMSULOSIN HYDROCHLORIDE 0.4 MG: 0.4 CAPSULE ORAL at 21:17

## 2024-07-27 RX ADMIN — METOPROLOL SUCCINATE 50 MG: 50 TABLET, EXTENDED RELEASE ORAL at 09:24

## 2024-07-27 RX ADMIN — MYCOPHENOLATE MOFETIL 750 MG: 250 CAPSULE ORAL at 09:28

## 2024-07-27 RX ADMIN — CEFTRIAXONE SODIUM 1000 MG: 1 INJECTION, POWDER, FOR SOLUTION INTRAMUSCULAR; INTRAVENOUS at 00:58

## 2024-07-27 RX ADMIN — GABAPENTIN 300 MG: 300 CAPSULE ORAL at 21:17

## 2024-07-27 RX ADMIN — ASPIRIN 81 MG: 81 TABLET, COATED ORAL at 09:24

## 2024-07-27 RX ADMIN — HEPARIN SODIUM 5000 UNITS: 5000 INJECTION INTRAVENOUS; SUBCUTANEOUS at 05:55

## 2024-07-27 RX ADMIN — HEPARIN SODIUM 5000 UNITS: 5000 INJECTION INTRAVENOUS; SUBCUTANEOUS at 21:22

## 2024-07-27 RX ADMIN — GABAPENTIN 300 MG: 300 CAPSULE ORAL at 09:24

## 2024-07-27 ASSESSMENT — PAIN SCALES - GENERAL: PAINLEVEL_OUTOF10: 8

## 2024-07-27 NOTE — PROGRESS NOTES
Hospitalist Daily Progress Note  Name: Remy Upton  Age: 73 y.o.  Gender: male  CodeStatus: Full Code  Allergies: Statins    Chief Complaint:abnormal bloodwork (Per Skilled Facility abnormal BUN )      Primary Care Provider: Tico Rodriguez DO    InpatientTreatment Team: Treatment Team: Attending Provider: Mendoza Gonzalez MD; Consulting Physician: Malick Blair MD; Utilization Reviewer: Wyatt Ace RN; : Екатерина Leos RN; Consulting Physician: Hunter Leyva MD; : Mariama Chen RN; Registered Nurse: Anna Wahl RN; Utilization Reviewer: Shiloh Bower RN    Admission Date: 7/24/2024      Subjective: No chest pain, sob, nausea.  Resting in bed with  at bedside.    Physical Exam  Vitals and nursing note reviewed.   Constitutional:       Appearance: Normal appearance.   Cardiovascular:      Rate and Rhythm: Normal rate and regular rhythm.   Pulmonary:      Effort: Pulmonary effort is normal.      Breath sounds: Normal breath sounds.   Abdominal:      General: Bowel sounds are normal.      Palpations: Abdomen is soft.   Musculoskeletal:         General: Normal range of motion.   Skin:     General: Skin is warm and dry.   Neurological:      Mental Status: He is alert and oriented to person, place, and time. Mental status is at baseline.         Medications:  Reviewed    Infusion Medications:    sodium chloride      dextrose       Scheduled Medications:    aspirin  81 mg Oral Daily    cycloSPORINE modified  75 mg Oral BID    finasteride  5 mg Oral Daily    gabapentin  300 mg Oral BID    metoprolol succinate  50 mg Oral Daily    midodrine  5 mg Oral TID WC    pantoprazole  40 mg Oral Daily    predniSONE  5 mg Oral Daily    tamsulosin  0.4 mg Oral Nightly    [Held by provider] rosuvastatin  10 mg Oral Daily    mycophenolate  750 mg Oral BID    micafungin  100 mg IntraVENous Daily    sodium chloride flush  5-40 mL IntraVENous 2 times per day     of right lung due to infectious organism, unspecified part of lung ICD10, follow-up    COMPARISONS: March 19, 2018    FINDINGS:    Two views of the chest are submitted.  The cardiac silhouette is enlarged..  The mediastinum is unremarkable.  Pulmonary vascular unremarkable.  Right sided trachea. Small areas atelectasis both bases.  There is been interval improvement in the bibasilar areas of scattered groundglass infiltrates as compared to the prior examination..  No effusions.  No Pneumothoraces. There is still some blunting of the costophrenic angles posteriorly which could be  scarring or trace bilateral pleural effusions. Unchanged    Impression  INTERVAL IMPROVEMENT IN THE BIBASILAR AREAS OF SCATTERED GROUNDGLASS INFILTRATES AS COMPARED TO THE PRIOR EXAMINATION       Portable: Results for orders placed during the hospital encounter of 10/12/22    XR CHEST PORTABLE    Narrative  EXAMINATION:  ONE XRAY VIEW OF THE CHEST    10/12/2022 2:58 pm    COMPARISON:  09/14/2022    HISTORY:  ORDERING SYSTEM PROVIDED HISTORY: sob and shoulder pain  TECHNOLOGIST PROVIDED HISTORY:  Reason for exam:->sob and shoulder pain  What reading provider will be dictating this exam?->CRC    FINDINGS:  The lungs are without acute focal process.  There is no effusion or  pneumothorax. The cardiomediastinal silhouette is without acute process.  Heart upper limits of normal in size.  The osseous structures are without  acute process.    Impression  No acute process.      Echo No results found for this or any previous visit.            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

## 2024-07-27 NOTE — PROGRESS NOTES
Patients glucose at 2030 was 361. 4 units Humalog given. JH Rahman notified. No further orders at this time.

## 2024-07-27 NOTE — PLAN OF CARE
Problem: Discharge Planning  Goal: Discharge to home or other facility with appropriate resources  7/27/2024 1046 by Anna Wahl RN  Outcome: Progressing  7/26/2024 2226 by Nereida Parsons RN  Outcome: Progressing     Problem: Safety - Adult  Goal: Free from fall injury  7/27/2024 1046 by Anna Wahl RN  Outcome: Progressing  7/26/2024 2226 by Nereida Parsons RN  Outcome: Progressing     Problem: ABCDS Injury Assessment  Goal: Absence of physical injury  7/27/2024 1046 by Anna Wahl RN  Outcome: Progressing  7/26/2024 2226 by Nereida Parsons RN  Outcome: Progressing     Problem: Skin/Tissue Integrity  Goal: Absence of new skin breakdown  Description: 1.  Monitor for areas of redness and/or skin breakdown  2.  Assess vascular access sites hourly  3.  Every 4-6 hours minimum:  Change oxygen saturation probe site  4.  Every 4-6 hours:  If on nasal continuous positive airway pressure, respiratory therapy assess nares and determine need for appliance change or resting period.  7/27/2024 1046 by Anna Wahl RN  Outcome: Progressing  7/26/2024 2226 by Nereida Parsons RN  Outcome: Progressing     Problem: Chronic Conditions and Co-morbidities  Goal: Patient's chronic conditions and co-morbidity symptoms are monitored and maintained or improved  7/27/2024 1046 by Anna Wahl RN  Outcome: Progressing  7/26/2024 2226 by Nereida Parsons RN  Outcome: Progressing

## 2024-07-27 NOTE — PROGRESS NOTES
Renal Progress Note  Assessment:  73 y.o. male with history s/f ESRD s/p KT x2, T2DM, HTN, seizures who presented from SNF for abnormal labs. Had hyperkalemia, azotemia, worsening renal function.      ESRD s/p 2 KTs (2nd at  on 11/8/2015)  - allograft function: has DELORES on CKD stage III/IV: possibly 2/2 urinary retention (noted on transplant renal U/S) +/- volume depletion vs. Rhabdomyolysis vs. Transplant related, not hypotensive but tachycardic on presentation  - IS: cyclosporine 75 mg BID, cellcept 750 mg BID, prednisone 5 mg daily   Rhabdomyolysis: on crestor as outpatient   Hyperkalemia   Metabolic acidosis  Elevated liver enzymes  Yeast UTI      Plan:  - change fluids to LR at 75 ml/hr for today and then stop  - means catheter associated with significant improvement in creatinine from 4-3.4  - consult urology for urinary retention  - keep holding crestor for now   - continue gabapentin 300 mg daily   Over the weekend coverage patient is clinically stable hemodynamically with no associated electrolyte or acid-base imbalance and improvement in his GFR no clinical or laboratory indication to change to current line of management will continue to follow will check UA for C&S    Patient Active Problem List:     Pneumonia     Abdominal pain, other specified site     Acute pyelonephritis without lesion of renal medullary necrosis     Anemia     Essential hypertension     Nonspecific abnormal results of thyroid function study     Mixed hyperlipidemia     Kidney replaced by transplant     Intra-abdominal abscess post-procedure     Infection due to Enterococcus     Fever and other physiologic disturbances of temperature regulation     Chronic kidney disease (CKD)     Diabetes mellitus type II, controlled (HCC)     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     Anginal chest pain at rest (HCC)     Atypical chest pain     Chronic cough     Unstable angina (HCC)     Chest pain     Atrial  fibrillation (HCC)     Symptomatic anemia     Acute upper GI bleed     Dieulafoy lesion of colon     Poorly controlled diabetes mellitus (HCC)     Lung nodules     COPD without exacerbation (HCC)     Abnormal CT of the chest     Bronchiectasis without complication (HCC)     GI bleeding     Complication of transplanted kidney     Muscle weakness (generalized)     Difficulty in walking     Elevated BUN     Urinary retention      Subjective:   Admit Date: 7/24/2024    Interval History: Denies any uremic related or fluid volume overload related symptoms expresses concern about high constant blood sugar recently diagnosed with UTI    Medications:   Scheduled Meds:   aspirin  81 mg Oral Daily    cycloSPORINE modified  75 mg Oral BID    finasteride  5 mg Oral Daily    gabapentin  300 mg Oral BID    metoprolol succinate  50 mg Oral Daily    midodrine  5 mg Oral TID WC    pantoprazole  40 mg Oral Daily    predniSONE  5 mg Oral Daily    tamsulosin  0.4 mg Oral Nightly    [Held by provider] rosuvastatin  10 mg Oral Daily    mycophenolate  750 mg Oral BID    micafungin  100 mg IntraVENous Daily    sodium chloride flush  5-40 mL IntraVENous 2 times per day    heparin (porcine)  5,000 Units SubCUTAneous 3 times per day    cefTRIAXone (ROCEPHIN) IV  1,000 mg IntraVENous Q24H    insulin lispro  0-8 Units SubCUTAneous TID WC    insulin lispro  0-4 Units SubCUTAneous Nightly     Continuous Infusions:   sodium chloride      dextrose         CBC:   Recent Labs     07/26/24  0526 07/27/24  0545   WBC 8.1 8.1   HGB 8.7* 8.5*    164     CMP:    Recent Labs     07/25/24  0625 07/26/24  0526 07/27/24  0545     140 141 140   K 4.3  4.3 3.6 3.8     106 103 102   CO2 16*  16* 20 23   *  120* 108* 98*   CREATININE 4.41*  4.42* 4.01* 3.41*   GLUCOSE 261*  272* 236* 205*   CALCIUM 8.9  9.2 8.8 8.5   LABGLOM 13.4*  13.4* 15.1* 18.3*     Troponin: No results for input(s): \"TROPONINI\" in the last 72 hours.  BNP: No

## 2024-07-28 LAB
ALBUMIN SERPL-MCNC: 2.9 G/DL (ref 3.5–4.6)
ANION GAP SERPL CALCULATED.3IONS-SCNC: 16 MEQ/L (ref 9–15)
BASOPHILS # BLD: 0 K/UL (ref 0–0.2)
BASOPHILS NFR BLD: 0.4 %
BUN SERPL-MCNC: 95 MG/DL (ref 8–23)
CALCIUM SERPL-MCNC: 8.8 MG/DL (ref 8.5–9.9)
CHLORIDE SERPL-SCNC: 104 MEQ/L (ref 95–107)
CO2 SERPL-SCNC: 21 MEQ/L (ref 20–31)
CREAT SERPL-MCNC: 2.86 MG/DL (ref 0.7–1.2)
EOSINOPHIL # BLD: 0.2 K/UL (ref 0–0.7)
EOSINOPHIL NFR BLD: 2.3 %
ERYTHROCYTE [DISTWIDTH] IN BLOOD BY AUTOMATED COUNT: 15.7 % (ref 11.5–14.5)
GLUCOSE BLD-MCNC: 167 MG/DL (ref 70–99)
GLUCOSE BLD-MCNC: 234 MG/DL (ref 70–99)
GLUCOSE BLD-MCNC: 259 MG/DL (ref 70–99)
GLUCOSE BLD-MCNC: 325 MG/DL (ref 70–99)
GLUCOSE SERPL-MCNC: 195 MG/DL (ref 70–99)
HCT VFR BLD AUTO: 25.8 % (ref 42–52)
HGB BLD-MCNC: 8.3 G/DL (ref 14–18)
LYMPHOCYTES # BLD: 0.5 K/UL (ref 1–4.8)
LYMPHOCYTES NFR BLD: 6.4 %
MCH RBC QN AUTO: 28.4 PG (ref 27–31.3)
MCHC RBC AUTO-ENTMCNC: 32.2 % (ref 33–37)
MCV RBC AUTO: 88.4 FL (ref 79–92.2)
MONOCYTES # BLD: 0.5 K/UL (ref 0.2–0.8)
MONOCYTES NFR BLD: 6.4 %
NEUTROPHILS # BLD: 6.2 K/UL (ref 1.4–6.5)
NEUTS SEG NFR BLD: 84.1 %
PERFORMED ON: ABNORMAL
PHOSPHATE SERPL-MCNC: 4.2 MG/DL (ref 2.3–4.8)
PLATELET # BLD AUTO: 160 K/UL (ref 130–400)
POTASSIUM SERPL-SCNC: 4.1 MEQ/L (ref 3.4–4.9)
RBC # BLD AUTO: 2.92 M/UL (ref 4.7–6.1)
SODIUM SERPL-SCNC: 141 MEQ/L (ref 135–144)
WBC # BLD AUTO: 7.4 K/UL (ref 4.8–10.8)

## 2024-07-28 PROCEDURE — 2580000003 HC RX 258: Performed by: FAMILY MEDICINE

## 2024-07-28 PROCEDURE — 85025 COMPLETE CBC W/AUTO DIFF WBC: CPT

## 2024-07-28 PROCEDURE — 2580000003 HC RX 258: Performed by: INTERNAL MEDICINE

## 2024-07-28 PROCEDURE — 6370000000 HC RX 637 (ALT 250 FOR IP): Performed by: INTERNAL MEDICINE

## 2024-07-28 PROCEDURE — 80069 RENAL FUNCTION PANEL: CPT

## 2024-07-28 PROCEDURE — 6360000002 HC RX W HCPCS: Performed by: INTERNAL MEDICINE

## 2024-07-28 PROCEDURE — 36415 COLL VENOUS BLD VENIPUNCTURE: CPT

## 2024-07-28 PROCEDURE — 1210000000 HC MED SURG R&B

## 2024-07-28 PROCEDURE — 6360000002 HC RX W HCPCS: Performed by: FAMILY MEDICINE

## 2024-07-28 PROCEDURE — 6370000000 HC RX 637 (ALT 250 FOR IP): Performed by: FAMILY MEDICINE

## 2024-07-28 RX ORDER — GABAPENTIN 300 MG/1
300 CAPSULE ORAL EVERY 12 HOURS
Status: DISCONTINUED | OUTPATIENT
Start: 2024-07-28 | End: 2024-07-28

## 2024-07-28 RX ORDER — GABAPENTIN 300 MG/1
300 CAPSULE ORAL EVERY 12 HOURS
Status: DISCONTINUED | OUTPATIENT
Start: 2024-07-29 | End: 2024-07-28

## 2024-07-28 RX ORDER — GABAPENTIN 300 MG/1
300 CAPSULE ORAL EVERY 12 HOURS
Status: DISCONTINUED | OUTPATIENT
Start: 2024-07-28 | End: 2024-07-31 | Stop reason: HOSPADM

## 2024-07-28 RX ADMIN — GABAPENTIN 300 MG: 300 CAPSULE ORAL at 08:41

## 2024-07-28 RX ADMIN — HEPARIN SODIUM 5000 UNITS: 5000 INJECTION INTRAVENOUS; SUBCUTANEOUS at 15:00

## 2024-07-28 RX ADMIN — MYCOPHENOLATE MOFETIL 750 MG: 250 CAPSULE ORAL at 08:39

## 2024-07-28 RX ADMIN — MICAFUNGIN SODIUM 100 MG: 100 INJECTION, POWDER, LYOPHILIZED, FOR SOLUTION INTRAVENOUS at 21:18

## 2024-07-28 RX ADMIN — INSULIN LISPRO 4 UNITS: 100 INJECTION, SOLUTION INTRAVENOUS; SUBCUTANEOUS at 21:28

## 2024-07-28 RX ADMIN — Medication 10 ML: at 08:41

## 2024-07-28 RX ADMIN — METOCLOPRAMIDE 5 MG: 5 TABLET ORAL at 12:14

## 2024-07-28 RX ADMIN — INSULIN LISPRO 4 UNITS: 100 INJECTION, SOLUTION INTRAVENOUS; SUBCUTANEOUS at 17:25

## 2024-07-28 RX ADMIN — PREDNISONE 5 MG: 5 TABLET ORAL at 08:40

## 2024-07-28 RX ADMIN — GABAPENTIN 300 MG: 300 CAPSULE ORAL at 21:23

## 2024-07-28 RX ADMIN — METOCLOPRAMIDE 5 MG: 5 TABLET ORAL at 06:05

## 2024-07-28 RX ADMIN — METOPROLOL SUCCINATE 50 MG: 50 TABLET, EXTENDED RELEASE ORAL at 08:40

## 2024-07-28 RX ADMIN — ASPIRIN 81 MG: 81 TABLET, COATED ORAL at 08:40

## 2024-07-28 RX ADMIN — CEFTRIAXONE SODIUM 1000 MG: 1 INJECTION, POWDER, FOR SOLUTION INTRAMUSCULAR; INTRAVENOUS at 00:41

## 2024-07-28 RX ADMIN — HEPARIN SODIUM 5000 UNITS: 5000 INJECTION INTRAVENOUS; SUBCUTANEOUS at 21:30

## 2024-07-28 RX ADMIN — INSULIN LISPRO 2 UNITS: 100 INJECTION, SOLUTION INTRAVENOUS; SUBCUTANEOUS at 12:14

## 2024-07-28 RX ADMIN — CYCLOSPORINE 75 MG: 25 CAPSULE, LIQUID FILLED ORAL at 08:40

## 2024-07-28 RX ADMIN — MIDODRINE HYDROCHLORIDE 5 MG: 5 TABLET ORAL at 08:41

## 2024-07-28 RX ADMIN — MYCOPHENOLATE MOFETIL 750 MG: 250 CAPSULE ORAL at 21:23

## 2024-07-28 RX ADMIN — PANTOPRAZOLE SODIUM 40 MG: 40 TABLET, DELAYED RELEASE ORAL at 08:40

## 2024-07-28 RX ADMIN — FINASTERIDE 5 MG: 5 TABLET, FILM COATED ORAL at 08:40

## 2024-07-28 RX ADMIN — HEPARIN SODIUM 5000 UNITS: 5000 INJECTION INTRAVENOUS; SUBCUTANEOUS at 06:05

## 2024-07-28 RX ADMIN — METOCLOPRAMIDE 5 MG: 5 TABLET ORAL at 16:42

## 2024-07-28 RX ADMIN — TAMSULOSIN HYDROCHLORIDE 0.4 MG: 0.4 CAPSULE ORAL at 21:23

## 2024-07-28 RX ADMIN — MIDODRINE HYDROCHLORIDE 5 MG: 5 TABLET ORAL at 16:42

## 2024-07-28 RX ADMIN — CYCLOSPORINE 75 MG: 25 CAPSULE, LIQUID FILLED ORAL at 21:27

## 2024-07-28 NOTE — PROGRESS NOTES
Hospitalist Daily Progress Note  Name: Remy Upton  Age: 73 y.o.  Gender: male  CodeStatus: Full Code  Allergies: Statins    Chief Complaint:abnormal bloodwork (Per Skilled Facility abnormal BUN )      Primary Care Provider: Tico Rodriguez DO    InpatientTreatment Team: Treatment Team: Attending Provider: Mendoza Gonzalez MD; Consulting Physician: Malick Blair MD; Utilization Reviewer: Wyatt Ace RN; Consulting Physician: Hunter Leyva MD; Registered Nurse: Margo Peraza RN    Admission Date: 7/24/2024      Subjective: No chest pain, sob, nausea.  Resting in bed with wife at bedside.    Physical Exam  Vitals and nursing note reviewed.   Constitutional:       Appearance: Normal appearance.   Cardiovascular:      Rate and Rhythm: Normal rate and regular rhythm.   Pulmonary:      Effort: Pulmonary effort is normal.      Breath sounds: Normal breath sounds.   Abdominal:      General: Bowel sounds are normal.      Palpations: Abdomen is soft.   Musculoskeletal:         General: Normal range of motion.   Skin:     General: Skin is warm and dry.   Neurological:      Mental Status: He is alert and oriented to person, place, and time. Mental status is at baseline.         Medications:  Reviewed    Infusion Medications:    sodium chloride      dextrose       Scheduled Medications:    gabapentin  300 mg Oral Q12H    metoclopramide  5 mg Oral TID AC    aspirin  81 mg Oral Daily    cycloSPORINE modified  75 mg Oral BID    finasteride  5 mg Oral Daily    metoprolol succinate  50 mg Oral Daily    midodrine  5 mg Oral TID WC    pantoprazole  40 mg Oral Daily    predniSONE  5 mg Oral Daily    tamsulosin  0.4 mg Oral Nightly    [Held by provider] rosuvastatin  10 mg Oral Daily    mycophenolate  750 mg Oral BID    micafungin  100 mg IntraVENous Daily    sodium chloride flush  5-40 mL IntraVENous 2 times per day    heparin (porcine)  5,000 Units SubCUTAneous 3 times per day    cefTRIAXone (ROCEPHIN) IV

## 2024-07-28 NOTE — PROGRESS NOTES
Renal Progress Note  Assessment:  73 y.o. male with history s/f ESRD s/p KT x2, T2DM, HTN, seizures who presented from SNF for abnormal labs. Had hyperkalemia, azotemia, worsening renal function.      ESRD s/p 2 KTs (2nd at  on 11/8/2015)  - allograft function: has DELORES on CKD stage III/IV: possibly 2/2 urinary retention (noted on transplant renal U/S) +/- volume depletion vs. Rhabdomyolysis vs. Transplant related, not hypotensive but tachycardic on presentation  - IS: cyclosporine 75 mg BID, cellcept 750 mg BID, prednisone 5 mg daily   Rhabdomyolysis: on crestor as outpatient   Hyperkalemia   Metabolic acidosis  Elevated liver enzymes  Yeast UTI      Plan:  - change fluids to LR at 75 ml/hr for today and then stop  - means catheter associated with significant improvement in creatinine from 4-3.4  - consult urology for urinary retention  - keep holding crestor for now   - continue gabapentin 300 mg daily   Continue to get better clinical and laboratory patient can be discharged to follow-up outpatient tomorrow if continues to be stable and improving    Patient Active Problem List:     Pneumonia     Abdominal pain, other specified site     Acute pyelonephritis without lesion of renal medullary necrosis     Anemia     Essential hypertension     Nonspecific abnormal results of thyroid function study     Mixed hyperlipidemia     Kidney replaced by transplant     Intra-abdominal abscess post-procedure     Infection due to Enterococcus     Fever and other physiologic disturbances of temperature regulation     Chronic kidney disease (CKD)     Diabetes mellitus type II, controlled (HCC)     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     Anginal chest pain at rest (HCC)     Atypical chest pain     Chronic cough     Unstable angina (HCC)     Chest pain     Atrial fibrillation (HCC)     Symptomatic anemia     Acute upper GI bleed     Dieulafoy lesion of colon     Poorly controlled diabetes  mellitus (HCC)     Lung nodules     COPD without exacerbation (HCC)     Abnormal CT of the chest     Bronchiectasis without complication (HCC)     GI bleeding     Complication of transplanted kidney     Muscle weakness (generalized)     Difficulty in walking     Elevated BUN     Urinary retention      Subjective:   Admit Date: 7/24/2024    Interval History: Alert oriented follow commands seen and examined denied any uremic related or fluid volume overload related symptoms      Medications:   Scheduled Meds:   metoclopramide  5 mg Oral TID AC    aspirin  81 mg Oral Daily    cycloSPORINE modified  75 mg Oral BID    finasteride  5 mg Oral Daily    gabapentin  300 mg Oral BID    metoprolol succinate  50 mg Oral Daily    midodrine  5 mg Oral TID WC    pantoprazole  40 mg Oral Daily    predniSONE  5 mg Oral Daily    tamsulosin  0.4 mg Oral Nightly    [Held by provider] rosuvastatin  10 mg Oral Daily    mycophenolate  750 mg Oral BID    micafungin  100 mg IntraVENous Daily    sodium chloride flush  5-40 mL IntraVENous 2 times per day    heparin (porcine)  5,000 Units SubCUTAneous 3 times per day    cefTRIAXone (ROCEPHIN) IV  1,000 mg IntraVENous Q24H    insulin lispro  0-8 Units SubCUTAneous TID WC    insulin lispro  0-4 Units SubCUTAneous Nightly     Continuous Infusions:   sodium chloride      dextrose         CBC:   Recent Labs     07/27/24  0545 07/28/24  0613   WBC 8.1 7.4   HGB 8.5* 8.3*    160     CMP:    Recent Labs     07/26/24  0526 07/27/24  0545 07/28/24  0612    140 141   K 3.6 3.8 4.1    102 104   CO2 20 23 21   * 98* 95*   CREATININE 4.01* 3.41* 2.86*   GLUCOSE 236* 205* 195*   CALCIUM 8.8 8.5 8.8   LABGLOM 15.1* 18.3* 22.6*     Troponin: No results for input(s): \"TROPONINI\" in the last 72 hours.  BNP: No results for input(s): \"BNP\" in the last 72 hours.  INR: No results for input(s): \"INR\" in the last 72 hours.  Lipids: No results for input(s): \"CHOL\", \"LDLDIRECT\", \"TRIG\", \"HDL\",

## 2024-07-28 NOTE — FLOWSHEET NOTE
Patient helped repositioned in bed per his level of comfort.his breathing is unlabored,denies pain,iv have good blood return and flushe without resistance.    3:22 He is soundly asleep,his respirations were even and effortless,heart rate 90 and Afib.    6:05 he was easily arousable for his  heparin injection,,no respiratory distress,call light within his easy reach.

## 2024-07-29 LAB
ALBUMIN SERPL-MCNC: 3 G/DL (ref 3.5–4.6)
ANION GAP SERPL CALCULATED.3IONS-SCNC: 18 MEQ/L (ref 9–15)
BACTERIA UR CULT: NORMAL
BASOPHILS # BLD: 0 K/UL (ref 0–0.2)
BASOPHILS NFR BLD: 0.4 %
BUN SERPL-MCNC: 91 MG/DL (ref 8–23)
CALCIUM SERPL-MCNC: 8.3 MG/DL (ref 8.5–9.9)
CHLORIDE SERPL-SCNC: 106 MEQ/L (ref 95–107)
CO2 SERPL-SCNC: 20 MEQ/L (ref 20–31)
CREAT SERPL-MCNC: 2.81 MG/DL (ref 0.7–1.2)
EOSINOPHIL # BLD: 0.2 K/UL (ref 0–0.7)
EOSINOPHIL NFR BLD: 2.2 %
ERYTHROCYTE [DISTWIDTH] IN BLOOD BY AUTOMATED COUNT: 15.4 % (ref 11.5–14.5)
GLUCOSE BLD-MCNC: 188 MG/DL (ref 70–99)
GLUCOSE BLD-MCNC: 255 MG/DL (ref 70–99)
GLUCOSE BLD-MCNC: 268 MG/DL (ref 70–99)
GLUCOSE BLD-MCNC: 283 MG/DL (ref 70–99)
GLUCOSE SERPL-MCNC: 187 MG/DL (ref 70–99)
HCT VFR BLD AUTO: 25.9 % (ref 42–52)
HGB BLD-MCNC: 8.2 G/DL (ref 14–18)
LYMPHOCYTES # BLD: 0.6 K/UL (ref 1–4.8)
LYMPHOCYTES NFR BLD: 7.8 %
MCH RBC QN AUTO: 28.1 PG (ref 27–31.3)
MCHC RBC AUTO-ENTMCNC: 31.7 % (ref 33–37)
MCV RBC AUTO: 88.7 FL (ref 79–92.2)
MONOCYTES # BLD: 0.5 K/UL (ref 0.2–0.8)
MONOCYTES NFR BLD: 6.4 %
NEUTROPHILS # BLD: 6 K/UL (ref 1.4–6.5)
NEUTS SEG NFR BLD: 82.8 %
PERFORMED ON: ABNORMAL
PHOSPHATE SERPL-MCNC: 3.9 MG/DL (ref 2.3–4.8)
PLATELET # BLD AUTO: 160 K/UL (ref 130–400)
POTASSIUM SERPL-SCNC: 4.1 MEQ/L (ref 3.4–4.9)
RBC # BLD AUTO: 2.92 M/UL (ref 4.7–6.1)
SODIUM SERPL-SCNC: 144 MEQ/L (ref 135–144)
WBC # BLD AUTO: 7.3 K/UL (ref 4.8–10.8)

## 2024-07-29 PROCEDURE — 2580000003 HC RX 258: Performed by: FAMILY MEDICINE

## 2024-07-29 PROCEDURE — 85025 COMPLETE CBC W/AUTO DIFF WBC: CPT

## 2024-07-29 PROCEDURE — 6360000002 HC RX W HCPCS: Performed by: FAMILY MEDICINE

## 2024-07-29 PROCEDURE — 6370000000 HC RX 637 (ALT 250 FOR IP): Performed by: INTERNAL MEDICINE

## 2024-07-29 PROCEDURE — 6360000002 HC RX W HCPCS: Performed by: INTERNAL MEDICINE

## 2024-07-29 PROCEDURE — 1210000000 HC MED SURG R&B

## 2024-07-29 PROCEDURE — 36415 COLL VENOUS BLD VENIPUNCTURE: CPT

## 2024-07-29 PROCEDURE — 99231 SBSQ HOSP IP/OBS SF/LOW 25: CPT | Performed by: PHYSICIAN ASSISTANT

## 2024-07-29 PROCEDURE — 80069 RENAL FUNCTION PANEL: CPT

## 2024-07-29 PROCEDURE — 6370000000 HC RX 637 (ALT 250 FOR IP): Performed by: FAMILY MEDICINE

## 2024-07-29 PROCEDURE — 2580000003 HC RX 258: Performed by: INTERNAL MEDICINE

## 2024-07-29 RX ADMIN — Medication 10 ML: at 00:42

## 2024-07-29 RX ADMIN — INSULIN LISPRO 4 UNITS: 100 INJECTION, SOLUTION INTRAVENOUS; SUBCUTANEOUS at 11:52

## 2024-07-29 RX ADMIN — HEPARIN SODIUM 5000 UNITS: 5000 INJECTION INTRAVENOUS; SUBCUTANEOUS at 05:58

## 2024-07-29 RX ADMIN — MICAFUNGIN SODIUM 100 MG: 100 INJECTION, POWDER, LYOPHILIZED, FOR SOLUTION INTRAVENOUS at 20:36

## 2024-07-29 RX ADMIN — CYCLOSPORINE 75 MG: 25 CAPSULE, LIQUID FILLED ORAL at 20:22

## 2024-07-29 RX ADMIN — FINASTERIDE 5 MG: 5 TABLET, FILM COATED ORAL at 08:48

## 2024-07-29 RX ADMIN — MIDODRINE HYDROCHLORIDE 5 MG: 5 TABLET ORAL at 16:40

## 2024-07-29 RX ADMIN — MYCOPHENOLATE MOFETIL 750 MG: 250 CAPSULE ORAL at 08:48

## 2024-07-29 RX ADMIN — Medication 10 ML: at 20:23

## 2024-07-29 RX ADMIN — CEFTRIAXONE SODIUM 1000 MG: 1 INJECTION, POWDER, FOR SOLUTION INTRAMUSCULAR; INTRAVENOUS at 00:47

## 2024-07-29 RX ADMIN — TAMSULOSIN HYDROCHLORIDE 0.4 MG: 0.4 CAPSULE ORAL at 20:22

## 2024-07-29 RX ADMIN — HEPARIN SODIUM 5000 UNITS: 5000 INJECTION INTRAVENOUS; SUBCUTANEOUS at 21:43

## 2024-07-29 RX ADMIN — METOCLOPRAMIDE 5 MG: 5 TABLET ORAL at 05:58

## 2024-07-29 RX ADMIN — MYCOPHENOLATE MOFETIL 750 MG: 250 CAPSULE ORAL at 20:22

## 2024-07-29 RX ADMIN — MIDODRINE HYDROCHLORIDE 5 MG: 5 TABLET ORAL at 08:48

## 2024-07-29 RX ADMIN — PANTOPRAZOLE SODIUM 40 MG: 40 TABLET, DELAYED RELEASE ORAL at 08:47

## 2024-07-29 RX ADMIN — MIDODRINE HYDROCHLORIDE 5 MG: 5 TABLET ORAL at 11:52

## 2024-07-29 RX ADMIN — PREDNISONE 5 MG: 5 TABLET ORAL at 08:48

## 2024-07-29 RX ADMIN — METOPROLOL SUCCINATE 50 MG: 50 TABLET, EXTENDED RELEASE ORAL at 08:48

## 2024-07-29 RX ADMIN — ASPIRIN 81 MG: 81 TABLET, COATED ORAL at 08:48

## 2024-07-29 RX ADMIN — GABAPENTIN 300 MG: 300 CAPSULE ORAL at 20:22

## 2024-07-29 RX ADMIN — HEPARIN SODIUM 5000 UNITS: 5000 INJECTION INTRAVENOUS; SUBCUTANEOUS at 14:18

## 2024-07-29 RX ADMIN — Medication 10 ML: at 08:47

## 2024-07-29 RX ADMIN — INSULIN LISPRO 4 UNITS: 100 INJECTION, SOLUTION INTRAVENOUS; SUBCUTANEOUS at 16:40

## 2024-07-29 RX ADMIN — CYCLOSPORINE 75 MG: 25 CAPSULE, LIQUID FILLED ORAL at 08:48

## 2024-07-29 RX ADMIN — METOCLOPRAMIDE 5 MG: 5 TABLET ORAL at 11:52

## 2024-07-29 RX ADMIN — METOCLOPRAMIDE 5 MG: 5 TABLET ORAL at 16:40

## 2024-07-29 RX ADMIN — GABAPENTIN 300 MG: 300 CAPSULE ORAL at 08:48

## 2024-07-29 ASSESSMENT — ENCOUNTER SYMPTOMS: APNEA: 0

## 2024-07-29 NOTE — PROGRESS NOTES
Hospitalist Progress Note      PCP: Tico Rodriguez DO    Date of Admission: 7/24/2024    Chief Complaint:  no acute events, afebrile, stable HD, u/o-1000 ml    Medications:  Reviewed    Infusion Medications    sodium chloride      dextrose       Scheduled Medications    gabapentin  300 mg Oral Q12H    metoclopramide  5 mg Oral TID AC    aspirin  81 mg Oral Daily    cycloSPORINE modified  75 mg Oral BID    finasteride  5 mg Oral Daily    metoprolol succinate  50 mg Oral Daily    midodrine  5 mg Oral TID WC    pantoprazole  40 mg Oral Daily    predniSONE  5 mg Oral Daily    tamsulosin  0.4 mg Oral Nightly    [Held by provider] rosuvastatin  10 mg Oral Daily    mycophenolate  750 mg Oral BID    micafungin  100 mg IntraVENous Daily    sodium chloride flush  5-40 mL IntraVENous 2 times per day    heparin (porcine)  5,000 Units SubCUTAneous 3 times per day    cefTRIAXone (ROCEPHIN) IV  1,000 mg IntraVENous Q24H    insulin lispro  0-8 Units SubCUTAneous TID WC    insulin lispro  0-4 Units SubCUTAneous Nightly     PRN Meds: sodium chloride flush, sodium chloride, ondansetron **OR** ondansetron, acetaminophen **OR** acetaminophen, glucose, dextrose bolus **OR** dextrose bolus, glucagon (rDNA), dextrose      Intake/Output Summary (Last 24 hours) at 7/29/2024 1543  Last data filed at 7/29/2024 0911  Gross per 24 hour   Intake 480 ml   Output 1000 ml   Net -520 ml       Exam:    /71   Pulse (!) 111   Temp 97.3 °F (36.3 °C) (Oral)   Resp 18   Ht 1.88 m (6' 2\")   Wt 80.6 kg (177 lb 11.2 oz)   SpO2 100%   BMI 22.82 kg/m²     General appearance: appears stated age and cooperative.  Respiratory:  clear to auscultation, bilaterally   Cardiovascular: Regular rate and rhythm, S1/S2.  Abdomen: Soft, active bowel sounds.  Musculoskeletal: No edema bilaterally.      Labs:   Recent Labs     07/27/24  0545 07/28/24  0613 07/29/24  0554   WBC 8.1 7.4 7.3   HGB 8.5* 8.3* 8.2*   HCT 26.9* 25.8* 25.9*    160 160

## 2024-07-29 NOTE — FLOWSHEET NOTE
Patient is alet and oriented,denies pain or shortness of breath,right arm bruit intact,lungs clear,apical pulse irregular ,no jugular vein distention,abdomen soft with active bowel sounds,right thigh wound covered with the mepilex,no edema but he continue to have legs weakness,so he was assisted with repositioning in bed per his level of comfort.    06:05 Patient bath with bed linens changed provided,right right wound was covered with mepilex foam,no foul odor emitted from that sore,patient was incontinent of a medium formed brown stool,denies any abdominal cramping during or after defecation.

## 2024-07-29 NOTE — PROGRESS NOTES
Renal Progress Note  Assessment:  73 y.o. male with history s/f ESRD s/p KT x2, T2DM, HTN, seizures who presented from SNF for abnormal labs. Had hyperkalemia, azotemia, worsening renal function.      ESRD s/p 2 KTs (2nd at  on 11/8/2015)  - allograft function: has DELORES on CKD stage III/IV: possibly 2/2 urinary retention (noted on transplant renal U/S) +/- volume depletion vs. Rhabdomyolysis vs. Transplant related, not hypotensive but tachycardic on presentation, baseline Scr ~2.4 w/ eGFR high 20s/low 30s  - IS: cyclosporine 75 mg BID, cellcept 750 mg BID, prednisone 5 mg daily   Rhabdomyolysis: on crestor as outpatient   Hyperkalemia   Metabolic acidosis  Elevated liver enzymes  Yeast UTI      Plan:  - no longer in needing of fluids   - function much improved so ok to discharge from renal standpoint w/ f/u w/ Dr. Rodriguez in 1-2 weeks     - keep holding crestor for now   - continue gabapentin 300 mg daily   Continue to get better clinical and laboratory patient can be discharged to follow-up outpatient tomorrow if continues to be stable and improving      Patient Active Problem List:     Pneumonia     Abdominal pain, other specified site     Acute pyelonephritis without lesion of renal medullary necrosis     Anemia     Essential hypertension     Nonspecific abnormal results of thyroid function study     Mixed hyperlipidemia     Kidney replaced by transplant     Intra-abdominal abscess post-procedure     Infection due to Enterococcus     Fever and other physiologic disturbances of temperature regulation     Chronic kidney disease (CKD)     Diabetes mellitus type II, controlled (HCC)     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  Liver: No results for input(s): \"AST\", \"ALT\", \"ALKPHOS\", \"LABALBU\", \"BILITOT\" in the last 72 hours.    Invalid input(s): \"PROT\", \"BILDIR\"  Iron:  No results for input(s): \"FERRITIN\" in the last 72 hours.    Invalid input(s): \"IRONS\", \"LABIRONS\"  Urinalysis: No results for input(s): \"UA\" in the last 72 hours.    Objective:   Vitals: BP (!) 151/86   Pulse 100   Temp 98.8 °F (37.1 °C) (Oral)   Resp 14   Ht 1.88 m (6' 2\")   Wt 80.6 kg (177 lb 11.2 oz)   SpO2 99%   BMI 22.82 kg/m²    Wt Readings from Last 3 Encounters:   07/27/24 80.6 kg (177 lb 11.2 oz)   03/28/23 88.5 kg (195 lb)   11/18/22 91.6 kg (202 lb)      24HR INTAKE/OUTPUT:    Intake/Output Summary (Last 24 hours) at 7/29/2024 1250  Last data filed at 7/29/2024 0911  Gross per 24 hour   Intake 480 ml   Output 1000 ml   Net -520 ml       Admission weight: 76.7 kg (169 lb)     General: alert, in no apparent distress  HEENT: normocephalic, atraumatic, anicteric  Lungs: non-labored respirations, clear to auscultation bilaterally  Heart: regular rate and rhythm, no murmurs or rubs  Abdomen: soft, non-tender, non-distended  Ext: no cyanosis, no peripheral edema  Neuro: alert, follows commands         Electronically signed by Molly Barrera MD on 7/29/2024 at 12:50 PM

## 2024-07-29 NOTE — PLAN OF CARE
Problem: Discharge Planning  Goal: Discharge to home or other facility with appropriate resources  Outcome: Progressing  Flowsheets (Taken 7/29/2024 0906)  Discharge to home or other facility with appropriate resources: Identify barriers to discharge with patient and caregiver

## 2024-07-29 NOTE — PROGRESS NOTES
Subjective:      Patient ID: Remy Upton is a 73 y.o. male    HPI 73 year old male who's means catheter is draining clear clover urine. He voices no other urological complaints    Past Medical History:   Diagnosis Date    Diabetes mellitus (HCC)     Hemodialysis access, fistula mature (HCC) 2002    Hypertension     Seizures (HCC)     no seizure since      Past Surgical History:   Procedure Laterality Date    BRAIN SURGERY Left 1990    to eliminate seizures    COLONOSCOPY N/A 10/24/2022    COLONOSCOPY DIAGNOSTIC performed by Va Ordoñez MD at [facility]    KIDNEY TRANSPLANT      [place] BRNCC INCL FLUOR GDNCE DX W/CELL WASHG SPX N/A 3/20/2018    BRONCHOSCOPY performed by Cristopher Conde MD at [facility] OR    UPPER GASTROINTESTINAL ENDOSCOPY N/A 10/21/2022    EGD DIAGNOSTIC ONLY performed by Jaime Martinez MD at [facility]     Social History     Socioeconomic History    Marital status:      Spouse name: None    Number of children: None    Years of education: None    Highest education level: None   Tobacco Use    Smoking status: Former     Current packs/day: 0.00     Types: Cigarettes     Quit date: 2015     Years since quittin.1    Smokeless tobacco: Former   Vaping Use    Vaping Use: Never used   Substance and Sexual Activity    Alcohol use: No     Alcohol/week: 0.0 standard drinks of alcohol    Drug use: No    Sexual activity: Not Currently     Social Determinants of Health     Food Insecurity: No Food Insecurity (2024)    Hunger Vital Sign     Worried About Running Out of Food in the Last Year: Never true     Ran Out of Food in the Last Year: Never true   Transportation Needs: No Transportation Needs (2024)    PRAPARE - Transportation     Lack of Transportation (Medical): No     Lack of Transportation (Non-Medical): No   Housing Stability: Low Risk  (2024)    Housing Stability Vital Sign     Unable to Pay for Housing in the Last Year: No

## 2024-07-30 PROBLEM — D84.821 IMMUNOSUPPRESSION DUE TO DRUG THERAPY (HCC): Status: ACTIVE | Noted: 2024-07-30

## 2024-07-30 PROBLEM — N17.9 AKI (ACUTE KIDNEY INJURY) (HCC): Status: ACTIVE | Noted: 2024-07-30

## 2024-07-30 PROBLEM — Z79.899 IMMUNOSUPPRESSION DUE TO DRUG THERAPY (HCC): Status: ACTIVE | Noted: 2024-07-30

## 2024-07-30 LAB
ALBUMIN SERPL-MCNC: 2.9 G/DL (ref 3.5–4.6)
ALP SERPL-CCNC: 41 U/L (ref 35–104)
ALT SERPL-CCNC: 83 U/L (ref 0–41)
ANION GAP SERPL CALCULATED.3IONS-SCNC: 15 MEQ/L (ref 9–15)
AST SERPL-CCNC: 67 U/L (ref 0–40)
BACTERIA BLD CULT ORG #2: NORMAL
BACTERIA BLD CULT: NORMAL
BASOPHILS # BLD: 0 K/UL (ref 0–0.2)
BASOPHILS NFR BLD: 0.5 %
BILIRUB DIRECT SERPL-MCNC: 0.4 MG/DL (ref 0–0.4)
BILIRUB INDIRECT SERPL-MCNC: 0.2 MG/DL (ref 0–0.6)
BILIRUB SERPL-MCNC: 0.6 MG/DL (ref 0.2–0.7)
BUN SERPL-MCNC: 92 MG/DL (ref 8–23)
CALCIUM SERPL-MCNC: 8.9 MG/DL (ref 8.5–9.9)
CHLORIDE SERPL-SCNC: 108 MEQ/L (ref 95–107)
CO2 SERPL-SCNC: 20 MEQ/L (ref 20–31)
CREAT SERPL-MCNC: 2.71 MG/DL (ref 0.7–1.2)
CRP SERPL HS-MCNC: 5.6 MG/L (ref 0–5)
EOSINOPHIL # BLD: 0.2 K/UL (ref 0–0.7)
EOSINOPHIL NFR BLD: 2.6 %
ERYTHROCYTE [DISTWIDTH] IN BLOOD BY AUTOMATED COUNT: 15.5 % (ref 11.5–14.5)
GLUCOSE BLD-MCNC: 185 MG/DL (ref 70–99)
GLUCOSE BLD-MCNC: 211 MG/DL (ref 70–99)
GLUCOSE BLD-MCNC: 244 MG/DL (ref 70–99)
GLUCOSE BLD-MCNC: 270 MG/DL (ref 70–99)
GLUCOSE SERPL-MCNC: 210 MG/DL (ref 70–99)
HCT VFR BLD AUTO: 25.4 % (ref 42–52)
HGB BLD-MCNC: 8 G/DL (ref 14–18)
LYMPHOCYTES # BLD: 0.7 K/UL (ref 1–4.8)
LYMPHOCYTES NFR BLD: 10 %
MAGNESIUM SERPL-MCNC: 1.6 MG/DL (ref 1.7–2.4)
MCH RBC QN AUTO: 28.3 PG (ref 27–31.3)
MCHC RBC AUTO-ENTMCNC: 31.5 % (ref 33–37)
MCV RBC AUTO: 89.8 FL (ref 79–92.2)
MONOCYTES # BLD: 0.5 K/UL (ref 0.2–0.8)
MONOCYTES NFR BLD: 7 %
NEUTROPHILS # BLD: 5.3 K/UL (ref 1.4–6.5)
NEUTS SEG NFR BLD: 79.4 %
PERFORMED ON: ABNORMAL
PHOSPHATE SERPL-MCNC: 3.8 MG/DL (ref 2.3–4.8)
PLATELET # BLD AUTO: 169 K/UL (ref 130–400)
POTASSIUM SERPL-SCNC: 4.3 MEQ/L (ref 3.4–4.9)
PROCALCITONIN SERPL IA-MCNC: 0.2 NG/ML (ref 0–0.15)
PROT SERPL-MCNC: 5.1 G/DL (ref 6.3–8)
RBC # BLD AUTO: 2.83 M/UL (ref 4.7–6.1)
SODIUM SERPL-SCNC: 143 MEQ/L (ref 135–144)
WBC # BLD AUTO: 6.6 K/UL (ref 4.8–10.8)

## 2024-07-30 PROCEDURE — 6370000000 HC RX 637 (ALT 250 FOR IP): Performed by: INTERNAL MEDICINE

## 2024-07-30 PROCEDURE — 97166 OT EVAL MOD COMPLEX 45 MIN: CPT

## 2024-07-30 PROCEDURE — 83735 ASSAY OF MAGNESIUM: CPT

## 2024-07-30 PROCEDURE — 2580000003 HC RX 258: Performed by: FAMILY MEDICINE

## 2024-07-30 PROCEDURE — 6370000000 HC RX 637 (ALT 250 FOR IP): Performed by: FAMILY MEDICINE

## 2024-07-30 PROCEDURE — 80076 HEPATIC FUNCTION PANEL: CPT

## 2024-07-30 PROCEDURE — 6360000002 HC RX W HCPCS: Performed by: INTERNAL MEDICINE

## 2024-07-30 PROCEDURE — 36415 COLL VENOUS BLD VENIPUNCTURE: CPT

## 2024-07-30 PROCEDURE — 80048 BASIC METABOLIC PNL TOTAL CA: CPT

## 2024-07-30 PROCEDURE — 84100 ASSAY OF PHOSPHORUS: CPT

## 2024-07-30 PROCEDURE — 85025 COMPLETE CBC W/AUTO DIFF WBC: CPT

## 2024-07-30 PROCEDURE — 6360000002 HC RX W HCPCS: Performed by: FAMILY MEDICINE

## 2024-07-30 PROCEDURE — 97110 THERAPEUTIC EXERCISES: CPT

## 2024-07-30 PROCEDURE — 2580000003 HC RX 258: Performed by: INTERNAL MEDICINE

## 2024-07-30 PROCEDURE — 97535 SELF CARE MNGMENT TRAINING: CPT

## 2024-07-30 PROCEDURE — 1210000000 HC MED SURG R&B

## 2024-07-30 PROCEDURE — 86140 C-REACTIVE PROTEIN: CPT

## 2024-07-30 PROCEDURE — 84145 PROCALCITONIN (PCT): CPT

## 2024-07-30 PROCEDURE — 99222 1ST HOSP IP/OBS MODERATE 55: CPT | Performed by: INTERNAL MEDICINE

## 2024-07-30 PROCEDURE — 97162 PT EVAL MOD COMPLEX 30 MIN: CPT

## 2024-07-30 PROCEDURE — 99231 SBSQ HOSP IP/OBS SF/LOW 25: CPT | Performed by: PHYSICIAN ASSISTANT

## 2024-07-30 RX ORDER — LANOLIN ALCOHOL/MO/W.PET/CERES
400 CREAM (GRAM) TOPICAL ONCE
Status: COMPLETED | OUTPATIENT
Start: 2024-07-30 | End: 2024-07-30

## 2024-07-30 RX ADMIN — MYCOPHENOLATE MOFETIL 750 MG: 250 CAPSULE ORAL at 08:15

## 2024-07-30 RX ADMIN — MICAFUNGIN SODIUM 100 MG: 100 INJECTION, POWDER, LYOPHILIZED, FOR SOLUTION INTRAVENOUS at 20:15

## 2024-07-30 RX ADMIN — GABAPENTIN 300 MG: 300 CAPSULE ORAL at 20:20

## 2024-07-30 RX ADMIN — CYCLOSPORINE 75 MG: 25 CAPSULE, LIQUID FILLED ORAL at 20:20

## 2024-07-30 RX ADMIN — METOCLOPRAMIDE 5 MG: 5 TABLET ORAL at 06:28

## 2024-07-30 RX ADMIN — FINASTERIDE 5 MG: 5 TABLET, FILM COATED ORAL at 08:15

## 2024-07-30 RX ADMIN — INSULIN LISPRO 2 UNITS: 100 INJECTION, SOLUTION INTRAVENOUS; SUBCUTANEOUS at 17:21

## 2024-07-30 RX ADMIN — Medication 10 ML: at 20:21

## 2024-07-30 RX ADMIN — HEPARIN SODIUM 5000 UNITS: 5000 INJECTION INTRAVENOUS; SUBCUTANEOUS at 06:27

## 2024-07-30 RX ADMIN — HEPARIN SODIUM 5000 UNITS: 5000 INJECTION INTRAVENOUS; SUBCUTANEOUS at 13:50

## 2024-07-30 RX ADMIN — PREDNISONE 5 MG: 5 TABLET ORAL at 08:16

## 2024-07-30 RX ADMIN — MYCOPHENOLATE MOFETIL 750 MG: 250 CAPSULE ORAL at 20:20

## 2024-07-30 RX ADMIN — MIDODRINE HYDROCHLORIDE 5 MG: 5 TABLET ORAL at 11:34

## 2024-07-30 RX ADMIN — METOCLOPRAMIDE 5 MG: 5 TABLET ORAL at 11:34

## 2024-07-30 RX ADMIN — Medication 10 ML: at 08:16

## 2024-07-30 RX ADMIN — METOCLOPRAMIDE 5 MG: 5 TABLET ORAL at 16:31

## 2024-07-30 RX ADMIN — CYCLOSPORINE 75 MG: 25 CAPSULE, LIQUID FILLED ORAL at 08:15

## 2024-07-30 RX ADMIN — CEFTRIAXONE SODIUM 1000 MG: 1 INJECTION, POWDER, FOR SOLUTION INTRAMUSCULAR; INTRAVENOUS at 00:29

## 2024-07-30 RX ADMIN — GABAPENTIN 300 MG: 300 CAPSULE ORAL at 08:16

## 2024-07-30 RX ADMIN — INSULIN LISPRO 4 UNITS: 100 INJECTION, SOLUTION INTRAVENOUS; SUBCUTANEOUS at 11:34

## 2024-07-30 RX ADMIN — TAMSULOSIN HYDROCHLORIDE 0.4 MG: 0.4 CAPSULE ORAL at 20:20

## 2024-07-30 RX ADMIN — MIDODRINE HYDROCHLORIDE 5 MG: 5 TABLET ORAL at 08:15

## 2024-07-30 RX ADMIN — HEPARIN SODIUM 5000 UNITS: 5000 INJECTION INTRAVENOUS; SUBCUTANEOUS at 21:46

## 2024-07-30 RX ADMIN — Medication 400 MG: at 13:50

## 2024-07-30 RX ADMIN — MIDODRINE HYDROCHLORIDE 5 MG: 5 TABLET ORAL at 16:31

## 2024-07-30 RX ADMIN — METOPROLOL SUCCINATE 50 MG: 50 TABLET, EXTENDED RELEASE ORAL at 08:15

## 2024-07-30 RX ADMIN — ASPIRIN 81 MG: 81 TABLET, COATED ORAL at 08:15

## 2024-07-30 RX ADMIN — PANTOPRAZOLE SODIUM 40 MG: 40 TABLET, DELAYED RELEASE ORAL at 08:16

## 2024-07-30 ASSESSMENT — ENCOUNTER SYMPTOMS: APNEA: 0

## 2024-07-30 NOTE — PROGRESS NOTES
[facility] OCCUPATIONAL THERAPY EVALUATION - ACUTE     NAME: Remy Upton  : 1950 (73 y.o.)  MRN: [id number]  CODE STATUS: Full Code  Room: [facility]8/W478-01    Date of Service: 2024    Patient Diagnosis(es): Complication of transplanted kidney [T86.10]   Patient Active Problem List    Diagnosis Date Noted    Chest pain 2022    GI bleeding 10/29/2022    Lung nodules 10/25/2022    COPD without exacerbation (HCC) 10/25/2022    Abnormal CT of the chest 10/25/2022    Bronchiectasis without complication (HCC) 10/25/2022    Dieulafoy lesion of colon 10/24/2022    Poorly controlled diabetes mellitus (HCC) 10/24/2022    Symptomatic anemia 10/19/2022    Acute upper GI bleed 10/19/2022    Atrial fibrillation (HCC) 2022    Unstable angina (HCC) 2022    Elevated BUN 2024    Urinary retention 2024    Muscle weakness (generalized) 2024    Difficulty in walking 2024    Complication of transplanted kidney 2024    Atypical chest pain 2019    Chronic cough 2019    Anginal chest pain at rest (HCC) 2019    Pneumonia 2018    Abdominal pain, other specified site 06/15/2011    Intra-abdominal abscess post-procedure 06/15/2011    Infection due to Enterococcus 06/15/2011    Nonspecific abnormal results of thyroid function study 2011    Anemia 2009    Essential hypertension 2008    Mixed hyperlipidemia 2008    Diabetes mellitus type II, controlled (HCC) 10/03/2008    Fever and other physiologic disturbances of temperature regulation 2008    Kidney replaced by transplant 2007    Acute pyelonephritis without lesion of renal medullary necrosis 2007    Chronic kidney disease (CKD) 10/31/2003    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    Cognition Status:  Cognition  Overall Cognitive Status: WFL    Perception Status:  Perception  Overall Perceptual Status: [facility]    Vision and Hearing Status:  Vision  Vision Exceptions: Wears glasses at all times  Hearing  Hearing: Exceptions to WFL  Hearing Exceptions: Hard of hearing/hearing concerns (tinnitus)   Vision - Basic Assessment  Prior Vision: Wears glasses all the time  Visual History: No significant visual history  Patient Visual Report: No visual complaint reported.  Visual Field Cut: No  Oculo Motor Control: WNL    GROSS ASSESSMENT AROM/PROM:  AROM: Within functional limits (R shoulder limited d/t rotator cuff injury)       ROM:   LUE AROM (degrees)  LUE AROM : WFL  Left Hand AROM (degrees)  Left Hand AROM: WFL  RUE AROM (degrees)  RUE AROM : WFL  RUE General AROM: R shoulder limited d/t rotator cuff injury  Right Hand AROM (degrees)  Right Hand AROM: WFL    UE STRENGTH:  Strength: Within functional limits    UE COORDINATION:  Coordination: Within functional limits    UE TONE:  Tone: Normal    UE SENSATION:  Sensation: Impaired (Neuropathy in BLEs)    Hand Dominance:  Hand Dominance  Hand Dominance: Left    ADL Status:  ADL  Feeding: Independent  Grooming: Setup  UE Bathing: Setup  LE Bathing: Maximum assistance  UE Dressing: Setup  LE Dressing: Dependent/Total  Toileting: Dependent/Total  Additional Comments: Simulated ADLs as above, limited d/t BLE weakness, decreased balance and decreased ability to reach feet d/t LE weakness. Unable to stand.    Educated on rolling

## 2024-07-30 NOTE — PLAN OF CARE
See OT evaluation for all goals and OT POC. Electronically signed by Elise Sher OTR/L on 7/30/2024 at 3:00 PM

## 2024-07-30 NOTE — PROGRESS NOTES
Hospitalist Progress Note      PCP: Tico Rodriguez DO    Date of Admission: 7/24/2024    Chief Complaint:  no acute events, afebrile, stable HD, u/o-2000 ml    Medications:  Reviewed    Infusion Medications    sodium chloride      dextrose       Scheduled Medications    gabapentin  300 mg Oral Q12H    metoclopramide  5 mg Oral TID AC    aspirin  81 mg Oral Daily    cycloSPORINE modified  75 mg Oral BID    finasteride  5 mg Oral Daily    metoprolol succinate  50 mg Oral Daily    midodrine  5 mg Oral TID WC    pantoprazole  40 mg Oral Daily    predniSONE  5 mg Oral Daily    tamsulosin  0.4 mg Oral Nightly    [Held by provider] rosuvastatin  10 mg Oral Daily    mycophenolate  750 mg Oral BID    micafungin  100 mg IntraVENous Daily    sodium chloride flush  5-40 mL IntraVENous 2 times per day    heparin (porcine)  5,000 Units SubCUTAneous 3 times per day    cefTRIAXone (ROCEPHIN) IV  1,000 mg IntraVENous Q24H    insulin lispro  0-8 Units SubCUTAneous TID WC    insulin lispro  0-4 Units SubCUTAneous Nightly     PRN Meds: sodium chloride flush, sodium chloride, ondansetron **OR** ondansetron, acetaminophen **OR** acetaminophen, glucose, dextrose bolus **OR** dextrose bolus, glucagon (rDNA), dextrose      Intake/Output Summary (Last 24 hours) at 7/30/2024 1325  Last data filed at 7/30/2024 0823  Gross per 24 hour   Intake 1056.81 ml   Output 2050 ml   Net -993.19 ml         Exam:    /74   Pulse (!) 104   Temp 97.9 °F (36.6 °C) (Oral)   Resp 16   Ht 1.88 m (6' 2\")   Wt 80.6 kg (177 lb 11.2 oz)   SpO2 98%   BMI 22.82 kg/m²     General appearance: appears stated age and cooperative.  Respiratory:  clear to auscultation, bilaterally   Cardiovascular: Regular rate and rhythm, S1/S2.  Abdomen: Soft, active bowel sounds.  Musculoskeletal: No edema bilaterally.      Labs:   Recent Labs     07/28/24  0613 07/29/24  0554 07/30/24  0602   WBC 7.4 7.3 6.6   HGB 8.3* 8.2* 8.0*   HCT 25.8* 25.9* 25.4*    160 169

## 2024-07-30 NOTE — FLOWSHEET NOTE
Patient is resting quietly in bed,he had been turned and repositioned per his level of comfort,the right upper thigh skin tear foam dressing is intact,instructed him to use his call light for help.the call light is within his easy reach.

## 2024-07-30 NOTE — PROGRESS NOTES
Renal Progress Note  Assessment:  73 y.o. male with history s/f ESRD s/p KT x2, T2DM, HTN, seizures who presented from SNF for abnormal labs. Had hyperkalemia, azotemia, worsening renal function.      ESRD s/p 2 KTs (2nd at  on 11/8/2015)  - allograft function: has DELORES on CKD stage III/IV: possibly 2/2 urinary retention (noted on transplant renal U/S) +/- volume depletion vs. Rhabdomyolysis vs. Transplant related, not hypotensive but tachycardic on presentation, baseline Scr ~2.4 w/ eGFR high 20s/low 30s  - IS: cyclosporine 75 mg BID, cellcept 750 mg BID, prednisone 5 mg daily   Rhabdomyolysis: on crestor as outpatient, now on hold   Hyperkalemia: corrected   Metabolic acidosis: improved   Elevated liver enzymes  Yeast UTI      Plan:  - function much improved so ok to discharge from renal standpoint w/ f/u w/ Dr. Rodriguez in 1-2 weeks, no therapeutic changes today   - checking CK   - keep holding crestor for now   - continue gabapentin 300 mg daily       Patient Active Problem List:     Pneumonia     Abdominal pain, other specified site     Acute pyelonephritis without lesion of renal medullary necrosis     Anemia     Essential hypertension     Nonspecific abnormal results of thyroid function study     Mixed hyperlipidemia     Kidney replaced by transplant     Intra-abdominal abscess post-procedure     Infection due to Enterococcus     Fever and other physiologic disturbances of temperature regulation     Chronic kidney disease (CKD)     Diabetes mellitus type II, controlled (HCC)     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 (HCC)     Abnormal CT of the chest      Bronchiectasis without complication (HCC)     GI bleeding     Complication of transplanted kidney     Muscle weakness (generalized)     Difficulty in walking     Elevated BUN     Urinary retention      Subjective:   Admit Date: 7/24/2024    Interval History: function continues to slowly improve, continues w/ means     Medications:   Scheduled Meds:   gabapentin  300 mg Oral Q12H    metoclopramide  5 mg Oral TID AC    aspirin  81 mg Oral Daily    cycloSPORINE modified  75 mg Oral BID    finasteride  5 mg Oral Daily    metoprolol succinate  50 mg Oral Daily    midodrine  5 mg Oral TID WC    pantoprazole  40 mg Oral Daily    predniSONE  5 mg Oral Daily    tamsulosin  0.4 mg Oral Nightly    [Held by provider] rosuvastatin  10 mg Oral Daily    mycophenolate  750 mg Oral BID    micafungin  100 mg IntraVENous Daily    sodium chloride flush  5-40 mL IntraVENous 2 times per day    heparin (porcine)  5,000 Units SubCUTAneous 3 times per day    cefTRIAXone (ROCEPHIN) IV  1,000 mg IntraVENous Q24H    insulin lispro  0-8 Units SubCUTAneous TID WC    insulin lispro  0-4 Units SubCUTAneous Nightly     Continuous Infusions:   sodium chloride      dextrose         CBC:   Recent Labs     07/29/24  0554 07/30/24  0602   WBC 7.3 6.6   HGB 8.2* 8.0*    169       CMP:    Recent Labs     07/28/24  0612 07/29/24  0554 07/30/24  0602    144 143   K 4.1 4.1 4.3    106 108*   CO2 21 20 20   BUN 95* 91* 92*   CREATININE 2.86* 2.81* 2.71*   GLUCOSE 195* 187* 210*   CALCIUM 8.8 8.3* 8.9   LABGLOM 22.6* 23.1* 24.1*       Troponin: No results for input(s): \"TROPONINI\" in the last 72 hours.  BNP: No results for input(s): \"BNP\" in the last 72 hours.  INR: No results for input(s): \"INR\" in the last 72 hours.  Lipids: No results for input(s): \"CHOL\", \"LDLDIRECT\", \"TRIG\", \"HDL\", \"AMYLASE\", \"LIPASE\" in the last 72 hours.  Liver:   Recent Labs     07/30/24  0602   AST 67*   ALT 83*   ALKPHOS 41   BILITOT 0.6     Iron:  No results for  input(s): \"FERRITIN\" in the last 72 hours.    Invalid input(s): \"IRONS\", \"LABIRONS\"  Urinalysis: No results for input(s): \"UA\" in the last 72 hours.    Objective:   Vitals: /74   Pulse (!) 104   Temp 97.9 °F (36.6 °C) (Oral)   Resp 16   Ht 1.88 m (6' 2\")   Wt 80.6 kg (177 lb 11.2 oz)   SpO2 98%   BMI 22.82 kg/m²    Wt Readings from Last 3 Encounters:   07/27/24 80.6 kg (177 lb 11.2 oz)   03/28/23 88.5 kg (195 lb)   11/18/22 91.6 kg (202 lb)      24HR INTAKE/OUTPUT:    Intake/Output Summary (Last 24 hours) at 7/30/2024 1121  Last data filed at 7/30/2024 0823  Gross per 24 hour   Intake 1056.81 ml   Output 2050 ml   Net -993.19 ml       Admission weight: 76.7 kg (169 lb)     General: alert, in no apparent distress  HEENT: normocephalic, atraumatic, anicteric  Lungs: non-labored respirations, clear to auscultation bilaterally  Heart: regular rate and rhythm, no murmurs or rubs  Abdomen: soft, non-tender, non-distended  Ext: no cyanosis, no peripheral edema  Neuro: alert, follows commands         Electronically signed by Molly Barrera MD on 7/30/2024 at 11:21 AM

## 2024-07-30 NOTE — PROGRESS NOTES
Subjective:      Patient ID: Remy Upton is a 73 y.o. male    HPI73 year old male who's means catheter is draining clear clover urine. He voices no other urological complaints       Past Medical History:   Diagnosis Date    Diabetes mellitus (HCC)     Hemodialysis access, fistula mature (HCC) 2002    Hypertension     Seizures (HCC)     no seizure since      Past Surgical History:   Procedure Laterality Date    BRAIN SURGERY Left 1990    to eliminate seizures    COLONOSCOPY N/A 10/24/2022    COLONOSCOPY DIAGNOSTIC performed by Va Ordoñez MD at [facility]    KIDNEY TRANSPLANT      [place] BRNCC INCL FLUOR GDNCE DX W/CELL WASHG SPX N/A 3/20/2018    BRONCHOSCOPY performed by Cristopher Conde MD at [facility] OR    UPPER GASTROINTESTINAL ENDOSCOPY N/A 10/21/2022    EGD DIAGNOSTIC ONLY performed by Jaime Martinez MD at [facility]     Social History     Socioeconomic History    Marital status:      Spouse name: None    Number of children: None    Years of education: None    Highest education level: None   Tobacco Use    Smoking status: Former     Current packs/day: 0.00     Types: Cigarettes     Quit date: 2015     Years since quittin.1    Smokeless tobacco: Former   Vaping Use    Vaping Use: Never used   Substance and Sexual Activity    Alcohol use: No     Alcohol/week: 0.0 standard drinks of alcohol    Drug use: No    Sexual activity: Not Currently     Social Determinants of Health     Food Insecurity: No Food Insecurity (2024)    Hunger Vital Sign     Worried About Running Out of Food in the Last Year: Never true     Ran Out of Food in the Last Year: Never true   Transportation Needs: No Transportation Needs (2024)    PRAPARE - Transportation     Lack of Transportation (Medical): No     Lack of Transportation (Non-Medical): No   Housing Stability: Low Risk  (2024)    Housing Stability Vital Sign     Unable to Pay for Housing in the Last Year: No

## 2024-07-30 NOTE — PROGRESS NOTES
Physical Therapy Med Surg Initial Assessment  Facility/Department: [address] MED SURG UNIT  Room: [place] [address]       NAME: Remy Upton  : 1950 (73 y.o.)  MRN: [id number]  CODE STATUS: Full Code    Date of Service: 2024    Patient Diagnosis(es): Complication of transplanted kidney [T86.10]   Chief Complaint   Patient presents with    abnormal bloodwork     Per Skilled Facility abnormal BUN      Patient Active Problem List    Diagnosis Date Noted    Chest pain 2022    GI bleeding 10/29/2022    Lung nodules 10/25/2022    COPD without exacerbation (HCC) 10/25/2022    Abnormal CT of the chest 10/25/2022    Bronchiectasis without complication (HCC) 10/25/2022    Dieulafoy lesion of colon 10/24/2022    Poorly controlled diabetes mellitus (HCC) 10/24/2022    Symptomatic anemia 10/19/2022    Acute upper GI bleed 10/19/2022    Atrial fibrillation (HCC) 2022    Unstable angina (HCC) 2022    Elevated BUN 2024    Urinary retention 2024    Muscle weakness (generalized) 2024    Difficulty in walking 2024    Complication of transplanted kidney 2024    Atypical chest pain 2019    Chronic cough 2019    Anginal chest pain at rest (HCC) 2019    Pneumonia 2018    Abdominal pain, other specified site 06/15/2011    Intra-abdominal abscess post-procedure 06/15/2011    Infection due to Enterococcus 06/15/2011    Nonspecific abnormal results of thyroid function study 2011    Anemia 2009    Essential hypertension 2008    Mixed hyperlipidemia 2008    Diabetes mellitus type II, controlled (HCC) 10/03/2008    Fever and other physiologic disturbances of temperature regulation 2008    Kidney replaced by transplant 2007    Acute pyelonephritis without lesion of renal medullary necrosis 2007    Chronic kidney disease (CKD) 10/31/2003    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

## 2024-07-30 NOTE — PROGRESS NOTES
Spiritual Health Assessment/Progress Note  [facility]    Initial Encounter,  , Adjustment to illness,      Name: Remy Upton MRN: [id number]    Age: 73 y.o.     Sex: male   Language: English   Episcopal: Jehovah's witness   Complication of transplanted kidney     Date: 7/30/2024            Total Time Calculated: 60 min              Spiritual Assessment began in [facility] MED SURG UNIT        Referral/Consult From: Physician   Encounter Overview/Reason: Initial Encounter  Service Provided For: Patient   intern visited with Mr. Upton for a spiritual consult.  Mr. Upton was open and comfortable to share his thoughts and feelings about his current health situation. Active listening included a discussion about the impact of his illness, and  his marian and relationship with God.He expressed hope to get back to his work as a , once he feels stronger. He expressed a desire to talk to a psychiatrist and his nurse was notified about this.    Marian, Belief, Meaning:   Patient is connected with a marian tradition or spiritual practice and has beliefs or practices that help with coping during difficult times  Family/Friends No family/friends present      Importance and Influence:  Patient Other: unknown  Family/Friends no family/friends present    Community:  Patient is connected with a spiritual community  Family/Friends Other: unknown    Assessment and Plan of Care:     Patient Interventions include: Facilitated expression of thoughts and feelings  Family/Friends Interventions include: Other: unknown    Patient Plan of Care: Spiritual Care available upon further referral  Family/Friends Plan of Care: Other: patient noted  visited him    Electronically signed by Melanie Musain Intern on 7/30/2024 at 9:52 AM

## 2024-07-31 VITALS
BODY MASS INDEX: 22.8 KG/M2 | HEIGHT: 74 IN | TEMPERATURE: 97.5 F | OXYGEN SATURATION: 100 % | SYSTOLIC BLOOD PRESSURE: 129 MMHG | RESPIRATION RATE: 18 BRPM | HEART RATE: 102 BPM | DIASTOLIC BLOOD PRESSURE: 67 MMHG | WEIGHT: 177.7 LBS

## 2024-07-31 PROBLEM — F43.20 ADJUSTMENT DISORDER: Status: ACTIVE | Noted: 2024-07-31

## 2024-07-31 LAB
ALBUMIN SERPL-MCNC: 2.9 G/DL (ref 3.5–4.6)
ANION GAP SERPL CALCULATED.3IONS-SCNC: 16 MEQ/L (ref 9–15)
BASOPHILS # BLD: 0 K/UL (ref 0–0.2)
BASOPHILS NFR BLD: 0.3 %
BUN SERPL-MCNC: 89 MG/DL (ref 8–23)
CALCIUM SERPL-MCNC: 9 MG/DL (ref 8.5–9.9)
CHLORIDE SERPL-SCNC: 108 MEQ/L (ref 95–107)
CK SERPL-CCNC: 879 U/L (ref 0–190)
CO2 SERPL-SCNC: 19 MEQ/L (ref 20–31)
CREAT SERPL-MCNC: 2.57 MG/DL (ref 0.7–1.2)
EOSINOPHIL # BLD: 0.2 K/UL (ref 0–0.7)
EOSINOPHIL NFR BLD: 3.6 %
ERYTHROCYTE [DISTWIDTH] IN BLOOD BY AUTOMATED COUNT: 15.6 % (ref 11.5–14.5)
GLUCOSE BLD-MCNC: 186 MG/DL (ref 70–99)
GLUCOSE BLD-MCNC: 231 MG/DL (ref 70–99)
GLUCOSE BLD-MCNC: 245 MG/DL (ref 70–99)
GLUCOSE SERPL-MCNC: 192 MG/DL (ref 70–99)
HCT VFR BLD AUTO: 25.3 % (ref 42–52)
HGB BLD-MCNC: 7.9 G/DL (ref 14–18)
LYMPHOCYTES # BLD: 0.7 K/UL (ref 1–4.8)
LYMPHOCYTES NFR BLD: 10.8 %
MAGNESIUM SERPL-MCNC: 1.7 MG/DL (ref 1.7–2.4)
MCH RBC QN AUTO: 28.1 PG (ref 27–31.3)
MCHC RBC AUTO-ENTMCNC: 31.2 % (ref 33–37)
MCV RBC AUTO: 90 FL (ref 79–92.2)
MONOCYTES # BLD: 0.5 K/UL (ref 0.2–0.8)
MONOCYTES NFR BLD: 7.4 %
NEUTROPHILS # BLD: 4.9 K/UL (ref 1.4–6.5)
NEUTS SEG NFR BLD: 77.6 %
PERFORMED ON: ABNORMAL
PHOSPHATE SERPL-MCNC: 3.9 MG/DL (ref 2.3–4.8)
PLATELET # BLD AUTO: 168 K/UL (ref 130–400)
POTASSIUM SERPL-SCNC: 4.3 MEQ/L (ref 3.4–4.9)
RBC # BLD AUTO: 2.81 M/UL (ref 4.7–6.1)
SODIUM SERPL-SCNC: 143 MEQ/L (ref 135–144)
WBC # BLD AUTO: 6.3 K/UL (ref 4.8–10.8)

## 2024-07-31 PROCEDURE — 82550 ASSAY OF CK (CPK): CPT

## 2024-07-31 PROCEDURE — 80069 RENAL FUNCTION PANEL: CPT

## 2024-07-31 PROCEDURE — 6360000002 HC RX W HCPCS: Performed by: INTERNAL MEDICINE

## 2024-07-31 PROCEDURE — 6370000000 HC RX 637 (ALT 250 FOR IP): Performed by: FAMILY MEDICINE

## 2024-07-31 PROCEDURE — 6370000000 HC RX 637 (ALT 250 FOR IP): Performed by: INTERNAL MEDICINE

## 2024-07-31 PROCEDURE — 99231 SBSQ HOSP IP/OBS SF/LOW 25: CPT | Performed by: PHYSICIAN ASSISTANT

## 2024-07-31 PROCEDURE — 90792 PSYCH DIAG EVAL W/MED SRVCS: CPT | Performed by: PSYCHIATRY & NEUROLOGY

## 2024-07-31 PROCEDURE — 6360000002 HC RX W HCPCS: Performed by: FAMILY MEDICINE

## 2024-07-31 PROCEDURE — 83735 ASSAY OF MAGNESIUM: CPT

## 2024-07-31 PROCEDURE — 85025 COMPLETE CBC W/AUTO DIFF WBC: CPT

## 2024-07-31 PROCEDURE — 36415 COLL VENOUS BLD VENIPUNCTURE: CPT

## 2024-07-31 PROCEDURE — 2580000003 HC RX 258: Performed by: INTERNAL MEDICINE

## 2024-07-31 RX ADMIN — MIDODRINE HYDROCHLORIDE 5 MG: 5 TABLET ORAL at 08:06

## 2024-07-31 RX ADMIN — METOCLOPRAMIDE 5 MG: 5 TABLET ORAL at 16:33

## 2024-07-31 RX ADMIN — CEFTRIAXONE SODIUM 1000 MG: 1 INJECTION, POWDER, FOR SOLUTION INTRAMUSCULAR; INTRAVENOUS at 00:52

## 2024-07-31 RX ADMIN — CYCLOSPORINE 75 MG: 25 CAPSULE, LIQUID FILLED ORAL at 08:06

## 2024-07-31 RX ADMIN — METOPROLOL SUCCINATE 50 MG: 50 TABLET, EXTENDED RELEASE ORAL at 08:07

## 2024-07-31 RX ADMIN — PANTOPRAZOLE SODIUM 40 MG: 40 TABLET, DELAYED RELEASE ORAL at 08:07

## 2024-07-31 RX ADMIN — HEPARIN SODIUM 5000 UNITS: 5000 INJECTION INTRAVENOUS; SUBCUTANEOUS at 06:03

## 2024-07-31 RX ADMIN — METOCLOPRAMIDE 5 MG: 5 TABLET ORAL at 11:16

## 2024-07-31 RX ADMIN — FINASTERIDE 5 MG: 5 TABLET, FILM COATED ORAL at 08:07

## 2024-07-31 RX ADMIN — MYCOPHENOLATE MOFETIL 750 MG: 250 CAPSULE ORAL at 08:08

## 2024-07-31 RX ADMIN — MIDODRINE HYDROCHLORIDE 5 MG: 5 TABLET ORAL at 11:16

## 2024-07-31 RX ADMIN — INSULIN LISPRO 2 UNITS: 100 INJECTION, SOLUTION INTRAVENOUS; SUBCUTANEOUS at 12:16

## 2024-07-31 RX ADMIN — ASPIRIN 81 MG: 81 TABLET, COATED ORAL at 08:06

## 2024-07-31 RX ADMIN — MIDODRINE HYDROCHLORIDE 5 MG: 5 TABLET ORAL at 16:33

## 2024-07-31 RX ADMIN — PREDNISONE 5 MG: 5 TABLET ORAL at 08:06

## 2024-07-31 RX ADMIN — GABAPENTIN 300 MG: 300 CAPSULE ORAL at 08:07

## 2024-07-31 RX ADMIN — METOCLOPRAMIDE 5 MG: 5 TABLET ORAL at 06:03

## 2024-07-31 RX ADMIN — Medication 10 ML: at 08:07

## 2024-07-31 ASSESSMENT — ENCOUNTER SYMPTOMS: APNEA: 0

## 2024-07-31 NOTE — PROGRESS NOTES
Renal Progress Note  Assessment:  73 y.o. male with history s/f ESRD s/p KT x2, T2DM, HTN, seizures who presented from SNF for abnormal labs. Had hyperkalemia, azotemia, worsening renal function.      ESRD s/p 2 KTs (2nd at  on 11/8/2015)  - allograft function: has DELORES on CKD stage III/IV: possibly 2/2 urinary retention (noted on transplant renal U/S) +/- volume depletion vs. Rhabdomyolysis vs. Transplant related, not hypotensive but tachycardic on presentation, baseline Scr ~2.4 w/ eGFR high 20s/low 30s  - IS: cyclosporine 75 mg BID, cellcept 750 mg BID, prednisone 5 mg daily   Rhabdomyolysis: on crestor as outpatient, now on hold   Hyperkalemia: corrected   Metabolic acidosis: improved   Elevated liver enzymes  Yeast UTI:      Plan:  - function much improved so ok to discharge from renal standpoint w/ f/u w/ Dr. Rodriguez in 1-2 weeks, no therapeutic changes today   - checking CK   - keep holding crestor for now   - continue gabapentin 300 mg daily   - waiting placement pending precert       Patient Active Problem List:     Pneumonia     Abdominal pain, other specified site     Acute pyelonephritis without lesion of renal medullary necrosis     Anemia     Essential hypertension     Nonspecific abnormal results of thyroid function study     Mixed hyperlipidemia     Kidney replaced by transplant     Intra-abdominal abscess post-procedure     Infection due to Enterococcus     Fever and other physiologic disturbances of temperature regulation     Chronic kidney disease (CKD)     Diabetes mellitus type II, controlled (HCC)     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

## 2024-07-31 NOTE — FLOWSHEET NOTE
Patient prefer to stay  on his right side momentarily,his breathing is effortless,heart rate irregular but no chest pains,his means is draining well of yellow urine.    06!5 patient was easily arrousable  for heparin injection administration,he denies pain or shortness of breath,means was emptied of 700 ml. Kristi urine,no foul odor emitted.

## 2024-07-31 NOTE — PLAN OF CARE
Problem: Discharge Planning  Goal: Discharge to home or other facility with appropriate resources  Outcome: Progressing  Flowsheets (Taken 7/31/2024 9915)  Discharge to home or other facility with appropriate resources: Identify barriers to discharge with patient and caregiver

## 2024-07-31 NOTE — PROGRESS NOTES
Hospitalist Progress Note      PCP: Tico Rodriguez DO    Date of Admission: 7/24/2024    Chief Complaint:  no acute events, afebrile, stable HD    Medications:  Reviewed    Infusion Medications    sodium chloride      dextrose       Scheduled Medications    gabapentin  300 mg Oral Q12H    metoclopramide  5 mg Oral TID AC    aspirin  81 mg Oral Daily    cycloSPORINE modified  75 mg Oral BID    finasteride  5 mg Oral Daily    metoprolol succinate  50 mg Oral Daily    midodrine  5 mg Oral TID WC    pantoprazole  40 mg Oral Daily    predniSONE  5 mg Oral Daily    tamsulosin  0.4 mg Oral Nightly    [Held by provider] rosuvastatin  10 mg Oral Daily    mycophenolate  750 mg Oral BID    micafungin  100 mg IntraVENous Daily    sodium chloride flush  5-40 mL IntraVENous 2 times per day    heparin (porcine)  5,000 Units SubCUTAneous 3 times per day    insulin lispro  0-8 Units SubCUTAneous TID WC    insulin lispro  0-4 Units SubCUTAneous Nightly     PRN Meds: sodium chloride flush, sodium chloride, ondansetron **OR** ondansetron, acetaminophen **OR** acetaminophen, glucose, dextrose bolus **OR** dextrose bolus, glucagon (rDNA), dextrose      Intake/Output Summary (Last 24 hours) at 7/31/2024 1453  Last data filed at 7/31/2024 0618  Gross per 24 hour   Intake 556.46 ml   Output 700 ml   Net -143.54 ml         Exam:    /63   Pulse 92   Temp 98.1 °F (36.7 °C) (Oral)   Resp 18   Ht 1.88 m (6' 2\")   Wt 80.6 kg (177 lb 11.2 oz)   SpO2 98%   BMI 22.82 kg/m²     General appearance: appears stated age and cooperative.  Respiratory:  clear to auscultation, bilaterally   Cardiovascular: Regular rate and rhythm, S1/S2.  Abdomen: Soft, active bowel sounds.  Musculoskeletal: No edema bilaterally.      Labs:   Recent Labs     07/29/24  0554 07/30/24  0602 07/31/24  0635   WBC 7.3 6.6 6.3   HGB 8.2* 8.0* 7.9*   HCT 25.9* 25.4* 25.3*    169 168       Recent Labs     07/29/24  0554 07/30/24  0602 07/31/24  0635

## 2024-07-31 NOTE — DISCHARGE SUMMARY
Hospital Medicine Discharge Summary    Remy Upton  :  1950  MRN:  [id number]    Admit date:  2024  Discharge date:  2024    Admitting Physician:  Carlene Chavez DO  Primary Care Physician:  Tico Rodriguez DO      Discharge Diagnoses:    Principal Problem:    Complication of transplanted kidney  Active Problems:    Elevated BUN    Urinary retention    Immunosuppression due to drug therapy (HCC)    DELORES (acute kidney injury) (HCC)  Resolved Problems:    * No resolved hospital problems. *      Hospital Course:   Remy Upton is a 73 y.o. male that was admitted and treated at [facility] for the following medical issues:     DELORES, BPH with acute urine retention  - s/p Walters placement  - renal function was slowly improving  - continued Cellcept, Cyclosporine, Prednisone, Flomax, Proscar  - followed by nephrology and urology    Possible UTI  - initial culture grew Candida Glabrata  - treated with Rocephin and Micafungin  - repeat culture no growth  - discussed with ID, no further treatment on d/c    Depression  - seen by psychiatry      Disposition - SNF      Patient was seen by the following consultants while admitted to [facility]:   Consults:  IP CONSULT TO NEPHROLOGY  IP CONSULT TO UROLOGY  IP CONSULT TO INFECTIOUS DISEASES  IP CONSULT TO SPIRITUAL SERVICES  IP CONSULT TO PSYCHIATRY    Significant Diagnostic Studies:    Vascular duplex kidney transplant    Result Date: 2024  EXAMINATION: ULTRASOUND OF RENAL TRANPLANT 2024 4:07 pm COMPARISON: None. HISTORY: ORDERING SYSTEM PROVIDED HISTORY: worsening renal function in transplanted kidney, ACUTE KIDNEY INJURY, HTN, allen-vasc likely, low GFR FINDINGS: Distended urinary bladder with estimated prevoid volume of 1143 mL. Estimated postvoid residual of 856 mL.  Left lower quadrant transplant kidney.  11.6 x 5.6 x 4.8 cm.  Mild nonspecific dilation of the caliceal structures of the transplant  kidney.  Patent renal vein.  Patent arterial structures.  Maximum systolic flow velocity in the proximal renal artery up to 139 centimeters/second.  Normal spectral Doppler waveforms.     1. Urinary bladder distension and large postvoid bladder residual 2. Left lower quadrant transplant kidney with patent vessels and mild caliectasis.       Discharge Medications:         Medication List        CHANGE how you take these medications      NovoLIN R FlexPen 100 UNIT/ML Sopn pen  Generic drug: insulin regular  6 units am 8 units lunch and dinner  What changed:   how to take this  when to take this  additional instructions     tamsulosin 0.4 MG capsule  Commonly known as: FLOMAX  Take 1 capsule by mouth once daily  What changed: when to take this            CONTINUE taking these medications      aspirin 81 MG EC tablet     Bydureon BCise 2 MG/0.85ML injection  Generic drug: Exenatide ER     cycloSPORINE modified 25 MG capsule  Commonly known as: NEORAL     Dexcom G6 Transmitter Misc  Change every 3 months     finasteride 5 MG tablet  Commonly known as: PROSCAR     * FreeStyle Sona 14 Day Sensor Misc  Every 2 weeks     * FreeStyle Sona 14 Day Sensor Misc  Every 2 weeks Dx E11.65     gabapentin 300 MG capsule  Commonly known as: NEURONTIN     Insulin Lispro-aabc (1 U Dial) 100 UNIT/ML Sopn     metoclopramide 5 MG tablet  Commonly known as: REGLAN     metoprolol succinate 50 MG extended release tablet  Commonly known as: TOPROL XL     midodrine 5 MG tablet  Commonly known as: PROAMATINE     mycophenolate 250 MG capsule  Commonly known as: CELLCEPT     pantoprazole 40 MG tablet  Commonly known as: PROTONIX     predniSONE 5 MG tablet  Commonly known as: DELTASONE     sodium bicarbonate 325 MG tablet     Stress B/Zinc Tabs     THERAVIM-M PO     vitamin C 500 MG tablet  Commonly known as: ASCORBIC ACID     vitamin D 25 MCG (1000 UT) Tabs tablet  Commonly known as: CHOLECALCIFEROL           * This list has 2 medication(s) that  must take them may have changed. For medication questions, contact your retail pharmacy and your PCP, Tico Rodriguez DO .     ** I STRONGLY RECOMMEND that you follow up with Tico Rodriguez DO within 3 to 5 days for a post hospitalization evaluation. This specific office visit is covered by your insurance, and is not the same as your annual doctor visit/ check up. This office visit is important, as it may prevent need for repeat and/or future hospitalizations.**    Your medical team at [facility] appreciates the opportunity to work with you to get well!    Sincerely,  SHERIDAN MARTINEZ MD

## 2024-07-31 NOTE — DISCHARGE INSTR - COC
Continuity of Care Form    Patient Name: Remy Upton   :  1950  MRN:  [id number]    Admit date:  2024  Discharge date:  24    Code Status Order: Full Code   Advance Directives:     Admitting Physician:  Carlene Chavez DO  PCP: Tico Rodriguez DO    Discharging Nurse: 4w [id number]  Discharging Hospital Unit/Room#: W478/W478-01  Discharging Unit Phone Number: ***    Emergency Contact:   Extended Emergency Contact Information  Primary Emergency Contact: Simona Upton   [facility]  Home Phone: [phone]  Relation: Spouse  Secondary Emergency Contact: Maria Luisa Upton  Home Phone: [phone]  Relation: Child    Past Surgical History:  Past Surgical History:   Procedure Laterality Date    BRAIN SURGERY Left 1990    to eliminate seizures    COLONOSCOPY N/A 10/24/2022    COLONOSCOPY DIAGNOSTIC performed by Va Ordoñez MD at [facility]    KIDNEY TRANSPLANT      [facility] INCL FLUOR GDNCE DX W/CELL WASHG SPX N/A 3/20/2018    BRONCHOSCOPY performed by Cristopher Conde MD at [facility] [place]    UPPER GASTROINTESTINAL ENDOSCOPY N/A 10/21/2022    EGD DIAGNOSTIC ONLY performed by Jaime Martinez MD at [facility]       Immunization History:   Immunization History   Administered Date(s) Administered    Influenza Virus Vaccine 2003, 2010    Influenza, FLUZONE (age 65 y+), High Dose, 0.7mL 10/07/2020    Influenza, High Dose (Fluzone 65 yrs and older) 10/18/2018, 2019    Pneumococcal, PPSV23, PNEUMOVAX 23, (age 2y+), SC/IM, 0.5mL 2011    TDaP, ADACEL (age 10y-64y), BOOSTRIX (age 10y+), IM, 0.5mL 2020       Active Problems:  Patient Active Problem List   Diagnosis Code    Pneumonia J18.9    Abdominal pain, other specified site R10.9    Acute pyelonephritis without lesion of renal medullary necrosis N10    Anemia D64.9    Essential hypertension I10    Nonspecific abnormal results of thyroid function study R94.6    Mixed hyperlipidemia  signature: Electronically signed by LOLI Blake on 7/31/24 at 3:55 PM EDT    PHYSICIAN SECTION    Prognosis: {Prognosis:3534994767}    Condition at Discharge: { Patient Condition:048379700}    Rehab Potential (if transferring to Rehab): {Prognosis:7976440319}    Recommended Labs or Other Treatments After Discharge: ***    Physician Certification: I certify the above information and transfer of Remy Upton  is necessary for the continuing treatment of the diagnosis listed and that he requires {Admit to Appropriate Level of Care:37774} for {GREATER/LESS:937109599} 30 days.   The individual is being discharged to a nursing facility directly from the hospital after receiving acute patient care at the hospital; and    Requires nursing facility services for the condition for which the patient received care in the hospital; and    As the attending physician, certifies no later than the date of discharge, the individual requires fewer than 30 days of nursing facility care   Update Admission H&P: {CHP DME Changes in HandP:688478788}    PHYSICIAN SIGNATURE:  Electronically signed by SHERIDAN MARTINEZ MD on 7/31/24 at 3:17 PM EDT

## 2024-07-31 NOTE — PROGRESS NOTES
1600 Attempted to call report to Mariaelena Phillips, spoke with Shiloh wadsworth twice no answer at this time.  1748 Second attempt to call report, no answer at this time.

## 2024-07-31 NOTE — CARE COORDINATION
SPOKE WITH [facility] HAS ACCEPTED PT WILL NEED PT/OT EVALS TO START PRECERT.   
This LSW received notification that insurance has given authorization for SNF transfer. Insurance authorization is approved through midnight of Sunday August 4,2024. Patient will transfer to [facility].  Transportation is arranged  with [facility].Transportation is scheduled for 6:00PM. Patient, LISANDRA Gaxiola and Renee,admissions liaison are all aware.  2nd IMM completed, copy given to patient.  [id number] completed.  Electronically signed by LOLI Blake on 7/31/24 at 3:04 PM EDT      
This LSW visited patient at bedside this am. Insurance authorization was initiated on 7/30/24.  Patient to transfer to [facility] once insurance authorization is approved.  ROBELW/CM to follow.  Electronically signed by LOLI Blake on 7/31/24 at 10:57 AM EDT     
This LSW visited patient at bedside this am. Patients referral has been accepted to [facility]. Patient must complete PT, OT evaluations for insurance authorization to begin for [facility].  ROBELW/CM : to follow.  Electronically signed by LOLI Blake on 7/29/24 at 11:31 AM EDT     
This LSW visited patient at bedside. Patient plans to transfer to [facility] upon discharge.Patient has completed PT evaluation, he will need to complete OT evaluation for insurance authorization to be initiated.  Patient will transfer to [facility] once insurance authorization and medical clearance are obtained.  Electronically signed by LOLI Blake on 7/30/24 at 12:25 PM EDT   
dialysis.  Patient has a walker and a shower chair at home.  Patient has not used HHC in the past.  If physician feels HHC is needed a list of providers will be given to patient.  Patient and wife are happy with care received at [facility] and [facility] (in past).  Patients wife is very knowledgeable and open about patients history.  Patient uses [facility] in [place] for pharmacy.  LSW will follow.  Electronically signed by LOLI Muniz on 7/25/24 at 3:03 PM EDT        The Plan for Transition of Care is related to the following treatment goals of Complication of transplanted kidney [T86.10]    IF APPLICABLE: The Patient and/or patient representative Remy and his family were provided with a choice of provider and agrees with the discharge plan. Freedom of choice list with basic dialogue that supports the patient's individualized plan of care/goals and shares the quality data associated with the providers was provided to:     Patient Representative Name:       The Patient and/or Patient Representative Agree with the Discharge Plan?      LOLI Muniz  Case Management Department

## 2024-07-31 NOTE — CONSULTS
[facility] Department of Psychiatry  Behavioral Health Consult    REASON FOR CONSULT: Depression    CONSULTING PHYSICIAN:     History obtained from: patient    HISTORY OF PRESENT ILLNESS:      The patient is a 73 y.o. male with no significant past psychiatric history of  who presents with multiple medical issues.  Patient has been admitted to the hospital for more than a week which has been frustrating him.  He reported that he is normally not a depressed person but being in the hospital for such a long time is making him sad.  Moreover he is focused on all his medical condition including his kidney transplant and the complications that he is facing now.  Patient denies any depression or suicidal thoughts.  He wants to go home and get better.  Patient and he denies any hopeless or worthless feeling.  He has been anxious but not have any audiovisual hallucinations or paranoid thoughts.  Patient denies having any access to guns or weapons at home.  He lives alone.  Patient denies any alcohol or drug related issues.        The patient is not currently receiving care for the above psychiatric illness.          Past Psychiatric History:  No past psychiatric history    Past Medical History:        Diagnosis Date    Diabetes mellitus (HCC)     Hemodialysis access, fistula mature (HCC) 12/2002    Hypertension     Seizures (HCC)     no seizure since 1995       Past Surgical History:        Procedure Laterality Date    BRAIN SURGERY Left 12/06/1990    to eliminate seizures    COLONOSCOPY N/A 10/24/2022    COLONOSCOPY DIAGNOSTIC performed by Va Ordoñez MD at [facility]    KIDNEY TRANSPLANT  2015    [facility] INCL FLUOR GDNCE DX W/CELL WASHG SPX N/A 3/20/2018    BRONCHOSCOPY performed by Cristopher Conde MD at [facility]    UPPER GASTROINTESTINAL ENDOSCOPY N/A 10/21/2022    EGD DIAGNOSTIC ONLY performed by Jaime Martinez MD at [facility]       Medications Prior to Admission:   Medications Prior  11.2 oz)   SpO2 100%   BMI 22.82 kg/m²      Neuro Exam:   Muscle Strength & Tone: full ROM  Gait: normal gait   Involuntary Movements: No    Mental Status Examination:    Level of consciousness:  within normal limits   Appearance:  well-appearing  Behavior/Motor:  no abnormalities noted  Attitude toward examiner:  cooperative  Speech:  normal volume   Mood: anxious  Affect:  mood congruent  Thought processes:  goal directed   Association  Thought content:  Suicidal Ideation:  denies suicidal ideation  Cognition:  oriented to person, place, and time   Attention & Concentration intact  Memory intact  Mini Mental Status not completed because   Insight fair   Judgement fair   Fund of Knowledge good      DIAGNOSIS:    Adjustment disorder unspecified      RISK ASSESSMENT:        LABS: REVIEWED TODAY:  Recent Labs     07/29/24  0554 07/30/24  0602 07/31/24  0635   WBC 7.3 6.6 6.3   HGB 8.2* 8.0* 7.9*    169 168     Recent Labs     07/29/24  0554 07/30/24  0602 07/31/24  0635    143 143   K 4.1 4.3 4.3    108* 108*   CO2 20 20 19*   BUN 91* 92* 89*   CREATININE 2.81* 2.71* 2.57*   GLUCOSE 187* 210* 192*     Recent Labs     07/30/24  0602   BILITOT 0.6   ALKPHOS 41   AST 67*   ALT 83*     Lab Results   Component Value Date/Time    BARBSCNU Neg 10/19/2022 01:44 PM    LABBENZ Neg 10/19/2022 01:44 PM    LABMETH Neg 10/19/2022 01:44 PM    ETOH <10 10/19/2022 11:01 AM     Lab Results   Component Value Date/Time    TSH 0.574 10/19/2022 11:02 AM    TSH 0.972 09/14/2022 06:59 PM     No results found for: \"LITHIUM\"  No results found for: \"VALPROATE\", \"CBMZ\"  No results found for: \"LITHIUM\", \"VALPROATE\"    FURTHER LABS ORDERED :      Radiology   Vascular duplex kidney transplant    Result Date: 7/25/2024  EXAMINATION: ULTRASOUND OF RENAL TRANPLANT 7/25/2024 4:07 pm COMPARISON: None. HISTORY: ORDERING SYSTEM PROVIDED HISTORY: worsening renal function in transplanted kidney, ACUTE KIDNEY INJURY, HTN, allen-vasc  likely, low GFR FINDINGS: Distended urinary bladder with estimated prevoid volume of 1143 mL. Estimated postvoid residual of 856 mL.  Left lower quadrant transplant kidney.  11.6 x 5.6 x 4.8 cm.  Mild nonspecific dilation of the caliceal structures of the transplant kidney.  Patent renal vein.  Patent arterial structures.  Maximum systolic flow velocity in the proximal renal artery up to 139 centimeters/second.  Normal spectral Doppler waveforms.     1. Urinary bladder distension and large postvoid bladder residual 2. Left lower quadrant transplant kidney with patent vessels and mild caliectasis.       EKG: TRACING REVIEWED    RECOMMENDATIONS    Risk Management:  routine:  no special precautions necessary    Patient does not appear to be clinically depressed or suicidal.  He can be safely discharged when medically stable.  Offered patient to follow-up with outpatient psychiatry program which he declined.  Patient does not want any active psychiatric intervention  Discussed with the treating physician/ team about the patient and treatment plan  Reviewed the chart    Discussed with the patient risk, benefit, alternative and common side effects for the  proposed medication treatment. Patient is consenting to the treatment.    Thanks for the consult. Please call me if needed.    Electronically signed by YAIMA ACOSTA MD on 7/31/2024 at 4:00 PM

## 2024-07-31 NOTE — PROGRESS NOTES
Subjective:      Patient ID: Remy Upton is a 73 y.o. male    HPI73 year old male who's means catheter is draining clear clover urine. He voices no other urological complaints          Past Medical History:   Diagnosis Date    Diabetes mellitus (HCC)     Hemodialysis access, fistula mature (HCC) 2002    Hypertension     Seizures (HCC)     no seizure since      Past Surgical History:   Procedure Laterality Date    BRAIN SURGERY Left 1990    to eliminate seizures    COLONOSCOPY N/A 10/24/2022    COLONOSCOPY DIAGNOSTIC performed by Va Ordoñez MD at [facility]    KIDNEY TRANSPLANT      MS [facility] INCL FLUOR GDNCE DX W/CELL WASHG SPX N/A 3/20/2018    BRONCHOSCOPY [place] [facility] [place]    UPPER GASTROINTESTINAL ENDOSCOPY N/A 10/21/2022    EGD DIAGNOSTIC ONLY performed by Jaime Martinez MD at [facility]     Social History     Socioeconomic History    Marital status:      Spouse name: None    Number of children: None    Years of education: None    Highest education level: None   Tobacco Use    Smoking status: Former     Current packs/day: 0.00     Types: Cigarettes     Quit date: 2015     Years since quittin.1    Smokeless tobacco: Former   Vaping Use    Vaping Use: Never used   Substance and Sexual Activity    Alcohol use: No     Alcohol/week: 0.0 standard drinks of alcohol    Drug use: No    Sexual activity: Not Currently     Social Determinants of Health     Food Insecurity: No Food Insecurity (2024)    Hunger Vital Sign     Worried About Running Out of Food in the Last Year: Never true     Ran Out of Food in the Last Year: Never true   Transportation Needs: No Transportation Needs (2024)    PRAPARE - Transportation     Lack of Transportation (Medical): No     Lack of Transportation (Non-Medical): No   Housing Stability: Low Risk  (2024)    Housing Stability Vital Sign     Unable to Pay for Housing in the Last Year: No           Liban Martínez PA-C

## 2024-08-01 ENCOUNTER — APPOINTMENT (OUTPATIENT)
Dept: AUDIOLOGY | Facility: CLINIC | Age: 74
End: 2024-08-01
Payer: COMMERCIAL

## 2024-08-01 ENCOUNTER — APPOINTMENT (OUTPATIENT)
Dept: OTOLARYNGOLOGY | Facility: CLINIC | Age: 74
End: 2024-08-01
Payer: COMMERCIAL

## 2024-08-01 NOTE — PROGRESS NOTES
Physical Therapy  Facility/Department: [facility] MED SURG W478/W478-01  Physical Therapy Discharge      NAME: Remy Upton    : 1950 (73 y.o.)  MRN: [id number]    Account: [id number]  Gender: male      Patient has been discharged from acute care hospital. DC patient from current PT program.      Electronically signed by Chayo Ambrose PT on 24 at 4:12 PM EDT

## 2024-08-02 ENCOUNTER — OFFICE VISIT (OUTPATIENT)
Dept: GERIATRIC MEDICINE | Age: 74
End: 2024-08-02

## 2024-08-02 DIAGNOSIS — N40.1 URINARY RETENTION DUE TO BENIGN PROSTATIC HYPERPLASIA: ICD-10-CM

## 2024-08-02 DIAGNOSIS — N30.00 ACUTE CYSTITIS WITHOUT HEMATURIA: ICD-10-CM

## 2024-08-02 DIAGNOSIS — N18.6 ESRD (END STAGE RENAL DISEASE) (HCC): ICD-10-CM

## 2024-08-02 DIAGNOSIS — R33.8 URINARY RETENTION DUE TO BENIGN PROSTATIC HYPERPLASIA: ICD-10-CM

## 2024-08-02 DIAGNOSIS — I10 ESSENTIAL HYPERTENSION: ICD-10-CM

## 2024-08-02 DIAGNOSIS — R26.2 DIFFICULTY IN WALKING: ICD-10-CM

## 2024-08-02 DIAGNOSIS — M62.81 MUSCLE WEAKNESS (GENERALIZED): Primary | ICD-10-CM

## 2024-08-02 DIAGNOSIS — E78.2 MIXED HYPERLIPIDEMIA: ICD-10-CM

## 2024-08-02 DIAGNOSIS — Z94.0 KIDNEY REPLACED BY TRANSPLANT: ICD-10-CM

## 2024-08-02 DIAGNOSIS — E11.29 CONTROLLED TYPE 2 DIABETES MELLITUS WITH OTHER DIABETIC KIDNEY COMPLICATION, WITH LONG-TERM CURRENT USE OF INSULIN (HCC): ICD-10-CM

## 2024-08-02 DIAGNOSIS — I48.91 ATRIAL FIBRILLATION, UNSPECIFIED TYPE (HCC): ICD-10-CM

## 2024-08-02 DIAGNOSIS — Z79.4 CONTROLLED TYPE 2 DIABETES MELLITUS WITH OTHER DIABETIC KIDNEY COMPLICATION, WITH LONG-TERM CURRENT USE OF INSULIN (HCC): ICD-10-CM

## 2024-08-02 NOTE — PROGRESS NOTES
Name: Remy Upton   Facility: [address]  [address]    Date: 8/2/2024     Subjective:     Chief Complaint   Patient presents with    Follow-up       HPI  Remy Upton is a 73 y.o. male being seen today for evaluation of weakness difficulty walking ESRD status post kidney transplant diabetes with neuropathy hypertension hyperlipidemia and atrial fibrillation, urinary rentention w/BPH, and UTI.  Resident was re-admitted from [facility] after being treated for complication of transplanted kidney, UTI, DELORES, urinary retention, he is very debilitated and weak, unable to walk.  He has history of kidney transplant x 2 in 2015, atrial fibrillation with a watchman placed in 2023, COPD, HTN, HLD, DM, anemia, BPH, GERD, and epilepsy.  Resident is up in dining room, alert and oriented x 3, cooperative with care.  Medications reconciled, he was admitted for vitamins which he states he can't take d/t kidney transplant.  Resident is on immunosuppressive/anti-rejection regimen, has right fistula, not currently on dialysis.  Vital signs are at baseline, no fever.  Appetite fair for breakfast.  PT/OT evaluation planned for later today.  Residents wife is here, discussed medications again and they agree with current meds.  Walters draining clear yellow.  Will keep for another 1-2 weeks per orders of urologist.     Past Medical History:   Diagnosis Date    Diabetes mellitus (HCC)     Hemodialysis access, fistula mature ([facility]) 12/2002    Hypertension     Seizures ([facility])     no seizure since 1995         Allergies:  Reviewed in nursing facility records  Medications:  Reviewed and reconciled in nursing facility records    Review of Systems  A 10 Point review of systems was performed and is negative unless previously stated      Objective:   See nursing facility record for vital signs    Physical Exam  Constitutional:  in tilt-n-space wheelchair, chronically ill-appearing, frail, in no acute

## 2024-08-06 ENCOUNTER — OFFICE VISIT (OUTPATIENT)
Dept: GERIATRIC MEDICINE | Age: 74
End: 2024-08-06

## 2024-08-06 DIAGNOSIS — M62.81 MUSCLE WEAKNESS (GENERALIZED): Primary | ICD-10-CM

## 2024-08-06 DIAGNOSIS — R30.0 DYSURIA: ICD-10-CM

## 2024-08-06 DIAGNOSIS — R60.0 BILATERAL LOWER EXTREMITY EDEMA: ICD-10-CM

## 2024-08-06 DIAGNOSIS — Z79.4 CONTROLLED TYPE 2 DIABETES MELLITUS WITH OTHER DIABETIC KIDNEY COMPLICATION, WITH LONG-TERM CURRENT USE OF INSULIN (HCC): ICD-10-CM

## 2024-08-06 DIAGNOSIS — E11.29 CONTROLLED TYPE 2 DIABETES MELLITUS WITH OTHER DIABETIC KIDNEY COMPLICATION, WITH LONG-TERM CURRENT USE OF INSULIN (HCC): ICD-10-CM

## 2024-08-06 DIAGNOSIS — R26.2 DIFFICULTY IN WALKING: ICD-10-CM

## 2024-08-07 ENCOUNTER — OFFICE VISIT (OUTPATIENT)
Dept: GERIATRIC MEDICINE | Age: 74
End: 2024-08-07

## 2024-08-07 DIAGNOSIS — M62.81 MUSCLE WEAKNESS (GENERALIZED): ICD-10-CM

## 2024-08-07 DIAGNOSIS — J44.9 COPD WITHOUT EXACERBATION (HCC): ICD-10-CM

## 2024-08-07 DIAGNOSIS — I10 ESSENTIAL HYPERTENSION: Primary | ICD-10-CM

## 2024-08-12 ENCOUNTER — OFFICE VISIT (OUTPATIENT)
Dept: GERIATRIC MEDICINE | Age: 74
End: 2024-08-12

## 2024-08-12 DIAGNOSIS — R60.0 BILATERAL LOWER EXTREMITY EDEMA: ICD-10-CM

## 2024-08-12 DIAGNOSIS — E11.65 POORLY CONTROLLED DIABETES MELLITUS (HCC): ICD-10-CM

## 2024-08-12 DIAGNOSIS — I10 ESSENTIAL HYPERTENSION: Primary | ICD-10-CM

## 2024-08-12 DIAGNOSIS — J44.9 COPD WITHOUT EXACERBATION (HCC): ICD-10-CM

## 2024-08-13 ENCOUNTER — OFFICE VISIT (OUTPATIENT)
Dept: GERIATRIC MEDICINE | Age: 74
End: 2024-08-13

## 2024-08-13 DIAGNOSIS — M62.81 MUSCLE WEAKNESS (GENERALIZED): Primary | ICD-10-CM

## 2024-08-13 DIAGNOSIS — R30.0 DYSURIA: ICD-10-CM

## 2024-08-13 DIAGNOSIS — R26.2 DIFFICULTY IN WALKING: ICD-10-CM

## 2024-08-13 DIAGNOSIS — R60.0 BILATERAL LOWER EXTREMITY EDEMA: ICD-10-CM

## 2024-08-15 ENCOUNTER — OFFICE VISIT (OUTPATIENT)
Dept: GERIATRIC MEDICINE | Age: 74
End: 2024-08-15

## 2024-08-15 DIAGNOSIS — J44.9 COPD WITHOUT EXACERBATION (HCC): ICD-10-CM

## 2024-08-15 DIAGNOSIS — I48.20 CHRONIC ATRIAL FIBRILLATION (HCC): ICD-10-CM

## 2024-08-15 DIAGNOSIS — I10 ESSENTIAL HYPERTENSION: Primary | ICD-10-CM

## 2024-08-20 ENCOUNTER — OFFICE VISIT (OUTPATIENT)
Dept: GERIATRIC MEDICINE | Age: 74
End: 2024-08-20
Payer: COMMERCIAL

## 2024-08-20 DIAGNOSIS — N18.4 ANEMIA DUE TO STAGE 4 CHRONIC KIDNEY DISEASE (HCC): ICD-10-CM

## 2024-08-20 DIAGNOSIS — R26.2 DIFFICULTY IN WALKING: ICD-10-CM

## 2024-08-20 DIAGNOSIS — M62.81 MUSCLE WEAKNESS (GENERALIZED): ICD-10-CM

## 2024-08-20 DIAGNOSIS — R31.0 GROSS HEMATURIA: Primary | ICD-10-CM

## 2024-08-20 DIAGNOSIS — D63.1 ANEMIA DUE TO STAGE 4 CHRONIC KIDNEY DISEASE (HCC): ICD-10-CM

## 2024-08-20 DIAGNOSIS — Z94.0 KIDNEY REPLACED BY TRANSPLANT: ICD-10-CM

## 2024-08-20 PROCEDURE — 99309 SBSQ NF CARE MODERATE MDM 30: CPT | Performed by: NURSE PRACTITIONER

## 2024-08-20 PROCEDURE — 1123F ACP DISCUSS/DSCN MKR DOCD: CPT | Performed by: NURSE PRACTITIONER

## 2024-08-21 PROBLEM — R31.0 GROSS HEMATURIA: Status: ACTIVE | Noted: 2024-08-21

## 2024-08-21 RX ORDER — TAMSULOSIN HYDROCHLORIDE 0.4 MG/1
0.4 CAPSULE ORAL DAILY
Qty: 90 CAPSULE | Refills: 3 | Status: SHIPPED | OUTPATIENT
Start: 2024-08-21

## 2024-08-21 NOTE — PROGRESS NOTES
Name: Remy Upton   Facility: [address]  [address]    Date: 8/20/2024     Subjective:     Chief Complaint   Patient presents with    Follow-up       HPI  Remy Upton is a 73 y.o. male being seen today for evaluation of gross hematuria weakness difficulty walking stage IV kidney disease status post kidney transplant.  Nurse reports gross hematuria in catheter bag over the weekend, no clots noted.  Urine culture obtained.  Resident denied any symptoms, he was recently hospitalized and treated for UTI.  Labs drawn yesterday, HGB down to 7.6 yesterday, was 9.3 on 7/24/24.  Iron panel within acceptable range.  Resident is participating in PT/OT, making some progress, still not able to walk.  Continues to have difficulty going from seated to standing position, quadriceps are very weak, needs help to stand.  He stood for approximately 10 minutes.      Past Medical History:   Diagnosis Date    Diabetes mellitus ([facility])     Hemodialysis access, fistula mature ([facility]) 12/2002    Hypertension     Seizures ([facility])     no seizure since 1995         Allergies:  Reviewed in nursing facility records  Medications:  Reviewed and reconciled in nursing facility records    Review of Systems  A 10 Point review of systems was performed and is negative unless previously stated      Objective:   See nursing facility record for vital signs.      Physical Exam  Constitutional:  in wheelchair, elderly, frail, in no acute distess  Pulmonary/Chest:  breathing unlabored, no use of accessory muscles, lung sounds clear and diminished in lower lobes, no shortness of breath at rest, dyspnea with activity  Cardiovascular:  S1-S2 irregular, 2+ DP x 2, edema to lower extremities is improving  Abd/GI:  abdomen soft, round, non-distended, non-tender, active bowel sounds x 4 quadrants  : no SP tenderness,no CVA tenderness, no hematuria  MS/Extremities:  MCCOY, ROM limited, non-ambulatory  Psych:  A/O x 3,

## 2024-08-27 ENCOUNTER — OFFICE VISIT (OUTPATIENT)
Dept: GERIATRIC MEDICINE | Age: 74
End: 2024-08-27

## 2024-08-27 DIAGNOSIS — M62.81 MUSCLE WEAKNESS (GENERALIZED): ICD-10-CM

## 2024-08-27 DIAGNOSIS — N18.4 ANEMIA DUE TO STAGE 4 CHRONIC KIDNEY DISEASE (HCC): ICD-10-CM

## 2024-08-27 DIAGNOSIS — D63.1 ANEMIA DUE TO STAGE 4 CHRONIC KIDNEY DISEASE (HCC): ICD-10-CM

## 2024-08-27 DIAGNOSIS — R60.0 BILATERAL LOWER EXTREMITY EDEMA: ICD-10-CM

## 2024-08-27 DIAGNOSIS — N30.00 ACUTE CYSTITIS WITHOUT HEMATURIA: Primary | ICD-10-CM

## 2024-08-27 DIAGNOSIS — R26.2 DIFFICULTY IN WALKING: ICD-10-CM

## 2024-08-30 ENCOUNTER — OFFICE VISIT (OUTPATIENT)
Dept: GERIATRIC MEDICINE | Age: 74
End: 2024-08-30

## 2024-08-30 DIAGNOSIS — M62.81 MUSCLE WEAKNESS (GENERALIZED): ICD-10-CM

## 2024-08-30 DIAGNOSIS — N30.00 ACUTE CYSTITIS WITHOUT HEMATURIA: Primary | ICD-10-CM

## 2024-08-30 DIAGNOSIS — R60.0 BILATERAL LOWER EXTREMITY EDEMA: ICD-10-CM

## 2024-08-30 DIAGNOSIS — R26.2 DIFFICULTY IN WALKING: ICD-10-CM

## 2024-08-31 PROBLEM — R60.0 LOCALIZED EDEMA: Status: ACTIVE | Noted: 2024-08-31

## 2024-08-31 PROBLEM — R30.0 DYSURIA: Status: ACTIVE | Noted: 2024-08-31

## 2024-08-31 NOTE — PROGRESS NOTES
Name: Remy Upton   Facility: [address]  [address]    Date: 8/6/2024     Subjective:     Chief Complaint   Patient presents with    Follow-up       HPI  Remy Upton is a 73 y.o. male being seen today for evaluation of weakness difficulty walking and bilateral lower extremity edema, pain around catheter, labile heart rate, and diabetes.  Resident is participating in physical and Occupational Therapy, he is very debilitated and weak, unable to walk. He is a debbie lift for all transfers, max assist with ADL's, 12/15 on BIMS, working on strengthening. Resident is up in dining room, alert and oriented x 3, cooperative with care.  Vital signs are at baseline, no fever.  Appetite fair for breakfast.  Walters draining clear yellow although resident is c/o pain around catheter.  Nurses report labile heart rate , has h/o atrial fib.  Resident denies symptoms, no chest palpitations, irregular heart beat, or chest pain.    Past Medical History:   Diagnosis Date    Diabetes mellitus ([facility])     Hemodialysis access, fistula mature ([facility]) 12/2002    Hypertension     Seizures ([facility])     no seizure since 1995         Allergies:  Reviewed in nursing facility records  Medications:  Reviewed and reconciled in nursing facility records    Review of Systems  A 10 Point review of systems was performed and is negative unless previously stated      Objective:   See nursing facility record for vital signs    Physical Exam  Constitutional:  in tilt-n-space wheelchair, chronically ill-appearing, frail, in no acute distess  Pulmonary/Chest:  breathing unlabored, no use of accessory muscles, lung sounds clear and diminished in lower lobes, no shortness of breath at rest, dyspnea with activity  Cardiovascular:  S1-S2 regular, 2+ DP x 2, edema in lower extremities, more so in right foot, AV fistula  Abd/GI:  abdomen soft, round, non-distended, non-tender, active bowel sounds x 4 quadrants  : no SP

## 2024-09-03 ENCOUNTER — OFFICE VISIT (OUTPATIENT)
Dept: GERIATRIC MEDICINE | Age: 74
End: 2024-09-03
Payer: COMMERCIAL

## 2024-09-03 DIAGNOSIS — R26.2 DIFFICULTY IN WALKING: ICD-10-CM

## 2024-09-03 DIAGNOSIS — N30.00 ACUTE CYSTITIS WITHOUT HEMATURIA: Primary | ICD-10-CM

## 2024-09-03 DIAGNOSIS — M62.81 MUSCLE WEAKNESS (GENERALIZED): ICD-10-CM

## 2024-09-03 DIAGNOSIS — R60.0 BILATERAL LOWER EXTREMITY EDEMA: ICD-10-CM

## 2024-09-03 PROCEDURE — 1123F ACP DISCUSS/DSCN MKR DOCD: CPT | Performed by: NURSE PRACTITIONER

## 2024-09-03 PROCEDURE — 99308 SBSQ NF CARE LOW MDM 20: CPT | Performed by: NURSE PRACTITIONER

## 2024-09-06 ENCOUNTER — OFFICE VISIT (OUTPATIENT)
Dept: GERIATRIC MEDICINE | Age: 74
End: 2024-09-06
Payer: COMMERCIAL

## 2024-09-06 DIAGNOSIS — M62.81 MUSCLE WEAKNESS (GENERALIZED): ICD-10-CM

## 2024-09-06 DIAGNOSIS — R60.0 BILATERAL LOWER EXTREMITY EDEMA: ICD-10-CM

## 2024-09-06 DIAGNOSIS — Z94.0 KIDNEY REPLACED BY TRANSPLANT (HHS-HCC): ICD-10-CM

## 2024-09-06 DIAGNOSIS — R26.2 DIFFICULTY IN WALKING: ICD-10-CM

## 2024-09-06 DIAGNOSIS — N30.00 ACUTE CYSTITIS WITHOUT HEMATURIA: Primary | ICD-10-CM

## 2024-09-06 PROCEDURE — 1123F ACP DISCUSS/DSCN MKR DOCD: CPT | Performed by: NURSE PRACTITIONER

## 2024-09-06 PROCEDURE — 99308 SBSQ NF CARE LOW MDM 20: CPT | Performed by: NURSE PRACTITIONER

## 2024-09-10 ENCOUNTER — OFFICE VISIT (OUTPATIENT)
Dept: GERIATRIC MEDICINE | Age: 74
End: 2024-09-10

## 2024-09-10 DIAGNOSIS — M62.81 MUSCLE WEAKNESS (GENERALIZED): Primary | ICD-10-CM

## 2024-09-10 DIAGNOSIS — R26.2 DIFFICULTY IN WALKING: ICD-10-CM

## 2024-09-10 DIAGNOSIS — H91.93 BILATERAL HEARING LOSS, UNSPECIFIED HEARING LOSS TYPE: ICD-10-CM

## 2024-09-13 ENCOUNTER — OFFICE VISIT (OUTPATIENT)
Dept: GERIATRIC MEDICINE | Age: 74
End: 2024-09-13

## 2024-09-13 DIAGNOSIS — R60.0 BILATERAL LOWER EXTREMITY EDEMA: ICD-10-CM

## 2024-09-13 DIAGNOSIS — M62.81 MUSCLE WEAKNESS (GENERALIZED): Primary | ICD-10-CM

## 2024-09-13 DIAGNOSIS — R26.2 DIFFICULTY IN WALKING: ICD-10-CM

## 2024-09-16 ENCOUNTER — OFFICE VISIT (OUTPATIENT)
Dept: GERIATRIC MEDICINE | Age: 74
End: 2024-09-16

## 2024-09-16 DIAGNOSIS — I10 ESSENTIAL HYPERTENSION: Primary | ICD-10-CM

## 2024-09-16 DIAGNOSIS — I48.20 CHRONIC ATRIAL FIBRILLATION (HCC): ICD-10-CM

## 2024-09-16 DIAGNOSIS — J44.9 COPD WITHOUT EXACERBATION (HCC): ICD-10-CM

## 2024-09-17 ENCOUNTER — OFFICE VISIT (OUTPATIENT)
Dept: GERIATRIC MEDICINE | Age: 74
End: 2024-09-17

## 2024-09-17 ENCOUNTER — APPOINTMENT (OUTPATIENT)
Dept: OTOLARYNGOLOGY | Facility: CLINIC | Age: 74
End: 2024-09-17
Payer: COMMERCIAL

## 2024-09-17 ENCOUNTER — APPOINTMENT (OUTPATIENT)
Dept: AUDIOLOGY | Facility: CLINIC | Age: 74
End: 2024-09-17
Payer: COMMERCIAL

## 2024-09-17 VITALS
DIASTOLIC BLOOD PRESSURE: 79 MMHG | SYSTOLIC BLOOD PRESSURE: 129 MMHG | WEIGHT: 170 LBS | HEIGHT: 74 IN | BODY MASS INDEX: 21.82 KG/M2

## 2024-09-17 DIAGNOSIS — H61.23 BILATERAL IMPACTED CERUMEN: Primary | ICD-10-CM

## 2024-09-17 DIAGNOSIS — H93.11 TINNITUS OF RIGHT EAR: ICD-10-CM

## 2024-09-17 DIAGNOSIS — H91.91 HEARING DIFFICULTY OF RIGHT EAR: ICD-10-CM

## 2024-09-17 DIAGNOSIS — H91.93 BILATERAL HEARING LOSS, UNSPECIFIED HEARING LOSS TYPE: Primary | ICD-10-CM

## 2024-09-17 PROCEDURE — 1160F RVW MEDS BY RX/DR IN RCRD: CPT

## 2024-09-17 PROCEDURE — 3062F POS MACROALBUMINURIA REV: CPT

## 2024-09-17 PROCEDURE — 3048F LDL-C <100 MG/DL: CPT

## 2024-09-17 PROCEDURE — 69210 REMOVE IMPACTED EAR WAX UNI: CPT

## 2024-09-17 PROCEDURE — 3008F BODY MASS INDEX DOCD: CPT

## 2024-09-17 PROCEDURE — 3074F SYST BP LT 130 MM HG: CPT

## 2024-09-17 PROCEDURE — 1159F MED LIST DOCD IN RCRD: CPT

## 2024-09-17 PROCEDURE — 3078F DIAST BP <80 MM HG: CPT

## 2024-09-17 PROCEDURE — 99203 OFFICE O/P NEW LOW 30 MIN: CPT

## 2024-09-17 RX ORDER — ACETAMINOPHEN 650 MG/1
SUPPOSITORY RECTAL
COMMUNITY

## 2024-09-17 RX ORDER — ASPIRIN 81 MG/1
81 TABLET ORAL DAILY
COMMUNITY

## 2024-09-17 RX ORDER — MIDODRINE HYDROCHLORIDE 5 MG/1
5 TABLET ORAL
COMMUNITY

## 2024-09-17 RX ORDER — ASPIRIN 81 MG
5 TABLET, DELAYED RELEASE (ENTERIC COATED) ORAL 3 TIMES WEEKLY
Qty: 15 ML | Refills: 0 | Status: SHIPPED | OUTPATIENT
Start: 2024-09-18 | End: 2024-09-17

## 2024-09-17 RX ORDER — METOCLOPRAMIDE 5 MG/1
5 TABLET ORAL 4 TIMES DAILY
COMMUNITY

## 2024-09-17 RX ORDER — ASPIRIN 81 MG
5 TABLET, DELAYED RELEASE (ENTERIC COATED) ORAL 3 TIMES WEEKLY
Qty: 15 ML | Refills: 0 | Status: SHIPPED | OUTPATIENT
Start: 2024-09-18 | End: 2024-09-28

## 2024-09-17 RX ORDER — ACETAMINOPHEN 325 MG/1
TABLET ORAL EVERY 6 HOURS PRN
COMMUNITY

## 2024-09-17 NOTE — PROGRESS NOTES
Patient ID: Keith Lazar is a 73 y.o. male who presents for an initial assessment of earwax removal. Patient presents as a referral from ED provider Dr. Jero You. Patient presents today in a wheelchair and is accompanied by his wife, Niurka.     PROVIDER IMPRESSIONS:  DIAGNOSES/PROBLEMS:  - Cerumen impaction of both ears   - Tinnitus, right ear  - Hearing difficulty, right ear     ASSESSMENT: Keith Lazar is a 73 y.o. male who presents for an initial encounter with symptoms and clinical findings that are consistent with bilateral cerumen impaction. Using appropriate instrumentation, impacted cerumen was successfully removed from the left EAC. Unfortunately, due to the degree of impaction and patient discomfort, right cerumen impaction was unable to be removed from the right EAC today. Reassurance provided to patient that otologic exam today revealed no evidence of acute infection/inflammation in the left EAC and that left TM appears with no evidence of infection, effusion, retraction or perforation.      PLAN:   -I counseled patient on safe interventions for cerumen management at home. Patient may wash the external ear with a cloth. I reinforced education to the patient that they should avoid using cotton tipped applicators, tissues, paper, or any rigid object in the ear canal. I explained to the patient that doing so may cause wax to be pushed back into the ear canal and stuck and also risks injury to the ear canal and ear drum.   -I recommended patient initiates use of o.t.c. ceruminolytic agent Debrox 3 times weekly into the right EAC to soften cerumen for secondary attempt at removal. I instructed patient to use Debrox once daily in the 3 days prior to follow-up visit. Patient was counseled on the indications, possible side effects, and proper administration of medication. Prescription was printed and provided to the wife, per request.  -Follow-up: Patient may schedule for routine evaluation for  the removal of cerumen impaction in 2-3 weeks. May consider audiogram, pending follow-up. They may follow up with me as requested, sooner if needed. All questions answered to patient satisfaction.    Subjective    HPI: Keith Lazar is a 73 y.o. male referred by ED  for clinical evaluation of right hearing difficulty and right tinnitus and with the request for earwax removal. States that symptoms began approximately 3 months ago. Describes tinnitus as a continuous non-pulsatile ringing sound in the right ear. States he went to the ED on 6/24/24 and ED physician attempted to remove right cerumen impaction with instrument but was unsuccessful. When asked about the presence of associated otologic symptoms, including ear pain, ear drainage, ear itching, aural fullness, autophony, dizziness or vertigo, the patient admits to none. When asked about pertinent otologic history, the patient denies a history of recurrent ear infections, denies history of ear surgery, denies history of PE tube insertion, and denies history of prolonged/traumatic loud noise exposure. The patient does not have family history of hearing loss. The patient does not insert Q-tips in the ear canals. The patient denies insertion of other foreign objects into the ear canals. The patient denies prior history of cerumen impaction. They have not been using ear drops to loosen wax immediately prior to this visit.       REVIEW OF SYSTEMS:  All other systems negative.    Objective   Physical Exam:  Right Ear: External inspection of ear with no deformity, scars, or masses. EAC is completely impacted with cerumen. Unable to visualize tympanic membrane.  Left Ear: External inspection of ear with no deformity, scars, or masses. EAC is partially impacted with cerumen. Unable to visualize tympanic membrane.     Neurologic: Cranial nerves II-XII grossly intact and symmetric bilaterally.  Head and Face:  Head: Atraumatic with no masses, lesions or  scarring.  Face: Normal symmetry. No scars or deformities.  Eyes: Conjunctiva not edematous or erythematous.   Nose: External inspection of nose: No nasal lesions, lacerations or scars.   Neck: Normal appearing, symmetric, trachea midline.   Cardiovascular: Examination of peripheral vascular system shows no clubbing or cyanosis.  Respiratory: No respiratory distress increased work of breathing. Inspection of the chest with symmetric chest expansion and normal respiratory effort.  Skin: No head and neck rashes.  Lymph nodes: No adenopathy.     EAR CLEANING PROCEDURE NOTE:  Indication: Cerumen impaction  Location: bilateral ear canals  Procedure Note: The procedure was performed by the provider.  Visualization Instrument: A microscope with a #3/4 speculum was placed in the ear canals to visualize the ear canal debris.  Ear Cleaning Instrument and Outcome: Using the 5/7/gabi suction, saline bullet, hook, alligator forceps, a large amount of dry, brown cerumen was removed from the impacted EAC on the left.   After cleaning: Left TM intact without evidence of effusion, retraction, infection, or perforation. Left EAC is clear and appears narrow. Right EAC is narrow and impacted with cerumen.   Patient Status: The patient tolerated the procedure well.  Complications: Unfortunately due to the degree of impaction and patient discomfort, right cerumen impaction was unable to be removed today.

## 2024-09-18 ENCOUNTER — HOSPITAL ENCOUNTER (EMERGENCY)
Age: 74
Discharge: HOME | End: 2024-09-18
Attending: STUDENT IN AN ORGANIZED HEALTH CARE EDUCATION/TRAINING PROGRAM
Payer: COMMERCIAL

## 2024-09-18 VITALS
HEIGHT: 74 IN | OXYGEN SATURATION: 99 % | WEIGHT: 171.3 LBS | SYSTOLIC BLOOD PRESSURE: 138 MMHG | BODY MASS INDEX: 21.98 KG/M2 | HEART RATE: 107 BPM | TEMPERATURE: 98 F | RESPIRATION RATE: 12 BRPM | DIASTOLIC BLOOD PRESSURE: 86 MMHG

## 2024-09-18 DIAGNOSIS — Z94.0 KIDNEY TRANSPLANT RECIPIENT: Primary | ICD-10-CM

## 2024-09-18 DIAGNOSIS — I48.20 CHRONIC A-FIB (HCC): ICD-10-CM

## 2024-09-18 DIAGNOSIS — N18.9 CHRONIC KIDNEY DISEASE, UNSPECIFIED CKD STAGE: ICD-10-CM

## 2024-09-18 LAB
ANION GAP SERPL CALCULATED.3IONS-SCNC: 16 MEQ/L (ref 9–15)
BACTERIA URNS QL MICRO: ABNORMAL /HPF
BASOPHILS # BLD: 0 K/UL (ref 0–0.2)
BASOPHILS NFR BLD: 0.5 %
BILIRUB UR QL STRIP: NEGATIVE
BUN SERPL-MCNC: 99 MG/DL (ref 8–23)
CALCIUM SERPL-MCNC: 9.7 MG/DL (ref 8.5–9.9)
CHLORIDE SERPL-SCNC: 104 MEQ/L (ref 95–107)
CLARITY UR: CLEAR
CO2 SERPL-SCNC: 23 MEQ/L (ref 20–31)
COLOR UR: YELLOW
CREAT SERPL-MCNC: 3.28 MG/DL (ref 0.7–1.2)
CRYSTALS URNS MICRO: ABNORMAL /HPF
EOSINOPHIL # BLD: 0.1 K/UL (ref 0–0.7)
EOSINOPHIL NFR BLD: 2.1 %
EPI CELLS #/AREA URNS AUTO: ABNORMAL /HPF (ref 0–5)
ERYTHROCYTE [DISTWIDTH] IN BLOOD BY AUTOMATED COUNT: 13.7 % (ref 11.5–14.5)
GLUCOSE SERPL-MCNC: 180 MG/DL (ref 70–99)
GLUCOSE UR STRIP-MCNC: NEGATIVE MG/DL
HCT VFR BLD AUTO: 32 % (ref 42–52)
HGB BLD-MCNC: 9.8 G/DL (ref 14–18)
HGB UR QL STRIP: ABNORMAL
HYALINE CASTS #/AREA URNS AUTO: ABNORMAL /HPF (ref 0–5)
KETONES UR STRIP-MCNC: NEGATIVE MG/DL
LEUKOCYTE ESTERASE UR QL STRIP: ABNORMAL
LYMPHOCYTES # BLD: 0.4 K/UL (ref 1–4.8)
LYMPHOCYTES NFR BLD: 7 %
MCH RBC QN AUTO: 29.5 PG (ref 27–31.3)
MCHC RBC AUTO-ENTMCNC: 30.6 % (ref 33–37)
MCV RBC AUTO: 96.4 FL (ref 79–92.2)
MONOCYTES # BLD: 0.5 K/UL (ref 0.2–0.8)
MONOCYTES NFR BLD: 7.9 %
NEUTROPHILS # BLD: 5.1 K/UL (ref 1.4–6.5)
NEUTS SEG NFR BLD: 82.3 %
NITRITE UR QL STRIP: NEGATIVE
PH UR STRIP: 6 [PH] (ref 5–9)
PLATELET # BLD AUTO: 160 K/UL (ref 130–400)
POTASSIUM SERPL-SCNC: 4.9 MEQ/L (ref 3.4–4.9)
PROT UR STRIP-MCNC: 30 MG/DL
RBC # BLD AUTO: 3.32 M/UL (ref 4.7–6.1)
RBC #/AREA URNS AUTO: ABNORMAL /HPF (ref 0–5)
SODIUM SERPL-SCNC: 143 MEQ/L (ref 135–144)
SP GR UR STRIP: 1.01 (ref 1–1.03)
URINE REFLEX TO CULTURE: YES
UROBILINOGEN UR STRIP-ACNC: 1 E.U./DL
WBC # BLD AUTO: 6.2 K/UL (ref 4.8–10.8)
WBC #/AREA URNS AUTO: ABNORMAL /HPF (ref 0–5)
YEAST URNS QL MICRO: PRESENT /HPF

## 2024-09-18 PROCEDURE — 81001 URINALYSIS AUTO W/SCOPE: CPT

## 2024-09-18 PROCEDURE — 93005 ELECTROCARDIOGRAM TRACING: CPT | Performed by: STUDENT IN AN ORGANIZED HEALTH CARE EDUCATION/TRAINING PROGRAM

## 2024-09-18 PROCEDURE — 2580000003 HC RX 258: Performed by: STUDENT IN AN ORGANIZED HEALTH CARE EDUCATION/TRAINING PROGRAM

## 2024-09-18 PROCEDURE — 80048 BASIC METABOLIC PNL TOTAL CA: CPT

## 2024-09-18 PROCEDURE — 99284 EMERGENCY DEPT VISIT MOD MDM: CPT

## 2024-09-18 PROCEDURE — 87086 URINE CULTURE/COLONY COUNT: CPT

## 2024-09-18 PROCEDURE — 85025 COMPLETE CBC W/AUTO DIFF WBC: CPT

## 2024-09-18 PROCEDURE — 96360 HYDRATION IV INFUSION INIT: CPT

## 2024-09-18 RX ORDER — 0.9 % SODIUM CHLORIDE 0.9 %
1000 INTRAVENOUS SOLUTION INTRAVENOUS ONCE
Status: COMPLETED | OUTPATIENT
Start: 2024-09-18 | End: 2024-09-18

## 2024-09-18 RX ADMIN — SODIUM CHLORIDE 1000 ML: 9 INJECTION, SOLUTION INTRAVENOUS at 21:20

## 2024-09-18 ASSESSMENT — PAIN - FUNCTIONAL ASSESSMENT: PAIN_FUNCTIONAL_ASSESSMENT: NONE - DENIES PAIN

## 2024-09-18 ASSESSMENT — LIFESTYLE VARIABLES
HOW MANY STANDARD DRINKS CONTAINING ALCOHOL DO YOU HAVE ON A TYPICAL DAY: PATIENT DOES NOT DRINK
HOW OFTEN DO YOU HAVE A DRINK CONTAINING ALCOHOL: NEVER

## 2024-09-19 LAB
EKG ATRIAL RATE: 93 BPM
EKG Q-T INTERVAL: 342 MS
EKG QRS DURATION: 86 MS
EKG QTC CALCULATION (BAZETT): 448 MS
EKG R AXIS: 1 DEGREES
EKG T AXIS: 4 DEGREES
EKG VENTRICULAR RATE: 103 BPM

## 2024-09-21 LAB — BACTERIA UR CULT: NORMAL

## 2024-09-23 ENCOUNTER — OFFICE VISIT (OUTPATIENT)
Dept: GERIATRIC MEDICINE | Age: 74
End: 2024-09-23
Payer: COMMERCIAL

## 2024-09-23 DIAGNOSIS — G40.909 SEIZURE DISORDER (HCC): ICD-10-CM

## 2024-09-23 DIAGNOSIS — I10 ESSENTIAL HYPERTENSION: Primary | ICD-10-CM

## 2024-09-23 DIAGNOSIS — G62.9 PERIPHERAL NERVE DISEASE: ICD-10-CM

## 2024-09-23 PROCEDURE — 99309 SBSQ NF CARE MODERATE MDM 30: CPT | Performed by: INTERNAL MEDICINE

## 2024-09-23 PROCEDURE — 1123F ACP DISCUSS/DSCN MKR DOCD: CPT | Performed by: INTERNAL MEDICINE

## 2024-09-24 ENCOUNTER — OFFICE VISIT (OUTPATIENT)
Dept: GERIATRIC MEDICINE | Age: 74
End: 2024-09-24

## 2024-09-24 DIAGNOSIS — R26.2 DIFFICULTY IN WALKING: ICD-10-CM

## 2024-09-24 DIAGNOSIS — H91.93 BILATERAL HEARING LOSS, UNSPECIFIED HEARING LOSS TYPE: Primary | ICD-10-CM

## 2024-09-24 DIAGNOSIS — M62.81 MUSCLE WEAKNESS (GENERALIZED): ICD-10-CM

## 2024-09-24 DIAGNOSIS — R60.0 BILATERAL LOWER EXTREMITY EDEMA: ICD-10-CM

## 2024-09-26 ENCOUNTER — TELEPHONE (OUTPATIENT)
Dept: NEUROLOGY | Age: 74
End: 2024-09-26

## 2024-09-26 PROBLEM — N30.00 ACUTE CYSTITIS WITHOUT HEMATURIA: Status: ACTIVE | Noted: 2024-09-26

## 2024-09-30 NOTE — PROGRESS NOTES
Name: Remy Upton   Facility: [address]  [address]    Date: 9/3/2024     Subjective:     Chief Complaint   Patient presents with    Follow-up       HPI  eRmy Upton is a 73 y.o. male being seen today for evaluation of UTI weakness difficulty walking and edema.  Resident is participating in PT/OT, making some progress, still not able to walk.  Is on antibiotics for UTI and Lasix for persistent edema.  Vital signs stable, no fever.        Past Medical History:   Diagnosis Date    Diabetes mellitus ([facility])     Hemodialysis access, fistula mature ([facility]) 12/2002    Hypertension     Seizures ([facility])     no seizure since 1995         Allergies:  Reviewed in nursing facility records  Medications:  Reviewed and reconciled in nursing facility records    Review of Systems  A 10 Point review of systems was performed and is negative unless previously stated      Objective:   See nursing facility record for vital signs.      Physical Exam  Constitutional:  in wheelchair, elderly, frail, in no acute distess  Pulmonary/Chest:  breathing unlabored, no use of accessory muscles, lung sounds clear and diminished in lower lobes, no shortness of breath at rest, dyspnea with activity  Cardiovascular:  S1-S2 irregular, 2+ DP x 2, edema to lower extremities  Abd/GI:  abdomen soft, round, non-distended, non-tender, active bowel sounds x 4 quadrants  : no SP tenderness,no CVA tenderness, no hematuria  MS/Extremities:  MCCOY, ROM limited, non-ambulatory  Psych:  A/O x 3, cooperative with care, no anxiety      Diagnosis Orders   1. Acute cystitis without hematuria        2. Muscle weakness (generalized)        3. Difficulty in walking        4. Bilateral lower extremity edema                Assessment and Plan:      1. Acute cystitis without hematuria   No hematuria, ATB complete, no s/s.      2. Muscle weakness (generalized)  Continue PT/OT    3. Difficulty in walking  Continue PT/OT    4. Bilateral

## 2024-10-01 LAB
BASOPHILS ABSOLUTE: ABNORMAL
BASOPHILS RELATIVE PERCENT: 0.6 %
BUN BLDV-MCNC: 110 MG/DL
CALCIUM SERPL-MCNC: 9.2 MG/DL
CHLORIDE BLD-SCNC: 103 MMOL/L
CO2: 22 MMOL/L
CREAT SERPL-MCNC: 3.1 MG/DL
EGFR: 20
EOSINOPHILS ABSOLUTE: 0.2 /ΜL
EOSINOPHILS RELATIVE PERCENT: 4 %
GLUCOSE BLD-MCNC: 123 MG/DL
HCT VFR BLD CALC: 29 % (ref 41–53)
HEMOGLOBIN: 9.5 G/DL (ref 13.5–17.5)
LYMPHOCYTES ABSOLUTE: 0.7 /ΜL
LYMPHOCYTES RELATIVE PERCENT: 14.2 %
MCH RBC QN AUTO: 30.8 PG
MCHC RBC AUTO-ENTMCNC: 32.8 G/DL
MCV RBC AUTO: 94.1 FL
MONOCYTES ABSOLUTE: 0.5 /ΜL
MONOCYTES RELATIVE PERCENT: 10 %
NEUTROPHILS ABSOLUTE: 3.4 /ΜL
NEUTROPHILS RELATIVE PERCENT: 71.2 %
PLATELET # BLD: 168 K/ΜL
PMV BLD AUTO: 9.4 FL
POTASSIUM SERPL-SCNC: 4.6 MMOL/L
RBC # BLD: 3.08 10^6/ΜL
SODIUM BLD-SCNC: 142 MMOL/L
WBC # BLD: 4.8 10^3/ML

## 2024-10-01 NOTE — PROGRESS NOTES
History  Chief Complaint   Patient presents with    Hip Injury     Patient fell last night down multiple stairs in bathroom  Patient denies hitting head or LOC  Per EMS, patient went to [address] [place] [address] urgent care today where she had an x-ray that showed broken R hip  61 yo female sent to me by ambulance from [facility] for further evaluation of pelvis fracture  After the fall down stairs, her  responded and found her in severe pain but alert, oriented  She denies LOC, or head injury  Her  helped her back up stairs because she seemed able to bear weight  However, right hip pain increased, so that later in the night she had to crawl to the bathroom when she got up another time  She denies HA, N/V, neck pain  The fracture was seen on xray, which I have reviewed and concur with findings by the Advanced Practitioner, and I confirmed with the reading radiologist           History provided by:  Patient  Trauma  Mechanism of injury: fall  Injury location: pelvis  Injury location detail: pelvis  Incident location: home  Time since incident: 12 hours  Arrived directly from scene: no     Fall:       Fall occurred: down stairs (Accidentally took a wrong in the grogginess of the middle of the night toward the stairs rather than the bathroom  She fell al the way down, knows that she landed on outstretch right hand, but also right side)       Impact surface: hard floor    Current symptoms:       Associated symptoms:             Denies abdominal pain, back pain, chest pain, headache, nausea, neck pain and vomiting  None       Past Medical History:   Diagnosis Date    Breast cancer ([facility] [address] )     Depression     PTSD (post-traumatic stress disorder)        Past Surgical History:   Procedure Laterality Date    APPENDECTOMY      BREAST SURGERY      TONSILLECTOMY         History reviewed  No pertinent family history  I have reviewed and agree with the history as documented      Social History   Substance Use Topics    Smoking status: Never Smoker    Smokeless tobacco: Never Used    Alcohol use No        Review of Systems   Constitutional: Negative for appetite change, chills and fever  HENT: Negative for sore throat  Respiratory: Negative for cough, shortness of breath and wheezing  Cardiovascular: Negative for chest pain and palpitations  Gastrointestinal: Negative for abdominal pain, diarrhea, nausea and vomiting  Genitourinary: Negative for dysuria and hematuria  Musculoskeletal: Positive for gait problem  Negative for back pain, neck pain and neck stiffness  Skin: Negative for rash  Neurological: Negative for dizziness, weakness and headaches  Psychiatric/Behavioral: Negative for suicidal ideas  All other systems reviewed and are negative  Physical Exam  ED Triage Vitals [02/15/18 1556]   Temperature Pulse Respirations Blood Pressure SpO2   97 8 °F (36 6 °C) 90 16 168/71 99 %      Temp Source Heart Rate Source Patient Position - Orthostatic VS BP Location FiO2 (%)   Oral Monitor Lying Left arm --      Pain Score       6           Orthostatic Vital Signs  Vitals:    02/15/18 1556 02/15/18 1721 02/15/18 1837   BP: 168/71 153/78 142/70   Pulse: 90 90 69   Patient Position - Orthostatic VS: Lying Lying Lying       Physical Exam   Constitutional: She appears well-developed and well-nourished  No distress  HENT:   Head: Normocephalic  Head is without raccoon's eyes, without Lazar's sign, without abrasion, without contusion, without laceration, without right periorbital erythema and without left periorbital erythema  Right Ear: Tympanic membrane and external ear normal  No lacerations  Left Ear: Tympanic membrane and external ear normal  No lacerations  Nose: Nose normal  No nose lacerations, nasal deformity, septal deviation or nasal septal hematoma  No epistaxis  Mouth/Throat: Normal dentition     Eyes: Conjunctivae and EOM are normal  Pupils are equal, (ABSOLUTE) 0.70 K/uL 0.90-5.50 L Final             MONOCYTES (ABSOLUTE) 0.60 K/uL 0.15-1.10  Final             EOS (ABSOLUTE) 0.10 K/uL 0.20-0.80 L Final             BASO (ABSOLUTE) 0.10 K/uL 0.00-0.30  Final        8-19-24       IRON PNL #1 (IRON,UIBC,TIBC,%SAT)      IRON 55 ug/dL   Final              TIBC 276 ug/dL 245-450  Final             % SATURATION 20 % 25-50 L Final           FERRITIN 300 ng/mL 21..66 H Final           FOLATE (FOLIC ACID) 4.7 ng/mL >5.4  Final           VITAMIN B12 326 pg/mL 211-911pg/mL  Final     I have reviewed the patient's medical history in detail and updated the computerized patient record.    This note was partially generated using Dragon voice recognition system, and there may be some incorrect words, spellings, punctuation that were not noticed in checking the note before saving.      Electronically signed by: HAJA Figueroa CNP on 9/6/2024     round, and reactive to light  Neck: Trachea normal, normal range of motion, full passive range of motion without pain and phonation normal  Neck supple  No spinous process tenderness (Cspine cleared) present  Cardiovascular: Normal rate, regular rhythm, normal heart sounds and normal pulses  Pulmonary/Chest: Breath sounds normal  No respiratory distress  She has no decreased breath sounds  She exhibits no tenderness, no crepitus and no deformity  Abdominal: Normal appearance  There is no tenderness  Musculoskeletal:        Right shoulder: Normal         Left shoulder: Normal         Right elbow: She exhibits laceration (superficial, 1 cm, well approximated without additional repair needed)  She exhibits normal range of motion and no swelling  No tenderness found  Left elbow: Normal         Right wrist: Normal         Left wrist: Normal         Right hip: She exhibits decreased range of motion (severe pain with elevation at about 45 degrees, cannot bear weight due to pain), decreased strength and bony tenderness (with pelvic compression)  She exhibits no crepitus and no deformity  Left hip: Normal         Right knee: Normal         Left knee: Normal         Right ankle: Normal         Left ankle: Normal         Cervical back: Normal         Thoracic back: Normal         Lumbar back: Normal         Right foot: Normal         Left foot: Normal    Neurological: She is alert  She has normal strength  No cranial nerve deficit or sensory deficit  GCS eye subscore is 4  GCS verbal subscore is 5  GCS motor subscore is 6  Skin: Skin is warm, dry and intact  She is not diaphoretic  Psychiatric: She has a normal mood and affect  Her speech is normal    Nursing note and vitals reviewed        ED Medications  Medications   HYDROmorphone (DILAUDID) injection 1 mg (not administered)   ketorolac (TORADOL) injection 15 mg (15 mg Intravenous Given 2/15/18 1723)   fentanyl citrate (PF) 100 MCG/2ML 100 mcg (100 mcg Intravenous Given 2/15/18 1724)       Diagnostic Studies  Results Reviewed     Procedure Component Value Units Date/Time    Protime-INR [[id number]]  (Normal) Collected:  02/15/18 1720    Lab Status:  Final result Specimen:  Blood from Arm, Left Updated:  02/15/18 1752     Protime 13 2 seconds      INR 1 00    APTT [[id number]]  (Normal) Collected:  02/15/18 1720    Lab Status:  Final result Specimen:  Blood from Arm, Left Updated:  02/15/18 1752     PTT 30 seconds     Narrative: Therapeutic Heparin Range = 60-90 seconds    Basic metabolic panel [[id number]] Collected:  02/15/18 1720    Lab Status:  Final result Specimen:  Blood from Arm, Left Updated:  02/15/18 1742     Sodium 139 mmol/L      Potassium 4 1 mmol/L      Chloride 104 mmol/L      CO2 27 mmol/L      Anion Gap 8 mmol/L      BUN 13 mg/dL      Creatinine 0 67 mg/dL      Glucose 77 mg/dL      Calcium 8 9 mg/dL      eGFR 96 ml/min/1 73sq m     Narrative:         National Kidney Disease Education Program recommendations are as follows:  GFR calculation is accurate only with a steady state creatinine  Chronic Kidney disease less than 60 ml/min/1 73 sq  meters  Kidney failure less than 15 ml/min/1 73 sq  meters      CBC and differential [[id number]]  (Abnormal) Collected:  02/15/18 1720    Lab Status:  Final result Specimen:  Blood from Arm, Left Updated:  02/15/18 1735     WBC 11 01 (H) Thousand/uL      RBC 4 16 Million/uL      Hemoglobin 14 0 g/dL      Hematocrit 40 2 %      MCV 97 fL      MCH 33 7 pg      MCHC 34 8 g/dL      RDW 12 9 %      MPV 10 2 fL      Platelets 892 Thousands/uL      nRBC 0 /100 WBCs      Neutrophils Relative 78 (H) %      Lymphocytes Relative 14 %      Monocytes Relative 7 %      Eosinophils Relative 1 %      Basophils Relative 0 %      Neutrophils Absolute 8 58 (H) Thousands/µL      Lymphocytes Absolute 1 56 Thousands/µL      Monocytes Absolute 0 78 Thousand/µL      Eosinophils Absolute 0 06 Thousand/µL      Basophils Absolute 0 03 Thousands/µL                  CT pelvis wo contrast   Final Result by Rohan Harris MD (02/15 1805)      Nondisplaced right superior and inferior pubic rami fractures  Workstation performed: [id number]                    Procedures  Procedures       Phone Contacts  ED Phone Contact    ED Course  ED Course as of Feb 15 1851   Thu Feb 15, 2018   1758 D/W Dr Kendal Liu who in lieu of other fractures on CT, agreed this is a nonsurgical fracture that is weight bearing as tolerated, and only requires admission for uncontrolled pain  1846 CT confirms no other fractures, but discussing with patient, she still has minimal ROM just lying, and cannot get up to bear weight acutely  Her home situation, would be trying to get up stairs  We both concur that she would benefit from admission overnight to get PT/OT, additional pain control before trying to go right home  D/W Dr Juju Umanzor who agrees to admit to [facility] MDM  CritCare Time    Disposition  Final diagnoses:   Pelvic fracture ([address] ) - right inferior and superior pubic ramus     Time reflects when diagnosis was documented in both MDM as applicable and the Disposition within this note     Time User Action Codes Description Comment    2/15/2018  6:45 PM Cristobal Hernandez Add [S32  9XXA] Pelvic fracture ([address] )     2/15/2018  6:45 PM Cristobal Hernandez Modify [X43  9XXA] Pelvic fracture (HCC) right inferior and superior pubic ramus      ED Disposition     ED Disposition Condition Comment    Admit  Case was discussed with Dr Juju Umanzor and the patient's admission status was agreed to be Admission Status: observation status to the service of Dr Juju Umanzor   Follow-up Information    None       Patient's Medications    No medications on file     No discharge procedures on file      ED Provider  Electronically Signed by           Monico Gary MD  02/15/18 0753

## 2024-10-09 ENCOUNTER — APPOINTMENT (OUTPATIENT)
Dept: OTOLARYNGOLOGY | Facility: CLINIC | Age: 74
End: 2024-10-09
Payer: COMMERCIAL

## 2024-10-10 PROBLEM — H91.93 BILATERAL HEARING LOSS: Status: ACTIVE | Noted: 2024-10-10

## 2024-10-10 NOTE — PROGRESS NOTES
Name: Remy Upton   Facility: [address]  [address]    Date: 9/10/2024     Subjective:     Chief Complaint   Patient presents with    Follow-up       HPI  Remy Upton is a 73 y.o. male being seen today for evaluation of weakness difficulty walking and bilateral hearing loss.  Resident is participating in PT/OT, making some progress, still not able to walk.  Vital signs stable, no fever.  Appetite good, urine output good.      Past Medical History:   Diagnosis Date    Diabetes mellitus ([facility])     Hemodialysis access, fistula mature ([facility]) 12/2002    Hypertension     Seizures ([facility])     no seizure since 1995         Allergies:  Reviewed in nursing facility records  Medications:  Reviewed and reconciled in nursing facility records    Review of Systems  A 10 Point review of systems was performed and is negative unless previously stated      Objective:   See nursing facility record for vital signs.      Physical Exam  Constitutional:  in wheelchair, elderly, frail, in no acute distess  Pulmonary/Chest:  breathing unlabored, no use of accessory muscles, lung sounds clear and diminished in lower lobes, no shortness of breath at rest, dyspnea with activity  Cardiovascular:  S1-S2 irregular, 2+ DP x 2, edema to lower extremities  Abd/GI:  abdomen soft, round, non-distended, non-tender, active bowel sounds x 4 quadrants  : no SP tenderness,no CVA tenderness, no hematuria  MS/Extremities:  MCCOY, ROM limited, non-ambulatory  Psych:  A/O x 3, cooperative with care, no anxiety      Diagnosis Orders   1. Muscle weakness (generalized)        2. Difficulty in walking        3. Bilateral hearing loss, unspecified hearing loss type                    Assessment and Plan:      1. Muscle weakness (generalized)  Continue PT/OT    2. Difficulty in walking  Continue PT/OT    3. Bilateral hearing loss  Follow up with ENT, arrange for transportation to appointment.      Reviewed labs:

## 2024-10-14 PROBLEM — M25.512 ACUTE PAIN OF LEFT SHOULDER: Status: ACTIVE | Noted: 2020-06-30

## 2024-10-14 PROBLEM — E13.9 PTDM (POST-TRANSPLANT DIABETES MELLITUS) (HCC): Status: ACTIVE | Noted: 2023-09-29

## 2024-10-14 PROBLEM — R82.998 LEUKOCYTES IN URINE: Status: ACTIVE | Noted: 2024-10-14

## 2024-10-14 PROBLEM — N40.0 BENIGN ENLARGEMENT OF PROSTATE: Status: ACTIVE | Noted: 2023-09-29

## 2024-10-14 PROBLEM — N40.1 BENIGN PROSTATIC HYPERPLASIA WITH URINARY FREQUENCY: Chronic | Status: ACTIVE | Noted: 2023-09-29

## 2024-10-14 PROBLEM — G89.29 CHRONIC RIGHT SHOULDER PAIN: Status: ACTIVE | Noted: 2020-06-30

## 2024-10-14 PROBLEM — M25.511 CHRONIC RIGHT SHOULDER PAIN: Status: ACTIVE | Noted: 2020-06-30

## 2024-10-14 PROBLEM — Z92.89 HISTORY OF BLOOD TRANSFUSION: Status: ACTIVE | Noted: 2023-09-29

## 2024-10-14 PROBLEM — R26.9 GAIT DIFFICULTY: Status: ACTIVE | Noted: 2024-06-20

## 2024-10-14 PROBLEM — R29.898 LEG WEAKNESS, BILATERAL: Status: ACTIVE | Noted: 2024-06-20

## 2024-10-14 PROBLEM — Z87.19 H/O LOWER GASTROINTESTINAL BLEEDING: Status: ACTIVE | Noted: 2023-09-29

## 2024-10-14 PROBLEM — I82.409 DEEP VEIN THROMBOSIS (DVT) OF LOWER EXTREMITY (HCC): Status: ACTIVE | Noted: 2024-10-14

## 2024-10-14 PROBLEM — R35.0 BENIGN PROSTATIC HYPERPLASIA WITH URINARY FREQUENCY: Chronic | Status: ACTIVE | Noted: 2023-09-29

## 2024-10-14 PROBLEM — Z96.1 PSEUDOPHAKIA OF BOTH EYES: Status: ACTIVE | Noted: 2023-09-29

## 2024-10-14 PROBLEM — R60.9 EDEMA: Status: ACTIVE | Noted: 2023-09-29

## 2024-10-14 PROBLEM — Z91.89 AT RISK FOR STROKE: Status: ACTIVE | Noted: 2024-10-14

## 2024-10-14 PROBLEM — H52.4 ASTIGMATISM OF BOTH EYES WITH PRESBYOPIA: Status: ACTIVE | Noted: 2023-09-29

## 2024-10-14 PROBLEM — R33.9 INCOMPLETE EMPTYING OF BLADDER: Status: ACTIVE | Noted: 2023-09-29

## 2024-10-14 PROBLEM — G62.9 PERIPHERAL NERVE DISEASE: Status: ACTIVE | Noted: 2024-10-14

## 2024-10-14 PROBLEM — Z79.4 INSULIN LONG-TERM USE (HCC): Status: ACTIVE | Noted: 2023-09-29

## 2024-10-14 PROBLEM — M62.82 RHABDOMYOLYSIS: Status: ACTIVE | Noted: 2024-10-14

## 2024-10-14 PROBLEM — K21.9 GERD (GASTROESOPHAGEAL REFLUX DISEASE): Status: ACTIVE | Noted: 2023-09-29

## 2024-10-14 PROBLEM — H43.819 POSTERIOR VITREOUS DETACHMENT: Status: ACTIVE | Noted: 2017-11-21

## 2024-10-14 PROBLEM — E87.5 ACUTE HYPERKALEMIA: Status: ACTIVE | Noted: 2023-05-19

## 2024-10-14 PROBLEM — H52.203 ASTIGMATISM OF BOTH EYES WITH PRESBYOPIA: Status: ACTIVE | Noted: 2023-09-29

## 2024-10-14 PROBLEM — B34.8 BK VIREMIA: Status: ACTIVE | Noted: 2023-09-29

## 2024-10-14 PROBLEM — R22.43 LOCALIZED SWELLING OF BOTH LOWER LEGS: Status: ACTIVE | Noted: 2024-10-14

## 2024-10-14 PROBLEM — N18.5: Status: ACTIVE | Noted: 2023-09-29

## 2024-10-14 PROBLEM — Z94.0: Status: ACTIVE | Noted: 2023-09-29

## 2024-10-14 PROBLEM — L02.619 ABSCESS, TOE: Status: ACTIVE | Noted: 2023-09-29

## 2024-10-14 PROBLEM — G40.909 SEIZURE DISORDER (HCC): Status: ACTIVE | Noted: 2023-09-29

## 2024-10-14 PROBLEM — H52.209 ASTIGMATISM: Status: ACTIVE | Noted: 2017-11-21

## 2024-10-16 ENCOUNTER — APPOINTMENT (OUTPATIENT)
Dept: OTOLARYNGOLOGY | Facility: CLINIC | Age: 74
End: 2024-10-16
Payer: COMMERCIAL

## 2024-10-16 ENCOUNTER — TELEMEDICINE (OUTPATIENT)
Dept: UROLOGY | Facility: HOSPITAL | Age: 74
End: 2024-10-16
Payer: COMMERCIAL

## 2024-10-16 VITALS — HEIGHT: 74 IN | BODY MASS INDEX: 21.83 KG/M2 | DIASTOLIC BLOOD PRESSURE: 77 MMHG | SYSTOLIC BLOOD PRESSURE: 109 MMHG

## 2024-10-16 DIAGNOSIS — R33.9 URINARY RETENTION: Primary | ICD-10-CM

## 2024-10-16 DIAGNOSIS — H61.21 IMPACTED CERUMEN OF RIGHT EAR: Primary | ICD-10-CM

## 2024-10-16 DIAGNOSIS — H91.91 HEARING DIFFICULTY OF RIGHT EAR: ICD-10-CM

## 2024-10-16 DIAGNOSIS — Z97.8 FOLEY CATHETER IN PLACE: ICD-10-CM

## 2024-10-16 DIAGNOSIS — H93.11 TINNITUS OF RIGHT EAR: ICD-10-CM

## 2024-10-16 DIAGNOSIS — Z94.0 KIDNEY TRANSPLANT STATUS: ICD-10-CM

## 2024-10-16 PROCEDURE — 3048F LDL-C <100 MG/DL: CPT

## 2024-10-16 PROCEDURE — 3062F POS MACROALBUMINURIA REV: CPT

## 2024-10-16 PROCEDURE — 99214 OFFICE O/P EST MOD 30 MIN: CPT

## 2024-10-16 PROCEDURE — 3048F LDL-C <100 MG/DL: CPT | Performed by: UROLOGY

## 2024-10-16 PROCEDURE — 3074F SYST BP LT 130 MM HG: CPT

## 2024-10-16 PROCEDURE — 1160F RVW MEDS BY RX/DR IN RCRD: CPT

## 2024-10-16 PROCEDURE — 3078F DIAST BP <80 MM HG: CPT

## 2024-10-16 PROCEDURE — 3062F POS MACROALBUMINURIA REV: CPT | Performed by: UROLOGY

## 2024-10-16 PROCEDURE — 69210 REMOVE IMPACTED EAR WAX UNI: CPT

## 2024-10-16 PROCEDURE — 99213 OFFICE O/P EST LOW 20 MIN: CPT | Performed by: UROLOGY

## 2024-10-16 PROCEDURE — 1159F MED LIST DOCD IN RCRD: CPT

## 2024-10-16 PROCEDURE — G2211 COMPLEX E/M VISIT ADD ON: HCPCS | Performed by: UROLOGY

## 2024-10-16 PROCEDURE — 1036F TOBACCO NON-USER: CPT

## 2024-10-16 NOTE — PROGRESS NOTES
HPI    73 y.o. male being seen with the following problem list:    Problem list:  history of kidney transplant   significant incomplete bladder emptying in the setting of minimal bothersome urinary symptoms , now with urinary retention    Most recent transplant ultrasound from 1/2018 demonstrated fullness of the renal pelvis and incomplete bladder emptying. Most recent creatinine 11/2021 was 2.03, relatively stable for several years. Post void residual in Dr. Harper's office, 521 cc. Dr. Parrish requested an outlet surgery to address incomplete emptying, rUTIs to procect his allograft. Now s/p outpatient HoLEP 4/25/23. Not much tissue to remove, but was maximally deobstructed.     Path - pending     4/27/23- here for TOV. 350cc in, 100cc clear urine out.     transplant kidney US, 5/2023 - PVR 83cc     5/11/23- Patient presents for PVR. Since surgery has been voiding very high volumes of urine to the point where he thinks he is getting dehydrated. some UUI. a bit of burning. PVR today between 134-201cc, but subsequently voided.     5/25/23- Patient was in the hospital for orthostatic hypotension and was started on Midodrine. urinary symptoms are much improved, now waking 1x a night.     8/31/23 - here for 3mo fuv post HoLEP. He states that he is back on dialysis. He had a prolonged hospitalization and ultimately ended up with ARF 2/2 rhabdo. Was found to have a PVR around 400cc and murray placed. Transplant US 8/2023 with stable minimal fullness of allograft renal pelvis. would like murray out     10/16/24 - seen back over thv. Had a murray placed in July for acute retention, has had a murray since. Has been in a SNF due to profound LE weakness.    Lab Results   Component Value Date    PSA <0.01 07/02/2024    PSA 0.01 (LL) 07/20/2023         Current Medications:  Current Outpatient Medications   Medication Sig Dispense Refill    acetaminophen (Tylenol) 325 mg tablet Take by mouth every 6 hours if needed for mild pain (1 -  3).      acetaminophen (Tylenol) 650 mg suppository Insert into the rectum.      aluminum-magnesium hydroxide 200-200 mg/5 mL suspension Take by mouth every 6 hours if needed for heartburn.      aspirin 81 mg EC tablet Take 1 tablet (81 mg) by mouth once daily.      cholecalciferol (Vitamin D-3) 25 MCG (1000 UT) tablet Take 1 tablet (1,000 Units) by mouth once daily.      cycloSPORINE (SandIMMUNE) 25 mg capsule Take 2 capsules (50 mg) by mouth 2 times a day.      cycloSPORINE modified (NEOral) 25 mg capsule TAKE 3 CAPSULES BY MOUTH 2 TIMES A DAY      finasteride (Proscar) 5 mg tablet Take 1 tablet (5 mg) by mouth once daily at bedtime. Do not crush, chew, or split.      furosemide (Lasix) 40 mg tablet Take 0.5 tablets (20 mg) by mouth once daily. As needed for shortness of breath or leg swelling 30 tablet 0    gabapentin (Neurontin) 100 mg capsule Take 2 capsules (200 mg) by mouth 2 times a day.      insulin lispro-aabc (LYUMJEV KWIKPEN U-100 INSULIN SUBQ) Inject under the skin 3 times a day with meals. Per Sliding Scale      insulin NPH, Isophane, (HumuLIN N,NovoLIN N) 100 unit/mL injection Inject 7 Units under the skin once daily in the morning. Take as directed per insulin instructions.      insulin NPH, Isophane, (HumuLIN N,NovoLIN N) 100 unit/mL injection Inject 5 Units under the skin once daily at bedtime. Take as directed per insulin instructions.      methocarbamol (Robaxin) 500 mg tablet Take 1 tablet (500 mg) by mouth every 8 hours if needed for muscle spasms.      metoclopramide (Reglan) 5 mg tablet Take 1 tablet (5 mg) by mouth 4 times a day.      metoprolol succinate XL (Toprol-XL) 50 mg 24 hr tablet Take 1 tablet (50 mg) by mouth 2 times a day. Do not crush or chew.      midodrine (Proamatine) 5 mg tablet Take 1 tablet (5 mg) by mouth 3 times daily (morning, midday, late afternoon).      mycophenolate (Cellcept) 250 mg capsule Take 3 capsules (750 mg) by mouth 2 times a day.      pantoprazole  (ProtoNix) 40 mg EC tablet Take 1 tablet (40 mg) by mouth once daily in the morning. Take before meals.      predniSONE (Deltasone) 5 mg tablet Take 1 tablet (5 mg) by mouth once daily.      rosuvastatin (Crestor) 20 mg tablet Take 1 tablet (20 mg) by mouth once daily.      sodium bicarbonate 650 mg tablet Take 0.5 tablets (325 mg) by mouth 2 times a day.      tamsulosin (Flomax) 0.4 mg 24 hr capsule Take 1 capsule (0.4 mg) by mouth once daily. 90 capsule 3     No current facility-administered medications for this visit.        Active Problems:  Keith Lazar is a 73 y.o. male with the following Problems and Medications.  Patient Active Problem List   Diagnosis    Essential hypertension    Atrial fibrillation with RVR (Multi)    Abdominal pain    Abnormal CT of the chest    Abscess, toe    Anemia    Atypical chest pain    Astigmatism of both eyes with presbyopia    Benign enlargement of prostate    Benign prostatic hyperplasia with urinary frequency    BK viremia    Bronchiectasis without complication (Multi)    Stage 4 chronic kidney disease (Multi)    Chronic right shoulder pain    COPD without exacerbation (Multi)    Chronic cough    Diabetes mellitus type 2 without retinopathy (Multi)    Dieulafoy lesion of colon    Edema    GERD (gastroesophageal reflux disease)    Hypercholesteremia    Immunosuppression    Incomplete emptying of bladder    Infection due to Enterococcus    Intra-abdominal abscess post-procedure    Kidney transplant status    Lung nodules    Nonspecific abnormal results of thyroid function study    Poorly controlled diabetes mellitus (Multi)    Physiologic disturbance of temperature regulation    Pneumonia    Pseudophakia of both eyes    PTDM (post-transplant diabetes mellitus) (Multi)    PVD (posterior vitreous detachment), left eye    Seizure disorder (Multi)    Stage 5 chronic kidney disease with transplanted kidney (Multi)    Urine WBC increased    Condition not found    BK virus  nephropathy    FSGS (focal segmental glomerulosclerosis)    History of blood transfusion    H/O lower gastrointestinal bleeding    Insulin long-term use (Multi)    Paroxysmal atrial fibrillation (Multi)    Acute kidney failure, unspecified (CMS-HCC)    Acute renal failure superimposed on chronic kidney disease, unspecified acute renal failure type, unspecified CKD stage (CMS-HCC)     Current Outpatient Medications   Medication Sig Dispense Refill    acetaminophen (Tylenol) 325 mg tablet Take by mouth every 6 hours if needed for mild pain (1 - 3).      acetaminophen (Tylenol) 650 mg suppository Insert into the rectum.      aluminum-magnesium hydroxide 200-200 mg/5 mL suspension Take by mouth every 6 hours if needed for heartburn.      aspirin 81 mg EC tablet Take 1 tablet (81 mg) by mouth once daily.      cholecalciferol (Vitamin D-3) 25 MCG (1000 UT) tablet Take 1 tablet (1,000 Units) by mouth once daily.      cycloSPORINE (SandIMMUNE) 25 mg capsule Take 2 capsules (50 mg) by mouth 2 times a day.      cycloSPORINE modified (NEOral) 25 mg capsule TAKE 3 CAPSULES BY MOUTH 2 TIMES A DAY      finasteride (Proscar) 5 mg tablet Take 1 tablet (5 mg) by mouth once daily at bedtime. Do not crush, chew, or split.      furosemide (Lasix) 40 mg tablet Take 0.5 tablets (20 mg) by mouth once daily. As needed for shortness of breath or leg swelling 30 tablet 0    gabapentin (Neurontin) 100 mg capsule Take 2 capsules (200 mg) by mouth 2 times a day.      insulin lispro-aabc (LYUMJEV KWIKPEN U-100 INSULIN SUBQ) Inject under the skin 3 times a day with meals. Per Sliding Scale      insulin NPH, Isophane, (HumuLIN N,NovoLIN N) 100 unit/mL injection Inject 7 Units under the skin once daily in the morning. Take as directed per insulin instructions.      insulin NPH, Isophane, (HumuLIN N,NovoLIN N) 100 unit/mL injection Inject 5 Units under the skin once daily at bedtime. Take as directed per insulin instructions.      methocarbamol  (Robaxin) 500 mg tablet Take 1 tablet (500 mg) by mouth every 8 hours if needed for muscle spasms.      metoclopramide (Reglan) 5 mg tablet Take 1 tablet (5 mg) by mouth 4 times a day.      metoprolol succinate XL (Toprol-XL) 50 mg 24 hr tablet Take 1 tablet (50 mg) by mouth 2 times a day. Do not crush or chew.      midodrine (Proamatine) 5 mg tablet Take 1 tablet (5 mg) by mouth 3 times daily (morning, midday, late afternoon).      mycophenolate (Cellcept) 250 mg capsule Take 3 capsules (750 mg) by mouth 2 times a day.      pantoprazole (ProtoNix) 40 mg EC tablet Take 1 tablet (40 mg) by mouth once daily in the morning. Take before meals.      predniSONE (Deltasone) 5 mg tablet Take 1 tablet (5 mg) by mouth once daily.      rosuvastatin (Crestor) 20 mg tablet Take 1 tablet (20 mg) by mouth once daily.      sodium bicarbonate 650 mg tablet Take 0.5 tablets (325 mg) by mouth 2 times a day.      tamsulosin (Flomax) 0.4 mg 24 hr capsule Take 1 capsule (0.4 mg) by mouth once daily. 90 capsule 3     No current facility-administered medications for this visit.       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     FSGS (focal segmental glomerulosclerosis)       PSH:  Past Surgical History:   Procedure Laterality Date    CATARACT EXTRACTION  09/29/2015    Cataract Surgery    OTHER SURGICAL HISTORY  09/29/2015    Cadaver Donor Nephrectomy    OTHER SURGICAL HISTORY  09/29/2015    Incisional Hernia Repair With Implantation Of Mesh    OTHER SURGICAL HISTORY  09/29/2015    Peritoneal Dialysis Catheter    OTHER SURGICAL HISTORY  09/29/2015    Brain Surgery    TONSILLECTOMY  09/29/2015    Tonsillectomy With Adenoidectomy        FMH:  Family History   Problem Relation Name Age of Onset    Alcohol abuse Father      Alcohol abuse Sister         SHx:  Social History     Tobacco Use    Smoking status: Former     Types: Cigarettes    Smokeless tobacco: Never   Substance Use Topics    Alcohol use: Never    Drug use: Never       Allergies:  Allergies   Allergen Reactions    Statins-Hmg-Coa Reductase Inhibitors Headache       Assessment/Plan  Urinary retention, will bring in for cysto and at that time consider TOV if appropriate. May be bladder failure, may need to discuss SIC, will revisit at that time.         Scribe Attestation  By signing my name below, I, Rhona Galicia, attest that this documentation  has been prepared under the direction and in the presence of Ronny Lofton MD.

## 2024-10-16 NOTE — PROGRESS NOTES
Patient ID: Keith Lazar is a 73 y.o. male who presents for a follow up assessment for earwax removal. Patient presents in a wheelchair and is accompanied to this visit by his wife.    PROVIDER IMPRESSIONS:  DIAGNOSES/PROBLEMS:  - Cerumen impaction of the right ear   - Tinnitus right ear  - Right hearing difficulty     ASSESSMENT: Keith Lazar is a 73 y.o. male who presents for a follow up encounter with symptoms of right non-pulsatile tinnitus and right hearing difficulty. The clinical findings that are consistent with right cerumen impaction. Using appropriate instrumentation, impacted cerumen was successfully removed from the right EAC with patient endorsed improvement in hearing following procedure. Reassurance provided to patient that otologic exam today revealed no evidence of acute infection/inflammation in the EAC bilaterally and that TM appears with no evidence of infection, effusion, retraction or perforation bilaterally. I explained to patient that audiogram is necessary to     PLAN:   -I counseled patient on safe interventions for cerumen management at home. Patient may wash the external ear with a cloth. I reinforced education to the patient that they should avoid using cotton tipped applicators, tissues, paper, or any rigid object in the ear canal. I explained to the patient that doing so may cause wax to be pushed back into the ear canal and stuck and also risks injury to the ear canal and ear drum.   -I recommended audiogram in the next 1-4 weeks at wheelchair-accessible audiogram facility to further evaluate right tinnitus and hearing difficulty. Patient informed that results will be reviewed at virtual follow-up.  -Follow-up: Patient may follow-up virtually after obtaining audiogram to review the results and determine further recommendations. Patient may schedule for routine evaluation for the removal of cerumen impaction in 6 months, advised patient to use o.t.c. debrox in both ear  canals daily in the 4-5 days leading up to cerumen removal visit. They may follow up with me as requested, sooner if needed. All questions answered to patient satisfaction.    At today's visit with Keith Lazar, the number of minutes I spent providing patient care was 30. More than 50% of that time was spent counseling the patient on possible etiologies, test results, treatment options and care coordination.     Subjective    HPI: Keith Lazar is a 73 y.o. male who presents for follow-up evaluation for right non-pulsatile tinnitus, right hearing difficulty, and for second attempt at earwax removal. Since last visit on 9/17/24, patient reports that symptoms remain unchanged. Reports that he has been consistent with use of o.t.c. debrox use in the right EAC. When asked about the presence of associated otologic symptoms, including ear pain, ear drainage, ear itching, aural fullness, autophony, dizziness or vertigo, the patient admits to none.     RECALL 9/17/24:  HPI: Keith Lazar is a 73 y.o. male referred by ED  for clinical evaluation of right hearing difficulty and right tinnitus and with the request for earwax removal. States that symptoms began approximately 3 months ago. Describes tinnitus as a continuous non-pulsatile ringing sound in the right ear. States he went to the ED on 6/24/24 and ED physician attempted to remove right cerumen impaction with instrument but was unsuccessful. When asked about the presence of associated otologic symptoms, including ear pain, ear drainage, ear itching, aural fullness, autophony, dizziness or vertigo, the patient admits to none. When asked about pertinent otologic history, the patient denies a history of recurrent ear infections, denies history of ear surgery, denies history of PE tube insertion, and denies history of prolonged/traumatic loud noise exposure. The patient does not have family history of hearing loss. The patient does not insert Q-tips in the  ear canals. The patient denies insertion of other foreign objects into the ear canals. The patient denies prior history of cerumen impaction. They have not been using ear drops to loosen wax immediately prior to this visit.   ASSESSMENT: Keith Lazar is a 73 y.o. male who presents for an initial encounter with symptoms and clinical findings that are consistent with bilateral cerumen impaction. Using appropriate instrumentation, impacted cerumen was successfully removed from the left EAC. Unfortunately, due to the degree of impaction and patient discomfort, right cerumen impaction was unable to be removed from the right EAC today. Reassurance provided to patient that otologic exam today revealed no evidence of acute infection/inflammation in the left EAC and that left TM appears with no evidence of infection, effusion, retraction or perforation.    PLAN:   I counseled patient on safe interventions for cerumen management at home. Patient may wash the external ear with a cloth. I reinforced education to the patient that they should avoid using cotton tipped applicators, tissues, paper, or any rigid object in the ear canal. I explained to the patient that doing so may cause wax to be pushed back into the ear canal and stuck and also risks injury to the ear canal and ear drum.   I recommended patient initiates use of o.t.c. ceruminolytic agent Debrox 3 times weekly into the right EAC to soften cerumen for secondary attempt at removal. I instructed patient to use Debrox once daily in the 3 days prior to follow-up visit. Patient was counseled on the indications, possible side effects, and proper administration of medication. Prescription was printed and provided to the wife, per request.  Follow-up: Patient may schedule for routine evaluation for the removal of cerumen impaction in 2-3 weeks. May consider audiogram, pending follow-up. They may follow up with me as requested, sooner if needed. All questions answered  to patient satisfaction.    REVIEW OF SYSTEMS:  All other systems negative.    Objective   Physical Exam:  Right Ear: External inspection of ear with no deformity, scars, or masses. EAC is completely impacted with cerumen. Unable to visualize tympanic membrane.  Left Ear: External inspection of ear with no deformity, scars, or masses. EAC is clear. TM is intact with no sign of infection, effusion, or retraction. No perforation seen.     Neurologic: Cranial nerves II-XII grossly intact and symmetric bilaterally.  Head and Face:  Head: Atraumatic with no masses, lesions or scarring.  Face: Normal symmetry. No scars or deformities.  Eyes: Conjunctiva not edematous or erythematous.   Nose: External inspection of nose: No nasal lesions, lacerations or scars.   Neck: Normal appearing, symmetric, trachea midline.   Cardiovascular: Examination of peripheral vascular system shows no clubbing or cyanosis.  Respiratory: No respiratory distress increased work of breathing. Inspection of the chest with symmetric chest expansion and normal respiratory effort.  Skin: No head and neck rashes.  Lymph nodes: No adenopathy.     EAR CLEANING PROCEDURE NOTE:  Indication: Cerumen impaction  Location: right ear canals  Procedure Note: The procedure was performed by the provider.  Visualization Instrument: A microscope with a #3 speculum was placed in the ear canals to visualize the ear canal debris.  Ear Cleaning Instrument and Outcome: Using the 5/7 suction and alligator forceps, a large amount of soft, yellow cerumen was removed from the impacted EAC(s).  After cleaning: Right TM intact without evidence of effusion, retraction, infection, or perforation. Right EAC is clear.   Patient Status: The patient tolerated the procedure well.  Complications: There were no complications.

## 2024-10-18 NOTE — PROGRESS NOTES
% 25-50 L Final           FERRITIN 300 ng/mL 21..66 H Final           FOLATE (FOLIC ACID) 4.7 ng/mL >5.4  Final           VITAMIN B12 326 pg/mL 211-911pg/mL  Final     I have reviewed the patient's medical history in detail and updated the computerized patient record.    This note was partially generated using Dragon voice recognition system, and there may be some incorrect words, spellings, punctuation that were not noticed in checking the note before saving.      Electronically signed by: HAJA Figueroa CNP on 9/17/2024

## 2024-10-23 NOTE — PROGRESS NOTES
SUBJECTIVE:  73-year-old gentleman seen in his room for his for his hypertension seizures with weakness history of this time.  Has been under course of therapy.      ROS: Denies chest pain palpitations  The rest of the 14 point ROS negative    PHYSICAL EXAM: VSS per facility record  So far no crackles wheezing cardiovascular normal    ASSESSMENT & PLAN:   Diagnosis Orders   1. Essential hypertension        2. Seizure disorder (HCC)        3. Peripheral nerve disease          Continue insulin regimen stable with A1c pending.  Pain management patient is on gabapentin tolerating well.  No orthostasis at this time            Past Medical History:   Diagnosis Date    Diabetes mellitus (HCC)     Hemodialysis access, fistula mature (HCC) 12/2002    Hypertension     Seizures ([facility])     no seizure since 1995         Past Surgical History:   Procedure Laterality Date    BRAIN SURGERY Left 12/06/1990    to eliminate seizures    COLONOSCOPY N/A 10/24/2022    COLONOSCOPY DIAGNOSTIC performed by Va Ordoñez MD at [facility]    KIDNEY TRANSPLANT  2015    [facility] INCL FLUOR GDNCE DX W/CELL WASHG SPX N/A 3/20/2018    BRONCHOSCOPY performed by Cristopher Conde MD [facility]    UPPER GASTROINTESTINAL ENDOSCOPY N/A 10/21/2022    EGD DIAGNOSTIC ONLY performed by Jaime Martinez MD at [facility]         Current Outpatient Medications on File Prior to Visit   Medication Sig Dispense Refill    Exenatide ER (BYDUREON BCISE) 2 MG/0.85ML injection Inject 0.85 mLs into the skin every 7 days Every tuesday      finasteride (PROSCAR) 5 MG tablet Take 1 tablet by mouth daily      gabapentin (NEURONTIN) 300 MG capsule Take 1 capsule by mouth in the morning and 1 capsule in the evening.      sodium bicarbonate 325 MG tablet Take 1 tablet by mouth 2 times daily      metoprolol succinate (TOPROL XL) 50 MG extended release tablet Take 1 tablet by mouth daily Indications: High Blood Pressure Disorder      midodrine (PROAMATINE) 5

## 2024-10-29 ENCOUNTER — CLINICAL SUPPORT (OUTPATIENT)
Dept: AUDIOLOGY | Facility: CLINIC | Age: 74
End: 2024-10-29
Payer: COMMERCIAL

## 2024-10-29 DIAGNOSIS — H90.A31 MIXED CONDUCTIVE AND SENSORINEURAL HEARING LOSS OF RIGHT EAR WITH RESTRICTED HEARING OF LEFT EAR: Primary | ICD-10-CM

## 2024-10-29 DIAGNOSIS — H90.3 SENSORINEURAL HEARING LOSS (SNHL) OF BOTH EARS: ICD-10-CM

## 2024-10-29 PROCEDURE — 92567 TYMPANOMETRY: CPT | Performed by: AUDIOLOGIST

## 2024-10-29 PROCEDURE — 92557 COMPREHENSIVE HEARING TEST: CPT | Performed by: AUDIOLOGIST

## 2024-10-31 ENCOUNTER — PROCEDURE VISIT (OUTPATIENT)
Dept: UROLOGY | Facility: HOSPITAL | Age: 74
End: 2024-10-31
Payer: COMMERCIAL

## 2024-10-31 DIAGNOSIS — R33.9 URINARY RETENTION: Primary | ICD-10-CM

## 2024-10-31 DIAGNOSIS — Z97.8 FOLEY CATHETER IN PLACE: ICD-10-CM

## 2024-10-31 PROCEDURE — 52000 CYSTOURETHROSCOPY: CPT | Performed by: UROLOGY

## 2024-11-01 ENCOUNTER — APPOINTMENT (OUTPATIENT)
Dept: OTOLARYNGOLOGY | Facility: CLINIC | Age: 74
End: 2024-11-01
Payer: COMMERCIAL

## 2024-11-01 DIAGNOSIS — H69.91 ACUTE DYSFUNCTION OF RIGHT EUSTACHIAN TUBE: Primary | ICD-10-CM

## 2024-11-01 DIAGNOSIS — H65.01 RIGHT ACUTE SEROUS OTITIS MEDIA, RECURRENCE NOT SPECIFIED: ICD-10-CM

## 2024-11-01 DIAGNOSIS — H90.A31 MIXED CONDUCTIVE AND SENSORINEURAL HEARING LOSS OF RIGHT EAR WITH RESTRICTED HEARING OF LEFT EAR: ICD-10-CM

## 2024-11-01 DIAGNOSIS — H90.A22 SENSORINEURAL HEARING LOSS (SNHL) OF LEFT EAR WITH RESTRICTED HEARING OF RIGHT EAR: ICD-10-CM

## 2024-11-01 DIAGNOSIS — H93.11 TINNITUS OF RIGHT EAR: ICD-10-CM

## 2024-11-01 PROCEDURE — 99442 PR PHYS/QHP TELEPHONE EVALUATION 11-20 MIN: CPT

## 2024-11-01 PROCEDURE — 3062F POS MACROALBUMINURIA REV: CPT

## 2024-11-01 PROCEDURE — 1159F MED LIST DOCD IN RCRD: CPT

## 2024-11-01 PROCEDURE — 3048F LDL-C <100 MG/DL: CPT

## 2024-11-01 PROCEDURE — 1160F RVW MEDS BY RX/DR IN RCRD: CPT

## 2024-11-01 RX ORDER — OXYMETAZOLINE HYDROCHLORIDE 0.05 G/100ML
SPRAY, METERED NASAL
Qty: 30 ML | Refills: 0 | Status: SHIPPED | OUTPATIENT
Start: 2024-11-01

## 2024-11-01 RX ORDER — FLUTICASONE PROPIONATE 50 MCG
2 SPRAY, SUSPENSION (ML) NASAL DAILY
Qty: 16 G | Refills: 11 | Status: SHIPPED | OUTPATIENT
Start: 2024-11-01 | End: 2025-11-01

## 2024-11-04 ENCOUNTER — APPOINTMENT (OUTPATIENT)
Dept: CT IMAGING | Age: 74
End: 2024-11-04
Payer: COMMERCIAL

## 2024-11-04 ENCOUNTER — HOSPITAL ENCOUNTER (INPATIENT)
Age: 74
LOS: 5 days | Discharge: HOME HEALTH | End: 2024-11-09
Attending: STUDENT IN AN ORGANIZED HEALTH CARE EDUCATION/TRAINING PROGRAM | Admitting: INTERNAL MEDICINE
Payer: COMMERCIAL

## 2024-11-04 ENCOUNTER — APPOINTMENT (OUTPATIENT)
Dept: GENERAL RADIOLOGY | Age: 74
End: 2024-11-04
Payer: COMMERCIAL

## 2024-11-04 DIAGNOSIS — N17.9 AKI (ACUTE KIDNEY INJURY) (HCC): Primary | ICD-10-CM

## 2024-11-04 DIAGNOSIS — E83.42 HYPOMAGNESEMIA: ICD-10-CM

## 2024-11-04 DIAGNOSIS — R79.89 ELEVATED TROPONIN: ICD-10-CM

## 2024-11-04 DIAGNOSIS — I48.91 ATRIAL FIBRILLATION WITH RVR (HCC): ICD-10-CM

## 2024-11-04 DIAGNOSIS — N30.00 ACUTE CYSTITIS WITHOUT HEMATURIA: ICD-10-CM

## 2024-11-04 DIAGNOSIS — E87.5 HYPERKALEMIA: ICD-10-CM

## 2024-11-04 LAB
ALBUMIN SERPL-MCNC: 3.5 G/DL (ref 3.5–4.6)
ALP SERPL-CCNC: 57 U/L (ref 35–104)
ALT SERPL-CCNC: 6 U/L (ref 0–41)
ANION GAP SERPL CALCULATED.3IONS-SCNC: 16 MEQ/L (ref 9–15)
AST SERPL-CCNC: 13 U/L (ref 0–40)
B PARAP IS1001 DNA NPH QL NAA+NON-PROBE: NOT DETECTED
B PERT.PT PRMT NPH QL NAA+NON-PROBE: NOT DETECTED
BACTERIA URNS QL MICRO: NEGATIVE /HPF
BASOPHILS # BLD: 0 K/UL (ref 0–0.2)
BASOPHILS NFR BLD: 0.2 %
BILIRUB SERPL-MCNC: 1.3 MG/DL (ref 0.2–0.7)
BILIRUB UR QL STRIP: NEGATIVE
BUN SERPL-MCNC: 90 MG/DL (ref 8–23)
C PNEUM DNA NPH QL NAA+NON-PROBE: NOT DETECTED
CALCIUM SERPL-MCNC: 8.9 MG/DL (ref 8.5–9.9)
CHLORIDE SERPL-SCNC: 105 MEQ/L (ref 95–107)
CLARITY UR: ABNORMAL
CO2 SERPL-SCNC: 15 MEQ/L (ref 20–31)
COLOR UR: YELLOW
CREAT SERPL-MCNC: 3.5 MG/DL (ref 0.7–1.2)
EOSINOPHIL # BLD: 0 K/UL (ref 0–0.7)
EOSINOPHIL NFR BLD: 0.2 %
EPI CELLS #/AREA URNS AUTO: ABNORMAL /HPF (ref 0–5)
ERYTHROCYTE [DISTWIDTH] IN BLOOD BY AUTOMATED COUNT: 13.3 % (ref 11.5–14.5)
FLUAV RNA NPH QL NAA+NON-PROBE: NOT DETECTED
FLUBV RNA NPH QL NAA+NON-PROBE: NOT DETECTED
GLOBULIN SER CALC-MCNC: 2.8 G/DL (ref 2.3–3.5)
GLUCOSE BLD-MCNC: 135 MG/DL (ref 70–99)
GLUCOSE SERPL-MCNC: 210 MG/DL (ref 70–99)
GLUCOSE UR STRIP-MCNC: NEGATIVE MG/DL
HADV DNA NPH QL NAA+NON-PROBE: NOT DETECTED
HCOV 229E RNA NPH QL NAA+NON-PROBE: NOT DETECTED
HCOV HKU1 RNA NPH QL NAA+NON-PROBE: NOT DETECTED
HCOV NL63 RNA NPH QL NAA+NON-PROBE: NOT DETECTED
HCOV OC43 RNA NPH QL NAA+NON-PROBE: NOT DETECTED
HCT VFR BLD AUTO: 32.2 % (ref 42–52)
HGB BLD-MCNC: 10.6 G/DL (ref 14–18)
HGB UR QL STRIP: ABNORMAL
HMPV RNA NPH QL NAA+NON-PROBE: NOT DETECTED
HPIV1 RNA NPH QL NAA+NON-PROBE: NOT DETECTED
HPIV2 RNA NPH QL NAA+NON-PROBE: NOT DETECTED
HPIV3 RNA NPH QL NAA+NON-PROBE: NOT DETECTED
HPIV4 RNA NPH QL NAA+NON-PROBE: NOT DETECTED
HYALINE CASTS #/AREA URNS LPF: ABNORMAL /LPF (ref 0–5)
KETONES UR STRIP-MCNC: ABNORMAL MG/DL
LACTIC ACID, SEPSIS: 1.6 MMOL/L (ref 0.5–1.9)
LEUKOCYTE ESTERASE UR QL STRIP: ABNORMAL
LYMPHOCYTES # BLD: 0.1 K/UL (ref 1–4.8)
LYMPHOCYTES NFR BLD: 2.3 %
M PNEUMO DNA NPH QL NAA+NON-PROBE: NOT DETECTED
MAGNESIUM SERPL-MCNC: 1.6 MG/DL (ref 1.7–2.4)
MCH RBC QN AUTO: 29.3 PG (ref 27–31.3)
MCHC RBC AUTO-ENTMCNC: 32.9 % (ref 33–37)
MCV RBC AUTO: 89 FL (ref 79–92.2)
MONOCYTES # BLD: 0.6 K/UL (ref 0.2–0.8)
MONOCYTES NFR BLD: 10.9 %
NEUTROPHILS # BLD: 4.5 K/UL (ref 1.4–6.5)
NEUTS SEG NFR BLD: 85.8 %
NITRITE UR QL STRIP: NEGATIVE
PERFORMED ON: ABNORMAL
PH UR STRIP: 5.5 [PH] (ref 5–9)
PLATELET # BLD AUTO: 171 K/UL (ref 130–400)
POTASSIUM SERPL-SCNC: 5.2 MEQ/L (ref 3.4–4.9)
PROCALCITONIN SERPL IA-MCNC: 0.72 NG/ML (ref 0–0.15)
PROT SERPL-MCNC: 6.3 G/DL (ref 6.3–8)
PROT UR STRIP-MCNC: >=300 MG/DL
RBC # BLD AUTO: 3.62 M/UL (ref 4.7–6.1)
RBC #/AREA URNS AUTO: ABNORMAL /HPF (ref 0–5)
RSV RNA NPH QL NAA+NON-PROBE: NOT DETECTED
RV+EV RNA NPH QL NAA+NON-PROBE: NOT DETECTED
SARS-COV-2 RNA NPH QL NAA+NON-PROBE: NOT DETECTED
SODIUM SERPL-SCNC: 136 MEQ/L (ref 135–144)
SP GR UR STRIP: 1.01 (ref 1–1.03)
TROPONIN, HIGH SENSITIVITY: 647 NG/L (ref 0–19)
TROPONIN, HIGH SENSITIVITY: 682 NG/L (ref 0–19)
TSH REFLEX: 1.29 UIU/ML (ref 0.44–3.86)
URINE REFLEX TO CULTURE: YES
UROBILINOGEN UR STRIP-ACNC: 1 E.U./DL
WBC # BLD AUTO: 5.2 K/UL (ref 4.8–10.8)
WBC #/AREA URNS AUTO: >100 /HPF (ref 0–5)

## 2024-11-04 PROCEDURE — 2500000003 HC RX 250 WO HCPCS: Performed by: PHYSICIAN ASSISTANT

## 2024-11-04 PROCEDURE — 0202U NFCT DS 22 TRGT SARS-COV-2: CPT

## 2024-11-04 PROCEDURE — 96365 THER/PROPH/DIAG IV INF INIT: CPT

## 2024-11-04 PROCEDURE — 2500000003 HC RX 250 WO HCPCS: Performed by: PERSONAL EMERGENCY RESPONSE ATTENDANT

## 2024-11-04 PROCEDURE — 2580000003 HC RX 258: Performed by: PERSONAL EMERGENCY RESPONSE ATTENDANT

## 2024-11-04 PROCEDURE — 85025 COMPLETE CBC W/AUTO DIFF WBC: CPT

## 2024-11-04 PROCEDURE — 87040 BLOOD CULTURE FOR BACTERIA: CPT

## 2024-11-04 PROCEDURE — 87077 CULTURE AEROBIC IDENTIFY: CPT

## 2024-11-04 PROCEDURE — 87186 SC STD MICRODIL/AGAR DIL: CPT

## 2024-11-04 PROCEDURE — 96375 TX/PRO/DX INJ NEW DRUG ADDON: CPT

## 2024-11-04 PROCEDURE — 70450 CT HEAD/BRAIN W/O DYE: CPT

## 2024-11-04 PROCEDURE — 80053 COMPREHEN METABOLIC PANEL: CPT

## 2024-11-04 PROCEDURE — 71045 X-RAY EXAM CHEST 1 VIEW: CPT

## 2024-11-04 PROCEDURE — 1210000000 HC MED SURG R&B

## 2024-11-04 PROCEDURE — 6370000000 HC RX 637 (ALT 250 FOR IP): Performed by: PERSONAL EMERGENCY RESPONSE ATTENDANT

## 2024-11-04 PROCEDURE — 93005 ELECTROCARDIOGRAM TRACING: CPT | Performed by: STUDENT IN AN ORGANIZED HEALTH CARE EDUCATION/TRAINING PROGRAM

## 2024-11-04 PROCEDURE — 84484 ASSAY OF TROPONIN QUANT: CPT

## 2024-11-04 PROCEDURE — 87086 URINE CULTURE/COLONY COUNT: CPT

## 2024-11-04 PROCEDURE — 99285 EMERGENCY DEPT VISIT HI MDM: CPT

## 2024-11-04 PROCEDURE — 83605 ASSAY OF LACTIC ACID: CPT

## 2024-11-04 PROCEDURE — 36415 COLL VENOUS BLD VENIPUNCTURE: CPT

## 2024-11-04 PROCEDURE — 6360000002 HC RX W HCPCS: Performed by: PERSONAL EMERGENCY RESPONSE ATTENDANT

## 2024-11-04 PROCEDURE — 84443 ASSAY THYROID STIM HORMONE: CPT

## 2024-11-04 PROCEDURE — 81001 URINALYSIS AUTO W/SCOPE: CPT

## 2024-11-04 PROCEDURE — 84145 PROCALCITONIN (PCT): CPT

## 2024-11-04 PROCEDURE — 83735 ASSAY OF MAGNESIUM: CPT

## 2024-11-04 RX ORDER — MYCOPHENOLATE MOFETIL 250 MG/1
750 CAPSULE ORAL ONCE
Status: COMPLETED | OUTPATIENT
Start: 2024-11-04 | End: 2024-11-04

## 2024-11-04 RX ORDER — OXYMETAZOLINE HYDROCHLORIDE 0.05 G/100ML
2 SPRAY NASAL ONCE
Status: COMPLETED | OUTPATIENT
Start: 2024-11-04 | End: 2024-11-04

## 2024-11-04 RX ORDER — MAGNESIUM SULFATE IN WATER 40 MG/ML
2000 INJECTION, SOLUTION INTRAVENOUS ONCE
Status: COMPLETED | OUTPATIENT
Start: 2024-11-04 | End: 2024-11-04

## 2024-11-04 RX ORDER — CYCLOSPORINE 25 MG/1
75 CAPSULE, LIQUID FILLED ORAL ONCE
Status: COMPLETED | OUTPATIENT
Start: 2024-11-04 | End: 2024-11-04

## 2024-11-04 RX ORDER — FUROSEMIDE 20 MG/1
20 TABLET ORAL PRN
COMMUNITY

## 2024-11-04 RX ORDER — DILTIAZEM HYDROCHLORIDE 5 MG/ML
0.25 INJECTION INTRAVENOUS ONCE
Status: COMPLETED | OUTPATIENT
Start: 2024-11-04 | End: 2024-11-04

## 2024-11-04 RX ORDER — GABAPENTIN 100 MG/1
300 CAPSULE ORAL ONCE
Status: COMPLETED | OUTPATIENT
Start: 2024-11-04 | End: 2024-11-04

## 2024-11-04 RX ORDER — CYCLOSPORINE 25 MG/1
75 CAPSULE, GELATIN COATED ORAL ONCE
Status: DISCONTINUED | OUTPATIENT
Start: 2024-11-04 | End: 2024-11-04

## 2024-11-04 RX ORDER — 0.9 % SODIUM CHLORIDE 0.9 %
500 INTRAVENOUS SOLUTION INTRAVENOUS ONCE
Status: COMPLETED | OUTPATIENT
Start: 2024-11-04 | End: 2024-11-04

## 2024-11-04 RX ADMIN — MAGNESIUM SULFATE HEPTAHYDRATE 2000 MG: 40 INJECTION, SOLUTION INTRAVENOUS at 20:21

## 2024-11-04 RX ADMIN — CYCLOSPORINE 75 MG: 25 CAPSULE, LIQUID FILLED ORAL at 22:12

## 2024-11-04 RX ADMIN — DILTIAZEM HYDROCHLORIDE 17.5 MG: 5 INJECTION, SOLUTION INTRAVENOUS at 18:44

## 2024-11-04 RX ADMIN — GABAPENTIN 300 MG: 100 CAPSULE ORAL at 21:27

## 2024-11-04 RX ADMIN — SODIUM CHLORIDE 500 ML: 9 INJECTION, SOLUTION INTRAVENOUS at 20:27

## 2024-11-04 RX ADMIN — DILTIAZEM HYDROCHLORIDE 17.5 MG: 5 INJECTION, SOLUTION INTRAVENOUS at 21:31

## 2024-11-04 RX ADMIN — MYCOPHENOLATE MOFETIL 750 MG: 250 CAPSULE ORAL at 22:12

## 2024-11-04 RX ADMIN — INSULIN HUMAN 5 UNITS: 100 INJECTION, SOLUTION PARENTERAL at 20:23

## 2024-11-04 RX ADMIN — OXYMETAZOLINE HYDROCHLORIDE 2 SPRAY: 0.5 SPRAY NASAL at 21:27

## 2024-11-04 RX ADMIN — CEFTRIAXONE SODIUM 1000 MG: 1 INJECTION, POWDER, FOR SOLUTION INTRAMUSCULAR; INTRAVENOUS at 21:28

## 2024-11-04 ASSESSMENT — ENCOUNTER SYMPTOMS
ABDOMINAL DISTENTION: 0
CONSTIPATION: 0
EYE DISCHARGE: 0
COLOR CHANGE: 0
SORE THROAT: 0
SHORTNESS OF BREATH: 0
ABDOMINAL PAIN: 0
RHINORRHEA: 0

## 2024-11-04 ASSESSMENT — PAIN - FUNCTIONAL ASSESSMENT: PAIN_FUNCTIONAL_ASSESSMENT: NONE - DENIES PAIN

## 2024-11-04 NOTE — ED PROVIDER NOTES
[facility] ED  EMERGENCY DEPARTMENT ENCOUNTER      Pt Name: Remy Upton  MRN: [id number]  Birthdate 1950  Date of evaluation: 11/4/2024  Provider: PATRICA CARBAJAL  5:40 PM EST    CHIEF COMPLAINT     No chief complaint on file.        HISTORY OF PRESENT ILLNESS   (Location/Symptom, Timing/Onset, Context/Setting, Quality, Duration, Modifying Factors, Severity)  Note limiting factors.   Remy Upton is a 73 y.o. male who presents to the emergency department with complaint of progressive weakness which patient states been ongoing for the last 4 to 5 days, patient states today that he has a lean to his right side which is acutely present for him, he believes this started approximately 3 hours ago.  He denies any acute falls or injuries, there is no headaches, no blurred vision, no numbness or tingling, patient's had chronic weakness which she states been ongoing for last 2 to 3 months, he is not able to ambulate, this is not a new condition for him, but patient states he feels weaker than normal.  No chest pain or shortness of breath denies any pain, 0-10.  Past medical history significant for diabetes, seizures, hypertension, history of kidney transplant    Patient got out of nursing home 3 weeks ago due to difficulty ambulating and was going through PT/OT.  Currently gets around by wheelchair.  Patient has had leg weakness for the past couple days, and today his wife was trying to help stand him up and he started leaning to the right.  She was concern for falls.  Past couple days also having occasional productive cough, unknown color, and left anterior chest pain which is intermittent, worse lying flat.  4 days ago had cath removed and since then has had dysuria but no difficulty urinating.  He denies fevers, lightheadedness, shortness of breath, palpitations, abdominal pain, nausea, vomiting, diarrhea, constipation, leg swelling. On ASA.  Fistula in right lower arm (does not get  Physician who either signs or Co-signs this chart in the absence of a cardiologist.    EKG shows atrial fibrillation with a rapid ventricular sponsor 136 bpm nonspecific T wave abnormality no ventricular ectopy QTc 460 ms    RADIOLOGY:   Non-plain film images such as CT, Ultrasound and MRI are read by the radiologist. Plain radiographic images are visualized and preliminarily interpreted by the emergency physician with the below findings:      Interpretation per the Radiologist below, if available at the time of this note:    CT Head W/O Contrast   Final Result   1. No acute intracranial abnormality.   2. Moderate cerebral atrophy with periventricular white matter changes.   3. Previous left craniotomy defect with encephalomalacia in the anterior   aspect of the left temporal lobe.         XR CHEST PORTABLE   Final Result   No acute process.      Mild cardiomegaly.               ED BEDSIDE ULTRASOUND:   Performed by ED Physician - none    LABS:  Labs Reviewed   CBC WITH AUTO DIFFERENTIAL - Abnormal; Notable for the following components:       Result Value    RBC 3.62 (*)     Hemoglobin 10.6 (*)     Hematocrit 32.2 (*)     MCHC 32.9 (*)     Lymphocytes Absolute 0.1 (*)     All other components within normal limits   COMPREHENSIVE METABOLIC PANEL - Abnormal; Notable for the following components:    Potassium 5.2 (*)     CO2 15 (*)     Anion Gap 16 (*)     Glucose 210 (*)     BUN 90 (*)     Creatinine 3.50 (*)     Est, Glom Filt Rate 17.7 (*)     Total Bilirubin 1.3 (*)     All other components within normal limits    Narrative:     CALL  [facility]  [facility] tel. [phone],  BUN results called to and read back by LIZETH MARTINEZ, 11/04/2024 19:03, by  DELL  TRP results called to and read back by LIZETH MARTINEZ, 11/04/2024 19:02, by  DELL   MAGNESIUM - Abnormal; Notable for the following components:    Magnesium 1.6 (*)     All other components within normal limits    Narrative:     CALL  [facility]  [facility] tel. [phone],  BUN results

## 2024-11-04 NOTE — ED NOTES
Pt came from home via EMS for weakness and \"leaning\" to one side.   Pt is currently in a fib with RVR  Pt does not take a blood thinner  Poor PATRICA Pulido at bedside for initial assessment

## 2024-11-05 LAB
EKG ATRIAL RATE: 122 BPM
EKG Q-T INTERVAL: 306 MS
EKG QRS DURATION: 72 MS
EKG QTC CALCULATION (BAZETT): 460 MS
EKG R AXIS: -8 DEGREES
EKG T AXIS: 3 DEGREES
EKG VENTRICULAR RATE: 136 BPM
GLUCOSE BLD-MCNC: 163 MG/DL (ref 70–99)
GLUCOSE BLD-MCNC: 209 MG/DL (ref 70–99)
GLUCOSE BLD-MCNC: 231 MG/DL (ref 70–99)
GLUCOSE BLD-MCNC: 243 MG/DL (ref 70–99)
PERFORMED ON: ABNORMAL

## 2024-11-05 PROCEDURE — 97163 PT EVAL HIGH COMPLEX 45 MIN: CPT

## 2024-11-05 PROCEDURE — 51798 US URINE CAPACITY MEASURE: CPT

## 2024-11-05 PROCEDURE — 1210000000 HC MED SURG R&B

## 2024-11-05 PROCEDURE — 2580000003 HC RX 258: Performed by: PERSONAL EMERGENCY RESPONSE ATTENDANT

## 2024-11-05 PROCEDURE — 6360000002 HC RX W HCPCS: Performed by: STUDENT IN AN ORGANIZED HEALTH CARE EDUCATION/TRAINING PROGRAM

## 2024-11-05 PROCEDURE — 36415 COLL VENOUS BLD VENIPUNCTURE: CPT

## 2024-11-05 PROCEDURE — 99222 1ST HOSP IP/OBS MODERATE 55: CPT | Performed by: INTERNAL MEDICINE

## 2024-11-05 PROCEDURE — 6370000000 HC RX 637 (ALT 250 FOR IP): Performed by: INTERNAL MEDICINE

## 2024-11-05 PROCEDURE — 99223 1ST HOSP IP/OBS HIGH 75: CPT | Performed by: INTERNAL MEDICINE

## 2024-11-05 PROCEDURE — 93010 ELECTROCARDIOGRAM REPORT: CPT | Performed by: INTERNAL MEDICINE

## 2024-11-05 PROCEDURE — 6360000002 HC RX W HCPCS: Performed by: PERSONAL EMERGENCY RESPONSE ATTENDANT

## 2024-11-05 PROCEDURE — 6370000000 HC RX 637 (ALT 250 FOR IP): Performed by: PERSONAL EMERGENCY RESPONSE ATTENDANT

## 2024-11-05 PROCEDURE — 97167 OT EVAL HIGH COMPLEX 60 MIN: CPT

## 2024-11-05 PROCEDURE — 83036 HEMOGLOBIN GLYCOSYLATED A1C: CPT

## 2024-11-05 RX ORDER — INSULIN GLARGINE 100 [IU]/ML
6 INJECTION, SOLUTION SUBCUTANEOUS NIGHTLY
Status: DISCONTINUED | OUTPATIENT
Start: 2024-11-05 | End: 2024-11-07

## 2024-11-05 RX ORDER — INSULIN LISPRO 100 [IU]/ML
0-4 INJECTION, SOLUTION INTRAVENOUS; SUBCUTANEOUS
Status: DISCONTINUED | OUTPATIENT
Start: 2024-11-05 | End: 2024-11-06

## 2024-11-05 RX ORDER — SODIUM CHLORIDE 0.9 % (FLUSH) 0.9 %
5-40 SYRINGE (ML) INJECTION PRN
Status: DISCONTINUED | OUTPATIENT
Start: 2024-11-05 | End: 2024-11-09 | Stop reason: HOSPADM

## 2024-11-05 RX ORDER — SODIUM CHLORIDE 9 MG/ML
INJECTION, SOLUTION INTRAVENOUS PRN
Status: DISCONTINUED | OUTPATIENT
Start: 2024-11-05 | End: 2024-11-09 | Stop reason: HOSPADM

## 2024-11-05 RX ORDER — PREDNISONE 5 MG/1
5 TABLET ORAL DAILY
Status: DISCONTINUED | OUTPATIENT
Start: 2024-11-05 | End: 2024-11-09 | Stop reason: HOSPADM

## 2024-11-05 RX ORDER — CYCLOSPORINE 25 MG/1
75 CAPSULE, LIQUID FILLED ORAL 2 TIMES DAILY
Status: DISCONTINUED | OUTPATIENT
Start: 2024-11-05 | End: 2024-11-09 | Stop reason: HOSPADM

## 2024-11-05 RX ORDER — SODIUM CHLORIDE 0.9 % (FLUSH) 0.9 %
5-40 SYRINGE (ML) INJECTION EVERY 12 HOURS SCHEDULED
Status: DISCONTINUED | OUTPATIENT
Start: 2024-11-05 | End: 2024-11-09 | Stop reason: HOSPADM

## 2024-11-05 RX ORDER — ACETAMINOPHEN 325 MG/1
650 TABLET ORAL EVERY 4 HOURS PRN
Status: DISCONTINUED | OUTPATIENT
Start: 2024-11-05 | End: 2024-11-09 | Stop reason: HOSPADM

## 2024-11-05 RX ORDER — MIDODRINE HYDROCHLORIDE 10 MG/1
10 TABLET ORAL
Status: DISCONTINUED | OUTPATIENT
Start: 2024-11-05 | End: 2024-11-07

## 2024-11-05 RX ORDER — NEPHROCAP 1 MG
1 CAP ORAL DAILY
Status: DISCONTINUED | OUTPATIENT
Start: 2024-11-05 | End: 2024-11-09 | Stop reason: HOSPADM

## 2024-11-05 RX ORDER — ONDANSETRON 2 MG/ML
4 INJECTION INTRAMUSCULAR; INTRAVENOUS EVERY 6 HOURS PRN
Status: DISCONTINUED | OUTPATIENT
Start: 2024-11-05 | End: 2024-11-09 | Stop reason: HOSPADM

## 2024-11-05 RX ORDER — GABAPENTIN 300 MG/1
300 CAPSULE ORAL 2 TIMES DAILY
Status: DISCONTINUED | OUTPATIENT
Start: 2024-11-05 | End: 2024-11-09 | Stop reason: HOSPADM

## 2024-11-05 RX ORDER — FINASTERIDE 5 MG/1
5 TABLET, FILM COATED ORAL DAILY
Status: DISCONTINUED | OUTPATIENT
Start: 2024-11-05 | End: 2024-11-09 | Stop reason: HOSPADM

## 2024-11-05 RX ORDER — METOCLOPRAMIDE 5 MG/1
5 TABLET ORAL
Status: DISCONTINUED | OUTPATIENT
Start: 2024-11-05 | End: 2024-11-09 | Stop reason: HOSPADM

## 2024-11-05 RX ORDER — ASPIRIN 81 MG/1
81 TABLET ORAL DAILY
Status: DISCONTINUED | OUTPATIENT
Start: 2024-11-05 | End: 2024-11-09 | Stop reason: HOSPADM

## 2024-11-05 RX ORDER — FLUTICASONE PROPIONATE 50 MCG
2 SPRAY, SUSPENSION (ML) NASAL DAILY
COMMUNITY
Start: 2024-11-01 | End: 2025-11-01

## 2024-11-05 RX ORDER — ONDANSETRON 4 MG/1
4 TABLET, ORALLY DISINTEGRATING ORAL EVERY 8 HOURS PRN
Status: DISCONTINUED | OUTPATIENT
Start: 2024-11-05 | End: 2024-11-09 | Stop reason: HOSPADM

## 2024-11-05 RX ORDER — ENOXAPARIN SODIUM 100 MG/ML
30 INJECTION SUBCUTANEOUS DAILY
Status: DISCONTINUED | OUTPATIENT
Start: 2024-11-05 | End: 2024-11-09 | Stop reason: HOSPADM

## 2024-11-05 RX ORDER — ACETAMINOPHEN 160 MG/5ML
650 LIQUID ORAL EVERY 4 HOURS PRN
Status: DISCONTINUED | OUTPATIENT
Start: 2024-11-05 | End: 2024-11-09 | Stop reason: HOSPADM

## 2024-11-05 RX ORDER — MYCOPHENOLATE MOFETIL 250 MG/1
750 CAPSULE ORAL 2 TIMES DAILY
Status: DISCONTINUED | OUTPATIENT
Start: 2024-11-05 | End: 2024-11-09 | Stop reason: HOSPADM

## 2024-11-05 RX ORDER — AMIODARONE HYDROCHLORIDE 200 MG/1
200 TABLET ORAL 3 TIMES DAILY
Status: DISCONTINUED | OUTPATIENT
Start: 2024-11-05 | End: 2024-11-08

## 2024-11-05 RX ORDER — OXYMETAZOLINE HYDROCHLORIDE 0.05 G/100ML
2 SPRAY NASAL 2 TIMES DAILY
COMMUNITY

## 2024-11-05 RX ADMIN — ASPIRIN 81 MG: 81 TABLET, COATED ORAL at 08:57

## 2024-11-05 RX ADMIN — SODIUM CHLORIDE, PRESERVATIVE FREE 10 ML: 5 INJECTION INTRAVENOUS at 08:57

## 2024-11-05 RX ADMIN — INSULIN LISPRO 1 UNITS: 100 INJECTION, SOLUTION INTRAVENOUS; SUBCUTANEOUS at 16:15

## 2024-11-05 RX ADMIN — AMIODARONE HYDROCHLORIDE 200 MG: 200 TABLET ORAL at 20:46

## 2024-11-05 RX ADMIN — MIDODRINE HYDROCHLORIDE 10 MG: 10 TABLET ORAL at 08:57

## 2024-11-05 RX ADMIN — FINASTERIDE 5 MG: 5 TABLET, FILM COATED ORAL at 08:57

## 2024-11-05 RX ADMIN — GABAPENTIN 300 MG: 300 CAPSULE ORAL at 08:57

## 2024-11-05 RX ADMIN — MIDODRINE HYDROCHLORIDE 10 MG: 10 TABLET ORAL at 16:14

## 2024-11-05 RX ADMIN — MYCOPHENOLATE MOFETIL 750 MG: 250 CAPSULE ORAL at 20:45

## 2024-11-05 RX ADMIN — ENOXAPARIN SODIUM 30 MG: 100 INJECTION SUBCUTANEOUS at 08:57

## 2024-11-05 RX ADMIN — INSULIN LISPRO 1 UNITS: 100 INJECTION, SOLUTION INTRAVENOUS; SUBCUTANEOUS at 20:44

## 2024-11-05 RX ADMIN — CYCLOSPORINE 75 MG: 25 CAPSULE, LIQUID FILLED ORAL at 20:46

## 2024-11-05 RX ADMIN — MIDODRINE HYDROCHLORIDE 10 MG: 10 TABLET ORAL at 12:03

## 2024-11-05 RX ADMIN — AMIODARONE HYDROCHLORIDE 200 MG: 200 TABLET ORAL at 12:03

## 2024-11-05 RX ADMIN — GABAPENTIN 300 MG: 300 CAPSULE ORAL at 20:46

## 2024-11-05 RX ADMIN — PREDNISONE 5 MG: 5 TABLET ORAL at 08:57

## 2024-11-05 RX ADMIN — Medication 1 MG: at 08:57

## 2024-11-05 RX ADMIN — AMIODARONE HYDROCHLORIDE 200 MG: 200 TABLET ORAL at 16:14

## 2024-11-05 RX ADMIN — INSULIN GLARGINE 6 UNITS: 100 INJECTION, SOLUTION SUBCUTANEOUS at 20:44

## 2024-11-05 RX ADMIN — ACETAMINOPHEN 325MG 650 MG: 325 TABLET ORAL at 09:01

## 2024-11-05 RX ADMIN — SODIUM CHLORIDE, PRESERVATIVE FREE 10 ML: 5 INJECTION INTRAVENOUS at 20:49

## 2024-11-05 ASSESSMENT — PAIN SCALES - GENERAL
PAINLEVEL_OUTOF10: 0
PAINLEVEL_OUTOF10: 1
PAINLEVEL_OUTOF10: 3
PAINLEVEL_OUTOF10: 0
PAINLEVEL_OUTOF10: 0

## 2024-11-05 ASSESSMENT — ENCOUNTER SYMPTOMS
COUGH: 0
EYES NEGATIVE: 1
RESPIRATORY NEGATIVE: 1
STRIDOR: 0
GASTROINTESTINAL NEGATIVE: 1
WHEEZING: 0
SHORTNESS OF BREATH: 0
NAUSEA: 0
CHEST TIGHTNESS: 0
BLOOD IN STOOL: 0

## 2024-11-05 ASSESSMENT — PAIN - FUNCTIONAL ASSESSMENT: PAIN_FUNCTIONAL_ASSESSMENT: ACTIVITIES ARE NOT PREVENTED

## 2024-11-05 ASSESSMENT — PAIN DESCRIPTION - LOCATION
LOCATION: NECK
LOCATION: NECK

## 2024-11-05 ASSESSMENT — PAIN DESCRIPTION - ONSET: ONSET: ON-GOING

## 2024-11-05 ASSESSMENT — PAIN DESCRIPTION - ORIENTATION: ORIENTATION: POSTERIOR

## 2024-11-05 ASSESSMENT — PAIN DESCRIPTION - DESCRIPTORS: DESCRIPTORS: ACHING

## 2024-11-05 ASSESSMENT — PAIN DESCRIPTION - PAIN TYPE: TYPE: ACUTE PAIN

## 2024-11-05 ASSESSMENT — PAIN DESCRIPTION - FREQUENCY: FREQUENCY: INTERMITTENT

## 2024-11-05 NOTE — CARE COORDINATION
Case Management Assessment  Initial Evaluation    Date/Time of Evaluation: 11/5/2024 10:54 AM  Assessment Completed by: LOLI Patel    If patient is discharged prior to next notation, then this note serves as note for discharge by case management.    Patient Name: Remy Upton                   YOB: 1950  Diagnosis: Hyperkalemia [E87.5]  Hypomagnesemia [E83.42]  Elevated troponin [R79.89]  DELORES (acute kidney injury) (HCC) [N17.9]  Atrial fibrillation with RVR (HCC) [I48.91]  Acute cystitis without hematuria [N30.00]                   Date / Time: 11/4/2024  5:20 PM    Patient Admission Status: Inpatient   Readmission Risk (Low < 19, Mod (19-27), High > 27): Readmission Risk Score: 20.1    Current PCP: Tico Rodriguez, DO  PCP verified by CM? Yes    Chart Reviewed: Yes      History Provided by: Patient  Patient Orientation: Alert and Oriented    Patient Cognition: Alert    Hospitalization in the last 30 days (Readmission):  No    If yes, Readmission Assessment in CM Navigator will be completed.    Advance Directives:      Code Status: Full Code   Patient's Primary Decision Maker is: Legal Next of Kin    Primary Decision Maker: Simona Upton - Spouse - [phone]    Secondary Decision Maker: Maria Luisa Upton - Child - [phone]    Discharge Planning:    Patient lives with: Spouse/Significant Other Type of Home: House  Primary Care Giver: Self  Patient Support Systems include: Spouse/Significant Other   Current Financial resources:    Current community resources:    Current services prior to admission: None            Current DME:              Type of Home Care services:  OT, PT    ADLS  Prior functional level: Assistance with the following:, Bathing, Mobility, Dressing  Current functional level: Assistance with the following:, Bathing, Dressing, Mobility    PT AM-PAC: 7 /24  OT AM-PAC:   /24    Family can provide assistance at DC: Yes  Would you like Case Management to discuss the

## 2024-11-05 NOTE — H&P
[facility]                   [address] [place] [address]                           HISTORY & PHYSICAL      PATIENT NAME: LUL QUILES           : 1950  MED REC NO: [id number]                        ROOM: [id number]  ACCOUNT NO: [id number]                       ADMIT DATE: 2024  PROVIDER: Tico Rodriguez DO      HISTORY OF PRESENT ILLNESS:  A 73-year-old cachectic-appearing male, admitted to the hospital after falling numerous times at home.  He does have a history of previous kidney transplant greater than 10 years ago.  He had been on dialysis prior to that.  He has been in a nursing home recently and 3 weeks ago, he was sent home with PT/OT.  Since that time, the patient has had increasing weakness and back pains.  He denies any fevers or chills.  No nausea or vomiting.  He denies any chest pressure.  He has had a previous Watchman placement.  He has a right arm fistula in place, not using at this time.    PAST MEDICAL HISTORY:  Diabetes mellitus type 2, right arm fistula, history of seizures, hypertension, organic heart disease, prior kidney transplant with CKD 3B.    PAST SURGICAL HISTORY:  Brain surgery, colonoscopy multiple times, kidney transplant in 2015, upper endoscopy, bronchoscopy.    MEDICATIONS:  At the time of his admission, aspirin, cyclosporine, Bydureon, finasteride, gabapentin, Neurontin, metoprolol, ProAmatine, CellCept, Deltasone, sodium bicarb, Flomax, vitamin D.    ALLERGIES:  STATINS.      HABITS:  .  No alcohol.  Stopped smoking in .    REVIEW OF SYSTEMS:  Weakness.    PHYSICAL EXAMINATION:  VITAL SIGNS:  Patient is 5 foot 10 inches, 167 pounds.  Blood pressure 130/70, heart rate 100, respirations 18, afebrile.  HEENT:  Normocephalic.  Pupils are equal and reactive to light.  Sclerae are clear.  HEART:  Regular; 1/6 systolic murmur.  ABDOMEN:  Soft.  No guarding or rigidity.  Flat.  No distention.  EXTREMITIES:  Show no  From: Darwin Sal  To: Yefri Oliver MD  Sent: 3/19/2020 5:22 AM CDT  Subject: Non-Urgent Medical Question    Hi, Given my heart condition, age and past stroke problems should I be staying home from work because of the coronavirus? My family is urging me to stay home, I am home today but I am looking to the future. Your recommendation would be greatly appreciated. Thanks, Ho MATTSON

## 2024-11-05 NOTE — PLAN OF CARE
Problem: Chronic Conditions and Co-morbidities  Goal: Patient's chronic conditions and co-morbidity symptoms are monitored and maintained or improved  11/5/2024 0043 by Christopher Swanson RN  Outcome: Progressing  11/4/2024 2355 by Christopher Swanson RN  Outcome: Progressing  Flowsheets (Taken 11/4/2024 2348)  Care Plan - Patient's Chronic Conditions and Co-Morbidity Symptoms are Monitored and Maintained or Improved:   Monitor and assess patient's chronic conditions and comorbid symptoms for stability, deterioration, or improvement   Collaborate with multidisciplinary team to address chronic and comorbid conditions and prevent exacerbation or deterioration   Update acute care plan with appropriate goals if chronic or comorbid symptoms are exacerbated and prevent overall improvement and discharge     Problem: Discharge Planning  Goal: Discharge to home or other facility with appropriate resources  11/5/2024 0043 by Christopher Swanson RN  Outcome: Progressing  11/4/2024 2355 by Christopher Swanson RN  Outcome: Progressing  Flowsheets (Taken 11/4/2024 2348)  Discharge to home or other facility with appropriate resources:   Identify barriers to discharge with patient and caregiver   Arrange for needed discharge resources and transportation as appropriate   Identify discharge learning needs (meds, wound care, etc)     Problem: Skin/Tissue Integrity  Goal: Absence of new skin breakdown  Description: 1.  Monitor for areas of redness and/or skin breakdown  2.  Assess vascular access sites hourly  3.  Every 4-6 hours minimum:  Change oxygen saturation probe site  4.  Every 4-6 hours:  If on nasal continuous positive airway pressure, respiratory therapy assess nares and determine need for appliance change or resting period.  11/5/2024 0043 by Christopher Swanson RN  Outcome: Progressing  11/4/2024 2355 by Christopher Swanson RN  Outcome: Progressing     Problem: Safety - Adult  Goal: Free from fall injury  11/5/2024 0043 by Christopher Swanson

## 2024-11-05 NOTE — PROGRESS NOTES
Physical Therapy Med Surg Initial Assessment  Facility/Department: [address] TELEMETRY  Room: [place] [address]       NAME: Remy Upton  : 1950 (73 y.o.)  MRN: [id number]  CODE STATUS: Full Code    Date of Service: 2024    Patient Diagnosis(es): Hyperkalemia [E87.5]  Hypomagnesemia [E83.42]  Elevated troponin [R79.89]  DELORES (acute kidney injury) ([facility]) [N17.9]  Atrial fibrillation with RVR ([facility]) [I48.91]  Acute cystitis without hematuria [N30.00]   No chief complaint on file.    Patient Active Problem List    Diagnosis Date Noted    Chest pain 2022    GI bleeding 10/29/2022    Lung nodules 10/25/2022    COPD without exacerbation ([facility]) 10/25/2022    Abnormal CT of the chest 10/25/2022    Bronchiectasis without complication ([facility]) 10/25/2022    Dieulafoy lesion of colon 10/24/2022    Poorly controlled diabetes mellitus ([facility]) 10/24/2022    Symptomatic anemia 10/19/2022    Acute upper GI bleed 10/19/2022    Atrial fibrillation ([facility]) 2022    Unstable angina ([facility]) 2022    Atrial fibrillation with RVR ([facility]) 2024    At risk for stroke 10/14/2024    Deep vein thrombosis (DVT) of lower extremity ([facility]) 10/14/2024    Localized swelling of both lower legs 10/14/2024    Peripheral nerve disease 10/14/2024    Rhabdomyolysis 10/14/2024    Leukocytes in urine 10/14/2024    Bilateral hearing loss 10/10/2024    Acute cystitis without hematuria 2024    Bilateral lower extremity edema 2024    Dysuria 2024    Gross hematuria 2024    Adjustment disorder 2024    Immunosuppression due to drug therapy (HCC) 2024    DELORES (acute kidney injury) ([facility]) 2024    Elevated BUN 2024    Urinary retention 2024    Muscle weakness (generalized) 2024    Difficulty in walking 2024    Complication of transplanted kidney 2024    Gait difficulty 2024    Leg weakness, bilateral 2024    Abscess, toe 2023    Astigmatism of both eyes with  assess)  Standing - Static:  (unsafe to assess)  Standing - Dynamic:  (unsafe to assess)    Sensation: Intact    Bed mobility  Rolling to Left: Maximum assistance  Rolling to Right: Maximum assistance  Supine to Sit: Dependent/Total  Sit to Supine: Dependent/Total  Bed Mobility Comments: poor tolerance for sitting edge of bed with quick fatigue    Transfers  Comment: unsafe to assess due to LE weakness    Ambulation  Comments: pt stated non-amb at baseline    Activity Tolerance  Activity Tolerance: Patient limited by fatigue;Patient limited by endurance;Treatment limited secondary to decreased cognition    Patient Education  Education Given To: Patient  Education Provided: Role of Therapy;Plan of Care  Education Method: Verbal  Education Outcome: Continued education needed     ASSESSMENT:   Body Structures, Functions, Activity Limitations Requiring Skilled Therapeutic Intervention: Decreased functional mobility ;Decreased ROM;Decreased strength;Decreased endurance;Decreased cognition;Decreased balance  Decision Making: High Complexity  History: high  Exam: high  Clinical Presentation: high    Therapy Prognosis: Fair;Good    DISCHARGE RECOMMENDATIONS:  Discharge Recommendations: Continue to assess pending progress    Assessment: Pt is 73 y.o. male to hospital due to weakness for 5 days at home.  Pt reports having a low PLOF including non-amb since d/c 3 wks ago from SNF and states required assistance with transfers.  Pt exhibits weakness, decreased balance, decreased activity tolerance, and poor insight into home environment.  Continued PT is required to progress toward highest level of indep for decreased caregiver burden.  Requires PT Follow-Up: Yes       PLAN OF CARE:  Physical Therapy Plan  General Plan: 1 time a day 3-6 times a week  Current Treatment Recommendations: Strengthening, ROM, Balance training, Functional mobility training, Transfer training, Endurance training, Wheelchair mobility training, Manual,

## 2024-11-05 NOTE — CONSULTS
Inpatient consult to Cardiology  Consult performed by: Miguel Francisco MD  Consult ordered by: Tico Rodriguez DO          Patient Name: Remy Upton  Admit Date: 2024  5:20 PM  MR #: [id number]  : 1950    Attending Physician: Tico Rodriguez DO  Reason for consult: AF    History of Presenting Illness:      Remy Upton is a 73 y.o. male on hospital day 1 with a history of .   History Obtained From:  patient, electronic medical record    Pt is admitted for severe weakness and multiple near falls.  He oral intake has been poor. He recently had a NH stay for therapy. He is tired and weak. He denies CP nor SOB.     He has CKD s/p Renal Transplant remotely.  He has persistent AF and s/p Watchman device  at  due to prior Extensive bleed and inability to anticoagulate.     He has no known CAD. He is non compliant with f/u. I have not seen him since 3/2023 when I sent him for Watchman.     2024 FREDERIC at  EF 55-60 and stable Watchman position.     HS Troponin 686 > 647   ECG   Telemetry      History:      EKG:  Past Medical History:   Diagnosis Date    Diabetes mellitus (HCC)     Hemodialysis access, fistula mature (HCC) 2002    Hypertension     Seizures (HCC)     no seizure since      Past Surgical History:   Procedure Laterality Date    BRAIN SURGERY Left 1990    to eliminate seizures    COLONOSCOPY N/A 10/24/2022    COLONOSCOPY DIAGNOSTIC performed by Va Ordoñez MD at [facility]    KIDNEY TRANSPLANT      [place] [facility] INCL FLUOR GDNCE DX W/CELL WASHG SPX N/A 3/20/2018    BRONCHOSCOPY performed by Cristopher Conde MD at [facility] OR    UPPER GASTROINTESTINAL ENDOSCOPY N/A 10/21/2022    EGD DIAGNOSTIC ONLY performed by Jaime Martinez MD at [facility]       Family History  Family History   Problem Relation Age of Onset    Colon Cancer Neg Hx      [] Unable to obtain due to ventilated and/ or neurologic status    Social History     Socioeconomic  acute intracranial abnormality. 2. Moderate cerebral atrophy with periventricular white matter changes. 3. Previous left craniotomy defect with encephalomalacia in the anterior aspect of the left temporal lobe.     XR CHEST PORTABLE    Result Date: 11/4/2024  EXAMINATION: ONE XRAY VIEW OF THE CHEST 11/4/2024 6:00 pm COMPARISON: None. HISTORY: ORDERING SYSTEM PROVIDED HISTORY: Sepsis TECHNOLOGIST PROVIDED HISTORY: Reason for exam:->Sepsis What reading provider will be dictating this exam?->CRC FINDINGS: The lungs are without acute focal process.  There is no effusion or pneumothorax. The cardiomediastinal silhouette is without acute process. The osseous structures are without acute process.     No acute process. Mild cardiomegaly.       Active Hospital Problems    Diagnosis Date Noted    Atrial fibrillation with RVR ([facility]) [I48.91] 11/04/2024     Priority: Low         Impression/Plan:   Generalized Weakness with multiple near falls.  Persistent AF.  S/p Watchman.   BP is low.  Will start PO Amiodarone Load. ECG. Tele  Advanced CKD s/p Renal transplant - need to avoid Nephrotoxicity  Demand ischemia without CP - ASA Statin. BB when BP allows.   Normal LVEF 55-60%  On High dose Midodrine.      Thank you for allowing us to participate in the care of this patient.     Will continue to follow.    Please call if questions or concerns arise.    Electronically signed by SILVESTRE SHIELDS MD on 11/5/2024 at 10:18 AM

## 2024-11-05 NOTE — PROGRESS NOTES
[facility] OCCUPATIONAL THERAPY EVALUATION - ACUTE     NAME: Remy Upton  : 1950 (73 y.o.)  MRN: [id number]  CODE STATUS: Full Code  Room: W167/W167-01    Date of Service: 2024    Patient Diagnosis(es): Hyperkalemia [E87.5]  Hypomagnesemia [E83.42]  Elevated troponin [R79.89]  DELORES (acute kidney injury) ([facility]) [N17.9]  Atrial fibrillation with RVR ([facility]) [I48.91]  Acute cystitis without hematuria [N30.00]   Patient Active Problem List    Diagnosis Date Noted    Chest pain 2022    GI bleeding 10/29/2022    Lung nodules 10/25/2022    COPD without exacerbation ([facility]) 10/25/2022    Abnormal CT of the chest 10/25/2022    Bronchiectasis without complication ([facility]) 10/25/2022    Dieulafoy lesion of colon 10/24/2022    Poorly controlled diabetes mellitus ([facility]) 10/24/2022    Symptomatic anemia 10/19/2022    Acute upper GI bleed 10/19/2022    Atrial fibrillation ([facility]) 2022    Unstable angina ([facility]) 2022    Atrial fibrillation with RVR ([facility]) 2024    At risk for stroke 10/14/2024    Deep vein thrombosis (DVT) of lower extremity ([facility]) 10/14/2024    Localized swelling of both lower legs 10/14/2024    Peripheral nerve disease 10/14/2024    Rhabdomyolysis 10/14/2024    Leukocytes in urine 10/14/2024    Bilateral hearing loss 10/10/2024    Acute cystitis without hematuria 2024    Bilateral lower extremity edema 2024    Dysuria 2024    Gross hematuria 2024    Adjustment disorder 2024    Immunosuppression due to drug therapy (HCC) 2024    DELORES (acute kidney injury) (HCC) 2024    Elevated BUN 2024    Urinary retention 2024    Muscle weakness (generalized) 2024    Difficulty in walking 2024    Complication of transplanted kidney 2024    Gait difficulty 2024    Leg weakness, bilateral 2024    Abscess, toe 2023    Astigmatism of both eyes with presbyopia 2023    Benign enlargement of prostate 2023

## 2024-11-05 NOTE — PROGRESS NOTES
2345: admission completed. Home meds verified with home med list from spouse. Patient updated on admission and oriented to room. Will continue to monitor.     0705: Dr. Rodriguez made aware of home meds needing reordered.

## 2024-11-05 NOTE — ACP (ADVANCE CARE PLANNING)
Advance Care Planning   Healthcare Decision Maker:    Primary Decision Maker: AlexsandraAllysonSimona - Spouse - [phone]    Secondary Decision Maker: AlexsandraMaria Luisa - Child - [phone]    Click here to complete Healthcare Decision Makers including selection of the Healthcare Decision Maker Relationship (ie \"Primary\").  Today we documented Decision Maker(s) consistent with Legal Next of Kin hierarchy.

## 2024-11-05 NOTE — CARE COORDINATION
CONFIRMED [facility] SERVICES.  RESUME ORDER OBTAINED AND FAXED TO [facility], BRIANNA PAINTER.

## 2024-11-06 LAB
ALBUMIN SERPL-MCNC: 3.3 G/DL (ref 3.5–4.6)
ALP SERPL-CCNC: 59 U/L (ref 35–104)
ALT SERPL-CCNC: 6 U/L (ref 0–41)
ANION GAP SERPL CALCULATED.3IONS-SCNC: 18 MEQ/L (ref 9–15)
AST SERPL-CCNC: 13 U/L (ref 0–40)
BASOPHILS # BLD: 0 K/UL (ref 0–0.2)
BASOPHILS NFR BLD: 0.4 %
BILIRUB SERPL-MCNC: 0.9 MG/DL (ref 0.2–0.7)
BUN SERPL-MCNC: 88 MG/DL (ref 8–23)
CALCIUM SERPL-MCNC: 8.9 MG/DL (ref 8.5–9.9)
CHLORIDE SERPL-SCNC: 107 MEQ/L (ref 95–107)
CO2 SERPL-SCNC: 15 MEQ/L (ref 20–31)
CREAT SERPL-MCNC: 3.67 MG/DL (ref 0.7–1.2)
EOSINOPHIL # BLD: 0.1 K/UL (ref 0–0.7)
EOSINOPHIL NFR BLD: 1.7 %
ERYTHROCYTE [DISTWIDTH] IN BLOOD BY AUTOMATED COUNT: 13.6 % (ref 11.5–14.5)
ERYTHROCYTE [SEDIMENTATION RATE] IN BLOOD BY WESTERGREN METHOD: 48 MM (ref 0–20)
ESTIMATED AVERAGE GLUCOSE: 151 MG/DL
GLOBULIN SER CALC-MCNC: 2.8 G/DL (ref 2.3–3.5)
GLUCOSE BLD-MCNC: 158 MG/DL (ref 70–99)
GLUCOSE BLD-MCNC: 212 MG/DL (ref 70–99)
GLUCOSE BLD-MCNC: 232 MG/DL (ref 70–99)
GLUCOSE BLD-MCNC: 245 MG/DL (ref 70–99)
GLUCOSE SERPL-MCNC: 156 MG/DL (ref 70–99)
HBA1C MFR BLD: 6.9 % (ref 4–6)
HCT VFR BLD AUTO: 30.9 % (ref 42–52)
HGB BLD-MCNC: 10.2 G/DL (ref 14–18)
LYMPHOCYTES # BLD: 0.2 K/UL (ref 1–4.8)
LYMPHOCYTES NFR BLD: 4.1 %
MCH RBC QN AUTO: 29.2 PG (ref 27–31.3)
MCHC RBC AUTO-ENTMCNC: 33 % (ref 33–37)
MCV RBC AUTO: 88.5 FL (ref 79–92.2)
MONOCYTES # BLD: 0.6 K/UL (ref 0.2–0.8)
MONOCYTES NFR BLD: 11.1 %
NEUTROPHILS # BLD: 4.5 K/UL (ref 1.4–6.5)
NEUTS SEG NFR BLD: 82.3 %
PERFORMED ON: ABNORMAL
PLATELET # BLD AUTO: 189 K/UL (ref 130–400)
POTASSIUM SERPL-SCNC: 4.5 MEQ/L (ref 3.4–4.9)
PROCALCITONIN SERPL IA-MCNC: 0.62 NG/ML (ref 0–0.15)
PROT SERPL-MCNC: 6.1 G/DL (ref 6.3–8)
RBC # BLD AUTO: 3.49 M/UL (ref 4.7–6.1)
SODIUM SERPL-SCNC: 140 MEQ/L (ref 135–144)
WBC # BLD AUTO: 5.4 K/UL (ref 4.8–10.8)

## 2024-11-06 PROCEDURE — 1210000000 HC MED SURG R&B

## 2024-11-06 PROCEDURE — 6370000000 HC RX 637 (ALT 250 FOR IP): Performed by: INTERNAL MEDICINE

## 2024-11-06 PROCEDURE — 99233 SBSQ HOSP IP/OBS HIGH 50: CPT | Performed by: INTERNAL MEDICINE

## 2024-11-06 PROCEDURE — 85652 RBC SED RATE AUTOMATED: CPT

## 2024-11-06 PROCEDURE — 6360000002 HC RX W HCPCS: Performed by: PERSONAL EMERGENCY RESPONSE ATTENDANT

## 2024-11-06 PROCEDURE — 85025 COMPLETE CBC W/AUTO DIFF WBC: CPT

## 2024-11-06 PROCEDURE — 6360000002 HC RX W HCPCS: Performed by: STUDENT IN AN ORGANIZED HEALTH CARE EDUCATION/TRAINING PROGRAM

## 2024-11-06 PROCEDURE — 2580000003 HC RX 258: Performed by: PERSONAL EMERGENCY RESPONSE ATTENDANT

## 2024-11-06 PROCEDURE — 99232 SBSQ HOSP IP/OBS MODERATE 35: CPT | Performed by: PHYSICIAN ASSISTANT

## 2024-11-06 PROCEDURE — 36415 COLL VENOUS BLD VENIPUNCTURE: CPT

## 2024-11-06 PROCEDURE — 6370000000 HC RX 637 (ALT 250 FOR IP): Performed by: PHYSICIAN ASSISTANT

## 2024-11-06 PROCEDURE — 80053 COMPREHEN METABOLIC PANEL: CPT

## 2024-11-06 PROCEDURE — 84145 PROCALCITONIN (PCT): CPT

## 2024-11-06 PROCEDURE — 6370000000 HC RX 637 (ALT 250 FOR IP): Performed by: STUDENT IN AN ORGANIZED HEALTH CARE EDUCATION/TRAINING PROGRAM

## 2024-11-06 PROCEDURE — 93005 ELECTROCARDIOGRAM TRACING: CPT | Performed by: INTERNAL MEDICINE

## 2024-11-06 RX ORDER — GLUCAGON 1 MG/ML
1 KIT INJECTION PRN
Status: DISCONTINUED | OUTPATIENT
Start: 2024-11-06 | End: 2024-11-09 | Stop reason: HOSPADM

## 2024-11-06 RX ORDER — DEXTROSE MONOHYDRATE 100 MG/ML
INJECTION, SOLUTION INTRAVENOUS CONTINUOUS PRN
Status: DISCONTINUED | OUTPATIENT
Start: 2024-11-06 | End: 2024-11-09 | Stop reason: HOSPADM

## 2024-11-06 RX ORDER — INSULIN LISPRO 100 [IU]/ML
0-8 INJECTION, SOLUTION INTRAVENOUS; SUBCUTANEOUS
Status: DISCONTINUED | OUTPATIENT
Start: 2024-11-06 | End: 2024-11-09 | Stop reason: HOSPADM

## 2024-11-06 RX ORDER — METOPROLOL TARTRATE 1 MG/ML
5 INJECTION, SOLUTION INTRAVENOUS EVERY 6 HOURS PRN
Status: DISCONTINUED | OUTPATIENT
Start: 2024-11-06 | End: 2024-11-09 | Stop reason: HOSPADM

## 2024-11-06 RX ORDER — METOPROLOL SUCCINATE 25 MG/1
25 TABLET, EXTENDED RELEASE ORAL 2 TIMES DAILY
Status: DISCONTINUED | OUTPATIENT
Start: 2024-11-06 | End: 2024-11-08

## 2024-11-06 RX ADMIN — MIDODRINE HYDROCHLORIDE 10 MG: 10 TABLET ORAL at 16:30

## 2024-11-06 RX ADMIN — AMIODARONE HYDROCHLORIDE 200 MG: 200 TABLET ORAL at 15:01

## 2024-11-06 RX ADMIN — GABAPENTIN 300 MG: 300 CAPSULE ORAL at 09:01

## 2024-11-06 RX ADMIN — PREDNISONE 5 MG: 5 TABLET ORAL at 09:08

## 2024-11-06 RX ADMIN — MYCOPHENOLATE MOFETIL 750 MG: 250 CAPSULE ORAL at 20:22

## 2024-11-06 RX ADMIN — METOCLOPRAMIDE 5 MG: 5 TABLET ORAL at 15:02

## 2024-11-06 RX ADMIN — MIDODRINE HYDROCHLORIDE 10 MG: 10 TABLET ORAL at 09:01

## 2024-11-06 RX ADMIN — INSULIN LISPRO 2 UNITS: 100 INJECTION, SOLUTION INTRAVENOUS; SUBCUTANEOUS at 20:18

## 2024-11-06 RX ADMIN — ENOXAPARIN SODIUM 30 MG: 100 INJECTION SUBCUTANEOUS at 09:00

## 2024-11-06 RX ADMIN — METOPROLOL SUCCINATE 25 MG: 25 TABLET, EXTENDED RELEASE ORAL at 15:01

## 2024-11-06 RX ADMIN — CYCLOSPORINE 75 MG: 25 CAPSULE, LIQUID FILLED ORAL at 20:19

## 2024-11-06 RX ADMIN — AMIODARONE HYDROCHLORIDE 200 MG: 200 TABLET ORAL at 09:08

## 2024-11-06 RX ADMIN — INSULIN LISPRO 1 UNITS: 100 INJECTION, SOLUTION INTRAVENOUS; SUBCUTANEOUS at 16:30

## 2024-11-06 RX ADMIN — METOCLOPRAMIDE 5 MG: 5 TABLET ORAL at 12:03

## 2024-11-06 RX ADMIN — FINASTERIDE 5 MG: 5 TABLET, FILM COATED ORAL at 09:08

## 2024-11-06 RX ADMIN — SODIUM CHLORIDE, PRESERVATIVE FREE 10 ML: 5 INJECTION INTRAVENOUS at 08:59

## 2024-11-06 RX ADMIN — SODIUM CHLORIDE, PRESERVATIVE FREE 10 ML: 5 INJECTION INTRAVENOUS at 20:16

## 2024-11-06 RX ADMIN — CYCLOSPORINE 75 MG: 25 CAPSULE, LIQUID FILLED ORAL at 09:01

## 2024-11-06 RX ADMIN — METOCLOPRAMIDE 5 MG: 5 TABLET ORAL at 05:24

## 2024-11-06 RX ADMIN — METOPROLOL SUCCINATE 25 MG: 25 TABLET, EXTENDED RELEASE ORAL at 09:07

## 2024-11-06 RX ADMIN — GABAPENTIN 300 MG: 300 CAPSULE ORAL at 20:21

## 2024-11-06 RX ADMIN — MYCOPHENOLATE MOFETIL 750 MG: 250 CAPSULE ORAL at 09:07

## 2024-11-06 RX ADMIN — AMIODARONE HYDROCHLORIDE 200 MG: 200 TABLET ORAL at 20:20

## 2024-11-06 RX ADMIN — INSULIN LISPRO 1 UNITS: 100 INJECTION, SOLUTION INTRAVENOUS; SUBCUTANEOUS at 12:04

## 2024-11-06 RX ADMIN — ASPIRIN 81 MG: 81 TABLET, COATED ORAL at 09:00

## 2024-11-06 RX ADMIN — MIDODRINE HYDROCHLORIDE 10 MG: 10 TABLET ORAL at 12:03

## 2024-11-06 RX ADMIN — Medication 1 MG: at 09:08

## 2024-11-06 RX ADMIN — INSULIN GLARGINE 6 UNITS: 100 INJECTION, SOLUTION SUBCUTANEOUS at 20:18

## 2024-11-06 ASSESSMENT — ENCOUNTER SYMPTOMS
WHEEZING: 0
BLOOD IN STOOL: 0
EYES NEGATIVE: 1
SHORTNESS OF BREATH: 0
STRIDOR: 0
NAUSEA: 0
RESPIRATORY NEGATIVE: 1
COUGH: 0
GASTROINTESTINAL NEGATIVE: 1
CHEST TIGHTNESS: 0

## 2024-11-06 ASSESSMENT — PAIN SCALES - GENERAL
PAINLEVEL_OUTOF10: 0

## 2024-11-06 NOTE — CONSULTS
[facility]                   [address] [place] [address]                              CONSULTATION      PATIENT NAME: LUL QUILES           : 1950  MED REC NO: [id number]                        ROOM: W167  ACCOUNT NO: [id number]                       ADMIT DATE: 2024  PROVIDER: Kirk Diamond MD    ENDOCRINE CONSULT    CONSULT DATE: 2024    REFERRING PHYSICIAN:  Tico Rodriguez DO      REASON FOR CONSULT:  Management of uncontrolled diabetes.    CHIEF COMPLAINT AND HISTORY OF PRESENT ILLNESS:  The patient is a 73-year-old male with known history of diabetes with variable control, history of chronic kidney disease, admitted to [facility] because of frequent falls, weakness, history of kidney transplant 10 years ago, had been on dialysis prior to kidney transplant.  He has been at a nursing home recently 3 weeks ago, sent home, for physical therapy.  Complaining of increasing weakness and back pains.  Initial admitting diagnoses were generalized weakness, cachexia, uncontrolled diabetes.  No hemoglobin A1c to review.  Blood sugars have been staying between 135 to 210 range.  A1c is pending.  Chemistries were reviewed.  Sodium 136, potassium 5.2, chloride 105, CO2 was 15, BUN 90, creatinine 3.50.  Hemoglobin was 10.6.  The patient also seen by cardiologist for atrial fibrillation.  The patient is on high dose Midodrine.  Has received some regular insulin p.r.n.  Home medications for diabetes included Bydureon.  The patient does take prednisone for long-term kidney transplant medication list.    PAST MEDICAL HISTORY:  Significant for type 2 diabetes, history of renal failure, hypertension, seizure disorder.    PAST SURGICAL HISTORY:  Brain surgery, colonoscopy, kidney transplant.    FAMILY HISTORY:  Reviewed.  Noncontributory.    PERSONAL AND SOCIAL HISTORY:  Does smoke cigarettes.  Denies any substance abuse.    ALLERGIES:

## 2024-11-06 NOTE — PROGRESS NOTES
PROGRESS NOTE     Patient Name: Remy Upton  Admit Date: 2024  5:20 PM  MR #: [id number]  : 1950    Attending Physician: Tico Rodriguez DO  Reason for consult: AF    History of Presenting Illness:      Remy Upton is a 73 y.o. male on hospital day 2 with a history of .   History Obtained From:  patient, electronic medical record    Pt is admitted for severe weakness and multiple near falls.  He oral intake has been poor. He recently had a NH stay for therapy. He is tired and weak. He denies CP nor SOB.     He has CKD s/p Renal Transplant remotely.  He has persistent AF and s/p Watchman device  at  due to prior Extensive bleed and inability to anticoagulate.     He has no known CAD. He is non compliant with f/u. I have not seen him since 3/2023 when I sent him for Watchman.     2024 FREDERIC at  EF 55-60 and stable Watchman position.     HS Troponin 686 > 647   ECG   Telemetry      24 Tele AF 120s. No acute evens denies CP nor SOB    History:      EKG:  Past Medical History:   Diagnosis Date    Diabetes mellitus (HCC)     Hemodialysis access, fistula mature (HCC) 2002    Hypertension     Seizures (HCC)     no seizure since      Past Surgical History:   Procedure Laterality Date    BRAIN SURGERY Left 1990    to eliminate seizures    COLONOSCOPY N/A 10/24/2022    COLONOSCOPY DIAGNOSTIC performed by Va Ordoñez MD at [facility]    KIDNEY TRANSPLANT      [place] [facility] INCL FLUOR GDNCE DX W/CELL WASHG SPX N/A 3/20/2018    BRONCHOSCOPY performed by Cristopher Conde MD at [facility] OR    UPPER GASTROINTESTINAL ENDOSCOPY N/A 10/21/2022    EGD DIAGNOSTIC ONLY performed by Jaime Martinez MD at [facility]       Family History  Family History   Problem Relation Age of Onset    Colon Cancer Neg Hx      [] Unable to obtain due to ventilated and/ or neurologic status    Social History     Socioeconomic History    Marital status:      Spouse  CP - ASA Statin. BB when BP allows.   Normal LVEF 55-60%  On High dose Midodrine.      Thank you for allowing us to participate in the care of this patient.     Will continue to follow.    Please call if questions or concerns arise.    Electronically signed by SILVESTRE SHIELDS MD on 11/6/2024 at 8:18 AM

## 2024-11-06 NOTE — PROGRESS NOTES
Endocrinology Progress Note    Assessment and Plan:   Assessment-  Type 2 diabetes  A-fib with RVR  Weakness and deconditioning      Plan-  Continue Lantus 6 units nightly  Increase to medium dose Humalog sliding scale coverage  Monitor glycemic control closely  Patient may be discharged to rehab from endocrinology standpoint    POC Glucose:   Recent Labs     11/05/24  1536 11/05/24  2040 11/06/24  0604 11/06/24  1048 11/06/24  1504   POCGLU 231* 243* 158* 232* 212*     HGBA1C:  Lab Results   Component Value Date    LABA1C 6.9 (H) 11/05/2024    LABA1C 8.3 (A) 04/20/2023    LABA1C 8.5 (H) 10/19/2022     CBC:   Recent Labs     11/04/24  1819 11/06/24  0458   WBC 5.2 5.4   HGB 10.6* 10.2*    189     CMP:    Lab Results   Component Value Date     11/06/2024    K 4.5 11/06/2024     11/06/2024    CO2 15 (L) 11/06/2024    BUN 88 (HH) 11/06/2024    CREATININE 3.67 (H) 11/06/2024    GLUCOSE 156 (H) 11/06/2024    CALCIUM 8.9 11/06/2024    BILITOT 0.9 (H) 11/06/2024    ALKPHOS 59 11/06/2024    AST 13 11/06/2024    ALT 6 11/06/2024    LABGLOM 16.7 (L) 11/06/2024    GFRAA 28.2 (L) 10/12/2022    GLOB 2.8 11/06/2024       Lab Results   Component Value Date    TSH 1.290 11/04/2024    TSH 0.574 10/19/2022    TSH 0.469 10/12/2022     No results found for: \"TPOABS\"      CC: No chief complaint on file.      Subjective:   Interval History: Brayan is a 73-year-old male admitted for weakness falls and deconditioning.  Has a history of CKD, status post renal transplant, atrial fibrillation.  He was noted to be hyperglycemic and we were consulted for management of his diabetes.  His recent hemoglobin A1c is 6.9% however he is anemic so this may be falsely low.  He was started on basal and bolus insulin with improving glycemic control, we will monitor glucose closely and make daily insulin dosing adjustments as needed.  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)     DELORES (acute kidney injury) ([facility])     Adjustment disorder     Gross hematuria     Bilateral lower extremity edema     Dysuria     Acute cystitis without hematuria     Bilateral hearing loss     Abscess, toe     Acute hyperkalemia     Acute pain of left shoulder     Astigmatism     Astigmatism of both eyes with presbyopia     At risk for stroke     Benign enlargement of prostate     Benign prostatic hyperplasia with urinary frequency     BK viremia     Chronic right shoulder pain     Condition not found     Deep vein thrombosis (DVT) of lower extremity (HCC)     Diabetes mellitus type 2 without retinopathy ([facility])     Edema     Gait difficulty     GERD (gastroesophageal reflux disease)     H/O lower gastrointestinal bleeding     History of blood transfusion     Incomplete emptying of bladder     Insulin long-term use ([facility])     Leg weakness, bilateral     Localized swelling of both lower legs     Peripheral nerve disease     Pseudophakia of both eyes     PTDM (post-transplant diabetes mellitus) ([facility])     Posterior vitreous detachment     Rhabdomyolysis     Seizure disorder ([facility])     Stage 5 chronic kidney disease  with transplanted kidney (HCC)     Leukocytes in urine     Atrial fibrillation with RVR (HCC)        Electronically signed by PATRICA Cuellar on 11/6/2024 at 4:37 PM

## 2024-11-06 NOTE — PLAN OF CARE
Problem: Chronic Conditions and Co-morbidities  Goal: Patient's chronic conditions and co-morbidity symptoms are monitored and maintained or improved  Outcome: Progressing     Problem: Discharge Planning  Goal: Discharge to home or other facility with appropriate resources  Outcome: Progressing     Problem: Skin/Tissue Integrity  Goal: Absence of new skin breakdown  Description: 1.  Monitor for areas of redness and/or skin breakdown  2.  Assess vascular access sites hourly  3.  Every 4-6 hours minimum:  Change oxygen saturation probe site  4.  Every 4-6 hours:  If on nasal continuous positive airway pressure, respiratory therapy assess nares and determine need for appliance change or resting period.  Outcome: Progressing     Problem: Safety - Adult  Goal: Free from fall injury  Outcome: Progressing     Problem: Pain  Goal: Verbalizes/displays adequate comfort level or baseline comfort level  Outcome: Progressing  Flowsheets  Taken 11/6/2024 0733 by Hans Donnelly RN  Verbalizes/displays adequate comfort level or baseline comfort level:   Encourage patient to monitor pain and request assistance   Assess pain using appropriate pain scale   Administer analgesics based on type and severity of pain and evaluate response  Taken 11/6/2024 0435 by Olga Doss RN  Verbalizes/displays adequate comfort level or baseline comfort level: Assess pain using appropriate pain scale

## 2024-11-06 NOTE — PROGRESS NOTES
Physician Progress Note      PATIENT:               LUL QUILES  CSN #:                  [id number]  :                       1950  ADMIT DATE:       2024 5:20 PM  DISCH DATE:  RESPONDING  PROVIDER #:        Tico Rodriguez DO          QUERY TEXT:    Pt admitted with weakness. Pt noted to have low BP, persistent A. Fib,    tachycardia. If possible, please document in progress notes and discharge   summary the cause of the weakness:    The medical record reflects the following:  Risk Factors: OHDx CAF Watchman, tachycardia, 2015 kidney transplant, DMII,   HTN  Clinical Indicators: -144; /58; Trponins [phone]; Cardiology PN:   \"Persistent AF.  S/p Watchman.   BP is low.\"  Treatment: Cardiology consult, high dose Midodrine, start PO Amiodarone load,   BB, tele, labs and monitoring    Thank you,  Monica Padron   Clinical Documentation Improvement Specialist  W: [phone]  Options provided:  -- Weakness due to Persistent A. Fib  -- Weakness due to, Please document cause of weakness  -- Other - I will add my own diagnosis  -- Disagree - Not applicable / Not valid  -- Disagree - Clinically unable to determine / Unknown  -- Refer to Clinical Documentation Reviewer    PROVIDER RESPONSE TEXT:    This patient has weakness due to poor appetie multiple medical issues    Query created by: Monica Padron on 2024 1:25 PM      Electronically signed by:  Tico Rodriguez DO 2024 3:17 PM

## 2024-11-06 NOTE — PLAN OF CARE
Problem: Chronic Conditions and Co-morbidities  Goal: Patient's chronic conditions and co-morbidity symptoms are monitored and maintained or improved  Outcome: Progressing  Flowsheets (Taken 11/5/2024 0857)  Care Plan - Patient's Chronic Conditions and Co-Morbidity Symptoms are Monitored and Maintained or Improved: Monitor and assess patient's chronic conditions and comorbid symptoms for stability, deterioration, or improvement     Problem: Discharge Planning  Goal: Discharge to home or other facility with appropriate resources  Outcome: Progressing  Flowsheets (Taken 11/5/2024 0857)  Discharge to home or other facility with appropriate resources: Identify barriers to discharge with patient and caregiver     Problem: Skin/Tissue Integrity  Goal: Absence of new skin breakdown  Description: 1.  Monitor for areas of redness and/or skin breakdown  2.  Assess vascular access sites hourly  3.  Every 4-6 hours minimum:  Change oxygen saturation probe site  4.  Every 4-6 hours:  If on nasal continuous positive airway pressure, respiratory therapy assess nares and determine need for appliance change or resting period.  Outcome: Progressing     Problem: Safety - Adult  Goal: Free from fall injury  Outcome: Progressing     Problem: Pain  Goal: Verbalizes/displays adequate comfort level or baseline comfort level  Outcome: Progressing

## 2024-11-06 NOTE — CARE COORDINATION
Quality round completed with care management team. Discussed DC plan with pt. Pt agreeable with SNF at [place]. Refuse [facility] facilities.   Would like some where in [place]. Then report would like to wait to talk to his wife. FOC offered.   Referral sent to [facility]. Pending acceptance.     Electronically signed by LOLI Hodge on 11/6/2024 at 11:45 AM    All [facility] facilities is out of net work with [employer].     Electronically signed by LOLI Hodge on 11/6/2024 at 11:48 AM    Met with pt and wife at bedside. FOC offered.   Michelle mejia  [facility].   Referral sent to all.     [facility] out of net work.   Electronically signed by LOLI Hodge on 11/6/2024 at 2:26 PM

## 2024-11-06 NOTE — CARE COORDINATION
RECEIVED CALL FROM [facility] [place] [facility]PER[facility]. WILL REVIEW PATIENT AGAIN TOMORROW BASED ON POSSIBILITY OF DIALYSIS. NEED NEPHRO PLAN. IF DIALYSIS, [facility] CANNOT ACCEPT.

## 2024-11-06 NOTE — PROGRESS NOTES
Nephrology Progress Note    Assessment:  S/P kidney transplant 2015  OHDx CAF Watchman   tachycardia  Weakness generalized  DM type-2  Hypertension  PAF new onset/?          Plan: on cellcept and cyclosporin + prednisone  following labs  consults in-puts appreciated     Patient Active Problem List:     Pneumonia     Abdominal pain, other specified site     Acute pyelonephritis without lesion of renal medullary necrosis     Anemia     Essential hypertension     Nonspecific abnormal results of thyroid function study     Mixed hyperlipidemia     Kidney replaced by transplant     Intra-abdominal abscess post-procedure (HCC)     Infection due to Enterococcus     Fever and other physiologic disturbances of temperature regulation     Chronic kidney disease (CKD)     Diabetes mellitus type II, controlled (HCC)     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 (HCC)     Abnormal CT of the chest     Bronchiectasis without complication (HCC)     GI bleeding     Complication of transplanted kidney     Muscle weakness (generalized)     Difficulty in walking     Elevated BUN     Urinary retention     Immunosuppression due to drug therapy (HCC)     DELORES (acute kidney injury) (HCC)     Adjustment disorder     Gross hematuria     Bilateral lower extremity edema     Dysuria     Acute cystitis without hematuria     Bilateral hearing loss     Abscess, toe     Acute hyperkalemia     Acute pain of left shoulder     Astigmatism     Astigmatism of both eyes with presbyopia     At risk for stroke     Benign enlargement of prostate     Benign prostatic hyperplasia with urinary frequency     BK

## 2024-11-07 LAB
ANION GAP SERPL CALCULATED.3IONS-SCNC: 16 MEQ/L (ref 9–15)
BUN SERPL-MCNC: 92 MG/DL (ref 8–23)
CALCIUM SERPL-MCNC: 8.7 MG/DL (ref 8.5–9.9)
CHLORIDE SERPL-SCNC: 105 MEQ/L (ref 95–107)
CO2 SERPL-SCNC: 17 MEQ/L (ref 20–31)
CREAT SERPL-MCNC: 3.56 MG/DL (ref 0.7–1.2)
EKG ATRIAL RATE: 208 BPM
EKG Q-T INTERVAL: 328 MS
EKG QRS DURATION: 76 MS
EKG QTC CALCULATION (BAZETT): 455 MS
EKG R AXIS: -8 DEGREES
EKG T AXIS: 33 DEGREES
EKG VENTRICULAR RATE: 116 BPM
GLUCOSE BLD-MCNC: 184 MG/DL (ref 70–99)
GLUCOSE BLD-MCNC: 235 MG/DL (ref 70–99)
GLUCOSE BLD-MCNC: 253 MG/DL (ref 70–99)
GLUCOSE BLD-MCNC: 259 MG/DL (ref 70–99)
GLUCOSE SERPL-MCNC: 223 MG/DL (ref 70–99)
PERFORMED ON: ABNORMAL
POTASSIUM SERPL-SCNC: 4.5 MEQ/L (ref 3.4–4.9)
SODIUM SERPL-SCNC: 138 MEQ/L (ref 135–144)

## 2024-11-07 PROCEDURE — 2580000003 HC RX 258: Performed by: PERSONAL EMERGENCY RESPONSE ATTENDANT

## 2024-11-07 PROCEDURE — 99232 SBSQ HOSP IP/OBS MODERATE 35: CPT | Performed by: INTERNAL MEDICINE

## 2024-11-07 PROCEDURE — 1210000000 HC MED SURG R&B

## 2024-11-07 PROCEDURE — 6360000002 HC RX W HCPCS: Performed by: PERSONAL EMERGENCY RESPONSE ATTENDANT

## 2024-11-07 PROCEDURE — 6370000000 HC RX 637 (ALT 250 FOR IP): Performed by: INTERNAL MEDICINE

## 2024-11-07 PROCEDURE — 6370000000 HC RX 637 (ALT 250 FOR IP): Performed by: STUDENT IN AN ORGANIZED HEALTH CARE EDUCATION/TRAINING PROGRAM

## 2024-11-07 PROCEDURE — 6370000000 HC RX 637 (ALT 250 FOR IP): Performed by: PHYSICIAN ASSISTANT

## 2024-11-07 PROCEDURE — 80048 BASIC METABOLIC PNL TOTAL CA: CPT

## 2024-11-07 PROCEDURE — 97535 SELF CARE MNGMENT TRAINING: CPT

## 2024-11-07 PROCEDURE — 6360000002 HC RX W HCPCS: Performed by: STUDENT IN AN ORGANIZED HEALTH CARE EDUCATION/TRAINING PROGRAM

## 2024-11-07 PROCEDURE — 36415 COLL VENOUS BLD VENIPUNCTURE: CPT

## 2024-11-07 PROCEDURE — 87040 BLOOD CULTURE FOR BACTERIA: CPT

## 2024-11-07 PROCEDURE — 93010 ELECTROCARDIOGRAM REPORT: CPT | Performed by: INTERNAL MEDICINE

## 2024-11-07 PROCEDURE — 99233 SBSQ HOSP IP/OBS HIGH 50: CPT | Performed by: INTERNAL MEDICINE

## 2024-11-07 PROCEDURE — 87086 URINE CULTURE/COLONY COUNT: CPT

## 2024-11-07 RX ORDER — MIDODRINE HYDROCHLORIDE 10 MG/1
10 TABLET ORAL 3 TIMES DAILY PRN
Status: DISCONTINUED | OUTPATIENT
Start: 2024-11-07 | End: 2024-11-09 | Stop reason: HOSPADM

## 2024-11-07 RX ORDER — INSULIN LISPRO 100 [IU]/ML
4 INJECTION, SOLUTION INTRAVENOUS; SUBCUTANEOUS
Status: DISCONTINUED | OUTPATIENT
Start: 2024-11-07 | End: 2024-11-09 | Stop reason: HOSPADM

## 2024-11-07 RX ORDER — SODIUM BICARBONATE 650 MG/1
650 TABLET ORAL 4 TIMES DAILY
Status: DISCONTINUED | OUTPATIENT
Start: 2024-11-07 | End: 2024-11-09 | Stop reason: HOSPADM

## 2024-11-07 RX ORDER — OXYMETAZOLINE HYDROCHLORIDE 0.05 G/100ML
2 SPRAY NASAL 2 TIMES DAILY PRN
Status: DISCONTINUED | OUTPATIENT
Start: 2024-11-07 | End: 2024-11-09 | Stop reason: HOSPADM

## 2024-11-07 RX ORDER — INSULIN GLARGINE 100 [IU]/ML
12 INJECTION, SOLUTION SUBCUTANEOUS NIGHTLY
Status: DISCONTINUED | OUTPATIENT
Start: 2024-11-07 | End: 2024-11-09 | Stop reason: HOSPADM

## 2024-11-07 RX ORDER — FLUTICASONE PROPIONATE 50 MCG
1 SPRAY, SUSPENSION (ML) NASAL DAILY PRN
Status: DISCONTINUED | OUTPATIENT
Start: 2024-11-07 | End: 2024-11-09 | Stop reason: HOSPADM

## 2024-11-07 RX ADMIN — SODIUM CHLORIDE, PRESERVATIVE FREE 10 ML: 5 INJECTION INTRAVENOUS at 22:27

## 2024-11-07 RX ADMIN — INSULIN LISPRO 4 UNITS: 100 INJECTION, SOLUTION INTRAVENOUS; SUBCUTANEOUS at 11:19

## 2024-11-07 RX ADMIN — OXYMETAZOLINE HYDROCHLORIDE 2 SPRAY: 0.5 SPRAY NASAL at 22:29

## 2024-11-07 RX ADMIN — ENOXAPARIN SODIUM 30 MG: 100 INJECTION SUBCUTANEOUS at 08:11

## 2024-11-07 RX ADMIN — AMIODARONE HYDROCHLORIDE 200 MG: 200 TABLET ORAL at 08:12

## 2024-11-07 RX ADMIN — Medication 1 MG: at 08:10

## 2024-11-07 RX ADMIN — MYCOPHENOLATE MOFETIL 750 MG: 250 CAPSULE ORAL at 08:11

## 2024-11-07 RX ADMIN — INSULIN LISPRO 4 UNITS: 100 INJECTION, SOLUTION INTRAVENOUS; SUBCUTANEOUS at 14:07

## 2024-11-07 RX ADMIN — INSULIN LISPRO 4 UNITS: 100 INJECTION, SOLUTION INTRAVENOUS; SUBCUTANEOUS at 16:50

## 2024-11-07 RX ADMIN — INSULIN GLARGINE 12 UNITS: 100 INJECTION, SOLUTION SUBCUTANEOUS at 22:25

## 2024-11-07 RX ADMIN — METOCLOPRAMIDE 5 MG: 5 TABLET ORAL at 05:21

## 2024-11-07 RX ADMIN — PREDNISONE 5 MG: 5 TABLET ORAL at 08:10

## 2024-11-07 RX ADMIN — INSULIN LISPRO 2 UNITS: 100 INJECTION, SOLUTION INTRAVENOUS; SUBCUTANEOUS at 16:53

## 2024-11-07 RX ADMIN — MIDODRINE HYDROCHLORIDE 10 MG: 10 TABLET ORAL at 08:09

## 2024-11-07 RX ADMIN — GABAPENTIN 300 MG: 300 CAPSULE ORAL at 08:08

## 2024-11-07 RX ADMIN — SODIUM BICARBONATE 650 MG: 650 TABLET ORAL at 16:50

## 2024-11-07 RX ADMIN — GABAPENTIN 300 MG: 300 CAPSULE ORAL at 22:24

## 2024-11-07 RX ADMIN — MYCOPHENOLATE MOFETIL 750 MG: 250 CAPSULE ORAL at 22:24

## 2024-11-07 RX ADMIN — SODIUM BICARBONATE 650 MG: 650 TABLET ORAL at 08:10

## 2024-11-07 RX ADMIN — METOPROLOL SUCCINATE 25 MG: 25 TABLET, EXTENDED RELEASE ORAL at 14:08

## 2024-11-07 RX ADMIN — SODIUM BICARBONATE 650 MG: 650 TABLET ORAL at 14:08

## 2024-11-07 RX ADMIN — AMIODARONE HYDROCHLORIDE 200 MG: 200 TABLET ORAL at 22:24

## 2024-11-07 RX ADMIN — AMIODARONE HYDROCHLORIDE 200 MG: 200 TABLET ORAL at 14:08

## 2024-11-07 RX ADMIN — METOPROLOL SUCCINATE 25 MG: 25 TABLET, EXTENDED RELEASE ORAL at 08:10

## 2024-11-07 RX ADMIN — INSULIN LISPRO 2 UNITS: 100 INJECTION, SOLUTION INTRAVENOUS; SUBCUTANEOUS at 08:13

## 2024-11-07 RX ADMIN — MIDODRINE HYDROCHLORIDE 10 MG: 10 TABLET ORAL at 11:18

## 2024-11-07 RX ADMIN — SODIUM BICARBONATE 650 MG: 650 TABLET ORAL at 22:24

## 2024-11-07 RX ADMIN — CYCLOSPORINE 75 MG: 25 CAPSULE, LIQUID FILLED ORAL at 08:08

## 2024-11-07 RX ADMIN — ASPIRIN 81 MG: 81 TABLET, COATED ORAL at 08:10

## 2024-11-07 RX ADMIN — SODIUM CHLORIDE, PRESERVATIVE FREE 10 ML: 5 INJECTION INTRAVENOUS at 11:13

## 2024-11-07 RX ADMIN — METOCLOPRAMIDE 5 MG: 5 TABLET ORAL at 11:18

## 2024-11-07 RX ADMIN — METOCLOPRAMIDE 5 MG: 5 TABLET ORAL at 16:49

## 2024-11-07 RX ADMIN — FINASTERIDE 5 MG: 5 TABLET, FILM COATED ORAL at 08:09

## 2024-11-07 ASSESSMENT — PAIN SCALES - GENERAL
PAINLEVEL_OUTOF10: 0

## 2024-11-07 ASSESSMENT — ENCOUNTER SYMPTOMS
GASTROINTESTINAL NEGATIVE: 1
NAUSEA: 0
STRIDOR: 0
BLOOD IN STOOL: 0
WHEEZING: 0
SHORTNESS OF BREATH: 0
EYES NEGATIVE: 1
COUGH: 0
CHEST TIGHTNESS: 0
RESPIRATORY NEGATIVE: 1

## 2024-11-07 NOTE — PROGRESS NOTES
PROGRESS NOTE     Patient Name: Remy Upton  Admit Date: 2024  5:20 PM  MR #: [id number]  : 1950    Attending Physician: Tico Rodriguez DO  Reason for consult: AF    History of Presenting Illness:      Remy Upton is a 73 y.o. male on hospital day 3 with a history of .   History Obtained From:  patient, electronic medical record    Pt is admitted for severe weakness and multiple near falls.  He oral intake has been poor. He recently had a NH stay for therapy. He is tired and weak. He denies CP nor SOB.     He has CKD s/p Renal Transplant remotely.  He has persistent AF and s/p Watchman device  at  due to prior Extensive bleed and inability to anticoagulate.     He has no known CAD. He is non compliant with f/u. I have not seen him since 3/2023 when I sent him for Watchman.     2024 FREDERIC at  EF 55-60 and stable Watchman position.     HS Troponin 686 > 647   ECG   Telemetry      24 Tele AF 120s. No acute evens denies CP nor SOB    24 Tele AF 93 diuresed 1.4L  overnight    History:      EKG:  Past Medical History:   Diagnosis Date    Diabetes mellitus (HCC)     Hemodialysis access, fistula mature (HCC) 2002    Hypertension     Seizures (HCC)     no seizure since      Past Surgical History:   Procedure Laterality Date    BRAIN SURGERY Left 1990    to eliminate seizures    COLONOSCOPY N/A 10/24/2022    COLONOSCOPY DIAGNOSTIC performed by Va Ordoñez MD at [facility]    KIDNEY TRANSPLANT      CT [facility] INCL FLUOR GDNCE DX W/CELL WASHG SPX N/A 3/20/2018    BRONCHOSCOPY performed by Cristopher Conde MD at [facility]    UPPER GASTROINTESTINAL ENDOSCOPY N/A 10/21/2022    EGD DIAGNOSTIC ONLY performed by Jaime Martinez MD at [facility]       Family History  Family History   Problem Relation Age of Onset    Colon Cancer Neg Hx      [] Unable to obtain due to ventilated and/ or neurologic status    Social History     Socioeconomic  BCISE) 2 MG/0.85ML injection, Inject 0.85 mLs into the skin every 7 days Every tuesday (Patient not taking: Reported on 11/4/2024)  sodium bicarbonate 325 MG tablet, Take 1 tablet by mouth 2 times daily  B Complex-C-E-Zn (STRESS B/ZINC) TABS, Take 1 tablet by mouth daily Indications: Wound Care (Patient not taking: Reported on 11/4/2024)  Multiple Vitamins-Minerals (THERAVIM-M PO), Take 1 tablet by mouth daily Indications: Nutritional Support (Patient not taking: Reported on 11/4/2024)  vitamin C (ASCORBIC ACID) 500 MG tablet, Take 1 tablet by mouth daily Indications: Wound Care (Patient not taking: Reported on 11/4/2024)  Continuous Blood Gluc Transmit (DEXCOM G6 TRANSMITTER) MISC, Change every 3 months  Continuous Blood Gluc Sensor (FREESTYLE KEV 14 DAY SENSOR) MISC, Every 2 weeks Dx E11.65  Continuous Blood Gluc Sensor (FREESTYLE KEV 14 DAY SENSOR) MISC, Every 2 weeks    Current Hospital Medications:     Scheduled Meds:   sodium bicarbonate  650 mg Oral 4x Daily    metoprolol succinate  25 mg Oral BID    insulin lispro  0-8 Units SubCUTAneous 4x Daily AC & HS    sodium chloride flush  5-40 mL IntraVENous 2 times per day    enoxaparin  30 mg SubCUTAneous Daily    aspirin  81 mg Oral Daily    gabapentin  300 mg Oral BID    midodrine  10 mg Oral TID WC    predniSONE  5 mg Oral Daily    finasteride  5 mg Oral Daily    Virt-Caps  1 capsule Oral Daily    amiodarone  200 mg Oral TID    insulin glargine  6 Units SubCUTAneous Nightly    cycloSPORINE modified  75 mg Oral BID    mycophenolate  750 mg Oral BID    metoclopramide  5 mg Oral TID AC     Continuous Infusions:   dextrose      sodium chloride       PRN Meds:.metoprolol, glucose, dextrose bolus **OR** dextrose bolus, glucagon (rDNA), dextrose, sodium chloride flush, sodium chloride, acetaminophen, ondansetron **OR** ondansetron, acetaminophen  .   dextrose      sodium chloride          Allergies:     Allergies   Allergen Reactions    Statins Headaches and Other  2 diabetes mellitus with stage 3b chronic kidney disease, without long-term current use of insulin (HCC) [E11.22, N18.32] 10/03/2008     Priority: Low         Impression/Plan:   Generalized Weakness with multiple near falls.  Persistent AF.  S/p Watchman.   BP is low.  Continue PO Amiodarone Load. ECG. Tele.  Added Toprol bid for further rate control.   Advanced CKD s/p Renal transplant - need to avoid Nephrotoxicity  Demand ischemia without CP - ASA Statin. BB when BP allows.   Normal LVEF 55-60%  On High dose Midodrine.   Will likely need Rehab/SNF     Thank you for allowing us to participate in the care of this patient.     Will continue to follow.    Please call if questions or concerns arise.    Electronically signed by SILVESTRE SHIELDS MD on 11/7/2024 at 9:08 AM

## 2024-11-07 NOTE — PROGRESS NOTES
Pt is aox4 pwd even unlabored respirations.  Pt states he is tired because they woke him up early for a bath.  Pt states otherwise he is feeling good.

## 2024-11-07 NOTE — PLAN OF CARE
Problem: Chronic Conditions and Co-morbidities  Goal: Patient's chronic conditions and co-morbidity symptoms are monitored and maintained or improved  11/7/2024 0833 by Hans Donnelly RN  Outcome: Progressing  Flowsheets (Taken 11/7/2024 0800)  Care Plan - Patient's Chronic Conditions and Co-Morbidity Symptoms are Monitored and Maintained or Improved: Monitor and assess patient's chronic conditions and comorbid symptoms for stability, deterioration, or improvement  11/7/2024 0128 by Olga Doss, RN  Outcome: Progressing  Flowsheets (Taken 11/6/2024 1918)  Care Plan - Patient's Chronic Conditions and Co-Morbidity Symptoms are Monitored and Maintained or Improved: Monitor and assess patient's chronic conditions and comorbid symptoms for stability, deterioration, or improvement     Problem: Discharge Planning  Goal: Discharge to home or other facility with appropriate resources  11/7/2024 0833 by Hans Donnelly RN  Outcome: Progressing  Flowsheets (Taken 11/7/2024 0800)  Discharge to home or other facility with appropriate resources:   Arrange for needed discharge resources and transportation as appropriate   Identify barriers to discharge with patient and caregiver   Identify discharge learning needs (meds, wound care, etc)  11/7/2024 0128 by Olga Doss, RN  Outcome: Progressing  Flowsheets (Taken 11/6/2024 1918)  Discharge to home or other facility with appropriate resources: Refer to discharge planning if patient needs post-hospital services based on physician order or complex needs related to functional status, cognitive ability or social support system     Problem: Skin/Tissue Integrity  Goal: Absence of new skin breakdown  Description: 1.  Monitor for areas of redness and/or skin breakdown  2.  Assess vascular access sites hourly  3.  Every 4-6 hours minimum:  Change oxygen saturation probe site  4.  Every 4-6 hours:  If on nasal continuous positive airway pressure, respiratory therapy assess nares and

## 2024-11-07 NOTE — PROGRESS NOTES
Nutrition Note    Chart reviewed for dx of hyperkalemia, current K+ levels wnl, noted hx of DM with  hyperglycemia. Added Carb Control 4 to diet to aid in glycemic control    Electronically signed by PIA ROJAS RD, LD on 11/7/24 at 7:02 AM EST

## 2024-11-07 NOTE — CARE COORDINATION
Quality round completed with care management team. Referral sent to:   Michelle mejia  [facility].   Referral sent to all.     [facility] denied, report pt will be more appropriate for slower SNF.   [facility] is out of net work.     Pending acceptance at [facility]. Will need pre-cert.     Electronically signed by LOLI Hodge on 11/7/2024 at 9:14 AM    [facility] IS UNABLE TO ACCEPT PT. PT WILL NEED TO PAY DAILY COPY AND 6K UPFRONT PIROR TO ADMISSION TO [facility].   UPDATED PT. PT WOULD LIKE TO GO HOME WITH WIFE AND [facility].     UPDATED CM.     Electronically signed by LOLI Hodeg on 11/7/2024 at 3:18 PM

## 2024-11-07 NOTE — PROGRESS NOTES
Nurse was in with pt explaining new orders to pt and wife per his request.  Pt and wife speaking at the same time explained they would like nurse to use the reading from his blood glucose monitor and not use [facility] POC. Nurse explained to pt and his wife they reading from the POC cross into EPIC for charting and we could not use the ready from his monitor.  Nurse attempted to finish answer questions about new orders and pt interrupted and asked another questions about urination.   Nurse explained top pt and wife please allow nurse to finish with new orders and then will answer urination question.  Pt states \" IM SORRY YOU CAN NOT MULTI TASK AS A NURSE\". \"Pt became upset with nurse and advised he would speak to Dr. Nurse advised he would message provider for pt to answer questions about new med's and urination per his request.

## 2024-11-07 NOTE — PROGRESS NOTES
Nephrology Progress Note    Assessment:  Hx Kidney transplant 2015  OHDx Watch man -AF on eliquis  Metabolic acidosis  Hx Hypertension  Cachexia        Plan:add NaHCO3 oral  procalcitonin elevated culture     Patient Active Problem List:     Pneumonia     Abdominal pain, other specified site     Acute pyelonephritis without lesion of renal medullary necrosis     Anemia     Essential hypertension     Nonspecific abnormal results of thyroid function study     Mixed hyperlipidemia     Kidney replaced by transplant     Intra-abdominal abscess post-procedure (HCC)     Infection due to Enterococcus     Fever and other physiologic disturbances of temperature regulation     Chronic kidney disease (CKD)     Type 2 diabetes mellitus with stage 3b chronic kidney disease, without long-term current use of insulin (HCC)     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 (HCC)     Abnormal CT of the chest     Bronchiectasis without complication (HCC)     GI bleeding     Complication of transplanted kidney     Muscle weakness (generalized)     Difficulty in walking     Elevated BUN     Urinary retention     Immunosuppression due to drug therapy (HCC)     DELORES (acute kidney injury) (HCC)     Adjustment disorder     Gross hematuria     Bilateral lower extremity edema     Dysuria     Acute cystitis without hematuria     Bilateral hearing loss     Abscess, toe     Acute hyperkalemia     Acute pain of left shoulder     Astigmatism     Astigmatism of both eyes with presbyopia     At risk for stroke     Benign enlargement of prostate     Benign prostatic hyperplasia with urinary frequency      BK viremia     Chronic right shoulder pain     Condition not found     Deep vein thrombosis (DVT) of lower extremity (HCC)     Diabetes mellitus type 2 without retinopathy (HCC)     Edema     Gait difficulty     GERD (gastroesophageal reflux disease)     H/O lower gastrointestinal bleeding     History of blood transfusion     Incomplete emptying of bladder     Insulin long-term use ([facility])     Leg weakness, bilateral     Localized swelling of both lower legs     Peripheral nerve disease     Pseudophakia of both eyes     PTDM (post-transplant diabetes mellitus) ([facility])     Posterior vitreous detachment     Rhabdomyolysis     Seizure disorder ([facility])     Stage 5 chronic kidney disease with transplanted kidney ([facility])     Leukocytes in urine     Atrial fibrillation with RVR ([facility])      Subjective:  Admit Date: 11/4/2024    Interval History: weak    Medications:  Scheduled Meds:   metoprolol succinate  25 mg Oral BID    insulin lispro  0-8 Units SubCUTAneous 4x Daily AC & HS    sodium chloride flush  5-40 mL IntraVENous 2 times per day    enoxaparin  30 mg SubCUTAneous Daily    aspirin  81 mg Oral Daily    gabapentin  300 mg Oral BID    midodrine  10 mg Oral TID WC    predniSONE  5 mg Oral Daily    finasteride  5 mg Oral Daily    Virt-Caps  1 capsule Oral Daily    amiodarone  200 mg Oral TID    insulin glargine  6 Units SubCUTAneous Nightly    cycloSPORINE modified  75 mg Oral BID    mycophenolate  750 mg Oral BID    metoclopramide  5 mg Oral TID AC     Continuous Infusions:   dextrose      sodium chloride         CBC:   Recent Labs     11/04/24 1819 11/06/24  0458   WBC 5.2 5.4   HGB 10.6* 10.2*    189     CMP:    Recent Labs     11/04/24 1819 11/06/24  0458 11/07/24  0504    140 138   K 5.2* 4.5 4.5    107 105   CO2 15* 15* 17*   BUN 90* 88* 92*   CREATININE 3.50* 3.67* 3.56*   GLUCOSE 210* 156* 223*   CALCIUM 8.9 8.9 8.7   LABGLOM 17.7* 16.7* 17.3*     Troponin: No results for input(s): \"TROPONINI\" in the  last 72 hours.  BNP: No results for input(s): \"BNP\" in the last 72 hours.  INR: No results for input(s): \"INR\" in the last 72 hours.  Lipids: No results for input(s): \"CHOL\", \"LDLDIRECT\", \"TRIG\", \"HDL\", \"AMYLASE\", \"LIPASE\" in the last 72 hours.  Liver:   Recent Labs     11/06/24  0458   AST 13   ALT 6   ALKPHOS 59   BILITOT 0.9*     Iron:  No results for input(s): \"FERRITIN\" in the last 72 hours.    Invalid input(s): \"IRONS\", \"LABIRONS\"  Urinalysis: No results for input(s): \"UA\" in the last 72 hours.    Objective:  Vitals: /73   Pulse 99   Temp 97.9 °F (36.6 °C) (Oral)   Resp 18   Ht 1.778 m (5' 10\")   Wt 75.9 kg (167 lb 6.4 oz)   SpO2 99%   BMI 24.02 kg/m²    Wt Readings from Last 3 Encounters:   11/04/24 75.9 kg (167 lb 6.4 oz)   09/18/24 77.7 kg (171 lb 4.8 oz)   07/27/24 80.6 kg (177 lb 11.2 oz)      24HR INTAKE/OUTPUT:    Intake/Output Summary (Last 24 hours) at 11/7/2024 0774  Last data filed at 11/7/2024 0454  Gross per 24 hour   Intake --   Output 1400 ml   Net -1400 ml       General: alert, in no apparent distress  HEENT: normocephalic, atraumatic, anicteric  Neck: supple, no mass  Lungs: non-labored respirations, clear to auscultation bilaterally  Heart: regular rate and rhythm, no murmurs or rubs  Abdomen: soft, non-tender, non-distended  Ext: no cyanosis, no peripheral edema  Neuro: alert and oriented, no gross abnormalities  Psych: normal mood and affect  Skin: no rash      Electronically signed by Tico Rodriguez DO

## 2024-11-07 NOTE — PROGRESS NOTES
presbyopia 09/29/2023    Benign enlargement of prostate 09/29/2023    Benign prostatic hyperplasia with urinary frequency 09/29/2023    BK viremia 09/29/2023    Edema 09/29/2023    GERD (gastroesophageal reflux disease) 09/29/2023    H/O lower gastrointestinal bleeding 09/29/2023    History of blood transfusion 09/29/2023    Incomplete emptying of bladder 09/29/2023    Insulin long-term use ([facility]) 09/29/2023    Pseudophakia of both eyes 09/29/2023    PTDM (post-transplant diabetes mellitus) ([facility]) 09/29/2023    Seizure disorder ([facility]) 09/29/2023    Stage 5 chronic kidney disease with transplanted kidney ([facility]) 09/29/2023    Acute hyperkalemia 05/19/2023    Acute pain of left shoulder 06/30/2020    Chronic right shoulder pain 06/30/2020    Atypical chest pain 07/24/2019    Chronic cough 07/24/2019    Anginal chest pain at rest ([facility]) 07/17/2019    Pneumonia 03/16/2018    Astigmatism 11/21/2017    Posterior vitreous detachment 11/21/2017    Diabetes mellitus type 2 without retinopathy ([facility]) 03/24/2016    Abdominal pain, other specified site 06/15/2011    Intra-abdominal abscess post-procedure ([facility]) 06/15/2011    Infection due to Enterococcus 06/15/2011    Nonspecific abnormal results of thyroid function study 04/29/2011    Anemia 09/30/2009    Essential hypertension 12/19/2008    Mixed hyperlipidemia 12/19/2008    Type 2 diabetes mellitus with stage 3b chronic kidney disease, without long-term current use of insulin ([facility]) 10/03/2008    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 10/31/2003    Condition not found 10/31/2003        Past Medical History:   Diagnosis Date    Diabetes mellitus ([facility])     Hemodialysis access, fistula mature ([facility]) 12/2002     Hypertension     Seizures (HCC)     no seizure since 1995     Past Surgical History:   Procedure Laterality Date    BRAIN SURGERY Left 12/06/1990    to eliminate seizures    COLONOSCOPY N/A 10/24/2022    COLONOSCOPY DIAGNOSTIC performed by Va Ordoñez MD at [facility]    [facility]  [facility]CELL W[facility]G SPX N/A 3/20/2018    BRONCHOSCOPY [place] [facility] [facility]    UPPER GASTROINTESTINAL ENDOSCOPY N/A 10/21/2022    EGD DIAGNOSTIC ONLY performed by Jaime Martinez MD at [facility]     Restrictions:  Restrictions/Precautions: Fall Risk    SUBJECTIVE:   Subjective: \"I am thinking about going to [facility] for therapy\"    Pain  Pain: denies pain pre/post    OBJECTIVE:   Bed Mobility Training  Bed Mobility Training: Yes  Overall Level of Assistance:  (HOB elevated)  Supine to Sit: Maximum assistance;Assist X1 (assistance to lift trunk into seated position and assistance scoot to upright seated position)  Sit to Supine: Total assistance;Assist X1 (assistance to slowly lower trunk and to lift LEs into bed)  Scooting: Maximum assistance;Assist X1 (seated scooting edge of bed- pt with poor pelvic excursion off edge of bed inorder to demonstrate seated scooting without max A)    Transfer Training  Transfer Training: Yes  Overall Level of Assistance: Total assistance;Assist X1 (unable to reach full standing position X 2 reps- PT blocking knees into extension; pt able to lift buttocks off edge of bed with max A)    Activity Tolerance  Activity Tolerance: Patient tolerated treatment well    Patient Education  Education Given To: Patient  Education Provided: Plan of Care  Education Method: Verbal  Education Outcome: Continued education needed     ASSESSMENT   Assessment: Pt requires maximal assistance for bed mobility and pt unable to stand this date with max A.  Pt attempted standing and exhibits good motivation to participate with PT.  Pt states he  has had difficulty with standing at home prior to hospital admission.  Continued PT is required to progress toward highest level of functional indep for decreased caregiver burden.     Discharge Recommendations:  Continue to assess pending progress    Goals  Long Term Goals  Long Term Goal 1: Pt will demonstrate bed mobility min A  Long Term Goal 2: Pt will demonstrate transfers mod A with safest AD  Long Term Goal 3: Pt will demonstrate w/c mobility indep >/= 50ft  Long Term Goal 4: Pt will demonstrate sitting edge of bed >/= 5 min with SBA    PLAN    General Plan: 1 time a day 3-6 times a week  Safety Devices  Type of Devices: All fall risk precautions in place, Bed alarm in place, Call light within reach, Left in bed     AMPAC (6 CLICK) BASIC MOBILITY  AM-PAC Inpatient Mobility Raw Score : 7     Therapy Time   Individual   Time In 0918   Time Out 0931   Minutes 13         Bed mob/transfers X 13 min    Chayo Ambrose PT, 11/07/24 at 10:01 AM         Definitions for assistance levels  Independent = pt does not require any physical supervision or assistance from another person for activity completion. Device may be needed.  Stand by assistance = pt requires verbal cues or instructions from another person, close to but not touching, to perform the activity  Minimal assistance= pt performs 75% or more of the activity; assistance is required to complete the activity  Moderate assistance= pt performs 50% of the activity; assistance is required to complete the activity  Maximal assistance = pt performs 25% of the activity; assistance is required to complete the activity  Dependent = pt requires total physical assistance to accomplish the task

## 2024-11-08 LAB
ANION GAP SERPL CALCULATED.3IONS-SCNC: 14 MEQ/L (ref 9–15)
BACTERIA BLD CULT ORG #2: NORMAL
BACTERIA UR CULT: ABNORMAL
BACTERIA UR CULT: NORMAL
BASOPHILS # BLD: 0 K/UL (ref 0–0.2)
BASOPHILS NFR BLD: 0.5 %
BUN SERPL-MCNC: 91 MG/DL (ref 8–23)
CALCIUM SERPL-MCNC: 9.2 MG/DL (ref 8.5–9.9)
CHLORIDE SERPL-SCNC: 107 MEQ/L (ref 95–107)
CO2 SERPL-SCNC: 19 MEQ/L (ref 20–31)
CREAT SERPL-MCNC: 3.4 MG/DL (ref 0.7–1.2)
EOSINOPHIL # BLD: 0.2 K/UL (ref 0–0.7)
EOSINOPHIL NFR BLD: 3.2 %
ERYTHROCYTE [DISTWIDTH] IN BLOOD BY AUTOMATED COUNT: 13.9 % (ref 11.5–14.5)
GLUCOSE BLD-MCNC: 135 MG/DL (ref 70–99)
GLUCOSE BLD-MCNC: 157 MG/DL (ref 70–99)
GLUCOSE BLD-MCNC: 179 MG/DL (ref 70–99)
GLUCOSE BLD-MCNC: 189 MG/DL (ref 70–99)
GLUCOSE BLD-MCNC: 212 MG/DL (ref 70–99)
GLUCOSE BLD-MCNC: 234 MG/DL (ref 70–99)
GLUCOSE SERPL-MCNC: 143 MG/DL (ref 70–99)
HCT VFR BLD AUTO: 30.9 % (ref 42–52)
HGB BLD-MCNC: 9.8 G/DL (ref 14–18)
LYMPHOCYTES # BLD: 0.4 K/UL (ref 1–4.8)
LYMPHOCYTES NFR BLD: 7.7 %
MCH RBC QN AUTO: 28.2 PG (ref 27–31.3)
MCHC RBC AUTO-ENTMCNC: 31.7 % (ref 33–37)
MCV RBC AUTO: 89 FL (ref 79–92.2)
MONOCYTES # BLD: 0.5 K/UL (ref 0.2–0.8)
MONOCYTES NFR BLD: 8.1 %
NEUTROPHILS # BLD: 4.5 K/UL (ref 1.4–6.5)
NEUTS SEG NFR BLD: 79.8 %
ORGANISM: ABNORMAL
ORGANISM: ABNORMAL
PERFORMED ON: ABNORMAL
PLATELET # BLD AUTO: 220 K/UL (ref 130–400)
POTASSIUM SERPL-SCNC: 4.6 MEQ/L (ref 3.4–4.9)
RBC # BLD AUTO: 3.47 M/UL (ref 4.7–6.1)
SODIUM SERPL-SCNC: 140 MEQ/L (ref 135–144)
WBC # BLD AUTO: 5.7 K/UL (ref 4.8–10.8)

## 2024-11-08 PROCEDURE — 85025 COMPLETE CBC W/AUTO DIFF WBC: CPT

## 2024-11-08 PROCEDURE — 2580000003 HC RX 258: Performed by: PERSONAL EMERGENCY RESPONSE ATTENDANT

## 2024-11-08 PROCEDURE — 6370000000 HC RX 637 (ALT 250 FOR IP): Performed by: INTERNAL MEDICINE

## 2024-11-08 PROCEDURE — 36415 COLL VENOUS BLD VENIPUNCTURE: CPT

## 2024-11-08 PROCEDURE — 80048 BASIC METABOLIC PNL TOTAL CA: CPT

## 2024-11-08 PROCEDURE — 6360000002 HC RX W HCPCS: Performed by: PERSONAL EMERGENCY RESPONSE ATTENDANT

## 2024-11-08 PROCEDURE — 6370000000 HC RX 637 (ALT 250 FOR IP): Performed by: STUDENT IN AN ORGANIZED HEALTH CARE EDUCATION/TRAINING PROGRAM

## 2024-11-08 PROCEDURE — 6360000002 HC RX W HCPCS: Performed by: STUDENT IN AN ORGANIZED HEALTH CARE EDUCATION/TRAINING PROGRAM

## 2024-11-08 PROCEDURE — 6370000000 HC RX 637 (ALT 250 FOR IP): Performed by: PERSONAL EMERGENCY RESPONSE ATTENDANT

## 2024-11-08 PROCEDURE — 99231 SBSQ HOSP IP/OBS SF/LOW 25: CPT | Performed by: INTERNAL MEDICINE

## 2024-11-08 PROCEDURE — 97535 SELF CARE MNGMENT TRAINING: CPT

## 2024-11-08 PROCEDURE — 1210000000 HC MED SURG R&B

## 2024-11-08 PROCEDURE — 99233 SBSQ HOSP IP/OBS HIGH 50: CPT | Performed by: INTERNAL MEDICINE

## 2024-11-08 RX ORDER — METOPROLOL SUCCINATE 50 MG/1
50 TABLET, EXTENDED RELEASE ORAL 2 TIMES DAILY
Status: DISCONTINUED | OUTPATIENT
Start: 2024-11-08 | End: 2024-11-09 | Stop reason: HOSPADM

## 2024-11-08 RX ORDER — AMIODARONE HYDROCHLORIDE 200 MG/1
200 TABLET ORAL 2 TIMES DAILY
Status: DISCONTINUED | OUTPATIENT
Start: 2024-11-08 | End: 2024-11-09 | Stop reason: HOSPADM

## 2024-11-08 RX ADMIN — INSULIN LISPRO 4 UNITS: 100 INJECTION, SOLUTION INTRAVENOUS; SUBCUTANEOUS at 16:15

## 2024-11-08 RX ADMIN — FINASTERIDE 5 MG: 5 TABLET, FILM COATED ORAL at 07:44

## 2024-11-08 RX ADMIN — INSULIN LISPRO 2 UNITS: 100 INJECTION, SOLUTION INTRAVENOUS; SUBCUTANEOUS at 11:14

## 2024-11-08 RX ADMIN — ONDANSETRON 4 MG: 4 TABLET, ORALLY DISINTEGRATING ORAL at 22:26

## 2024-11-08 RX ADMIN — METOCLOPRAMIDE 5 MG: 5 TABLET ORAL at 16:15

## 2024-11-08 RX ADMIN — ASPIRIN 81 MG: 81 TABLET, COATED ORAL at 07:44

## 2024-11-08 RX ADMIN — MYCOPHENOLATE MOFETIL 750 MG: 250 CAPSULE ORAL at 07:43

## 2024-11-08 RX ADMIN — METOPROLOL SUCCINATE 25 MG: 25 TABLET, EXTENDED RELEASE ORAL at 07:43

## 2024-11-08 RX ADMIN — INSULIN LISPRO 4 UNITS: 100 INJECTION, SOLUTION INTRAVENOUS; SUBCUTANEOUS at 11:13

## 2024-11-08 RX ADMIN — SODIUM CHLORIDE, PRESERVATIVE FREE 10 ML: 5 INJECTION INTRAVENOUS at 07:49

## 2024-11-08 RX ADMIN — INSULIN GLARGINE 12 UNITS: 100 INJECTION, SOLUTION SUBCUTANEOUS at 20:27

## 2024-11-08 RX ADMIN — SODIUM BICARBONATE 650 MG: 650 TABLET ORAL at 20:40

## 2024-11-08 RX ADMIN — GABAPENTIN 300 MG: 300 CAPSULE ORAL at 20:40

## 2024-11-08 RX ADMIN — CYCLOSPORINE 75 MG: 25 CAPSULE, LIQUID FILLED ORAL at 11:16

## 2024-11-08 RX ADMIN — MYCOPHENOLATE MOFETIL 750 MG: 250 CAPSULE ORAL at 20:40

## 2024-11-08 RX ADMIN — METOPROLOL SUCCINATE 50 MG: 50 TABLET, EXTENDED RELEASE ORAL at 20:40

## 2024-11-08 RX ADMIN — METOCLOPRAMIDE 5 MG: 5 TABLET ORAL at 11:14

## 2024-11-08 RX ADMIN — SODIUM BICARBONATE 650 MG: 650 TABLET ORAL at 07:43

## 2024-11-08 RX ADMIN — CYCLOSPORINE 75 MG: 25 CAPSULE, LIQUID FILLED ORAL at 20:40

## 2024-11-08 RX ADMIN — GABAPENTIN 300 MG: 300 CAPSULE ORAL at 07:45

## 2024-11-08 RX ADMIN — SODIUM BICARBONATE 650 MG: 650 TABLET ORAL at 16:15

## 2024-11-08 RX ADMIN — AMIODARONE HYDROCHLORIDE 200 MG: 200 TABLET ORAL at 20:40

## 2024-11-08 RX ADMIN — ENOXAPARIN SODIUM 30 MG: 100 INJECTION SUBCUTANEOUS at 07:45

## 2024-11-08 RX ADMIN — PREDNISONE 5 MG: 5 TABLET ORAL at 07:43

## 2024-11-08 RX ADMIN — SODIUM BICARBONATE 650 MG: 650 TABLET ORAL at 11:14

## 2024-11-08 RX ADMIN — CYCLOSPORINE 75 MG: 25 CAPSULE, LIQUID FILLED ORAL at 01:30

## 2024-11-08 RX ADMIN — INSULIN LISPRO 4 UNITS: 100 INJECTION, SOLUTION INTRAVENOUS; SUBCUTANEOUS at 07:44

## 2024-11-08 RX ADMIN — AMIODARONE HYDROCHLORIDE 200 MG: 200 TABLET ORAL at 07:44

## 2024-11-08 RX ADMIN — Medication 1 MG: at 07:43

## 2024-11-08 RX ADMIN — INSULIN LISPRO 2 UNITS: 100 INJECTION, SOLUTION INTRAVENOUS; SUBCUTANEOUS at 16:15

## 2024-11-08 ASSESSMENT — ENCOUNTER SYMPTOMS
SHORTNESS OF BREATH: 0
STRIDOR: 0
CHEST TIGHTNESS: 0
RESPIRATORY NEGATIVE: 1
EYES NEGATIVE: 1
BLOOD IN STOOL: 0
NAUSEA: 0
COUGH: 0
GASTROINTESTINAL NEGATIVE: 1
WHEEZING: 0

## 2024-11-08 NOTE — CARE COORDINATION
DISCUSSED WITH DR CHILLE, PLAN FOR DC HOME ONCE HR CONTROLLED, POSSIBLY THIS WEEKEND.  DANIAL REED [facility] UPDATED.

## 2024-11-08 NOTE — PROGRESS NOTES
Progress Note  Date:2024       Room:[place] [id number]  Patient Name:Remy Upton     YOB: 1950     Age:73 y.o.    Chief complaint uncontrolled type 2 diabetes    Subjective    Subjective:  Symptoms:  Stable.  He reports malaise and weakness.    Diet:  Poor intake.    Activity level: Impaired due to weakness.    Pain:  He reports no pain.       Review of Systems   Neurological:  Positive for weakness.     Objective         Vitals Last 24 Hours:  TEMPERATURE:  Temp  Av.6 °F (36.4 °C)  Min: 97.2 °F (36.2 °C)  Max: 97.9 °F (36.6 °C)  RESPIRATIONS RANGE: Resp  Av.5  Min: 16  Max: 18  PULSE OXIMETRY RANGE: SpO2  Av %  Min: 100 %  Max: 100 %  PULSE RANGE: Pulse  Av.2  Min: 79  Max: 91  BLOOD PRESSURE RANGE: Systolic (24hrs), Av , Min:127 , Max:147   ; Diastolic (24hrs), Av, Min:68, Max:88    I/O (24Hr):    Intake/Output Summary (Last 24 hours) at 2024 0716  Last data filed at 2024 2227  Gross per 24 hour   Intake 5 ml   Output --   Net 5 ml     Objective:  General Appearance:  Ill-appearing.    Vital signs: (most recent): Blood pressure 133/72, pulse 86, temperature 97.8 °F (36.6 °C), temperature source Oral, resp. rate 16, height 1.778 m (5' 10\"), weight 75.9 kg (167 lb 6.4 oz), SpO2 100%.  Vital signs are normal.    HEENT: Normal HEENT exam.    Lungs:  Normal effort and normal respiratory rate.    Heart: Normal rate.    Abdomen: Abdomen is soft.    Extremities: Normal range of motion.    Neurological: Patient is alert.    Skin:  No rash.     Labs/Imaging/Diagnostics    Labs:  CBC:  Recent Labs     24  0458 24  0611   WBC 5.4 5.7   RBC 3.49* 3.47*   HGB 10.2* 9.8*   HCT 30.9* 30.9*   MCV 88.5 89.0   RDW 13.6 13.9    220     CHEMISTRIES:  Recent Labs     24  0458 24  0504    138   K 4.5 4.5    105   CO2 15* 17*   BUN 88* 92*   CREATININE 3.67* 3.56*   GLUCOSE 156* 223*     PT/INR:No results for input(s): \"PROTIME\", \"INR\"  in the last 72 hours.  APTT:No results for input(s): \"APTT\" in the last 72 hours.  LIVER PROFILE:  Recent Labs     11/06/24  0458   AST 13   ALT 6   BILITOT 0.9*   ALKPHOS 59       Imaging Last 24 Hours:  No results found.  Assessment//Plan           Hospital Problems             Last Modified POA    * (Principal) Atrial fibrillation with RVR ([facility]) 11/4/2024 Yes    Type 2 diabetes mellitus with stage 3b chronic kidney disease, without long-term current use of insulin ([facility]) 11/6/2024 Yes     Assessment:    Condition: In stable condition.  Unchanged.   (Uncontrolled type 2 diabetes blood sugars are trending higher fluctuating p.o. intake  Chronic kidney disease followed by nephrology  Generalized weakness with falls  A-fib hypotension patient on midodrine reviewed cardiology notes  ).     Plan:    (Increase Lantus to 12 units at bedtime plus Humalog 4 units with each meals continue other management as per cardiology nephrology patient will also need rehab with physical therapy reviewed nursing consultants note total time spent 35 minutes).       Electronically signed by Kirk Diamond MD on 11/8/24 at 7:16 AM EST

## 2024-11-08 NOTE — PROGRESS NOTES
times daily  furosemide (LASIX) 20 MG tablet, Take 1 tablet by mouth as needed  Dulaglutide (TRULICITY SC), Inject into the skin once a week On tuesdays  finasteride (PROSCAR) 5 MG tablet, Take 1 tablet by mouth daily  gabapentin (NEURONTIN) 300 MG capsule, Take 1 capsule by mouth in the morning and 1 capsule in the evening.  metoprolol succinate (TOPROL XL) 50 MG extended release tablet, Take 1 tablet by mouth daily Indications: High Blood Pressure  midodrine (PROAMATINE) 5 MG tablet, Take 1 tablet by mouth 3 times daily Indications: Disorder of Low Blood Pressure Hold for SBP> 110  metoclopramide (REGLAN) 5 MG tablet, Take 1 tablet by mouth 3 times daily (before meals) Indications: Nausea  vitamin D (CHOLECALCIFEROL) 25 MCG (1000 UT) TABS tablet, Take 1 tablet by mouth daily Indications: Nutritional Support  aspirin 81 MG EC tablet, Take 1 tablet by mouth daily Indications: CAD  Insulin Lispro-aabc, 1 U Dial, 100 UNIT/ML SOPN, Inject 0-10 Units into the skin 3 times daily (before meals) Inject as per sliding scale: If 150-200=2u; 201-250=4u; 251-300=6u; 301-350=8u; 351-400=10u;  tamsulosin (FLOMAX) 0.4 MG capsule, Take 1 capsule by mouth once daily (Patient taking differently: Take 1 capsule by mouth at bedtime Indications: Urinary Tract Infection)  predniSONE (DELTASONE) 5 MG tablet, Take 1 tablet by mouth daily Indications: Chronic Obstructive Lung Disease  Insulin Regular Human (NOVOLIN R FLEXPEN) 100 UNIT/ML SOPN, 6 units am 8 units lunch and dinner (Patient taking differently: Inject into the skin See Admin Instructions Indications: Type 2 Diabetes Give 5 units every morning and 7 units every night.)  pantoprazole (PROTONIX) 40 MG tablet, Take 1 tablet by mouth daily Indications: Gastroesophageal Reflux Disease  cycloSPORINE modified (NEORAL) 25 MG capsule, Take 3 capsules by mouth 2 times daily Indications: Acute Kidney Disease  mycophenolate (CELLCEPT) 250 MG capsule, Take 3 capsules by mouth 2 times daily  \"stroke alert\" been called?->No Decision Support Exception - unselect if not a suspected or confirmed emergency medical condition->Emergency Medical Condition (MA) What reading provider will be dictating this exam?->CRC FINDINGS: BRAIN/VENTRICLES: There is no acute intracranial hemorrhage, mass effect or midline shift. No abnormal extra-axial fluid collection.  The gray-white differentiation is maintained without evidence of an acute infarct. There is prominence of the ventricles and sulci due to global parenchymal volume loss. There are nonspecific areas of hypoattenuation within the periventricular and subcortical white matter, which likely represent chronic microvascular ischemic change. There is moderate cerebral atrophy with periventricular white matter changes. ORBITS: The visualized portion of the orbits demonstrate no acute abnormality. SINUSES: The visualized paranasal sinuses and mastoid air cells demonstrate no acute abnormality. SOFT TISSUES/SKULL: No acute abnormality of the visualized skull or soft tissues. There is a previous left craniotomy defect with encephalomalacia in the anterior aspect of the left temporal lobe.     1. No acute intracranial abnormality. 2. Moderate cerebral atrophy with periventricular white matter changes. 3. Previous left craniotomy defect with encephalomalacia in the anterior aspect of the left temporal lobe.     XR CHEST PORTABLE    Result Date: 11/4/2024  EXAMINATION: ONE XRAY VIEW OF THE CHEST 11/4/2024 6:00 pm COMPARISON: None. HISTORY: ORDERING SYSTEM PROVIDED HISTORY: Sepsis TECHNOLOGIST PROVIDED HISTORY: Reason for exam:->Sepsis What reading provider will be dictating this exam?->CRC FINDINGS: The lungs are without acute focal process.  There is no effusion or pneumothorax. The cardiomediastinal silhouette is without acute process. The osseous structures are without acute process.     No acute process. Mild cardiomegaly.       Active Hospital Problems    Diagnosis  Date Noted    Atrial fibrillation with RVR ([facility]) [I48.91] 11/04/2024     Priority: Low    Type 2 diabetes mellitus with stage 3b chronic kidney disease, without long-term current use of insulin ([facility]) [E11.22, N18.32] 10/03/2008     Priority: Low         Impression/Plan:   Generalized Weakness with multiple near falls.  Persistent AF.  S/p Watchman.  Will taper PO Amiodarone to 200 bd from tid.    Advance Toprol to 50 bid form 25 bid.  for further rate control.   Advanced CKD s/p Renal transplant - need to avoid Nephrotoxicity  Demand ischemia without CP - ASA Statin. BB when BP allows.   Normal LVEF 55-60%  On High dose Midodrine.   Will likely need Rehab/SNF but unfortunately insurance denied.   He will go home w [facility].      Thank you for allowing us to participate in the care of this patient.     Will continue to follow.    Please call if questions or concerns arise.    Electronically signed by SILVESTRE SHIELDS MD on 11/8/2024 at 7:57 AM

## 2024-11-08 NOTE — PROGRESS NOTES
Nephrology Progress Note    Assessment:  CKD-4  Hx Kidney transplant  OHDx AF  HR 90-95  DM type-2  Anemia  Hx Hypertension          Plan:continue physical therapy  follow labs  once HR controlled will discharge home    insurance problems    Patient Active Problem List:     Pneumonia     Abdominal pain, other specified site     Acute pyelonephritis without lesion of renal medullary necrosis     Anemia     Essential hypertension     Nonspecific abnormal results of thyroid function study     Mixed hyperlipidemia     Kidney replaced by transplant     Intra-abdominal abscess post-procedure (HCC)     Infection due to Enterococcus     Fever and other physiologic disturbances of temperature regulation     Chronic kidney disease (CKD)     Type 2 diabetes mellitus with stage 3b chronic kidney disease, without long-term current use of insulin (HCC)     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 (HCC)     Abnormal CT of the chest     Bronchiectasis without complication (HCC)     GI bleeding     Complication of transplanted kidney     Muscle weakness (generalized)     Difficulty in walking     Elevated BUN     Urinary retention     Immunosuppression due to drug therapy (HCC)     DELORES (acute kidney injury) (HCC)     Adjustment disorder     Gross hematuria     Bilateral lower extremity edema     Dysuria     Acute cystitis without hematuria     Bilateral hearing loss     Abscess, toe     Acute hyperkalemia     Acute pain of left shoulder     Astigmatism     Astigmatism of both eyes with presbyopia     At risk for stroke     Benign enlargement of prostate     Benign prostatic  hyperplasia with urinary frequency     BK viremia     Chronic right shoulder pain     Condition not found     Deep vein thrombosis (DVT) of lower extremity (HCC)     Diabetes mellitus type 2 without retinopathy (HCC)     Edema     Gait difficulty     GERD (gastroesophageal reflux disease)     H/O lower gastrointestinal bleeding     History of blood transfusion     Incomplete emptying of bladder     Insulin long-term use (HCC)     Leg weakness, bilateral     Localized swelling of both lower legs     Peripheral nerve disease     Pseudophakia of both eyes     PTDM (post-transplant diabetes mellitus) ([facility])     Posterior vitreous detachment     Rhabdomyolysis     Seizure disorder ([facility])     Stage 5 chronic kidney disease with transplanted kidney ([facility])     Leukocytes in urine     Atrial fibrillation with RVR ([facility])      Subjective:  Admit Date: 11/4/2024    Interval History: stable weak    Medications:  Scheduled Meds:   sodium bicarbonate  650 mg Oral 4x Daily    insulin glargine  12 Units SubCUTAneous Nightly    insulin lispro  4 Units SubCUTAneous TID WC    metoprolol succinate  25 mg Oral BID    insulin lispro  0-8 Units SubCUTAneous 4x Daily AC & HS    sodium chloride flush  5-40 mL IntraVENous 2 times per day    enoxaparin  30 mg SubCUTAneous Daily    aspirin  81 mg Oral Daily    gabapentin  300 mg Oral BID    predniSONE  5 mg Oral Daily    finasteride  5 mg Oral Daily    Virt-Caps  1 capsule Oral Daily    amiodarone  200 mg Oral TID    cycloSPORINE modified  75 mg Oral BID    mycophenolate  750 mg Oral BID    metoclopramide  5 mg Oral TID AC     Continuous Infusions:   dextrose      sodium chloride         CBC:   Recent Labs     11/06/24  0458 11/08/24  0611   WBC 5.4 5.7   HGB 10.2* 9.8*    220     CMP:    Recent Labs     11/06/24  0458 11/07/24  0504    138   K 4.5 4.5    105   CO2 15* 17*   BUN 88* 92*   CREATININE 3.67* 3.56*   GLUCOSE 156* 223*   CALCIUM 8.9 8.7   LABGLOM 16.7* 17.3*      Troponin: No results for input(s): \"TROPONINI\" in the last 72 hours.  BNP: No results for input(s): \"BNP\" in the last 72 hours.  INR: No results for input(s): \"INR\" in the last 72 hours.  Lipids: No results for input(s): \"CHOL\", \"LDLDIRECT\", \"TRIG\", \"HDL\", \"AMYLASE\", \"LIPASE\" in the last 72 hours.  Liver:   Recent Labs     11/06/24  0458   AST 13   ALT 6   ALKPHOS 59   BILITOT 0.9*     Iron:  No results for input(s): \"FERRITIN\" in the last 72 hours.    Invalid input(s): \"IRONS\", \"LABIRONS\"  Urinalysis: No results for input(s): \"UA\" in the last 72 hours.    Objective:  Vitals: /72   Pulse 86   Temp 97.8 °F (36.6 °C) (Oral)   Resp 16   Ht 1.778 m (5' 10\")   Wt 75.9 kg (167 lb 6.4 oz)   SpO2 100%   BMI 24.02 kg/m²    Wt Readings from Last 3 Encounters:   11/04/24 75.9 kg (167 lb 6.4 oz)   09/18/24 77.7 kg (171 lb 4.8 oz)   07/27/24 80.6 kg (177 lb 11.2 oz)      24HR INTAKE/OUTPUT:    Intake/Output Summary (Last 24 hours) at 11/8/2024 0716  Last data filed at 11/7/2024 2227  Gross per 24 hour   Intake 5 ml   Output --   Net 5 ml       General: alert, in no apparent distress  HEENT: normocephalic, atraumatic, anicteric  Neck: supple, no mass  Lungs: non-labored respirations, clear to auscultation bilaterally  Heart: regular rate and rhythm, no murmurs or rubs  Abdomen: soft, non-tender, non-distended  Ext: no cyanosis, no peripheral edema  Neuro: alert and oriented, no gross abnormalities  Psych: normal mood and affect  Skin: no rash      Electronically signed by Tico Rodriguez DO

## 2024-11-08 NOTE — PLAN OF CARE
Problem: Chronic Conditions and Co-morbidities  Goal: Patient's chronic conditions and co-morbidity symptoms are monitored and maintained or improved  Outcome: Progressing  Flowsheets (Taken 11/7/2024 2100)  Care Plan - Patient's Chronic Conditions and Co-Morbidity Symptoms are Monitored and Maintained or Improved: Monitor and assess patient's chronic conditions and comorbid symptoms for stability, deterioration, or improvement     Problem: Discharge Planning  Goal: Discharge to home or other facility with appropriate resources  Outcome: Progressing  Flowsheets (Taken 11/7/2024 2100)  Discharge to home or other facility with appropriate resources:   Identify barriers to discharge with patient and caregiver   Arrange for needed discharge resources and transportation as appropriate     Problem: Skin/Tissue Integrity  Goal: Absence of new skin breakdown  Description: 1.  Monitor for areas of redness and/or skin breakdown  2.  Assess vascular access sites hourly  3.  Every 4-6 hours minimum:  Change oxygen saturation probe site  4.  Every 4-6 hours:  If on nasal continuous positive airway pressure, respiratory therapy assess nares and determine need for appliance change or resting period.  Outcome: Progressing     Problem: Safety - Adult  Goal: Free from fall injury  Outcome: Progressing     Problem: Pain  Goal: Verbalizes/displays adequate comfort level or baseline comfort level  Outcome: Progressing  Flowsheets  Taken 11/7/2024 2224 by Teena Freire, RN  Verbalizes/displays adequate comfort level or baseline comfort level: Encourage patient to monitor pain and request assistance  Taken 11/7/2024 1542 by Hans Donnelly, RN  Verbalizes/displays adequate comfort level or baseline comfort level:   Encourage patient to monitor pain and request assistance   Administer analgesics based on type and severity of pain and evaluate response   Assess pain using appropriate pain scale

## 2024-11-08 NOTE — PROGRESS NOTES
Physical Therapy Med Surg Daily Treatment Note  Facility/Department: [address] TELEMETRY  Room: [place] [id number]       NAME: Remy Upton  : 1950 (73 y.o.)  MRN: [id number]  CODE STATUS: Full Code    Date of Service: 2024    Patient Diagnosis(es): Hyperkalemia [E87.5]  Hypomagnesemia [E83.42]  Elevated troponin [R79.89]  DELORES (acute kidney injury) ([facility]) [N17.9]  Atrial fibrillation with RVR ([facility]) [I48.91]  Acute cystitis without hematuria [N30.00]   No chief complaint on file.    Patient Active Problem List    Diagnosis Date Noted    Chest pain 2022    GI bleeding 10/29/2022    Lung nodules 10/25/2022    COPD without exacerbation ([facility]) 10/25/2022    Abnormal CT of the chest 10/25/2022    Bronchiectasis without complication ([facility]) 10/25/2022    Dieulafoy lesion of colon 10/24/2022    Poorly controlled diabetes mellitus ([facility]) 10/24/2022    Symptomatic anemia 10/19/2022    Acute upper GI bleed 10/19/2022    Atrial fibrillation ([facility]) 2022    Unstable angina ([facility]) 2022    Atrial fibrillation with RVR ([facility]) 2024    At risk for stroke 10/14/2024    Deep vein thrombosis (DVT) of lower extremity ([facility]) 10/14/2024    Localized swelling of both lower legs 10/14/2024    Peripheral nerve disease 10/14/2024    Rhabdomyolysis 10/14/2024    Leukocytes in urine 10/14/2024    Bilateral hearing loss 10/10/2024    Acute cystitis without hematuria 2024    Bilateral lower extremity edema 2024    Dysuria 2024    Gross hematuria 2024    Adjustment disorder 2024    Immunosuppression due to drug therapy (HCC) 2024    DELORES (acute kidney injury) (HCC) 2024    Elevated BUN 2024    Urinary retention 2024    Muscle weakness (generalized) 2024    Difficulty in walking 2024    Complication of transplanted kidney 2024    Gait difficulty 2024    Leg weakness, bilateral 2024    Abscess, toe 2023    Astigmatism of both eyes with     Hypertension     Seizures (HCC)     no seizure since 1995     Past Surgical History:   Procedure Laterality Date    BRAIN SURGERY Left 12/06/1990    to eliminate seizures    COLONOSCOPY N/A 10/24/2022    COLONOSCOPY DIAGNOSTIC performed by Va Ordoñez MD at [facility]    KIDNEY TRANSPLANT  2015    IN BRNCC INCL FLUOR GDNCE DX W/CELL WASHG SPX N/A 3/20/2018    BRONCHOSCOPY performed [facility] Crist[facility] Conde [facility]    UPPER GASTROINTESTINAL ENDOSCOPY N/A 10/21/2022    EGD DIAGNOSTIC ONLY performed by Jaime Martinez MD at [facility]            Restrictions:  Restrictions/Precautions: Fall Risk    SUBJECTIVE:   Subjective: I'm cold. I use a sliding board at home.    Pain  Pain: denies pain pre/post    OBJECTIVE:        Bed mobility  Supine to Sit: Maximum assistance (Pt helping to get LE's to EOB but unable to finish.  Light assist of trunk to sit up.)  Sit to Supine: Moderate assistance  Scooting: Maximal assistance (Lateral EOB.)                        Neuromuscular Education  NDT Treatment: Sitting  Facilitation techniques: Sitting EOB for 7 minutes with SBA.                          ASSESSMENT   Body Structures, Functions, Activity Limitations Requiring Skilled Therapeutic Intervention: Decreased functional mobility ;Decreased ROM;Decreased strength;Decreased endurance;Decreased cognition;Decreased balance  Assessment: Pt able to meet sitting goal and work on lateral transfers at EOB.  Pt would benefit from hospital bed at home.  Pt uses a sliding board at home.  Pt does not get on the toilet, but just \"goes in the diaper.\"  Pt able to move LE's in bed but fatigues quickly.  Bed mobility improving.     Discharge Recommendations:  Continue to assess pending progress         Goals  Long Term Goals  Long Term Goal 1: Pt will demonstrate bed mobility min A  Long Term Goal 2: Pt will demonstrate transfers mod A with safest AD  Long Term Goal 3: Pt will demonstrate w/c mobility indep >/=  50ft  Long Term Goal 4: Pt will demonstrate sitting edge of bed >/= 5 min with SBA    PLAN    General Plan: 1 time a day 3-6 times a week        AMPAC (6 CLICK) BASIC MOBILITY  AM-PAC Inpatient Mobility Raw Score : 8     Therapy Time   Individual   Time In 1439   Time Out 1455   Minutes 16     Minutes: 16  Neuro re-ed: 7  Transfers: 9          Mariama Angel, PTA, 11/08/24 at 3:13 PM         Definitions for assistance levels  Independent = pt does not require any physical supervision or assistance from another person for activity completion. Device may be needed.  Stand by assistance = pt requires verbal cues or instructions from another person, close to but not touching, to perform the activity  Minimal assistance= pt performs 75% or more of the activity; assistance is required to complete the activity  Moderate assistance= pt performs 50% of the activity; assistance is required to complete the activity  Maximal assistance = pt performs 25% of the activity; assistance is required to complete the activity  Dependent = pt requires total physical assistance to accomplish the task

## 2024-11-09 VITALS
RESPIRATION RATE: 20 BRPM | SYSTOLIC BLOOD PRESSURE: 121 MMHG | BODY MASS INDEX: 23.96 KG/M2 | DIASTOLIC BLOOD PRESSURE: 65 MMHG | HEIGHT: 70 IN | OXYGEN SATURATION: 100 % | HEART RATE: 86 BPM | WEIGHT: 167.4 LBS | TEMPERATURE: 97.4 F

## 2024-11-09 LAB
ANION GAP SERPL CALCULATED.3IONS-SCNC: 13 MEQ/L (ref 9–15)
BACTERIA BLD CULT ORG #2: NORMAL
BACTERIA BLD CULT: NORMAL
BUN SERPL-MCNC: 93 MG/DL (ref 8–23)
CALCIUM SERPL-MCNC: 9 MG/DL (ref 8.5–9.9)
CHLORIDE SERPL-SCNC: 107 MEQ/L (ref 95–107)
CO2 SERPL-SCNC: 19 MEQ/L (ref 20–31)
CREAT SERPL-MCNC: 3.33 MG/DL (ref 0.7–1.2)
GLUCOSE BLD-MCNC: 192 MG/DL (ref 70–99)
GLUCOSE SERPL-MCNC: 173 MG/DL (ref 70–99)
PERFORMED ON: ABNORMAL
POTASSIUM SERPL-SCNC: 4.9 MEQ/L (ref 3.4–4.9)
PROCALCITONIN SERPL IA-MCNC: 0.31 NG/ML (ref 0–0.15)
SODIUM SERPL-SCNC: 139 MEQ/L (ref 135–144)

## 2024-11-09 PROCEDURE — 6360000002 HC RX W HCPCS: Performed by: STUDENT IN AN ORGANIZED HEALTH CARE EDUCATION/TRAINING PROGRAM

## 2024-11-09 PROCEDURE — 6370000000 HC RX 637 (ALT 250 FOR IP): Performed by: INTERNAL MEDICINE

## 2024-11-09 PROCEDURE — 36415 COLL VENOUS BLD VENIPUNCTURE: CPT

## 2024-11-09 PROCEDURE — 84145 PROCALCITONIN (PCT): CPT

## 2024-11-09 PROCEDURE — 6370000000 HC RX 637 (ALT 250 FOR IP): Performed by: STUDENT IN AN ORGANIZED HEALTH CARE EDUCATION/TRAINING PROGRAM

## 2024-11-09 PROCEDURE — 80048 BASIC METABOLIC PNL TOTAL CA: CPT

## 2024-11-09 PROCEDURE — 6360000002 HC RX W HCPCS: Performed by: PERSONAL EMERGENCY RESPONSE ATTENDANT

## 2024-11-09 PROCEDURE — 6370000000 HC RX 637 (ALT 250 FOR IP): Performed by: PHYSICIAN ASSISTANT

## 2024-11-09 RX ORDER — AMIODARONE HYDROCHLORIDE 200 MG/1
200 TABLET ORAL 2 TIMES DAILY
Qty: 60 TABLET | Refills: 0 | Status: SHIPPED | OUTPATIENT
Start: 2024-11-09

## 2024-11-09 RX ADMIN — SODIUM BICARBONATE 650 MG: 650 TABLET ORAL at 08:10

## 2024-11-09 RX ADMIN — INSULIN LISPRO 2 UNITS: 100 INJECTION, SOLUTION INTRAVENOUS; SUBCUTANEOUS at 08:11

## 2024-11-09 RX ADMIN — ASPIRIN 81 MG: 81 TABLET, COATED ORAL at 08:10

## 2024-11-09 RX ADMIN — FINASTERIDE 5 MG: 5 TABLET, FILM COATED ORAL at 08:10

## 2024-11-09 RX ADMIN — AMIODARONE HYDROCHLORIDE 200 MG: 200 TABLET ORAL at 08:10

## 2024-11-09 RX ADMIN — METOCLOPRAMIDE 5 MG: 5 TABLET ORAL at 07:03

## 2024-11-09 RX ADMIN — INSULIN LISPRO 4 UNITS: 100 INJECTION, SOLUTION INTRAVENOUS; SUBCUTANEOUS at 08:12

## 2024-11-09 RX ADMIN — PREDNISONE 5 MG: 5 TABLET ORAL at 08:13

## 2024-11-09 RX ADMIN — METOPROLOL SUCCINATE 50 MG: 50 TABLET, EXTENDED RELEASE ORAL at 08:09

## 2024-11-09 RX ADMIN — MYCOPHENOLATE MOFETIL 750 MG: 250 CAPSULE ORAL at 08:09

## 2024-11-09 RX ADMIN — ENOXAPARIN SODIUM 30 MG: 100 INJECTION SUBCUTANEOUS at 08:14

## 2024-11-09 RX ADMIN — METOCLOPRAMIDE 5 MG: 5 TABLET ORAL at 11:43

## 2024-11-09 RX ADMIN — Medication 1 MG: at 08:09

## 2024-11-09 RX ADMIN — CYCLOSPORINE 75 MG: 25 CAPSULE, LIQUID FILLED ORAL at 08:09

## 2024-11-09 RX ADMIN — GABAPENTIN 300 MG: 300 CAPSULE ORAL at 08:10

## 2024-11-09 NOTE — DISCHARGE INSTR - DIET

## 2024-11-09 NOTE — PROGRESS NOTES
Progress Note  Date:2024       Room:[place] [id number]  Patient Name:Remy Upton     YOB: 1950     Age:73 y.o.    Chief complaint uncontrolled type 2 diabetes    Subjective    Subjective:  Symptoms:  Stable.  He reports weakness.    Diet:  Poor intake.    Activity level: Impaired due to weakness.    Pain:  He reports no pain.       Review of Systems   Neurological:  Positive for weakness.     Objective         Vitals Last 24 Hours:  TEMPERATURE:  Temp  Av.6 °F (36.4 °C)  Min: 97.3 °F (36.3 °C)  Max: 98 °F (36.7 °C)  RESPIRATIONS RANGE: Resp  Av.5  Min: 18  Max: 20  PULSE OXIMETRY RANGE: SpO2  Av %  Min: 100 %  Max: 100 %  PULSE RANGE: Pulse  Av.2  Min: 79  Max: 94  BLOOD PRESSURE RANGE: Systolic (24hrs), Av , Min:118 , Max:135   ; Diastolic (24hrs), Av, Min:60, Max:67    I/O (24Hr):    Intake/Output Summary (Last 24 hours) at 2024 1025  Last data filed at 2024 0502  Gross per 24 hour   Intake 60 ml   Output 1000 ml   Net -940 ml     Objective:  General Appearance:  Ill-appearing and comfortable.    Vital signs: (most recent): Blood pressure 121/65, pulse 86, temperature 97.4 °F (36.3 °C), temperature source Oral, resp. rate 20, height 1.778 m (5' 10\"), weight 75.9 kg (167 lb 6.4 oz), SpO2 100%.    HEENT: Normal HEENT exam.    Lungs:  Normal effort and normal respiratory rate.    Heart: Normal rate.    Abdomen: Abdomen is soft.    Extremities: Normal range of motion.    Neurological: Patient is alert.    Skin:  No rash.     Labs/Imaging/Diagnostics    Labs:  CBC:  Recent Labs     24  0611   WBC 5.7   RBC 3.47*   HGB 9.8*   HCT 30.9*   MCV 89.0   RDW 13.9        CHEMISTRIES:  Recent Labs     24  0504 24  0611 24  0742    140 139   K 4.5 4.6 4.9    107 107   CO2 17* 19* 19*   BUN 92* 91* 93*   CREATININE 3.56* 3.40* 3.33*   GLUCOSE 223* 143* 173*     PT/INR:No results for input(s): \"PROTIME\", \"INR\" in the last 72

## 2024-11-09 NOTE — PROGRESS NOTES
Nephrology Progress Note  Lab pending today  Assessment:  CKD-4 chronic rejection  S/P  kidney transplant 2015  Hypertension  OHDX AF Watchman HR 82 /in  Anemia  DM type-2      Plan: repeat procalcitonin  increase activity   discharge once cardiology ok  unable to afford Rehab    Patient Active Problem List:     Pneumonia     Abdominal pain, other specified site     Acute pyelonephritis without lesion of renal medullary necrosis     Anemia     Essential hypertension     Nonspecific abnormal results of thyroid function study     Mixed hyperlipidemia     Kidney replaced by transplant     Intra-abdominal abscess post-procedure (HCC)     Infection due to Enterococcus     Fever and other physiologic disturbances of temperature regulation     Chronic kidney disease (CKD)     Type 2 diabetes mellitus with stage 3b chronic kidney disease, without long-term current use of insulin (HCC)     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 (HCC)     Abnormal CT of the chest     Bronchiectasis without complication (HCC)     GI bleeding     Complication of transplanted kidney     Muscle weakness (generalized)     Difficulty in walking     Elevated BUN     Urinary retention     Immunosuppression due to drug therapy (HCC)     DELORES (acute kidney injury) (HCC)     Adjustment disorder     Gross hematuria     Bilateral lower extremity edema     Dysuria     Acute cystitis without hematuria     Bilateral hearing loss     Abscess, toe     Acute hyperkalemia     Acute pain of left shoulder     Astigmatism     Astigmatism of both eyes with presbyopia     At risk for stroke     Benign enlargement  of prostate     Benign prostatic hyperplasia with urinary frequency     BK viremia     Chronic right shoulder pain     Condition not found     Deep vein thrombosis (DVT) of lower extremity (HCC)     Diabetes mellitus type 2 without retinopathy (HCC)     Edema     Gait difficulty     GERD (gastroesophageal reflux disease)     H/O lower gastrointestinal bleeding     History of blood transfusion     Incomplete emptying of bladder     Insulin long-term use (HCC)     Leg weakness, bilateral     Localized swelling of both lower legs     Peripheral nerve disease     Pseudophakia of both eyes     PTDM (post-transplant diabetes mellitus) ([facility])     Posterior vitreous detachment     Rhabdomyolysis     Seizure disorder ([facility])     Stage 5 chronic kidney disease with transplanted kidney ([facility])     Leukocytes in urine     Atrial fibrillation with RVR ([facility])      Subjective:  Admit Date: 11/4/2024    Interval History: stable    Medications:  Scheduled Meds:   amiodarone  200 mg Oral BID    metoprolol succinate  50 mg Oral BID    sodium bicarbonate  650 mg Oral 4x Daily    insulin glargine  12 Units SubCUTAneous Nightly    insulin lispro  4 Units SubCUTAneous TID WC    insulin lispro  0-8 Units SubCUTAneous 4x Daily AC & HS    sodium chloride flush  5-40 mL IntraVENous 2 times per day    enoxaparin  30 mg SubCUTAneous Daily    aspirin  81 mg Oral Daily    gabapentin  300 mg Oral BID    predniSONE  5 mg Oral Daily    finasteride  5 mg Oral Daily    Virt-Caps  1 capsule Oral Daily    cycloSPORINE modified  75 mg Oral BID    mycophenolate  750 mg Oral BID    metoclopramide  5 mg Oral TID AC     Continuous Infusions:   dextrose      sodium chloride         CBC:   Recent Labs     11/08/24  0611   WBC 5.7   HGB 9.8*        CMP:    Recent Labs     11/07/24  0504 11/08/24  0611    140   K 4.5 4.6    107   CO2 17* 19*   BUN 92* 91*   CREATININE 3.56* 3.40*   GLUCOSE 223* 143*   CALCIUM 8.7 9.2   LABGLOM 17.3* 18.3*

## 2024-11-09 NOTE — PLAN OF CARE
Problem: Chronic Conditions and Co-morbidities  Goal: Patient's chronic conditions and co-morbidity symptoms are monitored and maintained or improved  11/8/2024 2200 by Christopher Swanson RN  Outcome: Progressing  Flowsheets (Taken 11/8/2024 2030)  Care Plan - Patient's Chronic Conditions and Co-Morbidity Symptoms are Monitored and Maintained or Improved:   Monitor and assess patient's chronic conditions and comorbid symptoms for stability, deterioration, or improvement   Collaborate with multidisciplinary team to address chronic and comorbid conditions and prevent exacerbation or deterioration   Update acute care plan with appropriate goals if chronic or comorbid symptoms are exacerbated and prevent overall improvement and discharge  11/8/2024 1313 by Elaine Trejo RN  Outcome: Progressing     Problem: Discharge Planning  Goal: Discharge to home or other facility with appropriate resources  11/8/2024 2200 by Christopher Swanson RN  Outcome: Progressing  Flowsheets (Taken 11/8/2024 2030)  Discharge to home or other facility with appropriate resources:   Identify barriers to discharge with patient and caregiver   Arrange for needed discharge resources and transportation as appropriate   Identify discharge learning needs (meds, wound care, etc)  11/8/2024 1313 by Elaine Trejo RN  Outcome: Progressing     Problem: Skin/Tissue Integrity  Goal: Absence of new skin breakdown  Description: 1.  Monitor for areas of redness and/or skin breakdown  2.  Assess vascular access sites hourly  3.  Every 4-6 hours minimum:  Change oxygen saturation probe site  4.  Every 4-6 hours:  If on nasal continuous positive airway pressure, respiratory therapy assess nares and determine need for appliance change or resting period.  11/8/2024 2200 by Christopher Swanson RN  Outcome: Progressing  11/8/2024 1313 by Elaine Trejo RN  Outcome: Progressing     Problem: Safety - Adult  Goal: Free from fall injury  11/8/2024 2200 by Christopher Swanson RN  Outcome:

## 2024-11-09 NOTE — PROGRESS NOTES
2056: Evening assessment and med pass completed. IV to left AC leaking. Attempted x2 and patient refusing to be poked anymore unless its an emergency. Will attempt later on in shift if patient allows. Will continue to monitor.     2250: Patient having complaint of nausea. Medicated with PO zofran per request.     2305: Denies nausea at this time. Refusing to reposition and stated he is comfortable at this time. Will continue to attempt.     2345: Patient complaint of nausea coming back. Given diet ginger ale and crackers per request. Blood glucose 189.     0006: patient stated the crackers help his nausea. Believes he is constipated. Given apple juice per request.

## 2024-11-10 NOTE — PROGRESS NOTES
Physical Therapy  Facility/Department: [facility] MED SURG W167/W167-01  Physical Therapy Discharge      NAME: Remy Upton    : 1950 (73 y.o.)  MRN: [id number]    Account: [id number]  Gender: male      Patient has been discharged from acute care hospital. DC patient from current PT program.      Electronically signed by Chayo Ambrose PT on 11/10/24 at 1:42 PM EST

## 2024-11-12 LAB — BACTERIA BLD CULT ORG #2: NORMAL

## 2024-12-11 ENCOUNTER — APPOINTMENT (OUTPATIENT)
Dept: AUDIOLOGY | Facility: CLINIC | Age: 74
End: 2024-12-11
Payer: COMMERCIAL

## 2024-12-11 ENCOUNTER — APPOINTMENT (OUTPATIENT)
Dept: OTOLARYNGOLOGY | Facility: CLINIC | Age: 74
End: 2024-12-11
Payer: COMMERCIAL

## 2024-12-12 ENCOUNTER — TELEMEDICINE (OUTPATIENT)
Dept: UROLOGY | Facility: HOSPITAL | Age: 74
End: 2024-12-12
Payer: COMMERCIAL

## 2024-12-12 DIAGNOSIS — R33.9 URINARY RETENTION: Primary | ICD-10-CM

## 2024-12-12 PROCEDURE — G2211 COMPLEX E/M VISIT ADD ON: HCPCS | Performed by: UROLOGY

## 2024-12-12 PROCEDURE — 3048F LDL-C <100 MG/DL: CPT | Performed by: UROLOGY

## 2024-12-12 PROCEDURE — 3062F POS MACROALBUMINURIA REV: CPT | Performed by: UROLOGY

## 2024-12-12 PROCEDURE — 99213 OFFICE O/P EST LOW 20 MIN: CPT | Performed by: UROLOGY

## 2024-12-12 NOTE — PROGRESS NOTES
HPI    73 y.o. male being seen with the following problem list:    Problem list:  history of kidney transplant   significant incomplete bladder emptying in the setting of minimal bothersome urinary symptoms , now with urinary retention     Most recent transplant ultrasound from 1/2018 demonstrated fullness of the renal pelvis and incomplete bladder emptying. Most recent creatinine 11/2021 was 2.03, relatively stable for several years. Post void residual in Dr. Harper's office, 521 cc. Dr. Parrish requested an outlet surgery to address incomplete emptying, rUTIs to procect his allograft. Now s/p outpatient HoLEP 4/25/23. Not much tissue to remove, but was maximally deobstructed.     Path - benign 6.7g     4/27/23- here for TOV. 350cc in, 100cc clear urine out.     transplant kidney US, 5/2023 - PVR 83cc     5/11/23- Patient presents for PVR. Since surgery has been voiding very high volumes of urine to the point where he thinks he is getting dehydrated. some UUI. a bit of burning. PVR today between 134-201cc, but subsequently voided.     5/25/23- Patient was in the hospital for orthostatic hypotension and was started on Midodrine. urinary symptoms are much improved, now waking 1x a night.     8/31/23 - here for 3mo fuv post HoLEP. He states that he is back on dialysis. He had a prolonged hospitalization and ultimately ended up with ARF 2/2 rhabdo. Was found to have a PVR around 400cc and murray placed. Transplant US 8/2023 with stable minimal fullness of allograft renal pelvis. would like murray out      10/16/24 - seen back over thv. Had a murray placed in July for acute retention, has had a murray since. Has been in a SNF due to profound LE weakness.     10/31/24 - Presents for cysto. Doing well overall. Cysto shows widely open prostate, no residual obstruction, bladder is of high capacity. Advise to self cath once at night    Admitted from 11/4  through 11/9 with ALLIE, atrial fibrillation with RVR, DM type 2.    12/12/24  - Seen today over telehealth, performed this visit using real-time telehealth tools, including an audio/video connection between Keith Lazar at home and Ronny Lofton MD at [facility]. Consent for telemedicine visit was obtained.  Doing well, voids large volume with good stream, stopped CIC, volume was minimal.        Lab Results   Component Value Date    PSA <0.01 07/02/2024    PSA 0.01 (LL) 07/20/2023              Current Medications:  Current Outpatient Medications   Medication Sig Dispense Refill    acetaminophen (Tylenol) 325 mg tablet Take by mouth every 6 hours if needed for mild pain (1 - 3).      acetaminophen (Tylenol) 650 mg suppository Insert into the rectum.      aluminum-magnesium hydroxide 200-200 mg/5 mL suspension Take by mouth every 6 hours if needed for heartburn.      aspirin 81 mg EC tablet Take 1 tablet (81 mg) by mouth once daily.      cholecalciferol (Vitamin D-3) 25 MCG (1000 UT) tablet Take 1 tablet (1,000 Units) by mouth once daily.      cycloSPORINE (SandIMMUNE) 25 mg capsule Take 2 capsules (50 mg) by mouth 2 times a day.      cycloSPORINE modified (NEOral) 25 mg capsule TAKE 3 CAPSULES BY MOUTH 2 TIMES A DAY      finasteride (Proscar) 5 mg tablet Take 1 tablet (5 mg) by mouth once daily at bedtime. Do not crush, chew, or split.      fluticasone (Flonase) 50 mcg/actuation nasal spray Administer 2 sprays into each nostril once daily. Shake gently. Before first use, prime pump. After use, clean tip and replace cap. 16 g 11    furosemide (Lasix) 40 mg tablet Take 0.5 tablets (20 mg) by mouth once daily. As needed for shortness of breath or leg swelling 30 tablet 0    gabapentin (Neurontin) 100 mg capsule Take 2 capsules (200 mg) by mouth 2 times a day.      insulin lispro-aabc (LYUMJEV KWIKPEN U-100 INSULIN SUBQ) Inject under the skin 3 times a day with meals. Per Sliding Scale      insulin NPH, Isophane, (HumuLIN N,NovoLIN N) 100 unit/mL injection Inject 7 Units under the  skin once daily in the morning. Take as directed per insulin instructions.      insulin NPH, Isophane, (HumuLIN N,NovoLIN N) 100 unit/mL injection Inject 5 Units under the skin once daily at bedtime. Take as directed per insulin instructions.      methocarbamol (Robaxin) 500 mg tablet Take 1 tablet (500 mg) by mouth every 8 hours if needed for muscle spasms.      metoclopramide (Reglan) 5 mg tablet Take 1 tablet (5 mg) by mouth 4 times a day.      metoprolol succinate XL (Toprol-XL) 50 mg 24 hr tablet Take 1 tablet (50 mg) by mouth 2 times a day. Do not crush or chew.      midodrine (Proamatine) 5 mg tablet Take 1 tablet (5 mg) by mouth 3 times daily (morning, midday, late afternoon).      mycophenolate (Cellcept) 250 mg capsule Take 3 capsules (750 mg) by mouth 2 times a day.      oxymetazoline (Afrin, oxymetazoline,) 0.05 % nasal spray Administer 2 sprays in each nostril twice per day. Do this for 3 days in a row. Take one day off. Do it for another 3 days in a row. Take one day off. Do it for another 3 days in a row, and then stop. 30 mL 0    pantoprazole (ProtoNix) 40 mg EC tablet Take 1 tablet (40 mg) by mouth once daily in the morning. Take before meals.      predniSONE (Deltasone) 5 mg tablet Take 1 tablet (5 mg) by mouth once daily.      rosuvastatin (Crestor) 20 mg tablet Take 1 tablet (20 mg) by mouth once daily.      sodium bicarbonate 650 mg tablet Take 0.5 tablets (325 mg) by mouth 2 times a day.      tamsulosin (Flomax) 0.4 mg 24 hr capsule Take 1 capsule (0.4 mg) by mouth once daily. 90 capsule 3     No current facility-administered medications for this visit.        Active Problems:  Keith Lazar is a 73 y.o. male with the following Problems and Medications.  Patient Active Problem List   Diagnosis    Essential hypertension    Atrial fibrillation with RVR (Multi)    Abdominal pain    Abnormal CT of the chest    Abscess, toe    Anemia    Atypical chest pain    Astigmatism of both eyes with  presbyopia    Benign enlargement of prostate    Benign prostatic hyperplasia with urinary frequency    BK viremia    Bronchiectasis without complication (Multi)    Stage 4 chronic kidney disease (Multi)    Chronic right shoulder pain    COPD without exacerbation (Multi)    Chronic cough    Diabetes mellitus type 2 without retinopathy (Multi)    Dieulafoy lesion of colon    Edema    GERD (gastroesophageal reflux disease)    Hypercholesteremia    Immunosuppression    Urinary retention    Infection due to Enterococcus    Intra-abdominal abscess post-procedure    Kidney transplant status    Lung nodules    Nonspecific abnormal results of thyroid function study    Poorly controlled diabetes mellitus (Multi)    Physiologic disturbance of temperature regulation    Pneumonia    Pseudophakia of both eyes    PTDM (post-transplant diabetes mellitus) (Multi)    PVD (posterior vitreous detachment), left eye    Seizure disorder (Multi)    Stage 5 chronic kidney disease with transplanted kidney (Multi)    Urine WBC increased    Condition not found    BK virus nephropathy    FSGS (focal segmental glomerulosclerosis)    History of blood transfusion    H/O lower gastrointestinal bleeding    Insulin long-term use (Multi)    Paroxysmal atrial fibrillation (Multi)    Acute kidney failure, unspecified (CMS-HCC)    Acute renal failure superimposed on chronic kidney disease, unspecified acute renal failure type, unspecified CKD stage ([facility])    Randall catheter in place     Current Outpatient Medications   Medication Sig Dispense Refill    acetaminophen (Tylenol) 325 mg tablet Take by mouth every 6 hours if needed for mild pain (1 - 3).      acetaminophen (Tylenol) 650 mg suppository Insert into the rectum.      aluminum-magnesium hydroxide 200-200 mg/5 mL suspension Take by mouth every 6 hours if needed for heartburn.      aspirin 81 mg EC tablet Take 1 tablet (81 mg) by mouth once daily.      cholecalciferol (Vitamin D-3) 25 MCG (1000  UT) tablet Take 1 tablet (1,000 Units) by mouth once daily.      cycloSPORINE (SandIMMUNE) 25 mg capsule Take 2 capsules (50 mg) by mouth 2 times a day.      cycloSPORINE modified (NEOral) 25 mg capsule TAKE 3 CAPSULES BY MOUTH 2 TIMES A DAY      finasteride (Proscar) 5 mg tablet Take 1 tablet (5 mg) by mouth once daily at bedtime. Do not crush, chew, or split.      fluticasone (Flonase) 50 mcg/actuation nasal spray Administer 2 sprays into each nostril once daily. Shake gently. Before first use, prime pump. After use, clean tip and replace cap. 16 g 11    furosemide (Lasix) 40 mg tablet Take 0.5 tablets (20 mg) by mouth once daily. As needed for shortness of breath or leg swelling 30 tablet 0    gabapentin (Neurontin) 100 mg capsule Take 2 capsules (200 mg) by mouth 2 times a day.      insulin lispro-aabc (LYUMJEV KWIKPEN U-100 INSULIN SUBQ) Inject under the skin 3 times a day with meals. Per Sliding Scale      insulin NPH, Isophane, (HumuLIN N,NovoLIN N) 100 unit/mL injection Inject 7 Units under the skin once daily in the morning. Take as directed per insulin instructions.      insulin NPH, Isophane, (HumuLIN N,NovoLIN N) 100 unit/mL injection Inject 5 Units under the skin once daily at bedtime. Take as directed per insulin instructions.      methocarbamol (Robaxin) 500 mg tablet Take 1 tablet (500 mg) by mouth every 8 hours if needed for muscle spasms.      metoclopramide (Reglan) 5 mg tablet Take 1 tablet (5 mg) by mouth 4 times a day.      metoprolol succinate XL (Toprol-XL) 50 mg 24 hr tablet Take 1 tablet (50 mg) by mouth 2 times a day. Do not crush or chew.      midodrine (Proamatine) 5 mg tablet Take 1 tablet (5 mg) by mouth 3 times daily (morning, midday, late afternoon).      mycophenolate (Cellcept) 250 mg capsule Take 3 capsules (750 mg) by mouth 2 times a day.      oxymetazoline (Afrin, oxymetazoline,) 0.05 % nasal spray Administer 2 sprays in each nostril twice per day. Do this for 3 days in a row.  Take one day off. Do it for another 3 days in a row. Take one day off. Do it for another 3 days in a row, and then stop. 30 mL 0    pantoprazole (ProtoNix) 40 mg EC tablet Take 1 tablet (40 mg) by mouth once daily in the morning. Take before meals.      predniSONE (Deltasone) 5 mg tablet Take 1 tablet (5 mg) by mouth once daily.      rosuvastatin (Crestor) 20 mg tablet Take 1 tablet (20 mg) by mouth once daily.      sodium bicarbonate 650 mg tablet Take 0.5 tablets (325 mg) by mouth 2 times a day.      tamsulosin (Flomax) 0.4 mg 24 hr capsule Take 1 capsule (0.4 mg) by mouth once daily. 90 capsule 3     No current facility-administered medications for this visit.       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     FSGS (focal segmental glomerulosclerosis)       PSH:  Past Surgical History:   Procedure Laterality Date    CATARACT EXTRACTION  09/29/2015    Cataract Surgery    OTHER SURGICAL HISTORY  09/29/2015    Cadaver Donor Nephrectomy    OTHER SURGICAL HISTORY  09/29/2015    Incisional Hernia Repair With Implantation Of Mesh    OTHER SURGICAL HISTORY  09/29/2015    Peritoneal Dialysis Catheter    OTHER SURGICAL HISTORY  09/29/2015    Brain Surgery    TONSILLECTOMY  09/29/2015    Tonsillectomy With Adenoidectomy       FMH:  Family History   Problem Relation Name Age of Onset    Alcohol abuse Father      Alcohol abuse Sister         SHx:  Social History     Tobacco Use    Smoking status: Former     Types: Cigarettes    Smokeless tobacco: Never   Substance Use Topics    Alcohol use: Never    Drug use: Never       Allergies:  Allergies   Allergen Reactions    Statins-Hmg-Coa Reductase Inhibitors Headache          Assessment/Plan  Symptomatically doing well, he was using CIC before but only getting 10% of his volume, stopped catheterizing now. No issues with voiding currently, void large volume. Will continue without cath for now.     Follow up in 3-4 months over TH. Scribe Attestation  By signing my name below, I, PopeyeRhona Joseph, attest that this documentation has been prepared under the direction and in the presence of Ronny Lofton MD.

## 2024-12-17 RX ORDER — AMIODARONE HYDROCHLORIDE 200 MG/1
200 TABLET ORAL 2 TIMES DAILY
Qty: 60 TABLET | Refills: 0 | Status: ON HOLD | OUTPATIENT
Start: 2024-12-17

## 2024-12-17 NOTE — TELEPHONE ENCOUNTER
Pt wife, Simona calling to find out if pt is supposed to be taking Amiodarone?  If so, pt needs a refill to be sent to [facility]?    Pt # [phone]

## 2024-12-18 ENCOUNTER — APPOINTMENT (OUTPATIENT)
Dept: GENERAL RADIOLOGY | Age: 74
End: 2024-12-18
Payer: COMMERCIAL

## 2024-12-18 ENCOUNTER — HOSPITAL ENCOUNTER (INPATIENT)
Age: 74
LOS: 27 days | Discharge: NURSING, REHAB OR LONG-TERM CARE | End: 2025-01-14
Attending: EMERGENCY MEDICINE | Admitting: INTERNAL MEDICINE
Payer: COMMERCIAL

## 2024-12-18 ENCOUNTER — APPOINTMENT (OUTPATIENT)
Dept: CT IMAGING | Age: 74
End: 2024-12-18
Payer: COMMERCIAL

## 2024-12-18 DIAGNOSIS — J96.01 ACUTE RESPIRATORY FAILURE WITH HYPOXIA: ICD-10-CM

## 2024-12-18 DIAGNOSIS — Z94.0 S/P KIDNEY TRANSPLANT: ICD-10-CM

## 2024-12-18 DIAGNOSIS — N17.9 ACUTE KIDNEY INJURY SUPERIMPOSED ON CKD (HCC): ICD-10-CM

## 2024-12-18 DIAGNOSIS — Z94.0: ICD-10-CM

## 2024-12-18 DIAGNOSIS — N18.9 ACUTE KIDNEY INJURY SUPERIMPOSED ON CKD (HCC): ICD-10-CM

## 2024-12-18 DIAGNOSIS — N18.5: ICD-10-CM

## 2024-12-18 DIAGNOSIS — R07.89 ATYPICAL CHEST PAIN: ICD-10-CM

## 2024-12-18 DIAGNOSIS — B34.8 RHINOVIRUS: Primary | ICD-10-CM

## 2024-12-18 DIAGNOSIS — R06.02 SHORTNESS OF BREATH: ICD-10-CM

## 2024-12-18 LAB
ALBUMIN SERPL-MCNC: 3.1 G/DL (ref 3.5–4.6)
ALP SERPL-CCNC: 67 U/L (ref 35–104)
ALT SERPL-CCNC: 16 U/L (ref 0–41)
ANION GAP SERPL CALCULATED.3IONS-SCNC: 25 MEQ/L (ref 9–15)
AST SERPL-CCNC: 33 U/L (ref 0–40)
B PARAP IS1001 DNA NPH QL NAA+NON-PROBE: NOT DETECTED
B PERT.PT PRMT NPH QL NAA+NON-PROBE: NOT DETECTED
BASE EXCESS ARTERIAL: -14 (ref -3–3)
BASOPHILS # BLD: 0 K/UL (ref 0–0.2)
BASOPHILS NFR BLD: 0.3 %
BILIRUB SERPL-MCNC: 1.3 MG/DL (ref 0.2–0.7)
BUN SERPL-MCNC: 124 MG/DL (ref 8–23)
C PNEUM DNA NPH QL NAA+NON-PROBE: NOT DETECTED
CALCIUM IONIZED: 1.1 MMOL/L (ref 1.12–1.32)
CALCIUM SERPL-MCNC: 8.8 MG/DL (ref 8.5–9.9)
CHLORIDE SERPL-SCNC: 96 MEQ/L (ref 95–107)
CHP ED QC CHECK: YES
CO2 SERPL-SCNC: 13 MEQ/L (ref 20–31)
CREAT SERPL-MCNC: 5.93 MG/DL (ref 0.7–1.2)
EOSINOPHIL # BLD: 0.1 K/UL (ref 0–0.7)
EOSINOPHIL NFR BLD: 2.1 %
ERYTHROCYTE [DISTWIDTH] IN BLOOD BY AUTOMATED COUNT: 15.8 % (ref 11.5–14.5)
FLUAV RNA NPH QL NAA+NON-PROBE: NOT DETECTED
FLUBV RNA NPH QL NAA+NON-PROBE: NOT DETECTED
GLOBULIN SER CALC-MCNC: 3.1 G/DL (ref 2.3–3.5)
GLUCOSE BLD-MCNC: 202 MG/DL
GLUCOSE BLD-MCNC: 202 MG/DL (ref 70–99)
GLUCOSE BLD-MCNC: 270 MG/DL (ref 70–99)
GLUCOSE BLD-MCNC: 272 MG/DL (ref 70–99)
GLUCOSE BLD-MCNC: 285 MG/DL (ref 70–99)
GLUCOSE SERPL-MCNC: 190 MG/DL (ref 70–99)
HADV DNA NPH QL NAA+NON-PROBE: NOT DETECTED
HCO3 ARTERIAL: 14.5 MMOL/L (ref 21–29)
HCOV 229E RNA NPH QL NAA+NON-PROBE: NOT DETECTED
HCOV HKU1 RNA NPH QL NAA+NON-PROBE: NOT DETECTED
HCOV NL63 RNA NPH QL NAA+NON-PROBE: NOT DETECTED
HCOV OC43 RNA NPH QL NAA+NON-PROBE: NOT DETECTED
HCT VFR BLD AUTO: 23 % (ref 41–53)
HCT VFR BLD AUTO: 26.9 % (ref 42–52)
HGB BLD CALC-MCNC: 7.8 GM/DL (ref 13.5–17.5)
HGB BLD-MCNC: 8.7 G/DL (ref 14–18)
HMPV RNA NPH QL NAA+NON-PROBE: NOT DETECTED
HPIV1 RNA NPH QL NAA+NON-PROBE: NOT DETECTED
HPIV2 RNA NPH QL NAA+NON-PROBE: NOT DETECTED
HPIV3 RNA NPH QL NAA+NON-PROBE: NOT DETECTED
HPIV4 RNA NPH QL NAA+NON-PROBE: NOT DETECTED
LACTATE: <0.3 MMOL/L (ref 0.4–2)
LACTIC ACID, SEPSIS: <0.2 MMOL/L (ref 0.5–1.9)
LYMPHOCYTES # BLD: 0.1 K/UL (ref 1–4.8)
LYMPHOCYTES NFR BLD: 1.8 %
M PNEUMO DNA NPH QL NAA+NON-PROBE: NOT DETECTED
MAGNESIUM SERPL-MCNC: 1.5 MG/DL (ref 1.7–2.4)
MCH RBC QN AUTO: 28.9 PG (ref 27–31.3)
MCHC RBC AUTO-ENTMCNC: 32.3 % (ref 33–37)
MCV RBC AUTO: 89.4 FL (ref 79–92.2)
MONOCYTES # BLD: 0.6 K/UL (ref 0.2–0.8)
MONOCYTES NFR BLD: 8.8 %
NEUTROPHILS # BLD: 5.7 K/UL (ref 1.4–6.5)
NEUTS SEG NFR BLD: 85.2 %
O2 SAT, ARTERIAL: 100 % (ref 93–100)
PCO2 ARTERIAL: 40 MM HG (ref 35–45)
PERFORMED ON: ABNORMAL
PH ARTERIAL: 7.17 (ref 7.35–7.45)
PLATELET # BLD AUTO: 170 K/UL (ref 130–400)
PO2 ARTERIAL: 443 MM HG (ref 75–108)
POC CHLORIDE: 108 MEQ/L (ref 99–110)
POC CREATININE: 4.8 MG/DL (ref 0.8–1.3)
POC FIO2: 100
POC SAMPLE TYPE: ABNORMAL
POTASSIUM SERPL-SCNC: 4.1 MEQ/L (ref 3.4–4.9)
POTASSIUM SERPL-SCNC: 4.5 MEQ/L (ref 3.5–5.1)
PROCALCITONIN SERPL IA-MCNC: 1.18 NG/ML (ref 0–0.15)
PROT SERPL-MCNC: 6.2 G/DL (ref 6.3–8)
RBC # BLD AUTO: 3.01 M/UL (ref 4.7–6.1)
RSV RNA NPH QL NAA+NON-PROBE: NOT DETECTED
RV+EV RNA NPH QL NAA+NON-PROBE: DETECTED
SARS-COV-2 RNA NPH QL NAA+NON-PROBE: NOT DETECTED
SODIUM BLD-SCNC: 134 MEQ/L (ref 136–145)
SODIUM SERPL-SCNC: 134 MEQ/L (ref 135–144)
TCO2 ARTERIAL: 16 MMOL/L (ref 21–32)
WBC # BLD AUTO: 6.7 K/UL (ref 4.8–10.8)

## 2024-12-18 PROCEDURE — 71045 X-RAY EXAM CHEST 1 VIEW: CPT

## 2024-12-18 PROCEDURE — 6360000002 HC RX W HCPCS: Performed by: INTERNAL MEDICINE

## 2024-12-18 PROCEDURE — 2580000003 HC RX 258: Performed by: EMERGENCY MEDICINE

## 2024-12-18 PROCEDURE — 96365 THER/PROPH/DIAG IV INF INIT: CPT

## 2024-12-18 PROCEDURE — 82948 REAGENT STRIP/BLOOD GLUCOSE: CPT

## 2024-12-18 PROCEDURE — 6360000002 HC RX W HCPCS: Performed by: EMERGENCY MEDICINE

## 2024-12-18 PROCEDURE — 87040 BLOOD CULTURE FOR BACTERIA: CPT

## 2024-12-18 PROCEDURE — 2500000003 HC RX 250 WO HCPCS: Performed by: EMERGENCY MEDICINE

## 2024-12-18 PROCEDURE — 5A0935A ASSISTANCE WITH RESPIRATORY VENTILATION, LESS THAN 24 CONSECUTIVE HOURS, HIGH NASAL FLOW/VELOCITY: ICD-10-PCS | Performed by: INTERNAL MEDICINE

## 2024-12-18 PROCEDURE — 80158 DRUG ASSAY CYCLOSPORINE: CPT

## 2024-12-18 PROCEDURE — 99285 EMERGENCY DEPT VISIT HI MDM: CPT

## 2024-12-18 PROCEDURE — 82435 ASSAY OF BLOOD CHLORIDE: CPT

## 2024-12-18 PROCEDURE — 2500000003 HC RX 250 WO HCPCS: Performed by: INTERNAL MEDICINE

## 2024-12-18 PROCEDURE — 70450 CT HEAD/BRAIN W/O DYE: CPT

## 2024-12-18 PROCEDURE — 6370000000 HC RX 637 (ALT 250 FOR IP): Performed by: INTERNAL MEDICINE

## 2024-12-18 PROCEDURE — 83605 ASSAY OF LACTIC ACID: CPT

## 2024-12-18 PROCEDURE — 85025 COMPLETE CBC W/AUTO DIFF WBC: CPT

## 2024-12-18 PROCEDURE — 0202U NFCT DS 22 TRGT SARS-COV-2: CPT

## 2024-12-18 PROCEDURE — 2580000003 HC RX 258: Performed by: INTERNAL MEDICINE

## 2024-12-18 PROCEDURE — 82565 ASSAY OF CREATININE: CPT

## 2024-12-18 PROCEDURE — 82803 BLOOD GASES ANY COMBINATION: CPT

## 2024-12-18 PROCEDURE — 2500000003 HC RX 250 WO HCPCS

## 2024-12-18 PROCEDURE — 94761 N-INVAS EAR/PLS OXIMETRY MLT: CPT

## 2024-12-18 PROCEDURE — 83735 ASSAY OF MAGNESIUM: CPT

## 2024-12-18 PROCEDURE — 2000000000 HC ICU R&B

## 2024-12-18 PROCEDURE — 31500 INSERT EMERGENCY AIRWAY: CPT

## 2024-12-18 PROCEDURE — 2700000000 HC OXYGEN THERAPY PER DAY

## 2024-12-18 PROCEDURE — 82330 ASSAY OF CALCIUM: CPT

## 2024-12-18 PROCEDURE — 84145 PROCALCITONIN (PCT): CPT

## 2024-12-18 PROCEDURE — 94002 VENT MGMT INPAT INIT DAY: CPT

## 2024-12-18 PROCEDURE — 85014 HEMATOCRIT: CPT

## 2024-12-18 PROCEDURE — 36415 COLL VENOUS BLD VENIPUNCTURE: CPT

## 2024-12-18 PROCEDURE — 84295 ASSAY OF SERUM SODIUM: CPT

## 2024-12-18 PROCEDURE — 93005 ELECTROCARDIOGRAM TRACING: CPT | Performed by: EMERGENCY MEDICINE

## 2024-12-18 PROCEDURE — 80053 COMPREHEN METABOLIC PANEL: CPT

## 2024-12-18 PROCEDURE — 84132 ASSAY OF SERUM POTASSIUM: CPT

## 2024-12-18 PROCEDURE — 5A1945Z RESPIRATORY VENTILATION, 24-96 CONSECUTIVE HOURS: ICD-10-PCS | Performed by: INTERNAL MEDICINE

## 2024-12-18 RX ORDER — NOREPINEPHRINE BITARTRATE 0.06 MG/ML
1-100 INJECTION, SOLUTION INTRAVENOUS CONTINUOUS
Status: DISCONTINUED | OUTPATIENT
Start: 2024-12-18 | End: 2024-12-21

## 2024-12-18 RX ORDER — MIDAZOLAM HYDROCHLORIDE 2 MG/2ML
2 INJECTION, SOLUTION INTRAMUSCULAR; INTRAVENOUS ONCE
Status: DISCONTINUED | OUTPATIENT
Start: 2024-12-18 | End: 2024-12-21

## 2024-12-18 RX ORDER — ETOMIDATE 2 MG/ML
20 INJECTION INTRAVENOUS ONCE
Status: COMPLETED | OUTPATIENT
Start: 2024-12-18 | End: 2024-12-18

## 2024-12-18 RX ORDER — SODIUM CHLORIDE 0.9 % (FLUSH) 0.9 %
5-40 SYRINGE (ML) INJECTION EVERY 12 HOURS SCHEDULED
Status: DISCONTINUED | OUTPATIENT
Start: 2024-12-18 | End: 2025-01-14 | Stop reason: HOSPADM

## 2024-12-18 RX ORDER — SODIUM CHLORIDE 9 MG/ML
INJECTION, SOLUTION INTRAVENOUS CONTINUOUS
Status: DISCONTINUED | OUTPATIENT
Start: 2024-12-18 | End: 2024-12-20

## 2024-12-18 RX ORDER — ACETAMINOPHEN 650 MG/1
650 SUPPOSITORY RECTAL EVERY 6 HOURS PRN
Status: DISCONTINUED | OUTPATIENT
Start: 2024-12-18 | End: 2025-01-14 | Stop reason: HOSPADM

## 2024-12-18 RX ORDER — ROCURONIUM BROMIDE 10 MG/ML
1 INJECTION, SOLUTION INTRAVENOUS ONCE
Status: COMPLETED | OUTPATIENT
Start: 2024-12-18 | End: 2024-12-18

## 2024-12-18 RX ORDER — NOREPINEPHRINE BITARTRATE 0.06 MG/ML
INJECTION, SOLUTION INTRAVENOUS
Status: COMPLETED
Start: 2024-12-18 | End: 2024-12-18

## 2024-12-18 RX ORDER — POTASSIUM CHLORIDE 29.8 MG/ML
20 INJECTION INTRAVENOUS PRN
Status: DISCONTINUED | OUTPATIENT
Start: 2024-12-18 | End: 2024-12-18

## 2024-12-18 RX ORDER — MAGNESIUM SULFATE IN WATER 40 MG/ML
2000 INJECTION, SOLUTION INTRAVENOUS PRN
Status: DISCONTINUED | OUTPATIENT
Start: 2024-12-18 | End: 2024-12-18

## 2024-12-18 RX ORDER — ACETAMINOPHEN 325 MG/1
650 TABLET ORAL EVERY 6 HOURS PRN
Status: DISCONTINUED | OUTPATIENT
Start: 2024-12-18 | End: 2025-01-14 | Stop reason: HOSPADM

## 2024-12-18 RX ORDER — 0.9 % SODIUM CHLORIDE 0.9 %
500 INTRAVENOUS SOLUTION INTRAVENOUS ONCE
Status: COMPLETED | OUTPATIENT
Start: 2024-12-18 | End: 2024-12-18

## 2024-12-18 RX ORDER — HEPARIN SODIUM 5000 [USP'U]/ML
5000 INJECTION, SOLUTION INTRAVENOUS; SUBCUTANEOUS EVERY 8 HOURS SCHEDULED
Status: DISCONTINUED | OUTPATIENT
Start: 2024-12-18 | End: 2025-01-14 | Stop reason: HOSPADM

## 2024-12-18 RX ORDER — POTASSIUM CHLORIDE 7.45 MG/ML
10 INJECTION INTRAVENOUS PRN
Status: DISCONTINUED | OUTPATIENT
Start: 2024-12-18 | End: 2024-12-18

## 2024-12-18 RX ORDER — MAGNESIUM SULFATE 1 G/100ML
1000 INJECTION INTRAVENOUS ONCE
Status: COMPLETED | OUTPATIENT
Start: 2024-12-18 | End: 2024-12-18

## 2024-12-18 RX ORDER — SODIUM CHLORIDE 9 MG/ML
INJECTION, SOLUTION INTRAVENOUS PRN
Status: DISCONTINUED | OUTPATIENT
Start: 2024-12-18 | End: 2025-01-14 | Stop reason: HOSPADM

## 2024-12-18 RX ORDER — SODIUM CHLORIDE 0.9 % (FLUSH) 0.9 %
5-40 SYRINGE (ML) INJECTION PRN
Status: DISCONTINUED | OUTPATIENT
Start: 2024-12-18 | End: 2025-01-14 | Stop reason: HOSPADM

## 2024-12-18 RX ORDER — MIDODRINE HYDROCHLORIDE 10 MG/1
10 TABLET ORAL
Status: DISCONTINUED | OUTPATIENT
Start: 2024-12-19 | End: 2024-12-22

## 2024-12-18 RX ORDER — FENTANYL CITRATE-0.9 % NACL/PF 10 MCG/ML
25-200 PLASTIC BAG, INJECTION (ML) INTRAVENOUS CONTINUOUS
Status: DISCONTINUED | OUTPATIENT
Start: 2024-12-18 | End: 2024-12-22

## 2024-12-18 RX ORDER — MYCOPHENOLATE MOFETIL 250 MG/1
500 CAPSULE ORAL 2 TIMES DAILY
Status: DISCONTINUED | OUTPATIENT
Start: 2024-12-18 | End: 2025-01-14 | Stop reason: HOSPADM

## 2024-12-18 RX ORDER — CYCLOSPORINE 25 MG/1
50 CAPSULE, LIQUID FILLED ORAL 2 TIMES DAILY
Status: DISCONTINUED | OUTPATIENT
Start: 2024-12-18 | End: 2024-12-18 | Stop reason: ALTCHOICE

## 2024-12-18 RX ORDER — ONDANSETRON 2 MG/ML
4 INJECTION INTRAMUSCULAR; INTRAVENOUS EVERY 6 HOURS PRN
Status: DISCONTINUED | OUTPATIENT
Start: 2024-12-18 | End: 2025-01-14 | Stop reason: HOSPADM

## 2024-12-18 RX ORDER — INSULIN LISPRO 100 [IU]/ML
0-4 INJECTION, SOLUTION INTRAVENOUS; SUBCUTANEOUS EVERY 6 HOURS SCHEDULED
Status: DISCONTINUED | OUTPATIENT
Start: 2024-12-18 | End: 2024-12-20

## 2024-12-18 RX ORDER — IPRATROPIUM BROMIDE AND ALBUTEROL SULFATE 2.5; .5 MG/3ML; MG/3ML
1 SOLUTION RESPIRATORY (INHALATION) EVERY 4 HOURS PRN
Status: DISCONTINUED | OUTPATIENT
Start: 2024-12-18 | End: 2025-01-14 | Stop reason: HOSPADM

## 2024-12-18 RX ORDER — ONDANSETRON 4 MG/1
4 TABLET, ORALLY DISINTEGRATING ORAL EVERY 8 HOURS PRN
Status: DISCONTINUED | OUTPATIENT
Start: 2024-12-18 | End: 2025-01-14 | Stop reason: HOSPADM

## 2024-12-18 RX ORDER — CYCLOSPORINE 100 MG/ML
50 SOLUTION ORAL 2 TIMES DAILY
Status: DISCONTINUED | OUTPATIENT
Start: 2024-12-18 | End: 2024-12-22

## 2024-12-18 RX ORDER — DEXTROSE MONOHYDRATE 100 MG/ML
INJECTION, SOLUTION INTRAVENOUS CONTINUOUS PRN
Status: DISCONTINUED | OUTPATIENT
Start: 2024-12-18 | End: 2025-01-14 | Stop reason: HOSPADM

## 2024-12-18 RX ORDER — FENTANYL CITRATE 0.05 MG/ML
50 INJECTION, SOLUTION INTRAMUSCULAR; INTRAVENOUS ONCE
Status: COMPLETED | OUTPATIENT
Start: 2024-12-18 | End: 2024-12-18

## 2024-12-18 RX ORDER — GLUCAGON 1 MG/ML
1 KIT INJECTION PRN
Status: DISCONTINUED | OUTPATIENT
Start: 2024-12-18 | End: 2025-01-14 | Stop reason: HOSPADM

## 2024-12-18 RX ORDER — POLYETHYLENE GLYCOL 3350 17 G/17G
17 POWDER, FOR SOLUTION ORAL DAILY PRN
Status: DISCONTINUED | OUTPATIENT
Start: 2024-12-18 | End: 2024-12-19

## 2024-12-18 RX ADMIN — CYCLOSPORINE 50 MG: 100 SOLUTION ORAL at 22:58

## 2024-12-18 RX ADMIN — ROCURONIUM BROMIDE 72 MG: 10 INJECTION, SOLUTION INTRAVENOUS at 19:37

## 2024-12-18 RX ADMIN — CEFEPIME 2000 MG: 2 INJECTION, POWDER, FOR SOLUTION INTRAVENOUS at 15:44

## 2024-12-18 RX ADMIN — NOREPINEPHRINE BITARTRATE 5 MCG/MIN: 64 SOLUTION INTRAVENOUS at 20:51

## 2024-12-18 RX ADMIN — SODIUM CHLORIDE 500 ML: 9 INJECTION, SOLUTION INTRAVENOUS at 15:44

## 2024-12-18 RX ADMIN — ETOMIDATE 20 MG: 2 INJECTION, SOLUTION INTRAVENOUS at 19:37

## 2024-12-18 RX ADMIN — INSULIN LISPRO 2 UNITS: 100 INJECTION, SOLUTION INTRAVENOUS; SUBCUTANEOUS at 22:57

## 2024-12-18 RX ADMIN — NOREPINEPHRINE BITARTRATE 5 MCG/MIN: 0.06 INJECTION, SOLUTION INTRAVENOUS at 20:51

## 2024-12-18 RX ADMIN — FENTANYL CITRATE 50 MCG: 0.05 INJECTION, SOLUTION INTRAMUSCULAR; INTRAVENOUS at 19:55

## 2024-12-18 RX ADMIN — Medication 50 MCG/HR: at 21:26

## 2024-12-18 RX ADMIN — SODIUM CHLORIDE: 9 INJECTION, SOLUTION INTRAVENOUS at 21:04

## 2024-12-18 RX ADMIN — MAGNESIUM SULFATE HEPTAHYDRATE 1000 MG: 1 INJECTION, SOLUTION INTRAVENOUS at 16:50

## 2024-12-18 RX ADMIN — HEPARIN SODIUM 5000 UNITS: 5000 INJECTION INTRAVENOUS; SUBCUTANEOUS at 22:57

## 2024-12-18 ASSESSMENT — PULMONARY FUNCTION TESTS
PIF_VALUE: 28
PIF_VALUE: 25
PIF_VALUE: 28
PIF_VALUE: 25
PIF_VALUE: 25
PIF_VALUE: 27
PIF_VALUE: 25
PIF_VALUE: 25
PIF_VALUE: 26
PIF_VALUE: 24
PIF_VALUE: 25
PIF_VALUE: 25

## 2024-12-18 ASSESSMENT — PAIN - FUNCTIONAL ASSESSMENT: PAIN_FUNCTIONAL_ASSESSMENT: NONE - DENIES PAIN

## 2024-12-18 NOTE — H&P
Hospital Medicine  History and Physical    Patient:  Remy Upton  MRN: [id number]    CHIEF COMPLAINT:    Chief Complaint   Patient presents with    Illness       History Obtained From:  Patient, EMR  Primary Care Physician: Tico Rodriguez DO    HISTORY OF PRESENT ILLNESS:   The patient is a 73 y.o. male NH resident with history of HTN, PAF, DM2, ESRD/CKD4 s/p kidney transplant x 2, BPH / urine retention , anemia/lower GI bleed requiring PRBC transfusion, depression, who presented with the above CC. Patient has been coughing for the last couple of days per wife at the bedside, did not eat anything today, stable HD, on RA on arrival to ED, initial w/u showed DELORES/CKD4, AG metabolic acidosis, hypomagnesemia, anemia, hyperglycemia, ECG showed AF, CXR was negative, respiratory panel positive for Rhinovirus, Saline bolus, Cefepime and IV magnesium given in ED, became hypoxic in ED after a coughing episode per report, SPO2 in the 70s on RA, up to 88% on 6 liters of NC, RT called to place patient on HFNC, repeat CXR ordered, will admit to ICU for closer monitoring.    Past Medical History:      Diagnosis Date    Diabetes mellitus (HCC)     Hemodialysis access, fistula mature (HCC) 12/2002    Hypertension     Seizures (HCC)     no seizure since 1995       Past Surgical History:      Procedure Laterality Date    BRAIN SURGERY Left 12/06/1990    to eliminate seizures    COLONOSCOPY N/A 10/24/2022    COLONOSCOPY DIAGNOSTIC performed by Va Ordoñez MD at [facility]    KIDNEY TRANSPLANT  2015    [facility] INCL FLUOR GDNCE DX W/CELL WASHG SPX N/A 3/20/2018    BRONCHOSCOPY [place] Crist[place] Conde [place] [facility]    UPPER GASTROINTESTINAL ENDOSCOPY N/A 10/21/2022    EGD DIAGNOSTIC ONLY performed by Jaime Martinez MD at [facility]       Medications Prior to Admission:    Prior to Admission medications    Medication Sig Start Date End Date Taking? Authorizing Provider   amiodarone (CORDARONE) 200 MG  report  - continue IVFs  - consult nephrology  - monitor renal function closely    Acute hypoxic respiratory failure  - possibly related to aspiration after coughing episode in ED  - with SPO2 in the 70s on RA, placed on HFNC by RT  - initial CXR was negative, repeat CXR pending  - admit to ICU for closer monitoring  - keep NPO, speech eval  - IV Zosyn empirically  - consult critical care/pulmonology service    AF  - rate is controlled  - resume Toprol and Amiodarone when able to tolerate PO  - off OAC due to hx of GI bleed    Hypomagnesemia  - replaced    Anemia  - follow H/H    DM2 with hyperglycemia  - ISS    BPH  - resume Flomax and Proscar when able to take PO              SHERIDAN MARTINEZ MD, MD  Admitting Hospitalist

## 2024-12-18 NOTE — ED NOTES
Patient placed on 3L still remaining at 85%. Patient moved to 6L on Nasal cannula. Respiratory contacted.

## 2024-12-18 NOTE — ED NOTES
Patient in room resting with eyes closed. Wife at bedside. Patient repositioned. No distress noted at this time.

## 2024-12-18 NOTE — ED PROVIDER NOTES
[facility] ED  EMERGENCY DEPARTMENT ENCOUNTER      Pt Name: Remy Upton  MRN: [id number]  Birthdate 1950  Date of evaluation: 12/18/2024  Provider: Antony Granados MD  12:52 PM EST    CHIEF COMPLAINT       Chief Complaint   Patient presents with    Illness         HISTORY OF PRESENT ILLNESS   (Location/Symptom, Timing/Onset, Context/Setting, Quality, Duration, Modifying Factors, Severity)  Note limiting factors.   Remy Upton is a 73 y.o. male who presents to the emergency department for evaluation of general illness, fatigue.  He says that his home health aide was concerned about his lungs being \"crackly\".  He says for the past couple of days he has been \"wiped out\".  He denies fever, vision change, jaw pain, chest pain, difficulty breathing, vomiting or diaphoresis, abdominal pain, dysuria, blood in stool, new focal numbness/weakness.  He says that he is wheelchair-bound.    HPI  Chart review notes history of diabetes, hypertension, seizures, ESRD status post transplant, atrial fibrillation on amiodarone status post Watchman, DVT, Lasix use, aspirin use  Nursing Notes were reviewed.    REVIEW OF SYSTEMS    (2-9 systems for level 4, 10 or more for level 5)     Review of Systems   Constitutional:  Positive for fatigue. Negative for chills and fever.   Respiratory:  Negative for cough and shortness of breath.    Gastrointestinal:  Negative for vomiting.   Skin:  Negative for rash.   Neurological:  Negative for syncope and headaches.   All other systems reviewed and are negative.      Except as noted above the remainder of the review of systems was reviewed and negative.       PAST MEDICAL HISTORY     Past Medical History:   Diagnosis Date    Diabetes mellitus (HCC)     Hemodialysis access, fistula mature (HCC) 12/2002    Hypertension     Seizures (HCC)     no seizure since 1995         SURGICAL HISTORY       Past Surgical History:   Procedure Laterality Date    BRAIN SURGERY Left  by mouth daily Indications: High Blood Pressure    MIDODRINE (PROAMATINE) 5 MG TABLET    Take 1 tablet by mouth 3 times daily Indications: Disorder of Low Blood Pressure Hold for SBP> 110    MYCOPHENOLATE (CELLCEPT) 250 MG CAPSULE    Take 3 capsules by mouth 2 times daily Indications: Kidney Transplant    OXYMETAZOLINE (AFRIN) 0.05 % NASAL SPRAY    2 sprays by Nasal route 2 times daily    PANTOPRAZOLE (PROTONIX) 40 MG TABLET    Take 1 tablet by mouth daily Indications: Gastroesophageal Reflux Disease    PREDNISONE (DELTASONE) 5 MG TABLET    Take 1 tablet by mouth daily Indications: Chronic Obstructive Lung Disease    SODIUM BICARBONATE 325 MG TABLET    Take 1 tablet by mouth 2 times daily    TAMSULOSIN (FLOMAX) 0.4 MG CAPSULE    Take 1 capsule by mouth once daily    VITAMIN D (CHOLECALCIFEROL) 25 MCG (1000 UT) TABS TABLET    Take 1 tablet by mouth daily Indications: Nutritional Support       ALLERGIES     Statins    FAMILY HISTORY       Family History   Problem Relation Age of Onset    Colon Cancer Neg Hx           SOCIAL HISTORY       Social History     Socioeconomic History    Marital status:    Tobacco Use    Smoking status: Former     Current packs/day: 0.00     Types: Cigarettes     Quit date: 2015     Years since quittin.5    Smokeless tobacco: Former   Vaping Use    Vaping status: Never Used   Substance and Sexual Activity    Alcohol use: No     Alcohol/week: 0.0 standard drinks of alcohol    Drug use: No    Sexual activity: Not Currently     Social Determinants of Health     Food Insecurity: No Food Insecurity (2024)    Hunger Vital Sign     Worried About Running Out of Food in the Last Year: Never true     Ran Out of Food in the Last Year: Never true   Transportation Needs: No Transportation Needs (2024)    PRAPARE - Transportation     Lack of Transportation (Medical): No     Lack of Transportation (Non-Medical): No    Received from [facility] Safety

## 2024-12-19 ENCOUNTER — APPOINTMENT (OUTPATIENT)
Age: 74
End: 2024-12-19
Payer: COMMERCIAL

## 2024-12-19 ENCOUNTER — APPOINTMENT (OUTPATIENT)
Dept: ULTRASOUND IMAGING | Age: 74
End: 2024-12-19
Payer: COMMERCIAL

## 2024-12-19 PROBLEM — E44.0 MODERATE MALNUTRITION (HCC): Status: ACTIVE | Noted: 2024-12-19

## 2024-12-19 LAB
ACANTHOCYTES BLD QL SMEAR: ABNORMAL
ALBUMIN SERPL-MCNC: 2.9 G/DL (ref 3.5–4.6)
ANION GAP SERPL CALCULATED.3IONS-SCNC: 27 MEQ/L (ref 9–15)
ANISOCYTOSIS BLD QL SMEAR: ABNORMAL
BASE EXCESS ARTERIAL: -14 (ref -3–3)
BASE EXCESS ARTERIAL: -15 (ref -3–3)
BASOPHILS # BLD: 0.1 K/UL (ref 0–0.2)
BASOPHILS NFR BLD: 1 %
BUN SERPL-MCNC: 126 MG/DL (ref 8–23)
BURR CELLS BLD QL SMEAR: ABNORMAL
BURR CELLS: ABNORMAL
CALCIUM IONIZED: 1.06 MMOL/L (ref 1.12–1.32)
CALCIUM IONIZED: 1.1 MMOL/L (ref 1.12–1.32)
CALCIUM SERPL-MCNC: 8.1 MG/DL (ref 8.5–9.9)
CHLORIDE SERPL-SCNC: 98 MEQ/L (ref 95–107)
CO2 SERPL-SCNC: 11 MEQ/L (ref 20–31)
CREAT SERPL-MCNC: 6.45 MG/DL (ref 0.7–1.2)
CRP SERPL HS-MCNC: 131.1 MG/L (ref 0–5)
ECHO AO ROOT DIAM: 3.7 CM
ECHO AO ROOT INDEX: 1.87 CM/M2
ECHO AV AREA PEAK VELOCITY: 2.2 CM2
ECHO AV AREA VTI: 2.3 CM2
ECHO AV AREA/BSA PEAK VELOCITY: 1.1 CM2/M2
ECHO AV AREA/BSA VTI: 1.2 CM2/M2
ECHO AV CUSP MM: 1.9 CM
ECHO AV MEAN GRADIENT: 3 MMHG
ECHO AV MEAN VELOCITY: 0.8 M/S
ECHO AV PEAK GRADIENT: 5 MMHG
ECHO AV PEAK VELOCITY: 1.2 M/S
ECHO AV VELOCITY RATIO: 0.75
ECHO AV VTI: 24.5 CM
ECHO BSA: 1.93 M2
ECHO BSA: 1.93 M2
ECHO LA DIAMETER INDEX: 1.67 CM/M2
ECHO LA DIAMETER: 3.3 CM
ECHO LA TO AORTIC ROOT RATIO: 0.89
ECHO LA VOL A-L A2C: 31 ML (ref 18–58)
ECHO LA VOL A-L A4C: 70 ML (ref 18–58)
ECHO LA VOL MOD A2C: 30 ML (ref 18–58)
ECHO LA VOL MOD A4C: 67 ML (ref 18–58)
ECHO LA VOLUME AREA LENGTH: 51 ML
ECHO LA VOLUME INDEX A-L A2C: 16 ML/M2 (ref 16–34)
ECHO LA VOLUME INDEX A-L A4C: 35 ML/M2 (ref 16–34)
ECHO LA VOLUME INDEX AREA LENGTH: 26 ML/M2 (ref 16–34)
ECHO LA VOLUME INDEX MOD A2C: 15 ML/M2 (ref 16–34)
ECHO LA VOLUME INDEX MOD A4C: 34 ML/M2 (ref 16–34)
ECHO LV EDV A2C: 53 ML
ECHO LV EDV A4C: 71 ML
ECHO LV EDV BP: 61 ML (ref 67–155)
ECHO LV EDV INDEX A4C: 36 ML/M2
ECHO LV EDV INDEX BP: 31 ML/M2
ECHO LV EDV NDEX A2C: 27 ML/M2
ECHO LV EJECTION FRACTION A2C: 53 %
ECHO LV EJECTION FRACTION A4C: 59 %
ECHO LV EJECTION FRACTION BIPLANE: 55 % (ref 55–100)
ECHO LV ESV A2C: 25 ML
ECHO LV ESV A4C: 29 ML
ECHO LV ESV BP: 27 ML (ref 22–58)
ECHO LV ESV INDEX A2C: 13 ML/M2
ECHO LV ESV INDEX A4C: 15 ML/M2
ECHO LV ESV INDEX BP: 14 ML/M2
ECHO LV FRACTIONAL SHORTENING: 30 % (ref 28–44)
ECHO LV INTERNAL DIMENSION DIASTOLE INDEX: 2.02 CM/M2
ECHO LV INTERNAL DIMENSION DIASTOLIC: 4 CM (ref 4.2–5.9)
ECHO LV INTERNAL DIMENSION SYSTOLIC INDEX: 1.41 CM/M2
ECHO LV INTERNAL DIMENSION SYSTOLIC: 2.8 CM
ECHO LV IVSD: 1 CM (ref 0.6–1)
ECHO LV IVSS: 1.5 CM
ECHO LV MASS 2D: 136.2 G (ref 88–224)
ECHO LV MASS INDEX 2D: 68.8 G/M2 (ref 49–115)
ECHO LV POSTERIOR WALL DIASTOLIC: 1.1 CM (ref 0.6–1)
ECHO LV POSTERIOR WALL SYSTOLIC: 1.6 CM
ECHO LV RELATIVE WALL THICKNESS RATIO: 0.55
ECHO LVOT AREA: 2.8 CM2
ECHO LVOT AV VTI INDEX: 0.78
ECHO LVOT DIAM: 1.9 CM
ECHO LVOT MEAN GRADIENT: 1 MMHG
ECHO LVOT PEAK GRADIENT: 3 MMHG
ECHO LVOT PEAK VELOCITY: 0.9 M/S
ECHO LVOT STROKE VOLUME INDEX: 27.3 ML/M2
ECHO LVOT SV: 54.1 ML
ECHO LVOT VTI: 19.1 CM
ECHO MV E VELOCITY: 0.98 M/S
ECHO PV MAX VELOCITY: 0.9 M/S
ECHO PV PEAK GRADIENT: 3 MMHG
ECHO RV INTERNAL DIMENSION: 2.5 CM
ECHO RV TAPSE: 2.1 CM (ref 1.7–?)
ECHO TV REGURGITANT MAX VELOCITY: 2.78 M/S
ECHO TV REGURGITANT PEAK GRADIENT: 31 MMHG
EKG ATRIAL RATE: 85 BPM
EKG Q-T INTERVAL: 342 MS
EKG QRS DURATION: 100 MS
EKG QTC CALCULATION (BAZETT): 409 MS
EKG R AXIS: -23 DEGREES
EKG T AXIS: -66 DEGREES
EKG VENTRICULAR RATE: 86 BPM
EOSINOPHIL # BLD: 0.1 K/UL (ref 0–0.7)
EOSINOPHIL NFR BLD: 1 %
ERYTHROCYTE [DISTWIDTH] IN BLOOD BY AUTOMATED COUNT: 15.9 % (ref 11.5–14.5)
GLUCOSE BLD-MCNC: 175 MG/DL (ref 70–99)
GLUCOSE BLD-MCNC: 200 MG/DL (ref 70–99)
GLUCOSE BLD-MCNC: 202 MG/DL (ref 70–99)
GLUCOSE BLD-MCNC: 219 MG/DL (ref 70–99)
GLUCOSE BLD-MCNC: 221 MG/DL (ref 70–99)
GLUCOSE SERPL-MCNC: 208 MG/DL (ref 70–99)
HCO3 ARTERIAL: 12.6 MMOL/L (ref 21–29)
HCO3 ARTERIAL: 12.8 MMOL/L (ref 21–29)
HCT VFR BLD AUTO: 22 % (ref 41–53)
HCT VFR BLD AUTO: 23 % (ref 41–53)
HCT VFR BLD AUTO: 24.4 % (ref 42–52)
HGB BLD CALC-MCNC: 7.6 GM/DL (ref 13.5–17.5)
HGB BLD CALC-MCNC: 7.9 GM/DL (ref 13.5–17.5)
HGB BLD-MCNC: 7.7 G/DL (ref 14–18)
LACTATE: 0.76 MMOL/L (ref 0.4–2)
LACTATE: 0.77 MMOL/L (ref 0.4–2)
LYMPHOCYTES # BLD: 0.1 K/UL (ref 1–4.8)
LYMPHOCYTES NFR BLD: 1 %
MAGNESIUM SERPL-MCNC: 1.8 MG/DL (ref 1.7–2.4)
MCH RBC QN AUTO: 28.2 PG (ref 27–31.3)
MCHC RBC AUTO-ENTMCNC: 31.6 % (ref 33–37)
MCV RBC AUTO: 89.4 FL (ref 79–92.2)
MONOCYTES # BLD: 0.2 K/UL (ref 0.2–0.8)
MONOCYTES NFR BLD: 2.9 %
NEUTROPHILS # BLD: 7.5 K/UL (ref 1.4–6.5)
NEUTS BAND NFR BLD MANUAL: 6 %
NEUTS SEG NFR BLD: 89 %
O2 SAT, ARTERIAL: 100 % (ref 93–100)
O2 SAT, ARTERIAL: 99 % (ref 93–100)
PCO2 ARTERIAL: 25 MM HG (ref 35–45)
PCO2 ARTERIAL: 32 MM HG (ref 35–45)
PERFORMED ON: ABNORMAL
PH ARTERIAL: 7.21 (ref 7.35–7.45)
PH ARTERIAL: 7.31 (ref 7.35–7.45)
PHOSPHATE SERPL-MCNC: 7.1 MG/DL (ref 2.3–4.8)
PLATELET # BLD AUTO: 164 K/UL (ref 130–400)
PLATELET BLD QL SMEAR: ADEQUATE
PO2 ARTERIAL: 171 MM HG (ref 75–108)
PO2 ARTERIAL: 177 MM HG (ref 75–108)
POC CHLORIDE: 110 MEQ/L (ref 99–110)
POC CHLORIDE: 110 MEQ/L (ref 99–110)
POC CREATININE: 6 MG/DL (ref 0.8–1.3)
POC CREATININE: 6.3 MG/DL (ref 0.8–1.3)
POC FIO2: 40
POC FIO2: 40
POC SAMPLE TYPE: ABNORMAL
POC SAMPLE TYPE: ABNORMAL
POIKILOCYTOSIS BLD QL SMEAR: ABNORMAL
POTASSIUM SERPL-SCNC: 4.2 MEQ/L (ref 3.5–5.1)
POTASSIUM SERPL-SCNC: 4.2 MEQ/L (ref 3.5–5.1)
POTASSIUM SERPL-SCNC: 4.6 MEQ/L (ref 3.4–4.9)
PROCALCITONIN SERPL IA-MCNC: 1.67 NG/ML (ref 0–0.15)
RBC # BLD AUTO: 2.73 M/UL (ref 4.7–6.1)
SLIDE REVIEW: ABNORMAL
SODIUM BLD-SCNC: 134 MEQ/L (ref 136–145)
SODIUM BLD-SCNC: 136 MEQ/L (ref 136–145)
SODIUM SERPL-SCNC: 136 MEQ/L (ref 135–144)
TCO2 ARTERIAL: 13 MMOL/L (ref 21–32)
TCO2 ARTERIAL: 14 MMOL/L (ref 21–32)
WBC # BLD AUTO: 7.9 K/UL (ref 4.8–10.8)

## 2024-12-19 PROCEDURE — 94003 VENT MGMT INPAT SUBQ DAY: CPT

## 2024-12-19 PROCEDURE — 85025 COMPLETE CBC W/AUTO DIFF WBC: CPT

## 2024-12-19 PROCEDURE — 2700000000 HC OXYGEN THERAPY PER DAY

## 2024-12-19 PROCEDURE — 2580000003 HC RX 258: Performed by: INTERNAL MEDICINE

## 2024-12-19 PROCEDURE — 84295 ASSAY OF SERUM SODIUM: CPT

## 2024-12-19 PROCEDURE — 2580000003 HC RX 258: Performed by: NURSE PRACTITIONER

## 2024-12-19 PROCEDURE — 93990 DOPPLER FLOW TESTING: CPT | Performed by: RADIOLOGY

## 2024-12-19 PROCEDURE — 94761 N-INVAS EAR/PLS OXIMETRY MLT: CPT

## 2024-12-19 PROCEDURE — 83036 HEMOGLOBIN GLYCOSYLATED A1C: CPT

## 2024-12-19 PROCEDURE — 93990 DOPPLER FLOW TESTING: CPT

## 2024-12-19 PROCEDURE — 6370000000 HC RX 637 (ALT 250 FOR IP): Performed by: INTERNAL MEDICINE

## 2024-12-19 PROCEDURE — 51798 US URINE CAPACITY MEASURE: CPT

## 2024-12-19 PROCEDURE — 6360000002 HC RX W HCPCS: Performed by: INTERNAL MEDICINE

## 2024-12-19 PROCEDURE — 83605 ASSAY OF LACTIC ACID: CPT

## 2024-12-19 PROCEDURE — 82565 ASSAY OF CREATININE: CPT

## 2024-12-19 PROCEDURE — 82435 ASSAY OF BLOOD CHLORIDE: CPT

## 2024-12-19 PROCEDURE — 2500000003 HC RX 250 WO HCPCS: Performed by: INTERNAL MEDICINE

## 2024-12-19 PROCEDURE — 84132 ASSAY OF SERUM POTASSIUM: CPT

## 2024-12-19 PROCEDURE — 83735 ASSAY OF MAGNESIUM: CPT

## 2024-12-19 PROCEDURE — 86140 C-REACTIVE PROTEIN: CPT

## 2024-12-19 PROCEDURE — 36415 COLL VENOUS BLD VENIPUNCTURE: CPT

## 2024-12-19 PROCEDURE — 84145 PROCALCITONIN (PCT): CPT

## 2024-12-19 PROCEDURE — 36600 WITHDRAWAL OF ARTERIAL BLOOD: CPT

## 2024-12-19 PROCEDURE — 82803 BLOOD GASES ANY COMBINATION: CPT

## 2024-12-19 PROCEDURE — 93306 TTE W/DOPPLER COMPLETE: CPT

## 2024-12-19 PROCEDURE — 99291 CRITICAL CARE FIRST HOUR: CPT | Performed by: INTERNAL MEDICINE

## 2024-12-19 PROCEDURE — 80069 RENAL FUNCTION PANEL: CPT

## 2024-12-19 PROCEDURE — 85014 HEMATOCRIT: CPT

## 2024-12-19 PROCEDURE — 6370000000 HC RX 637 (ALT 250 FOR IP): Performed by: NURSE PRACTITIONER

## 2024-12-19 PROCEDURE — 82330 ASSAY OF CALCIUM: CPT

## 2024-12-19 PROCEDURE — 2000000000 HC ICU R&B

## 2024-12-19 PROCEDURE — 6360000002 HC RX W HCPCS: Performed by: NURSE PRACTITIONER

## 2024-12-19 PROCEDURE — 82948 REAGENT STRIP/BLOOD GLUCOSE: CPT

## 2024-12-19 PROCEDURE — 93306 TTE W/DOPPLER COMPLETE: CPT | Performed by: INTERNAL MEDICINE

## 2024-12-19 RX ORDER — PROPOFOL 10 MG/ML
INJECTION, EMULSION INTRAVENOUS
Status: DISPENSED
Start: 2024-12-19 | End: 2024-12-20

## 2024-12-19 RX ORDER — POLYETHYLENE GLYCOL 3350 17 G/17G
17 POWDER, FOR SOLUTION ORAL DAILY
Status: DISCONTINUED | OUTPATIENT
Start: 2024-12-19 | End: 2025-01-14 | Stop reason: HOSPADM

## 2024-12-19 RX ORDER — PREDNISONE 10 MG/1
5 TABLET ORAL DAILY
Status: DISCONTINUED | OUTPATIENT
Start: 2024-12-19 | End: 2024-12-20

## 2024-12-19 RX ORDER — SODIUM CHLORIDE 9 MG/ML
INJECTION, SOLUTION INTRAVENOUS
Status: DISPENSED
Start: 2024-12-19 | End: 2024-12-20

## 2024-12-19 RX ORDER — MYCOPHENOLATE MOFETIL 200 MG/ML
500 POWDER, FOR SUSPENSION ORAL 2 TIMES DAILY
Status: DISCONTINUED | OUTPATIENT
Start: 2024-12-20 | End: 2024-12-22

## 2024-12-19 RX ORDER — CHLORHEXIDINE GLUCONATE ORAL RINSE 1.2 MG/ML
15 SOLUTION DENTAL 2 TIMES DAILY
Status: DISCONTINUED | OUTPATIENT
Start: 2024-12-19 | End: 2024-12-24 | Stop reason: SDUPTHER

## 2024-12-19 RX ORDER — AMIODARONE HYDROCHLORIDE 200 MG/1
200 TABLET ORAL 2 TIMES DAILY
Status: DISCONTINUED | OUTPATIENT
Start: 2024-12-19 | End: 2025-01-14 | Stop reason: HOSPADM

## 2024-12-19 RX ADMIN — MIDODRINE HYDROCHLORIDE 10 MG: 10 TABLET ORAL at 18:35

## 2024-12-19 RX ADMIN — CYCLOSPORINE 50 MG: 100 SOLUTION ORAL at 22:25

## 2024-12-19 RX ADMIN — Medication 100 MCG/HR: at 21:34

## 2024-12-19 RX ADMIN — Medication 10 ML: at 22:30

## 2024-12-19 RX ADMIN — INSULIN LISPRO 1 UNITS: 100 INJECTION, SOLUTION INTRAVENOUS; SUBCUTANEOUS at 06:40

## 2024-12-19 RX ADMIN — Medication 10 ML: at 09:43

## 2024-12-19 RX ADMIN — PREDNISONE 5 MG: 10 TABLET ORAL at 09:43

## 2024-12-19 RX ADMIN — AMIODARONE HYDROCHLORIDE 200 MG: 200 TABLET ORAL at 22:26

## 2024-12-19 RX ADMIN — VANCOMYCIN HYDROCHLORIDE 1750 MG: 1 INJECTION, POWDER, LYOPHILIZED, FOR SOLUTION INTRAVENOUS at 10:00

## 2024-12-19 RX ADMIN — SODIUM CHLORIDE: 9 INJECTION, SOLUTION INTRAVENOUS at 05:30

## 2024-12-19 RX ADMIN — MIDODRINE HYDROCHLORIDE 10 MG: 10 TABLET ORAL at 11:20

## 2024-12-19 RX ADMIN — CYCLOSPORINE 50 MG: 100 SOLUTION ORAL at 09:44

## 2024-12-19 RX ADMIN — Medication 125 MCG/HR: at 13:08

## 2024-12-19 RX ADMIN — CHLORHEXIDINE GLUCONATE, 0.12% ORAL RINSE 15 ML: 1.2 SOLUTION DENTAL at 09:43

## 2024-12-19 RX ADMIN — SODIUM CHLORIDE: 9 INJECTION, SOLUTION INTRAVENOUS at 21:35

## 2024-12-19 RX ADMIN — INSULIN LISPRO 1 UNITS: 100 INJECTION, SOLUTION INTRAVENOUS; SUBCUTANEOUS at 18:35

## 2024-12-19 RX ADMIN — MIDODRINE HYDROCHLORIDE 10 MG: 10 TABLET ORAL at 09:42

## 2024-12-19 RX ADMIN — SODIUM CHLORIDE: 9 INJECTION, SOLUTION INTRAVENOUS at 13:11

## 2024-12-19 RX ADMIN — INSULIN LISPRO 1 UNITS: 100 INJECTION, SOLUTION INTRAVENOUS; SUBCUTANEOUS at 11:20

## 2024-12-19 RX ADMIN — PIPERACILLIN AND TAZOBACTAM 3375 MG: 3; .375 INJECTION, POWDER, LYOPHILIZED, FOR SOLUTION INTRAVENOUS at 12:06

## 2024-12-19 RX ADMIN — AMIODARONE HYDROCHLORIDE 200 MG: 200 TABLET ORAL at 09:43

## 2024-12-19 RX ADMIN — HEPARIN SODIUM 5000 UNITS: 5000 INJECTION INTRAVENOUS; SUBCUTANEOUS at 22:26

## 2024-12-19 RX ADMIN — MYCOPHENOLATE MOFETIL 500 MG: 250 CAPSULE ORAL at 11:24

## 2024-12-19 RX ADMIN — DOCUSATE SODIUM 100 MG: 50 LIQUID ORAL at 09:43

## 2024-12-19 RX ADMIN — SODIUM CHLORIDE 40 MG: 9 INJECTION INTRAMUSCULAR; INTRAVENOUS; SUBCUTANEOUS at 09:43

## 2024-12-19 RX ADMIN — CHLORHEXIDINE GLUCONATE, 0.12% ORAL RINSE 15 ML: 1.2 SOLUTION DENTAL at 22:26

## 2024-12-19 RX ADMIN — PIPERACILLIN AND TAZOBACTAM 3375 MG: 3; .375 INJECTION, POWDER, LYOPHILIZED, FOR SOLUTION INTRAVENOUS at 00:47

## 2024-12-19 RX ADMIN — POLYETHYLENE GLYCOL 3350 17 G: 17 POWDER, FOR SOLUTION ORAL at 09:43

## 2024-12-19 RX ADMIN — HEPARIN SODIUM 5000 UNITS: 5000 INJECTION INTRAVENOUS; SUBCUTANEOUS at 06:33

## 2024-12-19 RX ADMIN — HEPARIN SODIUM 5000 UNITS: 5000 INJECTION INTRAVENOUS; SUBCUTANEOUS at 15:46

## 2024-12-19 ASSESSMENT — PULMONARY FUNCTION TESTS
PIF_VALUE: 22
PIF_VALUE: 23
PIF_VALUE: 22
PIF_VALUE: 22
PIF_VALUE: 23
PIF_VALUE: 23
PIF_VALUE: 22
PIF_VALUE: 25
PIF_VALUE: 23
PIF_VALUE: 25
PIF_VALUE: 22
PIF_VALUE: 23
PIF_VALUE: 25
PIF_VALUE: 25
PIF_VALUE: 23
PIF_VALUE: 22
PIF_VALUE: 25
PIF_VALUE: 25
PIF_VALUE: 23
PIF_VALUE: 26
PIF_VALUE: 23
PIF_VALUE: 25
PIF_VALUE: 23
PIF_VALUE: 23
PIF_VALUE: 25
PIF_VALUE: 24
PIF_VALUE: 23
PIF_VALUE: 24
PIF_VALUE: 27
PIF_VALUE: 25
PIF_VALUE: 22
PIF_VALUE: 26
PIF_VALUE: 23
PIF_VALUE: 25
PIF_VALUE: 26
PIF_VALUE: 25
PIF_VALUE: 23

## 2024-12-19 NOTE — PLAN OF CARE
Problem: Safety - Medical Restraint  Goal: Remains free of injury from restraints (Restraint for Interference with Medical Device)  Description: INTERVENTIONS:  1. Determine that other, less restrictive measures have been tried or would not be effective before applying the restraint  2. Evaluate the patient's condition at the time of restraint application  3. Inform patient/family regarding the reason for restraint  4. Q2H: Monitor safety, psychosocial status, comfort, nutrition and hydration  Outcome: Not Progressing  Flowsheets (Taken 12/18/2024 1942 by Mary Garcia RN)  Remains free of injury from restraints (restraint for interference with medical device):   Every 2 hours: Monitor safety, psychosocial status, comfort, nutrition and hydration   Evaluate the patient's condition at the time of restraint application     Problem: Chronic Conditions and Co-morbidities  Goal: Patient's chronic conditions and co-morbidity symptoms are monitored and maintained or improved  Outcome: Not Progressing  Flowsheets (Taken 12/18/2024 2100)  Care Plan - Patient's Chronic Conditions and Co-Morbidity Symptoms are Monitored and Maintained or Improved: Monitor and assess patient's chronic conditions and comorbid symptoms for stability, deterioration, or improvement     Problem: Discharge Planning  Goal: Discharge to home or other facility with appropriate resources  Outcome: Not Progressing  Flowsheets (Taken 12/18/2024 2100)  Discharge to home or other facility with appropriate resources: Identify barriers to discharge with patient and caregiver     Problem: Pain  Goal: Verbalizes/displays adequate comfort level or baseline comfort level  Outcome: Not Progressing  Flowsheets (Taken 12/19/2024 0400)  Verbalizes/displays adequate comfort level or baseline comfort level:   Encourage patient to monitor pain and request assistance   Assess pain using appropriate pain scale   Administer analgesics based on type and severity of pain

## 2024-12-19 NOTE — CONSULTS
Renal consult dictated  intubated  DELORES on CKD 4    S/P kidney transplant 2012  CAF Anemia    IV hydration  follow labs  continue cellcept/Neoral  control HR

## 2024-12-19 NOTE — CARE COORDINATION
ICU team quality rounds done this am outside of room d/t isolation protocol. Pt on vent with multiple drips . I received call from Alice at [facility] that pt ended services 11/30 and was going to have outpatient. They did a well check on him yest and he was not very responsive and was brought to ER. I will call wife to go over IMM.

## 2024-12-19 NOTE — PROGRESS NOTES
[facility]   Facility/Department: [facility] ICU  Speech Language Pathology    Remy Upton  1950  [id number]/[id number]    Date: 12/19/2024      Speech Therapy attempted to see Remy Upton on this date for a/an:    Clinical Bedside Swallow Evaluation    Pt was unable to be seen due to:   Other: Pt is currently intubated. Discussed with LISANDRA Elias that ST will follow up 12/20/24 to see if extubated. RN to page if pt extubated and appropriate this date.         Electronically signed by MEME Rodrigez on 12/19/24 at 10:23 AM EST

## 2024-12-19 NOTE — PROGRESS NOTES
Pharmacy Note  Vancomycin Consult    Remy Upton is a 73 y.o. male started on Vancomycin for sepsis; consult received from Dr. BROCK to manage therapy. Also receiving the following antibiotics: zosyn.    Rhinovirus [B34.8]  S/P kidney transplant [Z94.0]  DELORES (acute kidney injury) (HCC) [N17.9]  Acute respiratory failure with hypoxia [J96.01]  Acute kidney injury superimposed on CKD (HCC) [N17.9, N18.9]  Allergies: Statins    Cultures  Recent Labs     12/18/24  1345   COVID19 Not Detected     Height: 188 cm (6' 2\"), Weight - Scale: 72.8 kg (160 lb 7.9 oz), Body mass index is 20.61 kg/m².     MRSA Nasal swab:N/a, does not meet criteria    Recent Labs     12/19/24  0943   CREATININE 6.0*   Estimated Creatinine Clearance: 11 mL/min (A) (based on SCr of 6 mg/dL (HH)).  .    Assessment/Plan:  Will initiate Vancomycin with a one time loading dose of 1750 mg x1. Given renal function will draw level 24 hours from load to assess continued dosing. Timing of future trough levels may be adjusted based on culture results, renal function, and clinical response.    Thank you for the consult.  Will continue to follow.  Digna Morgan, [facility] PharmD

## 2024-12-19 NOTE — ED NOTES
Intubation summary:  Performed by Dr. Granados  Time out completed 1935  Etomidate in 1936 by Debo RN   Rocuronium in at 1937 administered by LISANDRA Edmondson   7.5 fr   25 cm at gum    Present in room: Debo Paulson, RN, Rajendra, medic, RT, and this nurse

## 2024-12-19 NOTE — ACP (ADVANCE CARE PLANNING)
Advance Care Planning     Advance Care Planning Activator (Inpatient)  Conversation Note      Date of ACP Conversation: 12/18/2024     Conversation Conducted with: Legal next of kin    ACP Activator: Carolina Montes RN        Health Care Decision Maker:     Current Designated Health Care Decision Maker:     Primary Decision Maker: Simona Upton - Spouse - [phone]    Secondary Decision Maker: Maria Luisa Upton - Child - [phone]

## 2024-12-19 NOTE — CONSULTS
[facility]                   [address] [place] [address]                              CONSULTATION      PATIENT NAME: LUL QUILES           : 1950  MED REC NO: [id number]                        ROOM: 01  ACCOUNT NO: [id number]                       ADMIT DATE: 2024  PROVIDER: Tico Rodriguez DO    RENAL CONSULT    CONSULT DATE: 2024      HISTORY OF PRESENT ILLNESS:  73-year-old  male admitted to the hospital with weakness and a serum creatinine of 5.9 and a GFR of 9 mL/minute.  Old records show that 2024, the patient had a creatinine of 3.56.  The serum creatinine 17 mL/minute.  The patient has been feeling poorly over the past 7 to 10 days.  He has generalized weakness and fatigue.  He states that he has been coughing, but denies any major shortness of breath.  He is status post kidney transplant 12 years ago, has had a chronic change with progressive deterioration in his renal function.  The patient is wheelchair bound at this time.  He denies any dysuria.  No abdominal pain.  No frequency or hematuria.  He does have a history of atrial fibrillation.    MEDICATIONS:  At time of his admission, amiodarone, aspirin, continue glucose monitor, Trulicity, Proscar, Flonase nasal spray, Lasix, Neurontin, insulin coverage, metoprolol, Reglan, ProAmatine, Protonix, Deltasone, sodium bicarbonate, Tamsulosin.    SOCIAL HISTORY:  .  No alcohol or drug use.    PAST SURGICAL HISTORY:  Kidney transplant, brain surgery for history of seizure activity, multiple colonoscopies, upper endoscopies, bronchoscopy.    PAST MEDICAL HISTORY:  Renal transplant, CKD 4, diabetes mellitus type 2, history of seizures, hypertension.    HABITS:  No smoking or alcohol use.    PHYSICAL EXAMINATION:  VITAL SIGNS:  The patient is 6 feet 2 inches, 168 pounds.  Blood pressure is 123/60, heart rate 90, respirations 23, afebrile.  HEENT:  Normocephalic.  Pupils

## 2024-12-19 NOTE — PLAN OF CARE
Nutrition Problem #1: Altered nutrition-related lab values  Intervention: Food and/or Nutrient Delivery: Start Tube Feeding  Nutritional

## 2024-12-19 NOTE — CARE COORDINATION
I called wife to discuss IMM and reviewed with her and will place copy in room. I did discuss poss dc needs with wife and she wants to wait to speak to  and we reviewed SNF choices and she states he has been to [facility] and loved that place so she would think he would like [facility].

## 2024-12-19 NOTE — CARE COORDINATION
Case Management Assessment  Initial Evaluation    Date/Time of Evaluation: 12/18/2024 9:13 PM  Assessment Completed by: Carolina Montes RN    If patient is discharged prior to next notation, then this note serves as note for discharge by case management.    Patient Name: Remy Upton                   YOB: 1950  Diagnosis: Rhinovirus [B34.8]  S/P kidney transplant [Z94.0]  DELORES (acute kidney injury) (HCC) [N17.9]  Acute respiratory failure with hypoxia [J96.01]  Acute kidney injury superimposed on CKD (HCC) [N17.9, N18.9]                   Date / Time: 12/18/2024 12:52 PM    Patient Admission Status: Inpatient   Readmission Risk (Low < 19, Mod (19-27), High > 27): Readmission Risk Score: 22.6    Current PCP: Tico Rodriguez, DO  PCP verified by CM? Yes    Chart Reviewed: Yes      History Provided by: Spouse  Patient Orientation: Unable to Assess (pt resting with his eyes closed. high flow O2 on and then was intubated.)    Patient Cognition:      Hospitalization in the last 30 days (Readmission):  No    If yes, Readmission Assessment in  Navigator will be completed.    Advance Directives:      Code Status: Full Code   Patient's Primary Decision Maker is: Legal Next of Kin (wife and daughter Maria Luisa.)    Primary Decision Maker: AlexsandraSimona - Spouse - [phone]    Secondary Decision Maker: Maria Luisa Upton - Child - [phone]    Discharge Planning:    Patient lives with: Spouse/Significant Other Type of Home: House  Primary Care Giver: Spouse  Patient Support Systems include: Spouse/Significant Other, Children   Current Financial resources: Other (Comment) ([employer] health plan.)  Current community resources: Other (Comment) (he's been going to outpatient therapy at [facility].)  Current services prior to admission: Durable Medical Equipment            Current DME: Cane, Shower Chair, Walker, Wheelchair            Type of Home Care services:       ADLS  Prior functional level:  Assistance with the following:, Bathing, Dressing, Cooking, Housework, Shopping  Current functional level: Assistance with the following:, Bathing, Dressing, Toileting, Cooking, Housework, Shopping, Mobility    PT AM-PAC:   /24  OT AM-PAC:   /24    Family can provide assistance at DC: Yes  Would you like Case Management to discuss the discharge plan with any other family members/significant others, and if so, who? Yes (wife.)  Plans to Return to Present Housing: Other (see comment) (to be determined by his condition.)  Other Identified Issues/Barriers to RETURNING to current housing: possible rehab/snf  Potential Assistance needed at discharge: Other (Comment) (to be determined: may need rehab/hhc.)            Potential DME:    Patient expects to discharge to: Other (comment) (to be determined.)  Plan for transportation at discharge:      Financial    Payor: [employer] / Plan: [employer] / Product Type: *No Product type* /     Does insurance require precert for SNF: Yes    Potential assistance Purchasing Medications: No  Meds-to-Beds request:        [facility] - [place], [place] - [address] - P [phone] - F [phone]  [address] [facility] [place] [address]  Phone: [phone] Fax: [phone]    [facility] - [place], [place] - [address] - P [phone] - F [phone]  [address]  [place] [address]  Phone: [phone] Fax: [phone]    [facility] [place], [place] - [address] [facility] - P [phone] - F [phone]  [address] [facility] [address]  Phone: [phone] Fax: [phone]    [facility] - [place], [place] - [address]  - P [phone] - F [phone]  [address]  [place] [address]  Phone: [phone] Fax: [phone]      Notes:    Factors facilitating achievement of predicted outcomes: Family support    Barriers to discharge: medical clearance, possible rehab/snf/hhc: pt is intubated  currently.    Additional Case Management Notes: the patient lives with his wife. He has a wheelchair, rolling walker, cane and a shower chair. He is not on home O2 or dialysis. He had been at [facility] and was going to their outpatient rehab after he was d/c per his wife. He is currently intubated and his d/c needs will need reassessed prior to d/c. He has had [facility] in the past per his wife.     The Plan for Transition of Care is related to the following treatment goals of Rhinovirus [B34.8]  S/P kidney transplant [Z94.0]  DELORES (acute kidney injury) (HCC) [N17.9]  Acute respiratory failure with hypoxia [J96.01]  Acute kidney injury superimposed on CKD (HCC) [N17.9, N18.9]    IF APPLICABLE: The Patient and/or patient representative Remy and his family were provided with a choice of provider and agrees with the discharge plan. Freedom of choice list with basic dialogue that supports the patient's individualized plan of care/goals and shares the quality data associated with the providers was provided to:     Patient Representative Name:       The Patient and/or Patient Representative Agree with the Discharge Plan?      Carolina Montes RN  Case Management Department

## 2024-12-19 NOTE — PROGRESS NOTES
Nephrology Progress Note    Assessment:  CKD-4  Hypotension  +Rhinovirus  DM type-2  S/P kidney transplant  chronic rejection  Anemia  PAF  tremors      Plan:may need dialysis in morning  tremors Medsd?    Patient Active Problem List:     Pneumonia     Abdominal pain, other specified site     Acute pyelonephritis without lesion of renal medullary necrosis     Anemia     Essential hypertension     Nonspecific abnormal results of thyroid function study     Mixed hyperlipidemia     Kidney replaced by transplant     Intra-abdominal abscess post-procedure (HCC)     Infection due to Enterococcus     Fever and other physiologic disturbances of temperature regulation     Chronic kidney disease (CKD)     Type 2 diabetes mellitus with stage 3b chronic kidney disease, without long-term current use of insulin (HCC)     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 (HCC)     Abnormal CT of the chest     Bronchiectasis without complication (HCC)     GI bleeding     Complication of transplanted kidney     Muscle weakness (generalized)     Difficulty in walking     Elevated BUN     Urinary retention     Immunosuppression due to drug therapy (HCC)     DELORES (acute kidney injury) (HCC)     Adjustment disorder     Gross hematuria     Bilateral lower extremity edema     Dysuria     Acute cystitis without hematuria     Bilateral hearing loss     Abscess, toe     Acute hyperkalemia     Acute pain of left shoulder     Astigmatism     Astigmatism of both eyes with presbyopia     At risk for stroke     Benign enlargement of prostate     Benign prostatic hyperplasia with urinary frequency     BK      Troponin: No results for input(s): \"TROPONINI\" in the last 72 hours.  BNP: No results for input(s): \"BNP\" in the last 72 hours.  INR: No results for input(s): \"INR\" in the last 72 hours.  Lipids: No results for input(s): \"CHOL\", \"LDLDIRECT\", \"TRIG\", \"HDL\", \"AMYLASE\", \"LIPASE\" in the last 72 hours.  Liver:   Recent Labs     12/18/24  1409   AST 33   ALT 16   ALKPHOS 67   BILITOT 1.3*     Iron:  No results for input(s): \"FERRITIN\" in the last 72 hours.    Invalid input(s): \"IRONS\", \"LABIRONS\"  Urinalysis: No results for input(s): \"UA\" in the last 72 hours.    Objective:  Vitals: BP (!) 93/52   Pulse 94   Temp 98.2 °F (36.8 °C)   Resp 18   Ht 1.88 m (6' 2\")   Wt 72.8 kg (160 lb 7.9 oz)   SpO2 100%   BMI 20.61 kg/m²    Wt Readings from Last 3 Encounters:   12/19/24 72.8 kg (160 lb 7.9 oz)   11/04/24 75.9 kg (167 lb 6.4 oz)   09/18/24 77.7 kg (171 lb 4.8 oz)      24HR INTAKE/OUTPUT:    Intake/Output Summary (Last 24 hours) at 12/19/2024 0821  Last data filed at 12/19/2024 0642  Gross per 24 hour   Intake 1739.33 ml   Output --   Net 1739.33 ml       General: alert, in no apparent distress  HEENT: normocephalic, atraumatic, anicteric  Neck: supple, no mass  Lungs: non-labored respirations, clear to auscultation bilaterally  Heart: regular rate and rhythm, no murmurs or rubs  Abdomen: soft, non-tender, non-distended  Ext: no cyanosis, no peripheral edema  Neuro: alert and oriented, no gross abnormalities  Psych: normal mood and affect  Skin: no rash      Electronically signed by Tico Rodriguez DO

## 2024-12-19 NOTE — PROGRESS NOTES
Shift Summary 2030-0700:    Pt to ICU bed 1 from ER. Pt transferred to ICU bed, skin assessment completed with Ladonna TIM RN. Mepilexs applied to bony prominences.    RR called due to BP being 60s/30s. Levo gtt started per verbal orders.     Pt unresponsive at first but at 2126 pt breathing into 40s, vent alarming high pressure. Fentanyl gtt started.     AM labs obtained, I/O completed. Pt had one large unmeasured urine output.

## 2024-12-19 NOTE — CONSULTS
Pulmonary and Critical Care Medicine  Consult Note  Encounter Date: 2024 8:26 AM    Mr. Remy Upton is a 73 y.o. male  : 1950  Requesting Provider: Brittney Narvaez MD    Reason for request: ICU management             HISTORY OF PRESENT ILLNESS:    Patient is 73 y.o. presents with generalized fatigue, feeling unwell, no fever, no chest pain, and no shortness of breath.  He is currently intubated on vent, he open his eyes to verbal stimuli but did not follow commands.  He is a wheelchair-bound.  He has history of end-stage renal disease status post transplant, A-fib, he is status post Watchman, history of DVT.  While in ED, patient progressively became hypoxemic with impaired consciousness, and he was intubated.          Past Medical History:        Diagnosis Date    Diabetes mellitus (HCC)     Hemodialysis access, fistula mature (HCC) 2002    Hypertension     Seizures (HCC)     no seizure since        Past Surgical History:        Procedure Laterality Date    BRAIN SURGERY Left 1990    to eliminate seizures    COLONOSCOPY N/A 10/24/2022    COLONOSCOPY DIAGNOSTIC performed by Va Ordoñez MD at [facility]    KIDNEY TRANSPLANT      ME [facility] INCL FLUOR GDNCE DX W/CELL WASHG SPX N/A 3/20/2018    BRONCHOSCOPY performed by Cristopher Conde MD at [facility]    UPPER GASTROINTESTINAL ENDOSCOPY N/A 10/21/2022    EGD DIAGNOSTIC ONLY performed by Jaime Martinez MD at [facility]       Social History:     reports that he quit smoking about 9 years ago. His smoking use included cigarettes. He has quit using smokeless tobacco. He reports that he does not drink alcohol and does not use drugs.    Family History:       Problem Relation Age of Onset    Colon Cancer Neg Hx        Allergies:  Statins        MEDICATIONS during current hospitalization:    Continuous Infusions:   dextrose      sodium chloride      sodium chloride 125 mL/hr at 24 0642    fentaNYL 75 mcg/hr

## 2024-12-19 NOTE — PROGRESS NOTES
Hospitalist Progress Note      PCP: Tico Rodriguez DO    Date of Admission: 12/18/2024    Chief Complaint:  patient was intubated last evening in ED for airway protection due to lethargy with GCS-3 per report, admitted to ICU, remains intubated and sedated with Fentanyl, hypothermic-34.4C and hypotensive requiring low dose Norepinephrine infusion,    Medications:  Reviewed    Infusion Medications    dextrose      sodium chloride      sodium chloride 125 mL/hr at 12/19/24 0642    fentaNYL 75 mcg/hr (12/19/24 0642)    norepinephrine 2 mcg/min (12/19/24 0000)     Scheduled Medications    polyethylene glycol  17 g Oral Daily    docusate  100 mg Oral BID    predniSONE  5 mg Oral Daily    amiodarone  200 mg Oral BID    chlorhexidine  15 mL Mouth/Throat BID    pantoprazole (PROTONIX) 40 mg in sodium chloride (PF) 0.9 % 10 mL injection  40 mg IntraVENous Daily    vancomycin (VANCOCIN) intermittent dosing (placeholder)   Other RX Placeholder    vancomycin  22.5 mg/kg IntraVENous Once    sodium chloride flush  5-40 mL IntraVENous 2 times per day    heparin (porcine)  5,000 Units SubCUTAneous 3 times per day    insulin lispro  0-4 Units SubCUTAneous 4 times per day    piperacillin-tazobactam  3,375 mg IntraVENous Q12H    midazolam  2 mg IntraVENous Once    midodrine  10 mg Oral TID WC    mycophenolate  500 mg Oral BID    cycloSPORINE  50 mg Oral BID     PRN Meds: glucose, dextrose bolus **OR** dextrose bolus, glucagon (rDNA), dextrose, sodium chloride flush, sodium chloride, ondansetron **OR** ondansetron, acetaminophen **OR** acetaminophen, ipratropium 0.5 mg-albuterol 2.5 mg      Intake/Output Summary (Last 24 hours) at 12/19/2024 1006  Last data filed at 12/19/2024 0642  Gross per 24 hour   Intake 1739.33 ml   Output --   Net 1739.33 ml       Exam:    BP (!) 93/52   Pulse 94   Temp 98.2 °F (36.8 °C)   Resp 18   Ht 1.88 m (6' 2\")   Wt 72.8 kg (160 lb 7.9 oz)   SpO2 100%   BMI 20.61 kg/m²     General appearance:

## 2024-12-19 NOTE — PROGRESS NOTES
PHARMACY NOTE:   ICU Rounds Attended (10-15 minutes in patient room):    Pt diagnosis: DELORES    IV Fluids: NS @ 125 cc/hr   Renal:   Recent Labs     12/18/24  2036 12/19/24  0458 12/19/24  0943   CREATININE 4.8* 6.45* 6.0*    Estimated Creatinine Clearance: 11 mL/min (A) (based on SCr of 6 mg/dL (HH)).        Antimicrobial Therapy:   Day 1/7   Antimicrobial agents: zosyn, vanco per below  Cultures blood pending   ID on consult: no    Recent Labs     12/18/24  1411 12/19/24  0458   WBC 6.7 7.9       Pressors:   norepinephrine @ 5 mcg/min     Sedation:   Fentanyl     Insulin Therapy (goal: 140-180): low  3 units given past 24 hours     Recent Labs     12/18/24  1324 12/18/24  1409 12/19/24  0458   GLUCOSE 202 190* 208*       Steroid Therapy: resumed per below    Stress Ulcer Prophylaxis:   protonix  On at home: protonix    DVT Prophylaxis/Anticoagulant Therapy: heparin TID  Recent Labs     12/18/24  1411 12/19/24  0458    164     No results for input(s): \"INR\" in the last 72 hours.      Bowel Regimen:   Colace added  Miralax added    Follow up/Changes:   -home meds reviewed: amiodarone and prednisone resumed per verbal on rounds. Follow up resume tamsulosin, finasteride, reglan, and metoprolol as appropriate  -bowel agents added per verbal on rounds  -vanco consult to dose per verbal on rounds  -dc trulicity per verbal ( can cause DELORES)  -core measures assessed/met    Digna Morgan RPH, PharmD   12/19/2024 10:52 AM

## 2024-12-19 NOTE — PROGRESS NOTES
Comprehensive Nutrition Assessment    Type and Reason for Visit:  Initial, Consult (TF order and manage, new vent)    Nutrition Recommendations/Plan:   Begin trophic tube feeding : standard with fiber formula ( Jevity 1.5) @ 20 ml/hr via OG  Water flushes 50 ml every 6 hrs while on IVF, then increase to 100 ml every 4hrs once IVF d/c  Continue to monitor renal status for adjustments to EN regimen     Malnutrition Assessment:  Malnutrition Status:  Moderate malnutrition (12/19/24 1020)    Context:  Chronic Illness     Findings of the 6 clinical characteristics of malnutrition:  Energy Intake:  Mild decrease in energy intake  Weight Loss:  Greater than 10% over 6 months     Body Fat Loss:  Mild body fat loss Buccal region, Orbital   Muscle Mass Loss:  Mild muscle mass loss Clavicles (pectoralis & deltoids), Temples (temporalis)  Fluid Accumulation:  No fluid accumulation     Strength:  Not Performed    Nutrition Assessment:    Pt meets criteria for moderate malnutrition related to chronic disease,progressive weight loss ~ 10%  in < 6 months, with losses of adipose and muscle stores, currently intubated, per rounds okay to start trophic tube feeding    Nutrition Related Findings:    [facility] resident, Hx significant for : DM2, ESRD/CKD4 s/p kidney transplant x 2;   coughing for the last couple of days per wife poor appeitite, +Rhinovirus. Intubated 12/18, OG in place sedate with fentanyl, low dose levophed, IVF = .9 NS @ 125 ml/hr via peripheral line, abnormal labs noted ( BUN = 126, creat = 6.45, gluc > 200), relevant meds reviewed, last BM PTA ( on daily bowel regimen), no plans for RRT yet per rounds Wound Type: Skin Tears       Current Nutrition Intake & Therapies:    Average Meal Intake: NPO  Average Supplements Intake: NPO  Diet NPO  Current Tube Feeding (TF) Orders:  Feeding Route: Orogastric  Formula: Standard with Fiber (jevity 1.5)  Schedule: Continuous  Feeding Regimen: 20 ml/hr = 480 ml  Additives/Modulars:  None  Water Flushes: 50 ml every 6 hrs , while on IVF, then increase to 100 ml every 4 hrs  Current TF Provides: 720 kcals, 31 g protein  ~ 565 ml free water    Anthropometric Measures:  Height: 188 cm (6' 2\")  Ideal Body Weight (IBW): 190 lbs (86 kg)    Admission Body Weight: 71.7 kg (158 lb)  Current Body Weight: 72.6 kg (160 lb), 84.2 % IBW. Weight Source: Bed scale  Current BMI (kg/m2): 20.5  Usual Body Weight: 80.3 kg (177 lb) (( 7/2024);173#(5/2024), 180# ( 10/39392)     % Weight Change (Calculated): -9.6                    BMI Categories: Underweight (BMI less than 22) age over 65    Estimated Daily Nutrient Needs:  Energy Requirements Based On: Kcal/kg  Weight Used for Energy Requirements: Current  Energy (kcal/day): 5569-9405 kcals @ 20-22 kcal/kg  Weight Used for Protein Requirements: Current  Protein (g/day): 73-95 g protein @ 1-1.3 g/kg due to renal fnx  Method Used for Fluid Requirements: Defer to provider  Fluid (ml/day): ~1600 or per provider    Nutrition Diagnosis:   Altered nutrition-related lab values related to renal dysfunction, endocrine dysfunction as evidenced by lab values  Inadequate oral intake related to impaired respiratory function as evidenced by intubation    Nutrition Interventions:   Food and/or Nutrient Delivery: Start Tube Feeding  Nutrition Education/Counseling: No recommendation at this time  Coordination of Nutrition Care: No recommendation at this time       Goals:  Goals: Initiate nutrition support, by next RD assessment          Nutrition Monitoring and Evaluation:      Food/Nutrient Intake Outcomes: Enteral Nutrition Intake/Tolerance  Physical Signs/Symptoms Outcomes: Biochemical Data, Hemodynamic Status    Discharge Planning:    Too soon to determine     PIA ROJAS, RD, LD

## 2024-12-19 NOTE — PROGRESS NOTES
unable to conduct assessment visit at this time because patient is intubated and no family are present.

## 2024-12-20 ENCOUNTER — APPOINTMENT (OUTPATIENT)
Dept: CT IMAGING | Age: 74
End: 2024-12-20
Payer: COMMERCIAL

## 2024-12-20 ENCOUNTER — APPOINTMENT (OUTPATIENT)
Dept: GENERAL RADIOLOGY | Age: 74
End: 2024-12-20
Payer: COMMERCIAL

## 2024-12-20 LAB
ALBUMIN SERPL-MCNC: 2.7 G/DL (ref 3.5–4.6)
ANION GAP SERPL CALCULATED.3IONS-SCNC: 25 MEQ/L (ref 9–15)
ANISOCYTOSIS BLD QL SMEAR: ABNORMAL
BASOPHILS # BLD: 0.2 K/UL (ref 0–0.2)
BASOPHILS NFR BLD: 2 %
BUN SERPL-MCNC: 123 MG/DL (ref 8–23)
BURR CELLS: ABNORMAL
CALCIUM SERPL-MCNC: 7.8 MG/DL (ref 8.5–9.9)
CHLORIDE SERPL-SCNC: 101 MEQ/L (ref 95–107)
CO2 SERPL-SCNC: 10 MEQ/L (ref 20–31)
CREAT SERPL-MCNC: 6.51 MG/DL (ref 0.7–1.2)
CRP SERPL HS-MCNC: 186.5 MG/L (ref 0–5)
EOSINOPHIL # BLD: 0.1 K/UL (ref 0–0.7)
EOSINOPHIL NFR BLD: 1 %
ERYTHROCYTE [DISTWIDTH] IN BLOOD BY AUTOMATED COUNT: 15.9 % (ref 11.5–14.5)
GLUCOSE BLD-MCNC: 233 MG/DL (ref 70–99)
GLUCOSE BLD-MCNC: 249 MG/DL (ref 70–99)
GLUCOSE BLD-MCNC: 251 MG/DL (ref 70–99)
GLUCOSE BLD-MCNC: 266 MG/DL (ref 70–99)
GLUCOSE BLD-MCNC: 274 MG/DL (ref 70–99)
GLUCOSE SERPL-MCNC: 232 MG/DL (ref 70–99)
HBV SURFACE AG SERPL QL IA: NORMAL
HCT VFR BLD AUTO: 23.8 % (ref 42–52)
HGB BLD-MCNC: 7.8 G/DL (ref 14–18)
LYMPHOCYTES # BLD: 0.2 K/UL (ref 1–4.8)
LYMPHOCYTES NFR BLD: 2 %
MAGNESIUM SERPL-MCNC: 1.6 MG/DL (ref 1.7–2.4)
MCH RBC QN AUTO: 28.4 PG (ref 27–31.3)
MCHC RBC AUTO-ENTMCNC: 32.8 % (ref 33–37)
MCV RBC AUTO: 86.5 FL (ref 79–92.2)
MONOCYTES # BLD: 0.4 K/UL (ref 0.2–0.8)
MONOCYTES NFR BLD: 4.8 %
NEUTROPHILS # BLD: 7.1 K/UL (ref 1.4–6.5)
NEUTS SEG NFR BLD: 91 %
PERFORMED ON: ABNORMAL
PHOSPHATE SERPL-MCNC: 5.9 MG/DL (ref 2.3–4.8)
PLATELET # BLD AUTO: 179 K/UL (ref 130–400)
PLATELET BLD QL SMEAR: ADEQUATE
POIKILOCYTOSIS BLD QL SMEAR: ABNORMAL
POTASSIUM SERPL-SCNC: 4.7 MEQ/L (ref 3.4–4.9)
PROCALCITONIN SERPL IA-MCNC: 2.51 NG/ML (ref 0–0.15)
RBC # BLD AUTO: 2.75 M/UL (ref 4.7–6.1)
SLIDE REVIEW: ABNORMAL
SODIUM SERPL-SCNC: 136 MEQ/L (ref 135–144)
VANCOMYCIN SERPL-MCNC: 11.5 UG/ML (ref 10–40)
WBC # BLD AUTO: 7.8 K/UL (ref 4.8–10.8)

## 2024-12-20 PROCEDURE — 84145 PROCALCITONIN (PCT): CPT

## 2024-12-20 PROCEDURE — 86706 HEP B SURFACE ANTIBODY: CPT

## 2024-12-20 PROCEDURE — 99291 CRITICAL CARE FIRST HOUR: CPT | Performed by: INTERNAL MEDICINE

## 2024-12-20 PROCEDURE — 6370000000 HC RX 637 (ALT 250 FOR IP): Performed by: NURSE PRACTITIONER

## 2024-12-20 PROCEDURE — 87340 HEPATITIS B SURFACE AG IA: CPT

## 2024-12-20 PROCEDURE — 85025 COMPLETE CBC W/AUTO DIFF WBC: CPT

## 2024-12-20 PROCEDURE — 6370000000 HC RX 637 (ALT 250 FOR IP): Performed by: INTERNAL MEDICINE

## 2024-12-20 PROCEDURE — 2700000000 HC OXYGEN THERAPY PER DAY

## 2024-12-20 PROCEDURE — 2580000003 HC RX 258: Performed by: INTERNAL MEDICINE

## 2024-12-20 PROCEDURE — 80202 ASSAY OF VANCOMYCIN: CPT

## 2024-12-20 PROCEDURE — 80069 RENAL FUNCTION PANEL: CPT

## 2024-12-20 PROCEDURE — 6360000002 HC RX W HCPCS: Performed by: INTERNAL MEDICINE

## 2024-12-20 PROCEDURE — 86140 C-REACTIVE PROTEIN: CPT

## 2024-12-20 PROCEDURE — 99222 1ST HOSP IP/OBS MODERATE 55: CPT | Performed by: PSYCHIATRY & NEUROLOGY

## 2024-12-20 PROCEDURE — 2500000003 HC RX 250 WO HCPCS: Performed by: INTERNAL MEDICINE

## 2024-12-20 PROCEDURE — 83735 ASSAY OF MAGNESIUM: CPT

## 2024-12-20 PROCEDURE — 51798 US URINE CAPACITY MEASURE: CPT

## 2024-12-20 PROCEDURE — P9047 ALBUMIN (HUMAN), 25%, 50ML: HCPCS | Performed by: INTERNAL MEDICINE

## 2024-12-20 PROCEDURE — 2000000000 HC ICU R&B

## 2024-12-20 PROCEDURE — 2580000003 HC RX 258: Performed by: NURSE PRACTITIONER

## 2024-12-20 PROCEDURE — 94003 VENT MGMT INPAT SUBQ DAY: CPT

## 2024-12-20 PROCEDURE — 5A1D70Z PERFORMANCE OF URINARY FILTRATION, INTERMITTENT, LESS THAN 6 HOURS PER DAY: ICD-10-PCS | Performed by: INTERNAL MEDICINE

## 2024-12-20 PROCEDURE — 71045 X-RAY EXAM CHEST 1 VIEW: CPT

## 2024-12-20 PROCEDURE — 36415 COLL VENOUS BLD VENIPUNCTURE: CPT

## 2024-12-20 PROCEDURE — 6360000002 HC RX W HCPCS: Performed by: NURSE PRACTITIONER

## 2024-12-20 PROCEDURE — 70450 CT HEAD/BRAIN W/O DYE: CPT

## 2024-12-20 RX ORDER — ALBUMIN (HUMAN) 12.5 G/50ML
25 SOLUTION INTRAVENOUS ONCE
Status: COMPLETED | OUTPATIENT
Start: 2024-12-20 | End: 2024-12-20

## 2024-12-20 RX ORDER — INSULIN LISPRO 100 [IU]/ML
0-16 INJECTION, SOLUTION INTRAVENOUS; SUBCUTANEOUS EVERY 4 HOURS
Status: DISCONTINUED | OUTPATIENT
Start: 2024-12-20 | End: 2025-01-14 | Stop reason: HOSPADM

## 2024-12-20 RX ORDER — MAGNESIUM SULFATE IN WATER 40 MG/ML
2000 INJECTION, SOLUTION INTRAVENOUS ONCE
Status: COMPLETED | OUTPATIENT
Start: 2024-12-20 | End: 2024-12-20

## 2024-12-20 RX ORDER — PROPOFOL 10 MG/ML
5-50 INJECTION, EMULSION INTRAVENOUS CONTINUOUS
Status: DISCONTINUED | OUTPATIENT
Start: 2024-12-20 | End: 2024-12-22

## 2024-12-20 RX ORDER — MIDAZOLAM HYDROCHLORIDE 2 MG/2ML
2 INJECTION, SOLUTION INTRAMUSCULAR; INTRAVENOUS ONCE
Status: COMPLETED | OUTPATIENT
Start: 2024-12-20 | End: 2024-12-20

## 2024-12-20 RX ORDER — LORAZEPAM 2 MG/ML
2 INJECTION INTRAMUSCULAR PRN
Status: DISCONTINUED | OUTPATIENT
Start: 2024-12-20 | End: 2024-12-31

## 2024-12-20 RX ORDER — PROPOFOL 10 MG/ML
INJECTION, EMULSION INTRAVENOUS
Status: DISPENSED
Start: 2024-12-20 | End: 2024-12-20

## 2024-12-20 RX ADMIN — CYCLOSPORINE 50 MG: 100 SOLUTION ORAL at 22:41

## 2024-12-20 RX ADMIN — INSULIN LISPRO 2 UNITS: 100 INJECTION, SOLUTION INTRAVENOUS; SUBCUTANEOUS at 01:06

## 2024-12-20 RX ADMIN — AMIODARONE HYDROCHLORIDE 200 MG: 200 TABLET ORAL at 08:06

## 2024-12-20 RX ADMIN — ALBUMIN (HUMAN) 25 G: 12.5 SOLUTION INTRAVENOUS at 17:00

## 2024-12-20 RX ADMIN — CHLORHEXIDINE GLUCONATE, 0.12% ORAL RINSE 15 ML: 1.2 SOLUTION DENTAL at 22:41

## 2024-12-20 RX ADMIN — POLYETHYLENE GLYCOL 3350 17 G: 17 POWDER, FOR SOLUTION ORAL at 08:06

## 2024-12-20 RX ADMIN — Medication 125 MCG/HR: at 13:27

## 2024-12-20 RX ADMIN — MYCOPHENOLATE MOFETIL 500 MG: 200 POWDER, FOR SUSPENSION ORAL at 22:00

## 2024-12-20 RX ADMIN — METHYLPREDNISOLONE SODIUM SUCCINATE 40 MG: 40 INJECTION INTRAMUSCULAR; INTRAVENOUS at 08:05

## 2024-12-20 RX ADMIN — MAGNESIUM SULFATE HEPTAHYDRATE 2000 MG: 40 INJECTION, SOLUTION INTRAVENOUS at 11:09

## 2024-12-20 RX ADMIN — INSULIN LISPRO 4 UNITS: 100 INJECTION, SOLUTION INTRAVENOUS; SUBCUTANEOUS at 22:57

## 2024-12-20 RX ADMIN — Medication 175 MCG/HR: at 05:03

## 2024-12-20 RX ADMIN — PROPOFOL 15 MCG/KG/MIN: 10 INJECTION, EMULSION INTRAVENOUS at 23:52

## 2024-12-20 RX ADMIN — DOCUSATE SODIUM 100 MG: 50 LIQUID ORAL at 22:41

## 2024-12-20 RX ADMIN — MIDODRINE HYDROCHLORIDE 10 MG: 10 TABLET ORAL at 11:13

## 2024-12-20 RX ADMIN — INSULIN LISPRO 8 UNITS: 100 INJECTION, SOLUTION INTRAVENOUS; SUBCUTANEOUS at 15:21

## 2024-12-20 RX ADMIN — MIDODRINE HYDROCHLORIDE 10 MG: 10 TABLET ORAL at 16:48

## 2024-12-20 RX ADMIN — PIPERACILLIN AND TAZOBACTAM 3375 MG: 3; .375 INJECTION, POWDER, LYOPHILIZED, FOR SOLUTION INTRAVENOUS at 11:55

## 2024-12-20 RX ADMIN — MIDODRINE HYDROCHLORIDE 10 MG: 10 TABLET ORAL at 08:06

## 2024-12-20 RX ADMIN — Medication 125 MCG/HR: at 22:27

## 2024-12-20 RX ADMIN — PROPOFOL 20 MCG/KG/MIN: 10 INJECTION, EMULSION INTRAVENOUS at 09:21

## 2024-12-20 RX ADMIN — AMIODARONE HYDROCHLORIDE 200 MG: 200 TABLET ORAL at 22:40

## 2024-12-20 RX ADMIN — VANCOMYCIN HYDROCHLORIDE 750 MG: 750 INJECTION, POWDER, LYOPHILIZED, FOR SOLUTION INTRAVENOUS at 22:40

## 2024-12-20 RX ADMIN — MIDAZOLAM 2 MG: 1 INJECTION INTRAMUSCULAR; INTRAVENOUS at 09:13

## 2024-12-20 RX ADMIN — HEPARIN SODIUM 5000 UNITS: 5000 INJECTION INTRAVENOUS; SUBCUTANEOUS at 05:15

## 2024-12-20 RX ADMIN — SODIUM CHLORIDE 40 MG: 9 INJECTION INTRAMUSCULAR; INTRAVENOUS; SUBCUTANEOUS at 08:06

## 2024-12-20 RX ADMIN — SODIUM CHLORIDE: 9 INJECTION, SOLUTION INTRAVENOUS at 05:04

## 2024-12-20 RX ADMIN — INSULIN LISPRO 4 UNITS: 100 INJECTION, SOLUTION INTRAVENOUS; SUBCUTANEOUS at 11:12

## 2024-12-20 RX ADMIN — DOCUSATE SODIUM 100 MG: 50 LIQUID ORAL at 08:06

## 2024-12-20 RX ADMIN — CHLORHEXIDINE GLUCONATE, 0.12% ORAL RINSE 15 ML: 1.2 SOLUTION DENTAL at 08:06

## 2024-12-20 RX ADMIN — Medication 10 ML: at 22:42

## 2024-12-20 RX ADMIN — CYCLOSPORINE 50 MG: 100 SOLUTION ORAL at 10:49

## 2024-12-20 RX ADMIN — MYCOPHENOLATE MOFETIL 500 MG: 200 POWDER, FOR SUSPENSION ORAL at 10:49

## 2024-12-20 RX ADMIN — HEPARIN SODIUM 5000 UNITS: 5000 INJECTION INTRAVENOUS; SUBCUTANEOUS at 13:26

## 2024-12-20 RX ADMIN — PIPERACILLIN AND TAZOBACTAM 3375 MG: 3; .375 INJECTION, POWDER, LYOPHILIZED, FOR SOLUTION INTRAVENOUS at 01:05

## 2024-12-20 RX ADMIN — INSULIN LISPRO 2 UNITS: 100 INJECTION, SOLUTION INTRAVENOUS; SUBCUTANEOUS at 06:54

## 2024-12-20 RX ADMIN — HEPARIN SODIUM 5000 UNITS: 5000 INJECTION INTRAVENOUS; SUBCUTANEOUS at 22:41

## 2024-12-20 ASSESSMENT — PULMONARY FUNCTION TESTS
PIF_VALUE: 28
PIF_VALUE: 27
PIF_VALUE: 26
PIF_VALUE: 25
PIF_VALUE: 27
PIF_VALUE: 25
PIF_VALUE: 26
PIF_VALUE: 27
PIF_VALUE: 26
PIF_VALUE: 34
PIF_VALUE: 38
PIF_VALUE: 26
PIF_VALUE: 26
PIF_VALUE: 27
PIF_VALUE: 25
PIF_VALUE: 26
PIF_VALUE: 25
PIF_VALUE: 28
PIF_VALUE: 26
PIF_VALUE: 24
PIF_VALUE: 25
PIF_VALUE: 26
PIF_VALUE: 29
PIF_VALUE: 27
PIF_VALUE: 26
PIF_VALUE: 27
PIF_VALUE: 26
PIF_VALUE: 27
PIF_VALUE: 25
PIF_VALUE: 26
PIF_VALUE: 26
PIF_VALUE: 27
PIF_VALUE: 26
PIF_VALUE: 25
PIF_VALUE: 26
PIF_VALUE: 24
PIF_VALUE: 28
PIF_VALUE: 38
PIF_VALUE: 27
PIF_VALUE: 26
PIF_VALUE: 28
PIF_VALUE: 28
PIF_VALUE: 26
PIF_VALUE: 26
PIF_VALUE: 28
PIF_VALUE: 27
PIF_VALUE: 26
PIF_VALUE: 27
PIF_VALUE: 26
PIF_VALUE: 28
PIF_VALUE: 28
PIF_VALUE: 34
PIF_VALUE: 34
PIF_VALUE: 28
PIF_VALUE: 28
PIF_VALUE: 27
PIF_VALUE: 27
PIF_VALUE: 34
PIF_VALUE: 27
PIF_VALUE: 28
PIF_VALUE: 27
PIF_VALUE: 28
PIF_VALUE: 28
PIF_VALUE: 29
PIF_VALUE: 26
PIF_VALUE: 38
PIF_VALUE: 28
PIF_VALUE: 26
PIF_VALUE: 27
PIF_VALUE: 26
PIF_VALUE: 26
PIF_VALUE: 28
PIF_VALUE: 26
PIF_VALUE: 27
PIF_VALUE: 26
PIF_VALUE: 27
PIF_VALUE: 27
PIF_VALUE: 28
PIF_VALUE: 27
PIF_VALUE: 38
PIF_VALUE: 27
PIF_VALUE: 26
PIF_VALUE: 26
PIF_VALUE: 28
PIF_VALUE: 29
PIF_VALUE: 27
PIF_VALUE: 27
PIF_VALUE: 26
PIF_VALUE: 27
PIF_VALUE: 26
PIF_VALUE: 28
PIF_VALUE: 28
PIF_VALUE: 27
PIF_VALUE: 28
PIF_VALUE: 27
PIF_VALUE: 25
PIF_VALUE: 27
PIF_VALUE: 26
PIF_VALUE: 25
PIF_VALUE: 26
PIF_VALUE: 28
PIF_VALUE: 28

## 2024-12-20 NOTE — PROGRESS NOTES
[facility]   Facility/Department: [facility]  Speech Language Pathology    Remy Upton  1950  [id number]    Date: 12/20/2024      Speech Therapy attempted to see Remy Upton on this date for a/an:    Clinical Bedside Swallow Evaluation    Pt was unable to be seen due to:   Other: Pt is still intubated. Asked LISANDRA Pastor for d/c of order until patient is extubated. RN verbalized understanding.        Electronically signed by MEME Vieira on 12/20/24 at 9:53 AM EST

## 2024-12-20 NOTE — PROGRESS NOTES
[facility]   Pharmacy Pharmacokinetic Monitoring Service - Vancomycin    Consulting Provider: Dr. Yadav   Indication: sepsis of unknown etiology  Target Concentration: Pre-Dialysis Concentration 15-20 mg/L  Day of Therapy: 2  Additional Antimicrobials: zosyn    Pertinent Laboratory Values:   Wt Readings from Last 1 Encounters:   12/20/24 75.6 kg (166 lb 10.7 oz)     Temp Readings from Last 1 Encounters:   12/20/24 98.2 °F (36.8 °C)     Recent Labs     12/19/24  0458 12/19/24  0943 12/19/24  1312 12/20/24  0510   CREATININE 6.45*   < > 6.3* 6.51*   *  --   --  123*   WBC 7.9  --   --  7.8    < > = values in this interval not displayed.     Procalcitonin: 2.51    Pertinent Cultures:  Culture Date Source Results   12/18/24 Blood x 2 No Growth to date.  Any change in status will be called.    MRSA Nasal Swab: N/A. Non-respiratory infection.    Recent vancomycin administrations                     vancomycin (VANCOCIN) 1,750 mg in sodium chloride 0.9 % 500 mL IVPB (mg) 1,750 mg New Bag 12/19/24 1000                    Assessment:  Date/Time Current Dose Concentration Dialysis Session   12/20/24 Intermittent due to renal insufficiency, last dose 1750 mg x 1 on 12/19 11.5 12/20 in the AM     Plan:  Concentration-guided dosing due to renal impairment and new intermittent hemodialysis   New order for dialysis x 1 session this morning ordered  Current dosing regimen of 1750 mg x 1 dose is sub-therapeutic   Will place order for 750 mg x 1 dose   Vancomycin concentration ordered for 12/21/24 @ 0600   Pharmacy will continue to monitor patient and adjust therapy as indicated    Thank you for the consult,  Nadeen Valera RPH  12/20/2024 10:53 AM

## 2024-12-20 NOTE — CARE COORDINATION
Team ICU rounds done this am outside of room. Pt to have Hemodialysis started today and he has a fistula. Pt still on vent with SBT's being done. Pt requiring more sedation for possible seizure type activity this am. Pt will not be a candidate for Michelle Khoury with dialysis and we will continue to monitor for future dc plan.

## 2024-12-20 NOTE — CONSULTS
Subjective:      Patient ID: Remy Upton is a 73 y.o. male who presents today for tremors..    HPI 73 right-handed young man who was admitted here for multiple medical issues.  We are consulted for evaluation of tremors or shivering.  Patient in isolation therefore history obtained from the nursing staff to avoid transmission.  It did appear that he was having some shivering or jerking or tremors and now on propofol and does not have any.  We were not therefore able to complete the workup for now.  Patient intubated and sedate    Review of Systems   Unable to perform ROS: Intubated       Past Medical History:   Diagnosis Date    Diabetes mellitus (HCC)     Hemodialysis access, fistula mature (HCC) 2002    Hypertension     Seizures (HCC)     no seizure since      Past Surgical History:   Procedure Laterality Date    BRAIN SURGERY Left 1990    to eliminate seizures    COLONOSCOPY N/A 10/24/2022    COLONOSCOPY DIAGNOSTIC performed by Va Ordoñez MD at [facility]    KIDNEY TRANSPLANT      [facility] INCL FLUOR GDNCE DX W/CELL WASHG SPX N/A 3/20/2018    BRONCHOSCOPY performed by Cristopher Conde MD at [facility]    UPPER GASTROINTESTINAL ENDOSCOPY N/A 10/21/2022    EGD DIAGNOSTIC ONLY performed by Jaime Martinez MD at [facility]     Social History     Socioeconomic History    Marital status:      Spouse name: Not on file    Number of children: Not on file    Years of education: Not on file    Highest education level: Not on file   Occupational History    Not on file   Tobacco Use    Smoking status: Former     Current packs/day: 0.00     Types: Cigarettes     Quit date: 2015     Years since quittin.5    Smokeless tobacco: Former   Vaping Use    Vaping status: Never Used   Substance and Sexual Activity    Alcohol use: No     Alcohol/week: 0.0 standard drinks of alcohol    Drug use: No    Sexual activity: Not Currently   Other Topics Concern    Not on file   Social  acetaminophen (TYLENOL) suppository 650 mg  650 mg Rectal Q6H PRN Brittney Narvaez MD        ipratropium 0.5 mg-albuterol 2.5 mg (DUONEB) nebulizer solution 1 Dose  1 Dose Inhalation Q4H PRN Brittney Narvaez MD        piperacillin-tazobactam (ZOSYN) 3,375 mg in sodium chloride 0.9 % 50 mL IVPB (mini-bag)  3,375 mg IntraVENous Q12H Brittney Narvaez MD 12.5 mL/hr at 12/20/24 1155 3,375 mg at 12/20/24 1155    midazolam PF (VERSED) injection 2 mg  2 mg IntraVENous Once Antony Granados MD        fentaNYL (SUBLIMAZE) 1,000 mcg in sodium chloride 0.9% 100 mL infusion   mcg/hr IntraVENous Continuous Fredi Lawson MD 12.5 mL/hr at 12/20/24 1327 125 mcg/hr at 12/20/24 1327    norepinephrine (LEVOPHED) 16 mg in sodium chloride 0.9 % 250 mL infusion  1-100 mcg/min IntraVENous Continuous Fredi Lawson MD 1.9 mL/hr at 12/20/24 0657 2 mcg/min at 12/20/24 0657    midodrine (PROAMATINE) tablet 10 mg  10 mg Oral TID  Tico Rodrgiuez, DO   10 mg at 12/20/24 1113    [Held by provider] mycophenolate (CELLCEPT) capsule 500 mg  500 mg Oral BID Tico Rodriguez, DO   500 mg at 12/19/24 1124    cycloSPORINE (NEORAL) 100 MG/ML microemulsion solution 50 mg  50 mg Oral BID Tico Rodriguez, DO   50 mg at 12/20/24 1049        Objective:   /73   Pulse 91   Temp 97.3 °F (36.3 °C)   Resp 30   Ht 1.88 m (6' 2\")   Wt 75.6 kg (166 lb 10.7 oz)   SpO2 100%   BMI 21.40 kg/m²     Physical Exam patient isolation and examination of pain through the nurse reports normal pupils and gag response and medicines which have responded to propofol.  Prior to start of propofol patient actually is following commands but somewhat confused and moved all his extremities    Echo (TTE) complete (PRN contrast/bubble/strain/3D)    Result Date: 12/19/2024    Left Ventricle: Normal left ventricular systolic function with a visually estimated EF of 55 - 60%. Left ventricle size is normal. Normal wall thickness. Normal wall motion.   Tricuspid Valve: Mild  blurring of the left hemidiaphragm, findings of moderate left lower lobe consolidation from either atelectasis or pneumonia. There is a tiny right pleural effusion, new compared to the previous study. There is no definite pleural effusion on the left. The heart remains top-normal in size. The mediastinum is normal in appearance. The osseous structures are normal in appearance for the patient's age. In the upper abdomen, there is no distension of bowel to suggest obstruction or ileus.  There is no sign of free air.     1. Endotracheal tube in satisfactory position. 2. Nasogastric tube with its tip in the gastric fundus but the proximal port is above the GE junction. Recommend advancing the NG tube 10 cm to place the proximal port below the GE junction. 3. New mild congestive failure. 4. New tiny right pleural effusion. 5. Moderate left lower lobe consolidation from either atelectasis or pneumonia. RECOMMENDATION: Recommended advancing the NG tube 10 cm to place the tip below the GE junction.     CT HEAD WO CONTRAST    Result Date: 12/18/2024  EXAMINATION: CT OF THE HEAD WITHOUT CONTRAST  12/18/2024 7:17 pm TECHNIQUE: CT of the head was performed without the administration of intravenous contrast. Automated exposure control, iterative reconstruction, and/or weight based adjustment of the mA/kV was utilized to reduce the radiation dose to as low as reasonably achievable. COMPARISON: CT head 11/04/2024. HISTORY: ORDERING SYSTEM PROVIDED HISTORY: [facility] TECHNOLOGIST PROVIDED HISTORY: Reason for exam:->[facility] Has a \"code stroke\" or \"stroke alert\" been called?->No What reading provider will be dictating this exam?->CRC FINDINGS: BRAIN/VENTRICLES: There is no acute intracranial hemorrhage, mass effect or midline shift.  No abnormal extra-axial fluid collection.  No evidence of an acute infarct.  Similar encephalomalacia of the left anterior temporal lobe. There is no evidence of acute hydrocephalus.  Similar periventricular white

## 2024-12-20 NOTE — PROGRESS NOTES
Shift Summary [id number]:    Handoff report received from Curt FRY at bedside, skin assessment completed.     Head to toe assessment completed, see flowsheets for details.    Nightly medications administered, see MAR for details.     Bed bath and linen change completed. Skin tears dressed with xeroform and roll gauze.      Critical labs received, HCO3 10  and creat 6.51. Dr. Lawson notified, no new orders at this time.

## 2024-12-20 NOTE — INTERDISCIPLINARY ROUNDS
Spiritual Care Services     Summary of Visit:  Attended ICU Rounds. Patient intubated, sleeping, no family present, patient recommended to be placed on possible dialysis for acute kidney injury. Patient has history of kidney complications, steven tradition [employer].   Encounter Summary  Encounter Overview/Reason: Interdisciplinary rounds  Service Provided For: Patient not available (patient intubated. No family were present.)                               Spiritual Assessment/Intervention/Outcomes:                     Care Plan:             Spiritual Care Services   Electronically signed by Chaplain Camilo on 12/20/2024 at 12:27 PM.    To reach a  for emotional and spiritual support, place an EPIC consult request.   If a  is needed immediately, dial “0” and ask to page the on-call .Atte

## 2024-12-20 NOTE — PROGRESS NOTES
Pulmonary & Critical Care Medicine ICU Progress Note  Chief complaint : Respiratory failure    Subjunctive/24 hour events :   Patient seen and examined during multidisciplinary rounds with RN, charge nurse, RT, pharmacy, dietitian, and social service.   On vent, open his eyes to verbal stimuli and follow commands, he is on Levophed at 2 mcg/min, no fever, on 30% FiO2 saturation 100%, tolerates tube feed, no vomiting, urine output none, +6 L        New information updated in the note today, rest of the examination did not change compared to yesterday.  Social History     Tobacco Use    Smoking status: Former     Current packs/day: 0.00     Types: Cigarettes     Quit date: 2015     Years since quittin.5    Smokeless tobacco: Former   Substance Use Topics    Alcohol use: No     Alcohol/week: 0.0 standard drinks of alcohol         Problem Relation Age of Onset    Colon Cancer Neg Hx        Recent Labs     24  0943 24  1312   PHART 7.211* 7.311*   [id number] 32* 25*   PO2ART 171* 177*       MV Settings:  Vent Mode: AC/VC Resp Rate (Set): 30 bpm/Vt (Set, mL): 500 mL/ /FiO2 : 30 %           IV:   propofol 20 mcg/kg/min (24 09)    sodium chloride      dextrose      sodium chloride      fentaNYL 175 mcg/hr (24)    norepinephrine 2 mcg/min (24)       Vitals:  BP (!) 123/59   Pulse 99   Temp 98.2 °F (36.8 °C)   Resp 30   Ht 1.88 m (6' 2\")   Wt 75.6 kg (166 lb 10.7 oz)   SpO2 100%   BMI 21.40 kg/m²    Tmax:        Intake/Output Summary (Last 24 hours) at 2024 0933  Last data filed at 2024 0657  Gross per 24 hour   Intake 4446.18 ml   Output --   Net 4446.18 ml       EXAM:  General: Sedated, no distress  Head: normocephalic, atraumatic  Eyes:No gross abnormalities.  ENT:  MMM no lesions  Neck:  supple and no masses  Chest : Vent sounds, wheezing, nontender, tympanic  Heart:: Heart sounds are normal.  Regular rate and rhythm without murmur, gallop or rub.  ABD:   symmetric, soft, non-tender, no guarding or rebound  Musculoskeletal : no cyanosis, no clubbing, and no edema  Neuro:   Sedated, open his eyes and follows simple commands  Skin: No rashes or nodules noted.  Lymph node:  no cervical nodes  Urology: No Walters   Psychiatric: Calm    Medications:  Scheduled Meds:   methylPREDNISolone  40 mg IntraVENous Daily    magnesium sulfate  2,000 mg IntraVENous Once    insulin lispro  0-16 Units SubCUTAneous Q4H    propofol        polyethylene glycol  17 g Oral Daily    docusate  100 mg Oral BID    amiodarone  200 mg Oral BID    chlorhexidine  15 mL Mouth/Throat BID    pantoprazole (PROTONIX) 40 mg in sodium chloride (PF) 0.9 % 10 mL injection  40 mg IntraVENous Daily    vancomycin (VANCOCIN) intermittent dosing (placeholder)   Other RX Placeholder    mycophenolate  500 mg Oral BID    propofol        sodium chloride flush  5-40 mL IntraVENous 2 times per day    heparin (porcine)  5,000 Units SubCUTAneous 3 times per day    piperacillin-tazobactam  3,375 mg IntraVENous Q12H    midazolam  2 mg IntraVENous Once    midodrine  10 mg Oral TID WC    [Held by provider] mycophenolate  500 mg Oral BID    cycloSPORINE  50 mg Oral BID       PRN Meds:  LORazepam, propofol, sodium chloride, propofol, glucose, dextrose bolus **OR** dextrose bolus, glucagon (rDNA), dextrose, sodium chloride flush, sodium chloride, ondansetron **OR** ondansetron, acetaminophen **OR** acetaminophen, ipratropium 0.5 mg-albuterol 2.5 mg    Results: reviewed by me   CBC:   Recent Labs     12/18/24  1411 12/18/24 2036 12/19/24  0458 12/19/24  0943 12/19/24  1312 12/20/24  0510   WBC 6.7  --  7.9  --   --  7.8   HGB 8.7*   < > 7.7* 7.9* 7.6* 7.8*   HCT 26.9*  --  24.4*  --   --  23.8*   MCV 89.4  --  89.4  --   --  86.5     --  164  --   --  179    < > = values in this interval not displayed.     BMP:   Recent Labs     12/18/24  1409 12/18/24  2036 12/19/24  0458 12/19/24  0943 12/19/24  1312 12/20/24  0510   NA

## 2024-12-20 NOTE — PROGRESS NOTES
Nephrology Progress Note    Assessment:  CKD-5  Metabolic acidosis  Intubated on ventilator  Hypomg++  Anemia  Failure to thrive  Hyperphosphtemia   Elevated procalcitonin  Tremors. Seizures vs  meds      Plan: will do dialysis treatment  has patent fistula  tube feedings started with flushes  will d/c IV    Patient Active Problem List:     Pneumonia     Abdominal pain, other specified site     Acute pyelonephritis without lesion of renal medullary necrosis     Anemia     Essential hypertension     Nonspecific abnormal results of thyroid function study     Mixed hyperlipidemia     Kidney replaced by transplant     Intra-abdominal abscess post-procedure (HCC)     Infection due to Enterococcus     Fever and other physiologic disturbances of temperature regulation     Chronic kidney disease (CKD)     Type 2 diabetes mellitus with stage 3b chronic kidney disease, without long-term current use of insulin (HCC)     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 (HCC)     Abnormal CT of the chest     Bronchiectasis without complication (HCC)     GI bleeding     Complication of transplanted kidney     Muscle weakness (generalized)     Difficulty in walking     Elevated BUN     Urinary retention     Immunosuppression due to drug therapy (HCC)     DELORES (acute kidney injury) (HCC)     Adjustment disorder     Gross hematuria     Bilateral lower extremity edema     Dysuria     Acute cystitis without hematuria     Bilateral hearing loss     Abscess, toe     Acute hyperkalemia     Acute pain of left shoulder     Astigmatism     Astigmatism of both eyes with presbyopia     At risk for

## 2024-12-20 NOTE — PROGRESS NOTES
Physician Progress Note      PATIENT:               LUL QUILES  CSN #:                  [id number]  :                       1950  ADMIT DATE:       2024 12:52 PM  DISCH DATE:  RESPONDING  PROVIDER #:        Brittney MARTINEZ MD          QUERY TEXT:    Pt admitted with pneumonia .  Noted documentation of septic shock by Dr. Yadav.  If possible, please document in progress notes and discharge   summary:    The medical record reflects the following, Maxipime, Levophed, Zosyn,   Vancomycin  Options provided:  -- Sepsis due to pneumonia confirmed present on admission  -- Sepsis due to pneumonia ruled out  -- Defer to Dr. Yadav consultant documentation regarding sepsis  -- Other - I will add my own diagnosis  -- Disagree - Not applicable / Not valid  -- Disagree - Clinically unable to determine / Unknown  -- Refer to Clinical Documentation Reviewer    PROVIDER RESPONSE TEXT:    I defer to Dr. Yadav consultant regarding documentation of sepsis.    Query created by: Shaina Mccoy on 2024 10:10 AM      Electronically signed by:  Brittney MARTINEZ MD 2024 10:12 AM

## 2024-12-20 NOTE — PLAN OF CARE
Problem: Safety - Medical Restraint  Goal: Remains free of injury from restraints (Restraint for Interference with Medical Device)  Description: INTERVENTIONS:  1. Determine that other, less restrictive measures have been tried or would not be effective before applying the restraint  2. Evaluate the patient's condition at the time of restraint application  3. Inform patient/family regarding the reason for restraint  4. Q2H: Monitor safety, psychosocial status, comfort, nutrition and hydration  Outcome: Not Progressing  Flowsheets (Taken 12/19/2024 2000)  Remains free of injury from restraints (restraint for interference with medical device):   Determine that other, less restrictive measures have been tried or would not be effective before applying the restraint   Evaluate the patient's condition at the time of restraint application   Inform patient/family regarding the reason for restraint   Every 2 hours: Monitor safety, psychosocial status, comfort, nutrition and hydration     Problem: Chronic Conditions and Co-morbidities  Goal: Patient's chronic conditions and co-morbidity symptoms are monitored and maintained or improved  Outcome: Not Progressing     Problem: Discharge Planning  Goal: Discharge to home or other facility with appropriate resources  Outcome: Not Progressing     Problem: Pain  Goal: Verbalizes/displays adequate comfort level or baseline comfort level  Outcome: Not Progressing  Flowsheets (Taken 12/19/2024 2000)  Verbalizes/displays adequate comfort level or baseline comfort level:   Encourage patient to monitor pain and request assistance   Assess pain using appropriate pain scale     Problem: Safety - Adult  Goal: Free from fall injury  Outcome: Not Progressing     Problem: Skin/Tissue Integrity  Goal: Absence of new skin breakdown  Description: 1.  Monitor for areas of redness and/or skin breakdown  2.  Assess vascular access sites hourly  3.  Every 4-6 hours minimum:  Change oxygen saturation  patient's view, family's view, and healthcare provider's view of condition  2. Facilitate patient and family articulation of goals for care  3. Help patient and family identify pros/cons of alternative solutions  4. Provide information as requested by patient/family  5. Respect patient/family right to receive or not to receive information  6. Serve as a liaison between patient and family and health care team  7. Initiate Consults from Ethics, Palliative Care or initiate Family Care Conference as is appropriate  Outcome: Not Progressing  Flowsheets (Taken 12/19/2024 2000)  Patient/family able to effectively weigh alternatives and participate in decision making related to treatment and care:   Determine when there are differences between patient's view, family's view, and healthcare provider's view of condition   Facilitate patient and family articulation of goals for care     Problem: Nutrition Deficit:  Goal: Optimize nutritional status  Outcome: Not Progressing

## 2024-12-20 NOTE — PROGRESS NOTES
Spiritual Health History and Assessment/Progress Note  [facility]    Initial Encounter,  ,  ,      Name: Remy Upton MRN: [id number]    Age: 73 y.o.     Sex: male   Language: English   Amish: Sabianist   DELORES (acute kidney injury) (HCC)     Date: 12/20/2024            Total Time Calculated: (P) 15 min              Spiritual Assessment began in [facility]            Encounter Overview/Reason: Initial Encounter  Service Provided For: Patient    Patient resting and sleeping at time of chaplains visit. Patient intubated,  offered prayer of wellness, healing, and comfort.    Marian, Belief, Meaning:   Patient is connected with a marian tradition or spiritual practice  Family/Friends No family/friends present      Importance and Influence:  Patient unable to assess at this time  Family/Friends No family/friends present    Community:  Patient feels well-supported. Support system includes: Spouse/Partner and Children  Family/Friends No family/friends present    Assessment and Plan of Care:     Patient Interventions include: Other: Prayer of wellness   Family/Friends Interventions include: No family/friends present    Patient Plan of Care: Spiritual Care available upon further referral  Family/Friends Plan of Care: No family/friends present    Electronically signed by Chaplain Camilo on 12/20/2024 at 12:33 PM

## 2024-12-20 NOTE — PROGRESS NOTES
0800- morning medications administered via orogastric tube. 100ml residual tube feed extracted from OG. Sedation off for SBT today per Dr. Yadav. 2 medications unavailable in [facility] and requested from main pharmacy. Meds have not come through tube system yet. Per pharmacy, medications are going to take a while to come up due to availability. Patient able to open eyes follow commands by squeezing hands, wiggling toes and tracking fingers with eyes. Pupils are even and reactive to light. Patient with moderate tremors in his bilateral upper extremities.     0921- Morning rounds completed with ICU interdisciplinary team. Dr. Rodriguez at bedside. Patient with continuous tremors and unable to follow commands at this time. Patient not coughing with suction by RT. Concern patient is experiencing seizures. One time dose of 2mg versed and propofol started. Consult for neurology placed per ICU NP.     936- Levophed titrated as patient tolerated and ICU physician aware.     1230- phone update to patient's spouse Simona. Phone consent for hemodialysis obtained with 2 RN. Patient continues to tolerate sedation well with -1 RASS.     1619- Patient undergoing hemodialysis at bedside. Dr. Rodriguez aware of levophed titration per patient tolerance during dialysis treatment. Pharmacy contacted to retime vancomycin due to dialysis.     Electronically signed by Farrah Lam RN on 12/20/2024 at 6:46 PM

## 2024-12-20 NOTE — PLAN OF CARE
hydration  12/20/2024 0631 by Felicia Sandoval RN  Outcome: Not Progressing  Flowsheets (Taken 12/19/2024 2000)  Remains free of injury from restraints (restraint for interference with medical device):   Determine that other, less restrictive measures have been tried or would not be effective before applying the restraint   Evaluate the patient's condition at the time of restraint application   Inform patient/family regarding the reason for restraint   Every 2 hours: Monitor safety, psychosocial status, comfort, nutrition and hydration     Problem: Chronic Conditions and Co-morbidities  Goal: Patient's chronic conditions and co-morbidity symptoms are monitored and maintained or improved  12/20/2024 1604 by Farrah Lam RN  Outcome: Progressing  12/20/2024 0631 by Felicia Sandoval RN  Outcome: Not Progressing     Problem: Discharge Planning  Goal: Discharge to home or other facility with appropriate resources  12/20/2024 1604 by Farrah Lam RN  Outcome: Progressing  12/20/2024 0631 by Felicia Sandoval RN  Outcome: Not Progressing     Problem: Pain  Goal: Verbalizes/displays adequate comfort level or baseline comfort level  12/20/2024 1604 by Farrah Lam RN  Outcome: Progressing  12/20/2024 0631 by Felicia Sandoval RN  Outcome: Not Progressing  Flowsheets (Taken 12/19/2024 2000)  Verbalizes/displays adequate comfort level or baseline comfort level:   Encourage patient to monitor pain and request assistance   Assess pain using appropriate pain scale     Problem: Safety - Adult  Goal: Free from fall injury  12/20/2024 1604 by Farrah Lam RN  Outcome: Progressing  12/20/2024 0631 by Felicia Sandoval RN  Outcome: Not Progressing     Problem: Skin/Tissue Integrity  Goal: Absence of new skin breakdown  Description: 1.  Monitor for areas of redness and/or skin breakdown  2.  Assess vascular access sites hourly  3.  Every 4-6 hours minimum:  Change oxygen saturation probe site  4.  Every 4-6 hours:  If on nasal  continuous positive airway pressure, respiratory therapy assess nares and determine need for appliance change or resting period.  12/20/2024 1604 by Farrah Lam RN  Outcome: Progressing  12/20/2024 0631 by Felicia Sandoval RN  Outcome: Not Progressing     Problem: Neurosensory - Adult  Goal: Achieves stable or improved neurological status  12/20/2024 1604 by Farrah Lam RN  Outcome: Progressing  12/20/2024 0631 by Felicia Sandoval RN  Outcome: Not Progressing     Problem: Respiratory - Adult  Goal: Achieves optimal ventilation and oxygenation  12/20/2024 1604 by Farrah Lam RN  Outcome: Progressing  12/20/2024 0631 by Felicia Sandoval RN  Outcome: Not Progressing     Problem: Cardiovascular - Adult  Goal: Maintains optimal cardiac output and hemodynamic stability  12/20/2024 1604 by Farrah Lam RN  Outcome: Progressing  12/20/2024 0631 by Felicia Sandoval RN  Outcome: Not Progressing  Goal: Absence of cardiac dysrhythmias or at baseline  12/20/2024 1604 by Farrah Lam RN  Outcome: Progressing  12/20/2024 0631 by Felicia Sandoval RN  Outcome: Not Progressing     Problem: Skin/Tissue Integrity - Adult  Goal: Skin integrity remains intact  12/20/2024 1604 by Farrah Lam RN  Outcome: Progressing  Flowsheets (Taken 12/20/2024 0800)  Skin Integrity Remains Intact: Monitor for areas of redness and/or skin breakdown  12/20/2024 0631 by Felicia Sandoval RN  Outcome: Not Progressing     Problem: Musculoskeletal - Adult  Goal: Return mobility to safest level of function  12/20/2024 1604 by Farrah Lam RN  Outcome: Progressing  12/20/2024 0631 by Felicia Sandoval RN  Outcome: Not Progressing     Problem: Gastrointestinal - Adult  Goal: Minimal or absence of nausea and vomiting  12/20/2024 1604 by Farrah Lam RN  Outcome: Progressing  12/20/2024 0631 by Felicia Sandoval RN  Outcome: Not Progressing  Goal: Maintains or returns to baseline bowel function  12/20/2024 1604 by Farrah Lam  patient/family  5. Respect patient/family right to receive or not to receive information  6. Serve as a liaison between patient and family and health care team  7. Initiate Consults from Ethics, Palliative Care or initiate Family Care Conference as is appropriate  12/20/2024 1604 by Farrah Lam RN  Outcome: Progressing  12/20/2024 0631 by Felicia Sandoval RN  Outcome: Not Progressing  Flowsheets (Taken 12/19/2024 2000)  Patient/family able to effectively weigh alternatives and participate in decision making related to treatment and care:   Determine when there are differences between patient's view, family's view, and healthcare provider's view of condition   Facilitate patient and family articulation of goals for care     Problem: Nutrition Deficit:  Goal: Optimize nutritional status  12/20/2024 1604 by Farrah Lam RN  Outcome: Progressing  12/20/2024 0631 by Felicia Sandoval RN  Outcome: Not Progressing

## 2024-12-21 LAB
ALBUMIN SERPL-MCNC: 2.9 G/DL (ref 3.5–4.6)
ANION GAP SERPL CALCULATED.3IONS-SCNC: 18 MEQ/L (ref 9–15)
BASOPHILS # BLD: 0 K/UL (ref 0–0.2)
BASOPHILS NFR BLD: 0 %
BUN SERPL-MCNC: 65 MG/DL (ref 8–23)
CALCIUM SERPL-MCNC: 7.6 MG/DL (ref 8.5–9.9)
CHLORIDE SERPL-SCNC: 96 MEQ/L (ref 95–107)
CO2 SERPL-SCNC: 20 MEQ/L (ref 20–31)
CREAT SERPL-MCNC: 3.94 MG/DL (ref 0.7–1.2)
CRP SERPL HS-MCNC: 181.5 MG/L (ref 0–5)
CYCLOSPORINE A: 239.1 NG/ML
EOSINOPHIL # BLD: 0 K/UL (ref 0–0.7)
EOSINOPHIL NFR BLD: 0 %
ERYTHROCYTE [DISTWIDTH] IN BLOOD BY AUTOMATED COUNT: 15.2 % (ref 11.5–14.5)
GLUCOSE BLD-MCNC: 131 MG/DL (ref 70–99)
GLUCOSE BLD-MCNC: 211 MG/DL (ref 70–99)
GLUCOSE BLD-MCNC: 223 MG/DL (ref 70–99)
GLUCOSE BLD-MCNC: 231 MG/DL (ref 70–99)
GLUCOSE BLD-MCNC: 240 MG/DL (ref 70–99)
GLUCOSE SERPL-MCNC: 192 MG/DL (ref 70–99)
HBV SURFACE AB TITR SER: <3.5 MIU/ML
HCT VFR BLD AUTO: 19.7 % (ref 42–52)
HGB BLD-MCNC: 7 G/DL (ref 14–18)
LYMPHOCYTES # BLD: 0.1 K/UL (ref 1–4.8)
LYMPHOCYTES NFR BLD: 1.1 %
MAGNESIUM SERPL-MCNC: 1.8 MG/DL (ref 1.7–2.4)
MCH RBC QN AUTO: 29.5 PG (ref 27–31.3)
MCHC RBC AUTO-ENTMCNC: 35.5 % (ref 33–37)
MCV RBC AUTO: 83.1 FL (ref 79–92.2)
MONOCYTES # BLD: 0.4 K/UL (ref 0.2–0.8)
MONOCYTES NFR BLD: 5.1 %
NEUTROPHILS # BLD: 6.4 K/UL (ref 1.4–6.5)
NEUTS SEG NFR BLD: 87.2 %
PERFORMED ON: ABNORMAL
PHOSPHATE SERPL-MCNC: 3.4 MG/DL (ref 2.3–4.8)
PLATELET # BLD AUTO: 169 K/UL (ref 130–400)
POTASSIUM SERPL-SCNC: 4.1 MEQ/L (ref 3.4–4.9)
PROCALCITONIN SERPL IA-MCNC: 2.49 NG/ML (ref 0–0.15)
RBC # BLD AUTO: 2.37 M/UL (ref 4.7–6.1)
SODIUM SERPL-SCNC: 134 MEQ/L (ref 135–144)
VANCOMYCIN SERPL-MCNC: 12.5 UG/ML (ref 10–40)
WBC # BLD AUTO: 7.4 K/UL (ref 4.8–10.8)

## 2024-12-21 PROCEDURE — 2500000003 HC RX 250 WO HCPCS: Performed by: INTERNAL MEDICINE

## 2024-12-21 PROCEDURE — 99291 CRITICAL CARE FIRST HOUR: CPT | Performed by: INTERNAL MEDICINE

## 2024-12-21 PROCEDURE — 84145 PROCALCITONIN (PCT): CPT

## 2024-12-21 PROCEDURE — 2580000003 HC RX 258: Performed by: NURSE PRACTITIONER

## 2024-12-21 PROCEDURE — 83735 ASSAY OF MAGNESIUM: CPT

## 2024-12-21 PROCEDURE — 80069 RENAL FUNCTION PANEL: CPT

## 2024-12-21 PROCEDURE — 6370000000 HC RX 637 (ALT 250 FOR IP): Performed by: INTERNAL MEDICINE

## 2024-12-21 PROCEDURE — 89220 SPUTUM SPECIMEN COLLECTION: CPT

## 2024-12-21 PROCEDURE — 6360000002 HC RX W HCPCS: Performed by: INTERNAL MEDICINE

## 2024-12-21 PROCEDURE — 6360000002 HC RX W HCPCS: Performed by: NURSE PRACTITIONER

## 2024-12-21 PROCEDURE — 85025 COMPLETE CBC W/AUTO DIFF WBC: CPT

## 2024-12-21 PROCEDURE — 6370000000 HC RX 637 (ALT 250 FOR IP): Performed by: NURSE PRACTITIONER

## 2024-12-21 PROCEDURE — 80202 ASSAY OF VANCOMYCIN: CPT

## 2024-12-21 PROCEDURE — 2580000003 HC RX 258: Performed by: INTERNAL MEDICINE

## 2024-12-21 PROCEDURE — 94003 VENT MGMT INPAT SUBQ DAY: CPT

## 2024-12-21 PROCEDURE — 2000000000 HC ICU R&B

## 2024-12-21 PROCEDURE — 87070 CULTURE OTHR SPECIMN AEROBIC: CPT

## 2024-12-21 PROCEDURE — 86140 C-REACTIVE PROTEIN: CPT

## 2024-12-21 PROCEDURE — 2700000000 HC OXYGEN THERAPY PER DAY

## 2024-12-21 PROCEDURE — 36415 COLL VENOUS BLD VENIPUNCTURE: CPT

## 2024-12-21 PROCEDURE — 90935 HEMODIALYSIS ONE EVALUATION: CPT

## 2024-12-21 PROCEDURE — 99232 SBSQ HOSP IP/OBS MODERATE 35: CPT | Performed by: PSYCHIATRY & NEUROLOGY

## 2024-12-21 RX ORDER — INSULIN GLARGINE 100 [IU]/ML
10 INJECTION, SOLUTION SUBCUTANEOUS DAILY
Status: DISCONTINUED | OUTPATIENT
Start: 2024-12-21 | End: 2024-12-26

## 2024-12-21 RX ADMIN — POLYETHYLENE GLYCOL 3350 17 G: 17 POWDER, FOR SOLUTION ORAL at 07:45

## 2024-12-21 RX ADMIN — MIDODRINE HYDROCHLORIDE 10 MG: 10 TABLET ORAL at 07:45

## 2024-12-21 RX ADMIN — Medication 10 ML: at 07:47

## 2024-12-21 RX ADMIN — PIPERACILLIN AND TAZOBACTAM 3375 MG: 3; .375 INJECTION, POWDER, LYOPHILIZED, FOR SOLUTION INTRAVENOUS at 00:04

## 2024-12-21 RX ADMIN — AMIODARONE HYDROCHLORIDE 200 MG: 200 TABLET ORAL at 21:58

## 2024-12-21 RX ADMIN — INSULIN LISPRO 4 UNITS: 100 INJECTION, SOLUTION INTRAVENOUS; SUBCUTANEOUS at 07:08

## 2024-12-21 RX ADMIN — AMIODARONE HYDROCHLORIDE 200 MG: 200 TABLET ORAL at 07:45

## 2024-12-21 RX ADMIN — INSULIN LISPRO 4 UNITS: 100 INJECTION, SOLUTION INTRAVENOUS; SUBCUTANEOUS at 03:31

## 2024-12-21 RX ADMIN — Medication 125 MCG/HR: at 06:17

## 2024-12-21 RX ADMIN — METHYLPREDNISOLONE SODIUM SUCCINATE 40 MG: 40 INJECTION INTRAMUSCULAR; INTRAVENOUS at 07:45

## 2024-12-21 RX ADMIN — INSULIN LISPRO 4 UNITS: 100 INJECTION, SOLUTION INTRAVENOUS; SUBCUTANEOUS at 21:25

## 2024-12-21 RX ADMIN — MYCOPHENOLATE MOFETIL 500 MG: 200 POWDER, FOR SUSPENSION ORAL at 21:58

## 2024-12-21 RX ADMIN — DOCUSATE SODIUM 100 MG: 50 LIQUID ORAL at 07:45

## 2024-12-21 RX ADMIN — INSULIN LISPRO 4 UNITS: 100 INJECTION, SOLUTION INTRAVENOUS; SUBCUTANEOUS at 14:49

## 2024-12-21 RX ADMIN — CHLORHEXIDINE GLUCONATE, 0.12% ORAL RINSE 15 ML: 1.2 SOLUTION DENTAL at 21:59

## 2024-12-21 RX ADMIN — PROPOFOL 5 MCG/KG/MIN: 10 INJECTION, EMULSION INTRAVENOUS at 14:35

## 2024-12-21 RX ADMIN — HEPARIN SODIUM 5000 UNITS: 5000 INJECTION INTRAVENOUS; SUBCUTANEOUS at 07:08

## 2024-12-21 RX ADMIN — INSULIN GLARGINE 10 UNITS: 100 INJECTION, SOLUTION SUBCUTANEOUS at 09:25

## 2024-12-21 RX ADMIN — CYCLOSPORINE 50 MG: 100 SOLUTION ORAL at 07:46

## 2024-12-21 RX ADMIN — HEPARIN SODIUM 5000 UNITS: 5000 INJECTION INTRAVENOUS; SUBCUTANEOUS at 14:35

## 2024-12-21 RX ADMIN — Medication 10 ML: at 22:08

## 2024-12-21 RX ADMIN — PIPERACILLIN AND TAZOBACTAM 3375 MG: 3; .375 INJECTION, POWDER, LYOPHILIZED, FOR SOLUTION INTRAVENOUS at 12:35

## 2024-12-21 RX ADMIN — SODIUM CHLORIDE 40 MG: 9 INJECTION INTRAMUSCULAR; INTRAVENOUS; SUBCUTANEOUS at 07:45

## 2024-12-21 RX ADMIN — CYCLOSPORINE 50 MG: 100 SOLUTION ORAL at 21:58

## 2024-12-21 RX ADMIN — INSULIN LISPRO 4 UNITS: 100 INJECTION, SOLUTION INTRAVENOUS; SUBCUTANEOUS at 01:10

## 2024-12-21 RX ADMIN — HEPARIN SODIUM 5000 UNITS: 5000 INJECTION INTRAVENOUS; SUBCUTANEOUS at 21:59

## 2024-12-21 RX ADMIN — CHLORHEXIDINE GLUCONATE, 0.12% ORAL RINSE 15 ML: 1.2 SOLUTION DENTAL at 13:59

## 2024-12-21 RX ADMIN — MYCOPHENOLATE MOFETIL 500 MG: 200 POWDER, FOR SUSPENSION ORAL at 08:08

## 2024-12-21 ASSESSMENT — PULMONARY FUNCTION TESTS
PIF_VALUE: 37
PIF_VALUE: 30
PIF_VALUE: 26
PIF_VALUE: 25
PIF_VALUE: 25
PIF_VALUE: 23
PIF_VALUE: 26
PIF_VALUE: 25
PIF_VALUE: 26
PIF_VALUE: 26
PIF_VALUE: 29
PIF_VALUE: 25
PIF_VALUE: 25
PIF_VALUE: 24
PIF_VALUE: 25
PIF_VALUE: 26
PIF_VALUE: 26
PIF_VALUE: 25
PIF_VALUE: 30
PIF_VALUE: 29
PIF_VALUE: 26
PIF_VALUE: 23
PIF_VALUE: 25
PIF_VALUE: 24
PIF_VALUE: 23
PIF_VALUE: 22
PIF_VALUE: 26
PIF_VALUE: 25
PIF_VALUE: 26
PIF_VALUE: 30
PIF_VALUE: 24
PIF_VALUE: 25
PIF_VALUE: 27
PIF_VALUE: 23
PIF_VALUE: 24
PIF_VALUE: 23
PIF_VALUE: 25
PIF_VALUE: 28
PIF_VALUE: 26
PIF_VALUE: 23
PIF_VALUE: 26
PIF_VALUE: 23
PIF_VALUE: 26
PIF_VALUE: 27
PIF_VALUE: 25
PIF_VALUE: 26
PIF_VALUE: 23
PIF_VALUE: 24
PIF_VALUE: 28
PIF_VALUE: 26
PIF_VALUE: 24
PIF_VALUE: 26
PIF_VALUE: 26
PIF_VALUE: 24
PIF_VALUE: 27
PIF_VALUE: 30
PIF_VALUE: 26
PIF_VALUE: 30
PIF_VALUE: 25
PIF_VALUE: 31
PIF_VALUE: 24
PIF_VALUE: 24
PIF_VALUE: 30
PIF_VALUE: 26
PIF_VALUE: 29
PIF_VALUE: 23
PIF_VALUE: 29
PIF_VALUE: 29
PIF_VALUE: 25
PIF_VALUE: 26
PIF_VALUE: 26
PIF_VALUE: 25
PIF_VALUE: 23
PIF_VALUE: 30
PIF_VALUE: 24
PIF_VALUE: 27

## 2024-12-21 ASSESSMENT — PAIN SCALES - GENERAL
PAINLEVEL_OUTOF10: 0
PAINLEVEL_OUTOF10: 0

## 2024-12-21 NOTE — PROGRESS NOTES
Neurology Follow up    SUBJECTIVE: In isolation therefore history and evaluation obtained from the nurse.  Patient is now getting of the sedation and moving with some commands reported and very minimal tremulousness.  He had dialysis.  Current Facility-Administered Medications   Medication Dose Route Frequency Provider Last Rate Last Admin    [START ON 12/22/2024] vancomycin (VANCOCIN) 1,750 mg in sodium chloride 0.9 % 500 mL IVPB  1,750 mg IntraVENous Once Carlotta Yadav MD        insulin glargine (LANTUS) injection vial 10 Units  10 Units SubCUTAneous Daily Susu Aldridge DO   10 Units at 12/21/24 0925    methylPREDNISolone sodium succ (SOLU-MEDROL) 40 mg in bacteriostatic water 1 mL injection  40 mg IntraVENous Daily Carlotta Yadav MD   40 mg at 12/21/24 0745    insulin lispro (HUMALOG,ADMELOG) injection vial 0-16 Units  0-16 Units SubCUTAneous Q4H Earnestine Reddy APRN - CNP   4 Units at 12/21/24 0708    propofol infusion  5-50 mcg/kg/min IntraVENous Continuous Earnestine Reddy APRN - CNP 6.8 mL/hr at 12/21/24 0627 15 mcg/kg/min at 12/21/24 0627    LORazepam (ATIVAN) injection 2 mg  2 mg IntraVENous PRN Earnestine Reddy APRN - CNP        polyethylene glycol (GLYCOLAX) packet 17 g  17 g Oral Daily Carlotta Yadav MD   17 g at 12/21/24 0745    docusate (COLACE) 50 MG/5ML liquid 100 mg  100 mg Oral BID Carlotta Yadav MD   100 mg at 12/21/24 0745    amiodarone (CORDARONE) tablet 200 mg  200 mg Oral BID Carlotta Yadav MD   200 mg at 12/21/24 0745    chlorhexidine (PERIDEX) 0.12 % solution 15 mL  15 mL Mouth/Throat BID Earnestine Reddy APRN - CNP   15 mL at 12/20/24 2241    pantoprazole (PROTONIX) 40 mg in sodium chloride (PF) 0.9 % 10 mL injection  40 mg IntraVENous Daily Earnestine Reddy APRN - CNP   40 mg at 12/21/24 0745    vancomycin (VANCOCIN) intermittent dosing (placeholder)   Other RX Placeholder Carlotta Yadav MD        mycophenolate (CELLCEPT) 200 MG/ML suspension 500 mg  500 mg Oral BID  congestive failure. 4. New tiny right pleural effusion. 5. Moderate left lower lobe consolidation from either atelectasis or pneumonia. RECOMMENDATION: Recommended advancing the NG tube 10 cm to place the tip below the GE junction.     CT HEAD WO CONTRAST    Result Date: 12/18/2024  EXAMINATION: CT OF THE HEAD WITHOUT CONTRAST  12/18/2024 7:17 pm TECHNIQUE: CT of the head was performed without the administration of intravenous contrast. Automated exposure control, iterative reconstruction, and/or weight based adjustment of the mA/kV was utilized to reduce the radiation dose to as low as reasonably achievable. COMPARISON: CT head 11/04/2024. HISTORY: ORDERING SYSTEM PROVIDED HISTORY: ams TECHNOLOGIST PROVIDED HISTORY: Reason for exam:->ams Has a \"code stroke\" or \"stroke alert\" been called?->No What reading provider will be dictating this exam?->CRC FINDINGS: BRAIN/VENTRICLES: There is no acute intracranial hemorrhage, mass effect or midline shift.  No abnormal extra-axial fluid collection.  No evidence of an acute infarct.  Similar encephalomalacia of the left anterior temporal lobe. There is no evidence of acute hydrocephalus.  Similar periventricular white matter hypodensity. ORBITS: The visualized portion of the orbits demonstrate no acute abnormality. SINUSES: The visualized paranasal sinuses and mastoid air cells demonstrate no acute abnormality.  Similar right sphenoid opacification with central hyperdensity that may relate to inspissated mucus or fungal elements. SOFT TISSUES/SKULL:  No acute abnormality of the visualized skull or soft tissues.  Redemonstrated left craniotomy.  Tubes entering the oropharynx likely relate to endotracheal and enteric tubes, incompletely imaged.     No acute intracranial findings.     XR CHEST PORTABLE    Result Date: 12/18/2024  EXAMINATION: ONE XRAY VIEW OF THE CHEST 12/18/2024 3:42 pm COMPARISON: None. HISTORY: ORDERING SYSTEM PROVIDED HISTORY: hypoxia TECHNOLOGIST PROVIDED

## 2024-12-21 NOTE — PROGRESS NOTES
HD today for 3.5 hours via RAVF. Tolerated tx fair. Had hypotension treated with UF off. Normal Saline 250 ml x2 and Albumin 25 %25 GM. Net UF +869 ml. Homeostasis of RAVF achieved and dressing dry and intact. Hands off to Farrah Lam RN

## 2024-12-21 NOTE — PROGRESS NOTES
Nephrology Progress Note    Assessment:  DELORES on CKD- hypotension  S/P kidney transplant  Malnutrition  DM type-2  Procalcitonin elevation      Plan: dialysis today repeat  family aware of status     Patient Active Problem List:     Pneumonia     Abdominal pain, other specified site     Acute pyelonephritis without lesion of renal medullary necrosis     Anemia     Essential hypertension     Nonspecific abnormal results of thyroid function study     Mixed hyperlipidemia     Kidney replaced by transplant     Intra-abdominal abscess post-procedure (HCC)     Infection due to Enterococcus     Fever and other physiologic disturbances of temperature regulation     Chronic kidney disease (CKD)     Type 2 diabetes mellitus with stage 3b chronic kidney disease, without long-term current use of insulin (HCC)     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 (HCC)     Abnormal CT of the chest     Bronchiectasis without complication (HCC)     GI bleeding     Complication of transplanted kidney     Muscle weakness (generalized)     Difficulty in walking     Elevated BUN     Urinary retention     Immunosuppression due to drug therapy (HCC)     DELORES (acute kidney injury) (HCC)     Adjustment disorder     Gross hematuria     Bilateral lower extremity edema     Dysuria     Acute cystitis without hematuria     Bilateral hearing loss     Abscess, toe     Acute hyperkalemia     Acute pain of left shoulder     Astigmatism     Astigmatism of both eyes with presbyopia     At risk for stroke     Benign enlargement of prostate     Benign prostatic hyperplasia with urinary frequency        WBC 7.8 7.4   HGB 7.8* 7.0*    169     CMP:    Recent Labs     12/19/24  0458 12/19/24  0943 12/19/24  1312 12/20/24  0510 12/21/24  0525     --   --  136 134*   K 4.6  --   --  4.7 4.1   CL 98  --   --  101 96   CO2 11*  --   --  10* 20   *  --   --  123* 65*   CREATININE 6.45*   < > 6.3* 6.51* 3.94*   GLUCOSE 208*  --   --  232* 192*   CALCIUM 8.1*  --   --  7.8* 7.6*   LABGLOM 8.5*   < > 9* 8.4* 15.3*    < > = values in this interval not displayed.     Troponin: No results for input(s): \"TROPONINI\" in the last 72 hours.  BNP: No results for input(s): \"BNP\" in the last 72 hours.  INR: No results for input(s): \"INR\" in the last 72 hours.  Lipids: No results for input(s): \"CHOL\", \"LDLDIRECT\", \"TRIG\", \"HDL\", \"AMYLASE\", \"LIPASE\" in the last 72 hours.  Liver:   Recent Labs     12/18/24  1409   AST 33   ALT 16   ALKPHOS 67   BILITOT 1.3*     Iron:  No results for input(s): \"FERRITIN\" in the last 72 hours.    Invalid input(s): \"IRONS\", \"LABIRONS\"  Urinalysis: No results for input(s): \"UA\" in the last 72 hours.    Objective:  Vitals: BP (!) 150/84   Pulse 84   Temp 98.6 °F (37 °C)   Resp 30   Ht 1.88 m (6' 2\")   Wt 77.4 kg (170 lb 10.2 oz)   SpO2 100%   BMI 21.91 kg/m²    Wt Readings from Last 3 Encounters:   12/21/24 77.4 kg (170 lb 10.2 oz)   11/04/24 75.9 kg (167 lb 6.4 oz)   09/18/24 77.7 kg (171 lb 4.8 oz)      24HR INTAKE/OUTPUT:    Intake/Output Summary (Last 24 hours) at 12/21/2024 0734  Last data filed at 12/21/2024 0627  Gross per 24 hour   Intake 2421.16 ml   Output 131 ml   Net 2290.16 ml       General: alert, in no apparent distress  HEENT: normocephalic, atraumatic, anicteric  Neck: supple, no mass  Lungs: non-labored respirations, clear to auscultation bilaterally  Heart: regular rate and rhythm, no murmurs or rubs  Abdomen: soft, non-tender, non-distended  Ext: no cyanosis, no peripheral edema  Neuro: alert and oriented, no gross abnormalities  Psych: normal mood and

## 2024-12-21 NOTE — PROGRESS NOTES
Pulmonary & Critical Care Medicine ICU Progress Note    Subjective:     No overnight events reported.  He does remain sedated on the ventilator.  He reportedly failed spontaneous breathing trial yesterday.    EXAM:  General: No acute distress, resting in bed, sedated on the ventilator  HEENT: Normocephalic, atraumatic, oral ETT and OG in place  Lungs : Occasional squeak bilaterally, no wheezes, no respiratory distress, no ventilator dyssynchrony  Heart: Regular rate and rhythm  ABD: Soft, positive bowel sounds, nontender to palpation  Extremities : Warm, dry, no edema noted  Neuro: Sedated on propofol and fentanyl drips  Skin: No rashes appreciated    MV Settings:  Vent Mode: AC/VC Resp Rate (Set): 30 bpm/Vt (Set, mL): 500 mL/ /FiO2 : 30 %     Recent Labs     12/19/24  0943 12/19/24  1312   PHART 7.211* 7.311*   VFO8MAO 32* 25*   PO2ART 171* 177*         IV:   propofol 15 mcg/kg/min (12/21/24 0627)    dextrose      sodium chloride      fentaNYL 125 mcg/hr (12/21/24 0627)    norepinephrine Stopped (12/20/24 1825)       Vitals:  BP (!) 150/84   Pulse 84   Temp 98.6 °F (37 °C)   Resp 30   Ht 1.88 m (6' 2\")   Wt 77.4 kg (170 lb 10.2 oz)   SpO2 100%   BMI 21.91 kg/m²          Intake/Output Summary (Last 24 hours) at 12/21/2024 0744  Last data filed at 12/21/2024 0627  Gross per 24 hour   Intake 2421.16 ml   Output 131 ml   Net 2290.16 ml       Medications:  Scheduled Meds:   [START ON 12/22/2024] vancomycin  1,750 mg IntraVENous Once    methylPREDNISolone  40 mg IntraVENous Daily    insulin lispro  0-16 Units SubCUTAneous Q4H    polyethylene glycol  17 g Oral Daily    docusate  100 mg Oral BID    amiodarone  200 mg Oral BID    chlorhexidine  15 mL Mouth/Throat BID    pantoprazole (PROTONIX) 40 mg in sodium chloride (PF) 0.9 % 10 mL injection  40 mg IntraVENous Daily    vancomycin (VANCOCIN) intermittent dosing (placeholder)   Other RX Placeholder    mycophenolate  500 mg Oral BID    sodium chloride flush  5-40 mL  CHEST (2 VW)    CLINICAL HISTORY: J18.9 Pneumonia of right lung due to infectious organism, unspecified part of lung ICD10, follow-up    COMPARISONS: March 19, 2018    FINDINGS:    Two views of the chest are submitted.  The cardiac silhouette is enlarged..  The mediastinum is unremarkable.  Pulmonary vascular unremarkable.  Right sided trachea. Small areas atelectasis both bases.  There is been interval improvement in the bibasilar areas of scattered groundglass infiltrates as compared to the prior examination..  No effusions.  No Pneumothoraces. There is still some blunting of the costophrenic angles posteriorly which could be  scarring or trace bilateral pleural effusions. Unchanged    Impression  INTERVAL IMPROVEMENT IN THE BIBASILAR AREAS OF SCATTERED GROUNDGLASS INFILTRATES AS COMPARED TO THE PRIOR EXAMINATION              Portable: Results for orders placed during the hospital encounter of 12/18/24    XR CHEST PORTABLE    Narrative  EXAMINATION:  ONE XRAY VIEW OF THE CHEST    12/20/2024 6:24 am    COMPARISON:  12/18/2024    HISTORY:  ORDERING SYSTEM PROVIDED HISTORY: resp failure  TECHNOLOGIST PROVIDED HISTORY:  Reason for exam:->resp failure  What reading provider will be dictating this exam?->CRC    FINDINGS:  Portable chest reveals heart to be enlarged.  Endotracheal tube is been  placed in satisfactory position.  Nasogastric tube tip below the level  diaphragm.  Underlying chronic changes seen throughout the lung fields  bilaterally.  Minimal residual markings at the left lung base.  Trace left  pleural effusion.  Degenerative changes seen within the spine.    Impression  Cardiomegaly with underlying chronic changes seen throughout the lung fields  bilaterally. Minimal residual markings at the left lung base with trace left  pleural effusion.      Assessment/Plan:    Acute hypoxic respiratory failure-patient did require intubation and has been on the ventilator for few days.  He reportedly failed a CPAP  today.    Nutrition: Tolerating tube feeds    Prophylaxis: Heparin subcu for DVT prophylaxis.  Protonix for GI prophylaxis.    Code Status: Full code    This is a 73 y.o. male who is critically ill patient at risk of deterioration/death in need of close ICU monitoring and intervention secondary to respiratory failure, sepsis, pneumonia, CKD, anemia, diabetes, tremor.  I have spent a total of 38 minutes of critical care time with this patient, review of the chart, and discussion with the bedside staff; excluding any billable procedures.    Electronically signed by MARTIR JI DO, on 12/21/2024 at 7:44 AM

## 2024-12-21 NOTE — PLAN OF CARE
Problem: Safety - Medical Restraint  Goal: Remains free of injury from restraints (Restraint for Interference with Medical Device)  Description: INTERVENTIONS:  1. Determine that other, less restrictive measures have been tried or would not be effective before applying the restraint  2. Evaluate the patient's condition at the time of restraint application  3. Inform patient/family regarding the reason for restraint  4. Q2H: Monitor safety, psychosocial status, comfort, nutrition and hydration  12/21/2024 0506 by Frankenfield, Nancy, RN  Outcome: Progressing  Flowsheets  Taken 12/21/2024 0400 by Frankenfield, Nancy, RN  Remains free of injury from restraints (restraint for interference with medical device):   Determine that other, less restrictive measures have been tried or would not be effective before applying the restraint   Evaluate the patient's condition at the time of restraint application   Inform patient/family regarding the reason for restraint   Every 2 hours: Monitor safety, psychosocial status, comfort, nutrition and hydration  Taken 12/20/2024 2257 by Frankenfield, Nancy, RN  Remains free of injury from restraints (restraint for interference with medical device):   Determine that other, less restrictive measures have been tried or would not be effective before applying the restraint   Evaluate the patient's condition at the time of restraint application   Inform patient/family regarding the reason for restraint   Every 2 hours: Monitor safety, psychosocial status, comfort, nutrition and hydration  Taken 12/20/2024 1800 by Farrah Lam RN  Remains free of injury from restraints (restraint for interference with medical device): Every 2 hours: Monitor safety, psychosocial status, comfort, nutrition and hydration  12/20/2024 1604 by Farrah Lam RN  Outcome: Progressing  Flowsheets  Taken 12/20/2024 1600  Remains free of injury from restraints (restraint for interference with medical device):  maintained within normal limits  12/21/2024 0506 by Frankenfield, Nancy, RN  Outcome: Progressing  12/20/2024 1604 by Farrah Lam RN  Outcome: Progressing  Flowsheets (Taken 12/20/2024 0800)  Electrolytes maintained within normal limits:   Monitor labs and assess patient for signs and symptoms of electrolyte imbalances   Administer electrolyte replacement as ordered   Monitor response to electrolyte replacements, including repeat lab results as appropriate  Goal: Hemodynamic stability and optimal renal function maintained  12/21/2024 0506 by Frankenfield, Nancy, RN  Outcome: Progressing  12/20/2024 1604 by Farrah Lam RN  Outcome: Progressing  Flowsheets (Taken 12/20/2024 0800)  Hemodynamic stability and optimal renal function maintained:   Monitor labs and assess for signs and symptoms of volume excess or deficit   Monitor intake, output and patient weight   Monitor urine specific gravity, serum osmolarity and serum sodium as indicated or ordered  Goal: Glucose maintained within prescribed range  12/21/2024 0506 by Frankenfield, Nancy, RN  Outcome: Progressing  12/20/2024 1604 by Farrah Lam RN  Outcome: Progressing  Flowsheets (Taken 12/20/2024 0800)  Glucose maintained within prescribed range:   Monitor blood glucose as ordered   Assess for signs and symptoms of hyperglycemia and hypoglycemia   Administer ordered medications to maintain glucose within target range     Problem: Hematologic - Adult  Goal: Maintains hematologic stability  12/21/2024 0506 by Frankenfield, Nancy, RN  Outcome: Progressing  12/20/2024 1604 by Farrah Lam RN  Outcome: Progressing  Flowsheets (Taken 12/20/2024 0800)  Maintains hematologic stability:   Assess for signs and symptoms of bleeding or hemorrhage   Monitor labs for bleeding or clotting disorders     Problem: Decision Making  Goal: Pt/Family able to effectively weigh alternatives and participate in decision making related to treatment and

## 2024-12-21 NOTE — PLAN OF CARE
Problem: Safety - Medical Restraint  Goal: Remains free of injury from restraints (Restraint for Interference with Medical Device)  Description: INTERVENTIONS:  1. Determine that other, less restrictive measures have been tried or would not be effective before applying the restraint  2. Evaluate the patient's condition at the time of restraint application  3. Inform patient/family regarding the reason for restraint  4. Q2H: Monitor safety, psychosocial status, comfort, nutrition and hydration  Recent Flowsheet Documentation  Taken 12/21/2024 0843 by Farrah Lam RN  Remains free of injury from restraints (restraint for interference with medical device): Every 2 hours: Monitor safety, psychosocial status, comfort, nutrition and hydration  12/21/2024 0507 by Frankenfield, Nancy, RN  Outcome: HH/HSPC Resolved Met  12/21/2024 0506 by Frankenfield, Nancy, RN  Outcome: Progressing  Flowsheets  Taken 12/21/2024 0400 by Frankenfield, Nancy, RN  Remains free of injury from restraints (restraint for interference with medical device):   Determine that other, less restrictive measures have been tried or would not be effective before applying the restraint   Evaluate the patient's condition at the time of restraint application   Inform patient/family regarding the reason for restraint   Every 2 hours: Monitor safety, psychosocial status, comfort, nutrition and hydration  Taken 12/20/2024 2257 by Frankenfield, Nancy, RN  Remains free of injury from restraints (restraint for interference with medical device):   Determine that other, less restrictive measures have been tried or would not be effective before applying the restraint   Evaluate the patient's condition at the time of restraint application   Inform patient/family regarding the reason for restraint   Every 2 hours: Monitor safety, psychosocial status, comfort, nutrition and hydration  Taken 12/20/2024 1800 by Farrah aLm RN  Remains free of injury from  replacement as ordered   Monitor response to electrolyte replacements, including repeat lab results as appropriate   Fluid restriction as ordered   Instruct patient on fluid and nutrition restrictions as appropriate  Goal: Hemodynamic stability and optimal renal function maintained  12/21/2024 0855 by Farrah Lam RN  Outcome: Progressing  12/21/2024 0506 by Frankenfield, Nancy, RN  Outcome: Progressing  Flowsheets (Taken 12/20/2024 2000)  Hemodynamic stability and optimal renal function maintained:   Monitor labs and assess for signs and symptoms of volume excess or deficit   Monitor intake, output and patient weight   Monitor urine specific gravity, serum osmolarity and serum sodium as indicated or ordered   Monitor response to interventions for patient's volume status, including labs, urine output, blood pressure (other measures as available)   Encourage oral intake as appropriate   Instruct patient on fluid and nutrition restrictions as appropriate  Goal: Glucose maintained within prescribed range  12/21/2024 0855 by Farrah Lam RN  Outcome: Progressing  12/21/2024 0506 by Frankenfield, Nancy, RN  Outcome: Progressing  Flowsheets (Taken 12/20/2024 2000)  Glucose maintained within prescribed range:   Monitor blood glucose as ordered   Assess for signs and symptoms of hyperglycemia and hypoglycemia   Administer ordered medications to maintain glucose within target range   Assess barriers to adequate nutritional intake and initiate nutrition consult as needed   Instruct patient on self management of diabetes and initiate consult as needed     Problem: Hematologic - Adult  Goal: Maintains hematologic stability  12/21/2024 0855 by Farrah Lam RN  Outcome: Progressing  12/21/2024 0506 by Frankenfield, Nancy, RN  Outcome: Progressing  Flowsheets (Taken 12/20/2024 2000)  Maintains hematologic stability:   Assess for signs and symptoms of bleeding or hemorrhage   Monitor labs for bleeding or clotting

## 2024-12-21 NOTE — PROGRESS NOTES
[facility]   Pharmacy Pharmacokinetic Monitoring Service - Vancomycin    Consulting Provider: Dr Yadav   Indication: sepsis  Target Concentration: Pre-Dialysis Concentration 15-20 mg/L  Day of Therapy: 3  Additional Antimicrobials: zosyn    Pertinent Laboratory Values:   Wt Readings from Last 1 Encounters:   12/21/24 77.4 kg (170 lb 10.2 oz)     Temp Readings from Last 1 Encounters:   12/21/24 98.6 °F (37 °C)     Recent Labs     12/20/24  0510 12/21/24  0525 12/21/24  0526   CREATININE 6.51* 3.94*  --    * 65*  --    WBC 7.8  --  7.4           Recent vancomycin administrations                     vancomycin (VANCOCIN) 750 mg in sodium chloride 0.9 % 250 mL IVPB (mg) 750 mg New Bag 12/20/24 2240    vancomycin (VANCOCIN) 1,750 mg in sodium chloride 0.9 % 500 mL IVPB (mg) 1,750 mg New Bag 12/19/24 1000                    Assessment:  Date/Time Current Dose Concentration Dialysis Session   12/21/24  Last dose 750 mg 12/20 post HD 12.5 12/20     Plan:  Concentration-guided dosing due to renal impairment and intermittent hemodialysis     Supplemental dose of vanco 750 mg was given after HD, next HD session ordered for tomorrow, will order vanco 1750 mg x 1 post HD 12/22  Vancomycin concentration to be ordered based on HD schedule, prior to HD session   Pharmacy will continue to monitor patient and adjust therapy as indicated    Thank you for the consult,HEAVEN Barillas RPH  12/21/2024 7:33 AM

## 2024-12-21 NOTE — PROGRESS NOTES
Hospitalist Progress Note      PCP: Tico Rodriguez DO    Date of Admission: 12/18/2024    Chief Complaint:  patient remains intubated, sedated with Propofol and Fentanyl, off low dose Norepinephrine infusion, had HD yesterday     Medications:  Reviewed    Infusion Medications    propofol 15 mcg/kg/min (12/21/24 0627)    dextrose      sodium chloride      fentaNYL 125 mcg/hr (12/21/24 0627)     Scheduled Medications    [START ON 12/22/2024] vancomycin  1,750 mg IntraVENous Once    insulin glargine  10 Units SubCUTAneous Daily    methylPREDNISolone  40 mg IntraVENous Daily    insulin lispro  0-16 Units SubCUTAneous Q4H    polyethylene glycol  17 g Oral Daily    docusate  100 mg Oral BID    amiodarone  200 mg Oral BID    chlorhexidine  15 mL Mouth/Throat BID    pantoprazole (PROTONIX) 40 mg in sodium chloride (PF) 0.9 % 10 mL injection  40 mg IntraVENous Daily    vancomycin (VANCOCIN) intermittent dosing (placeholder)   Other RX Placeholder    mycophenolate  500 mg Oral BID    sodium chloride flush  5-40 mL IntraVENous 2 times per day    heparin (porcine)  5,000 Units SubCUTAneous 3 times per day    piperacillin-tazobactam  3,375 mg IntraVENous Q12H    midodrine  10 mg Oral TID WC    [Held by provider] mycophenolate  500 mg Oral BID    cycloSPORINE  50 mg Oral BID     PRN Meds: LORazepam, glucose, dextrose bolus **OR** dextrose bolus, glucagon (rDNA), dextrose, sodium chloride flush, sodium chloride, ondansetron **OR** ondansetron, acetaminophen **OR** acetaminophen, ipratropium 0.5 mg-albuterol 2.5 mg      Intake/Output Summary (Last 24 hours) at 12/21/2024 0936  Last data filed at 12/21/2024 0800  Gross per 24 hour   Intake 2421.16 ml   Output 131 ml   Net 2290.16 ml       Exam:    /74   Pulse 90   Temp 98.2 °F (36.8 °C) (Bladder)   Resp 30   Ht 1.88 m (6' 2\")   Wt 79.6 kg (175 lb 7.8 oz)   SpO2 100%   BMI 22.53 kg/m²     General appearance: intubated, sedated  Lungs: diminished bilaterally  Heart:  S1/S2,irregular  Abdomen: soft  Extremities: no edema      Labs:   Recent Labs     12/19/24  0458 12/19/24  0943 12/19/24  1312 12/20/24  0510 12/21/24  0526   WBC 7.9  --   --  7.8 7.4   HGB 7.7*   < > 7.6* 7.8* 7.0*   HCT 24.4*  --   --  23.8* 19.7*     --   --  179 169    < > = values in this interval not displayed.     Recent Labs     12/19/24  0458 12/19/24  0943 12/19/24  1312 12/20/24  0510 12/21/24  0525     --   --  136 134*   K 4.6  --   --  4.7 4.1   CL 98  --   --  101 96   CO2 11*  --   --  10* 20   *  --   --  123* 65*   CREATININE 6.45*   < > 6.3* 6.51* 3.94*   CALCIUM 8.1*  --   --  7.8* 7.6*   PHOS 7.1*  --   --  5.9* 3.4    < > = values in this interval not displayed.     Recent Labs     12/18/24  1409   AST 33   ALT 16   BILITOT 1.3*   ALKPHOS 67     No results for input(s): \"INR\" in the last 72 hours.  No results for input(s): \"CKTOTAL\", \"TROPONINI\" in the last 72 hours.    Urinalysis:      Lab Results   Component Value Date/Time    NITRU Negative 11/04/2024 08:48 PM    WBCUA >100 11/04/2024 08:48 PM    WBCUA 13 08/11/2023 09:49 AM    BACTERIA Negative 11/04/2024 08:48 PM    RBCUA 3-5 11/04/2024 08:48 PM    RBCUA 2 08/11/2023 09:49 AM    BLOODU LARGE 11/04/2024 08:48 PM    SPECGRAV 1.015 08/17/2021 11:00 AM    GLUCOSEU Negative 11/04/2024 08:48 PM    GLUCOSEU GT 1 09/14/2011 08:57 AM       Radiology:  CT HEAD WO CONTRAST   Final Result   Stable atrophy and chronic changes seen within the brain with no acute   intracranial abnormality.         XR CHEST PORTABLE   Final Result   Cardiomegaly with underlying chronic changes seen throughout the lung fields   bilaterally. Minimal residual markings at the left lung base with trace left   pleural effusion.         Vascular duplex hemodialysis access right   Final Result   1.  Fistula is a radial artery to cephalic outflow vein fistula. The fistula is   widely patent with cephalic blood flow volume of 821 cc/min which is sufficient

## 2024-12-21 NOTE — PROGRESS NOTES
0720- Bedside report received from Brenda Colón. Patient turned and repositioned with pillow under left hip. Oral care completed and lip moisturizer applied. Patient remains sedated with propofol and fentanyl. His bed is in the lowest position and the alarm is activated.     1430- RT at bedside to suction patient. HR spiked and sustaining in 140s. Telephone order from Dr. Aldridge to restart propofol at low dose for patient comfort and tolerance. Patient responding to yes/no questions with head shake. He denies pain. Patient's spouse at bedside and updated on patient condition and plan of care    1940- bedside report given to PM RN. End of shift check performed. Patient with small soft bm. Patient repositioned and arms elevated.     Electronically signed by Farrah Lam RN on 12/21/2024 at 7:41 PM

## 2024-12-22 LAB
ACANTHOCYTES BLD QL SMEAR: ABNORMAL
ALBUMIN SERPL-MCNC: 3 G/DL (ref 3.5–4.6)
ALP SERPL-CCNC: 61 U/L (ref 35–104)
ALT SERPL-CCNC: 24 U/L (ref 0–41)
ANION GAP SERPL CALCULATED.3IONS-SCNC: 13 MEQ/L (ref 9–15)
ANION GAP SERPL CALCULATED.3IONS-SCNC: 13 MEQ/L (ref 9–15)
ANION GAP SERPL CALCULATED.3IONS-SCNC: 16 MEQ/L (ref 9–15)
ANISOCYTOSIS BLD QL SMEAR: ABNORMAL
AST SERPL-CCNC: 77 U/L (ref 0–40)
BASE EXCESS ARTERIAL: 6 (ref -3–3)
BASE EXCESS ARTERIAL: 6 (ref -3–3)
BASOPHILS # BLD: 0 K/UL (ref 0–0.2)
BASOPHILS NFR BLD: 0 %
BILIRUB DIRECT SERPL-MCNC: 0.9 MG/DL (ref 0–0.4)
BILIRUB INDIRECT SERPL-MCNC: 0.3 MG/DL (ref 0–0.6)
BILIRUB SERPL-MCNC: 1.2 MG/DL (ref 0.2–0.7)
BUN SERPL-MCNC: 12 MG/DL (ref 8–23)
BUN SERPL-MCNC: 28 MG/DL (ref 8–23)
BUN SERPL-MCNC: 44 MG/DL (ref 8–23)
BURR CELLS: ABNORMAL
CALCIUM IONIZED: 1.14 MMOL/L (ref 1.12–1.32)
CALCIUM IONIZED: 1.21 MMOL/L (ref 1.12–1.32)
CALCIUM SERPL-MCNC: 8 MG/DL (ref 8.5–9.9)
CALCIUM SERPL-MCNC: 8.3 MG/DL (ref 8.5–9.9)
CALCIUM SERPL-MCNC: 8.5 MG/DL (ref 8.5–9.9)
CHLORIDE SERPL-SCNC: 94 MEQ/L (ref 95–107)
CHLORIDE SERPL-SCNC: 96 MEQ/L (ref 95–107)
CHLORIDE SERPL-SCNC: 96 MEQ/L (ref 95–107)
CO2 SERPL-SCNC: 26 MEQ/L (ref 20–31)
CO2 SERPL-SCNC: 29 MEQ/L (ref 20–31)
CO2 SERPL-SCNC: 30 MEQ/L (ref 20–31)
CREAT SERPL-MCNC: 0.9 MG/DL (ref 0.7–1.2)
CREAT SERPL-MCNC: 2.06 MG/DL (ref 0.7–1.2)
CREAT SERPL-MCNC: 2.97 MG/DL (ref 0.7–1.2)
CRP SERPL HS-MCNC: 142.9 MG/L (ref 0–5)
EOSINOPHIL # BLD: 0 K/UL (ref 0–0.7)
EOSINOPHIL NFR BLD: 0 %
ERYTHROCYTE [DISTWIDTH] IN BLOOD BY AUTOMATED COUNT: 15.4 % (ref 11.5–14.5)
FERRITIN: 1045 NG/ML
GLUCOSE BLD-MCNC: 185 MG/DL (ref 70–99)
GLUCOSE BLD-MCNC: 211 MG/DL (ref 70–99)
GLUCOSE BLD-MCNC: 228 MG/DL (ref 70–99)
GLUCOSE BLD-MCNC: 243 MG/DL (ref 70–99)
GLUCOSE BLD-MCNC: 254 MG/DL (ref 70–99)
GLUCOSE BLD-MCNC: 256 MG/DL (ref 70–99)
GLUCOSE SERPL-MCNC: 124 MG/DL (ref 70–99)
GLUCOSE SERPL-MCNC: 213 MG/DL (ref 70–99)
GLUCOSE SERPL-MCNC: 219 MG/DL (ref 70–99)
HCO3 ARTERIAL: 31 MMOL/L (ref 21–29)
HCO3 ARTERIAL: 33 MMOL/L (ref 21–29)
HCT VFR BLD AUTO: 21.7 % (ref 42–52)
HCT VFR BLD AUTO: 25 % (ref 41–53)
HCT VFR BLD AUTO: 27 % (ref 41–53)
HGB BLD CALC-MCNC: 8.5 GM/DL (ref 13.5–17.5)
HGB BLD CALC-MCNC: 9.3 GM/DL (ref 13.5–17.5)
HGB BLD-MCNC: 7.3 G/DL (ref 14–18)
IRON % SATURATION: 58 % (ref 20–55)
IRON: 111 UG/DL (ref 61–157)
LACTATE: 1.11 MMOL/L (ref 0.4–2)
LACTATE: 1.23 MMOL/L (ref 0.4–2)
LYMPHOCYTES # BLD: 0.2 K/UL (ref 1–4.8)
LYMPHOCYTES NFR BLD: 1 %
MACROCYTES BLD QL SMEAR: ABNORMAL
MAGNESIUM SERPL-MCNC: 1.9 MG/DL (ref 1.7–2.4)
MCH RBC QN AUTO: 28.4 PG (ref 27–31.3)
MCHC RBC AUTO-ENTMCNC: 33.6 % (ref 33–37)
MCV RBC AUTO: 84.4 FL (ref 79–92.2)
MONOCYTES # BLD: 0.1 K/UL (ref 0.2–0.8)
MONOCYTES NFR BLD: 1 %
NEUTROPHILS # BLD: 9.2 K/UL (ref 1.4–6.5)
NEUTS SEG NFR BLD: 97 %
O2 SAT, ARTERIAL: 100 % (ref 93–100)
O2 SAT, ARTERIAL: 99 % (ref 93–100)
OVALOCYTES BLD QL SMEAR: ABNORMAL
PCO2 ARTERIAL: 50 MM HG (ref 35–45)
PCO2 ARTERIAL: 73 MM HG (ref 35–45)
PERFORMED ON: ABNORMAL
PH ARTERIAL: 7.26 (ref 7.35–7.45)
PH ARTERIAL: 7.4 (ref 7.35–7.45)
PHOSPHATE SERPL-MCNC: 3.9 MG/DL (ref 2.3–4.8)
PLATELET # BLD AUTO: 192 K/UL (ref 130–400)
PLATELET BLD QL SMEAR: ADEQUATE
PO2 ARTERIAL: 151 MM HG (ref 75–108)
PO2 ARTERIAL: 172 MM HG (ref 75–108)
POC CHLORIDE: 98 MEQ/L (ref 99–110)
POC CHLORIDE: 99 MEQ/L (ref 99–110)
POC CREATININE: 2.3 MG/DL (ref 0.8–1.3)
POC CREATININE: 2.7 MG/DL (ref 0.8–1.3)
POC FIO2: 40
POC SAMPLE TYPE: ABNORMAL
POC SAMPLE TYPE: ABNORMAL
POIKILOCYTOSIS BLD QL SMEAR: ABNORMAL
POTASSIUM SERPL-SCNC: 3.2 MEQ/L (ref 3.4–4.9)
POTASSIUM SERPL-SCNC: 4.1 MEQ/L (ref 3.4–4.9)
POTASSIUM SERPL-SCNC: 4.4 MEQ/L (ref 3.5–5.1)
POTASSIUM SERPL-SCNC: 4.6 MEQ/L (ref 3.4–4.9)
POTASSIUM SERPL-SCNC: 4.6 MEQ/L (ref 3.5–5.1)
PROCALCITONIN SERPL IA-MCNC: 2.22 NG/ML (ref 0–0.15)
PROT SERPL-MCNC: 5.4 G/DL (ref 6.3–8)
RBC # BLD AUTO: 2.57 M/UL (ref 4.7–6.1)
REASON FOR REJECTION: NORMAL
REJECTED TEST: NORMAL
SLIDE REVIEW: ABNORMAL
SMUDGE CELLS BLD QL SMEAR: 6.7
SODIUM BLD-SCNC: 137 MEQ/L (ref 136–145)
SODIUM BLD-SCNC: 138 MEQ/L (ref 136–145)
SODIUM SERPL-SCNC: 136 MEQ/L (ref 135–144)
SODIUM SERPL-SCNC: 138 MEQ/L (ref 135–144)
SODIUM SERPL-SCNC: 139 MEQ/L (ref 135–144)
TCO2 ARTERIAL: 33 MMOL/L (ref 21–32)
TCO2 ARTERIAL: 35 MMOL/L (ref 21–32)
TOTAL IRON BINDING CAPACITY: 191 UG/DL (ref 250–450)
UNSATURATED IRON BINDING CAPACITY: 80 UG/DL (ref 112–347)
VARIANT LYMPHS NFR BLD: 1 %
WBC # BLD AUTO: 9.5 K/UL (ref 4.8–10.8)

## 2024-12-22 PROCEDURE — 36600 WITHDRAWAL OF ARTERIAL BLOOD: CPT

## 2024-12-22 PROCEDURE — 82565 ASSAY OF CREATININE: CPT

## 2024-12-22 PROCEDURE — 82948 REAGENT STRIP/BLOOD GLUCOSE: CPT

## 2024-12-22 PROCEDURE — 6360000002 HC RX W HCPCS: Performed by: INTERNAL MEDICINE

## 2024-12-22 PROCEDURE — 84132 ASSAY OF SERUM POTASSIUM: CPT

## 2024-12-22 PROCEDURE — 2580000003 HC RX 258: Performed by: INTERNAL MEDICINE

## 2024-12-22 PROCEDURE — 6370000000 HC RX 637 (ALT 250 FOR IP): Performed by: NURSE PRACTITIONER

## 2024-12-22 PROCEDURE — 36415 COLL VENOUS BLD VENIPUNCTURE: CPT

## 2024-12-22 PROCEDURE — 2500000003 HC RX 250 WO HCPCS: Performed by: INTERNAL MEDICINE

## 2024-12-22 PROCEDURE — 94003 VENT MGMT INPAT SUBQ DAY: CPT

## 2024-12-22 PROCEDURE — 2580000003 HC RX 258: Performed by: NURSE PRACTITIONER

## 2024-12-22 PROCEDURE — 82435 ASSAY OF BLOOD CHLORIDE: CPT

## 2024-12-22 PROCEDURE — 85025 COMPLETE CBC W/AUTO DIFF WBC: CPT

## 2024-12-22 PROCEDURE — 82728 ASSAY OF FERRITIN: CPT

## 2024-12-22 PROCEDURE — 80048 BASIC METABOLIC PNL TOTAL CA: CPT

## 2024-12-22 PROCEDURE — 84295 ASSAY OF SERUM SODIUM: CPT

## 2024-12-22 PROCEDURE — 84100 ASSAY OF PHOSPHORUS: CPT

## 2024-12-22 PROCEDURE — 5A09357 ASSISTANCE WITH RESPIRATORY VENTILATION, LESS THAN 24 CONSECUTIVE HOURS, CONTINUOUS POSITIVE AIRWAY PRESSURE: ICD-10-PCS | Performed by: INTERNAL MEDICINE

## 2024-12-22 PROCEDURE — 82330 ASSAY OF CALCIUM: CPT

## 2024-12-22 PROCEDURE — 83540 ASSAY OF IRON: CPT

## 2024-12-22 PROCEDURE — 6360000002 HC RX W HCPCS

## 2024-12-22 PROCEDURE — 83550 IRON BINDING TEST: CPT

## 2024-12-22 PROCEDURE — 6360000002 HC RX W HCPCS: Performed by: NURSE PRACTITIONER

## 2024-12-22 PROCEDURE — 80076 HEPATIC FUNCTION PANEL: CPT

## 2024-12-22 PROCEDURE — 83605 ASSAY OF LACTIC ACID: CPT

## 2024-12-22 PROCEDURE — 99291 CRITICAL CARE FIRST HOUR: CPT | Performed by: INTERNAL MEDICINE

## 2024-12-22 PROCEDURE — 83735 ASSAY OF MAGNESIUM: CPT

## 2024-12-22 PROCEDURE — 94660 CPAP INITIATION&MGMT: CPT

## 2024-12-22 PROCEDURE — 85014 HEMATOCRIT: CPT

## 2024-12-22 PROCEDURE — 84145 PROCALCITONIN (PCT): CPT

## 2024-12-22 PROCEDURE — 82803 BLOOD GASES ANY COMBINATION: CPT

## 2024-12-22 PROCEDURE — 6370000000 HC RX 637 (ALT 250 FOR IP): Performed by: INTERNAL MEDICINE

## 2024-12-22 PROCEDURE — 2500000003 HC RX 250 WO HCPCS

## 2024-12-22 PROCEDURE — 2000000000 HC ICU R&B

## 2024-12-22 PROCEDURE — 86140 C-REACTIVE PROTEIN: CPT

## 2024-12-22 PROCEDURE — 2700000000 HC OXYGEN THERAPY PER DAY

## 2024-12-22 PROCEDURE — 8010000000 HC HEMODIALYSIS ACUTE INPT

## 2024-12-22 RX ORDER — CYCLOSPORINE 100 MG/ML
50 SOLUTION ORAL DAILY
Status: DISCONTINUED | OUTPATIENT
Start: 2024-12-23 | End: 2025-01-08

## 2024-12-22 RX ORDER — MIDODRINE HYDROCHLORIDE 10 MG/1
10 TABLET ORAL 3 TIMES DAILY PRN
Status: DISCONTINUED | OUTPATIENT
Start: 2024-12-22 | End: 2025-01-03

## 2024-12-22 RX ORDER — METOPROLOL TARTRATE 1 MG/ML
5 INJECTION, SOLUTION INTRAVENOUS ONCE
Status: COMPLETED | OUTPATIENT
Start: 2024-12-22 | End: 2024-12-22

## 2024-12-22 RX ORDER — MYCOPHENOLATE MOFETIL 200 MG/ML
500 POWDER, FOR SUSPENSION ORAL 2 TIMES DAILY
Status: DISCONTINUED | OUTPATIENT
Start: 2024-12-23 | End: 2024-12-22

## 2024-12-22 RX ORDER — METOPROLOL TARTRATE 1 MG/ML
INJECTION, SOLUTION INTRAVENOUS
Status: COMPLETED
Start: 2024-12-22 | End: 2024-12-22

## 2024-12-22 RX ORDER — MYCOPHENOLATE MOFETIL 200 MG/ML
250 POWDER, FOR SUSPENSION ORAL 2 TIMES DAILY
Status: DISCONTINUED | OUTPATIENT
Start: 2024-12-23 | End: 2025-01-14 | Stop reason: HOSPADM

## 2024-12-22 RX ORDER — METOPROLOL TARTRATE 25 MG/1
25 TABLET, FILM COATED ORAL 2 TIMES DAILY
Status: DISCONTINUED | OUTPATIENT
Start: 2024-12-22 | End: 2025-01-01

## 2024-12-22 RX ORDER — HYDRALAZINE HYDROCHLORIDE 20 MG/ML
10 INJECTION INTRAMUSCULAR; INTRAVENOUS EVERY 4 HOURS PRN
Status: DISCONTINUED | OUTPATIENT
Start: 2024-12-22 | End: 2025-01-14 | Stop reason: HOSPADM

## 2024-12-22 RX ADMIN — POLYETHYLENE GLYCOL 3350 17 G: 17 POWDER, FOR SOLUTION ORAL at 08:09

## 2024-12-22 RX ADMIN — INSULIN LISPRO 8 UNITS: 100 INJECTION, SOLUTION INTRAVENOUS; SUBCUTANEOUS at 16:42

## 2024-12-22 RX ADMIN — Medication 10 ML: at 20:52

## 2024-12-22 RX ADMIN — HYDRALAZINE HYDROCHLORIDE 10 MG: 20 INJECTION INTRAMUSCULAR; INTRAVENOUS at 21:25

## 2024-12-22 RX ADMIN — METOPROLOL TARTRATE 25 MG: 25 TABLET, FILM COATED ORAL at 08:09

## 2024-12-22 RX ADMIN — INSULIN GLARGINE 10 UNITS: 100 INJECTION, SOLUTION SUBCUTANEOUS at 08:15

## 2024-12-22 RX ADMIN — PIPERACILLIN AND TAZOBACTAM 3375 MG: 3; .375 INJECTION, POWDER, LYOPHILIZED, FOR SOLUTION INTRAVENOUS at 14:55

## 2024-12-22 RX ADMIN — HEPARIN SODIUM 5000 UNITS: 5000 INJECTION INTRAVENOUS; SUBCUTANEOUS at 06:37

## 2024-12-22 RX ADMIN — MYCOPHENOLATE MOFETIL 500 MG: 500 INJECTION, POWDER, LYOPHILIZED, FOR SOLUTION INTRAVENOUS at 20:55

## 2024-12-22 RX ADMIN — METHYLPREDNISOLONE SODIUM SUCCINATE 40 MG: 40 INJECTION INTRAMUSCULAR; INTRAVENOUS at 08:09

## 2024-12-22 RX ADMIN — HEPARIN SODIUM 5000 UNITS: 5000 INJECTION INTRAVENOUS; SUBCUTANEOUS at 14:58

## 2024-12-22 RX ADMIN — MYCOPHENOLATE MOFETIL 500 MG: 500 INJECTION, POWDER, LYOPHILIZED, FOR SOLUTION INTRAVENOUS at 09:15

## 2024-12-22 RX ADMIN — CHLORHEXIDINE GLUCONATE, 0.12% ORAL RINSE 15 ML: 1.2 SOLUTION DENTAL at 08:09

## 2024-12-22 RX ADMIN — INSULIN LISPRO 4 UNITS: 100 INJECTION, SOLUTION INTRAVENOUS; SUBCUTANEOUS at 20:52

## 2024-12-22 RX ADMIN — PIPERACILLIN AND TAZOBACTAM 3375 MG: 3; .375 INJECTION, POWDER, LYOPHILIZED, FOR SOLUTION INTRAVENOUS at 00:28

## 2024-12-22 RX ADMIN — HEPARIN SODIUM 5000 UNITS: 5000 INJECTION INTRAVENOUS; SUBCUTANEOUS at 20:59

## 2024-12-22 RX ADMIN — INSULIN LISPRO 8 UNITS: 100 INJECTION, SOLUTION INTRAVENOUS; SUBCUTANEOUS at 06:38

## 2024-12-22 RX ADMIN — VANCOMYCIN HYDROCHLORIDE 1750 MG: 1.25 INJECTION, POWDER, LYOPHILIZED, FOR SOLUTION INTRAVENOUS at 22:39

## 2024-12-22 RX ADMIN — Medication 10 ML: at 08:10

## 2024-12-22 RX ADMIN — METOPROLOL TARTRATE 5 MG: 5 INJECTION INTRAVENOUS at 23:20

## 2024-12-22 RX ADMIN — SODIUM CHLORIDE 40 MG: 9 INJECTION INTRAMUSCULAR; INTRAVENOUS; SUBCUTANEOUS at 08:09

## 2024-12-22 RX ADMIN — METOPROLOL TARTRATE 5 MG: 1 INJECTION, SOLUTION INTRAVENOUS at 23:20

## 2024-12-22 RX ADMIN — DOCUSATE SODIUM 100 MG: 50 LIQUID ORAL at 08:09

## 2024-12-22 RX ADMIN — INSULIN LISPRO 4 UNITS: 100 INJECTION, SOLUTION INTRAVENOUS; SUBCUTANEOUS at 02:20

## 2024-12-22 RX ADMIN — AMIODARONE HYDROCHLORIDE 200 MG: 200 TABLET ORAL at 08:09

## 2024-12-22 RX ADMIN — CHLORHEXIDINE GLUCONATE, 0.12% ORAL RINSE 15 ML: 1.2 SOLUTION DENTAL at 20:52

## 2024-12-22 ASSESSMENT — PULMONARY FUNCTION TESTS
PIF_VALUE: 23
PIF_VALUE: 22
PIF_VALUE: 22
PIF_VALUE: 23
PIF_VALUE: 21
PIF_VALUE: 22
PIF_VALUE: 24
PIF_VALUE: 23
PIF_VALUE: 22
PIF_VALUE: 23
PIF_VALUE: 12
PIF_VALUE: 22
PIF_VALUE: 23
PIF_VALUE: 22
PIF_VALUE: 24
PIF_VALUE: 22
PIF_VALUE: 25
PIF_VALUE: 23
PIF_VALUE: 22
PIF_VALUE: 23
PIF_VALUE: 22
PIF_VALUE: 23
PIF_VALUE: 22

## 2024-12-22 ASSESSMENT — PAIN SCALES - GENERAL
PAINLEVEL_OUTOF10: 0

## 2024-12-22 NOTE — PROGRESS NOTES
[facility]  Occupational Therapy        NAME:  Remy Upton  ROOM: IC01/IC01-01  :  1950  DATE: 2024    Attempted to see Remy Upton at 1045 on this date for:   [x]  Initial Evaluation   []  Treatment       Patient was unable to be seen due to:   [] Off unit for testing/procedure    [] Patient refused, stating \"    [] Therapy on hold due to     [] Nursing deferred due to    [x] Other:  Pt receiving bedside dialysis.    Electronically signed by KARLA Yanes on 24 at 11:10 AM EST

## 2024-12-22 NOTE — PROGRESS NOTES
Treatment time: 180 minutes    Net UF: 1000 mL    Pre weight: 77 kg  Post weight: N/A  EDW: TBD    Access used: RFA AVF  Access function: Good    Medications or blood products given: None    Regular outpatient schedule: TBD    Summary of response to treatment: Patient tolerated treatment well, Dr. Rodriguez evaluated patient during treatment and orders reviewed, no signs or symptoms of distress noted during treatment, bleeding time <15 minutes, report given to LISANDRA Hughes.    Copy of dialysis treatment record placed in chart, to be scanned into EMR.

## 2024-12-22 NOTE — PROGRESS NOTES
Hospitalist Progress Note      PCP: Tico Rodriguez DO    Date of Admission: 12/18/2024    Chief Complaint:  patient was extubated today,  afebrile, stable HD, had 2nd session of HD with 1 liter removed yesterday    Medications:  Reviewed    Infusion Medications    dextrose      sodium chloride       Scheduled Medications    metoprolol tartrate  25 mg Oral BID    mycophenolate (CELLCEPT) 500 mg in dextrose 5 % 83.3 mL IVPB  500 mg IntraVENous Q12H    [START ON 12/23/2024] cycloSPORINE  50 mg Oral Daily    [START ON 12/23/2024] mycophenolate  250 mg Oral BID    vancomycin  1,750 mg IntraVENous Once    insulin glargine  10 Units SubCUTAneous Daily    methylPREDNISolone  40 mg IntraVENous Daily    insulin lispro  0-16 Units SubCUTAneous Q4H    polyethylene glycol  17 g Oral Daily    docusate  100 mg Oral BID    amiodarone  200 mg Oral BID    chlorhexidine  15 mL Mouth/Throat BID    pantoprazole (PROTONIX) 40 mg in sodium chloride (PF) 0.9 % 10 mL injection  40 mg IntraVENous Daily    vancomycin (VANCOCIN) intermittent dosing (placeholder)   Other RX Placeholder    sodium chloride flush  5-40 mL IntraVENous 2 times per day    heparin (porcine)  5,000 Units SubCUTAneous 3 times per day    piperacillin-tazobactam  3,375 mg IntraVENous Q12H    [Held by provider] mycophenolate  500 mg Oral BID     PRN Meds: midodrine, LORazepam, glucose, dextrose bolus **OR** dextrose bolus, glucagon (rDNA), dextrose, sodium chloride flush, sodium chloride, ondansetron **OR** ondansetron, acetaminophen **OR** acetaminophen, ipratropium 0.5 mg-albuterol 2.5 mg      Intake/Output Summary (Last 24 hours) at 12/22/2024 0954  Last data filed at 12/22/2024 0600  Gross per 24 hour   Intake 651.67 ml   Output --   Net 651.67 ml       Exam:    /80   Pulse (!) 120   Temp 98.8 °F (37.1 °C)   Resp 25   Ht 1.88 m (6' 2\")   Wt 77 kg (169 lb 12.1 oz)   SpO2 98%   BMI 21.80 kg/m²     General appearance: somnolent  Lungs: diminished  empirically  - followed by critical care/pulmonology service    Tremors vs shivering  - improved with Propofol per nursing staff  - followed by neurology     AF  - resumed Amiodarone  - resume Toprol when BP allows  - off OAC due to hx of GI bleed     Hypomagnesemia  - replaced     Anemia  - Hb down to 7.0  - follow H/H     DM2 with hyperglycemia  - ISS     BPH  - resume Flomax and Proscar when able to take PO    Diet: Diet NPO    Code Status: Full Code              Electronically signed by SHERIDAN MARTINEZ MD on 12/22/2024 at 9:54 AM

## 2024-12-22 NOTE — PROGRESS NOTES
Pulmonary & Critical Care Medicine ICU Progress Note    Subjective:     No overnight events reported.  His sedation was able to be turned off around 6 AM.  He did receive dialysis yesterday.  He is more awake and alert.  He is able to follow commands today.    EXAM:  General: No acute distress, resting in bed  HEENT: Normocephalic, atraumatic, oral ETT and OG in place  Lungs : no wheezes, no respiratory distress, no ventilator dyssynchrony  Heart: Regular rate and rhythm  ABD: Soft, positive bowel sounds, nontender to palpation  Extremities : Warm, dry, upper extremity edema noted  Neuro: Awake, alert, weak  Skin: No rashes appreciated    MV Settings:  Vent Mode: AC/VC Resp Rate (Set): 22 bpm/Vt (Set, mL): 500 mL/ /FiO2 : 30 %     Recent Labs     12/19/24  0943 12/19/24  1312   PHART 7.211* 7.311*   [id number] 32* 25*   PO2ART 171* 177*         IV:   propofol 5 mcg/kg/min (12/21/24 1926)    dextrose      sodium chloride      fentaNYL Stopped (12/21/24 0736)       Vitals:  /76   Pulse (!) 111   Temp 97.5 °F (36.4 °C)   Resp 21   Ht 1.88 m (6' 2\")   Wt 77 kg (169 lb 12.1 oz)   SpO2 100%   BMI 21.80 kg/m²          Intake/Output Summary (Last 24 hours) at 12/22/2024 0719  Last data filed at 12/22/2024 0600  Gross per 24 hour   Intake 1212.67 ml   Output 0 ml   Net 1212.67 ml       Medications:  Scheduled Meds:   vancomycin  1,750 mg IntraVENous Once    insulin glargine  10 Units SubCUTAneous Daily    methylPREDNISolone  40 mg IntraVENous Daily    insulin lispro  0-16 Units SubCUTAneous Q4H    polyethylene glycol  17 g Oral Daily    docusate  100 mg Oral BID    amiodarone  200 mg Oral BID    chlorhexidine  15 mL Mouth/Throat BID    pantoprazole (PROTONIX) 40 mg in sodium chloride (PF) 0.9 % 10 mL injection  40 mg IntraVENous Daily    vancomycin (VANCOCIN) intermittent dosing (placeholder)   Other RX Placeholder    mycophenolate  500 mg Oral BID    sodium chloride flush  5-40 mL IntraVENous 2 times per day

## 2024-12-22 NOTE — PROGRESS NOTES
Tried calling wifeNephrology Progress Note  Fistula working  Assessment:  DELORES S/P remote Kidney transplant  CKD-4 baseline  Ventilator dependent  off ventilator presently  ?Pneumonia viral  DM type-2  Nutritional issues          Plan: will repeat IHD today   continue present treatment  Getting  gube feedings nutrition reduce neuoral & cellcept doses  Uncertain prognosis    Patient Active Problem List:     Pneumonia     Abdominal pain, other specified site     Acute pyelonephritis without lesion of renal medullary necrosis     Anemia     Essential hypertension     Nonspecific abnormal results of thyroid function study     Mixed hyperlipidemia     Kidney replaced by transplant     Intra-abdominal abscess post-procedure (HCC)     Infection due to Enterococcus     Fever and other physiologic disturbances of temperature regulation     Chronic kidney disease (CKD)     Type 2 diabetes mellitus with stage 3b chronic kidney disease, without long-term current use of insulin (HCC)     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 (HCC)     Abnormal CT of the chest     Bronchiectasis without complication (HCC)     GI bleeding     Complication of transplanted kidney     Muscle weakness (generalized)     Difficulty in walking     Elevated BUN     Urinary retention     Immunosuppression due to drug therapy (HCC)     DELORES (acute kidney injury) (HCC)     Adjustment disorder     Gross hematuria     Bilateral lower extremity edema     Dysuria     Acute cystitis without hematuria     Bilateral hearing loss     Abscess, toe     Acute hyperkalemia     Acute pain of left shoulder

## 2024-12-22 NOTE — PROGRESS NOTES
Shift Summary [id number]:    Handoff report received from David FRY at bedside, skin assessment completed, wounds noted on bilateral lower legs, heels and feet.     Head to toe assessment completed, pt remains intubated/lightly sedated. Pt awakens spontaneously and tracks staff around room. Pt appropriately shakes head \"yes\" and \"no\". Mepilexes and boots removed due to wounds on legs, heels and feet. Feet elevated off the bed with pillow support.     Bed bath and linen change completed, pt tolerated well.     Warming blanket reapplied due to temp decreasing below 36 degrees after bath.     Sedation turned off.    Dr. Aldridge at bedside, pt flipped to CPAP mode. Handoff report given to Darby FRY, skin assessment completed, no new wounds noted.

## 2024-12-22 NOTE — PROGRESS NOTES
HEMODIALYSIS PRE-TREATMENT NOTE    Patient Identifiers prior to treatment: Name,     Isolation Required: Yes                      Isolation Type: Droplet/Rhinovirus       (please document if patient is being managed as a PUI/COVID-19 patient)        Hepatitis status: Susceptible                          Date Drawn                             Result  Hepatitis B Surface Antigen 24     neg                     Hepatitis B Surface Antibody 24 neg        Hepatitis B Core Antibody 23 neg          How was Hepatitis Status verified: Epic     Was a copy of the labs you documented provided to facility for the patient's chart: Epic    Hemodialysis orders verified: Yes, with Dr. Rodriguez    Access Within normal limits ( I.e. s/s of infection,...): WNL, srinath, kobi present     Pre-Assessment completed: Yes    Pre-dialysis report received from: LISANDRA Haley                      Time: 09:55

## 2024-12-22 NOTE — PLAN OF CARE
Problem: Chronic Conditions and Co-morbidities  Goal: Patient's chronic conditions and co-morbidity symptoms are monitored and maintained or improved  Outcome: Not Progressing  Flowsheets (Taken 12/21/2024 2000)  Care Plan - Patient's Chronic Conditions and Co-Morbidity Symptoms are Monitored and Maintained or Improved:   Monitor and assess patient's chronic conditions and comorbid symptoms for stability, deterioration, or improvement   Collaborate with multidisciplinary team to address chronic and comorbid conditions and prevent exacerbation or deterioration     Problem: Discharge Planning  Goal: Discharge to home or other facility with appropriate resources  Outcome: Not Progressing  Flowsheets (Taken 12/21/2024 2000)  Discharge to home or other facility with appropriate resources: Identify barriers to discharge with patient and caregiver     Problem: Pain  Goal: Verbalizes/displays adequate comfort level or baseline comfort level  Outcome: Not Progressing  Flowsheets (Taken 12/21/2024 2000)  Verbalizes/displays adequate comfort level or baseline comfort level: Assess pain using appropriate pain scale     Problem: Safety - Adult  Goal: Free from fall injury  Outcome: Not Progressing     Problem: Skin/Tissue Integrity  Goal: Absence of new skin breakdown  Description: 1.  Monitor for areas of redness and/or skin breakdown  2.  Assess vascular access sites hourly  3.  Every 4-6 hours minimum:  Change oxygen saturation probe site  4.  Every 4-6 hours:  If on nasal continuous positive airway pressure, respiratory therapy assess nares and determine need for appliance change or resting period.  Outcome: Not Progressing     Problem: Neurosensory - Adult  Goal: Achieves stable or improved neurological status  Outcome: Not Progressing  Flowsheets (Taken 12/21/2024 2000)  Achieves stable or improved neurological status: Assess for and report changes in neurological status     Problem: Respiratory - Adult  Goal: Achieves

## 2024-12-22 NOTE — PROGRESS NOTES
Physical Therapy Missed Treatment   Facility/Department: [facility] MED SURG IC01/IC01-01    NAME: Reym Upton    : 1950 (73 y.o.)  MRN: [id number]    Account: [id number]  Gender: male      Pt on hold at this time due to receiving bedside dialysis.      Will follow and attempt PT evaluation again at earliest availability.       Chayo Ambrose, PT, 24 at 11:10 AM

## 2024-12-23 PROBLEM — Z71.89 GOALS OF CARE, COUNSELING/DISCUSSION: Status: ACTIVE | Noted: 2024-12-23

## 2024-12-23 PROBLEM — Z71.89 ADVANCED CARE PLANNING/COUNSELING DISCUSSION: Status: ACTIVE | Noted: 2024-12-23

## 2024-12-23 PROBLEM — Z51.5 PALLIATIVE CARE ENCOUNTER: Status: ACTIVE | Noted: 2024-12-23

## 2024-12-23 PROBLEM — Z71.89 ENCOUNTER FOR HOSPICE CARE DISCUSSION: Status: ACTIVE | Noted: 2024-12-23

## 2024-12-23 LAB
ALBUMIN SERPL-MCNC: 3.2 G/DL (ref 3.5–4.6)
ANION GAP SERPL CALCULATED.3IONS-SCNC: 16 MEQ/L (ref 9–15)
BACTERIA BLD CULT ORG #2: NORMAL
BACTERIA BLD CULT: NORMAL
BASOPHILS # BLD: 0 K/UL (ref 0–0.2)
BASOPHILS NFR BLD: 0.1 %
BUN SERPL-MCNC: 42 MG/DL (ref 8–23)
CALCIUM SERPL-MCNC: 8.8 MG/DL (ref 8.5–9.9)
CHLORIDE SERPL-SCNC: 96 MEQ/L (ref 95–107)
CO2 SERPL-SCNC: 27 MEQ/L (ref 20–31)
CREAT SERPL-MCNC: 2.7 MG/DL (ref 0.7–1.2)
EOSINOPHIL # BLD: 0 K/UL (ref 0–0.7)
EOSINOPHIL NFR BLD: 0 %
ERYTHROCYTE [DISTWIDTH] IN BLOOD BY AUTOMATED COUNT: 16 % (ref 11.5–14.5)
GLUCOSE BLD-MCNC: 133 MG/DL (ref 70–99)
GLUCOSE BLD-MCNC: 154 MG/DL (ref 70–99)
GLUCOSE BLD-MCNC: 200 MG/DL (ref 70–99)
GLUCOSE BLD-MCNC: 218 MG/DL (ref 70–99)
GLUCOSE BLD-MCNC: 220 MG/DL (ref 70–99)
GLUCOSE SERPL-MCNC: 131 MG/DL (ref 70–99)
HCT VFR BLD AUTO: 26.5 % (ref 42–52)
HGB BLD-MCNC: 8.4 G/DL (ref 14–18)
LYMPHOCYTES # BLD: 0 K/UL (ref 1–4.8)
LYMPHOCYTES NFR BLD: 0.9 %
MAGNESIUM SERPL-MCNC: 2.1 MG/DL (ref 1.7–2.4)
MCH RBC QN AUTO: 28.1 PG (ref 27–31.3)
MCHC RBC AUTO-ENTMCNC: 31.7 % (ref 33–37)
MCV RBC AUTO: 88.6 FL (ref 79–92.2)
MONOCYTES # BLD: 0.3 K/UL (ref 0.2–0.8)
MONOCYTES NFR BLD: 1.9 %
NEUTROPHILS # BLD: 14.1 K/UL (ref 1.4–6.5)
NEUTS SEG NFR BLD: 98 %
PERFORMED ON: ABNORMAL
PHOSPHATE SERPL-MCNC: 5.4 MG/DL (ref 2.3–4.8)
PLATELET # BLD AUTO: 240 K/UL (ref 130–400)
PLATELET BLD QL SMEAR: ABNORMAL
POTASSIUM SERPL-SCNC: 4.7 MEQ/L (ref 3.4–4.9)
RBC # BLD AUTO: 2.99 M/UL (ref 4.7–6.1)
SLIDE REVIEW: ABNORMAL
SMUDGE CELLS BLD QL SMEAR: 12.5
SODIUM SERPL-SCNC: 139 MEQ/L (ref 135–144)
WBC # BLD AUTO: 14.4 K/UL (ref 4.8–10.8)

## 2024-12-23 PROCEDURE — 92610 EVALUATE SWALLOWING FUNCTION: CPT

## 2024-12-23 PROCEDURE — 36415 COLL VENOUS BLD VENIPUNCTURE: CPT

## 2024-12-23 PROCEDURE — 2580000003 HC RX 258: Performed by: NURSE PRACTITIONER

## 2024-12-23 PROCEDURE — 6370000000 HC RX 637 (ALT 250 FOR IP): Performed by: INTERNAL MEDICINE

## 2024-12-23 PROCEDURE — 6370000000 HC RX 637 (ALT 250 FOR IP): Performed by: NURSE PRACTITIONER

## 2024-12-23 PROCEDURE — 83735 ASSAY OF MAGNESIUM: CPT

## 2024-12-23 PROCEDURE — 99223 1ST HOSP IP/OBS HIGH 75: CPT | Performed by: NURSE PRACTITIONER

## 2024-12-23 PROCEDURE — 2580000003 HC RX 258: Performed by: INTERNAL MEDICINE

## 2024-12-23 PROCEDURE — 6360000002 HC RX W HCPCS: Performed by: INTERNAL MEDICINE

## 2024-12-23 PROCEDURE — 2500000003 HC RX 250 WO HCPCS: Performed by: INTERNAL MEDICINE

## 2024-12-23 PROCEDURE — 94760 N-INVAS EAR/PLS OXIMETRY 1: CPT

## 2024-12-23 PROCEDURE — 2000000000 HC ICU R&B

## 2024-12-23 PROCEDURE — 6360000002 HC RX W HCPCS: Performed by: NURSE PRACTITIONER

## 2024-12-23 PROCEDURE — 6360000002 HC RX W HCPCS

## 2024-12-23 PROCEDURE — 2700000000 HC OXYGEN THERAPY PER DAY

## 2024-12-23 PROCEDURE — 85025 COMPLETE CBC W/AUTO DIFF WBC: CPT

## 2024-12-23 PROCEDURE — 80069 RENAL FUNCTION PANEL: CPT

## 2024-12-23 PROCEDURE — 94660 CPAP INITIATION&MGMT: CPT

## 2024-12-23 PROCEDURE — 99291 CRITICAL CARE FIRST HOUR: CPT | Performed by: INTERNAL MEDICINE

## 2024-12-23 RX ORDER — METOPROLOL TARTRATE 1 MG/ML
2.5 INJECTION, SOLUTION INTRAVENOUS EVERY 6 HOURS PRN
Status: DISCONTINUED | OUTPATIENT
Start: 2024-12-23 | End: 2025-01-14 | Stop reason: HOSPADM

## 2024-12-23 RX ADMIN — HEPARIN SODIUM 5000 UNITS: 5000 INJECTION INTRAVENOUS; SUBCUTANEOUS at 05:34

## 2024-12-23 RX ADMIN — PIPERACILLIN AND TAZOBACTAM 3375 MG: 3; .375 INJECTION, POWDER, LYOPHILIZED, FOR SOLUTION INTRAVENOUS at 11:37

## 2024-12-23 RX ADMIN — HEPARIN SODIUM 5000 UNITS: 5000 INJECTION INTRAVENOUS; SUBCUTANEOUS at 22:09

## 2024-12-23 RX ADMIN — PIPERACILLIN AND TAZOBACTAM 3375 MG: 3; .375 INJECTION, POWDER, LYOPHILIZED, FOR SOLUTION INTRAVENOUS at 01:01

## 2024-12-23 RX ADMIN — INSULIN LISPRO 4 UNITS: 100 INJECTION, SOLUTION INTRAVENOUS; SUBCUTANEOUS at 01:08

## 2024-12-23 RX ADMIN — INSULIN LISPRO 4 UNITS: 100 INJECTION, SOLUTION INTRAVENOUS; SUBCUTANEOUS at 20:52

## 2024-12-23 RX ADMIN — SODIUM CHLORIDE 40 MG: 9 INJECTION INTRAMUSCULAR; INTRAVENOUS; SUBCUTANEOUS at 08:22

## 2024-12-23 RX ADMIN — HYDRALAZINE HYDROCHLORIDE 10 MG: 20 INJECTION INTRAMUSCULAR; INTRAVENOUS at 03:03

## 2024-12-23 RX ADMIN — INSULIN GLARGINE 10 UNITS: 100 INJECTION, SOLUTION SUBCUTANEOUS at 08:22

## 2024-12-23 RX ADMIN — Medication 10 ML: at 08:22

## 2024-12-23 RX ADMIN — Medication 10 ML: at 20:53

## 2024-12-23 RX ADMIN — HYDRALAZINE HYDROCHLORIDE 10 MG: 20 INJECTION INTRAMUSCULAR; INTRAVENOUS at 15:01

## 2024-12-23 RX ADMIN — METOPROLOL TARTRATE 2.5 MG: 5 INJECTION INTRAVENOUS at 14:33

## 2024-12-23 RX ADMIN — HEPARIN SODIUM 5000 UNITS: 5000 INJECTION INTRAVENOUS; SUBCUTANEOUS at 13:42

## 2024-12-23 RX ADMIN — CHLORHEXIDINE GLUCONATE, 0.12% ORAL RINSE 15 ML: 1.2 SOLUTION DENTAL at 20:53

## 2024-12-23 RX ADMIN — CHLORHEXIDINE GLUCONATE, 0.12% ORAL RINSE 15 ML: 1.2 SOLUTION DENTAL at 08:22

## 2024-12-23 RX ADMIN — METOPROLOL TARTRATE 2.5 MG: 5 INJECTION INTRAVENOUS at 22:09

## 2024-12-23 RX ADMIN — METHYLPREDNISOLONE SODIUM SUCCINATE 40 MG: 40 INJECTION INTRAMUSCULAR; INTRAVENOUS at 08:22

## 2024-12-23 ASSESSMENT — PAIN SCALES - GENERAL
PAINLEVEL_OUTOF10: 0
PAINLEVEL_OUTOF10: 0

## 2024-12-23 NOTE — PROGRESS NOTES
Hospitalist Progress Note      PCP: Tico Rodriguez DO    Date of Admission: 12/18/2024    Chief Complaint:  no acute events overnight. Patiet on BIPAP this morning, following simple commands but otherwise minimally interactive     Medications:  Reviewed    Infusion Medications    dextrose      sodium chloride       Scheduled Medications    [START ON 12/24/2024] methylPREDNISolone  10 mg IntraVENous Daily    metoprolol tartrate  25 mg Oral BID    cycloSPORINE  50 mg Oral Daily    mycophenolate  250 mg Oral BID    insulin glargine  10 Units SubCUTAneous Daily    insulin lispro  0-16 Units SubCUTAneous Q4H    polyethylene glycol  17 g Oral Daily    docusate  100 mg Oral BID    amiodarone  200 mg Oral BID    chlorhexidine  15 mL Mouth/Throat BID    pantoprazole (PROTONIX) 40 mg in sodium chloride (PF) 0.9 % 10 mL injection  40 mg IntraVENous Daily    vancomycin (VANCOCIN) intermittent dosing (placeholder)   Other RX Placeholder    sodium chloride flush  5-40 mL IntraVENous 2 times per day    heparin (porcine)  5,000 Units SubCUTAneous 3 times per day    piperacillin-tazobactam  3,375 mg IntraVENous Q12H    [Held by provider] mycophenolate  500 mg Oral BID     PRN Meds: metoprolol, midodrine, hydrALAZINE, LORazepam, glucose, dextrose bolus **OR** dextrose bolus, glucagon (rDNA), dextrose, sodium chloride flush, sodium chloride, ondansetron **OR** ondansetron, acetaminophen **OR** acetaminophen, ipratropium 0.5 mg-albuterol 2.5 mg      Intake/Output Summary (Last 24 hours) at 12/23/2024 1425  Last data filed at 12/23/2024 1213  Gross per 24 hour   Intake 783.02 ml   Output --   Net 783.02 ml       Exam:    BP (!) 162/80   Pulse (!) 121   Temp 99.1 °F (37.3 °C)   Resp 29   Ht 1.88 m (6' 2\")   Wt 77.2 kg (170 lb 3.1 oz)   SpO2 100%   BMI 21.85 kg/m²     General appearance: somnolent  Lungs: diminished bilaterally  Heart: S1/S2,irregular  Abdomen: soft  Extremities: edema present bilateral UE      Labs:   Recent  appropriate for a   patent fistula. No evidence of stenosis or thrombus.      COMPARISON:No prior studies are available for comparison.      DIAGNOSIS: Patient with right forearm fistula, experiencing flow-volume   problems.      COMMENTS: What reading provider will be dictating this exam?->[facility]      TECHNIQUE: Grayscale and color Doppler ultrasound of right arm fistula      FINDINGS:       Radial ARTERY FLOW: 774 milliliters per minute.      CEPHALIC VEIN:   Flow: 821 milliliters per.   The cephalic vein is widely patent without any evidence of thrombus or stenosis.         Electronically signed by Joce Campos      CT HEAD WO CONTRAST   Final Result   No acute intracranial findings.         XR CHEST ABDOMEN NG PLACEMENT   Final Result   1. Endotracheal tube in satisfactory position.   2. Nasogastric tube with its tip in the gastric fundus but the proximal port   is above the GE junction. Recommend advancing the NG tube 10 cm to place the   proximal port below the GE junction.   3. New mild congestive failure.   4. New tiny right pleural effusion.   5. Moderate left lower lobe consolidation from either atelectasis or   pneumonia.      RECOMMENDATION:   Recommended advancing the NG tube 10 cm to place the tip below the GE   junction.         XR CHEST PORTABLE   Final Result   Emphysema.  No focal consolidation.         XR CHEST PORTABLE   Final Result   1. Cardiomegaly.   2. Bronchiectasis.   3. No active airspace consolidation.                 Assessment/Plan:    73 y.o. male NH resident with history of HTN, PAF, DM2, ESRD/CKD4 s/p kidney transplant x 2, BPH / urine retention , anemia/lower GI bleed requiring PRBC transfusion, depression, who presented with:     DELORES/CKD4 with AG metabolic acidosis  - possibly due to poor oral intake from Rhinovirus infection  - no improvement with IVFs  - started on HD   - resumed Cellcept, Cyclosporine, Prednisone(Solumedrol)  - followed by nephrology     Acute hypoxic

## 2024-12-23 NOTE — PROGRESS NOTES
[facility]  Occupational Therapy        NAME:  Remy Upton  ROOM: IC01/IC01-01  :  1950  DATE: 2024    Attempted to see Remy Upton at 0935 on this date for:   [x]  Initial Evaluation   []  Treatment       Patient was unable to be seen due to:   [] Off unit for testing/procedure    [] Patient refused, stating \"    [] Therapy on hold due to     [x] Nursing deferred due to just prior to therapy attempt pt's O2 sats decreased on NC and pt had to be placed back on bipap, not yet ready.   [] Other:      Discussed with LISANDRA Loo    Electronically signed by FRANCISCO Yanes/L on 24 at 10:44 AM EST

## 2024-12-23 NOTE — PROGRESS NOTES
Perfect serve to hospitalist for something prn for elevated blood pressure this evening.   Awaiting response.  Electronically signed by Digna Ying RN on 12/22/2024 at 9:03 PM

## 2024-12-23 NOTE — PROGRESS NOTES
Physical Therapy Missed Treatment   Facility/Department: [facility] MED SURG IC01/IC01-01    NAME: Remy Upton    : 1950 (73 y.o.)  MRN: [id number]    Account: [id number]  Gender: male      Pt placed back on bipap due to declining respiratory status.  Holding PT eval at this time.  RN is aware.      Will follow and attempt PT evaluation again at earliest availability.       Chayo Ambrose, PT, 24 at 9:49 AM

## 2024-12-23 NOTE — CARE COORDINATION
ICU team rounds done this am and pt has been on PAP machine since he was extubated. He has PT/OT,speech and ET on consult. Pt to have Pall Care consult and per report he has had HD x3 and prior his last HD was last Yovany and he has an AVFistula. Pt has not passed swallow yet and may need Corpak.

## 2024-12-23 NOTE — PROGRESS NOTES
Wound Ostomy Continence Nurse  Consult Note       NAME:  Remy Upton  MEDICAL RECORD NUMBER:  [id number]  AGE: 73 y.o.   GENDER: male  : 1950  TODAY'S DATE:  2024    Subjective   Reason for [facility] Nurse Evaluation and Assessment: hands and BLE      Remy Upton is a 73 y.o. male referred by:   [] Physician  [x] Nursing  [] Other:     Wound Identification:  Wound Type: Pressure vs vascular and skin teares   Contributing Factors: chronic pressure, decreased mobility, decreased tissue oxygenation, incontinence of urine, and moisture and friction    Wound History: Patient admitted to [facility] on 24. Wound ostomy Rn consulted on  for evaluation of BLE and hands.  Current Wound Care Treatment:  Recommending 1) continue pressure injury prevention interventions including low air loss mattress 2) skin barrier film to nonblanchable areas with intact skin and intact blisters 3) zinc bid and prn to sacrum 4) strict offloading of heels at all times with pillows    Patient Goal of Care:  [x] Wound Healing  [] Odor Control  [] Palliative Care  [] Pain Control   [] Other:         PAST MEDICAL HISTORY        Diagnosis Date    Diabetes mellitus (HCC)     Hemodialysis access, fistula mature (HCC) 2002    Hypertension     Seizures (HCC)     no seizure since        PAST SURGICAL HISTORY    Past Surgical History:   Procedure Laterality Date    BRAIN SURGERY Left 1990    to eliminate seizures    COLONOSCOPY N/A 10/24/2022    COLONOSCOPY DIAGNOSTIC performed by Va Ordoñez MD at [facility]    KIDNEY TRANSPLANT      [facility] INCL FLUOR GDNCE DX W/CELL WASHG SPX N/A 3/20/2018    BRONCHOSCOPY performed by Cristopher Conde MD at [facility]    UPPER GASTROINTESTINAL ENDOSCOPY N/A 10/21/2022    EGD DIAGNOSTIC ONLY performed by Jaime Martinez MD at [facility]       FAMILY HISTORY    Family History   Problem Relation Age of Onset    Colon Cancer Neg Hx        SOCIAL  Cool;Dry/flaky;Intact 12/23/24 0950   Margins Defined edges 12/23/24 0950   Number of days: 2       Wound 12/21/24 Leg Distal;Right;Medial (Active)   Wound Image   12/23/24 0950   Dressing Status New dressing applied 12/23/24 0950   Wound Cleansed Cleansed with saline 12/22/24 0000   Dressing/Treatment Barrier film 12/23/24 0950   Dressing Change Due 12/23/24 12/23/24 0950   Wound Length (cm) 11 cm 12/23/24 0950   Wound Width (cm) 4 cm 12/23/24 0950   Wound Depth (cm) 0 cm 12/23/24 0950   Wound Surface Area (cm^2) 44 cm^2 12/23/24 0950   Wound Volume (cm^3) 0 cm^3 12/23/24 0950   Wound Assessment Non-blanchable erythema;Purple/maroon 12/23/24 0950   Drainage Amount None (dry) 12/23/24 0950   Odor None 12/23/24 0950   Herlinda-wound Assessment Intact 12/23/24 0950   Margins Defined edges 12/23/24 0950   Number of days: 2       Wound 12/23/24 Leg Right;Lateral (Active)   Wound Image   12/23/24 0950   Dressing Status New dressing applied 12/23/24 0950   Dressing/Treatment Barrier film 12/23/24 0950   Dressing Change Due 12/23/24 12/23/24 0950   Wound Length (cm) 5 cm 12/23/24 0950   Wound Width (cm) 1.2 cm 12/23/24 0950   Wound Depth (cm) 0 cm 12/23/24 0950   Wound Surface Area (cm^2) 6 cm^2 12/23/24 0950   Wound Volume (cm^3) 0 cm^3 12/23/24 0950   Wound Assessment Purple/maroon;Non-blanchable erythema 12/23/24 0950   Drainage Amount None (dry) 12/23/24 0950   Odor None 12/23/24 0950   Herlinda-wound Assessment Fragile;Intact 12/23/24 0950   Margins Defined edges 12/23/24 0950   Number of days: 0     Assessment: Patient in bed, on bipap. BLE assessed. Right lower extremity with deep tissue injury to heel in form of blood filled blister with nonblanchable erythema. Right medial ankle with deep tissue injury that is dry with epithelialization over open area and nonblanchable erythema. Barrier film applied to both areas and heels offloaded with pillow support. Recommend discontinuing use of offloading boots and using pillow

## 2024-12-23 NOTE — FLOWSHEET NOTE
Patient extubated this am and was able to nod head yes and no appropriately. He was able to voice yes and no until after dialysis however, after asking multiple times he could then nod head when asked if he felt more tired after dialysis. Patient wife and daughter were in to visit through out the day. Patient has open 1 cm area on coccyx. He is incontinent of stool and saturated mepilex. Melpilex removed and after cleaning coccyx area. Zinc has been lathered. Feet are elevated off bed. Patient nods his head no when asked if he is in pain.

## 2024-12-23 NOTE — PLAN OF CARE
Nutrition Problem #1: Increased nutrient needs  Intervention: Food and/or Nutrient Delivery:  (Pending results of BSE)

## 2024-12-23 NOTE — PROGRESS NOTES
Patient able to follow commands by squeezing hands and wiggling toes. Patient switched to nasal cannula and taken off of AVAPS.     Patient lasted roughly 45 minutes while on 4L nasal cannula POX dropped to 88% and HR to 130's. Once placed back on AVAPS patients POX went up to 99%    SLP came to see patient and patient just placed back on bipap stated she would come back in the afternoon.     1515- took patient off of bipap and placed him on 4L NC for SLP- per SLP patient is to stay NPO     1605- patient desat to 82% patient placed back on bipap. Family at bedside and all questions were answered.

## 2024-12-23 NOTE — PROGRESS NOTES
Patient repositioned every 2 hours over night and heels kept off bed strictly inforced due to dti on feet.  Patient incon of stool x2 and urine x1 overnight.  Small open area on coccyx and left gluteal fold.  Zinc ointment applied multiple times this shift.    Patient wore bipap and tolerated well overnight.  BP elevated on several occasions overnight-hospitalist notified and orders placed for prn medication that were given with effectiveness.    Patient temp low overnight and placed on bear hugger warming blanket with effectiveness.  Electronically signed by Digna Ying RN on 12/23/2024 at 5:11 AM

## 2024-12-23 NOTE — PROGRESS NOTES
Comprehensive Nutrition Assessment    Type and Reason for Visit:  Reassess    Nutrition Recommendations/Plan:   Pending results of BSE      Malnutrition Assessment:  Malnutrition Status:  Moderate malnutrition (12/19/24 1020)    Context:  Chronic Illness     Findings of the 6 clinical characteristics of malnutrition:  Energy Intake:  Mild decrease in energy intake  Weight Loss:  Greater than 10% over 6 months     Body Fat Loss:  Mild body fat loss Buccal region, Orbital   Muscle Mass Loss:  Mild muscle mass loss Clavicles (pectoralis & deltoids), Temples (temporalis)  Fluid Accumulation:  No fluid accumulation     Strength:  Not Performed    Nutrition Assessment:    Pt was extubated yesterday, remains NPO pending BSE.  Day 5 meeting < 75% of estimated energy-proetin needs.  Will monitor for results of BSE for further nutrition recommendations    Nutrition Related Findings:    NH resident, Hx significant for : DM2, ESRD/CKD4 s/p kidney transplant x 2; started HD 12/20, volume overloaded. Intubated 12/18-22, trophic TF x 3 days +Rhinovirus.  Labs reviewed, Phos: 5.4, hyperglycemia.  Meds include colace, glycolax, prednisone, anuric Wound Type: Multiple, Pressure Injury, Stage II, Skin Tears       Current Nutrition Intake & Therapies:    Diet NPO    Anthropometric Measures:  Height: 188 cm (6' 2\")  Ideal Body Weight (IBW): 190 lbs (86 kg)    Admission Body Weight: 71.7 kg (158 lb)  Current Body Weight: 77.1 kg (170 lb) (3+ weeping edema), 84.2 % Weight Source: Bed scale  Current BMI (kg/m2): 21.8  Usual Body Weight: 80.3 kg (177 lb) (( 7/2024);173#(5/2024), 180# ( 10/11455)     % Weight Change (Calculated): -9.6                    BMI Categories: Underweight (BMI less than 22) age over 65    Estimated Daily Nutrient Needs:  Energy Requirements Based On: Kcal/kg  Weight Used for Energy Requirements: Admission  Energy (kcal/day): ~2150 kcal @ 30 kcal/kg  Weight Used for Protein Requirements: Admission  Protein  (g/day): ~107 gm @ 1.5 gm/kg  Method Used for Fluid Requirements: Standard renal  Fluid (ml/day): 500-800 ml + DUO (or as per provider)    Nutrition Diagnosis:   Increased nutrient needs related to increase demand for energy/nutrients as evidenced by wounds  Inadequate oral intake related to swallowing difficulty as evidenced by NPO or clear liquid status due to medical condition    Nutrition Interventions:   Food and/or Nutrient Delivery:  (Pending results of BSE)  Nutrition Education/Counseling: No recommendation at this time  Coordination of Nutrition Care: No recommendation at this time       Goals:  Goals: Initiation of nutrition  Type of Goal: New goal  Previous Goal Met: Goal(s) Achieved    Nutrition Monitoring and Evaluation:      Food/Nutrient Intake Outcomes: Progression of Nutrition  Physical Signs/Symptoms Outcomes: Biochemical Data, Chewing or Swallowing, Weight    Discharge Planning:    Too soon to determine     Gayle Monsalve RD, LD

## 2024-12-23 NOTE — PROGRESS NOTES
Critical Care Progress Note    2024 8:44 AM    Subjective:   Admit Date: 2024  PCP: Tico Rodriguez DO    Chief Complaint   Patient presents with    Illness     Interval History: Extubated yesterday. He is on NIV now. Very weak.  Has been extremely swollen.       Medications:   Scheduled Meds:   metoprolol tartrate  25 mg Oral BID    cycloSPORINE  50 mg Oral Daily    mycophenolate  250 mg Oral BID    insulin glargine  10 Units SubCUTAneous Daily    methylPREDNISolone  40 mg IntraVENous Daily    insulin lispro  0-16 Units SubCUTAneous Q4H    polyethylene glycol  17 g Oral Daily    docusate  100 mg Oral BID    amiodarone  200 mg Oral BID    chlorhexidine  15 mL Mouth/Throat BID    pantoprazole (PROTONIX) 40 mg in sodium chloride (PF) 0.9 % 10 mL injection  40 mg IntraVENous Daily    vancomycin (VANCOCIN) intermittent dosing (placeholder)   Other RX Placeholder    sodium chloride flush  5-40 mL IntraVENous 2 times per day    heparin (porcine)  5,000 Units SubCUTAneous 3 times per day    piperacillin-tazobactam  3,375 mg IntraVENous Q12H    [Held by provider] mycophenolate  500 mg Oral BID     Continuous Infusions:   dextrose      sodium chloride           Objective:   Vitals:   Temp (24hrs), Av.2 °F (35.7 °C), Min:95.2 °F (35.1 °C), Max:97.2 °F (36.2 °C)    BP (!) 163/82   Pulse (!) 104   Temp 97.2 °F (36.2 °C)   Resp 29   Ht 1.88 m (6' 2\")   Wt 77.2 kg (170 lb 3.1 oz)   SpO2 100%   BMI 21.85 kg/m²   I/O:24HR INTAKE/OUTPUT:    Intake/Output Summary (Last 24 hours) at 2024 0844  Last data filed at 2024 0353  Gross per 24 hour   Intake 775.79 ml   Output --   Net 775.79 ml      07 -  0700  In: 775.8   Out: -   CVP:             Ventilator Settings:  Vent Mode: CPAP/PS  Resp Rate (Set): 22 bpm   Vt (Set, mL): 450 mL       PEEP/CPAP (cmH2O): 5   FiO2 : 40 %     Physical Exam:  General appearance - ill appearing  Mental status - alert. Weak and confused.   Eyes - pupils

## 2024-12-23 NOTE — PROGRESS NOTES
Nephrology Progress Note    Assessment:  DELORES S/P remote Kidney transplant, s/p HD started 12/20   CKD-4 baseline  Acute hypoxic respiratory failure: now extubated, on PAP  Septic shock: resoved  Rhinovirus Pneumonia w/ bacterial coinfection   DM type-2  Nutritional issues      Plan:   - next HD rx tomorrow     Patient Active Problem List:     Pneumonia     Abdominal pain, other specified site     Acute pyelonephritis without lesion of renal medullary necrosis     Anemia     Essential hypertension     Nonspecific abnormal results of thyroid function study     Mixed hyperlipidemia     Kidney replaced by transplant     Intra-abdominal abscess post-procedure (HCC)     Infection due to Enterococcus     Fever and other physiologic disturbances of temperature regulation     Chronic kidney disease (CKD)     Type 2 diabetes mellitus with stage 3b chronic kidney disease, without long-term current use of insulin (HCC)     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 (HCC)     Abnormal CT of the chest     Bronchiectasis without complication (HCC)     GI bleeding     Complication of transplanted kidney     Muscle weakness (generalized)     Difficulty in walking     Elevated BUN     Urinary retention     Immunosuppression due to drug therapy (HCC)     DELORES (acute kidney injury) (HCC)     Adjustment disorder     Gross hematuria     Bilateral lower extremity edema     Dysuria     Acute cystitis without hematuria     Bilateral hearing loss     Abscess, toe     Acute hyperkalemia     Acute pain of left shoulder     Astigmatism     Astigmatism of both eyes with presbyopia     At risk for

## 2024-12-23 NOTE — CONSULTS
Palliative Care Consult Note  Patient: Remy Upton  Gender: male  YOB: 1950  Unit/Bed: IC01/IC01-01  CodeStatus: Full Code  Inpatient Treatment Team: Treatment Team:   Gagan Oliveros MD Zaizafoun, Manaf, MD Bescak, George M, DO Patel, Dhruv R, MD Young, Allison M, Lily Dias, Monica Bernabe RN Faria, Alexis, RN  Admit Date:  12/18/2024    Chief Complaint: Illness    History of Presenting Illness:      Remy Upton is a 73 y.o. male on hospital day 5 with a history of hypertension, PAF, diabetes type 2, ESRD status post kidney transplant x 2, BPH/urinary retention, anemia, depression.    Patient was brought to the emergency room with coughing for a few days and not being able to eat anything for.  Patient had generalized weakness being for the last week or so.  Patient became hypoxic in the ED after coughing episode.  Patient admitted to ICU for closer monitoring.  Patient ended up intubated upon admission.  Patient requiring hemodialysis this admission.  Patient extubated 12/22.     Palliative care was consulted for goals of care.  This is patient's third admission in the last 6 months.     Patient lives at home with wife. Patient has been at [facility] for rehab in the last 6 months. Per wife patient had been doing good. Patient could use walker for short distances but was mostly wheelchair bound. Per wife patient has had decreased appetite and significant weight loss.     Upon entering room patient is seen in ICU. He is currently on non invasive ventilator. He is lethargic. He opens his eyes but did not follow commands for me. He is not on any pressors. He was extubated yesterday. Per notes patient HR elevated when placed on 4 L NC. Patient has sitter at bedside for safety.     Most recent notable labs: BUN 42, creatinine 2.70, GFR 24.1, phosphorus 5.4, albumin 3.2, WBC 14.4, hemoglobin 8.4       Review of Systems:       Review of Systems  Hospice does not align with patients current goals of care.     3. Advance Care Planning  Discussed goals of care with patient. Explained in extensive detail nuances between full code, DNR CCA and DNR CC. Patient has made the decision to be FULL CODE.     Patients wife is legal next of kin.       4. Goals of Care Discussion:  Disease process and goals of treatment were discussed in basic terms. Remy's goal is to optimize available comfort care measures and all aggressive treatment. We discussed the palliative care philosophy in light of those goals. We discussed all care options contingent on treatment response and QOL. Much active listening, presence, and emotional support were given.     Patients wife feels patient is improving.       I have discussed/reviewed the patient's case with nurse.    -Patient is currently being treated for multiple medical conditions by other providers  DELORES on CKD 4  Acute hypoxic respiratory failure  Rhinovirus infection  Acute encephalopathy  Shock likely septic  Anion gap metabolic acidosis secondary to renal failure  Atrial fibrillation  Hypomagnesemia  Anemia  Diabetes type 2 hyperglycemia  BP    MDM: High    Electronically signed by HAJA Mcmullen CNP on 12/23/2024 at 12:38 PM    Collaborating physician: Dr. Coffman     Please note this report is partially produced by using speech recognition hardware.  It may contain errors related to the system, including grammar, punctuation and spelling as well as words and phrases that may seem inaccurate.  For any questions or concerns feel free to contact me for clarification.    Thanks for the opportunity you have allowed us to provide palliative care to Remy Upton. We will be in touch as care progresses. Please feel free to reach out to us should you have any questions or requests.

## 2024-12-23 NOTE — PROGRESS NOTES
[facility]   Facility/Department: [facility]  Speech Language Pathology  Clinical Bedside Swallow Evaluation    NAME:Remy Upton  : 1950 (73 y.o.)   [x]   confirmed    MRN: [id number]  ROOM: [place] [address]  ADMISSION DATE: 2024  PATIENT DIAGNOSIS(ES): Rhinovirus [B34.8]  S/P kidney transplant [Z94.0]  DELORES (acute kidney injury) (HCC) [N17.9]  Acute respiratory failure with hypoxia [J96.01]  Acute kidney injury superimposed on CKD (HCC) [N17.9, N18.9]  Chief Complaint   Patient presents with    Illness     Patient Active Problem List    Diagnosis Date Noted    Chest pain 2022    GI bleeding 10/29/2022    Lung nodules 10/25/2022    COPD without exacerbation (HCC) 10/25/2022    Abnormal CT of the chest 10/25/2022    Bronchiectasis without complication (HCC) 10/25/2022    Dieulafoy lesion of colon 10/24/2022    Poorly controlled diabetes mellitus (HCC) 10/24/2022    Symptomatic anemia 10/19/2022    Acute upper GI bleed 10/19/2022    Atrial fibrillation (HCC) 2022    Unstable angina (HCC) 2022    Palliative care encounter 2024    Goals of care, counseling/discussion 2024    Advanced care planning/counseling discussion 2024    Encounter for hospice care discussion 2024    Moderate malnutrition (HCC) 2024    Atrial fibrillation with RVR (HCC) 2024    At risk for stroke 10/14/2024    Deep vein thrombosis (DVT) of lower extremity (HCC) 10/14/2024    Localized swelling of both lower legs 10/14/2024    Peripheral nerve disease 10/14/2024    Rhabdomyolysis 10/14/2024    Leukocytes in urine 10/14/2024    Bilateral hearing loss 10/10/2024    Acute cystitis without hematuria 2024    Bilateral lower extremity edema 2024    Dysuria 2024    Gross hematuria 2024    Adjustment disorder 2024    Immunosuppression due to drug therapy (HCC) 2024    DELORES (acute kidney injury) (HCC) 2024    Elevated BUN 2024    Urinary  bedside swallow evaluation to assess the efficiency of his swallow function, identify signs and symptoms of aspiration, identify risk factors, and make recommendations regarding safe dietary consistencies, effective compensatory strategies, and safe eating environment.    General  Chart Reviewed: Yes  Comments: Per ICU consult 12/18/2024, \"Patient is 73 y.o. presents with generalized fatigue, feeling unwell, no fever, no chest pain, and no shortness of breath.  He is currently intubated on vent, he open his eyes to verbal stimuli but did not follow commands.  He is a wheelchair-bound.  He has history of end-stage renal disease status post transplant, A-fib, he is status post Watchman, history of DVT.  While in ED, patient progressively became hypoxemic with impaired consciousness, and he was intubated.\"  Behavior/Cognition: Alert;Confused;Doesn't follow directions  Respiratory Status: O2 via nasual cannula  O2 Device: Nasal cannula  Liters of Oxygen: 4 L  Communication Observation: Non-verbal  Follows Directions: None (max cues to follow simple directions, made some attempts at times to move tongue and voice)  Dentition:  (no upper dentition, some lower)  Patient Positioning: Upright in bed  Baseline Vocal Quality: Aphonic (attempted to voice x 1 after cues however unsucessful)  Volitional Cough: Absent (attempted to cough spontaneously however unsuccessful)  Volitional Swallow: Absent  Prior Dysphagia History: Unknown.  Pt intubated this admission from 12/18-12/22.  RN took pt off Bi-Pap for this evaluation.  SpO2 remained at 100 during evaluation.      Consistencies Administered: Ice Chips      Current Diet level  Current Diet : NPO  Current Liquid Diet : NPO    Oral Motor  Labial: Decreased rate;Decreased seal (open mouth posture)  Dentition:  (some lower dentition, no upper dentition; unknown if has dentures)  Oral Hygiene: Xerostomia;Clean  Lingual: Decreased rate;Decreased

## 2024-12-23 NOTE — INTERDISCIPLINARY ROUNDS
Spiritual Care Services     Summary of Visit:   participated in ICU rounds. Patient on Bipap after being extubated yesterday. No family present.     Encounter Summary  Encounter Overview/Reason: Interdisciplinary rounds  Service Provided For: Patient  Support System: Spouse, Children  Complexity of Encounter: Low  Begin Time: 0945  End Time : 1000  Total Time Calculated: 15 min        Spiritual/Emotional needs  Type: Spiritual Support                      Spiritual Assessment/Intervention/Outcomes:    Assessment: Unable to assess    Intervention: Prayer (assurance of)/[place]           Care Plan:    Plan and Referrals  Plan/Referrals: Continue Support (comment)     to provide additional spiritual support as needed      Spiritual Care Services   Electronically signed by Chaplain Neeta on 12/23/2024 at 10:04 AM.    To reach a  for emotional and spiritual support, place an EPIC consult request.   If a  is needed immediately, dial “0” and ask to page the on-call .

## 2024-12-23 NOTE — PLAN OF CARE
Problem: Chronic Conditions and Co-morbidities  Goal: Patient's chronic conditions and co-morbidity symptoms are monitored and maintained or improved  Outcome: Progressing  Flowsheets (Taken 12/22/2024 2000)  Care Plan - Patient's Chronic Conditions and Co-Morbidity Symptoms are Monitored and Maintained or Improved: Monitor and assess patient's chronic conditions and comorbid symptoms for stability, deterioration, or improvement     Problem: Discharge Planning  Goal: Discharge to home or other facility with appropriate resources  Outcome: Progressing  Flowsheets (Taken 12/22/2024 2000)  Discharge to home or other facility with appropriate resources: Identify barriers to discharge with patient and caregiver     Problem: Pain  Goal: Verbalizes/displays adequate comfort level or baseline comfort level  Outcome: Progressing  Flowsheets (Taken 12/22/2024 2000)  Verbalizes/displays adequate comfort level or baseline comfort level:   Encourage patient to monitor pain and request assistance   Assess pain using appropriate pain scale   Administer analgesics based on type and severity of pain and evaluate response     Problem: Safety - Adult  Goal: Free from fall injury  Outcome: Progressing     Problem: Skin/Tissue Integrity  Goal: Absence of new skin breakdown  Description: 1.  Monitor for areas of redness and/or skin breakdown  2.  Assess vascular access sites hourly  3.  Every 4-6 hours minimum:  Change oxygen saturation probe site  4.  Every 4-6 hours:  If on nasal continuous positive airway pressure, respiratory therapy assess nares and determine need for appliance change or resting period.  Outcome: Progressing     Problem: Neurosensory - Adult  Goal: Achieves stable or improved neurological status  Outcome: Progressing  Flowsheets (Taken 12/22/2024 2000)  Achieves stable or improved neurological status: Assess for and report changes in neurological status     Problem: Respiratory - Adult  Goal: Achieves optimal

## 2024-12-23 NOTE — PROGRESS NOTES
PHARMACY NOTE:   ICU Rounds Attended (10-15 minutes in patient room):    Pt diagnosis: DELORES    Follow up/Changes:   -home meds reviewed: Follow up resume tamsulosin, finasteride, reglan as appropriate  -monitor SSI needs and need for increased lantus  -change solu medrol to more equivalent dose prednisone home dose. Changed to 10mg per verbal  -core measures assessed/met     Additional information:   Steroid: solu medrol decreased to 10mg  Insulin coverage: high sliding scale with Humalog, utilized 16 units per sliding scale over the past 24 hours. 10 units Lantus  Pressors: none  Sedation: none  Antimicrobial therapy, day #5/7:vanco/zosyn. ID not on consult. Cultures no growth. Elevated wbc and procal  Core measures assessed/met     Digna Morgan RPH, PharmD

## 2024-12-24 ENCOUNTER — APPOINTMENT (OUTPATIENT)
Dept: GENERAL RADIOLOGY | Age: 74
End: 2024-12-24
Payer: COMMERCIAL

## 2024-12-24 LAB
ALBUMIN SERPL-MCNC: 3.4 G/DL (ref 3.5–4.6)
ANION GAP SERPL CALCULATED.3IONS-SCNC: 17 MEQ/L (ref 9–15)
BACTERIA SPEC RESP CULT: NORMAL
BASE EXCESS ARTERIAL: 4 (ref -3–3)
BASOPHILS # BLD: 0 K/UL (ref 0–0.2)
BASOPHILS NFR BLD: 0.1 %
BUN SERPL-MCNC: 61 MG/DL (ref 8–23)
CALCIUM IONIZED: 1.08 MMOL/L (ref 1.12–1.32)
CALCIUM SERPL-MCNC: 9 MG/DL (ref 8.5–9.9)
CHLORIDE SERPL-SCNC: 97 MEQ/L (ref 95–107)
CO2 SERPL-SCNC: 26 MEQ/L (ref 20–31)
CREAT SERPL-MCNC: 3.66 MG/DL (ref 0.7–1.2)
EOSINOPHIL # BLD: 0 K/UL (ref 0–0.7)
EOSINOPHIL NFR BLD: 0 %
ERYTHROCYTE [DISTWIDTH] IN BLOOD BY AUTOMATED COUNT: 16.2 % (ref 11.5–14.5)
GLUCOSE BLD-MCNC: 130 MG/DL (ref 70–99)
GLUCOSE BLD-MCNC: 147 MG/DL (ref 70–99)
GLUCOSE BLD-MCNC: 150 MG/DL (ref 70–99)
GLUCOSE BLD-MCNC: 153 MG/DL (ref 70–99)
GLUCOSE BLD-MCNC: 170 MG/DL (ref 70–99)
GLUCOSE BLD-MCNC: 193 MG/DL (ref 70–99)
GLUCOSE SERPL-MCNC: 152 MG/DL (ref 70–99)
HCO3 ARTERIAL: 26.9 MMOL/L (ref 21–29)
HCT VFR BLD AUTO: 27.1 % (ref 42–52)
HCT VFR BLD AUTO: 34 % (ref 41–53)
HGB BLD CALC-MCNC: 11.6 GM/DL (ref 13.5–17.5)
HGB BLD-MCNC: 8.4 G/DL (ref 14–18)
LACTATE: 1.13 MMOL/L (ref 0.4–2)
LYMPHOCYTES # BLD: 0.1 K/UL (ref 1–4.8)
LYMPHOCYTES NFR BLD: 0.6 %
MAGNESIUM SERPL-MCNC: 2.4 MG/DL (ref 1.7–2.4)
MCH RBC QN AUTO: 27.6 PG (ref 27–31.3)
MCHC RBC AUTO-ENTMCNC: 31 % (ref 33–37)
MCV RBC AUTO: 89.1 FL (ref 79–92.2)
MONOCYTES # BLD: 0.7 K/UL (ref 0.2–0.8)
MONOCYTES NFR BLD: 4.8 %
NEUTROPHILS # BLD: 13.5 K/UL (ref 1.4–6.5)
NEUTS SEG NFR BLD: 89.3 %
O2 SAT, ARTERIAL: 100 % (ref 93–100)
PCO2 ARTERIAL: 35 MM HG (ref 35–45)
PERFORMED ON: ABNORMAL
PH ARTERIAL: 7.5 (ref 7.35–7.45)
PHOSPHATE SERPL-MCNC: 6.5 MG/DL (ref 2.3–4.8)
PLATELET # BLD AUTO: 250 K/UL (ref 130–400)
PO2 ARTERIAL: 293 MM HG (ref 75–108)
POC CHLORIDE: 98 MEQ/L (ref 99–110)
POC CREATININE: 2.9 MG/DL (ref 0.8–1.3)
POC FIO2: 60
POC SAMPLE TYPE: ABNORMAL
POTASSIUM SERPL-SCNC: 3.8 MEQ/L (ref 3.5–5.1)
POTASSIUM SERPL-SCNC: 4.8 MEQ/L (ref 3.4–4.9)
RBC # BLD AUTO: 3.04 M/UL (ref 4.7–6.1)
SODIUM BLD-SCNC: 136 MEQ/L (ref 136–145)
SODIUM SERPL-SCNC: 140 MEQ/L (ref 135–144)
TCO2 ARTERIAL: 28 MMOL/L (ref 21–32)
TRIGL SERPL-MCNC: 86 MG/DL (ref 0–150)
VANCOMYCIN SERPL-MCNC: 23 UG/ML (ref 10–40)
WBC # BLD AUTO: 15.1 K/UL (ref 4.8–10.8)

## 2024-12-24 PROCEDURE — 6370000000 HC RX 637 (ALT 250 FOR IP): Performed by: INTERNAL MEDICINE

## 2024-12-24 PROCEDURE — 84478 ASSAY OF TRIGLYCERIDES: CPT

## 2024-12-24 PROCEDURE — 84132 ASSAY OF SERUM POTASSIUM: CPT

## 2024-12-24 PROCEDURE — 6360000002 HC RX W HCPCS: Performed by: INTERNAL MEDICINE

## 2024-12-24 PROCEDURE — 71045 X-RAY EXAM CHEST 1 VIEW: CPT

## 2024-12-24 PROCEDURE — 6360000002 HC RX W HCPCS: Performed by: NURSE PRACTITIONER

## 2024-12-24 PROCEDURE — 2580000003 HC RX 258: Performed by: INTERNAL MEDICINE

## 2024-12-24 PROCEDURE — 2000000000 HC ICU R&B

## 2024-12-24 PROCEDURE — 2500000003 HC RX 250 WO HCPCS: Performed by: INTERNAL MEDICINE

## 2024-12-24 PROCEDURE — 2700000000 HC OXYGEN THERAPY PER DAY

## 2024-12-24 PROCEDURE — 82803 BLOOD GASES ANY COMBINATION: CPT

## 2024-12-24 PROCEDURE — 82330 ASSAY OF CALCIUM: CPT

## 2024-12-24 PROCEDURE — 94660 CPAP INITIATION&MGMT: CPT

## 2024-12-24 PROCEDURE — 82435 ASSAY OF BLOOD CHLORIDE: CPT

## 2024-12-24 PROCEDURE — 99291 CRITICAL CARE FIRST HOUR: CPT | Performed by: INTERNAL MEDICINE

## 2024-12-24 PROCEDURE — 6360000002 HC RX W HCPCS

## 2024-12-24 PROCEDURE — 5A1955Z RESPIRATORY VENTILATION, GREATER THAN 96 CONSECUTIVE HOURS: ICD-10-PCS | Performed by: INTERNAL MEDICINE

## 2024-12-24 PROCEDURE — 0BH17EZ INSERTION OF ENDOTRACHEAL AIRWAY INTO TRACHEA, VIA NATURAL OR ARTIFICIAL OPENING: ICD-10-PCS | Performed by: INTERNAL MEDICINE

## 2024-12-24 PROCEDURE — 84295 ASSAY OF SERUM SODIUM: CPT

## 2024-12-24 PROCEDURE — 83605 ASSAY OF LACTIC ACID: CPT

## 2024-12-24 PROCEDURE — 2580000003 HC RX 258: Performed by: NURSE PRACTITIONER

## 2024-12-24 PROCEDURE — 31500 INSERT EMERGENCY AIRWAY: CPT | Performed by: INTERNAL MEDICINE

## 2024-12-24 PROCEDURE — P9047 ALBUMIN (HUMAN), 25%, 50ML: HCPCS | Performed by: INTERNAL MEDICINE

## 2024-12-24 PROCEDURE — 85014 HEMATOCRIT: CPT

## 2024-12-24 PROCEDURE — 94003 VENT MGMT INPAT SUBQ DAY: CPT

## 2024-12-24 PROCEDURE — 80202 ASSAY OF VANCOMYCIN: CPT

## 2024-12-24 PROCEDURE — 36600 WITHDRAWAL OF ARTERIAL BLOOD: CPT

## 2024-12-24 PROCEDURE — 36415 COLL VENOUS BLD VENIPUNCTURE: CPT

## 2024-12-24 PROCEDURE — 31500 INSERT EMERGENCY AIRWAY: CPT

## 2024-12-24 PROCEDURE — 82565 ASSAY OF CREATININE: CPT

## 2024-12-24 PROCEDURE — 83735 ASSAY OF MAGNESIUM: CPT

## 2024-12-24 PROCEDURE — 80069 RENAL FUNCTION PANEL: CPT

## 2024-12-24 PROCEDURE — 82948 REAGENT STRIP/BLOOD GLUCOSE: CPT

## 2024-12-24 PROCEDURE — 85025 COMPLETE CBC W/AUTO DIFF WBC: CPT

## 2024-12-24 PROCEDURE — 94761 N-INVAS EAR/PLS OXIMETRY MLT: CPT

## 2024-12-24 PROCEDURE — 99233 SBSQ HOSP IP/OBS HIGH 50: CPT | Performed by: NURSE PRACTITIONER

## 2024-12-24 PROCEDURE — 6370000000 HC RX 637 (ALT 250 FOR IP): Performed by: NURSE PRACTITIONER

## 2024-12-24 RX ORDER — FENTANYL CITRATE 50 UG/ML
100 INJECTION, SOLUTION INTRAMUSCULAR; INTRAVENOUS ONCE
Status: COMPLETED | OUTPATIENT
Start: 2024-12-24 | End: 2024-12-24

## 2024-12-24 RX ORDER — ETOMIDATE 2 MG/ML
0.3 INJECTION INTRAVENOUS ONCE
Status: COMPLETED | OUTPATIENT
Start: 2024-12-24 | End: 2024-12-24

## 2024-12-24 RX ORDER — CHLORHEXIDINE GLUCONATE ORAL RINSE 1.2 MG/ML
15 SOLUTION DENTAL 2 TIMES DAILY
Status: DISCONTINUED | OUTPATIENT
Start: 2024-12-24 | End: 2024-12-25

## 2024-12-24 RX ORDER — FENTANYL CITRATE-0.9 % NACL/PF 20 MCG/2ML
50 SYRINGE (ML) INTRAVENOUS EVERY 30 MIN PRN
Status: DISCONTINUED | OUTPATIENT
Start: 2024-12-24 | End: 2024-12-25

## 2024-12-24 RX ORDER — FENTANYL CITRATE-0.9 % NACL/PF 10 MCG/ML
25-200 PLASTIC BAG, INJECTION (ML) INTRAVENOUS CONTINUOUS
Status: DISCONTINUED | OUTPATIENT
Start: 2024-12-24 | End: 2024-12-25

## 2024-12-24 RX ORDER — NOREPINEPHRINE BITARTRATE 0.06 MG/ML
INJECTION, SOLUTION INTRAVENOUS
Status: DISCONTINUED
Start: 2024-12-24 | End: 2024-12-25

## 2024-12-24 RX ORDER — SODIUM CHLORIDE, SODIUM LACTATE, POTASSIUM CHLORIDE, AND CALCIUM CHLORIDE .6; .31; .03; .02 G/100ML; G/100ML; G/100ML; G/100ML
250 INJECTION, SOLUTION INTRAVENOUS ONCE
Status: COMPLETED | OUTPATIENT
Start: 2024-12-24 | End: 2024-12-24

## 2024-12-24 RX ORDER — MIDAZOLAM HYDROCHLORIDE 1 MG/ML
4 INJECTION, SOLUTION INTRAMUSCULAR; INTRAVENOUS ONCE
Status: COMPLETED | OUTPATIENT
Start: 2024-12-24 | End: 2024-12-24

## 2024-12-24 RX ORDER — MIDODRINE HYDROCHLORIDE 10 MG/1
20 TABLET ORAL
Status: DISCONTINUED | OUTPATIENT
Start: 2024-12-24 | End: 2024-12-27

## 2024-12-24 RX ORDER — ALBUMIN (HUMAN) 12.5 G/50ML
50 SOLUTION INTRAVENOUS ONCE
Status: COMPLETED | OUTPATIENT
Start: 2024-12-24 | End: 2024-12-24

## 2024-12-24 RX ORDER — NOREPINEPHRINE BITARTRATE 0.06 MG/ML
1-100 INJECTION, SOLUTION INTRAVENOUS CONTINUOUS
Status: DISCONTINUED | OUTPATIENT
Start: 2024-12-24 | End: 2024-12-26

## 2024-12-24 RX ORDER — PROPOFOL 10 MG/ML
5-50 INJECTION, EMULSION INTRAVENOUS CONTINUOUS
Status: DISCONTINUED | OUTPATIENT
Start: 2024-12-24 | End: 2024-12-25

## 2024-12-24 RX ADMIN — HEPARIN SODIUM 5000 UNITS: 5000 INJECTION INTRAVENOUS; SUBCUTANEOUS at 06:13

## 2024-12-24 RX ADMIN — DOCUSATE SODIUM 100 MG: 50 LIQUID ORAL at 13:01

## 2024-12-24 RX ADMIN — PIPERACILLIN AND TAZOBACTAM 3375 MG: 3; .375 INJECTION, POWDER, LYOPHILIZED, FOR SOLUTION INTRAVENOUS at 00:09

## 2024-12-24 RX ADMIN — HYDRALAZINE HYDROCHLORIDE 10 MG: 20 INJECTION INTRAMUSCULAR; INTRAVENOUS at 02:26

## 2024-12-24 RX ADMIN — CYCLOSPORINE 50 MG: 100 SOLUTION ORAL at 14:24

## 2024-12-24 RX ADMIN — PIPERACILLIN AND TAZOBACTAM 3375 MG: 3; .375 INJECTION, POWDER, LYOPHILIZED, FOR SOLUTION INTRAVENOUS at 12:58

## 2024-12-24 RX ADMIN — Medication 250 MG: at 14:24

## 2024-12-24 RX ADMIN — CHLORHEXIDINE GLUCONATE, 0.12% ORAL RINSE 15 ML: 1.2 SOLUTION DENTAL at 08:50

## 2024-12-24 RX ADMIN — Medication 10 ML: at 21:00

## 2024-12-24 RX ADMIN — HEPARIN SODIUM 5000 UNITS: 5000 INJECTION INTRAVENOUS; SUBCUTANEOUS at 14:25

## 2024-12-24 RX ADMIN — Medication 10 ML: at 13:01

## 2024-12-24 RX ADMIN — DOCUSATE SODIUM 100 MG: 50 LIQUID ORAL at 21:53

## 2024-12-24 RX ADMIN — INSULIN GLARGINE 10 UNITS: 100 INJECTION, SOLUTION SUBCUTANEOUS at 08:50

## 2024-12-24 RX ADMIN — PROPOFOL 15 MCG/KG/MIN: 10 INJECTION, EMULSION INTRAVENOUS at 23:38

## 2024-12-24 RX ADMIN — 0.12% CHLORHEXIDINE GLUCONATE 15 ML: 1.2 RINSE ORAL at 13:02

## 2024-12-24 RX ADMIN — METHYLPREDNISOLONE SODIUM SUCCINATE 10 MG: 40 INJECTION INTRAMUSCULAR; INTRAVENOUS at 08:51

## 2024-12-24 RX ADMIN — SODIUM CHLORIDE 40 MG: 9 INJECTION INTRAMUSCULAR; INTRAVENOUS; SUBCUTANEOUS at 08:50

## 2024-12-24 RX ADMIN — POLYETHYLENE GLYCOL 3350 17 G: 17 POWDER, FOR SOLUTION ORAL at 12:58

## 2024-12-24 RX ADMIN — METOPROLOL TARTRATE 25 MG: 25 TABLET, FILM COATED ORAL at 21:53

## 2024-12-24 RX ADMIN — 0.12% CHLORHEXIDINE GLUCONATE 15 ML: 1.2 RINSE ORAL at 21:53

## 2024-12-24 RX ADMIN — FENTANYL CITRATE 100 MCG: 50 INJECTION INTRAMUSCULAR; INTRAVENOUS at 12:28

## 2024-12-24 RX ADMIN — AMIODARONE HYDROCHLORIDE 200 MG: 200 TABLET ORAL at 21:53

## 2024-12-24 RX ADMIN — ETOMIDATE 23 MG: 2 INJECTION, SOLUTION INTRAVENOUS at 11:45

## 2024-12-24 RX ADMIN — MIDODRINE HYDROCHLORIDE 20 MG: 10 TABLET ORAL at 12:59

## 2024-12-24 RX ADMIN — SODIUM CHLORIDE, POTASSIUM CHLORIDE, SODIUM LACTATE AND CALCIUM CHLORIDE 250 ML: 600; 310; 30; 20 INJECTION, SOLUTION INTRAVENOUS at 12:15

## 2024-12-24 RX ADMIN — HEPARIN SODIUM 5000 UNITS: 5000 INJECTION INTRAVENOUS; SUBCUTANEOUS at 21:53

## 2024-12-24 RX ADMIN — ALBUMIN (HUMAN) 50 G: 0.25 INJECTION, SOLUTION INTRAVENOUS at 12:17

## 2024-12-24 RX ADMIN — NOREPINEPHRINE BITARTRATE 20 MCG/MIN: 64 SOLUTION INTRAVENOUS at 12:10

## 2024-12-24 RX ADMIN — Medication 250 MG: at 21:54

## 2024-12-24 RX ADMIN — MIDAZOLAM 4 MG: 1 INJECTION INTRAMUSCULAR; INTRAVENOUS at 11:45

## 2024-12-24 RX ADMIN — MIDODRINE HYDROCHLORIDE 20 MG: 10 TABLET ORAL at 16:58

## 2024-12-24 RX ADMIN — INSULIN LISPRO 4 UNITS: 100 INJECTION, SOLUTION INTRAVENOUS; SUBCUTANEOUS at 01:05

## 2024-12-24 RX ADMIN — PROPOFOL 20 MCG/KG/MIN: 10 INJECTION, EMULSION INTRAVENOUS at 11:57

## 2024-12-24 ASSESSMENT — PULMONARY FUNCTION TESTS
PIF_VALUE: 22
PIF_VALUE: 23
PIF_VALUE: 29
PIF_VALUE: 29
PIF_VALUE: 21
PIF_VALUE: 29
PIF_VALUE: 22
PIF_VALUE: 29
PIF_VALUE: 22
PIF_VALUE: 23
PIF_VALUE: 29
PIF_VALUE: 22
PIF_VALUE: 22
PIF_VALUE: 23
PIF_VALUE: 27
PIF_VALUE: 23
PIF_VALUE: 23
PIF_VALUE: 26
PIF_VALUE: 21
PIF_VALUE: 25
PIF_VALUE: 22
PIF_VALUE: 35
PIF_VALUE: 23
PIF_VALUE: 22
PIF_VALUE: 22
PIF_VALUE: 29
PIF_VALUE: 23
PIF_VALUE: 22
PIF_VALUE: 29
PIF_VALUE: 29
PIF_VALUE: 23
PIF_VALUE: 22
PIF_VALUE: 29
PIF_VALUE: 26
PIF_VALUE: 22
PIF_VALUE: 23
PIF_VALUE: 28
PIF_VALUE: 23
PIF_VALUE: 21
PIF_VALUE: 22
PIF_VALUE: 23
PIF_VALUE: 28

## 2024-12-24 ASSESSMENT — PAIN SCALES - GENERAL
PAINLEVEL_OUTOF10: 0

## 2024-12-24 NOTE — PROGRESS NOTES
[facility]  Occupational Therapy        NAME:  Remy Upton  ROOM: IC01/IC01-01  :  1950  DATE: 2024    Attempted to see Remy Upton at 0946 on this date for:   [x]  Initial Evaluation   []  Treatment       Patient was unable to be seen due to:   [] Off unit for testing/procedure    [] Patient refused, stating \"    [] Therapy on hold due to     [] Nursing deferred due to    [x] Other:  Pt receiving dialysis and per RN likely to be intubated after dialysis. Pt has been unable to participate x 2 days and is having decreased medical status. Will d/c current orders, please reorder when pt able to participate.     Discussed with LISANDRA Covarrubias    Electronically signed by FRANCISCO Yanes/L on 24 at 9:46 AM EST

## 2024-12-24 NOTE — FLOWSHEET NOTE
Tx complete 800 ml uf removed        12/24/24 1030   Vital Signs   /70   Temp (!) 95.9 °F (35.5 °C)   Pulse (!) 114   Respirations 27   SpO2 100 %   Weight - Scale 76 kg (167 lb 8.8 oz)   Percent Weight Change -0.52   Post-Hemodialysis Assessment   Post-Treatment Procedures Blood returned;Access bleeding time < 10 minutes   Machine Disinfection Process Exterior Machine Disinfection   Rinseback Volume (ml) 300 ml   Blood Volume Processed (Liters) 40.4 L   Dialyzer Clearance Lightly streaked   Duration of Treatment (minutes) 180 minutes   Heparin Amount Administered During Treatment (mL) 0 mL   Hemodialysis Intake (ml) 600 ml   Hemodialysis Output (ml) 1400 ml   NET Removed (ml) 800   Tolerated Treatment Good   Bilateral Breath Sounds Diminished   Edema Right upper extremity;Left upper extremity;Right lower extremity;Left lower extremity   RUE Edema +3;Pitting   Observations & Evaluations   Level of Consciousness 1   Heart Rhythm Regular   Respiratory Quality/Effort Unlabored   O2 Device PAP (positive airway pressure)   Skin Condition/Temp Dry;Warm   Abdomen Inspection Soft

## 2024-12-24 NOTE — PROGRESS NOTES
2030: Assessment completed. Pt on continuous bipap. No signs of distress noted. Pt repositioned, external cath placed. Heels elevated off bed. Bear hugger in place. Zinc applie to coccyx.    0000: Upon reassessment, pt is noted to have hematoma like bump on right wrist to which he has an AV fistula. This RN had Sarah REYES come assess it. Message sent to Josiah HERNANDEZ. Positive thrill and bruit noted.    0030: Josiah HERNANDEZ at bedside to assess AV fistula.     0120: Call made to sergio to notify the on call nurse of hematoma. Per on call nurse, apply heat or ice to area for no more than 15 minutes and continue to monitor.     0345: reassessment completed.

## 2024-12-24 NOTE — PROCEDURES
PROCEDURE NOTE  Date: 12/24/2024   Name: Remy Upton  YOB: 1950    Procedures    PROCEDURE:   Endotracheal intubation.       INDICATIONS:   Acute Respiratory Failure.    Consent   The spouse was counseled regarding the procedure, it's indications, risks, potential complications and alternatives and any questions were answered. Consent was obtained.    PROCEDURE SUMMARY:   Permit was implied secondary to emergent situation. Amac 4 blade  was inserted into the oropharynx at which time the vocal cords were visualized. 7.5 Swedish endotracheal tube was inserted and visualized going through the vocal cords. The stylette was removed. Colorimetric change was visualized on the CO2 meter. Breath sounds were heard in both lung fields equally. The endotracheal tube was placed at 23 cm, measured at the teeth.    COMPLICATIONS:   None    ESTIMATED BLOOD LOSS:   None      Carlotta Yadav MD 11:49 AM 12/24/2024 .

## 2024-12-24 NOTE — PROGRESS NOTES
Nephrology Progress Note    Assessment:  DELORES S/P remote Kidney transplant, s/p HD started 12/20   CKD-4 baseline  Acute hypoxic respiratory failure: now extubated, on PAP  Septic shock: resoved  Rhinovirus Pneumonia w/ bacterial coinfection   DM type-2  Nutritional issues  Hypotensive      Plan:   - next HD rx Thursday      Patient Active Problem List:     Pneumonia     Abdominal pain, other specified site     Acute pyelonephritis without lesion of renal medullary necrosis     Anemia     Essential hypertension     Nonspecific abnormal results of thyroid function study     Mixed hyperlipidemia     Kidney replaced by transplant     Intra-abdominal abscess post-procedure (HCC)     Infection due to Enterococcus     Fever and other physiologic disturbances of temperature regulation     Chronic kidney disease (CKD)     Type 2 diabetes mellitus with stage 3b chronic kidney disease, without long-term current use of insulin (HCC)     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 (HCC)     Abnormal CT of the chest     Bronchiectasis without complication (HCC)     GI bleeding     Complication of transplanted kidney     Muscle weakness (generalized)     Difficulty in walking     Elevated BUN     Urinary retention     Immunosuppression due to drug therapy (HCC)     DELORES (acute kidney injury) (HCC)     Adjustment disorder     Gross hematuria     Bilateral lower extremity edema     Dysuria     Acute cystitis without hematuria     Bilateral hearing loss     Abscess, toe     Acute hyperkalemia     Acute pain of left shoulder     Astigmatism     Astigmatism of both eyes with presbyopia      At risk for stroke     Benign enlargement of prostate     Benign prostatic hyperplasia with urinary frequency     Rhinovirus     Chronic right shoulder pain     Condition not found     Deep vein thrombosis (DVT) of lower extremity (HCC)     Diabetes mellitus type 2 without retinopathy (HCC)     Edema     Gait difficulty     GERD (gastroesophageal reflux disease)     H/O lower gastrointestinal bleeding     History of blood transfusion     Incomplete emptying of bladder     Insulin long-term use (HCC)     Leg weakness, bilateral     Localized swelling of both lower legs     Peripheral nerve disease     Pseudophakia of both eyes     PTDM (post-transplant diabetes mellitus) (HCC)     Posterior vitreous detachment     Rhabdomyolysis     Seizure disorder (HCC)     Stage 5 chronic kidney disease with transplanted kidney (HCC)     Leukocytes in urine     Atrial fibrillation with RVR (HCC)     Moderate malnutrition (HCC)      Subjective:  Admit Date: 12/18/2024    Interval History: had HD today w/ decreased UF due to low BP, seen on rx, had to get fluid back     Medications:  Scheduled Meds:   methylPREDNISolone  10 mg IntraVENous Daily    metoprolol tartrate  25 mg Oral BID    cycloSPORINE  50 mg Oral Daily    mycophenolate  250 mg Oral BID    insulin glargine  10 Units SubCUTAneous Daily    insulin lispro  0-16 Units SubCUTAneous Q4H    polyethylene glycol  17 g Oral Daily    docusate  100 mg Oral BID    amiodarone  200 mg Oral BID    chlorhexidine  15 mL Mouth/Throat BID    pantoprazole (PROTONIX) 40 mg in sodium chloride (PF) 0.9 % 10 mL injection  40 mg IntraVENous Daily    vancomycin (VANCOCIN) intermittent dosing (placeholder)   Other RX Placeholder    sodium chloride flush  5-40 mL IntraVENous 2 times per day    heparin (porcine)  5,000 Units SubCUTAneous 3 times per day    piperacillin-tazobactam  3,375 mg IntraVENous Q12H    [Held by provider] mycophenolate  500 mg Oral BID     Continuous Infusions:   dextrose

## 2024-12-24 NOTE — PROGRESS NOTES
Hospitalist Progress Note      PCP: Tico Rodriguez DO    Date of Admission: 12/18/2024    Chief Complaint:  patient intubated this morning. He was sedated during my evaluation     Medications:  Reviewed    Infusion Medications    fentaNYL      propofol 20 mcg/kg/min (12/24/24 1232)    norepinephrine 5 mcg/min (12/24/24 1220)    dextrose      sodium chloride       Scheduled Medications    chlorhexidine  15 mL Mouth/Throat BID    famotidine (PEPCID) injection  20 mg IntraVENous Daily    albumin human 25%  50 g IntraVENous Once    norepinephrine-sodium chloride        midodrine  20 mg Oral TID WC    methylPREDNISolone  10 mg IntraVENous Daily    metoprolol tartrate  25 mg Oral BID    cycloSPORINE  50 mg Oral Daily    mycophenolate  250 mg Oral BID    insulin glargine  10 Units SubCUTAneous Daily    insulin lispro  0-16 Units SubCUTAneous Q4H    polyethylene glycol  17 g Oral Daily    docusate  100 mg Oral BID    amiodarone  200 mg Oral BID    pantoprazole (PROTONIX) 40 mg in sodium chloride (PF) 0.9 % 10 mL injection  40 mg IntraVENous Daily    sodium chloride flush  5-40 mL IntraVENous 2 times per day    heparin (porcine)  5,000 Units SubCUTAneous 3 times per day    piperacillin-tazobactam  3,375 mg IntraVENous Q12H    [Held by provider] mycophenolate  500 mg Oral BID     PRN Meds: fentaNYL **AND** fentaNYL, norepinephrine-sodium chloride, metoprolol, midodrine, hydrALAZINE, LORazepam, glucose, dextrose bolus **OR** dextrose bolus, glucagon (rDNA), dextrose, sodium chloride flush, sodium chloride, ondansetron **OR** ondansetron, acetaminophen **OR** acetaminophen, ipratropium 0.5 mg-albuterol 2.5 mg      Intake/Output Summary (Last 24 hours) at 12/24/2024 1310  Last data filed at 12/24/2024 1220  Gross per 24 hour   Intake 688.73 ml   Output 1400 ml   Net -711.27 ml       Exam:    /81   Pulse (!) 123   Temp (!) 95.7 °F (35.4 °C)   Resp 23   Ht 1.88 m (6' 2\")   Wt 76 kg (167 lb 8.8 oz)   SpO2 100%   BMI  throughout the lung fields   bilaterally. Minimal residual markings at the left lung base with trace left   pleural effusion.         Vascular duplex hemodialysis access right   Final Result   1.  Fistula is a radial artery to cephalic outflow vein fistula. The fistula is   widely patent with cephalic blood flow volume of 821 cc/min which is sufficient   for dialysis. Radial artery flow volume is 774 cc/min which is appropriate for a   patent fistula. No evidence of stenosis or thrombus.      COMPARISON:No prior studies are available for comparison.      DIAGNOSIS: Patient with right forearm fistula, experiencing flow-volume   problems.      COMMENTS: What reading provider will be dictating this exam?->[facility]      TECHNIQUE: Grayscale and color Doppler ultrasound of right arm fistula      FINDINGS:       Radial ARTERY FLOW: 774 milliliters per minute.      CEPHALIC VEIN:   Flow: 821 milliliters per.   The cephalic vein is widely patent without any evidence of thrombus or stenosis.         Electronically signed by Joce Campos      CT HEAD WO CONTRAST   Final Result   No acute intracranial findings.         XR CHEST ABDOMEN NG PLACEMENT   Final Result   1. Endotracheal tube in satisfactory position.   2. Nasogastric tube with its tip in the gastric fundus but the proximal port   is above the GE junction. Recommend advancing the NG tube 10 cm to place the   proximal port below the GE junction.   3. New mild congestive failure.   4. New tiny right pleural effusion.   5. Moderate left lower lobe consolidation from either atelectasis or   pneumonia.      RECOMMENDATION:   Recommended advancing the NG tube 10 cm to place the tip below the GE   junction.         XR CHEST PORTABLE   Final Result   Emphysema.  No focal consolidation.         XR CHEST PORTABLE   Final Result   1. Cardiomegaly.   2. Bronchiectasis.   3. No active airspace consolidation.         XR CHEST PORTABLE    (Results Pending)

## 2024-12-24 NOTE — PROGRESS NOTES
Palliative Care Progress Note  Patient: Remy Upton  Gender: male  YOB: 1950  Unit/Bed: IC01/IC01-01  CodeStatus: Full Code  Inpatient Treatment Team: Treatment Team:   Gagan Oliveros MD Zaizafoun, Manaf, MD Bescak, George M, DO Patel, Dhruv R, MD Faria, Irvin, Marci Rodriguez RN Salisbury, Mary Jo C Vance, Kelly, RN Feliciano, Jaymarie  Admit Date:  12/18/2024    Chief Complaint: Illness    History of Presenting Illness:      Remy Upton is a 73 y.o. male on hospital day 6 with a history of hypertension, PAF, diabetes type 2, ESRD status post kidney transplant x 2, BPH/urinary retention, anemia, depression.    Patient was brought to the emergency room with coughing for a few days and not being able to eat anything for.  Patient had generalized weakness being for the last week or so.  Patient became hypoxic in the ED after coughing episode.  Patient admitted to ICU for closer monitoring.  Patient ended up intubated upon admission.  Patient requiring hemodialysis this admission.  Patient extubated 12/22.     Palliative care was consulted for goals of care.  This is patient's third admission in the last 6 months.     Patient lives at home with wife. Patient has been at [facility] for rehab in the last 6 months. Per wife patient had been doing good. Patient could use walker for short distances but was mostly wheelchair bound. Per wife patient has had decreased appetite and significant weight loss.     Upon entering room patient is seen in ICU. He is currently on non invasive ventilator. He is lethargic. He opens his eyes but did not follow commands for me. He is not on any pressors. He was extubated yesterday. Per notes patient HR elevated when placed on 4 L NC. Patient has sitter at bedside for safety.     Most recent notable labs: BUN 42, creatinine 2.70, GFR 24.1, phosphorus 5.4, albumin 3.2, WBC 14.4, hemoglobin 8.4     12/24/24    Patient recently re-intubated.  Toileting, [   ] Bathing, [   ] Continence    Radiology: Reviewed      No results found.       Assessment and plan:  Palliative care encounter:  Explained the role of palliative care in treating patient. Discussed symptom management related to chronic disease/condition. Provided emotional support and active listening. Patient understands and is agreeable to current plan.  Continue with current plan of care.   Discussed goals of care and code status with patients wife over the phone. At this time patient to remain FULL CODE.   Palliative care will continue to follow.     Hospice: patient is hospice eligible in the setting of acute hypoxic respiratory failure requiring non invasive ventilator, CKD4 on dialysis, acte encephalopathy. Hospice does not align with patients current goals of care.     3. Advance Care Planning  Discussed goals of care with patient. Explained in extensive detail nuances between full code, DNR CCA and DNR CC. Patient has made the decision to be FULL CODE.     Patients wife is legal next of kin.       4. Goals of Care Discussion:  Disease process and goals of treatment were discussed in basic terms. Remy's goal is to optimize available comfort care measures and all aggressive treatment. We discussed the palliative care philosophy in light of those goals. We discussed all care options contingent on treatment response and QOL. Much active listening, presence, and emotional support were given.     Patients wife feels patient is improving.       I have discussed/reviewed the patient's case with nurse.    12/24/24    Updated patients wife and daughter over the phone. They are aware patient is re-intubated. I discussed possible next steps of needing trach and peg. All questions and concerns addressed. Will see how patient does over the next few days. They would like to speak to Dr. Rodriguez before making any decisions. Palliative care will continue to follow.     -Patient is currently being treated

## 2024-12-24 NOTE — PROGRESS NOTES
Physical Therapy Missed Treatment   Facility/Department: [facility] MED SURG IC01/IC01-01    NAME: Remy Upton    : 1950 (73 y.o.)  MRN: [id number]    Account: [id number]  Gender: male      Unable to eval pt at this time due to declining medical status and pt being intubated.  PT orders d/c at this time.  RN is aware.      Chayo Ambrose, PT, 24 at 10:22 AM

## 2024-12-24 NOTE — PROGRESS NOTES
Pulmonary & Critical Care Medicine ICU Progress Note  Chief complaint : Respiratory failure    Subjunctive/24 hour events :   Patient seen and examined during multidisciplinary rounds with RN, charge nurse, RT, pharmacy, dietitian, and social service.      On BiPAP, encephalopathic, follows simple commands, did not answer questions, hypothermic, tachycardic, and hypertensive.  Currently on dialysis.  On 30% FiO2 saturation 100%, bowels moving , not eating      New information updated in the note today, rest of the examination did not change compared to yesterday.  Social History     Tobacco Use    Smoking status: Former     Current packs/day: 0.00     Types: Cigarettes     Quit date: 2015     Years since quittin.5    Smokeless tobacco: Former   Substance Use Topics    Alcohol use: No     Alcohol/week: 0.0 standard drinks of alcohol         Problem Relation Age of Onset    Colon Cancer Neg Hx        Recent Labs     24  1502 24  1635   PHART 7.262* 7.403   AES6LVO 73* 50*   PO2ART 151* 172*       MV Settings:  Vent Mode: CPAP/PS Resp Rate (Set): 22 bpm/Vt (Set, mL): 450 mL/ /FiO2 : 30 %           IV:   dextrose      sodium chloride         Vitals:  BP (!) 165/81   Pulse (!) 118   Temp (!) 96.6 °F (35.9 °C)   Resp 26   Ht 1.88 m (6' 2\")   Wt 76.4 kg (168 lb 6.9 oz)   SpO2 100%   BMI 21.63 kg/m²    Tmax:        Intake/Output Summary (Last 24 hours) at 2024 0817  Last data filed at 2024 1507  Gross per 24 hour   Intake 41.71 ml   Output --   Net 41.71 ml       EXAM:  General: Awake, encephalopathic on BiPAP  Head: normocephalic, atraumatic  Eyes:No gross abnormalities.  ENT:  MMM no lesions  Neck:  supple and no masses  Chest : Diminished breath sounds, no wheezing, minimal rales, nontender, tympanic  Heart:: Heart sounds are normal.  Regular rate and rhythm without murmur, gallop or rub.  ABD:  symmetric, soft, non-tender, no guarding or rebound  Musculoskeletal : no cyanosis, no

## 2024-12-24 NOTE — PROGRESS NOTES
12/24/24 1733   Patient Observation   Pulse (!) 105   Respirations 14   SpO2 100 %   Ventilator Settings   FiO2  50 %   Vt (Set, mL) 500 mL   Resp Rate (Set) (S)  14 bpm   PEEP/CPAP (cmH2O) 5   Vent Patient Data (Readings)   Vt (Measured) 504 mL   Peak Inspiratory Pressure (cmH2O) 22 cmH2O   Rate Measured 19 br/min   Minute Volume (L/min) 9.48 Liters   Mean Airway Pressure (cmH2O) 8.2 cmH20   I:E Ratio 1:4.4   ETT    Placement Date/Time: 12/24/24 1155   Present on Admission/Arrival: No  Placed By: Licensed provider  Placement Verified By: Auscultation;Chest X-ray;Colorimetric ETCO2 device  Preoxygenation: Yes  Technique: Rapid sequence  Airway Type: Cuffed  Airway...   Secured At 25 cm  (advance ET to 25 @ the gumper DR Yadav)   Measured From [employer]   ETT Placement Right   Secured By Commercial tube lisa

## 2024-12-24 NOTE — FLOWSHEET NOTE
0800 assessment complete, see flow sheet. Patient remains on AVAPS. Opens eyes to name called,follows simple commands, mouth care done, and repositioned for comfort.  0900 Seen by Dr Yadav on Critical Care team rounds, update given.  1146 - 1300 Patient intubated per Dr Yadav 7.5 Tube, all sedation given per order, see MAR. NG right nare inserted, 75 cm, PCXR done checked and verified for correct placement by Dr Yadav. Levo started and titrated as needed and ordered, see MAR.  1500 Patient incont of a large soft brown, mushy stool, patient cleansed, maurizio care done, new external catha applied, patient repositioned for comfort.  1730 Patient bladder scan done, 259 cc noted, new external cath applied. Patients wife and daughter at bedside update given and questions answered. No changes in assessment noted.

## 2024-12-24 NOTE — PROGRESS NOTES
[facility]   Facility/Department: [facility] ICU  Speech Language Pathology    NAME:Remy Upton  : 1950  ROOM: IC01/IC01-01      DATE: 2024      This patient was being seen by Speech Therapy for:  Dysphagia Treatment      However, due to this patient's change in medical status, Speech Therapy will require new orders to continue to follow the patient. Please re-order, as needed.       Thank you,  Onur Cope, SLP, CCC-SLP, Date: 2024, Time: 12:58 PM

## 2024-12-24 NOTE — PROGRESS NOTES
[facility]   [place] [facility]  Speech Language Pathology    Remy MCKEON Alexsandra  1950  IC01/IC01-01    Date: 12/24/2024      Speech Therapy attempted to see Remy Upton on this date for a/an:    Treatment    Pt was unable to be seen due to:   Nursing deferred: Patient is currently receiving dialysis. Per RN patient is most likely going to be intubated after dialysis. ST to continue to follow to determine appropriateness of reassessment of the swallow mechanism.         Electronically signed by MEME Kee on 12/24/24 at 8:02 AM EST

## 2024-12-24 NOTE — CARE COORDINATION
ICU team rounds done this am outside of room and pt may require vent and trach per report. Pt also on HD at present. Wife prefers to discuss any procedures like trach ,peg,LTAC etc with Dr. Rodriguez before she decides if she agrees to them. She wants to know what Dr. Rodriguez thinks and states her  has been friends and close with him for over 20 years. Wife asked if pt is going to be on dialysis or if this is just short term. I let her know if he does go back on vent and requires further procedures the nurse will also be letting her know. Wife is familiar with [facility] where [facility] is located.

## 2024-12-25 ENCOUNTER — APPOINTMENT (OUTPATIENT)
Dept: GENERAL RADIOLOGY | Age: 74
End: 2024-12-25
Payer: COMMERCIAL

## 2024-12-25 LAB
ABO/RH: NORMAL
ALBUMIN SERPL-MCNC: 3.4 G/DL (ref 3.5–4.6)
ANION GAP SERPL CALCULATED.3IONS-SCNC: 16 MEQ/L (ref 9–15)
ANISOCYTOSIS BLD QL SMEAR: ABNORMAL
ANTIBODY SCREEN: NORMAL
BASOPHILS # BLD: 0 K/UL (ref 0–0.2)
BASOPHILS NFR BLD: 0.1 %
BLOOD BANK DISPENSE STATUS: NORMAL
BLOOD BANK PRODUCT CODE: NORMAL
BPU ID: NORMAL
BUN SERPL-MCNC: 52 MG/DL (ref 8–23)
CALCIUM SERPL-MCNC: 8.2 MG/DL (ref 8.5–9.9)
CHLORIDE SERPL-SCNC: 96 MEQ/L (ref 95–107)
CO2 SERPL-SCNC: 28 MEQ/L (ref 20–31)
CREAT SERPL-MCNC: 3.21 MG/DL (ref 0.7–1.2)
DESCRIPTION BLOOD BANK: NORMAL
EOSINOPHIL # BLD: 0 K/UL (ref 0–0.7)
EOSINOPHIL NFR BLD: 0.6 %
ERYTHROCYTE [DISTWIDTH] IN BLOOD BY AUTOMATED COUNT: 15.7 % (ref 11.5–14.5)
GLUCOSE BLD-MCNC: 140 MG/DL (ref 70–99)
GLUCOSE BLD-MCNC: 196 MG/DL (ref 70–99)
GLUCOSE BLD-MCNC: 282 MG/DL (ref 70–99)
GLUCOSE BLD-MCNC: 282 MG/DL (ref 70–99)
GLUCOSE SERPL-MCNC: 152 MG/DL (ref 70–99)
HCT VFR BLD AUTO: 19.8 % (ref 42–52)
HCT VFR BLD AUTO: 20.5 % (ref 42–52)
HCT VFR BLD AUTO: 28.2 % (ref 42–52)
HGB BLD-MCNC: 6.3 G/DL (ref 14–18)
HGB BLD-MCNC: 6.6 G/DL (ref 14–18)
HGB BLD-MCNC: 9 G/DL (ref 14–18)
LYMPHOCYTES # BLD: 0.2 K/UL (ref 1–4.8)
LYMPHOCYTES NFR BLD: 2 %
MAGNESIUM SERPL-MCNC: 2.2 MG/DL (ref 1.7–2.4)
MCH RBC QN AUTO: 28.9 PG (ref 27–31.3)
MCHC RBC AUTO-ENTMCNC: 32.2 % (ref 33–37)
MCV RBC AUTO: 89.9 FL (ref 79–92.2)
MONOCYTES # BLD: 0.1 K/UL (ref 0.2–0.8)
MONOCYTES NFR BLD: 1 %
NEUTROPHILS # BLD: 10.8 K/UL (ref 1.4–6.5)
NEUTS SEG NFR BLD: 97 %
PERFORMED ON: ABNORMAL
PHOSPHATE SERPL-MCNC: 4.1 MG/DL (ref 2.3–4.8)
PLATELET # BLD AUTO: 194 K/UL (ref 130–400)
PLATELET BLD QL SMEAR: ADEQUATE
POIKILOCYTOSIS BLD QL SMEAR: ABNORMAL
POTASSIUM SERPL-SCNC: 4.2 MEQ/L (ref 3.4–4.9)
RBC # BLD AUTO: 2.28 M/UL (ref 4.7–6.1)
SLIDE REVIEW: ABNORMAL
SMUDGE CELLS BLD QL SMEAR: 12.6
SODIUM SERPL-SCNC: 140 MEQ/L (ref 135–144)
WBC # BLD AUTO: 11.1 K/UL (ref 4.8–10.8)

## 2024-12-25 PROCEDURE — 36415 COLL VENOUS BLD VENIPUNCTURE: CPT

## 2024-12-25 PROCEDURE — 86850 RBC ANTIBODY SCREEN: CPT

## 2024-12-25 PROCEDURE — 71045 X-RAY EXAM CHEST 1 VIEW: CPT

## 2024-12-25 PROCEDURE — 2580000003 HC RX 258: Performed by: NURSE PRACTITIONER

## 2024-12-25 PROCEDURE — 6370000000 HC RX 637 (ALT 250 FOR IP): Performed by: INTERNAL MEDICINE

## 2024-12-25 PROCEDURE — 86923 COMPATIBILITY TEST ELECTRIC: CPT

## 2024-12-25 PROCEDURE — 2500000003 HC RX 250 WO HCPCS: Performed by: INTERNAL MEDICINE

## 2024-12-25 PROCEDURE — 36430 TRANSFUSION BLD/BLD COMPNT: CPT

## 2024-12-25 PROCEDURE — 85025 COMPLETE CBC W/AUTO DIFF WBC: CPT

## 2024-12-25 PROCEDURE — 2580000003 HC RX 258: Performed by: INTERNAL MEDICINE

## 2024-12-25 PROCEDURE — 6360000002 HC RX W HCPCS: Performed by: INTERNAL MEDICINE

## 2024-12-25 PROCEDURE — 51798 US URINE CAPACITY MEASURE: CPT

## 2024-12-25 PROCEDURE — 86900 BLOOD TYPING SEROLOGIC ABO: CPT

## 2024-12-25 PROCEDURE — 86901 BLOOD TYPING SEROLOGIC RH(D): CPT

## 2024-12-25 PROCEDURE — 6370000000 HC RX 637 (ALT 250 FOR IP): Performed by: NURSE PRACTITIONER

## 2024-12-25 PROCEDURE — 99291 CRITICAL CARE FIRST HOUR: CPT | Performed by: INTERNAL MEDICINE

## 2024-12-25 PROCEDURE — 2700000000 HC OXYGEN THERAPY PER DAY

## 2024-12-25 PROCEDURE — 2000000000 HC ICU R&B

## 2024-12-25 PROCEDURE — 94003 VENT MGMT INPAT SUBQ DAY: CPT

## 2024-12-25 PROCEDURE — 85014 HEMATOCRIT: CPT

## 2024-12-25 PROCEDURE — 30233N1 TRANSFUSION OF NONAUTOLOGOUS RED BLOOD CELLS INTO PERIPHERAL VEIN, PERCUTANEOUS APPROACH: ICD-10-PCS | Performed by: INTERNAL MEDICINE

## 2024-12-25 PROCEDURE — 80069 RENAL FUNCTION PANEL: CPT

## 2024-12-25 PROCEDURE — P9016 RBC LEUKOCYTES REDUCED: HCPCS

## 2024-12-25 PROCEDURE — 83735 ASSAY OF MAGNESIUM: CPT

## 2024-12-25 PROCEDURE — 6360000002 HC RX W HCPCS: Performed by: NURSE PRACTITIONER

## 2024-12-25 PROCEDURE — 85018 HEMOGLOBIN: CPT

## 2024-12-25 RX ORDER — FENTANYL CITRATE 0.05 MG/ML
50 INJECTION, SOLUTION INTRAMUSCULAR; INTRAVENOUS
Status: DISCONTINUED | OUTPATIENT
Start: 2024-12-25 | End: 2025-01-05

## 2024-12-25 RX ORDER — SODIUM CHLORIDE 9 MG/ML
INJECTION, SOLUTION INTRAVENOUS PRN
Status: DISCONTINUED | OUTPATIENT
Start: 2024-12-25 | End: 2024-12-31

## 2024-12-25 RX ORDER — ALBUMIN (HUMAN) 12.5 G/50ML
50 SOLUTION INTRAVENOUS EVERY 8 HOURS
Status: DISCONTINUED | OUTPATIENT
Start: 2024-12-25 | End: 2024-12-25

## 2024-12-25 RX ADMIN — Medication 250 MG: at 20:51

## 2024-12-25 RX ADMIN — MIDODRINE HYDROCHLORIDE 20 MG: 10 TABLET ORAL at 09:11

## 2024-12-25 RX ADMIN — PIPERACILLIN AND TAZOBACTAM 3375 MG: 3; .375 INJECTION, POWDER, LYOPHILIZED, FOR SOLUTION INTRAVENOUS at 12:27

## 2024-12-25 RX ADMIN — DOCUSATE SODIUM 100 MG: 50 LIQUID ORAL at 12:15

## 2024-12-25 RX ADMIN — AMIODARONE HYDROCHLORIDE 200 MG: 200 TABLET ORAL at 12:15

## 2024-12-25 RX ADMIN — AMIODARONE HYDROCHLORIDE 200 MG: 200 TABLET ORAL at 20:48

## 2024-12-25 RX ADMIN — METHYLPREDNISOLONE SODIUM SUCCINATE 10 MG: 40 INJECTION INTRAMUSCULAR; INTRAVENOUS at 12:13

## 2024-12-25 RX ADMIN — Medication 10 ML: at 20:48

## 2024-12-25 RX ADMIN — FENTANYL CITRATE 50 MCG: 0.05 INJECTION, SOLUTION INTRAMUSCULAR; INTRAVENOUS at 20:48

## 2024-12-25 RX ADMIN — INSULIN GLARGINE 10 UNITS: 100 INJECTION, SOLUTION SUBCUTANEOUS at 12:17

## 2024-12-25 RX ADMIN — INSULIN LISPRO 8 UNITS: 100 INJECTION, SOLUTION INTRAVENOUS; SUBCUTANEOUS at 18:33

## 2024-12-25 RX ADMIN — MIDODRINE HYDROCHLORIDE 20 MG: 10 TABLET ORAL at 18:33

## 2024-12-25 RX ADMIN — CYCLOSPORINE 50 MG: 100 SOLUTION ORAL at 12:12

## 2024-12-25 RX ADMIN — POLYETHYLENE GLYCOL 3350 17 G: 17 POWDER, FOR SOLUTION ORAL at 12:14

## 2024-12-25 RX ADMIN — Medication 250 MG: at 12:12

## 2024-12-25 RX ADMIN — MIDODRINE HYDROCHLORIDE 20 MG: 10 TABLET ORAL at 12:18

## 2024-12-25 RX ADMIN — INSULIN LISPRO 8 UNITS: 100 INJECTION, SOLUTION INTRAVENOUS; SUBCUTANEOUS at 20:56

## 2024-12-25 RX ADMIN — SODIUM CHLORIDE 40 MG: 9 INJECTION INTRAMUSCULAR; INTRAVENOUS; SUBCUTANEOUS at 12:13

## 2024-12-25 RX ADMIN — PIPERACILLIN AND TAZOBACTAM 3375 MG: 3; .375 INJECTION, POWDER, LYOPHILIZED, FOR SOLUTION INTRAVENOUS at 01:03

## 2024-12-25 ASSESSMENT — PULMONARY FUNCTION TESTS
PIF_VALUE: 26
PIF_VALUE: 23
PIF_VALUE: 25
PIF_VALUE: 21
PIF_VALUE: 22
PIF_VALUE: 25
PIF_VALUE: 22
PIF_VALUE: 25
PIF_VALUE: 21
PIF_VALUE: 21
PIF_VALUE: 25
PIF_VALUE: 21
PIF_VALUE: 25
PIF_VALUE: 21
PIF_VALUE: 21
PIF_VALUE: 26
PIF_VALUE: 22
PIF_VALUE: 21
PIF_VALUE: 21
PIF_VALUE: 22
PIF_VALUE: 25
PIF_VALUE: 26
PIF_VALUE: 28
PIF_VALUE: 22
PIF_VALUE: 25
PIF_VALUE: 24
PIF_VALUE: 25
PIF_VALUE: 25
PIF_VALUE: 21
PIF_VALUE: 24
PIF_VALUE: 21
PIF_VALUE: 22
PIF_VALUE: 22
PIF_VALUE: 25
PIF_VALUE: 20
PIF_VALUE: 26
PIF_VALUE: 24
PIF_VALUE: 21
PIF_VALUE: 25
PIF_VALUE: 21
PIF_VALUE: 25
PIF_VALUE: 22
PIF_VALUE: 25
PIF_VALUE: 25
PIF_VALUE: 24
PIF_VALUE: 21
PIF_VALUE: 26
PIF_VALUE: 25
PIF_VALUE: 22
PIF_VALUE: 21

## 2024-12-25 ASSESSMENT — PAIN SCALES - GENERAL
PAINLEVEL_OUTOF10: 7
PAINLEVEL_OUTOF10: 0

## 2024-12-25 ASSESSMENT — PAIN DESCRIPTION - LOCATION: LOCATION: GENERALIZED

## 2024-12-25 NOTE — PROGRESS NOTES
Hospitalist Progress Note      PCP: Tico Rodriguez DO    Date of Admission: 12/18/2024    Chief Complaint:  patient intubated this morning. He was sedated during my evaluation. Updated wife extensively over phone. RN said no signs of active bleeding that she was seeing during any of her assessments     Medications:  Reviewed    Infusion Medications    norepinephrine 3 mcg/min (12/25/24 0446)    dextrose      sodium chloride       Scheduled Medications    midodrine  20 mg Oral TID WC    methylPREDNISolone  10 mg IntraVENous Daily    [Held by provider] metoprolol tartrate  25 mg Oral BID    cycloSPORINE  50 mg Oral Daily    mycophenolate  250 mg Oral BID    insulin glargine  10 Units SubCUTAneous Daily    insulin lispro  0-16 Units SubCUTAneous Q4H    polyethylene glycol  17 g Oral Daily    docusate  100 mg Oral BID    amiodarone  200 mg Oral BID    pantoprazole (PROTONIX) 40 mg in sodium chloride (PF) 0.9 % 10 mL injection  40 mg IntraVENous Daily    sodium chloride flush  5-40 mL IntraVENous 2 times per day    [Held by provider] heparin (porcine)  5,000 Units SubCUTAneous 3 times per day    piperacillin-tazobactam  3,375 mg IntraVENous Q12H    [Held by provider] mycophenolate  500 mg Oral BID     PRN Meds: fentanNYL, metoprolol, midodrine, hydrALAZINE, LORazepam, glucose, dextrose bolus **OR** dextrose bolus, glucagon (rDNA), dextrose, sodium chloride flush, sodium chloride, ondansetron **OR** ondansetron, acetaminophen **OR** acetaminophen, ipratropium 0.5 mg-albuterol 2.5 mg      Intake/Output Summary (Last 24 hours) at 12/25/2024 1401  Last data filed at 12/25/2024 0600  Gross per 24 hour   Intake 965.22 ml   Output --   Net 965.22 ml       Exam:    BP (!) 93/43   Pulse 95   Temp 100 °F (37.8 °C)   Resp 14   Ht 1.88 m (6' 2\")   Wt 76.2 kg (167 lb 15.9 oz)   SpO2 99%   BMI 21.57 kg/m²     General appearance: sedated  Lungs: diminished bilaterally  Heart: S1/S2,irregular  Abdomen: soft  Extremities: edema

## 2024-12-25 NOTE — CONSENT
Informed Consent for Blood Component Transfusion Note    I have discussed with the spouse the rationale for blood component transfusion; its benefits in treating or preventing fatigue, organ damage, or death; and its risk which includes mild transfusion reactions, rare risk of blood borne infection, or more serious but rare reactions. I have discussed the alternatives to transfusion, including the risk and consequences of not receiving transfusion. The spouse had an opportunity to ask questions and had agreed to proceed with transfusion of blood components.    Electronically signed by Gagan Oliveros MD on 12/25/24 at 1:05 PM EST

## 2024-12-25 NOTE — PLAN OF CARE
Problem: Chronic Conditions and Co-morbidities  Goal: Patient's chronic conditions and co-morbidity symptoms are monitored and maintained or improved  Outcome: Progressing  Flowsheets (Taken 12/24/2024 0800 by Marci Vega, RN)  Care Plan - Patient's Chronic Conditions and Co-Morbidity Symptoms are Monitored and Maintained or Improved: Monitor and assess patient's chronic conditions and comorbid symptoms for stability, deterioration, or improvement     Problem: Discharge Planning  Goal: Discharge to home or other facility with appropriate resources  Outcome: Progressing  Flowsheets (Taken 12/24/2024 0800 by Marci Vega, RN)  Discharge to home or other facility with appropriate resources: Identify barriers to discharge with patient and caregiver     Problem: Pain  Goal: Verbalizes/displays adequate comfort level or baseline comfort level  Outcome: Progressing     Problem: Safety - Adult  Goal: Free from fall injury  Outcome: Progressing     Problem: Skin/Tissue Integrity  Goal: Absence of new skin breakdown  Description: 1.  Monitor for areas of redness and/or skin breakdown  2.  Assess vascular access sites hourly  3.  Every 4-6 hours minimum:  Change oxygen saturation probe site  4.  Every 4-6 hours:  If on nasal continuous positive airway pressure, respiratory therapy assess nares and determine need for appliance change or resting period.  Outcome: Progressing     Problem: Neurosensory - Adult  Goal: Achieves stable or improved neurological status  Outcome: Progressing  Flowsheets (Taken 12/24/2024 0800 by Marci Vega RN)  Achieves stable or improved neurological status: Assess for and report changes in neurological status     Problem: Respiratory - Adult  Goal: Achieves optimal ventilation and oxygenation  Outcome: Progressing  Flowsheets (Taken 12/24/2024 0800 by Marci Vega RN)  Achieves optimal ventilation and oxygenation: Assess for changes in respiratory status     Problem:

## 2024-12-25 NOTE — PROGRESS NOTES
Nephrology Progress Note    Assessment:  DELORES S/P remote Kidney transplant, s/p HD started 12/20   CKD-4 baseline w/ baseline Scr ~low 3s w/ eGFR high teens   Acute hypoxic respiratory failure: now re-intubated   Septic shock: resoved  Rhinovirus Pneumonia w/ bacterial coinfection   DM type-2  Nutritional issues  Hypotensive      Plan:   - next HD rx tomorrow   - seems ok to continue to cyclosporin and cellcept for now however if worsens may need to hold his cellcept       Patient Active Problem List:     Pneumonia     Abdominal pain, other specified site     Acute pyelonephritis without lesion of renal medullary necrosis     Anemia     Essential hypertension     Nonspecific abnormal results of thyroid function study     Mixed hyperlipidemia     Kidney replaced by transplant     Intra-abdominal abscess post-procedure (HCC)     Infection due to Enterococcus     Fever and other physiologic disturbances of temperature regulation     Chronic kidney disease (CKD)     Type 2 diabetes mellitus with stage 3b chronic kidney disease, without long-term current use of insulin (HCC)     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 (HCC)     Abnormal CT of the chest     Bronchiectasis without complication (HCC)     GI bleeding     Complication of transplanted kidney     Muscle weakness (generalized)     Difficulty in walking     Elevated BUN     Urinary retention     Immunosuppression due to drug therapy (HCC)     DELORES (acute kidney injury) (HCC)     Adjustment disorder     Gross hematuria     Bilateral lower extremity edema     Dysuria     Acute cystitis without hematuria      Bilateral hearing loss     Abscess, toe     Acute hyperkalemia     Acute pain of left shoulder     Astigmatism     Astigmatism of both eyes with presbyopia     At risk for stroke     Benign enlargement of prostate     Benign prostatic hyperplasia with urinary frequency     Rhinovirus     Chronic right shoulder pain     Condition not found     Deep vein thrombosis (DVT) of lower extremity ([facility])     Diabetes mellitus type 2 without retinopathy ([facility])     Edema     Gait difficulty     GERD (gastroesophageal reflux disease)     H/O lower gastrointestinal bleeding     History of blood transfusion     Incomplete emptying of bladder     Insulin long-term use ([facility])     Leg weakness, bilateral     Localized swelling of both lower legs     Peripheral nerve disease     Pseudophakia of both eyes     PTDM (post-transplant diabetes mellitus) ([facility])     Posterior vitreous detachment     Rhabdomyolysis     Seizure disorder ([facility])     Stage 5 chronic kidney disease with transplanted kidney ([facility])     Leukocytes in urine     Atrial fibrillation with RVR ([facility])     Moderate malnutrition ([facility])      Subjective:  Admit Date: 12/18/2024    Interval History: re-intubated, also hypotensive needing pressors    Medications:  Scheduled Meds:   chlorhexidine  15 mL Mouth/Throat BID    famotidine (PEPCID) injection  20 mg IntraVENous Daily    midodrine  20 mg Oral TID WC    methylPREDNISolone  10 mg IntraVENous Daily    [Held by provider] metoprolol tartrate  25 mg Oral BID    cycloSPORINE  50 mg Oral Daily    mycophenolate  250 mg Oral BID    insulin glargine  10 Units SubCUTAneous Daily    insulin lispro  0-16 Units SubCUTAneous Q4H    polyethylene glycol  17 g Oral Daily    docusate  100 mg Oral BID    amiodarone  200 mg Oral BID    pantoprazole (PROTONIX) 40 mg in sodium chloride (PF) 0.9 % 10 mL injection  40 mg IntraVENous Daily    sodium chloride flush  5-40 mL IntraVENous 2 times per day    [Held by provider] heparin (porcine)  5,000 Units

## 2024-12-25 NOTE — PROGRESS NOTES
Shift summary:    Pt on propofol while intubated.  Pt on levophed 3mcq to maintain map 65.  Pt tracks and responds to verbal command.    Pt continues to weep from left arm serous sang fluid yellow in color.    Bedside report given to oncoming RN.

## 2024-12-25 NOTE — PROGRESS NOTES
Pulmonary & Critical Care Medicine ICU Progress Note  Chief complaint : Respiratory failure    Subjunctive/24 hour events :   Patient seen and examined during multidisciplinary rounds with RN, charge nurse, RT, pharmacy, dietitian, and social service.      Intubated yesterday, wakes up to verbal stimuli, we could not follow commands, on 3 mcg/min of Levophed, and on propofol.  He did have low-grade fever overnight 100 °F, dialysis output 1400 cc, +10 L, no urine output.  No vomiting.  Tolerating tube feed.    New information updated in the note today, rest of the examination did not change compared to yesterday.  Social History     Tobacco Use    Smoking status: Former     Current packs/day: 0.00     Types: Cigarettes     Quit date: 2015     Years since quittin.5    Smokeless tobacco: Former   Substance Use Topics    Alcohol use: No     Alcohol/week: 0.0 standard drinks of alcohol         Problem Relation Age of Onset    Colon Cancer Neg Hx        Recent Labs     24  1635 24  1444   PHART 7.403 7.496*   JMF1PFR 50* 35   PO2ART 172* 293*       MV Settings:  Vent Mode: AC/VC Resp Rate (Set): 14 bpm/Vt (Set, mL): 500 mL/ /FiO2 : 30 %           IV:   fentaNYL      propofol 15 mcg/kg/min (24)    norepinephrine 3 mcg/min (24)    dextrose      sodium chloride         Vitals:  BP (!) 91/44   Pulse 90   Temp 100 °F (37.8 °C)   Resp 14   Ht 1.88 m (6' 2\")   Wt 76.2 kg (167 lb 15.9 oz)   SpO2 99%   BMI 21.57 kg/m²    Tmax:        Intake/Output Summary (Last 24 hours) at 2024 0736  Last data filed at 2024 0446  Gross per 24 hour   Intake 1259.47 ml   Output 1400 ml   Net -140.53 ml       EXAM:  General: On vent, sedated, wakes up to verbal stimuli  Head: normocephalic, atraumatic  Eyes:No gross abnormalities.  ENT:  MMM no lesions  Neck:  supple and no masses  Chest : Vent sounds, no wheezing, no rales, nontender, tympanic  Heart:: Heart sounds are normal.  Regular

## 2024-12-26 LAB
ALBUMIN SERPL-MCNC: 3.5 G/DL (ref 3.5–4.6)
ANION GAP SERPL CALCULATED.3IONS-SCNC: 21 MEQ/L (ref 9–15)
BASOPHILS # BLD: 0 K/UL (ref 0–0.2)
BASOPHILS NFR BLD: 0 %
BUN SERPL-MCNC: 71 MG/DL (ref 8–23)
CALCIUM SERPL-MCNC: 8.9 MG/DL (ref 8.5–9.9)
CHLORIDE SERPL-SCNC: 92 MEQ/L (ref 95–107)
CO2 SERPL-SCNC: 24 MEQ/L (ref 20–31)
CREAT SERPL-MCNC: 3.72 MG/DL (ref 0.7–1.2)
EOSINOPHIL # BLD: 0 K/UL (ref 0–0.7)
EOSINOPHIL NFR BLD: 0 %
ERYTHROCYTE [DISTWIDTH] IN BLOOD BY AUTOMATED COUNT: 15.1 % (ref 11.5–14.5)
GLUCOSE BLD-MCNC: 141 MG/DL (ref 70–99)
GLUCOSE BLD-MCNC: 241 MG/DL (ref 70–99)
GLUCOSE BLD-MCNC: 242 MG/DL (ref 70–99)
GLUCOSE BLD-MCNC: 242 MG/DL (ref 70–99)
GLUCOSE BLD-MCNC: 243 MG/DL (ref 70–99)
GLUCOSE BLD-MCNC: 253 MG/DL (ref 70–99)
GLUCOSE SERPL-MCNC: 230 MG/DL (ref 70–99)
HCT VFR BLD AUTO: 28.4 % (ref 42–52)
HGB BLD-MCNC: 9.2 G/DL (ref 14–18)
LYMPHOCYTES # BLD: 0.1 K/UL (ref 1–4.8)
LYMPHOCYTES NFR BLD: 0.4 %
MAGNESIUM SERPL-MCNC: 2.5 MG/DL (ref 1.7–2.4)
MCH RBC QN AUTO: 28.8 PG (ref 27–31.3)
MCHC RBC AUTO-ENTMCNC: 32.4 % (ref 33–37)
MCV RBC AUTO: 88.8 FL (ref 79–92.2)
MONOCYTES # BLD: 0.5 K/UL (ref 0.2–0.8)
MONOCYTES NFR BLD: 3.5 %
NEUTROPHILS # BLD: 12.5 K/UL (ref 1.4–6.5)
NEUTS SEG NFR BLD: 91.4 %
PERFORMED ON: ABNORMAL
PHOSPHATE SERPL-MCNC: 5.1 MG/DL (ref 2.3–4.8)
PLATELET # BLD AUTO: 212 K/UL (ref 130–400)
POTASSIUM SERPL-SCNC: 4.9 MEQ/L (ref 3.4–4.9)
RBC # BLD AUTO: 3.2 M/UL (ref 4.7–6.1)
SODIUM SERPL-SCNC: 137 MEQ/L (ref 135–144)
WBC # BLD AUTO: 13.7 K/UL (ref 4.8–10.8)

## 2024-12-26 PROCEDURE — 85025 COMPLETE CBC W/AUTO DIFF WBC: CPT

## 2024-12-26 PROCEDURE — 6360000002 HC RX W HCPCS: Performed by: INTERNAL MEDICINE

## 2024-12-26 PROCEDURE — 2580000003 HC RX 258: Performed by: NURSE PRACTITIONER

## 2024-12-26 PROCEDURE — 36415 COLL VENOUS BLD VENIPUNCTURE: CPT

## 2024-12-26 PROCEDURE — 6370000000 HC RX 637 (ALT 250 FOR IP): Performed by: NURSE PRACTITIONER

## 2024-12-26 PROCEDURE — 99291 CRITICAL CARE FIRST HOUR: CPT | Performed by: INTERNAL MEDICINE

## 2024-12-26 PROCEDURE — 6370000000 HC RX 637 (ALT 250 FOR IP): Performed by: INTERNAL MEDICINE

## 2024-12-26 PROCEDURE — 99222 1ST HOSP IP/OBS MODERATE 55: CPT | Performed by: SPECIALIST

## 2024-12-26 PROCEDURE — 83735 ASSAY OF MAGNESIUM: CPT

## 2024-12-26 PROCEDURE — 80069 RENAL FUNCTION PANEL: CPT

## 2024-12-26 PROCEDURE — 2000000000 HC ICU R&B

## 2024-12-26 PROCEDURE — 99232 SBSQ HOSP IP/OBS MODERATE 35: CPT | Performed by: NURSE PRACTITIONER

## 2024-12-26 PROCEDURE — 2500000003 HC RX 250 WO HCPCS: Performed by: INTERNAL MEDICINE

## 2024-12-26 PROCEDURE — 6360000002 HC RX W HCPCS: Performed by: NURSE PRACTITIONER

## 2024-12-26 PROCEDURE — 94761 N-INVAS EAR/PLS OXIMETRY MLT: CPT

## 2024-12-26 PROCEDURE — 6370000000 HC RX 637 (ALT 250 FOR IP): Performed by: STUDENT IN AN ORGANIZED HEALTH CARE EDUCATION/TRAINING PROGRAM

## 2024-12-26 PROCEDURE — 2580000003 HC RX 258: Performed by: INTERNAL MEDICINE

## 2024-12-26 PROCEDURE — 94003 VENT MGMT INPAT SUBQ DAY: CPT

## 2024-12-26 PROCEDURE — 2700000000 HC OXYGEN THERAPY PER DAY

## 2024-12-26 RX ORDER — INSULIN GLARGINE 100 [IU]/ML
12 INJECTION, SOLUTION SUBCUTANEOUS DAILY
Status: DISCONTINUED | OUTPATIENT
Start: 2024-12-26 | End: 2024-12-28

## 2024-12-26 RX ADMIN — METOPROLOL TARTRATE 25 MG: 25 TABLET, FILM COATED ORAL at 21:53

## 2024-12-26 RX ADMIN — MIDODRINE HYDROCHLORIDE 20 MG: 10 TABLET ORAL at 08:54

## 2024-12-26 RX ADMIN — DOCUSATE SODIUM 100 MG: 50 LIQUID ORAL at 21:52

## 2024-12-26 RX ADMIN — SODIUM CHLORIDE 40 MG: 9 INJECTION INTRAMUSCULAR; INTRAVENOUS; SUBCUTANEOUS at 08:55

## 2024-12-26 RX ADMIN — CYCLOSPORINE 50 MG: 100 SOLUTION ORAL at 08:57

## 2024-12-26 RX ADMIN — INSULIN LISPRO 4 UNITS: 100 INJECTION, SOLUTION INTRAVENOUS; SUBCUTANEOUS at 17:45

## 2024-12-26 RX ADMIN — INSULIN LISPRO 4 UNITS: 100 INJECTION, SOLUTION INTRAVENOUS; SUBCUTANEOUS at 00:47

## 2024-12-26 RX ADMIN — PIPERACILLIN AND TAZOBACTAM 3375 MG: 3; .375 INJECTION, POWDER, LYOPHILIZED, FOR SOLUTION INTRAVENOUS at 00:44

## 2024-12-26 RX ADMIN — PIPERACILLIN AND TAZOBACTAM 3375 MG: 3; .375 INJECTION, POWDER, LYOPHILIZED, FOR SOLUTION INTRAVENOUS at 12:52

## 2024-12-26 RX ADMIN — INSULIN GLARGINE 12 UNITS: 100 INJECTION, SOLUTION SUBCUTANEOUS at 08:55

## 2024-12-26 RX ADMIN — INSULIN LISPRO 4 UNITS: 100 INJECTION, SOLUTION INTRAVENOUS; SUBCUTANEOUS at 22:07

## 2024-12-26 RX ADMIN — INSULIN LISPRO 4 UNITS: 100 INJECTION, SOLUTION INTRAVENOUS; SUBCUTANEOUS at 06:52

## 2024-12-26 RX ADMIN — AMIODARONE HYDROCHLORIDE 200 MG: 200 TABLET ORAL at 08:54

## 2024-12-26 RX ADMIN — FENTANYL CITRATE 50 MCG: 0.05 INJECTION, SOLUTION INTRAMUSCULAR; INTRAVENOUS at 06:20

## 2024-12-26 RX ADMIN — METOPROLOL TARTRATE 25 MG: 25 TABLET, FILM COATED ORAL at 08:55

## 2024-12-26 RX ADMIN — AMIODARONE HYDROCHLORIDE 200 MG: 200 TABLET ORAL at 21:52

## 2024-12-26 RX ADMIN — INSULIN LISPRO 4 UNITS: 100 INJECTION, SOLUTION INTRAVENOUS; SUBCUTANEOUS at 09:20

## 2024-12-26 RX ADMIN — METHYLPREDNISOLONE SODIUM SUCCINATE 10 MG: 40 INJECTION INTRAMUSCULAR; INTRAVENOUS at 08:55

## 2024-12-26 RX ADMIN — Medication 250 MG: at 08:57

## 2024-12-26 RX ADMIN — Medication 10 ML: at 21:52

## 2024-12-26 RX ADMIN — Medication 250 MG: at 21:20

## 2024-12-26 ASSESSMENT — PULMONARY FUNCTION TESTS
PIF_VALUE: 25
PIF_VALUE: 22
PIF_VALUE: 21
PIF_VALUE: 25
PIF_VALUE: 21
PIF_VALUE: 21
PIF_VALUE: 25
PIF_VALUE: 22
PIF_VALUE: 25
PIF_VALUE: 21
PIF_VALUE: 21
PIF_VALUE: 25
PIF_VALUE: 24
PIF_VALUE: 21
PIF_VALUE: 25
PIF_VALUE: 24
PIF_VALUE: 25
PIF_VALUE: 24
PIF_VALUE: 21
PIF_VALUE: 21
PIF_VALUE: 25
PIF_VALUE: 21
PIF_VALUE: 21
PIF_VALUE: 22
PIF_VALUE: 22
PIF_VALUE: 24
PIF_VALUE: 24
PIF_VALUE: 21
PIF_VALUE: 21

## 2024-12-26 ASSESSMENT — PAIN SCALES - GENERAL
PAINLEVEL_OUTOF10: 0
PAINLEVEL_OUTOF10: 0

## 2024-12-26 ASSESSMENT — PAIN SCALES - WONG BAKER: WONGBAKER_NUMERICALRESPONSE: NO HURT

## 2024-12-26 NOTE — FLOWSHEET NOTE
0900- patient awake and following commands, spouse at bedside for a brief visit.     0930- dialysis started at bedside.

## 2024-12-26 NOTE — PROGRESS NOTES
Palliative Care Progress Note  Patient: Remy Upton  Gender: male  YOB: 1950  Unit/Bed: IC01/IC01-01  CodeStatus: Full Code  Inpatient Treatment Team: Treatment Team:   Gagan Oliveros MD Zaizafoun, Manaf, MD Bescak, George M, DO Patel, Dhruv R, MD Faria, Alexis, Wyatt Gtz RN Rangel-Switzer, Alyssa M, RN  Admit Date:  12/18/2024    Chief Complaint: Illness    History of Presenting Illness:      Remy Upton is a 73 y.o. male on hospital day 8 with a history of hypertension, PAF, diabetes type 2, ESRD status post kidney transplant x 2, BPH/urinary retention, anemia, depression.    Patient was brought to the emergency room with coughing for a few days and not being able to eat anything for.  Patient had generalized weakness being for the last week or so.  Patient became hypoxic in the ED after coughing episode.  Patient admitted to ICU for closer monitoring.  Patient ended up intubated upon admission.  Patient requiring hemodialysis this admission.  Patient extubated 12/22.     Palliative care was consulted for goals of care.  This is patient's third admission in the last 6 months.     Patient lives at home with wife. Patient has been at [facility] for rehab in the last 6 months. Per wife patient had been doing good. Patient could use walker for short distances but was mostly wheelchair bound. Per wife patient has had decreased appetite and significant weight loss.     Upon entering room patient is seen in ICU. He is currently on non invasive ventilator. He is lethargic. He opens his eyes but did not follow commands for me. He is not on any pressors. He was extubated yesterday. Per notes patient HR elevated when placed on 4 L NC. Patient has sitter at bedside for safety.     Most recent notable labs: BUN 42, creatinine 2.70, GFR 24.1, phosphorus 5.4, albumin 3.2, WBC 14.4, hemoglobin 8.4     12/24/24    Patient recently re-intubated. Patient had dialysis again this

## 2024-12-26 NOTE — CONSULTS
Consults    Patient Name: Remy Upton  Admit Date: 2024 12:52 PM  MR #: [id number]  : 1950    Attending Physician: Gagan Oliveros MD  Reason for consult: Anemia    History of Presenting Illness:      Remy Upton is a 73 y.o. male on hospital day 8 with a history of pneumonia and respiratory failure requiring Endo tracheal intubation.  GI consult was requested because of anemia.  Hemoglobin on  was 8.4 and a day later it was 11.6 and repeat 1 is 6.6, patient received 1 unit of packed cells and the most recent hemoglobin is 9.2, no history of any overt GI bleeding.  I spoke to patient's spouse and she states that he has no GI issues.  No nausea no vomiting no abdominal pain.  Patient had kidney transplant twice and has been on antirejection medications and including steroids and has been using PPI for prophylaxis.  In 2022 patient had EGD by me which was unremarkable , patient had a colonoscopy 2 days later by  which showed a bleeding AVM versus  Dieulafoys lesion of the right colon which was treated with epinephrine injection and application of hemostatic clips .history Obtained From:  patient, family member and electronic medical record      History:      Past Medical History:   Diagnosis Date    Diabetes mellitus (HCC)     Hemodialysis access, fistula mature (HCC) 2002    Hypertension     Seizures (HCC)     no seizure since      Past Surgical History:   Procedure Laterality Date    BRAIN SURGERY Left 1990    to eliminate seizures    COLONOSCOPY N/A 10/24/2022    COLONOSCOPY DIAGNOSTIC performed by Va Ordoñez MD at [facility]    KIDNEY TRANSPLANT      [facility] INCL FLUOR GDNCE DX W/CELL WASHG SPX N/A 3/20/2018    [place]ON[place]OS[place] [facility] [place]    UPPER GASTROINTESTINAL ENDOSCOPY N/A 10/21/2022    EGD DIAGNOSTIC ONLY performed by Jaime Martinez MD at [facility]       Family History  Family  into the skin See Admin Instructions Indications: Type 2 Diabetes Give 5 units every morning and 7 units every night.)  Continuous Blood Gluc Sensor (FREESTYLE KEV 14 DAY SENSOR) MISC, Every 2 weeks  pantoprazole (PROTONIX) 40 MG tablet, Take 1 tablet by mouth daily Indications: Gastroesophageal Reflux Disease  cycloSPORINE modified (NEORAL) 25 MG capsule, Take 3 capsules by mouth 2 times daily Indications: Acute Kidney Disease  mycophenolate (CELLCEPT) 250 MG capsule, Take 3 capsules by mouth 2 times daily Indications: Kidney Transplant    Current Hospital Medications:     Scheduled Meds:   insulin glargine  12 Units SubCUTAneous Daily    midodrine  20 mg Oral TID WC    methylPREDNISolone  10 mg IntraVENous Daily    metoprolol tartrate  25 mg Oral BID    cycloSPORINE  50 mg Oral Daily    mycophenolate  250 mg Oral BID    insulin lispro  0-16 Units SubCUTAneous Q4H    polyethylene glycol  17 g Oral Daily    docusate  100 mg Oral BID    amiodarone  200 mg Oral BID    pantoprazole (PROTONIX) 40 mg in sodium chloride (PF) 0.9 % 10 mL injection  40 mg IntraVENous Daily    sodium chloride flush  5-40 mL IntraVENous 2 times per day    [Held by provider] heparin (porcine)  5,000 Units SubCUTAneous 3 times per day    piperacillin-tazobactam  3,375 mg IntraVENous Q12H    [Held by provider] mycophenolate  500 mg Oral BID     Continuous Infusions:   sodium chloride      dextrose      sodium chloride       PRN Meds:.fentanNYL, sodium chloride, metoprolol, midodrine, hydrALAZINE, LORazepam, glucose, dextrose bolus **OR** dextrose bolus, glucagon (rDNA), dextrose, sodium chloride flush, sodium chloride, ondansetron **OR** ondansetron, acetaminophen **OR** acetaminophen, ipratropium 0.5 mg-albuterol 2.5 mg  .   sodium chloride      dextrose      sodium chloride          Allergies:     Allergies   Allergen Reactions    Statins Headaches and Other (See Comments)        Review of Systems:       [] CV, Resp, Neuro, , and all other  mg Oral BID     Continuous Infusions:   sodium chloride      dextrose      sodium chloride         Recent Labs     12/24/24 0452 12/24/24 1444 12/25/24 0416 12/25/24 1405 12/25/24 2001 12/26/24 0415   WBC 15.1*  --  11.1*  --   --  13.7*   HGB 8.4*   < > 6.6* 6.3* 9.0* 9.2*   HCT 27.1*  --  20.5* 19.8* 28.2* 28.4*   MCV 89.1  --  89.9  --   --  88.8     --  194  --   --  212    < > = values in this interval not displayed.     Recent Labs     12/24/24 0452 12/24/24 1444 12/25/24 0416 12/26/24 0415     --  140 137   K 4.8  --  4.2 4.9   CL 97  --  96 92*   CO2 26  --  28 24   PHOS 6.5*  --  4.1 5.1*   BUN 61*  --  52* 71*   CREATININE 3.66* 2.9* 3.21* 3.72*     No results for input(s): \"AST\", \"ALT\", \"BILIDIR\", \"BILITOT\", \"ALKPHOS\" in the last 72 hours.    Invalid input(s): \"ALB\"  No results for input(s): \"LIPASE\", \"AMYLASE\" in the last 72 hours.  No results for input(s): \"INR\" in the last 72 hours.    Invalid input(s): \"PROT\"  XR CHEST PORTABLE    Result Date: 12/25/2024  EXAMINATION: ONE XRAY VIEW OF THE CHEST 12/25/2024 6:35 am COMPARISON: 12/24/2024 HISTORY: ORDERING SYSTEM PROVIDED HISTORY: resp failure TECHNOLOGIST PROVIDED HISTORY: Reason for exam:->resp failure What reading provider will be dictating this exam?->CRC FINDINGS: Compared to the radiograph from yesterday there is interval development of a new hazy alveolar opacity in the right middle lower lung, which may represent atelectasis or pneumonia the heart is normal in size.  No pneumothorax. Probable small right pleural effusion. The endotracheal tube is well positioned, with the tip approximately 5.5 cm above the galileo.  The NG tube appears well positioned. The tip is below the diaphragm and beyond the field of view of this study.     1. Interval development of a hazy alveolar opacity in the right middle lower lung, which may represent atelectasis or pneumonia. 2. Probable small right pleural effusion. 3. Well-positioned

## 2024-12-26 NOTE — FLOWSHEET NOTE
Patient is on no sedation, IV pushes prn for pain and agitation. None needed this shift. He is alert and follows commands however very weak. He nods his head yes and no appropriately. Lg bm this shift, water consistency, FMS in place working without leaking. Family in today visiting intermittently.

## 2024-12-26 NOTE — PROGRESS NOTES
HEMODIALYSIS PRE-TREATMENT NOTE    Patient Identifiers prior to treatment: Name,     Isolation Required: Yes                      Isolation Type: Droplet/Rhinovirus       (please document if patient is being managed as a PUI/COVID-19 patient)        Hepatitis status: Susceptible                          Date Drawn                             Result  Hepatitis B Surface Antigen 24     Neg                     Hepatitis B Surface Antibody 24 Neg        Hepatitis B Core Antibody N/A N/A          How was Hepatitis Status verified: Epic     Was a copy of the labs you documented provided to facility for the patient's chart: Epic    Hemodialysis orders verified: Yes, with Dr. Barrera    Access Within normal limits ( I.e. s/s of infection,...): WNL, bruit, kobi present     Pre-Assessment completed: Yes    Pre-dialysis report received from: LISANDRA Crystal                      Time: 09:00

## 2024-12-26 NOTE — PROGRESS NOTES
Shift summary:    Pt off sedation this whole shift.  Pt requested pain medication x 2 this shift with good relief.    Pt bp remained stable this shift.

## 2024-12-26 NOTE — PROGRESS NOTES
Comprehensive Nutrition Assessment    Type and Reason for Visit:  Reassess    Nutrition Recommendations/Plan:   Continue with renal tube feeding ( nepro) via NH tube  Increase 10 ml every 8 hrs to a goal rate of  45 ml/hr x 24 hrs        ( 1944 kcals, 87 g protein , 1386 ml free water, ~1025 mg K)     Malnutrition Assessment:  Malnutrition Status:  Moderate malnutrition (12/26/24 0927)    Context:  Acute Illness     Findings of the 6 clinical characteristics of malnutrition:  Energy Intake:  75% or less of estimated energy requirements for 7 or more days  Weight Loss:  Greater than 7.5% over 3 months     Body Fat Loss:  Mild body fat loss Orbital, Buccal region   Muscle Mass Loss:  Mild muscle mass loss Clavicles (pectoralis & deltoids), Temples (temporalis)  Fluid Accumulation:  No fluid accumulation     Strength:  Not Performed    Nutrition Assessment:    Moderate malnutrition continues, minimal progress towards nutrition related goals. Pt required re-intubation 12/24, trophic tube feeding resumed, currently > 7 days meeting < 75% estimated energy/protein needs. Will trial progression of tube feeding to goal rate and monitor for ability to extubate and resume po versus need for long term nutrition support    Nutrition Related Findings:    NH resident, Hx significant for : DM2, ESRD/CKD4 s/p kidney transplant x 2; started HD 12/20, volume overloaded. Intubated 12/18-22, reintubated 12/24. Trophic TF in progress. Labs reviewed, Phos: 5.1, hyperglycemia, elevated BUN/creat/ Meds include colace, glycolax, prednisone, ? urine output ( documented anuric/2200 ml), sedation currently on hold, no prossors, loose BM via FMS Wound Type: Multiple, Pressure Injury, Stage II, Skin Tears       Current Nutrition Intake & Therapies:    Average Meal Intake: NPO  Average Supplements Intake: NPO  Diet NPO  ADULT TUBE FEEDING; Orogastric; Renal Formula; Continuous; 20; No; 100; Q 4 hours    Anthropometric Measures:  Height: 188 cm  (6' 2\")  Ideal Body Weight (IBW): 190 lbs (86 kg)    Admission Body Weight: 71.7 kg (158 lb)  Current Body Weight: 76.2 kg (168 lb), 88.4 % IBW. Weight Source: Bed scale  Current BMI (kg/m2): 21.6  Usual Body Weight: 80.3 kg (177 lb) (( 7/2024);173#(5/2024), 180# ( 10/29421)     % Weight Change (Calculated): -9.6                    BMI Categories: Underweight (BMI less than 22) age over 65    Estimated Daily Nutrient Needs:  Energy Requirements Based On: Kcal/kg  Weight Used for Energy Requirements: Admission  Energy (kcal/day): ~2150 kcal @ 30 kcal/kg  Weight Used for Protein Requirements: Admission  Protein (g/day): ~107 gm @ 1.5 gm/kg  Method Used for Fluid Requirements: Standard renal  Fluid (ml/day): 500-800 ml + DUO (or as per provider)    Nutrition Diagnosis:   Increased nutrient needs related to increase demand for energy/nutrients as evidenced by wounds  Inadequate oral intake related to swallowing difficulty as evidenced by intubation    Nutrition Interventions:   Food and/or Nutrient Delivery: Modify Tube Feeding  Nutrition Education/Counseling: No recommendation at this time  Coordination of Nutrition Care: No recommendation at this time       Goals:  Goals: Meet at least 75% of estimated needs  Type of Goal: New goal  Previous Goal Met: Progressing toward Goal(s)    Nutrition Monitoring and Evaluation:      Food/Nutrient Intake Outcomes: Progression of Nutrition  Physical Signs/Symptoms Outcomes: Biochemical Data, Chewing or Swallowing, Weight    Discharge Planning:    Too soon to determine     PIA ROJAS RD, LD

## 2024-12-26 NOTE — PROGRESS NOTES
Treatment time: 180 minutes    Net UF: 1000 mL    Pre weight: 76.3 kg  Post weight: 75.3 kg  EDW: TBD    Access used: AVF RFA  Access function: good    Medications or blood products given: None    Regular outpatient schedule: TBD    Summary of response to treatment: Patient tolerated treatment well, no signs or symptoms of distress noted during treatment, orders reviewed with Dr. Barrera prior to treatment, bleeding time <10 minutes from access sites, next treatment planned for Saturday, report given to LISANDRA Crystal.    Copy of dialysis treatment record placed in chart, to be scanned into EMR.

## 2024-12-26 NOTE — PROGRESS NOTES
Pulmonary & Critical Care Medicine ICU Progress Note  Chief complaint : Respiratory failure    Subjunctive/24 hour events :   Patient seen and examined during multidisciplinary rounds with RN, charge nurse, RT, pharmacy, dietitian, and social service.      Awake, getting stronger, he follows commands but weak, no fever, he is on 30% FiO2 saturation 100%, not on pressors, not on sedation.  Urine output 2.2 L.    New information updated in the note today, rest of the examination did not change compared to yesterday.  Social History     Tobacco Use    Smoking status: Former     Current packs/day: 0.00     Types: Cigarettes     Quit date: 2015     Years since quittin.5    Smokeless tobacco: Former   Substance Use Topics    Alcohol use: No     Alcohol/week: 0.0 standard drinks of alcohol         Problem Relation Age of Onset    Colon Cancer Neg Hx        Recent Labs     24  1444   PHART 7.496*   RMH1UEX 35   PO2ART 293*       MV Settings:  Vent Mode: AC/VC Resp Rate (Set): 14 bpm/Vt (Set, mL): 500 mL/ /FiO2 : 30 %           IV:   sodium chloride      norepinephrine Stopped (24 0853)    dextrose      sodium chloride         Vitals:  BP (!) 147/75   Pulse 94   Temp 98.2 °F (36.8 °C)   Resp 16   Ht 1.88 m (6' 2\")   Wt 76.2 kg (167 lb 15.9 oz)   SpO2 100%   BMI 21.57 kg/m²    Tmax:        Intake/Output Summary (Last 24 hours) at 2024 0713  Last data filed at 2024 1834  Gross per 24 hour   Intake 82.63 ml   Output 2200 ml   Net -2117.37 ml       EXAM:  General: Awake on vent, no distress  Head: normocephalic, atraumatic  Eyes:No gross abnormalities.  ENT:  MMM no lesions  Neck:  supple and no masses  Chest : Vent sounds, no wheezing, no rales, nontender, tympanic  Heart:: Heart sounds are normal.  Regular rate and rhythm without murmur, gallop or rub.  ABD:  symmetric, soft, non-tender, no guarding or rebound  Musculoskeletal : no cyanosis, no clubbing, and no edema  Neuro:   Awake,

## 2024-12-26 NOTE — PLAN OF CARE
Problem: Chronic Conditions and Co-morbidities  Goal: Patient's chronic conditions and co-morbidity symptoms are monitored and maintained or improved  Outcome: Progressing  Flowsheets (Taken 12/25/2024 2000)  Care Plan - Patient's Chronic Conditions and Co-Morbidity Symptoms are Monitored and Maintained or Improved:   Monitor and assess patient's chronic conditions and comorbid symptoms for stability, deterioration, or improvement   Collaborate with multidisciplinary team to address chronic and comorbid conditions and prevent exacerbation or deterioration   Update acute care plan with appropriate goals if chronic or comorbid symptoms are exacerbated and prevent overall improvement and discharge     Problem: Discharge Planning  Goal: Discharge to home or other facility with appropriate resources  Outcome: Progressing  Flowsheets (Taken 12/25/2024 2000)  Discharge to home or other facility with appropriate resources:   Identify barriers to discharge with patient and caregiver   Arrange for needed discharge resources and transportation as appropriate   Identify discharge learning needs (meds, wound care, etc)   Refer to discharge planning if patient needs post-hospital services based on physician order or complex needs related to functional status, cognitive ability or social support system     Problem: Pain  Goal: Verbalizes/displays adequate comfort level or baseline comfort level  Outcome: Progressing  Flowsheets  Taken 12/26/2024 0400  Verbalizes/displays adequate comfort level or baseline comfort level:   Encourage patient to monitor pain and request assistance   Assess pain using appropriate pain scale   Administer analgesics based on type and severity of pain and evaluate response   Implement non-pharmacological measures as appropriate and evaluate response   Consider cultural and social influences on pain and pain management   Notify Licensed Independent Practitioner if interventions unsuccessful or patient  12/25/2024 2000)  Maintains optimal cardiac output and hemodynamic stability:   Monitor blood pressure and heart rate   Monitor urine output and notify Licensed Independent Practitioner for values outside of normal range   Assess for signs of decreased cardiac output   Administer fluid and/or volume expanders as ordered   Administer vasoactive medications as ordered  Goal: Absence of cardiac dysrhythmias or at baseline  Outcome: Progressing     Problem: Skin/Tissue Integrity - Adult  Goal: Skin integrity remains intact  Outcome: Progressing  Flowsheets (Taken 12/25/2024 2000)  Skin Integrity Remains Intact:   Monitor for areas of redness and/or skin breakdown   Assess vascular access sites hourly   Every 4-6 hours minimum: Change oxygen saturation probe site     Problem: Musculoskeletal - Adult  Goal: Return mobility to safest level of function  Outcome: Progressing     Problem: Gastrointestinal - Adult  Goal: Minimal or absence of nausea and vomiting  Outcome: Progressing  Flowsheets (Taken 12/25/2024 2000)  Minimal or absence of nausea and vomiting:   Administer IV fluids as ordered to ensure adequate hydration   Nasogastric tube to low intermittent suction as ordered   Maintain NPO status until nausea and vomiting are resolved   Provide nonpharmacologic comfort measures as appropriate   Administer ordered antiemetic medications as needed  Goal: Maintains or returns to baseline bowel function  Outcome: Progressing  Flowsheets (Taken 12/25/2024 2000)  Maintains or returns to baseline bowel function:   Assess bowel function   Encourage oral fluids to ensure adequate hydration  Goal: Maintains adequate nutritional intake  Outcome: Progressing  Flowsheets (Taken 12/25/2024 2000)  Maintains adequate nutritional intake:   Monitor percentage of each meal consumed   Identify factors contributing to decreased intake, treat as appropriate   Assist with meals as needed   Monitor intake and output, weight and lab values

## 2024-12-26 NOTE — PROGRESS NOTES
Nephrology Progress Note    Assessment:  Non-oliguric DELORES S/P remote Kidney transplant, s/p HD started 12/20   CKD-4 baseline w/ baseline Scr ~low 3s w/ eGFR high teens   Acute hypoxic respiratory failure: now re-intubated   Septic shock: resoved  Rhinovirus Pneumonia w/ bacterial coinfection   DM type-2  Nutritional issues  Hypotensive      Plan:   - next HD rx Saturday    - seems ok to continue to cyclosporin and cellcept for now however if worsens may need to hold his cellcept   - ok to transfuse PRBC PRN         Patient Active Problem List:     Pneumonia     Abdominal pain, other specified site     Acute pyelonephritis without lesion of renal medullary necrosis     Anemia     Essential hypertension     Nonspecific abnormal results of thyroid function study     Mixed hyperlipidemia     Kidney replaced by transplant     Intra-abdominal abscess post-procedure (HCC)     Infection due to Enterococcus     Fever and other physiologic disturbances of temperature regulation     Chronic kidney disease (CKD)     Type 2 diabetes mellitus with stage 3b chronic kidney disease, without long-term current use of insulin (HCC)     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 (HCC)     Abnormal CT of the chest     Bronchiectasis without complication (HCC)     GI bleeding     Complication of transplanted kidney     Muscle weakness (generalized)     Difficulty in walking     Elevated BUN     Urinary retention     Immunosuppression due to drug therapy (HCC)     DELORES (acute kidney injury) (HCC)     Adjustment disorder     Gross hematuria     Bilateral lower extremity edema     Dysuria     non-tender, non-distended  Ext: no peripheral edema  Neuro: responsive         Electronically signed by Molly Barrera MD

## 2024-12-26 NOTE — CARE COORDINATION
Patient remains intubated, on tube feeds, HD (new this admission). Plan for possible LTAC pending progress. Monitor for HD need upon discharge.    Gutierrez Galvan MSN, RN Mgr Case Mgmt.

## 2024-12-27 LAB
ALBUMIN SERPL-MCNC: 2.8 G/DL (ref 3.5–4.6)
ANION GAP SERPL CALCULATED.3IONS-SCNC: 19 MEQ/L (ref 9–15)
BASOPHILS # BLD: 0 K/UL (ref 0–0.2)
BASOPHILS NFR BLD: 0.1 %
BUN SERPL-MCNC: 54 MG/DL (ref 8–23)
CALCIUM SERPL-MCNC: 8.5 MG/DL (ref 8.5–9.9)
CHLORIDE SERPL-SCNC: 92 MEQ/L (ref 95–107)
CO2 SERPL-SCNC: 25 MEQ/L (ref 20–31)
CREAT SERPL-MCNC: 2.78 MG/DL (ref 0.7–1.2)
EOSINOPHIL # BLD: 0 K/UL (ref 0–0.7)
EOSINOPHIL NFR BLD: 0.3 %
ERYTHROCYTE [DISTWIDTH] IN BLOOD BY AUTOMATED COUNT: 15.2 % (ref 11.5–14.5)
GLUCOSE BLD-MCNC: 226 MG/DL (ref 70–99)
GLUCOSE BLD-MCNC: 250 MG/DL (ref 70–99)
GLUCOSE BLD-MCNC: 263 MG/DL (ref 70–99)
GLUCOSE BLD-MCNC: 311 MG/DL (ref 70–99)
GLUCOSE SERPL-MCNC: 214 MG/DL (ref 70–99)
HCT VFR BLD AUTO: 30.4 % (ref 42–52)
HEMOCCULT STL QL IA: ABNORMAL
HGB BLD-MCNC: 9.6 G/DL (ref 14–18)
LYMPHOCYTES # BLD: 0.2 K/UL (ref 1–4.8)
LYMPHOCYTES NFR BLD: 1.4 %
MAGNESIUM SERPL-MCNC: 2.4 MG/DL (ref 1.7–2.4)
MCH RBC QN AUTO: 28 PG (ref 27–31.3)
MCHC RBC AUTO-ENTMCNC: 31.6 % (ref 33–37)
MCV RBC AUTO: 88.6 FL (ref 79–92.2)
MONOCYTES # BLD: 0.9 K/UL (ref 0.2–0.8)
MONOCYTES NFR BLD: 6 %
NEUTROPHILS # BLD: 13.6 K/UL (ref 1.4–6.5)
NEUTS SEG NFR BLD: 87.8 %
PERFORMED ON: ABNORMAL
PHOSPHATE SERPL-MCNC: 4.4 MG/DL (ref 2.3–4.8)
PLATELET # BLD AUTO: 227 K/UL (ref 130–400)
POTASSIUM SERPL-SCNC: 4.5 MEQ/L (ref 3.4–4.9)
RBC # BLD AUTO: 3.43 M/UL (ref 4.7–6.1)
REASON FOR REJECTION: NORMAL
REJECTED TEST: NORMAL
SODIUM SERPL-SCNC: 136 MEQ/L (ref 135–144)
WBC # BLD AUTO: 15.5 K/UL (ref 4.8–10.8)

## 2024-12-27 PROCEDURE — 6360000002 HC RX W HCPCS: Performed by: INTERNAL MEDICINE

## 2024-12-27 PROCEDURE — 80069 RENAL FUNCTION PANEL: CPT

## 2024-12-27 PROCEDURE — 2700000000 HC OXYGEN THERAPY PER DAY

## 2024-12-27 PROCEDURE — 99291 CRITICAL CARE FIRST HOUR: CPT | Performed by: INTERNAL MEDICINE

## 2024-12-27 PROCEDURE — 36415 COLL VENOUS BLD VENIPUNCTURE: CPT

## 2024-12-27 PROCEDURE — 6370000000 HC RX 637 (ALT 250 FOR IP): Performed by: INTERNAL MEDICINE

## 2024-12-27 PROCEDURE — 85025 COMPLETE CBC W/AUTO DIFF WBC: CPT

## 2024-12-27 PROCEDURE — 6360000002 HC RX W HCPCS: Performed by: STUDENT IN AN ORGANIZED HEALTH CARE EDUCATION/TRAINING PROGRAM

## 2024-12-27 PROCEDURE — 82274 ASSAY TEST FOR BLOOD FECAL: CPT

## 2024-12-27 PROCEDURE — 2000000000 HC ICU R&B

## 2024-12-27 PROCEDURE — 6370000000 HC RX 637 (ALT 250 FOR IP): Performed by: STUDENT IN AN ORGANIZED HEALTH CARE EDUCATION/TRAINING PROGRAM

## 2024-12-27 PROCEDURE — 2580000003 HC RX 258: Performed by: STUDENT IN AN ORGANIZED HEALTH CARE EDUCATION/TRAINING PROGRAM

## 2024-12-27 PROCEDURE — 94003 VENT MGMT INPAT SUBQ DAY: CPT

## 2024-12-27 PROCEDURE — 2500000003 HC RX 250 WO HCPCS: Performed by: INTERNAL MEDICINE

## 2024-12-27 PROCEDURE — 2580000003 HC RX 258: Performed by: INTERNAL MEDICINE

## 2024-12-27 PROCEDURE — 6370000000 HC RX 637 (ALT 250 FOR IP): Performed by: NURSE PRACTITIONER

## 2024-12-27 PROCEDURE — 94660 CPAP INITIATION&MGMT: CPT

## 2024-12-27 PROCEDURE — 83735 ASSAY OF MAGNESIUM: CPT

## 2024-12-27 RX ORDER — MIDODRINE HYDROCHLORIDE 10 MG/1
10 TABLET ORAL 2 TIMES DAILY
Status: DISCONTINUED | OUTPATIENT
Start: 2024-12-27 | End: 2024-12-28

## 2024-12-27 RX ADMIN — INSULIN LISPRO 4 UNITS: 100 INJECTION, SOLUTION INTRAVENOUS; SUBCUTANEOUS at 01:44

## 2024-12-27 RX ADMIN — PIPERACILLIN AND TAZOBACTAM 3375 MG: 3; .375 INJECTION, POWDER, LYOPHILIZED, FOR SOLUTION INTRAVENOUS at 00:09

## 2024-12-27 RX ADMIN — DOCUSATE SODIUM 100 MG: 50 LIQUID ORAL at 08:50

## 2024-12-27 RX ADMIN — INSULIN LISPRO 12 UNITS: 100 INJECTION, SOLUTION INTRAVENOUS; SUBCUTANEOUS at 16:11

## 2024-12-27 RX ADMIN — CYCLOSPORINE 50 MG: 100 SOLUTION ORAL at 09:02

## 2024-12-27 RX ADMIN — AMIODARONE HYDROCHLORIDE 200 MG: 200 TABLET ORAL at 08:51

## 2024-12-27 RX ADMIN — PIPERACILLIN AND TAZOBACTAM 3375 MG: 3; .375 INJECTION, POWDER, LYOPHILIZED, FOR SOLUTION INTRAVENOUS at 15:04

## 2024-12-27 RX ADMIN — METOPROLOL TARTRATE 25 MG: 25 TABLET, FILM COATED ORAL at 08:51

## 2024-12-27 RX ADMIN — SODIUM CHLORIDE 40 MG: 9 INJECTION INTRAMUSCULAR; INTRAVENOUS; SUBCUTANEOUS at 08:52

## 2024-12-27 RX ADMIN — SODIUM CHLORIDE 40 MG: 9 INJECTION INTRAMUSCULAR; INTRAVENOUS; SUBCUTANEOUS at 20:59

## 2024-12-27 RX ADMIN — METHYLPREDNISOLONE SODIUM SUCCINATE 10 MG: 40 INJECTION INTRAMUSCULAR; INTRAVENOUS at 08:52

## 2024-12-27 RX ADMIN — INSULIN LISPRO 8 UNITS: 100 INJECTION, SOLUTION INTRAVENOUS; SUBCUTANEOUS at 21:00

## 2024-12-27 RX ADMIN — Medication 250 MG: at 21:01

## 2024-12-27 RX ADMIN — AMIODARONE HYDROCHLORIDE 200 MG: 200 TABLET ORAL at 21:00

## 2024-12-27 RX ADMIN — INSULIN GLARGINE 12 UNITS: 100 INJECTION, SOLUTION SUBCUTANEOUS at 08:52

## 2024-12-27 RX ADMIN — Medication 10 ML: at 21:00

## 2024-12-27 RX ADMIN — INSULIN LISPRO 8 UNITS: 100 INJECTION, SOLUTION INTRAVENOUS; SUBCUTANEOUS at 08:51

## 2024-12-27 RX ADMIN — POLYETHYLENE GLYCOL 3350 17 G: 17 POWDER, FOR SOLUTION ORAL at 08:50

## 2024-12-27 RX ADMIN — INSULIN LISPRO 4 UNITS: 100 INJECTION, SOLUTION INTRAVENOUS; SUBCUTANEOUS at 05:14

## 2024-12-27 RX ADMIN — METOPROLOL TARTRATE 25 MG: 25 TABLET, FILM COATED ORAL at 21:00

## 2024-12-27 ASSESSMENT — PULMONARY FUNCTION TESTS
PIF_VALUE: 21
PIF_VALUE: 20
PIF_VALUE: 22
PIF_VALUE: 19
PIF_VALUE: 21
PIF_VALUE: 20
PIF_VALUE: 21
PIF_VALUE: 21
PIF_VALUE: 20
PIF_VALUE: 21
PIF_VALUE: 22
PIF_VALUE: 21
PIF_VALUE: 20
PIF_VALUE: 19
PIF_VALUE: 13
PIF_VALUE: 21
PIF_VALUE: 22
PIF_VALUE: 21
PIF_VALUE: 20
PIF_VALUE: 20
PIF_VALUE: 21
PIF_VALUE: 20
PIF_VALUE: 22
PIF_VALUE: 21

## 2024-12-27 ASSESSMENT — PAIN SCALES - GENERAL
PAINLEVEL_OUTOF10: 0
PAINLEVEL_OUTOF10: 0

## 2024-12-27 NOTE — CARE COORDINATION
Quality round completed with care management team this AM. Pt on vent. No family at bedside. Per report, HD treatment today. Possible trach?  Pall care following for goals of care.     LSW tried to call wife. No answer.     DC plan: TBD; on vent. Possible LTAC.     Electronically signed by LOLI Hodge on 12/27/2024 at 11:47 AM

## 2024-12-27 NOTE — PROGRESS NOTES
19:00-07:30 Shift Summary   Assessments completed (see flowsheets). Pt remains on vent. Lethargic and able to follow some commands. T/F infusing via NG. Placement verified via respiratory status, external length of 70 cm, and gastric content. IV medications infused per orders. Bed bath and complete linen change provided. Labs drawn by . Stool specimen labeled and sent to lab. Bedside handoff report given to oncoming RN. Safety measures in place.

## 2024-12-27 NOTE — PROGRESS NOTES
Nephrology Progress Note    Assessment:  Non-oliguric DELORES S/P remote Kidney transplant, s/p HD started 12/20   CKD-4 baseline w/ baseline Scr ~low 3s w/ eGFR high teens   Acute hypoxic respiratory failure: now re-intubated   Septic shock: resoved  Rhinovirus Pneumonia w/ bacterial coinfection   DM type-2  Nutritional issues  Hypotensive episodes: now improved, off pressors       Plan:   - next HD rx tomorrow   - seems ok to continue to cyclosporin and cellcept for now however if worsens may need to hold his cellcept   - ok to transfuse PRBC PRN         Patient Active Problem List:     Pneumonia     Abdominal pain, other specified site     Acute pyelonephritis without lesion of renal medullary necrosis     Anemia     Essential hypertension     Nonspecific abnormal results of thyroid function study     Mixed hyperlipidemia     Kidney replaced by transplant     Intra-abdominal abscess post-procedure (HCC)     Infection due to Enterococcus     Fever and other physiologic disturbances of temperature regulation     Chronic kidney disease (CKD)     Type 2 diabetes mellitus with stage 3b chronic kidney disease, without long-term current use of insulin (HCC)     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 (HCC)     Abnormal CT of the chest     Bronchiectasis without complication (HCC)     GI bleeding     Complication of transplanted kidney     Muscle weakness (generalized)     Difficulty in walking     Elevated BUN     Urinary retention     Immunosuppression due to drug therapy (HCC)     DELORES (acute kidney injury) (HCC)     Adjustment disorder     Gross hematuria     Bilateral  lower extremity edema     Dysuria     Acute cystitis without hematuria     Bilateral hearing loss     Abscess, toe     Acute hyperkalemia     Acute pain of left shoulder     Astigmatism     Astigmatism of both eyes with presbyopia     At risk for stroke     Benign enlargement of prostate     Benign prostatic hyperplasia with urinary frequency     Rhinovirus     Chronic right shoulder pain     Condition not found     Deep vein thrombosis (DVT) of lower extremity (HCC)     Diabetes mellitus type 2 without retinopathy ([facility])     Edema     Gait difficulty     GERD (gastroesophageal reflux disease)     H/O lower gastrointestinal bleeding     History of blood transfusion     Incomplete emptying of bladder     Insulin long-term use ([facility])     Leg weakness, bilateral     Localized swelling of both lower legs     Peripheral nerve disease     Pseudophakia of both eyes     PTDM (post-transplant diabetes mellitus) ([facility])     Posterior vitreous detachment     Rhabdomyolysis     Seizure disorder ([facility])     Stage 5 chronic kidney disease with transplanted kidney ([facility])     Leukocytes in urine     Atrial fibrillation with RVR ([facility])     Moderate malnutrition ([facility])      Subjective:  Admit Date: 12/18/2024    Interval History: remains intubated, remains off pressors, had HD yesterday w/ 1L taken off     Medications:  Scheduled Meds:   pantoprazole (PROTONIX) 40 mg in sodium chloride (PF) 0.9 % 10 mL injection  40 mg IntraVENous Q12H    midodrine  10 mg Oral BID    insulin glargine  12 Units SubCUTAneous Daily    methylPREDNISolone  10 mg IntraVENous Daily    metoprolol tartrate  25 mg Oral BID    cycloSPORINE  50 mg Oral Daily    mycophenolate  250 mg Oral BID    insulin lispro  0-16 Units SubCUTAneous Q4H    polyethylene glycol  17 g Oral Daily    docusate  100 mg Oral BID    amiodarone  200 mg Oral BID    sodium chloride flush  5-40 mL IntraVENous 2 times per day    [Held by provider] heparin (porcine)  5,000 Units SubCUTAneous 3 times

## 2024-12-27 NOTE — PROGRESS NOTES
Pulmonary & Critical Care Medicine ICU Progress Note  Chief complaint : Respiratory failure    Subjunctive/24 hour events :   Patient seen and examined during multidisciplinary rounds with RN, charge nurse, RT, pharmacy, dietitian, and social service.      Awake, on vent, he is weak, follows simple commands, no fever, on 30% FiO2 saturation 100%, no vomiting, urine output none, dialysis output 1500 cc, +9 L    New information updated in the note today, rest of the examination did not change compared to yesterday.  Social History     Tobacco Use    Smoking status: Former     Current packs/day: 0.00     Types: Cigarettes     Quit date: 2015     Years since quittin.5    Smokeless tobacco: Former   Substance Use Topics    Alcohol use: No     Alcohol/week: 0.0 standard drinks of alcohol         Problem Relation Age of Onset    Colon Cancer Neg Hx        Recent Labs     24  1444   PHART 7.496*   DSZ2YTQ 35   PO2ART 293*       MV Settings:  Vent Mode: AC/VC Resp Rate (Set): 14 bpm/Vt (Set, mL): 500 mL/ /FiO2 : 30 %           IV:   sodium chloride      dextrose      sodium chloride         Vitals:  /68   Pulse 88   Temp 97.5 °F (36.4 °C)   Resp 18   Ht 1.88 m (6' 2\")   Wt 76.3 kg (168 lb 3.4 oz)   SpO2 100%   BMI 21.60 kg/m²    Tmax:        Intake/Output Summary (Last 24 hours) at 2024 0804  Last data filed at 2024 0744  Gross per 24 hour   Intake 1670.74 ml   Output 1500 ml   Net 170.74 ml       EXAM:  General: Awake on vent, no distress  Head: normocephalic, atraumatic  Eyes:No gross abnormalities.  ENT:  MMM no lesions  Neck:  supple and no masses  Chest : Vent sounds, no wheezing, no rales, nontender, tympanic  Heart:: Heart sounds are normal.  Regular rate and rhythm without murmur, gallop or rub.  ABD:  symmetric, soft, non-tender, no guarding or rebound  Musculoskeletal : no cyanosis, no clubbing, and no edema  Neuro:   Awake, weak, follows simple commands  Skin: No rashes or

## 2024-12-27 NOTE — PROGRESS NOTES
Spiritual Health History and Assessment/Progress Note  [facility]    Follow-up, Palliative Care,  ,  ,      Name: Remy Upton MRN: [id number]    Age: 73 y.o.     Sex: male   Language: English   Evangelical: [employer]   DELORES (acute kidney injury) (HCC)     Date: 12/27/2024            Total Time Calculated: (P) 15 min              Spiritual Assessment began in [facility]            Encounter Overview/Reason: Follow-up, Palliative Care  Service Provided For: Patient     reports that patient is intubated, awake, alert, uses headed to nod at chaplains guide questions and explorations of patients wellness and comfort. Patient makes eye contact and follows chaplains with his eyes.      offered prayer wellness and comfort.     Marian, Belief, Meaning:   Patient is connected with a marian tradition or spiritual practice  Family/Friends No family/friends present      Importance and Influence:  Patient unable to assess at this time  Family/Friends No family/friends present    Community:  Patient is connected with a spiritual community  Family/Friends No family/friends present    Assessment and Plan of Care:     Patient Interventions include: Facilitated expression of thoughts and feelings  Family/Friends Interventions include: No family/friends present    Patient Plan of Care: Spiritual Care available upon further referral  Family/Friends Plan of Care: No family/friends present    Electronically signed by Chaplain Camilo on 12/27/2024 at 5:13 PM

## 2024-12-27 NOTE — PROGRESS NOTES
Hospitalist Progress Note      PCP: Tico Rodriguez DO    Date of Admission: 12/18/2024    Chief Complaint:  patient intubated/sedated this morning.     Medications:  Reviewed    Infusion Medications    sodium chloride      dextrose      sodium chloride       Scheduled Medications    pantoprazole (PROTONIX) 40 mg in sodium chloride (PF) 0.9 % 10 mL injection  40 mg IntraVENous Q12H    midodrine  10 mg Oral BID    insulin glargine  12 Units SubCUTAneous Daily    methylPREDNISolone  10 mg IntraVENous Daily    metoprolol tartrate  25 mg Oral BID    cycloSPORINE  50 mg Oral Daily    mycophenolate  250 mg Oral BID    insulin lispro  0-16 Units SubCUTAneous Q4H    polyethylene glycol  17 g Oral Daily    docusate  100 mg Oral BID    amiodarone  200 mg Oral BID    sodium chloride flush  5-40 mL IntraVENous 2 times per day    [Held by provider] heparin (porcine)  5,000 Units SubCUTAneous 3 times per day    piperacillin-tazobactam  3,375 mg IntraVENous Q12H    [Held by provider] mycophenolate  500 mg Oral BID     PRN Meds: fentanNYL, sodium chloride, metoprolol, midodrine, hydrALAZINE, LORazepam, glucose, dextrose bolus **OR** dextrose bolus, glucagon (rDNA), dextrose, sodium chloride flush, sodium chloride, ondansetron **OR** ondansetron, acetaminophen **OR** acetaminophen, ipratropium 0.5 mg-albuterol 2.5 mg      Intake/Output Summary (Last 24 hours) at 12/27/2024 1614  Last data filed at 12/27/2024 0744  Gross per 24 hour   Intake 1070.74 ml   Output --   Net 1070.74 ml       Exam:    /62   Pulse 92   Temp 98.8 °F (37.1 °C)   Resp 18   Ht 1.88 m (6' 2\")   Wt 76.3 kg (168 lb 3.4 oz)   SpO2 100%   BMI 21.60 kg/m²     General appearance: sedated  Lungs: diminished bilaterally  Heart: S1/S2,irregular  Abdomen: soft  Extremities: edema present bilateral UE      Labs:   Recent Labs     12/25/24  0416 12/25/24  1405 12/25/24 2001 12/26/24  0415 12/27/24  0528   WBC 11.1*  --   --  13.7* 15.5*   HGB 6.6*   < > 9.0*  9.2* 9.6*   HCT 20.5*   < > 28.2* 28.4* 30.4*     --   --  212 227    < > = values in this interval not displayed.     Recent Labs     12/25/24 0416 12/26/24 0415 12/27/24  0401    137 136   K 4.2 4.9 4.5   CL 96 92* 92*   CO2 28 24 25   BUN 52* 71* 54*   CREATININE 3.21* 3.72* 2.78*   CALCIUM 8.2* 8.9 8.5   PHOS 4.1 5.1* 4.4     No results for input(s): \"AST\", \"ALT\", \"BILIDIR\", \"BILITOT\", \"ALKPHOS\" in the last 72 hours.    No results for input(s): \"INR\" in the last 72 hours.  No results for input(s): \"CKTOTAL\", \"TROPONINI\" in the last 72 hours.    Urinalysis:      Lab Results   Component Value Date/Time    NITRU Negative 11/04/2024 08:48 PM    WBCUA >100 11/04/2024 08:48 PM    WBCUA 13 08/11/2023 09:49 AM    BACTERIA Negative 11/04/2024 08:48 PM    RBCUA 3-5 11/04/2024 08:48 PM    RBCUA 2 08/11/2023 09:49 AM    BLOODU LARGE 11/04/2024 08:48 PM    SPECGRAV 1.015 08/17/2021 11:00 AM    GLUCOSEU Negative 11/04/2024 08:48 PM    GLUCOSEU GT 1 09/14/2011 08:57 AM       Radiology:  XR CHEST PORTABLE   Final Result   1. Interval development of a hazy alveolar opacity in the right middle lower   lung, which may represent atelectasis or pneumonia.   2. Probable small right pleural effusion.   3. Well-positioned endotracheal tube and NG tube.         XR CHEST PORTABLE   Final Result   Tiny pleural effusion is seen on the left.  Tubes and lines project over the   chest.         CT HEAD WO CONTRAST   Final Result   Stable atrophy and chronic changes seen within the brain with no acute   intracranial abnormality.         XR CHEST PORTABLE   Final Result   Cardiomegaly with underlying chronic changes seen throughout the lung fields   bilaterally. Minimal residual markings at the left lung base with trace left   pleural effusion.         Vascular duplex hemodialysis access right   Final Result   1.  Fistula is a radial artery to cephalic outflow vein fistula. The fistula is   widely patent with cephalic blood flow

## 2024-12-28 LAB
ALBUMIN SERPL-MCNC: 2.8 G/DL (ref 3.5–4.6)
ANION GAP SERPL CALCULATED.3IONS-SCNC: 17 MEQ/L (ref 9–15)
BASOPHILS # BLD: 0 K/UL (ref 0–0.2)
BASOPHILS NFR BLD: 0.1 %
BUN SERPL-MCNC: 80 MG/DL (ref 8–23)
CALCIUM SERPL-MCNC: 8.1 MG/DL (ref 8.5–9.9)
CHLORIDE SERPL-SCNC: 91 MEQ/L (ref 95–107)
CO2 SERPL-SCNC: 24 MEQ/L (ref 20–31)
CREAT SERPL-MCNC: 3.53 MG/DL (ref 0.7–1.2)
EOSINOPHIL # BLD: 0 K/UL (ref 0–0.7)
EOSINOPHIL NFR BLD: 0.2 %
ERYTHROCYTE [DISTWIDTH] IN BLOOD BY AUTOMATED COUNT: 15.2 % (ref 11.5–14.5)
GLUCOSE BLD-MCNC: 179 MG/DL (ref 70–99)
GLUCOSE BLD-MCNC: 222 MG/DL (ref 70–99)
GLUCOSE BLD-MCNC: 242 MG/DL (ref 70–99)
GLUCOSE BLD-MCNC: 245 MG/DL (ref 70–99)
GLUCOSE BLD-MCNC: 248 MG/DL (ref 70–99)
GLUCOSE BLD-MCNC: 261 MG/DL (ref 70–99)
GLUCOSE SERPL-MCNC: 228 MG/DL (ref 70–99)
HCT VFR BLD AUTO: 27.2 % (ref 42–52)
HGB BLD-MCNC: 8.6 G/DL (ref 14–18)
LYMPHOCYTES # BLD: 0 K/UL (ref 1–4.8)
LYMPHOCYTES NFR BLD: 2 %
MAGNESIUM SERPL-MCNC: 2.5 MG/DL (ref 1.7–2.4)
MCH RBC QN AUTO: 29.1 PG (ref 27–31.3)
MCHC RBC AUTO-ENTMCNC: 31.6 % (ref 33–37)
MCV RBC AUTO: 91.9 FL (ref 79–92.2)
MONOCYTES # BLD: 0.7 K/UL (ref 0.2–0.8)
MONOCYTES NFR BLD: 5.7 %
NEUTROPHILS # BLD: 11.4 K/UL (ref 1.4–6.5)
NEUTS SEG NFR BLD: 94 %
NRBC BLD-RTO: 3 /100 WBC
PERFORMED ON: ABNORMAL
PHOSPHATE SERPL-MCNC: 4.9 MG/DL (ref 2.3–4.8)
PLATELET # BLD AUTO: 222 K/UL (ref 130–400)
PLATELET BLD QL SMEAR: ABNORMAL
POTASSIUM SERPL-SCNC: 4.7 MEQ/L (ref 3.4–4.9)
RBC # BLD AUTO: 2.96 M/UL (ref 4.7–6.1)
SLIDE REVIEW: ABNORMAL
SMUDGE CELLS BLD QL SMEAR: 2.9
SODIUM SERPL-SCNC: 132 MEQ/L (ref 135–144)
WBC # BLD AUTO: 12.1 K/UL (ref 4.8–10.8)

## 2024-12-28 PROCEDURE — 99291 CRITICAL CARE FIRST HOUR: CPT | Performed by: INTERNAL MEDICINE

## 2024-12-28 PROCEDURE — 2580000003 HC RX 258: Performed by: STUDENT IN AN ORGANIZED HEALTH CARE EDUCATION/TRAINING PROGRAM

## 2024-12-28 PROCEDURE — 6360000002 HC RX W HCPCS: Performed by: INTERNAL MEDICINE

## 2024-12-28 PROCEDURE — 36415 COLL VENOUS BLD VENIPUNCTURE: CPT

## 2024-12-28 PROCEDURE — 85025 COMPLETE CBC W/AUTO DIFF WBC: CPT

## 2024-12-28 PROCEDURE — 6370000000 HC RX 637 (ALT 250 FOR IP): Performed by: INTERNAL MEDICINE

## 2024-12-28 PROCEDURE — 94003 VENT MGMT INPAT SUBQ DAY: CPT

## 2024-12-28 PROCEDURE — 2580000003 HC RX 258: Performed by: INTERNAL MEDICINE

## 2024-12-28 PROCEDURE — 2000000000 HC ICU R&B

## 2024-12-28 PROCEDURE — 83735 ASSAY OF MAGNESIUM: CPT

## 2024-12-28 PROCEDURE — 99231 SBSQ HOSP IP/OBS SF/LOW 25: CPT | Performed by: INTERNAL MEDICINE

## 2024-12-28 PROCEDURE — 2700000000 HC OXYGEN THERAPY PER DAY

## 2024-12-28 PROCEDURE — 2500000003 HC RX 250 WO HCPCS: Performed by: INTERNAL MEDICINE

## 2024-12-28 PROCEDURE — 6360000002 HC RX W HCPCS: Performed by: STUDENT IN AN ORGANIZED HEALTH CARE EDUCATION/TRAINING PROGRAM

## 2024-12-28 PROCEDURE — 6370000000 HC RX 637 (ALT 250 FOR IP): Performed by: NURSE PRACTITIONER

## 2024-12-28 PROCEDURE — 80069 RENAL FUNCTION PANEL: CPT

## 2024-12-28 RX ORDER — INSULIN GLARGINE 100 [IU]/ML
15 INJECTION, SOLUTION SUBCUTANEOUS DAILY
Status: DISCONTINUED | OUTPATIENT
Start: 2024-12-28 | End: 2024-12-28

## 2024-12-28 RX ORDER — INSULIN GLARGINE 100 [IU]/ML
25 INJECTION, SOLUTION SUBCUTANEOUS DAILY
Status: DISCONTINUED | OUTPATIENT
Start: 2024-12-28 | End: 2024-12-29

## 2024-12-28 RX ADMIN — AMIODARONE HYDROCHLORIDE 200 MG: 200 TABLET ORAL at 12:23

## 2024-12-28 RX ADMIN — PIPERACILLIN AND TAZOBACTAM 3375 MG: 3; .375 INJECTION, POWDER, LYOPHILIZED, FOR SOLUTION INTRAVENOUS at 15:11

## 2024-12-28 RX ADMIN — MIDODRINE HYDROCHLORIDE 10 MG: 10 TABLET ORAL at 07:59

## 2024-12-28 RX ADMIN — AMIODARONE HYDROCHLORIDE 200 MG: 200 TABLET ORAL at 21:41

## 2024-12-28 RX ADMIN — SODIUM CHLORIDE 40 MG: 9 INJECTION INTRAMUSCULAR; INTRAVENOUS; SUBCUTANEOUS at 12:24

## 2024-12-28 RX ADMIN — HEPARIN SODIUM 5000 UNITS: 5000 INJECTION INTRAVENOUS; SUBCUTANEOUS at 15:02

## 2024-12-28 RX ADMIN — INSULIN GLARGINE 25 UNITS: 100 INJECTION, SOLUTION SUBCUTANEOUS at 12:25

## 2024-12-28 RX ADMIN — CYCLOSPORINE 50 MG: 100 SOLUTION ORAL at 12:29

## 2024-12-28 RX ADMIN — INSULIN LISPRO 8 UNITS: 100 INJECTION, SOLUTION INTRAVENOUS; SUBCUTANEOUS at 19:55

## 2024-12-28 RX ADMIN — DOCUSATE SODIUM 100 MG: 50 LIQUID ORAL at 12:27

## 2024-12-28 RX ADMIN — Medication 10 ML: at 21:42

## 2024-12-28 RX ADMIN — METOPROLOL TARTRATE 25 MG: 25 TABLET, FILM COATED ORAL at 21:41

## 2024-12-28 RX ADMIN — Medication 10 ML: at 12:26

## 2024-12-28 RX ADMIN — PIPERACILLIN AND TAZOBACTAM 3375 MG: 3; .375 INJECTION, POWDER, LYOPHILIZED, FOR SOLUTION INTRAVENOUS at 03:03

## 2024-12-28 RX ADMIN — DOCUSATE SODIUM 100 MG: 50 LIQUID ORAL at 21:41

## 2024-12-28 RX ADMIN — METOPROLOL TARTRATE 25 MG: 25 TABLET, FILM COATED ORAL at 12:35

## 2024-12-28 RX ADMIN — SODIUM CHLORIDE 40 MG: 9 INJECTION INTRAMUSCULAR; INTRAVENOUS; SUBCUTANEOUS at 21:41

## 2024-12-28 RX ADMIN — POLYETHYLENE GLYCOL 3350 17 G: 17 POWDER, FOR SOLUTION ORAL at 12:26

## 2024-12-28 RX ADMIN — EPOETIN ALFA-EPBX 10000 UNITS: 10000 INJECTION, SOLUTION INTRAVENOUS; SUBCUTANEOUS at 19:55

## 2024-12-28 RX ADMIN — INSULIN LISPRO 4 UNITS: 100 INJECTION, SOLUTION INTRAVENOUS; SUBCUTANEOUS at 04:51

## 2024-12-28 RX ADMIN — FENTANYL CITRATE 50 MCG: 0.05 INJECTION, SOLUTION INTRAMUSCULAR; INTRAVENOUS at 21:49

## 2024-12-28 RX ADMIN — METHYLPREDNISOLONE SODIUM SUCCINATE 10 MG: 40 INJECTION INTRAMUSCULAR; INTRAVENOUS at 12:24

## 2024-12-28 RX ADMIN — INSULIN LISPRO 4 UNITS: 100 INJECTION, SOLUTION INTRAVENOUS; SUBCUTANEOUS at 00:10

## 2024-12-28 RX ADMIN — HEPARIN SODIUM 5000 UNITS: 5000 INJECTION INTRAVENOUS; SUBCUTANEOUS at 21:41

## 2024-12-28 ASSESSMENT — PULMONARY FUNCTION TESTS
PIF_VALUE: 20
PIF_VALUE: 21
PIF_VALUE: 21
PIF_VALUE: 22
PIF_VALUE: 28
PIF_VALUE: 20
PIF_VALUE: 21
PIF_VALUE: 22
PIF_VALUE: 22
PIF_VALUE: 23
PIF_VALUE: 22
PIF_VALUE: 20
PIF_VALUE: 23
PIF_VALUE: 21
PIF_VALUE: 20
PIF_VALUE: 21
PIF_VALUE: 23
PIF_VALUE: 22
PIF_VALUE: 21
PIF_VALUE: 21
PIF_VALUE: 22
PIF_VALUE: 20
PIF_VALUE: 24
PIF_VALUE: 22
PIF_VALUE: 23

## 2024-12-28 ASSESSMENT — PAIN SCALES - GENERAL
PAINLEVEL_OUTOF10: 0
PAINLEVEL_OUTOF10: 0
PAINLEVEL_OUTOF10: 5
PAINLEVEL_OUTOF10: 0

## 2024-12-28 ASSESSMENT — PAIN DESCRIPTION - LOCATION: LOCATION: GENERALIZED

## 2024-12-28 ASSESSMENT — PAIN SCALES - WONG BAKER
WONGBAKER_NUMERICALRESPONSE: NO HURT

## 2024-12-28 NOTE — PROGRESS NOTES
Pulmonary & Critical Care Medicine ICU Progress Note  Chief complaint : Respiratory failure    Subjunctive/24 hour events :   Patient seen and examined during multidisciplinary rounds with RN, charge nurse, RT, pharmacy, dietitian, and social service.      Awake, no issues overnight, plan for dialysis today, on 30% FiO2 saturation 100%, no fever.  No urine output.    New information updated in the note today, rest of the examination did not change compared to yesterday.  Social History     Tobacco Use    Smoking status: Former     Current packs/day: 0.00     Types: Cigarettes     Quit date: 2015     Years since quittin.5    Smokeless tobacco: Former   Substance Use Topics    Alcohol use: No     Alcohol/week: 0.0 standard drinks of alcohol         Problem Relation Age of Onset    Colon Cancer Neg Hx        No results for input(s): \"PHART\", \"YFK0CBV\", \"PO2ART\" in the last 72 hours.      MV Settings:  Vent Mode: AC/VC Resp Rate (Set): 14 bpm/Vt (Set, mL): 500 mL/ /FiO2 : 30 %           IV:   sodium chloride      dextrose      sodium chloride         Vitals:  BP (!) 115/55   Pulse 84   Temp 97.2 °F (36.2 °C)   Resp 16   Ht 1.88 m (6' 2\")   Wt (P) 75.3 kg (166 lb 0.1 oz)   SpO2 100%   BMI (P) 21.31 kg/m²    Tmax:        Intake/Output Summary (Last 24 hours) at 2024 0816  Last data filed at 2024 0615  Gross per 24 hour   Intake 1406 ml   Output --   Net 1406 ml       EXAM:  General: Awake on vent, no distress  Head: normocephalic, atraumatic  Eyes:No gross abnormalities.  ENT:  MMM no lesions  Neck:  supple and no masses  Chest : Vent sounds, no wheezing, no rales, nontender, tympanic  Heart:: Heart sounds are normal.  Regular rate and rhythm without murmur, gallop or rub.  ABD:  symmetric, soft, non-tender, no guarding or rebound  Musculoskeletal : no cyanosis, no clubbing, and no edema  Neuro:   Awake, weak, follows simple commands  Skin: No rashes or nodules noted.  Lymph node:  no cervical  nodes  Urology: No Walters   Psychiatric: Calm    Medications:  Scheduled Meds:   insulin glargine  15 Units SubCUTAneous Daily    pantoprazole (PROTONIX) 40 mg in sodium chloride (PF) 0.9 % 10 mL injection  40 mg IntraVENous Q12H    midodrine  10 mg Oral BID    methylPREDNISolone  10 mg IntraVENous Daily    metoprolol tartrate  25 mg Oral BID    cycloSPORINE  50 mg Oral Daily    mycophenolate  250 mg Oral BID    insulin lispro  0-16 Units SubCUTAneous Q4H    polyethylene glycol  17 g Oral Daily    docusate  100 mg Oral BID    amiodarone  200 mg Oral BID    sodium chloride flush  5-40 mL IntraVENous 2 times per day    [Held by provider] heparin (porcine)  5,000 Units SubCUTAneous 3 times per day    piperacillin-tazobactam  3,375 mg IntraVENous Q12H    [Held by provider] mycophenolate  500 mg Oral BID       PRN Meds:  fentanNYL, sodium chloride, metoprolol, midodrine, hydrALAZINE, LORazepam, glucose, dextrose bolus **OR** dextrose bolus, glucagon (rDNA), dextrose, sodium chloride flush, sodium chloride, ondansetron **OR** ondansetron, acetaminophen **OR** acetaminophen, ipratropium 0.5 mg-albuterol 2.5 mg    Results: reviewed by me   CBC:   Recent Labs     12/26/24  0415 12/27/24  0528 12/28/24  0438   WBC 13.7* 15.5* 12.1*   HGB 9.2* 9.6* 8.6*   HCT 28.4* 30.4* 27.2*   MCV 88.8 88.6 91.9    227 222     BMP:   Recent Labs     12/26/24  0415 12/27/24  0401 12/28/24  0438    136 132*   K 4.9 4.5 4.7   CL 92* 92* 91*   CO2 24 25 24   PHOS 5.1* 4.4 4.9*   BUN 71* 54* 80*   CREATININE 3.72* 2.78* 3.53*     LIVER PROFILE:   No results for input(s): \"AST\", \"ALT\", \"LIPASE\", \"AMYLASE\", \"BILIDIR\", \"BILITOT\", \"ALKPHOS\" in the last 72 hours.    Invalid input(s): \"ALB\"    PT/INR: No results for input(s): \"PROTIME\", \"INR\" in the last 72 hours.  APTT: No results for input(s): \"APTT\" in the last 72 hours.  UA:No results for input(s): \"NITRITE\", \"COLORU\", \"PHUR\", \"LABCAST\", \"WBCUA\", \"RBCUA\", \"MUCUS\", \"TRICHOMONAS\",  \"YEAST\", \"BACTERIA\", \"CLARITYU\", \"SPECGRAV\", \"LEUKOCYTESUR\", \"UROBILINOGEN\", \"BILIRUBINUR\", \"BLOODU\", \"GLUCOSEU\", \"AMORPHOUS\" in the last 72 hours.    Invalid input(s): \"KETONESU\"    Cultures:  Rhinovirus  Films:   CXR films reviewed by me and it showed congested, possible left lower lobe infiltrate      Assessment:  This is a critically ill patient at risk of deterioration / death , needing close ICU monitoring and intervention due to below noted problems   Acute on chronic renal failure currently on HD   Rhinovirus bronchitis/pneumonia with bacterial coinfection  Left lower lobe pneumonia  Septic shock  Acute hypoxic respiratory failure currently BiPAP dependent  Acute encephalopathy  Septic shock, shock physiology resolved   Anion gap metabolic acidosis secondary to renal failure  A-fib status post Watchman procedure  History of DVT not on chronic anticoagulation  Status post renal transplant, immunocompromise  Pulmonary hypertension, group 2 and 3     Recommendation  Vent support lung protective strategy  head of the bed 30°  Daily s breathing trials  Minimize sedation as much as possible, none if possible  Watch for ICU delirium: TV on, natural light, avoid benzos, pain control, early mobility, and family engagement  PUD prophylaxis  DVT prophylaxis, resume heparin,   Target blood sugar 140-180  Monitor and replace electrolyte as needed  Continue current antibiotic  Continue Solu-Medrol 10 mg daily,    Increase Lantus to 25 units daily  Continue CellCept and cyclosporine   DC midodrine              Due to the immediate potential for life-threatening deterioration due to shock I spent  35 minutes providing critical care.  This time is excluding time spent performing procedures.          Electronically signed by Carlotta Yadav MD,  [facility] ,on 12/28/2024 at 8:16 AM

## 2024-12-28 NOTE — PROGRESS NOTES
Patient with uneventful shift overnight.  Continues to be frequently repositioned to protect skin integrity.  Patient with fms; however stool leaking around it.  Zinc applied to open areas on bottom.  Feet kept strictly off bed.  Electronically signed by Digna Ying RN on 12/28/2024 at 6:34 AM\

## 2024-12-28 NOTE — PROGRESS NOTES
0740  Patient bedside report received, with skin check completed   Patient has multiple sites for skin, patient has Tx to the extremities R/T skin tears. Right arm is weeping.  Patient heels are not to touch the bed R/T patient heels have blisters   0800    Patient is receiving dialysis today, dialysis tech requested for patient to have midodrine prior to dialysis Tx R/T patient BP Systolic being in the low 100's  Patient assessment complete, is able to follow commands at this time   Electronically signed by Maria Fernanda Jacobsen RN on 12/28/2024 at 10:52 AM

## 2024-12-28 NOTE — PROGRESS NOTES
Hospitalist Progress Note      PCP: Tico Rodriguez DO    Date of Admission: 12/18/2024    Chief Complaint:  patient intubated/sedated this morning.     Medications:  Reviewed    Infusion Medications    sodium chloride      dextrose      sodium chloride       Scheduled Medications    insulin glargine  25 Units SubCUTAneous Daily    epoetin megan-epbx  10,000 Units SubCUTAneous Weekly    pantoprazole (PROTONIX) 40 mg in sodium chloride (PF) 0.9 % 10 mL injection  40 mg IntraVENous Q12H    methylPREDNISolone  10 mg IntraVENous Daily    metoprolol tartrate  25 mg Oral BID    cycloSPORINE  50 mg Oral Daily    [Held by provider] mycophenolate  250 mg Oral BID    insulin lispro  0-16 Units SubCUTAneous Q4H    polyethylene glycol  17 g Oral Daily    docusate  100 mg Oral BID    amiodarone  200 mg Oral BID    sodium chloride flush  5-40 mL IntraVENous 2 times per day    heparin (porcine)  5,000 Units SubCUTAneous 3 times per day    piperacillin-tazobactam  3,375 mg IntraVENous Q12H    [Held by provider] mycophenolate  500 mg Oral BID     PRN Meds: fentanNYL, sodium chloride, metoprolol, midodrine, hydrALAZINE, LORazepam, glucose, dextrose bolus **OR** dextrose bolus, glucagon (rDNA), dextrose, sodium chloride flush, sodium chloride, ondansetron **OR** ondansetron, acetaminophen **OR** acetaminophen, ipratropium 0.5 mg-albuterol 2.5 mg      Intake/Output Summary (Last 24 hours) at 12/28/2024 1437  Last data filed at 12/28/2024 1200  Gross per 24 hour   Intake 1806 ml   Output 1600 ml   Net 206 ml       Exam:    BP (!) 116/55   Pulse 80   Temp (!) 96.6 °F (35.9 °C) (Esophageal)   Resp 16   Ht 1.88 m (6' 2\")   Wt 75.3 kg (166 lb 0.1 oz)   SpO2 97%   BMI 21.31 kg/m²     General appearance: sedated  Lungs: diminished bilaterally  Heart: S1/S2,irregular  Abdomen: soft  Extremities: edema present bilateral UE      Labs:   Recent Labs     12/26/24  0415 12/27/24  0528 12/28/24  0438   WBC 13.7* 15.5* 12.1*   HGB 9.2* 9.6*

## 2024-12-28 NOTE — PROGRESS NOTES
Tx complete, total UF 1.2L, patient tolerated well, sites held for hemostasis, primary RN notified

## 2024-12-28 NOTE — PROGRESS NOTES
Gastroenterology Progress Note    Remy Upton is a 74 y.o. male patient.  Hospitalization Day:10    Chief C/O: Anemia    SUBJECTIVE: Patient is a 73-year-old with history of pneumonia and respiratory failure requiring intubation.  Patient's hemoglobin hemoglobin on arrival was was 8.7. Patient's hemoglobin 2024 was 11.6 followed by next day 6.6 patient received 1 unit PRBC with posttransfusion hemoglobin of 9.0. Patient remains sedated and intubated in ICU.  Patient has FMS with brown stool noted in tubing. Patient's hemoglobin today is 8.6 with no overt GI blood loss overnight.   Patient has history of kidney transplant x 2 and has been on antirejection medications including steroids, and patient has been on PPI as prophylaxis.  Patient is on hemodialysis today.     Previous Endoscoopic Evaluations:  Colonoscopy 10/24/2022, Michelle Thapa  Blood in the entire examined colon. A pulsating intermittent bleeding noted. A single small Dieulafoy Vs AVM with bleeding was found in the ascending colon. Area was successfully injected with 4 mL of a 0.1 mg/mL solution of epinephrine for hemostasis. To stop active bleeding, two hemostatic clips were successfully placed. There was no bleeding at the end of the procedure. A single bleeding colonic small Dieulafoy Vs AVM with bleeding . Injected. Clips were placed. External non-bleeding hemorrhoids. No specimens collected.     EGD 10/21/2022, Michelle Manuel  Normal esophagus. Z-line regular, 42 cm from the incisors. Normal stomach. Normal examined duodenum. No specimens collected.     ROS:  Gastrointestinal ROS: no abdominal pain, change in bowel habits, or black or bloody stools    Physical    VITALS:  BP (!) 115/55   Pulse 84   Temp 97.2 °F (36.2 °C)   Resp 16   Ht 1.88 m (6' 2\")   Wt 75.3 kg (166 lb 0.1 oz)   SpO2 100%   BMI 21.31 kg/m²   TEMPERATURE:  Current - Temp: 97.2 °F (36.2 °C); Max - Temp  Av.2 °F (36.8 °C)  Min: 97 °F (36.1 °C)  Max:

## 2024-12-28 NOTE — PLAN OF CARE
Problem: Chronic Conditions and Co-morbidities  Goal: Patient's chronic conditions and co-morbidity symptoms are monitored and maintained or improved  Outcome: Progressing  Flowsheets (Taken 12/27/2024 2030)  Care Plan - Patient's Chronic Conditions and Co-Morbidity Symptoms are Monitored and Maintained or Improved: Monitor and assess patient's chronic conditions and comorbid symptoms for stability, deterioration, or improvement     Problem: Discharge Planning  Goal: Discharge to home or other facility with appropriate resources  Outcome: Progressing  Flowsheets (Taken 12/27/2024 2030)  Discharge to home or other facility with appropriate resources: Identify barriers to discharge with patient and caregiver     Problem: Pain  Goal: Verbalizes/displays adequate comfort level or baseline comfort level  Outcome: Progressing  Flowsheets (Taken 12/27/2024 2000)  Verbalizes/displays adequate comfort level or baseline comfort level:   Encourage patient to monitor pain and request assistance   Assess pain using appropriate pain scale   Administer analgesics based on type and severity of pain and evaluate response     Problem: Safety - Adult  Goal: Free from fall injury  Outcome: Progressing     Problem: Skin/Tissue Integrity  Goal: Absence of new skin breakdown  Description: 1.  Monitor for areas of redness and/or skin breakdown  2.  Assess vascular access sites hourly  3.  Every 4-6 hours minimum:  Change oxygen saturation probe site  4.  Every 4-6 hours:  If on nasal continuous positive airway pressure, respiratory therapy assess nares and determine need for appliance change or resting period.  Outcome: Progressing     Problem: Neurosensory - Adult  Goal: Achieves stable or improved neurological status  Outcome: Progressing  Flowsheets (Taken 12/27/2024 2030)  Achieves stable or improved neurological status: Assess for and report changes in neurological status     Problem: Respiratory - Adult  Goal: Achieves optimal

## 2024-12-28 NOTE — PROGRESS NOTES
Nephrology Progress Note    Assessment:  Non-oliguric DELORES S/P remote Kidney transplant, s/p HD started 12/20   CKD-4 baseline w/ baseline Scr ~low 3s w/ eGFR high teens   Acute hypoxic respiratory failure: now re-intubated   Septic shock: resoved  Rhinovirus Pneumonia w/ bacterial coinfection   DM type-2  Nutritional issues  Hypotensive episodes: now improved, off pressors       Plan:   -Hemodialysis today and then likely Monday.  Using his previuos fistula  - seems ok to continue to cyclosporine.  Will hold cellcept (risk > benefit)  - retacrit to help with anemia        Patient Active Problem List:     Pneumonia     Abdominal pain, other specified site     Acute pyelonephritis without lesion of renal medullary necrosis     Anemia     Essential hypertension     Nonspecific abnormal results of thyroid function study     Mixed hyperlipidemia     Kidney replaced by transplant     Intra-abdominal abscess post-procedure (HCC)     Infection due to Enterococcus     Fever and other physiologic disturbances of temperature regulation     Chronic kidney disease (CKD)     Type 2 diabetes mellitus with stage 3b chronic kidney disease, without long-term current use of insulin (HCC)     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 (HCC)     Abnormal CT of the chest     Bronchiectasis without complication (HCC)     GI bleeding     Complication of transplanted kidney     Muscle weakness (generalized)     Difficulty in walking     Elevated BUN     Urinary retention     Immunosuppression due to drug therapy (HCC)     DELORES (acute kidney injury) (HCC)     Adjustment disorder     Gross  hematuria     Bilateral lower extremity edema     Dysuria     Acute cystitis without hematuria     Bilateral hearing loss     Abscess, toe     Acute hyperkalemia     Acute pain of left shoulder     Astigmatism     Astigmatism of both eyes with presbyopia     At risk for stroke     Benign enlargement of prostate     Benign prostatic hyperplasia with urinary frequency     Rhinovirus     Chronic right shoulder pain     Condition not found     Deep vein thrombosis (DVT) of lower extremity (HCC)     Diabetes mellitus type 2 without retinopathy ([facility])     Edema     Gait difficulty     GERD (gastroesophageal reflux disease)     H/O lower gastrointestinal bleeding     History of blood transfusion     Incomplete emptying of bladder     Insulin long-term use (HCC)     Leg weakness, bilateral     Localized swelling of both lower legs     Peripheral nerve disease     Pseudophakia of both eyes     PTDM (post-transplant diabetes mellitus) ([facility])     Posterior vitreous detachment     Rhabdomyolysis     Seizure disorder ([facility])     Stage 5 chronic kidney disease with transplanted kidney ([facility])     Leukocytes in urine     Atrial fibrillation with RVR ([facility])     Moderate malnutrition ([facility])      Subjective:  Admit Date: 12/18/2024    Interval History: remains intubated, remains off pressors, midodrine just stopped.  Pt seen on hd.  Uf had to be decreased to 1 liter    Medications:  Scheduled Meds:   insulin glargine  25 Units SubCUTAneous Daily    pantoprazole (PROTONIX) 40 mg in sodium chloride (PF) 0.9 % 10 mL injection  40 mg IntraVENous Q12H    methylPREDNISolone  10 mg IntraVENous Daily    metoprolol tartrate  25 mg Oral BID    cycloSPORINE  50 mg Oral Daily    mycophenolate  250 mg Oral BID    insulin lispro  0-16 Units SubCUTAneous Q4H    polyethylene glycol  17 g Oral Daily    docusate  100 mg Oral BID    amiodarone  200 mg Oral BID    sodium chloride flush  5-40 mL IntraVENous 2 times per day    heparin (porcine)  5,000 Units

## 2024-12-28 NOTE — PROGRESS NOTES
Physician Progress Note      PATIENT:               LUL QUILES  CSN #:                  [id number]  :                       1950  ADMIT DATE:       2024 12:52 PM  DISCH DATE:  RESPONDING  PROVIDER #:        Gagan Oliveros MD          QUERY TEXT:    Per wound care RN assessment on , patient noted to have deep tissue   injury to right heel and ankle not present on admission. If possible, please   document in progress notes and discharge summary the location, present on   admission status and stage of the pressure ulcer:    The medical record reflects the following:  Risk Factors: chronic pressure, decreased mobility, decreased tissue   oxygenation, incontinence of urine, and moisture and friction  Clinical Indicators:  ODIN Puckett RN \" Right lower extremity with deep   tissue injury to heel in form of blood filled blister with nonblanchable   erythema. Right medial ankle with deep tissue injury that is dry with   epithelialization over open area and nonblanchable erythema.\"  Treatment: 1) continue pressure injury prevention interventions including low   air loss mattress 2) skin barrier film to nonblanchable areas with intact skin   and intact blisters 3) zinc bid and prn to sacrum 4) strict offloading of   heels at all times with pillows    Thank you,  Shannon Moses   Clinical Documentation Improvement Specialist  W: [phone]  Options provided:  -- Deep tissue injury pressure ulcer of right heel and ankle not present on   admission  -- Other - I will add my own diagnosis  -- Disagree - Not applicable / Not valid  -- Disagree - Clinically unable to determine / Unknown  -- Refer to Clinical Documentation Reviewer    PROVIDER RESPONSE TEXT:    This patient has a deep tissue injury pressure ulcer of the right heel and   ankle which was not present on admission.    Query created by: Angela Moses on 2024 7:36 AM      QUERY TEXT:    Patient admitted with pneumonia, shock, acute hypoxic

## 2024-12-29 PROBLEM — K92.2 LOWER GI BLEED: Status: ACTIVE | Noted: 2024-12-29

## 2024-12-29 LAB
ALBUMIN SERPL-MCNC: 3 G/DL (ref 3.5–4.6)
ANION GAP SERPL CALCULATED.3IONS-SCNC: 16 MEQ/L (ref 9–15)
BASOPHILS # BLD: 0 K/UL (ref 0–0.2)
BASOPHILS NFR BLD: 0.2 %
BUN SERPL-MCNC: 58 MG/DL (ref 8–23)
CALCIUM SERPL-MCNC: 8.2 MG/DL (ref 8.5–9.9)
CHLORIDE SERPL-SCNC: 91 MEQ/L (ref 95–107)
CO2 SERPL-SCNC: 26 MEQ/L (ref 20–31)
CREAT SERPL-MCNC: 2.58 MG/DL (ref 0.7–1.2)
EOSINOPHIL # BLD: 0 K/UL (ref 0–0.7)
EOSINOPHIL NFR BLD: 0 %
ERYTHROCYTE [DISTWIDTH] IN BLOOD BY AUTOMATED COUNT: 15.5 % (ref 11.5–14.5)
GLUCOSE BLD-MCNC: 187 MG/DL (ref 70–99)
GLUCOSE BLD-MCNC: 218 MG/DL (ref 70–99)
GLUCOSE BLD-MCNC: 236 MG/DL (ref 70–99)
GLUCOSE BLD-MCNC: 244 MG/DL (ref 70–99)
GLUCOSE BLD-MCNC: 256 MG/DL (ref 70–99)
GLUCOSE BLD-MCNC: 280 MG/DL (ref 70–99)
GLUCOSE SERPL-MCNC: 263 MG/DL (ref 70–99)
HCT VFR BLD AUTO: 30.7 % (ref 42–52)
HGB BLD-MCNC: 9.7 G/DL (ref 14–18)
LYMPHOCYTES # BLD: 0.2 K/UL (ref 1–4.8)
LYMPHOCYTES NFR BLD: 1.6 %
MAGNESIUM SERPL-MCNC: 2.3 MG/DL (ref 1.7–2.4)
MCH RBC QN AUTO: 28.5 PG (ref 27–31.3)
MCHC RBC AUTO-ENTMCNC: 31.6 % (ref 33–37)
MCV RBC AUTO: 90.3 FL (ref 79–92.2)
MONOCYTES # BLD: 0.8 K/UL (ref 0.2–0.8)
MONOCYTES NFR BLD: 7.1 %
NEUTROPHILS # BLD: 10.3 K/UL (ref 1.4–6.5)
NEUTS SEG NFR BLD: 88.1 %
PERFORMED ON: ABNORMAL
PHOSPHATE SERPL-MCNC: 4.1 MG/DL (ref 2.3–4.8)
PLATELET # BLD AUTO: 237 K/UL (ref 130–400)
POTASSIUM SERPL-SCNC: 4.2 MEQ/L (ref 3.4–4.9)
RBC # BLD AUTO: 3.4 M/UL (ref 4.7–6.1)
SODIUM SERPL-SCNC: 133 MEQ/L (ref 135–144)
WBC # BLD AUTO: 11.6 K/UL (ref 4.8–10.8)

## 2024-12-29 PROCEDURE — 94003 VENT MGMT INPAT SUBQ DAY: CPT

## 2024-12-29 PROCEDURE — 6360000002 HC RX W HCPCS: Performed by: INTERNAL MEDICINE

## 2024-12-29 PROCEDURE — 80069 RENAL FUNCTION PANEL: CPT

## 2024-12-29 PROCEDURE — 6370000000 HC RX 637 (ALT 250 FOR IP): Performed by: INTERNAL MEDICINE

## 2024-12-29 PROCEDURE — 2500000003 HC RX 250 WO HCPCS: Performed by: INTERNAL MEDICINE

## 2024-12-29 PROCEDURE — 36415 COLL VENOUS BLD VENIPUNCTURE: CPT

## 2024-12-29 PROCEDURE — 6370000000 HC RX 637 (ALT 250 FOR IP): Performed by: NURSE PRACTITIONER

## 2024-12-29 PROCEDURE — 6360000002 HC RX W HCPCS: Performed by: STUDENT IN AN ORGANIZED HEALTH CARE EDUCATION/TRAINING PROGRAM

## 2024-12-29 PROCEDURE — 85025 COMPLETE CBC W/AUTO DIFF WBC: CPT

## 2024-12-29 PROCEDURE — 99231 SBSQ HOSP IP/OBS SF/LOW 25: CPT | Performed by: INTERNAL MEDICINE

## 2024-12-29 PROCEDURE — 83735 ASSAY OF MAGNESIUM: CPT

## 2024-12-29 PROCEDURE — 99291 CRITICAL CARE FIRST HOUR: CPT | Performed by: INTERNAL MEDICINE

## 2024-12-29 PROCEDURE — 94660 CPAP INITIATION&MGMT: CPT

## 2024-12-29 PROCEDURE — 2580000003 HC RX 258: Performed by: STUDENT IN AN ORGANIZED HEALTH CARE EDUCATION/TRAINING PROGRAM

## 2024-12-29 PROCEDURE — 2000000000 HC ICU R&B

## 2024-12-29 PROCEDURE — 2580000003 HC RX 258: Performed by: INTERNAL MEDICINE

## 2024-12-29 PROCEDURE — 2700000000 HC OXYGEN THERAPY PER DAY

## 2024-12-29 RX ORDER — INSULIN GLARGINE 100 [IU]/ML
35 INJECTION, SOLUTION SUBCUTANEOUS DAILY
Status: DISCONTINUED | OUTPATIENT
Start: 2024-12-29 | End: 2024-12-30

## 2024-12-29 RX ADMIN — AMIODARONE HYDROCHLORIDE 200 MG: 200 TABLET ORAL at 21:37

## 2024-12-29 RX ADMIN — HEPARIN SODIUM 5000 UNITS: 5000 INJECTION INTRAVENOUS; SUBCUTANEOUS at 21:36

## 2024-12-29 RX ADMIN — METOPROLOL TARTRATE 25 MG: 25 TABLET, FILM COATED ORAL at 09:29

## 2024-12-29 RX ADMIN — METHYLPREDNISOLONE SODIUM SUCCINATE 10 MG: 40 INJECTION INTRAMUSCULAR; INTRAVENOUS at 09:29

## 2024-12-29 RX ADMIN — INSULIN LISPRO 4 UNITS: 100 INJECTION, SOLUTION INTRAVENOUS; SUBCUTANEOUS at 12:32

## 2024-12-29 RX ADMIN — INSULIN LISPRO 4 UNITS: 100 INJECTION, SOLUTION INTRAVENOUS; SUBCUTANEOUS at 21:38

## 2024-12-29 RX ADMIN — DOCUSATE SODIUM 100 MG: 50 LIQUID ORAL at 21:36

## 2024-12-29 RX ADMIN — FENTANYL CITRATE 50 MCG: 0.05 INJECTION, SOLUTION INTRAMUSCULAR; INTRAVENOUS at 05:25

## 2024-12-29 RX ADMIN — INSULIN LISPRO 8 UNITS: 100 INJECTION, SOLUTION INTRAVENOUS; SUBCUTANEOUS at 04:20

## 2024-12-29 RX ADMIN — PIPERACILLIN AND TAZOBACTAM 3375 MG: 3; .375 INJECTION, POWDER, LYOPHILIZED, FOR SOLUTION INTRAVENOUS at 14:36

## 2024-12-29 RX ADMIN — HEPARIN SODIUM 5000 UNITS: 5000 INJECTION INTRAVENOUS; SUBCUTANEOUS at 14:30

## 2024-12-29 RX ADMIN — METOPROLOL TARTRATE 25 MG: 25 TABLET, FILM COATED ORAL at 21:37

## 2024-12-29 RX ADMIN — PIPERACILLIN AND TAZOBACTAM 3375 MG: 3; .375 INJECTION, POWDER, LYOPHILIZED, FOR SOLUTION INTRAVENOUS at 02:53

## 2024-12-29 RX ADMIN — CYCLOSPORINE 50 MG: 100 SOLUTION ORAL at 09:29

## 2024-12-29 RX ADMIN — DOCUSATE SODIUM 100 MG: 50 LIQUID ORAL at 09:29

## 2024-12-29 RX ADMIN — POLYETHYLENE GLYCOL 3350 17 G: 17 POWDER, FOR SOLUTION ORAL at 09:29

## 2024-12-29 RX ADMIN — INSULIN GLARGINE 35 UNITS: 100 INJECTION, SOLUTION SUBCUTANEOUS at 09:30

## 2024-12-29 RX ADMIN — Medication 10 ML: at 21:43

## 2024-12-29 RX ADMIN — HEPARIN SODIUM 5000 UNITS: 5000 INJECTION INTRAVENOUS; SUBCUTANEOUS at 06:12

## 2024-12-29 RX ADMIN — INSULIN LISPRO 4 UNITS: 100 INJECTION, SOLUTION INTRAVENOUS; SUBCUTANEOUS at 09:30

## 2024-12-29 RX ADMIN — SODIUM CHLORIDE 40 MG: 9 INJECTION INTRAMUSCULAR; INTRAVENOUS; SUBCUTANEOUS at 21:36

## 2024-12-29 RX ADMIN — Medication 10 ML: at 12:32

## 2024-12-29 RX ADMIN — SODIUM CHLORIDE 40 MG: 9 INJECTION INTRAMUSCULAR; INTRAVENOUS; SUBCUTANEOUS at 09:30

## 2024-12-29 RX ADMIN — INSULIN LISPRO 4 UNITS: 100 INJECTION, SOLUTION INTRAVENOUS; SUBCUTANEOUS at 16:29

## 2024-12-29 RX ADMIN — AMIODARONE HYDROCHLORIDE 200 MG: 200 TABLET ORAL at 09:29

## 2024-12-29 RX ADMIN — INSULIN LISPRO 8 UNITS: 100 INJECTION, SOLUTION INTRAVENOUS; SUBCUTANEOUS at 00:23

## 2024-12-29 ASSESSMENT — PULMONARY FUNCTION TESTS
PIF_VALUE: 25
PIF_VALUE: 19
PIF_VALUE: 21
PIF_VALUE: 20
PIF_VALUE: 21
PIF_VALUE: 20
PIF_VALUE: 19
PIF_VALUE: 20
PIF_VALUE: 21
PIF_VALUE: 20
PIF_VALUE: 20
PIF_VALUE: 21
PIF_VALUE: 13
PIF_VALUE: 21
PIF_VALUE: 20
PIF_VALUE: 21
PIF_VALUE: 20

## 2024-12-29 ASSESSMENT — PAIN SCALES - GENERAL: PAINLEVEL_OUTOF10: 0

## 2024-12-29 ASSESSMENT — PAIN SCALES - WONG BAKER: WONGBAKER_NUMERICALRESPONSE: NO HURT

## 2024-12-29 NOTE — PROGRESS NOTES
Hospitalist Progress Note      PCP: Tico Rodriguez DO    Date of Admission: 12/18/2024    Chief Complaint:  patient intubated/sedated this morning. Discussed with RN, he was doing ok on CPAP but intensivist was concerned about his weakness.     Medications:  Reviewed    Infusion Medications    sodium chloride      dextrose      sodium chloride       Scheduled Medications    insulin glargine  35 Units SubCUTAneous Daily    epoetin megan-epbx  10,000 Units SubCUTAneous Weekly    pantoprazole (PROTONIX) 40 mg in sodium chloride (PF) 0.9 % 10 mL injection  40 mg IntraVENous Q12H    methylPREDNISolone  10 mg IntraVENous Daily    metoprolol tartrate  25 mg Oral BID    cycloSPORINE  50 mg Oral Daily    [Held by provider] mycophenolate  250 mg Oral BID    insulin lispro  0-16 Units SubCUTAneous Q4H    polyethylene glycol  17 g Oral Daily    docusate  100 mg Oral BID    amiodarone  200 mg Oral BID    sodium chloride flush  5-40 mL IntraVENous 2 times per day    heparin (porcine)  5,000 Units SubCUTAneous 3 times per day    piperacillin-tazobactam  3,375 mg IntraVENous Q12H    [Held by provider] mycophenolate  500 mg Oral BID     PRN Meds: fentanNYL, sodium chloride, metoprolol, midodrine, hydrALAZINE, LORazepam, glucose, dextrose bolus **OR** dextrose bolus, glucagon (rDNA), dextrose, sodium chloride flush, sodium chloride, ondansetron **OR** ondansetron, acetaminophen **OR** acetaminophen, ipratropium 0.5 mg-albuterol 2.5 mg      Intake/Output Summary (Last 24 hours) at 12/29/2024 1301  Last data filed at 12/29/2024 1232  Gross per 24 hour   Intake 373.74 ml   Output --   Net 373.74 ml       Exam:    BP (!) 141/80   Pulse 80   Temp (!) 96.4 °F (35.8 °C)   Resp 16   Ht 1.88 m (6' 2\")   Wt 75.3 kg (166 lb 0.1 oz)   SpO2 100%   BMI 21.31 kg/m²     General appearance: sedated  Lungs: diminished bilaterally  Heart: S1/S2,irregular  Abdomen: soft  Extremities: edema present bilateral UE      Labs:   Recent Labs      IVFs  - started on HD   - resumed Cyclosporine, Solumedrol. Holding Cellcept  - followed by nephrology    Shock, resolved - unclear etiology, but I suspect sedative medicine is playing role as it had to be started shortly after intubation  -Continue Zosyn  -Now of pressors    Acute hypoxic respiratory failure  - likely related to rhinovirus infection / pneumonia,possible aspiration   - requiring intubation and mechanical ventilation  - extubated 12/22, re-intubated 12/24  - on IV Zosyn empirically  - CXR 12/25 with possible RML pneumonia   - followed by critical care/pulmonology service    Tremors vs shivering  - improved with Propofol per nursing staff  - followed by neurology     AF  - resumed Amiodarone  - resume Toprol when BP allows  - off OAC due to hx of GI bleed     Hypomagnesemia  - replaced     Anemia  -no active signs of bleeding. S/p PRBC 12/25. May be from renal failure  -GI following   - follow H/H     DM2 with hyperglycemia  - ISS     BPH  - resume Flomax and Proscar when able to take PO    Diet: Diet NPO  ADULT TUBE FEEDING; Orogastric; Renal Formula; Continuous; 20; Yes; 10; Q 8 hours; 45; 100; Q 4 hours    Code Status: Full Code              Electronically signed by Gagan Oliveros MD on 12/29/2024 at 1:01 PM

## 2024-12-29 NOTE — PROGRESS NOTES
Pulmonary & Critical Care Medicine ICU Progress Note  Chief complaint : Respiratory failure    Subjunctive/24 hour events :   Patient seen and examined during multidisciplinary rounds with RN, charge nurse, RT, pharmacy, dietitian, and social service.      Sleeping, no issues, follows commands, weak, no pain, no fever, on 30% FiO2 saturation 100%.  No urine output, dialysis 1600 cc, +9 L    New information updated in the note today, rest of the examination did not change compared to yesterday.  Social History     Tobacco Use    Smoking status: Former     Current packs/day: 0.00     Types: Cigarettes     Quit date: 2015     Years since quittin.5    Smokeless tobacco: Former   Substance Use Topics    Alcohol use: No     Alcohol/week: 0.0 standard drinks of alcohol         Problem Relation Age of Onset    Colon Cancer Neg Hx        No results for input(s): \"PHART\", \"FTP0TIC\", \"PO2ART\" in the last 72 hours.      MV Settings:  Vent Mode: CPAP Resp Rate (Set): 14 bpm/Vt (Set, mL): 500 mL/ /FiO2 : 30 %           IV:   sodium chloride      dextrose      sodium chloride         Vitals:  BP (!) 144/65   Pulse 100   Temp (!) 96.3 °F (35.7 °C)   Resp 16   Ht 1.88 m (6' 2\")   Wt 75.3 kg (166 lb 0.1 oz)   SpO2 100%   BMI 21.31 kg/m²    Tmax:        Intake/Output Summary (Last 24 hours) at 2024 0813  Last data filed at 2024 0639  Gross per 24 hour   Intake 573.74 ml   Output 1600 ml   Net -1026.26 ml       EXAM:  General: Awake on vent, no distress  Head: normocephalic, atraumatic  Eyes:No gross abnormalities.  ENT:  MMM no lesions  Neck:  supple and no masses  Chest : Vent sounds, no wheezing, no rales, nontender, tympanic  Heart:: Heart sounds are normal.  Regular rate and rhythm without murmur, gallop or rub.  ABD:  symmetric, soft, non-tender, no guarding or rebound  Musculoskeletal : no cyanosis, no clubbing, and no edema  Neuro:   Awake, weak, follows simple commands  Skin: No rashes or nodules

## 2024-12-29 NOTE — PROGRESS NOTES
12/29/24 0953   Patient Observation   Pulse 99   SpO2 100 %   Vent Information   Vent Mode (S)  AC/VC  (placed back on AC per Dr Yadav pt too weak)   Ventilator Settings   FiO2  30 %   Vt (Set, mL) 500 mL   Resp Rate (Set) 14 bpm   PEEP/CPAP (cmH2O) 5   Vent Patient Data (Readings)   Vt (Measured) 769 mL   Peak Inspiratory Pressure (cmH2O) 13 cmH2O   Rate Measured 22 br/min   Minute Volume (L/min) 9.52 Liters   Mean Airway Pressure (cmH2O) 6.9 cmH20   I:E Ratio 1:4.4

## 2024-12-29 NOTE — PROGRESS NOTES
Nephrology Progress Note  Awakes shakes appropriatelly  Assessment:  Suspect ERDX  AF controlled rate  Hypoxia requiring ventilator  Anemia  Hx Kidney transplant  DM type-2        Plan: Dialysis yesterday  evaluate in morning to decide next treatment continue nutrition    Patient Active Problem List:     Pneumonia     Abdominal pain, other specified site     Acute pyelonephritis without lesion of renal medullary necrosis     Anemia     Essential hypertension     Nonspecific abnormal results of thyroid function study     Mixed hyperlipidemia     Kidney replaced by transplant     Intra-abdominal abscess post-procedure (HCC)     Infection due to Enterococcus     Fever and other physiologic disturbances of temperature regulation     Chronic kidney disease (CKD)     Type 2 diabetes mellitus with stage 3b chronic kidney disease, without long-term current use of insulin (HCC)     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 (HCC)     Abnormal CT of the chest     Bronchiectasis without complication (HCC)     GI bleeding     Complication of transplanted kidney     Muscle weakness (generalized)     Difficulty in walking     Elevated BUN     Urinary retention     Immunosuppression due to drug therapy (HCC)     DELORES (acute kidney injury) (HCC)     Adjustment disorder     Gross hematuria     Bilateral lower extremity edema     Dysuria     Acute cystitis without hematuria     Bilateral hearing loss     Abscess, toe     Acute hyperkalemia     Acute pain of left shoulder     Astigmatism     Astigmatism of both eyes with presbyopia     At risk for stroke     Benign enlargement of

## 2024-12-29 NOTE — FLOWSHEET NOTE
Shift summary    0730 report at bedside. Pt placed on CPAP 5 PS 5 per respiratory.    9881-1217 assessment complete see flowsheet. Pt awake, tracks, nods, follows commands albeit weakly to all extremities (barely able to squeeze hands, can wiggle toes, unable to lift arms up off of bed). MP Afib. Anasarca noted. RFA fistula + bruit+thrill. Noted bilateral EJs both functional. Tolerating TF.     Anuric. FMS in place- draining, leaking. Linen changed, zinc cream applied.     Noted unstageable wound to sacrum and R buttock- zinc cream applied. Numerous small skin tears to arms- xeroform in place. B/l heel blisters noted- heels offloaded.     0950 placed back on AC mode, per Dr. Yadav, too weak to extubate at this time.     1100 repositioned using sling. Pt incontinent small BM (leaked around flexiseal), linen changed, zinc cream applied.     Dr. Oliveros here, updated, okay to remove EJ.     1300 no change to assessment. Pt brother at bedside, updated. Electronically signed by Elizabeth Martínez RN on 12/29/2024 at 1:59 PM    1730 no change from previous assessment. Pt wife at bedside, updated. Meds per MAR. Tolerating TF. VSS. Repositioned- remains anuric, FMS draining, bag changed out. Electronically signed by Elizabeth Martínez RN on 12/29/2024 at 5:38 PM

## 2024-12-29 NOTE — PROGRESS NOTES
Gastroenterology Progress Note    Remy Upton is a 74 y.o. male patient.  Hospitalization Day:11    Chief C/O: Anemia    SUBJECTIVE: Patient remains intubated in ICU.  Patient follows commands, denies any pain.  FMS brown stool in tubing and bag.    Previous Endoscoopic Evaluations:  Colonoscopy 10/24/2022, Michelle Thapa  Blood in the entire examined colon. A pulsating intermittent bleeding noted. A single small Dieulafoy Vs AVM with bleeding was found in the ascending colon. Area was successfully injected with 4 mL of a 0.1 mg/mL solution of epinephrine for hemostasis. To stop active bleeding, two hemostatic clips were successfully placed. There was no bleeding at the end of the procedure. A single bleeding colonic small Dieulafoy Vs AVM with bleeding . Injected. Clips were placed. External non-bleeding hemorrhoids. No specimens collected.     EGD 10/21/2022, Michelle Manuel  Normal esophagus. Z-line regular, 42 cm from the incisors. Normal stomach. Normal examined duodenum. No specimens collected.     ROS:  Gastrointestinal ROS: no abdominal pain, change in bowel habits, or black or bloody stools    Physical    VITALS:  BP (!) 151/71   Pulse 99   Temp (!) 96.3 °F (35.7 °C)   Resp 16   Ht 1.88 m (6' 2\")   Wt 75.3 kg (166 lb 0.1 oz)   SpO2 100%   BMI 21.31 kg/m²   TEMPERATURE:  Current - Temp: (!) 96.3 °F (35.7 °C); Max - Temp  Av.3 °F (36.3 °C)  Min: 95.9 °F (35.5 °C)  Max: 98.8 °F (37.1 °C)    General - intubated  Eyes -no icterus, no pallor  Cardiovascular - RRR, no murmur  Lungs -clear to auscultation bilaterally  Abdomen - non-distended, no organomegaly, Bowel sounds normal  Extremities -no edema  Skin -warm and dry  Neuro: no asterixis    Data    Data Review:    Recent Labs     24  0528 24  0438 24  0418   WBC 15.5* 12.1* 11.6*   HGB 9.6* 8.6* 9.7*   HCT 30.4* 27.2* 30.7*   MCV 88.6 91.9 90.3    222 237     Recent Labs     24  0401 24  1088

## 2024-12-30 LAB
ALBUMIN SERPL-MCNC: 3.1 G/DL (ref 3.5–4.6)
ANION GAP SERPL CALCULATED.3IONS-SCNC: 17 MEQ/L (ref 9–15)
BASOPHILS # BLD: 0 K/UL (ref 0–0.2)
BASOPHILS NFR BLD: 0.2 %
BUN SERPL-MCNC: 80 MG/DL (ref 8–23)
CALCIUM SERPL-MCNC: 8.4 MG/DL (ref 8.5–9.9)
CHLORIDE SERPL-SCNC: 85 MEQ/L (ref 95–107)
CO2 SERPL-SCNC: 26 MEQ/L (ref 20–31)
CREAT SERPL-MCNC: 3.13 MG/DL (ref 0.7–1.2)
EOSINOPHIL # BLD: 0 K/UL (ref 0–0.7)
EOSINOPHIL NFR BLD: 0.2 %
ERYTHROCYTE [DISTWIDTH] IN BLOOD BY AUTOMATED COUNT: 16.1 % (ref 11.5–14.5)
GLUCOSE BLD-MCNC: 185 MG/DL (ref 70–99)
GLUCOSE BLD-MCNC: 188 MG/DL (ref 70–99)
GLUCOSE BLD-MCNC: 192 MG/DL (ref 70–99)
GLUCOSE BLD-MCNC: 208 MG/DL (ref 70–99)
GLUCOSE BLD-MCNC: 276 MG/DL (ref 70–99)
GLUCOSE SERPL-MCNC: 241 MG/DL (ref 70–99)
HCT VFR BLD AUTO: 31 % (ref 42–52)
HGB BLD-MCNC: 9.9 G/DL (ref 14–18)
LYMPHOCYTES # BLD: 0.3 K/UL (ref 1–4.8)
LYMPHOCYTES NFR BLD: 2.2 %
MAGNESIUM SERPL-MCNC: 2.5 MG/DL (ref 1.7–2.4)
MCH RBC QN AUTO: 29.3 PG (ref 27–31.3)
MCHC RBC AUTO-ENTMCNC: 31.9 % (ref 33–37)
MCV RBC AUTO: 91.7 FL (ref 79–92.2)
MONOCYTES # BLD: 1.3 K/UL (ref 0.2–0.8)
MONOCYTES NFR BLD: 10 %
NEUTROPHILS # BLD: 10.6 K/UL (ref 1.4–6.5)
NEUTS SEG NFR BLD: 83.5 %
PERFORMED ON: ABNORMAL
PHOSPHATE SERPL-MCNC: 5.1 MG/DL (ref 2.3–4.8)
PLATELET # BLD AUTO: 253 K/UL (ref 130–400)
POTASSIUM SERPL-SCNC: 4.3 MEQ/L (ref 3.4–4.9)
RBC # BLD AUTO: 3.38 M/UL (ref 4.7–6.1)
SODIUM SERPL-SCNC: 128 MEQ/L (ref 135–144)
WBC # BLD AUTO: 12.7 K/UL (ref 4.8–10.8)

## 2024-12-30 PROCEDURE — 6370000000 HC RX 637 (ALT 250 FOR IP): Performed by: INTERNAL MEDICINE

## 2024-12-30 PROCEDURE — 0B113F4 BYPASS TRACHEA TO CUTANEOUS WITH TRACHEOSTOMY DEVICE, PERCUTANEOUS APPROACH: ICD-10-PCS | Performed by: INTERNAL MEDICINE

## 2024-12-30 PROCEDURE — 99232 SBSQ HOSP IP/OBS MODERATE 35: CPT | Performed by: INTERNAL MEDICINE

## 2024-12-30 PROCEDURE — 80069 RENAL FUNCTION PANEL: CPT

## 2024-12-30 PROCEDURE — 6360000002 HC RX W HCPCS: Performed by: INTERNAL MEDICINE

## 2024-12-30 PROCEDURE — 2580000003 HC RX 258: Performed by: INTERNAL MEDICINE

## 2024-12-30 PROCEDURE — 2000000000 HC ICU R&B

## 2024-12-30 PROCEDURE — 2700000000 HC OXYGEN THERAPY PER DAY

## 2024-12-30 PROCEDURE — 83735 ASSAY OF MAGNESIUM: CPT

## 2024-12-30 PROCEDURE — 31600 PLANNED TRACHEOSTOMY: CPT | Performed by: INTERNAL MEDICINE

## 2024-12-30 PROCEDURE — 2500000003 HC RX 250 WO HCPCS: Performed by: INTERNAL MEDICINE

## 2024-12-30 PROCEDURE — 2500000003 HC RX 250 WO HCPCS

## 2024-12-30 PROCEDURE — 2580000003 HC RX 258: Performed by: STUDENT IN AN ORGANIZED HEALTH CARE EDUCATION/TRAINING PROGRAM

## 2024-12-30 PROCEDURE — 31624 DX BRONCHOSCOPE/LAVAGE: CPT

## 2024-12-30 PROCEDURE — 85025 COMPLETE CBC W/AUTO DIFF WBC: CPT

## 2024-12-30 PROCEDURE — 89220 SPUTUM SPECIMEN COLLECTION: CPT

## 2024-12-30 PROCEDURE — 94003 VENT MGMT INPAT SUBQ DAY: CPT

## 2024-12-30 PROCEDURE — 6360000002 HC RX W HCPCS: Performed by: NURSE PRACTITIONER

## 2024-12-30 PROCEDURE — 2500000003 HC RX 250 WO HCPCS: Performed by: NURSE PRACTITIONER

## 2024-12-30 PROCEDURE — 31502 CHANGE OF WINDPIPE AIRWAY: CPT

## 2024-12-30 PROCEDURE — 6370000000 HC RX 637 (ALT 250 FOR IP): Performed by: NURSE PRACTITIONER

## 2024-12-30 PROCEDURE — 6360000002 HC RX W HCPCS: Performed by: STUDENT IN AN ORGANIZED HEALTH CARE EDUCATION/TRAINING PROGRAM

## 2024-12-30 PROCEDURE — 36415 COLL VENOUS BLD VENIPUNCTURE: CPT

## 2024-12-30 RX ORDER — FENTANYL CITRATE 50 UG/ML
100 INJECTION, SOLUTION INTRAMUSCULAR; INTRAVENOUS ONCE
Status: COMPLETED | OUTPATIENT
Start: 2024-12-30 | End: 2024-12-30

## 2024-12-30 RX ORDER — MIDAZOLAM HYDROCHLORIDE 1 MG/ML
5 INJECTION, SOLUTION INTRAMUSCULAR; INTRAVENOUS ONCE
Status: DISCONTINUED | OUTPATIENT
Start: 2024-12-30 | End: 2024-12-31

## 2024-12-30 RX ORDER — NOREPINEPHRINE BITARTRATE 0.06 MG/ML
1-100 INJECTION, SOLUTION INTRAVENOUS CONTINUOUS
Status: DISCONTINUED | OUTPATIENT
Start: 2024-12-30 | End: 2024-12-30

## 2024-12-30 RX ORDER — NOREPINEPHRINE BITARTRATE 0.06 MG/ML
INJECTION, SOLUTION INTRAVENOUS
Status: COMPLETED
Start: 2024-12-30 | End: 2024-12-30

## 2024-12-30 RX ORDER — OXYCODONE AND ACETAMINOPHEN 5; 325 MG/1; MG/1
1 TABLET ORAL EVERY 4 HOURS PRN
Status: DISCONTINUED | OUTPATIENT
Start: 2024-12-30 | End: 2025-01-14 | Stop reason: HOSPADM

## 2024-12-30 RX ORDER — OXYCODONE AND ACETAMINOPHEN 5; 325 MG/1; MG/1
2 TABLET ORAL EVERY 4 HOURS PRN
Status: DISCONTINUED | OUTPATIENT
Start: 2024-12-30 | End: 2025-01-14 | Stop reason: HOSPADM

## 2024-12-30 RX ORDER — NOREPINEPHRINE BITARTRATE 0.06 MG/ML
1-100 INJECTION, SOLUTION INTRAVENOUS CONTINUOUS
Status: DISCONTINUED | OUTPATIENT
Start: 2024-12-30 | End: 2024-12-31

## 2024-12-30 RX ORDER — INSULIN GLARGINE 100 [IU]/ML
40 INJECTION, SOLUTION SUBCUTANEOUS DAILY
Status: DISCONTINUED | OUTPATIENT
Start: 2024-12-31 | End: 2025-01-05

## 2024-12-30 RX ORDER — CISATRACURIUM BESYLATE 2 MG/ML
10 INJECTION, SOLUTION INTRAVENOUS ONCE
Status: COMPLETED | OUTPATIENT
Start: 2024-12-30 | End: 2024-12-30

## 2024-12-30 RX ORDER — PROPOFOL 10 MG/ML
5-50 INJECTION, EMULSION INTRAVENOUS CONTINUOUS
Status: DISCONTINUED | OUTPATIENT
Start: 2024-12-30 | End: 2024-12-30

## 2024-12-30 RX ADMIN — OXYCODONE AND ACETAMINOPHEN 1 TABLET: 5; 325 TABLET ORAL at 20:49

## 2024-12-30 RX ADMIN — Medication 10 ML: at 09:00

## 2024-12-30 RX ADMIN — DOCUSATE SODIUM 100 MG: 50 LIQUID ORAL at 20:50

## 2024-12-30 RX ADMIN — INSULIN LISPRO 4 UNITS: 100 INJECTION, SOLUTION INTRAVENOUS; SUBCUTANEOUS at 20:35

## 2024-12-30 RX ADMIN — FENTANYL CITRATE 50 MCG: 0.05 INJECTION, SOLUTION INTRAMUSCULAR; INTRAVENOUS at 13:42

## 2024-12-30 RX ADMIN — AMIODARONE HYDROCHLORIDE 200 MG: 200 TABLET ORAL at 10:15

## 2024-12-30 RX ADMIN — NOREPINEPHRINE BITARTRATE 10 MCG/MIN: 64 SOLUTION INTRAVENOUS at 11:15

## 2024-12-30 RX ADMIN — Medication 10 ML: at 20:50

## 2024-12-30 RX ADMIN — SODIUM CHLORIDE 40 MG: 9 INJECTION INTRAMUSCULAR; INTRAVENOUS; SUBCUTANEOUS at 20:50

## 2024-12-30 RX ADMIN — PIPERACILLIN AND TAZOBACTAM 3375 MG: 3; .375 INJECTION, POWDER, LYOPHILIZED, FOR SOLUTION INTRAVENOUS at 14:51

## 2024-12-30 RX ADMIN — DOCUSATE SODIUM 100 MG: 50 LIQUID ORAL at 10:15

## 2024-12-30 RX ADMIN — PIPERACILLIN AND TAZOBACTAM 3375 MG: 3; .375 INJECTION, POWDER, LYOPHILIZED, FOR SOLUTION INTRAVENOUS at 03:49

## 2024-12-30 RX ADMIN — SODIUM CHLORIDE 40 MG: 9 INJECTION INTRAMUSCULAR; INTRAVENOUS; SUBCUTANEOUS at 10:33

## 2024-12-30 RX ADMIN — INSULIN LISPRO 4 UNITS: 100 INJECTION, SOLUTION INTRAVENOUS; SUBCUTANEOUS at 10:11

## 2024-12-30 RX ADMIN — HEPARIN SODIUM 5000 UNITS: 5000 INJECTION INTRAVENOUS; SUBCUTANEOUS at 06:31

## 2024-12-30 RX ADMIN — CISATRACURIUM BESYLATE 10 MG: 10 INJECTION, SOLUTION INTRAVENOUS at 11:15

## 2024-12-30 RX ADMIN — NOREPINEPHRINE BITARTRATE 5 MCG/MIN: 64 SOLUTION INTRAVENOUS at 11:11

## 2024-12-30 RX ADMIN — FENTANYL CITRATE 50 MCG: 0.05 INJECTION, SOLUTION INTRAMUSCULAR; INTRAVENOUS at 15:52

## 2024-12-30 RX ADMIN — CYCLOSPORINE 50 MG: 100 SOLUTION ORAL at 15:39

## 2024-12-30 RX ADMIN — POLYETHYLENE GLYCOL 3350 17 G: 17 POWDER, FOR SOLUTION ORAL at 10:15

## 2024-12-30 RX ADMIN — HEPARIN SODIUM 5000 UNITS: 5000 INJECTION INTRAVENOUS; SUBCUTANEOUS at 22:29

## 2024-12-30 RX ADMIN — AMIODARONE HYDROCHLORIDE 200 MG: 200 TABLET ORAL at 20:50

## 2024-12-30 RX ADMIN — PROPOFOL 20 MCG/KG/MIN: 10 INJECTION, EMULSION INTRAVENOUS at 10:27

## 2024-12-30 RX ADMIN — FENTANYL CITRATE 100 MCG: 50 INJECTION, SOLUTION INTRAMUSCULAR; INTRAVENOUS at 11:14

## 2024-12-30 RX ADMIN — HEPARIN SODIUM 5000 UNITS: 5000 INJECTION INTRAVENOUS; SUBCUTANEOUS at 16:06

## 2024-12-30 RX ADMIN — INSULIN LISPRO 4 UNITS: 100 INJECTION, SOLUTION INTRAVENOUS; SUBCUTANEOUS at 15:51

## 2024-12-30 RX ADMIN — METOPROLOL TARTRATE 25 MG: 25 TABLET, FILM COATED ORAL at 20:49

## 2024-12-30 RX ADMIN — NOREPINEPHRINE BITARTRATE 5 MCG/MIN: 64 SOLUTION INTRAVENOUS at 21:51

## 2024-12-30 ASSESSMENT — PULMONARY FUNCTION TESTS
PIF_VALUE: 21
PIF_VALUE: 21
PIF_VALUE: 19
PIF_VALUE: 19
PIF_VALUE: 21
PIF_VALUE: 22
PIF_VALUE: 20
PIF_VALUE: 19
PIF_VALUE: 21
PIF_VALUE: 20
PIF_VALUE: 20
PIF_VALUE: 21
PIF_VALUE: 21
PIF_VALUE: 20
PIF_VALUE: 20
PIF_VALUE: 21
PIF_VALUE: 20
PIF_VALUE: 20
PIF_VALUE: 22
PIF_VALUE: 21
PIF_VALUE: 20
PIF_VALUE: 21
PIF_VALUE: 20
PIF_VALUE: 20
PIF_VALUE: 22
PIF_VALUE: 20
PIF_VALUE: 22
PIF_VALUE: 21
PIF_VALUE: 21

## 2024-12-30 NOTE — PLAN OF CARE
Problem: Chronic Conditions and Co-morbidities  Goal: Patient's chronic conditions and co-morbidity symptoms are monitored and maintained or improved  Outcome: Progressing  Flowsheets (Taken 12/29/2024 2000)  Care Plan - Patient's Chronic Conditions and Co-Morbidity Symptoms are Monitored and Maintained or Improved: Monitor and assess patient's chronic conditions and comorbid symptoms for stability, deterioration, or improvement     Problem: Discharge Planning  Goal: Discharge to home or other facility with appropriate resources  Outcome: Progressing  Flowsheets (Taken 12/29/2024 2000)  Discharge to home or other facility with appropriate resources: Identify barriers to discharge with patient and caregiver     Problem: Pain  Goal: Verbalizes/displays adequate comfort level or baseline comfort level  Outcome: Progressing     Problem: Safety - Adult  Goal: Free from fall injury  Outcome: Progressing     Problem: Skin/Tissue Integrity  Goal: Absence of new skin breakdown  Description: 1.  Monitor for areas of redness and/or skin breakdown  2.  Assess vascular access sites hourly  3.  Every 4-6 hours minimum:  Change oxygen saturation probe site  4.  Every 4-6 hours:  If on nasal continuous positive airway pressure, respiratory therapy assess nares and determine need for appliance change or resting period.  Outcome: Progressing     Problem: Neurosensory - Adult  Goal: Achieves stable or improved neurological status  Outcome: Progressing  Flowsheets (Taken 12/29/2024 2000)  Achieves stable or improved neurological status: Assess for and report changes in neurological status     Problem: Respiratory - Adult  Goal: Achieves optimal ventilation and oxygenation  Outcome: Progressing  Flowsheets (Taken 12/29/2024 2000)  Achieves optimal ventilation and oxygenation: Assess for changes in respiratory status     Problem: Cardiovascular - Adult  Goal: Maintains optimal cardiac output and hemodynamic stability  Outcome:

## 2024-12-30 NOTE — PROGRESS NOTES
Pulmonary & Critical Care Medicine ICU Progress Note  Chief complaint : Respiratory failure    Subjunctive/24 hour events :   Patient seen and examined during multidisciplinary rounds with RN, charge nurse, RT, pharmacy, dietitian, and social service.      Awake on vent, no issues, no fever, he is not on sedation, urine output 400 cc, +10 L.+ Bowel movement, no pain.    New information updated in the note today, rest of the examination did not change compared to yesterday.  Social History     Tobacco Use    Smoking status: Former     Current packs/day: 0.00     Types: Cigarettes     Quit date: 2015     Years since quittin.5    Smokeless tobacco: Former   Substance Use Topics    Alcohol use: No     Alcohol/week: 0.0 standard drinks of alcohol         Problem Relation Age of Onset    Colon Cancer Neg Hx        No results for input(s): \"PHART\", \"IHB9KAO\", \"PO2ART\" in the last 72 hours.      MV Settings:  Vent Mode: AC/VC Resp Rate (Set): 14 bpm/Vt (Set, mL): 500 mL/ /FiO2 : 30 %           IV:   sodium chloride      dextrose      sodium chloride         Vitals:  BP (!) 120/53   Pulse 97   Temp 97.9 °F (36.6 °C) (Esophageal)   Resp 18   Ht 1.88 m (6' 2\")   Wt 76.2 kg (167 lb 15.9 oz)   SpO2 99%   BMI 21.57 kg/m²    Tmax:        Intake/Output Summary (Last 24 hours) at 2024 0837  Last data filed at 2024  Gross per 24 hour   Intake 1098.95 ml   Output 400 ml   Net 698.95 ml       EXAM:  General: Awake on vent, no distress  Head: normocephalic, atraumatic  Eyes:No gross abnormalities.  ENT:  MMM no lesions  Neck:  supple and no masses  Chest : Vent sounds, no wheezing, no rales, nontender, tympanic  Heart:: Heart sounds are normal.  Regular rate and rhythm without murmur, gallop or rub.  ABD:  symmetric, soft, non-tender, no guarding or rebound  Musculoskeletal : no cyanosis, no clubbing, and no edema  Neuro:   Awake, weak, follows simple commands  Skin: No rashes or nodules noted.  Lymph  input(s): \"NITRITE\", \"COLORU\", \"PHUR\", \"LABCAST\", \"WBCUA\", \"RBCUA\", \"MUCUS\", \"TRICHOMONAS\", \"YEAST\", \"BACTERIA\", \"CLARITYU\", \"SPECGRAV\", \"LEUKOCYTESUR\", \"UROBILINOGEN\", \"BILIRUBINUR\", \"BLOODU\", \"GLUCOSEU\", \"AMORPHOUS\" in the last 72 hours.    Invalid input(s): \"KETONESU\"    Cultures:  Rhinovirus  Films:   CXR films reviewed by me and it showed congested, possible left lower lobe infiltrate      Assessment:  This is a critically ill patient at risk of deterioration / death , needing close ICU monitoring and intervention due to below noted problems   Acute on chronic renal failure currently on HD   Rhinovirus bronchitis/pneumonia with bacterial coinfection  Left lower lobe pneumonia  Septic shock  Acute hypoxic respiratory failure currently BiPAP dependent  Acute encephalopathy  Septic shock, shock physiology resolved   Anion gap metabolic acidosis secondary to renal failure  A-fib status post Watchman procedure  History of DVT not on chronic anticoagulation  Status post renal transplant, immunocompromise  Pulmonary hypertension, group 2 and 3     Recommendation  Vent support lung protective strategy  head of the bed 30°  Daily s breathing trials  Minimize sedation as much as possible, none if possible  Watch for ICU delirium: TV on, natural light, avoid benzos, pain control, early mobility, and family engagement  PUD prophylaxis  DVT prophylaxis, resume heparin,   Target blood sugar 140-180  Monitor and replace electrolyte as needed  Continue current antibiotic  Continue Solu-Medrol 10 mg daily,    Increase Lantus to 40  units daily  Continue CellCept and cyclosporine   Tracheostomy               Due to the immediate potential for life-threatening deterioration due to shock I spent 35  minutes providing critical care.  This time is excluding time spent performing procedures.          Electronically signed by Carlotta Yadav MD,  [facility] ,on 12/30/2024 at 8:37 AM

## 2024-12-30 NOTE — FLOWSHEET NOTE
Tracheostomy placement bedside  Propofol gtt initiated  Levophed started for map of 62 1112  Fentanyl 1115 and Nimbex 1114 administered    Procedure start 1117  Trach placed 1120, size 8  Cuff inflated 1122   Procedure end 1132    Corpak placed.

## 2024-12-30 NOTE — PROGRESS NOTES
Wound Ostomy Continence Nurse  Follow-up Progress Note       NAME:  Remy Upton  MEDICAL RECORD NUMBER:  [id number]  AGE:  74 y.o.   GENDER:  male  :  1950  TODAY'S DATE:  2024    Subjective:   Wound Identification:  Wound Type: pressure  Contributing Factors: diabetes, chronic pressure, decreased mobility, shear force, malnutrition, incontinence of stool, and incontinence of urine        Patient Goal of Care:  [x] Wound Healing  [] Odor Control  [] Palliative Care  [] Pain Control   [] Other:     Objective:    BP (!) 120/53   Pulse 92   Temp 97.9 °F (36.6 °C) (Esophageal)   Resp 18   Ht 1.88 m (6' 2\")   Wt 76.2 kg (167 lb 15.9 oz)   SpO2 100%   BMI 21.57 kg/m²   Jeremi Risk Score: Jeremi Scale Score: 9  Assessment:   Measurements:  Wound 24 Heel Right (Active)   Wound Image   24 0950   Wound Etiology Deep tissue/Injury 24 1045   Dressing Status New dressing applied 24 1045   Wound Cleansed Soap and water 24 0400   Dressing/Treatment Barrier film 24 1045   Offloading for Diabetic Foot Ulcers Offloading ordered 24 1045   Dressing Change Due 24 1045   Wound Length (cm) 7 cm 24 0950   Wound Width (cm) 3 cm 24 0950   Wound Depth (cm) 0 cm 24 0950   Wound Surface Area (cm^2) 21 cm^2 24 0950   Wound Volume (cm^3) 0 cm^3 24 0950   Wound Assessment Pink/red;Purple/maroon;Fluid filled blister 24 1045   Drainage Amount None (dry) 24 1045   Odor None 24 1045   Herlinda-wound Assessment Blanchable erythema 24 1045   Margins Defined edges 24 1045   Number of days: 9   Wound 24 Ankle Right;Medial (Active)   Wound Image   24 0950   Wound Etiology Pressure Unstageable 24 1045   Dressing Status New dressing applied 24 1045   Wound Cleansed Cleansed with saline 24 1045   Dressing/Treatment Other (comment) 24 1045   Dressing Change Due 24 1040    Wound Length (cm) 4.4 cm 12/23/24 0950   Wound Width (cm) 3 cm 12/23/24 0950   Wound Depth (cm) 0 cm 12/23/24 0950   Wound Surface Area (cm^2) 13.2 cm^2 12/23/24 0950   Wound Volume (cm^3) 0 cm^3 12/23/24 0950   Wound Assessment Eschar dry 12/30/24 1045   Drainage Amount None (dry) 12/30/24 1045   Odor None 12/30/24 1045   Herlinda-wound Assessment Dry/flaky;Cool 12/30/24 1045   Margins Defined edges 12/30/24 1045   Number of days: 9     Assessment: Follow up rounding complete at this time. Patient wounds to BLE present similar to previous assessment - continue current orders. Right heel wound improved in color. Fluid filled blister intact. Continue offloading and barrier film to intact blister. Right medial ankle with dry eschar - plurogel dressing applied to promote autolytic debridment. Patient full assist to turn for assessment of sacrum. Coccyx with stage 2 pressure injury present. Wound is superficial with scant sanguinous drainge. Cleansed and zinc applied. Patient offloaded with pillow suppport.     Plan:   Plan of Care: Wound 12/18/24 Hand Distal;Left;Posterior-Dressing/Treatment: Xeroform  Wound 12/18/24 Hand Left;Posterior-Dressing/Treatment: Xeroform  Wound 12/19/24 Hand Right-Dressing/Treatment: Xeroform  Wound 12/21/24 Heel Right-Dressing/Treatment: Barrier film  Wound 12/21/24 Left;Medial-Dressing/Treatment: Open to air  Wound 12/21/24 Toe (Comment  which one) Right Purple, non-blanchable-Dressing/Treatment: Open to air  Wound 12/21/24 Toe (Comment  which one) Left purple, non-blanchable-Dressing/Treatment: Open to air  Wound 12/21/24 Ankle Left purple, nonblanchable-Dressing/Treatment: Open to air  Wound 12/21/24 Ankle Right;Medial-Dressing/Treatment: Other (comment) (Plurogel, gauze, foam)  Wound 12/21/24 Leg Distal;Right;Medial-Dressing/Treatment: Open to air  [REMOVED] Wound 12/22/24 Sacrum-Dressing/Treatment: Zinc paste  Wound 12/23/24 Leg Right;Lateral-Dressing/Treatment: Barrier film    Specialty  Bed Required : Yes   [] Low Air Loss   [] Pressure Redistribution  [] Fluid Immersion  [] Bariatric  [] Other:     Current Diet: Diet NPO  ADULT TUBE FEEDING; Orogastric; Renal Formula; Continuous; 45; No; 50; Q 6 hours  Dietician consult:  Yes    Discharge Plan:  Placement for patient upon discharge: intermediate care facility   Patient appropriate for Outpatient Wound Care Center: Yes    Referrals:  []   [] Home Health Care  [] Supplies  [] Other    Patient/Caregiver Teaching:  Level of patient/caregiver understanding able to:   [] Indicates understanding       [] Needs reinforcement  [] Unsuccessful      [] Verbal Understanding  [] Demonstrated understanding       [] No evidence of learning  [] Refused teaching         [] N/A       Electronically signed by   LELA Aguillon, RN, Wound/Ostomy Nurse on 12/30/2024 at 1:05 PM

## 2024-12-30 NOTE — CARE COORDINATION
ICU team rounds done this am and I met with wife after rounds to discuss his poss dc needs. I did discuss LTAC with her and [facility] is her choice. Pt is on HD at present and having trach done today.   1215 I called Kira from [facility] to give referral info to her for patient. I had to leave [facility].

## 2024-12-30 NOTE — CARE COORDINATION
I gave referral to Kira augustine [facility] for patient and gave her the info needed.

## 2024-12-30 NOTE — PROGRESS NOTES
Nephrology Progress Note    Assessment:  ESRDX  Ventilator dependent  Malnutrition  Remote Kidney transplant with chronic rejection with records  DM type-2  Anemia  AF controlled rate            Plan:dialysis scheduled for today remains intubated weakness issue with extubation  follow labs Trach to be pursued wife aware    Patient Active Problem List:     Pneumonia     Abdominal pain, other specified site     Acute pyelonephritis without lesion of renal medullary necrosis     Anemia     Essential hypertension     Nonspecific abnormal results of thyroid function study     Mixed hyperlipidemia     Kidney replaced by transplant     Intra-abdominal abscess post-procedure (HCC)     Infection due to Enterococcus     Fever and other physiologic disturbances of temperature regulation     Chronic kidney disease (CKD)     Type 2 diabetes mellitus with stage 3b chronic kidney disease, without long-term current use of insulin (HCC)     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 (HCC)     Abnormal CT of the chest     Bronchiectasis without complication (HCC)     GI bleeding     Complication of transplanted kidney     Muscle weakness (generalized)     Difficulty in walking     Elevated BUN     Urinary retention     Immunosuppression due to drug therapy (HCC)     DELORES (acute kidney injury) (HCC)     Adjustment disorder     Gross hematuria     Bilateral lower extremity edema     Dysuria     Acute cystitis without hematuria     Bilateral hearing loss     Abscess, toe     Acute hyperkalemia     Acute pain of left shoulder     Astigmatism     Astigmatism of both eyes with presbyopia

## 2024-12-30 NOTE — PROCEDURES
PROCEDURE NOTE  Date: 12/30/2024   Name: Remy Upton  YOB: 1950    Procedures      PERC TRACH  [id number]    PROCEDURE PERFORMED:  Percutaneous dilational tracheostomy with fiberoptic bronchoscopic guidance.     CONSENT:  The risks and benefits of this procedure were explained to the patient. All questions were answered and alternative options explained. The patient has been assessed and he/she is stable to undergo conscious sedation as well as the procedure itself.        MEDICATIONS USED:  1. Lidocaine 1% with epinephrine 1:200,000 solution, total quantity 5 mL.  2.  Nimbex 10 mg    PREOPERATIVE DIAGNOSIS(ES):  1. Respiratory failure     POSTPROCEDURE DIAGNOSIS(ES):  1. Same      DESCRIPTION OF PROCEDURE:  Consent was verified as signed and placed upon the chart. Patient was positively identified and procedure site was marked. Time-out was performed by operating team and patient. Vital sign monitoring was accomplished by noninvasive hemodynamic monitoring, pulse oximetry, and telemetry. The patient remained intubated and sedated prior to the procedure and was preoxygenated with a FiO2 of 1.0.     The neck was examined with the ultrasound probe and no evidence of a high riding vessel could be seen. The neck was placed in an extended position and then prepped and draped in the usual fashion. The skin was anesthetized with 1% lidocaine with epinephrine. A P 160 fiberoptic Olympus bronchoscope was inserted into the endotracheal tube and the endotracheal tube was withdrawn to the level of the thyroid membrane. A finder needle was inserted into the first tracheal space without. Using a cutting needle with an Angiocath, a catheter was then placed between the first and second tracheal ring. A J-wire was then inserted and a 1.5 cm  longitudinal incision was then made in the skin. Serial dilations were performed and an 8 Shiley   tracheostomy tube was then inserted without difficulty using a Blue Rhino

## 2024-12-30 NOTE — INTERDISCIPLINARY ROUNDS
Spiritual Care Services     Summary of Visit:    Attended ICU Rounds. Patient intubated and wake. Patient will move from intubation to trache. No family present, patients steven tradition is Oriental orthodox.     Encounter Summary  Encounter Overview/Reason: Interdisciplinary rounds  Service Provided For: Patient  Support System: Spouse  Complexity of Encounter: Low  Begin Time: 1600  End Time : 1615  Total Time Calculated: 15 min        Spiritual/Emotional needs  Type: Spiritual Support                Palliative Care  Type: Palliative Care, Follow-up       Spiritual Assessment/Intervention/Outcomes:    Assessment: Unable to assess, Calm, Peaceful    Intervention: Prayer (assurance of)/[place]    Outcome: Receptive, Comfort      Care Plan:    Plan and Referrals  Plan/Referrals: Continue Support (comment)          Spiritual Care Services   Electronically signed by Chaplain Camilo on 12/30/2024 at 1:16 PM.    To reach a  for emotional and spiritual support, place an EPIC consult request.   If a  is needed immediately, dial “0” and ask to page the on-call .

## 2024-12-30 NOTE — FLOWSHEET NOTE
SHIFT SUMMARY    1900: Received report from LISANDRA Johnson. Patient is resting in bed with eyes closed, observable regular rise and fall of chest, with no signs of acute distress. Last set of vital signs taken at 1900 and are within normal limits (see vital signs flowsheet for values). IV fluids are infusing at prescribed rate (see orders and intake flowsheet for values). IV site(s) is without redness, edema, or streaking. The dressing is clean, dry, and intact. SpO2 remains above 98% (see vital signs flowsheet for values). Standard safety measures verified as follows: bed is in lowest position with wheels locked. Appropriate amount of side rails are raised. Patient belongings are at the bedside within reach of the patient. Safety measures are checked hourly throughout the night. Call light is within reach.     2000: PM nursing  assessment completed. Previous assessment reviewed and updated.        Pt : Ventilated       Code Status: full code  Oxygen: vent  Complaints of: none  Pain: 0/10  IV: patent/ flushed/ capped, no signs of infiltration noted, dressing clean/dry/intact.  TELE: Afib  Dressings: See LDA  Precautions: standard  Falls: high  Jeremi: 9  New Labs: Na+ 133, BUN 58, Creat 2.58    Chart and meds reviewed. Bed wheels locked and in lowest position. Call light and bedside table within reach.     2100 - 0630: Patient currently resting in bed with no complaints of pain or discomfort. No acute distress noted at this time. Safety measures remain in place. Call light within reach.     0700: Report given to day shift RN

## 2024-12-30 NOTE — PROGRESS NOTES
Spiritual Health History and Assessment/Progress Note  [facility]    (P) Initial Encounter, Follow-up,  ,  ,      Name: Remy Upton MRN: [id number]    Age: 74 y.o.     Sex: male   Language: English   Sabianism: Scientology   DELORES (acute kidney injury) (HCC)     Date: 12/30/2024            Total Time Calculated: (P) 15 min              Spiritual Assessment continued in [facility] ICU            Encounter Overview/Reason: (P) Initial Encounter, Follow-up  Service Provided For: (P) Patient     reports, patient coping, alert and shows signs of slight communication through head nodding to  guided questions and explorations of patients wellness, comfort and care     Marian, Belief, Meaning:   Patient is connected with a marian tradition or spiritual practice  Family/Friends No family/friends present      Importance and Influence:  Patient has spiritual/personal beliefs that influence decisions regarding their health  Family/Friends No family/friends present    Community:  Patient is connected with a spiritual community  Family/Friends No family/friends present    Assessment and Plan of Care:     Patient Interventions include: Facilitated expression of thoughts and feelings  Family/Friends Interventions include: No family/friends present    Patient Plan of Care: Spiritual Care available upon further referral  Family/Friends Plan of Care: No family/friends present    Electronically signed by Chaplain Camilo on 12/30/2024 at 1:21 PM

## 2024-12-31 ENCOUNTER — APPOINTMENT (OUTPATIENT)
Dept: GENERAL RADIOLOGY | Age: 74
End: 2024-12-31
Payer: COMMERCIAL

## 2024-12-31 LAB
ALBUMIN SERPL-MCNC: 2.9 G/DL (ref 3.5–4.6)
ANION GAP SERPL CALCULATED.3IONS-SCNC: 19 MEQ/L (ref 9–15)
BASOPHILS # BLD: 0 K/UL (ref 0–0.2)
BASOPHILS NFR BLD: 0.1 %
BUN SERPL-MCNC: 101 MG/DL (ref 8–23)
CALCIUM SERPL-MCNC: 8.2 MG/DL (ref 8.5–9.9)
CHLORIDE SERPL-SCNC: 90 MEQ/L (ref 95–107)
CO2 SERPL-SCNC: 26 MEQ/L (ref 20–31)
CREAT SERPL-MCNC: 3.75 MG/DL (ref 0.7–1.2)
EOSINOPHIL # BLD: 0 K/UL (ref 0–0.7)
EOSINOPHIL NFR BLD: 1 %
ERYTHROCYTE [DISTWIDTH] IN BLOOD BY AUTOMATED COUNT: 16.1 % (ref 11.5–14.5)
GLUCOSE BLD-MCNC: 125 MG/DL (ref 70–99)
GLUCOSE BLD-MCNC: 128 MG/DL (ref 70–99)
GLUCOSE BLD-MCNC: 137 MG/DL (ref 70–99)
GLUCOSE BLD-MCNC: 166 MG/DL (ref 70–99)
GLUCOSE BLD-MCNC: 180 MG/DL (ref 70–99)
GLUCOSE BLD-MCNC: 210 MG/DL (ref 70–99)
GLUCOSE SERPL-MCNC: 195 MG/DL (ref 70–99)
HCT VFR BLD AUTO: 27.3 % (ref 42–52)
HGB BLD-MCNC: 8.8 G/DL (ref 14–18)
LYMPHOCYTES # BLD: 0.2 K/UL (ref 1–4.8)
LYMPHOCYTES NFR BLD: 1 %
MAGNESIUM SERPL-MCNC: 2.6 MG/DL (ref 1.7–2.4)
MCH RBC QN AUTO: 29.6 PG (ref 27–31.3)
MCHC RBC AUTO-ENTMCNC: 32.2 % (ref 33–37)
MCV RBC AUTO: 91.9 FL (ref 79–92.2)
MONOCYTES # BLD: 0.3 K/UL (ref 0.2–0.8)
MONOCYTES NFR BLD: 2.9 %
MYELOCYTES NFR BLD MANUAL: 1 %
NEUTROPHILS # BLD: 9.2 K/UL (ref 1.4–6.5)
NEUTS SEG NFR BLD: 94 %
NRBC BLD-RTO: 1 /100 WBC
PERFORMED ON: ABNORMAL
PHOSPHATE SERPL-MCNC: 6.2 MG/DL (ref 2.3–4.8)
PLATELET # BLD AUTO: 223 K/UL (ref 130–400)
PLATELET BLD QL SMEAR: ABNORMAL
POTASSIUM SERPL-SCNC: 4.2 MEQ/L (ref 3.4–4.9)
RBC # BLD AUTO: 2.97 M/UL (ref 4.7–6.1)
SLIDE REVIEW: ABNORMAL
SMUDGE CELLS BLD QL SMEAR: 2.9
SODIUM SERPL-SCNC: 135 MEQ/L (ref 135–144)
VARIANT LYMPHS NFR BLD: 1 %
WBC # BLD AUTO: 9.7 K/UL (ref 4.8–10.8)

## 2024-12-31 PROCEDURE — 2500000003 HC RX 250 WO HCPCS: Performed by: INTERNAL MEDICINE

## 2024-12-31 PROCEDURE — 71045 X-RAY EXAM CHEST 1 VIEW: CPT

## 2024-12-31 PROCEDURE — 6360000002 HC RX W HCPCS: Performed by: INTERNAL MEDICINE

## 2024-12-31 PROCEDURE — 99291 CRITICAL CARE FIRST HOUR: CPT | Performed by: INTERNAL MEDICINE

## 2024-12-31 PROCEDURE — 94003 VENT MGMT INPAT SUBQ DAY: CPT

## 2024-12-31 PROCEDURE — 85025 COMPLETE CBC W/AUTO DIFF WBC: CPT

## 2024-12-31 PROCEDURE — 6370000000 HC RX 637 (ALT 250 FOR IP): Performed by: INTERNAL MEDICINE

## 2024-12-31 PROCEDURE — 90935 HEMODIALYSIS ONE EVALUATION: CPT

## 2024-12-31 PROCEDURE — 2580000003 HC RX 258: Performed by: INTERNAL MEDICINE

## 2024-12-31 PROCEDURE — 6370000000 HC RX 637 (ALT 250 FOR IP): Performed by: NURSE PRACTITIONER

## 2024-12-31 PROCEDURE — 99232 SBSQ HOSP IP/OBS MODERATE 35: CPT | Performed by: NURSE PRACTITIONER

## 2024-12-31 PROCEDURE — 80069 RENAL FUNCTION PANEL: CPT

## 2024-12-31 PROCEDURE — 83735 ASSAY OF MAGNESIUM: CPT

## 2024-12-31 PROCEDURE — 36415 COLL VENOUS BLD VENIPUNCTURE: CPT

## 2024-12-31 PROCEDURE — 51798 US URINE CAPACITY MEASURE: CPT

## 2024-12-31 PROCEDURE — 94660 CPAP INITIATION&MGMT: CPT

## 2024-12-31 PROCEDURE — 2000000000 HC ICU R&B

## 2024-12-31 PROCEDURE — 2700000000 HC OXYGEN THERAPY PER DAY

## 2024-12-31 RX ORDER — LANSOPRAZOLE 30 MG/1
30 TABLET, ORALLY DISINTEGRATING, DELAYED RELEASE ORAL
Status: DISCONTINUED | OUTPATIENT
Start: 2024-12-31 | End: 2025-01-14 | Stop reason: HOSPADM

## 2024-12-31 RX ORDER — MIDODRINE HYDROCHLORIDE 5 MG/1
5 TABLET ORAL
Status: DISCONTINUED | OUTPATIENT
Start: 2024-12-31 | End: 2025-01-03

## 2024-12-31 RX ORDER — METOPROLOL TARTRATE 25 MG/1
12.5 TABLET, FILM COATED ORAL ONCE
Status: COMPLETED | OUTPATIENT
Start: 2024-12-31 | End: 2024-12-31

## 2024-12-31 RX ORDER — PREDNISONE 10 MG/1
5 TABLET ORAL DAILY
Status: DISCONTINUED | OUTPATIENT
Start: 2024-12-31 | End: 2025-01-14 | Stop reason: HOSPADM

## 2024-12-31 RX ADMIN — HEPARIN SODIUM 5000 UNITS: 5000 INJECTION INTRAVENOUS; SUBCUTANEOUS at 21:23

## 2024-12-31 RX ADMIN — MIDODRINE HYDROCHLORIDE 5 MG: 5 TABLET ORAL at 18:39

## 2024-12-31 RX ADMIN — HEPARIN SODIUM 5000 UNITS: 5000 INJECTION INTRAVENOUS; SUBCUTANEOUS at 05:43

## 2024-12-31 RX ADMIN — DOCUSATE SODIUM 100 MG: 50 LIQUID ORAL at 08:07

## 2024-12-31 RX ADMIN — PIPERACILLIN AND TAZOBACTAM 3375 MG: 3; .375 INJECTION, POWDER, LYOPHILIZED, FOR SOLUTION INTRAVENOUS at 15:14

## 2024-12-31 RX ADMIN — INSULIN LISPRO 4 UNITS: 100 INJECTION, SOLUTION INTRAVENOUS; SUBCUTANEOUS at 04:41

## 2024-12-31 RX ADMIN — Medication 10 ML: at 08:10

## 2024-12-31 RX ADMIN — LANSOPRAZOLE 30 MG: 30 TABLET, ORALLY DISINTEGRATING, DELAYED RELEASE ORAL at 09:02

## 2024-12-31 RX ADMIN — METOPROLOL TARTRATE 12.5 MG: 25 TABLET, FILM COATED ORAL at 22:20

## 2024-12-31 RX ADMIN — INSULIN GLARGINE 40 UNITS: 100 INJECTION, SOLUTION SUBCUTANEOUS at 08:06

## 2024-12-31 RX ADMIN — HEPARIN SODIUM 5000 UNITS: 5000 INJECTION INTRAVENOUS; SUBCUTANEOUS at 15:16

## 2024-12-31 RX ADMIN — Medication 10 ML: at 21:23

## 2024-12-31 RX ADMIN — METOPROLOL TARTRATE 25 MG: 25 TABLET, FILM COATED ORAL at 09:02

## 2024-12-31 RX ADMIN — POLYETHYLENE GLYCOL 3350 17 G: 17 POWDER, FOR SOLUTION ORAL at 08:07

## 2024-12-31 RX ADMIN — AMIODARONE HYDROCHLORIDE 200 MG: 200 TABLET ORAL at 08:07

## 2024-12-31 RX ADMIN — CYCLOSPORINE 50 MG: 100 SOLUTION ORAL at 08:10

## 2024-12-31 RX ADMIN — INSULIN LISPRO 4 UNITS: 100 INJECTION, SOLUTION INTRAVENOUS; SUBCUTANEOUS at 08:06

## 2024-12-31 RX ADMIN — PREDNISONE 5 MG: 10 TABLET ORAL at 08:07

## 2024-12-31 RX ADMIN — PIPERACILLIN AND TAZOBACTAM 3375 MG: 3; .375 INJECTION, POWDER, LYOPHILIZED, FOR SOLUTION INTRAVENOUS at 02:24

## 2024-12-31 RX ADMIN — MIDODRINE HYDROCHLORIDE 5 MG: 5 TABLET ORAL at 12:14

## 2024-12-31 RX ADMIN — AMIODARONE HYDROCHLORIDE 200 MG: 200 TABLET ORAL at 21:22

## 2024-12-31 RX ADMIN — DOCUSATE SODIUM 100 MG: 50 LIQUID ORAL at 21:23

## 2024-12-31 ASSESSMENT — PULMONARY FUNCTION TESTS
PIF_VALUE: 19
PIF_VALUE: 23
PIF_VALUE: 23
PIF_VALUE: 19
PIF_VALUE: 23
PIF_VALUE: 18
PIF_VALUE: 22
PIF_VALUE: 20
PIF_VALUE: 20
PIF_VALUE: 19
PIF_VALUE: 23
PIF_VALUE: 19
PIF_VALUE: 24
PIF_VALUE: 25
PIF_VALUE: 25
PIF_VALUE: 23
PIF_VALUE: 24
PIF_VALUE: 23
PIF_VALUE: 19
PIF_VALUE: 19
PIF_VALUE: 23
PIF_VALUE: 25

## 2024-12-31 NOTE — PROGRESS NOTES
Nephrology Progress Note    Assessment:  Suspect ESRDX  S/P trach  Poor nutrition  DM type-2  Hx Renal transplant  AF controlled        Plan:dialysis today  continue nutrition support  restart proamitine    Patient Active Problem List:     Pneumonia     Abdominal pain, other specified site     Acute pyelonephritis without lesion of renal medullary necrosis     Anemia     Essential hypertension     Nonspecific abnormal results of thyroid function study     Mixed hyperlipidemia     Kidney replaced by transplant     Intra-abdominal abscess post-procedure (HCC)     Infection due to Enterococcus     Fever and other physiologic disturbances of temperature regulation     Chronic kidney disease (CKD)     Type 2 diabetes mellitus with stage 3b chronic kidney disease, without long-term current use of insulin (HCC)     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 (HCC)     Abnormal CT of the chest     Bronchiectasis without complication (HCC)     GI bleeding     Complication of transplanted kidney     Muscle weakness (generalized)     Difficulty in walking     Elevated BUN     Urinary retention     Immunosuppression due to drug therapy (HCC)     DELORES (acute kidney injury) (HCC)     Adjustment disorder     Gross hematuria     Bilateral lower extremity edema     Dysuria     Acute cystitis without hematuria     Bilateral hearing loss     Abscess, toe     Acute hyperkalemia     Acute pain of left shoulder     Astigmatism     Astigmatism of both eyes with presbyopia     At risk for stroke     Benign enlargement of prostate     Benign prostatic hyperplasia with urinary frequency      Rhinovirus     Chronic right shoulder pain     Condition not found     Deep vein thrombosis (DVT) of lower extremity (HCC)     Diabetes mellitus type 2 without retinopathy (HCC)     Edema     Gait difficulty     GERD (gastroesophageal reflux disease)     H/O lower gastrointestinal bleeding     History of blood transfusion     Incomplete emptying of bladder     Insulin long-term use (HCC)     Leg weakness, bilateral     Localized swelling of both lower legs     Peripheral nerve disease     Pseudophakia of both eyes     PTDM (post-transplant diabetes mellitus) ([facility])     Posterior vitreous detachment     Rhabdomyolysis     Seizure disorder (HCC)     Stage 5 chronic kidney disease with transplanted kidney (HCC)     Leukocytes in urine     Atrial fibrillation with RVR (HCC)     Moderate malnutrition (HCC)     Palliative care encounter     Goals of care, counseling/discussion     Advanced care planning/counseling discussion     Encounter for hospice care discussion     Lower GI bleed      Subjective:  Admit Date: 12/18/2024    Interval History: laert on dialysis    Medications:  Scheduled Meds:   lansoprazole  30 mg Oral QAM AC    predniSONE  5 mg Oral Daily    insulin glargine  40 Units SubCUTAneous Daily    epoetin megan-epbx  10,000 Units SubCUTAneous Weekly    metoprolol tartrate  25 mg Oral BID    cycloSPORINE  50 mg Oral Daily    [Held by provider] mycophenolate  250 mg Oral BID    insulin lispro  0-16 Units SubCUTAneous Q4H    polyethylene glycol  17 g Oral Daily    docusate  100 mg Oral BID    amiodarone  200 mg Oral BID    sodium chloride flush  5-40 mL IntraVENous 2 times per day    heparin (porcine)  5,000 Units SubCUTAneous 3 times per day    piperacillin-tazobactam  3,375 mg IntraVENous Q12H    [Held by provider] mycophenolate  500 mg Oral BID     Continuous Infusions:   dextrose      sodium chloride         CBC:   Recent Labs     12/30/24  0421 12/31/24  0513   WBC 12.7* 9.7   HGB 9.9* 8.8*    223

## 2024-12-31 NOTE — PROGRESS NOTES
19:00-07:30 Shift Summary   Assessments completed (see flowsheets). Pt with trach/vent. Dr. Breaux notified of low Bps, see new order. IV medication titrated/infusing per orders (see emar). Bed bath and complete linen change provided. Labs drawn by . Bedside handoff report given to oncoming RN. Safety measures in place.

## 2024-12-31 NOTE — PROGRESS NOTES
Hospitalist Progress Note      PCP: Tico Rodriguez DO    Date of Admission: 12/18/2024    Chief Complaint:  patient intubated/sedated this morning. Discussed with RN, he was doing ok on CPAP but intensivist was concerned about his weakness.     Medications:  Reviewed    Infusion Medications    norepinephrine 5 mcg/min (12/30/24 2151)    sodium chloride      dextrose      sodium chloride       Scheduled Medications    midazolam  5 mg IntraVENous Once    [START ON 12/31/2024] insulin glargine  40 Units SubCUTAneous Daily    epoetin megan-epbx  10,000 Units SubCUTAneous Weekly    pantoprazole (PROTONIX) 40 mg in sodium chloride (PF) 0.9 % 10 mL injection  40 mg IntraVENous Q12H    methylPREDNISolone  10 mg IntraVENous Daily    metoprolol tartrate  25 mg Oral BID    cycloSPORINE  50 mg Oral Daily    [Held by provider] mycophenolate  250 mg Oral BID    insulin lispro  0-16 Units SubCUTAneous Q4H    polyethylene glycol  17 g Oral Daily    docusate  100 mg Oral BID    amiodarone  200 mg Oral BID    sodium chloride flush  5-40 mL IntraVENous 2 times per day    heparin (porcine)  5,000 Units SubCUTAneous 3 times per day    piperacillin-tazobactam  3,375 mg IntraVENous Q12H    [Held by provider] mycophenolate  500 mg Oral BID     PRN Meds: oxyCODONE-acetaminophen **OR** oxyCODONE-acetaminophen, fentanNYL, sodium chloride, metoprolol, midodrine, hydrALAZINE, LORazepam, glucose, dextrose bolus **OR** dextrose bolus, glucagon (rDNA), dextrose, sodium chloride flush, sodium chloride, ondansetron **OR** ondansetron, acetaminophen **OR** acetaminophen, ipratropium 0.5 mg-albuterol 2.5 mg      Intake/Output Summary (Last 24 hours) at 12/30/2024 2324  Last data filed at 12/30/2024 1941  Gross per 24 hour   Intake 759.29 ml   Output 350 ml   Net 409.29 ml       Exam:    /63   Pulse 94   Temp 98.6 °F (37 °C)   Resp 14   Ht 1.88 m (6' 2\")   Wt 76.2 kg (167 lb 15.9 oz)   SpO2 100%   BMI 21.57 kg/m²     General appearance:  hemodialysis access right   Final Result   1.  Fistula is a radial artery to cephalic outflow vein fistula. The fistula is   widely patent with cephalic blood flow volume of 821 cc/min which is sufficient   for dialysis. Radial artery flow volume is 774 cc/min which is appropriate for a   patent fistula. No evidence of stenosis or thrombus.      COMPARISON:No prior studies are available for comparison.      DIAGNOSIS: Patient with right forearm fistula, experiencing flow-volume   problems.      COMMENTS: What reading provider will be dictating this exam?->[facility]      TECHNIQUE: Grayscale and color Doppler ultrasound of right arm fistula      FINDINGS:       Radial ARTERY FLOW: 774 milliliters per minute.      CEPHALIC VEIN:   Flow: 821 milliliters per.   The cephalic vein is widely patent without any evidence of thrombus or stenosis.         Electronically signed by Joce Campos      CT HEAD WO CONTRAST   Final Result   No acute intracranial findings.         XR CHEST ABDOMEN NG PLACEMENT   Final Result   1. Endotracheal tube in satisfactory position.   2. Nasogastric tube with its tip in the gastric fundus but the proximal port   is above the GE junction. Recommend advancing the NG tube 10 cm to place the   proximal port below the GE junction.   3. New mild congestive failure.   4. New tiny right pleural effusion.   5. Moderate left lower lobe consolidation from either atelectasis or   pneumonia.      RECOMMENDATION:   Recommended advancing the NG tube 10 cm to place the tip below the GE   junction.         XR CHEST PORTABLE   Final Result   Emphysema.  No focal consolidation.         XR CHEST PORTABLE   Final Result   1. Cardiomegaly.   2. Bronchiectasis.   3. No active airspace consolidation.         XR CHEST PORTABLE    (Results Pending)           Assessment/Plan:    73 y.o. male NH resident with history of HTN, PAF, DM2, ESRD/CKD4 s/p kidney transplant x 2, BPH / urine retention , anemia/lower GI bleed

## 2024-12-31 NOTE — FLOWSHEET NOTE
12/31/24 1344   Treatment   Time On 0956   Time Off 1330   Treatment Goal 1000   Vital Signs   BP (!) 161/77   Temp 97.5 °F (36.4 °C)   Hemodialysis (linkable) Right Forearm   No Placement Date or Time found.   Pre-existing: Yes  Orientation: Right  Access Location: Forearm  Description (optional): AV fistula   Site Assessment Edematous;Clean, dry & intact   Thrill Present   Bruit Present   Status Deaccessed   Accessed By: Zion   Access Attempts  1   Dressing Status New dressing applied   Post-Hemodialysis Assessment   Post-Treatment Procedures Blood returned;Access bleeding time < 10 minutes   Machine Disinfection Process Acid/Vinegar Clean;Heat Disinfect;Exterior Machine Disinfection   Rinseback Volume (ml) 200 ml   Blood Volume Processed (Liters) 67 L   Dialyzer Clearance Clear   Duration of Treatment (minutes) 210 minutes   Heparin Amount Administered During Treatment (mL) 0 mL   Hemodialysis Intake (ml) 500 ml   Hemodialysis Output (ml) 1500 ml   NET Removed (ml) 1000   Tolerated Treatment Good   Patient Response to Treatment Well tolerated

## 2024-12-31 NOTE — CARE COORDINATION
Rounds done this am and pt continues on vent s/p trach yest and on and off Levo. Referral to [facility] yest and pt requires insurance auth.to go to next level of care. Pt also needs PEG that is to be done later in week.

## 2024-12-31 NOTE — PROGRESS NOTES
Pulmonary & Critical Care Medicine ICU Progress Note    Subjective:     No overnight events reported.  He did undergo tracheostomy tube placement at the bedside yesterday.  He was started on Levophed briefly for the procedure.  This was able to be weaned off, however was restarted overnight.  This has since been weaned off again.  He is currently resting comfortably in bed and offers no complaints this morning.    EXAM:  General: No acute distress  HEENT: Normocephalic, atraumatic, tracheostomy tube in place, NG tube in place  Lungs : Clear to auscultation anteriorly, no wheezes, no rales, no rhonchi, no ventilator dyssynchrony  Heart: Regular rate and rhythm  ABD: Soft, positive bowel sounds, nontender to palpation  Extremities : Warm, dry, edema noted  Neuro: Awake, alert, weak  Skin: No rashes appreciated    MV Settings:  Vent Mode: AC/VC Resp Rate (Set): 14 bpm/Vt (Set, mL): 500 mL/ /FiO2 : 30 %       IV:   sodium chloride      dextrose      sodium chloride         Vitals:  BP (!) 120/57   Pulse 72   Temp 97.6 °F (36.4 °C) (Axillary)   Resp 14   Ht 1.88 m (6' 2\")   Wt 76.2 kg (167 lb 15.9 oz)   SpO2 100%   BMI 21.57 kg/m²          Intake/Output Summary (Last 24 hours) at 12/31/2024 0734  Last data filed at 12/31/2024 0639  Gross per 24 hour   Intake 1328.48 ml   Output 350 ml   Net 978.48 ml       Medications:  Scheduled Meds:   lansoprazole  30 mg Oral QAM AC    predniSONE  5 mg Oral Daily    insulin glargine  40 Units SubCUTAneous Daily    epoetin megan-epbx  10,000 Units SubCUTAneous Weekly    metoprolol tartrate  25 mg Oral BID    cycloSPORINE  50 mg Oral Daily    [Held by provider] mycophenolate  250 mg Oral BID    insulin lispro  0-16 Units SubCUTAneous Q4H    polyethylene glycol  17 g Oral Daily    docusate  100 mg Oral BID    amiodarone  200 mg Oral BID    sodium chloride flush  5-40 mL IntraVENous 2 times per day    heparin (porcine)  5,000 Units SubCUTAneous 3 times per day     still some blunting of the costophrenic angles posteriorly which could be  scarring or trace bilateral pleural effusions. Unchanged    Impression  INTERVAL IMPROVEMENT IN THE BIBASILAR AREAS OF SCATTERED GROUNDGLASS INFILTRATES AS COMPARED TO THE PRIOR EXAMINATION              Portable: Results for orders placed during the hospital encounter of 12/18/24    XR CHEST PORTABLE    Narrative  EXAMINATION:  ONE XRAY VIEW OF THE CHEST    12/31/2024 3:09 am    COMPARISON:  12/25/2024    HISTORY:  ORDERING SYSTEM PROVIDED HISTORY: resp failure  TECHNOLOGIST PROVIDED HISTORY:  Reason for exam:->resp failure  What reading provider will be dictating this exam?->[facility]    FINDINGS:  Portable chest reveal heart to be borderline enlarged.  Underlying chronic  changes seen throughout the lung fields bilaterally.  Some improvement seen  in the aeration of the lung bases.  Trace bilateral pleural effusions.  Degenerative changes seen within the spine.  Tracheostomy tube remains in  satisfactory position with feeding tube tip below the level diaphragm.    Impression  Some improvement in the aeration of the lung bases. Trace bilateral pleural  effusions.  Tracheostomy tube placed in the interval in satisfactory position  with feeding tube tip below the level diaphragm.      Assessment/Plan:    Acute hypoxic respiratory failure-he did undergo bedside tracheostomy tube yesterday.  He was placed on CPAP today with a pressure port of 10.  He will likely require long-term acute care facility for ventilator weaning upon discharge from the acute portion of the hospital.  He will complete his course of antibiotics tomorrow.    Septic shock-his shock has resolved.  He was able to be weaned off Levophed.  This was started briefly yesterday for the procedure.  It was restarted overnight.  However this will be discontinued.  He does have as needed midodrine.  This had been held last week when it was scheduled and changed to as needed at that point.

## 2024-12-31 NOTE — FLOWSHEET NOTE
Shift Summary  [id number]    Report received from LISANDRA Dougherty.     Patient on assessment is lethargic but interacts when spoken to. He sqeezed this RN's hands bilaterally, as well as follows commands with BLE. He responds to \"yes\" and \"no\" questions by shaking his head. This RN asked if he had pain, and if he was comfortable. Patient shook his head no to pain and yes to being comfortable, he was provided warm blankets, TV on for entertainment.     Dialysis RN at bedside this morning, received treatment until 1330. Vitals stable throughout treatment.     Patient had a bowel movement around rectal tube, full bath and linen change provided.     Patient's wife at the bedside, she had brought in Trulicity and advised that the patient takes this mediation weekly. Hospitalist advised via perfect serve, no new orders at this time.     Updates to MAR per MD Ariel and MD Luis Carlos. Refer to order history/MAR.     Patient bladder scanned, 350 mL on scan. No interventions at this time per protocol.

## 2024-12-31 NOTE — PROGRESS NOTES
Palliative Care Progress Note  Patient: Remy Upton  Gender: male  YOB: 1950  Unit/Bed: IC01/IC01-01  CodeStatus: Full Code  Inpatient Treatment Team: Treatment Team:   Nik Nevarez MD Zaizafoun, Manaf, MD Bescak, George M, DO Patel, Dhruv R, MD Faria, Alexis, RN West, Robin M, RN SalisburyLily Brooke, RN  Admit Date:  12/18/2024    Chief Complaint: Illness    History of Presenting Illness:      Remy Upton is a 74 y.o. male on hospital day 13 with a history of hypertension, PAF, diabetes type 2, ESRD status post kidney transplant x 2, BPH/urinary retention, anemia, depression.    Patient was brought to the emergency room with coughing for a few days and not being able to eat anything for.  Patient had generalized weakness being for the last week or so.  Patient became hypoxic in the ED after coughing episode.  Patient admitted to ICU for closer monitoring.  Patient ended up intubated upon admission.  Patient requiring hemodialysis this admission.  Patient extubated 12/22.     Palliative care was consulted for goals of care.  This is patient's third admission in the last 6 months.     Patient lives at home with wife. Patient has been at [facility] for rehab in the last 6 months. Per wife patient had been doing good. Patient could use walker for short distances but was mostly wheelchair bound. Per wife patient has had decreased appetite and significant weight loss.     Upon entering room patient is seen in ICU. He is currently on non invasive ventilator. He is lethargic. He opens his eyes but did not follow commands for me. He is not on any pressors. He was extubated yesterday. Per notes patient HR elevated when placed on 4 L NC. Patient has sitter at bedside for safety.     Most recent notable labs: BUN 42, creatinine 2.70, GFR 24.1, phosphorus 5.4, albumin 3.2, WBC 14.4, hemoglobin 8.4     12/24/24    Patient recently re-intubated. Patient had dialysis again  Weakness present.   Psychiatric:         Speech: He is noncommunicative.         Cognition and Memory: Cognition is impaired. Memory is impaired.      Comments: See HPI         Allergies:      Allergies   Allergen Reactions    Statins Headaches and Other (See Comments)       Medications:      Current Facility-Administered Medications   Medication Dose Route Frequency Provider Last Rate Last Admin    lansoprazole (PREVACID SOLUTAB) disintegrating tablet 30 mg  30 mg Oral QAM AC Susu Aldridge DO   30 mg at 12/31/24 0902    predniSONE (DELTASONE) tablet 5 mg  5 mg Oral Daily Susu Aldridge DO   5 mg at 12/31/24 0807    midodrine (PROAMATINE) tablet 5 mg  5 mg Oral TID  Tico Rodriguez DO        insulin glargine (LANTUS) injection vial 40 Units  40 Units SubCUTAneous Daily Carlotta Yadav MD   40 Units at 12/31/24 0806    oxyCODONE-acetaminophen (PERCOCET) 5-325 MG per tablet 1 tablet  1 tablet Oral Q4H PRN Earnestine Reddy APRN - CNP   1 tablet at 12/30/24 2049    Or    oxyCODONE-acetaminophen (PERCOCET) 5-325 MG per tablet 2 tablet  2 tablet Oral Q4H PRN Earnestine Reddy APRN - CNP        epoetin megan-epbx (RETACRIT) injection 10,000 Units  10,000 Units SubCUTAneous Weekly Malick Blair MD   10,000 Units at 12/28/24 1955    fentaNYL (SUBLIMAZE) injection 50 mcg  50 mcg IntraVENous Q2H PRN Carlotta Yadav MD   50 mcg at 12/30/24 1552    metoprolol (LOPRESSOR) injection 2.5 mg  2.5 mg IntraVENous Q6H PRN Becky Toscano MD   2.5 mg at 12/23/24 2209    midodrine (PROAMATINE) tablet 10 mg  10 mg Oral TID PRN Susu Aldridge DO        metoprolol tartrate (LOPRESSOR) tablet 25 mg  25 mg Oral BID Carlotta Yadav MD   25 mg at 12/31/24 0902    cycloSPORINE (NEORAL) 100 MG/ML microemulsion solution 50 mg  50 mg Oral Daily Tico Rodriguez DO   50 mg at 12/31/24 0810    [Held by provider] mycophenolate (CELLCEPT) 200 MG/ML suspension 250 mg  250 mg Oral BID Tico Rodriguez DO   250 mg at 12/27/24 2102     09/26/2023 12:51 PM    CALCIUM 8.2 12/31/2024 05:13 AM    BILITOT 1.2 12/22/2024 05:19 AM    ALKPHOS 61 12/22/2024 05:19 AM    AST 77 12/22/2024 05:19 AM    ALT 24 12/22/2024 05:19 AM     BMP:    Lab Results   Component Value Date/Time     12/31/2024 05:13 AM    K 4.2 12/31/2024 05:13 AM    K 4.6 12/22/2024 02:54 PM    CL 90 12/31/2024 05:13 AM    CO2 26 12/31/2024 05:13 AM     12/31/2024 05:13 AM    CREATININE 3.75 12/31/2024 05:13 AM    CALCIUM 8.2 12/31/2024 05:13 AM    GFRAA 28.2 10/12/2022 02:45 PM    LABGLOM 16.3 12/31/2024 05:13 AM    LABGLOM 20 10/01/2024 04:41 PM    GLUCOSE 195 12/31/2024 05:13 AM    GLUCOSE 156 09/26/2023 12:51 PM     TSH:   Lab Results   Component Value Date/Time    TSH 1.290 11/04/2024 06:19 PM     Urinalysis:   Lab Results   Component Value Date/Time    NITRU Negative 11/04/2024 08:48 PM    WBCUA >100 11/04/2024 08:48 PM    WBCUA 13 08/11/2023 09:49 AM    BACTERIA Negative 11/04/2024 08:48 PM    RBCUA 3-5 11/04/2024 08:48 PM    RBCUA 2 08/11/2023 09:49 AM    BLOODU LARGE 11/04/2024 08:48 PM    SPECGRAV 1.015 08/17/2021 11:00 AM    GLUCOSEU Negative 11/04/2024 08:48 PM    GLUCOSEU GT 1 09/14/2011 08:57 AM     Albumin:   Lab Results   Component Value Date    LABPROT 1.1 (H) 03/23/2023    LABPROT 1.1 03/23/2023     Weight Trends  Wt Readings from Last 10 Encounters:   12/30/24 76.2 kg (167 lb 15.9 oz)   11/04/24 75.9 kg (167 lb 6.4 oz)   09/18/24 77.7 kg (171 lb 4.8 oz)   07/27/24 80.6 kg (177 lb 11.2 oz)   03/28/23 88.5 kg (195 lb)   11/18/22 91.6 kg (202 lb)   10/28/22 83.9 kg (185 lb)   10/24/22 83.9 kg (185 lb)   10/14/22 88.6 kg (195 lb 6.4 oz)   10/12/22 87.1 kg (192 lb)          FUNCTIONAL ADL´S:     Independent: [  ] Eating, [   ] Dressing, [   ] Transferring, [   ] Toileting, [   ] Bathing, [   ] Continence  Dependent   : [ x ] Eating, [ x  ] Dressing, [x  ] Transferring, [   x] Toileting, [  x ] Bathing, [  x ] Continence  W. assistant : [  ] Eating, [   ] Dressing,

## 2025-01-01 LAB
ALBUMIN SERPL-MCNC: 2.9 G/DL (ref 3.5–4.6)
ANION GAP SERPL CALCULATED.3IONS-SCNC: 15 MEQ/L (ref 9–15)
ANISOCYTOSIS BLD QL SMEAR: ABNORMAL
BASOPHILS # BLD: 0 K/UL (ref 0–0.2)
BASOPHILS NFR BLD: 0.1 %
BUN SERPL-MCNC: 56 MG/DL (ref 8–23)
CALCIUM SERPL-MCNC: 7.9 MG/DL (ref 8.5–9.9)
CHLORIDE SERPL-SCNC: 93 MEQ/L (ref 95–107)
CO2 SERPL-SCNC: 27 MEQ/L (ref 20–31)
CREAT SERPL-MCNC: 2.34 MG/DL (ref 0.7–1.2)
EOSINOPHIL # BLD: 0 K/UL (ref 0–0.7)
EOSINOPHIL NFR BLD: 1.2 %
ERYTHROCYTE [DISTWIDTH] IN BLOOD BY AUTOMATED COUNT: 16.5 % (ref 11.5–14.5)
GLUCOSE BLD-MCNC: 149 MG/DL (ref 70–99)
GLUCOSE BLD-MCNC: 154 MG/DL (ref 70–99)
GLUCOSE BLD-MCNC: 154 MG/DL (ref 70–99)
GLUCOSE BLD-MCNC: 161 MG/DL (ref 70–99)
GLUCOSE BLD-MCNC: 173 MG/DL (ref 70–99)
GLUCOSE BLD-MCNC: 204 MG/DL (ref 70–99)
GLUCOSE SERPL-MCNC: 158 MG/DL (ref 70–99)
HCT VFR BLD AUTO: 28 % (ref 42–52)
HGB BLD-MCNC: 8.9 G/DL (ref 14–18)
LYMPHOCYTES # BLD: 0.2 K/UL (ref 1–4.8)
LYMPHOCYTES NFR BLD: 2 %
MACROCYTES BLD QL SMEAR: ABNORMAL
MAGNESIUM SERPL-MCNC: 2.3 MG/DL (ref 1.7–2.4)
MCH RBC QN AUTO: 29.6 PG (ref 27–31.3)
MCHC RBC AUTO-ENTMCNC: 31.8 % (ref 33–37)
MCV RBC AUTO: 93 FL (ref 79–92.2)
MONOCYTES # BLD: 0.6 K/UL (ref 0.2–0.8)
MONOCYTES NFR BLD: 6.7 %
NEUTROPHILS # BLD: 7.6 K/UL (ref 1.4–6.5)
NEUTS BAND NFR BLD MANUAL: 1 %
NEUTS SEG NFR BLD: 90 %
OVALOCYTES BLD QL SMEAR: ABNORMAL
PERFORMED ON: ABNORMAL
PHOSPHATE SERPL-MCNC: 3.6 MG/DL (ref 2.3–4.8)
PLATELET # BLD AUTO: ABNORMAL K/UL (ref 130–400)
PLATELET BLD QL SMEAR: ADEQUATE
POIKILOCYTOSIS BLD QL SMEAR: ABNORMAL
POLYCHROMASIA BLD QL SMEAR: ABNORMAL
POTASSIUM SERPL-SCNC: 3.8 MEQ/L (ref 3.4–4.9)
RBC # BLD AUTO: 3.01 M/UL (ref 4.7–6.1)
SLIDE REVIEW: ABNORMAL
SODIUM SERPL-SCNC: 135 MEQ/L (ref 135–144)
WBC # BLD AUTO: 8.3 K/UL (ref 4.8–10.8)

## 2025-01-01 PROCEDURE — 80069 RENAL FUNCTION PANEL: CPT

## 2025-01-01 PROCEDURE — 36415 COLL VENOUS BLD VENIPUNCTURE: CPT

## 2025-01-01 PROCEDURE — 2000000000 HC ICU R&B

## 2025-01-01 PROCEDURE — 2500000003 HC RX 250 WO HCPCS: Performed by: INTERNAL MEDICINE

## 2025-01-01 PROCEDURE — 99291 CRITICAL CARE FIRST HOUR: CPT | Performed by: INTERNAL MEDICINE

## 2025-01-01 PROCEDURE — 6360000002 HC RX W HCPCS: Performed by: INTERNAL MEDICINE

## 2025-01-01 PROCEDURE — 83735 ASSAY OF MAGNESIUM: CPT

## 2025-01-01 PROCEDURE — 94003 VENT MGMT INPAT SUBQ DAY: CPT

## 2025-01-01 PROCEDURE — 94761 N-INVAS EAR/PLS OXIMETRY MLT: CPT

## 2025-01-01 PROCEDURE — 6370000000 HC RX 637 (ALT 250 FOR IP): Performed by: INTERNAL MEDICINE

## 2025-01-01 PROCEDURE — 51701 INSERT BLADDER CATHETER: CPT

## 2025-01-01 PROCEDURE — 2580000003 HC RX 258: Performed by: INTERNAL MEDICINE

## 2025-01-01 PROCEDURE — 6370000000 HC RX 637 (ALT 250 FOR IP): Performed by: NURSE PRACTITIONER

## 2025-01-01 PROCEDURE — 51798 US URINE CAPACITY MEASURE: CPT

## 2025-01-01 PROCEDURE — 94660 CPAP INITIATION&MGMT: CPT

## 2025-01-01 PROCEDURE — 85025 COMPLETE CBC W/AUTO DIFF WBC: CPT

## 2025-01-01 RX ORDER — METOPROLOL TARTRATE 25 MG/1
12.5 TABLET, FILM COATED ORAL 2 TIMES DAILY
Status: DISCONTINUED | OUTPATIENT
Start: 2025-01-01 | End: 2025-01-14 | Stop reason: HOSPADM

## 2025-01-01 RX ADMIN — AMIODARONE HYDROCHLORIDE 200 MG: 200 TABLET ORAL at 10:03

## 2025-01-01 RX ADMIN — INSULIN GLARGINE 40 UNITS: 100 INJECTION, SOLUTION SUBCUTANEOUS at 10:03

## 2025-01-01 RX ADMIN — HEPARIN SODIUM 5000 UNITS: 5000 INJECTION INTRAVENOUS; SUBCUTANEOUS at 21:11

## 2025-01-01 RX ADMIN — PIPERACILLIN AND TAZOBACTAM 3375 MG: 3; .375 INJECTION, POWDER, LYOPHILIZED, FOR SOLUTION INTRAVENOUS at 03:21

## 2025-01-01 RX ADMIN — Medication 10 ML: at 21:13

## 2025-01-01 RX ADMIN — AMIODARONE HYDROCHLORIDE 200 MG: 200 TABLET ORAL at 21:11

## 2025-01-01 RX ADMIN — METOPROLOL TARTRATE 12.5 MG: 25 TABLET, FILM COATED ORAL at 10:04

## 2025-01-01 RX ADMIN — METOPROLOL TARTRATE 12.5 MG: 25 TABLET, FILM COATED ORAL at 22:37

## 2025-01-01 RX ADMIN — INSULIN LISPRO 4 UNITS: 100 INJECTION, SOLUTION INTRAVENOUS; SUBCUTANEOUS at 10:03

## 2025-01-01 RX ADMIN — PREDNISONE 5 MG: 10 TABLET ORAL at 10:04

## 2025-01-01 RX ADMIN — HEPARIN SODIUM 5000 UNITS: 5000 INJECTION INTRAVENOUS; SUBCUTANEOUS at 13:38

## 2025-01-01 RX ADMIN — LANSOPRAZOLE 30 MG: 30 TABLET, ORALLY DISINTEGRATING, DELAYED RELEASE ORAL at 06:29

## 2025-01-01 RX ADMIN — CYCLOSPORINE 50 MG: 100 SOLUTION ORAL at 10:08

## 2025-01-01 RX ADMIN — MIDODRINE HYDROCHLORIDE 5 MG: 5 TABLET ORAL at 10:04

## 2025-01-01 RX ADMIN — HEPARIN SODIUM 5000 UNITS: 5000 INJECTION INTRAVENOUS; SUBCUTANEOUS at 06:29

## 2025-01-01 RX ADMIN — Medication 10 ML: at 10:06

## 2025-01-01 ASSESSMENT — PULMONARY FUNCTION TESTS
PIF_VALUE: 17
PIF_VALUE: 27
PIF_VALUE: 26
PIF_VALUE: 24
PIF_VALUE: 18
PIF_VALUE: 25
PIF_VALUE: 18
PIF_VALUE: 26
PIF_VALUE: 25
PIF_VALUE: 27
PIF_VALUE: 25
PIF_VALUE: 23
PIF_VALUE: 23
PIF_VALUE: 24
PIF_VALUE: 21
PIF_VALUE: 23
PIF_VALUE: 24

## 2025-01-01 NOTE — PROGRESS NOTES
Pulmonary & Critical Care Medicine ICU Progress Note    Subjective:     No overnight events reported.  He was given half dose of Lopressor overnight given that the night before he did require institution of Levophed after his dose of Lopressor.  He is without complaints this morning.  He did tolerate use of CPAP for 3 hours yesterday.    EXAM:  General: No acute distress, resting comfortably in bed  HEENT: Normocephalic, atraumatic, tracheostomy tube in place, NG tube in place, left EJ in place  Lungs : Coarse breath sounds more on the right, no wheezes, no ventilator dyssynchrony  Heart: Regular rate and rhythm  ABD: Soft, positive bowel sounds, nontender to palpation  Extremities : Warm, dry, edema noted  Neuro: Awake, alert, weak  Skin: No rashes appreciated    MV Settings:  Vent Mode: AC/VC Resp Rate (Set): 14 bpm/Vt (Set, mL): 500 mL/ /FiO2 : 30 %       IV:   dextrose      sodium chloride         Vitals:  BP (!) 148/69   Pulse (!) 110   Temp 98 °F (36.7 °C) (Oral)   Resp 16   Ht 1.88 m (6' 2\")   Wt 76.2 kg (167 lb 15.9 oz)   SpO2 100%   BMI 21.57 kg/m²          Intake/Output Summary (Last 24 hours) at 1/1/2025 0728  Last data filed at 1/1/2025 0641  Gross per 24 hour   Intake 1164.66 ml   Output 1600 ml   Net -435.34 ml       Medications:  Scheduled Meds:   lansoprazole  30 mg Oral QAM AC    predniSONE  5 mg Oral Daily    midodrine  5 mg Oral TID WC    insulin glargine  40 Units SubCUTAneous Daily    epoetin megan-epbx  10,000 Units SubCUTAneous Weekly    metoprolol tartrate  25 mg Oral BID    cycloSPORINE  50 mg Oral Daily    [Held by provider] mycophenolate  250 mg Oral BID    insulin lispro  0-16 Units SubCUTAneous Q4H    polyethylene glycol  17 g Oral Daily    docusate  100 mg Oral BID    amiodarone  200 mg Oral BID    sodium chloride flush  5-40 mL IntraVENous 2 times per day    heparin (porcine)  5,000 Units SubCUTAneous 3 times per day    [Held by provider] mycophenolate  500 mg Oral BID

## 2025-01-01 NOTE — PROGRESS NOTES
Hospitalist Progress Note      PCP: Tico Rodriguez DO    Date of Admission: 12/18/2024    Chief Complaint:  patient intubated/sedated this morning. Discussed with RN, he was doing ok on CPAP but intensivist was concerned about his weakness.     Medications:  Reviewed    Infusion Medications    dextrose      sodium chloride       Scheduled Medications    lansoprazole  30 mg Oral QAM AC    predniSONE  5 mg Oral Daily    midodrine  5 mg Oral TID WC    insulin glargine  40 Units SubCUTAneous Daily    epoetin megan-epbx  10,000 Units SubCUTAneous Weekly    metoprolol tartrate  25 mg Oral BID    cycloSPORINE  50 mg Oral Daily    [Held by provider] mycophenolate  250 mg Oral BID    insulin lispro  0-16 Units SubCUTAneous Q4H    polyethylene glycol  17 g Oral Daily    docusate  100 mg Oral BID    amiodarone  200 mg Oral BID    sodium chloride flush  5-40 mL IntraVENous 2 times per day    heparin (porcine)  5,000 Units SubCUTAneous 3 times per day    piperacillin-tazobactam  3,375 mg IntraVENous Q12H    [Held by provider] mycophenolate  500 mg Oral BID     PRN Meds: oxyCODONE-acetaminophen **OR** oxyCODONE-acetaminophen, fentanNYL, metoprolol, midodrine, hydrALAZINE, glucose, dextrose bolus **OR** dextrose bolus, glucagon (rDNA), dextrose, sodium chloride flush, sodium chloride, ondansetron **OR** ondansetron, acetaminophen **OR** acetaminophen, ipratropium 0.5 mg-albuterol 2.5 mg      Intake/Output Summary (Last 24 hours) at 12/31/2024 2239  Last data filed at 12/31/2024 1859  Gross per 24 hour   Intake 1115.85 ml   Output 2100 ml   Net -984.15 ml       Exam:    BP (!) 116/49   Pulse 86   Temp 97.3 °F (36.3 °C) (Axillary)   Resp 16   Ht 1.88 m (6' 2\")   Wt 76.2 kg (167 lb 15.9 oz)   SpO2 97%   BMI 21.57 kg/m²     General appearance: sedated  Lungs: diminished bilaterally  Heart: S1/S2,irregular  Abdomen: soft  Extremities: edema present bilateral UE      Labs:   Recent Labs     12/29/24  0418 12/30/24  0427

## 2025-01-01 NOTE — PROGRESS NOTES
Nephrology Progress Note    Assessment:  Suspected ESRD  Resp failure with trach  Malnutrition  Hx Kidney transplant  AF controlled  DM type-2  Anemia stable        Plan: next dialysis tomorrow if numbers worsen  off presors  alert to questions     Patient Active Problem List:     Pneumonia     Abdominal pain, other specified site     Acute pyelonephritis without lesion of renal medullary necrosis     Anemia     Essential hypertension     Nonspecific abnormal results of thyroid function study     Mixed hyperlipidemia     Kidney replaced by transplant     Intra-abdominal abscess post-procedure (HCC)     Infection due to Enterococcus     Fever and other physiologic disturbances of temperature regulation     Chronic kidney disease (CKD)     Type 2 diabetes mellitus with stage 3b chronic kidney disease, without long-term current use of insulin (HCC)     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 (HCC)     Abnormal CT of the chest     Bronchiectasis without complication (HCC)     GI bleeding     Complication of transplanted kidney     Muscle weakness (generalized)     Difficulty in walking     Elevated BUN     Urinary retention     Immunosuppression due to drug therapy (HCC)     DELORES (acute kidney injury) (HCC)     Adjustment disorder     Gross hematuria     Bilateral lower extremity edema     Dysuria     Acute cystitis without hematuria     Bilateral hearing loss     Abscess, toe     Acute hyperkalemia     Acute pain of left shoulder     Astigmatism     Astigmatism of both eyes with presbyopia     At risk for stroke     Benign enlargement of prostate     Benign prostatic

## 2025-01-01 NOTE — FLOWSHEET NOTE
Shift summary    0730 report at bedside. Pt repositioned, incontinent of stool (flexiseal leaked), maurizio care done, plurogel and zinc cream applied. New pad placed. Pt awake, nods, follow commands weakly (weak hand grasps and toe wiggles only) unable to help turn or lift arms. VSS. Placed on CPAP 5 PS 10 per respiratory    [id number] Am assessment complete see flowsheet. Tolerating TF, corpak in place. Meds per MAR. LS c/dim- scant tracheal secretions however copious thick clear oral secretions noted, requiring frequent suctioning. IC and trach ties changed out.  MP Afib. L arm edematous/weeping. RFA fistula +B+T.     0940 placed back on AC mode per respiratory.     R heel blister burst spontaneously. Complete linen change done. Heels up on pillow/offloaded. No boots.     1215 noted no urine-bladder scan done- 250 cc,  straight cath done for 250 cc dark brown urine with sediment.     1300 no change to assessment. Repositioned see flowsheet. Electronically signed by Elizabeth Martínez RN on 1/1/2025 at 2:56 PM    1630 pt wife at bedside, updated. Flexiseal leaked-linen changed, plurogel and zinc applied. No change to assessment. Electronically signed by Elizabeth Martínez RN on 1/1/2025 at 4:55 PM    1800 repositioned. I/O's done. Meds per MAR. Electronically signed by Elizabeth Martínez RN on 1/1/2025 at 6:44 PM

## 2025-01-02 ENCOUNTER — ANESTHESIA (OUTPATIENT)
Dept: OPERATING ROOM | Age: 75
End: 2025-01-02
Payer: COMMERCIAL

## 2025-01-02 ENCOUNTER — ANESTHESIA EVENT (OUTPATIENT)
Dept: OPERATING ROOM | Age: 75
End: 2025-01-02
Payer: COMMERCIAL

## 2025-01-02 ENCOUNTER — PREP FOR PROCEDURE (OUTPATIENT)
Dept: SURGERY | Age: 75
End: 2025-01-02

## 2025-01-02 DIAGNOSIS — R63.8 UNABLE TO EAT: ICD-10-CM

## 2025-01-02 LAB
ALBUMIN SERPL-MCNC: 2.7 G/DL (ref 3.5–4.6)
ANION GAP SERPL CALCULATED.3IONS-SCNC: 12 MEQ/L (ref 9–15)
BASOPHILS # BLD: 0 K/UL (ref 0–0.2)
BASOPHILS NFR BLD: 0.1 %
BUN SERPL-MCNC: 75 MG/DL (ref 8–23)
CALCIUM SERPL-MCNC: 8.2 MG/DL (ref 8.5–9.9)
CHLORIDE SERPL-SCNC: 93 MEQ/L (ref 95–107)
CO2 SERPL-SCNC: 28 MEQ/L (ref 20–31)
CREAT SERPL-MCNC: 3.01 MG/DL (ref 0.7–1.2)
EOSINOPHIL # BLD: 0.1 K/UL (ref 0–0.7)
EOSINOPHIL NFR BLD: 1.8 %
ERYTHROCYTE [DISTWIDTH] IN BLOOD BY AUTOMATED COUNT: 16.9 % (ref 11.5–14.5)
GLUCOSE BLD-MCNC: 171 MG/DL (ref 70–99)
GLUCOSE BLD-MCNC: 175 MG/DL (ref 70–99)
GLUCOSE BLD-MCNC: 202 MG/DL (ref 70–99)
GLUCOSE BLD-MCNC: 43 MG/DL (ref 70–99)
GLUCOSE BLD-MCNC: 64 MG/DL (ref 70–99)
GLUCOSE BLD-MCNC: 75 MG/DL (ref 70–99)
GLUCOSE BLD-MCNC: 88 MG/DL (ref 70–99)
GLUCOSE BLD-MCNC: 92 MG/DL (ref 70–99)
GLUCOSE SERPL-MCNC: 184 MG/DL (ref 70–99)
HCT VFR BLD AUTO: 27.2 % (ref 42–52)
HGB BLD-MCNC: 8.5 G/DL (ref 14–18)
LYMPHOCYTES # BLD: 0.2 K/UL (ref 1–4.8)
LYMPHOCYTES NFR BLD: 2.2 %
MAGNESIUM SERPL-MCNC: 2.4 MG/DL (ref 1.7–2.4)
MCH RBC QN AUTO: 29.2 PG (ref 27–31.3)
MCHC RBC AUTO-ENTMCNC: 31.3 % (ref 33–37)
MCV RBC AUTO: 93.5 FL (ref 79–92.2)
MONOCYTES # BLD: 0.7 K/UL (ref 0.2–0.8)
MONOCYTES NFR BLD: 9.6 %
NEUTROPHILS # BLD: 6.1 K/UL (ref 1.4–6.5)
NEUTS SEG NFR BLD: 82.9 %
PERFORMED ON: ABNORMAL
PERFORMED ON: NORMAL
PHOSPHATE SERPL-MCNC: 4.4 MG/DL (ref 2.3–4.8)
PLATELET # BLD AUTO: 223 K/UL (ref 130–400)
POTASSIUM SERPL-SCNC: 4 MEQ/L (ref 3.4–4.9)
RBC # BLD AUTO: 2.91 M/UL (ref 4.7–6.1)
SODIUM SERPL-SCNC: 133 MEQ/L (ref 135–144)
WBC # BLD AUTO: 7.3 K/UL (ref 4.8–10.8)

## 2025-01-02 PROCEDURE — 6360000002 HC RX W HCPCS: Performed by: INTERNAL MEDICINE

## 2025-01-02 PROCEDURE — 0DH63UZ INSERTION OF FEEDING DEVICE INTO STOMACH, PERCUTANEOUS APPROACH: ICD-10-PCS | Performed by: COLON & RECTAL SURGERY

## 2025-01-02 PROCEDURE — 2709999900 HC NON-CHARGEABLE SUPPLY: Performed by: COLON & RECTAL SURGERY

## 2025-01-02 PROCEDURE — 94660 CPAP INITIATION&MGMT: CPT

## 2025-01-02 PROCEDURE — 2700000000 HC OXYGEN THERAPY PER DAY

## 2025-01-02 PROCEDURE — 6370000000 HC RX 637 (ALT 250 FOR IP): Performed by: INTERNAL MEDICINE

## 2025-01-02 PROCEDURE — 2500000003 HC RX 250 WO HCPCS: Performed by: INTERNAL MEDICINE

## 2025-01-02 PROCEDURE — 2500000003 HC RX 250 WO HCPCS: Performed by: COLON & RECTAL SURGERY

## 2025-01-02 PROCEDURE — 6370000000 HC RX 637 (ALT 250 FOR IP): Performed by: NURSE PRACTITIONER

## 2025-01-02 PROCEDURE — 3609013300 HC EGD TUBE PLACEMENT: Performed by: COLON & RECTAL SURGERY

## 2025-01-02 PROCEDURE — 43246 EGD PLACE GASTROSTOMY TUBE: CPT | Performed by: COLON & RECTAL SURGERY

## 2025-01-02 PROCEDURE — 80069 RENAL FUNCTION PANEL: CPT

## 2025-01-02 PROCEDURE — 90935 HEMODIALYSIS ONE EVALUATION: CPT

## 2025-01-02 PROCEDURE — 2000000000 HC ICU R&B

## 2025-01-02 PROCEDURE — 99291 CRITICAL CARE FIRST HOUR: CPT | Performed by: INTERNAL MEDICINE

## 2025-01-02 PROCEDURE — 36415 COLL VENOUS BLD VENIPUNCTURE: CPT

## 2025-01-02 PROCEDURE — 85025 COMPLETE CBC W/AUTO DIFF WBC: CPT

## 2025-01-02 PROCEDURE — 2580000003 HC RX 258: Performed by: INTERNAL MEDICINE

## 2025-01-02 PROCEDURE — 6360000002 HC RX W HCPCS: Performed by: COLON & RECTAL SURGERY

## 2025-01-02 PROCEDURE — 83735 ASSAY OF MAGNESIUM: CPT

## 2025-01-02 PROCEDURE — P9047 ALBUMIN (HUMAN), 25%, 50ML: HCPCS | Performed by: INTERNAL MEDICINE

## 2025-01-02 PROCEDURE — 51798 US URINE CAPACITY MEASURE: CPT

## 2025-01-02 PROCEDURE — 6360000002 HC RX W HCPCS

## 2025-01-02 PROCEDURE — 94003 VENT MGMT INPAT SUBQ DAY: CPT

## 2025-01-02 RX ORDER — SODIUM CHLORIDE 0.9 % (FLUSH) 0.9 %
5-40 SYRINGE (ML) INJECTION PRN
Status: DISCONTINUED | OUTPATIENT
Start: 2025-01-02 | End: 2025-01-02 | Stop reason: HOSPADM

## 2025-01-02 RX ORDER — NOREPINEPHRINE BITARTRATE 0.06 MG/ML
INJECTION, SOLUTION INTRAVENOUS
Status: COMPLETED
Start: 2025-01-02 | End: 2025-01-02

## 2025-01-02 RX ORDER — SODIUM CHLORIDE 9 MG/ML
INJECTION, SOLUTION INTRAVENOUS PRN
Status: CANCELLED | OUTPATIENT
Start: 2025-01-02

## 2025-01-02 RX ORDER — SODIUM CHLORIDE 0.9 % (FLUSH) 0.9 %
5-40 SYRINGE (ML) INJECTION PRN
Status: CANCELLED | OUTPATIENT
Start: 2025-01-02

## 2025-01-02 RX ORDER — MIDAZOLAM HYDROCHLORIDE 1 MG/ML
4 INJECTION, SOLUTION INTRAMUSCULAR; INTRAVENOUS ONCE
Status: COMPLETED | OUTPATIENT
Start: 2025-01-02 | End: 2025-01-02

## 2025-01-02 RX ORDER — ALBUMIN (HUMAN) 12.5 G/50ML
50 SOLUTION INTRAVENOUS
Status: COMPLETED | OUTPATIENT
Start: 2025-01-02 | End: 2025-01-02

## 2025-01-02 RX ORDER — SODIUM CHLORIDE 9 MG/ML
INJECTION, SOLUTION INTRAVENOUS PRN
Status: DISCONTINUED | OUTPATIENT
Start: 2025-01-02 | End: 2025-01-02 | Stop reason: HOSPADM

## 2025-01-02 RX ORDER — NOREPINEPHRINE BITARTRATE 0.06 MG/ML
1-100 INJECTION, SOLUTION INTRAVENOUS CONTINUOUS
Status: DISCONTINUED | OUTPATIENT
Start: 2025-01-02 | End: 2025-01-03

## 2025-01-02 RX ORDER — ALBUMIN (HUMAN) 12.5 G/50ML
50 SOLUTION INTRAVENOUS ONCE
Status: DISCONTINUED | OUTPATIENT
Start: 2025-01-02 | End: 2025-01-05

## 2025-01-02 RX ORDER — FENTANYL CITRATE 50 UG/ML
100 INJECTION, SOLUTION INTRAMUSCULAR; INTRAVENOUS ONCE
Status: COMPLETED | OUTPATIENT
Start: 2025-01-02 | End: 2025-01-02

## 2025-01-02 RX ORDER — SODIUM CHLORIDE 0.9 % (FLUSH) 0.9 %
5-40 SYRINGE (ML) INJECTION EVERY 12 HOURS SCHEDULED
Status: DISCONTINUED | OUTPATIENT
Start: 2025-01-02 | End: 2025-01-02 | Stop reason: HOSPADM

## 2025-01-02 RX ORDER — SODIUM CHLORIDE 0.9 % (FLUSH) 0.9 %
5-40 SYRINGE (ML) INJECTION EVERY 12 HOURS SCHEDULED
Status: CANCELLED | OUTPATIENT
Start: 2025-01-02

## 2025-01-02 RX ADMIN — CYCLOSPORINE 50 MG: 100 SOLUTION ORAL at 09:33

## 2025-01-02 RX ADMIN — Medication 10 ML: at 21:25

## 2025-01-02 RX ADMIN — METOPROLOL TARTRATE 12.5 MG: 25 TABLET, FILM COATED ORAL at 09:26

## 2025-01-02 RX ADMIN — PREDNISONE 5 MG: 10 TABLET ORAL at 09:26

## 2025-01-02 RX ADMIN — AMIODARONE HYDROCHLORIDE 200 MG: 200 TABLET ORAL at 09:25

## 2025-01-02 RX ADMIN — MIDAZOLAM 2 MG: 1 INJECTION INTRAMUSCULAR; INTRAVENOUS at 15:30

## 2025-01-02 RX ADMIN — INSULIN GLARGINE 40 UNITS: 100 INJECTION, SOLUTION SUBCUTANEOUS at 09:25

## 2025-01-02 RX ADMIN — LANSOPRAZOLE 30 MG: 30 TABLET, ORALLY DISINTEGRATING, DELAYED RELEASE ORAL at 09:38

## 2025-01-02 RX ADMIN — HYDRALAZINE HYDROCHLORIDE 10 MG: 20 INJECTION INTRAMUSCULAR; INTRAVENOUS at 14:56

## 2025-01-02 RX ADMIN — ALBUMIN (HUMAN) 50 G: 0.25 INJECTION, SOLUTION INTRAVENOUS at 13:01

## 2025-01-02 RX ADMIN — DEXTROSE MONOHYDRATE 125 ML: 100 INJECTION, SOLUTION INTRAVENOUS at 22:33

## 2025-01-02 RX ADMIN — DEXTROSE MONOHYDRATE 125 ML: 100 INJECTION, SOLUTION INTRAVENOUS at 21:46

## 2025-01-02 RX ADMIN — Medication 10 ML: at 09:26

## 2025-01-02 RX ADMIN — METOPROLOL TARTRATE 12.5 MG: 25 TABLET, FILM COATED ORAL at 21:25

## 2025-01-02 RX ADMIN — INSULIN LISPRO 4 UNITS: 100 INJECTION, SOLUTION INTRAVENOUS; SUBCUTANEOUS at 11:04

## 2025-01-02 RX ADMIN — HEPARIN SODIUM 5000 UNITS: 5000 INJECTION INTRAVENOUS; SUBCUTANEOUS at 06:53

## 2025-01-02 RX ADMIN — METOPROLOL TARTRATE 2.5 MG: 5 INJECTION INTRAVENOUS at 16:26

## 2025-01-02 RX ADMIN — HEPARIN SODIUM 5000 UNITS: 5000 INJECTION INTRAVENOUS; SUBCUTANEOUS at 21:25

## 2025-01-02 RX ADMIN — AMIODARONE HYDROCHLORIDE 200 MG: 200 TABLET ORAL at 21:25

## 2025-01-02 RX ADMIN — FENTANYL CITRATE 100 MCG: 50 INJECTION, SOLUTION INTRAMUSCULAR; INTRAVENOUS at 15:30

## 2025-01-02 ASSESSMENT — PULMONARY FUNCTION TESTS
PIF_VALUE: 11
PIF_VALUE: 28
PIF_VALUE: 20
PIF_VALUE: 11
PIF_VALUE: 28
PIF_VALUE: 11
PIF_VALUE: 18
PIF_VALUE: 13
PIF_VALUE: 25
PIF_VALUE: 12
PIF_VALUE: 11
PIF_VALUE: 6
PIF_VALUE: 25
PIF_VALUE: 28
PIF_VALUE: 24
PIF_VALUE: 21
PIF_VALUE: 28
PIF_VALUE: 24
PIF_VALUE: 11
PIF_VALUE: 31
PIF_VALUE: 24
PIF_VALUE: 12
PIF_VALUE: 20
PIF_VALUE: 11
PIF_VALUE: 26
PIF_VALUE: 30
PIF_VALUE: 11
PIF_VALUE: 29
PIF_VALUE: 21
PIF_VALUE: 11
PIF_VALUE: 12
PIF_VALUE: 32
PIF_VALUE: 16
PIF_VALUE: 11
PIF_VALUE: 11
PIF_VALUE: 18
PIF_VALUE: 11
PIF_VALUE: 22
PIF_VALUE: 11
PIF_VALUE: 12
PIF_VALUE: 11
PIF_VALUE: 28
PIF_VALUE: 11
PIF_VALUE: 26
PIF_VALUE: 24
PIF_VALUE: 19

## 2025-01-02 ASSESSMENT — PAIN SCALES - GENERAL: PAINLEVEL_OUTOF10: 0

## 2025-01-02 NOTE — PROGRESS NOTES
Spiritual Care Services     Summary of Visit:    Patient resting at time of visit.  No family is present.    Encounter Summary  Encounter Overview/Reason: Interdisciplinary rounds  Service Provided For: Patient  Support System: Spouse, Children  Complexity of Encounter: Low  Begin Time: 0900  End Time : 0910  Total Time Calculated: 10 min        Spiritual/Emotional needs  Type: Spiritual Support         Spiritual Assessment/Intervention/Outcomes:    Assessment: Unable to assess (Patient resting)        Care Plan:    Plan and Referrals  Plan/Referrals: Continue Support (comment)     will remain available as needed.      Spiritual Care Services   Electronically signed by Chaplain Romario on 1/2/2025 at 10:12 AM.    To reach a  for emotional and spiritual support, place an EPIC consult request.   If a  is needed immediately, dial “0” and ask to page the on-call .

## 2025-01-02 NOTE — PROGRESS NOTES
Physical Therapy Missed Treatment   Facility/Department: [facility] MED SURG IC01/IC01-01    NAME: Remy Upton    : 1950 (74 y.o.)  MRN: [id number]    Account: [id number]  Gender: male       Nursing staff notified pt on hold for dialysis      Will follow and attempt PT evaluation again at earliest availability.       Shiloh Morgan, PT, 25 at 1:48 PM

## 2025-01-02 NOTE — PROGRESS NOTES
[facility]  Occupational Therapy        NAME:  Remy Upton  ROOM: IC01/IC01-01  :  1950  DATE: 2025    Attempted to see Remy Upton at 1310 on this date for:   [x]  Initial Evaluation   []  Treatment       Patient was unable to be seen due to:   [] Off unit for testing/procedure    [] Patient refused, stating \"    [] Therapy on hold due to     [] Nursing deferred due to    [x] Other:  Pt receiving dialysis    Discussed with LISANDRA Yan    Electronically signed by FRANCISCO Yanes/L on 25 at 1:47 PM EST

## 2025-01-02 NOTE — PROGRESS NOTES
Nephrology Progress Note    Assessment:  ESRDX  Respiratory failure  Malnutrition  Anemia  Remote hx kidney transplant with chronic rejection  AF controlled      Plan:  Dialysis today  trach in place    Patient Active Problem List:     Pneumonia     Abdominal pain, other specified site     Acute pyelonephritis without lesion of renal medullary necrosis     Anemia     Essential hypertension     Nonspecific abnormal results of thyroid function study     Mixed hyperlipidemia     Kidney replaced by transplant     Intra-abdominal abscess post-procedure (HCC)     Infection due to Enterococcus     Fever and other physiologic disturbances of temperature regulation     Chronic kidney disease (CKD)     Type 2 diabetes mellitus with stage 3b chronic kidney disease, without long-term current use of insulin (HCC)     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 (HCC)     Abnormal CT of the chest     Bronchiectasis without complication (HCC)     GI bleeding     Complication of transplanted kidney     Muscle weakness (generalized)     Difficulty in walking     Elevated BUN     Urinary retention     Immunosuppression due to drug therapy (HCC)     DELORES (acute kidney injury) (HCC)     Adjustment disorder     Gross hematuria     Bilateral lower extremity edema     Dysuria     Acute cystitis without hematuria     Bilateral hearing loss     Abscess, toe     Acute hyperkalemia     Acute pain of left shoulder     Astigmatism     Astigmatism of both eyes with presbyopia     At risk for stroke     Benign enlargement of prostate     Benign prostatic hyperplasia with urinary frequency      frequency     Rhinovirus     Chronic right shoulder pain     Condition not found     Deep vein thrombosis (DVT) of lower extremity (HCC)     Diabetes mellitus type 2 without retinopathy (HCC)     Edema     Gait difficulty     GERD (gastroesophageal reflux disease)     H/O lower gastrointestinal bleeding     History of blood transfusion     Incomplete emptying of bladder     Insulin long-term use (HCC)     Leg weakness, bilateral     Localized swelling of both lower legs     Peripheral nerve disease     Pseudophakia of both eyes     PTDM (post-transplant diabetes mellitus) ([facility])     Posterior vitreous detachment     Rhabdomyolysis     Seizure disorder ([facility])     Stage 5 chronic kidney disease with transplanted kidney ([facility])     Leukocytes in urine     Atrial fibrillation with RVR ([facility])     Moderate malnutrition ([facility])     Palliative care encounter     Goals of care, counseling/discussion     Advanced care planning/counseling discussion     Encounter for hospice care discussion     Lower GI bleed      Subjective:  Admit Date: 12/18/2024    Interval History: stable alert     Medications:  Scheduled Meds:   metoprolol tartrate  12.5 mg Oral BID    lansoprazole  30 mg Oral QAM AC    predniSONE  5 mg Oral Daily    midodrine  5 mg Oral TID WC    insulin glargine  40 Units SubCUTAneous Daily    epoetin megan-epbx  10,000 Units SubCUTAneous Weekly    cycloSPORINE  50 mg Oral Daily    [Held by provider] mycophenolate  250 mg Oral BID    insulin lispro  0-16 Units SubCUTAneous Q4H    polyethylene glycol  17 g Oral Daily    docusate  100 mg Oral BID    amiodarone  200 mg Oral BID    sodium chloride flush  5-40 mL IntraVENous 2 times per day    heparin (porcine)  5,000 Units SubCUTAneous 3 times per day    [Held by provider] mycophenolate  500 mg Oral BID     Continuous Infusions:   dextrose      sodium chloride         CBC:   Recent Labs     01/01/25  0415 01/02/25  0403   WBC 8.3 7.3   HGB 8.9* 8.5*   PLT CLUMPED 223     CMP:

## 2025-01-02 NOTE — PROGRESS NOTES
HEMODIALYSIS PRE-TREATMENT NOTE    Patient Identifiers prior to treatment: Name,     Isolation Required: No                      Isolation Type: N/A       (please document if patient is being managed as a PUI/COVID-19 patient)        Hepatitis status: Susceptible                          Date Drawn                             Result  Hepatitis B Surface Antigen 24     Neg                     Hepatitis B Surface Antibody 24 Neg        Hepatitis B Core Antibody N/A N/A          How was Hepatitis Status verified: Epic     Was a copy of the labs you documented provided to facility for the patient's chart: Epic    Hemodialysis orders verified: Yes, with Dr. Rodriguez    Access Within normal limits ( I.e. s/s of infection,...): WNL, srinath, kobi present     Pre-Assessment completed: Yes    Pre-dialysis report received from: LISANDRA Elias                      Time: 09:45

## 2025-01-02 NOTE — PROGRESS NOTES
Treatment time: 210 minutes    Net UF: 1000 mL    Pre weight: 76.2 kg  Post weight: N/A  EDW: TBD    Access used: AVF RFA  Access function: Good    Medications or blood products given: 50 g albumin    Regular outpatient schedule: TBD    Summary of response to treatment: Patient tolerated treatment fairly, Dr. Rodriguez updated during treatment and new orders received for 50 g albumin, orders reviewed with Dr. Rodriguez prior to treatment, bleeding time <10 minutes from access sites, report given to LISANDRA Mcmahon.    Copy of dialysis treatment record placed in chart, to be scanned into EMR.

## 2025-01-02 NOTE — CARE COORDINATION
MANDY STARTED FOR [facility] TODAY, AWAITING PEG PLACEMENT. THEA AT [facility] UPDATED.

## 2025-01-02 NOTE — PROGRESS NOTES
Report received at bedside from Elizabeth FRY, skin assessment completed. Shift assessments completed, see flow sheets. Patient able to wake to stimulation, follows commands but is very weak. Medications given as ordered. Corepak connected to TF, patient tolerating well, no residuals overnight. Patient repositioned Q2 hours, wounds dressed per order. Report given to oncoming RN's

## 2025-01-02 NOTE — PROGRESS NOTES
Pulmonary & Critical Care Medicine ICU Progress Note  Chief complaint : Respiratory failure    Subjunctive/24 hour events :   Patient seen and examined during multidisciplinary rounds with RN, charge nurse, RT, pharmacy, dietitian, and social service.      Awake, no issues overnight, on 30% FiO2 saturation 100%, no fever urine output 300 cc, bowels moving.    New information updated in the note today, rest of the examination did not change compared to yesterday.  Social History     Tobacco Use    Smoking status: Former     Current packs/day: 0.00     Types: Cigarettes     Quit date: 2015     Years since quittin.5    Smokeless tobacco: Former   Substance Use Topics    Alcohol use: No     Alcohol/week: 0.0 standard drinks of alcohol         Problem Relation Age of Onset    Colon Cancer Neg Hx        No results for input(s): \"PHART\", \"WFC4RWJ\", \"PO2ART\" in the last 72 hours.      MV Settings:  Vent Mode: (S) CPAP/PS Resp Rate (Set): 14 bpm/Vt (Set, mL): 500 mL/ /FiO2 : 30 %           IV:   dextrose      sodium chloride         Vitals:  BP (!) 115/59   Pulse 80   Temp 97.8 °F (36.6 °C) (Axillary)   Resp 17   Ht 1.88 m (6' 2\")   Wt 76.2 kg (167 lb 15.9 oz)   SpO2 100%   BMI 21.57 kg/m²    Tmax:        Intake/Output Summary (Last 24 hours) at 2025 0808  Last data filed at 2025 0551  Gross per 24 hour   Intake 1136 ml   Output 443 ml   Net 693 ml       EXAM:  General: Awake on vent, no distress  Head: normocephalic, atraumatic  Eyes:No gross abnormalities.  ENT:  MMM no lesions  Neck:  supple and no masses, tracheostomy in place, no bleeding  Chest : Vent sounds, no wheezing, no rales, nontender, tympanic  Heart:: Heart sounds are normal.  Regular rate and rhythm without murmur, gallop or rub.  ABD:  symmetric, soft, non-tender, no guarding or rebound  Musculoskeletal : no cyanosis, no clubbing, and no edema  Neuro:   Awake, weak, follows simple commands  Skin: No rashes or nodules noted.  Lymph

## 2025-01-03 LAB
ALBUMIN SERPL-MCNC: 3.2 G/DL (ref 3.5–4.6)
ANION GAP SERPL CALCULATED.3IONS-SCNC: 12 MEQ/L (ref 9–15)
BASOPHILS # BLD: 0 K/UL (ref 0–0.2)
BASOPHILS NFR BLD: 0.3 %
BUN SERPL-MCNC: 41 MG/DL (ref 8–23)
CALCIUM SERPL-MCNC: 8.7 MG/DL (ref 8.5–9.9)
CHLORIDE SERPL-SCNC: 93 MEQ/L (ref 95–107)
CO2 SERPL-SCNC: 28 MEQ/L (ref 20–31)
CREAT SERPL-MCNC: 2.04 MG/DL (ref 0.7–1.2)
EOSINOPHIL # BLD: 0.1 K/UL (ref 0–0.7)
EOSINOPHIL NFR BLD: 1.4 %
ERYTHROCYTE [DISTWIDTH] IN BLOOD BY AUTOMATED COUNT: 17.3 % (ref 11.5–14.5)
GLUCOSE BLD-MCNC: 230 MG/DL (ref 70–99)
GLUCOSE BLD-MCNC: 257 MG/DL (ref 70–99)
GLUCOSE BLD-MCNC: 54 MG/DL (ref 70–99)
GLUCOSE BLD-MCNC: 57 MG/DL (ref 70–99)
GLUCOSE BLD-MCNC: 60 MG/DL (ref 70–99)
GLUCOSE BLD-MCNC: 71 MG/DL (ref 70–99)
GLUCOSE BLD-MCNC: 75 MG/DL (ref 70–99)
GLUCOSE BLD-MCNC: 92 MG/DL (ref 70–99)
GLUCOSE BLD-MCNC: 93 MG/DL (ref 70–99)
GLUCOSE SERPL-MCNC: 57 MG/DL (ref 70–99)
HCT VFR BLD AUTO: 29.7 % (ref 42–52)
HGB BLD-MCNC: 9.1 G/DL (ref 14–18)
LYMPHOCYTES # BLD: 0.2 K/UL (ref 1–4.8)
LYMPHOCYTES NFR BLD: 2.9 %
MCH RBC QN AUTO: 28.8 PG (ref 27–31.3)
MCHC RBC AUTO-ENTMCNC: 30.6 % (ref 33–37)
MCV RBC AUTO: 94 FL (ref 79–92.2)
MONOCYTES # BLD: 0.8 K/UL (ref 0.2–0.8)
MONOCYTES NFR BLD: 10.7 %
NEUTROPHILS # BLD: 6.4 K/UL (ref 1.4–6.5)
NEUTS SEG NFR BLD: 81.6 %
PERFORMED ON: ABNORMAL
PERFORMED ON: NORMAL
PHOSPHATE SERPL-MCNC: 3.4 MG/DL (ref 2.3–4.8)
PLATELET # BLD AUTO: 206 K/UL (ref 130–400)
POTASSIUM SERPL-SCNC: 4 MEQ/L (ref 3.4–4.9)
RBC # BLD AUTO: 3.16 M/UL (ref 4.7–6.1)
SODIUM SERPL-SCNC: 133 MEQ/L (ref 135–144)
WBC # BLD AUTO: 7.9 K/UL (ref 4.8–10.8)

## 2025-01-03 PROCEDURE — 99291 CRITICAL CARE FIRST HOUR: CPT | Performed by: INTERNAL MEDICINE

## 2025-01-03 PROCEDURE — 80069 RENAL FUNCTION PANEL: CPT

## 2025-01-03 PROCEDURE — 2500000003 HC RX 250 WO HCPCS: Performed by: INTERNAL MEDICINE

## 2025-01-03 PROCEDURE — 2580000003 HC RX 258: Performed by: INTERNAL MEDICINE

## 2025-01-03 PROCEDURE — 2580000003 HC RX 258: Performed by: NURSE PRACTITIONER

## 2025-01-03 PROCEDURE — 97161 PT EVAL LOW COMPLEX 20 MIN: CPT

## 2025-01-03 PROCEDURE — 36415 COLL VENOUS BLD VENIPUNCTURE: CPT

## 2025-01-03 PROCEDURE — 94003 VENT MGMT INPAT SUBQ DAY: CPT

## 2025-01-03 PROCEDURE — 2700000000 HC OXYGEN THERAPY PER DAY

## 2025-01-03 PROCEDURE — 85025 COMPLETE CBC W/AUTO DIFF WBC: CPT

## 2025-01-03 PROCEDURE — 6370000000 HC RX 637 (ALT 250 FOR IP): Performed by: NURSE PRACTITIONER

## 2025-01-03 PROCEDURE — 2000000000 HC ICU R&B

## 2025-01-03 PROCEDURE — 94660 CPAP INITIATION&MGMT: CPT

## 2025-01-03 PROCEDURE — 51798 US URINE CAPACITY MEASURE: CPT

## 2025-01-03 PROCEDURE — 6370000000 HC RX 637 (ALT 250 FOR IP): Performed by: INTERNAL MEDICINE

## 2025-01-03 PROCEDURE — 6360000002 HC RX W HCPCS: Performed by: INTERNAL MEDICINE

## 2025-01-03 RX ORDER — DEXTROSE MONOHYDRATE AND SODIUM CHLORIDE 5; .9 G/100ML; G/100ML
INJECTION, SOLUTION INTRAVENOUS CONTINUOUS
Status: DISCONTINUED | OUTPATIENT
Start: 2025-01-03 | End: 2025-01-03

## 2025-01-03 RX ADMIN — DEXTROSE AND SODIUM CHLORIDE 100 ML/HR: 5; 900 INJECTION, SOLUTION INTRAVENOUS at 09:51

## 2025-01-03 RX ADMIN — HEPARIN SODIUM 5000 UNITS: 5000 INJECTION INTRAVENOUS; SUBCUTANEOUS at 06:56

## 2025-01-03 RX ADMIN — Medication 10 ML: at 21:03

## 2025-01-03 RX ADMIN — LANSOPRAZOLE 30 MG: 30 TABLET, ORALLY DISINTEGRATING, DELAYED RELEASE ORAL at 09:30

## 2025-01-03 RX ADMIN — DEXTROSE MONOHYDRATE 125 ML: 100 INJECTION, SOLUTION INTRAVENOUS at 01:12

## 2025-01-03 RX ADMIN — DEXTROSE MONOHYDRATE 125 ML: 100 INJECTION, SOLUTION INTRAVENOUS at 06:57

## 2025-01-03 RX ADMIN — INSULIN LISPRO 4 UNITS: 100 INJECTION, SOLUTION INTRAVENOUS; SUBCUTANEOUS at 21:02

## 2025-01-03 RX ADMIN — HEPARIN SODIUM 5000 UNITS: 5000 INJECTION INTRAVENOUS; SUBCUTANEOUS at 21:02

## 2025-01-03 RX ADMIN — DEXTROSE MONOHYDRATE 125 ML: 100 INJECTION, SOLUTION INTRAVENOUS at 02:11

## 2025-01-03 RX ADMIN — AMIODARONE HYDROCHLORIDE 200 MG: 200 TABLET ORAL at 09:31

## 2025-01-03 RX ADMIN — METOPROLOL TARTRATE 12.5 MG: 25 TABLET, FILM COATED ORAL at 21:01

## 2025-01-03 RX ADMIN — PREDNISONE 5 MG: 10 TABLET ORAL at 09:31

## 2025-01-03 RX ADMIN — HEPARIN SODIUM 5000 UNITS: 5000 INJECTION INTRAVENOUS; SUBCUTANEOUS at 13:26

## 2025-01-03 RX ADMIN — DEXTROSE MONOHYDRATE 125 ML: 100 INJECTION, SOLUTION INTRAVENOUS at 10:03

## 2025-01-03 RX ADMIN — Medication 10 ML: at 09:32

## 2025-01-03 RX ADMIN — INSULIN LISPRO 4 UNITS: 100 INJECTION, SOLUTION INTRAVENOUS; SUBCUTANEOUS at 17:22

## 2025-01-03 RX ADMIN — METOPROLOL TARTRATE 12.5 MG: 25 TABLET, FILM COATED ORAL at 09:31

## 2025-01-03 RX ADMIN — AMIODARONE HYDROCHLORIDE 200 MG: 200 TABLET ORAL at 21:01

## 2025-01-03 ASSESSMENT — PULMONARY FUNCTION TESTS
PIF_VALUE: 22
PIF_VALUE: 11
PIF_VALUE: 18
PIF_VALUE: 23
PIF_VALUE: 10
PIF_VALUE: 10
PIF_VALUE: 11
PIF_VALUE: 17
PIF_VALUE: 17
PIF_VALUE: 11
PIF_VALUE: 12
PIF_VALUE: 26
PIF_VALUE: 11
PIF_VALUE: 11
PIF_VALUE: 20
PIF_VALUE: 17
PIF_VALUE: 18
PIF_VALUE: 12
PIF_VALUE: 23
PIF_VALUE: 11
PIF_VALUE: 11
PIF_VALUE: 12
PIF_VALUE: 24
PIF_VALUE: 18
PIF_VALUE: 20
PIF_VALUE: 24
PIF_VALUE: 20
PIF_VALUE: 18
PIF_VALUE: 11
PIF_VALUE: 11

## 2025-01-03 ASSESSMENT — PAIN SCALES - GENERAL
PAINLEVEL_OUTOF10: 0

## 2025-01-03 NOTE — PROGRESS NOTES
Comprehensive Nutrition Assessment    Type and Reason for Visit:  Reassess    Nutrition Recommendations/Plan:   Continue renal tube feeding ( Nepro)  Change to bolus schedule : 325 ml, given by gravity 4x daily through PEG  Water flushes 50 ml before and after each bolus  Monitor Na levels for further free water adjustments     Malnutrition Assessment:  Malnutrition Status:  Moderate malnutrition (01/03/25 1041)    Context:  Chronic Illness     Findings of the 6 clinical characteristics of malnutrition:  Energy Intake:  Mild decrease in energy intake  Weight Loss:  Greater than 10% over 6 months     Body Fat Loss:  Mild body fat loss Orbital, Buccal region   Muscle Mass Loss:  Mild muscle mass loss (to moderate) Temples (temporalis), Clavicles (pectoralis & deltoids)  Fluid Accumulation:  Mild Generalized   Strength:  Not Performed    Nutrition Assessment:    Pt making progress towards nutriton related goals, had PEG placed 1/2/25, tube feeding  was tolerated at goal rate prior to PEG placement, difficult to assess weight status due to significant edema, will transition to bolus tube feeding schedule in anticipation of increased activity levels    Nutrition Related Findings:    NH resident, Hx significant for : DM2, ESRD/CKD4 s/p kidney transplant x 2; started HD 12/20, Intubated (12/18), Trach (12/30), PEG ( 1/2). TF at goal since 12/26. Urine output < 500 ml per day, anasarca reported by nursing. . Labs reviewed: Low Na, hypoglycemia/ BUN/creat. Meds include colace, glycolax, prednisone, IVF = D5.9Ns started @ 100 mlhr ( ~400 kcals), today for low glucose,  loose BM via FMS, remains on vent support, RN reports will try to get pt up to chair today Wound Type: Multiple, Pressure Injury, Stage II, Deep Tissue Injury, Skin Tears (see wound flowsheet)       Current Nutrition Intake & Therapies:    Average Meal Intake: NPO  Average Supplements Intake: NPO  ADULT TUBE FEEDING; PEG; Renal Formula; Continuous; 10; Yes;  10; Q 4 hours; 45; 50; Q 6 hours  Current Tube Feeding (TF) Orders:  Feeding Route: PEG  Formula: Renal Formula (Nepro)  Schedule: Bolus  Feeding Regimen: 325 ml, given 4 x daily = 1300 ml  Additives/Modulars: None  Water Flushes: 50 ml before and after each bolus ( 400)  Current TF Provides: 2340 kcals, 105 g protein, 1345 ml free water    Anthropometric Measures:  Height: 188 cm (6' 2\")  Ideal Body Weight (IBW): 190 lbs (86 kg)    Admission Body Weight: 71.7 kg (158 lb)  Current Body Weight: 75.8 kg (167 lb) (* significant edema), 88.4 % IBW. Weight Source: Bed scale  Current BMI (kg/m2): 21.4  Usual Body Weight: 80.3 kg (177 lb) (( 7/2024);173#(5/2024), 180# ( 10/37163)     % Weight Change (Calculated): -9.6                    BMI Categories: Underweight (BMI less than 22) age over 65 (estimated due to fluid status)    Estimated Daily Nutrient Needs:  Energy Requirements Based On: Kcal/kg  Weight Used for Energy Requirements: Admission  Energy (kcal/day): ~2150 kcal @ 30 kcal/kg  Weight Used for Protein Requirements: Admission  Protein (g/day): 108 g protein @ 1.5 g/kg  Method Used for Fluid Requirements: Standard renal  Fluid (ml/day): 500-800 ml + DUO (or as per provider)    Nutrition Diagnosis:   Increased nutrient needs related to increase demand for energy/nutrients as evidenced by wounds  Inadequate oral intake related to swallowing difficulty as evidenced by intubation    Nutrition Interventions:   Food and/or Nutrient Delivery: Modify Tube Feeding  Nutrition Education/Counseling: No recommendation at this time  Coordination of Nutrition Care: No recommendation at this time       Goals:  Goals: Meet at least 75% of estimated needs  Type of Goal: New goal  Previous Goal Met: Goal(s) Achieved    Nutrition Monitoring and Evaluation:      Food/Nutrient Intake Outcomes: Enteral Nutrition Intake/Tolerance  Physical Signs/Symptoms Outcomes: Biochemical Data, Skin, Weight    Discharge Planning:    Enteral

## 2025-01-03 NOTE — CARE COORDINATION
ICU QUALIFY ROUNDS AT BEDSIDE. NO FAMILY PRESENT. TUBE FEEDING VIA PEG TUBE. TUBE FEEDINGS INCREASED. ON LEVOPHED AND ZOSYN. CM FOLLOWING FOR PRECERT FOR [facility]. PT/OT ORDERED, EVALUATIONS PENDING.

## 2025-01-03 NOTE — FLOWSHEET NOTE
0800 Assessment complete, see flow sheet. Patient awake, weak but does follow simple commands and nods and gestures appropriately. Multiple wounds, skin tears and DTI's checked and addressed. Patient suctioned and repositioned for comfort.   0915 Seen by Earnestine Reddy CNP on Critical Care team rounds, update given and made aware of low blood sugars though out the night and this am, see orders.  1300 Bolus tube feeding given, as ordered. Zero residual noted prior to starting, will cont to monitor. No changes in assessment noted.  1730 Patient repositioned, small amount of stool noted around FMS, patient cleansed, linen changed, barrier cream applied. Patient suctioned and mouth care done. Bladder Scan also done at noted for 189 cc. Patient resting with eyes closed, no changes in assessment noted.

## 2025-01-03 NOTE — CARE COORDINATION
SPOKE MARZENA/THEA FROM [facility] AND SHE INFORMED PRECERT HAS BEEN STARTED FOR [facility]. WILL FOLLOW.

## 2025-01-03 NOTE — PLAN OF CARE
Nutrition Problem #1: Increased nutrient needs  Intervention: Food and/or Nutrient Delivery: Modify Tube Feeding  Nutritional

## 2025-01-03 NOTE — PROGRESS NOTES
Hospitalist Progress Note      PCP: Tico Rodriguez DO    Date of Admission: 12/18/2024    Chief Complaint:  patient intubated/sedated this morning. Discussed with RN, he was doing ok on CPAP but intensivist was concerned about his weakness.     Medications:  Reviewed    Infusion Medications    norepinephrine      dextrose      sodium chloride       Scheduled Medications    albumin human 25%  50 g IntraVENous Once    metoprolol tartrate  12.5 mg Oral BID    lansoprazole  30 mg Oral QAM AC    predniSONE  5 mg Oral Daily    midodrine  5 mg Oral TID WC    insulin glargine  40 Units SubCUTAneous Daily    epoetin megan-epbx  10,000 Units SubCUTAneous Weekly    cycloSPORINE  50 mg Oral Daily    [Held by provider] mycophenolate  250 mg Oral BID    insulin lispro  0-16 Units SubCUTAneous Q4H    polyethylene glycol  17 g Oral Daily    docusate  100 mg Oral BID    amiodarone  200 mg Oral BID    sodium chloride flush  5-40 mL IntraVENous 2 times per day    heparin (porcine)  5,000 Units SubCUTAneous 3 times per day    [Held by provider] mycophenolate  500 mg Oral BID     PRN Meds: oxyCODONE-acetaminophen **OR** oxyCODONE-acetaminophen, fentanNYL, metoprolol, midodrine, hydrALAZINE, glucose, dextrose bolus **OR** dextrose bolus, glucagon (rDNA), dextrose, sodium chloride flush, sodium chloride, ondansetron **OR** ondansetron, acetaminophen **OR** acetaminophen, ipratropium 0.5 mg-albuterol 2.5 mg      Intake/Output Summary (Last 24 hours) at 1/2/2025 2204  Last data filed at 1/2/2025 1835  Gross per 24 hour   Intake 1053 ml   Output 1893 ml   Net -840 ml       Exam:    BP (!) 158/68   Pulse 90   Temp 97.5 °F (36.4 °C) (Axillary)   Resp 20   Ht 1.88 m (6' 2\")   Wt 76.2 kg (167 lb 15.9 oz)   SpO2 99%   BMI 21.57 kg/m²     General appearance: sedated  Lungs: diminished bilaterally  Heart: S1/S2,irregular  Abdomen: soft  Extremities: edema present bilateral UE      Labs:   Recent Labs     12/31/24  0513 01/01/25  5781  lung base with trace left   pleural effusion.         Vascular duplex hemodialysis access right   Final Result   1.  Fistula is a radial artery to cephalic outflow vein fistula. The fistula is   widely patent with cephalic blood flow volume of 821 cc/min which is sufficient   for dialysis. Radial artery flow volume is 774 cc/min which is appropriate for a   patent fistula. No evidence of stenosis or thrombus.      COMPARISON:No prior studies are available for comparison.      DIAGNOSIS: Patient with right forearm fistula, experiencing flow-volume   problems.      COMMENTS: What reading provider will be dictating this exam?->[facility]      TECHNIQUE: Grayscale and color Doppler ultrasound of right arm fistula      FINDINGS:       Radial ARTERY FLOW: 774 milliliters per minute.      CEPHALIC VEIN:   Flow: 821 milliliters per.   The cephalic vein is widely patent without any evidence of thrombus or stenosis.         Electronically signed by Joce Campos      CT HEAD WO CONTRAST   Final Result   No acute intracranial findings.         XR CHEST ABDOMEN NG PLACEMENT   Final Result   1. Endotracheal tube in satisfactory position.   2. Nasogastric tube with its tip in the gastric fundus but the proximal port   is above the GE junction. Recommend advancing the NG tube 10 cm to place the   proximal port below the GE junction.   3. New mild congestive failure.   4. New tiny right pleural effusion.   5. Moderate left lower lobe consolidation from either atelectasis or   pneumonia.      RECOMMENDATION:   Recommended advancing the NG tube 10 cm to place the tip below the GE   junction.         XR CHEST PORTABLE   Final Result   Emphysema.  No focal consolidation.         XR CHEST PORTABLE   Final Result   1. Cardiomegaly.   2. Bronchiectasis.   3. No active airspace consolidation.                 Assessment/Plan:    73 y.o. male NH resident with history of HTN, PAF, DM2, ESRD/CKD4 s/p kidney transplant x 2, BPH / urine retention ,

## 2025-01-03 NOTE — PROGRESS NOTES
[facility]  Occupational Therapy        NAME:  Remy Upton  ROOM: IC01/IC01-01  :  1950  DATE: 1/3/2025    Attempted to see Remy Upton at 1600 on this date for:   [x]  Initial Evaluation   []  Treatment       Patient was unable to be seen due to:   [] Off unit for testing/procedure    [] Patient refused, stating \"    [] Therapy on hold due to     [x] Nursing deferred due to pt very fatigued and has just been rolled and repositioned; requests therapy hold and attempt again in AM.    [] Other:      Discussed with LISANDRA Covarrubias    Electronically signed by FRANCISCO Yanes/L on 1/3/25 at 4:01 PM EST

## 2025-01-03 NOTE — PROGRESS NOTES
Updates received from Salome Colón. Shift assessments completed, see flow sheets for details. Remains on trach vent. Opens eyes to stimulation and able to nod appropriately. Follows commands but is severely weak. Multiple poor skin integrity sites, cleansed and dressed as ordered. BG at 2130 43 with repeat of 47, PRN dextrose administered, BG checked 15 mins after infusion completed, BG 64, additional D5 administered. BG 92. Tube Feed connected to newly placed peg tube at 2200, patient tolerating well. BG checked at 0100 was 57, D5 infusion administered as ordered, recheck 75, notified Farrah PEÑALOZA of results throughout shift, verbal order to give 125cc more of D5 since patient;s glucose continues to drop despite being on tube feedings. Bath and complete linen change done, patient tolerated well. Small amount of stool noted leaking around fecal management system, repositioned and working well. No urine output noted for shift, bladder scan resulted at 143cc in bladder. 0600 BG 57, PRN D10 infusion hung. BG rechecked, glucose 71. Report given to oncoming RN.

## 2025-01-03 NOTE — PROGRESS NOTES
Nephrology Progress Note    Assessment:  Hypoxia  Trach in place  S/P kidney transplant chronic rejection  Hypertension  AF controlled  DM type-2  Anemia  Malnutrition-PEG in place      Plan: dialysis  continues   maintain on O2 thru trach   discussed with wife status    Patient Active Problem List:     Pneumonia     Abdominal pain, other specified site     Acute pyelonephritis without lesion of renal medullary necrosis     Anemia     Essential hypertension     Nonspecific abnormal results of thyroid function study     Mixed hyperlipidemia     Kidney replaced by transplant     Intra-abdominal abscess post-procedure (HCC)     Infection due to Enterococcus     Fever and other physiologic disturbances of temperature regulation     Chronic kidney disease (CKD)     Type 2 diabetes mellitus with stage 3b chronic kidney disease, without long-term current use of insulin (HCC)     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 (HCC)     Abnormal CT of the chest     Bronchiectasis without complication (HCC)     GI bleeding     Complication of transplanted kidney     Muscle weakness (generalized)     Difficulty in walking     Elevated BUN     Urinary retention     Immunosuppression due to drug therapy (HCC)     DELORES (acute kidney injury) (HCC)     Adjustment disorder     Gross hematuria     Bilateral lower extremity edema     Dysuria     Acute cystitis without hematuria     Bilateral hearing loss     Abscess, toe     Acute hyperkalemia     Acute pain of left shoulder     Astigmatism     Astigmatism of both eyes with presbyopia     At risk for stroke     Benign enlargement of  Jennifer, DO

## 2025-01-03 NOTE — PROGRESS NOTES
Pulmonary & Critical Care Medicine ICU Progress Note  Chief complaint : Respiratory failure    Subjunctive/24 hour events :   Patient seen and examined during multidisciplinary rounds with RN, charge nurse, RT, pharmacy, dietitian, and social service.      Awake, follows commands, on 30% FiO2 saturation 97%, no fever, urine output 100 cc, +3 L, bowels moving    New information updated in the note today, rest of the examination did not change compared to yesterday.  Social History     Tobacco Use    Smoking status: Former     Current packs/day: 0.00     Types: Cigarettes     Quit date: 2015     Years since quittin.5    Smokeless tobacco: Former   Substance Use Topics    Alcohol use: No     Alcohol/week: 0.0 standard drinks of alcohol         Problem Relation Age of Onset    Colon Cancer Neg Hx        No results for input(s): \"PHART\", \"XKQ6FBA\", \"PO2ART\" in the last 72 hours.      MV Settings:  Vent Mode: CPAP/PS Resp Rate (Set): 14 bpm/Vt (Set, mL): 500 mL/ /FiO2 : 30 %           IV:   dextrose 5 % and 0.9 % NaCl 100 mL/hr (25 0951)    dextrose      sodium chloride         Vitals:  /66   Pulse 70   Temp 97.5 °F (36.4 °C) (Axillary)   Resp 14   Ht 1.88 m (6' 2\")   Wt 76.2 kg (167 lb 15.9 oz)   SpO2 100%   BMI 21.57 kg/m²    Tmax:        Intake/Output Summary (Last 24 hours) at 1/3/2025 1022  Last data filed at 1/3/2025 0713  Gross per 24 hour   Intake 792 ml   Output 1993 ml   Net -1201 ml       EXAM:  General: Awake on vent, no distress  Head: normocephalic, atraumatic  Eyes:No gross abnormalities.  ENT:  MMM no lesions  Neck:  supple and no masses, tracheostomy in place, no bleeding  Chest : Vent sounds, no wheezing, no rales, nontender, tympanic  Heart:: Heart sounds are normal.  Regular rate and rhythm without murmur, gallop or rub.  ABD:  symmetric, soft, non-tender, no guarding or rebound  Musculoskeletal : no cyanosis, no clubbing, and no edema  Neuro:   Awake, stronger, follows  \"WBCUA\", \"RBCUA\", \"MUCUS\", \"TRICHOMONAS\", \"YEAST\", \"BACTERIA\", \"CLARITYU\", \"SPECGRAV\", \"LEUKOCYTESUR\", \"UROBILINOGEN\", \"BILIRUBINUR\", \"BLOODU\", \"GLUCOSEU\", \"AMORPHOUS\" in the last 72 hours.    Invalid input(s): \"KETONESU\"    Cultures:  Rhinovirus  Films:   CXR films reviewed by me and it showed congested, possible left lower lobe infiltrate      Assessment:  This is a critically ill patient at risk of deterioration / death , needing close ICU monitoring and intervention due to below noted problems   Acute on chronic renal failure currently on HD   Rhinovirus bronchitis/pneumonia with bacterial coinfection  Left lower lobe pneumonia  Septic shock  Acute hypoxic respiratory failure currently BiPAP dependent  Acute encephalopathy  Septic shock, shock physiology resolved   Anion gap metabolic acidosis secondary to renal failure  A-fib status post Watchman procedure  History of DVT not on chronic anticoagulation  Status post renal transplant, immunocompromise  Pulmonary hypertension, group 2 and 3     Recommendation  Vent support lung protective strategy  head of the bed 30°  Pressure support trial as tolerated,  No sedation   TV on, natural light, avoid benzos, pain control, early mobility, and family engagement  PUD prophylaxis  DVT prophylaxis, resume heparin,   Target blood sugar 140-180  Monitor and replace electrolyte as needed  Continue current antibiotic  Continue Solu-Medrol 10 mg daily,    Continue  cyclosporine   CellCept on hold, will defer to nephrology  Physical therapy  PEG tube in place and tolerates tube feed           Due to the immediate potential for life-threatening deterioration due to shock I spent  35   minutes providing critical care.  This time is excluding time spent performing procedures.          Electronically signed by Carlotta Yadav MD,  [facility] ,on 1/3/2025 at 10:22 AM

## 2025-01-03 NOTE — PROGRESS NOTES
Attempted trach collar at 50%.  Patient's rr increased to the 50's with increased labored breathing.  Patient started shaking his head in distress.  Patient back on ventilator on cpap mode.  Patient seems to be tolerating cpap mode fine with a rr of 25 and vt of 320.

## 2025-01-03 NOTE — PLAN OF CARE
RN  Outcome: Progressing     Problem: Safety - Adult  Goal: Free from fall injury  1/2/2025 1934 by Farrah Lam RN  Outcome: Progressing  1/2/2025 1934 by Farrah Lam RN  Outcome: Progressing     Problem: Skin/Tissue Integrity  Goal: Absence of new skin breakdown  Description: 1.  Monitor for areas of redness and/or skin breakdown  2.  Assess vascular access sites hourly  3.  Every 4-6 hours minimum:  Change oxygen saturation probe site  4.  Every 4-6 hours:  If on nasal continuous positive airway pressure, respiratory therapy assess nares and determine need for appliance change or resting period.  1/2/2025 1934 by Farrah Lam RN  Outcome: Progressing  1/2/2025 1934 by Farrah Lam RN  Outcome: Progressing     Problem: Neurosensory - Adult  Goal: Achieves stable or improved neurological status  1/2/2025 1934 by Farrah Lam RN  Outcome: Progressing  Flowsheets (Taken 1/2/2025 0800)  Achieves stable or improved neurological status:   Assess for and report changes in neurological status   Maintain blood pressure and fluid volume within ordered parameters to optimize cerebral perfusion and minimize risk of hemorrhage   Monitor temperature, glucose, and sodium. Initiate appropriate interventions as ordered  1/2/2025 1934 by Farrah Lam RN  Outcome: Progressing  Flowsheets (Taken 1/2/2025 0800)  Achieves stable or improved neurological status:   Assess for and report changes in neurological status   Maintain blood pressure and fluid volume within ordered parameters to optimize cerebral perfusion and minimize risk of hemorrhage   Monitor temperature, glucose, and sodium. Initiate appropriate interventions as ordered     Problem: Respiratory - Adult  Goal: Achieves optimal ventilation and oxygenation  1/2/2025 1934 by Farrah Lam RN  Outcome: Progressing  1/2/2025 1934 by Farrah Lam RN  Outcome: Progressing     Problem: Cardiovascular - Adult  Goal: Maintains optimal  LISANDRA Yan  Outcome: Progressing  Flowsheets (Taken 1/2/2025 0800)  Return Mobility to Safest Level of Function:   Assess patient stability and activity tolerance for standing, transferring and ambulating with or without assistive devices   Obtain physical therapy/occupational therapy consults as needed  1/2/2025 1934 by Farrah Lam RN  Outcome: Progressing  Flowsheets (Taken 1/2/2025 0800)  Return Mobility to Safest Level of Function:   Assess patient stability and activity tolerance for standing, transferring and ambulating with or without assistive devices   Obtain physical therapy/occupational therapy consults as needed     Problem: Gastrointestinal - Adult  Goal: Minimal or absence of nausea and vomiting  1/2/2025 1934 by Farrah Lam RN  Outcome: Progressing  Flowsheets (Taken 1/2/2025 0800)  Minimal or absence of nausea and vomiting:   Administer ordered antiemetic medications as needed   Provide nonpharmacologic comfort measures as appropriate   Advance diet as tolerated, if ordered   Nutrition consult to assist patient with adequate nutrition and appropriate food choices   Administer IV fluids as ordered to ensure adequate hydration  1/2/2025 1934 by Farrah Lam RN  Outcome: Progressing  Flowsheets (Taken 1/2/2025 0800)  Minimal or absence of nausea and vomiting:   Administer ordered antiemetic medications as needed   Provide nonpharmacologic comfort measures as appropriate   Advance diet as tolerated, if ordered   Nutrition consult to assist patient with adequate nutrition and appropriate food choices   Administer IV fluids as ordered to ensure adequate hydration  Goal: Maintains or returns to baseline bowel function  1/2/2025 1934 by Farrah Lam RN  Outcome: Progressing  Flowsheets (Taken 1/2/2025 0800)  Maintains or returns to baseline bowel function:   Assess bowel function   Administer IV fluids as ordered to ensure adequate hydration   Nutrition consult to assist patient  medications to maintain glucose within target range  1/2/2025 1934 by Farrah Lam, RN  Outcome: Progressing  Flowsheets (Taken 1/2/2025 0800)  Glucose maintained within prescribed range:   Monitor blood glucose as ordered   Assess for signs and symptoms of hyperglycemia and hypoglycemia   Administer ordered medications to maintain glucose within target range     Problem: Hematologic - Adult  Goal: Maintains hematologic stability  1/2/2025 1934 by Farrah Lam, RN  Outcome: Progressing  Flowsheets (Taken 1/2/2025 0800)  Maintains hematologic stability:   Assess for signs and symptoms of bleeding or hemorrhage   Monitor labs for bleeding or clotting disorders  1/2/2025 1934 by Farrah Lam, RN  Outcome: Progressing  Flowsheets (Taken 1/2/2025 0800)  Maintains hematologic stability:   Assess for signs and symptoms of bleeding or hemorrhage   Monitor labs for bleeding or clotting disorders     Problem: Decision Making  Goal: Pt/Family able to effectively weigh alternatives and participate in decision making related to treatment and care  Description: INTERVENTIONS:  1. Determine when there are differences between patient's view, family's view, and healthcare provider's view of condition  2. Facilitate patient and family articulation of goals for care  3. Help patient and family identify pros/cons of alternative solutions  4. Provide information as requested by patient/family  5. Respect patient/family right to receive or not to receive information  6. Serve as a liaison between patient and family and health care team  7. Initiate Consults from Ethics, Palliative Care or initiate Family Care Conference as is appropriate  1/2/2025 1934 by Farrah Lam, RN  Outcome: Progressing  Flowsheets (Taken 1/2/2025 0800)  Patient/family able to effectively weigh alternatives and participate in decision making related to treatment and care:   Provide information as requested by patient/family   Respect

## 2025-01-03 NOTE — PLAN OF CARE
Problem: Nutrition Deficit:  Goal: Optimize nutritional status  1/3/2025 1108 by Shiloh Marie RD, LD  Outcome: Progressing  Flowsheets (Taken 12/19/2024 1024)  Nutrient intake appropriate for improving, restoring, or maintaining nutritional needs:   Assess nutritional status and recommend course of action   Recommend, monitor, and adjust tube feedings and TPN/PPN based on assessed needs   Recommend appropriate diets, oral nutritional supplements, and vitamin/mineral supplements   Monitor oral intake, labs, and treatment plans  1/3/2025 1104 by Shiloh Marie RD, LD  Outcome: Progressing  Flowsheets (Taken 12/19/2024 1024)  Nutrient intake appropriate for improving, restoring, or maintaining nutritional needs:   Assess nutritional status and recommend course of action   Recommend, monitor, and adjust tube feedings and TPN/PPN based on assessed needs   Recommend appropriate diets, oral nutritional supplements, and vitamin/mineral supplements   Monitor oral intake, labs, and treatment plans

## 2025-01-03 NOTE — PROGRESS NOTES
Spiritual Care Services     Summary of Visit:  Patient was resting.  No family was present.    Encounter Summary  Encounter Overview/Reason: Interdisciplinary rounds  Service Provided For: Patient  Support System: Spouse  Complexity of Encounter: Low  Begin Time: 0915  End Time : 0920  Total Time Calculated: 5 min        Spiritual/Emotional needs  Type: Spiritual Support           Palliative Care  Type: Palliative Care, Follow-up       Spiritual Assessment/Intervention/Outcomes:    Assessment: Unable to assess (Patient resting)    Intervention: Empowerment, Nurtured Hope    Outcome: Coping      Care Plan:    Plan and Referrals  Plan/Referrals: Contacted support as requested per patient/family request Plan     will remain available as needed.      Spiritual Care Services   Electronically signed by Chaplain Romario on 1/3/2025 at 10:40 AM.    To reach a  for emotional and spiritual support, place an EPIC consult request.   If a  is needed immediately, dial “0” and ask to page the on-call .

## 2025-01-03 NOTE — PROGRESS NOTES
Physical Therapy Med Surg Initial Assessment  Facility/Department: [facility] ICU  Room: [place] [address]       NAME: Remy Upton  : 1950 (74 y.o.)  MRN: [id number]  CODE STATUS: Full Code    Date of Service: 1/3/2025    Patient Diagnosis(es): Rhinovirus [B34.8]  S/P kidney transplant [Z94.0]  DELORES (acute kidney injury) (HCC) [N17.9]  Acute respiratory failure with hypoxia [J96.01]  Acute kidney injury superimposed on CKD (HCC) [N17.9, N18.9]   Chief Complaint   Patient presents with    Illness     Patient Active Problem List    Diagnosis Date Noted    Chest pain 2022    GI bleeding 10/29/2022    Lung nodules 10/25/2022    COPD without exacerbation (HCC) 10/25/2022    Abnormal CT of the chest 10/25/2022    Bronchiectasis without complication (HCC) 10/25/2022    Dieulafoy lesion of colon 10/24/2022    Poorly controlled diabetes mellitus (HCC) 10/24/2022    Symptomatic anemia 10/19/2022    Acute upper GI bleed 10/19/2022    Atrial fibrillation (HCC) 2022    Unstable angina (HCC) 2022    Unable to eat 2025    Lower GI bleed 2024    Palliative care encounter 2024    Goals of care, counseling/discussion 2024    Advanced care planning/counseling discussion 2024    Encounter for hospice care discussion 2024    Moderate malnutrition (HCC) 2024    Atrial fibrillation with RVR (HCC) 2024    At risk for stroke 10/14/2024    Deep vein thrombosis (DVT) of lower extremity (HCC) 10/14/2024    Localized swelling of both lower legs 10/14/2024    Peripheral nerve disease 10/14/2024    Rhabdomyolysis 10/14/2024    Leukocytes in urine 10/14/2024    Bilateral hearing loss 10/10/2024    Acute cystitis without hematuria 2024    Bilateral lower extremity edema 2024    Dysuria 2024    Gross hematuria 2024    Adjustment disorder 2024    Immunosuppression due to drug therapy (HCC) 2024    DELORES (acute kidney injury) ([facility]) 2024

## 2025-01-03 NOTE — PROGRESS NOTES
0800- morning assessment completed. See flowsheet for complete documentation. Patient awake and following commands. He responds to questions with nods/ gestures appropriately. He denies pain. Patient assessed and repositioned in bed. He has multiple skin tears/ wounds/ DTIs. Patient has rectal tube but is leaking large amount of brown, watery stool around tubing. Rectal tube deflated and reinflated with 45cc water. Patient tolerated well. Arms elevated on pillows to help with swelling/weeping. Heel boots applied to protect feet.     1250- patient became hypotensive during dialysis treatment. Dialysis RN incontact with nephrology. ICU NP made aware of hypotension and placed orders for albumin and levophed for pressure support. Patient awake and alert, responding to yes/no questions with nods and gestures.     1300- Blood pressure improving prior to initiating levophed. NP made aware and medication returned to cabinet.     1500- PRN hydralazine administered for hypertension. Patient to undergo PEG placement at bedside this afternoon.     1630- PEG placement complete. Patient tolerated well. Family present in waiting area and updated by physician. Np aware patient remains hypertensive and lopressor administered at this time.     Electronically signed by Farrah Lam RN on 1/2/2025 at 7:42 PM

## 2025-01-04 LAB
ALBUMIN SERPL-MCNC: 2.9 G/DL (ref 3.5–4.6)
ANION GAP SERPL CALCULATED.3IONS-SCNC: 16 MEQ/L (ref 9–15)
BASOPHILS # BLD: 0 K/UL (ref 0–0.2)
BASOPHILS NFR BLD: 0.1 %
BUN SERPL-MCNC: 57 MG/DL (ref 8–23)
CALCIUM SERPL-MCNC: 8.3 MG/DL (ref 8.5–9.9)
CHLORIDE SERPL-SCNC: 93 MEQ/L (ref 95–107)
CO2 SERPL-SCNC: 26 MEQ/L (ref 20–31)
CREAT SERPL-MCNC: 2.62 MG/DL (ref 0.7–1.2)
EOSINOPHIL # BLD: 0.1 K/UL (ref 0–0.7)
EOSINOPHIL NFR BLD: 0.6 %
ERYTHROCYTE [DISTWIDTH] IN BLOOD BY AUTOMATED COUNT: 17.2 % (ref 11.5–14.5)
GLUCOSE BLD-MCNC: 127 MG/DL (ref 70–99)
GLUCOSE BLD-MCNC: 143 MG/DL (ref 70–99)
GLUCOSE BLD-MCNC: 150 MG/DL (ref 70–99)
GLUCOSE BLD-MCNC: 176 MG/DL (ref 70–99)
GLUCOSE BLD-MCNC: 187 MG/DL (ref 70–99)
GLUCOSE BLD-MCNC: 240 MG/DL (ref 70–99)
GLUCOSE SERPL-MCNC: 204 MG/DL (ref 70–99)
HCT VFR BLD AUTO: 27.2 % (ref 42–52)
HGB BLD-MCNC: 8.3 G/DL (ref 14–18)
LYMPHOCYTES # BLD: 0.3 K/UL (ref 1–4.8)
LYMPHOCYTES NFR BLD: 3.9 %
MCH RBC QN AUTO: 28.7 PG (ref 27–31.3)
MCHC RBC AUTO-ENTMCNC: 30.5 % (ref 33–37)
MCV RBC AUTO: 94.1 FL (ref 79–92.2)
MONOCYTES # BLD: 0.8 K/UL (ref 0.2–0.8)
MONOCYTES NFR BLD: 8.8 %
NEUTROPHILS # BLD: 7.2 K/UL (ref 1.4–6.5)
NEUTS SEG NFR BLD: 83.9 %
PERFORMED ON: ABNORMAL
PHOSPHATE SERPL-MCNC: 3.4 MG/DL (ref 2.3–4.8)
PLATELET # BLD AUTO: 222 K/UL (ref 130–400)
POTASSIUM SERPL-SCNC: 3.7 MEQ/L (ref 3.4–4.9)
RBC # BLD AUTO: 2.89 M/UL (ref 4.7–6.1)
SODIUM SERPL-SCNC: 135 MEQ/L (ref 135–144)
WBC # BLD AUTO: 8.5 K/UL (ref 4.8–10.8)

## 2025-01-04 PROCEDURE — 94003 VENT MGMT INPAT SUBQ DAY: CPT

## 2025-01-04 PROCEDURE — 6360000002 HC RX W HCPCS: Performed by: INTERNAL MEDICINE

## 2025-01-04 PROCEDURE — 2700000000 HC OXYGEN THERAPY PER DAY

## 2025-01-04 PROCEDURE — 94660 CPAP INITIATION&MGMT: CPT

## 2025-01-04 PROCEDURE — 2000000000 HC ICU R&B

## 2025-01-04 PROCEDURE — 89220 SPUTUM SPECIMEN COLLECTION: CPT

## 2025-01-04 PROCEDURE — 51798 US URINE CAPACITY MEASURE: CPT

## 2025-01-04 PROCEDURE — 6370000000 HC RX 637 (ALT 250 FOR IP): Performed by: NURSE PRACTITIONER

## 2025-01-04 PROCEDURE — 97167 OT EVAL HIGH COMPLEX 60 MIN: CPT

## 2025-01-04 PROCEDURE — 85025 COMPLETE CBC W/AUTO DIFF WBC: CPT

## 2025-01-04 PROCEDURE — 90935 HEMODIALYSIS ONE EVALUATION: CPT

## 2025-01-04 PROCEDURE — 36415 COLL VENOUS BLD VENIPUNCTURE: CPT

## 2025-01-04 PROCEDURE — 6370000000 HC RX 637 (ALT 250 FOR IP): Performed by: INTERNAL MEDICINE

## 2025-01-04 PROCEDURE — 80069 RENAL FUNCTION PANEL: CPT

## 2025-01-04 PROCEDURE — 2500000003 HC RX 250 WO HCPCS: Performed by: INTERNAL MEDICINE

## 2025-01-04 PROCEDURE — 99233 SBSQ HOSP IP/OBS HIGH 50: CPT | Performed by: INTERNAL MEDICINE

## 2025-01-04 RX ADMIN — Medication 10 ML: at 09:04

## 2025-01-04 RX ADMIN — CYCLOSPORINE 50 MG: 100 SOLUTION ORAL at 09:05

## 2025-01-04 RX ADMIN — METOPROLOL TARTRATE 12.5 MG: 25 TABLET, FILM COATED ORAL at 09:01

## 2025-01-04 RX ADMIN — INSULIN LISPRO 4 UNITS: 100 INJECTION, SOLUTION INTRAVENOUS; SUBCUTANEOUS at 06:24

## 2025-01-04 RX ADMIN — PREDNISONE 5 MG: 10 TABLET ORAL at 09:01

## 2025-01-04 RX ADMIN — AMIODARONE HYDROCHLORIDE 200 MG: 200 TABLET ORAL at 21:54

## 2025-01-04 RX ADMIN — HEPARIN SODIUM 5000 UNITS: 5000 INJECTION INTRAVENOUS; SUBCUTANEOUS at 05:50

## 2025-01-04 RX ADMIN — LANSOPRAZOLE 30 MG: 30 TABLET, ORALLY DISINTEGRATING, DELAYED RELEASE ORAL at 05:50

## 2025-01-04 RX ADMIN — DOCUSATE SODIUM 100 MG: 50 LIQUID ORAL at 09:03

## 2025-01-04 RX ADMIN — EPOETIN ALFA-EPBX 10000 UNITS: 10000 INJECTION, SOLUTION INTRAVENOUS; SUBCUTANEOUS at 18:16

## 2025-01-04 RX ADMIN — Medication 10 ML: at 21:00

## 2025-01-04 RX ADMIN — POLYETHYLENE GLYCOL 3350 17 G: 17 POWDER, FOR SOLUTION ORAL at 09:03

## 2025-01-04 RX ADMIN — DOCUSATE SODIUM 100 MG: 50 LIQUID ORAL at 21:54

## 2025-01-04 RX ADMIN — INSULIN LISPRO 4 UNITS: 100 INJECTION, SOLUTION INTRAVENOUS; SUBCUTANEOUS at 14:38

## 2025-01-04 RX ADMIN — METOPROLOL TARTRATE 12.5 MG: 25 TABLET, FILM COATED ORAL at 21:54

## 2025-01-04 RX ADMIN — AMIODARONE HYDROCHLORIDE 200 MG: 200 TABLET ORAL at 09:03

## 2025-01-04 RX ADMIN — HEPARIN SODIUM 5000 UNITS: 5000 INJECTION INTRAVENOUS; SUBCUTANEOUS at 21:54

## 2025-01-04 RX ADMIN — HEPARIN SODIUM 5000 UNITS: 5000 INJECTION INTRAVENOUS; SUBCUTANEOUS at 14:29

## 2025-01-04 ASSESSMENT — PULMONARY FUNCTION TESTS
PIF_VALUE: 19
PIF_VALUE: 18
PIF_VALUE: 18
PIF_VALUE: 21
PIF_VALUE: 18
PIF_VALUE: 19
PIF_VALUE: 18
PIF_VALUE: 17
PIF_VALUE: 18
PIF_VALUE: 20
PIF_VALUE: 17
PIF_VALUE: 19
PIF_VALUE: 19
PIF_VALUE: 17
PIF_VALUE: 18
PIF_VALUE: 19
PIF_VALUE: 16
PIF_VALUE: 18
PIF_VALUE: 21
PIF_VALUE: 19
PIF_VALUE: 18
PIF_VALUE: 19
PIF_VALUE: 19
PIF_VALUE: 18
PIF_VALUE: 20
PIF_VALUE: 20
PIF_VALUE: 19
PIF_VALUE: 19
PIF_VALUE: 17
PIF_VALUE: 17
PIF_VALUE: 18
PIF_VALUE: 19
PIF_VALUE: 18
PIF_VALUE: 18
PIF_VALUE: 19
PIF_VALUE: 17
PIF_VALUE: 16

## 2025-01-04 ASSESSMENT — PAIN SCALES - WONG BAKER: WONGBAKER_NUMERICALRESPONSE: NO HURT

## 2025-01-04 ASSESSMENT — PAIN SCALES - GENERAL
PAINLEVEL_OUTOF10: 0
PAINLEVEL_OUTOF10: 0

## 2025-01-04 NOTE — PROGRESS NOTES
Hospitalist Progress Note      PCP: Tico Rodriguez DO    Date of Admission: 12/18/2024    Chief Complaint:  patient intubated/sedated this morning. Discussed with RN, he was doing ok on CPAP but intensivist was concerned about his weakness.     Medications:  Reviewed    Infusion Medications    dextrose      sodium chloride       Scheduled Medications    albumin human 25%  50 g IntraVENous Once    metoprolol tartrate  12.5 mg Oral BID    lansoprazole  30 mg Oral QAM AC    predniSONE  5 mg Oral Daily    [Held by provider] insulin glargine  40 Units SubCUTAneous Daily    epoetin megan-epbx  10,000 Units SubCUTAneous Weekly    cycloSPORINE  50 mg Oral Daily    [Held by provider] mycophenolate  250 mg Oral BID    insulin lispro  0-16 Units SubCUTAneous Q4H    polyethylene glycol  17 g Oral Daily    docusate  100 mg Oral BID    amiodarone  200 mg Oral BID    sodium chloride flush  5-40 mL IntraVENous 2 times per day    heparin (porcine)  5,000 Units SubCUTAneous 3 times per day    [Held by provider] mycophenolate  500 mg Oral BID     PRN Meds: oxyCODONE-acetaminophen **OR** oxyCODONE-acetaminophen, fentanNYL, metoprolol, hydrALAZINE, glucose, dextrose bolus **OR** dextrose bolus, glucagon (rDNA), dextrose, sodium chloride flush, sodium chloride, ondansetron **OR** ondansetron, acetaminophen **OR** acetaminophen, ipratropium 0.5 mg-albuterol 2.5 mg      Intake/Output Summary (Last 24 hours) at 1/4/2025 1852  Last data filed at 1/4/2025 1810  Gross per 24 hour   Intake 1250 ml   Output 1950 ml   Net -700 ml       Exam:    /62   Pulse 88   Temp 97.5 °F (36.4 °C)   Resp 17   Ht 1.88 m (6' 2\")   Wt 77.2 kg (170 lb 3.1 oz)   SpO2 100%   BMI 21.85 kg/m²     General appearance: sedated  Lungs: diminished bilaterally  Heart: S1/S2,irregular  Abdomen: soft  Extremities: edema present bilateral UE      Labs:   Recent Labs     01/02/25  0403 01/03/25  0626 01/04/25  0457   WBC 7.3 7.9 8.5   HGB 8.5* 9.1* 8.3*   HCT

## 2025-01-04 NOTE — PROGRESS NOTES
[id number]: Bedside report obtained from RN, after which this RN assumed care of pt.     2000-2130: Shift assessments completed and documented, see flowsheets. ARGENIS orientation, pt trach on ventilator, follows commands and nods appropriately.  however blood sample included some serous fluid r/t weeping, rechecked on ear and obtained 238 on recheck, insulin given per this reading, see MAR. Medications administered per MAR. Bed alarm on.    0000: PEG to bolus TF per orders.    0600: Pt had uneventful shift. See MAR for titrations and medications administered throughout shift.

## 2025-01-04 NOTE — PROGRESS NOTES
Hospitalist Progress Note      PCP: Tico Rodriguez DO    Date of Admission: 12/18/2024    Chief Complaint:  patient intubated/sedated this morning. Discussed with RN, he was doing ok on CPAP but intensivist was concerned about his weakness.     Medications:  Reviewed    Infusion Medications    dextrose      sodium chloride       Scheduled Medications    albumin human 25%  50 g IntraVENous Once    metoprolol tartrate  12.5 mg Oral BID    lansoprazole  30 mg Oral QAM AC    predniSONE  5 mg Oral Daily    [Held by provider] insulin glargine  40 Units SubCUTAneous Daily    epoetin megan-epbx  10,000 Units SubCUTAneous Weekly    cycloSPORINE  50 mg Oral Daily    [Held by provider] mycophenolate  250 mg Oral BID    insulin lispro  0-16 Units SubCUTAneous Q4H    polyethylene glycol  17 g Oral Daily    docusate  100 mg Oral BID    amiodarone  200 mg Oral BID    sodium chloride flush  5-40 mL IntraVENous 2 times per day    heparin (porcine)  5,000 Units SubCUTAneous 3 times per day    [Held by provider] mycophenolate  500 mg Oral BID     PRN Meds: oxyCODONE-acetaminophen **OR** oxyCODONE-acetaminophen, fentanNYL, metoprolol, hydrALAZINE, glucose, dextrose bolus **OR** dextrose bolus, glucagon (rDNA), dextrose, sodium chloride flush, sodium chloride, ondansetron **OR** ondansetron, acetaminophen **OR** acetaminophen, ipratropium 0.5 mg-albuterol 2.5 mg      Intake/Output Summary (Last 24 hours) at 1/4/2025 1852  Last data filed at 1/4/2025 1810  Gross per 24 hour   Intake 1250 ml   Output 1950 ml   Net -700 ml       Exam:    /62   Pulse 88   Temp 97.5 °F (36.4 °C)   Resp 17   Ht 1.88 m (6' 2\")   Wt 77.2 kg (170 lb 3.1 oz)   SpO2 100%   BMI 21.85 kg/m²     General appearance: sedated  Lungs: diminished bilaterally  Heart: S1/S2,irregular  Abdomen: soft  Extremities: edema present bilateral UE      Labs:   Recent Labs     01/02/25  0403 01/03/25  0626 01/04/25  0457   WBC 7.3 7.9 8.5   HGB 8.5* 9.1* 8.3*   HCT  duplex hemodialysis access right   Final Result   1.  Fistula is a radial artery to cephalic outflow vein fistula. The fistula is   widely patent with cephalic blood flow volume of 821 cc/min which is sufficient   for dialysis. Radial artery flow volume is 774 cc/min which is appropriate for a   patent fistula. No evidence of stenosis or thrombus.      COMPARISON:No prior studies are available for comparison.      DIAGNOSIS: Patient with right forearm fistula, experiencing flow-volume   problems.      COMMENTS: What reading provider will be dictating this exam?->[facility]      TECHNIQUE: Grayscale and color Doppler ultrasound of right arm fistula      FINDINGS:       Radial ARTERY FLOW: 774 milliliters per minute.      CEPHALIC VEIN:   Flow: 821 milliliters per.   The cephalic vein is widely patent without any evidence of thrombus or stenosis.         Electronically signed by Joce Campos      CT HEAD WO CONTRAST   Final Result   No acute intracranial findings.         XR CHEST ABDOMEN NG PLACEMENT   Final Result   1. Endotracheal tube in satisfactory position.   2. Nasogastric tube with its tip in the gastric fundus but the proximal port   is above the GE junction. Recommend advancing the NG tube 10 cm to place the   proximal port below the GE junction.   3. New mild congestive failure.   4. New tiny right pleural effusion.   5. Moderate left lower lobe consolidation from either atelectasis or   pneumonia.      RECOMMENDATION:   Recommended advancing the NG tube 10 cm to place the tip below the GE   junction.         XR CHEST PORTABLE   Final Result   Emphysema.  No focal consolidation.         XR CHEST PORTABLE   Final Result   1. Cardiomegaly.   2. Bronchiectasis.   3. No active airspace consolidation.                 Assessment/Plan:    73 y.o. male NH resident with history of HTN, PAF, DM2, ESRD/CKD4 s/p kidney transplant x 2, BPH / urine retention , anemia/lower GI bleed requiring PRBC transfusion, depression,

## 2025-01-04 NOTE — PLAN OF CARE
Problem: Chronic Conditions and Co-morbidities  Goal: Patient's chronic conditions and co-morbidity symptoms are monitored and maintained or improved  Outcome: Progressing  Flowsheets (Taken 1/3/2025 2000)  Care Plan - Patient's Chronic Conditions and Co-Morbidity Symptoms are Monitored and Maintained or Improved: Collaborate with multidisciplinary team to address chronic and comorbid conditions and prevent exacerbation or deterioration     Problem: Discharge Planning  Goal: Discharge to home or other facility with appropriate resources  Outcome: Progressing  Flowsheets (Taken 1/3/2025 2000)  Discharge to home or other facility with appropriate resources: Identify barriers to discharge with patient and caregiver     Problem: Pain  Goal: Verbalizes/displays adequate comfort level or baseline comfort level  Outcome: Progressing     Problem: Safety - Adult  Goal: Free from fall injury  Outcome: Progressing     Problem: Skin/Tissue Integrity  Goal: Absence of new skin breakdown  Description: 1.  Monitor for areas of redness and/or skin breakdown  2.  Assess vascular access sites hourly  3.  Every 4-6 hours minimum:  Change oxygen saturation probe site  4.  Every 4-6 hours:  If on nasal continuous positive airway pressure, respiratory therapy assess nares and determine need for appliance change or resting period.  Outcome: Progressing     Problem: Neurosensory - Adult  Goal: Achieves stable or improved neurological status  Outcome: Progressing  Flowsheets (Taken 1/3/2025 2000)  Achieves stable or improved neurological status:   Assess for and report changes in neurological status   Initiate measures to prevent increased intracranial pressure     Problem: Respiratory - Adult  Goal: Achieves optimal ventilation and oxygenation  Outcome: Progressing  Flowsheets (Taken 1/3/2025 2000)  Achieves optimal ventilation and oxygenation:   Assess for changes in respiratory status   Assess for changes in mentation and behavior      Problem: Cardiovascular - Adult  Goal: Maintains optimal cardiac output and hemodynamic stability  Outcome: Progressing  Flowsheets (Taken 1/3/2025 2000)  Maintains optimal cardiac output and hemodynamic stability:   Monitor blood pressure and heart rate   Monitor urine output and notify Licensed Independent Practitioner for values outside of normal range  Goal: Absence of cardiac dysrhythmias or at baseline  Outcome: Progressing  Flowsheets (Taken 1/3/2025 2000)  Absence of cardiac dysrhythmias or at baseline:   Monitor cardiac rate and rhythm   Assess for signs of decreased cardiac output     Problem: Skin/Tissue Integrity - Adult  Goal: Skin integrity remains intact  Outcome: Progressing  Flowsheets (Taken 1/3/2025 2000)  Skin Integrity Remains Intact:   Monitor for areas of redness and/or skin breakdown   Assess vascular access sites hourly     Problem: Musculoskeletal - Adult  Goal: Return mobility to safest level of function  Outcome: Progressing  Flowsheets (Taken 1/3/2025 2000)  Return Mobility to Safest Level of Function:   Assess patient stability and activity tolerance for standing, transferring and ambulating with or without assistive devices   Assist with transfers and ambulation using safe patient handling equipment as needed     Problem: Gastrointestinal - Adult  Goal: Minimal or absence of nausea and vomiting  Outcome: Progressing  Flowsheets (Taken 1/3/2025 2000)  Minimal or absence of nausea and vomiting:   Administer ordered antiemetic medications as needed   Advance diet as tolerated, if ordered  Goal: Maintains or returns to baseline bowel function  Outcome: Progressing  Flowsheets (Taken 1/3/2025 2000)  Maintains or returns to baseline bowel function: Assess bowel function  Goal: Maintains adequate nutritional intake  Outcome: Progressing  Flowsheets (Taken 1/3/2025 2000)  Maintains adequate nutritional intake:   Monitor percentage of each meal consumed   Monitor intake and output, weight and  family's view, and healthcare provider's view of condition  2. Facilitate patient and family articulation of goals for care  3. Help patient and family identify pros/cons of alternative solutions  4. Provide information as requested by patient/family  5. Respect patient/family right to receive or not to receive information  6. Serve as a liaison between patient and family and health care team  7. Initiate Consults from Ethics, Palliative Care or initiate Family Care Conference as is appropriate  Outcome: Progressing  Flowsheets (Taken 1/3/2025 2000)  Patient/family able to effectively weigh alternatives and participate in decision making related to treatment and care:   Determine when there are differences between patient's view, family's view, and healthcare provider's view of condition   Facilitate patient and family articulation of goals for care     Problem: Nutrition Deficit:  Goal: Optimize nutritional status  1/3/2025 2307 by Rylee Padilla RN  Outcome: Progressing  1/3/2025 1108 by Shiloh Marie RD, LD  Outcome: Progressing  Flowsheets (Taken 12/19/2024 1024)  Nutrient intake appropriate for improving, restoring, or maintaining nutritional needs:   Assess nutritional status and recommend course of action   Recommend, monitor, and adjust tube feedings and TPN/PPN based on assessed needs   Recommend appropriate diets, oral nutritional supplements, and vitamin/mineral supplements   Monitor oral intake, labs, and treatment plans  1/3/2025 1104 by Shiloh Marie RD, LD  Outcome: Progressing  Flowsheets (Taken 12/19/2024 1024)  Nutrient intake appropriate for improving, restoring, or maintaining nutritional needs:   Assess nutritional status and recommend course of action   Recommend, monitor, and adjust tube feedings and TPN/PPN based on assessed needs   Recommend appropriate diets, oral nutritional supplements, and vitamin/mineral supplements   Monitor oral intake, labs, and treatment plans     Problem:

## 2025-01-04 NOTE — PROGRESS NOTES
[facility] OCCUPATIONAL THERAPY EVALUATION - ACUTE     NAME: Remy Upton  : 1950 (74 y.o.)  MRN: [id number]  CODE STATUS: Full Code  Room: [place] [address]    Date of Service: 2025    Patient Diagnosis(es): Rhinovirus [B34.8]  S/P kidney transplant [Z94.0]  DELORES (acute kidney injury) (HCC) [N17.9]  Acute respiratory failure with hypoxia [J96.01]  Acute kidney injury superimposed on CKD (HCC) [N17.9, N18.9]   Patient Active Problem List    Diagnosis Date Noted    Chest pain 2022    GI bleeding 10/29/2022    Lung nodules 10/25/2022    COPD without exacerbation (HCC) 10/25/2022    Abnormal CT of the chest 10/25/2022    Bronchiectasis without complication (HCC) 10/25/2022    Dieulafoy lesion of colon 10/24/2022    Poorly controlled diabetes mellitus (HCC) 10/24/2022    Symptomatic anemia 10/19/2022    Acute upper GI bleed 10/19/2022    Atrial fibrillation (HCC) 2022    Unstable angina (HCC) 2022    Unable to eat 2025    Lower GI bleed 2024    Palliative care encounter 2024    Goals of care, counseling/discussion 2024    Advanced care planning/counseling discussion 2024    Encounter for hospice care discussion 2024    Moderate malnutrition (HCC) 2024    Atrial fibrillation with RVR ([facility]) 2024    At risk for stroke 10/14/2024    Deep vein thrombosis (DVT) of lower extremity ([facility]) 10/14/2024    Localized swelling of both lower legs 10/14/2024    Peripheral nerve disease 10/14/2024    Rhabdomyolysis 10/14/2024    Leukocytes in urine 10/14/2024    Bilateral hearing loss 10/10/2024    Acute cystitis without hematuria 2024    Bilateral lower extremity edema 2024    Dysuria 2024    Gross hematuria 2024    Adjustment disorder 2024    Immunosuppression due to drug therapy (HCC) 2024    DELORES (acute kidney injury) (HCC) 2024    Elevated BUN 2024    Urinary retention 2024    Muscle weakness  re-education, Cognitive reorientation, Safety education & training, Patient/Caregiver education & training, Equipment evaluation, education, & procurement, Positioning, Self-Care / ADL, Cognitive/Perceptual training, Coordination training    Goals:   Patient will:    - Improve functional endurance to tolerate/complete 8-10 mins of ADL's  - Be Max A  in UB ADLs   - Be Max A in LB ADLs  - Be Max A in ADL transfers without LOB  - Be Max A in toileting tasks  - Improve B UE strength and endurance to increase by 1/2 MMT grade  in order to participate in self-care activities as projected.  - Sequence self-care tasks with 2 or less verbal/tactile cues     Patient Goal:    not stated        Therapy Time:   Individual   Time In 1020   Time Out 1034   Minutes 14          Eval: 14 minutes     Electronically signed by:    FRANCISCO Pruitt/L,   1/4/2025, 10:47 AM

## 2025-01-04 NOTE — FLOWSHEET NOTE
01/04/25 1810   Vital Signs   /62   Temp 97.5 °F (36.4 °C)   Pulse 88   Respirations 17   Post-Hemodialysis Assessment   Post-Treatment Procedures Blood returned;Access bleeding time < 10 minutes   Machine Disinfection Process Acid/Vinegar Clean;Heat Disinfect;Exterior Machine Disinfection   Dialyzer Clearance Lightly streaked   Duration of Treatment (minutes) 210 minutes   Heparin Amount Administered During Treatment (mL) 0 mL   Hemodialysis Intake (ml) 400 ml   Hemodialysis Output (ml) 1400 ml   NET Removed (ml) 1000   Tolerated Treatment Good   Patient Response to Treatment Needles pulled. Hemostasis achieved. Dressing applied. Pt stable   Bilateral Breath Sounds Diminished   Edema Generalized   Edema Generalized Anasarca   Time Off 1755   Patient Disposition Remain in ICU/ED   Observations & Evaluations   Level of Consciousness 0   Heart Rhythm Regular   Respiratory Quality/Effort Unlabored   FiO2  30 %   O2 Device Ventilator

## 2025-01-04 NOTE — FLOWSHEET NOTE
Shift Summary  [id number]    [id number]  Bedside report received from LISANDRA Mckee.     This patient began CPAP at 0745. Dialysis RN at the bedside at 1400. Vitals stable throughout the day. Assist control initiated at 1645, successful CPAP completed until that time.     0309-0625  Vtials continue to remain stable, patient's wife at the bedside. All questions answered, no further needs at this time. Patient had excess secretions and was suctioned via 14 fr, oropharyngeal approach.

## 2025-01-04 NOTE — PROGRESS NOTES
Pulmonary & Critical Care Medicine ICU Progress Note    Subjective:     No overnight events reported.  He is currently resting comfortably in bed without any complaints.  He was evaluated by nephrology earlier today.  He is awake and alert but remains very weak.    EXAM:  General: No acute distress, resting comfortably in bed  HEENT: Normocephalic, atraumatic, tracheostomy tube in place, left EJ in place  Lungs : Clear to auscultation bilaterally, no wheezes, no ventilator dyssynchrony  Heart: Regular rate and rhythm  ABD: Soft, positive bowel sounds, nontender to palpation  Extremities : Warm, dry, edema noted  Neuro: Awake, alert, weak  Skin: No rashes appreciated    MV Settings:  Vent Mode: AC/VC Resp Rate (Set): 14 bpm/Vt (Set, mL): 500 mL/ /FiO2 : 99 %       IV:   dextrose      sodium chloride         Vitals:  BP (!) 125/58   Pulse 87   Temp 97.4 °F (36.3 °C) (Axillary)   Resp 16   Ht 1.88 m (6' 2\")   Wt 77.2 kg (170 lb 3.1 oz)   SpO2 99%   BMI 21.85 kg/m²          Intake/Output Summary (Last 24 hours) at 1/4/2025 0745  Last data filed at 1/4/2025 0631  Gross per 24 hour   Intake 2382.7 ml   Output 550 ml   Net 1832.7 ml       Medications:  Scheduled Meds:   albumin human 25%  50 g IntraVENous Once    metoprolol tartrate  12.5 mg Oral BID    lansoprazole  30 mg Oral QAM AC    predniSONE  5 mg Oral Daily    [Held by provider] insulin glargine  40 Units SubCUTAneous Daily    epoetin megan-epbx  10,000 Units SubCUTAneous Weekly    cycloSPORINE  50 mg Oral Daily    [Held by provider] mycophenolate  250 mg Oral BID    insulin lispro  0-16 Units SubCUTAneous Q4H    polyethylene glycol  17 g Oral Daily    docusate  100 mg Oral BID    amiodarone  200 mg Oral BID    sodium chloride flush  5-40 mL IntraVENous 2 times per day    heparin (porcine)  5,000 Units SubCUTAneous 3 times per day    [Held by provider] mycophenolate  500 mg Oral BID       Labs:   CBC:   Recent Labs     01/02/25  0403 01/03/25  0626  negative.    CKD-nephrology does continue to follow.  He will undergo hemodialysis today.  He does remain on his steroids and cyclosporine given that he is status post renal transplant.  His CellCept remains on hold at this time.    Diabetes mellitus --his blood sugars are fairly well-controlled at this point.  Continue with sliding scale insulin.  He does continue with bolus tube feeds as well.  He had previously been on Lantus, however it does appear this was held yesterday.    Nutrition: Tube feeds as tolerated    Prophylaxis: Heparin subcu for DVT prophylaxis.  Prevacid for GI prophylaxis.    Code Status: Full code    He has improved clinically and is tolerating CPAP throughout the day.  At this time patient does appear stable from a critical care standpoint to be transition to the next level of care.  Per review of care coordinator notes it does appear the plan is [facility].  Hopefully this will occur soon.    Electronically signed by MARTIR JI DO, on 1/4/2025 at 7:45 AM

## 2025-01-04 NOTE — PROGRESS NOTES
See OT evaluation for all goals and OT POC. Electronically signed by Andi Hendricks OTR/L on 1/4/2025 at 10:49 AM

## 2025-01-05 LAB
ALBUMIN SERPL-MCNC: 3.2 G/DL (ref 3.5–4.6)
ANION GAP SERPL CALCULATED.3IONS-SCNC: 13 MEQ/L (ref 9–15)
BASOPHILS # BLD: 0 K/UL (ref 0–0.2)
BASOPHILS NFR BLD: 0.2 %
BUN SERPL-MCNC: 37 MG/DL (ref 8–23)
CALCIUM SERPL-MCNC: 8.7 MG/DL (ref 8.5–9.9)
CHLORIDE SERPL-SCNC: 97 MEQ/L (ref 95–107)
CO2 SERPL-SCNC: 29 MEQ/L (ref 20–31)
CREAT SERPL-MCNC: 1.66 MG/DL (ref 0.7–1.2)
EOSINOPHIL # BLD: 0.1 K/UL (ref 0–0.7)
EOSINOPHIL NFR BLD: 0.5 %
ERYTHROCYTE [DISTWIDTH] IN BLOOD BY AUTOMATED COUNT: 17.6 % (ref 11.5–14.5)
GLUCOSE BLD-MCNC: 124 MG/DL (ref 70–99)
GLUCOSE BLD-MCNC: 146 MG/DL (ref 70–99)
GLUCOSE BLD-MCNC: 207 MG/DL (ref 70–99)
GLUCOSE BLD-MCNC: 217 MG/DL (ref 70–99)
GLUCOSE BLD-MCNC: 249 MG/DL (ref 70–99)
GLUCOSE BLD-MCNC: 262 MG/DL (ref 70–99)
GLUCOSE SERPL-MCNC: 145 MG/DL (ref 70–99)
HCT VFR BLD AUTO: 29.8 % (ref 42–52)
HGB BLD-MCNC: 9 G/DL (ref 14–18)
LYMPHOCYTES # BLD: 0.3 K/UL (ref 1–4.8)
LYMPHOCYTES NFR BLD: 3 %
MCH RBC QN AUTO: 28.9 PG (ref 27–31.3)
MCHC RBC AUTO-ENTMCNC: 30.2 % (ref 33–37)
MCV RBC AUTO: 95.8 FL (ref 79–92.2)
MONOCYTES # BLD: 0.9 K/UL (ref 0.2–0.8)
MONOCYTES NFR BLD: 8.8 %
NEUTROPHILS # BLD: 8.3 K/UL (ref 1.4–6.5)
NEUTS SEG NFR BLD: 85.8 %
PERFORMED ON: ABNORMAL
PHOSPHATE SERPL-MCNC: 2.2 MG/DL (ref 2.3–4.8)
PLATELET # BLD AUTO: 241 K/UL (ref 130–400)
POTASSIUM SERPL-SCNC: 4 MEQ/L (ref 3.4–4.9)
RBC # BLD AUTO: 3.11 M/UL (ref 4.7–6.1)
SODIUM SERPL-SCNC: 139 MEQ/L (ref 135–144)
WBC # BLD AUTO: 9.7 K/UL (ref 4.8–10.8)

## 2025-01-05 PROCEDURE — 2000000000 HC ICU R&B

## 2025-01-05 PROCEDURE — 80069 RENAL FUNCTION PANEL: CPT

## 2025-01-05 PROCEDURE — 99233 SBSQ HOSP IP/OBS HIGH 50: CPT | Performed by: INTERNAL MEDICINE

## 2025-01-05 PROCEDURE — 36415 COLL VENOUS BLD VENIPUNCTURE: CPT

## 2025-01-05 PROCEDURE — 6360000002 HC RX W HCPCS: Performed by: INTERNAL MEDICINE

## 2025-01-05 PROCEDURE — 6370000000 HC RX 637 (ALT 250 FOR IP): Performed by: NURSE PRACTITIONER

## 2025-01-05 PROCEDURE — 94660 CPAP INITIATION&MGMT: CPT

## 2025-01-05 PROCEDURE — 2500000003 HC RX 250 WO HCPCS: Performed by: INTERNAL MEDICINE

## 2025-01-05 PROCEDURE — 51798 US URINE CAPACITY MEASURE: CPT

## 2025-01-05 PROCEDURE — 85025 COMPLETE CBC W/AUTO DIFF WBC: CPT

## 2025-01-05 PROCEDURE — 6370000000 HC RX 637 (ALT 250 FOR IP): Performed by: INTERNAL MEDICINE

## 2025-01-05 PROCEDURE — 2700000000 HC OXYGEN THERAPY PER DAY

## 2025-01-05 PROCEDURE — 94003 VENT MGMT INPAT SUBQ DAY: CPT

## 2025-01-05 RX ADMIN — DOCUSATE SODIUM 100 MG: 50 LIQUID ORAL at 20:05

## 2025-01-05 RX ADMIN — CYCLOSPORINE 50 MG: 100 SOLUTION ORAL at 08:29

## 2025-01-05 RX ADMIN — ONDANSETRON 4 MG: 2 INJECTION, SOLUTION INTRAMUSCULAR; INTRAVENOUS at 12:24

## 2025-01-05 RX ADMIN — AMIODARONE HYDROCHLORIDE 200 MG: 200 TABLET ORAL at 08:15

## 2025-01-05 RX ADMIN — HEPARIN SODIUM 5000 UNITS: 5000 INJECTION INTRAVENOUS; SUBCUTANEOUS at 23:02

## 2025-01-05 RX ADMIN — HEPARIN SODIUM 5000 UNITS: 5000 INJECTION INTRAVENOUS; SUBCUTANEOUS at 05:33

## 2025-01-05 RX ADMIN — HEPARIN SODIUM 5000 UNITS: 5000 INJECTION INTRAVENOUS; SUBCUTANEOUS at 13:27

## 2025-01-05 RX ADMIN — INSULIN LISPRO 4 UNITS: 100 INJECTION, SOLUTION INTRAVENOUS; SUBCUTANEOUS at 23:11

## 2025-01-05 RX ADMIN — LANSOPRAZOLE 30 MG: 30 TABLET, ORALLY DISINTEGRATING, DELAYED RELEASE ORAL at 05:32

## 2025-01-05 RX ADMIN — METOPROLOL TARTRATE 12.5 MG: 25 TABLET, FILM COATED ORAL at 08:15

## 2025-01-05 RX ADMIN — Medication 10 ML: at 08:15

## 2025-01-05 RX ADMIN — DOCUSATE SODIUM 100 MG: 50 LIQUID ORAL at 08:14

## 2025-01-05 RX ADMIN — POLYETHYLENE GLYCOL 3350 17 G: 17 POWDER, FOR SOLUTION ORAL at 08:14

## 2025-01-05 RX ADMIN — ONDANSETRON 4 MG: 2 INJECTION, SOLUTION INTRAMUSCULAR; INTRAVENOUS at 19:50

## 2025-01-05 RX ADMIN — Medication 10 ML: at 20:08

## 2025-01-05 RX ADMIN — INSULIN LISPRO 4 UNITS: 100 INJECTION, SOLUTION INTRAVENOUS; SUBCUTANEOUS at 13:27

## 2025-01-05 RX ADMIN — AMIODARONE HYDROCHLORIDE 200 MG: 200 TABLET ORAL at 20:05

## 2025-01-05 RX ADMIN — OXYCODONE AND ACETAMINOPHEN 2 TABLET: 5; 325 TABLET ORAL at 12:15

## 2025-01-05 RX ADMIN — INSULIN LISPRO 8 UNITS: 100 INJECTION, SOLUTION INTRAVENOUS; SUBCUTANEOUS at 20:05

## 2025-01-05 RX ADMIN — INSULIN LISPRO 4 UNITS: 100 INJECTION, SOLUTION INTRAVENOUS; SUBCUTANEOUS at 02:32

## 2025-01-05 RX ADMIN — METOPROLOL TARTRATE 12.5 MG: 25 TABLET, FILM COATED ORAL at 20:05

## 2025-01-05 RX ADMIN — PREDNISONE 5 MG: 10 TABLET ORAL at 08:14

## 2025-01-05 ASSESSMENT — PULMONARY FUNCTION TESTS
PIF_VALUE: 22
PIF_VALUE: 18
PIF_VALUE: 17
PIF_VALUE: 18
PIF_VALUE: 16
PIF_VALUE: 19
PIF_VALUE: 20
PIF_VALUE: 19
PIF_VALUE: 18
PIF_VALUE: 19
PIF_VALUE: 16
PIF_VALUE: 19
PIF_VALUE: 16
PIF_VALUE: 18
PIF_VALUE: 19
PIF_VALUE: 17
PIF_VALUE: 18
PIF_VALUE: 19
PIF_VALUE: 19
PIF_VALUE: 17
PIF_VALUE: 18
PIF_VALUE: 20
PIF_VALUE: 19
PIF_VALUE: 20
PIF_VALUE: 17
PIF_VALUE: 19
PIF_VALUE: 17
PIF_VALUE: 16
PIF_VALUE: 19
PIF_VALUE: 16
PIF_VALUE: 17
PIF_VALUE: 18
PIF_VALUE: 16
PIF_VALUE: 19
PIF_VALUE: 16
PIF_VALUE: 20
PIF_VALUE: 20
PIF_VALUE: 21

## 2025-01-05 ASSESSMENT — PAIN SCALES - GENERAL
PAINLEVEL_OUTOF10: 0

## 2025-01-05 NOTE — PLAN OF CARE
Problem: Pain  Goal: Verbalizes/displays adequate comfort level or baseline comfort level  Recent Flowsheet Documentation  Taken 1/4/2025 2000 by Frankenfield, Nancy, RN  Verbalizes/displays adequate comfort level or baseline comfort level:   Encourage patient to monitor pain and request assistance   Assess pain using appropriate pain scale   Notify Licensed Independent Practitioner if interventions unsuccessful or patient reports new pain   Consider cultural and social influences on pain and pain management   Implement non-pharmacological measures as appropriate and evaluate response   Administer analgesics based on type and severity of pain and evaluate response     Problem: Safety - Adult  Goal: Free from fall injury  Recent Flowsheet Documentation  Taken 1/4/2025 2000 by Frankenfield, Nancy, RN  Free From Fall Injury: Instruct family/caregiver on patient safety     Problem: Skin/Tissue Integrity - Adult  Goal: Skin integrity remains intact  Recent Flowsheet Documentation  Taken 1/4/2025 2000 by Frankenfield, Nancy, RN  Skin Integrity Remains Intact:   Monitor for areas of redness and/or skin breakdown   Assess vascular access sites hourly   Every 4-6 hours minimum: Change oxygen saturation probe site

## 2025-01-05 NOTE — PROGRESS NOTES
Nephrology Progress Note    Assessment:  ESRDX  Renal transplant with chronic rejection  Muscle wasting  Recent trach/PEG  Pneumonia resolved from  admission        Plan: maintain till placement complete  reviw labs    Patient Active Problem List:     Pneumonia     Abdominal pain, other specified site     Acute pyelonephritis without lesion of renal medullary necrosis     Anemia     Essential hypertension     Nonspecific abnormal results of thyroid function study     Mixed hyperlipidemia     Kidney replaced by transplant     Intra-abdominal abscess post-procedure (HCC)     Infection due to Enterococcus     Fever and other physiologic disturbances of temperature regulation     Chronic kidney disease (CKD)     Type 2 diabetes mellitus with stage 3b chronic kidney disease, without long-term current use of insulin (HCC)     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 (HCC)     Abnormal CT of the chest     Bronchiectasis without complication (HCC)     GI bleeding     Complication of transplanted kidney     Muscle weakness (generalized)     Difficulty in walking     Elevated BUN     Urinary retention     Immunosuppression due to drug therapy (HCC)     DELORES (acute kidney injury) (HCC)     Adjustment disorder     Gross hematuria     Bilateral lower extremity edema     Dysuria     Acute cystitis without hematuria     Bilateral hearing loss     Abscess, toe     Acute hyperkalemia     Acute pain of left shoulder     Astigmatism     Astigmatism of both eyes with presbyopia     At risk for stroke     Benign enlargement of prostate     Benign prostatic hyperplasia with urinary  frequency     Rhinovirus     Chronic right shoulder pain     Condition not found     Deep vein thrombosis (DVT) of lower extremity (HCC)     Diabetes mellitus type 2 without retinopathy (HCC)     Edema     Gait difficulty     GERD (gastroesophageal reflux disease)     H/O lower gastrointestinal bleeding     History of blood transfusion     Incomplete emptying of bladder     Insulin long-term use (HCC)     Leg weakness, bilateral     Localized swelling of both lower legs     Peripheral nerve disease     Pseudophakia of both eyes     PTDM (post-transplant diabetes mellitus) ([facility])     Posterior vitreous detachment     Rhabdomyolysis     Seizure disorder (HCC)     Stage 5 chronic kidney disease with transplanted kidney ([facility])     Leukocytes in urine     Atrial fibrillation with RVR ([facility])     Moderate malnutrition ([facility])     Palliative care encounter     Goals of care, counseling/discussion     Advanced care planning/counseling discussion     Encounter for hospice care discussion     Lower GI bleed     Unable to eat      Subjective:  Admit Date: 12/18/2024    Interval History: stable    Medications:  Scheduled Meds:   albumin human 25%  50 g IntraVENous Once    metoprolol tartrate  12.5 mg Oral BID    lansoprazole  30 mg Oral QAM AC    predniSONE  5 mg Oral Daily    [Held by provider] insulin glargine  40 Units SubCUTAneous Daily    epoetin megan-epbx  10,000 Units SubCUTAneous Weekly    cycloSPORINE  50 mg Oral Daily    [Held by provider] mycophenolate  250 mg Oral BID    insulin lispro  0-16 Units SubCUTAneous Q4H    polyethylene glycol  17 g Oral Daily    docusate  100 mg Oral BID    amiodarone  200 mg Oral BID    sodium chloride flush  5-40 mL IntraVENous 2 times per day    heparin (porcine)  5,000 Units SubCUTAneous 3 times per day    [Held by provider] mycophenolate  500 mg Oral BID     Continuous Infusions:   dextrose      sodium chloride         CBC:   Recent Labs     01/04/25  0457 01/05/25  0558   WBC 8.5 9.7

## 2025-01-05 NOTE — PROGRESS NOTES
Shift summary:    Pt remained stable this shift.  Wife Simona updated with pt condition and plan of care.  Ring finger on left hand appearing much improved since removing wedding band.    Bedside report given to Shruthi FRY.

## 2025-01-05 NOTE — PROGRESS NOTES
Pulmonary & Critical Care Medicine ICU Progress Note    Subjective:     No overnight events reported.  He is currently resting comfortably in bed without any complaints.  He was evaluated by nephrology earlier today.  He is awake and alert but remains very weak.    EXAM:  General: No acute distress, resting comfortably in bed  HEENT: Normocephalic, atraumatic, tracheostomy tube in place, left EJ in place, occasional facial twitching noted  Lungs : Diminished but clear to auscultation bilaterally, no wheezes, no ventilator dyssynchrony  Heart: Regular rate and rhythm  ABD: Soft, positive bowel sounds, nontender to palpation  Extremities : Warm, dry, edema noted  Neuro: Awake, alert, weak  Skin: No rashes appreciated    MV Settings:  Vent Mode: AC/VC Resp Rate (Set): 14 bpm/Vt (Set, mL): 500 mL/ /FiO2 : 30 %       IV:   dextrose      sodium chloride         Vitals:  BP (!) 127/57   Pulse 89   Temp 99.5 °F (37.5 °C) (Axillary)   Resp 21   Ht 1.88 m (6' 2\")   Wt 77.7 kg (171 lb 4.8 oz)   SpO2 99%   BMI 21.99 kg/m²          Intake/Output Summary (Last 24 hours) at 1/5/2025 0808  Last data filed at 1/4/2025 2000  Gross per 24 hour   Intake 1344 ml   Output 1650 ml   Net -306 ml       Medications:  Scheduled Meds:   albumin human 25%  50 g IntraVENous Once    metoprolol tartrate  12.5 mg Oral BID    lansoprazole  30 mg Oral QAM AC    predniSONE  5 mg Oral Daily    [Held by provider] insulin glargine  40 Units SubCUTAneous Daily    epoetin megan-epbx  10,000 Units SubCUTAneous Weekly    cycloSPORINE  50 mg Oral Daily    [Held by provider] mycophenolate  250 mg Oral BID    insulin lispro  0-16 Units SubCUTAneous Q4H    polyethylene glycol  17 g Oral Daily    docusate  100 mg Oral BID    amiodarone  200 mg Oral BID    sodium chloride flush  5-40 mL IntraVENous 2 times per day    heparin (porcine)  5,000 Units SubCUTAneous 3 times per day    [Held by provider] mycophenolate  500 mg Oral BID       Labs:   CBC:   Recent  negative.    CKD-nephrology does continue to follow.  He did have hemodialysis yesterday.  He does remain on his steroids and cyclosporine given that he is status post renal transplant.  His CellCept remains on hold at this time.    Diabetes mellitus --his blood sugars are fairly well-controlled at this point.  Continue with sliding scale insulin.  He does continue with bolus tube feeds as well.  He had previously been on Lantus, however this has been on hold and his blood sugars are controlled at this time.    Nutrition: Tube feeds as tolerated    Prophylaxis: Heparin subcu for DVT prophylaxis.  Prevacid for GI prophylaxis.    Code Status: Full code    He has improved clinically and is tolerating CPAP throughout the day.  At this time patient does appear stable from a critical care standpoint to be transition to the next level of care.  Per review of care coordinator notes it does appear the plan is [facility].  Hopefully this will occur soon.  In the meantime we will continue with current supportive measures.    Electronically signed by MARTIR JI DO, on 1/5/2025 at 8:08 AM

## 2025-01-05 NOTE — PLAN OF CARE
Problem: Chronic Conditions and Co-morbidities  Goal: Patient's chronic conditions and co-morbidity symptoms are monitored and maintained or improved  Outcome: Progressing     Problem: Discharge Planning  Goal: Discharge to home or other facility with appropriate resources  Outcome: Progressing  Flowsheets (Taken 1/5/2025 0100)  Discharge to home or other facility with appropriate resources:   Identify barriers to discharge with patient and caregiver   Arrange for needed discharge resources and transportation as appropriate   Identify discharge learning needs (meds, wound care, etc)   Refer to discharge planning if patient needs post-hospital services based on physician order or complex needs related to functional status, cognitive ability or social support system     Problem: Pain  Goal: Verbalizes/displays adequate comfort level or baseline comfort level  Outcome: Progressing  Flowsheets (Taken 1/4/2025 2000)  Verbalizes/displays adequate comfort level or baseline comfort level:   Encourage patient to monitor pain and request assistance   Assess pain using appropriate pain scale   Notify Licensed Independent Practitioner if interventions unsuccessful or patient reports new pain   Consider cultural and social influences on pain and pain management   Implement non-pharmacological measures as appropriate and evaluate response   Administer analgesics based on type and severity of pain and evaluate response     Problem: Safety - Adult  Goal: Free from fall injury  Outcome: Progressing  Flowsheets (Taken 1/4/2025 2000)  Free From Fall Injury: Instruct family/caregiver on patient safety     Problem: Skin/Tissue Integrity  Goal: Absence of new skin breakdown  Description: 1.  Monitor for areas of redness and/or skin breakdown  2.  Assess vascular access sites hourly  3.  Every 4-6 hours minimum:  Change oxygen saturation probe site  4.  Every 4-6 hours:  If on nasal continuous positive airway pressure, respiratory  - Adult  Goal: Absence of infection at discharge  Outcome: Progressing     Problem: Metabolic/Fluid and Electrolytes - Adult  Goal: Electrolytes maintained within normal limits  Outcome: Progressing  Goal: Hemodynamic stability and optimal renal function maintained  Outcome: Progressing  Goal: Glucose maintained within prescribed range  Outcome: Progressing     Problem: Hematologic - Adult  Goal: Maintains hematologic stability  Outcome: Progressing     Problem: Decision Making  Goal: Pt/Family able to effectively weigh alternatives and participate in decision making related to treatment and care  Description: INTERVENTIONS:  1. Determine when there are differences between patient's view, family's view, and healthcare provider's view of condition  2. Facilitate patient and family articulation of goals for care  3. Help patient and family identify pros/cons of alternative solutions  4. Provide information as requested by patient/family  5. Respect patient/family right to receive or not to receive information  6. Serve as a liaison between patient and family and health care team  7. Initiate Consults from Ethics, Palliative Care or initiate Family Care Conference as is appropriate  Outcome: Progressing  Flowsheets (Taken 1/4/2025 2000)  Patient/family able to effectively weigh alternatives and participate in decision making related to treatment and care:   Help patient and family identify pros/cons of alternative solutions   Facilitate patient and family articulation of goals for care   Determine when there are differences between patient's view, family's view, and healthcare provider's view of condition   Provide information as requested by patient/family   Respect patient/family right to receive or not to receive information   Serve as a liaison between patient and family and health care team   Initiate Consults from Ethics, Palliative Care or initiate Family Care Conference as is appropriate     Problem: Nutrition

## 2025-01-05 NOTE — PROGRESS NOTES
Hospitalist Progress Note      Date of Admission: 12/18/2024  Chief Complaint:    Chief Complaint   Patient presents with    Illness     Subjective:  intubated    Medications:    Infusion Medications    dextrose      sodium chloride       Scheduled Medications    metoprolol tartrate  12.5 mg Oral BID    lansoprazole  30 mg Oral QAM AC    predniSONE  5 mg Oral Daily    epoetin megan-epbx  10,000 Units SubCUTAneous Weekly    cycloSPORINE  50 mg Oral Daily    [Held by provider] mycophenolate  250 mg Oral BID    insulin lispro  0-16 Units SubCUTAneous Q4H    polyethylene glycol  17 g Oral Daily    docusate  100 mg Oral BID    amiodarone  200 mg Oral BID    sodium chloride flush  5-40 mL IntraVENous 2 times per day    heparin (porcine)  5,000 Units SubCUTAneous 3 times per day    [Held by provider] mycophenolate  500 mg Oral BID     PRN Meds: oxyCODONE-acetaminophen **OR** oxyCODONE-acetaminophen, metoprolol, hydrALAZINE, glucose, dextrose bolus **OR** dextrose bolus, glucagon (rDNA), dextrose, sodium chloride flush, sodium chloride, ondansetron **OR** ondansetron, acetaminophen **OR** acetaminophen, ipratropium 0.5 mg-albuterol 2.5 mg    Intake/Output Summary (Last 24 hours) at 1/5/2025 1021  Last data filed at 1/5/2025 0700  Gross per 24 hour   Intake 2744 ml   Output 1650 ml   Net 1094 ml     Exam:  BP (!) 127/57   Pulse (!) 102   Temp 99.5 °F (37.5 °C) (Axillary)   Resp 23   Ht 1.88 m (6' 2\")   Wt 77.7 kg (171 lb 4.8 oz)   SpO2 100%   BMI 21.99 kg/m²   Head: Normocephalic, atraumatic  Sclera clear  Neck JVD flat  Lungs: normal effort of breathing, scattered crackles    Labs:   Recent Labs     01/03/25  0626 01/04/25  0457 01/05/25  0558   WBC 7.9 8.5 9.7   HGB 9.1* 8.3* 9.0*   HCT 29.7* 27.2* 29.8*    222 241     Recent Labs     01/03/25  0626 01/04/25  0457 01/05/25  0558   * 135 139   K 4.0 3.7 4.0   CL 93* 93* 97   CO2 28 26 29   BUN 41* 57* 37*   CREATININE 2.04* 2.62* 1.66*   CALCIUM 8.7 8.3*  8.7   PHOS 3.4 3.4 2.2*     No results for input(s): \"INR\" in the last 72 hours.  No results for input(s): \"CKTOTAL\", \"TROPONINI\" in the last 72 hours.  Radiology:  XR CHEST PORTABLE   Final Result   Some improvement in the aeration of the lung bases. Trace bilateral pleural   effusions.  Tracheostomy tube placed in the interval in satisfactory position   with feeding tube tip below the level diaphragm.         XR CHEST PORTABLE   Final Result   1. Interval development of a hazy alveolar opacity in the right middle lower   lung, which may represent atelectasis or pneumonia.   2. Probable small right pleural effusion.   3. Well-positioned endotracheal tube and NG tube.         XR CHEST PORTABLE   Final Result   Tiny pleural effusion is seen on the left.  Tubes and lines project over the   chest.         CT HEAD WO CONTRAST   Final Result   Stable atrophy and chronic changes seen within the brain with no acute   intracranial abnormality.         XR CHEST PORTABLE   Final Result   Cardiomegaly with underlying chronic changes seen throughout the lung fields   bilaterally. Minimal residual markings at the left lung base with trace left   pleural effusion.         Vascular duplex hemodialysis access right   Final Result   1.  Fistula is a radial artery to cephalic outflow vein fistula. The fistula is   widely patent with cephalic blood flow volume of 821 cc/min which is sufficient   for dialysis. Radial artery flow volume is 774 cc/min which is appropriate for a   patent fistula. No evidence of stenosis or thrombus.      COMPARISON:No prior studies are available for comparison.      DIAGNOSIS: Patient with right forearm fistula, experiencing flow-volume   problems.      COMMENTS: What reading provider will be dictating this exam?->[facility]      TECHNIQUE: Grayscale and color Doppler ultrasound of right arm fistula      FINDINGS:       Radial ARTERY FLOW: 774 milliliters per minute.      CEPHALIC VEIN:   Flow: 821 milliliters

## 2025-01-05 NOTE — PROGRESS NOTES
During assessment, this nurse assessed that left hand ring finger was being cut off related to edema and wedding ring. This RN called wife Simona to obtain permission to have ring cut off.    Simona stated that she had also noted that ring was tight around ring finger and appeared to be occluding the tisuue to the finger.    This RN called ED to ask if they had ring cutter to assist in getting the ring off the finger.  David PATEL RN successfully removed ring from finger.  Ring placed in bad with label.    Left hand elevated on 2 pillows.

## 2025-01-06 LAB
ALBUMIN SERPL-MCNC: 3.1 G/DL (ref 3.5–4.6)
ANION GAP SERPL CALCULATED.3IONS-SCNC: 11 MEQ/L (ref 9–15)
BASOPHILS # BLD: 0 K/UL (ref 0–0.2)
BASOPHILS NFR BLD: 0.2 %
BUN SERPL-MCNC: 33 MG/DL (ref 8–23)
CALCIUM SERPL-MCNC: 8.5 MG/DL (ref 8.5–9.9)
CHLORIDE SERPL-SCNC: 95 MEQ/L (ref 95–107)
CO2 SERPL-SCNC: 29 MEQ/L (ref 20–31)
CREAT SERPL-MCNC: 1.37 MG/DL (ref 0.7–1.2)
EOSINOPHIL # BLD: 0.1 K/UL (ref 0–0.7)
EOSINOPHIL NFR BLD: 0.6 %
ERYTHROCYTE [DISTWIDTH] IN BLOOD BY AUTOMATED COUNT: 17.6 % (ref 11.5–14.5)
GLUCOSE BLD-MCNC: 159 MG/DL (ref 70–99)
GLUCOSE BLD-MCNC: 165 MG/DL (ref 70–99)
GLUCOSE BLD-MCNC: 210 MG/DL (ref 70–99)
GLUCOSE BLD-MCNC: 215 MG/DL (ref 70–99)
GLUCOSE BLD-MCNC: 222 MG/DL (ref 70–99)
GLUCOSE BLD-MCNC: 262 MG/DL (ref 70–99)
GLUCOSE SERPL-MCNC: 215 MG/DL (ref 70–99)
HCT VFR BLD AUTO: 28.8 % (ref 42–52)
HGB BLD-MCNC: 8.8 G/DL (ref 14–18)
LYMPHOCYTES # BLD: 0.3 K/UL (ref 1–4.8)
LYMPHOCYTES NFR BLD: 2.2 %
MCH RBC QN AUTO: 29.5 PG (ref 27–31.3)
MCHC RBC AUTO-ENTMCNC: 30.6 % (ref 33–37)
MCV RBC AUTO: 96.6 FL (ref 79–92.2)
MONOCYTES # BLD: 1 K/UL (ref 0.2–0.8)
MONOCYTES NFR BLD: 7.7 %
NEUTROPHILS # BLD: 11.4 K/UL (ref 1.4–6.5)
NEUTS SEG NFR BLD: 87.8 %
PERFORMED ON: ABNORMAL
PHOSPHATE SERPL-MCNC: 1.7 MG/DL (ref 2.3–4.8)
PLATELET # BLD AUTO: 237 K/UL (ref 130–400)
POTASSIUM SERPL-SCNC: 3.3 MEQ/L (ref 3.4–4.9)
RBC # BLD AUTO: 2.98 M/UL (ref 4.7–6.1)
SODIUM SERPL-SCNC: 135 MEQ/L (ref 135–144)
WBC # BLD AUTO: 12.9 K/UL (ref 4.8–10.8)

## 2025-01-06 PROCEDURE — 99291 CRITICAL CARE FIRST HOUR: CPT | Performed by: INTERNAL MEDICINE

## 2025-01-06 PROCEDURE — 51798 US URINE CAPACITY MEASURE: CPT

## 2025-01-06 PROCEDURE — 6360000002 HC RX W HCPCS: Performed by: INTERNAL MEDICINE

## 2025-01-06 PROCEDURE — 8010000000 HC HEMODIALYSIS ACUTE INPT

## 2025-01-06 PROCEDURE — 94660 CPAP INITIATION&MGMT: CPT

## 2025-01-06 PROCEDURE — 2700000000 HC OXYGEN THERAPY PER DAY

## 2025-01-06 PROCEDURE — 6370000000 HC RX 637 (ALT 250 FOR IP): Performed by: NURSE PRACTITIONER

## 2025-01-06 PROCEDURE — 2000000000 HC ICU R&B

## 2025-01-06 PROCEDURE — 80069 RENAL FUNCTION PANEL: CPT

## 2025-01-06 PROCEDURE — 85025 COMPLETE CBC W/AUTO DIFF WBC: CPT

## 2025-01-06 PROCEDURE — 2500000003 HC RX 250 WO HCPCS: Performed by: INTERNAL MEDICINE

## 2025-01-06 PROCEDURE — 36415 COLL VENOUS BLD VENIPUNCTURE: CPT

## 2025-01-06 PROCEDURE — 94003 VENT MGMT INPAT SUBQ DAY: CPT

## 2025-01-06 PROCEDURE — 6370000000 HC RX 637 (ALT 250 FOR IP): Performed by: INTERNAL MEDICINE

## 2025-01-06 RX ORDER — INSULIN GLARGINE 100 [IU]/ML
5 INJECTION, SOLUTION SUBCUTANEOUS DAILY
Status: DISCONTINUED | OUTPATIENT
Start: 2025-01-06 | End: 2025-01-07

## 2025-01-06 RX ADMIN — PREDNISONE 5 MG: 10 TABLET ORAL at 08:30

## 2025-01-06 RX ADMIN — AMIODARONE HYDROCHLORIDE 200 MG: 200 TABLET ORAL at 21:03

## 2025-01-06 RX ADMIN — Medication 10 ML: at 13:33

## 2025-01-06 RX ADMIN — INSULIN LISPRO 8 UNITS: 100 INJECTION, SOLUTION INTRAVENOUS; SUBCUTANEOUS at 17:39

## 2025-01-06 RX ADMIN — Medication 10 ML: at 21:21

## 2025-01-06 RX ADMIN — METOPROLOL TARTRATE 12.5 MG: 25 TABLET, FILM COATED ORAL at 08:30

## 2025-01-06 RX ADMIN — INSULIN LISPRO 4 UNITS: 100 INJECTION, SOLUTION INTRAVENOUS; SUBCUTANEOUS at 01:52

## 2025-01-06 RX ADMIN — METOPROLOL TARTRATE 12.5 MG: 25 TABLET, FILM COATED ORAL at 20:53

## 2025-01-06 RX ADMIN — DOCUSATE SODIUM 100 MG: 50 LIQUID ORAL at 20:53

## 2025-01-06 RX ADMIN — AMIODARONE HYDROCHLORIDE 200 MG: 200 TABLET ORAL at 08:30

## 2025-01-06 RX ADMIN — INSULIN GLARGINE 5 UNITS: 100 INJECTION, SOLUTION SUBCUTANEOUS at 10:19

## 2025-01-06 RX ADMIN — HEPARIN SODIUM 5000 UNITS: 5000 INJECTION INTRAVENOUS; SUBCUTANEOUS at 22:59

## 2025-01-06 RX ADMIN — CYCLOSPORINE 50 MG: 100 SOLUTION ORAL at 08:31

## 2025-01-06 RX ADMIN — Medication 10 ML: at 08:30

## 2025-01-06 RX ADMIN — LANSOPRAZOLE 30 MG: 30 TABLET, ORALLY DISINTEGRATING, DELAYED RELEASE ORAL at 06:13

## 2025-01-06 RX ADMIN — INSULIN LISPRO 4 UNITS: 100 INJECTION, SOLUTION INTRAVENOUS; SUBCUTANEOUS at 10:20

## 2025-01-06 RX ADMIN — INSULIN LISPRO 4 UNITS: 100 INJECTION, SOLUTION INTRAVENOUS; SUBCUTANEOUS at 23:03

## 2025-01-06 RX ADMIN — HEPARIN SODIUM 5000 UNITS: 5000 INJECTION INTRAVENOUS; SUBCUTANEOUS at 13:31

## 2025-01-06 RX ADMIN — HEPARIN SODIUM 5000 UNITS: 5000 INJECTION INTRAVENOUS; SUBCUTANEOUS at 06:14

## 2025-01-06 ASSESSMENT — PAIN SCALES - GENERAL
PAINLEVEL_OUTOF10: 0

## 2025-01-06 ASSESSMENT — PULMONARY FUNCTION TESTS
PIF_VALUE: 17
PIF_VALUE: 16
PIF_VALUE: 18
PIF_VALUE: 17
PIF_VALUE: 9.8
PIF_VALUE: 17
PIF_VALUE: 19
PIF_VALUE: 19
PIF_VALUE: 17
PIF_VALUE: 12
PIF_VALUE: 17
PIF_VALUE: 18
PIF_VALUE: 18
PIF_VALUE: 19
PIF_VALUE: 17
PIF_VALUE: 16
PIF_VALUE: 16
PIF_VALUE: 17
PIF_VALUE: 18
PIF_VALUE: 19
PIF_VALUE: 18
PIF_VALUE: 17
PIF_VALUE: 19
PIF_VALUE: 17
PIF_VALUE: 17
PIF_VALUE: 15
PIF_VALUE: 18
PIF_VALUE: 16
PIF_VALUE: 18
PIF_VALUE: 17
PIF_VALUE: 17

## 2025-01-06 ASSESSMENT — PAIN SCALES - WONG BAKER: WONGBAKER_NUMERICALRESPONSE: NO HURT

## 2025-01-06 NOTE — PROGRESS NOTES
Spiritual Health History and Assessment/Progress Note  [facility]    (P) Follow-up,  ,  ,      Name: Remy Upton MRN: [id number]    Age: 74 y.o.     Sex: male   Language: English   Catholic: Tenriism   DELORES (acute kidney injury) (HCC)     Date: 1/6/2025            Total Time Calculated: (P) 15 min              Spiritual Assessment continued in [facility] [place] [facility]            Encounter Overview/Reason: (P) Follow-up  Service Provided For: (P) Patient     continues to follow up with patient. Patient has been in ICU for 18 days.  reports patient is cooping and looks and seems to be fighting, remains intubated and on dialysis at this time.  offered words of hope, peace, comfort, and healing to patient.     Marian, Belief, Meaning:   Patient is connected with a marian tradition or spiritual practice  Family/Friends No family/friends present      Importance and Influence:  Patient has spiritual/personal beliefs that influence decisions regarding their health  Family/Friends No family/friends present    Community:  Patient is connected with a spiritual community  Family/Friends No family/friends present    Assessment and Plan of Care:     Patient Interventions include: Facilitated expression of thoughts and feelings  Family/Friends Interventions include: No family/friends present    Patient Plan of Care: Spiritual Care available upon further referral  Family/Friends Plan of Care: No family/friends present    Electronically signed by Chaplain Camilo on 1/6/2025 at 12:02 PM

## 2025-01-06 NOTE — PROGRESS NOTES
Critical Care Progress Note    2025 8:22 AM    Subjective:   Admit Date: 2024  PCP: Tico Rodriguez DO    Chief Complaint   Patient presents with    Illness     Interval History: Currently undergoing dialysis.  Continues to be on vent via tracheostomy.          Medications:   Scheduled Meds:   metoprolol tartrate  12.5 mg Oral BID    lansoprazole  30 mg Oral QAM AC    predniSONE  5 mg Oral Daily    epoetin megan-epbx  10,000 Units SubCUTAneous Weekly    cycloSPORINE  50 mg Oral Daily    [Held by provider] mycophenolate  250 mg Oral BID    insulin lispro  0-16 Units SubCUTAneous Q4H    polyethylene glycol  17 g Oral Daily    docusate  100 mg Oral BID    amiodarone  200 mg Oral BID    sodium chloride flush  5-40 mL IntraVENous 2 times per day    heparin (porcine)  5,000 Units SubCUTAneous 3 times per day    [Held by provider] mycophenolate  500 mg Oral BID     Continuous Infusions:   dextrose      sodium chloride           Objective:   Vitals:   Temp (24hrs), Av.7 °F (37.1 °C), Min:98 °F (36.7 °C), Max:99.8 °F (37.7 °C)    BP (!) 140/63   Pulse 90   Temp 98.4 °F (36.9 °C)   Resp 17   Ht 1.88 m (6' 2\")   Wt 77.3 kg (170 lb 6.7 oz)   SpO2 100%   BMI 21.88 kg/m²   I/O:24HR INTAKE/OUTPUT:    Intake/Output Summary (Last 24 hours) at 2025 0822  Last data filed at 2025 2300  Gross per 24 hour   Intake 850 ml   Output 100 ml   Net 750 ml            Physical Exam:    General appearance -ill appearing  Mental status - alert, oriented to person   Eyes - pupils equal and reactive, extraocular eye movements intact  Nose - normal and patent.   Neck -trach in place, supple, no significant adenopathy  Chest -diminished bilaterally so he has patient's think of this is a    Heart - normal rate, regular rhythm, normal S1, S2   Abdomen - soft, nontender, nondistended, no masses or organomegaly  Rectal - deferred, not clinically indicated  Neurological - alert, oriented, normal speech, no focal findings     Musculoskeletal - no joint tenderness, deformity or swelling  Extremities - peripheral pulses normal, no pedal edema, no clubbing or cyanosis  Skin - normal coloration and turgor, no rashes, no suspicious skin lesions noted              BMP:    Recent Labs     01/04/25 0457 01/05/25  0558    139   K 3.7 4.0   CL 93* 97   CO2 26 29   BUN 57* 37*   CREATININE 2.62* 1.66*   GLUCOSE 204* 145*   PHOS 3.4 2.2*    .  MG:3,PHOS:3)@  Ionized Calcium: No components found for: \"IONCA\"  CBC:   Recent Labs     01/04/25 0457 01/05/25  0558   WBC 8.5 9.7   HGB 8.3* 9.0*    241      ABG: No results for input(s): \"PH\", \"PCO2\", \"PO2\" in the last 72 hours.        Assessment and Plan:       Impression:    -Hypoxic respiratory failure requiring mechanical ventilation.  Current vent settings are via tracheostomy.  -Septic shock.  Blood pressure has been marginal blood off of Levophed.  -Rhinovirus pneumonia and lower respiratory tract infection.  -End-stage renal disease on hemodialysis.  -Status post PEG tube placement.  -Status post tracheostomy   -Chronic anemia.  -Atrial fibrillation.  Rate is well-controlled    Recommendations:      - Continue current care in the ICU for hemodynamic and neuromonitoring.  - Continue ventilator bundle.  Head of bed 30 degrees.  Gentle sedation.  - Tracheostomy care.  - Strict intake and output measurement.  - Tight glucose control.  - Observe off of vasopressors.  - Hemodialysis with nephrology.  - Continue immunosuppression      Prophylaxis:  Stress ulcer: [] PPI Agent [] H2RA [] Sucralfate [] Other:   VTE: [] Enoxaparin  [] SC Heparin  [] SCD      Full Code      Excluding procedures, the total critical care time caring for this patient with life threatening, unstable organ failure, including direct patient contact, review of medical record, management of life support systems, review of data including imaging and labs, discussions with other team members, patient's family and

## 2025-01-06 NOTE — CARE COORDINATION
MESSAGE FROM THEA [facility], SHE WILL BE PROVIDING INSURANCE WITH UPDATED CLINICAL AS REQUESTED BY INSURANCE FOR PEER TO PEER.

## 2025-01-06 NOTE — CARE COORDINATION
This LSW completed quality rounds with ICU team.  Patient on Cpap and was receiving dialysis in room.  Followed up with Kira Ugarte was obtaining new PT/OT notes to review and weekend notes.  Electronically signed by LOLI Muniz on 1/6/25 at 11:17 AM EST

## 2025-01-06 NOTE — PROGRESS NOTES
PHARMACY NOTE:   ICU Rounds Attended (10-15 minutes in patient room):    Pt diagnosis: DELORES    Follow up/Changes:   -home meds reviewed: Follow up resume tamsulosin, finasteride, reglan as appropriate  -monitor SSI. Lantus 5 units daily per verbal on rounds  -core measures assessed/met     Additional information:   Steroid: prednisone 5mg daily  Insulin coverage: high sliding scale with Humalog, utilized 20 units per sliding scale over the past 24 hours. Lantus added per above  Pressors: none  Sedation: none  Antimicrobial therapy: none  Core measures assessed/met     Digna Morgan RPH, PharmD

## 2025-01-06 NOTE — PLAN OF CARE
Problem: Chronic Conditions and Co-morbidities  Goal: Patient's chronic conditions and co-morbidity symptoms are monitored and maintained or improved  Outcome: Progressing  Flowsheets (Taken 1/6/2025 0000)  Care Plan - Patient's Chronic Conditions and Co-Morbidity Symptoms are Monitored and Maintained or Improved:   Monitor and assess patient's chronic conditions and comorbid symptoms for stability, deterioration, or improvement   Collaborate with multidisciplinary team to address chronic and comorbid conditions and prevent exacerbation or deterioration   Update acute care plan with appropriate goals if chronic or comorbid symptoms are exacerbated and prevent overall improvement and discharge     Problem: Discharge Planning  Goal: Discharge to home or other facility with appropriate resources  Outcome: Progressing  Flowsheets (Taken 1/6/2025 0000)  Discharge to home or other facility with appropriate resources:   Identify barriers to discharge with patient and caregiver   Arrange for needed discharge resources and transportation as appropriate   Identify discharge learning needs (meds, wound care, etc)   Refer to discharge planning if patient needs post-hospital services based on physician order or complex needs related to functional status, cognitive ability or social support system     Problem: Pain  Goal: Verbalizes/displays adequate comfort level or baseline comfort level  Outcome: Progressing     Problem: Safety - Adult  Goal: Free from fall injury  Outcome: Progressing  Flowsheets (Taken 1/6/2025 0000)  Free From Fall Injury: Instruct family/caregiver on patient safety     Problem: Skin/Tissue Integrity  Goal: Absence of new skin breakdown  Description: 1.  Monitor for areas of redness and/or skin breakdown  2.  Assess vascular access sites hourly  3.  Every 4-6 hours minimum:  Change oxygen saturation probe site  4.  Every 4-6 hours:  If on nasal continuous positive airway pressure, respiratory therapy  catheter per Licensed Independent Practitioner order if needed   Discuss with Licensed Independent Practitioner  medications to alleviate retention as needed   Discuss catheterization for long term situations as appropriate     Problem: Infection - Adult  Goal: Absence of infection at discharge  Outcome: Progressing  Flowsheets (Taken 1/6/2025 0000)  Absence of infection at discharge:   Assess and monitor for signs and symptoms of infection   Monitor lab/diagnostic results   Monitor all insertion sites i.e., indwelling lines, tubes and drains   Monitor endotracheal (as able) and nasal secretions for changes in amount and color   [place] appropriate cooling/warming therapies per order   Administer medications as ordered   Instruct and encourage patient and family to use good hand hygiene technique     Problem: Metabolic/Fluid and Electrolytes - Adult  Goal: Electrolytes maintained within normal limits  Outcome: Progressing  Flowsheets (Taken 1/6/2025 0000)  Electrolytes maintained within normal limits:   Monitor labs and assess patient for signs and symptoms of electrolyte imbalances   Administer electrolyte replacement as ordered   Monitor response to electrolyte replacements, including repeat lab results as appropriate   Fluid restriction as ordered   Instruct patient on fluid and nutrition restrictions as appropriate  Goal: Hemodynamic stability and optimal renal function maintained  Outcome: Progressing  Flowsheets (Taken 1/6/2025 0000)  Hemodynamic stability and optimal renal function maintained:   Monitor labs and assess for signs and symptoms of volume excess or deficit   Monitor intake, output and patient weight   Monitor urine specific gravity, serum osmolarity and serum sodium as indicated or ordered   Monitor response to interventions for patient's volume status, including labs, urine output, blood pressure (other measures as available)   Encourage oral intake as appropriate   Instruct patient on fluid  and nutrition restrictions as appropriate  Goal: Glucose maintained within prescribed range  Outcome: Progressing  Flowsheets (Taken 1/6/2025 0000)  Glucose maintained within prescribed range:   Monitor blood glucose as ordered   Assess for signs and symptoms of hyperglycemia and hypoglycemia   Administer ordered medications to maintain glucose within target range   Assess barriers to adequate nutritional intake and initiate nutrition consult as needed     Problem: Hematologic - Adult  Goal: Maintains hematologic stability  Outcome: Progressing  Flowsheets (Taken 1/6/2025 0000)  Maintains hematologic stability:   Assess for signs and symptoms of bleeding or hemorrhage   Monitor labs for bleeding or clotting disorders   Administer blood products/factors as ordered     Problem: Decision Making  Goal: Pt/Family able to effectively weigh alternatives and participate in decision making related to treatment and care  Description: INTERVENTIONS:  1. Determine when there are differences between patient's view, family's view, and healthcare provider's view of condition  2. Facilitate patient and family articulation of goals for care  3. Help patient and family identify pros/cons of alternative solutions  4. Provide information as requested by patient/family  5. Respect patient/family right to receive or not to receive information  6. Serve as a liaison between patient and family and health care team  7. Initiate Consults from Ethics, Palliative Care or initiate Family Care Conference as is appropriate  Outcome: Progressing     Problem: Nutrition Deficit:  Goal: Optimize nutritional status  Outcome: Progressing     Problem: ABCDS Injury Assessment  Goal: Absence of physical injury  Outcome: Progressing

## 2025-01-06 NOTE — CARE COORDINATION
[facility] from [facility], requested information for a peer to peer for review of patient to go to [facility].  Info for physician given,  Electronically signed by LOLI Muniz on 1/6/25 at 11:24 AM EST

## 2025-01-06 NOTE — FLOWSHEET NOTE
0745 Assessment complete, see flow sheet. Patient alert, opens eyes to verbal stimuli, and nods appropriately to questions asked, but remains very weak and lethargic. Patient suction, mouth care done, repositioned for comfort.   0830 Dialysis at bedside.  1245 Dialysis complete. Tube feeding set changed. No changes in assessment noted.  1630 Patients wedding ring that was in the lock box in his room was sent home with patients wife Simona.  1730 Patient incont of smal amount of brown stool, patient cleansed, linen changed, barrier cream anad new Mepilex applied. Mouth care and patient suctioned. Bladder scan done, noted 309 cc, Patient repositioned for comfort. No changes in assessment noted.

## 2025-01-06 NOTE — INTERDISCIPLINARY ROUNDS
Spiritual Care Services     Summary of Visit:    Attended ICU Rounds. Patient awake, no family present, steven tradition is Caodaism, Patient remain intubated and on dialysis at this time.     Encounter Summary  Encounter Overview/Reason: Interdisciplinary rounds  Service Provided For: Patient  Support System: Spouse  Complexity of Encounter: Low  Begin Time: 0915  End Time : 0920  Total Time Calculated: 5 min        Spiritual/Emotional needs  Type: Spiritual Support              Palliative Care  Type: Palliative Care, Follow-up       Spiritual Assessment/Intervention/Outcomes:    Assessment: Unable to assess (Patient resting)    Intervention: Empowerment, Nurtured Hope    Outcome: Coping      Care Plan:    Plan and Referrals  Plan/Referrals: Contacted support as requested per patient/family request Plan          Spiritual Care Services   Electronically signed by Chaplain Camilo on 1/6/2025 at 11:56 AM.    To reach a  for emotional and spiritual support, place an EPIC consult request.   If a  is needed immediately, dial “0” and ask to page the on-call .

## 2025-01-06 NOTE — PROGRESS NOTES
Progress Note  Date:2025       Room:[place] [address]  Patient Name:Remy Upton     YOB: 1950     Age:74 y.o.        Subjective    Subjective   Review of Systems  ROS: could not be obtained bc pt is intubated  Objective         Vitals Last 24 Hours:  TEMPERATURE:  Temp  Av.1 °F (36.7 °C)  Min: 97.5 °F (36.4 °C)  Max: 98.7 °F (37.1 °C)  RESPIRATIONS RANGE: Resp  Av.8  Min: 14  Max: 26  PULSE OXIMETRY RANGE: SpO2  Av.7 %  Min: 97 %  Max: 100 %  PULSE RANGE: Pulse  Av  Min: 73  Max: 99  BLOOD PRESSURE RANGE: Systolic (24hrs), Av , Min:99 , Max:160   ; Diastolic (24hrs), Av, Min:43, Max:78    I/O (24Hr):    Intake/Output Summary (Last 24 hours) at 2025 1231  Last data filed at 2025 2300  Gross per 24 hour   Intake 850 ml   Output 100 ml   Net 750 ml     Objective:  General Appearance:  Ill-appearing.    Vital signs: (most recent): Blood pressure 134/64, pulse 94, temperature 97.5 °F (36.4 °C), temperature source Axillary, resp. rate 17, height 1.88 m (6' 2\"), weight 77.3 kg (170 lb 6.7 oz), SpO2 100%.    HEENT: Normal HEENT exam.    Lungs:  There are decreased breath sounds.    Heart: S1 normal and S2 normal.    Abdomen: Abdomen is soft.  Bowel sounds are normal.   There is no epigastric area tenderness.     Pulses: Distal pulses are intact.    Neurological: Patient is alert.    Pupils:  Pupils are equal, round, and reactive to light.    Skin:  Warm.      Labs/Imaging/Diagnostics    Labs:  CBC:  Recent Labs     25  0457 25  0558 25  1027   WBC 8.5 9.7 12.9*   RBC 2.89* 3.11* 2.98*   HGB 8.3* 9.0* 8.8*   HCT 27.2* 29.8* 28.8*   MCV 94.1* 95.8* 96.6*   RDW 17.2* 17.6* 17.6*    241 237     CHEMISTRIES:  Recent Labs     25  0457 25  0558 25  1027    139 135   K 3.7 4.0 3.3*   CL 93* 97 95   CO2 26 29 29   BUN 57* 37* 33*   CREATININE 2.62* 1.66* 1.37*   GLUCOSE 204* 145* 215*   PHOS 3.4 2.2* 1.7*     PT/INR:No results

## 2025-01-06 NOTE — PROGRESS NOTES
Physical Therapy Missed Treatment   Facility/Department: [facility] MED SURG IC01/IC01-01    NAME: Remy Upton    : 1950 (74 y.o.)  MRN: [id number]    Account: [id number]  Gender: male    Chart reviewed, attempted PT at 11:11. Patient unavailable 2° to:    [x] Hold per nsg request. Pt is lethargic and getting dialysis right now. Check back later time permitting.    [] Pt declined     [] Nsg notified   [] Other notified    [] Pt.. off floor for test/procedure.     [] Pt. Unavailable       Will attempt PT treatment again at earliest convenience.      Electronically signed by Rita Nova PTA on 25 at 11:48 AM EST

## 2025-01-06 NOTE — DIALYSIS
Pt. Tolerated HD well.  Ran full HD of  3.5 hrs  Removed a  1 liter of fluid, no complications, tolerated well. AVF sites held, no issues with bleeding pressure dressing applied. Treatment completed.

## 2025-01-06 NOTE — FLOWSHEET NOTE
Shift Summary  [id number]    Patient report received from LISANDRA Gutierrez.    This patient's day has been uneventful in terms of significant events. CPAP for about 8 hours consecutively. He was switched back to the vent when he was showing fatigue by tachypnea and shallow breaths. Vitals have remained stable, patient has been resting comfortably, wife was at the bedside this evening and all questions were acknowledged/answered.

## 2025-01-07 LAB
ALBUMIN SERPL-MCNC: 2.6 G/DL (ref 3.5–4.6)
ANION GAP SERPL CALCULATED.3IONS-SCNC: 14 MEQ/L (ref 9–15)
ANISOCYTOSIS BLD QL SMEAR: ABNORMAL
BASOPHILS # BLD: 0 K/UL (ref 0–0.2)
BASOPHILS NFR BLD: 0.3 %
BUN SERPL-MCNC: 43 MG/DL (ref 8–23)
CALCIUM SERPL-MCNC: 8.4 MG/DL (ref 8.5–9.9)
CHLORIDE SERPL-SCNC: 95 MEQ/L (ref 95–107)
CO2 SERPL-SCNC: 26 MEQ/L (ref 20–31)
CREAT SERPL-MCNC: 1.66 MG/DL (ref 0.7–1.2)
EOSINOPHIL # BLD: 0.1 K/UL (ref 0–0.7)
EOSINOPHIL NFR BLD: 0.6 %
ERYTHROCYTE [DISTWIDTH] IN BLOOD BY AUTOMATED COUNT: 17.4 % (ref 11.5–14.5)
GLUCOSE BLD-MCNC: 176 MG/DL (ref 70–99)
GLUCOSE BLD-MCNC: 180 MG/DL (ref 70–99)
GLUCOSE BLD-MCNC: 199 MG/DL (ref 70–99)
GLUCOSE BLD-MCNC: 291 MG/DL (ref 70–99)
GLUCOSE BLD-MCNC: 298 MG/DL (ref 70–99)
GLUCOSE BLD-MCNC: 388 MG/DL (ref 70–99)
GLUCOSE SERPL-MCNC: 267 MG/DL (ref 70–99)
HCT VFR BLD AUTO: 27.2 % (ref 42–52)
HGB BLD-MCNC: 8 G/DL (ref 14–18)
HYPOCHROMIA BLD QL SMEAR: ABNORMAL
LYMPHOCYTES # BLD: 0.3 K/UL (ref 1–4.8)
LYMPHOCYTES NFR BLD: 3.2 %
MACROCYTES BLD QL SMEAR: ABNORMAL
MCH RBC QN AUTO: 28.7 PG (ref 27–31.3)
MCHC RBC AUTO-ENTMCNC: 29.4 % (ref 33–37)
MCV RBC AUTO: 97.5 FL (ref 79–92.2)
MONOCYTES # BLD: 0.7 K/UL (ref 0.2–0.8)
MONOCYTES NFR BLD: 7.5 %
NEUTROPHILS # BLD: 8.3 K/UL (ref 1.4–6.5)
NEUTS SEG NFR BLD: 86.9 %
OVALOCYTES BLD QL SMEAR: ABNORMAL
PATH INTERP BLD-IMP: NORMAL
PATH INTERP BLD-IMP: YES
PERFORMED ON: ABNORMAL
PHOSPHATE SERPL-MCNC: 2.1 MG/DL (ref 2.3–4.8)
PLATELET # BLD AUTO: 234 K/UL (ref 130–400)
PLATELET BLD QL SMEAR: ADEQUATE
POIKILOCYTOSIS BLD QL SMEAR: ABNORMAL
POTASSIUM SERPL-SCNC: 3.5 MEQ/L (ref 3.4–4.9)
RBC # BLD AUTO: 2.79 M/UL (ref 4.7–6.1)
SCHISTOCYTES BLD QL SMEAR: ABNORMAL
SLIDE REVIEW: ABNORMAL
SODIUM SERPL-SCNC: 135 MEQ/L (ref 135–144)
WBC # BLD AUTO: 9.6 K/UL (ref 4.8–10.8)

## 2025-01-07 PROCEDURE — 6370000000 HC RX 637 (ALT 250 FOR IP): Performed by: INTERNAL MEDICINE

## 2025-01-07 PROCEDURE — 2700000000 HC OXYGEN THERAPY PER DAY

## 2025-01-07 PROCEDURE — P9047 ALBUMIN (HUMAN), 25%, 50ML: HCPCS | Performed by: INTERNAL MEDICINE

## 2025-01-07 PROCEDURE — 2000000000 HC ICU R&B

## 2025-01-07 PROCEDURE — 51798 US URINE CAPACITY MEASURE: CPT

## 2025-01-07 PROCEDURE — 6360000002 HC RX W HCPCS: Performed by: INTERNAL MEDICINE

## 2025-01-07 PROCEDURE — 2500000003 HC RX 250 WO HCPCS: Performed by: INTERNAL MEDICINE

## 2025-01-07 PROCEDURE — 99291 CRITICAL CARE FIRST HOUR: CPT | Performed by: INTERNAL MEDICINE

## 2025-01-07 PROCEDURE — 85025 COMPLETE CBC W/AUTO DIFF WBC: CPT

## 2025-01-07 PROCEDURE — 94003 VENT MGMT INPAT SUBQ DAY: CPT

## 2025-01-07 PROCEDURE — 80069 RENAL FUNCTION PANEL: CPT

## 2025-01-07 PROCEDURE — 97140 MANUAL THERAPY 1/> REGIONS: CPT

## 2025-01-07 PROCEDURE — 6370000000 HC RX 637 (ALT 250 FOR IP): Performed by: NURSE PRACTITIONER

## 2025-01-07 RX ORDER — INSULIN GLARGINE 100 [IU]/ML
10 INJECTION, SOLUTION SUBCUTANEOUS DAILY
Status: DISCONTINUED | OUTPATIENT
Start: 2025-01-07 | End: 2025-01-08

## 2025-01-07 RX ORDER — ALBUMIN (HUMAN) 12.5 G/50ML
25 SOLUTION INTRAVENOUS ONCE
Status: COMPLETED | OUTPATIENT
Start: 2025-01-07 | End: 2025-01-07

## 2025-01-07 RX ADMIN — CYCLOSPORINE 50 MG: 100 SOLUTION ORAL at 09:31

## 2025-01-07 RX ADMIN — DOCUSATE SODIUM 100 MG: 50 LIQUID ORAL at 22:22

## 2025-01-07 RX ADMIN — AMIODARONE HYDROCHLORIDE 200 MG: 200 TABLET ORAL at 22:21

## 2025-01-07 RX ADMIN — Medication 10 ML: at 22:23

## 2025-01-07 RX ADMIN — INSULIN LISPRO 8 UNITS: 100 INJECTION, SOLUTION INTRAVENOUS; SUBCUTANEOUS at 17:52

## 2025-01-07 RX ADMIN — Medication 10 ML: at 09:32

## 2025-01-07 RX ADMIN — LANSOPRAZOLE 30 MG: 30 TABLET, ORALLY DISINTEGRATING, DELAYED RELEASE ORAL at 09:30

## 2025-01-07 RX ADMIN — DOCUSATE SODIUM 100 MG: 50 LIQUID ORAL at 09:30

## 2025-01-07 RX ADMIN — INSULIN LISPRO 12 UNITS: 100 INJECTION, SOLUTION INTRAVENOUS; SUBCUTANEOUS at 14:41

## 2025-01-07 RX ADMIN — INSULIN LISPRO 8 UNITS: 100 INJECTION, SOLUTION INTRAVENOUS; SUBCUTANEOUS at 06:00

## 2025-01-07 RX ADMIN — AMIODARONE HYDROCHLORIDE 200 MG: 200 TABLET ORAL at 09:30

## 2025-01-07 RX ADMIN — HEPARIN SODIUM 5000 UNITS: 5000 INJECTION INTRAVENOUS; SUBCUTANEOUS at 06:00

## 2025-01-07 RX ADMIN — HEPARIN SODIUM 5000 UNITS: 5000 INJECTION INTRAVENOUS; SUBCUTANEOUS at 22:21

## 2025-01-07 RX ADMIN — METOPROLOL TARTRATE 12.5 MG: 25 TABLET, FILM COATED ORAL at 09:30

## 2025-01-07 RX ADMIN — ALBUMIN (HUMAN) 25 G: 0.25 INJECTION, SOLUTION INTRAVENOUS at 09:29

## 2025-01-07 RX ADMIN — HEPARIN SODIUM 5000 UNITS: 5000 INJECTION INTRAVENOUS; SUBCUTANEOUS at 14:41

## 2025-01-07 RX ADMIN — PREDNISONE 5 MG: 10 TABLET ORAL at 09:30

## 2025-01-07 RX ADMIN — INSULIN LISPRO 4 UNITS: 100 INJECTION, SOLUTION INTRAVENOUS; SUBCUTANEOUS at 22:30

## 2025-01-07 RX ADMIN — METOPROLOL TARTRATE 12.5 MG: 25 TABLET, FILM COATED ORAL at 22:22

## 2025-01-07 RX ADMIN — INSULIN GLARGINE 10 UNITS: 100 INJECTION, SOLUTION SUBCUTANEOUS at 09:30

## 2025-01-07 ASSESSMENT — PULMONARY FUNCTION TESTS
PIF_VALUE: 18
PIF_VALUE: 15
PIF_VALUE: 18
PIF_VALUE: 20
PIF_VALUE: 17
PIF_VALUE: 18
PIF_VALUE: 16
PIF_VALUE: 12

## 2025-01-07 ASSESSMENT — PAIN SCALES - GENERAL
PAINLEVEL_OUTOF10: 0

## 2025-01-07 ASSESSMENT — PAIN DESCRIPTION - LOCATION: LOCATION: GENERALIZED

## 2025-01-07 ASSESSMENT — PAIN SCALES - WONG BAKER
WONGBAKER_NUMERICALRESPONSE: NO HURT

## 2025-01-07 NOTE — PROGRESS NOTES
Critical Care Progress Note    2025 8:38 AM    Subjective:   Admit Date: 2024  PCP: Tico Rodriguez DO    Chief Complaint   Patient presents with    Illness     Interval History:  no major issues overnight. Awaiting tranfer to LTAC. Continues to be requiring MV via trach.  Had dialysis yesterday.  Vitals are relatively stable overnight.      Medications:   Scheduled Meds:   albumin human 25%  25 g IntraVENous Once    insulin glargine  5 Units SubCUTAneous Daily    metoprolol tartrate  12.5 mg Oral BID    lansoprazole  30 mg Oral QAM AC    predniSONE  5 mg Oral Daily    epoetin megan-epbx  10,000 Units SubCUTAneous Weekly    cycloSPORINE  50 mg Oral Daily    [Held by provider] mycophenolate  250 mg Oral BID    insulin lispro  0-16 Units SubCUTAneous Q4H    polyethylene glycol  17 g Oral Daily    docusate  100 mg Oral BID    amiodarone  200 mg Oral BID    sodium chloride flush  5-40 mL IntraVENous 2 times per day    heparin (porcine)  5,000 Units SubCUTAneous 3 times per day    [Held by provider] mycophenolate  500 mg Oral BID     Continuous Infusions:   dextrose      sodium chloride           Objective:   Vitals:   Temp (24hrs), Av.8 °F (36.6 °C), Min:97.5 °F (36.4 °C), Max:98.1 °F (36.7 °C)    BP (!) 111/51   Pulse (!) 101   Temp 97.6 °F (36.4 °C) (Axillary)   Resp 19   Ht 1.88 m (6' 2\")   Wt 77.3 kg (170 lb 6.7 oz)   SpO2 100%   BMI 21.88 kg/m²   I/O:24HR INTAKE/OUTPUT:    Intake/Output Summary (Last 24 hours) at 2025 0838  Last data filed at 2025 0603  Gross per 24 hour   Intake 2200 ml   Output 1400 ml   Net 800 ml            Physical Exam:    General appearance -ill appearing  Mental status - alert, oriented to person   Eyes - pupils equal and reactive, extraocular eye movements intact  Nose - normal and patent.   Neck -trach in place, supple, no significant adenopathy  Chest -diminished bilaterally so he has patient's think of this is a    Heart - normal rate, regular rhythm,

## 2025-01-07 NOTE — INTERDISCIPLINARY ROUNDS
Spiritual Care Services     Summary of Visit:    Attended ICU Rounds. Patient making progress, tolerated dialysis, sleeping, resting, no family present, steven tradition is Episcopal.     Encounter Summary  Encounter Overview/Reason: Interdisciplinary rounds  Service Provided For: Patient  Support System: Spouse  Complexity of Encounter: Low  Begin Time: 0940  End Time : 0955  Total Time Calculated: 15 min        Spiritual/Emotional needs  Type: Spiritual Support              Palliative Care  Type: Palliative Care, Follow-up       Spiritual Assessment/Intervention/Outcomes:    Assessment: Calm, Sad, Coping    Intervention: Prayer (assurance of)/[place], Nurtured Hope    Outcome:  (Patient responded with slight nodding of head of chaplains presence)      Care Plan:    Plan and Referrals  Plan/Referrals: Continue Support (comment)          Spiritual Care Services   Electronically signed by Chaplain Camilo on 1/7/2025 at 11:42 AM.    To reach a  for emotional and spiritual support, place an EPIC consult request.   If a  is needed immediately, dial “0” and ask to page the on-call .

## 2025-01-07 NOTE — FLOWSHEET NOTE
SHIFT SUMMARY     1900: Received report from LISANDRA Covarrubias. Patient is resting in bed with eyes closed, observable regular rise and fall of chest, with no signs of acute distress. Last set of vital signs taken at 1900 and are within normal limits (see vital signs flowsheet for values). IV fluids are infusing at prescribed rate (see orders and intake flowsheet for values). IV site(s) is without redness, edema, or streaking. The dressing is clean, dry, and intact. SpO2 remains above 98% (see vital signs flowsheet for values). Standard safety measures verified as follows: bed is in lowest position with wheels locked. Appropriate amount of side rails are raised. Patient belongings are at the bedside within reach of the patient. Safety measures are checked hourly throughout the night. Call light is within reach.      2000: PM nursing  assessment completed. Previous assessment reviewed and updated.         Pt : Ventilated       Code Status: full code  Oxygen: vent  Complaints of: none  Pain: 0/10  IV: patent/ flushed/ capped, no signs of infiltration noted, dressing clean/dry/intact.  TELE: Afib  Dressings: See LDA  Precautions: standard  Falls: high  Jeremi: 9  New Labs: BUN 58, Creat 1.37     Chart and meds reviewed. Bed wheels locked and in lowest position. Call light and bedside table within reach.     2100 - 0600: Patient currently resting in bed with no complaints of pain or discomfort. No acute distress noted at this time. Safety measures remain in place. Call light within reach.     0700: Report given to day shift RN.

## 2025-01-07 NOTE — FLOWSHEET NOTE
0800 Assessment complete, see flow sheet. Patient with trach, placed on CPAP by resp therapy and tolerating well. Patient alert, nodding appropriately to questions asked, but patient is very weak and lethargic, falls asleep easily. Patient suctioned and mouth acre done, and repositioned for comfort.  1500 Patient incont of large amount of liquid brown stool, complete bed linen change done, patient cleansed, barrier cream applied, gown changed. FMS deflated and checked and re inflated and appears to be working well. Patient repositioned for comfort. Patient was on trach collar while changing him and SPO2 quickly decreased into the high 40 -50 %, patient Bagged and resp therapy called, placed patient back on vent and SPO2 returned to 100% quickly.  1520 Patient placed back on trach collar by resp therapy and tolerating well, will cont to monitor.  1715 Patient incont of liquid stool appears to be mixed with urine, patient cleansed, linen changed, patient repositioned. Bladder scan done, and showed 397. No changes in assessment noted.

## 2025-01-07 NOTE — PROGRESS NOTES
Patient's trach cuff deflated and patient taken off of vent and placed on 40% CATC. Patient sterile suctioned for a large amount of thick tan secretions with multiple mucus plugs. Patient tolerating CATC well.

## 2025-01-07 NOTE — PROGRESS NOTES
Spoke with peer to peer doctor from the insurance he says that the patient could stay in the hospital in acute setting and the insurance will pay for his treatment at the hospital, they did not approve for discharge at this time. They stated he is going to get better care here compared to [facility].

## 2025-01-07 NOTE — PLAN OF CARE
Problem: Chronic Conditions and Co-morbidities  Goal: Patient's chronic conditions and co-morbidity symptoms are monitored and maintained or improved  Outcome: Progressing  Flowsheets (Taken 1/6/2025 2000)  Care Plan - Patient's Chronic Conditions and Co-Morbidity Symptoms are Monitored and Maintained or Improved: Monitor and assess patient's chronic conditions and comorbid symptoms for stability, deterioration, or improvement     Problem: Discharge Planning  Goal: Discharge to home or other facility with appropriate resources  Outcome: Progressing  Flowsheets (Taken 1/6/2025 2000)  Discharge to home or other facility with appropriate resources: Identify barriers to discharge with patient and caregiver     Problem: Pain  Goal: Verbalizes/displays adequate comfort level or baseline comfort level  Outcome: Progressing     Problem: Safety - Adult  Goal: Free from fall injury  Outcome: Progressing     Problem: Skin/Tissue Integrity  Goal: Absence of new skin breakdown  Description: 1.  Monitor for areas of redness and/or skin breakdown  2.  Assess vascular access sites hourly  3.  Every 4-6 hours minimum:  Change oxygen saturation probe site  4.  Every 4-6 hours:  If on nasal continuous positive airway pressure, respiratory therapy assess nares and determine need for appliance change or resting period.  Outcome: Progressing     Problem: Neurosensory - Adult  Goal: Achieves stable or improved neurological status  Outcome: Progressing  Flowsheets (Taken 1/6/2025 2000)  Achieves stable or improved neurological status: Assess for and report changes in neurological status     Problem: Respiratory - Adult  Goal: Achieves optimal ventilation and oxygenation  Outcome: Progressing     Problem: Cardiovascular - Adult  Goal: Maintains optimal cardiac output and hemodynamic stability  Outcome: Progressing  Flowsheets (Taken 1/6/2025 2000)  Maintains optimal cardiac output and hemodynamic stability: Monitor blood pressure and  heart rate  Goal: Absence of cardiac dysrhythmias or at baseline  Outcome: Progressing  Flowsheets (Taken 1/6/2025 2000)  Absence of cardiac dysrhythmias or at baseline: Monitor cardiac rate and rhythm     Problem: Skin/Tissue Integrity - Adult  Goal: Skin integrity remains intact  Outcome: Progressing  Flowsheets (Taken 1/6/2025 2000)  Skin Integrity Remains Intact: Monitor for areas of redness and/or skin breakdown     Problem: Musculoskeletal - Adult  Goal: Return mobility to safest level of function  Outcome: Progressing  Flowsheets (Taken 1/6/2025 2000)  Return Mobility to Safest Level of Function: Assess patient stability and activity tolerance for standing, transferring and ambulating with or without assistive devices     Problem: Gastrointestinal - Adult  Goal: Minimal or absence of nausea and vomiting  Outcome: Progressing  Flowsheets (Taken 1/6/2025 2000)  Minimal or absence of nausea and vomiting: Administer IV fluids as ordered to ensure adequate hydration  Goal: Maintains or returns to baseline bowel function  Outcome: Progressing  Flowsheets (Taken 1/6/2025 2000)  Maintains or returns to baseline bowel function: Assess bowel function  Goal: Maintains adequate nutritional intake  Outcome: Progressing  Flowsheets (Taken 1/6/2025 2000)  Maintains adequate nutritional intake: Monitor percentage of each meal consumed     Problem: Genitourinary - Adult  Goal: Absence of urinary retention  Outcome: Progressing  Flowsheets (Taken 1/6/2025 2000)  Absence of urinary retention: Assess patient’s ability to void and empty bladder     Problem: Infection - Adult  Goal: Absence of infection at discharge  Outcome: Progressing  Flowsheets (Taken 1/6/2025 2000)  Absence of infection at discharge: Assess and monitor for signs and symptoms of infection     Problem: Metabolic/Fluid and Electrolytes - Adult  Goal: Electrolytes maintained within normal limits  Outcome: Progressing  Flowsheets (Taken 1/6/2025  Injury Assessment  Goal: Absence of physical injury  Outcome: Progressing

## 2025-01-07 NOTE — PROGRESS NOTES
MESSAGE SENT TO DR. MULLIGAN REGARDING P2P NEEDING COMPLETE BY 10 AM TODAY.     RECEIVED INFO FROM [employer]@ [email or web address]     # [phone] OPTION 3  CASE # [id number]

## 2025-01-07 NOTE — PLAN OF CARE
Nutrition Problem #1: Increased nutrient needs  Intervention: Food and/or Nutrient Delivery: Continue Current Tube Feeding  Nutritional

## 2025-01-07 NOTE — PROGRESS NOTES
Progress Note  Date:2025       Room:[place] [address]  Patient Name:Remy Upton     YOB: 1950     Age:74 y.o.        Subjective    Subjective   Review of Systems  ROS: could not be obtained bc pt is intubated  Objective         Vitals Last 24 Hours:  TEMPERATURE:  Temp  Av.9 °F (36.6 °C)  Min: 97.5 °F (36.4 °C)  Max: 98.8 °F (37.1 °C)  RESPIRATIONS RANGE: Resp  Av.5  Min: 13  Max: 28  PULSE OXIMETRY RANGE: SpO2  Av %  Min: 97 %  Max: 100 %  PULSE RANGE: Pulse  Av.7  Min: 91  Max: 115  BLOOD PRESSURE RANGE: Systolic (24hrs), Av , Min:94 , Max:175   ; Diastolic (24hrs), Av, Min:43, Max:99    I/O (24Hr):    Intake/Output Summary (Last 24 hours) at 2025 1058  Last data filed at 2025 0603  Gross per 24 hour   Intake 2200 ml   Output 1400 ml   Net 800 ml     Objective:  General Appearance:  Ill-appearing.    Vital signs: (most recent): Blood pressure 129/64, pulse (!) 108, temperature 98.8 °F (37.1 °C), temperature source Axillary, resp. rate 28, height 1.88 m (6' 2\"), weight 77.3 kg (170 lb 6.7 oz), SpO2 98%.    HEENT: Normal HEENT exam.    Lungs:  There are decreased breath sounds.    Heart: S1 normal and S2 normal.    Abdomen: Abdomen is soft.  Bowel sounds are normal.   There is no epigastric area tenderness.     Pulses: Distal pulses are intact.    Neurological: Patient is alert.    Pupils:  Pupils are equal, round, and reactive to light.    Skin:  Warm.      Labs/Imaging/Diagnostics    Labs:  CBC:  Recent Labs     25  0558 25  1027 25  0457   WBC 9.7 12.9* 9.6   RBC 3.11* 2.98* 2.79*   HGB 9.0* 8.8* 8.0*   HCT 29.8* 28.8* 27.2*   MCV 95.8* 96.6* 97.5*   RDW 17.6* 17.6* 17.4*    237 234     CHEMISTRIES:  Recent Labs     25  0558 25  1027 25  0457    135 135   K 4.0 3.3* 3.5   CL 97 95 95   CO2 29 29 26   BUN 37* 33* 43*   CREATININE 1.66* 1.37* 1.66*   GLUCOSE 145* 215* 267*   PHOS 2.2* 1.7* 2.1*     PT/INR:No

## 2025-01-07 NOTE — CARE COORDINATION
PER INSURANCE COMPANY, THEY WILL NOT APPROVE LTAC AT THIS TIME D/T THEY FEEL PATIENT'S NEEDS ARE BETTER MET IN HOSPITAL SETTING. PATIENT WILL CONTINUE WITH ICU STAY.

## 2025-01-07 NOTE — PROGRESS NOTES
I called peer to peer, the insurance wants to schedule me at 10 am,, I gave them my number, but could not promise I could answer my phone since I would be on call for rapid response, codes and admissions.Casemangement aware

## 2025-01-07 NOTE — PROGRESS NOTES
Comprehensive Nutrition Assessment    Type and Reason for Visit:  Reassess    Nutrition Recommendations/Plan:   Continue with renal tube feeding ( Nepro or equivalent)  Bolus schedule 325 ml given  4 x daily via PEG  Water flushes 50 ml before and after each bolus  Dietitian Recommendations : consider daily renal MVI with minerals to aid in meeting micronutrient needs to support wound healing  Consider d/c /holding of daily bowel regimen due to continued loose stools     Malnutrition Assessment:  Malnutrition Status:  Moderate malnutrition (01/03/25 1041)    Context:  Chronic Illness     Findings of the 6 clinical characteristics of malnutrition:  Energy Intake:  Mild decrease in energy intake  Weight Loss:  Greater than 10% over 6 months     Body Fat Loss:  Mild body fat loss Orbital, Buccal region   Muscle Mass Loss:  Mild muscle mass loss (to moderate) Temples (temporalis), Clavicles (pectoralis & deltoids)  Fluid Accumulation:  Mild Generalized   Strength:  Not Performed    Nutrition Assessment:    Pt meeting nutrition related goals, has tolerated transition to bolus tube feeding schedule, and as ordered, meets estimated energy and protein needs, no new recommendations at this time    Nutrition Related Findings:    , Hx significant for : DM2, ESRD/CKD4 s/p kidney transplant x 2; started HD (12/2024), Intubated (12/18), Trach (12/30), PEG ( 1/2). TF at goal since (12/26), transitioned to bolus schedule ( 1/3), . Urine output < 100 ml per day, anasarca reported by nursing. . Labs reviewed: variable glucose, low phosphorus, elevated BUN/creat. Meds include colace, glycolax, prednisone,  loose BMs, on intermittent vent support Wound Type: Multiple, Pressure Injury, Stage II, Deep Tissue Injury, Skin Tears (see wound flowsheet)       Current Nutrition Intake & Therapies:    Average Meal Intake: NPO  Average Supplements Intake: NPO  ADULT TUBE FEEDING; PEG; Renal Formula; Bolus; 4 Times Daily; 325; Gravity; 50;  Before and after each bolus  Current Tube Feeding (TF) Orders:  Feeding Route: PEG  Formula: Renal Formula (Nepro)  Schedule: Bolus  Feeding Regimen: 325 ml, given 4 x daily = 1300 ml  Additives/Modulars: None  Water Flushes: 50 ml before and after each bolus ( 400)  Current TF Provides: 2340 kcals, 105 g protein, 1345 ml free water    Anthropometric Measures:  Height: 188 cm (6' 2\")  Ideal Body Weight (IBW): 190 lbs (86 kg)    Admission Body Weight: 71.7 kg (158 lb)  Current Body Weight: 75.8 kg (167 lb) (* significant edema), 88.4 % IBW. Weight Source: Bed scale  Current BMI (kg/m2): 21.4  Usual Body Weight: 80.3 kg (177 lb) (( 7/2024);173#(5/2024), 180# ( 10/88801)     % Weight Change (Calculated): -9.6                    BMI Categories: Underweight (BMI less than 22) age over 65 (estimated due to fluid status)    Estimated Daily Nutrient Needs:  Energy Requirements Based On: Kcal/kg  Weight Used for Energy Requirements: Admission  Energy (kcal/day): ~2150 kcal @ 30 kcal/kg  Weight Used for Protein Requirements: Admission  Protein (g/day): 108 g protein @ 1.5 g/kg  Method Used for Fluid Requirements: Standard renal  Fluid (ml/day): 500-800 ml + DUO (or as per provider)    Nutrition Diagnosis:   Increased nutrient needs related to increase demand for energy/nutrients as evidenced by wounds  Inadequate oral intake related to swallowing difficulty as evidenced by nutrition support - enteral nutrition    Nutrition Interventions:   Food and/or Nutrient Delivery: Continue Current Tube Feeding  Nutrition Education/Counseling: No recommendation at this time  Coordination of Nutrition Care: No recommendation at this time       Goals:  Goals: Meet at least 75% of estimated needs  Type of Goal: New goal  Previous Goal Met: Goal(s) Achieved    Nutrition Monitoring and Evaluation:      Food/Nutrient Intake Outcomes: Enteral Nutrition Intake/Tolerance  Physical Signs/Symptoms Outcomes: Biochemical Data, Skin,

## 2025-01-07 NOTE — PROGRESS NOTES
[facility] MED SURG TREATMENT NOTE     Date: 2025  Patient Name: Remy Upton        MRN: [id number]  Account: [id number]   : 1950  (74 y.o.)  Room: [place] [address]    Chart Review:    Restrictions  Restrictions/Precautions  Restrictions/Precautions: Fall Risk, NPO  Activity Level: Up with Assist     Safety:  Safety Devices  Type of Devices: All fall risk precautions in place    Patient's birthday verified: Pt verbally unable, verified via wrist band    Subjective: Pt non verbal. Pt inconsistently nods \"yes\" and \"no\".     Pain   Pain at start of treatment: No    Pain at end of treatment: No    Pt minimally shakes \"no\" when asked about pain.      Objective: Per Respiratory Therapist, pt has been experiencing significant oxygen desaturation with bed mobility. Pt seen bed level and participated as follows.     Observation:  Observation: Pt on vent via Trach, PEG Tube in place with feeding in progress  Edema: All 4 extremities. Appears worse on LUE    Upper Extremity Function  UE PROM: Therapist provides PROM to BUEs as follows.   Wrist flexion/extension: 2 sets x 10 reps (PROM/AAROM - Pt able to minimally move along with ROM)   Elbow flexion/extension: 3 sets x 10 reps (PROM)   Shoulder flexion/extension: 2 sets x 10 reps (PROM)  Finger flexion/extension: 2 sets x 10 reps (AAROM - Pt able to minimally move along with ROM)  Thumb abduction/adduction: 2 sets x 10 reps (PROM)   Pt tolerated well. PROM/AAROM provided to maintain ROM, decrease pain,decrease swelling, and prevent contracture     Cognition Status:  Overall Cognitive Status: Exceptions  Arousal/Alertness: Inconsistent responses to stimuli (Pt inconsistently nods \"yes\" and \"no\" throughout session)  Following Commands: Follows one step commands with increased time - Pt able to squeeze therapist's hands when instructed.       Therapy key for assistance levels -   Independent/Mod I = Pt. is able to perform task with no

## 2025-01-07 NOTE — PROGRESS NOTES
Nephrology Progress Note    Assessment:  Suspect ESRDX  Kidney transplant with chronic rejection  Pneumonia on admission  Nutritional problem  Recent Trach/ PEG placement      Plan:dialysis tomorrow  sleepy  vitals good  discussed situation with him he wants to keep going    Patient Active Problem List:     Pneumonia     Abdominal pain, other specified site     Acute pyelonephritis without lesion of renal medullary necrosis     Anemia     Essential hypertension     Nonspecific abnormal results of thyroid function study     Mixed hyperlipidemia     Kidney replaced by transplant     Intra-abdominal abscess post-procedure (HCC)     Infection due to Enterococcus     Fever and other physiologic disturbances of temperature regulation     Chronic kidney disease (CKD)     Type 2 diabetes mellitus with stage 3b chronic kidney disease, without long-term current use of insulin (HCC)     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 (HCC)     Abnormal CT of the chest     Bronchiectasis without complication (HCC)     GI bleeding     Complication of transplanted kidney     Muscle weakness (generalized)     Difficulty in walking     Elevated BUN     Urinary retention     Immunosuppression due to drug therapy (HCC)     DELORES (acute kidney injury) (HCC)     Adjustment disorder     Gross hematuria     Bilateral lower extremity edema     Dysuria     Acute cystitis without hematuria     Bilateral hearing loss     Abscess, toe     Acute hyperkalemia     Acute pain of left shoulder     Astigmatism     Astigmatism of both eyes with presbyopia     At risk for stroke     Benign enlargement of  prostate     Benign prostatic hyperplasia with urinary frequency     Rhinovirus     Chronic right shoulder pain     Condition not found     Deep vein thrombosis (DVT) of lower extremity (HCC)     Diabetes mellitus type 2 without retinopathy (HCC)     Edema     Gait difficulty     GERD (gastroesophageal reflux disease)     H/O lower gastrointestinal bleeding     History of blood transfusion     Incomplete emptying of bladder     Insulin long-term use (HCC)     Leg weakness, bilateral     Localized swelling of both lower legs     Peripheral nerve disease     Pseudophakia of both eyes     PTDM (post-transplant diabetes mellitus) (formerly [facility])     Posterior vitreous detachment     Rhabdomyolysis     Seizure disorder (formerly [facility])     Stage 5 chronic kidney disease with transplanted kidney (formerly [facility])     Leukocytes in urine     Atrial fibrillation with RVR (formerly [facility])     Moderate malnutrition (formerly [facility])     Palliative care encounter     Goals of care, counseling/discussion     Advanced care planning/counseling discussion     Encounter for hospice care discussion     Lower GI bleed     Unable to eat      Subjective:  Admit Date: 12/18/2024    Interval History: sleepy    Medications:  Scheduled Meds:   insulin glargine  5 Units SubCUTAneous Daily    metoprolol tartrate  12.5 mg Oral BID    lansoprazole  30 mg Oral QAM AC    predniSONE  5 mg Oral Daily    epoetin megan-epbx  10,000 Units SubCUTAneous Weekly    cycloSPORINE  50 mg Oral Daily    [Held by provider] mycophenolate  250 mg Oral BID    insulin lispro  0-16 Units SubCUTAneous Q4H    polyethylene glycol  17 g Oral Daily    docusate  100 mg Oral BID    amiodarone  200 mg Oral BID    sodium chloride flush  5-40 mL IntraVENous 2 times per day    heparin (porcine)  5,000 Units SubCUTAneous 3 times per day    [Held by provider] mycophenolate  500 mg Oral BID     Continuous Infusions:   dextrose      sodium chloride         CBC:   Recent Labs     01/06/25  1027 01/07/25  0457   WBC 12.9* 9.6   HGB 8.8*

## 2025-01-08 LAB
ALBUMIN SERPL-MCNC: 2.9 G/DL (ref 3.5–4.6)
ANION GAP SERPL CALCULATED.3IONS-SCNC: 13 MEQ/L (ref 9–15)
BACTERIA URNS QL MICRO: NEGATIVE /HPF
BASOPHILS # BLD: 0 K/UL (ref 0–0.2)
BASOPHILS NFR BLD: 0.3 %
BILIRUB UR QL STRIP: NEGATIVE
BUN SERPL-MCNC: 71 MG/DL (ref 8–23)
CALCIUM SERPL-MCNC: 8.4 MG/DL (ref 8.5–9.9)
CHLORIDE SERPL-SCNC: 98 MEQ/L (ref 95–107)
CLARITY UR: ABNORMAL
CO2 SERPL-SCNC: 27 MEQ/L (ref 20–31)
COLOR UR: ABNORMAL
CREAT SERPL-MCNC: 2.22 MG/DL (ref 0.7–1.2)
EOSINOPHIL # BLD: 0.1 K/UL (ref 0–0.7)
EOSINOPHIL NFR BLD: 0.9 %
EPI CELLS #/AREA URNS AUTO: ABNORMAL /HPF (ref 0–5)
ERYTHROCYTE [DISTWIDTH] IN BLOOD BY AUTOMATED COUNT: 17.4 % (ref 11.5–14.5)
GLUCOSE BLD-MCNC: 160 MG/DL (ref 70–99)
GLUCOSE BLD-MCNC: 184 MG/DL (ref 70–99)
GLUCOSE BLD-MCNC: 207 MG/DL (ref 70–99)
GLUCOSE BLD-MCNC: 212 MG/DL (ref 70–99)
GLUCOSE BLD-MCNC: 268 MG/DL (ref 70–99)
GLUCOSE BLD-MCNC: 309 MG/DL (ref 70–99)
GLUCOSE SERPL-MCNC: 194 MG/DL (ref 70–99)
GLUCOSE UR STRIP-MCNC: 100 MG/DL
HCT VFR BLD AUTO: 26.3 % (ref 42–52)
HGB BLD-MCNC: 8.1 G/DL (ref 14–18)
HGB UR QL STRIP: ABNORMAL
HYALINE CASTS #/AREA URNS AUTO: ABNORMAL /HPF (ref 0–5)
KETONES UR STRIP-MCNC: NEGATIVE MG/DL
LEUKOCYTE ESTERASE UR QL STRIP: ABNORMAL
LYMPHOCYTES # BLD: 0.4 K/UL (ref 1–4.8)
LYMPHOCYTES NFR BLD: 4.1 %
MCH RBC QN AUTO: 29.7 PG (ref 27–31.3)
MCHC RBC AUTO-ENTMCNC: 30.8 % (ref 33–37)
MCV RBC AUTO: 96.3 FL (ref 79–92.2)
MONOCYTES # BLD: 0.9 K/UL (ref 0.2–0.8)
MONOCYTES NFR BLD: 8.1 %
NEUTROPHILS # BLD: 9.1 K/UL (ref 1.4–6.5)
NEUTS SEG NFR BLD: 84.9 %
NITRITE UR QL STRIP: NEGATIVE
PERFORMED ON: ABNORMAL
PH UR STRIP: 6.5 [PH] (ref 5–9)
PHOSPHATE SERPL-MCNC: 2.1 MG/DL (ref 2.3–4.8)
PLATELET # BLD AUTO: 218 K/UL (ref 130–400)
POTASSIUM SERPL-SCNC: 3.4 MEQ/L (ref 3.4–4.9)
PROT UR STRIP-MCNC: >=300 MG/DL
RBC # BLD AUTO: 2.73 M/UL (ref 4.7–6.1)
RBC #/AREA URNS HPF: ABNORMAL /HPF (ref 0–2)
SODIUM SERPL-SCNC: 138 MEQ/L (ref 135–144)
SP GR UR STRIP: 1.02 (ref 1–1.03)
URINE REFLEX TO CULTURE: YES
UROBILINOGEN UR STRIP-ACNC: 0.2 E.U./DL
WBC # BLD AUTO: 10.7 K/UL (ref 4.8–10.8)
WBC #/AREA URNS AUTO: >100 /HPF (ref 0–5)
YEAST URNS QL MICRO: PRESENT /HPF

## 2025-01-08 PROCEDURE — 6360000002 HC RX W HCPCS: Performed by: INTERNAL MEDICINE

## 2025-01-08 PROCEDURE — 90935 HEMODIALYSIS ONE EVALUATION: CPT

## 2025-01-08 PROCEDURE — 36415 COLL VENOUS BLD VENIPUNCTURE: CPT

## 2025-01-08 PROCEDURE — 99291 CRITICAL CARE FIRST HOUR: CPT | Performed by: INTERNAL MEDICINE

## 2025-01-08 PROCEDURE — 2500000003 HC RX 250 WO HCPCS: Performed by: NURSE PRACTITIONER

## 2025-01-08 PROCEDURE — 2700000000 HC OXYGEN THERAPY PER DAY

## 2025-01-08 PROCEDURE — 6370000000 HC RX 637 (ALT 250 FOR IP): Performed by: INTERNAL MEDICINE

## 2025-01-08 PROCEDURE — 6360000002 HC RX W HCPCS

## 2025-01-08 PROCEDURE — 94003 VENT MGMT INPAT SUBQ DAY: CPT

## 2025-01-08 PROCEDURE — 80069 RENAL FUNCTION PANEL: CPT

## 2025-01-08 PROCEDURE — 81001 URINALYSIS AUTO W/SCOPE: CPT

## 2025-01-08 PROCEDURE — 6370000000 HC RX 637 (ALT 250 FOR IP): Performed by: NURSE PRACTITIONER

## 2025-01-08 PROCEDURE — 94660 CPAP INITIATION&MGMT: CPT

## 2025-01-08 PROCEDURE — 87086 URINE CULTURE/COLONY COUNT: CPT

## 2025-01-08 PROCEDURE — 85025 COMPLETE CBC W/AUTO DIFF WBC: CPT

## 2025-01-08 PROCEDURE — 85027 COMPLETE CBC AUTOMATED: CPT

## 2025-01-08 PROCEDURE — 2000000000 HC ICU R&B

## 2025-01-08 PROCEDURE — 2500000003 HC RX 250 WO HCPCS: Performed by: INTERNAL MEDICINE

## 2025-01-08 PROCEDURE — 6360000002 HC RX W HCPCS: Performed by: NURSE PRACTITIONER

## 2025-01-08 RX ORDER — CYCLOSPORINE 100 MG/ML
50 SOLUTION ORAL
Status: DISCONTINUED | OUTPATIENT
Start: 2025-01-09 | End: 2025-01-14 | Stop reason: HOSPADM

## 2025-01-08 RX ORDER — INSULIN GLARGINE 100 [IU]/ML
15 INJECTION, SOLUTION SUBCUTANEOUS DAILY
Status: DISCONTINUED | OUTPATIENT
Start: 2025-01-09 | End: 2025-01-10

## 2025-01-08 RX ORDER — INSULIN GLARGINE 100 [IU]/ML
5 INJECTION, SOLUTION SUBCUTANEOUS ONCE
Status: COMPLETED | OUTPATIENT
Start: 2025-01-08 | End: 2025-01-08

## 2025-01-08 RX ADMIN — INSULIN GLARGINE 10 UNITS: 100 INJECTION, SOLUTION SUBCUTANEOUS at 08:10

## 2025-01-08 RX ADMIN — INSULIN LISPRO 4 UNITS: 100 INJECTION, SOLUTION INTRAVENOUS; SUBCUTANEOUS at 18:00

## 2025-01-08 RX ADMIN — WATER 1000 MG: 1 INJECTION INTRAMUSCULAR; INTRAVENOUS; SUBCUTANEOUS at 06:29

## 2025-01-08 RX ADMIN — METOPROLOL TARTRATE 12.5 MG: 25 TABLET, FILM COATED ORAL at 22:22

## 2025-01-08 RX ADMIN — INSULIN LISPRO 4 UNITS: 100 INJECTION, SOLUTION INTRAVENOUS; SUBCUTANEOUS at 22:33

## 2025-01-08 RX ADMIN — INSULIN LISPRO 8 UNITS: 100 INJECTION, SOLUTION INTRAVENOUS; SUBCUTANEOUS at 05:45

## 2025-01-08 RX ADMIN — Medication 10 ML: at 08:12

## 2025-01-08 RX ADMIN — INSULIN LISPRO 8 UNITS: 100 INJECTION, SOLUTION INTRAVENOUS; SUBCUTANEOUS at 03:50

## 2025-01-08 RX ADMIN — HEPARIN SODIUM 5000 UNITS: 5000 INJECTION INTRAVENOUS; SUBCUTANEOUS at 05:45

## 2025-01-08 RX ADMIN — HYDRALAZINE HYDROCHLORIDE 10 MG: 20 INJECTION INTRAMUSCULAR; INTRAVENOUS at 17:52

## 2025-01-08 RX ADMIN — PREDNISONE 5 MG: 10 TABLET ORAL at 08:10

## 2025-01-08 RX ADMIN — HEPARIN SODIUM 5000 UNITS: 5000 INJECTION INTRAVENOUS; SUBCUTANEOUS at 16:08

## 2025-01-08 RX ADMIN — HEPARIN SODIUM 5000 UNITS: 5000 INJECTION INTRAVENOUS; SUBCUTANEOUS at 22:33

## 2025-01-08 RX ADMIN — INSULIN GLARGINE 5 UNITS: 100 INJECTION, SOLUTION SUBCUTANEOUS at 11:58

## 2025-01-08 RX ADMIN — AMIODARONE HYDROCHLORIDE 200 MG: 200 TABLET ORAL at 08:10

## 2025-01-08 RX ADMIN — METOPROLOL TARTRATE 12.5 MG: 25 TABLET, FILM COATED ORAL at 08:10

## 2025-01-08 RX ADMIN — DOCUSATE SODIUM 100 MG: 50 LIQUID ORAL at 22:25

## 2025-01-08 RX ADMIN — OXYCODONE AND ACETAMINOPHEN 2 TABLET: 5; 325 TABLET ORAL at 22:23

## 2025-01-08 RX ADMIN — AMIODARONE HYDROCHLORIDE 200 MG: 200 TABLET ORAL at 22:23

## 2025-01-08 RX ADMIN — Medication 10 ML: at 23:37

## 2025-01-08 RX ADMIN — LANSOPRAZOLE 30 MG: 30 TABLET, ORALLY DISINTEGRATING, DELAYED RELEASE ORAL at 08:10

## 2025-01-08 ASSESSMENT — PULMONARY FUNCTION TESTS
PIF_VALUE: 11
PIF_VALUE: 12
PIF_VALUE: 11
PIF_VALUE: 12
PIF_VALUE: 12
PIF_VALUE: 11
PIF_VALUE: 12
PIF_VALUE: 25
PIF_VALUE: 11
PIF_VALUE: 12
PIF_VALUE: 11
PIF_VALUE: 11
PIF_VALUE: 12
PIF_VALUE: 18
PIF_VALUE: 12
PIF_VALUE: 11
PIF_VALUE: 11
PIF_VALUE: 18
PIF_VALUE: 22
PIF_VALUE: 17
PIF_VALUE: 12
PIF_VALUE: 19
PIF_VALUE: 13

## 2025-01-08 ASSESSMENT — PAIN SCALES - WONG BAKER
WONGBAKER_NUMERICALRESPONSE: NO HURT

## 2025-01-08 ASSESSMENT — PAIN SCALES - GENERAL
PAINLEVEL_OUTOF10: 0

## 2025-01-08 NOTE — PROGRESS NOTES
Nephrology Progress Note    Assessment:  ESRDX  Viral pneumonia admission  DM type-  Hx Kidney transplant chronic rejection  Trach/Peg in place      Plan:dialysis 3x week  nutrition thru PEG    Patient Active Problem List:     Pneumonia     Abdominal pain, other specified site     Acute pyelonephritis without lesion of renal medullary necrosis     Anemia     Essential hypertension     Nonspecific abnormal results of thyroid function study     Mixed hyperlipidemia     Kidney replaced by transplant     Intra-abdominal abscess post-procedure (HCC)     Infection due to Enterococcus     Fever and other physiologic disturbances of temperature regulation     Chronic kidney disease (CKD)     Type 2 diabetes mellitus with stage 3b chronic kidney disease, without long-term current use of insulin (HCC)     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 (HCC)     Abnormal CT of the chest     Bronchiectasis without complication (HCC)     GI bleeding     Complication of transplanted kidney     Muscle weakness (generalized)     Difficulty in walking     Elevated BUN     Urinary retention     Immunosuppression due to drug therapy (HCC)     DELORES (acute kidney injury) (HCC)     Adjustment disorder     Gross hematuria     Bilateral lower extremity edema     Dysuria     Acute cystitis without hematuria     Bilateral hearing loss     Abscess, toe     Acute hyperkalemia     Acute pain of left shoulder     Astigmatism     Astigmatism of both eyes with presbyopia     At risk for stroke     Benign enlargement of prostate     Benign prostatic hyperplasia with urinary frequency     Rhinovirus

## 2025-01-08 NOTE — PROGRESS NOTES
PHARMACY NOTE:   ICU Rounds Attended (10-15 minutes in patient room):    Pt diagnosis: DELORES    Follow up/Changes:   -home meds reviewed: Follow up resume tamsulosin, finasteride, reglan as appropriate  -monitor SSI. Lantus increased to 15 units daily per verbal on rounds. 5 units x 1 today per verbal  -core measures assessed/met  -Monitor abx LOT     Additional information:   Steroid: prednisone 5mg daily  Insulin coverage: high sliding scale with Humalog, utilized 40 units per sliding scale over the past 24 hours. Lantus added per above  Pressors: none  Sedation: none  Antimicrobial therapy: Rocephin day 2. Urine culture in progress. ID not on consult.   Core measures assessed/met     Digna Morgan RPH, PharmD

## 2025-01-08 NOTE — PROGRESS NOTES
Treatment time: 210 minutes    Net UF: 1000 mL    Pre weight: 78 kg  Post weight: 77 kg  EDW: TBD    Access used: AVF RFA  Access function: Good    Medications or blood products given: None    Regular outpatient schedule: TBD    Summary of response to treatment: Patient tolerated treatment well, no signs or symptoms of distress noted during treatment, patient evaluated during dialysis by Dr. Rodriguez and orders reviewed, bleeding time <10 minutes from access sites, report given to LISANDRA Cummings.    Copy of dialysis treatment record placed in chart, to be scanned into EMR.

## 2025-01-08 NOTE — PLAN OF CARE
Problem: Pain  Goal: Verbalizes/displays adequate comfort level or baseline comfort level  Outcome: Not Progressing     Problem: Neurosensory - Adult  Goal: Achieves stable or improved neurological status  Outcome: Not Progressing  Flowsheets (Taken 1/7/2025 2000)  Achieves stable or improved neurological status: Assess for and report changes in neurological status     Problem: Musculoskeletal - Adult  Goal: Return mobility to safest level of function  Outcome: Not Progressing  Flowsheets (Taken 1/7/2025 2000)  Return Mobility to Safest Level of Function: Assess patient stability and activity tolerance for standing, transferring and ambulating with or without assistive devices     Problem: Genitourinary - Adult  Goal: Absence of urinary retention  Outcome: Not Progressing  Flowsheets (Taken 1/7/2025 2000)  Absence of urinary retention: Assess patient’s ability to void and empty bladder     Problem: Metabolic/Fluid and Electrolytes - Adult  Goal: Hemodynamic stability and optimal renal function maintained  Outcome: Not Progressing  Flowsheets (Taken 1/7/2025 2000)  Hemodynamic stability and optimal renal function maintained: Monitor labs and assess for signs and symptoms of volume excess or deficit     Problem: Pain  Goal: Verbalizes/displays adequate comfort level or baseline comfort level  Outcome: Not Progressing     Problem: Neurosensory - Adult  Goal: Achieves stable or improved neurological status  Outcome: Not Progressing  Flowsheets (Taken 1/7/2025 2000)  Achieves stable or improved neurological status: Assess for and report changes in neurological status     Problem: Musculoskeletal - Adult  Goal: Return mobility to safest level of function  Outcome: Not Progressing  Flowsheets (Taken 1/7/2025 2000)  Return Mobility to Safest Level of Function: Assess patient stability and activity tolerance for standing, transferring and ambulating with or without assistive devices     Problem: Genitourinary -

## 2025-01-08 NOTE — PROGRESS NOTES
Physical Therapy Missed Treatment   Facility/Department: [facility] MED SURG IC01/IC01-01    NAME: Remy Upton    : 1950 (74 y.o.)  MRN: [id number]    Account: [id number]  Gender: male    Chart reviewed, attempted PT at 0916. Patient unavailable 2° to:    [] Hold per nsg request    [] Pt declined    [] Nsg notified   [] Other notified    [] Pt.. off floor for test/procedure.     [x] Pt. Unavailable pt currently receiving in room dialysis tx.        Will attempt PT treatment again at earliest convenience.      Electronically signed by Jp Jennings PTA on 25 at 11:09 AM EST

## 2025-01-08 NOTE — CARE COORDINATION
I messaged Jessica from [employer] and they also sent a team member here to do onsight of patient. Jessica states she will get back to us shortly.

## 2025-01-08 NOTE — PROGRESS NOTES
[facility]  Occupational Therapy        NAME:  Remy Upton  ROOM: IC01/IC01-01  :  1950  DATE: 2025    Attempted to see Remy Upton at 0916 on this date for:   []  Initial Evaluation   [x]  Treatment     Patient was unable to be seen due to:   [] Off unit for testing/procedure    [] Patient refused, stating \"    [] Therapy on hold due to     [] Nursing deferred due to    [x] Other:  Pt being connected to in room dialysis at this time.     Will attempt again as able.     Electronically signed by DIONICIO Merida on 25 at 9:21 AM EST

## 2025-01-08 NOTE — FLOWSHEET NOTE
Shift Summary  [id number]    Bedside shift report received from LISANDRA Cross.     Patient is resting with eyes closed at the bedside, respirations equal and unlabored. Upon assessment he awakens to voice. HE nods \"yes\" and \"no\" when asked about comfort and pain. He is weaker in the RUE than the rest of his extremities, but follows commands.     Rounds:  Besack: New orders placed regarding tube feed and immunosuppressant intervals. Refer to order history.   Dorcas: Discussed switching to avaps, trach collar, and potential for movement in a care facility that accepts dialysis patients.     Wedge pillow obtained to assist with strict turning regimen regarding coccyx pressure relief.     Dialysis RN at the bedside 0915    [facility] rep at the bedside this afternoon, folder dropped off for patient's wife.     Patient had multiple large loose stools around FMS this afternoon, full linen change provided as well as soap and water bath.     Patient experienced htn this evening, systolic 180. PRN hydralazine administered.     Patient's bp responded positively to hydralazine, patient vitals stable, but displaying intermittent mild tachycardia. No intervention at this time.

## 2025-01-08 NOTE — PROGRESS NOTES
HEMODIALYSIS PRE-TREATMENT NOTE    Patient Identifiers prior to treatment: Name,     Isolation Required: No                      Isolation Type: N/A       (please document if patient is being managed as a PUI/COVID-19 patient)        Hepatitis status: Susceptible                          Date Drawn                             Result  Hepatitis B Surface Antigen 24     Neg                     Hepatitis B Surface Antibody 24 Neg        Hepatitis B Core Antibody N/A N/A          How was Hepatitis Status verified: Epic     Was a copy of the labs you documented provided to facility for the patient's chart: [facility]    Hemodialysis orders verified: Yes, with Dr. Rodriguez    Access Within normal limits ( I.e. s/s of infection,...): WNL, srinath, kobi present     Pre-Assessment completed: Yes    Pre-dialysis report received from: LISANDRA Cummings                      Time: 08:55

## 2025-01-08 NOTE — CARE COORDINATION
Team ICU rounds done this am and I called patients wife to let her know we are contacting SNFs that can do HD and vent patients. I let her know we will update her and I did discuss [facility] with her as well as [facility]. I called [facility] and they do not do HD at bedside so he would have to be off vent for HD. I will fax info to [facility] now.

## 2025-01-08 NOTE — PROGRESS NOTES
Physical Therapy Missed Treatment   Facility/Department: [facility] MED SURG IC01/IC01-01    NAME: Remy Upton    : 1950 (74 y.o.)  MRN: [id number]    Account: [id number]  Gender: male    Chart reviewed, attempted PT at 1323. Patient unavailable 2° to:    [] Hold per nsg request    [] Pt declined    [] Nsg notified   [] Other notified    [] Pt.. off floor for test/procedure.     [x] Pt. Unavailable in need of nursing care at this time.       Will attempt PT treatment again at earliest convenience.      Electronically signed by Jp Jennings PTA on 25 at 1:46 PM EST

## 2025-01-08 NOTE — FLOWSHEET NOTE
SHIFT SUMMARY     1900: Received report from LISANDRA Covarrubias. Patient is resting in bed with eyes closed, observable regular rise and fall of chest, with no signs of acute distress. Last set of vital signs taken at 1900 and are within normal limits (see vital signs flowsheet for values). IV fluids are infusing at prescribed rate (see orders and intake flowsheet for values). IV site(s) is without redness, edema, or streaking. The dressing is clean, dry, and intact. SpO2 remains above 98% (see vital signs flowsheet for values). Standard safety measures verified as follows: bed is in lowest position with wheels locked. Appropriate amount of side rails are raised. Patient belongings are at the bedside within reach of the patient. Safety measures are checked hourly throughout the night. Call light is within reach.      2000: PM nursing  assessment completed. Previous assessment reviewed and updated.         Pt : Ventilated       Code Status: full code  Oxygen: vent  Complaints of: none  Pain: 0/10  IV: patent/ flushed/ capped, no signs of infiltration noted, dressing clean/dry/intact.  TELE: Afib  Dressings: See LDA  Precautions: standard  Falls: high  Jeremi: 9  New Labs: BUN 43, Creat 1.66     Chart and meds reviewed. Bed wheels locked and in lowest position. Call light and bedside table within reach.     0000: The RN contacted Dr. Barrera concerning an increase in patient's temperature, lethargy, decreased mentation vs baseline, and urine retention of 618 mL. The physician ordered straight catheterization and UA to rule out infection.     0010: New orders for straight cath PRN and UA with culture. Sample taken and sent to lab.     0400: UA results returned with WBCs >100 and the presence of yeast and RBCs in the urine. Urology has been notified and the NP was asked to review the information.     0600: The NP ordered a one time dose of Rocephin for UA findings. The RN gave the medication as ordered.    0700: Report given

## 2025-01-08 NOTE — CARE COORDINATION
Chart reviewed and I sent message to Jessica from [employer] to see if they take [facility]. I messaged Dr. Calvert re: denial of LTAC to see if we should look into SNFs that take vent/dialysis patients.

## 2025-01-08 NOTE — PROGRESS NOTES
Progress Note  Date:2025       Room:[place] [address]  Patient Name:Remy Upton     YOB: 1950     Age:74 y.o.        Subjective    Subjective   Review of Systems  ROS: could not be obtained bc pt is intubated  Objective         Vitals Last 24 Hours:  TEMPERATURE:  Temp  Av °F (37.2 °C)  Min: 98.8 °F (37.1 °C)  Max: 99.1 °F (37.3 °C)  RESPIRATIONS RANGE: Resp  Av.3  Min: 16  Max: 26  PULSE OXIMETRY RANGE: SpO2  Av.2 %  Min: 94 %  Max: 100 %  PULSE RANGE: Pulse  Av.3  Min: 83  Max: 112  BLOOD PRESSURE RANGE: Systolic (24hrs), Av , Min:116 , Max:163   ; Diastolic (24hrs), Av, Min:45, Max:69    I/O (24Hr):    Intake/Output Summary (Last 24 hours) at 2025 1459  Last data filed at 2025 0800  Gross per 24 hour   Intake 1572.37 ml   Output 1226 ml   Net 346.37 ml     Objective:  General Appearance:  Ill-appearing.    Vital signs: (most recent): Blood pressure (!) 119/55, pulse 91, temperature 99 °F (37.2 °C), temperature source Axillary, resp. rate 26, height 1.88 m (6' 2\"), weight 78 kg (171 lb 15.3 oz), SpO2 100%.    HEENT: Normal HEENT exam.    Lungs:  There are decreased breath sounds.    Heart: S1 normal and S2 normal.    Abdomen: Abdomen is soft.  Bowel sounds are normal.   There is no epigastric area tenderness.     Pulses: Distal pulses are intact.    Neurological: Patient is alert.    Pupils:  Pupils are equal, round, and reactive to light.    Skin:  Warm.      Labs/Imaging/Diagnostics    Labs:  CBC:  Recent Labs     25  1027 25  0457 25  0653   WBC 12.9* 9.6 10.7   RBC 2.98* 2.79* 2.73*   HGB 8.8* 8.0* 8.1*   HCT 28.8* 27.2* 26.3*   MCV 96.6* 97.5* 96.3*   RDW 17.6* 17.4* 17.4*    234 218     CHEMISTRIES:  Recent Labs     25  1027 25  0457 25  0653    135 138   K 3.3* 3.5 3.4   CL 95 95 98   CO2 29 26 27   BUN 33* 43* 71*   CREATININE 1.37* 1.66* 2.22*   GLUCOSE 215* 267* 194*   PHOS 1.7* 2.1* 2.1*      PT/INR:No results for input(s): \"PROTIME\", \"INR\" in the last 72 hours.  APTT:No results for input(s): \"APTT\" in the last 72 hours.  LIVER PROFILE:No results for input(s): \"AST\", \"ALT\", \"BILIDIR\", \"BILITOT\", \"ALKPHOS\" in the last 72 hours.    Imaging Last 24 Hours:  No results found.  Assessment//Plan           Hospital Problems             Last Modified POA    * (Principal) DELORES (acute kidney injury) (HCC) 12/18/2024 Yes    Rhinovirus 12/20/2024 Yes    Moderate malnutrition (HCC) 12/19/2024 Yes    Palliative care encounter 12/23/2024 Yes    Goals of care, counseling/discussion 12/23/2024 Yes    Advanced care planning/counseling discussion 12/23/2024 Yes    Encounter for hospice care discussion 12/23/2024 Yes    Lower GI bleed 12/29/2024 Yes    Unable to eat 1/2/2025 Unknown   Ventilator dependent respiratory failure: Status post tracheostomy 12/30   DELORES on CKD h/o renal transplant  Anemia  Afib    Assessment & Plan  1/6: Continue with dialysis with fluid removal.  Spoke with nursing about the care.  Status post PEG tube.  Off Levophed.  Continue with current vent settings.  Follow pulmonary.  Continue with rate control medication for A-fib.  LTAC placement TCT 55 min  1/7: Patient was seen and evaluated.  On CPAP mode.  FiO2 of 30, spoke to insurance about [facility] they recommend patient stay here.  Continue current care.  Continue with dialysis with fluid removal.  Continue weaning and try to put trach collar. Spoke with casemangement, c/w peg feeding, TCT 55 min  1/8: Patient is still on CPAP mode.  Spoke with case management to try SNF due to insurance denied the patient for LTAC at this time.  Continue current care.  Hemodialysis today with 1 L fluid removal TCT 55 min  Electronically signed by Marika Calvert MD on 1/6/25 at 12:31 PM EST

## 2025-01-08 NOTE — INTERDISCIPLINARY ROUNDS
Spiritual Care Services     Summary of Visit:    Attended ICU Rounds. Patient, resting, stable, dialysis, straight cath on last night, excessive secretion in throat, trache collar recommended for day time care, no family present.     Encounter Summary  Encounter Overview/Reason: Interdisciplinary rounds  Service Provided For: Patient  Support System: Spouse  Complexity of Encounter: Low  Begin Time: 0940  End Time : 0955  Total Time Calculated: 15 min        Spiritual/Emotional needs  Type: Spiritual Support              Palliative Care  Type: Palliative Care, Follow-up       Spiritual Assessment/Intervention/Outcomes:    Assessment: Calm, Sad, Coping    Intervention: Prayer (assurance of)/[place], Nurtured Hope    Outcome:  (Patient responded with slight nodding of head of chaplains presence)      Care Plan:    Plan and Referrals  Plan/Referrals: Continue Support (comment)          Spiritual Care Services   Electronically signed by Chaplain Camilo on 1/8/2025 at 11:11 AM.    To reach a  for emotional and spiritual support, place an EPIC consult request.   If a  is needed immediately, dial “0” and ask to page the on-call .

## 2025-01-08 NOTE — PROGRESS NOTES
Critical Care Progress Note    2025 9:07 AM    Subjective:   Admit Date: 2024  PCP: Tico Rodriguez DO    Chief Complaint   Patient presents with    Illness     Interval History:   No major issues of night.  Continues to be on antibiotics.  No fever.  Continues to be on mechanical ventilation.      Medications:   Scheduled Meds:   cefTRIAXone (ROCEPHIN) IV  1,000 mg IntraVENous Q24H    [START ON 2025] cycloSPORINE  50 mg Oral Q ,  &     insulin glargine  10 Units SubCUTAneous Daily    metoprolol tartrate  12.5 mg Oral BID    lansoprazole  30 mg Oral QAM AC    predniSONE  5 mg Oral Daily    epoetin megan-epbx  10,000 Units SubCUTAneous Weekly    [Held by provider] mycophenolate  250 mg Oral BID    insulin lispro  0-16 Units SubCUTAneous Q4H    polyethylene glycol  17 g Oral Daily    docusate  100 mg Oral BID    amiodarone  200 mg Oral BID    sodium chloride flush  5-40 mL IntraVENous 2 times per day    heparin (porcine)  5,000 Units SubCUTAneous 3 times per day    [Held by provider] mycophenolate  500 mg Oral BID     Continuous Infusions:   dextrose      sodium chloride           Objective:   Vitals:   Temp (24hrs), Av.2 °F (37.3 °C), Min:98.8 °F (37.1 °C), Max:99.9 °F (37.7 °C)    BP (!) 146/60   Pulse 94   Temp 99 °F (37.2 °C) (Axillary)   Resp 26   Ht 1.88 m (6' 2\")   Wt 78 kg (171 lb 15.3 oz)   SpO2 96%   BMI 22.08 kg/m²   I/O:24HR INTAKE/OUTPUT:    Intake/Output Summary (Last 24 hours) at 2025 0907  Last data filed at 2025 0056  Gross per 24 hour   Intake 1372.37 ml   Output 1226 ml   Net 146.37 ml            Physical Exam:    General appearance -ill appearing  Mental status - alert, oriented to person   Eyes - pupils equal and reactive, extraocular eye movements intact  Nose - normal and patent.   Neck -trach in place, supple, no significant adenopathy  Chest -diminished bilaterally so he has patient's think of this is a    Heart - normal rate, regular rhythm, normal

## 2025-01-09 ENCOUNTER — APPOINTMENT (OUTPATIENT)
Dept: INTERVENTIONAL RADIOLOGY/VASCULAR | Age: 75
End: 2025-01-09
Payer: COMMERCIAL

## 2025-01-09 LAB
ALBUMIN SERPL-MCNC: 3 G/DL (ref 3.5–4.6)
ANION GAP SERPL CALCULATED.3IONS-SCNC: 12 MEQ/L (ref 9–15)
BACTERIA UR CULT: NORMAL
BASOPHILS # BLD: 0.1 K/UL (ref 0–0.2)
BASOPHILS NFR BLD: 0.5 %
BUN SERPL-MCNC: 52 MG/DL (ref 8–23)
CALCIUM SERPL-MCNC: 8.6 MG/DL (ref 8.5–9.9)
CHLORIDE SERPL-SCNC: 95 MEQ/L (ref 95–107)
CO2 SERPL-SCNC: 28 MEQ/L (ref 20–31)
CREAT SERPL-MCNC: 1.65 MG/DL (ref 0.7–1.2)
EOSINOPHIL # BLD: 0.1 K/UL (ref 0–0.7)
EOSINOPHIL NFR BLD: 1 %
ERYTHROCYTE [DISTWIDTH] IN BLOOD BY AUTOMATED COUNT: 17.5 % (ref 11.5–14.5)
GLUCOSE BLD-MCNC: 120 MG/DL (ref 70–99)
GLUCOSE BLD-MCNC: 159 MG/DL (ref 70–99)
GLUCOSE BLD-MCNC: 159 MG/DL (ref 70–99)
GLUCOSE BLD-MCNC: 182 MG/DL (ref 70–99)
GLUCOSE BLD-MCNC: 228 MG/DL (ref 70–99)
GLUCOSE BLD-MCNC: 261 MG/DL (ref 70–99)
GLUCOSE SERPL-MCNC: 171 MG/DL (ref 70–99)
HCT VFR BLD AUTO: 28.8 % (ref 42–52)
HGB BLD-MCNC: 8.7 G/DL (ref 14–18)
LYMPHOCYTES # BLD: 0.3 K/UL (ref 1–4.8)
LYMPHOCYTES NFR BLD: 3 %
MCH RBC QN AUTO: 29.2 PG (ref 27–31.3)
MCHC RBC AUTO-ENTMCNC: 30.2 % (ref 33–37)
MCV RBC AUTO: 96.6 FL (ref 79–92.2)
MONOCYTES # BLD: 0.5 K/UL (ref 0.2–0.8)
MONOCYTES NFR BLD: 4.4 %
NEUTROPHILS # BLD: 10 K/UL (ref 1.4–6.5)
NEUTS SEG NFR BLD: 89.3 %
PERFORMED ON: ABNORMAL
PHOSPHATE SERPL-MCNC: 2 MG/DL (ref 2.3–4.8)
PLATELET # BLD AUTO: 224 K/UL (ref 130–400)
POTASSIUM SERPL-SCNC: 4.2 MEQ/L (ref 3.4–4.9)
RBC # BLD AUTO: 2.98 M/UL (ref 4.7–6.1)
SODIUM SERPL-SCNC: 135 MEQ/L (ref 135–144)
WBC # BLD AUTO: 11.2 K/UL (ref 4.8–10.8)

## 2025-01-09 PROCEDURE — 85025 COMPLETE CBC W/AUTO DIFF WBC: CPT

## 2025-01-09 PROCEDURE — 6360000002 HC RX W HCPCS: Performed by: INTERNAL MEDICINE

## 2025-01-09 PROCEDURE — 2700000000 HC OXYGEN THERAPY PER DAY

## 2025-01-09 PROCEDURE — C1751 CATH, INF, PER/CENT/MIDLINE: HCPCS

## 2025-01-09 PROCEDURE — 94660 CPAP INITIATION&MGMT: CPT

## 2025-01-09 PROCEDURE — 2709999900 IR PICC WO SQ PORT/PUMP > 5 YEARS

## 2025-01-09 PROCEDURE — 97110 THERAPEUTIC EXERCISES: CPT

## 2025-01-09 PROCEDURE — 36573 INSJ PICC RS&I 5 YR+: CPT

## 2025-01-09 PROCEDURE — 6370000000 HC RX 637 (ALT 250 FOR IP): Performed by: INTERNAL MEDICINE

## 2025-01-09 PROCEDURE — 6360000002 HC RX W HCPCS: Performed by: NURSE PRACTITIONER

## 2025-01-09 PROCEDURE — 2580000003 HC RX 258: Performed by: NURSE PRACTITIONER

## 2025-01-09 PROCEDURE — 94761 N-INVAS EAR/PLS OXIMETRY MLT: CPT

## 2025-01-09 PROCEDURE — 6370000000 HC RX 637 (ALT 250 FOR IP): Performed by: NURSE PRACTITIONER

## 2025-01-09 PROCEDURE — 94003 VENT MGMT INPAT SUBQ DAY: CPT

## 2025-01-09 PROCEDURE — 02H633Z INSERTION OF INFUSION DEVICE INTO RIGHT ATRIUM, PERCUTANEOUS APPROACH: ICD-10-PCS | Performed by: INTERNAL MEDICINE

## 2025-01-09 PROCEDURE — 2500000003 HC RX 250 WO HCPCS: Performed by: INTERNAL MEDICINE

## 2025-01-09 PROCEDURE — 2000000000 HC ICU R&B

## 2025-01-09 PROCEDURE — 2500000003 HC RX 250 WO HCPCS: Performed by: NURSE PRACTITIONER

## 2025-01-09 PROCEDURE — 36573 INSJ PICC RS&I 5 YR+: CPT | Performed by: RADIOLOGY

## 2025-01-09 PROCEDURE — 36592 COLLECT BLOOD FROM PICC: CPT

## 2025-01-09 PROCEDURE — 36415 COLL VENOUS BLD VENIPUNCTURE: CPT

## 2025-01-09 PROCEDURE — 99291 CRITICAL CARE FIRST HOUR: CPT | Performed by: INTERNAL MEDICINE

## 2025-01-09 PROCEDURE — 80069 RENAL FUNCTION PANEL: CPT

## 2025-01-09 RX ORDER — SODIUM CHLORIDE 0.9 % (FLUSH) 0.9 %
5-40 SYRINGE (ML) INJECTION EVERY 12 HOURS SCHEDULED
Status: DISCONTINUED | OUTPATIENT
Start: 2025-01-09 | End: 2025-01-14 | Stop reason: HOSPADM

## 2025-01-09 RX ORDER — SODIUM CHLORIDE 9 MG/ML
INJECTION, SOLUTION INTRAVENOUS PRN
Status: DISCONTINUED | OUTPATIENT
Start: 2025-01-09 | End: 2025-01-14 | Stop reason: HOSPADM

## 2025-01-09 RX ORDER — LIDOCAINE HYDROCHLORIDE 20 MG/ML
5 INJECTION, SOLUTION INFILTRATION; PERINEURAL ONCE
Status: COMPLETED | OUTPATIENT
Start: 2025-01-09 | End: 2025-01-09

## 2025-01-09 RX ORDER — SODIUM CHLORIDE 0.9 % (FLUSH) 0.9 %
5-40 SYRINGE (ML) INJECTION PRN
Status: DISCONTINUED | OUTPATIENT
Start: 2025-01-09 | End: 2025-01-14 | Stop reason: HOSPADM

## 2025-01-09 RX ORDER — LIDOCAINE HYDROCHLORIDE 10 MG/ML
50 INJECTION, SOLUTION EPIDURAL; INFILTRATION; INTRACAUDAL; PERINEURAL ONCE
Status: DISCONTINUED | OUTPATIENT
Start: 2025-01-09 | End: 2025-01-14 | Stop reason: HOSPADM

## 2025-01-09 RX ADMIN — AMIODARONE HYDROCHLORIDE 200 MG: 200 TABLET ORAL at 21:46

## 2025-01-09 RX ADMIN — PREDNISONE 5 MG: 10 TABLET ORAL at 08:31

## 2025-01-09 RX ADMIN — DOCUSATE SODIUM 100 MG: 50 LIQUID ORAL at 08:31

## 2025-01-09 RX ADMIN — HEPARIN SODIUM 5000 UNITS: 5000 INJECTION INTRAVENOUS; SUBCUTANEOUS at 21:46

## 2025-01-09 RX ADMIN — SODIUM CHLORIDE, PRESERVATIVE FREE 10 ML: 5 INJECTION INTRAVENOUS at 21:47

## 2025-01-09 RX ADMIN — SODIUM CHLORIDE, PRESERVATIVE FREE 10 ML: 5 INJECTION INTRAVENOUS at 18:28

## 2025-01-09 RX ADMIN — INSULIN GLARGINE 15 UNITS: 100 INJECTION, SOLUTION SUBCUTANEOUS at 10:46

## 2025-01-09 RX ADMIN — METOPROLOL TARTRATE 12.5 MG: 25 TABLET, FILM COATED ORAL at 08:31

## 2025-01-09 RX ADMIN — INSULIN LISPRO 4 UNITS: 100 INJECTION, SOLUTION INTRAVENOUS; SUBCUTANEOUS at 21:46

## 2025-01-09 RX ADMIN — Medication 10 ML: at 08:50

## 2025-01-09 RX ADMIN — HEPARIN SODIUM 5000 UNITS: 5000 INJECTION INTRAVENOUS; SUBCUTANEOUS at 06:06

## 2025-01-09 RX ADMIN — LANSOPRAZOLE 30 MG: 30 TABLET, ORALLY DISINTEGRATING, DELAYED RELEASE ORAL at 08:31

## 2025-01-09 RX ADMIN — Medication 10 ML: at 21:47

## 2025-01-09 RX ADMIN — INSULIN LISPRO 8 UNITS: 100 INJECTION, SOLUTION INTRAVENOUS; SUBCUTANEOUS at 10:46

## 2025-01-09 RX ADMIN — POLYETHYLENE GLYCOL 3350 17 G: 17 POWDER, FOR SOLUTION ORAL at 08:31

## 2025-01-09 RX ADMIN — SODIUM PHOSPHATE, MONOBASIC, MONOHYDRATE AND SODIUM PHOSPHATE, DIBASIC, ANHYDROUS 20 MMOL: 142; 276 INJECTION, SOLUTION INTRAVENOUS at 18:30

## 2025-01-09 RX ADMIN — LIDOCAINE HYDROCHLORIDE 5 ML: 20 INJECTION, SOLUTION INFILTRATION; PERINEURAL at 15:44

## 2025-01-09 RX ADMIN — AMIODARONE HYDROCHLORIDE 200 MG: 200 TABLET ORAL at 08:31

## 2025-01-09 RX ADMIN — WATER 1000 MG: 1 INJECTION INTRAMUSCULAR; INTRAVENOUS; SUBCUTANEOUS at 05:58

## 2025-01-09 RX ADMIN — DOCUSATE SODIUM 100 MG: 50 LIQUID ORAL at 21:46

## 2025-01-09 RX ADMIN — CYCLOSPORINE 50 MG: 100 SOLUTION ORAL at 08:33

## 2025-01-09 ASSESSMENT — PULMONARY FUNCTION TESTS
PIF_VALUE: 24
PIF_VALUE: 19
PIF_VALUE: 21
PIF_VALUE: 25
PIF_VALUE: 11
PIF_VALUE: 11
PIF_VALUE: 21
PIF_VALUE: 20
PIF_VALUE: 18
PIF_VALUE: 19
PIF_VALUE: 23
PIF_VALUE: 18
PIF_VALUE: 17
PIF_VALUE: 24
PIF_VALUE: 27
PIF_VALUE: 11
PIF_VALUE: 11
PIF_VALUE: 23

## 2025-01-09 ASSESSMENT — PAIN SCALES - GENERAL: PAINLEVEL_OUTOF10: 0

## 2025-01-09 NOTE — PROGRESS NOTES
PHARMACY NOTE:   ICU Rounds Attended (10-15 minutes in patient room):    Pt diagnosis: DELORES    Follow up/Changes:   -home meds reviewed: Follow up resume tamsulosin, finasteride, reglan as appropriate  -monitor SSI.   -core measures assessed/met  -Monitor abx LOT-culture  -Phos replaced Na Phos 20 mmol x 1 per verbal     Additional information:   Steroid: prednisone 5mg daily  Insulin coverage: high sliding scale with Humalog, utilized 8 units per sliding scale over the past 24 hours. Lantus added per above  Pressors: none  Sedation: none  Antimicrobial therapy: Rocephin day 2. Urine culture pending. ID not on consult.   Core measures assessed/met     Digna Morgan RPH, PharmD

## 2025-01-09 NOTE — FLOWSHEET NOTE
SHIFT SUMMARY     1900: Received report from LISANDRA Cummings. Patient is resting in bed with eyes closed, observable regular rise and fall of chest, with no signs of acute distress. Last set of vital signs taken at 1900 and are within normal limits (see vital signs flowsheet for values). IV fluids are infusing at prescribed rate (see orders and intake flowsheet for values). IV site(s) is without redness, edema, or streaking. The dressing is clean, dry, and intact. SpO2 remains above 98% (see vital signs flowsheet for values). Standard safety measures verified as follows: bed is in lowest position with wheels locked. Appropriate amount of side rails are raised. Patient belongings are at the bedside within reach of the patient. Safety measures are checked hourly throughout the night. Call light is within reach.      2000: PM nursing  assessment completed. Previous assessment reviewed and updated.         Pt : Ventilated       Code Status: full code  Oxygen: vent  Complaints of: none  Pain: 0/10  IV: patent/ flushed/ capped, no signs of infiltration noted, dressing clean/dry/intact.  TELE: Afib  Dressings: See LDA  Precautions: standard  Falls: high  Jeremi: 9  New Labs: BUN 71, Creat 2.22     Chart and meds reviewed. Bed wheels locked and in lowest position. Call light and bedside table within reach.     2100 - 0500:  Patient currently resting in bed with no complaints of pain or discomfort. No acute distress noted at this time. Safety measures remain in place. Call light within reach.     0600: Patient bathed, linen changed, gown changed, and mouth care performed.    0700: Report given to day shift RN.

## 2025-01-09 NOTE — FLOWSHEET NOTE
Shift Summary  [id number]    Bedside shift report received from LISANDRA Campo.     During the morning assessment the patient was not allowing for oral care/suctioning to be completed. This RN provided education regarding importance of oral hygiene while in ICU/trached. Patient following some commands, but refuses participation in full assessment.     Wound-ostomy consult submitted by this RN per protocol for assessment/follow up of previously documented wounds. Nature of wounds are worsening despite interventions.     Rounds:  [place]: Patient was accepted at [facility], further processing in progress to establish transfer information. The need for a new line was discussed post-rounds and consult for vascular access orders placed per ICU providers. Electrolyte replacement also ordered, refer to MAR.    Patient placed on trach collar 0930    Nephrology contacted for clearance and preference with PICC.     Specials contacted in reference to PICC placement, requested by ICU. Patient's wife updated and this RN obtained consent for PICC insertion this afternoon with NOEL Martínez RN at 1200.    Patient's wife at the bedside, received case management's contact card, there have been multiple questions regarding placement for this patient and the care team is closely working with patient's wife to answer all questions.     PICC 1530    FMS changed, large loose stool in the bed around tubing. FMS replaced for hole in the tubing. Complete linen change and bath.

## 2025-01-09 NOTE — PROGRESS NOTES
Critical Care Progress Note    2025 8:54 AM    Subjective:   Admit Date: 2024  PCP: Tico Rodriguez DO    Chief Complaint   Patient presents with    Illness     Interval History:   No major issues of night.  Continues to be on antibiotics.  No fever.  Continues to be on mechanical ventilation.  Could not tolerate trach collar yesterday.      Medications:   Scheduled Meds:   cefTRIAXone (ROCEPHIN) IV  1,000 mg IntraVENous Q24H    cycloSPORINE  50 mg Oral Q ,  &     insulin glargine  15 Units SubCUTAneous Daily    metoprolol tartrate  12.5 mg Oral BID    lansoprazole  30 mg Oral QAM AC    predniSONE  5 mg Oral Daily    epoetin megan-epbx  10,000 Units SubCUTAneous Weekly    [Held by provider] mycophenolate  250 mg Oral BID    insulin lispro  0-16 Units SubCUTAneous Q4H    polyethylene glycol  17 g Oral Daily    docusate  100 mg Oral BID    amiodarone  200 mg Oral BID    sodium chloride flush  5-40 mL IntraVENous 2 times per day    heparin (porcine)  5,000 Units SubCUTAneous 3 times per day    [Held by provider] mycophenolate  500 mg Oral BID     Continuous Infusions:   dextrose      sodium chloride           Objective:   Vitals:   Temp (24hrs), Av.9 °F (37.2 °C), Min:98.8 °F (37.1 °C), Max:99 °F (37.2 °C)    BP (!) 110/44   Pulse 95   Temp 98.8 °F (37.1 °C) (Axillary)   Resp 16   Ht 1.88 m (6' 2\")   Wt 77 kg (169 lb 12.1 oz)   SpO2 99%   BMI 21.80 kg/m²   I/O:24HR INTAKE/OUTPUT:    Intake/Output Summary (Last 24 hours) at 2025 0854  Last data filed at 2025 2358  Gross per 24 hour   Intake 1575 ml   Output 100 ml   Net 1475 ml            Physical Exam:    General appearance -ill appearing  Mental status - alert, oriented to person   Eyes - pupils equal and reactive, extraocular eye movements intact  Nose - normal and patent.   Neck -trach in place, supple, no significant adenopathy  Chest -diminished bilaterally so he has patient's think of this is a    Heart - normal rate, regular

## 2025-01-09 NOTE — PROGRESS NOTES
Nephrology Progress Note    Assessment:  ESRDX  Trach/PEG  Cachexia  Hx prior kidney transplant  Hx DM type-2/Hypertension      Plan:continue present program    Patient Active Problem List:     Pneumonia     Abdominal pain, other specified site     Acute pyelonephritis without lesion of renal medullary necrosis     Anemia     Essential hypertension     Nonspecific abnormal results of thyroid function study     Mixed hyperlipidemia     Kidney replaced by transplant     Intra-abdominal abscess post-procedure (HCC)     Infection due to Enterococcus     Fever and other physiologic disturbances of temperature regulation     Chronic kidney disease (CKD)     Type 2 diabetes mellitus with stage 3b chronic kidney disease, without long-term current use of insulin (HCC)     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 (HCC)     Abnormal CT of the chest     Bronchiectasis without complication (HCC)     GI bleeding     Complication of transplanted kidney     Muscle weakness (generalized)     Difficulty in walking     Elevated BUN     Urinary retention     Immunosuppression due to drug therapy (HCC)     DELORES (acute kidney injury) (HCC)     Adjustment disorder     Gross hematuria     Bilateral lower extremity edema     Dysuria     Acute cystitis without hematuria     Bilateral hearing loss     Abscess, toe     Acute hyperkalemia     Acute pain of left shoulder     Astigmatism     Astigmatism of both eyes with presbyopia     At risk for stroke     Benign enlargement of prostate     Benign prostatic hyperplasia with urinary frequency     Rhinovirus     Chronic right shoulder pain

## 2025-01-09 NOTE — PROGRESS NOTES
Physical Therapy Med Surg Daily Treatment Note  Facility/Department[place] [facility]  Room: [place] [address]       NAME: Remy Upton  : 1950 (74 y.o.)  MRN: [id number]  CODE STATUS: Full Code    Date of Service: 2025    Patient Diagnosis(es): Rhinovirus [B34.8]  S/P kidney transplant [Z94.0]  DELORES (acute kidney injury) (HCC) [N17.9]  Acute respiratory failure with hypoxia [J96.01]  Acute kidney injury superimposed on CKD (HCC) [N17.9, N18.9]   Chief Complaint   Patient presents with    Illness     Patient Active Problem List    Diagnosis Date Noted    Chest pain 2022    GI bleeding 10/29/2022    Lung nodules 10/25/2022    COPD without exacerbation (HCC) 10/25/2022    Abnormal CT of the chest 10/25/2022    Bronchiectasis without complication (HCC) 10/25/2022    Dieulafoy lesion of colon 10/24/2022    Poorly controlled diabetes mellitus (HCC) 10/24/2022    Symptomatic anemia 10/19/2022    Acute upper GI bleed 10/19/2022    Atrial fibrillation (HCC) 2022    Unstable angina ([facility]) 2022    Unable to eat 2025    Lower GI bleed 2024    Palliative care encounter 2024    Goals of care, counseling/discussion 2024    Advanced care planning/counseling discussion 2024    Encounter for hospice care discussion 2024    Moderate malnutrition (HCC) 2024    Atrial fibrillation with RVR ([facility]) 2024    At risk for stroke 10/14/2024    Deep vein thrombosis (DVT) of lower extremity ([facility]) 10/14/2024    Localized swelling of both lower legs 10/14/2024    Peripheral nerve disease 10/14/2024    Rhabdomyolysis 10/14/2024    Leukocytes in urine 10/14/2024    Bilateral hearing loss 10/10/2024    Acute cystitis without hematuria 2024    Bilateral lower extremity edema 2024    Dysuria 2024    Gross hematuria 2024    Adjustment disorder 2024    Immunosuppression due to drug therapy (HCC) 2024    DELORES (acute kidney injury) (HCC) 2024     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 10/31/2003    Condition not found 10/31/2003        Past Medical History:   Diagnosis Date    Diabetes mellitus (HCC)     Hemodialysis access, fistula mature (HCC) 12/2002    Hypertension     Seizures (HCC)     no seizure since 1995     Past Surgical History:   Procedure Laterality Date    BRAIN SURGERY Left 12/06/1990    to eliminate seizures    COLONOSCOPY N/A 10/24/2022    COLONOSCOPY DIAGNOSTIC performed by Va Ordoñez MD at [facility]    [facility]  2015    [facility]/CELL WASHG SPX N/A 3/20/2018    BRON[place]OSCOPY [place]de [place] [facility]    UPPER GASTROINTESTINAL ENDOSCOPY N/A 10/21/2022    EGD DIAGNOSTIC ONLY performed by Jaime Martinez MD at [facility]    UPPER GASTROINTESTINAL ENDOSCOPY N/A 1/2/2025    Esophagogastroduodenoscopy with percutaneous endoscopic gastrostomy tube/ICU bedside/anesthesia not required  ROOM ICU:1 performed [place] Syed Ju[place]rez [place] [facility] [place]       Chart Reviewed: Yes  Family/Caregiver Present: No    Restrictions:  Restrictions/Precautions: Fall Risk    SUBJECTIVE:        Pain  Pain  Pre-Pain:  (does not respond when asked if hes in pain, no pain behaviors noted during mobility.)    OBJECTIVE:        Bed mobility  Rolling to Left: Dependent/Total  Rolling to Right: Dependent/Total  Bed Mobility Comments: no initiation or engagement from pt for bed mobility.          PT Exercises  PROM Exercises: PROM of BLE ankle DF/PF. knee flx/ext. hip abd/flx/IR/ER. x10 each.          Activity Tolerance  Activity Tolerance: Patient limited by endurance;Patient limited by fatigue          ASSESSMENT   Assessment: pt with poor engagement, max attempts at facilitating AAROM, pt does not assist with therex or bed mobility. repositioned post tx to offload pressure wound, RN notified.

## 2025-01-09 NOTE — PROGRESS NOTES
Progress Note  Date:2025       Room:[place] [address]  Patient Name:Remy Upton     YOB: 1950     Age:74 y.o.        Subjective    Subjective   Review of Systems  ROS: could not be obtained bc pt is intubated  Objective         Vitals Last 24 Hours:  TEMPERATURE:  Temp  Av.9 °F (37.2 °C)  Min: 98.8 °F (37.1 °C)  Max: 99 °F (37.2 °C)  RESPIRATIONS RANGE: Resp  Av  Min: 14  Max: 29  PULSE OXIMETRY RANGE: SpO2  Av.8 %  Min: 99 %  Max: 100 %  PULSE RANGE: Pulse  Av.7  Min: 84  Max: 117  BLOOD PRESSURE RANGE: Systolic (24hrs), Av , Min:110 , Max:168   ; Diastolic (24hrs), Av, Min:44, Max:88    I/O (24Hr):    Intake/Output Summary (Last 24 hours) at 2025 1150  Last data filed at 2025 2358  Gross per 24 hour   Intake 1575 ml   Output 100 ml   Net 1475 ml     Objective:  General Appearance:  Ill-appearing.    Vital signs: (most recent): Blood pressure (!) 142/55, pulse 97, temperature 98.8 °F (37.1 °C), temperature source Axillary, resp. rate 25, height 1.88 m (6' 2\"), weight 77 kg (169 lb 12.1 oz), SpO2 99%.    HEENT: Normal HEENT exam.    Lungs:  There are decreased breath sounds.    Heart: S1 normal and S2 normal.    Abdomen: Abdomen is soft.  Bowel sounds are normal.   There is no epigastric area tenderness.     Pulses: Distal pulses are intact.    Neurological: Patient is alert.    Pupils:  Pupils are equal, round, and reactive to light.    Skin:  Warm.      Labs/Imaging/Diagnostics    Labs:  CBC:  Recent Labs     25  0457 25  0653 25  0604   WBC 9.6 10.7 11.2*   RBC 2.79* 2.73* 2.98*   HGB 8.0* 8.1* 8.7*   HCT 27.2* 26.3* 28.8*   MCV 97.5* 96.3* 96.6*   RDW 17.4* 17.4* 17.5*    218 224     CHEMISTRIES:  Recent Labs     25  0457 25  0653 25  0604    138 135   K 3.5 3.4 4.2   CL 95 98 95   CO2 26 27 28   BUN 43* 71* 52*   CREATININE 1.66* 2.22* 1.65*   GLUCOSE 267* 194* 171*   PHOS 2.1* 2.1* 2.0*     PT/INR:No  on 1/6/25 at 12:31 PM EST

## 2025-01-09 NOTE — INTERDISCIPLINARY ROUNDS
Spiritual Care Services     Summary of Visit:   participated in ICU rounds. Patient is on a trach collar and waiting on long term placement. No family were present during rounds.    Encounter Summary  Encounter Overview/Reason: Interdisciplinary rounds, Palliative Care  Service Provided For: Patient  Support System: Spouse  Complexity of Encounter: Low  Begin Time: 0940  End Time : 0955  Total Time Calculated: 15 min        Spiritual/Emotional needs  Type: Spiritual Support              Palliative Care  Type: Palliative Care, Interdisciplinary Rounds       Spiritual Assessment/Intervention/Outcomes:    Assessment: Calm, Sad, Coping    Intervention: Prayer (assurance of)/Nicollet, Nurtured Hope    Outcome:  (Patient responded with slight nodding of head of chaplains presence)      Care Plan:    Plan and Referrals  Plan/Referrals: Continue Support (comment)    Chapain to provide additional spiritual support as needed or requested      Spiritual Care Services   Electronically signed by Chaplain Neeta on 1/9/2025 at 9:39 AM.    To reach a  for emotional and spiritual support, place an EPIC consult request.   If a  is needed immediately, dial “0” and ask to page the on-call .

## 2025-01-09 NOTE — CARE COORDINATION
Received message from Jessica at [facility] they can accept patient and will work on insurance auth this morning and keep us updated.

## 2025-01-10 LAB
ALBUMIN SERPL-MCNC: 2.7 G/DL (ref 3.5–4.6)
ANION GAP SERPL CALCULATED.3IONS-SCNC: 12 MEQ/L (ref 9–15)
BASOPHILS # BLD: 0 K/UL (ref 0–0.2)
BASOPHILS NFR BLD: 0.3 %
BUN SERPL-MCNC: 68 MG/DL (ref 8–23)
CALCIUM SERPL-MCNC: 8.1 MG/DL (ref 8.5–9.9)
CHLORIDE SERPL-SCNC: 92 MEQ/L (ref 95–107)
CO2 SERPL-SCNC: 28 MEQ/L (ref 20–31)
CREAT SERPL-MCNC: 1.99 MG/DL (ref 0.7–1.2)
EOSINOPHIL # BLD: 0.1 K/UL (ref 0–0.7)
EOSINOPHIL NFR BLD: 0.5 %
ERYTHROCYTE [DISTWIDTH] IN BLOOD BY AUTOMATED COUNT: 17.5 % (ref 11.5–14.5)
GLUCOSE BLD-MCNC: 137 MG/DL (ref 70–99)
GLUCOSE BLD-MCNC: 161 MG/DL (ref 70–99)
GLUCOSE BLD-MCNC: 180 MG/DL (ref 70–99)
GLUCOSE BLD-MCNC: 191 MG/DL (ref 70–99)
GLUCOSE BLD-MCNC: 215 MG/DL (ref 70–99)
GLUCOSE BLD-MCNC: 249 MG/DL (ref 70–99)
GLUCOSE SERPL-MCNC: 202 MG/DL (ref 70–99)
HCT VFR BLD AUTO: 26.4 % (ref 42–52)
HGB BLD-MCNC: 8.1 G/DL (ref 14–18)
LYMPHOCYTES # BLD: 0.4 K/UL (ref 1–4.8)
LYMPHOCYTES NFR BLD: 3.3 %
MCH RBC QN AUTO: 29.6 PG (ref 27–31.3)
MCHC RBC AUTO-ENTMCNC: 30.7 % (ref 33–37)
MCV RBC AUTO: 96.4 FL (ref 79–92.2)
MONOCYTES # BLD: 0.8 K/UL (ref 0.2–0.8)
MONOCYTES NFR BLD: 6 %
NEUTROPHILS # BLD: 11.3 K/UL (ref 1.4–6.5)
NEUTS SEG NFR BLD: 88 %
PERFORMED ON: ABNORMAL
PHOSPHATE SERPL-MCNC: 3.5 MG/DL (ref 2.3–4.8)
PLATELET # BLD AUTO: 222 K/UL (ref 130–400)
POTASSIUM SERPL-SCNC: 4.1 MEQ/L (ref 3.4–4.9)
RBC # BLD AUTO: 2.74 M/UL (ref 4.7–6.1)
SODIUM SERPL-SCNC: 132 MEQ/L (ref 135–144)
WBC # BLD AUTO: 12.9 K/UL (ref 4.8–10.8)

## 2025-01-10 PROCEDURE — 6360000002 HC RX W HCPCS: Performed by: INTERNAL MEDICINE

## 2025-01-10 PROCEDURE — 89220 SPUTUM SPECIMEN COLLECTION: CPT

## 2025-01-10 PROCEDURE — 80069 RENAL FUNCTION PANEL: CPT

## 2025-01-10 PROCEDURE — 2580000003 HC RX 258: Performed by: INTERNAL MEDICINE

## 2025-01-10 PROCEDURE — 85025 COMPLETE CBC W/AUTO DIFF WBC: CPT

## 2025-01-10 PROCEDURE — 94761 N-INVAS EAR/PLS OXIMETRY MLT: CPT

## 2025-01-10 PROCEDURE — 6360000002 HC RX W HCPCS: Performed by: NURSE PRACTITIONER

## 2025-01-10 PROCEDURE — 51798 US URINE CAPACITY MEASURE: CPT

## 2025-01-10 PROCEDURE — 2000000000 HC ICU R&B

## 2025-01-10 PROCEDURE — 2500000003 HC RX 250 WO HCPCS: Performed by: NURSE PRACTITIONER

## 2025-01-10 PROCEDURE — 94003 VENT MGMT INPAT SUBQ DAY: CPT

## 2025-01-10 PROCEDURE — 6370000000 HC RX 637 (ALT 250 FOR IP): Performed by: INTERNAL MEDICINE

## 2025-01-10 PROCEDURE — 2500000003 HC RX 250 WO HCPCS: Performed by: INTERNAL MEDICINE

## 2025-01-10 PROCEDURE — 99291 CRITICAL CARE FIRST HOUR: CPT | Performed by: INTERNAL MEDICINE

## 2025-01-10 PROCEDURE — 6370000000 HC RX 637 (ALT 250 FOR IP): Performed by: NURSE PRACTITIONER

## 2025-01-10 PROCEDURE — 51701 INSERT BLADDER CATHETER: CPT

## 2025-01-10 PROCEDURE — P9047 ALBUMIN (HUMAN), 25%, 50ML: HCPCS | Performed by: INTERNAL MEDICINE

## 2025-01-10 PROCEDURE — 36592 COLLECT BLOOD FROM PICC: CPT

## 2025-01-10 PROCEDURE — 2700000000 HC OXYGEN THERAPY PER DAY

## 2025-01-10 RX ORDER — INSULIN LISPRO 100 [IU]/ML
0-16 INJECTION, SOLUTION INTRAVENOUS; SUBCUTANEOUS EVERY 4 HOURS
Qty: 90 EACH | Refills: 1 | Status: ON HOLD | OUTPATIENT
Start: 2025-01-10

## 2025-01-10 RX ORDER — METOPROLOL TARTRATE 25 MG/1
12.5 TABLET, FILM COATED ORAL 2 TIMES DAILY
Qty: 60 TABLET | Refills: 3 | Status: ON HOLD | OUTPATIENT
Start: 2025-01-10

## 2025-01-10 RX ORDER — IPRATROPIUM BROMIDE AND ALBUTEROL SULFATE 2.5; .5 MG/3ML; MG/3ML
3 SOLUTION RESPIRATORY (INHALATION) EVERY 4 HOURS PRN
Qty: 360 ML | Refills: 2 | Status: ON HOLD | OUTPATIENT
Start: 2025-01-10

## 2025-01-10 RX ORDER — INSULIN GLARGINE 100 [IU]/ML
20 INJECTION, SOLUTION SUBCUTANEOUS DAILY
Qty: 10 ML | Refills: 3 | Status: ON HOLD | OUTPATIENT
Start: 2025-01-11

## 2025-01-10 RX ORDER — LANSOPRAZOLE 30 MG/1
30 TABLET, ORALLY DISINTEGRATING, DELAYED RELEASE ORAL
Qty: 30 TABLET | Refills: 1 | Status: ON HOLD | OUTPATIENT
Start: 2025-01-11 | End: 2025-01-29 | Stop reason: HOSPADM

## 2025-01-10 RX ORDER — INSULIN GLARGINE 100 [IU]/ML
20 INJECTION, SOLUTION SUBCUTANEOUS DAILY
Status: DISCONTINUED | OUTPATIENT
Start: 2025-01-11 | End: 2025-01-14 | Stop reason: HOSPADM

## 2025-01-10 RX ORDER — ALBUMIN (HUMAN) 12.5 G/50ML
25 SOLUTION INTRAVENOUS ONCE
Status: COMPLETED | OUTPATIENT
Start: 2025-01-10 | End: 2025-01-10

## 2025-01-10 RX ORDER — HEPARIN SODIUM 5000 [USP'U]/ML
5000 INJECTION, SOLUTION INTRAVENOUS; SUBCUTANEOUS EVERY 8 HOURS SCHEDULED
Qty: 90 ML | Refills: 0 | Status: ON HOLD | OUTPATIENT
Start: 2025-01-10 | End: 2025-02-09

## 2025-01-10 RX ORDER — MYCOPHENOLATE MOFETIL 250 MG/1
500 CAPSULE ORAL 2 TIMES DAILY
Qty: 60 CAPSULE | Refills: 3 | Status: ON HOLD | OUTPATIENT
Start: 2025-01-10

## 2025-01-10 RX ORDER — 0.9 % SODIUM CHLORIDE 0.9 %
500 INTRAVENOUS SOLUTION INTRAVENOUS ONCE
Status: COMPLETED | OUTPATIENT
Start: 2025-01-10 | End: 2025-01-10

## 2025-01-10 RX ORDER — CYCLOSPORINE 100 MG/ML
SOLUTION ORAL
Qty: 60 ML | Refills: 3 | Status: ON HOLD | OUTPATIENT
Start: 2025-01-11

## 2025-01-10 RX ORDER — OXYCODONE AND ACETAMINOPHEN 5; 325 MG/1; MG/1
1 TABLET ORAL EVERY 4 HOURS PRN
Qty: 4 TABLET | Refills: 0 | Status: SHIPPED | OUTPATIENT
Start: 2025-01-10 | End: 2025-01-13

## 2025-01-10 RX ADMIN — SODIUM CHLORIDE 500 ML: 9 INJECTION, SOLUTION INTRAVENOUS at 18:55

## 2025-01-10 RX ADMIN — INSULIN LISPRO 4 UNITS: 100 INJECTION, SOLUTION INTRAVENOUS; SUBCUTANEOUS at 21:30

## 2025-01-10 RX ADMIN — DOCUSATE SODIUM 100 MG: 50 LIQUID ORAL at 08:13

## 2025-01-10 RX ADMIN — HEPARIN SODIUM 5000 UNITS: 5000 INJECTION INTRAVENOUS; SUBCUTANEOUS at 06:01

## 2025-01-10 RX ADMIN — SODIUM CHLORIDE, PRESERVATIVE FREE 10 ML: 5 INJECTION INTRAVENOUS at 20:10

## 2025-01-10 RX ADMIN — AMIODARONE HYDROCHLORIDE 200 MG: 200 TABLET ORAL at 08:04

## 2025-01-10 RX ADMIN — AMIODARONE HYDROCHLORIDE 200 MG: 200 TABLET ORAL at 20:31

## 2025-01-10 RX ADMIN — ALBUMIN (HUMAN) 25 G: 0.25 INJECTION, SOLUTION INTRAVENOUS at 18:17

## 2025-01-10 RX ADMIN — Medication 10 ML: at 20:10

## 2025-01-10 RX ADMIN — INSULIN GLARGINE 15 UNITS: 100 INJECTION, SOLUTION SUBCUTANEOUS at 08:14

## 2025-01-10 RX ADMIN — POLYETHYLENE GLYCOL 3350 17 G: 17 POWDER, FOR SOLUTION ORAL at 08:02

## 2025-01-10 RX ADMIN — INSULIN LISPRO 4 UNITS: 100 INJECTION, SOLUTION INTRAVENOUS; SUBCUTANEOUS at 18:12

## 2025-01-10 RX ADMIN — HEPARIN SODIUM 5000 UNITS: 5000 INJECTION INTRAVENOUS; SUBCUTANEOUS at 18:13

## 2025-01-10 RX ADMIN — INSULIN LISPRO 4 UNITS: 100 INJECTION, SOLUTION INTRAVENOUS; SUBCUTANEOUS at 06:23

## 2025-01-10 RX ADMIN — PREDNISONE 5 MG: 10 TABLET ORAL at 08:02

## 2025-01-10 RX ADMIN — LANSOPRAZOLE 30 MG: 30 TABLET, ORALLY DISINTEGRATING, DELAYED RELEASE ORAL at 05:02

## 2025-01-10 RX ADMIN — SODIUM CHLORIDE, PRESERVATIVE FREE 10 ML: 5 INJECTION INTRAVENOUS at 08:05

## 2025-01-10 RX ADMIN — INSULIN LISPRO 4 UNITS: 100 INJECTION, SOLUTION INTRAVENOUS; SUBCUTANEOUS at 08:14

## 2025-01-10 RX ADMIN — OXYCODONE AND ACETAMINOPHEN 1 TABLET: 5; 325 TABLET ORAL at 08:02

## 2025-01-10 RX ADMIN — WATER 1000 MG: 1 INJECTION INTRAMUSCULAR; INTRAVENOUS; SUBCUTANEOUS at 05:04

## 2025-01-10 RX ADMIN — METOPROLOL TARTRATE 12.5 MG: 25 TABLET, FILM COATED ORAL at 08:04

## 2025-01-10 RX ADMIN — SODIUM CHLORIDE, PRESERVATIVE FREE 10 ML: 5 INJECTION INTRAVENOUS at 08:04

## 2025-01-10 ASSESSMENT — PULMONARY FUNCTION TESTS
PIF_VALUE: 22
PIF_VALUE: 23
PIF_VALUE: 22
PIF_VALUE: 24
PIF_VALUE: 28
PIF_VALUE: 22
PIF_VALUE: 26
PIF_VALUE: 25
PIF_VALUE: 24
PIF_VALUE: 24
PIF_VALUE: 32
PIF_VALUE: 23
PIF_VALUE: 17
PIF_VALUE: 21
PIF_VALUE: 17
PIF_VALUE: 15
PIF_VALUE: 21
PIF_VALUE: 17
PIF_VALUE: 16
PIF_VALUE: 35

## 2025-01-10 ASSESSMENT — PAIN SCALES - WONG BAKER: WONGBAKER_NUMERICALRESPONSE: NO HURT

## 2025-01-10 ASSESSMENT — PAIN SCALES - GENERAL
PAINLEVEL_OUTOF10: 0

## 2025-01-10 NOTE — DISCHARGE SUMMARY
Discharge Summary    Date: 1/10/2025  Patient Name: Remy Upton    YOB: 1950     Age: 74 y.o.    Admit Date: 12/18/2024  Discharge Date: 1/10/2025  Discharge Condition: Fair    Admission Diagnosis  Rhinovirus [B34.8];S/P kidney transplant [Z94.0];DELORES (acute kidney injury) ([facility]) [N17.9];Acute respiratory failure with hypoxia [J96.01];Acute kidney injury superimposed on CKD (HCC) [N17.9, N18.9]      Discharge Diagnosis  Principal Problem:    DELORES (acute kidney injury) (HCC)  Active Problems:    Rhinovirus    Moderate malnutrition (HCC)    Palliative care encounter    Goals of care, counseling/discussion    Advanced care planning/counseling discussion    Encounter for hospice care discussion    Lower GI bleed    Unable to eat  Resolved Problems:    * No resolved hospital problems. *      Hospital Stay  Narrative of Hospital Course:  Pt was admitted and treated for the following conditions,  pt initially admited on 12/18 for DELORES on CKD 4, acute hypoxic respiratory failure, his hospital course was complicated with sepsis secondary to PNA and : pt has h/o renal transplant on immunosuppression therapy, spoke with renal about the dosing they gave me verbal order to resume at the dose that was pt getting here.         Ventilator dependent respiratory failure: Status post tracheostomy 12/30, s/p peg tube placement   DELORES on CKD h/o renal transplant  Anemia  Afib     Assessment & Plan  1/6: Continue with dialysis with fluid removal.  Spoke with nursing about the care.  Status post PEG tube.  Off Levophed.  Continue with current vent settings.  Follow pulmonary.  Continue with rate control medication for A-fib.  LTAC placement TCT 55 min  1/7: Patient was seen and evaluated.  On CPAP mode.  FiO2 of 30, spoke to insurance about [facility] they recommend patient stay here.  Continue current care.  Continue with dialysis with fluid removal.  Continue weaning and try to put trach collar. Spoke with casemangement,  c/w peg feeding, TCT 55 min  1/8: Patient is still on CPAP mode.  Spoke with case management to try SNF due to insurance denied the patient for LTAC at this time.  Continue current care.  Hemodialysis today with 1 L fluid removal TCT 55 min  1/9: Patient currently on trach collar.  Waiting for SNF placement.  Possible discharge tomorrow.  Spoke with nursing about the care.  Patient alert.  Able to follow simple commands.  No overnight events TCT 35 min      Consultants:  IP CONSULT TO CRITICAL CARE  IP CONSULT TO NEPHROLOGY  IP CONSULT TO DIETITIAN  IP CONSULT TO PHARMACY  PHARMACY TO DOSE VANCOMYCIN  IP CONSULT TO NEUROLOGY  IP CONSULT TO PALLIATIVE CARE  IP CONSULT TO PALLIATIVE CARE  IP CONSULT TO DIETITIAN  IP CONSULT TO GI  IP CONSULT TO GENERAL SURGERY  IP CONSULT TO DIETITIAN  IP CONSULT TO VASCULAR ACCESS TEAM    Surgeries/procedures Performed:      Treatments:            Discharge Plan/Disposition:  Home    Hospital/Incidental Findings Requiring Follow Up:    Patient Instructions:    Diet:    Activity:Activity as Tolerated  For number of days (if applicable):      Other Instructions:    Provider Follow-Up:   No follow-ups on file.     Significant Diagnostic Studies:    Recent Labs:  Admission on 12/18/2024  No results displayed because visit has over 200 results.    ------------    Radiology last 7 days:  No results found.     Pending Labs     Order Current Status    Blood Gas, Arterial Collected (12/22/24 1523)    Blood Gas, Arterial Collected (12/22/24 1523)    POCT EPOC BLOOD GAS, LACTIC ACID, ICA Collected (12/18/24 7269)    Hemoglobin A1c In process        Discharge Medications    Current Discharge Medication List    START taking these medications    ipratropium 0.5 mg-albuterol 2.5 mg (DUONEB) 0.5-2.5 (3) MG/3ML SOLN nebulizer solution  Inhale 3 mLs into the lungs every 4 hours as needed for Shortness of Breath  Qty: 360 mL Refills: 2    heparin, porcine, 5000 UNIT/ML injection  Inject 1 mL into the  Nasal route 2 times daily    finasteride (PROSCAR) 5 MG tablet  Take 1 tablet by mouth daily    predniSONE (DELTASONE) 5 MG tablet  Take 1 tablet by mouth daily Indications: Chronic Obstructive Lung Disease    Continuous Blood Gluc Transmit (DEXCOM G6 TRANSMITTER) MISC  Change every 3 months  Qty: 1 each Refills: 3    !! Continuous Blood Gluc Sensor (FREESTYLE KEV 14 DAY SENSOR) MISC  Every 2 weeks Dx E11.65  Qty: 2 each Refills: 06    !! Continuous Blood Gluc Sensor (FREESTYLE KEV 14 DAY SENSOR) MISC  Every 2 weeks  Qty: 2 each Refills: 06    !! - Potential duplicate medications found. Please discuss with provider.          Current Discharge Medication List    STOP taking these medications    furosemide (LASIX) 20 MG tablet  Comments:  Reason for Stopping:    Dulaglutide (TRULICITY SC)  Comments:  Reason for Stopping:    gabapentin (NEURONTIN) 300 MG capsule  Comments:  Reason for Stopping:    sodium bicarbonate 325 MG tablet  Comments:  Reason for Stopping:    metoprolol succinate (TOPROL XL) 50 MG extended release tablet  Comments:  Reason for Stopping:    midodrine (PROAMATINE) 5 MG tablet  Comments:  Reason for Stopping:    metoclopramide (REGLAN) 5 MG tablet  Comments:  Reason for Stopping:    vitamin D (CHOLECALCIFEROL) 25 MCG (1000 UT) TABS tablet  Comments:  Reason for Stopping:    aspirin 81 MG EC tablet  Comments:  Reason for Stopping:    Insulin Lispro-aabc, 1 U Dial, 100 UNIT/ML SOPN  Comments:  Reason for Stopping:    tamsulosin (FLOMAX) 0.4 MG capsule  Comments:  Reason for Stopping:    Insulin Regular Human (NOVOLIN R FLEXPEN) 100 UNIT/ML SOPN  Comments:  Reason for Stopping:    pantoprazole (PROTONIX) 40 MG tablet  Comments:  Reason for Stopping:    cycloSPORINE modified (NEORAL) 25 MG capsule  Comments:  Reason for Stopping:          Time Spent on Discharge:  minutes were spent in patient examination, evaluation, counseling as well as medication reconciliation, prescriptions for required

## 2025-01-10 NOTE — CARE COORDINATION
ICU team rounds done this am and family not present. I messaged Jessica from [employer] and she will let me know when she has auth and we can set up transport. I spoke to Dr. Calvert about that as well. I did let bedside nurse know we are hoping to get auth today. I called wife to let her know and spoke at length to her about facility and that he will go today if we get the approval. I reviewed IMM with her over phone for 2nd notice and she verbalized understanding. Wife asked if she could have her daughter call me because she works with insurances and I made sure she had my number. She states her daughters and grand kids are coming to town from [place] tomorrow and wanted to know if I was working to meet with them. I let her know I am available today until 5pm and can talk with them on phone. Wife states she was hoping he could go to [facility] or the [facility] facility and I let her know the LTAC was denied and that there are no facilities that can do vent with hemodialysis in that location.

## 2025-01-10 NOTE — PROGRESS NOTES
HD today for 3.5 hours via RAVF. Tolerated tx fair. Received Normal Saline 250 ml for symptomatic hypotension. Net UF+258  ml. All HD charting via ACES aper chart. Homeostasis pf ARVF achieved and dressing dry and intact. Hands-off to Salome Lam RN.

## 2025-01-10 NOTE — PROGRESS NOTES
Physical Therapy Missed Treatment   Facility/Department: [facility] MED SURG IC01/IC01-01    NAME: Remy Upton    : 1950 (74 y.o.)  MRN: [id number]    Account: [id number]  Gender: male    Chart reviewed, attempted PT at 1103. Patient unavailable 2° to:    [] Hold per nsg request    [] Pt declined    [] Nsg notified   [] Other notified    [] Pt.. off floor for test/procedure.     [x] Pt. Unavailable currently receiving in room dialysis treatment       Will attempt PT treatment again at earliest convenience.      Electronically signed by Jp Jennings PTA on 1/10/25 at 11:09 AM EST

## 2025-01-10 NOTE — PROGRESS NOTES
Nutrition Note    Hypernatremia resolved, sodium now low @ 132, water flushes decreased to 75 ml before and after each bolus  Current Tube Feeding (TF) Orders:   Feeding Route: PEG  Formula: Renal Formula (Nepro)  Schedule: Bolus : 325 ml given 4 x daily  Additives/Modulars: None  Water Flushes: 75 ml before and after each bolus ( 600)  Current TF & Flush Orders Provides: 2340 kcals, 105 g protein, 1545 ml free water        Electronically signed by PIA ROJAS RD, LD on 1/10/25 at 7:49 AM EST

## 2025-01-10 NOTE — PROGRESS NOTES
PHARMACY NOTE:   ICU Rounds Attended (10-15 minutes in patient room):    Pt diagnosis: DELORES    Follow up/Changes:   -home meds reviewed: Follow up resume tamsulosin, finasteride, reglan as appropriate  -monitor SSI. Lantus increased to 20 units per verbal on rounds  -core measures assessed/met  -rocephin dc'd no positive cultures  -Message to Rossy regarding [employer]cept resume     Additional information:   Steroid: prednisone 5mg daily  Insulin coverage: high sliding scale with Humalog, utilized 16 units per sliding scale over the past 24 hours. Lantus increased to 20 units  Pressors: none  Sedation: none  Antimicrobial therapy: Rocephin day 3. Urine culture negative. ID not on consult.-dc'd  Core measures assessed/met     Digna Morgan RPH, PharmD

## 2025-01-10 NOTE — DISCHARGE INSTR - COC
Continuity of Care Form    Patient Name: Remy Upton   :  1950  MRN:  [id number]    Admit date:  2024  Discharge date:  2025    Code Status Order: Full Code   Advance Directives:   Advance Care Flowsheet Documentation             Admitting Physician:  Nik Nevarez MD  PCP: Tico Rodriguez DO    Discharging Nurse: Shiloh APZ  Discharging Hospital Unit/Room#: IC01/IC01-01  Discharging Unit Phone Number: [phone]    Emergency Contact:   Extended Emergency Contact Information  Primary Emergency Contact: Simona Upton   [facility]  Home Phone: [phone]  Relation: Spouse  Secondary Emergency Contact: Maria Luisa Upton  Home Phone: [phone]  Relation: Child    Past Surgical History:  Past Surgical History:   Procedure Laterality Date    BRAIN SURGERY Left 1990    to eliminate seizures    COLONOSCOPY N/A 10/24/2022    COLONOSCOPY DIAGNOSTIC performed by Va Ordoñez MD at [facility]    KIDNEY TRANSPLANT  2015    [facility] INCL FLUOR GDNCE DX W/CELL WASHG SPX N/A 3/20/2018    BRONCHOSCOPY performed by Cristopher Conde MD at [facility]    UPPER GASTROINTESTINAL ENDOSCOPY N/A 10/21/2022    EGD DIAGNOSTIC ONLY performed by Jaime Martinez MD at [facility]    UPPER GASTROINTESTINAL ENDOSCOPY N/A 2025    Esophagogastroduodenoscopy with percutaneous endoscopic gastrostomy tube/ICU bedside/anesthesia not required  ROOM ICU:1 performed by Syed Juárez MD at [facility]       Immunization History:   Immunization History   Administered Date(s) Administered    Influenza Virus Vaccine 2003, 2010    Influenza, FLUZONE High Dose (age 65 y+), IM, Quadv, 0.7mL 10/07/2020    Influenza, FLUZONE High Dose, (age 65 y+), IM, Trivalent PF, 0.5mL 10/18/2018, 2019    Pneumococcal, PPSV23, PNEUMOVAX 23, (age 2y+), SC/IM, 0.5mL 2011    TDaP, ADACEL (age 10y-64y), BOOSTRIX (age 10y+), IM, 0.5mL 2020       Active Problems:  Patient Active  Level of Care:45693} for {GREATER/LESS:878567899} 30 days.     Update Admission H&P: {CHP DME Changes in HandP:684600097}    PHYSICIAN SIGNATURE:  Electronically signed by Marika Calvert MD on 1/10/25 at 10:52 AM EST

## 2025-01-10 NOTE — PROGRESS NOTES
Nephrology Progress Note    Assessment:  ESRDX  S/P viral pneumonia  Hx Kidney transplant  Cachexia  S/P trach/PEG      Plan:continue dialysis as patient wishes  watch H&H  follow I&O and base fluid removal as weight     Patient Active Problem List:     Pneumonia     Abdominal pain, other specified site     Acute pyelonephritis without lesion of renal medullary necrosis     Anemia     Essential hypertension     Nonspecific abnormal results of thyroid function study     Mixed hyperlipidemia     Kidney replaced by transplant     Intra-abdominal abscess post-procedure (HCC)     Infection due to Enterococcus     Fever and other physiologic disturbances of temperature regulation     Chronic kidney disease (CKD)     Type 2 diabetes mellitus with stage 3b chronic kidney disease, without long-term current use of insulin (HCC)     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 (HCC)     Abnormal CT of the chest     Bronchiectasis without complication (HCC)     GI bleeding     Complication of transplanted kidney     Muscle weakness (generalized)     Difficulty in walking     Elevated BUN     Urinary retention     Immunosuppression due to drug therapy (HCC)     DELORES (acute kidney injury) (HCC)     Adjustment disorder     Gross hematuria     Bilateral lower extremity edema     Dysuria     Acute cystitis without hematuria     Bilateral hearing loss     Abscess, toe     Acute hyperkalemia     Acute pain of left shoulder     Astigmatism     Astigmatism of both eyes with presbyopia     At risk for stroke     Benign enlargement of prostate     Benign prostatic hyperplasia with urinary

## 2025-01-10 NOTE — PLAN OF CARE
Problem: Chronic Conditions and Co-morbidities  Goal: Patient's chronic conditions and co-morbidity symptoms are monitored and maintained or improved  1/10/2025 0022 by Brooklyn Grant RN  Outcome: Not Progressing  1/9/2025 2015 by Zoila Middleton RN  Outcome: Not Progressing  Flowsheets (Taken 1/9/2025 0800)  Care Plan - Patient's Chronic Conditions and Co-Morbidity Symptoms are Monitored and Maintained or Improved: Monitor and assess patient's chronic conditions and comorbid symptoms for stability, deterioration, or improvement     Problem: Discharge Planning  Goal: Discharge to home or other facility with appropriate resources  1/10/2025 0022 by Brooklyn Grant RN  Outcome: Not Progressing  1/9/2025 2015 by Zoila Middleton RN  Outcome: Not Progressing  Flowsheets (Taken 1/9/2025 0800)  Discharge to home or other facility with appropriate resources: Identify barriers to discharge with patient and caregiver     Problem: Pain  Goal: Verbalizes/displays adequate comfort level or baseline comfort level  1/10/2025 0022 by Brooklyn Grant RN  Outcome: Not Progressing  Flowsheets (Taken 1/10/2025 0000)  Verbalizes/displays adequate comfort level or baseline comfort level:   Assess pain using appropriate pain scale   Administer analgesics based on type and severity of pain and evaluate response   Implement non-pharmacological measures as appropriate and evaluate response  1/9/2025 2015 by Zoila Middleton RN  Outcome: Not Progressing  Flowsheets  Taken 1/9/2025 2000 by Brooklyn Grant RN  Verbalizes/displays adequate comfort level or baseline comfort level:   Assess pain using appropriate pain scale   Administer analgesics based on type and severity of pain and evaluate response   Implement non-pharmacological measures as appropriate and evaluate response  Taken 1/9/2025 1830 by Zoila Middleton RN  Verbalizes/displays adequate comfort level or baseline comfort level:   Assess pain using appropriate pain scale    of cardiac dysrhythmias or at baseline  1/10/2025 0022 by Brooklyn Gratn RN  Outcome: Not Progressing  1/9/2025 2015 by Zoila Middleton RN  Outcome: Not Progressing  Flowsheets (Taken 1/9/2025 0800)  Absence of cardiac dysrhythmias or at baseline: Monitor cardiac rate and rhythm     Problem: Skin/Tissue Integrity - Adult  Goal: Skin integrity remains intact  1/10/2025 0022 by Brooklyn Grant RN  Outcome: Not Progressing  1/9/2025 2015 by Zoila Middleton RN  Outcome: Not Progressing  Flowsheets (Taken 1/9/2025 0800)  Skin Integrity Remains Intact: Monitor for areas of redness and/or skin breakdown     Problem: Musculoskeletal - Adult  Goal: Return mobility to safest level of function  1/10/2025 0022 by Brooklyn Grant RN  Outcome: Not Progressing  1/9/2025 2015 by Zoila Middleton RN  Outcome: Not Progressing  Flowsheets (Taken 1/9/2025 0800)  Return Mobility to Safest Level of Function: Assess patient stability and activity tolerance for standing, transferring and ambulating with or without assistive devices     Problem: Gastrointestinal - Adult  Goal: Minimal or absence of nausea and vomiting  1/10/2025 0022 by Brooklyn Grant RN  Outcome: Not Progressing  1/9/2025 2015 by Zoila Middleton RN  Outcome: Not Progressing  Flowsheets (Taken 1/9/2025 0800)  Minimal or absence of nausea and vomiting: Administer IV fluids as ordered to ensure adequate hydration  Goal: Maintains or returns to baseline bowel function  1/10/2025 0022 by Brooklyn Grant RN  Outcome: Not Progressing  1/9/2025 2015 by Zoila Middleton RN  Outcome: Not Progressing  Flowsheets (Taken 1/9/2025 0800)  Maintains or returns to baseline bowel function: Assess bowel function  Goal: Maintains adequate nutritional intake  1/10/2025 0022 by Brooklyn Grant RN  Outcome: Not Progressing  1/9/2025 2015 by Zoila Middleton RN  Outcome: Not Progressing  Flowsheets (Taken 1/9/2025 0800)  Maintains adequate nutritional intake: Monitor intake and output,

## 2025-01-10 NOTE — CONSULTS
Interventional Radiology Vascular Access Team   Consult Note    MRN: [id number]   Patient: Remy Upton   : 1950   Age: 74 y.o.  Statins        Patient is a 74 y.o. male admitted on 2024.   Vascular access team consulted due to access for antibiotics and successfully placed PICC on  25.    Peripherally Inserted Central Catheter/ Midline Assessment:   PICC 25 Left Basilic (Active)   Criteria for Appropriate Use Long term IV/antibiotic administration 01/10/25 0400   Site Assessment Clean, dry & intact 01/10/25 0400   Phlebitis Assessment No symptoms 01/10/25 0400   Infiltration Assessment 0 01/10/25 0400   Lumen #1 Color/Status Red;Brisk blood return;Alcohol cap applied;Flushed;Alcohol cap present;Normal saline locked 01/10/25 0400   Lumen #2 Color/Status Purple;Brisk blood return;Normal saline locked;Flushed;Alcohol cap applied;Alcohol cap present 01/10/25 0400   Line Care Connections checked and tightened;Ports disinfected 01/10/25 040   Alcohol Cap Used Yes 01/10/25 040   Date of Last Dressing Change 01/09/25 01/10/25 0400   Dressing Type Transparent w/CHG gel;Securing device 01/10/25 040   Dressing Status Clean, dry & intact 01/10/25 040   Dressing Intervention Other (Comment) 01/10/25 040       Hemodialysis (linkable) Right Forearm (Active)   Site Assessment Clean, dry & intact 01/10/25 0400   Thrill Present 01/10/25 0400   Bruit Present 01/10/25 0400   Status Accessed 25 1114   Venous Needle Size 15 G 25 0910   Arterial Needle Size 15 G 25 0910   Accessed By: Lokesh Walker RN 25 0910   Access Attempts  1 25 0910   Access Interventions Needles taped to patient;Aseptic Technique 25 040   Dressing Status Clean, dry & intact 01/10/25 040             Electronically signed by Kristen Stern RN on 1/10/25 at 7:46 AM EST

## 2025-01-10 NOTE — CARE COORDINATION
This LSW obtained a 7000 and placed the original on  that patients chart.  Electronically signed by LOLI Muniz on 1/10/25 at 10:57 AM EST

## 2025-01-10 NOTE — PROGRESS NOTES
Pulmonary & Critical Care Medicine ICU Progress Note  Chief complaint : Respiratory failure    Subjunctive/24 hour events :   Patient seen and examined during multidisciplinary rounds with RN, charge nurse, RT, pharmacy, dietitian, and social service.      Awake, follows commands, weak, on 30% FiO2, saturation 100%, no fever, no apparent pain.  No nausea or vomiting, tolerates tube feed    New information updated in the note today, rest of the examination did not change compared to yesterday.  Social History     Tobacco Use    Smoking status: Former     Current packs/day: 0.00     Types: Cigarettes     Quit date: 2015     Years since quittin.5    Smokeless tobacco: Former   Substance Use Topics    Alcohol use: No     Alcohol/week: 0.0 standard drinks of alcohol         Problem Relation Age of Onset    Colon Cancer Neg Hx        No results for input(s): \"PHART\", \"KKY7PWG\", \"PO2ART\" in the last 72 hours.      MV Settings:  Vent Mode: AC/VC Resp Rate (Set): 14 bpm/Vt (Set, mL): 500 mL/ /FiO2 : 30 %           IV:   sodium chloride      dextrose      sodium chloride         Vitals:  BP (!) 106/49   Pulse (!) 113   Temp 97.9 °F (36.6 °C)   Resp 15   Ht 1.88 m (6' 2\")   Wt 75.5 kg (166 lb 7.2 oz)   SpO2 100%   BMI 21.37 kg/m²    Tmax:        Intake/Output Summary (Last 24 hours) at 1/10/2025 0729  Last data filed at 1/10/2025 0655  Gross per 24 hour   Intake 2325.01 ml   Output 1000 ml   Net 1325.01 ml       EXAM:  General: Awake on vent, no distress  Head: normocephalic, atraumatic  Eyes:No gross abnormalities.  ENT:  MMM no lesions  Neck:  supple and no masses, tracheostomy in place, no bleeding  Chest : Vent sounds, no wheezing, no rales, nontender, tympanic  Heart:: Heart sounds are normal.  Regular rate and rhythm without murmur, gallop or rub.  ABD:  symmetric, soft, non-tender, no guarding or rebound  Musculoskeletal : no cyanosis, no clubbing, and no edema  Neuro:   Awake, stronger, follows simple  hours.  APTT: No results for input(s): \"APTT\" in the last 72 hours.  UA:  Recent Labs     01/08/25  0101   COLORU DARK YELLOW*   PHUR 6.5   WBCUA >100*   RBCUA 10-20*   YEAST Present*   BACTERIA Negative   CLARITYU CLOUDY*   LEUKOCYTESUR MODERATE*   UROBILINOGEN 0.2   BILIRUBINUR Negative   BLOODU LARGE*   GLUCOSEU 100*       Cultures:  Rhinovirus  Films:   CXR films reviewed by me and it showed congested, possible left lower lobe infiltrate      Assessment:  This is a critically ill patient at risk of deterioration / death , needing close ICU monitoring and intervention due to below noted problems   Acute on chronic renal failure currently on HD   Rhinovirus bronchitis/pneumonia with bacterial coinfection  Left lower lobe pneumonia  Septic shock  Acute hypoxic respiratory failure currently BiPAP dependent  Acute encephalopathy  Septic shock, shock physiology resolved   Anion gap metabolic acidosis secondary to renal failure  A-fib status post Watchman procedure  History of DVT not on chronic anticoagulation  Status post renal transplant, immunocompromise  Pulmonary hypertension, group 2 and 3     Recommendation  Vent support lung protective strategy, pressure support as tolerated  Trach collar as tolerated  No sedation   TV on, natural light, avoid benzos, pain control, early mobility, and family engagement  PUD prophylaxis  DVT prophylaxis, resume heparin,   Target blood sugar 140-180  Monitor and replace electrolyte as needed  Continue cyclosporine and prednisone  Resume CellCept when okay with nephrology   Physical therapy           Due to the immediate potential for life-threatening deterioration due to shock I spent  32  minutes providing critical care.  This time is excluding time spent performing procedures.          Electronically signed by Carlotta Yadav MD,  [facility] ,on 1/10/2025 at 7:29 AM

## 2025-01-10 NOTE — PLAN OF CARE
Problem: Chronic Conditions and Co-morbidities  Goal: Patient's chronic conditions and co-morbidity symptoms are monitored and maintained or improved  Outcome: Not Progressing  Flowsheets (Taken 1/9/2025 0800)  Care Plan - Patient's Chronic Conditions and Co-Morbidity Symptoms are Monitored and Maintained or Improved: Monitor and assess patient's chronic conditions and comorbid symptoms for stability, deterioration, or improvement     Problem: Discharge Planning  Goal: Discharge to home or other facility with appropriate resources  Outcome: Not Progressing  Flowsheets (Taken 1/9/2025 0800)  Discharge to home or other facility with appropriate resources: Identify barriers to discharge with patient and caregiver     Problem: Pain  Goal: Verbalizes/displays adequate comfort level or baseline comfort level  Outcome: Not Progressing     Problem: Safety - Adult  Goal: Free from fall injury  Outcome: Not Progressing     Problem: Skin/Tissue Integrity  Goal: Absence of new skin breakdown  Description: 1.  Monitor for areas of redness and/or skin breakdown  2.  Assess vascular access sites hourly  3.  Every 4-6 hours minimum:  Change oxygen saturation probe site  4.  Every 4-6 hours:  If on nasal continuous positive airway pressure, respiratory therapy assess nares and determine need for appliance change or resting period.  Outcome: Not Progressing     Problem: Neurosensory - Adult  Goal: Achieves stable or improved neurological status  Outcome: Not Progressing  Flowsheets (Taken 1/9/2025 0800)  Achieves stable or improved neurological status: Assess for and report changes in neurological status     Problem: Respiratory - Adult  Goal: Achieves optimal ventilation and oxygenation  Outcome: Not Progressing  Flowsheets (Taken 1/9/2025 0800)  Achieves optimal ventilation and oxygenation: Assess for changes in respiratory status     Problem: Cardiovascular - Adult  Goal: Maintains optimal cardiac output and hemodynamic  0800)  Absence of infection at discharge: Assess and monitor for signs and symptoms of infection     Problem: Metabolic/Fluid and Electrolytes - Adult  Goal: Electrolytes maintained within normal limits  Outcome: Not Progressing  Flowsheets (Taken 1/9/2025 0800)  Electrolytes maintained within normal limits: Monitor labs and assess patient for signs and symptoms of electrolyte imbalances  Goal: Hemodynamic stability and optimal renal function maintained  Outcome: Not Progressing  Flowsheets (Taken 1/9/2025 0800)  Hemodynamic stability and optimal renal function maintained: Monitor labs and assess for signs and symptoms of volume excess or deficit  Goal: Glucose maintained within prescribed range  Outcome: Not Progressing  Flowsheets (Taken 1/9/2025 0800)  Glucose maintained within prescribed range: Monitor blood glucose as ordered     Problem: Hematologic - Adult  Goal: Maintains hematologic stability  Outcome: Not Progressing  Flowsheets (Taken 1/9/2025 0800)  Maintains hematologic stability: Assess for signs and symptoms of bleeding or hemorrhage     Problem: Decision Making  Goal: Pt/Family able to effectively weigh alternatives and participate in decision making related to treatment and care  Description: INTERVENTIONS:  1. Determine when there are differences between patient's view, family's view, and healthcare provider's view of condition  2. Facilitate patient and family articulation of goals for care  3. Help patient and family identify pros/cons of alternative solutions  4. Provide information as requested by patient/family  5. Respect patient/family right to receive or not to receive information  6. Serve as a liaison between patient and family and health care team  7. Initiate Consults from Ethics, Palliative Care or initiate Family Care Conference as is appropriate  Outcome: Not Progressing  Flowsheets (Taken 1/9/2025 0800)  Patient/family able to effectively weigh alternatives and participate in

## 2025-01-10 NOTE — INTERDISCIPLINARY ROUNDS
Spiritual Care Services     Summary of Visit:   participated in ICU rounds. Patient currently on a trach and undergoing dialysis this morning. No family present during rounds. Patient waiting for long term placement.    Encounter Summary  Encounter Overview/Reason: Interdisciplinary rounds, Palliative Care  Service Provided For: Patient  Support System: Spouse  Complexity of Encounter: Low  Begin Time: 0940  End Time : 0955  Total Time Calculated: 15 min        Spiritual/Emotional needs  Type: Spiritual Support              Palliative Care  Type: Palliative Care, Interdisciplinary Rounds       Spiritual Assessment/Intervention/Outcomes:    Assessment: Calm, Sad, Coping    Intervention: Prayer (assurance of)/[place], Nurtured Hope    Outcome:  (Patient responded with slight nodding of head of chaplains presence)      Care Plan:    Plan and Referrals  Plan/Referrals: Continue Support (comment)     to provide additional spiritual support as needed or requested      Spiritual Care Services   Electronically signed by Chaplain Neeta on 1/10/2025 at 10:00 AM.    To reach a  for emotional and spiritual support, place an EPIC consult request.   If a  is needed immediately, dial “0” and ask to page the on-call .

## 2025-01-11 LAB
ALBUMIN SERPL-MCNC: 2.8 G/DL (ref 3.5–4.6)
ANION GAP SERPL CALCULATED.3IONS-SCNC: 11 MEQ/L (ref 9–15)
BASOPHILS # BLD: 0 K/UL (ref 0–0.2)
BASOPHILS NFR BLD: 0.2 %
BUN SERPL-MCNC: 42 MG/DL (ref 8–23)
CALCIUM SERPL-MCNC: 8.3 MG/DL (ref 8.5–9.9)
CHLORIDE SERPL-SCNC: 94 MEQ/L (ref 95–107)
CO2 SERPL-SCNC: 29 MEQ/L (ref 20–31)
CREAT SERPL-MCNC: 1.22 MG/DL (ref 0.7–1.2)
EOSINOPHIL # BLD: 0.1 K/UL (ref 0–0.7)
EOSINOPHIL NFR BLD: 0.5 %
ERYTHROCYTE [DISTWIDTH] IN BLOOD BY AUTOMATED COUNT: 17.4 % (ref 11.5–14.5)
GLUCOSE BLD-MCNC: 132 MG/DL (ref 70–99)
GLUCOSE BLD-MCNC: 163 MG/DL (ref 70–99)
GLUCOSE BLD-MCNC: 170 MG/DL (ref 70–99)
GLUCOSE BLD-MCNC: 226 MG/DL (ref 70–99)
GLUCOSE BLD-MCNC: 232 MG/DL (ref 70–99)
GLUCOSE BLD-MCNC: 239 MG/DL (ref 70–99)
GLUCOSE SERPL-MCNC: 251 MG/DL (ref 70–99)
HCT VFR BLD AUTO: 24.9 % (ref 42–52)
HGB BLD-MCNC: 7.8 G/DL (ref 14–18)
LYMPHOCYTES # BLD: 0.4 K/UL (ref 1–4.8)
LYMPHOCYTES NFR BLD: 3.1 %
MCH RBC QN AUTO: 30.5 PG (ref 27–31.3)
MCHC RBC AUTO-ENTMCNC: 31.3 % (ref 33–37)
MCV RBC AUTO: 97.3 FL (ref 79–92.2)
MONOCYTES # BLD: 0.8 K/UL (ref 0.2–0.8)
MONOCYTES NFR BLD: 6 %
NEUTROPHILS # BLD: 11 K/UL (ref 1.4–6.5)
NEUTS SEG NFR BLD: 88.7 %
PERFORMED ON: ABNORMAL
PHOSPHATE SERPL-MCNC: 2.1 MG/DL (ref 2.3–4.8)
PLATELET # BLD AUTO: 202 K/UL (ref 130–400)
POTASSIUM SERPL-SCNC: 3.8 MEQ/L (ref 3.4–4.9)
RBC # BLD AUTO: 2.56 M/UL (ref 4.7–6.1)
SODIUM SERPL-SCNC: 134 MEQ/L (ref 135–144)
WBC # BLD AUTO: 12.4 K/UL (ref 4.8–10.8)

## 2025-01-11 PROCEDURE — 6370000000 HC RX 637 (ALT 250 FOR IP): Performed by: INTERNAL MEDICINE

## 2025-01-11 PROCEDURE — 2500000003 HC RX 250 WO HCPCS: Performed by: INTERNAL MEDICINE

## 2025-01-11 PROCEDURE — 6360000002 HC RX W HCPCS: Performed by: INTERNAL MEDICINE

## 2025-01-11 PROCEDURE — 2000000000 HC ICU R&B

## 2025-01-11 PROCEDURE — 94761 N-INVAS EAR/PLS OXIMETRY MLT: CPT

## 2025-01-11 PROCEDURE — 94003 VENT MGMT INPAT SUBQ DAY: CPT

## 2025-01-11 PROCEDURE — 80069 RENAL FUNCTION PANEL: CPT

## 2025-01-11 PROCEDURE — 6370000000 HC RX 637 (ALT 250 FOR IP): Performed by: FAMILY MEDICINE

## 2025-01-11 PROCEDURE — 51701 INSERT BLADDER CATHETER: CPT

## 2025-01-11 PROCEDURE — 97110 THERAPEUTIC EXERCISES: CPT

## 2025-01-11 PROCEDURE — 2500000003 HC RX 250 WO HCPCS: Performed by: NURSE PRACTITIONER

## 2025-01-11 PROCEDURE — 85025 COMPLETE CBC W/AUTO DIFF WBC: CPT

## 2025-01-11 PROCEDURE — 99291 CRITICAL CARE FIRST HOUR: CPT | Performed by: INTERNAL MEDICINE

## 2025-01-11 PROCEDURE — 51798 US URINE CAPACITY MEASURE: CPT

## 2025-01-11 PROCEDURE — 6370000000 HC RX 637 (ALT 250 FOR IP): Performed by: NURSE PRACTITIONER

## 2025-01-11 PROCEDURE — 2700000000 HC OXYGEN THERAPY PER DAY

## 2025-01-11 RX ADMIN — INSULIN LISPRO 4 UNITS: 100 INJECTION, SOLUTION INTRAVENOUS; SUBCUTANEOUS at 10:25

## 2025-01-11 RX ADMIN — INSULIN LISPRO 4 UNITS: 100 INJECTION, SOLUTION INTRAVENOUS; SUBCUTANEOUS at 06:19

## 2025-01-11 RX ADMIN — SODIUM CHLORIDE, PRESERVATIVE FREE 10 ML: 5 INJECTION INTRAVENOUS at 08:12

## 2025-01-11 RX ADMIN — METOPROLOL TARTRATE 2.5 MG: 5 INJECTION INTRAVENOUS at 21:43

## 2025-01-11 RX ADMIN — EPOETIN ALFA-EPBX 10000 UNITS: 10000 INJECTION, SOLUTION INTRAVENOUS; SUBCUTANEOUS at 18:57

## 2025-01-11 RX ADMIN — HEPARIN SODIUM 5000 UNITS: 5000 INJECTION INTRAVENOUS; SUBCUTANEOUS at 18:40

## 2025-01-11 RX ADMIN — CYCLOSPORINE 50 MG: 100 SOLUTION ORAL at 07:48

## 2025-01-11 RX ADMIN — METOPROLOL TARTRATE 2.5 MG: 5 INJECTION INTRAVENOUS at 15:44

## 2025-01-11 RX ADMIN — PREDNISONE 5 MG: 10 TABLET ORAL at 07:47

## 2025-01-11 RX ADMIN — Medication 10 ML: at 08:12

## 2025-01-11 RX ADMIN — Medication 10 ML: at 20:20

## 2025-01-11 RX ADMIN — OXYCODONE AND ACETAMINOPHEN 1 TABLET: 5; 325 TABLET ORAL at 22:09

## 2025-01-11 RX ADMIN — METOPROLOL TARTRATE 12.5 MG: 25 TABLET, FILM COATED ORAL at 20:15

## 2025-01-11 RX ADMIN — CARBAMIDE PEROXIDE 6.5% 5 DROP: 6.5 LIQUID AURICULAR (OTIC) at 20:26

## 2025-01-11 RX ADMIN — AMIODARONE HYDROCHLORIDE 200 MG: 200 TABLET ORAL at 07:47

## 2025-01-11 RX ADMIN — AMIODARONE HYDROCHLORIDE 200 MG: 200 TABLET ORAL at 20:15

## 2025-01-11 RX ADMIN — POLYETHYLENE GLYCOL 3350 17 G: 17 POWDER, FOR SOLUTION ORAL at 07:47

## 2025-01-11 RX ADMIN — SODIUM CHLORIDE, PRESERVATIVE FREE 10 ML: 5 INJECTION INTRAVENOUS at 20:20

## 2025-01-11 RX ADMIN — DOCUSATE SODIUM 100 MG: 50 LIQUID ORAL at 07:47

## 2025-01-11 RX ADMIN — INSULIN LISPRO 4 UNITS: 100 INJECTION, SOLUTION INTRAVENOUS; SUBCUTANEOUS at 21:54

## 2025-01-11 RX ADMIN — HEPARIN SODIUM 5000 UNITS: 5000 INJECTION INTRAVENOUS; SUBCUTANEOUS at 10:25

## 2025-01-11 RX ADMIN — HEPARIN SODIUM 5000 UNITS: 5000 INJECTION INTRAVENOUS; SUBCUTANEOUS at 02:00

## 2025-01-11 RX ADMIN — INSULIN GLARGINE 20 UNITS: 100 INJECTION, SOLUTION SUBCUTANEOUS at 10:24

## 2025-01-11 RX ADMIN — LANSOPRAZOLE 30 MG: 30 TABLET, ORALLY DISINTEGRATING, DELAYED RELEASE ORAL at 05:15

## 2025-01-11 ASSESSMENT — PULMONARY FUNCTION TESTS
PIF_VALUE: 24
PIF_VALUE: 20
PIF_VALUE: 31
PIF_VALUE: 32
PIF_VALUE: 24
PIF_VALUE: 31
PIF_VALUE: 24
PIF_VALUE: 18
PIF_VALUE: 28
PIF_VALUE: 32
PIF_VALUE: 25
PIF_VALUE: 20
PIF_VALUE: 26
PIF_VALUE: 23
PIF_VALUE: 29
PIF_VALUE: 24
PIF_VALUE: 20
PIF_VALUE: 25
PIF_VALUE: 25
PIF_VALUE: 24
PIF_VALUE: 25
PIF_VALUE: 36
PIF_VALUE: 22
PIF_VALUE: 32
PIF_VALUE: 26
PIF_VALUE: 14
PIF_VALUE: 26
PIF_VALUE: 23
PIF_VALUE: 18
PIF_VALUE: 23
PIF_VALUE: 29
PIF_VALUE: 23
PIF_VALUE: 32
PIF_VALUE: 32
PIF_VALUE: 23
PIF_VALUE: 19
PIF_VALUE: 24
PIF_VALUE: 23
PIF_VALUE: 25
PIF_VALUE: 22
PIF_VALUE: 34
PIF_VALUE: 27
PIF_VALUE: 26
PIF_VALUE: 24
PIF_VALUE: 19
PIF_VALUE: 26
PIF_VALUE: 33
PIF_VALUE: 22
PIF_VALUE: 23

## 2025-01-11 ASSESSMENT — PAIN SCALES - GENERAL: PAINLEVEL_OUTOF10: 0

## 2025-01-11 NOTE — FLOWSHEET NOTE
Alert follows commands. Nods head to questions.  Watching TV.  Trach. with vent during night.  Peg tube intact with tube feed bolus.  Left hand draining serous drainage new dressing applied.  Afib

## 2025-01-11 NOTE — PROGRESS NOTES
Pulmonary & Critical Care Medicine ICU Progress Note  Chief complaint : Respiratory failure    Subjunctive/24 hour events :   Patient seen and examined during multidisciplinary rounds with RN, charge nurse, RT, pharmacy, dietitian, and social service.      No issues overnight,  no chest pain, no fever, on 30% FiO2 saturation 100%, no fever, and bowels moving    New information updated in the note today, rest of the examination did not change compared to yesterday.  Social History     Tobacco Use    Smoking status: Former     Current packs/day: 0.00     Types: Cigarettes     Quit date: 2015     Years since quittin.5    Smokeless tobacco: Former   Substance Use Topics    Alcohol use: No     Alcohol/week: 0.0 standard drinks of alcohol         Problem Relation Age of Onset    Colon Cancer Neg Hx        No results for input(s): \"PHART\", \"[id number]\", \"PO2ART\" in the last 72 hours.      MV Settings:  Vent Mode: AC/VC Resp Rate (Set): 14 bpm/Vt (Set, mL): 500 mL/ /FiO2 : 30 %           IV:   sodium chloride      dextrose      sodium chloride         Vitals:  BP (!) 101/51   Pulse (!) 132   Temp 98.9 °F (37.2 °C) (Oral)   Resp 24   Ht 1.88 m (6' 2\")   Wt 75.5 kg (166 lb 7.2 oz)   SpO2 100%   BMI 21.37 kg/m²    Tmax:        Intake/Output Summary (Last 24 hours) at 2025 0814  Last data filed at 2025 0516  Gross per 24 hour   Intake 3071.75 ml   Output 1023 ml   Net 2048.75 ml       EXAM:  General: Awake on vent, no distress  Head: normocephalic, atraumatic  Eyes:No gross abnormalities.  ENT:  MMM no lesions  Neck:  supple and no masses, tracheostomy in place, no bleeding  Chest : Vent sounds, no wheezing, no rales, nontender, tympanic  Heart:: Heart sounds are normal.  Regular rate and rhythm without murmur, gallop or rub.  ABD:  symmetric, soft, non-tender, no guarding or rebound  Musculoskeletal : no cyanosis, no clubbing, and no edema  Neuro:   Awake, stronger, follows simple commands  Skin: No

## 2025-01-11 NOTE — PROGRESS NOTES
Wound Ostomy Continence Nurse  Follow-up Progress Note       NAME:  Remy Upton  MEDICAL RECORD NUMBER:  [id number]  AGE:  74 y.o.   GENDER:  male  :  1950  TODAY'S DATE:  2025    Subjective:   Wound Identification:  Wound Type: arterial and pressure  Contributing Factors: chronic pressure, decreased mobility, shear force, malnutrition, incontinence of stool, and incontinence of urine        Patient Goal of Care:  [] Wound Healing  [] Odor Control  [] Palliative Care  [] Pain Control   [] Other:     Objective:    /65   Pulse (!) 134   Temp 98.4 °F (36.9 °C) (Oral)   Resp 21   Ht 1.88 m (6' 2\")   Wt 75.5 kg (166 lb 7.2 oz)   SpO2 99%   BMI 21.37 kg/m²   Jeremi Risk Score: Jeremi Scale Score: 8  Assessment:   Measurements:  Wound 24 Hand Distal;Left;Posterior (Active)   Wound Etiology Skin Tear 25   Dressing Status New drainage noted;New dressing applied 25   Wound Cleansed Not Cleansed 25   Dressing/Treatment Petroleum impregnated gauze 25   Offloading for Diabetic Foot Ulcers Offloading ordered 25   Dressing Change Due 25   Wound Length (cm) 1 cm 24 0950   Wound Width (cm) 0.5 cm 24 0950   Wound Depth (cm) 0.1 cm 24 0950   Wound Surface Area (cm^2) 0.5 cm^2 24 0950   Wound Volume (cm^3) 0.05 cm^3 24 0950   Wound Assessment Pink/red 25   Drainage Amount Large (50-75% saturated) 25   Drainage Description Serosanguinous 25   Odor None 25   Herlinda-wound Assessment Edematous;Ecchymosis;Fragile 25   Margins Defined edges 25   Number of days: 23       Wound 24 Hand Left;Posterior (Active)   Wound Etiology Skin Tear 25   Dressing Status Clean;Dry;Intact 25   Wound Cleansed Not Cleansed 25 08   Dressing/Treatment Xeroform;Alginate with Ag        Wound 01/11/25 Buttocks Right (Active)   Wound Etiology Deep tissue/Injury 01/11/25 1130   Dressing Status New dressing applied 01/11/25 1130   Dressing/Treatment Triad hydro/zinc oxide-based hydrophilic paste 01/11/25 1130   Dressing Change Due 01/11/25 01/11/25 1130   Wound Length (cm) 6 cm 01/11/25 1130   Wound Width (cm) 3 cm 01/11/25 1130   Wound Depth (cm) 0 cm 01/11/25 1130   Wound Surface Area (cm^2) 18 cm^2 01/11/25 1130   Wound Volume (cm^3) 0 cm^3 01/11/25 1130   Wound Assessment Purple/maroon 01/11/25 1130   Drainage Amount None (dry) 01/11/25 1130   Odor None 01/11/25 1130   Herlinda-wound Assessment Intact 01/11/25 1130   Margins Defined edges 01/11/25 1130   Number of days: 0     Assessment:    Wound Ostomy nurse has seen patient during this admission for various wounds throughout the body. Patient remains very high risk for skin breakdown, with a lengthy admission, mostly in the ICU, not with a trach/peg, reliant on tube feeds for nutrition, fecal incontinence (FMS in place and remain appropriate) and what appears to be vascular insufficiency to the LEs. There is a New Deep Tissue Injury (DTI) to the right buttock - wound measures 6x3cm - intact, purple, non-blanchable tissue. This is likely from the length of the positioning wedge - though patient has multiple co-morbidities. Sacral wound has evolved for a stage 2 pressure injury to an Unstageable pressure injury - mixed wound bed presenting as 50% slough and 50% red/partial thickness. Significant amount of Incontinence associated dermatitis to buttocks and ischium with erosion of skin noted. The Right heel DTI has also evolved into an unstageable pressure injury - wound is about 50% eschar and the remain wound bed red/partial thickness. There are various vascular wounds to bilateral LES and areas of stable eschar to the ankles and feet. The Upper back now has some moisture/friction tears present, again, likely from the length of the positioning

## 2025-01-11 NOTE — PROGRESS NOTES
Progress Note  Date:2025       Room:[place] [id number]  Patient Name:Remy Upton     YOB: 1950     Age:74 y.o.        Subjective    Subjective   Review of Systems  ROS: could not be obtained bc pt is intubated  Objective         Vitals Last 24 Hours:  TEMPERATURE:  Temp  Av.8 °F (36.6 °C)  Min: 96.4 °F (35.8 °C)  Max: 98.9 °F (37.2 °C)  RESPIRATIONS RANGE: Resp  Av.9  Min: 11  Max: 42  PULSE OXIMETRY RANGE: SpO2  Av.9 %  Min: 99 %  Max: 100 %  PULSE RANGE: Pulse  Av.8  Min: 82  Max: 138  BLOOD PRESSURE RANGE: Systolic (24hrs), Av , Min:83 , Max:140   ; Diastolic (24hrs), Av, Min:14, Max:82    I/O (24Hr):    Intake/Output Summary (Last 24 hours) at 2025 1055  Last data filed at 2025 0800  Gross per 24 hour   Intake 3191.75 ml   Output 1023 ml   Net 2168.75 ml     Objective:  General Appearance:  Ill-appearing.    Vital signs: (most recent): Blood pressure (!) 101/54, pulse (!) 123, temperature 98.4 °F (36.9 °C), temperature source Oral, resp. rate 25, height 1.88 m (6' 2\"), weight 75.5 kg (166 lb 7.2 oz), SpO2 99%.    HEENT: Normal HEENT exam.    Lungs:  There are decreased breath sounds.    Heart: S1 normal and S2 normal.    Abdomen: Abdomen is soft.  Bowel sounds are normal.   There is no epigastric area tenderness.     Pulses: Distal pulses are intact.    Neurological: Patient is alert.    Pupils:  Pupils are equal, round, and reactive to light.    Skin:  Warm.      Labs/Imaging/Diagnostics    Labs:  CBC:  Recent Labs     25  0604 01/10/25  0518 25  0511   WBC 11.2* 12.9* 12.4*   RBC 2.98* 2.74* 2.56*   HGB 8.7* 8.1* 7.8*   HCT 28.8* 26.4* 24.9*   MCV 96.6* 96.4* 97.3*   RDW 17.5* 17.5* 17.4*    222 202     CHEMISTRIES:  Recent Labs     25  0604 01/10/25  0518 25  0511    132* 134*   K 4.2 4.1 3.8   CL 95 92* 94*   CO2 28 28 29   BUN 52* 68* 42*   CREATININE 1.65* 1.99* 1.22*   GLUCOSE 171* 202* 251*   PHOS 2.0* 3.5  pre-CERT.  Medication is reconciled.  Spoke with nephrology team about immunosuppressive therapy.  Spoke with nursing about the care.  No overnight events c/w HD and fluid removal. TCt 35 min  Electronically signed by Marika Calvert MD on 1/6/25 at 12:31 PM EST

## 2025-01-11 NOTE — PLAN OF CARE
Problem: Chronic Conditions and Co-morbidities  Goal: Patient's chronic conditions and co-morbidity symptoms are monitored and maintained or improved  Outcome: Progressing  Flowsheets (Taken 1/11/2025 0800)  Care Plan - Patient's Chronic Conditions and Co-Morbidity Symptoms are Monitored and Maintained or Improved:   Monitor and assess patient's chronic conditions and comorbid symptoms for stability, deterioration, or improvement   Collaborate with multidisciplinary team to address chronic and comorbid conditions and prevent exacerbation or deterioration     Problem: Discharge Planning  Goal: Discharge to home or other facility with appropriate resources  Outcome: Progressing  Flowsheets (Taken 1/11/2025 0800)  Discharge to home or other facility with appropriate resources:   Identify barriers to discharge with patient and caregiver   Arrange for needed discharge resources and transportation as appropriate     Problem: Pain  Goal: Verbalizes/displays adequate comfort level or baseline comfort level  Outcome: Progressing     Problem: Safety - Adult  Goal: Free from fall injury  Outcome: Progressing     Problem: Skin/Tissue Integrity  Goal: Absence of new skin breakdown  Description: 1.  Monitor for areas of redness and/or skin breakdown  2.  Assess vascular access sites hourly  3.  Every 4-6 hours minimum:  Change oxygen saturation probe site  4.  Every 4-6 hours:  If on nasal continuous positive airway pressure, respiratory therapy assess nares and determine need for appliance change or resting period.  Outcome: Progressing     Problem: Neurosensory - Adult  Goal: Achieves stable or improved neurological status  Outcome: Progressing  Flowsheets (Taken 1/11/2025 0800)  Achieves stable or improved neurological status:   Assess for and report changes in neurological status   Initiate measures to prevent increased intracranial pressure   Maintain blood pressure and fluid volume within ordered parameters to optimize  signs and symptoms of electrolyte imbalances   Administer electrolyte replacement as ordered   Monitor response to electrolyte replacements, including repeat lab results as appropriate  Goal: Hemodynamic stability and optimal renal function maintained  Outcome: Progressing  Flowsheets (Taken 1/11/2025 0800)  Hemodynamic stability and optimal renal function maintained:   Monitor labs and assess for signs and symptoms of volume excess or deficit   Monitor intake, output and patient weight   Monitor urine specific gravity, serum osmolarity and serum sodium as indicated or ordered   Monitor response to interventions for patient's volume status, including labs, urine output, blood pressure (other measures as available)  Goal: Glucose maintained within prescribed range  Outcome: Progressing  Flowsheets (Taken 1/11/2025 0800)  Glucose maintained within prescribed range:   Monitor blood glucose as ordered   Assess for signs and symptoms of hyperglycemia and hypoglycemia   Administer ordered medications to maintain glucose within target range     Problem: Hematologic - Adult  Goal: Maintains hematologic stability  Outcome: Progressing  Flowsheets (Taken 1/11/2025 0800)  Maintains hematologic stability:   Assess for signs and symptoms of bleeding or hemorrhage   Monitor labs for bleeding or clotting disorders     Problem: Decision Making  Goal: Pt/Family able to effectively weigh alternatives and participate in decision making related to treatment and care  Description: INTERVENTIONS:  1. Determine when there are differences between patient's view, family's view, and healthcare provider's view of condition  2. Facilitate patient and family articulation of goals for care  3. Help patient and family identify pros/cons of alternative solutions  4. Provide information as requested by patient/family  5. Respect patient/family right to receive or not to receive information  6. Serve as a liaison between patient and family and  health care team  7. Initiate Consults from Ethics, Palliative Care or initiate Family Care Conference as is appropriate  Outcome: Not Progressing  Flowsheets (Taken 1/11/2025 0800)  Patient/family able to effectively weigh alternatives and participate in decision making related to treatment and care:   Determine when there are differences between patient's view, family's view, and healthcare provider's view of condition   Facilitate patient and family articulation of goals for care   Help patient and family identify pros/cons of alternative solutions   Provide information as requested by patient/family     Problem: Nutrition Deficit:  Goal: Optimize nutritional status  Outcome: Progressing     Problem: ABCDS Injury Assessment  Goal: Absence of physical injury  Outcome: Progressing     Problem: Decision Making  Goal: Pt/Family able to effectively weigh alternatives and participate in decision making related to treatment and care  Description: INTERVENTIONS:  1. Determine when there are differences between patient's view, family's view, and healthcare provider's view of condition  2. Facilitate patient and family articulation of goals for care  3. Help patient and family identify pros/cons of alternative solutions  4. Provide information as requested by patient/family  5. Respect patient/family right to receive or not to receive information  6. Serve as a liaison between patient and family and health care team  7. Initiate Consults from Ethics, Palliative Care or initiate Family Care Conference as is appropriate  Outcome: Not Progressing  Flowsheets (Taken 1/11/2025 0800)  Patient/family able to effectively weigh alternatives and participate in decision making related to treatment and care:   Determine when there are differences between patient's view, family's view, and healthcare provider's view of condition   Facilitate patient and family articulation of goals for care   Help patient and family identify pros/cons

## 2025-01-11 NOTE — PROGRESS NOTES
Renal Progress Note  Assessment:  ESRDX  S/P viral pneumonia  Hx Kidney transplant  Cachexia  S/P trach/PEG        Plan:continue dialysis as patient wishes  watch H&H  follow I&O and base fluid removal as weight     1/11/2025  No major electrolyte or acid-base imbalance hemoglobin at 7.8  Hemodynamically tendency to hypotension no clinical evidence of increased extracellular fluid volume  Will follow if needed JADON  Patient Active Problem List:     Pneumonia     Abdominal pain, other specified site     Acute pyelonephritis without lesion of renal medullary necrosis     Anemia     Essential hypertension     Nonspecific abnormal results of thyroid function study     Mixed hyperlipidemia     Kidney replaced by transplant     Intra-abdominal abscess post-procedure (HCC)     Infection due to Enterococcus     Fever and other physiologic disturbances of temperature regulation     Chronic kidney disease (CKD)     Type 2 diabetes mellitus with stage 3b chronic kidney disease, without long-term current use of insulin (HCC)     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 (HCC)     Abnormal CT of the chest     Bronchiectasis without complication (HCC)     GI bleeding     Complication of transplanted kidney     Muscle weakness (generalized)     Difficulty in walking     Elevated BUN     Urinary retention     Immunosuppression due to drug therapy (HCC)     DELORES (acute kidney injury) (HCC)     Adjustment disorder     Gross hematuria     Bilateral lower extremity edema     Dysuria     Acute cystitis without hematuria     Bilateral hearing loss     Abscess, toe     Acute hyperkalemia        docusate  100 mg Oral BID    amiodarone  200 mg Oral BID    sodium chloride flush  5-40 mL IntraVENous 2 times per day    heparin (porcine)  5,000 Units SubCUTAneous 3 times per day    [Held by provider] mycophenolate  500 mg Oral BID     Continuous Infusions:   sodium chloride      dextrose      sodium chloride         CBC:   Recent Labs     01/10/25  0518 01/11/25  0511   WBC 12.9* 12.4*   HGB 8.1* 7.8*    202     CMP:    Recent Labs     01/09/25  0604 01/10/25  0518 01/11/25  0511    132* 134*   K 4.2 4.1 3.8   CL 95 92* 94*   CO2 28 28 29   BUN 52* 68* 42*   CREATININE 1.65* 1.99* 1.22*   GLUCOSE 171* 202* 251*   CALCIUM 8.6 8.1* 8.3*   LABGLOM 43.6* 34.8* 62.6     Troponin: No results for input(s): \"TROPONINI\" in the last 72 hours.  BNP: No results for input(s): \"BNP\" in the last 72 hours.  INR: No results for input(s): \"INR\" in the last 72 hours.  Lipids: No results for input(s): \"CHOL\", \"LDLDIRECT\", \"TRIG\", \"HDL\", \"AMYLASE\", \"LIPASE\" in the last 72 hours.  Liver: No results for input(s): \"AST\", \"ALT\", \"ALKPHOS\", \"LABALBU\", \"BILITOT\" in the last 72 hours.    Invalid input(s): \"PROT\", \"BILDIR\"  Iron:  No results for input(s): \"FERRITIN\" in the last 72 hours.    Invalid input(s): \"IRONS\", \"LABIRONS\"  Urinalysis: No results for input(s): \"UA\" in the last 72 hours.    Objective:   Vitals: BP (!) 101/54   Pulse (!) 134   Temp 98.4 °F (36.9 °C) (Oral)   Resp 21   Ht 1.88 m (6' 2\")   Wt 75.5 kg (166 lb 7.2 oz)   SpO2 99%   BMI 21.37 kg/m²    Wt Readings from Last 3 Encounters:   01/10/25 75.5 kg (166 lb 7.2 oz)   11/04/24 75.9 kg (167 lb 6.4 oz)   09/18/24 77.7 kg (171 lb 4.8 oz)      24HR INTAKE/OUTPUT:    Intake/Output Summary (Last 24 hours) at 1/11/2025 1134  Last data filed at 1/11/2025 0800  Gross per 24 hour   Intake 3191.75 ml   Output 1023 ml   Net 2168.75 ml     Admission weight: 71.7 kg (158 lb 1.6 oz)     Constitutional:  Alert, awake, no apparent distress   Skin:normal, no

## 2025-01-11 NOTE — PROGRESS NOTES
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 10/31/2003    Condition not found 10/31/2003        Past Medical History:   Diagnosis Date    Diabetes mellitus (HCC)     Hemodialysis access, fistula mature (HCC) 12/2002    Hypertension     Seizures (HCC)     no seizure since 1995     Past Surgical History:   Procedure Laterality Date    BRAIN SURGERY Left 12/06/1990    to eliminate seizures    COLONOSCOPY N/A 10/24/2022    COLONOSCOPY DIAGNOSTIC performed by Va Ordoñez MD at [facility]    KIDNEY TRANSPLANT  2015    DE BRNCC INCL FLUOR GDNCE DX W/CELL WASHG SPX N/A 3/20/2018    BRONCHOSCOPY performed by Cristopher oCnde [place] [facility]    UPPER GASTROINTESTINAL ENDOSCOPY N/A 10/21/2022    EGD DIAGNOSTIC ONLY performed by Jaime Martinez MD at [facility]    UPPER GASTROINTESTINAL ENDOSCOPY N/A 1/2/2025    Esophagogastroduodenoscopy with percutaneous endoscopic gastrostomy tube/ICU bedside/anesthesia not required  ROOM ICU:1 performed by Syed Juárez [place] [facility]       Chart Reviewed: Yes  Family/Caregiver Present: No    Restrictions:  Restrictions/Precautions: Fall Risk    SUBJECTIVE:   Subjective: pt with improved engagement with therapist this date, nods in agreement to tx.    Pain   Does not answer when asked, no pain behaviors noted during mobility.     OBJECTIVE:        Bed mobility  Rolling to Left: Dependent/Total  Rolling to Right: Dependent/Total  Supine to Sit: Dependent/Total (attempted long sitting.)  Bed Mobility Comments: pt greatly limited by weakness, verbal and tactile cues for initiation provided            PT Exercises  PROM Exercises: PROM of BLE ankle DF/PF. knee flx/ext. hip abd/flx/IR/ER. x10 each. BUE wrist/elbow flexion/extension x 10 each.  A/AROM Exercises: cervical rotation L/R x 5 each, minimal ROM noted.          Activity Tolerance  Activity Tolerance: Patient  limited by endurance;Patient limited by fatigue          ASSESSMENT   Assessment: max attempts at facilitating AAROM, pt unable to assist with therex or bed mobility. repositioned post tx to offload pressure wound, RN notified. HR >120 throughout tx regardless of level of exertion.     Discharge Recommendations:  Continue to assess pending progress         Goals  Short Term Goals  Short Term Goal 1: Pt able to partipat with rolling requiring max assist +1  Short Term Goal 2: pt able to participate in supine to sit  activities as medically able  Short Term Goal 3: pt to demonstrated 2/5 LE active function    PLAN    General Plan: 1 time a day 3-6 times a week  Safety Devices  Type of Devices: Bed alarm in place, Call light within reach, Left in bed, Nurse notified     [facility] (6 CLICK) BASIC MOBILITY  AM-PAC Inpatient Mobility Raw Score : 6      Therapy Time   Individual   Time In 0915   Time Out 0933   Minutes 18      BM: 6  Therex: 12        Jp Jennings PTA, 01/11/25 at 1:13 PM         Definitions for assistance levels  Independent = pt does not require any physical supervision or assistance from another person for activity completion. Device may be needed.  Stand by assistance = pt requires verbal cues or instructions from another person, close to but not touching, to perform the activity  Minimal assistance= pt performs 75% or more of the activity; assistance is required to complete the activity  Moderate assistance= pt performs 50% of the activity; assistance is required to complete the activity  Maximal assistance = pt performs 25% of the activity; assistance is required to complete the activity  Dependent = pt requires total physical assistance to accomplish the task

## 2025-01-11 NOTE — PLAN OF CARE
Problem: Chronic Conditions and Co-morbidities  Goal: Patient's chronic conditions and co-morbidity symptoms are monitored and maintained or improved  1/10/2025 2111 by Candy Castro RN  Outcome: Progressing  1/10/2025 2110 by Candy Castro RN  Outcome: Progressing  Flowsheets  Taken 1/10/2025 1930 by Candy Castro RN  Care Plan - Patient's Chronic Conditions and Co-Morbidity Symptoms are Monitored and Maintained or Improved: Monitor and assess patient's chronic conditions and comorbid symptoms for stability, deterioration, or improvement  Taken 1/10/2025 0800 by Farrah Lam RN  Care Plan - Patient's Chronic Conditions and Co-Morbidity Symptoms are Monitored and Maintained or Improved: Monitor and assess patient's chronic conditions and comorbid symptoms for stability, deterioration, or improvement     Problem: Pain  Goal: Verbalizes/displays adequate comfort level or baseline comfort level  1/10/2025 2111 by Candy Castro RN  Outcome: Progressing  1/10/2025 2110 by Candy Castro RN  Outcome: Progressing  Flowsheets (Taken 1/10/2025 1930)  Verbalizes/displays adequate comfort level or baseline comfort level: Assess pain using appropriate pain scale     Problem: Safety - Adult  Goal: Free from fall injury  1/10/2025 2111 by Candy Castro RN  Outcome: Progressing  1/10/2025 2110 by Candy Castro RN  Outcome: Progressing     Problem: Skin/Tissue Integrity  Goal: Absence of new skin breakdown  Description: 1.  Monitor for areas of redness and/or skin breakdown  2.  Assess vascular access sites hourly  3.  Every 4-6 hours minimum:  Change oxygen saturation probe site  4.  Every 4-6 hours:  If on nasal continuous positive airway pressure, respiratory therapy assess nares and determine need for appliance change or resting period.  1/10/2025 2111 by Candy Castro RN  Outcome: Progressing  1/10/2025 2110 by Candy Castro RN  Outcome: Progressing     Problem:  and family identify pros/cons of alternative solutions     Problem: Nutrition Deficit:  Goal: Optimize nutritional status  Outcome: Progressing     Problem: ABCDS Injury Assessment  Goal: Absence of physical injury  Outcome: Progressing

## 2025-01-12 LAB
ALBUMIN SERPL-MCNC: 2.6 G/DL (ref 3.5–4.6)
ANION GAP SERPL CALCULATED.3IONS-SCNC: 11 MEQ/L (ref 9–15)
BASOPHILS # BLD: 0 K/UL (ref 0–0.2)
BASOPHILS NFR BLD: 0.3 %
BUN SERPL-MCNC: 57 MG/DL (ref 8–23)
CALCIUM SERPL-MCNC: 8.3 MG/DL (ref 8.5–9.9)
CHLORIDE SERPL-SCNC: 93 MEQ/L (ref 95–107)
CO2 SERPL-SCNC: 30 MEQ/L (ref 20–31)
CREAT SERPL-MCNC: 1.57 MG/DL (ref 0.7–1.2)
EOSINOPHIL # BLD: 0.1 K/UL (ref 0–0.7)
EOSINOPHIL NFR BLD: 0.8 %
ERYTHROCYTE [DISTWIDTH] IN BLOOD BY AUTOMATED COUNT: 17.1 % (ref 11.5–14.5)
GLUCOSE BLD-MCNC: 111 MG/DL (ref 70–99)
GLUCOSE BLD-MCNC: 139 MG/DL (ref 70–99)
GLUCOSE BLD-MCNC: 180 MG/DL (ref 70–99)
GLUCOSE BLD-MCNC: 205 MG/DL (ref 70–99)
GLUCOSE BLD-MCNC: 228 MG/DL (ref 70–99)
GLUCOSE SERPL-MCNC: 173 MG/DL (ref 70–99)
HCT VFR BLD AUTO: 25.3 % (ref 42–52)
HGB BLD-MCNC: 7.6 G/DL (ref 14–18)
LYMPHOCYTES # BLD: 0.5 K/UL (ref 1–4.8)
LYMPHOCYTES NFR BLD: 4.1 %
MCH RBC QN AUTO: 29 PG (ref 27–31.3)
MCHC RBC AUTO-ENTMCNC: 30 % (ref 33–37)
MCV RBC AUTO: 96.6 FL (ref 79–92.2)
MONOCYTES # BLD: 0.6 K/UL (ref 0.2–0.8)
MONOCYTES NFR BLD: 5.8 %
NEUTROPHILS # BLD: 9.6 K/UL (ref 1.4–6.5)
NEUTS SEG NFR BLD: 87.6 %
PERFORMED ON: ABNORMAL
PHOSPHATE SERPL-MCNC: 1.8 MG/DL (ref 2.3–4.8)
PLATELET # BLD AUTO: 221 K/UL (ref 130–400)
POTASSIUM SERPL-SCNC: 3.8 MEQ/L (ref 3.4–4.9)
RBC # BLD AUTO: 2.62 M/UL (ref 4.7–6.1)
SODIUM SERPL-SCNC: 134 MEQ/L (ref 135–144)
WBC # BLD AUTO: 11 K/UL (ref 4.8–10.8)

## 2025-01-12 PROCEDURE — 2000000000 HC ICU R&B

## 2025-01-12 PROCEDURE — 2500000003 HC RX 250 WO HCPCS: Performed by: INTERNAL MEDICINE

## 2025-01-12 PROCEDURE — 94003 VENT MGMT INPAT SUBQ DAY: CPT

## 2025-01-12 PROCEDURE — 99291 CRITICAL CARE FIRST HOUR: CPT | Performed by: INTERNAL MEDICINE

## 2025-01-12 PROCEDURE — 80069 RENAL FUNCTION PANEL: CPT

## 2025-01-12 PROCEDURE — 6370000000 HC RX 637 (ALT 250 FOR IP): Performed by: NURSE PRACTITIONER

## 2025-01-12 PROCEDURE — 6370000000 HC RX 637 (ALT 250 FOR IP): Performed by: INTERNAL MEDICINE

## 2025-01-12 PROCEDURE — 94660 CPAP INITIATION&MGMT: CPT

## 2025-01-12 PROCEDURE — 85025 COMPLETE CBC W/AUTO DIFF WBC: CPT

## 2025-01-12 PROCEDURE — 6360000002 HC RX W HCPCS: Performed by: INTERNAL MEDICINE

## 2025-01-12 PROCEDURE — 2700000000 HC OXYGEN THERAPY PER DAY

## 2025-01-12 PROCEDURE — 2500000003 HC RX 250 WO HCPCS: Performed by: NURSE PRACTITIONER

## 2025-01-12 RX ADMIN — METOPROLOL TARTRATE 12.5 MG: 25 TABLET, FILM COATED ORAL at 21:39

## 2025-01-12 RX ADMIN — Medication 10 ML: at 10:17

## 2025-01-12 RX ADMIN — LANSOPRAZOLE 30 MG: 30 TABLET, ORALLY DISINTEGRATING, DELAYED RELEASE ORAL at 08:08

## 2025-01-12 RX ADMIN — HEPARIN SODIUM 5000 UNITS: 5000 INJECTION INTRAVENOUS; SUBCUTANEOUS at 18:48

## 2025-01-12 RX ADMIN — PREDNISONE 5 MG: 10 TABLET ORAL at 08:08

## 2025-01-12 RX ADMIN — SODIUM CHLORIDE, PRESERVATIVE FREE 10 ML: 5 INJECTION INTRAVENOUS at 21:39

## 2025-01-12 RX ADMIN — METOPROLOL TARTRATE 12.5 MG: 25 TABLET, FILM COATED ORAL at 08:08

## 2025-01-12 RX ADMIN — AMIODARONE HYDROCHLORIDE 200 MG: 200 TABLET ORAL at 08:08

## 2025-01-12 RX ADMIN — INSULIN LISPRO 4 UNITS: 100 INJECTION, SOLUTION INTRAVENOUS; SUBCUTANEOUS at 10:13

## 2025-01-12 RX ADMIN — Medication 10 ML: at 21:40

## 2025-01-12 RX ADMIN — HEPARIN SODIUM 5000 UNITS: 5000 INJECTION INTRAVENOUS; SUBCUTANEOUS at 02:47

## 2025-01-12 RX ADMIN — HEPARIN SODIUM 5000 UNITS: 5000 INJECTION INTRAVENOUS; SUBCUTANEOUS at 08:05

## 2025-01-12 RX ADMIN — CARBAMIDE PEROXIDE 6.5% 5 DROP: 6.5 LIQUID AURICULAR (OTIC) at 10:13

## 2025-01-12 RX ADMIN — POLYETHYLENE GLYCOL 3350 17 G: 17 POWDER, FOR SOLUTION ORAL at 08:04

## 2025-01-12 RX ADMIN — INSULIN LISPRO 4 UNITS: 100 INJECTION, SOLUTION INTRAVENOUS; SUBCUTANEOUS at 03:05

## 2025-01-12 RX ADMIN — SODIUM CHLORIDE, PRESERVATIVE FREE 10 ML: 5 INJECTION INTRAVENOUS at 10:17

## 2025-01-12 RX ADMIN — AMIODARONE HYDROCHLORIDE 200 MG: 200 TABLET ORAL at 21:39

## 2025-01-12 RX ADMIN — DOCUSATE SODIUM 100 MG: 50 LIQUID ORAL at 21:38

## 2025-01-12 RX ADMIN — INSULIN LISPRO 4 UNITS: 100 INJECTION, SOLUTION INTRAVENOUS; SUBCUTANEOUS at 21:46

## 2025-01-12 RX ADMIN — DOCUSATE SODIUM 100 MG: 50 LIQUID ORAL at 08:04

## 2025-01-12 RX ADMIN — INSULIN GLARGINE 20 UNITS: 100 INJECTION, SOLUTION SUBCUTANEOUS at 08:05

## 2025-01-12 ASSESSMENT — PULMONARY FUNCTION TESTS
PIF_VALUE: 24
PIF_VALUE: 24
PIF_VALUE: 35
PIF_VALUE: 27
PIF_VALUE: 31
PIF_VALUE: 24
PIF_VALUE: 23
PIF_VALUE: 29
PIF_VALUE: 28
PIF_VALUE: 27
PIF_VALUE: 25
PIF_VALUE: 30
PIF_VALUE: 28
PIF_VALUE: 37
PIF_VALUE: 31
PIF_VALUE: 30
PIF_VALUE: 34
PIF_VALUE: 35
PIF_VALUE: 27
PIF_VALUE: 27
PIF_VALUE: 25
PIF_VALUE: 35
PIF_VALUE: 29
PIF_VALUE: 29
PIF_VALUE: 26
PIF_VALUE: 26
PIF_VALUE: 31
PIF_VALUE: 37
PIF_VALUE: 35
PIF_VALUE: 30
PIF_VALUE: 32

## 2025-01-12 ASSESSMENT — PAIN SCALES - GENERAL: PAINLEVEL_OUTOF10: 0

## 2025-01-12 ASSESSMENT — PAIN SCALES - WONG BAKER: WONGBAKER_NUMERICALRESPONSE: NO HURT

## 2025-01-12 NOTE — PROGRESS NOTES
Pulmonary & Critical Care Medicine ICU Progress Note  Chief complaint : Respiratory failure    Subjunctive/24 hour events :   Patient seen and examined during multidisciplinary rounds with RN, charge nurse, RT, pharmacy, dietitian, and social service.      Awake, no issues overnight, uses vent while asleep, now on trach collar, on 30% FiO2 saturation 100%, no fever  Bowels moving, urine output 400 cc      New information updated in the note today, rest of the examination did not change compared to yesterday.  Social History     Tobacco Use    Smoking status: Former     Current packs/day: 0.00     Types: Cigarettes     Quit date: 2015     Years since quittin.5    Smokeless tobacco: Former   Substance Use Topics    Alcohol use: No     Alcohol/week: 0.0 standard drinks of alcohol         Problem Relation Age of Onset    Colon Cancer Neg Hx        No results for input(s): \"PHART\", \"UXX1NZC\", \"PO2ART\" in the last 72 hours.      MV Settings:  Vent Mode: AC/VC Resp Rate (Set): 14 bpm/Vt (Set, mL): 500 mL/ /FiO2 : 30 %           IV:   sodium chloride      dextrose      sodium chloride         Vitals:  BP (!) 153/63   Pulse (!) 108   Temp 99.3 °F (37.4 °C) (Oral)   Resp (!) 34   Ht 1.88 m (6' 2\")   Wt 75.5 kg (166 lb 7.2 oz)   SpO2 100%   BMI 21.37 kg/m²    Tmax:        Intake/Output Summary (Last 24 hours) at 2025 0816  Last data filed at 2025 0100  Gross per 24 hour   Intake 1430 ml   Output 400 ml   Net 1030 ml       EXAM:  General: Awake on vent, no distress  Head: normocephalic, atraumatic  Eyes:No gross abnormalities.  ENT:  MMM no lesions  Neck:  supple and no masses, tracheostomy in place, no bleeding  Chest : Vent sounds, no wheezing, no rales, nontender, tympanic  Heart:: Heart sounds are normal.  Regular rate and rhythm without murmur, gallop or rub.  ABD:  symmetric, soft, non-tender, no guarding or rebound  Musculoskeletal : no cyanosis, no clubbing, and no edema  Neuro:   Awake,

## 2025-01-12 NOTE — PROGRESS NOTES
Renal Progress Note  Assessment:  ESRDX  S/P viral pneumonia  Hx Kidney transplant  Cachexia  S/P trach/PEG        Plan:continue dialysis as patient wishes  watch H&H  follow I&O and base fluid removal as weight   No clinical or laboratory indication for intervention today    Patient Active Problem List:     Pneumonia     Abdominal pain, other specified site     Acute pyelonephritis without lesion of renal medullary necrosis     Anemia     Essential hypertension     Nonspecific abnormal results of thyroid function study     Mixed hyperlipidemia     Kidney replaced by transplant     Intra-abdominal abscess post-procedure (HCC)     Infection due to Enterococcus     Fever and other physiologic disturbances of temperature regulation     Chronic kidney disease (CKD)     Type 2 diabetes mellitus with stage 3b chronic kidney disease, without long-term current use of insulin (HCC)     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 (HCC)     Abnormal CT of the chest     Bronchiectasis without complication (HCC)     GI bleeding     Complication of transplanted kidney     Muscle weakness (generalized)     Difficulty in walking     Elevated BUN     Urinary retention     Immunosuppression due to drug therapy (HCC)     DELORES (acute kidney injury) (HCC)     Adjustment disorder     Gross hematuria     Bilateral lower extremity edema     Dysuria     Acute cystitis without hematuria     Bilateral hearing loss     Abscess, toe     Acute hyperkalemia     Acute pain of left shoulder     Astigmatism     Astigmatism of both eyes with presbyopia     At risk for stroke     Benign enlargement of  SubCUTAneous 3 times per day    [Held by provider] mycophenolate  500 mg Oral BID     Continuous Infusions:   sodium chloride      dextrose      sodium chloride         CBC:   Recent Labs     01/11/25  0511 01/12/25  0620   WBC 12.4* 11.0*   HGB 7.8* 7.6*    221     CMP:    Recent Labs     01/10/25  0518 01/11/25  0511 01/12/25  0620   * 134* 134*   K 4.1 3.8 3.8   CL 92* 94* 93*   CO2 28 29 30   BUN 68* 42* 57*   CREATININE 1.99* 1.22* 1.57*   GLUCOSE 202* 251* 173*   CALCIUM 8.1* 8.3* 8.3*   LABGLOM 34.8* 62.6 46.2*     Troponin: No results for input(s): \"TROPONINI\" in the last 72 hours.  BNP: No results for input(s): \"BNP\" in the last 72 hours.  INR: No results for input(s): \"INR\" in the last 72 hours.  Lipids: No results for input(s): \"CHOL\", \"LDLDIRECT\", \"TRIG\", \"HDL\", \"AMYLASE\", \"LIPASE\" in the last 72 hours.  Liver: No results for input(s): \"AST\", \"ALT\", \"ALKPHOS\", \"LABALBU\", \"BILITOT\" in the last 72 hours.    Invalid input(s): \"PROT\", \"BILDIR\"  Iron:  No results for input(s): \"FERRITIN\" in the last 72 hours.    Invalid input(s): \"IRONS\", \"LABIRONS\"  Urinalysis: No results for input(s): \"UA\" in the last 72 hours.    Objective:   Vitals: BP (!) 135/27   Pulse (!) 108   Temp 99.1 °F (37.3 °C) (Oral)   Resp 28   Ht 1.88 m (6' 2\")   Wt 75.5 kg (166 lb 7.2 oz)   SpO2 99%   BMI 21.37 kg/m²    Wt Readings from Last 3 Encounters:   01/10/25 75.5 kg (166 lb 7.2 oz)   11/04/24 75.9 kg (167 lb 6.4 oz)   09/18/24 77.7 kg (171 lb 4.8 oz)      24HR INTAKE/OUTPUT:    Intake/Output Summary (Last 24 hours) at 1/12/2025 1719  Last data filed at 1/12/2025 1430  Gross per 24 hour   Intake 1570 ml   Output --   Net 1570 ml     Admission weight: 71.7 kg (158 lb 1.6 oz)     Constitutional: Partially alert sluggish follow command  Skin:normal, no rash  HEENT:sclera anicteric.  Head atraumatic normocephalic  Neck:supple with no thyromegally  Cardiovascular:  S1, S2 without m/r/g   Respiratory:  CTA B without w/r/r

## 2025-01-12 NOTE — FLOWSHEET NOTE
2000  Awake follows commands  Large amt of nasal drainage from right nares  Suctioned per trach  -120's Afib    2143  's BP elevated Lopressor 2.5 mg Iv given  2210 Pt nodded head yes for pain medicated with Percocet 1 tab  2215  Dr. Lawson saw pt and made aware of increased HR and BP  and increased nasal drainage.    0200 Bath and linen change   0400 Resting quietly  continues with nasal drainage

## 2025-01-12 NOTE — PROGRESS NOTES
1200 Received handoff of care report from Darby FRY.  Wife at bedside.    1430 Pt resting quietly.  Turned and repositioned as per protocol.

## 2025-01-12 NOTE — PROGRESS NOTES
Progress Note  Date:2025       Room:[place] [address]-  Patient Name:Remy Upton     YOB: 1950     Age:74 y.o.        Subjective    Subjective   Review of Systems  ROS: could not be obtained bc pt is intubated  Objective         Vitals Last 24 Hours:  TEMPERATURE:  Temp  Av.1 °F (37.3 °C)  Min: 98.2 °F (36.8 °C)  Max: 100.8 °F (38.2 °C)  RESPIRATIONS RANGE: Resp  Av.3  Min: 9  Max: 38  PULSE OXIMETRY RANGE: SpO2  Av.3 %  Min: 96 %  Max: 100 %  PULSE RANGE: Pulse  Av.8  Min: 96  Max: 143  BLOOD PRESSURE RANGE: Systolic (24hrs), Av , Min:104 , Max:196   ; Diastolic (24hrs), Av, Min:32, Max:145    I/O (24Hr):    Intake/Output Summary (Last 24 hours) at 2025 1228  Last data filed at 2025 0900  Gross per 24 hour   Intake 1950 ml   Output 400 ml   Net 1550 ml     Objective:  General Appearance:  Ill-appearing.    Vital signs: (most recent): Blood pressure (!) 155/34, pulse 98, temperature 98.8 °F (37.1 °C), temperature source Oral, resp. rate 25, height 1.88 m (6' 2\"), weight 75.5 kg (166 lb 7.2 oz), SpO2 100%.    HEENT: Normal HEENT exam.    Lungs:  There are decreased breath sounds.    Heart: S1 normal and S2 normal.    Abdomen: Abdomen is soft.  Bowel sounds are normal.   There is no epigastric area tenderness.     Pulses: Distal pulses are intact.    Neurological: Patient is alert.    Pupils:  Pupils are equal, round, and reactive to light.    Skin:  Warm.      Labs/Imaging/Diagnostics    Labs:  CBC:  Recent Labs     01/10/25  0518 25  0511 25  0620   WBC 12.9* 12.4* 11.0*   RBC 2.74* 2.56* 2.62*   HGB 8.1* 7.8* 7.6*   HCT 26.4* 24.9* 25.3*   MCV 96.4* 97.3* 96.6*   RDW 17.5* 17.4* 17.1*    202 221     CHEMISTRIES:  Recent Labs     01/10/25  0518 25  0511 25  0620   * 134* 134*   K 4.1 3.8 3.8   CL 92* 94* 93*   CO2 28 29 30   BUN 68* 42* 57*   CREATININE 1.99* 1.22* 1.57*   GLUCOSE 202* 251* 173*   PHOS 3.5 2.1* 1.8*   Medication is reconciled.  Spoke with nephrology team about immunosuppressive therapy.  Spoke with nursing about the care.  No overnight events c/w HD and fluid removal. TCt 35 min  1/12: Awaiting for pre-CERT.  No overnight events.  Continue current care.  On trach collar.  Continue DVT and PUD prophylaxis.  Monitor replace electrolytes as needed.  No sedation. TCT 35 min  Electronically signed by Marika Calvert MD on 1/6/25 at 12:31 PM EST

## 2025-01-12 NOTE — PLAN OF CARE
Problem: Chronic Conditions and Co-morbidities  Goal: Patient's chronic conditions and co-morbidity symptoms are monitored and maintained or improved  1/11/2025 2336 by Candy Castro RN  Outcome: Progressing  Flowsheets (Taken 1/11/2025 2000)  Care Plan - Patient's Chronic Conditions and Co-Morbidity Symptoms are Monitored and Maintained or Improved: Monitor and assess patient's chronic conditions and comorbid symptoms for stability, deterioration, or improvement  1/11/2025 1157 by Farrah Lam RN  Outcome: Progressing  Flowsheets (Taken 1/11/2025 0800)  Care Plan - Patient's Chronic Conditions and Co-Morbidity Symptoms are Monitored and Maintained or Improved:   Monitor and assess patient's chronic conditions and comorbid symptoms for stability, deterioration, or improvement   Collaborate with multidisciplinary team to address chronic and comorbid conditions and prevent exacerbation or deterioration     Problem: Discharge Planning  Goal: Discharge to home or other facility with appropriate resources  1/11/2025 2336 by Candy Castro RN  Outcome: Progressing  Flowsheets (Taken 1/11/2025 2000)  Discharge to home or other facility with appropriate resources: Identify barriers to discharge with patient and caregiver  1/11/2025 1157 by Farrah Lam RN  Outcome: Progressing  Flowsheets (Taken 1/11/2025 0800)  Discharge to home or other facility with appropriate resources:   Identify barriers to discharge with patient and caregiver   Arrange for needed discharge resources and transportation as appropriate     Problem: Pain  Goal: Verbalizes/displays adequate comfort level or baseline comfort level  1/11/2025 2336 by Candy Castro RN  Outcome: Progressing  Flowsheets (Taken 1/11/2025 2000)  Verbalizes/displays adequate comfort level or baseline comfort level: Assess pain using appropriate pain scale  1/11/2025 1157 by Farrah Lam RN  Outcome: Progressing     Problem: Safety -  Adult  Goal: Free from fall injury  1/11/2025 2336 by Candy Castro RN  Outcome: Progressing  1/11/2025 1157 by Farrah Lam RN  Outcome: Progressing     Problem: Skin/Tissue Integrity  Goal: Absence of new skin breakdown  Description: 1.  Monitor for areas of redness and/or skin breakdown  2.  Assess vascular access sites hourly  3.  Every 4-6 hours minimum:  Change oxygen saturation probe site  4.  Every 4-6 hours:  If on nasal continuous positive airway pressure, respiratory therapy assess nares and determine need for appliance change or resting period.  1/11/2025 2336 by Candy Castro RN  Outcome: Progressing  1/11/2025 1157 by Farrah Lam RN  Outcome: Progressing     Problem: Neurosensory - Adult  Goal: Achieves stable or improved neurological status  1/11/2025 2336 by Candy Castro RN  Outcome: Progressing  Flowsheets (Taken 1/11/2025 2000)  Achieves stable or improved neurological status: Assess for and report changes in neurological status  1/11/2025 1157 by Farrah Lam RN  Outcome: Progressing  Flowsheets (Taken 1/11/2025 0800)  Achieves stable or improved neurological status:   Assess for and report changes in neurological status   Initiate measures to prevent increased intracranial pressure   Maintain blood pressure and fluid volume within ordered parameters to optimize cerebral perfusion and minimize risk of hemorrhage   Monitor temperature, glucose, and sodium. Initiate appropriate interventions as ordered     Problem: Respiratory - Adult  Goal: Achieves optimal ventilation and oxygenation  1/11/2025 2336 by Candy Castro RN  Outcome: Progressing  Flowsheets (Taken 1/11/2025 2000)  Achieves optimal ventilation and oxygenation: Assess for changes in respiratory status  1/11/2025 1157 by Farrah Lam RN  Outcome: Progressing  Flowsheets (Taken 1/11/2025 0800)  Achieves optimal ventilation and oxygenation:   Assess for changes in respiratory status   Assess  Progressing  1/11/2025 1157 by Farrah Lam, RN  Outcome: Progressing     Problem: Decision Making  Goal: Pt/Family able to effectively weigh alternatives and participate in decision making related to treatment and care  Description: INTERVENTIONS:  1. Determine when there are differences between patient's view, family's view, and healthcare provider's view of condition  2. Facilitate patient and family articulation of goals for care  3. Help patient and family identify pros/cons of alternative solutions  4. Provide information as requested by patient/family  5. Respect patient/family right to receive or not to receive information  6. Serve as a liaison between patient and family and health care team  7. Initiate Consults from Ethics, Palliative Care or initiate Family Care Conference as is appropriate  1/11/2025 2336 by Candy Castro, RN  Outcome: Progressing  Flowsheets (Taken 1/11/2025 2000)  Patient/family able to effectively weigh alternatives and participate in decision making related to treatment and care: Determine when there are differences between patient's view, family's view, and healthcare provider's view of condition  1/11/2025 1157 by Farrah Lam, RN  Outcome: Not Progressing  Flowsheets (Taken 1/11/2025 0800)  Patient/family able to effectively weigh alternatives and participate in decision making related to treatment and care:   Determine when there are differences between patient's view, family's view, and healthcare provider's view of condition   Facilitate patient and family articulation of goals for care   Help patient and family identify pros/cons of alternative solutions   Provide information as requested by patient/family

## 2025-01-12 NOTE — FLOWSHEET NOTE
Family present and visiting with patient at bedside. Patient responding appropriately to family members with yes/no nods and gestures.     Wound/ostomy nurse at bedside to re-evaluate patient's current wounds and possible new breakdown. New orders placed for wound care and this RN completed wound care dressing with help of ostomy nurse.     Electronically signed by Farrah Lam RN on 1/11/2025 at 7:26 PM

## 2025-01-13 LAB
ALBUMIN SERPL-MCNC: 2.4 G/DL (ref 3.5–4.6)
ALP SERPL-CCNC: 136 U/L (ref 35–104)
ALT SERPL-CCNC: 27 U/L (ref 0–41)
ANION GAP SERPL CALCULATED.3IONS-SCNC: 9 MEQ/L (ref 9–15)
AST SERPL-CCNC: 20 U/L (ref 0–40)
BASOPHILS # BLD: 0 K/UL (ref 0–0.2)
BASOPHILS NFR BLD: 0.4 %
BILIRUB SERPL-MCNC: 0.4 MG/DL (ref 0.2–0.7)
BUN SERPL-MCNC: 65 MG/DL (ref 8–23)
CALCIUM SERPL-MCNC: 8.1 MG/DL (ref 8.5–9.9)
CHLORIDE SERPL-SCNC: 94 MEQ/L (ref 95–107)
CO2 SERPL-SCNC: 29 MEQ/L (ref 20–31)
CREAT SERPL-MCNC: 1.88 MG/DL (ref 0.7–1.2)
EOSINOPHIL # BLD: 0.1 K/UL (ref 0–0.7)
EOSINOPHIL NFR BLD: 1.1 %
ERYTHROCYTE [DISTWIDTH] IN BLOOD BY AUTOMATED COUNT: 17.2 % (ref 11.5–14.5)
GLOBULIN SER CALC-MCNC: 3 G/DL (ref 2.3–3.5)
GLUCOSE BLD-MCNC: 101 MG/DL (ref 70–99)
GLUCOSE BLD-MCNC: 167 MG/DL (ref 70–99)
GLUCOSE BLD-MCNC: 237 MG/DL (ref 70–99)
GLUCOSE BLD-MCNC: 243 MG/DL (ref 70–99)
GLUCOSE SERPL-MCNC: 221 MG/DL (ref 70–99)
HCT VFR BLD AUTO: 23.5 % (ref 42–52)
HGB BLD-MCNC: 7.2 G/DL (ref 14–18)
LYMPHOCYTES # BLD: 0.4 K/UL (ref 1–4.8)
LYMPHOCYTES NFR BLD: 3.9 %
MCH RBC QN AUTO: 29.9 PG (ref 27–31.3)
MCHC RBC AUTO-ENTMCNC: 30.6 % (ref 33–37)
MCV RBC AUTO: 97.5 FL (ref 79–92.2)
MONOCYTES # BLD: 0.5 K/UL (ref 0.2–0.8)
MONOCYTES NFR BLD: 5.9 %
NEUTROPHILS # BLD: 8 K/UL (ref 1.4–6.5)
NEUTS SEG NFR BLD: 87.2 %
PERFORMED ON: ABNORMAL
PLATELET # BLD AUTO: 249 K/UL (ref 130–400)
POTASSIUM SERPL-SCNC: 4 MEQ/L (ref 3.4–4.9)
PROT SERPL-MCNC: 5.4 G/DL (ref 6.3–8)
RBC # BLD AUTO: 2.41 M/UL (ref 4.7–6.1)
SODIUM SERPL-SCNC: 132 MEQ/L (ref 135–144)
WBC # BLD AUTO: 9.2 K/UL (ref 4.8–10.8)

## 2025-01-13 PROCEDURE — 80053 COMPREHEN METABOLIC PANEL: CPT

## 2025-01-13 PROCEDURE — 6370000000 HC RX 637 (ALT 250 FOR IP): Performed by: INTERNAL MEDICINE

## 2025-01-13 PROCEDURE — 99291 CRITICAL CARE FIRST HOUR: CPT | Performed by: INTERNAL MEDICINE

## 2025-01-13 PROCEDURE — 2500000003 HC RX 250 WO HCPCS: Performed by: INTERNAL MEDICINE

## 2025-01-13 PROCEDURE — 6360000002 HC RX W HCPCS: Performed by: INTERNAL MEDICINE

## 2025-01-13 PROCEDURE — 94761 N-INVAS EAR/PLS OXIMETRY MLT: CPT

## 2025-01-13 PROCEDURE — 2500000003 HC RX 250 WO HCPCS: Performed by: NURSE PRACTITIONER

## 2025-01-13 PROCEDURE — 2700000000 HC OXYGEN THERAPY PER DAY

## 2025-01-13 PROCEDURE — 97140 MANUAL THERAPY 1/> REGIONS: CPT

## 2025-01-13 PROCEDURE — 6370000000 HC RX 637 (ALT 250 FOR IP): Performed by: NURSE PRACTITIONER

## 2025-01-13 PROCEDURE — 8010000000 HC HEMODIALYSIS ACUTE INPT

## 2025-01-13 PROCEDURE — 85025 COMPLETE CBC W/AUTO DIFF WBC: CPT

## 2025-01-13 PROCEDURE — 2000000000 HC ICU R&B

## 2025-01-13 PROCEDURE — 51701 INSERT BLADDER CATHETER: CPT

## 2025-01-13 PROCEDURE — 51798 US URINE CAPACITY MEASURE: CPT

## 2025-01-13 RX ORDER — GUAIFENESIN 200 MG/10ML
200 LIQUID ORAL EVERY 4 HOURS PRN
Status: DISCONTINUED | OUTPATIENT
Start: 2025-01-13 | End: 2025-01-14 | Stop reason: HOSPADM

## 2025-01-13 RX ADMIN — HEPARIN SODIUM 5000 UNITS: 5000 INJECTION INTRAVENOUS; SUBCUTANEOUS at 18:17

## 2025-01-13 RX ADMIN — AMIODARONE HYDROCHLORIDE 200 MG: 200 TABLET ORAL at 09:23

## 2025-01-13 RX ADMIN — GUAIFENESIN 200 MG: 100 LIQUID ORAL at 01:53

## 2025-01-13 RX ADMIN — Medication 10 ML: at 09:25

## 2025-01-13 RX ADMIN — CARBAMIDE PEROXIDE 6.5% 5 DROP: 6.5 LIQUID AURICULAR (OTIC) at 09:26

## 2025-01-13 RX ADMIN — OXYCODONE AND ACETAMINOPHEN 2 TABLET: 5; 325 TABLET ORAL at 01:53

## 2025-01-13 RX ADMIN — OXYCODONE AND ACETAMINOPHEN 2 TABLET: 5; 325 TABLET ORAL at 10:07

## 2025-01-13 RX ADMIN — LANSOPRAZOLE 30 MG: 30 TABLET, ORALLY DISINTEGRATING, DELAYED RELEASE ORAL at 09:23

## 2025-01-13 RX ADMIN — METOPROLOL TARTRATE 12.5 MG: 25 TABLET, FILM COATED ORAL at 09:22

## 2025-01-13 RX ADMIN — INSULIN GLARGINE 20 UNITS: 100 INJECTION, SOLUTION SUBCUTANEOUS at 09:24

## 2025-01-13 RX ADMIN — SODIUM CHLORIDE, PRESERVATIVE FREE 10 ML: 5 INJECTION INTRAVENOUS at 09:25

## 2025-01-13 RX ADMIN — POLYETHYLENE GLYCOL 3350 17 G: 17 POWDER, FOR SOLUTION ORAL at 09:24

## 2025-01-13 RX ADMIN — HEPARIN SODIUM 5000 UNITS: 5000 INJECTION INTRAVENOUS; SUBCUTANEOUS at 09:23

## 2025-01-13 RX ADMIN — SODIUM CHLORIDE, PRESERVATIVE FREE 10 ML: 5 INJECTION INTRAVENOUS at 22:45

## 2025-01-13 RX ADMIN — PREDNISONE 5 MG: 10 TABLET ORAL at 09:22

## 2025-01-13 RX ADMIN — METOPROLOL TARTRATE 12.5 MG: 25 TABLET, FILM COATED ORAL at 22:43

## 2025-01-13 RX ADMIN — AMIODARONE HYDROCHLORIDE 200 MG: 200 TABLET ORAL at 22:43

## 2025-01-13 RX ADMIN — Medication 10 ML: at 22:45

## 2025-01-13 RX ADMIN — DOCUSATE SODIUM 100 MG: 50 LIQUID ORAL at 09:24

## 2025-01-13 RX ADMIN — HEPARIN SODIUM 5000 UNITS: 5000 INJECTION INTRAVENOUS; SUBCUTANEOUS at 01:53

## 2025-01-13 RX ADMIN — INSULIN LISPRO 4 UNITS: 100 INJECTION, SOLUTION INTRAVENOUS; SUBCUTANEOUS at 14:53

## 2025-01-13 RX ADMIN — INSULIN LISPRO 4 UNITS: 100 INJECTION, SOLUTION INTRAVENOUS; SUBCUTANEOUS at 09:23

## 2025-01-13 ASSESSMENT — PAIN SCALES - GENERAL
PAINLEVEL_OUTOF10: 9
PAINLEVEL_OUTOF10: 1
PAINLEVEL_OUTOF10: 3
PAINLEVEL_OUTOF10: 0
PAINLEVEL_OUTOF10: 5
PAINLEVEL_OUTOF10: 9
PAINLEVEL_OUTOF10: 0
PAINLEVEL_OUTOF10: 1

## 2025-01-13 ASSESSMENT — PULMONARY FUNCTION TESTS: PIF_VALUE: 19.9

## 2025-01-13 NOTE — PROGRESS NOTES
1930: assumed care of pt after receiving bedside report from LISANDRA Matamoros. Pt with trach, will attempt trial BiPAP with trach this shift. White board updated, call light within reach. ID bands verified.   2030: assessment completed. Pt not very interactive at this time. Followed some commands, did not nod at any questions except if he would like his wife to come up, he nodded no.   0100: pt with copious amounts of mucous drainage noted from mouth and both nostrils. This is approx the 4th time this shift of which he has required both suctioning with inline as well as yankauer. However, pt does not open mouth well enough and does fight anything you attempt to get in. RT called and nasotracheal suctioning performed. Audible rhonchi present as well. Sputum is thick, tenacious, and frothy. Spoke with pharmacy and provider regarding use of a thinning medication. Robitussin ordered.   0200: pt exhibiting signs of pain. HR has increased to 130's and respiratory rate up to 34-40. PT medicated for pain at this time.   0300: pt VS with noted improvement at this time, appears as pain has decreased  0700: report to LISADNRA Matamoros

## 2025-01-13 NOTE — PROGRESS NOTES
PHARMACY NOTE:   ICU Rounds Attended (10-15 minutes in patient room):    Pt diagnosis: DELORES    Follow up/Changes:   -No pharmacy interventions identified     Additional information:   Steroid: prednisone 5mg daily  Insulin coverage: Lantus 20 units daily, high sliding scale with Humalog, utilized 8 units per sliding scale over the past 24 hours.  Pressors: none  Sedation: none  Antimicrobial therapy: none  Core measures assessed/met     Dick Moore PharmD  PGY-1 Pharmacy Resident

## 2025-01-13 NOTE — CARE COORDINATION
Quality round completed with care management team. [facility]. Per Deandra Lopez admission, pre-cert remain pending at this time. Hopefully will get it back today.     Pending pre-cert at this time.   Electronically signed by LOLI Hodge on 1/13/2025 at 9:12 AM    Per ARACELY, BRETT, Deandra Lockhart notified her that Pre-cert is approved.   Transportation set up through [facility].  time 1530.   Notify [facility], Facility.   Tired mulitple time to notify RN. No answer. [facility] Aware.   LSW called pt's wife. No answer. Unable to leave VM.   LSW called pt's daughter, Maria Luisa. Updated Maria Luisa.   Maria Luisa would like LSW to call pt's wife again @ [phone].   LSW called the updated number.   No answer. VM full unable to leave VM.     7000 completed.     Updated facility.     Electronically signed by LOLI Hodge on 1/13/2025 at 1:14 PM    LSW was notified that transportation need to be push back to 7PM due to pt still needing HD.   Updated Unit sectary to call [facility].   Updated Facility. LSW tried to call pt's wife and daughter. No answer.     Electronically signed by LOLI Hodge on 1/13/2025 at 1:37 PM    Jessica [facility] requested HD Flowsheet. Flowsheet sent to Jessica.     Electronically signed by LOLI Hodge on 1/13/2025 at 3:58 PM

## 2025-01-13 NOTE — PROGRESS NOTES
Progress Note  Date:2025       Room:[place] [address]  Patient Name:Remy Upton     YOB: 1950     Age:74 y.o.        Subjective    Subjective   Review of Systems  ROS: could not be obtained bc pt is intubated  Objective         Vitals Last 24 Hours:  TEMPERATURE:  Temp  Av.1 °F (37.3 °C)  Min: 98.9 °F (37.2 °C)  Max: 99.1 °F (37.3 °C)  RESPIRATIONS RANGE: Resp  Av.4  Min: 9  Max: 38  PULSE OXIMETRY RANGE: SpO2  Av.4 %  Min: 93 %  Max: 100 %  PULSE RANGE: Pulse  Av.8  Min: 90  Max: 131  BLOOD PRESSURE RANGE: Systolic (24hrs), Av , Min:98 , Max:174   ; Diastolic (24hrs), Av, Min:27, Max:111    I/O (24Hr):    Intake/Output Summary (Last 24 hours) at 2025 1219  Last data filed at 2025 0400  Gross per 24 hour   Intake 1975 ml   Output 1664 ml   Net 311 ml     Objective:  General Appearance:  Ill-appearing.    Vital signs: (most recent): Blood pressure (!) 106/32, pulse 95, temperature 99.1 °F (37.3 °C), temperature source Axillary, resp. rate 21, height 1.88 m (6' 2\"), weight 80 kg (176 lb 5.9 oz), SpO2 94%.    HEENT: Normal HEENT exam.    Lungs:  There are decreased breath sounds.    Heart: S1 normal and S2 normal.    Abdomen: Abdomen is soft.  Bowel sounds are normal.   There is no epigastric area tenderness.     Pulses: Distal pulses are intact.    Neurological: Patient is alert.    Pupils:  Pupils are equal, round, and reactive to light.    Skin:  Warm.      Labs/Imaging/Diagnostics    Labs:  CBC:  Recent Labs     25  0511 25  0620 25  0514   WBC 12.4* 11.0* 9.2   RBC 2.56* 2.62* 2.41*   HGB 7.8* 7.6* 7.2*   HCT 24.9* 25.3* 23.5*   MCV 97.3* 96.6* 97.5*   RDW 17.4* 17.1* 17.2*    221 249     CHEMISTRIES:  Recent Labs     25  0511 25  0620 25  0514   * 134* 132*   K 3.8 3.8 4.0   CL 94* 93* 94*   CO2 29 30 29   BUN 42* 57* 65*   CREATININE 1.22* 1.57* 1.88*   GLUCOSE 251* 173* 221*   PHOS 2.1* 1.8*  --

## 2025-01-13 NOTE — PROGRESS NOTES
Physical Therapy Med Surg Daily Treatment Note  Facility/Department: [facility]  Room: [place] [address]       NAME: Remy Upton  : 1950 (74 y.o.)  MRN: [id number]  CODE STATUS: Full Code    Date of Service: 2025    Patient Diagnosis(es): Rhinovirus [B34.8]  S/P kidney transplant [Z94.0]  DELORES (acute kidney injury) (HCC) [N17.9]  Acute respiratory failure with hypoxia [J96.01]  Acute kidney injury superimposed on CKD (HCC) [N17.9, N18.9]   Chief Complaint   Patient presents with    Illness     Patient Active Problem List    Diagnosis Date Noted    Chest pain 2022    GI bleeding 10/29/2022    Lung nodules 10/25/2022    COPD without exacerbation (HCC) 10/25/2022    Abnormal CT of the chest 10/25/2022    Bronchiectasis without complication (HCC) 10/25/2022    Dieulafoy lesion of colon 10/24/2022    Poorly controlled diabetes mellitus (HCC) 10/24/2022    Symptomatic anemia 10/19/2022    Acute upper GI bleed 10/19/2022    Atrial fibrillation (HCC) 2022    Unstable angina ([facility]) 2022    Unable to eat 2025    Lower GI bleed 2024    Palliative care encounter 2024    Goals of care, counseling/discussion 2024    Advanced care planning/counseling discussion 2024    Encounter for hospice care discussion 2024    Moderate malnutrition (HCC) 2024    Atrial fibrillation with RVR ([facility]) 2024    At risk for stroke 10/14/2024    Deep vein thrombosis (DVT) of lower extremity ([facility]) 10/14/2024    Localized swelling of both lower legs 10/14/2024    Peripheral nerve disease 10/14/2024    Rhabdomyolysis 10/14/2024    Leukocytes in urine 10/14/2024    Bilateral hearing loss 10/10/2024    Acute cystitis without hematuria 2024    Bilateral lower extremity edema 2024    Dysuria 2024    Gross hematuria 2024    Adjustment disorder 2024    Immunosuppression due to drug therapy (HCC) 2024    DELORES (acute kidney injury) (HCC) 2024     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 10/31/2003    Condition not found 10/31/2003        Past Medical History:   Diagnosis Date    Diabetes mellitus (HCC)     Hemodialysis access, fistula mature (HCC) 12/2002    Hypertension     Seizures (HCC)     no seizure since 1995     Past Surgical History:   Procedure Laterality Date    BRAIN SURGERY Left 12/06/1990    to eliminate seizures    COLONOSCOPY N/A 10/24/2022    COLONOSCOPY DIAGNOSTIC performed by Va Ordoñez MD at [facility]    [facility]IDNEY TR[facility]PLANT  [facility]/CELL WASHG SPX N/A 3/20/2018    [place]ON[place]OSCOPY [place]de [place] [facility]    UPPER GASTROINTESTINAL ENDOSCOPY N/A 10/21/2022    EGD DIAGNOSTIC ONLY performed by Jaime Martinez MD at [facility]    UPPER GASTROINTESTINAL ENDOSCOPY N/A 1/2/2025    Esophagogastroduodenoscopy with percutaneous endoscopic gastrostomy tube/ICU bedside/anesthesia not required  ROOM ICU:1 [place] Syed Ju[place]rez [place] [facility] [place]            Restrictions: fall risk. npo       SUBJECTIVE:   Subjective: pt blinks 2 x to let you know \"yes\" per wife.    Pain   No signs of pain.     OBJECTIVE:   Orientation  Orientation Level: Oriented to person  Cognition  Overall Cognitive Status: Exceptions  Arousal/Alertness: Inconsistent responses to stimuli  Following Commands: Inconsistently follows commands                                  PT Exercises  PROM Exercises: prom of bilateral lower extremities to pts tolerance.                     ASSESSMENT pt not safe at this time to perform mobility activity as he is very fatigued and falling asleep often. Tolerated prom to bilateral lower extremities. RN and wife present.         Discharge Recommendations:  Continue to assess pending progress         Goals  Short Term Goals  Short Term Goal 1: Pt able to partipat with rolling  requiring max assist +1  Short Term Goal 2: pt able to participate in supine to sit  activities as medically able  Short Term Goal 3: pt to demonstrated 2/5 LE active function    PLAN    General Plan: 1 time a day 3-6 times a week        [facility] (6 CLICK) BASIC MOBILITY  AM-PAC Inpatient Mobility Raw Score : 6     Therapy Time   Individual   Time In  1335   Time Out 1350   Minutes 15   15 minutes prom/manual          Casi RUBIA Fraga, 01/13/25 at 3:59 PM         Definitions for assistance levels  Independent = pt does not require any physical supervision or assistance from another person for activity completion. Device may be needed.  Stand by assistance = pt requires verbal cues or instructions from another person, close to but not touching, to perform the activity  Minimal assistance= pt performs 75% or more of the activity; assistance is required to complete the activity  Moderate assistance= pt performs 50% of the activity; assistance is required to complete the activity  Maximal assistance = pt performs 25% of the activity; assistance is required to complete the activity  Dependent = pt requires total physical assistance to accomplish the task

## 2025-01-13 NOTE — CARE COORDINATION
I messaged Jessica to check on precert and we have it. I messaged Dr. Calvert to let him know and pt will be set up to go to [place] [facility] today. I let Shiloh know.

## 2025-01-13 NOTE — INTERDISCIPLINARY ROUNDS
Spiritual Care Services     Summary of Visit:    Attended ICU Rounds. Patient continues to make progress and waiting for transfer floor once bed becomes available to patient. No family present, steven tradition is Mormon.     Encounter Summary  Encounter Overview/Reason: Interdisciplinary rounds  Service Provided For: Patient  Support System: Spouse  Complexity of Encounter: Low  Begin Time: 0940  End Time : 0955  Total Time Calculated: 15 min        Spiritual/Emotional needs  Type: Spiritual Support              Palliative Care  Type: Palliative Care, Interdisciplinary Rounds       Spiritual Assessment/Intervention/Outcomes:    Assessment: Calm, Sad, Coping    Intervention: Prayer (assurance of)/[place], Nurtured Hope    Outcome:  (Patient responded with slight nodding of head of chaplains presence)      Care Plan:    Plan and Referrals  Plan/Referrals: Continue Support (comment)          Spiritual Care Services   Electronically signed by Chaplain Camilo on 1/13/2025 at 1:27 PM.    To reach a  for emotional and spiritual support, place an EPIC consult request.   If a  is needed immediately, dial “0” and ask to page the on-call .

## 2025-01-13 NOTE — PROGRESS NOTES
Pulmonary & Critical Care Medicine ICU Progress Note  Chief complaint : Respiratory failure    Subjunctive/24 hour events :   Patient seen and examined during multidisciplinary rounds with RN, charge nurse, RT, pharmacy, dietitian, and social service.      On trach collar, tolerated BiPAP overnight, episode of desaturation resolved with appropriate pulmonary toileting, no fever, back on trach collar.  Urine output 1600 cc, +11 L.      New information updated in the note today, rest of the examination did not change compared to yesterday.  Social History     Tobacco Use    Smoking status: Former     Current packs/day: 0.00     Types: Cigarettes     Quit date: 2015     Years since quittin.5    Smokeless tobacco: Former   Substance Use Topics    Alcohol use: No     Alcohol/week: 0.0 standard drinks of alcohol         Problem Relation Age of Onset    Colon Cancer Neg Hx        No results for input(s): \"PHART\", \"UVD9GLE\", \"PO2ART\" in the last 72 hours.      MV Settings:  Vent Mode: AC/VC Resp Rate (Set): 14 bpm/Vt (Set, mL): 500 mL/ /FiO2 : 28 %           IV:   sodium chloride      dextrose      sodium chloride         Vitals:  BP (!) 119/40   Pulse (!) 107   Temp 99.1 °F (37.3 °C) (Axillary)   Resp 28   Ht 1.88 m (6' 2\")   Wt 80 kg (176 lb 5.9 oz)   SpO2 96%   BMI 22.64 kg/m²    Tmax:        Intake/Output Summary (Last 24 hours) at 2025 1056  Last data filed at 2025 0400  Gross per 24 hour   Intake 1975 ml   Output 1664 ml   Net 311 ml       EXAM:  General: Awake on vent, no distress  Head: normocephalic, atraumatic  Eyes:No gross abnormalities.  ENT:  MMM no lesions  Neck:  supple and no masses, tracheostomy in place, no bleeding  Chest : Vent sounds, no wheezing, no rales, nontender, tympanic  Heart:: Heart sounds are normal.  Regular rate and rhythm without murmur, gallop or rub.  ABD:  symmetric, soft, non-tender, no guarding or rebound  Musculoskeletal : no cyanosis, no clubbing, and no  edema  Neuro:   Awake, stronger, follows simple commands  Skin: No rashes or nodules noted.  Lymph node:  no cervical nodes  Urology: No Walters   Psychiatric: Calm    Medications:  Scheduled Meds:   carbamide peroxide  5 drop Right Ear Daily    insulin glargine  20 Units SubCUTAneous Daily    sodium chloride flush  5-40 mL IntraVENous 2 times per day    lidocaine 1 % injection  50 mg IntraDERmal Once    cycloSPORINE  50 mg Oral Q Tu, Th & Sa    metoprolol tartrate  12.5 mg Oral BID    lansoprazole  30 mg Oral QAM AC    predniSONE  5 mg Oral Daily    epoetin megan-epbx  10,000 Units SubCUTAneous Weekly    [Held by provider] mycophenolate  250 mg Oral BID    insulin lispro  0-16 Units SubCUTAneous Q4H    polyethylene glycol  17 g Oral Daily    docusate  100 mg Oral BID    amiodarone  200 mg Oral BID    sodium chloride flush  5-40 mL IntraVENous 2 times per day    heparin (porcine)  5,000 Units SubCUTAneous 3 times per day    [Held by provider] mycophenolate  500 mg Oral BID       PRN Meds:  guaiFENesin, sodium chloride flush, sodium chloride, oxyCODONE-acetaminophen **OR** oxyCODONE-acetaminophen, metoprolol, hydrALAZINE, glucose, dextrose bolus **OR** dextrose bolus, glucagon (rDNA), dextrose, sodium chloride flush, sodium chloride, ondansetron **OR** ondansetron, acetaminophen **OR** acetaminophen, ipratropium 0.5 mg-albuterol 2.5 mg    Results: reviewed by me   CBC:   Recent Labs     01/11/25 0511 01/12/25 0620 01/13/25 0514   WBC 12.4* 11.0* 9.2   HGB 7.8* 7.6* 7.2*   HCT 24.9* 25.3* 23.5*   MCV 97.3* 96.6* 97.5*    221 249     BMP:   Recent Labs     01/11/25 0511 01/12/25 0620 01/13/25 0514   * 134* 132*   K 3.8 3.8 4.0   CL 94* 93* 94*   CO2 29 30 29   PHOS 2.1* 1.8*  --    BUN 42* 57* 65*   CREATININE 1.22* 1.57* 1.88*     LIVER PROFILE:   Recent Labs     01/13/25 0514   AST 20   ALT 27   BILITOT 0.4   ALKPHOS 136*       PT/INR: No results for input(s): \"PROTIME\", \"INR\" in the last 72  hours.  APTT: No results for input(s): \"APTT\" in the last 72 hours.  UA:  No results for input(s): \"NITRITE\", \"COLORU\", \"PHUR\", \"LABCAST\", \"WBCUA\", \"RBCUA\", \"MUCUS\", \"TRICHOMONAS\", \"YEAST\", \"BACTERIA\", \"CLARITYU\", \"SPECGRAV\", \"LEUKOCYTESUR\", \"UROBILINOGEN\", \"BILIRUBINUR\", \"BLOODU\", \"GLUCOSEU\", \"AMORPHOUS\" in the last 72 hours.    Invalid input(s): \"KETONESU\"      Cultures:  Rhinovirus  Films:   CXR films reviewed by me and it showed congested, possible left lower lobe infiltrate      Assessment:  This is a critically ill patient at risk of deterioration / death , needing close ICU monitoring and intervention due to below noted problems   Acute on chronic renal failure currently on HD   Rhinovirus bronchitis/pneumonia with bacterial coinfection  Left lower lobe pneumonia  Septic shock  Acute hypoxic respiratory failure currently BiPAP dependent  Acute encephalopathy  Septic shock, shock physiology resolved   Anion gap metabolic acidosis secondary to renal failure  A-fib status post Watchman procedure  History of DVT not on chronic anticoagulation  Status post renal transplant, immunocompromise  Pulmonary hypertension, group 2 and 3     Recommendation  AVAPS while asleep, trach collar as tolerated during the day  No sedation   TV on, natural light, avoid benzos, pain control, early mobility, and family engagement  PUD prophylaxis  DVT prophylaxis, resume heparin,   Target blood sugar 140-180  Monitor and replace electrolyte as needed  Continue cyclosporine and prednisone  Resume CellCept when okay with nephrology   Physical therapy             Due to the immediate potential for life-threatening deterioration due to shock I spent 35 minutes providing critical care.  This time is excluding time spent performing procedures.          Electronically signed by Carlotta Yadav MD,  [facility] ,on 1/13/2025 at 10:56 AM

## 2025-01-13 NOTE — PROGRESS NOTES
Comprehensive Nutrition Assessment    Type and Reason for Visit:  Reassess    Nutrition Recommendations/Plan:   Continue with renal tube feeding ( Nepro or equivalent)   Bolus tube feeding schedule 325 ml, given 4 x daily by gravity through PEG  Decrease free water flushes to 50 ml before and after each bolus, due to hyponatremia     Malnutrition Assessment:  Malnutrition Status:  Moderate malnutrition (01/03/25 1041)    Context:  Chronic Illness     Findings of the 6 clinical characteristics of malnutrition:  Energy Intake:  Mild decrease in energy intake  Weight Loss:  Greater than 10% over 6 months     Body Fat Loss:  Mild body fat loss Orbital, Buccal region   Muscle Mass Loss:  Mild muscle mass loss (to moderate) Temples (temporalis), Clavicles (pectoralis & deltoids)  Fluid Accumulation:  Mild Generalized   Strength:  Not Performed    Nutrition Assessment:    Pt meeting nutrition related goals, has tolerated transition to bolus tube feeding schedule,  and as ordered, meets estimated energy and protein needs, now off vent support, hyponatremia noted, will adjust water flushes    Nutrition Related Findings:    Hx significant for : DM2, ESRD/CKD4 ,kidney transplan; started HD (12/2024), Intubated (12/18), Trach (12/30), PEG ( 1/2). TF at goal since (12/26), transitioned to bolus schedule ( 1/3), . Urine output < 100 ml per day, anasarca reported by nursing. . Labs reviewed: variable glucose, low Na low phosphorus, elevated BUN/creat. Meds include colace, glycolax, prednisone, loose BMs, off vent support Wound Type: Multiple, Pressure Injury, Stage II, Deep Tissue Injury, Skin Tears (see wound flowsheet)       Current Nutrition Intake & Therapies:    Average Meal Intake: NPO  Average Supplements Intake: NPO  ADULT TUBE FEEDING; PEG; Renal Formula; Bolus; 4 Times Daily; 325; Gravity; 50; Before and after each bolus  Current Tube Feeding (TF) Orders:  Feeding Route: PEG  Formula: Renal Formula (Nepro)  Schedule:

## 2025-01-13 NOTE — PROGRESS NOTES
Progress Note  Date:2025       Room:[place] [address]-01  Patient Name:Remy Upton     YOB: 1950     Age:74 y.o.        Subjective    Subjective   Review of Systems  ROS: could not be obtained bc pt is intubated  Objective         Vitals Last 24 Hours:  TEMPERATURE:  Temp  Av.1 °F (37.3 °C)  Min: 98.9 °F (37.2 °C)  Max: 99.1 °F (37.3 °C)  RESPIRATIONS RANGE: Resp  Av.4  Min: 9  Max: 38  PULSE OXIMETRY RANGE: SpO2  Av.4 %  Min: 93 %  Max: 100 %  PULSE RANGE: Pulse  Av.8  Min: 90  Max: 131  BLOOD PRESSURE RANGE: Systolic (24hrs), Av , Min:98 , Max:174   ; Diastolic (24hrs), Av, Min:27, Max:111    I/O (24Hr):    Intake/Output Summary (Last 24 hours) at 2025 1220  Last data filed at 2025 0400  Gross per 24 hour   Intake 1975 ml   Output 1664 ml   Net 311 ml     Objective:  General Appearance:  Ill-appearing.    Vital signs: (most recent): Blood pressure (!) 106/32, pulse 95, temperature 99.1 °F (37.3 °C), temperature source Axillary, resp. rate 21, height 1.88 m (6' 2\"), weight 80 kg (176 lb 5.9 oz), SpO2 94%.    HEENT: Normal HEENT exam.    Lungs:  There are decreased breath sounds.    Heart: S1 normal and S2 normal.    Abdomen: Abdomen is soft.  Bowel sounds are normal.   There is no epigastric area tenderness.     Pulses: Distal pulses are intact.    Neurological: Patient is alert.    Pupils:  Pupils are equal, round, and reactive to light.    Skin:  Warm.      Labs/Imaging/Diagnostics    Labs:  CBC:  Recent Labs     25  0511 25  0620 25  0514   WBC 12.4* 11.0* 9.2   RBC 2.56* 2.62* 2.41*   HGB 7.8* 7.6* 7.2*   HCT 24.9* 25.3* 23.5*   MCV 97.3* 96.6* 97.5*   RDW 17.4* 17.1* 17.2*    221 249     CHEMISTRIES:  Recent Labs     25  0511 25  0620 25  0514   * 134* 132*   K 3.8 3.8 4.0   CL 94* 93* 94*   CO2 29 30 29   BUN 42* 57* 65*   CREATININE 1.22* 1.57* 1.88*   GLUCOSE 251* 173* 221*   PHOS 2.1* 1.8*  --

## 2025-01-13 NOTE — PLAN OF CARE
Problem: Chronic Conditions and Co-morbidities  Goal: Patient's chronic conditions and co-morbidity symptoms are monitored and maintained or improved  Outcome: Progressing     Problem: Discharge Planning  Goal: Discharge to home or other facility with appropriate resources  Outcome: Progressing     Problem: Pain  Goal: Verbalizes/displays adequate comfort level or baseline comfort level  Outcome: Progressing     Problem: Safety - Adult  Goal: Free from fall injury  Outcome: Progressing     Problem: Skin/Tissue Integrity  Goal: Absence of new skin breakdown  Description: 1.  Monitor for areas of redness and/or skin breakdown  2.  Assess vascular access sites hourly  3.  Every 4-6 hours minimum:  Change oxygen saturation probe site  4.  Every 4-6 hours:  If on nasal continuous positive airway pressure, respiratory therapy assess nares and determine need for appliance change or resting period.  Outcome: Progressing     Problem: Neurosensory - Adult  Goal: Achieves stable or improved neurological status  Outcome: Progressing     Problem: Respiratory - Adult  Goal: Achieves optimal ventilation and oxygenation  Outcome: Progressing     Problem: Cardiovascular - Adult  Goal: Maintains optimal cardiac output and hemodynamic stability  Outcome: Progressing  Goal: Absence of cardiac dysrhythmias or at baseline  Outcome: Progressing     Problem: Skin/Tissue Integrity - Adult  Goal: Skin integrity remains intact  Outcome: Progressing     Problem: Musculoskeletal - Adult  Goal: Return mobility to safest level of function  Outcome: Progressing     Problem: Gastrointestinal - Adult  Goal: Minimal or absence of nausea and vomiting  Outcome: Progressing  Goal: Maintains or returns to baseline bowel function  Outcome: Progressing  Goal: Maintains adequate nutritional intake  Outcome: Progressing     Problem: Genitourinary - Adult  Goal: Absence of urinary retention  Outcome: Progressing     Problem: Infection - Adult  Goal: Absence

## 2025-01-13 NOTE — PROGRESS NOTES
Nephrology Progress Note    Assessment:  ESRDX  Respiratory failure  Malnutrition  Hx Prior kidney translant  with chronic rejection  DM type2      Plan: continue IHD  await transfer    Patient Active Problem List:     Pneumonia     Abdominal pain, other specified site     Acute pyelonephritis without lesion of renal medullary necrosis     Anemia     Essential hypertension     Nonspecific abnormal results of thyroid function study     Mixed hyperlipidemia     Kidney replaced by transplant     Intra-abdominal abscess post-procedure (HCC)     Infection due to Enterococcus     Fever and other physiologic disturbances of temperature regulation     Chronic kidney disease (CKD)     Type 2 diabetes mellitus with stage 3b chronic kidney disease, without long-term current use of insulin (HCC)     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 (HCC)     Abnormal CT of the chest     Bronchiectasis without complication (HCC)     GI bleeding     Complication of transplanted kidney     Muscle weakness (generalized)     Difficulty in walking     Elevated BUN     Urinary retention     Immunosuppression due to drug therapy (HCC)     DELORES (acute kidney injury) (HCC)     Adjustment disorder     Gross hematuria     Bilateral lower extremity edema     Dysuria     Acute cystitis without hematuria     Bilateral hearing loss     Abscess, toe     Acute hyperkalemia     Acute pain of left shoulder     Astigmatism     Astigmatism of both eyes with presbyopia     At risk for stroke     Benign enlargement of prostate     Benign prostatic hyperplasia with urinary frequency     Rhinovirus     Chronic

## 2025-01-14 ENCOUNTER — HOSPITAL ENCOUNTER (INPATIENT)
Age: 75
LOS: 15 days | Discharge: NURSING, REHAB OR LONG-TERM CARE | End: 2025-01-29
Attending: STUDENT IN AN ORGANIZED HEALTH CARE EDUCATION/TRAINING PROGRAM
Payer: COMMERCIAL

## 2025-01-14 ENCOUNTER — APPOINTMENT (OUTPATIENT)
Dept: GENERAL RADIOLOGY | Age: 75
End: 2025-01-14
Attending: STUDENT IN AN ORGANIZED HEALTH CARE EDUCATION/TRAINING PROGRAM
Payer: COMMERCIAL

## 2025-01-14 VITALS
DIASTOLIC BLOOD PRESSURE: 77 MMHG | TEMPERATURE: 98 F | OXYGEN SATURATION: 100 % | HEART RATE: 89 BPM | SYSTOLIC BLOOD PRESSURE: 147 MMHG | BODY MASS INDEX: 22.63 KG/M2 | RESPIRATION RATE: 26 BRPM | WEIGHT: 176.37 LBS | HEIGHT: 74 IN

## 2025-01-14 LAB
ABO/RH: NORMAL
ALBUMIN SERPL-MCNC: 1.8 G/DL (ref 3.5–4.6)
ALP SERPL-CCNC: 112 U/L (ref 35–104)
ALT SERPL-CCNC: 23 U/L (ref 0–41)
ANION GAP SERPL CALCULATED.3IONS-SCNC: 22 MEQ/L (ref 9–15)
ANTIBODY SCREEN: NORMAL
AST SERPL-CCNC: 30 U/L (ref 0–40)
B PARAP IS1001 DNA NPH QL NAA+NON-PROBE: NOT DETECTED
B PERT.PT PRMT NPH QL NAA+NON-PROBE: NOT DETECTED
BASOPHILS # BLD: 0 K/UL (ref 0–0.2)
BASOPHILS NFR BLD: 0.3 %
BILIRUB SERPL-MCNC: 0.6 MG/DL (ref 0.2–0.7)
BLOOD BANK DISPENSE STATUS: NORMAL
BLOOD BANK PRODUCT CODE: NORMAL
BPU ID: NORMAL
BUN SERPL-MCNC: 31 MG/DL (ref 8–23)
C PNEUM DNA NPH QL NAA+NON-PROBE: NOT DETECTED
CALCIUM SERPL-MCNC: 7.1 MG/DL (ref 8.5–9.9)
CHLORIDE SERPL-SCNC: 74 MEQ/L (ref 95–107)
CO2 SERPL-SCNC: 18 MEQ/L (ref 20–31)
CREAT SERPL-MCNC: 1.12 MG/DL (ref 0.7–1.2)
CRP SERPL HS-MCNC: 163.5 MG/L (ref 0–5)
DESCRIPTION BLOOD BANK: NORMAL
EOSINOPHIL # BLD: 0.1 K/UL (ref 0–0.7)
EOSINOPHIL NFR BLD: 0.8 %
ERYTHROCYTE [DISTWIDTH] IN BLOOD BY AUTOMATED COUNT: 17.1 % (ref 11.5–14.5)
FLUAV RNA NPH QL NAA+NON-PROBE: NOT DETECTED
FLUBV RNA NPH QL NAA+NON-PROBE: NOT DETECTED
GLOBULIN SER CALC-MCNC: 3.5 G/DL (ref 2.3–3.5)
GLUCOSE BLD-MCNC: 46 MG/DL (ref 70–99)
GLUCOSE BLD-MCNC: 64 MG/DL (ref 70–99)
GLUCOSE BLD-MCNC: 80 MG/DL (ref 70–99)
GLUCOSE BLD-MCNC: 81 MG/DL (ref 70–99)
GLUCOSE BLD-MCNC: 86 MG/DL (ref 70–99)
GLUCOSE SERPL-MCNC: 820 MG/DL (ref 70–99)
HADV DNA NPH QL NAA+NON-PROBE: NOT DETECTED
HCOV 229E RNA NPH QL NAA+NON-PROBE: NOT DETECTED
HCOV HKU1 RNA NPH QL NAA+NON-PROBE: NOT DETECTED
HCOV NL63 RNA NPH QL NAA+NON-PROBE: NOT DETECTED
HCOV OC43 RNA NPH QL NAA+NON-PROBE: NOT DETECTED
HCT VFR BLD AUTO: 21 % (ref 42–52)
HGB BLD-MCNC: 6.4 G/DL (ref 14–18)
HMPV RNA NPH QL NAA+NON-PROBE: NOT DETECTED
HPIV1 RNA NPH QL NAA+NON-PROBE: NOT DETECTED
HPIV2 RNA NPH QL NAA+NON-PROBE: NOT DETECTED
HPIV3 RNA NPH QL NAA+NON-PROBE: NOT DETECTED
HPIV4 RNA NPH QL NAA+NON-PROBE: NOT DETECTED
LACTIC ACID, SEPSIS: 0.6 MMOL/L (ref 0.5–1.9)
LACTIC ACID, SEPSIS: 0.7 MMOL/L (ref 0.5–1.9)
LYMPHOCYTES # BLD: 0.3 K/UL (ref 1–4.8)
LYMPHOCYTES NFR BLD: 4.2 %
M PNEUMO DNA NPH QL NAA+NON-PROBE: NOT DETECTED
MAGNESIUM SERPL-MCNC: 2.1 MG/DL (ref 1.7–2.4)
MCH RBC QN AUTO: 29.5 PG (ref 27–31.3)
MCHC RBC AUTO-ENTMCNC: 30.5 % (ref 33–37)
MCV RBC AUTO: 96.8 FL (ref 79–92.2)
MONOCYTES # BLD: 0.3 K/UL (ref 0.2–0.8)
MONOCYTES NFR BLD: 4.5 %
NEUTROPHILS # BLD: 6.6 K/UL (ref 1.4–6.5)
NEUTS SEG NFR BLD: 89.1 %
PERFORMED ON: ABNORMAL
PERFORMED ON: ABNORMAL
PERFORMED ON: NORMAL
PLATELET # BLD AUTO: 252 K/UL (ref 130–400)
POTASSIUM SERPL-SCNC: 3.6 MEQ/L (ref 3.4–4.9)
PROCALCITONIN SERPL IA-MCNC: 0.83 NG/ML (ref 0–0.15)
PROT SERPL-MCNC: 5.3 G/DL (ref 6.3–8)
RBC # BLD AUTO: 2.17 M/UL (ref 4.7–6.1)
REASON FOR REJECTION: NORMAL
REJECTED TEST: NORMAL
RSV RNA NPH QL NAA+NON-PROBE: NOT DETECTED
RV+EV RNA NPH QL NAA+NON-PROBE: NOT DETECTED
SARS-COV-2 RNA NPH QL NAA+NON-PROBE: NOT DETECTED
SODIUM SERPL-SCNC: 114 MEQ/L (ref 135–144)
WBC # BLD AUTO: 7.4 K/UL (ref 4.8–10.8)

## 2025-01-14 PROCEDURE — 85025 COMPLETE CBC W/AUTO DIFF WBC: CPT

## 2025-01-14 PROCEDURE — 86923 COMPATIBILITY TEST ELECTRIC: CPT

## 2025-01-14 PROCEDURE — 30233N1 TRANSFUSION OF NONAUTOLOGOUS RED BLOOD CELLS INTO PERIPHERAL VEIN, PERCUTANEOUS APPROACH: ICD-10-PCS | Performed by: STUDENT IN AN ORGANIZED HEALTH CARE EDUCATION/TRAINING PROGRAM

## 2025-01-14 PROCEDURE — 80053 COMPREHEN METABOLIC PANEL: CPT

## 2025-01-14 PROCEDURE — 94002 VENT MGMT INPAT INIT DAY: CPT

## 2025-01-14 PROCEDURE — 86900 BLOOD TYPING SEROLOGIC ABO: CPT

## 2025-01-14 PROCEDURE — 86140 C-REACTIVE PROTEIN: CPT

## 2025-01-14 PROCEDURE — 83605 ASSAY OF LACTIC ACID: CPT

## 2025-01-14 PROCEDURE — 87040 BLOOD CULTURE FOR BACTERIA: CPT

## 2025-01-14 PROCEDURE — 2580000003 HC RX 258

## 2025-01-14 PROCEDURE — 2500000003 HC RX 250 WO HCPCS: Performed by: INTERNAL MEDICINE

## 2025-01-14 PROCEDURE — 2500000003 HC RX 250 WO HCPCS

## 2025-01-14 PROCEDURE — 93005 ELECTROCARDIOGRAM TRACING: CPT | Performed by: INTERNAL MEDICINE

## 2025-01-14 PROCEDURE — 71045 X-RAY EXAM CHEST 1 VIEW: CPT

## 2025-01-14 PROCEDURE — 83735 ASSAY OF MAGNESIUM: CPT

## 2025-01-14 PROCEDURE — 36415 COLL VENOUS BLD VENIPUNCTURE: CPT

## 2025-01-14 PROCEDURE — 2580000003 HC RX 258: Performed by: INTERNAL MEDICINE

## 2025-01-14 PROCEDURE — 84145 PROCALCITONIN (PCT): CPT

## 2025-01-14 PROCEDURE — 86850 RBC ANTIBODY SCREEN: CPT

## 2025-01-14 PROCEDURE — P9016 RBC LEUKOCYTES REDUCED: HCPCS

## 2025-01-14 PROCEDURE — 0202U NFCT DS 22 TRGT SARS-COV-2: CPT

## 2025-01-14 PROCEDURE — 36430 TRANSFUSION BLD/BLD COMPNT: CPT

## 2025-01-14 PROCEDURE — 3E033XZ INTRODUCTION OF VASOPRESSOR INTO PERIPHERAL VEIN, PERCUTANEOUS APPROACH: ICD-10-PCS | Performed by: INTERNAL MEDICINE

## 2025-01-14 PROCEDURE — 86901 BLOOD TYPING SEROLOGIC RH(D): CPT

## 2025-01-14 PROCEDURE — 2000000000 HC ICU R&B

## 2025-01-14 PROCEDURE — 6360000002 HC RX W HCPCS: Performed by: INTERNAL MEDICINE

## 2025-01-14 RX ORDER — ACETAMINOPHEN 325 MG/1
650 TABLET ORAL EVERY 6 HOURS PRN
Status: DISCONTINUED | OUTPATIENT
Start: 2025-01-14 | End: 2025-01-29 | Stop reason: HOSPADM

## 2025-01-14 RX ORDER — SODIUM CHLORIDE 9 MG/ML
INJECTION, SOLUTION INTRAVENOUS PRN
Status: DISCONTINUED | OUTPATIENT
Start: 2025-01-14 | End: 2025-01-25

## 2025-01-14 RX ORDER — 0.9 % SODIUM CHLORIDE 0.9 %
500 INTRAVENOUS SOLUTION INTRAVENOUS ONCE
Status: COMPLETED | OUTPATIENT
Start: 2025-01-14 | End: 2025-01-14

## 2025-01-14 RX ORDER — METOPROLOL TARTRATE 1 MG/ML
INJECTION, SOLUTION INTRAVENOUS
Status: COMPLETED
Start: 2025-01-14 | End: 2025-01-14

## 2025-01-14 RX ORDER — SODIUM CHLORIDE 0.9 % (FLUSH) 0.9 %
5-40 SYRINGE (ML) INJECTION EVERY 12 HOURS SCHEDULED
Status: DISCONTINUED | OUTPATIENT
Start: 2025-01-14 | End: 2025-01-29 | Stop reason: HOSPADM

## 2025-01-14 RX ORDER — DEXTROSE MONOHYDRATE 100 MG/ML
INJECTION, SOLUTION INTRAVENOUS CONTINUOUS PRN
Status: DISCONTINUED | OUTPATIENT
Start: 2025-01-14 | End: 2025-01-29 | Stop reason: HOSPADM

## 2025-01-14 RX ORDER — SODIUM CHLORIDE 9 MG/ML
INJECTION, SOLUTION INTRAVENOUS
Status: COMPLETED
Start: 2025-01-14 | End: 2025-01-14

## 2025-01-14 RX ORDER — GLUCAGON 1 MG/ML
1 KIT INJECTION PRN
Status: DISCONTINUED | OUTPATIENT
Start: 2025-01-14 | End: 2025-01-29 | Stop reason: HOSPADM

## 2025-01-14 RX ORDER — ENOXAPARIN SODIUM 100 MG/ML
40 INJECTION SUBCUTANEOUS DAILY
Status: DISCONTINUED | OUTPATIENT
Start: 2025-01-15 | End: 2025-01-14 | Stop reason: ALTCHOICE

## 2025-01-14 RX ORDER — HEPARIN SODIUM 5000 [USP'U]/ML
5000 INJECTION, SOLUTION INTRAVENOUS; SUBCUTANEOUS EVERY 8 HOURS SCHEDULED
Status: DISCONTINUED | OUTPATIENT
Start: 2025-01-14 | End: 2025-01-29 | Stop reason: HOSPADM

## 2025-01-14 RX ORDER — NOREPINEPHRINE BITARTRATE 0.06 MG/ML
1-100 INJECTION, SOLUTION INTRAVENOUS CONTINUOUS
Status: DISCONTINUED | OUTPATIENT
Start: 2025-01-14 | End: 2025-01-16

## 2025-01-14 RX ORDER — SODIUM CHLORIDE 9 MG/ML
INJECTION, SOLUTION INTRAVENOUS PRN
Status: DISCONTINUED | OUTPATIENT
Start: 2025-01-14 | End: 2025-01-29 | Stop reason: HOSPADM

## 2025-01-14 RX ORDER — ACETAMINOPHEN 650 MG/1
650 SUPPOSITORY RECTAL EVERY 6 HOURS PRN
Status: DISCONTINUED | OUTPATIENT
Start: 2025-01-14 | End: 2025-01-29 | Stop reason: HOSPADM

## 2025-01-14 RX ORDER — INSULIN LISPRO 100 [IU]/ML
0-4 INJECTION, SOLUTION INTRAVENOUS; SUBCUTANEOUS EVERY 6 HOURS SCHEDULED
Status: DISCONTINUED | OUTPATIENT
Start: 2025-01-14 | End: 2025-01-16

## 2025-01-14 RX ORDER — SODIUM CHLORIDE 0.9 % (FLUSH) 0.9 %
5-40 SYRINGE (ML) INJECTION PRN
Status: DISCONTINUED | OUTPATIENT
Start: 2025-01-14 | End: 2025-01-29 | Stop reason: HOSPADM

## 2025-01-14 RX ADMIN — AMIODARONE HYDROCHLORIDE 0.5 MG/MIN: 1.8 INJECTION, SOLUTION INTRAVENOUS at 18:08

## 2025-01-14 RX ADMIN — SODIUM CHLORIDE 40 MG: 9 INJECTION INTRAMUSCULAR; INTRAVENOUS; SUBCUTANEOUS at 20:04

## 2025-01-14 RX ADMIN — METOPROLOL TARTRATE 5 MG: 5 INJECTION INTRAVENOUS at 19:15

## 2025-01-14 RX ADMIN — PIPERACILLIN AND TAZOBACTAM 4500 MG: 4; .5 INJECTION, POWDER, FOR SOLUTION INTRAVENOUS at 17:17

## 2025-01-14 RX ADMIN — NOREPINEPHRINE BITARTRATE 5 MCG/MIN: 64 SOLUTION INTRAVENOUS at 17:46

## 2025-01-14 RX ADMIN — DEXTROSE MONOHYDRATE 125 ML: 100 INJECTION, SOLUTION INTRAVENOUS at 19:45

## 2025-01-14 RX ADMIN — Medication 500 ML: at 16:44

## 2025-01-14 RX ADMIN — DEXTROSE MONOHYDRATE 125 ML: 100 INJECTION, SOLUTION INTRAVENOUS at 18:25

## 2025-01-14 RX ADMIN — VANCOMYCIN HYDROCHLORIDE 1750 MG: 1 INJECTION, POWDER, LYOPHILIZED, FOR SOLUTION INTRAVENOUS at 23:02

## 2025-01-14 RX ADMIN — SODIUM CHLORIDE 500 ML: 9 INJECTION, SOLUTION INTRAVENOUS at 16:44

## 2025-01-14 ASSESSMENT — PAIN SCALES - GENERAL
PAINLEVEL_OUTOF10: 0

## 2025-01-14 ASSESSMENT — PULMONARY FUNCTION TESTS
PIF_VALUE: 23
PIF_VALUE: 22
PIF_VALUE: 22

## 2025-01-14 NOTE — PROGRESS NOTES
Physical Therapy  Facility/Department: [facility] MED SURG IC17/IC17-01  Physical Therapy Discharge      NAME: Remy Upton    : 1950 (74 y.o.)  MRN: [id number]    Account: [id number]  Gender: male      Patient has been discharged from acute care hospital. DC patient from current PT program.      Electronically signed by Chayo Ambrose PT on 25 at 1:09 PM EST

## 2025-01-14 NOTE — PROGRESS NOTES
16:15pm Received form [facility] to CCU 8. Trach/VEnt. MP-AF RVR. Eyes closed  Skin Check with Maria Luisa and Bernarda RN's. Multiple wounds: Non Blanchable to Bilateral Big toes.Ulcer looking wounds Bilateral shins skin tears R wrist, L hand, L forearm. Deep tissue injury to buttocks.  Swelling upper extremities. Wound consulted  16;33pm Rapid response called for BP 64/35. Dr Narvaez responded. Fluid Bolus up. Orders  17:15pm Wife at bedside and Dr Narvaez in to speak with her  17:35pm H?H 6.4/21 Message sent to Dr Narvaez. Orders  18:15pm Lab called sugar 46. One touch 46. D10up as ordered

## 2025-01-14 NOTE — H&P
Hospital Medicine  History and Physical    Patient:  Remy Upton  MRN: [id number]    CHIEF COMPLAINT:  AF with RVR, hypotension    History Obtained From:  Patient, EMR  Primary Care Physician: Tico Rodriguez DO    HISTORY OF PRESENT ILLNESS:   The patient is a 74 y.o. male NH resident with history of HTN, PAF, DM2, ESRD/CKD4 s/p kidney transplant x 2, BPH / urine retention, anemia due to lower GI bleed requiring PRBC transfusion, depression, who ws managed at [facility] from 12/18 -1/13 for DELORES/CKD4 requiring HD, acute hypoxic respiratory failure related to rhinovirus infection, pneumonia with possible aspiration, requiring prolonged intubation and mechanical ventilation s/p tracheostomy on 12/30, anemia requiring PRBC transfusion, who presented with the above CC. Patient was discharged from to our ICU to [facility] overnight and was sent to [facility] ED for AFIB with RVR, CXR showed RLL opacity , CT head and KUB were negative per report, labs showed mild leukocytosis and anemia, Lopressor was given and patient was transferred back to our ICU for further management. Patient was febrile-101F, in AF with HR in the 120-130s and SBP in the 80s on arrival, saline bolus started.    Past Medical History:      Diagnosis Date    Diabetes mellitus (HCC)     Hemodialysis access, fistula mature (HCC) 12/2002    Hypertension     Rhinovirus 12/2024    Seizures (HCC)     no seizure since 1995    Smoking        Past Surgical History:      Procedure Laterality Date    BRAIN SURGERY Left 12/06/1990    to eliminate seizures    COLONOSCOPY N/A 10/24/2022    COLONOSCOPY DIAGNOSTIC performed by Va Ordoñez MD at [facility]    KIDNEY TRANSPLANT  2015    MT [facility] INCL FLUOR GDNCE DX W/CELL WASHG SPX N/A 03/20/2018    BRONCHOSCOPY performed by Cristopher Conde MD at [facility] OR    TRACHEOSTOMY  12/30/2024    UPPER GASTROINTESTINAL ENDOSCOPY N/A 10/21/2022    EGD DIAGNOSTIC ONLY performed by Jaime Martinez MD

## 2025-01-14 NOTE — DIALYSIS
PT. HD completed.  Ran full tx as ordered 2.5 hrs  Removed 1 liter of fluid.  Pressure dressing applied no complications.

## 2025-01-14 NOTE — PROGRESS NOTES
[facility]   Pharmacy Dose Adjustment Per Protocol:  Zosyn Extended Interval Interchange      Remy Upton is a 74 y.o. male.     The following ordered dose of Zosyn has been changed to optimize its pharmacodynamic profile per [facility] pharmacy policy approved by [facility].    Recent Labs     01/12/25  0620 01/13/25  0514   CREATININE 1.57* 1.88*   BUN 57* 65*   WBC 11.0* 9.2     .   ,  , There is no height or weight on file to calculate BMI.  Estimated Creatinine Clearance: 40 mL/min (A) (based on SCr of 1.88 mg/dL (H)).    Medication Ordered:   Traditional Dosing   [] Zosyn 2.25 gm q6-8 hr   [] Zosyn 3.375 gm q6-8 hr   [x] Zosyn 4.5 gm q6-8 hr   [] Zosyn 2.25 gm q6-8 hr   [] Zosyn 3.375 gm q6-8 hr   [] Zosyn 4.5 gm q6-8 hr     New Dose      Piperacillin/Tazobactam - Extended Infusion Dosing (4-hour infusion) - Preferred Dosing Strategy       Renal Function (CrCl mL/min)  >= 20  < 20, HD, PD  CRRT    All Indications - Loading dose of 4500 milligrams x 1 over 30 minutes or via IV push. Maintenance dose  should begin 6 hours after load for CrCl > 20 and 8 hours after load for CrCl < 20.    Maintenance dosing for all indications except as outlined below  [x] 3375mg q8h  [] 3375mg q12h  [] 3375mg q8h    Febrile neutropenia, Cystic fibrosis, BMI > 40*, local Pseudomonas susceptibility < 80% (refer to page 3 for indications)     [] 4500mg q8h  [] 4500 q12h  [] 4500mg q8h    *Consider 3375mg q8h for indication of Urinary tract infections in BMI > 40. Adjust for decreased renal function.           Thank You,  Ellyn Zaidi, [facility]  1/14/2025 4:50 PM

## 2025-01-14 NOTE — PROGRESS NOTES
2355: [facility] at bedside, pt transferred to EMS cot.    0000: Report called/ given to nurse at [facility].     0015: Pt's wife contacted/ updated on pt transfer.

## 2025-01-14 NOTE — PROGRESS NOTES
Informed Consent for Blood Component Transfusion Note    I have discussed with the wife the rationale for blood component transfusion; its benefits in treating or preventing fatigue, organ damage, or death; and its risk which includes mild transfusion reactions, rare risk of blood borne infection, or more serious but rare reactions. I have discussed the alternatives to transfusion, including the risk and consequences of not receiving transfusion. The wife had an opportunity to ask questions and had agreed to proceed with transfusion of blood components.    Electronically signed by SHERIDAN MARTINEZ MD on 1/14/25 at 5:48 PM EST

## 2025-01-14 NOTE — CONSENT
Informed Consent for Blood Component Transfusion Note    I have discussed with the wife the rationale for blood component transfusion; its benefits in treating or preventing fatigue, organ damage, or death; and its risk which includes mild transfusion reactions, rare risk of blood borne infection, or more serious but rare reactions. I have discussed the alternatives to transfusion, including the risk and consequences of not receiving transfusion. The wife had an opportunity to ask questions and had agreed to proceed with transfusion of blood components.    Electronically signed by SHERIDAN MARTINEZ MD on 1/14/25 at 5:49 PM EST

## 2025-01-14 NOTE — PROGRESS NOTES
[facility]   Pharmacy Pharmacokinetic Monitoring Service - Vancomycin    Remy Upton is a 74 y.o. male starting on vancomycin therapy for sepsis. Pharmacy consulted by Dr. Narvaez for monitoring and adjustment.    Target Concentration: Pre-Dialysis Concentration 15-20 mg/L  Additional Antimicrobials: Zosyn    Pertinent Laboratory Values:   Wt Readings from Last 1 Encounters:   01/13/25 80 kg (176 lb 5.9 oz)     Temp Readings from Last 1 Encounters:   01/13/25 98 °F (36.7 °C) (Axillary)     Recent Labs     01/12/25  0620 01/13/25  0514   CREATININE 1.57* 1.88*   BUN 57* 65*   WBC 11.0* 9.2     Pertinent Cultures:  Culture, Blood 2 [[id number]]    Order Status: Sent Specimen: Blood    Culture, Blood 1 [[id number]]    Order Status: Sent Specimen: Blood    MRSA Nasal Swab: N/A. Non-respiratory infection.    Plan:  Concentration-guided dosing due to renal impairment and intermittent hemodialysis   Start vancomycin loading dose of 1750 mg IV x 1  Vancomycin concentration ordered for 01/15 @ 0600, HD session not scheduled yet  Pharmacy will continue to monitor patient and adjust therapy as indicated    Thank you for the consult,  Jodi Campos [facility]  1/14/2025 5:09 PM

## 2025-01-14 NOTE — PROGRESS NOTES
DVT / VTE PROPHYLAXIS EVALUATION    Recent Labs     01/12/25  0620 01/13/25  0514   BUN 57* 65*   CREATININE 1.57* 1.88*    249   HGB 7.6* 7.2*   HCT 25.3* 23.5*     ADMITTING DX OR CHIEF COMPLAINT? afib  WARFARIN? DOAC'S? no  ANY APPARENT BLEEDING? no  SCHEDULED SURGERY? no     If yes to following, excluded from auto adjustment in Table 1 of policy - please contact provider with recommendations as appropriate.  Include condition/exception in scratch notes. Yes No   Trauma Service or Ortho Surgery []  [x]    Pregnancy []  [x]        Current order:  Enoxaparin 40 mg SUBQ once daily       ,    Estimated Creatinine Clearance: 40 mL/min (A) (based on SCr of 1.88 mg/dL (H)).    Plan:  Pharmacologic VTE prophylaxis modified based on patient renal function per [facility]/P&T approved protocol     Patient Weight (kg)      50.9 and below .9 101-150.9 151-174.9 175 or greater   Estimated   CrCl  (ml/min) 30 or greater []   30 mg   SUBQ daily   []   40 mg   SUBQ daily (or 30 mg BID for orthopedic cases) []  30 mg SUBQ   BID*  []  40 mg   SUBQ   BID []  60mg SUBQ BID    15-29.9 []  UFH 5000   units SUBQ BID []  30 mg   SUBQ daily [] 30 mg SUBQ   daily []  40 mg SUBQ   daily [] 60 mg SUBQ   Daily*    Less than 15 or dialysis []  UFH 5000   units SUBQ BID [x] UFH 5000 units SUBQ TID []  UFH 7500   units   SUBQ TID*   *Do not exceed enoxaparin 40mg daily or UFH 5000 units SUBQ TID in patients with epidurals,   lumbar drains, or external ventricular drains

## 2025-01-15 ENCOUNTER — PREP FOR PROCEDURE (OUTPATIENT)
Dept: SURGERY | Age: 75
End: 2025-01-15

## 2025-01-15 ENCOUNTER — APPOINTMENT (OUTPATIENT)
Dept: CT IMAGING | Age: 75
End: 2025-01-15
Attending: STUDENT IN AN ORGANIZED HEALTH CARE EDUCATION/TRAINING PROGRAM
Payer: COMMERCIAL

## 2025-01-15 DIAGNOSIS — K94.23 GASTROSTOMY TUBE DYSFUNCTION (HCC): ICD-10-CM

## 2025-01-15 LAB
ALBUMIN SERPL-MCNC: 2.6 G/DL (ref 3.5–4.6)
ALBUMIN SERPL-MCNC: 2.7 G/DL (ref 3.5–4.6)
ALP SERPL-CCNC: 127 U/L (ref 35–104)
ALP SERPL-CCNC: 131 U/L (ref 35–104)
ALT SERPL-CCNC: 23 U/L (ref 0–41)
ALT SERPL-CCNC: 24 U/L (ref 0–41)
ANION GAP SERPL CALCULATED.3IONS-SCNC: 11 MEQ/L (ref 9–15)
ANION GAP SERPL CALCULATED.3IONS-SCNC: 12 MEQ/L (ref 9–15)
AST SERPL-CCNC: 23 U/L (ref 0–40)
AST SERPL-CCNC: 25 U/L (ref 0–40)
BASOPHILS # BLD: 0 K/UL (ref 0–0.2)
BASOPHILS NFR BLD: 0.4 %
BILIRUB SERPL-MCNC: 0.8 MG/DL (ref 0.2–0.7)
BILIRUB SERPL-MCNC: 0.8 MG/DL (ref 0.2–0.7)
BUN SERPL-MCNC: 38 MG/DL (ref 8–23)
BUN SERPL-MCNC: 39 MG/DL (ref 8–23)
CALCIUM SERPL-MCNC: 8.2 MG/DL (ref 8.5–9.9)
CALCIUM SERPL-MCNC: 8.3 MG/DL (ref 8.5–9.9)
CHLORIDE SERPL-SCNC: 95 MEQ/L (ref 95–107)
CHLORIDE SERPL-SCNC: 95 MEQ/L (ref 95–107)
CO2 SERPL-SCNC: 27 MEQ/L (ref 20–31)
CO2 SERPL-SCNC: 28 MEQ/L (ref 20–31)
CREAT SERPL-MCNC: 1.37 MG/DL (ref 0.7–1.2)
CREAT SERPL-MCNC: 1.49 MG/DL (ref 0.7–1.2)
CRP SERPL HS-MCNC: 262.5 MG/L (ref 0–5)
EOSINOPHIL # BLD: 0.1 K/UL (ref 0–0.7)
EOSINOPHIL NFR BLD: 1 %
ERYTHROCYTE [DISTWIDTH] IN BLOOD BY AUTOMATED COUNT: 17.9 % (ref 11.5–14.5)
ESTIMATED AVERAGE GLUCOSE: 120 MG/DL
GLOBULIN SER CALC-MCNC: 3.4 G/DL (ref 2.3–3.5)
GLOBULIN SER CALC-MCNC: 3.6 G/DL (ref 2.3–3.5)
GLUCOSE BLD-MCNC: 192 MG/DL (ref 70–99)
GLUCOSE BLD-MCNC: 73 MG/DL (ref 70–99)
GLUCOSE BLD-MCNC: 77 MG/DL (ref 70–99)
GLUCOSE BLD-MCNC: 84 MG/DL (ref 70–99)
GLUCOSE SERPL-MCNC: 83 MG/DL (ref 70–99)
GLUCOSE SERPL-MCNC: 86 MG/DL (ref 70–99)
HBA1C MFR BLD: 5.8 % (ref 4–6)
HCT VFR BLD AUTO: 29.3 % (ref 42–52)
HCT VFR BLD AUTO: 29.5 % (ref 42–52)
HGB BLD-MCNC: 9.2 G/DL (ref 14–18)
HGB BLD-MCNC: 9.3 G/DL (ref 14–18)
LYMPHOCYTES # BLD: 0.4 K/UL (ref 1–4.8)
LYMPHOCYTES NFR BLD: 4.1 %
MAGNESIUM SERPL-MCNC: 2.2 MG/DL (ref 1.7–2.4)
MAGNESIUM SERPL-MCNC: 2.2 MG/DL (ref 1.7–2.4)
MCH RBC QN AUTO: 29.8 PG (ref 27–31.3)
MCHC RBC AUTO-ENTMCNC: 31.7 % (ref 33–37)
MCV RBC AUTO: 93.9 FL (ref 79–92.2)
MONOCYTES # BLD: 0.6 K/UL (ref 0.2–0.8)
MONOCYTES NFR BLD: 6.3 %
NEUTROPHILS # BLD: 8 K/UL (ref 1.4–6.5)
NEUTS SEG NFR BLD: 86.8 %
PERFORMED ON: ABNORMAL
PERFORMED ON: NORMAL
PLATELET # BLD AUTO: 304 K/UL (ref 130–400)
POTASSIUM SERPL-SCNC: 4.1 MEQ/L (ref 3.4–4.9)
POTASSIUM SERPL-SCNC: 4.2 MEQ/L (ref 3.4–4.9)
PROCALCITONIN SERPL IA-MCNC: 1.36 NG/ML (ref 0–0.15)
PROT SERPL-MCNC: 6.1 G/DL (ref 6.3–8)
PROT SERPL-MCNC: 6.2 G/DL (ref 6.3–8)
RBC # BLD AUTO: 3.12 M/UL (ref 4.7–6.1)
SODIUM SERPL-SCNC: 134 MEQ/L (ref 135–144)
SODIUM SERPL-SCNC: 134 MEQ/L (ref 135–144)
VANCOMYCIN SERPL-MCNC: 18.6 UG/ML (ref 10–40)
WBC # BLD AUTO: 9.3 K/UL (ref 4.8–10.8)

## 2025-01-15 PROCEDURE — 94003 VENT MGMT INPAT SUBQ DAY: CPT

## 2025-01-15 PROCEDURE — 86140 C-REACTIVE PROTEIN: CPT

## 2025-01-15 PROCEDURE — 6360000002 HC RX W HCPCS: Performed by: COLON & RECTAL SURGERY

## 2025-01-15 PROCEDURE — 80202 ASSAY OF VANCOMYCIN: CPT

## 2025-01-15 PROCEDURE — 0DH63UZ INSERTION OF FEEDING DEVICE INTO STOMACH, PERCUTANEOUS APPROACH: ICD-10-PCS | Performed by: COLON & RECTAL SURGERY

## 2025-01-15 PROCEDURE — 2580000003 HC RX 258: Performed by: INTERNAL MEDICINE

## 2025-01-15 PROCEDURE — 74176 CT ABD & PELVIS W/O CONTRAST: CPT

## 2025-01-15 PROCEDURE — 36592 COLLECT BLOOD FROM PICC: CPT

## 2025-01-15 PROCEDURE — 2580000003 HC RX 258: Performed by: NURSE PRACTITIONER

## 2025-01-15 PROCEDURE — 3609013300 HC EGD TUBE PLACEMENT: Performed by: COLON & RECTAL SURGERY

## 2025-01-15 PROCEDURE — 2000000000 HC ICU R&B

## 2025-01-15 PROCEDURE — 94660 CPAP INITIATION&MGMT: CPT

## 2025-01-15 PROCEDURE — 85018 HEMOGLOBIN: CPT

## 2025-01-15 PROCEDURE — 6370000000 HC RX 637 (ALT 250 FOR IP): Performed by: INTERNAL MEDICINE

## 2025-01-15 PROCEDURE — 99223 1ST HOSP IP/OBS HIGH 75: CPT | Performed by: INTERNAL MEDICINE

## 2025-01-15 PROCEDURE — 43246 EGD PLACE GASTROSTOMY TUBE: CPT | Performed by: COLON & RECTAL SURGERY

## 2025-01-15 PROCEDURE — 85014 HEMATOCRIT: CPT

## 2025-01-15 PROCEDURE — 85025 COMPLETE CBC W/AUTO DIFF WBC: CPT

## 2025-01-15 PROCEDURE — 2500000003 HC RX 250 WO HCPCS

## 2025-01-15 PROCEDURE — 5A1955Z RESPIRATORY VENTILATION, GREATER THAN 96 CONSECUTIVE HOURS: ICD-10-PCS | Performed by: STUDENT IN AN ORGANIZED HEALTH CARE EDUCATION/TRAINING PROGRAM

## 2025-01-15 PROCEDURE — 84145 PROCALCITONIN (PCT): CPT

## 2025-01-15 PROCEDURE — 83735 ASSAY OF MAGNESIUM: CPT

## 2025-01-15 PROCEDURE — 6360000002 HC RX W HCPCS: Performed by: INTERNAL MEDICINE

## 2025-01-15 PROCEDURE — 2500000003 HC RX 250 WO HCPCS: Performed by: COLON & RECTAL SURGERY

## 2025-01-15 PROCEDURE — 2709999900 HC NON-CHARGEABLE SUPPLY: Performed by: COLON & RECTAL SURGERY

## 2025-01-15 PROCEDURE — 89220 SPUTUM SPECIMEN COLLECTION: CPT

## 2025-01-15 PROCEDURE — 99291 CRITICAL CARE FIRST HOUR: CPT | Performed by: INTERNAL MEDICINE

## 2025-01-15 PROCEDURE — 51798 US URINE CAPACITY MEASURE: CPT

## 2025-01-15 PROCEDURE — 51701 INSERT BLADDER CATHETER: CPT

## 2025-01-15 PROCEDURE — 6360000002 HC RX W HCPCS: Performed by: NURSE PRACTITIONER

## 2025-01-15 PROCEDURE — 36415 COLL VENOUS BLD VENIPUNCTURE: CPT

## 2025-01-15 PROCEDURE — 83036 HEMOGLOBIN GLYCOSYLATED A1C: CPT

## 2025-01-15 PROCEDURE — 80053 COMPREHEN METABOLIC PANEL: CPT

## 2025-01-15 PROCEDURE — 2500000003 HC RX 250 WO HCPCS: Performed by: INTERNAL MEDICINE

## 2025-01-15 PROCEDURE — 43762 RPLC GTUBE NO REVJ TRC: CPT

## 2025-01-15 PROCEDURE — 3E0G76Z INTRODUCTION OF NUTRITIONAL SUBSTANCE INTO UPPER GI, VIA NATURAL OR ARTIFICIAL OPENING: ICD-10-PCS | Performed by: COLON & RECTAL SURGERY

## 2025-01-15 RX ORDER — SODIUM CHLORIDE 0.9 % (FLUSH) 0.9 %
5-40 SYRINGE (ML) INJECTION EVERY 12 HOURS SCHEDULED
Status: DISCONTINUED | OUTPATIENT
Start: 2025-01-15 | End: 2025-01-15 | Stop reason: HOSPADM

## 2025-01-15 RX ORDER — SODIUM CHLORIDE 9 MG/ML
INJECTION, SOLUTION INTRAVENOUS PRN
Status: CANCELLED | OUTPATIENT
Start: 2025-01-15

## 2025-01-15 RX ORDER — FENTANYL CITRATE 50 UG/ML
100 INJECTION, SOLUTION INTRAMUSCULAR; INTRAVENOUS ONCE
Status: COMPLETED | OUTPATIENT
Start: 2025-01-15 | End: 2025-01-15

## 2025-01-15 RX ORDER — SODIUM CHLORIDE 0.9 % (FLUSH) 0.9 %
5-40 SYRINGE (ML) INJECTION PRN
Status: DISCONTINUED | OUTPATIENT
Start: 2025-01-15 | End: 2025-01-15 | Stop reason: HOSPADM

## 2025-01-15 RX ORDER — MIDAZOLAM HYDROCHLORIDE 1 MG/ML
4 INJECTION, SOLUTION INTRAMUSCULAR; INTRAVENOUS ONCE
Status: COMPLETED | OUTPATIENT
Start: 2025-01-15 | End: 2025-01-15

## 2025-01-15 RX ORDER — SODIUM CHLORIDE 0.9 % (FLUSH) 0.9 %
5-40 SYRINGE (ML) INJECTION EVERY 12 HOURS SCHEDULED
Status: CANCELLED | OUTPATIENT
Start: 2025-01-15

## 2025-01-15 RX ORDER — VANCOMYCIN 1.25 G/250ML
1750 INJECTION, SOLUTION INTRAVENOUS ONCE
Status: CANCELLED | OUTPATIENT
Start: 2025-01-15 | End: 2025-01-15

## 2025-01-15 RX ORDER — SODIUM CHLORIDE 0.9 % (FLUSH) 0.9 %
5-40 SYRINGE (ML) INJECTION PRN
Status: CANCELLED | OUTPATIENT
Start: 2025-01-15

## 2025-01-15 RX ORDER — SODIUM CHLORIDE 9 MG/ML
INJECTION, SOLUTION INTRAVENOUS PRN
Status: DISCONTINUED | OUTPATIENT
Start: 2025-01-15 | End: 2025-01-15 | Stop reason: HOSPADM

## 2025-01-15 RX ADMIN — Medication 10 ML: at 10:33

## 2025-01-15 RX ADMIN — AMIODARONE HYDROCHLORIDE 1 MG/MIN: 1.8 INJECTION, SOLUTION INTRAVENOUS at 22:08

## 2025-01-15 RX ADMIN — HEPARIN SODIUM 5000 UNITS: 5000 INJECTION INTRAVENOUS; SUBCUTANEOUS at 22:10

## 2025-01-15 RX ADMIN — MIDAZOLAM 4 MG: 1 INJECTION INTRAMUSCULAR; INTRAVENOUS at 11:10

## 2025-01-15 RX ADMIN — INSULIN LISPRO 1 UNITS: 100 INJECTION, SOLUTION INTRAVENOUS; SUBCUTANEOUS at 22:42

## 2025-01-15 RX ADMIN — HEPARIN SODIUM 5000 UNITS: 5000 INJECTION INTRAVENOUS; SUBCUTANEOUS at 14:37

## 2025-01-15 RX ADMIN — PIPERACILLIN AND TAZOBACTAM 3375 MG: 3; .375 INJECTION, POWDER, LYOPHILIZED, FOR SOLUTION INTRAVENOUS at 00:11

## 2025-01-15 RX ADMIN — AMIODARONE HYDROCHLORIDE 1 MG/MIN: 1.8 INJECTION, SOLUTION INTRAVENOUS at 08:03

## 2025-01-15 RX ADMIN — AMIODARONE HYDROCHLORIDE 1 MG/MIN: 1.8 INJECTION, SOLUTION INTRAVENOUS at 14:36

## 2025-01-15 RX ADMIN — MEROPENEM 1000 MG: 1 INJECTION INTRAVENOUS at 23:09

## 2025-01-15 RX ADMIN — FENTANYL CITRATE 100 MCG: 50 INJECTION, SOLUTION INTRAMUSCULAR; INTRAVENOUS at 11:10

## 2025-01-15 RX ADMIN — VANCOMYCIN HYDROCHLORIDE 750 MG: 750 INJECTION, POWDER, LYOPHILIZED, FOR SOLUTION INTRAVENOUS at 20:42

## 2025-01-15 RX ADMIN — SODIUM CHLORIDE, PRESERVATIVE FREE 10 ML: 5 INJECTION INTRAVENOUS at 10:32

## 2025-01-15 RX ADMIN — AMIODARONE HYDROCHLORIDE 1 MG/MIN: 1.8 INJECTION, SOLUTION INTRAVENOUS at 01:14

## 2025-01-15 RX ADMIN — MEROPENEM 1000 MG: 1 INJECTION INTRAVENOUS at 12:11

## 2025-01-15 RX ADMIN — SODIUM CHLORIDE 40 MG: 9 INJECTION INTRAMUSCULAR; INTRAVENOUS; SUBCUTANEOUS at 10:28

## 2025-01-15 ASSESSMENT — PAIN SCALES - GENERAL: PAINLEVEL_OUTOF10: 3

## 2025-01-15 ASSESSMENT — PULMONARY FUNCTION TESTS
PIF_VALUE: 22
PIF_VALUE: 22
PIF_VALUE: 18
PIF_VALUE: 23
PIF_VALUE: 25

## 2025-01-15 NOTE — CARE COORDINATION
Case Management Assessment  Initial Evaluation    Date/Time of Evaluation: 1/15/2025 2:54 PM  Assessment Completed by: Julieta David    If patient is discharged prior to next notation, then this note serves as note for discharge by case management.    Patient Name: Remy Upton                   YOB: 1950  Diagnosis: Atrial fibrillation (HCC) [I48.91]                   Date / Time: 1/14/2025  4:11 PM    Patient Admission Status: Inpatient   Readmission Risk (Low < 19, Mod (19-27), High > 27): Readmission Risk Score: 24    Current PCP: Tico Rodriguez, DO  PCP verified by CM? Yes    Chart Reviewed: Yes      History Provided by: Significant Other  Patient Orientation: Person, Self    Patient Cognition: Severely Impaired    Hospitalization in the last 30 days (Readmission):  Yes    If yes, Readmission Assessment in CM Navigator will be completed.    Advance Directives:      Code Status: Full Code   Patient's Primary Decision Maker is: Named in Scanned ACP Document    Primary Decision Maker: Simona Upton - Spouse - [phone]    Secondary Decision Maker: Maria Luisa Upton - Child - [phone]    Discharge Planning:    Patient lives with:   Type of Home:    Primary Care Giver: Spouse  Patient Support Systems include: Spouse/Significant Other   Current Financial resources:    Current community resources:    Current services prior to admission:              Current DME:              Type of Home Care services:       ADLS  Prior functional level: Assistance with the following:, Bathing, Dressing, Toileting, Feeding, Cooking, Housework, Shopping, Mobility  Current functional level: Mobility, Shopping, Housework, Cooking, Feeding, Toileting, Dressing, Bathing, Assistance with the following:    PT AM-PAC:   /24  OT AM-PAC:   /24    Family can provide assistance at DC: No  Would you like Case Management to discuss the discharge plan with any other family members/significant others, and if so, who?  Yes  Plans to Return to Present Housing: Yes  Other Identified Issues/Barriers to RETURNING to current housing: NO  Potential Assistance needed at discharge:              Potential DME:    Patient expects to discharge to:    Plan for transportation at discharge:      Financial    Payor: [employer] / Plan: [employer] / Product Type: *No Product type* /     Does insurance require precert for SNF: Yes    Potential assistance Purchasing Medications:    Meds-to-Beds request: Yes      [facility] - [place], [place] - [address] - P [phone] - F [phone]  [address] [facility] [place] [address]  Phone: [phone] Fax: [phone]    [facility] - [place], [place] - [address] - P [phone] - F [phone]  [address]  [place] [address]  Phone: [phone] Fax: [phone]    [facility] [address] - P [phone] - F [phone]  [address]  [facility] [address]  Phone: [phone] Fax: [phone]    [facility] - [place], [place] - [address] - P [phone] - F [phone]  [address]  [place] [address]  Phone: [phone] Fax: [phone]      Notes:    Factors facilitating achievement of predicted outcomes: Family support and Caregiver support    Barriers to discharge: Cognitive deficit, Limited participation, Limited insight into deficits, Communication deficit, Decreased sensation, Upper extremity weakness, Lower extremity weakness, Medical complications, and Medication managment    Additional Case Management Notes: PT WAS READMITTED, RECENT DC TO [facility].  [facility] HAD PT SENT TO [facility], AND [facility] REDIRECTED PT TO [place] [facility].  CLAIMS ARE THAT PEG TUBE HAD BEEN PULLED OUT.  PT IS BEDRIDDEN.  TRACH AND NEW TO HD.  PLAN IS TO RETURN TO [facility], LONG DISCUSSION HAD WITH WIFE THAT HD AND TRACH NEAREST ACCOMMODATIONS.  IF QULIFIES FOR LTAC, WIFE IS OPEN TO THIS PLAN AGAIN.      The Plan for Transition of

## 2025-01-15 NOTE — PROGRESS NOTES
Hospitalist Progress Note      PCP: Tico Rodriguez DO    Date of Admission: 1/14/2025    Chief Complaint:  no acute events, no fevers, in AFIB with RVR overnight requiring Amiodarone infusion, hypotensive requiring low dose Norepinephrine infusion, was straight cathed with 800 ml out per report.     Medications:  Reviewed    Infusion Medications    sodium chloride      sodium chloride      norepinephrine 4 mcg/min (01/15/25 0657)    dextrose      sodium chloride      amiodarone 1 mg/min (01/15/25 0803)     Scheduled Medications    sodium chloride flush  5-40 mL IntraVENous 2 times per day    sodium chloride flush  5-40 mL IntraVENous 2 times per day    piperacillin-tazobactam  3,375 mg IntraVENous Q8H    heparin (porcine)  5,000 Units SubCUTAneous 3 times per day    vancomycin (VANCOCIN) intermittent dosing (placeholder)   Other RX Placeholder    insulin lispro  0-4 Units SubCUTAneous 4 times per day    pantoprazole (PROTONIX) 40 mg in sodium chloride (PF) 0.9 % 10 mL injection  40 mg IntraVENous Daily     PRN Meds: sodium chloride flush, sodium chloride, sodium chloride flush, sodium chloride, acetaminophen **OR** acetaminophen, glucose, dextrose bolus **OR** dextrose bolus, glucagon (rDNA), dextrose, sodium chloride      Intake/Output Summary (Last 24 hours) at 1/15/2025 0955  Last data filed at 1/15/2025 0657  Gross per 24 hour   Intake 2044.71 ml   Output 850 ml   Net 1194.71 ml       Exam:    BP (!) 160/40   Pulse (!) 115   Temp 98.6 °F (37 °C)   Resp 27   Wt 75.2 kg (165 lb 12.6 oz)   SpO2 100%   BMI 21.29 kg/m²     General appearance: awake, intubated via trach, ill-appearing  Lungs: coarse sounds bilaterally  Heart: S1/S2, irregular, tachycardic  Abdomen: soft  Extremities: no edema      Labs:   Recent Labs     01/13/25  0514 01/14/25  1715 01/15/25  0140 01/15/25  0653   WBC 9.2 7.4  --  9.3   HGB 7.2* 6.4* 9.2* 9.3*   HCT 23.5* 21.0* 29.5* 29.3*    252  --  304     Recent Labs

## 2025-01-15 NOTE — ACP (ADVANCE CARE PLANNING)
Advance Care Planning   Healthcare Decision Maker:    Primary Decision Maker: AlexsandraAllysonSimona - Spouse - [phone]    Secondary Decision Maker: AlexsandraMaria Luisa - Child - [phone]    Click here to complete Healthcare Decision Makers including selection of the Healthcare Decision Maker Relationship (ie \"Primary\").  Today we documented Decision Maker(s) consistent with Legal Next of Kin hierarchy.

## 2025-01-15 NOTE — FLOWSHEET NOTE
0800 assessment complete, see flow sheet. Patient has Trach and on vent, no sedation. Does open eyes to name called and verbal stimuli, nods appropriately at times to questions asked, weakly attempts to grasp hands bilaterally with encouragement. Patient suctioned, mouth care done and repositioned for comfort.  1110 Patient medicated as per Dr LUIZA Oreilly order prior to PEG tube insertion, see MAR.  1145 PEG tube complete.  1300 Patient repositioned, maurizio and sacral area washed and barrier cream applied, linen changed, patient repositioned for comfort. Patients wife at bedside, update given and questions answered. No changes in assessment noted.  1530 Upon returning from CT scan, noticed, the PEG site was oozing blood, Dr Juárez here and notified. Dr Juárez placed 1 suture at site and applied split gauze. Site clean dry and intact at present time. Will cont to monitor.  1600 Patient bladder scanned for 303.  1800 Patient bladder scanned for 421 cc, straight cath done for 380 cc dark clover color urine without difficulty, patient tolerated well. Peg Tube site remains clean dry and intact, Tube feeding bolus started as ordered and infusing well. No changes in assessment noted.

## 2025-01-15 NOTE — PROGRESS NOTES
PHARMACY NOTE:   Interdisciplinary Rounds Completed     Pt diagnosis: Atrial fibrillation    Follow up/Changes per intensivist:       Discontinued Zosyn  Started Merrem - 72 hr stop date per protocol (provider aware) for ID to reorder or to de-escalate  Home meds in need of review/reorder as appropriate: prednisone 5 mg daily, Cellcept, cyclosporine    Renal:      Recent Labs     25  1906 01/15/25  0140 01/15/25  0653   CREATININE 1.12 1.37* 1.49*      Estimated Creatinine Clearance: 47 mL/min (A) (based on SCr of 1.49 mg/dL (H)).    Medications requiring renal adjustment at this time:  No medications requiring renal adjustment at this time - will monitor Merrem and Vanco     Additional Information/Core Measures:      DVT Prophylaxis/Anticoagulant Therapy:  Recent Labs     25  0514 25  1715 01/15/25  0140 01/15/25  0653   HGB 7.2* 6.4*   < > 9.3*    252  --  304    < > = values in this interval not displayed.     No results for input(s): \"INR\" in the last 72 hours.  Heparin 5000 units SQ TID  Stress Ulcer Prophylaxis:   Pantoprazole 40 mg IV daily  Steroid:  ---  Insulin Coverage (goal: 140-180):   Recent Labs     25  1906 01/15/25  0140 01/15/25  0653   GLUCOSE 820* 86 83     Lantus: ---  SSI: low  Humalo units required in the past 24 hours  Pressors:  Levophed  Sedation:  ---  Fluids:  ---  Drips:  Amiodarone, Levophed  Antimicrobial Therapy:  Recent Labs     25  0514 25  1715 01/15/25  0653   WBC 9.2 7.4 9.3     Recent Labs     01/15/25  0653   PROCAL 1.36*     ID on consult: no  Antimicrobial agents:   Vanco, Merrem  Cultures:  Blood  pending, sputum pending, urine  (previous encounter) final NGTD  Bowel Regimen:  ---  Core measures assessed/met    Dick Moore, PharmD  PGY-1 Pharmacy Resident    1/15/2025 4:11 PM

## 2025-01-15 NOTE — CONSULTS
Renal consult    CKD-5 with dialysis  Anemia transfusion  Post Trach/PEG  DM type-2 uncontrolled  OHDx CAD CAF      Plan  ICU ventilator  dialysis as needed  Follw H&H for bl transfusions

## 2025-01-15 NOTE — PROGRESS NOTES
Spiritual Health History and Assessment/Progress Note  [facility]    (P) Crisis, (P) Rapid Response,  ,      Name: Remy Upton MRN: [id number]    Age: 74 y.o.     Sex: male   Language: English   Congregation: Alevism   Atrial fibrillation (HCC)     Date: 1/14/2025            Total Time Calculated: (P) 35 min              Spiritual Assessment continued in [facility] ICU            Encounter Overview/Reason: (P) Crisis  Service Provided For: (P) Patient and family together    Marian, Belief, Meaning:   Patient has beliefs or practices that help with coping during difficult times  Family/Friends identify as spiritual, are connected with a marian tradition or spiritual practice, and have beliefs or practices that help with coping during difficult times      Importance and Influence:  Patient has spiritual/personal beliefs that influence decisions regarding their health  Family/Friends have spiritual/personal beliefs that influence decisions regarding the patient's health    Community:  Patient feels well-supported. Support system includes: Spouse/Partner  Family/Friends are connected with a spiritual community:    Assessment and Plan of Care:     Patient Interventions include: Other: Ministry of presence, words prayer, comfort, compassion, hope, courage.   Family/Friends Interventions include: Facilitated expression of thoughts and feelings and Explored spiritual coping/struggle/distress    Patient Plan of Care: Spiritual Care available upon further referral  Family/Friends Plan of Care: Spiritual Care available upon further referral    Electronically signed by Chaplain Camilo on 1/14/2025 at 7:54 PM

## 2025-01-15 NOTE — CONSULTS
Pulmonary and Critical Care Medicine  Consult Note  Encounter Date: 1/15/2025 8:21 AM    Mr. Remy Upton is a 74 y.o. male  : 1950  Requesting Provider: Brittney Narvaez MD    Reason for request: ICU management            HISTORY OF PRESENT ILLNESS:    Patient is 74 y.o. presents with A-fib/RVR, patient was recently discharged to LTAC, post prolonged hospitalization for pneumonia, respiratory failure, and shock.  He is status post tracheostomy on trach collar during the day and vent support at night, he is on dialysis.  Transferred yesterday due to A-fib with RVR, and fever 101 °F.  Rapid response was called once since admission for A-fib and heart rate in the 150s.  Currently better controlled, denies chest pain, no nausea no vomiting.   He was found to be anemic, hemoglobin 6.4, received 1 unit packed RBC  He is on Levophed at 4 mcg/min  On amiodarone drip.  He removed his PEG tube        Past Medical History:        Diagnosis Date    Diabetes mellitus (HCC)     Hemodialysis access, fistula mature (HCC) 2002    Hypertension     Rhinovirus 2024    Seizures ([facility])     no seizure since     Smoking        Past Surgical History:        Procedure Laterality Date    BRAIN SURGERY Left 1990    to eliminate seizures    COLONOSCOPY N/A 10/24/2022    COLONOSCOPY DIAGNOSTIC performed by Va Ordoñez MD at [facility]    KIDNEY TRANSPLANT      [facility] INCL FLUOR GDNCE DX W/CELL WASHG SPX N/A 2018    BRONCHOSCOPY performed by Cristopher Conde MD at [facility] [place]    TRACHEOSTOMY  2024    UPPER GASTROINTESTINAL ENDOSCOPY N/A 10/21/2022    EGD DIAGNOSTIC ONLY performed by Jaime Martinez MD at [facility]    UPPER GASTROINTESTINAL ENDOSCOPY N/A 2025    Esophagogastroduodenoscopy with percutaneous endoscopic gastrostomy tube/ICU bedside/anesthesia not required  ROOM ICU:1 performed by Syed Juárez MD at [facility] OR       Social History:     reports that he quit

## 2025-01-15 NOTE — CONSULTS
of care, counseling/discussion    Advanced care planning/counseling discussion    Encounter for hospice care discussion    Lower GI bleed    Unable to eat    Gastrostomy tube dysfunction (HCC)       Past Surgical History:   Procedure Laterality Date    BRAIN SURGERY Left 1990    to eliminate seizures    COLONOSCOPY N/A 10/24/2022    COLONOSCOPY DIAGNOSTIC performed by Va Ordoñez MD at [facility]    KIDNEY TRANSPLANT  2015    [facility] INCL FLUOR GDNCE DX W/CELL WASHG SPX N/A 2018    BRONCHOSCOPY performed [place] Cristopher Conde [place] [facility] [place]    TRACHEOSTOMY  2024    UPPER GASTROINTESTINAL ENDOSCOPY N/A 10/21/2022    EGD DIAGNOSTIC ONLY performed by Jaime Martinez MD at [facility]    UPPER GASTROINTESTINAL ENDOSCOPY N/A 2025    Esophagogastroduodenoscopy with percutaneous endoscopic gastrostomy tube/ICU bedside/anesthesia not required  ROOM ICU:1 performed by Syed Juárez [place] [facility] [place]       Social History     Socioeconomic History    Marital status:    Tobacco Use    Smoking status: Former     Current packs/day: 0.00     Types: Cigarettes     Quit date: 2015     Years since quittin.5    Smokeless tobacco: Former   Vaping Use    Vaping status: Never Used   Substance and Sexual Activity    Alcohol use: No     Alcohol/week: 0.0 standard drinks of alcohol    Drug use: No    Sexual activity: Not Currently     Social Determinants of Health     Food Insecurity: No Food Insecurity (2024)    Hunger Vital Sign     Worried About Running Out of Food in the Last Year: Never true     Ran Out of Food in the Last Year: Never true   Transportation Needs: No Transportation Needs (2024)    PRAPARE - Transportation     Lack of Transportation (Medical): No     Lack of Transportation (Non-Medical): No    [facility] from [facility]   Housing Stability: Low Risk  (2024)    Housing Stability Vital Sign     Unable to Pay for  Association. All Rights Reserved.       The CPT codes, CCI edits and ICD codes generated are intended as suggestions       and were generated based on input data.  These codes are preliminary and upon        review may be revised to meet current compliance and payer requirements.       The provider is responsible for the final determination of appropriate codes,       and modifiers. Scope Withdrawal Time:       00:03:27 Syed Juárez MD This document has been electronically signed. Note Initiated:1/2/2025 Note Completed:1/2/2025 3:40 PM    XR CHEST PORTABLE    Result Date: 12/31/2024  EXAMINATION: ONE XRAY VIEW OF THE CHEST 12/31/2024 3:09 am COMPARISON: 12/25/2024 HISTORY: ORDERING SYSTEM PROVIDED HISTORY: resp failure TECHNOLOGIST PROVIDED HISTORY: Reason for exam:->resp failure What reading provider will be dictating this exam?->CRC FINDINGS: Portable chest reveal heart to be borderline enlarged.  Underlying chronic changes seen throughout the lung fields bilaterally.  Some improvement seen in the aeration of the lung bases.  Trace bilateral pleural effusions. Degenerative changes seen within the spine.  Tracheostomy tube remains in satisfactory position with feeding tube tip below the level diaphragm.     Some improvement in the aeration of the lung bases. Trace bilateral pleural effusions.  Tracheostomy tube placed in the interval in satisfactory position with feeding tube tip below the level diaphragm.     XR CHEST PORTABLE    Result Date: 12/25/2024  EXAMINATION: ONE XRAY VIEW OF THE CHEST 12/25/2024 6:35 am COMPARISON: 12/24/2024 HISTORY: ORDERING SYSTEM PROVIDED HISTORY: resp failure TECHNOLOGIST PROVIDED HISTORY: Reason for exam:->resp failure What reading provider will be dictating this exam?->CRC FINDINGS: Compared to the radiograph from yesterday there is interval development of a new hazy alveolar opacity in the right middle lower lung, which may represent atelectasis or pneumonia the heart

## 2025-01-15 NOTE — FLOWSHEET NOTE
Shift Summary:    Bedside report received from LISANDRA Kramer    During report, despite being on amio gtt running at 0.5mg/min, HR remained 130's-150' afib RVR. Rapid response called, increased amio gtt to 1 mg/min and gave 5mg iv metoprolol. HR came down to 100's-120's where it remained all shift.     BG upon arrival 46, dayshift RN gave 125ml bolus dextrose 10% bolus, follow up BG 64, this RN gave additional 125ml dextrose 10% bolus, repeat BG 80.     Day shift RN received critical H&H of 6.4 and 21, 1 unit PRBC ordered but type and cross had not yet been drawn, resulted for this RN, unit administered and repeat H&H 9.2 and 29.5.     Lab called critical values of Na 114 and glucose 820, specimen drawn as dextrose 10% bolus was being given, Dr. Breaux, notified, repeat lab work ordered and drawn, follow up values Na 134 glucose 86.    Bladder scanned at 0230, showed 850ml, straight cathed for 800ml out.     All medications administered and titrated per protocol (see eMAR).    Bed remained locked and in low position.     Bedside report given to Marci FRY.

## 2025-01-15 NOTE — CONSULTS
[facility]                   [address] [place] [address]                              CONSULTATION      PATIENT NAME: LUL QUILES           : 1950  MED REC NO: [id number]                        ROOM: [facility]  ACCOUNT NO: [id number]                       ADMIT DATE: 2025  PROVIDER: Tico Rodriguez DO    RENAL CONSULT    CONSULT DATE: 01/15/2025    REFERRING PHYSICIAN:  TANNER BARRETO      HISTORY OF PRESENT ILLNESS:  This is a 74-year-old cachectic-appearing male, admitted to the hospital from [facility] due to atrial fibrillation, rapid ventricular response.  He was treated at [facility] for 2 weeks and discharged to a rehab center 2 days ago.  He had been in the process of receiving hemodialysis therapy through a left arm fistula.  During his hospital course 2 weeks ago, the patient required a tracheostomy and a G-tube for management.  Initially, the patient was admitted to the hospital 2 weeks ago for a respiratory viral infection.  The patient at this time is extremely weak and cachectic appearing.  His lab work on review showed relatively normal renal function with a GFR of 50 mL/minute and a creatinine 1.5.  His hemoglobin was 6.4 on admission and after transfusion 9.3.    PAST MEDICAL HISTORY:  Kidney transplant, diabetes mellitus type 2, hypertension, history of seizures, status post tracheostomy and PEG tube.    PAST SURGICAL HISTORY:  Brain surgery in , colonoscopy multiple times, status post kidney transplant in , upper endoscopy multiple times.    ALLERGIES:  STATINS.      MEDICATIONS:  Medicines at time of his admission; DuoNeb, Prevacid, CellCept, Neoral, Cordarone, Afrin, Proscar, Deltasone.    HABITS:  Quit smoking 10 years ago.  No alcohol use.    REVIEW OF SYSTEMS:  Unable to give review of systems.    PHYSICAL EXAMINATION:  VITAL SIGNS:  This patient is 6 feet 2 inches, 165 pounds.  Blood pressure recorded at 160/40, heart rate

## 2025-01-15 NOTE — PLAN OF CARE
Nutrition Problem #1: Severe malnutrition, in context of chronic illness  Intervention: Food and/or Nutrient Delivery: Start Tube Feeding (Begin  TF at 1730  Renal formula (Nepro), 325 ml bolus feedings QID  50 ml free water flush before and after each bolus feeding)

## 2025-01-15 NOTE — SIGNIFICANT EVENT
Rapid response was called due to the patient being tachycardic.  On examination patient's ventricular heart rate was in the 150s.  EKG at that time showed atrial fibrillation.  Patient received 5 mg of metoprolol and amiodarone rate was increased from 0.5 to 1

## 2025-01-15 NOTE — PROGRESS NOTES
Spiritual Health History and Assessment/Progress Note  [facility]    (P) Crisis, (P) Rapid Response,  ,      Name: Remy Upton MRN: [id number]    Age: 74 y.o.     Sex: male   Language: English   Mandaen: Muslim   Atrial fibrillation (HCC)     Date: 1/14/2025            Total Time Calculated: (P) 30 min              Spiritual Assessment continued in [facility] [place]            Encounter Overview/Reason: (P) Crisis  Service Provided For: (P) Patient and family together     reports, Responded to ICU patient rapid response. Patient has high heart rate, fever, possible sepsis, alert, awake, communicate by blinking eyes.      offered prayer and care support.      Marian, Belief, Meaning:   Patient has beliefs or practices that help with coping during difficult times  Family/Friends No family/friends present      Importance and Influence:  Patient has spiritual/personal beliefs that influence decisions regarding their health  Family/Friends No family/friends present    Community:  Patient feels well-supported. Support system includes: Spouse/Partner  Family/Friends No family/friends present    Assessment and Plan of Care:     Patient Interventions include: Other: [facility] for staff and pateint  Family/Friends Interventions include: No family/friends present    Patient Plan of Care: Spiritual Care available upon further referral  Family/Friends Plan of Care: No family/friends present    Electronically signed by Chaplain Camilo on 1/14/2025 at 7:45 PM

## 2025-01-15 NOTE — PROGRESS NOTES
Comprehensive Nutrition Assessment    Type and Reason for Visit:  Consult (TF order and manage)    Nutrition Recommendations/Plan:   Begin TF at 1730   Renal formula (Nepro), 325 ml bolus feedings QID   50 ml free water flush before and after each bolus feeding      Malnutrition Assessment:  Malnutrition Status:  Severe malnutrition (01/15/25 1617)    Context:  Chronic Illness     Findings of the 6 clinical characteristics of malnutrition:  Energy Intake:  Mild decrease in energy intake  Weight Loss:  Greater than 10% over 6 months     Body Fat Loss:  Severe body fat loss Orbital, Triceps, Fat Overlying Ribs   Muscle Mass Loss:  Severe muscle mass loss Temples (temporalis), Clavicles (pectoralis & deltoids), Scapula (trapezius)  Fluid Accumulation:  Unable to assess     Strength:  Not Performed    Nutrition Assessment:    Severe protein calorie malnutrition in the chronic setting, severe fat loss and muscle wasting present.  Pt recently discharged after prolonged hospitalization requiring mechanical ventilation, s/p tracheostomy and PEG tube placement.  Energy and protein needs  met with bolus PEG tube feedings.  Noted to have dislodged PEG tube on admission which was replaced today.  Begin TF as per surgical parameters, 6 hours after PEG placement.    Nutrition Related Findings:    Hx significant for: DM2, ESRD/CKD4, kidney transplant; started HD (12/2024), Intubated (12/18), Trach (12/30), PEG (1/2). TF at goal since (12/26), transitioned to bolus schedule (1/3), Labs reviewed: variable glucose, low Na low phosphorus, elevated BUN/creat. Meds reviewed, 1-2+ gen edema noted Wound Type: Multiple, Pressure Injury, Stage II, Skin Tears, Deep Tissue Injury       Current Nutrition Intake & Therapies:    Current Tube Feeding (TF) Orders:  Feeding Route: PEG  Formula: Renal Formula (Nepro)  Schedule: Bolus  Feeding Regimen: 325 ml QID (1300 ml)  Additives/Modulars: None  Water Flushes: 50 ml before and after each

## 2025-01-15 NOTE — PROGRESS NOTES
[facility]   Pharmacy Pharmacokinetic Monitoring Service - Vancomycin    Consulting Provider: Dr. Narvaez   Indication: Sepsis  Target Concentration: Pre-Dialysis Concentration 15-20 mg/L  Day of Therapy: 2  Additional Antimicrobials: Meropenem    Pertinent Laboratory Values:   Wt Readings from Last 1 Encounters:   01/14/25 75.2 kg (165 lb 12.6 oz)     Temp Readings from Last 1 Encounters:   01/15/25 98.6 °F (37 °C)     Recent Labs     01/14/25  1715 01/14/25  1906 01/15/25  0140 01/15/25  0653   CREATININE  --    < > 1.37* 1.49*   BUN  --    < > 38* 39*   WBC 7.4  --   --  9.3    < > = values in this interval not displayed.     Procalcitonin: 1.36    Pertinent Cultures:  Culture Date Source Results   1/14 Blood 2/2, respiratory pending   MRSA Nasal Swab: N/A. Non-respiratory infection.    Recent vancomycin administrations                     vancomycin (VANCOCIN) 1,750 mg in sodium chloride 0.9 % 500 mL IVPB (mg) 1,750 mg New Bag 01/14/25 2302                    Assessment:  Date/Time Current Dose Concentration Dialysis Session   01/15 @0806 1750 mg once on 1/14 18.6 Last session 1/13     Plan:  Concentration-guided dosing due to renal impairment and intermittent hemodialysis   1750 mg loading dose given once with a level of 18.6 is therapeutic for goal of 15-20  No current dialysis plan ordered. Will give 750 mg (10 mg/kg) once now given therapeutic level  Vancomycin concentration ordered for 01/16 @ 0800, prior to possible HD session pending nephrology recommendations  Pharmacy will continue to monitor patient and adjust therapy as indicated    Thank you for the consult,HEAVEN Moore [facility]  1/15/2025 11:44 AM

## 2025-01-15 NOTE — FLOWSHEET NOTE
Covering for Wound/Ostomy    Attempted to see patient for wound care referral. Patient not available due to peg tube placement procedure in process. Wound care will try to follow up as soon as possible. In the meantime please refer to most recent wound care assessment on 1/11/2025 for wound management recommendations.     Debra Salazar RN (covering Wound/Ostomy Nurse),

## 2025-01-16 ENCOUNTER — APPOINTMENT (OUTPATIENT)
Dept: AUDIOLOGY | Facility: CLINIC | Age: 75
End: 2025-01-16
Payer: COMMERCIAL

## 2025-01-16 ENCOUNTER — APPOINTMENT (OUTPATIENT)
Dept: OTOLARYNGOLOGY | Facility: CLINIC | Age: 75
End: 2025-01-16
Payer: COMMERCIAL

## 2025-01-16 PROBLEM — R57.9 SHOCK: Status: ACTIVE | Noted: 2025-01-16

## 2025-01-16 LAB
ALBUMIN SERPL-MCNC: 2.5 G/DL (ref 3.5–4.6)
ANION GAP SERPL CALCULATED.3IONS-SCNC: 15 MEQ/L (ref 9–15)
BASOPHILS # BLD: 0 K/UL (ref 0–0.2)
BASOPHILS NFR BLD: 0.4 %
BUN SERPL-MCNC: 48 MG/DL (ref 8–23)
CALCIUM SERPL-MCNC: 8.5 MG/DL (ref 8.5–9.9)
CHLORIDE SERPL-SCNC: 93 MEQ/L (ref 95–107)
CO2 SERPL-SCNC: 24 MEQ/L (ref 20–31)
CREAT SERPL-MCNC: 1.63 MG/DL (ref 0.7–1.2)
CRP SERPL HS-MCNC: 292 MG/L (ref 0–5)
EOSINOPHIL # BLD: 0.1 K/UL (ref 0–0.7)
EOSINOPHIL NFR BLD: 0.8 %
ERYTHROCYTE [DISTWIDTH] IN BLOOD BY AUTOMATED COUNT: 17.6 % (ref 11.5–14.5)
GLUCOSE BLD-MCNC: 176 MG/DL (ref 70–99)
GLUCOSE BLD-MCNC: 225 MG/DL (ref 70–99)
GLUCOSE BLD-MCNC: 276 MG/DL (ref 70–99)
GLUCOSE BLD-MCNC: 299 MG/DL (ref 70–99)
GLUCOSE BLD-MCNC: 305 MG/DL (ref 70–99)
GLUCOSE SERPL-MCNC: 267 MG/DL (ref 70–99)
HCT VFR BLD AUTO: 28.6 % (ref 42–52)
HGB BLD-MCNC: 9 G/DL (ref 14–18)
LYMPHOCYTES # BLD: 0.4 K/UL (ref 1–4.8)
LYMPHOCYTES NFR BLD: 3.9 %
MAGNESIUM SERPL-MCNC: 2.2 MG/DL (ref 1.7–2.4)
MCH RBC QN AUTO: 29.3 PG (ref 27–31.3)
MCHC RBC AUTO-ENTMCNC: 31.5 % (ref 33–37)
MCV RBC AUTO: 93.2 FL (ref 79–92.2)
MONOCYTES # BLD: 0.5 K/UL (ref 0.2–0.8)
MONOCYTES NFR BLD: 5.7 %
NEUTROPHILS # BLD: 8.2 K/UL (ref 1.4–6.5)
NEUTS SEG NFR BLD: 87.9 %
PERFORMED ON: ABNORMAL
PHOSPHATE SERPL-MCNC: 3.6 MG/DL (ref 2.3–4.8)
PLATELET # BLD AUTO: 320 K/UL (ref 130–400)
POTASSIUM SERPL-SCNC: 3.9 MEQ/L (ref 3.4–4.9)
PROCALCITONIN SERPL IA-MCNC: 1.42 NG/ML (ref 0–0.15)
RBC # BLD AUTO: 3.07 M/UL (ref 4.7–6.1)
SODIUM SERPL-SCNC: 132 MEQ/L (ref 135–144)
VANCOMYCIN SERPL-MCNC: 21.7 UG/ML (ref 10–40)
WBC # BLD AUTO: 9.3 K/UL (ref 4.8–10.8)

## 2025-01-16 PROCEDURE — 6360000002 HC RX W HCPCS: Performed by: NURSE PRACTITIONER

## 2025-01-16 PROCEDURE — 6370000000 HC RX 637 (ALT 250 FOR IP): Performed by: INTERNAL MEDICINE

## 2025-01-16 PROCEDURE — 94660 CPAP INITIATION&MGMT: CPT

## 2025-01-16 PROCEDURE — 80202 ASSAY OF VANCOMYCIN: CPT

## 2025-01-16 PROCEDURE — 2580000003 HC RX 258: Performed by: INTERNAL MEDICINE

## 2025-01-16 PROCEDURE — 2500000003 HC RX 250 WO HCPCS

## 2025-01-16 PROCEDURE — 85025 COMPLETE CBC W/AUTO DIFF WBC: CPT

## 2025-01-16 PROCEDURE — 84145 PROCALCITONIN (PCT): CPT

## 2025-01-16 PROCEDURE — 51701 INSERT BLADDER CATHETER: CPT

## 2025-01-16 PROCEDURE — 86140 C-REACTIVE PROTEIN: CPT

## 2025-01-16 PROCEDURE — 2700000000 HC OXYGEN THERAPY PER DAY

## 2025-01-16 PROCEDURE — 80069 RENAL FUNCTION PANEL: CPT

## 2025-01-16 PROCEDURE — 51798 US URINE CAPACITY MEASURE: CPT

## 2025-01-16 PROCEDURE — 6360000002 HC RX W HCPCS: Performed by: INTERNAL MEDICINE

## 2025-01-16 PROCEDURE — 2000000000 HC ICU R&B

## 2025-01-16 PROCEDURE — 2500000003 HC RX 250 WO HCPCS: Performed by: INTERNAL MEDICINE

## 2025-01-16 PROCEDURE — 99223 1ST HOSP IP/OBS HIGH 75: CPT

## 2025-01-16 PROCEDURE — 83735 ASSAY OF MAGNESIUM: CPT

## 2025-01-16 PROCEDURE — 94003 VENT MGMT INPAT SUBQ DAY: CPT

## 2025-01-16 PROCEDURE — 99233 SBSQ HOSP IP/OBS HIGH 50: CPT | Performed by: INTERNAL MEDICINE

## 2025-01-16 PROCEDURE — 99291 CRITICAL CARE FIRST HOUR: CPT | Performed by: INTERNAL MEDICINE

## 2025-01-16 PROCEDURE — 6370000000 HC RX 637 (ALT 250 FOR IP): Performed by: NURSE PRACTITIONER

## 2025-01-16 PROCEDURE — 2580000003 HC RX 258: Performed by: NURSE PRACTITIONER

## 2025-01-16 PROCEDURE — 94761 N-INVAS EAR/PLS OXIMETRY MLT: CPT

## 2025-01-16 RX ORDER — DIGOXIN 125 MCG
250 TABLET ORAL ONCE
Status: COMPLETED | OUTPATIENT
Start: 2025-01-16 | End: 2025-01-16

## 2025-01-16 RX ORDER — NOREPINEPHRINE BITARTRATE 0.06 MG/ML
1-100 INJECTION, SOLUTION INTRAVENOUS CONTINUOUS
Status: DISCONTINUED | OUTPATIENT
Start: 2025-01-16 | End: 2025-01-21

## 2025-01-16 RX ORDER — INSULIN LISPRO 100 [IU]/ML
0-16 INJECTION, SOLUTION INTRAVENOUS; SUBCUTANEOUS EVERY 4 HOURS
Status: DISCONTINUED | OUTPATIENT
Start: 2025-01-16 | End: 2025-01-29 | Stop reason: HOSPADM

## 2025-01-16 RX ORDER — FINASTERIDE 5 MG/1
5 TABLET, FILM COATED ORAL DAILY
Status: DISCONTINUED | OUTPATIENT
Start: 2025-01-17 | End: 2025-01-29 | Stop reason: HOSPADM

## 2025-01-16 RX ORDER — DIGOXIN 125 MCG
125 TABLET ORAL DAILY
Status: DISCONTINUED | OUTPATIENT
Start: 2025-01-17 | End: 2025-01-17

## 2025-01-16 RX ORDER — METOPROLOL TARTRATE 1 MG/ML
5 INJECTION, SOLUTION INTRAVENOUS ONCE
Status: COMPLETED | OUTPATIENT
Start: 2025-01-16 | End: 2025-01-16

## 2025-01-16 RX ORDER — METOPROLOL TARTRATE 1 MG/ML
5 INJECTION, SOLUTION INTRAVENOUS EVERY 4 HOURS PRN
Status: DISCONTINUED | OUTPATIENT
Start: 2025-01-16 | End: 2025-01-17

## 2025-01-16 RX ORDER — HYDROCORTISONE SODIUM SUCCINATE 100 MG/2ML
50 INJECTION INTRAMUSCULAR; INTRAVENOUS EVERY 6 HOURS
Status: DISCONTINUED | OUTPATIENT
Start: 2025-01-16 | End: 2025-01-22

## 2025-01-16 RX ORDER — MIDODRINE HYDROCHLORIDE 10 MG/1
20 TABLET ORAL
Status: DISCONTINUED | OUTPATIENT
Start: 2025-01-16 | End: 2025-01-29 | Stop reason: HOSPADM

## 2025-01-16 RX ADMIN — HYDROCORTISONE SODIUM SUCCINATE 50 MG: 100 INJECTION, POWDER, FOR SOLUTION INTRAMUSCULAR; INTRAVENOUS at 10:03

## 2025-01-16 RX ADMIN — HYDROCORTISONE SODIUM SUCCINATE 50 MG: 100 INJECTION, POWDER, FOR SOLUTION INTRAMUSCULAR; INTRAVENOUS at 20:47

## 2025-01-16 RX ADMIN — METOPROLOL TARTRATE 5 MG: 5 INJECTION INTRAVENOUS at 13:10

## 2025-01-16 RX ADMIN — SODIUM CHLORIDE 40 MG: 9 INJECTION INTRAMUSCULAR; INTRAVENOUS; SUBCUTANEOUS at 08:12

## 2025-01-16 RX ADMIN — INSULIN LISPRO 8 UNITS: 100 INJECTION, SOLUTION INTRAVENOUS; SUBCUTANEOUS at 13:41

## 2025-01-16 RX ADMIN — INSULIN LISPRO 1 UNITS: 100 INJECTION, SOLUTION INTRAVENOUS; SUBCUTANEOUS at 04:57

## 2025-01-16 RX ADMIN — INSULIN LISPRO 4 UNITS: 100 INJECTION, SOLUTION INTRAVENOUS; SUBCUTANEOUS at 17:03

## 2025-01-16 RX ADMIN — MIDODRINE HYDROCHLORIDE 20 MG: 10 TABLET ORAL at 11:16

## 2025-01-16 RX ADMIN — DIGOXIN 250 MCG: 125 TABLET ORAL at 16:14

## 2025-01-16 RX ADMIN — Medication 10 ML: at 20:52

## 2025-01-16 RX ADMIN — AMIODARONE HYDROCHLORIDE 1 MG/MIN: 1.8 INJECTION, SOLUTION INTRAVENOUS at 04:09

## 2025-01-16 RX ADMIN — HEPARIN SODIUM 5000 UNITS: 5000 INJECTION INTRAVENOUS; SUBCUTANEOUS at 04:57

## 2025-01-16 RX ADMIN — HEPARIN SODIUM 5000 UNITS: 5000 INJECTION INTRAVENOUS; SUBCUTANEOUS at 13:10

## 2025-01-16 RX ADMIN — METOPROLOL TARTRATE 5 MG: 5 INJECTION INTRAVENOUS at 00:29

## 2025-01-16 RX ADMIN — METOPROLOL TARTRATE 5 MG: 5 INJECTION INTRAVENOUS at 18:00

## 2025-01-16 RX ADMIN — Medication 10 ML: at 08:13

## 2025-01-16 RX ADMIN — ACETAMINOPHEN 650 MG: 325 TABLET ORAL at 20:47

## 2025-01-16 RX ADMIN — METOPROLOL TARTRATE 5 MG: 5 INJECTION INTRAVENOUS at 22:26

## 2025-01-16 RX ADMIN — AMIODARONE HYDROCHLORIDE 1 MG/MIN: 1.8 INJECTION, SOLUTION INTRAVENOUS at 09:54

## 2025-01-16 RX ADMIN — DIGOXIN 250 MCG: 125 TABLET ORAL at 10:21

## 2025-01-16 RX ADMIN — AMIODARONE HYDROCHLORIDE 1 MG/MIN: 1.8 INJECTION, SOLUTION INTRAVENOUS at 16:13

## 2025-01-16 RX ADMIN — MEROPENEM 1000 MG: 1 INJECTION INTRAVENOUS at 10:06

## 2025-01-16 RX ADMIN — MEROPENEM 1000 MG: 1 INJECTION INTRAVENOUS at 22:43

## 2025-01-16 RX ADMIN — METOPROLOL TARTRATE 5 MG: 5 INJECTION INTRAVENOUS at 08:12

## 2025-01-16 RX ADMIN — AMIODARONE HYDROCHLORIDE 1 MG/MIN: 1.8 INJECTION, SOLUTION INTRAVENOUS at 22:33

## 2025-01-16 RX ADMIN — MIDODRINE HYDROCHLORIDE 20 MG: 10 TABLET ORAL at 16:14

## 2025-01-16 RX ADMIN — HYDROCORTISONE SODIUM SUCCINATE 50 MG: 100 INJECTION, POWDER, FOR SOLUTION INTRAMUSCULAR; INTRAVENOUS at 16:13

## 2025-01-16 RX ADMIN — HEPARIN SODIUM 5000 UNITS: 5000 INJECTION INTRAVENOUS; SUBCUTANEOUS at 22:27

## 2025-01-16 RX ADMIN — INSULIN LISPRO 12 UNITS: 100 INJECTION, SOLUTION INTRAVENOUS; SUBCUTANEOUS at 10:02

## 2025-01-16 ASSESSMENT — PAIN SCALES - GENERAL
PAINLEVEL_OUTOF10: 0
PAINLEVEL_OUTOF10: 3
PAINLEVEL_OUTOF10: 0

## 2025-01-16 ASSESSMENT — PULMONARY FUNCTION TESTS
PIF_VALUE: 24
PIF_VALUE: 19
PIF_VALUE: 18
PIF_VALUE: 19
PIF_VALUE: 18
PIF_VALUE: 18

## 2025-01-16 NOTE — INTERDISCIPLINARY ROUNDS
Spiritual Care Services     Summary of Visit:   participated in ICU rounds. Patient currently intubated.  Family trying to decide between pursuing long term care and placement and moving towards palliative or comfort care.    Encounter Summary  Encounter Overview/Reason: Interdisciplinary rounds, Palliative Care  Service Provided For: Patient  Support System: Spouse  Complexity of Encounter: High  Begin Time: 1900  End Time : 1935  Total Time Calculated: 35 min     Crisis  Type: Rapid Response  Spiritual/Emotional needs  Type: Spiritual Support, Emotional Distress              Palliative Care  Type: Palliative Care, Interdisciplinary Rounds       Spiritual Assessment/Intervention/Outcomes:    Assessment: Calm, Peaceful, Hopeful, Other (Comment) ( assessed patients wife)    Intervention: Sustaining Presence/[facility], Prayer (assurance of)/Beason, Empowerment, Active listening    Outcome: Connection/Belonging, Peace, Receptive, Expressed Gratitude, Comfort, Encouraged, Acceptance      Care Plan:    Plan and Referrals  Plan/Referrals: Continue Support (comment)     to provide additional support to patient and family as needed or requested      Spiritual Care Services   Electronically signed by Chaplain Neeta on 1/16/2025 at 10:11 AM.    To reach a  for emotional and spiritual support, place an EPIC consult request.   If a  is needed immediately, dial “0” and ask to page the on-call .    Alert-The patient is alert, awake and responds to voice. The patient is oriented to time, place, and person. The triage nurse is able to obtain subjective information.

## 2025-01-16 NOTE — CARE COORDINATION
Met with wife and her brother at bedside and I explained again about trying for LTAC to see if it will get approved. I let them know that we will keep them informed. Wife would prefer a facility out toward [place] rather than [place].

## 2025-01-16 NOTE — PROGRESS NOTES
Wound Ostomy Continence Nurse  Consult Note       NAME:  Remy Upton  MEDICAL RECORD NUMBER:  [id number]  AGE: 74 y.o.   GENDER: male  : 1950  TODAY'S DATE:  2025    Subjective   Reason for [facility] Nurse Evaluation and Assessment: Multiple Pressure Injuries & Vascular wounds (POA)      Remy Upton is a 74 y.o. male referred by:   [x] Physician  [] Nursing  [] Other:     Wound Identification:  Wound Type: arterial and pressure  Contributing Factors: chronic pressure, decreased mobility, shear force, arterial insufficiency, decreased tissue oxygenation, malnutrition, incontinence of stool, and Cachexia    Wound History: Patient known to the Wound Ostomy Nurse service from previous admission. Patient presenting with multiple pressure injuries and vascular ulcers  Current Wound Care Treatment:  Wound care orders placed per [facility] Skin care protocol    Patient Goal of Care:  [] Wound Healing  [] Odor Control  [x] Palliative Care  [] Pain Control   [] Other:         PAST MEDICAL HISTORY        Diagnosis Date    Diabetes mellitus (HCC)     Hemodialysis access, fistula mature (HCC) 2002    Hypertension     Rhinovirus 2024    Seizures (HCC)     no seizure since     Smoking        PAST SURGICAL HISTORY    Past Surgical History:   Procedure Laterality Date    BRAIN SURGERY Left 1990    to eliminate seizures    COLONOSCOPY N/A 10/24/2022    COLONOSCOPY DIAGNOSTIC performed by Va Ordoñez MD at [facility]    KIDNEY TRANSPLANT      [facility] INCL FLUOR GDNCE DX W/CELL WASHG SPX N/A 2018    BRONCHOSCOPY [place] Crist[place] Conde [place] [facility] [place]    TRACHEOSTOMY  2024    UPPER GASTROINTESTINAL ENDOSCOPY N/A 10/21/2022    EGD DIAGNOSTIC ONLY performed by Jaime Martinez MD at [facility]    UPPER GASTROINTESTINAL ENDOSCOPY N/A 2025    Esophagogastroduodenoscopy with percutaneous endoscopic gastrostomy tube/ICU bedside/anesthesia not required

## 2025-01-16 NOTE — PROGRESS NOTES
Nephrology Progress Note    Assessment:  CKD-5  DM type-2  Hypotension  Seizure History        Plan:will discuss with family needs for terminal wean    Patient Active Problem List:     Pneumonia     Abdominal pain, other specified site     Acute pyelonephritis without lesion of renal medullary necrosis     Anemia     Essential hypertension     Nonspecific abnormal results of thyroid function study     Mixed hyperlipidemia     Kidney replaced by transplant     Intra-abdominal abscess post-procedure (HCC)     Infection due to Enterococcus     Fever and other physiologic disturbances of temperature regulation     Chronic kidney disease (CKD)     Type 2 diabetes mellitus with stage 3b chronic kidney disease, without long-term current use of insulin (HCC)     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 (HCC)     Abnormal CT of the chest     Bronchiectasis without complication (HCC)     GI bleeding     Complication of transplanted kidney     Muscle weakness (generalized)     Difficulty in walking     Elevated BUN     Urinary retention     Immunosuppression due to drug therapy (HCC)     DELORES (acute kidney injury) (HCC)     Adjustment disorder     Gross hematuria     Bilateral lower extremity edema     Dysuria     Acute cystitis without hematuria     Bilateral hearing loss     Abscess, toe     Acute hyperkalemia     Acute pain of left shoulder     Astigmatism     Astigmatism of both eyes with presbyopia     At risk for stroke     Benign enlargement of prostate     Benign prostatic hyperplasia with urinary frequency     Rhinovirus     Chronic right shoulder pain     Condition

## 2025-01-16 NOTE — PROGRESS NOTES
\"code stroke\" or \"stroke alert\" been called?->No What reading provider will be dictating this exam?->CRC FINDINGS: BRAIN/VENTRICLES: There is no acute intracranial hemorrhage, mass effect or midline shift.  No abnormal extra-axial fluid collection.  No evidence of an acute infarct.  Similar encephalomalacia of the left anterior temporal lobe. There is no evidence of acute hydrocephalus.  Similar periventricular white matter hypodensity. ORBITS: The visualized portion of the orbits demonstrate no acute abnormality. SINUSES: The visualized paranasal sinuses and mastoid air cells demonstrate no acute abnormality.  Similar right sphenoid opacification with central hyperdensity that may relate to inspissated mucus or fungal elements. SOFT TISSUES/SKULL:  No acute abnormality of the visualized skull or soft tissues.  Redemonstrated left craniotomy.  Tubes entering the oropharynx likely relate to endotracheal and enteric tubes, incompletely imaged.      No acute intracranial findings.      XR CHEST PORTABLE     Result Date: 12/18/2024  EXAMINATION: ONE XRAY VIEW OF THE CHEST 12/18/2024 3:42 pm COMPARISON: None. HISTORY: ORDERING SYSTEM PROVIDED HISTORY: hypoxia TECHNOLOGIST PROVIDED HISTORY: Reason for exam:->hypoxia What reading provider will be dictating this exam?->CRC FINDINGS: Heart / Mediastinum: Heart size is normal.  Normal pulmonary vasculature. Mediastinal contours are unremarkable. Lungs: Emphysematous changes bilaterally.  No focal consolidation. Pleura: No pneumothorax or pleural effusion. Bones: No acute osseous abnormality.      Emphysema.  No focal consolidation.      XR CHEST PORTABLE     Result Date: 12/18/2024  EXAMINATION: ONE XRAY VIEW OF THE CHEST 12/18/2024 12:56 pm COMPARISON: November 4, 2024 HISTORY: ORDERING SYSTEM PROVIDED HISTORY: cough TECHNOLOGIST PROVIDED HISTORY: Reason for exam:->cough What reading provider will be dictating this exam?->CRC FINDINGS: The heart is enlarged.  Patient's chin  heart rate with heart rate control approach.  As needed IV Lopressor every 4 hours  No anticoagulation secondary to anemia.  Patient with prior high risk of bleeding status post Watchman device  Wean Levophed off as tolerated  Monitor I's and O's and renal function.  Maintain potassium between 4-5 magnesium greater than 2  Nephrology recommendations  GI/DVT prophylaxis  No need for echocardiogram.  EF of 55 to 60 percent by echo on December 2024           Thank you for allowing me to participate in the care of your patient, please don't hesitate to contact me if you have any further questions.

## 2025-01-16 NOTE — CARE COORDINATION
Team ICU rounds done this am and family not present for rounds. I called wife and she does prefer LTAC if we can do that and try again. She states pt did not get to [facility] until 0230 am and then at 0730 he was taken to other hospital and she would rather drive to [facility] than [place] and wants to try for the LTAC. I let her know we will keep her updated.

## 2025-01-16 NOTE — CARE COORDINATION
Chart reviewed and per note wife would like LTAC for us to try again . I called Kira and gave her referral letting her know he was dc for less than 24 hr and was readmitted. Kira believes failing at SNF will help with criteria for LTAC.

## 2025-01-16 NOTE — PROGRESS NOTES
Pulmonary & Critical Care Medicine ICU Progress Note  Chief complaint : Respiratory failure    Subjunctive/24 hour events :   Patient seen and examined during multidisciplinary rounds with RN, charge nurse, RT, pharmacy, dietitian, and social service.   Awake on vent, new PEG tube placed yesterday, no recorded fever overnight, he did have low-grade fever yesterday 100.2 °F, he is on 30% 5 2 saturation 99%, no apparent pain, urine output 390 cc, no fever.    On Levophed at 5 mcg/min    New information updated in the note today, rest of the examination did not change compared to yesterday.  Social History     Tobacco Use    Smoking status: Former     Current packs/day: 0.00     Types: Cigarettes     Quit date: 2015     Years since quittin.5    Smokeless tobacco: Former   Substance Use Topics    Alcohol use: No     Alcohol/week: 0.0 standard drinks of alcohol         Problem Relation Age of Onset    Colon Cancer Neg Hx        No results for input(s): \"PHART\", \"CVU8BPO\", \"PO2ART\" in the last 72 hours.    MV Settings:  Vent Mode: (S) CPAP/PS Resp Rate (Set): 14 bpm/Vt (Set, mL): 500 mL/ /FiO2 : 30 %           IV:   sodium chloride      norepinephrine 5 mcg/min (25)    dextrose      sodium chloride      amiodarone 1 mg/min (25)       Vitals:  BP (!) 111/37   Pulse (!) 131   Temp 100.2 °F (37.9 °C) (Oral)   Resp 27   Wt 75.2 kg (165 lb 12.6 oz)   SpO2 99%   BMI 21.29 kg/m²    Tmax:        Intake/Output Summary (Last 24 hours) at 2025 0822  Last data filed at 2025 0608  Gross per 24 hour   Intake 585.98 ml   Output 390 ml   Net 195.98 ml       EXAM:  General: alert, cooperative, no distress  Head: normocephalic, atraumatic  Eyes:No gross abnormalities.  ENT:  MMM no lesions  Neck:  supple and no masses  Chest : clear to auscultation bilaterally- no wheezes, rales or rhonchi, normal air movement, no respiratory distress  Heart:: Heart sounds are normal.  Regular rate and rhythm  \"KETONESU\"    Cultures:    Films:  CT abdomen shows bibasilar atelectasis/infiltrate, with pleural effusion  Chest x-ray no acute infiltrate  Assessment:  This is a critically ill patient at risk of deterioration / death , needing close ICU monitoring and intervention due to below noted problems     Septic shock,    Bibasilar pneumonia, possible aspiration pneumonia  A-fib with RVR  Acute on chronic renal failure currently on HD   Anemia, source is not clear  Respiratory failure, status post tracheostomy  A-fib status post Watchman procedure, on amiodarone drip  History of DVT not on chronic anticoagulation  Status post renal transplant, immunocompromised  Pulmonary hypertension, group 2 and 3     Recommendation  Vent support, lung protective strategy  Pressure support trial as tolerated, transition to trach collar if able to tolerate  Pulmonary hygiene  Tracheostomy care\  head of the bed 30°  Meropenem and vancomycin   Follow-up culture   Continue amiodarone drip   TV on, natural light, avoid benzos, pain control, early mobility, and family engagement  PUD prophylaxis  DVT prophylaxis   tube feed  Target blood sugar 140-180  Levophed to maintain MAP ~65-70  Monitor and replace electrolyte as needed  Continue meropenem and vancomycin, follow-up culture  Add Solu-Cortef  Add midodrine  Add digoxin and monitor  Continue amiodarone drip, appreciate cardiology    Due to the immediate potential for life-threatening deterioration due to shock I spent 35  minutes providing critical care.  This time is excluding time spent performing procedures.          Electronically signed by Carlotta Yadav MD,  [facility] ,on 1/16/2025 at 8:22 AM

## 2025-01-16 NOTE — PROGRESS NOTES
Hospitalist Progress Note      PCP: Tico Rodriguez DO    Date of Admission: 1/14/2025    Chief Complaint:  no acute events, low grade fever-37.9 C, remains in AFIB with RVR overnight requiring Amiodarone infusion, remains hypotensive requiring low dose Norepinephrine infusion, u/o -      Medications:  Reviewed    Infusion Medications    sodium chloride      norepinephrine 5 mcg/min (01/16/25 0608)    dextrose      sodium chloride      amiodarone 1 mg/min (01/16/25 0954)     Scheduled Medications    hydrocortisone sodium succinate PF  50 mg IntraVENous Q6H    midodrine  20 mg Oral TID WC    insulin lispro  0-16 Units SubCUTAneous Q4H    digoxin  250 mcg Oral Once    Followed by    digoxin  250 mcg Oral Once    [START ON 1/17/2025] digoxin  125 mcg Oral Daily    meropenem  1,000 mg IntraVENous Q12H    sodium chloride flush  5-40 mL IntraVENous 2 times per day    heparin (porcine)  5,000 Units SubCUTAneous 3 times per day    vancomycin (VANCOCIN) intermittent dosing (placeholder)   Other RX Placeholder    pantoprazole (PROTONIX) 40 mg in sodium chloride (PF) 0.9 % 10 mL injection  40 mg IntraVENous Daily     PRN Meds: metoprolol, sodium chloride flush, sodium chloride, acetaminophen **OR** acetaminophen, glucose, dextrose bolus **OR** dextrose bolus, glucagon (rDNA), dextrose, sodium chloride      Intake/Output Summary (Last 24 hours) at 1/16/2025 0955  Last data filed at 1/16/2025 0608  Gross per 24 hour   Intake 585.98 ml   Output 390 ml   Net 195.98 ml       Exam:    BP (!) 115/36   Pulse (!) 110   Temp 99.5 °F (37.5 °C) (Axillary)   Resp 25   Wt 75.2 kg (165 lb 12.6 oz)   SpO2 99%   BMI 21.29 kg/m²     General appearance: awake, intubated via trach, ill-appearing  Lungs: coarse sounds bilaterally  Heart: S1/S2, irregular, tachycardic  Abdomen: soft  Extremities: no edema      Labs:   Recent Labs     01/14/25  1715 01/15/25  0140 01/15/25  0653 01/16/25  0441   WBC 7.4  --  9.3 9.3   HGB 6.4* 9.2* 9.3* 9.0*    HCT 21.0* 29.5* 29.3* 28.6*     --  304 320     Recent Labs     01/15/25  0140 01/15/25  0653 01/16/25  0441   * 134* 132*   K 4.2 4.1 3.9   CL 95 95 93*   CO2 27 28 24   BUN 38* 39* 48*   CREATININE 1.37* 1.49* 1.63*   CALCIUM 8.2* 8.3* 8.5   PHOS  --   --  3.6     Recent Labs     01/14/25  1906 01/15/25  0140 01/15/25  0653   AST 30 25 23   ALT 23 23 24   BILITOT 0.6 0.8* 0.8*   ALKPHOS 112* 131* 127*     No results for input(s): \"INR\" in the last 72 hours.  No results for input(s): \"CKTOTAL\", \"TROPONINI\" in the last 72 hours.    Urinalysis:      Lab Results   Component Value Date/Time    NITRU Negative 01/08/2025 01:01 AM    WBCUA >100 01/08/2025 01:01 AM    WBCUA 13 08/11/2023 09:49 AM    BACTERIA Negative 01/08/2025 01:01 AM    RBCUA 10-20 01/08/2025 01:01 AM    RBCUA 2 08/11/2023 09:49 AM    BLOODU LARGE 01/08/2025 01:01 AM    SPECGRAV 1.015 08/17/2021 11:00 AM    GLUCOSEU 100 01/08/2025 01:01 AM    GLUCOSEU GT 1 09/14/2011 08:57 AM       Radiology:  CT ABDOMEN PELVIS WO CONTRAST Additional Contrast? None   Final Result   1. New mild right greater than left pleural effusions with new moderate   nodular consolidation of the posterior right lower lobe consistent with   pneumonia or atelectasis.   2. New mild compressive atelectasis of the posterior left lower lobe adjacent   to the effusion.   3. Stable appearance of a functioning left pelvic transplant kidney. Stable   appearance of a moderately atrophic right renal transplant kidney.   4. Stable prominent atrophy of the native kidneys consistent with chronic   renal failure.   5. Stable 1.2 cm indeterminate density nodule arising from the posterior   interpolar right kidney, most likely a high density cyst since it has not   changed in size during the interval.   6. Tiny amount of free fluid in the pelvis, nonspecific.   7. New moderate irregular thickening of the urinary bladder wall, suggesting   cystitis.         XR CHEST PORTABLE   Final

## 2025-01-16 NOTE — FLOWSHEET NOTE
Shift Summary:    Bedside report received from Marci FRY.    Pt assessed (see flowsheets), no changes noted from this RN's assessment previous day other than new PEG tube placement.     Bolus tube feedings give x2, pt tolerated well.     Pt heart rate remained 110's to 140's throughout shift, Salome PEÑALOZA notified of sustained heart rates 130-140's' 5mg metoprolol ordered and given, with short lived improvement in heart rate. Returned 120's-130's within 1 hr.     Bladder scanned 0300 for 160ml bladder scanned again at 0630 for 230ml.    3 large BM's before 0000, FMS placed per JH Mcmahon.     All medications administered and titrated per protocol (See eMAR)    Bed remained locked and in low position.     Report given to LISANDRA Covarrubias

## 2025-01-16 NOTE — FLOWSHEET NOTE
0745 Assessment complete, see flow sheet. Patient remains on trach via the vent, does open eyes to verbal stimuli and attempt to nod appropriately to questions asked, easily falls back to sleep. Patient suctioned and mouth care done, repositioned for comfort.  1130 MP remains Afib 120-130', Earnestine Reddy aware of patients BP and MP, see order received.  1700 Patient bladder scan done for 421 cc, straight cath done for 430 cc, patient tolerate well. Debo from wound care at bedside, patient repositioned, maurizio care done, Triad cream applied to sacral area, bed pad changed, patient repositioned for comfort. No changes in assessment noted.

## 2025-01-16 NOTE — PROGRESS NOTES
Pharmacy Vancomycin Consult     Vancomycin Day: 3  Current Dosing: post 750mg dose yesterday 2000      Recent Labs     01/15/25  0653 01/16/25  0441   BUN 39* 48*   CREATININE 1.49* 1.63*   WBC 9.3 9.3       Intake/Output Summary (Last 24 hours) at 1/16/2025 1433  Last data filed at 1/16/2025 1200  Gross per 24 hour   Intake 1134.8 ml   Output 390 ml   Net 744.8 ml     Cultures  Recent Labs     01/14/25  1809 01/14/25  1702 01/08/25  0305   BC  --  No Growth to date.  Any change in status will be called.  --    BLOODCULT2  --  No Growth to date.  Any change in status will be called.  --    LABURIN  --   --  Cult,Urine:  NO GROWTH  Performed at [facility] [address] [id number] ([phone]     COVID19 Not Detected  --   --       , Weight - Scale: 75.2 kg (165 lb 12.6 oz), Body mass index is 21.29 kg/m².    Estimated Creatinine Clearance: 43 mL/min (A) (based on SCr of 1.63 mg/dL (H)).  .    Trough:  Recent Labs     01/15/25  0806   VANCORANDOM 18.6      Assessment/Plan:  Still no plan for dialysis noted, will adjust lab to be 24 hours from last dose and assess need for repeat dosing today. If renal function remains improved and no plan for HD will enter patient into PK software for analysis.   Timing of future trough levels may be adjusted based on culture results, renal function, and clinical response.    Thank you,  Digna Morgan, [facility] PharmD

## 2025-01-16 NOTE — PROGRESS NOTES
PHARMACY NOTE:   Interdisciplinary Rounds Completed     Changes made today by Pharmacy:   Solu cortef 50 q6h added per verbal on rounds  Midodrine 20mg TID added per verbal on rounds   Change to high SSI per verbal on rounds  Digoxin PO load per verbal on rounds, pharmacy to monitor level. 250 load followed by 250 q6 x 1 and 125mg daily. Level 12 hours following second dose to assess.   Follow up merrem continue/Id consult tomorrow 72 hours     Additional information:   Amio drip  Steroid: added per above  Insulin coverage: changed to high sliding scale with Humalog, utilized 2 units per sliding scale over the past 24 hours  Pressors: levo @ 5  Antimicrobial therapy, day #3/7:vanco. Merrem day #2/3.  ID not on consult. Cultures no growth  Core measures assessed/met    Digna Morgan RPH PharmD
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
## 2025-01-17 LAB
ALBUMIN SERPL-MCNC: 2.4 G/DL (ref 3.5–4.6)
ANION GAP SERPL CALCULATED.3IONS-SCNC: 15 MEQ/L (ref 9–15)
BACTERIA URNS QL MICRO: NEGATIVE /HPF
BASOPHILS # BLD: 0 K/UL (ref 0–0.2)
BASOPHILS NFR BLD: 0.2 %
BILIRUB UR QL STRIP: NEGATIVE
BUN SERPL-MCNC: 54 MG/DL (ref 8–23)
CALCIUM SERPL-MCNC: 8.5 MG/DL (ref 8.5–9.9)
CHLORIDE SERPL-SCNC: 95 MEQ/L (ref 95–107)
CLARITY UR: CLEAR
CO2 SERPL-SCNC: 24 MEQ/L (ref 20–31)
COLOR UR: YELLOW
CREAT SERPL-MCNC: 1.92 MG/DL (ref 0.7–1.2)
DIGOXIN SERPL-MCNC: 3.6 NG/ML (ref 0.8–2)
EKG ATRIAL RATE: 300 BPM
EKG Q-T INTERVAL: 372 MS
EKG QRS DURATION: 90 MS
EKG QTC CALCULATION (BAZETT): 489 MS
EKG R AXIS: -13 DEGREES
EKG T AXIS: 51 DEGREES
EKG VENTRICULAR RATE: 104 BPM
EOSINOPHIL # BLD: 0 K/UL (ref 0–0.7)
EOSINOPHIL NFR BLD: 0 %
EPI CELLS #/AREA URNS AUTO: ABNORMAL /HPF (ref 0–5)
ERYTHROCYTE [DISTWIDTH] IN BLOOD BY AUTOMATED COUNT: 17 % (ref 11.5–14.5)
GLUCOSE BLD-MCNC: 158 MG/DL (ref 70–99)
GLUCOSE BLD-MCNC: 186 MG/DL (ref 70–99)
GLUCOSE BLD-MCNC: 222 MG/DL (ref 70–99)
GLUCOSE BLD-MCNC: 249 MG/DL (ref 70–99)
GLUCOSE BLD-MCNC: 263 MG/DL (ref 70–99)
GLUCOSE BLD-MCNC: 294 MG/DL (ref 70–99)
GLUCOSE SERPL-MCNC: 202 MG/DL (ref 70–99)
GLUCOSE UR STRIP-MCNC: 100 MG/DL
HCT VFR BLD AUTO: 28.4 % (ref 42–52)
HGB BLD-MCNC: 9 G/DL (ref 14–18)
HGB UR QL STRIP: ABNORMAL
HYALINE CASTS #/AREA URNS AUTO: ABNORMAL /HPF (ref 0–5)
KETONES UR STRIP-MCNC: NEGATIVE MG/DL
LEUKOCYTE ESTERASE UR QL STRIP: ABNORMAL
LYMPHOCYTES # BLD: 0.6 K/UL (ref 1–4.8)
LYMPHOCYTES NFR BLD: 5.2 %
MAGNESIUM SERPL-MCNC: 2.4 MG/DL (ref 1.7–2.4)
MCH RBC QN AUTO: 29.3 PG (ref 27–31.3)
MCHC RBC AUTO-ENTMCNC: 31.7 % (ref 33–37)
MCV RBC AUTO: 92.5 FL (ref 79–92.2)
MONOCYTES # BLD: 0.4 K/UL (ref 0.2–0.8)
MONOCYTES NFR BLD: 3.6 %
NEUTROPHILS # BLD: 9.5 K/UL (ref 1.4–6.5)
NEUTS SEG NFR BLD: 89 %
NITRITE UR QL STRIP: NEGATIVE
PERFORMED ON: ABNORMAL
PH UR STRIP: 5.5 [PH] (ref 5–9)
PHOSPHATE SERPL-MCNC: 3.4 MG/DL (ref 2.3–4.8)
PLATELET # BLD AUTO: 338 K/UL (ref 130–400)
POTASSIUM SERPL-SCNC: 3.7 MEQ/L (ref 3.4–4.9)
PROT UR STRIP-MCNC: 100 MG/DL
RBC # BLD AUTO: 3.07 M/UL (ref 4.7–6.1)
RBC #/AREA URNS AUTO: ABNORMAL /HPF (ref 0–5)
SODIUM SERPL-SCNC: 134 MEQ/L (ref 135–144)
SP GR UR STRIP: 1.02 (ref 1–1.03)
URINE REFLEX TO CULTURE: YES
UROBILINOGEN UR STRIP-ACNC: 0.2 E.U./DL
VANCOMYCIN SERPL-MCNC: 18.4 UG/ML (ref 10–40)
WBC # BLD AUTO: 10.7 K/UL (ref 4.8–10.8)
WBC #/AREA URNS AUTO: ABNORMAL /HPF (ref 0–5)
YEAST URNS QL MICRO: PRESENT /HPF

## 2025-01-17 PROCEDURE — 93010 ELECTROCARDIOGRAM REPORT: CPT | Performed by: INTERNAL MEDICINE

## 2025-01-17 PROCEDURE — 51701 INSERT BLADDER CATHETER: CPT

## 2025-01-17 PROCEDURE — 2580000003 HC RX 258: Performed by: INTERNAL MEDICINE

## 2025-01-17 PROCEDURE — 6360000002 HC RX W HCPCS: Performed by: INTERNAL MEDICINE

## 2025-01-17 PROCEDURE — 99291 CRITICAL CARE FIRST HOUR: CPT | Performed by: INTERNAL MEDICINE

## 2025-01-17 PROCEDURE — 99232 SBSQ HOSP IP/OBS MODERATE 35: CPT | Performed by: INTERNAL MEDICINE

## 2025-01-17 PROCEDURE — 2000000000 HC ICU R&B

## 2025-01-17 PROCEDURE — 6370000000 HC RX 637 (ALT 250 FOR IP): Performed by: INTERNAL MEDICINE

## 2025-01-17 PROCEDURE — 6370000000 HC RX 637 (ALT 250 FOR IP): Performed by: NURSE PRACTITIONER

## 2025-01-17 PROCEDURE — 87086 URINE CULTURE/COLONY COUNT: CPT

## 2025-01-17 PROCEDURE — 94003 VENT MGMT INPAT SUBQ DAY: CPT

## 2025-01-17 PROCEDURE — 2500000003 HC RX 250 WO HCPCS: Performed by: INTERNAL MEDICINE

## 2025-01-17 PROCEDURE — 2500000003 HC RX 250 WO HCPCS

## 2025-01-17 PROCEDURE — 85025 COMPLETE CBC W/AUTO DIFF WBC: CPT

## 2025-01-17 PROCEDURE — 2580000003 HC RX 258: Performed by: NURSE PRACTITIONER

## 2025-01-17 PROCEDURE — 83735 ASSAY OF MAGNESIUM: CPT

## 2025-01-17 PROCEDURE — 81001 URINALYSIS AUTO W/SCOPE: CPT

## 2025-01-17 PROCEDURE — 99232 SBSQ HOSP IP/OBS MODERATE 35: CPT

## 2025-01-17 PROCEDURE — 80202 ASSAY OF VANCOMYCIN: CPT

## 2025-01-17 PROCEDURE — 36415 COLL VENOUS BLD VENIPUNCTURE: CPT

## 2025-01-17 PROCEDURE — 99222 1ST HOSP IP/OBS MODERATE 55: CPT | Performed by: INTERNAL MEDICINE

## 2025-01-17 PROCEDURE — 6360000002 HC RX W HCPCS: Performed by: NURSE PRACTITIONER

## 2025-01-17 PROCEDURE — 51798 US URINE CAPACITY MEASURE: CPT

## 2025-01-17 PROCEDURE — 80162 ASSAY OF DIGOXIN TOTAL: CPT

## 2025-01-17 PROCEDURE — 2500000003 HC RX 250 WO HCPCS: Performed by: NURSE PRACTITIONER

## 2025-01-17 PROCEDURE — 2700000000 HC OXYGEN THERAPY PER DAY

## 2025-01-17 PROCEDURE — 80069 RENAL FUNCTION PANEL: CPT

## 2025-01-17 RX ORDER — METOPROLOL TARTRATE 1 MG/ML
5 INJECTION, SOLUTION INTRAVENOUS EVERY 4 HOURS PRN
Status: DISCONTINUED | OUTPATIENT
Start: 2025-01-17 | End: 2025-01-29 | Stop reason: HOSPADM

## 2025-01-17 RX ORDER — OXYCODONE AND ACETAMINOPHEN 5; 325 MG/1; MG/1
2 TABLET ORAL EVERY 4 HOURS PRN
Status: DISCONTINUED | OUTPATIENT
Start: 2025-01-17 | End: 2025-01-29 | Stop reason: HOSPADM

## 2025-01-17 RX ORDER — LORAZEPAM 2 MG/ML
1 INJECTION INTRAMUSCULAR ONCE
Status: DISCONTINUED | OUTPATIENT
Start: 2025-01-17 | End: 2025-01-25

## 2025-01-17 RX ORDER — DIGOXIN 125 MCG
62.5 TABLET ORAL DAILY
Status: DISCONTINUED | OUTPATIENT
Start: 2025-01-18 | End: 2025-01-18

## 2025-01-17 RX ORDER — OXYCODONE AND ACETAMINOPHEN 5; 325 MG/1; MG/1
1 TABLET ORAL EVERY 4 HOURS PRN
Status: DISCONTINUED | OUTPATIENT
Start: 2025-01-17 | End: 2025-01-29 | Stop reason: HOSPADM

## 2025-01-17 RX ADMIN — MIDODRINE HYDROCHLORIDE 20 MG: 10 TABLET ORAL at 13:20

## 2025-01-17 RX ADMIN — INSULIN LISPRO 8 UNITS: 100 INJECTION, SOLUTION INTRAVENOUS; SUBCUTANEOUS at 20:42

## 2025-01-17 RX ADMIN — FINASTERIDE 5 MG: 5 TABLET, FILM COATED ORAL at 08:04

## 2025-01-17 RX ADMIN — INSULIN LISPRO 8 UNITS: 100 INJECTION, SOLUTION INTRAVENOUS; SUBCUTANEOUS at 14:34

## 2025-01-17 RX ADMIN — HYDROCORTISONE SODIUM SUCCINATE 50 MG: 100 INJECTION, POWDER, FOR SOLUTION INTRAMUSCULAR; INTRAVENOUS at 14:34

## 2025-01-17 RX ADMIN — Medication 10 ML: at 20:44

## 2025-01-17 RX ADMIN — HYDROCORTISONE SODIUM SUCCINATE 50 MG: 100 INJECTION, POWDER, FOR SOLUTION INTRAMUSCULAR; INTRAVENOUS at 08:04

## 2025-01-17 RX ADMIN — OXYCODONE AND ACETAMINOPHEN 2 TABLET: 5; 325 TABLET ORAL at 20:43

## 2025-01-17 RX ADMIN — MIDODRINE HYDROCHLORIDE 20 MG: 10 TABLET ORAL at 17:50

## 2025-01-17 RX ADMIN — HEPARIN SODIUM 5000 UNITS: 5000 INJECTION INTRAVENOUS; SUBCUTANEOUS at 20:43

## 2025-01-17 RX ADMIN — INSULIN LISPRO 4 UNITS: 100 INJECTION, SOLUTION INTRAVENOUS; SUBCUTANEOUS at 02:47

## 2025-01-17 RX ADMIN — HEPARIN SODIUM 5000 UNITS: 5000 INJECTION INTRAVENOUS; SUBCUTANEOUS at 14:34

## 2025-01-17 RX ADMIN — NOREPINEPHRINE BITARTRATE 15 MCG/MIN: 64 SOLUTION INTRAVENOUS at 12:38

## 2025-01-17 RX ADMIN — AMIODARONE HYDROCHLORIDE 0.5 MG/MIN: 1.8 INJECTION, SOLUTION INTRAVENOUS at 14:52

## 2025-01-17 RX ADMIN — MEROPENEM 1000 MG: 1 INJECTION INTRAVENOUS at 11:02

## 2025-01-17 RX ADMIN — OXYCODONE AND ACETAMINOPHEN 2 TABLET: 5; 325 TABLET ORAL at 10:57

## 2025-01-17 RX ADMIN — HYDROCORTISONE SODIUM SUCCINATE 50 MG: 100 INJECTION, POWDER, FOR SOLUTION INTRAMUSCULAR; INTRAVENOUS at 20:43

## 2025-01-17 RX ADMIN — DIGOXIN 125 MCG: 125 TABLET ORAL at 08:04

## 2025-01-17 RX ADMIN — HYDROCORTISONE SODIUM SUCCINATE 50 MG: 100 INJECTION, POWDER, FOR SOLUTION INTRAMUSCULAR; INTRAVENOUS at 05:48

## 2025-01-17 RX ADMIN — AMIODARONE HYDROCHLORIDE 1 MG/MIN: 1.8 INJECTION, SOLUTION INTRAVENOUS at 04:30

## 2025-01-17 RX ADMIN — INSULIN LISPRO 4 UNITS: 100 INJECTION, SOLUTION INTRAVENOUS; SUBCUTANEOUS at 18:04

## 2025-01-17 RX ADMIN — INSULIN LISPRO 4 UNITS: 100 INJECTION, SOLUTION INTRAVENOUS; SUBCUTANEOUS at 06:53

## 2025-01-17 RX ADMIN — NOREPINEPHRINE BITARTRATE 20 MCG/MIN: 64 SOLUTION INTRAVENOUS at 05:20

## 2025-01-17 RX ADMIN — HEPARIN SODIUM 5000 UNITS: 5000 INJECTION INTRAVENOUS; SUBCUTANEOUS at 06:53

## 2025-01-17 RX ADMIN — MIDODRINE HYDROCHLORIDE 20 MG: 10 TABLET ORAL at 08:04

## 2025-01-17 RX ADMIN — SODIUM CHLORIDE 40 MG: 9 INJECTION INTRAMUSCULAR; INTRAVENOUS; SUBCUTANEOUS at 08:05

## 2025-01-17 RX ADMIN — Medication 10 ML: at 08:05

## 2025-01-17 ASSESSMENT — PAIN DESCRIPTION - LOCATION: LOCATION: BUTTOCKS

## 2025-01-17 ASSESSMENT — PAIN SCALES - GENERAL
PAINLEVEL_OUTOF10: 0
PAINLEVEL_OUTOF10: 4
PAINLEVEL_OUTOF10: 0
PAINLEVEL_OUTOF10: 8
PAINLEVEL_OUTOF10: 8
PAINLEVEL_OUTOF10: 0
PAINLEVEL_OUTOF10: 7

## 2025-01-17 ASSESSMENT — PULMONARY FUNCTION TESTS
PIF_VALUE: 12
PIF_VALUE: 26
PIF_VALUE: 27
PIF_VALUE: 20
PIF_VALUE: 29
PIF_VALUE: 19

## 2025-01-17 NOTE — PROGRESS NOTES
Comprehensive Nutrition Assessment    Type and Reason for Visit:  Reassess    Nutrition Recommendations/Plan:   Continue current EN regimen : renal tube feeding  (Nepro), 325 ml, given 4x daily by PEG  Water flushed 50 ml before and after each bolus     Malnutrition Assessment:  Malnutrition Status:  Severe malnutrition (01/15/25 1617)    Context:  Chronic Illness     Findings of the 6 clinical characteristics of malnutrition:  Energy Intake:  Mild decrease in energy intake  Weight Loss:  Greater than 10% over 6 months     Body Fat Loss:  Severe body fat loss Orbital, Triceps, Fat Overlying Ribs   Muscle Mass Loss:  Severe muscle mass loss Temples (temporalis), Clavicles (pectoralis & deltoids), Scapula (trapezius)  Fluid Accumulation:  Unable to assess     Strength:  Not Performed    Nutrition Assessment:    Severe protein calorie malnutrition in the chronic setting, severe fat loss and muscle wasting present. Pt recently discharged after prolonged hospitalization requiring mechanical ventilation, s/p tracheostomy and PEG tube placement. Energy and protein needs met with bolus PEG tube feedings. Noted to have dislodged PEG tube on admission which has been replaced . Currently tolerating bolus tube feeding schedule.    Nutrition Related Findings:    Hx significant for: DM2, ESRD/CKD4,started HD (12/2024), Intubated (12/18), Trach (12/30), PEG (1/2). TF at goal since (12/26), transitioned to bolus schedule (1/3), Labs reviewed: variable glucose, low Na, elevated BUN/creat. Meds reviewed ( steroids), 1-2+ gen edema noted Wound Type: Multiple, Pressure Injury, Stage II, Skin Tears, Deep Tissue Injury       Current Nutrition Intake & Therapies:    Average Meal Intake: NPO  Average Supplements Intake: NPO  ADULT TUBE FEEDING; PEG; Renal Formula; Bolus; 4 Times Daily; 325; Gravity; 50; Before and after each bolus  Current Tube Feeding (TF) Orders:  Feeding Route: PEG  Formula: Renal Formula (Nepro)  Schedule:

## 2025-01-17 NOTE — CONSULTS
Infectious Disease     Patient Name: Remy Upton  Date: 1/17/2025  YOB: 1950  Medical Record Number: [id number]        Right lower lobe pneumonia      History of Present Illness:  Diabetes hypertension end-stage renal disease kidney transplant    Recent hospitalization from [facility] 2/1/2013 required hemodialysis had a rhinovirus infection aspiration pneumonia intubation and mechanical ventilation  Underwent tracheostomy on 12/30/2024    Discharged to nursing home return to hospital because of fever 101 °F pulled out PEG tube  Hypotensive requiring Levophed on admission and amiodarone for A-fib      White cell count not elevated    Procalcitonin 1.42  Creatinine 1.36      Bilirubin 0.8 alkaline phosphatase 131 AST ALT normal  Chest x-ray shows haziness right lung        CT ABDOMEN PELVIS WO CONTRAST Additional Contrast? None [[id number]]  Status: Final result     PACS Images     Show images for CT ABDOMEN PELVIS WO CONTRAST Additional Contrast? None  CT ABDOMEN PELVIS WO CONTRAST Additional Contrast? None  Order: [id number]  Status: Final result       Visible to patient: No (not released)       Next appt: 05/13/2025 at 03:45 PM in Neurology (Kadeem Aleman MD)    0 Result Notes       1  Topic  Details    Reading Physician Reading Date Result Priority   Harinder Alexandra MD  [phone] 1/15/2025      Narrative & Impression  EXAMINATION:  CT OF THE ABDOMEN AND PELVIS WITHOUT CONTRAST 1/15/2025 2:23 pm     TECHNIQUE:  CT of the abdomen and pelvis was performed without the administration of  intravenous contrast. Multiplanar reformatted images are provided for review.  Automated exposure control, iterative reconstruction, and/or weight based  adjustment of the mA/kV was utilized to reduce the radiation dose to as low  as reasonably achievable.     COMPARISON:  CT of the abdomen and pelvis with contrast from 10/19/2022.     HISTORY:  ORDERING SYSTEM PROVIDED HISTORY: sepsis  TECHNOLOGIST PROVIDED  pneumonia  Nosocomial pneumonia  Sepsis    Recent hospitalization    Continue vancomycin and meropenem

## 2025-01-17 NOTE — PLAN OF CARE
Problem: Chronic Conditions and Co-morbidities  Goal: Patient's chronic conditions and co-morbidity symptoms are monitored and maintained or improved  Outcome: Not Progressing  Flowsheets (Taken 1/16/2025 2000)  Care Plan - Patient's Chronic Conditions and Co-Morbidity Symptoms are Monitored and Maintained or Improved: Monitor and assess patient's chronic conditions and comorbid symptoms for stability, deterioration, or improvement     Problem: Discharge Planning  Goal: Discharge to home or other facility with appropriate resources  Outcome: Not Progressing  Flowsheets (Taken 1/16/2025 2000)  Discharge to home or other facility with appropriate resources: Identify barriers to discharge with patient and caregiver     Problem: Pain  Goal: Verbalizes/displays adequate comfort level or baseline comfort level  Outcome: Not Progressing  Flowsheets (Taken 1/16/2025 2000)  Verbalizes/displays adequate comfort level or baseline comfort level: Assess pain using appropriate pain scale     Problem: Safety - Adult  Goal: Free from fall injury  Outcome: Not Progressing     Problem: Skin/Tissue Integrity  Goal: Absence of new skin breakdown  Description: 1.  Monitor for areas of redness and/or skin breakdown  2.  Assess vascular access sites hourly  3.  Every 4-6 hours minimum:  Change oxygen saturation probe site  4.  Every 4-6 hours:  If on nasal continuous positive airway pressure, respiratory therapy assess nares and determine need for appliance change or resting period.  Outcome: Not Progressing     Problem: ABCDS Injury Assessment  Goal: Absence of physical injury  Outcome: Not Progressing     Problem: Nutrition Deficit:  Goal: Optimize nutritional status  Outcome: Not Progressing     Problem: Neurosensory - Adult  Goal: Achieves stable or improved neurological status  Outcome: Not Progressing  Flowsheets (Taken 1/16/2025 2000)  Achieves stable or improved neurological status: Assess for and report changes in neurological  status     Problem: Respiratory - Adult  Goal: Achieves optimal ventilation and oxygenation  Outcome: Not Progressing  Flowsheets (Taken 1/16/2025 2000)  Achieves optimal ventilation and oxygenation:   Assess for changes in respiratory status   Assess for changes in mentation and behavior     Problem: Cardiovascular - Adult  Goal: Maintains optimal cardiac output and hemodynamic stability  Outcome: Not Progressing  Flowsheets (Taken 1/16/2025 2000)  Maintains optimal cardiac output and hemodynamic stability: Monitor blood pressure and heart rate  Goal: Absence of cardiac dysrhythmias or at baseline  Outcome: Not Progressing  Flowsheets (Taken 1/16/2025 2000)  Absence of cardiac dysrhythmias or at baseline: Monitor cardiac rate and rhythm     Problem: Skin/Tissue Integrity - Adult  Goal: Skin integrity remains intact  Outcome: Not Progressing  Flowsheets (Taken 1/16/2025 2000)  Skin Integrity Remains Intact:   Monitor for areas of redness and/or skin breakdown   Every 4-6 hours minimum: Change oxygen saturation probe site     Problem: Musculoskeletal - Adult  Goal: Return mobility to safest level of function  Outcome: Not Progressing  Flowsheets (Taken 1/16/2025 2000)  Return Mobility to Safest Level of Function: Assess patient stability and activity tolerance for standing, transferring and ambulating with or without assistive devices     Problem: Gastrointestinal - Adult  Goal: Minimal or absence of nausea and vomiting  Outcome: Not Progressing  Flowsheets (Taken 1/16/2025 2000)  Minimal or absence of nausea and vomiting: Administer IV fluids as ordered to ensure adequate hydration  Goal: Maintains or returns to baseline bowel function  Outcome: Not Progressing  Flowsheets (Taken 1/16/2025 2000)  Maintains or returns to baseline bowel function: Assess bowel function  Goal: Maintains adequate nutritional intake  Outcome: Not Progressing  Flowsheets (Taken 1/16/2025 2000)  Maintains adequate nutritional intake:

## 2025-01-17 NOTE — PROGRESS NOTES
Hospitalist Progress Note      PCP: Tico Rodriguez DO    Date of Admission: 1/14/2025    Chief Complaint:  no acute events, febrile 39.4 C overnight, remains in AFIB with RVR overnight requiring Amiodarone infusion, remains hypotensive Norepinephrine infusion, u/o - 400 ml    Medications:  Reviewed    Infusion Medications    norepinephrine 20 mcg/min (01/17/25 0636)    sodium chloride      dextrose      sodium chloride      amiodarone 0.5 mg/min (01/17/25 0636)     Scheduled Medications    LORazepam  1 mg IntraVENous Once    [START ON 1/18/2025] digoxin  62.5 mcg Oral Daily    hydrocortisone sodium succinate PF  50 mg IntraVENous Q6H    midodrine  20 mg Oral TID WC    insulin lispro  0-16 Units SubCUTAneous Q4H    finasteride  5 mg Oral Daily    meropenem  1,000 mg IntraVENous Q12H    sodium chloride flush  5-40 mL IntraVENous 2 times per day    heparin (porcine)  5,000 Units SubCUTAneous 3 times per day    vancomycin (VANCOCIN) intermittent dosing (placeholder)   Other RX Placeholder    pantoprazole (PROTONIX) 40 mg in sodium chloride (PF) 0.9 % 10 mL injection  40 mg IntraVENous Daily     PRN Meds: metoprolol, sodium chloride flush, sodium chloride, acetaminophen **OR** acetaminophen, glucose, dextrose bolus **OR** dextrose bolus, glucagon (rDNA), dextrose, sodium chloride      Intake/Output Summary (Last 24 hours) at 1/17/2025 0949  Last data filed at 1/17/2025 0636  Gross per 24 hour   Intake 2953.6 ml   Output 530 ml   Net 2423.6 ml       Exam:    BP (!) 134/41   Pulse 90   Temp (!) 96.7 °F (35.9 °C) (Axillary)   Resp 20   Wt 78.4 kg (172 lb 12.8 oz)   SpO2 95%   BMI 22.19 kg/m²     General appearance: awake, intubated via trach, ill-appearing  Lungs: coarse sounds bilaterally  Heart: S1/S2, irregular, tachycardic  Abdomen: soft  Extremities: no edema      Labs:   Recent Labs     01/15/25  0653 01/16/25  0441 01/17/25  0434   WBC 9.3 9.3 10.7   HGB 9.3* 9.0* 9.0*   HCT 29.3* 28.6* 28.4*    320

## 2025-01-17 NOTE — PROGRESS NOTES
Pulmonary & Critical Care Medicine ICU Progress Note  Chief complaint : Respiratory failure    Subjunctive/24 hour events :   Patient seen and examined during multidisciplinary rounds with RN, charge nurse, RT, pharmacy, dietitian, and social service.     Awake, no issues overnight, no fever, on amiodarone, and on Levophed at 20 mcg/min, on 30% FiO2 saturation 95%.  New information updated in the note today, rest of the examination did not change compared to yesterday.  Social History     Tobacco Use    Smoking status: Former     Current packs/day: 0.00     Types: Cigarettes     Quit date: 2015     Years since quittin.5    Smokeless tobacco: Former   Substance Use Topics    Alcohol use: No     Alcohol/week: 0.0 standard drinks of alcohol         Problem Relation Age of Onset    Colon Cancer Neg Hx        No results for input(s): \"PHART\", \"ENC3AJO\", \"PO2ART\" in the last 72 hours.    MV Settings:  Vent Mode: AC/VC Resp Rate (Set): 14 bpm/Vt (Set, mL): 500 mL/ /FiO2 : 30 %           IV:   norepinephrine 20 mcg/min (25)    sodium chloride      dextrose      sodium chloride      amiodarone 0.5 mg/min (25)       Vitals:  BP (!) 134/41   Pulse 90   Temp (!) 96.7 °F (35.9 °C) (Axillary)   Resp 20   Wt 78.4 kg (172 lb 12.8 oz)   SpO2 95%   BMI 22.19 kg/m²    Tmax:        Intake/Output Summary (Last 24 hours) at 2025 0847  Last data filed at 2025 0636  Gross per 24 hour   Intake 2953.6 ml   Output 530 ml   Net 2423.6 ml       EXAM:  General: alert, cooperative, no distress  Head: normocephalic, atraumatic  Eyes:No gross abnormalities.  ENT:  MMM no lesions  Neck:  supple and no masses  Chest : clear to auscultation bilaterally- no wheezes, rales or rhonchi, normal air movement, no respiratory distress  Heart:: Heart sounds are normal.  Regular rate and rhythm without murmur, gallop or rub.  ABD:  symmetric, soft, non-tender, no guarding or rebound  Musculoskeletal : no cyanosis,

## 2025-01-17 NOTE — CARE COORDINATION
Received message from [facility] to send MAR sheets and I faxed those to them.

## 2025-01-17 NOTE — PROGRESS NOTES
Nephrology Progress Note    Assessment:  Failure to thrive  CKD 5  DM type-2  Hypertension now low  Hx Kidney transplant  S/ P Trach & PEG       Plan: family to continue care may need dialysis can hold today    Patient Active Problem List:     Pneumonia     Abdominal pain, other specified site     Acute pyelonephritis without lesion of renal medullary necrosis     Anemia     Essential hypertension     Nonspecific abnormal results of thyroid function study     Mixed hyperlipidemia     Kidney replaced by transplant     Intra-abdominal abscess post-procedure (HCC)     Infection due to Enterococcus     Fever and other physiologic disturbances of temperature regulation     Chronic kidney disease (CKD)     Type 2 diabetes mellitus with stage 3b chronic kidney disease, without long-term current use of insulin (HCC)     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 (HCC)     Abnormal CT of the chest     Bronchiectasis without complication (HCC)     GI bleeding     Complication of transplanted kidney     Muscle weakness (generalized)     Difficulty in walking     Elevated BUN     Urinary retention     Immunosuppression due to drug therapy (HCC)     DELORES (acute kidney injury) (HCC)     Adjustment disorder     Gross hematuria     Bilateral lower extremity edema     Dysuria     Acute cystitis without hematuria     Bilateral hearing loss     Abscess, toe     Acute hyperkalemia     Acute pain of left shoulder     Astigmatism     Astigmatism of both eyes with presbyopia     At risk for stroke     Benign enlargement of prostate     Benign prostatic hyperplasia with urinary frequency

## 2025-01-17 NOTE — PROGRESS NOTES
Physician Progress Note      PATIENT:               LUL QUILES  CSN #:                  [id number]  :                       1950  ADMIT DATE:       2025 4:11 PM  DISCH DATE:  RESPONDING  PROVIDER #:        Brittney Narvaez MD          QUERY TEXT:    Patient admitted with concern for septic shock. Severe malnutrition noted per   dietician assessment on 1/15. If possible, please document in progress notes   and discharge summary if you are evaluating and /or treating any of the   following:    The medical record reflects the following:  Risk Factors: CKD 5, recent hospitalization with pneumonia, prolong   intubation, trach, PEG, currently with sepsis  Clinical Indicators: Dietician \"Malnutrition Status:  Severe malnutrition   (01/15/25 1617)  Context:  Chronic Illness  Findings of the 6 clinical characteristics of malnutrition:  Energy Intake:  Mild decrease in energy intake  Weight Loss:  Greater than 10% over 6 months  Body Fat Loss:  Severe body fat loss Orbital, Triceps, Fat Overlying Ribs  Muscle Mass Loss:  Severe muscle mass loss Temples (temporalis), Clavicles   (pectoralis & deltoids), Scapula (trapezius)  Fluid Accumulation:  Unable to assess   Strength:  Not Performed\"  Treatment: begin tube feed via PEG, dietician consult, monitoring    ASPEN Criteria:    https://aspenjournals.onlinelibrary.bai.com/doi/full/10.1177/250157898167828  5    Thank you,  Shannon Moses   Clinical Documentation Improvement Specialist  W: [phone]  Options provided:  -- Protein calorie malnutrition severe  -- Other - I will add my own diagnosis  -- Disagree - Not applicable / Not valid  -- Disagree - Clinically unable to determine / Unknown  -- Refer to Clinical Documentation Reviewer    PROVIDER RESPONSE TEXT:    This patient has severe protein calorie malnutrition.    Query created by: Angela Moses on 2025 10:49 AM      Electronically signed by:  Brittney Narvaez MD 2025 10:52 AM

## 2025-01-17 NOTE — PROGRESS NOTES
Palliative Care Progress Note  Patient: Remy Upton  Gender: male  YOB: 1950  Unit/Bed: IC08/IC08-01  CodeStatus: Limited  Inpatient Treatment Team: Treatment Team:   Brittney Narvaez MD Zaizafoun, Manaf, MD Sodeinde, Adedeji, MD Holiday, DO Marie Wilcox Bonnie, RN Melendez, Jacqueline, Lily iDas, LISANDRA Yan, LISANDRA Ye, Monica, LISANDRA Huntley, Abel LEUNG MD  Admit Date:  1/14/2025    Chief Complaint: AF w RVR    History of Presenting Illness:      Remy Upton is a 74 y.o. male on hospital day 3 with a history of HTN, PAF, DM2, ESRD/CKD4 s/p kidney transplant x 2, BPH / urine retention, anemia due to lower GI bleed requiring PRBC transfusion, depression, who ws managed at [facility] from 12/18 -1/13 for DELORES/CKD4 requiring HD, acute hypoxic respiratory failure related to rhinovirus infection, pneumonia with possible aspiration, requiring prolonged intubation and mechanical ventilation s/p tracheostomy on 12/30, anemia requiring PRBC transfusion, who presented with the above CC. Patient was discharged from to our ICU to [facility] overnight and was sent to [facility] ED for AFIB with RVR, CXR showed RLL opacity. Patient was admitted in the setting of concern for sepsis, anemia, acute hypoxic resp. Failure, DM2.    Palliative care was consulted by Dr. BROCK for goals of care.     Upon entering the room I find the patient awake, trach/vent dependent, on levo 20mcg/min. Patient has peg tube replaced yesterday. Tolerating tube feeds. Wife and wife's brother at bedside to discuss overall goals of care.    1/17/25:  Trach/vent dependent. No acute distress. No acute events. Precert for ltach.         Review of Systems:       Review of Systems   Unable to perform ROS: Intubated       Physical Examination:       BP (!) 115/35   Pulse 87   Temp 97.3 °F (36.3 °C) (Axillary)   Resp 18   Wt 78.4 kg (172 lb 12.8 oz)   SpO2 100%   BMI 22.19 kg/m²   ORDERING SYSTEM PROVIDED HISTORY: sepsis TECHNOLOGIST PROVIDED HISTORY: Reason for exam:->sepsis Additional Contrast?->None What reading provider will be dictating this exam?->CRC FINDINGS: During the interval, a gastrostomy tube has been placed with its tip in satisfactory position in the body of the stomach. Lower Chest: There are new mild right greater than left pleural effusions with new moderate nodular consolidation of the posterior right lower lobe consistent with pneumonia or atelectasis.  There is new mild compressive atelectasis of the posterior left lower lobe adjacent to the effusion. There is moderate coronary calcification.  The heart is normal in size. Organs: The liver is normal in appearance without mass and without evidence of intrahepatic biliary ductal dilatation. The spleen and pancreas are unremarkable. The adrenal glands are unremarkable. The gallbladder is moderately distended with high density material consistent with sludge and is otherwise normal in appearance. There continues to be prominent atrophy of the native kidneys consistent with chronic renal failure.  There is no change in a 1.2 cm indeterminate density nodule arising from the posterior interpolar right kidney, most likely a high density cyst since it has not changed in size during the interval. GI/Bowel: There is no evidence of bowel obstruction. No evidence of abnormal bowel wall thickening or distension. The appendix is visualized and is unremarkable.  No evidence of acute appendicitis. Pelvis: Again seen is the left pelvic transplant kidney with appropriate excretion of contrast and normal cortical thickness, suggesting a functioning renal transplant. There is stable appearance of a moderately atrophic right renal transplant kidney with a small amount of excreted contrast in the right collecting system. The prostate is again seen to be small. The urinary bladder is mildly distended with excreted intravenous contrast. There is

## 2025-01-17 NOTE — CARE COORDINATION
LOLI received a call from [employer]. Per [employer], will began initial review for LTAC at this time. Will have to sent it to doctor for final review. Pending auth for LTAC at this time.     Updated CM.     Electronically signed by LOLI Hodge on 1/17/2025 at 10:15 AM

## 2025-01-17 NOTE — PLAN OF CARE
Nutrition Problem #1: Severe malnutrition, in context of chronic illness  Intervention: Food and/or Nutrient Delivery: Continue Current Tube Feeding  Nutritional

## 2025-01-17 NOTE — CARE COORDINATION
I called [employer] and spoke to peer to peer line and they are reaching out to Dr. Narvaez on patient. They gave us a deadline of 1630 today and they state we can call them back at [phone] opt 2 to check on outcome if we want to . The ref # is [id number]. I  let them know that wife and family want to try every level to get LTAC approved and that he failed at SNF level.

## 2025-01-17 NOTE — PROGRESS NOTES
PHARMACY NOTE:   Interdisciplinary Rounds Completed     Changes made today by Pharmacy:   Reduced digoxin dose to 62.5 mcg daily starting tomorrow   Next digoxin level ordered for 1/22  Increased HR parameter for PRN metoprolol to 140 bpm  Merrem 72 hr limit ends tomorrow (last q12h dose due tonight - needs addressed before 1100 tomorrow)  Intensivist aware, will be discussed on rounds tomorrow     Additional information:   Amio drip  Steroid: Solu-cortef 50 mg IV q6h  Insulin coverage: high sliding scale  Pressors: levophed  Antimicrobial therapy: vancomycin, meropenem. ID not on consult. Cultures no growth  Core measures assessed/met    Dick Moore [facility] PharmD

## 2025-01-17 NOTE — PROGRESS NOTES
Pharmacy Vancomycin Consult     Vancomycin Day: 4  Current Dosing: dose per level    Temp max: 102.9 *F    Recent Labs     01/15/25  0653 01/16/25  0441   BUN 39* 48*       Recent Labs     01/15/25  0653 01/16/25  0441   CREATININE 1.49* 1.63*       Recent Labs     01/15/25  0653 01/16/25  0441   WBC 9.3 9.3         Intake/Output Summary (Last 24 hours) at 1/17/2025 0018  Last data filed at 1/16/2025 1700  Gross per 24 hour   Intake 1352.33 ml   Output 430 ml   Net 922.33 ml       Ht Readings from Last 1 Encounters:   01/03/25 1.88 m (6' 2\")        Wt Readings from Last 1 Encounters:   01/14/25 75.2 kg (165 lb 12.6 oz)         Body mass index is 21.29 kg/m².    Estimated Creatinine Clearance: 43 mL/min (A) (based on SCr of 1.63 mg/dL (H)).    Trough:   Component  Ref Range & Units 01/16/25 2029   Vancomycin Rm  10.0 - 40.0 ug/mL 21.7     Assessment/Plan:  Patient's renal function remains unstable. Creatinine rising again. 0.51 increase over the past 2 days. Trough level supratherapeutic, no dose due at this time. Additional random vancomycin level to be drawn with morning labs. Will reassess then.    Judith Wetzel, [facility] 1/17/2025 12:23 AM

## 2025-01-17 NOTE — PROGRESS NOTES
Vancomycin Day: 4  Current Dosing: last dose 750 mg on 1/15    Recent Labs     01/16/25  0441 01/17/25  0434   BUN 48* 54*   CREATININE 1.63* 1.92*   WBC 9.3 10.7       Cultures  Recent Labs     01/14/25  1809 01/14/25  1702 01/08/25  0305   BC  --  No Growth to date.  Any change in status will be called.  --    BLOODCULT2  --  No Growth to date.  Any change in status will be called.  --    LABURIN  --   --  Cult,Urine:  NO GROWTH  Performed at [facility] [address], [place] [id number] ([phone]     COVID19 Not Detected  --   --       , Weight - Scale: 78.4 kg (172 lb 12.8 oz), Body mass index is 22.19 kg/m².    Estimated Creatinine Clearance: 38 mL/min (A) (based on SCr of 1.92 mg/dL (H)).  .    Assessment:    Date/Time Current Dose Concentration Timing of Concentration (h) AUC   1/17 @0434 750 mg once 1/15 18.4 ~32h post-dose Dose-by-level   Note: Serum concentrations collected for AUC dosing may appear elevated if collected in close proximity to the dose administered, this is not necessarily an indication of toxicity    Plan:  Dosing based on anticipated concentration <15 mg/L due to renal impairment/insufficiency  No recent/planned dialysis currently  Current dosing regimen is supra-therapeutic  Continue withholding until concentration <15 mg/L  Repeat vancomycin concentration ordered for 1/18 @ 0600   Pharmacy will continue to monitor patient and adjust therapy as indicated    Dick Moore RPH  1/17/2025 10:36 AM

## 2025-01-17 NOTE — CARE COORDINATION
I messaged  to see if he can talk to [facility] re;dc plan for LTAC for patient.I received message from [facility] liaison that they want to speak to MD. Dr. Narvaez did give number and I will let Rose know.

## 2025-01-17 NOTE — PROGRESS NOTES
Handoff report received from Marci FRY at bedside, skin assessment completed. Pts wife at bedside, all questions answered.     Head to toe assessment completed, see flowsheets for details.     Temp 102.9 axillary, PRN tylenol given for temp and patient shaking head \"yes\" to pain as well.    Pain and temp reassessed. Temp coming down, pt shaking head \"no\" to pain.     Bed bath and linen change completed, pt toelrated well.

## 2025-01-17 NOTE — CONSULTS
Palliative Care Consult Note  Patient: Remy Upton  Gender: male  YOB: 1950  Unit/Bed: IC08/IC08-01  CodeStatus: Limited  Inpatient Treatment Team: Treatment Team:   Brittney Narvaez MD Zaizafoun, Manaf, MD Sodeinde, Adedeji, MD Holiday, DO Marie Wilcox Bonnie, RN Melendez, Jacqueline, Felicia Gordon RN  Admit Date:  1/14/2025    Chief Complaint: AF w RVR    History of Presenting Illness:      Remy Upton is a 74 y.o. male on hospital day 2 with a history of HTN, PAF, DM2, ESRD/CKD4 s/p kidney transplant x 2, BPH / urine retention, anemia due to lower GI bleed requiring PRBC transfusion, depression, who ws managed at [facility] from 12/18 -1/13 for DELORES/CKD4 requiring HD, acute hypoxic respiratory failure related to rhinovirus infection, pneumonia with possible aspiration, requiring prolonged intubation and mechanical ventilation s/p tracheostomy on 12/30, anemia requiring PRBC transfusion, who presented with the above CC. Patient was discharged from to our ICU to [facility] overnight and was sent to [facility] ED for AFIB with RVR, CXR showed RLL opacity. Patient was admitted in the setting of concern for sepsis, anemia, acute hypoxic resp. Failure, DM2.    Palliative care was consulted by Dr. BROCK for goals of care.     Upon entering the room I find the patient awake, trach/vent dependent, on levo 20mcg/min. Patient has peg tube replaced yesterday. Tolerating tube feeds. Wife and wife's brother at bedside to discuss overall goals of care.      Review of Systems:       Review of Systems    Physical Examination:       BP (!) 143/58   Pulse (!) 126   Temp (!) 102.9 °F (39.4 °C) (Axillary)   Resp 30   Wt 75.2 kg (165 lb 12.6 oz)   SpO2 97%   BMI 21.29 kg/m²      Physical Exam    Allergies:      Allergies   Allergen Reactions    Statins Headaches and Other (See Comments)       Medications:      Current Facility-Administered Medications   Medication Dose Route

## 2025-01-17 NOTE — PROGRESS NOTES
CONSULTANT  Shashank Pena DO      REQUESTING PHYSICIAN  Brittney Narvaez MD                REASON FOR CONSULTATION  No chief complaint on file.        Hospital Day: 1         Patient is a 74 y.o. male who presents with a chief complaint of A-fib with RVR, pyrexia. Patient is followed on a regular basis by Tico Guzman DO.  Patient with past medical history of diabetes, hypertension, end-stage renal disease status post renal transplant, tobacco abuse.  Recently hospitalized for pneumonia and discharged to LTAC.  Apparently was noted to have atrial fibrillation with rapid ventricle response as well as pyrexia and sent back to the hospital for further evaluation and treatment.  He was placed on amiodarone drip.  Also noted to be significant hemic with a hemoglobin of 6.4.  Status post 1 unit packed red blood cells.  Patient also on Levophed 2 mcg/min at this time.     He has persistent AF and s/p Watchman device 2023 at  due to prior Extensive bleed and inability to anticoagulate. 7/2024 FREDERIC at  EF 55-60 and stable Watchman position.      He has no known CAD. He is non compliant with f/u.    1-16-25: Remains on Levophed.  Amiodarone drip.  A-fib on telemetry heart rate 126 bpm.  Patient is on trach    1-17-25: Patient remains on Levophed.  Also on amiodarone drip  Atrial fibrillation on telemetry 90s  On trach    Dig level 3.6        Past Medical History        Past Medical History:   Diagnosis Date    Diabetes mellitus ([facility])      Hemodialysis access, fistula mature ([facility]) 12/2002    Hypertension      Rhinovirus 12/2024    Seizures ([facility])       no seizure since 1995    Smoking           Problem List       Patient Active Problem List   Diagnosis    Pneumonia    Abdominal pain, other specified site    Acute pyelonephritis without lesion of renal medullary necrosis    Anemia    Essential hypertension    Nonspecific abnormal results of thyroid function study    Mixed hyperlipidemia    Kidney replaced by

## 2025-01-18 LAB
ALBUMIN SERPL-MCNC: 2.4 G/DL (ref 3.5–4.6)
ANION GAP SERPL CALCULATED.3IONS-SCNC: 15 MEQ/L (ref 9–15)
BASOPHILS # BLD: 0 K/UL (ref 0–0.2)
BASOPHILS NFR BLD: 0.1 %
BUN SERPL-MCNC: 69 MG/DL (ref 8–23)
CALCIUM SERPL-MCNC: 8.8 MG/DL (ref 8.5–9.9)
CHLORIDE SERPL-SCNC: 95 MEQ/L (ref 95–107)
CO2 SERPL-SCNC: 26 MEQ/L (ref 20–31)
CREAT SERPL-MCNC: 2.23 MG/DL (ref 0.7–1.2)
CRP SERPL HS-MCNC: 227.3 MG/L (ref 0–5)
EOSINOPHIL # BLD: 0 K/UL (ref 0–0.7)
EOSINOPHIL NFR BLD: 0 %
ERYTHROCYTE [DISTWIDTH] IN BLOOD BY AUTOMATED COUNT: 16.8 % (ref 11.5–14.5)
GLUCOSE BLD-MCNC: 223 MG/DL (ref 70–99)
GLUCOSE BLD-MCNC: 227 MG/DL (ref 70–99)
GLUCOSE BLD-MCNC: 237 MG/DL (ref 70–99)
GLUCOSE BLD-MCNC: 260 MG/DL (ref 70–99)
GLUCOSE BLD-MCNC: 273 MG/DL (ref 70–99)
GLUCOSE SERPL-MCNC: 227 MG/DL (ref 70–99)
HBV SURFACE AG SERPL QL IA: NORMAL
HCT VFR BLD AUTO: 28.3 % (ref 42–52)
HGB BLD-MCNC: 8.9 G/DL (ref 14–18)
LYMPHOCYTES # BLD: 0.5 K/UL (ref 1–4.8)
LYMPHOCYTES NFR BLD: 4.8 %
MAGNESIUM SERPL-MCNC: 2.5 MG/DL (ref 1.7–2.4)
MCH RBC QN AUTO: 28.6 PG (ref 27–31.3)
MCHC RBC AUTO-ENTMCNC: 31.4 % (ref 33–37)
MCV RBC AUTO: 91 FL (ref 79–92.2)
MONOCYTES # BLD: 0.3 K/UL (ref 0.2–0.8)
MONOCYTES NFR BLD: 2.9 %
NEUTROPHILS # BLD: 9.4 K/UL (ref 1.4–6.5)
NEUTS SEG NFR BLD: 90.8 %
PERFORMED ON: ABNORMAL
PHOSPHATE SERPL-MCNC: 3.5 MG/DL (ref 2.3–4.8)
PLATELET # BLD AUTO: 407 K/UL (ref 130–400)
POTASSIUM SERPL-SCNC: 3.7 MEQ/L (ref 3.4–4.9)
PROCALCITONIN SERPL IA-MCNC: 1.63 NG/ML (ref 0–0.15)
RBC # BLD AUTO: 3.11 M/UL (ref 4.7–6.1)
SODIUM SERPL-SCNC: 136 MEQ/L (ref 135–144)
VANCOMYCIN SERPL-MCNC: 15.8 UG/ML (ref 10–40)
WBC # BLD AUTO: 10.4 K/UL (ref 4.8–10.8)

## 2025-01-18 PROCEDURE — 99232 SBSQ HOSP IP/OBS MODERATE 35: CPT | Performed by: INTERNAL MEDICINE

## 2025-01-18 PROCEDURE — 80202 ASSAY OF VANCOMYCIN: CPT

## 2025-01-18 PROCEDURE — 6360000002 HC RX W HCPCS: Performed by: INTERNAL MEDICINE

## 2025-01-18 PROCEDURE — 84145 PROCALCITONIN (PCT): CPT

## 2025-01-18 PROCEDURE — 87070 CULTURE OTHR SPECIMN AEROBIC: CPT

## 2025-01-18 PROCEDURE — 87340 HEPATITIS B SURFACE AG IA: CPT

## 2025-01-18 PROCEDURE — 6370000000 HC RX 637 (ALT 250 FOR IP): Performed by: INTERNAL MEDICINE

## 2025-01-18 PROCEDURE — 2580000003 HC RX 258: Performed by: INTERNAL MEDICINE

## 2025-01-18 PROCEDURE — P9047 ALBUMIN (HUMAN), 25%, 50ML: HCPCS | Performed by: INTERNAL MEDICINE

## 2025-01-18 PROCEDURE — 2500000003 HC RX 250 WO HCPCS: Performed by: INTERNAL MEDICINE

## 2025-01-18 PROCEDURE — 99233 SBSQ HOSP IP/OBS HIGH 50: CPT | Performed by: INTERNAL MEDICINE

## 2025-01-18 PROCEDURE — 2700000000 HC OXYGEN THERAPY PER DAY

## 2025-01-18 PROCEDURE — 5A1D70Z PERFORMANCE OF URINARY FILTRATION, INTERMITTENT, LESS THAN 6 HOURS PER DAY: ICD-10-PCS | Performed by: STUDENT IN AN ORGANIZED HEALTH CARE EDUCATION/TRAINING PROGRAM

## 2025-01-18 PROCEDURE — 36415 COLL VENOUS BLD VENIPUNCTURE: CPT

## 2025-01-18 PROCEDURE — 89220 SPUTUM SPECIMEN COLLECTION: CPT

## 2025-01-18 PROCEDURE — 90935 HEMODIALYSIS ONE EVALUATION: CPT

## 2025-01-18 PROCEDURE — 86140 C-REACTIVE PROTEIN: CPT

## 2025-01-18 PROCEDURE — 6370000000 HC RX 637 (ALT 250 FOR IP): Performed by: NURSE PRACTITIONER

## 2025-01-18 PROCEDURE — 83735 ASSAY OF MAGNESIUM: CPT

## 2025-01-18 PROCEDURE — 85025 COMPLETE CBC W/AUTO DIFF WBC: CPT

## 2025-01-18 PROCEDURE — 2500000003 HC RX 250 WO HCPCS: Performed by: NURSE PRACTITIONER

## 2025-01-18 PROCEDURE — 80069 RENAL FUNCTION PANEL: CPT

## 2025-01-18 PROCEDURE — 94003 VENT MGMT INPAT SUBQ DAY: CPT

## 2025-01-18 PROCEDURE — 99291 CRITICAL CARE FIRST HOUR: CPT | Performed by: INTERNAL MEDICINE

## 2025-01-18 PROCEDURE — 2500000003 HC RX 250 WO HCPCS

## 2025-01-18 PROCEDURE — 94660 CPAP INITIATION&MGMT: CPT

## 2025-01-18 PROCEDURE — 2000000000 HC ICU R&B

## 2025-01-18 RX ORDER — AMIODARONE HYDROCHLORIDE 200 MG/1
200 TABLET ORAL 2 TIMES DAILY
Status: DISCONTINUED | OUTPATIENT
Start: 2025-01-18 | End: 2025-01-29 | Stop reason: HOSPADM

## 2025-01-18 RX ORDER — ALBUMIN (HUMAN) 12.5 G/50ML
25 SOLUTION INTRAVENOUS EVERY 8 HOURS
Status: DISCONTINUED | OUTPATIENT
Start: 2025-01-18 | End: 2025-01-21

## 2025-01-18 RX ADMIN — DIGOXIN 62.5 MCG: 125 TABLET ORAL at 09:03

## 2025-01-18 RX ADMIN — MIDODRINE HYDROCHLORIDE 20 MG: 10 TABLET ORAL at 12:31

## 2025-01-18 RX ADMIN — HEPARIN SODIUM 5000 UNITS: 5000 INJECTION INTRAVENOUS; SUBCUTANEOUS at 14:15

## 2025-01-18 RX ADMIN — MIDODRINE HYDROCHLORIDE 20 MG: 10 TABLET ORAL at 09:02

## 2025-01-18 RX ADMIN — HYDROCORTISONE SODIUM SUCCINATE 50 MG: 100 INJECTION, POWDER, FOR SOLUTION INTRAMUSCULAR; INTRAVENOUS at 09:01

## 2025-01-18 RX ADMIN — HEPARIN SODIUM 5000 UNITS: 5000 INJECTION INTRAVENOUS; SUBCUTANEOUS at 21:34

## 2025-01-18 RX ADMIN — INSULIN LISPRO 4 UNITS: 100 INJECTION, SOLUTION INTRAVENOUS; SUBCUTANEOUS at 20:49

## 2025-01-18 RX ADMIN — ALBUMIN (HUMAN) 25 G: 0.25 INJECTION, SOLUTION INTRAVENOUS at 09:29

## 2025-01-18 RX ADMIN — HYDROCORTISONE SODIUM SUCCINATE 50 MG: 100 INJECTION, POWDER, FOR SOLUTION INTRAMUSCULAR; INTRAVENOUS at 05:17

## 2025-01-18 RX ADMIN — INSULIN LISPRO 4 UNITS: 100 INJECTION, SOLUTION INTRAVENOUS; SUBCUTANEOUS at 06:42

## 2025-01-18 RX ADMIN — INSULIN LISPRO 8 UNITS: 100 INJECTION, SOLUTION INTRAVENOUS; SUBCUTANEOUS at 03:06

## 2025-01-18 RX ADMIN — FINASTERIDE 5 MG: 5 TABLET, FILM COATED ORAL at 09:03

## 2025-01-18 RX ADMIN — NOREPINEPHRINE BITARTRATE 11 MCG/MIN: 64 SOLUTION INTRAVENOUS at 07:23

## 2025-01-18 RX ADMIN — AMIODARONE HYDROCHLORIDE 0.5 MG/MIN: 1.8 INJECTION, SOLUTION INTRAVENOUS at 02:55

## 2025-01-18 RX ADMIN — SODIUM CHLORIDE 40 MG: 9 INJECTION INTRAMUSCULAR; INTRAVENOUS; SUBCUTANEOUS at 09:05

## 2025-01-18 RX ADMIN — AMIODARONE HYDROCHLORIDE 200 MG: 200 TABLET ORAL at 20:32

## 2025-01-18 RX ADMIN — ALBUMIN (HUMAN) 25 G: 0.25 INJECTION, SOLUTION INTRAVENOUS at 16:36

## 2025-01-18 RX ADMIN — MEROPENEM 1000 MG: 1 INJECTION INTRAVENOUS at 11:09

## 2025-01-18 RX ADMIN — AMIODARONE HYDROCHLORIDE 200 MG: 200 TABLET ORAL at 09:02

## 2025-01-18 RX ADMIN — INSULIN LISPRO 4 UNITS: 100 INJECTION, SOLUTION INTRAVENOUS; SUBCUTANEOUS at 09:05

## 2025-01-18 RX ADMIN — Medication 10 ML: at 09:01

## 2025-01-18 RX ADMIN — HYDROCORTISONE SODIUM SUCCINATE 50 MG: 100 INJECTION, POWDER, FOR SOLUTION INTRAMUSCULAR; INTRAVENOUS at 20:31

## 2025-01-18 RX ADMIN — HEPARIN SODIUM 5000 UNITS: 5000 INJECTION INTRAVENOUS; SUBCUTANEOUS at 06:43

## 2025-01-18 RX ADMIN — INSULIN LISPRO 4 UNITS: 100 INJECTION, SOLUTION INTRAVENOUS; SUBCUTANEOUS at 12:38

## 2025-01-18 RX ADMIN — Medication 10 ML: at 20:32

## 2025-01-18 RX ADMIN — VANCOMYCIN HYDROCHLORIDE 750 MG: 750 INJECTION, POWDER, LYOPHILIZED, FOR SOLUTION INTRAVENOUS at 20:53

## 2025-01-18 RX ADMIN — MIDODRINE HYDROCHLORIDE 20 MG: 10 TABLET ORAL at 17:42

## 2025-01-18 RX ADMIN — MEROPENEM 1000 MG: 1 INJECTION INTRAVENOUS at 00:14

## 2025-01-18 ASSESSMENT — PAIN SCALES - WONG BAKER: WONGBAKER_NUMERICALRESPONSE: NO HURT

## 2025-01-18 ASSESSMENT — PAIN SCALES - GENERAL
PAINLEVEL_OUTOF10: 0

## 2025-01-18 ASSESSMENT — PULMONARY FUNCTION TESTS
PIF_VALUE: 26
PIF_VALUE: 28
PIF_VALUE: 24
PIF_VALUE: 20
PIF_VALUE: 22

## 2025-01-18 NOTE — PROGRESS NOTES
Pulmonary & Critical Care Medicine ICU Progress Note  Chief complaint : Respiratory failure    Subjunctive/24 hour events :   Patient seen and examined during multidisciplinary rounds with RN, charge nurse, RT, pharmacy, dietitian, and social service.     More awake, no distress, no fever, he is on Levophed at 11 mcg/min, on 30% 5 to saturation 100%.  Urine output 250 cc.      New information updated in the note today, rest of the examination did not change compared to yesterday.  Social History     Tobacco Use    Smoking status: Former     Current packs/day: 0.00     Types: Cigarettes     Quit date: 2015     Years since quittin.5    Smokeless tobacco: Former   Substance Use Topics    Alcohol use: No     Alcohol/week: 0.0 standard drinks of alcohol         Problem Relation Age of Onset    Colon Cancer Neg Hx        No results for input(s): \"PHART\", \"XIK8EIU\", \"PO2ART\" in the last 72 hours.    MV Settings:  Vent Mode: AC/VC Resp Rate (Set): 14 bpm/Vt (Set, mL): 500 mL/ /FiO2 : 30 %           IV:   norepinephrine 11 mcg/min (25 0723)    sodium chloride      dextrose      sodium chloride         Vitals:  BP (!) 132/37   Pulse (!) 114   Temp 98.5 °F (36.9 °C) (Oral)   Resp 26   Wt 79 kg (174 lb 2.6 oz)   SpO2 100%   BMI 22.36 kg/m²    Tmax:        Intake/Output Summary (Last 24 hours) at 2025 0807  Last data filed at 2025 0656  Gross per 24 hour   Intake 2213.88 ml   Output 350 ml   Net 1863.88 ml       EXAM:  General: alert, cooperative, no distress  Head: normocephalic, atraumatic  Eyes:No gross abnormalities.  ENT:  MMM no lesions  Neck:  supple and no masses  Chest : clear to auscultation bilaterally- no wheezes, rales or rhonchi, normal air movement, no respiratory distress  Heart:: Heart sounds are normal.  Regular rate and rhythm without murmur, gallop or rub.  ABD:  symmetric, soft, non-tender, no guarding or rebound  Musculoskeletal : no cyanosis, no clubbing, and no edema  Neuro:

## 2025-01-18 NOTE — PROGRESS NOTES
pleural effusions.  Tracheostomy tube placed in the interval in satisfactory position with feeding tube tip below the level diaphragm.      XR CHEST PORTABLE     Result Date: 12/25/2024  EXAMINATION: ONE XRAY VIEW OF THE CHEST 12/25/2024 6:35 am COMPARISON: 12/24/2024 HISTORY: ORDERING SYSTEM PROVIDED HISTORY: resp failure TECHNOLOGIST PROVIDED HISTORY: Reason for exam:->resp failure What reading provider will be dictating this exam?->CRC FINDINGS: Compared to the radiograph from yesterday there is interval development of a new hazy alveolar opacity in the right middle lower lung, which may represent atelectasis or pneumonia the heart is normal in size.  No pneumothorax. Probable small right pleural effusion. The endotracheal tube is well positioned, with the tip approximately 5.5 cm above the galileo.  The NG tube appears well positioned. The tip is below the diaphragm and beyond the field of view of this study.      1. Interval development of a hazy alveolar opacity in the right middle lower lung, which may represent atelectasis or pneumonia. 2. Probable small right pleural effusion. 3. Well-positioned endotracheal tube and NG tube.      XR CHEST PORTABLE     Result Date: 12/24/2024  EXAMINATION: ONE XRAY VIEW OF THE CHEST 12/24/2024 11:44 am COMPARISON: December 20, 2024 HISTORY: ORDERING SYSTEM PROVIDED HISTORY: post intubation, ng tube placement TECHNOLOGIST PROVIDED HISTORY: Reason for exam:->post intubation, ng tube placement What reading provider will be dictating this exam?->CRC FINDINGS: The lungs are hyperinflated.  There is very in slight blunting of the costophrenic angle in the left lower lobe.  An endotracheal tube is again seen in place.  It measures about 7.5 cm from the galileo.  An NG tube is also seen in the tip is below the diaphragm out of the field of view.  Cardiac silhouette is again mildly enlarged.  The bony structures appear grossly intact.      Tiny pleural effusion is seen on the left.

## 2025-01-18 NOTE — PLAN OF CARE
urinary catheter per Licensed Independent Practitioner order if needed   Discuss with Licensed Independent Practitioner  medications to alleviate retention as needed   Discuss catheterization for long term situations as appropriate  1/18/2025 0226 by Felicia Sandoval RN  Outcome: Not Progressing  Flowsheets (Taken 1/17/2025 2000)  Absence of urinary retention: Assess patient’s ability to void and empty bladder     Problem: Infection - Adult  Goal: Absence of infection at discharge  1/18/2025 1045 by Sarah Black RN  Outcome: Progressing  Flowsheets (Taken 1/18/2025 0900)  Absence of infection at discharge:   Assess and monitor for signs and symptoms of infection   Monitor lab/diagnostic results   Monitor all insertion sites i.e., indwelling lines, tubes and drains  1/18/2025 0226 by Felicia Sandoval RN  Outcome: Not Progressing  Flowsheets (Taken 1/17/2025 2000)  Absence of infection at discharge:   Assess and monitor for signs and symptoms of infection   Monitor lab/diagnostic results     Problem: Metabolic/Fluid and Electrolytes - Adult  Goal: Electrolytes maintained within normal limits  1/18/2025 1045 by Sarah Black RN  Outcome: Progressing  Flowsheets (Taken 1/18/2025 0900)  Electrolytes maintained within normal limits:   Monitor labs and assess patient for signs and symptoms of electrolyte imbalances   Administer electrolyte replacement as ordered   Monitor response to electrolyte replacements, including repeat lab results as appropriate  1/18/2025 0226 by Felicia Sandoval RN  Outcome: Not Progressing  Flowsheets (Taken 1/17/2025 2000)  Electrolytes maintained within normal limits:   Monitor labs and assess patient for signs and symptoms of electrolyte imbalances   Monitor response to electrolyte replacements, including repeat lab results as appropriate  Goal: Hemodynamic stability and optimal renal function maintained  1/18/2025 1045 by Sarah Black RN  Outcome: Progressing  Flowsheets (Taken 1/18/2025  ordered  1/18/2025 0226 by Felicia Sandoval, RN  Outcome: Not Progressing  Flowsheets (Taken 1/17/2025 2000)  Maintains hematologic stability: Assess for signs and symptoms of bleeding or hemorrhage     Problem: Decision Making  Goal: Pt/Family able to effectively weigh alternatives and participate in decision making related to treatment and care  Description: INTERVENTIONS:  1. Determine when there are differences between patient's view, family's view, and healthcare provider's view of condition  2. Facilitate patient and family articulation of goals for care  3. Help patient and family identify pros/cons of alternative solutions  4. Provide information as requested by patient/family  5. Respect patient/family right to receive or not to receive information  6. Serve as a liaison between patient and family and health care team  7. Initiate Consults from Ethics, Palliative Care or initiate Family Care Conference as is appropriate  1/18/2025 1045 by Sarah Black RN  Outcome: Progressing  Flowsheets (Taken 1/18/2025 0900)  Patient/family able to effectively weigh alternatives and participate in decision making related to treatment and care:   Determine when there are differences between patient's view, family's view, and healthcare provider's view of condition   Facilitate patient and family articulation of goals for care   Help patient and family identify pros/cons of alternative solutions   Provide information as requested by patient/family   Respect patient/family right to receive or not to receive information   Serve as a liaison between patient and family and health care team   Initiate Consults from Ethics, Palliative Care or initiate Family Care Conference as is appropriate  1/18/2025 0226 by Felicia Sandoval, RN  Outcome: Not Progressing  Flowsheets (Taken 1/17/2025 2000)  Patient/family able to effectively weigh alternatives and participate in decision making related to treatment and care: Determine when there

## 2025-01-18 NOTE — PROGRESS NOTES
Hospitalist Progress Note      PCP: Tico Rodriguez DO    Date of Admission: 1/14/2025    Chief Complaint:  no acute events, afebrile, converted to SR, off  Amiodarone infusion, remains hypotensive Norepinephrine infusion, u/o - 250 ml    Medications:  Reviewed    Infusion Medications    norepinephrine 11 mcg/min (01/18/25 0723)    sodium chloride      dextrose      sodium chloride       Scheduled Medications    amiodarone  200 mg Oral BID    albumin human 25%  25 g IntraVENous Q8H    LORazepam  1 mg IntraVENous Once    digoxin  62.5 mcg Oral Daily    hydrocortisone sodium succinate PF  50 mg IntraVENous Q6H    midodrine  20 mg Oral TID WC    insulin lispro  0-16 Units SubCUTAneous Q4H    finasteride  5 mg Oral Daily    meropenem  1,000 mg IntraVENous Q12H    sodium chloride flush  5-40 mL IntraVENous 2 times per day    heparin (porcine)  5,000 Units SubCUTAneous 3 times per day    vancomycin (VANCOCIN) intermittent dosing (placeholder)   Other RX Placeholder    pantoprazole (PROTONIX) 40 mg in sodium chloride (PF) 0.9 % 10 mL injection  40 mg IntraVENous Daily     PRN Meds: metoprolol, oxyCODONE-acetaminophen **OR** oxyCODONE-acetaminophen, sodium chloride flush, sodium chloride, acetaminophen **OR** acetaminophen, glucose, dextrose bolus **OR** dextrose bolus, glucagon (rDNA), dextrose, sodium chloride      Intake/Output Summary (Last 24 hours) at 1/18/2025 0958  Last data filed at 1/18/2025 0656  Gross per 24 hour   Intake 2213.88 ml   Output 350 ml   Net 1863.88 ml       Exam:    BP (!) 132/37   Pulse (!) 114   Temp 98.5 °F (36.9 °C) (Oral)   Resp 26   Wt 79 kg (174 lb 2.6 oz)   SpO2 100%   BMI 22.36 kg/m²     General appearance: awake, intubated via trach, ill-appearing  Lungs: coarse sounds bilaterally  Heart: S1/S2, irregular, tachycardic  Abdomen: soft  Extremities: no edema      Labs:   Recent Labs     01/16/25  0441 01/17/25  0434 01/18/25  0706   WBC 9.3 10.7 10.4   HGB 9.0* 9.0* 8.9*   HCT 28.6*

## 2025-01-18 NOTE — PROGRESS NOTES
with urinary frequency     Rhinovirus     Chronic right shoulder pain     Condition not found     Deep vein thrombosis (DVT) of lower extremity (HCC)     Diabetes mellitus type 2 without retinopathy (HCC)     Edema     Gait difficulty     GERD (gastroesophageal reflux disease)     H/O lower gastrointestinal bleeding     History of blood transfusion     Incomplete emptying of bladder     Insulin long-term use (HCC)     Leg weakness, bilateral     Localized swelling of both lower legs     Peripheral nerve disease     Pseudophakia of both eyes     PTDM (post-transplant diabetes mellitus) (HCC)     Posterior vitreous detachment     Rhabdomyolysis     Seizure disorder (HCC)     Stage 5 chronic kidney disease with transplanted kidney (HCC)     Leukocytes in urine     Atrial fibrillation with RVR (HCC)     Moderate malnutrition (HCC)     Palliative care encounter     Goals of care, counseling/discussion     Advanced care planning/counseling discussion     Encounter for hospice care discussion     Lower GI bleed     Unable to eat     Gastrostomy tube dysfunction (HCC)     Shock      Subjective:  Admit Date: 1/14/2025    Interval History: same      Medications:  Scheduled Meds:   amiodarone  200 mg Oral BID    LORazepam  1 mg IntraVENous Once    digoxin  62.5 mcg Oral Daily    hydrocortisone sodium succinate PF  50 mg IntraVENous Q6H    midodrine  20 mg Oral TID WC    insulin lispro  0-16 Units SubCUTAneous Q4H    finasteride  5 mg Oral Daily    meropenem  1,000 mg IntraVENous Q12H    sodium chloride flush  5-40 mL IntraVENous 2 times per day    heparin (porcine)  5,000 Units SubCUTAneous 3 times per day    vancomycin (VANCOCIN) intermittent dosing (placeholder)   Other RX Placeholder    pantoprazole (PROTONIX) 40 mg in sodium chloride (PF) 0.9 % 10 mL injection  40 mg IntraVENous Daily     Continuous Infusions:   norepinephrine 11 mcg/min (01/18/25 0723)    sodium chloride      dextrose      sodium chloride         CBC:    Recent Labs     01/17/25  0434 01/18/25  0706   WBC 10.7 10.4   HGB 9.0* 8.9*    407*     CMP:    Recent Labs     01/16/25  0441 01/17/25  0434   * 134*   K 3.9 3.7   CL 93* 95   CO2 24 24   BUN 48* 54*   CREATININE 1.63* 1.92*   GLUCOSE 267* 202*   CALCIUM 8.5 8.5   LABGLOM 44.2* 36.3*     Troponin: No results for input(s): \"TROPONINI\" in the last 72 hours.  BNP: No results for input(s): \"BNP\" in the last 72 hours.  INR: No results for input(s): \"INR\" in the last 72 hours.  Lipids: No results for input(s): \"CHOL\", \"LDLDIRECT\", \"TRIG\", \"HDL\", \"AMYLASE\", \"LIPASE\" in the last 72 hours.  Liver: No results for input(s): \"AST\", \"ALT\", \"ALKPHOS\", \"LABALBU\", \"BILITOT\" in the last 72 hours.    Invalid input(s): \"PROT\", \"BILDIR\"  Iron:  No results for input(s): \"FERRITIN\" in the last 72 hours.    Invalid input(s): \"IRONS\", \"LABIRONS\"  Urinalysis: No results for input(s): \"UA\" in the last 72 hours.    Objective:  Vitals: BP (!) 132/37   Pulse (!) 114   Temp 98.5 °F (36.9 °C) (Oral)   Resp 26   Wt 79 kg (174 lb 2.6 oz)   SpO2 100%   BMI 22.36 kg/m²    Wt Readings from Last 3 Encounters:   01/18/25 79 kg (174 lb 2.6 oz)   01/13/25 80 kg (176 lb 5.9 oz)   11/04/24 75.9 kg (167 lb 6.4 oz)      24HR INTAKE/OUTPUT:    Intake/Output Summary (Last 24 hours) at 1/18/2025 0727  Last data filed at 1/18/2025 0656  Gross per 24 hour   Intake 2688.88 ml   Output 350 ml   Net 2338.88 ml       General: poorly  alert, in no apparent distress  HEENT: normocephalic, atraumatic, anicteric  Neck: supple, no mass  Trach in place  Lungs: non-labored respirations, clear to auscultation bilaterally  Heart: regular rate and rhythm, no murmurs or rubs  Abdomen: soft, non-tender, non-distended  Ext: no cyanosis, no peripheral edema  Neuro: alert and oriented, no gross abnormalities  Psych: normal mood and affect  Skin: no rash      Electronically signed by Tico Rodriguez DO

## 2025-01-18 NOTE — PROGRESS NOTES
[facility]   Pharmacy Dose Adjustment Per Protocol:  Meropenem Extended Interval Interchange    Remy Upton is a 74 y.o. male.     The following ordered dose of Meropenem has been changed to optimize its pharmacodynamic profile per [facility] pharmacy policy approved by [facility].    Recent Labs     01/17/25  0434 01/18/25  0706   CREATININE 1.92* 2.23*   BUN 54* 69*   WBC 10.7 10.4     .   , Weight - Scale: 79 kg (174 lb 2.6 oz), Body mass index is 22.36 kg/m².  Estimated Creatinine Clearance: 33 mL/min (A) (based on SCr of 2.23 mg/dL (H)).    New Dose    Meropenem - Extended Infusion (3-hour infusion) - Preferred Dosing Strategy    Renal function (CrCl mL/min)  >= 50  26 - 49  10 - 25   < 10, HD, PD  CRRT    All indications - Loading dose of 1942-0104 milligrams x 1 over 30 minutes or via IV push (based on indication). Maintenance dose should begin at the next regularly scheduled dosing interval based on indication/renal function.    Maintenance dosing for all indications except as outlined below  1000mg q8h ¨ 1000mg q12h ¨ 500 mg q12h ¨ 500 mg q24h ¨ 1000mg q12h^ ¨   CNS infections, Cystic fibrosis, RHONA > 4  2000mg q8h ¨ 2000mg q12h ¨ 1000mg q12h ¨ 1000mg q24h ¨ 2000mg q12h† ¨    ^Consider 1000mg q8h for CRRT effluent rates > 3L/h   †Consider 2000mg q8h for CRRT effluent rates >= 3L/h       Dialysis once today, no further dialysis plan as of yet  Currently on 1000 mg q12h per renal function  Dialysis recommendation of 500 mg q24h is 4x less drug and also assumes IHD 3x per week    Patient already received both q12h doses today (~0000 and 1200)  Will opt to hold next dose (0000) and re-assess before 1200 tomorrow based on updated dialysis plan    Thank You,  Dick Moore, [facility]  1/18/2025 2:33 PM

## 2025-01-18 NOTE — FLOWSHEET NOTE
Shift summary    0730 report at bedside. Pt flexiseal leaked- complete linen change and maurizio care done. Triad cream placed to buttocks/coccyx.  Pt awakens, follows commands weakly, attempts to mouth words, nods at times. Shakes head no for pain.     Per Maryam, RRT, pt placed on PS 12 PEEP % and resp rate was in 40s, did not pass CPAP trial. Dr. BROCK at bedside at this time and updated.     0900 assessment complete see flowsheet. Mp afib controlled.  Generalized edema noted to arms. + bruit + thrill to RFA fistula. PICC line WDL.     Repositioned throughout shift using sling.     Drsg to R leg changed.     HD orders received, pt tolerated well.     Electronically signed by Elizabeth Martínez RN on 1/18/2025 at 6:36 PM

## 2025-01-18 NOTE — PLAN OF CARE
Problem: Chronic Conditions and Co-morbidities  Goal: Patient's chronic conditions and co-morbidity symptoms are monitored and maintained or improved  Outcome: Not Progressing  Flowsheets (Taken 1/17/2025 2000)  Care Plan - Patient's Chronic Conditions and Co-Morbidity Symptoms are Monitored and Maintained or Improved: Monitor and assess patient's chronic conditions and comorbid symptoms for stability, deterioration, or improvement     Problem: Discharge Planning  Goal: Discharge to home or other facility with appropriate resources  Outcome: Not Progressing  Flowsheets (Taken 1/17/2025 2000)  Discharge to home or other facility with appropriate resources: Identify barriers to discharge with patient and caregiver     Problem: Pain  Goal: Verbalizes/displays adequate comfort level or baseline comfort level  Outcome: Not Progressing  Flowsheets (Taken 1/17/2025 2000)  Verbalizes/displays adequate comfort level or baseline comfort level: Assess pain using appropriate pain scale     Problem: Safety - Adult  Goal: Free from fall injury  Outcome: Not Progressing     Problem: Skin/Tissue Integrity  Goal: Absence of new skin breakdown  Description: 1.  Monitor for areas of redness and/or skin breakdown  2.  Assess vascular access sites hourly  3.  Every 4-6 hours minimum:  Change oxygen saturation probe site  4.  Every 4-6 hours:  If on nasal continuous positive airway pressure, respiratory therapy assess nares and determine need for appliance change or resting period.  Outcome: Not Progressing     Problem: ABCDS Injury Assessment  Goal: Absence of physical injury  Outcome: Not Progressing     Problem: Nutrition Deficit:  Goal: Optimize nutritional status  Outcome: Not Progressing     Problem: Neurosensory - Adult  Goal: Achieves stable or improved neurological status  Outcome: Not Progressing  Flowsheets (Taken 1/17/2025 2000)  Achieves stable or improved neurological status:   Assess for and report changes in

## 2025-01-18 NOTE — PROGRESS NOTES
[facility]   Pharmacy Pharmacokinetic Monitoring Service - Vancomycin    Consulting Provider: Dr. Narvaez   Indication: Sepsis  Target Concentration: Pre-Dialysis Concentration 15-20 mg/L  Day of Therapy: 5/7  Additional Antimicrobials: Merrem    Pertinent Laboratory Values:   Wt Readings from Last 1 Encounters:   01/18/25 79 kg (174 lb 2.6 oz)     Temp Readings from Last 1 Encounters:   01/18/25 97.5 °F (36.4 °C) (Oral)     Recent Labs     01/17/25  0434 01/18/25  0706   CREATININE 1.92* 2.23*   BUN 54* 69*   WBC 10.7 10.4     Procalcitonin: 1.63    Pertinent Cultures:  Culture Date Source Results   1/15 blood Prelim NGTD   MRSA Nasal Swab: N/A. Non-respiratory infection.    Recent vancomycin administrations                     vancomycin (VANCOCIN) 750 mg in sodium chloride 0.9 % 250 mL IVPB (mg) 750 mg New Bag 01/15/25 2042                  Assessment:  Date/Time Current Dose Concentration Dialysis Session   1/18 @1254 750 mg once given 1/15 15.8 Today once (last done 1/13)     Plan:  Concentration-guided dosing due to renal impairment and intermittent hemodialysis   Dialysis is not scheduled and unknown when next session will be  Current level is supratherapeutic if using renal impairment dosing, but therapeutic when using dialysis dosing  Give vancomycin 750 mg IV once after dialysis today  Vancomycin concentration ordered for 1/19 @ 0600, prior to HD session (if one is ordered by then)  This level is too soon after dosing to be used as a true trough (for renal dosing). However, obtaining level any later would put it at risk of being drawn during or after dialysis (if ordered)  Pharmacy will continue to monitor patient and adjust therapy as indicated    Thank you for the consult,  Dick Moore RPH  1/18/2025 2:08 PM

## 2025-01-18 NOTE — PROGRESS NOTES
Infectious Disease     Patient Name: Remy Upton  Date: 1/18/2025  YOB: 1950  Medical Record Number: [id number]        Right lower lobe pneumonia      History of Present Illness:  Diabetes hypertension end-stage renal disease kidney transplant    Recent hospitalization from [facility] 2/1/2013 required hemodialysis had a rhinovirus infection aspiration pneumonia intubation and mechanical ventilation  Underwent tracheostomy on 12/30/2024    Discharged to nursing home return to hospital because of fever 101 °F pulled out PEG tube  Hypotensive requiring Levophed on admission and amiodarone for A-fib      White cell count not elevated    Procalcitonin 1.42  Creatinine 1.36      Bilirubin 0.8 alkaline phosphatase 131 AST ALT normal  Chest x-ray shows haziness right lung        CT ABDOMEN PELVIS WO CONTRAST Additional Contrast? None [[id number]]  Status: Final result     PACS Images     Show images for CT ABDOMEN PELVIS WO CONTRAST Additional Contrast? None  CT ABDOMEN PELVIS WO CONTRAST Additional Contrast? None  Order: [id number]  Status: Final result       Visible to patient: No (not released)       Next appt: 05/13/2025 at 03:45 PM in Neurology (Kadeem Aleman MD)    0 Result Notes       1  Topic  Details    Reading Physician Reading Date Result Priority   Harinder Alexandra MD  [phone] 1/15/2025      Narrative & Impression  EXAMINATION:  CT OF THE ABDOMEN AND PELVIS WITHOUT CONTRAST 1/15/2025 2:23 pm     TECHNIQUE:  CT of the abdomen and pelvis was performed without the administration of  intravenous contrast. Multiplanar reformatted images are provided for review.  Automated exposure control, iterative reconstruction, and/or weight based  adjustment of the mA/kV was utilized to reduce the radiation dose to as low  as reasonably achievable.     COMPARISON:  CT of the abdomen and pelvis with contrast from 10/19/2022.     HISTORY:  ORDERING SYSTEM PROVIDED HISTORY: sepsis  TECHNOLOGIST PROVIDED

## 2025-01-18 NOTE — PROGRESS NOTES
0900- ICU rounds completed with care team and patient family. Family member's questions answered and addressed by ICU NP. Patient remains awake, alert and responding to commands. He nods/ gestures appropriately and is communicating by mouthing some words.     1030- patient awake and alert in bed. He is mouthing words to his wife at bedside and this RN. Patient able to successfully communicate that he is having pain in his buttocks. Patient unable to rate his pain on a scale of 0-10 so this RN used non-verbal pain scale to rate and determine pain scale. NP notified of patient pain and new orders received.     1830- bladder scan completed showing 462 present in bladder. Patient straight cath completed. Catheter bag was leaking while left to drain and bed was saturated in urine. Urine I/O documentation not accurate at this time. Sterile specimen sent for UA per order.     Electronically signed by Farrah Lam RN on 1/17/2025 at 8:07 PM

## 2025-01-19 LAB
ALBUMIN SERPL-MCNC: 3.1 G/DL (ref 3.5–4.6)
ANION GAP SERPL CALCULATED.3IONS-SCNC: 13 MEQ/L (ref 9–15)
BACTERIA BLD CULT ORG #2: NORMAL
BACTERIA BLD CULT: NORMAL
BACTERIA UR CULT: NORMAL
BASOPHILS # BLD: 0 K/UL (ref 0–0.2)
BASOPHILS NFR BLD: 0.1 %
BUN SERPL-MCNC: 35 MG/DL (ref 8–23)
CALCIUM SERPL-MCNC: 8.8 MG/DL (ref 8.5–9.9)
CHLORIDE SERPL-SCNC: 94 MEQ/L (ref 95–107)
CO2 SERPL-SCNC: 28 MEQ/L (ref 20–31)
CREAT SERPL-MCNC: 1.21 MG/DL (ref 0.7–1.2)
CRP SERPL HS-MCNC: 115.5 MG/L (ref 0–5)
EOSINOPHIL # BLD: 0 K/UL (ref 0–0.7)
EOSINOPHIL NFR BLD: 0 %
ERYTHROCYTE [DISTWIDTH] IN BLOOD BY AUTOMATED COUNT: 16.3 % (ref 11.5–14.5)
GLUCOSE BLD-MCNC: 167 MG/DL (ref 70–99)
GLUCOSE BLD-MCNC: 219 MG/DL (ref 70–99)
GLUCOSE BLD-MCNC: 294 MG/DL (ref 70–99)
GLUCOSE BLD-MCNC: 296 MG/DL (ref 70–99)
GLUCOSE BLD-MCNC: 373 MG/DL (ref 70–99)
GLUCOSE BLD-MCNC: 406 MG/DL (ref 70–99)
GLUCOSE SERPL-MCNC: 302 MG/DL (ref 70–99)
HCT VFR BLD AUTO: 25.3 % (ref 42–52)
HGB BLD-MCNC: 7.9 G/DL (ref 14–18)
LYMPHOCYTES # BLD: 0.6 K/UL (ref 1–4.8)
LYMPHOCYTES NFR BLD: 6 %
MAGNESIUM SERPL-MCNC: 2.3 MG/DL (ref 1.7–2.4)
MCH RBC QN AUTO: 28.4 PG (ref 27–31.3)
MCHC RBC AUTO-ENTMCNC: 31.2 % (ref 33–37)
MCV RBC AUTO: 91 FL (ref 79–92.2)
MONOCYTES # BLD: 0.5 K/UL (ref 0.2–0.8)
MONOCYTES NFR BLD: 5 %
NEUTROPHILS # BLD: 8.2 K/UL (ref 1.4–6.5)
NEUTS SEG NFR BLD: 87.9 %
PERFORMED ON: ABNORMAL
PHOSPHATE SERPL-MCNC: 1.8 MG/DL (ref 2.3–4.8)
PLATELET # BLD AUTO: 353 K/UL (ref 130–400)
POTASSIUM SERPL-SCNC: 3 MEQ/L (ref 3.4–4.9)
PROCALCITONIN SERPL IA-MCNC: 1.29 NG/ML (ref 0–0.15)
RBC # BLD AUTO: 2.78 M/UL (ref 4.7–6.1)
SODIUM SERPL-SCNC: 135 MEQ/L (ref 135–144)
VANCOMYCIN SERPL-MCNC: 16.9 UG/ML (ref 10–40)
WBC # BLD AUTO: 9.3 K/UL (ref 4.8–10.8)

## 2025-01-19 PROCEDURE — 94003 VENT MGMT INPAT SUBQ DAY: CPT

## 2025-01-19 PROCEDURE — 6370000000 HC RX 637 (ALT 250 FOR IP): Performed by: INTERNAL MEDICINE

## 2025-01-19 PROCEDURE — 83735 ASSAY OF MAGNESIUM: CPT

## 2025-01-19 PROCEDURE — 6360000002 HC RX W HCPCS: Performed by: INTERNAL MEDICINE

## 2025-01-19 PROCEDURE — 36592 COLLECT BLOOD FROM PICC: CPT

## 2025-01-19 PROCEDURE — 80202 ASSAY OF VANCOMYCIN: CPT

## 2025-01-19 PROCEDURE — 86140 C-REACTIVE PROTEIN: CPT

## 2025-01-19 PROCEDURE — 94660 CPAP INITIATION&MGMT: CPT

## 2025-01-19 PROCEDURE — 85025 COMPLETE CBC W/AUTO DIFF WBC: CPT

## 2025-01-19 PROCEDURE — P9047 ALBUMIN (HUMAN), 25%, 50ML: HCPCS | Performed by: INTERNAL MEDICINE

## 2025-01-19 PROCEDURE — 6370000000 HC RX 637 (ALT 250 FOR IP): Performed by: NURSE PRACTITIONER

## 2025-01-19 PROCEDURE — 6370000000 HC RX 637 (ALT 250 FOR IP)

## 2025-01-19 PROCEDURE — 99232 SBSQ HOSP IP/OBS MODERATE 35: CPT | Performed by: INTERNAL MEDICINE

## 2025-01-19 PROCEDURE — 2580000003 HC RX 258: Performed by: INTERNAL MEDICINE

## 2025-01-19 PROCEDURE — 2500000003 HC RX 250 WO HCPCS: Performed by: INTERNAL MEDICINE

## 2025-01-19 PROCEDURE — 84145 PROCALCITONIN (PCT): CPT

## 2025-01-19 PROCEDURE — 80069 RENAL FUNCTION PANEL: CPT

## 2025-01-19 PROCEDURE — 99291 CRITICAL CARE FIRST HOUR: CPT | Performed by: INTERNAL MEDICINE

## 2025-01-19 PROCEDURE — 2000000000 HC ICU R&B

## 2025-01-19 PROCEDURE — 99233 SBSQ HOSP IP/OBS HIGH 50: CPT | Performed by: INTERNAL MEDICINE

## 2025-01-19 PROCEDURE — 2700000000 HC OXYGEN THERAPY PER DAY

## 2025-01-19 RX ORDER — POTASSIUM CHLORIDE 29.8 MG/ML
20 INJECTION INTRAVENOUS ONCE
Status: DISCONTINUED | OUTPATIENT
Start: 2025-01-19 | End: 2025-01-19

## 2025-01-19 RX ORDER — POTASSIUM CHLORIDE 1500 MG/1
40 TABLET, EXTENDED RELEASE ORAL ONCE
Status: DISCONTINUED | OUTPATIENT
Start: 2025-01-19 | End: 2025-01-19

## 2025-01-19 RX ORDER — POTASSIUM CHLORIDE 29.8 MG/ML
20 INJECTION INTRAVENOUS
Status: DISCONTINUED | OUTPATIENT
Start: 2025-01-19 | End: 2025-01-19

## 2025-01-19 RX ORDER — INSULIN LISPRO 100 [IU]/ML
12 INJECTION, SOLUTION INTRAVENOUS; SUBCUTANEOUS ONCE
Status: COMPLETED | OUTPATIENT
Start: 2025-01-19 | End: 2025-01-19

## 2025-01-19 RX ADMIN — HEPARIN SODIUM 5000 UNITS: 5000 INJECTION INTRAVENOUS; SUBCUTANEOUS at 06:03

## 2025-01-19 RX ADMIN — OXYCODONE AND ACETAMINOPHEN 2 TABLET: 5; 325 TABLET ORAL at 15:12

## 2025-01-19 RX ADMIN — AMIODARONE HYDROCHLORIDE 200 MG: 200 TABLET ORAL at 20:56

## 2025-01-19 RX ADMIN — MIDODRINE HYDROCHLORIDE 20 MG: 10 TABLET ORAL at 09:45

## 2025-01-19 RX ADMIN — HEPARIN SODIUM 5000 UNITS: 5000 INJECTION INTRAVENOUS; SUBCUTANEOUS at 15:25

## 2025-01-19 RX ADMIN — HYDROCORTISONE SODIUM SUCCINATE 50 MG: 100 INJECTION, POWDER, FOR SOLUTION INTRAMUSCULAR; INTRAVENOUS at 16:31

## 2025-01-19 RX ADMIN — POTASSIUM BICARBONATE 20 MEQ: 782 TABLET, EFFERVESCENT ORAL at 15:11

## 2025-01-19 RX ADMIN — HEPARIN SODIUM 5000 UNITS: 5000 INJECTION INTRAVENOUS; SUBCUTANEOUS at 21:14

## 2025-01-19 RX ADMIN — INSULIN LISPRO 4 UNITS: 100 INJECTION, SOLUTION INTRAVENOUS; SUBCUTANEOUS at 00:48

## 2025-01-19 RX ADMIN — INSULIN LISPRO 12 UNITS: 100 INJECTION, SOLUTION INTRAVENOUS; SUBCUTANEOUS at 22:26

## 2025-01-19 RX ADMIN — INSULIN LISPRO 8 UNITS: 100 INJECTION, SOLUTION INTRAVENOUS; SUBCUTANEOUS at 09:49

## 2025-01-19 RX ADMIN — AMIODARONE HYDROCHLORIDE 200 MG: 200 TABLET ORAL at 09:45

## 2025-01-19 RX ADMIN — SODIUM CHLORIDE 40 MG: 9 INJECTION INTRAMUSCULAR; INTRAVENOUS; SUBCUTANEOUS at 10:12

## 2025-01-19 RX ADMIN — MEROPENEM 1000 MG: 1 INJECTION INTRAVENOUS at 15:16

## 2025-01-19 RX ADMIN — POTASSIUM BICARBONATE 40 MEQ: 782 TABLET, EFFERVESCENT ORAL at 09:45

## 2025-01-19 RX ADMIN — INSULIN LISPRO 16 UNITS: 100 INJECTION, SOLUTION INTRAVENOUS; SUBCUTANEOUS at 20:54

## 2025-01-19 RX ADMIN — Medication 10 ML: at 10:12

## 2025-01-19 RX ADMIN — ALBUMIN (HUMAN) 25 G: 0.25 INJECTION, SOLUTION INTRAVENOUS at 09:56

## 2025-01-19 RX ADMIN — HYDROCORTISONE SODIUM SUCCINATE 50 MG: 100 INJECTION, POWDER, FOR SOLUTION INTRAMUSCULAR; INTRAVENOUS at 09:49

## 2025-01-19 RX ADMIN — INSULIN LISPRO 8 UNITS: 100 INJECTION, SOLUTION INTRAVENOUS; SUBCUTANEOUS at 05:16

## 2025-01-19 RX ADMIN — ALBUMIN (HUMAN) 25 G: 0.25 INJECTION, SOLUTION INTRAVENOUS at 16:27

## 2025-01-19 RX ADMIN — MIDODRINE HYDROCHLORIDE 20 MG: 10 TABLET ORAL at 20:56

## 2025-01-19 RX ADMIN — Medication 10 ML: at 20:56

## 2025-01-19 RX ADMIN — FINASTERIDE 5 MG: 5 TABLET, FILM COATED ORAL at 15:11

## 2025-01-19 RX ADMIN — OXYCODONE AND ACETAMINOPHEN 2 TABLET: 5; 325 TABLET ORAL at 10:10

## 2025-01-19 RX ADMIN — HYDROCORTISONE SODIUM SUCCINATE 50 MG: 100 INJECTION, POWDER, FOR SOLUTION INTRAMUSCULAR; INTRAVENOUS at 05:09

## 2025-01-19 RX ADMIN — HYDROCORTISONE SODIUM SUCCINATE 50 MG: 100 INJECTION, POWDER, FOR SOLUTION INTRAMUSCULAR; INTRAVENOUS at 21:29

## 2025-01-19 RX ADMIN — MIDODRINE HYDROCHLORIDE 20 MG: 10 TABLET ORAL at 15:12

## 2025-01-19 RX ADMIN — ALBUMIN (HUMAN) 25 G: 0.25 INJECTION, SOLUTION INTRAVENOUS at 00:46

## 2025-01-19 ASSESSMENT — PAIN DESCRIPTION - LOCATION: LOCATION: GENERALIZED

## 2025-01-19 ASSESSMENT — PULMONARY FUNCTION TESTS
PIF_VALUE: 17
PIF_VALUE: 21
PIF_VALUE: 28
PIF_VALUE: 16
PIF_VALUE: 26

## 2025-01-19 ASSESSMENT — PAIN SCALES - GENERAL
PAINLEVEL_OUTOF10: 0

## 2025-01-19 ASSESSMENT — PAIN DESCRIPTION - DESCRIPTORS: DESCRIPTORS: SORE

## 2025-01-19 NOTE — PLAN OF CARE
Problem: Chronic Conditions and Co-morbidities  Goal: Patient's chronic conditions and co-morbidity symptoms are monitored and maintained or improved  Outcome: Not Progressing     Problem: Discharge Planning  Goal: Discharge to home or other facility with appropriate resources  Outcome: Not Progressing     Problem: Pain  Goal: Verbalizes/displays adequate comfort level or baseline comfort level  Outcome: Not Progressing  Flowsheets  Taken 1/19/2025 0000  Verbalizes/displays adequate comfort level or baseline comfort level:   Assess pain using appropriate pain scale   Administer analgesics based on type and severity of pain and evaluate response   Implement non-pharmacological measures as appropriate and evaluate response  Taken 1/18/2025 2000  Verbalizes/displays adequate comfort level or baseline comfort level:   Assess pain using appropriate pain scale   Administer analgesics based on type and severity of pain and evaluate response   Implement non-pharmacological measures as appropriate and evaluate response     Problem: Safety - Adult  Goal: Free from fall injury  Outcome: Not Progressing     Problem: Skin/Tissue Integrity  Goal: Absence of new skin breakdown  Description: 1.  Monitor for areas of redness and/or skin breakdown  2.  Assess vascular access sites hourly  3.  Every 4-6 hours minimum:  Change oxygen saturation probe site  4.  Every 4-6 hours:  If on nasal continuous positive airway pressure, respiratory therapy assess nares and determine need for appliance change or resting period.  Outcome: Not Progressing     Problem: ABCDS Injury Assessment  Goal: Absence of physical injury  Outcome: Not Progressing     Problem: Nutrition Deficit:  Goal: Optimize nutritional status  Outcome: Not Progressing     Problem: Neurosensory - Adult  Goal: Achieves stable or improved neurological status  Outcome: Not Progressing     Problem: Respiratory - Adult  Goal: Achieves optimal ventilation and oxygenation  Outcome:

## 2025-01-19 NOTE — PROGRESS NOTES
Hospitalist Progress Note      PCP: Tico Rodriguez DO    Date of Admission: 1/14/2025    Chief Complaint:  no acute events, afebrile, remains hypotensive requiring low dose Norepinephrine infusion    Medications:  Reviewed    Infusion Medications    norepinephrine 5 mcg/min (01/19/25 0705)    sodium chloride      dextrose      sodium chloride       Scheduled Medications    potassium bicarb-citric acid  20 mEq Oral Once    amiodarone  200 mg Oral BID    albumin human 25%  25 g IntraVENous Q8H    meropenem  1,000 mg IntraVENous Q12H    LORazepam  1 mg IntraVENous Once    hydrocortisone sodium succinate PF  50 mg IntraVENous Q6H    midodrine  20 mg Oral TID WC    insulin lispro  0-16 Units SubCUTAneous Q4H    finasteride  5 mg Oral Daily    sodium chloride flush  5-40 mL IntraVENous 2 times per day    heparin (porcine)  5,000 Units SubCUTAneous 3 times per day    vancomycin (VANCOCIN) intermittent dosing (placeholder)   Other RX Placeholder    pantoprazole (PROTONIX) 40 mg in sodium chloride (PF) 0.9 % 10 mL injection  40 mg IntraVENous Daily     PRN Meds: metoprolol, oxyCODONE-acetaminophen **OR** oxyCODONE-acetaminophen, sodium chloride flush, sodium chloride, acetaminophen **OR** acetaminophen, glucose, dextrose bolus **OR** dextrose bolus, glucagon (rDNA), dextrose, sodium chloride      Intake/Output Summary (Last 24 hours) at 1/19/2025 1032  Last data filed at 1/19/2025 1014  Gross per 24 hour   Intake 2027.29 ml   Output 3450 ml   Net -1422.71 ml       Exam:    BP (!) 137/56   Pulse (!) 106   Temp 99.3 °F (37.4 °C) (Oral)   Resp 14   Wt 79 kg (174 lb 2.6 oz)   SpO2 100%   BMI 22.36 kg/m²     General appearance: awake, intubated via trach  Lungs: coarse sounds bilaterally  Heart: S1/S2, irregular  Abdomen: soft  Extremities: no edema      Labs:   Recent Labs     01/17/25  0434 01/18/25  0706 01/19/25  0515   WBC 10.7 10.4 9.3   HGB 9.0* 8.9* 7.9*   HCT 28.4* 28.3* 25.3*    407* 353     Recent Labs

## 2025-01-19 NOTE — PROGRESS NOTES
PROGRESS NOTE    CONSULTANT  Shashank Pena DO      REQUESTING PHYSICIAN  Brittney Narvaez MD                REASON FOR CONSULTATION  No chief complaint on file.        Hospital Day: 1         Patient is a 74 y.o. male who presents with a chief complaint of A-fib with RVR, pyrexia. Patient is followed on a regular basis by Tico Guzman DO.  Patient with past medical history of diabetes, hypertension, end-stage renal disease status post renal transplant, tobacco abuse.  Recently hospitalized for pneumonia and discharged to LTAC.  Apparently was noted to have atrial fibrillation with rapid ventricle response as well as pyrexia and sent back to the hospital for further evaluation and treatment.  He was placed on amiodarone drip.  Also noted to be significant hemic with a hemoglobin of 6.4.  Status post 1 unit packed red blood cells.  Patient also on Levophed 2 mcg/min at this time.     He has persistent AF and s/p Watchman device 2023 at  due to prior Extensive bleed and inability to anticoagulate. 7/2024 FREDERIC at  EF 55-60 and stable Watchman position.      He has no known CAD. He is non compliant with f/u.    1-16-25: Remains on Levophed.  Amiodarone drip.  A-fib on telemetry heart rate 126 bpm.  Patient is on trach    1-17-25: Patient remains on Levophed.  Also on amiodarone drip  Atrial fibrillation on telemetry 90s  On trach    Dig level 3.6    1/18/25 coverng Dr. Pena. Tele SR 81. Remains vented via Tache 30% FIO2. On Amio gtt.  Awake     1/19/25 Tele AF/AFL 82 Remains vented via trache 3L out w HD yesterday. Pt opens eyes.  Remains on low dose Levo.         Past Medical History        Past Medical History:   Diagnosis Date    Diabetes mellitus (HCC)      Hemodialysis access, fistula mature (HCC) 12/2002    Hypertension      Rhinovirus 12/2024    Seizures (HCC)       no seizure since 1995    Smoking           Problem List       Patient Active Problem List   Diagnosis    Pneumonia    Abdominal pain,

## 2025-01-19 NOTE — FLOWSHEET NOTE
01/18/25 2000   Vital Signs   BP (!) 141/29   Temp 97.6 °F (36.4 °C)   Pulse 94   Respirations 22   SpO2 100 %   Post-Hemodialysis Assessment   Post-Treatment Procedures Blood returned;Access bleeding time < 10 minutes   Machine Disinfection Process Acid/Vinegar Clean;Heat Disinfect;Exterior Machine Disinfection   Dialyzer Clearance Moderately streaked   Duration of Treatment (minutes) 240 minutes   Heparin Amount Administered During Treatment (mL) 0 mL   Hemodialysis Intake (ml) 400 ml   Hemodialysis Output (ml) 3400 ml   NET Removed (ml) 3000   Patient Response to Treatment Pt tolerated tx well. Needles pulled. Hemostasis achieved. Dressing applied   Bilateral Breath Sounds Diminished   Edema Right upper extremity;Left upper extremity;Generalized   Edema Generalized +2   Time Off 1945   Patient Disposition Remain in ICU/ED   Observations & Evaluations   Level of Consciousness 0   Heart Rhythm Regular   Respiratory Quality/Effort Unlabored   O2 Device Ventilator

## 2025-01-19 NOTE — PROGRESS NOTES
Infectious Disease     Patient Name: Remy Upton  Date: 1/19/2025  YOB: 1950  Medical Record Number: [id number]        Right lower lobe pneumonia      Diabetes hypertension end-stage renal disease kidney transplant    Recent hospitalization from [facility] 2/1/2013 required hemodialysis had a rhinovirus infection aspiration pneumonia intubation and mechanical ventilation  Underwent tracheostomy on 12/30/2024    Discharged to nursing home return to hospital because of fever 101 °F pulled out PEG tube  Hypotensive requiring Levophed on admission and amiodarone for A-fib      White cell count not elevated    Procalcitonin 1.42  Creatinine 1.36      Bilirubin 0.8 alkaline phosphatase 131 AST ALT normal  Chest x-ray shows haziness right lung        CT ABDOMEN PELVIS WO CONTRAST Additional Contrast? None [[id number]]  Status: Final result     PACS Images     Show images for CT ABDOMEN PELVIS WO CONTRAST Additional Contrast? None  CT ABDOMEN PELVIS WO CONTRAST Additional Contrast? None  Order: [id number]  Status: Final result       Visible to patient: No (not released)       Next appt: 05/13/2025 at 03:45 PM in Neurology (Kadeem Aleman MD)    0 Result Notes       1  Topic  Details    Reading Physician Reading Date Result Priority   Harinder Alexandra MD  [phone] 1/15/2025      Narrative & Impression  EXAMINATION:  CT OF THE ABDOMEN AND PELVIS WITHOUT CONTRAST 1/15/2025 2:23 pm     TECHNIQUE:  CT of the abdomen and pelvis was performed without the administration of  intravenous contrast. Multiplanar reformatted images are provided for review.  Automated exposure control, iterative reconstruction, and/or weight based  adjustment of the mA/kV was utilized to reduce the radiation dose to as low  as reasonably achievable.     COMPARISON:  CT of the abdomen and pelvis with contrast from 10/19/2022.     HISTORY:  ORDERING SYSTEM PROVIDED HISTORY: sepsis  TECHNOLOGIST PROVIDED HISTORY:  Reason for

## 2025-01-19 NOTE — PROGRESS NOTES
Pulmonary & Critical Care Medicine ICU Progress Note  Chief complaint : Respiratory failure    Subjunctive/24 hour events :   Patient seen and examined during multidisciplinary rounds with RN, charge nurse, RT, pharmacy, dietitian, and social service.     Awake, no distress, dialysis done yesterday, no fever he is on Levophed at 5 mcg/min, no vomiting, on 30% 5 2 saturation 99%.      New information updated in the note today, rest of the examination did not change compared to yesterday.  Social History     Tobacco Use    Smoking status: Former     Current packs/day: 0.00     Types: Cigarettes     Quit date: 2015     Years since quittin.6    Smokeless tobacco: Former   Substance Use Topics    Alcohol use: No     Alcohol/week: 0.0 standard drinks of alcohol         Problem Relation Age of Onset    Colon Cancer Neg Hx        No results for input(s): \"PHART\", \"SSC8BRP\", \"PO2ART\" in the last 72 hours.    MV Settings:  Vent Mode: CPAP/PS Resp Rate (Set): 14 bpm/Vt (Set, mL): 500 mL/ /FiO2 : 30 %           IV:   norepinephrine 5 mcg/min (25 0705)    sodium chloride      dextrose      sodium chloride         Vitals:  /68   Pulse 75   Temp 97.9 °F (36.6 °C) (Oral)   Resp 19   Wt 79 kg (174 lb 2.6 oz)   SpO2 99%   BMI 22.36 kg/m²    Tmax:        Intake/Output Summary (Last 24 hours) at 2025 0919  Last data filed at 2025 0705  Gross per 24 hour   Intake 1602.29 ml   Output 3450 ml   Net -1847.71 ml       EXAM:  General: alert, cooperative, no distress  Head: normocephalic, atraumatic  Eyes:No gross abnormalities.  ENT:  MMM no lesions  Neck:  supple and no masses  Chest : clear to auscultation bilaterally- no wheezes, rales or rhonchi, normal air movement, no respiratory distress  Heart:: Heart sounds are normal.  Regular rate and rhythm without murmur, gallop or rub.  ABD:  symmetric, soft, non-tender, no guarding or rebound  Musculoskeletal : no cyanosis, no clubbing, and no edema  Neuro:

## 2025-01-19 NOTE — PROGRESS NOTES
Spiritual Health History and Assessment/Progress Note  [facility]    (P) Palliative Care, Follow-up, Rapid Response,  ,      Name: Remy Upton MRN: [id number]    Age: 74 y.o.     Sex: male   Language: English   Congregation: [employer]   Atrial fibrillation (HCC)     Date: 1/19/2025            Total Time Calculated: (P) 15 min              Sp[place] [facility] [place]            Encounter Overview/Reason: (P) Palliative Care, Follow-up  Service Provided For: (P) Patient     reports, patient on trache and vent at this time, uses blinking of eyes to respond to  presence, guide questions and exploration of patients wellness, comfort, care, and progress.      continues to offer spiritual care to patient when available.     Marian, Belief, Meaning:   Patient has beliefs or practices that help with coping during difficult times  Family/Friends No family/friends present      Importance and Influence:  Patient has spiritual/personal beliefs that influence decisions regarding their health  Family/Friends No family/friends present    Community:  Patient feels well-supported. Support system includes: Spouse/Partner  Family/Friends No family/friends present    Assessment and Plan of Care:     Patient Interventions include: Facilitated expression of thoughts and feelings and Affirmed coping skills/support systems  Family/Friends Interventions include: No family/friends present    Patient Plan of Care: Spiritual Care available upon further referral  Family/Friends Plan of Care: No family/friends present    Electronically signed by Chaplain Camilo on 1/19/2025 at 1:01 PM

## 2025-01-19 NOTE — PROGRESS NOTES
[facility]   Pharmacy Pharmacokinetic Monitoring Service - Vancomycin    Consulting Provider: Dr. Narvaez   Indication: Sepsis  Target Concentration: Pre-Dialysis Concentration 15-20 mg/L  Day of Therapy: 6/7  Additional Antimicrobials: Merrem    Pertinent Laboratory Values:   Wt Readings from Last 1 Encounters:   01/18/25 79 kg (174 lb 2.6 oz)     Temp Readings from Last 1 Encounters:   01/19/25 99.3 °F (37.4 °C) (Oral)     Recent Labs     01/18/25  0706 01/19/25  0515   CREATININE 2.23* 1.21*   BUN 69* 35*   WBC 10.4 9.3     Procalcitonin: 1.63    Pertinent Cultures:  Culture Date Source Results   1/15 blood Prelim NGTD   MRSA Nasal Swab: N/A. Non-respiratory infection.    Recent vancomycin administrations                     vancomycin (VANCOCIN) 750 mg in sodium chloride 0.9 % 250 mL IVPB (mg) 750 mg New Bag 01/15/25 2042                  Assessment:  Date/Time Current Dose Concentration Dialysis Session   1/19 @ 0515 750 mg once given 1/18 16.9 None ordered (last 1/18)     Plan:  Concentration-guided dosing due to renal impairment and PRN intermittent hemodialysis   Dialysis is still PRN and unknown when next session will be (not ordered for today as of time of posting)  Current level is supratherapeutic if using renal impairment dosing, but therapeutic when using dialysis dosing  Given no dialysis orders for today yet, will proceed with impaired renal function dosing (trough goal <15) and opt to hold vancomycin today and obtain another level tomorrow morning  Vancomycin concentration ordered for 1/20 @ 0600, prior to HD session (if one is ordered for then)  Pharmacy will continue to monitor patient and adjust therapy as indicated    Thank you for the consult,  Dick Moore RPH  1/19/2025 11:11 AM

## 2025-01-19 NOTE — PROGRESS NOTES
RT PLACED PT ON CPAP 5, PS12, RESP. RATE INCREASED TO 38, RSBI 132.  PT STATED HE FEELS SHORT OF BREATH.  PLACED PT BACK TO AC SETTINGS.

## 2025-01-19 NOTE — PROGRESS NOTES
Nephrology Progress Note  Poor prognosis  Assessment:  ESRDX  Malnutrision  AF  Hx Kidney transplant  COPD  TRACh* PEG  Sacral wound      Plan:off cyclosporine & cellcept   dialysis as needed   K+ replacement    Patient Active Problem List:     Nosocomial pneumonia     Abdominal pain, other specified site     Acute pyelonephritis without lesion of renal medullary necrosis     Anemia     Essential hypertension     Nonspecific abnormal results of thyroid function study     Mixed hyperlipidemia     Kidney replaced by transplant     Intra-abdominal abscess post-procedure (HCC)     Infection due to Enterococcus     Fever and other physiologic disturbances of temperature regulation     Chronic kidney disease (CKD)     Type 2 diabetes mellitus with stage 3b chronic kidney disease, without long-term current use of insulin (HCC)     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 (HCC)     Abnormal CT of the chest     Bronchiectasis without complication (HCC)     GI bleeding     Complication of transplanted kidney     Muscle weakness (generalized)     Difficulty in walking     Elevated BUN     Urinary retention     Immunosuppression due to drug therapy (HCC)     DELORES (acute kidney injury) (HCC)     Adjustment disorder     Gross hematuria     Bilateral lower extremity edema     Dysuria     Acute cystitis without hematuria     Bilateral hearing loss     Abscess, toe     Acute hyperkalemia     Acute pain of left shoulder     Astigmatism     Astigmatism of both eyes with presbyopia     At risk for stroke     Benign enlargement of prostate     Benign prostatic hyperplasia with urinary

## 2025-01-20 LAB
ALBUMIN SERPL-MCNC: 3.7 G/DL (ref 3.5–4.6)
ANION GAP SERPL CALCULATED.3IONS-SCNC: 13 MEQ/L (ref 9–15)
BASOPHILS # BLD: 0 K/UL (ref 0–0.2)
BASOPHILS NFR BLD: 0.1 %
BUN SERPL-MCNC: 55 MG/DL (ref 8–23)
CALCIUM SERPL-MCNC: 9.1 MG/DL (ref 8.5–9.9)
CHLORIDE SERPL-SCNC: 97 MEQ/L (ref 95–107)
CO2 SERPL-SCNC: 30 MEQ/L (ref 20–31)
CREAT SERPL-MCNC: 1.66 MG/DL (ref 0.7–1.2)
CRP SERPL HS-MCNC: 63.8 MG/L (ref 0–5)
EOSINOPHIL # BLD: 0 K/UL (ref 0–0.7)
EOSINOPHIL NFR BLD: 0 %
ERYTHROCYTE [DISTWIDTH] IN BLOOD BY AUTOMATED COUNT: 16.4 % (ref 11.5–14.5)
GLUCOSE BLD-MCNC: 132 MG/DL (ref 70–99)
GLUCOSE BLD-MCNC: 190 MG/DL (ref 70–99)
GLUCOSE BLD-MCNC: 212 MG/DL (ref 70–99)
GLUCOSE BLD-MCNC: 254 MG/DL (ref 70–99)
GLUCOSE BLD-MCNC: 255 MG/DL (ref 70–99)
GLUCOSE SERPL-MCNC: 122 MG/DL (ref 70–99)
HCT VFR BLD AUTO: 25.2 % (ref 42–52)
HGB BLD-MCNC: 7.8 G/DL (ref 14–18)
LYMPHOCYTES # BLD: 0.6 K/UL (ref 1–4.8)
LYMPHOCYTES NFR BLD: 6.7 %
MAGNESIUM SERPL-MCNC: 2.4 MG/DL (ref 1.7–2.4)
MCH RBC QN AUTO: 28.3 PG (ref 27–31.3)
MCHC RBC AUTO-ENTMCNC: 31 % (ref 33–37)
MCV RBC AUTO: 91.3 FL (ref 79–92.2)
MONOCYTES # BLD: 0.8 K/UL (ref 0.2–0.8)
MONOCYTES NFR BLD: 8.7 %
NEUTROPHILS # BLD: 7.2 K/UL (ref 1.4–6.5)
NEUTS SEG NFR BLD: 83.1 %
PERFORMED ON: ABNORMAL
PHOSPHATE SERPL-MCNC: 2.1 MG/DL (ref 2.3–4.8)
PLATELET # BLD AUTO: 305 K/UL (ref 130–400)
POTASSIUM SERPL-SCNC: 3.2 MEQ/L (ref 3.4–4.9)
PROCALCITONIN SERPL IA-MCNC: 1.04 NG/ML (ref 0–0.15)
RBC # BLD AUTO: 2.76 M/UL (ref 4.7–6.1)
SODIUM SERPL-SCNC: 140 MEQ/L (ref 135–144)
VANCOMYCIN SERPL-MCNC: 15.4 UG/ML (ref 10–40)
WBC # BLD AUTO: 8.6 K/UL (ref 4.8–10.8)

## 2025-01-20 PROCEDURE — 2500000003 HC RX 250 WO HCPCS: Performed by: INTERNAL MEDICINE

## 2025-01-20 PROCEDURE — 80202 ASSAY OF VANCOMYCIN: CPT

## 2025-01-20 PROCEDURE — 86140 C-REACTIVE PROTEIN: CPT

## 2025-01-20 PROCEDURE — 99232 SBSQ HOSP IP/OBS MODERATE 35: CPT | Performed by: INTERNAL MEDICINE

## 2025-01-20 PROCEDURE — 94003 VENT MGMT INPAT SUBQ DAY: CPT

## 2025-01-20 PROCEDURE — 2500000003 HC RX 250 WO HCPCS: Performed by: NURSE PRACTITIONER

## 2025-01-20 PROCEDURE — 84145 PROCALCITONIN (PCT): CPT

## 2025-01-20 PROCEDURE — 99233 SBSQ HOSP IP/OBS HIGH 50: CPT | Performed by: INTERNAL MEDICINE

## 2025-01-20 PROCEDURE — P9047 ALBUMIN (HUMAN), 25%, 50ML: HCPCS | Performed by: INTERNAL MEDICINE

## 2025-01-20 PROCEDURE — 6370000000 HC RX 637 (ALT 250 FOR IP): Performed by: NURSE PRACTITIONER

## 2025-01-20 PROCEDURE — 6370000000 HC RX 637 (ALT 250 FOR IP): Performed by: INTERNAL MEDICINE

## 2025-01-20 PROCEDURE — 36592 COLLECT BLOOD FROM PICC: CPT

## 2025-01-20 PROCEDURE — 2000000000 HC ICU R&B

## 2025-01-20 PROCEDURE — 80069 RENAL FUNCTION PANEL: CPT

## 2025-01-20 PROCEDURE — 85025 COMPLETE CBC W/AUTO DIFF WBC: CPT

## 2025-01-20 PROCEDURE — 99291 CRITICAL CARE FIRST HOUR: CPT | Performed by: INTERNAL MEDICINE

## 2025-01-20 PROCEDURE — 83735 ASSAY OF MAGNESIUM: CPT

## 2025-01-20 PROCEDURE — 2700000000 HC OXYGEN THERAPY PER DAY

## 2025-01-20 PROCEDURE — 2580000003 HC RX 258: Performed by: NURSE PRACTITIONER

## 2025-01-20 PROCEDURE — APPSS30 APP SPLIT SHARED TIME 16-30 MINUTES

## 2025-01-20 PROCEDURE — 6360000002 HC RX W HCPCS: Performed by: INTERNAL MEDICINE

## 2025-01-20 PROCEDURE — 2580000003 HC RX 258: Performed by: INTERNAL MEDICINE

## 2025-01-20 RX ORDER — INSULIN GLARGINE 100 [IU]/ML
10 INJECTION, SOLUTION SUBCUTANEOUS NIGHTLY
Status: DISCONTINUED | OUTPATIENT
Start: 2025-01-20 | End: 2025-01-22

## 2025-01-20 RX ORDER — POTASSIUM CHLORIDE 1500 MG/1
40 TABLET, EXTENDED RELEASE ORAL ONCE
Status: COMPLETED | OUTPATIENT
Start: 2025-01-20 | End: 2025-01-20

## 2025-01-20 RX ADMIN — HEPARIN SODIUM 5000 UNITS: 5000 INJECTION INTRAVENOUS; SUBCUTANEOUS at 14:10

## 2025-01-20 RX ADMIN — HYDROCORTISONE SODIUM SUCCINATE 50 MG: 100 INJECTION, POWDER, FOR SOLUTION INTRAMUSCULAR; INTRAVENOUS at 16:37

## 2025-01-20 RX ADMIN — INSULIN LISPRO 8 UNITS: 100 INJECTION, SOLUTION INTRAVENOUS; SUBCUTANEOUS at 01:01

## 2025-01-20 RX ADMIN — INSULIN LISPRO 4 UNITS: 100 INJECTION, SOLUTION INTRAVENOUS; SUBCUTANEOUS at 20:48

## 2025-01-20 RX ADMIN — INSULIN LISPRO 8 UNITS: 100 INJECTION, SOLUTION INTRAVENOUS; SUBCUTANEOUS at 11:47

## 2025-01-20 RX ADMIN — AMIODARONE HYDROCHLORIDE 200 MG: 200 TABLET ORAL at 20:46

## 2025-01-20 RX ADMIN — MEROPENEM 1000 MG: 1 INJECTION INTRAVENOUS at 11:38

## 2025-01-20 RX ADMIN — AMIODARONE HYDROCHLORIDE 200 MG: 200 TABLET ORAL at 08:25

## 2025-01-20 RX ADMIN — HYDROCORTISONE SODIUM SUCCINATE 50 MG: 100 INJECTION, POWDER, FOR SOLUTION INTRAMUSCULAR; INTRAVENOUS at 05:37

## 2025-01-20 RX ADMIN — FINASTERIDE 5 MG: 5 TABLET, FILM COATED ORAL at 08:26

## 2025-01-20 RX ADMIN — SODIUM CHLORIDE 40 MG: 9 INJECTION INTRAMUSCULAR; INTRAVENOUS; SUBCUTANEOUS at 08:25

## 2025-01-20 RX ADMIN — POTASSIUM CHLORIDE 40 MEQ: 1500 TABLET, EXTENDED RELEASE ORAL at 10:00

## 2025-01-20 RX ADMIN — HYDROCORTISONE SODIUM SUCCINATE 50 MG: 100 INJECTION, POWDER, FOR SOLUTION INTRAMUSCULAR; INTRAVENOUS at 10:00

## 2025-01-20 RX ADMIN — ALBUMIN (HUMAN) 25 G: 0.25 INJECTION, SOLUTION INTRAVENOUS at 08:32

## 2025-01-20 RX ADMIN — HEPARIN SODIUM 5000 UNITS: 5000 INJECTION INTRAVENOUS; SUBCUTANEOUS at 05:39

## 2025-01-20 RX ADMIN — MIDODRINE HYDROCHLORIDE 20 MG: 10 TABLET ORAL at 16:37

## 2025-01-20 RX ADMIN — MIDODRINE HYDROCHLORIDE 20 MG: 10 TABLET ORAL at 08:26

## 2025-01-20 RX ADMIN — INSULIN LISPRO 4 UNITS: 100 INJECTION, SOLUTION INTRAVENOUS; SUBCUTANEOUS at 16:45

## 2025-01-20 RX ADMIN — HEPARIN SODIUM 5000 UNITS: 5000 INJECTION INTRAVENOUS; SUBCUTANEOUS at 22:19

## 2025-01-20 RX ADMIN — INSULIN GLARGINE 10 UNITS: 100 INJECTION, SOLUTION SUBCUTANEOUS at 20:48

## 2025-01-20 RX ADMIN — HYDROCORTISONE SODIUM SUCCINATE 50 MG: 100 INJECTION, POWDER, FOR SOLUTION INTRAMUSCULAR; INTRAVENOUS at 20:46

## 2025-01-20 RX ADMIN — ALBUMIN (HUMAN) 25 G: 0.25 INJECTION, SOLUTION INTRAVENOUS at 00:31

## 2025-01-20 RX ADMIN — ALBUMIN (HUMAN) 25 G: 0.25 INJECTION, SOLUTION INTRAVENOUS at 16:38

## 2025-01-20 RX ADMIN — Medication 10 ML: at 20:45

## 2025-01-20 RX ADMIN — Medication 10 ML: at 08:31

## 2025-01-20 RX ADMIN — MIDODRINE HYDROCHLORIDE 20 MG: 10 TABLET ORAL at 11:39

## 2025-01-20 RX ADMIN — POTASSIUM PHOSPHATE, MONOBASIC POTASSIUM PHOSPHATE, DIBASIC 15 MMOL: 224; 236 INJECTION, SOLUTION, CONCENTRATE INTRAVENOUS at 10:28

## 2025-01-20 RX ADMIN — MEROPENEM 1000 MG: 1 INJECTION INTRAVENOUS at 00:26

## 2025-01-20 ASSESSMENT — PAIN SCALES - GENERAL
PAINLEVEL_OUTOF10: 0
PAINLEVEL_OUTOF10: 0

## 2025-01-20 ASSESSMENT — PULMONARY FUNCTION TESTS
PIF_VALUE: 22
PIF_VALUE: 28

## 2025-01-20 NOTE — PROGRESS NOTES
Critical Care Progress Note    2025 8:51 AM    Subjective:   Admit Date: 2025  PCP: Tico Rodriguez DO    No chief complaint on file.    Interval History: off of Amio gtt and off of Levophed.  On vent via tracheostomy.  No fever overnight.  Has been on midodrine 20 mg 3 times daily.  Receiving albumin as well.  Blood pressure is marginal.  Continues to be on IV antibiotics.    Medications:   Scheduled Meds:   amiodarone  200 mg Oral BID    albumin human 25%  25 g IntraVENous Q8H    meropenem  1,000 mg IntraVENous Q12H    LORazepam  1 mg IntraVENous Once    hydrocortisone sodium succinate PF  50 mg IntraVENous Q6H    midodrine  20 mg Oral TID WC    insulin lispro  0-16 Units SubCUTAneous Q4H    finasteride  5 mg Oral Daily    sodium chloride flush  5-40 mL IntraVENous 2 times per day    heparin (porcine)  5,000 Units SubCUTAneous 3 times per day    vancomycin (VANCOCIN) intermittent dosing (placeholder)   Other RX Placeholder    pantoprazole (PROTONIX) 40 mg in sodium chloride (PF) 0.9 % 10 mL injection  40 mg IntraVENous Daily     Continuous Infusions:   norepinephrine Stopped (25 1321)    sodium chloride      dextrose      sodium chloride           Objective:   Vitals:   Temp (24hrs), Av.4 °F (36.9 °C), Min:97.8 °F (36.6 °C), Max:99.3 °F (37.4 °C)    BP (!) 132/48   Pulse 72   Temp 98.3 °F (36.8 °C) (Oral)   Resp 15   Wt 79 kg (174 lb 2.6 oz)   SpO2 100%   BMI 22.36 kg/m²   I/O:24HR INTAKE/OUTPUT:    Intake/Output Summary (Last 24 hours) at 2025 0851  Last data filed at 2025 0646  Gross per 24 hour   Intake 2237.86 ml   Output 1000 ml   Net 1237.86 ml      0701 -  0700  In: 2249.8 [I.V.:28.5]  Out: 1000   CVP:               Vent Mode: AC/VC  Resp Rate (Set): 14 bpm   Vt (Set, mL): 500 mL       PEEP/CPAP (cmH2O): 5   FiO2 : 30 %     Physical Exam:  General appearance -ill appearing  Mental status - alert, oriented to person, place, and time  Eyes - pupils equal and

## 2025-01-20 NOTE — PROGRESS NOTES
Hospitalist Progress Note      PCP: Tico Rodriguez DO    Date of Admission: 1/14/2025    Chief Complaint:  no acute events, afebrile, off of pressors this morning.     Medications:  Reviewed    Infusion Medications    norepinephrine Stopped (01/19/25 1321)    sodium chloride      dextrose      sodium chloride       Scheduled Medications    potassium chloride  40 mEq Oral Once    potassium phosphate IVPB (PERIPHERAL LINE)  15 mmol IntraVENous Once    insulin glargine  10 Units SubCUTAneous Nightly    amiodarone  200 mg Oral BID    albumin human 25%  25 g IntraVENous Q8H    meropenem  1,000 mg IntraVENous Q12H    LORazepam  1 mg IntraVENous Once    hydrocortisone sodium succinate PF  50 mg IntraVENous Q6H    midodrine  20 mg Oral TID WC    insulin lispro  0-16 Units SubCUTAneous Q4H    finasteride  5 mg Oral Daily    sodium chloride flush  5-40 mL IntraVENous 2 times per day    heparin (porcine)  5,000 Units SubCUTAneous 3 times per day    vancomycin (VANCOCIN) intermittent dosing (placeholder)   Other RX Placeholder    pantoprazole (PROTONIX) 40 mg in sodium chloride (PF) 0.9 % 10 mL injection  40 mg IntraVENous Daily     PRN Meds: metoprolol, oxyCODONE-acetaminophen **OR** oxyCODONE-acetaminophen, sodium chloride flush, sodium chloride, acetaminophen **OR** acetaminophen, glucose, dextrose bolus **OR** dextrose bolus, glucagon (rDNA), dextrose, sodium chloride      Intake/Output Summary (Last 24 hours) at 1/20/2025 0953  Last data filed at 1/20/2025 0646  Gross per 24 hour   Intake 2237.86 ml   Output 1000 ml   Net 1237.86 ml       Exam:    /65   Pulse 90   Temp 98.5 °F (36.9 °C) (Oral)   Resp 22   Wt 79 kg (174 lb 2.6 oz)   SpO2 100%   BMI 22.36 kg/m²     General appearance: awake, intubated via trach  Lungs: coarse sounds bilaterally  Heart: S1/S2, irregular  Abdomen: soft  Extremities: no edema      Labs:   Recent Labs     01/18/25  0706 01/19/25  0515 01/20/25  0547   WBC 10.4 9.3 8.6   HGB 8.9*

## 2025-01-20 NOTE — FLOWSHEET NOTE
0745 Assessment complete, see flow sheet. Patient suctioned for moderate amount of thick white secretions, mouth care done, repositioned for comfort.  1300 Patients wife at bedside, update given and questions answered. No changes in assessment noted.  1500 Patient incont of large amount of urine, linen changed, patient cleansed, Triad cream applied to sacral area, Patient repositioned for comfort.  1800 Tube feeding bolus given, tolerated well. No changes in assessment noted.

## 2025-01-20 NOTE — PROGRESS NOTES
PROGRESS NOTE    CONSULTANT  Shashank Pena DO      REQUESTING PHYSICIAN  Brittney Narvaez MD                REASON FOR CONSULTATION  No chief complaint on file.        Hospital Day: 1         Patient is a 74 y.o. male who presents with a chief complaint of A-fib with RVR, pyrexia. Patient is followed on a regular basis by Tico Guzman DO.  Patient with past medical history of diabetes, hypertension, end-stage renal disease status post renal transplant, tobacco abuse.  Recently hospitalized for pneumonia and discharged to LTAC.  Apparently was noted to have atrial fibrillation with rapid ventricle response as well as pyrexia and sent back to the hospital for further evaluation and treatment.  He was placed on amiodarone drip.  Also noted to be significant hemic with a hemoglobin of 6.4.  Status post 1 unit packed red blood cells.  Patient also on Levophed 2 mcg/min at this time.     He has persistent AF and s/p Watchman device 2023 at  due to prior Extensive bleed and inability to anticoagulate. 7/2024 FREDERIC at  EF 55-60 and stable Watchman position.      He has no known CAD. He is non compliant with f/u.    1-16-25: Remains on Levophed.  Amiodarone drip.  A-fib on telemetry heart rate 126 bpm.  Patient is on trach    1-17-25: Patient remains on Levophed.  Also on amiodarone drip  Atrial fibrillation on telemetry 90s  On trach    Dig level 3.6    1/18/25 coverng Dr. Pena. Tele SR 81. Remains vented via Tache 30% FIO2. On Amio gtt.  Awake     1/19/25 Tele AF/AFL 82 Remains vented via trache 3L out w HD yesterday. Pt opens eyes.  Remains on low dose Levo.     1/20/25: Tele afib/aflutter controlled in the 80s.   Off of levophed at this time, /56  On trach, does open eyes  Amio drip dc'd-- now given via peg tube.        Past Medical History        Past Medical History:   Diagnosis Date    Diabetes mellitus (HCC)      Hemodialysis access, fistula mature (HCC) 12/2002    Hypertension      Rhinovirus

## 2025-01-20 NOTE — PROGRESS NOTES
Nephrology Progress Note  Poor prognosis  Assessment:  ESRDX  DM type-1  Respiratory failure  S/P TRach-peg  PAF  Sacral wound  Hypotension  Hx Kidney transplant remote      Plan no dialysis today  GFR noted but no muscle mass    Patient Active Problem List:     Nosocomial pneumonia     Abdominal pain, other specified site     Acute pyelonephritis without lesion of renal medullary necrosis     Anemia     Essential hypertension     Nonspecific abnormal results of thyroid function study     Mixed hyperlipidemia     Kidney replaced by transplant     Intra-abdominal abscess post-procedure (HCC)     Infection due to Enterococcus     Fever and other physiologic disturbances of temperature regulation     Chronic kidney disease (CKD)     Type 2 diabetes mellitus with stage 3b chronic kidney disease, without long-term current use of insulin (HCC)     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 (HCC)     Abnormal CT of the chest     Bronchiectasis without complication (HCC)     GI bleeding     Complication of transplanted kidney     Muscle weakness (generalized)     Difficulty in walking     Elevated BUN     Urinary retention     Immunosuppression due to drug therapy (HCC)     DELORES (acute kidney injury) (HCC)     Adjustment disorder     Gross hematuria     Bilateral lower extremity edema     Dysuria     Acute cystitis without hematuria     Bilateral hearing loss     Abscess, toe     Acute hyperkalemia     Acute pain of left shoulder     Astigmatism     Astigmatism of both eyes with presbyopia     At risk for stroke     Benign enlargement of prostate     Benign prostatic

## 2025-01-20 NOTE — PROGRESS NOTES
PHARMACY NOTE:   Interdisciplinary Rounds Completed     Changes made today by Pharmacy per intensivist:   Added 10 units of Lantus nightly  Added 40 mEq of oral potassium chloride once  Added 15 mmol of IV potassium phosphate once  Contacted ID to clarify/extend vancomycin consult and meropenem duration     Additional information:   Steroid: Solu-cortef 50 mg IV q6h  Insulin coverage: lantus 10 units nightly plus high sliding scale  Pressors: levophed (currently stopped)  Stress ulcer prophylaxis: pantoprazole 40 mg IV daily  Antimicrobial therapy: vancomycin, meropenem. ID on consult.   Core measures assessed/met    Dick Moore [facility] PharmD

## 2025-01-20 NOTE — PROGRESS NOTES
Pulmonary & Critical Care Medicine ICU Progress Note  Chief complaint : Acute respiratory failure    Subjunctive/24 hour events :   Patient seen and examined during multidisciplinary rounds with RN, charge nurse, RT, pharmacy, dietitian, and social service.   Patient had an uneventful night.  He remains on the ventilator with his tracheostomy.  He is requiring no sedation Levophed was able to be weaned off.  No fever overnight and incontinent of urine.  He is tolerating tube feed and last bowel movement 2025.    Social History     Tobacco Use    Smoking status: Former     Current packs/day: 0.00     Types: Cigarettes     Quit date: 2015     Years since quittin.6    Smokeless tobacco: Former   Substance Use Topics    Alcohol use: No     Alcohol/week: 0.0 standard drinks of alcohol         Problem Relation Age of Onset    Colon Cancer Neg Hx        No results for input(s): \"PHART\", \"EUJ5ZWS\", \"PO2ART\" in the last 72 hours.    MV Settings:  Vent Mode: AC/VC Resp Rate (Set): 14 bpm/Vt (Set, mL): 500 mL/ /FiO2 : 30 %           IV:   norepinephrine Stopped (25 1321)    sodium chloride      dextrose      sodium chloride         Vitals:  BP (!) 117/33   Pulse 90   Temp 98.1 °F (36.7 °C) (Oral)   Resp 23   Wt 79 kg (174 lb 2.6 oz)   SpO2 100%   BMI 22.36 kg/m²    Tmax:       Intake/Output Summary (Last 24 hours) at 2025 1313  Last data filed at 2025 0646  Gross per 24 hour   Intake 1812.86 ml   Output 1000 ml   Net 812.86 ml       EXAM:    General: fatigued  Head: normocephalic, atraumatic  Eyes:No gross abnormalities.  ENT:  MMM no lesions  Neck:  supple and no masses  Chest : Fair air movement no wheezing no rales nontender tympanic  Heart:: Heart sounds are normal.  Regular rate and rhythm without murmur, gallop or rub.  ABD:  bowel sounds normal, soft, non-tender  Musculoskeletal : no cyanosis, no clubbing, and no edema  Neuro: Weak follow simple commands  Skin: No rashes or nodules    CLARITYU Clear   LEUKOCYTESUR TRACE*   UROBILINOGEN 0.2   BILIRUBINUR Negative   BLOODU SMALL*   GLUCOSEU 100*       Cultures:    CT ABDOMEN PELVIS WO CONTRAST Additional Contrast? None    Result Date: 1/15/2025  EXAMINATION: CT OF THE ABDOMEN AND PELVIS WITHOUT CONTRAST 1/15/2025 2:23 pm TECHNIQUE: CT of the abdomen and pelvis was performed without the administration of intravenous contrast. Multiplanar reformatted images are provided for review. Automated exposure control, iterative reconstruction, and/or weight based adjustment of the mA/kV was utilized to reduce the radiation dose to as low as reasonably achievable. COMPARISON: CT of the abdomen and pelvis with contrast from 10/19/2022. HISTORY: ORDERING SYSTEM PROVIDED HISTORY: sepsis TECHNOLOGIST PROVIDED HISTORY: Reason for exam:->sepsis Additional Contrast?->None What reading provider will be dictating this exam?->CRC FINDINGS: During the interval, a gastrostomy tube has been placed with its tip in satisfactory position in the body of the stomach. Lower Chest: There are new mild right greater than left pleural effusions with new moderate nodular consolidation of the posterior right lower lobe consistent with pneumonia or atelectasis.  There is new mild compressive atelectasis of the posterior left lower lobe adjacent to the effusion. There is moderate coronary calcification.  The heart is normal in size. Organs: The liver is normal in appearance without mass and without evidence of intrahepatic biliary ductal dilatation. The spleen and pancreas are unremarkable. The adrenal glands are unremarkable. The gallbladder is moderately distended with high density material consistent with sludge and is otherwise normal in appearance. There continues to be prominent atrophy of the native kidneys consistent with chronic renal failure.  There is no change in a 1.2 cm indeterminate density nodule arising from the posterior interpolar right kidney, most likely a high

## 2025-01-20 NOTE — FLOWSHEET NOTE
Shift summary    0745 per RT attempted SBT- pt had high RSBI, resp rate in 40s. Placed back on vent mode. Report at bedside.     Uneventful shift, pt medicated for c/o back/buttocks pain with relief. Repositioned every 2-3 hours. Triad cream to buttocks wounds. Incontinent urine x 2.     Trach care done, IC changed. Noticed erosion/scab under bottom of trach.    Tolerated TF.    Weaned levo.     Pt wife at bedside, updated. Electronically signed by Elizabeth Martínez RN on 1/19/2025 at 8:23 PM

## 2025-01-20 NOTE — CARE COORDINATION
SPOKE WITH GISELL @ [facility]. PATIENT HAD RETURNED THERE FOR A FEW HOURS AND WAS SENT OUT FOR EVALUATION OF HYPOTENSION, AF, AND FEVER. GISELL WILL FOLLOW THE PATIENT FOR D/C. PLANNING.     RECEIVED CALL FROM THEA @ [facility] INQUIRING ABOUT APPEAL.     I WENT INTO THE PATIENT'S ROOM AND SPOKE WITH HIS WIFE. SHE DOES NOT WANT THE PATIENT TO RETURN TO [place] AND WANTS TO MOVE FORWARD WITH AN APPEAL.     PERFECT SERVE SENT TO DR. CORDOVA TO UPDATE ON PLAN. THEA IS SENDING OVER A LETTER FOR DR. CORDOVA TO SIGN AND RETURN.

## 2025-01-20 NOTE — PLAN OF CARE
Problem: Chronic Conditions and Co-morbidities  Goal: Patient's chronic conditions and co-morbidity symptoms are monitored and maintained or improved  Outcome: Progressing     Problem: Discharge Planning  Goal: Discharge to home or other facility with appropriate resources  Outcome: Progressing     Problem: Pain  Goal: Verbalizes/displays adequate comfort level or baseline comfort level  Outcome: Progressing  Flowsheets  Taken 1/20/2025 0000  Verbalizes/displays adequate comfort level or baseline comfort level:   Encourage patient to monitor pain and request assistance   Assess pain using appropriate pain scale   Administer analgesics based on type and severity of pain and evaluate response  Taken 1/19/2025 2000  Verbalizes/displays adequate comfort level or baseline comfort level:   Assess pain using appropriate pain scale   Administer analgesics based on type and severity of pain and evaluate response   Implement non-pharmacological measures as appropriate and evaluate response   Encourage patient to monitor pain and request assistance     Problem: Safety - Adult  Goal: Free from fall injury  Outcome: Progressing     Problem: Skin/Tissue Integrity  Goal: Absence of new skin breakdown  Description: 1.  Monitor for areas of redness and/or skin breakdown  2.  Assess vascular access sites hourly  3.  Every 4-6 hours minimum:  Change oxygen saturation probe site  4.  Every 4-6 hours:  If on nasal continuous positive airway pressure, respiratory therapy assess nares and determine need for appliance change or resting period.  Outcome: Progressing     Problem: ABCDS Injury Assessment  Goal: Absence of physical injury  Outcome: Progressing     Problem: Nutrition Deficit:  Goal: Optimize nutritional status  Outcome: Progressing     Problem: Neurosensory - Adult  Goal: Achieves stable or improved neurological status  Outcome: Progressing     Problem: Respiratory - Adult  Goal: Achieves optimal ventilation and

## 2025-01-20 NOTE — PROGRESS NOTES
[facility]   Pharmacy Pharmacokinetic Monitoring Service - Vancomycin    Consulting Provider: Dr. Huntley  Indication: Sepsis  Target Concentration: Pre-Dialysis Concentration 15-20 mg/L / trough <15 mg/L (renal impairment dosing)  Day of Therapy: 7 (day 1 of current consult)  Additional Antimicrobials: Merrem    Pertinent Laboratory Values:   Wt Readings from Last 1 Encounters:   01/18/25 79 kg (174 lb 2.6 oz)     Temp Readings from Last 1 Encounters:   01/20/25 98.5 °F (36.9 °C) (Oral)     Recent Labs     01/19/25  0515 01/20/25  0547   CREATININE 1.21* 1.66*   BUN 35* 55*   WBC 9.3 8.6     Procalcitonin: 1.04    Pertinent Cultures:  Culture Date Source Results   1/19 blood Final NGTD   MRSA Nasal Swab: N/A. Non-respiratory infection.    Recent vancomycin administrations                     vancomycin (VANCOCIN) 750 mg in sodium chloride 0.9 % 250 mL IVPB (mg) 750 mg New Bag 01/15/25 2042                  Assessment:  Date/Time Current Dose Concentration Dialysis Session   1/20 @ 0547 750 mg once given 1/18 15.4 None ordered (last 1/18)     Plan:  Concentration-guided dosing due to renal impairment and PRN intermittent hemodialysis   Dialysis is still PRN and unknown when next session will be (not ordered for today as of time of posting)  Current level is supratherapeutic if using renal impairment dosing, but therapeutic when using dialysis dosing  Given no dialysis orders for today, will proceed with impaired renal function dosing (trough goal <15) and opt to hold vancomycin today and obtain another level tomorrow morning  Vancomycin concentration ordered for 1/21 @ 0600, prior to HD session (if one is ordered for then)  Pharmacy will continue to monitor patient and adjust therapy as indicated    Thank you for the consult,  Dick Moore [facility]  1/20/2025 12:12 PM

## 2025-01-20 NOTE — PROGRESS NOTES
Infectious Disease     Patient Name: Remy Upton  Date: 1/20/2025  YOB: 1950  Medical Record Number: [id number]        Right lower lobe pneumonia      Diabetes hypertension end-stage renal disease kidney transplant    Recent hospitalization from [facility] 2/1/2013 required hemodialysis had a rhinovirus infection aspiration pneumonia intubation and mechanical ventilation  Underwent tracheostomy on 12/30/2024    Discharged to nursing home return to hospital because of fever 101 °F pulled out PEG tube  Hypotensive requiring Levophed on admission and amiodarone for A-fib      White cell count not elevated    Procalcitonin 1.42  Creatinine 1.36      Bilirubin 0.8 alkaline phosphatase 131 AST ALT normal  Chest x-ray shows haziness right lung        CT ABDOMEN PELVIS WO CONTRAST Additional Contrast? None [[id number]]  Status: Final result     PACS Images     Show images for CT ABDOMEN PELVIS WO CONTRAST Additional Contrast? None  CT ABDOMEN PELVIS WO CONTRAST Additional Contrast? None  Order: [id number]  Status: Final result       Visible to patient: No (not released)       Next appt: 05/13/2025 at 03:45 PM in Neurology (Kadeem Aleman MD)    0 Result Notes       1  Topic  Details    Reading Physician Reading Date Result Priority   Harinder Alexandra MD  [phone] 1/15/2025      Narrative & Impression  EXAMINATION:  CT OF THE ABDOMEN AND PELVIS WITHOUT CONTRAST 1/15/2025 2:23 pm     TECHNIQUE:  CT of the abdomen and pelvis was performed without the administration of  intravenous contrast. Multiplanar reformatted images are provided for review.  Automated exposure control, iterative reconstruction, and/or weight based  adjustment of the mA/kV was utilized to reduce the radiation dose to as low  as reasonably achievable.     COMPARISON:  CT of the abdomen and pelvis with contrast from 10/19/2022.     HISTORY:  ORDERING SYSTEM PROVIDED HISTORY: sepsis  TECHNOLOGIST PROVIDED HISTORY:  Reason for

## 2025-01-21 LAB
ALBUMIN SERPL-MCNC: 3.9 G/DL (ref 3.5–4.6)
ANION GAP SERPL CALCULATED.3IONS-SCNC: 13 MEQ/L (ref 9–15)
BACTERIA SPEC RESP CULT: NORMAL
BASOPHILS # BLD: 0 K/UL (ref 0–0.2)
BASOPHILS NFR BLD: 0.1 %
BUN SERPL-MCNC: 72 MG/DL (ref 8–23)
CALCIUM SERPL-MCNC: 9 MG/DL (ref 8.5–9.9)
CHLORIDE SERPL-SCNC: 94 MEQ/L (ref 95–107)
CO2 SERPL-SCNC: 29 MEQ/L (ref 20–31)
CREAT SERPL-MCNC: 1.96 MG/DL (ref 0.7–1.2)
EOSINOPHIL # BLD: 0 K/UL (ref 0–0.7)
EOSINOPHIL NFR BLD: 0 %
ERYTHROCYTE [DISTWIDTH] IN BLOOD BY AUTOMATED COUNT: 16.9 % (ref 11.5–14.5)
GLUCOSE BLD-MCNC: 127 MG/DL (ref 70–99)
GLUCOSE BLD-MCNC: 152 MG/DL (ref 70–99)
GLUCOSE BLD-MCNC: 240 MG/DL (ref 70–99)
GLUCOSE BLD-MCNC: 243 MG/DL (ref 70–99)
GLUCOSE BLD-MCNC: 290 MG/DL (ref 70–99)
GLUCOSE BLD-MCNC: 310 MG/DL (ref 70–99)
GLUCOSE SERPL-MCNC: 289 MG/DL (ref 70–99)
HCT VFR BLD AUTO: 24.8 % (ref 42–52)
HGB BLD-MCNC: 7.8 G/DL (ref 14–18)
LYMPHOCYTES # BLD: 0.5 K/UL (ref 1–4.8)
LYMPHOCYTES NFR BLD: 5.3 %
MAGNESIUM SERPL-MCNC: 2.4 MG/DL (ref 1.7–2.4)
MCH RBC QN AUTO: 29.3 PG (ref 27–31.3)
MCHC RBC AUTO-ENTMCNC: 31.5 % (ref 33–37)
MCV RBC AUTO: 93.2 FL (ref 79–92.2)
MONOCYTES # BLD: 0.3 K/UL (ref 0.2–0.8)
MONOCYTES NFR BLD: 3.2 %
NEUTROPHILS # BLD: 8.8 K/UL (ref 1.4–6.5)
NEUTS SEG NFR BLD: 89.5 %
PERFORMED ON: ABNORMAL
PHOSPHATE SERPL-MCNC: 2.7 MG/DL (ref 2.3–4.8)
PLATELET # BLD AUTO: 281 K/UL (ref 130–400)
POTASSIUM SERPL-SCNC: 4.6 MEQ/L (ref 3.4–4.9)
RBC # BLD AUTO: 2.66 M/UL (ref 4.7–6.1)
SODIUM SERPL-SCNC: 136 MEQ/L (ref 135–144)
VANCOMYCIN SERPL-MCNC: 13.2 UG/ML (ref 10–40)
WBC # BLD AUTO: 9.8 K/UL (ref 4.8–10.8)

## 2025-01-21 PROCEDURE — 99232 SBSQ HOSP IP/OBS MODERATE 35: CPT | Performed by: INTERNAL MEDICINE

## 2025-01-21 PROCEDURE — 8010000000 HC HEMODIALYSIS ACUTE INPT

## 2025-01-21 PROCEDURE — 80069 RENAL FUNCTION PANEL: CPT

## 2025-01-21 PROCEDURE — 6370000000 HC RX 637 (ALT 250 FOR IP): Performed by: INTERNAL MEDICINE

## 2025-01-21 PROCEDURE — 99233 SBSQ HOSP IP/OBS HIGH 50: CPT | Performed by: INTERNAL MEDICINE

## 2025-01-21 PROCEDURE — 6360000002 HC RX W HCPCS: Performed by: INTERNAL MEDICINE

## 2025-01-21 PROCEDURE — 2500000003 HC RX 250 WO HCPCS: Performed by: INTERNAL MEDICINE

## 2025-01-21 PROCEDURE — P9047 ALBUMIN (HUMAN), 25%, 50ML: HCPCS | Performed by: INTERNAL MEDICINE

## 2025-01-21 PROCEDURE — 2700000000 HC OXYGEN THERAPY PER DAY

## 2025-01-21 PROCEDURE — 2580000003 HC RX 258: Performed by: INTERNAL MEDICINE

## 2025-01-21 PROCEDURE — 99291 CRITICAL CARE FIRST HOUR: CPT | Performed by: INTERNAL MEDICINE

## 2025-01-21 PROCEDURE — 83735 ASSAY OF MAGNESIUM: CPT

## 2025-01-21 PROCEDURE — 85025 COMPLETE CBC W/AUTO DIFF WBC: CPT

## 2025-01-21 PROCEDURE — 80202 ASSAY OF VANCOMYCIN: CPT

## 2025-01-21 PROCEDURE — 2000000000 HC ICU R&B

## 2025-01-21 PROCEDURE — 94003 VENT MGMT INPAT SUBQ DAY: CPT

## 2025-01-21 PROCEDURE — 6370000000 HC RX 637 (ALT 250 FOR IP): Performed by: NURSE PRACTITIONER

## 2025-01-21 RX ADMIN — INSULIN LISPRO 4 UNITS: 100 INJECTION, SOLUTION INTRAVENOUS; SUBCUTANEOUS at 21:30

## 2025-01-21 RX ADMIN — ALBUMIN (HUMAN) 25 G: 0.25 INJECTION, SOLUTION INTRAVENOUS at 00:12

## 2025-01-21 RX ADMIN — INSULIN LISPRO 4 UNITS: 100 INJECTION, SOLUTION INTRAVENOUS; SUBCUTANEOUS at 23:59

## 2025-01-21 RX ADMIN — INSULIN GLARGINE 10 UNITS: 100 INJECTION, SOLUTION SUBCUTANEOUS at 21:30

## 2025-01-21 RX ADMIN — MEROPENEM 1000 MG: 1 INJECTION INTRAVENOUS at 00:12

## 2025-01-21 RX ADMIN — HYDROCORTISONE SODIUM SUCCINATE 50 MG: 100 INJECTION, POWDER, FOR SOLUTION INTRAMUSCULAR; INTRAVENOUS at 17:13

## 2025-01-21 RX ADMIN — MIDODRINE HYDROCHLORIDE 20 MG: 10 TABLET ORAL at 17:12

## 2025-01-21 RX ADMIN — HEPARIN SODIUM 5000 UNITS: 5000 INJECTION INTRAVENOUS; SUBCUTANEOUS at 21:32

## 2025-01-21 RX ADMIN — HEPARIN SODIUM 5000 UNITS: 5000 INJECTION INTRAVENOUS; SUBCUTANEOUS at 17:12

## 2025-01-21 RX ADMIN — HEPARIN SODIUM 5000 UNITS: 5000 INJECTION INTRAVENOUS; SUBCUTANEOUS at 06:58

## 2025-01-21 RX ADMIN — INSULIN LISPRO 8 UNITS: 100 INJECTION, SOLUTION INTRAVENOUS; SUBCUTANEOUS at 17:58

## 2025-01-21 RX ADMIN — HYDROCORTISONE SODIUM SUCCINATE 50 MG: 100 INJECTION, POWDER, FOR SOLUTION INTRAMUSCULAR; INTRAVENOUS at 21:32

## 2025-01-21 RX ADMIN — VANCOMYCIN HYDROCHLORIDE 1000 MG: 1 INJECTION, POWDER, LYOPHILIZED, FOR SOLUTION INTRAVENOUS at 14:24

## 2025-01-21 RX ADMIN — AMIODARONE HYDROCHLORIDE 200 MG: 200 TABLET ORAL at 21:28

## 2025-01-21 RX ADMIN — INSULIN LISPRO 12 UNITS: 100 INJECTION, SOLUTION INTRAVENOUS; SUBCUTANEOUS at 04:08

## 2025-01-21 RX ADMIN — HYDROCORTISONE SODIUM SUCCINATE 50 MG: 100 INJECTION, POWDER, FOR SOLUTION INTRAMUSCULAR; INTRAVENOUS at 04:08

## 2025-01-21 RX ADMIN — Medication 10 ML: at 21:32

## 2025-01-21 RX ADMIN — ALBUMIN (HUMAN) 25 G: 0.25 INJECTION, SOLUTION INTRAVENOUS at 08:47

## 2025-01-21 ASSESSMENT — PULMONARY FUNCTION TESTS
PIF_VALUE: 19
PIF_VALUE: 19
PIF_VALUE: 17
PIF_VALUE: 23
PIF_VALUE: 21
PIF_VALUE: 25
PIF_VALUE: 21

## 2025-01-21 ASSESSMENT — PAIN SCALES - GENERAL
PAINLEVEL_OUTOF10: 0

## 2025-01-21 NOTE — CARE COORDINATION
Quality round completed with care management team. Per Kira Ochoa, need  EXPEDITED APPEAL form sign by Doctor then able to start appeal process.     Notify Dr. Oliveros form Is on pt's chart.  Once is signed then will fax over to [facility].     Electronically signed by LOLI Hodge on 1/21/2025 at 10:16 AM

## 2025-01-21 NOTE — PROGRESS NOTES
[facility]   Pharmacy Dose Adjustment Per Protocol:  Meropenem Extended Interval Interchange    Recent Labs     01/20/25  0547 01/21/25  0414   CREATININE 1.66* 1.96*   BUN 55* 72*   WBC 8.6 9.8   Hemodialysis Intake (ml): 400 mlDialyzer Clearance: Moderately streaked.   , Weight - Scale: 79 kg (174 lb 2.6 oz), Body mass index is 22.36 kg/m².  Estimated Creatinine Clearance: 38 mL/min (A) (based on SCr of 1.96 mg/dL (H)).    Meropenem - Extended Infusion (3-hour infusion) - Preferred Dosing Strategy    Renal function (CrCl mL/min)  >= 50  26 - 49  10 - 25   < 10, HD, PD  CRRT    All indications - Loading dose of 7166-5763 milligrams x 1 over 30 minutes or via IV push (based on indication). Maintenance dose should begin at the next regularly scheduled dosing interval based on indication/renal function.    Maintenance dosing for all indications except as outlined below  1000mg q8h ¨ 1000mg q12h ¨ 500 mg q12h ¨ 500 mg q24h ¨ 1000mg q12h^ ¨   CNS infections, Cystic fibrosis, RHONA > 4  2000mg q8h ¨ 2000mg q12h ¨ 1000mg q12h ¨ 1000mg q24h ¨ 2000mg q12h† ¨    ^Consider 1000mg q8h for CRRT effluent rates > 3L/h   †Consider 2000mg q8h for CRRT effluent rates >= 3L/h     HD today, per protocol changed to 500mg q24h, dose already received today so adjusted for tomorrow with previous end date of 1/22 remaining.

## 2025-01-21 NOTE — PROGRESS NOTES
Critical Care Progress Note    2025 11:51 AM    Subjective:   Admit Date: 2025  PCP: Tico Rodriguez DO    No chief complaint on file.    Interval History: Undergoing hemodialysis.  Continues to be on vent via tracheostomy.  Continues to be on antibiotics.    Medications:   Scheduled Meds:   insulin glargine  10 Units SubCUTAneous Nightly    amiodarone  200 mg Oral BID    albumin human 25%  25 g IntraVENous Q8H    meropenem  1,000 mg IntraVENous Q12H    LORazepam  1 mg IntraVENous Once    hydrocortisone sodium succinate PF  50 mg IntraVENous Q6H    midodrine  20 mg Oral TID WC    insulin lispro  0-16 Units SubCUTAneous Q4H    finasteride  5 mg Oral Daily    sodium chloride flush  5-40 mL IntraVENous 2 times per day    heparin (porcine)  5,000 Units SubCUTAneous 3 times per day    vancomycin (VANCOCIN) intermittent dosing (placeholder)   Other RX Placeholder    pantoprazole (PROTONIX) 40 mg in sodium chloride (PF) 0.9 % 10 mL injection  40 mg IntraVENous Daily     Continuous Infusions:   norepinephrine Stopped (25 1321)    sodium chloride      dextrose      sodium chloride           Objective:   Vitals:   Temp (24hrs), Av.3 °F (36.8 °C), Min:98.1 °F (36.7 °C), Max:98.6 °F (37 °C)    /85   Pulse 93   Temp 98.2 °F (36.8 °C) (Oral)   Resp 24   Wt 79 kg (174 lb 2.6 oz)   SpO2 100%   BMI 22.36 kg/m²   I/O:24HR INTAKE/OUTPUT:    Intake/Output Summary (Last 24 hours) at 2025 1151  Last data filed at 2025 0800  Gross per 24 hour   Intake 1767.98 ml   Output 50 ml   Net 1717.98 ml      0701 -  0700  In: 1718   Out: 50   CVP:               Vent Mode: AC/VC  Resp Rate (Set): 14 bpm   Vt (Set, mL): 500 mL       PEEP/CPAP (cmH2O): 5   FiO2 : 30 %     Physical Exam:  General appearance -ill appearing  Mental status - alert, oriented to person, place, and time  Eyes - pupils equal and reactive, extraocular eye movements intact  Nose - normal and patent   Neck -trach in place,

## 2025-01-21 NOTE — DIALYSIS
Hemodialysis completed as ordered. 3000  ml   fluid removed with treatment. Pt. tolerated well. Please see printed record for treatment details

## 2025-01-21 NOTE — PROGRESS NOTES
Hospitalist Progress Note      PCP: Tico Rodriguez DO    Date of Admission: 1/14/2025    Chief Complaint:  no acute events, afebrile, off of pressors this morning.     Medications:  Reviewed    Infusion Medications    norepinephrine Stopped (01/19/25 1321)    sodium chloride      dextrose      sodium chloride       Scheduled Medications    insulin glargine  10 Units SubCUTAneous Nightly    amiodarone  200 mg Oral BID    albumin human 25%  25 g IntraVENous Q8H    meropenem  1,000 mg IntraVENous Q12H    LORazepam  1 mg IntraVENous Once    hydrocortisone sodium succinate PF  50 mg IntraVENous Q6H    midodrine  20 mg Oral TID WC    insulin lispro  0-16 Units SubCUTAneous Q4H    finasteride  5 mg Oral Daily    sodium chloride flush  5-40 mL IntraVENous 2 times per day    heparin (porcine)  5,000 Units SubCUTAneous 3 times per day    vancomycin (VANCOCIN) intermittent dosing (placeholder)   Other RX Placeholder    pantoprazole (PROTONIX) 40 mg in sodium chloride (PF) 0.9 % 10 mL injection  40 mg IntraVENous Daily     PRN Meds: metoprolol, oxyCODONE-acetaminophen **OR** oxyCODONE-acetaminophen, sodium chloride flush, sodium chloride, acetaminophen **OR** acetaminophen, glucose, dextrose bolus **OR** dextrose bolus, glucagon (rDNA), dextrose, sodium chloride      Intake/Output Summary (Last 24 hours) at 1/21/2025 1148  Last data filed at 1/21/2025 0800  Gross per 24 hour   Intake 1767.98 ml   Output 50 ml   Net 1717.98 ml       Exam:    /85   Pulse 93   Temp 98.2 °F (36.8 °C) (Oral)   Resp 24   Wt 79 kg (174 lb 2.6 oz)   SpO2 100%   BMI 22.36 kg/m²     General appearance: awake, intubated via trach  Lungs: coarse sounds bilaterally  Heart: S1/S2, irregular  Abdomen: soft  Extremities: no edema      Labs:   Recent Labs     01/19/25  0515 01/20/25  0547 01/21/25  0414   WBC 9.3 8.6 9.8   HGB 7.9* 7.8* 7.8*   HCT 25.3* 25.2* 24.8*    305 281     Recent Labs     01/19/25  0515 01/20/25  0547 01/21/25  0414

## 2025-01-21 NOTE — PROGRESS NOTES
Pharmacy Vancomycin Consult     Vancomycin Day: 7/14  Current Dosing: post 750mg dose 1/18    Recent Labs     01/15/25  0653 01/16/25  0441   BUN 39* 48*   CREATININE 1.49* 1.63*   WBC 9.3 9.3       Intake/Output Summary (Last 24 hours) at 1/16/2025 1433  Last data filed at 1/16/2025 1200  Gross per 24 hour   Intake 1134.8 ml   Output 390 ml   Net 744.8 ml     Cultures  Recent Labs     01/17/25  2031 01/14/25  1809 01/14/25  1702   BC  --   --  No growth after 5 days of incubation.   BLOODCULT2  --   --  No growth after 5 days of incubation.   LABURIN Cult,Urine:  NO GROWTH  Performed at [address] [address] ([phone]    --   --    COVID19  --  Not Detected  --       , Weight - Scale: 79 kg (174 lb 2.6 oz), Body mass index is 21.29 kg/m².    Estimated Creatinine Clearance: 38 mL/min (A) (based on SCr of 1.96 mg/dL (H)).  .    Trough:  Recent Labs     01/21/25  0414   VANCORANDOM 13.2      Assessment/Plan:  Pre-HD level slightly sub-therapeutic for goal, patient on PRN dialysis last session and dose 1/18. Will give vancomycin 1000mg x 1 post HD today. Plan level Thursday am for possible pre-HD level. Will monitor for next HD schedule/plan. Timing of future trough levels may be adjusted based on culture results, renal function, and clinical response.    Thank you,  Digna Morgan, [facility] PharmD

## 2025-01-21 NOTE — PROGRESS NOTES
Nephrology Progress Note    Assessment:  Azotemia oliguria  Ventilator dependent  Hx Kidney transplant  DM type-2  Anemia  Hypotension  Trach/PEG      Plan:3 hour dialysis today    Patient Active Problem List:     Nosocomial pneumonia     Abdominal pain, other specified site     Acute pyelonephritis without lesion of renal medullary necrosis     Anemia     Essential hypertension     Nonspecific abnormal results of thyroid function study     Mixed hyperlipidemia     Kidney replaced by transplant     Intra-abdominal abscess post-procedure (HCC)     Infection due to Enterococcus     Fever and other physiologic disturbances of temperature regulation     Chronic kidney disease (CKD)     Type 2 diabetes mellitus with stage 3b chronic kidney disease, without long-term current use of insulin (HCC)     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 (HCC)     Abnormal CT of the chest     Bronchiectasis without complication (HCC)     GI bleeding     Complication of transplanted kidney     Muscle weakness (generalized)     Difficulty in walking     Elevated BUN     Urinary retention     Immunosuppression due to drug therapy (HCC)     DELORES (acute kidney injury) (HCC)     Adjustment disorder     Gross hematuria     Bilateral lower extremity edema     Dysuria     Acute cystitis without hematuria     Bilateral hearing loss     Abscess, toe     Acute hyperkalemia     Acute pain of left shoulder     Astigmatism     Astigmatism of both eyes with presbyopia     At risk for stroke     Benign enlargement of prostate     Benign prostatic hyperplasia with urinary frequency     Rhinovirus

## 2025-01-21 NOTE — PROGRESS NOTES
Infectious Disease     Patient Name: Remy Upton  Date: 1/21/2025  YOB: 1950  Medical Record Number: [id number]        Right lower lobe pneumonia      Diabetes hypertension end-stage renal disease kidney transplant    Recent hospitalization from [facility] 2/1/2013 required hemodialysis had a rhinovirus infection aspiration pneumonia intubation and mechanical ventilation  Underwent tracheostomy on 12/30/2024    Discharged to nursing home return to hospital because of fever 101 °F pulled out PEG tube  Hypotensive requiring Levophed on admission and amiodarone for A-fib              CT ABDOMEN PELVIS WO CONTRAST Additional Contrast? None [[id number]]  Status: Final result     PACS Images     Show images for CT ABDOMEN PELVIS WO CONTRAST Additional Contrast? None  CT ABDOMEN PELVIS WO CONTRAST Additional Contrast? None  Order: [id number]  Status: Final result       Visible to patient: No (not released)       Next appt: 05/13/2025 at 03:45 PM in Neurology (Kadeem Aleman MD)    0 Result Notes       1  Topic  Details    Reading Physician Reading Date Result Priority   Harinder Alexandra MD  [phone] 1/15/2025      Narrative & Impression  EXAMINATION:  CT OF THE ABDOMEN AND PELVIS WITHOUT CONTRAST 1/15/2025 2:23 pm     TECHNIQUE:  CT of the abdomen and pelvis was performed without the administration of  intravenous contrast. Multiplanar reformatted images are provided for review.  Automated exposure control, iterative reconstruction, and/or weight based  adjustment of the mA/kV was utilized to reduce the radiation dose to as low  as reasonably achievable.     COMPARISON:  CT of the abdomen and pelvis with contrast from 10/19/2022.     HISTORY:  ORDERING SYSTEM PROVIDED HISTORY: sepsis  TECHNOLOGIST PROVIDED HISTORY:  Reason for exam:->sepsis  Additional Contrast?->None  What reading provider will be dictating this exam?->CRC     FINDINGS:  During the interval, a gastrostomy tube has been placed with its tip  in the  pelvis, nonspecific.     There is no sign of free air or extraluminal air.     No evidence of lymphadenopathy. Aorta is normal in caliber.     Bones/Soft Tissues: There is no sign of any abnormality of the superficial  soft tissue structures.     There is stable mild scoliosis of the thoracic and lumbar spine convex  towards the left.     IMPRESSION:  1. New mild right greater than left pleural effusions with new moderate  nodular consolidation of the posterior right lower lobe consistent with  pneumonia or atelectasis.  2. New mild compressive atelectasis of the posterior left lower lobe adjacent  to the effusion.  3. Stable appearance of a functioning left pelvic transplant kidney. Stable  appearance of a moderately atrophic right renal transplant kidney.  4. Stable prominent atrophy of the native kidneys consistent with chronic  renal failure.  5. Stable 1.2 cm indeterminate density nodule arising from the posterior  interpolar right kidney, most likely a high density cyst since it has not  changed in size during the interval.  6. Tiny amount of free fluid in the pelvis, nonspecific.  7. New moderate irregular thickening of the urinary bladder wall, suggesting  cystitis.              Exam Ended: 01/15/25 15:29 EST Last Resulted: 01/15/25 16:05 EST            XR CHEST PORTABLE [[id number]]  Status: Final result     PACS Images     Show images for XR CHEST PORTABLE  XR CHEST PORTABLE  Order: [id number]  Status: Final result       Visible to patient: No (not released)       Next appt: 05/13/2025 at 03:45 PM in Neurology (Kadeem Aleman MD)    0 Result Notes  Details    Reading Physician Reading Date Result Priority   Harinder Alexandra MD  [phone] 1/14/2025      Narrative & Impression  EXAMINATION:  ONE XRAY VIEW OF THE CHEST     1/14/2025 5:53 pm     COMPARISON:  Chest single view from 12/31/2024.     HISTORY:  ORDERING SYSTEM PROVIDED HISTORY: fever  TECHNOLOGIST PROVIDED HISTORY:  Reason for

## 2025-01-21 NOTE — PROGRESS NOTES
07;10am Report from [facility]. Awake tracks with eyes and nods head yes and no. AV fistula + thrill and bruit R upper arm. PICC L upper arm. Trach/Vent. Suctioned for thick white sputum. External catheter.  08:30am [facility] here for dialysis  09:15am Critical care rounds with Dr Toscano and team  1000am Dialysis in progress  12 noon Dialysis finished. Tolerated well VSS remains alert  1600pm Wife at bedside. No complaints

## 2025-01-21 NOTE — PROGRESS NOTES
PHARMACY NOTE:   Interdisciplinary Rounds Completed     Changes made today by Pharmacy per intensivist:   No changes made  Follow up SSI use and need for increase in lantus: 10 units added yesterday  Levo off follow up dc     Additional information:   Steroid: Solu-cortef 50 mg IV q6h  Insulin coverage: high sliding scale with Humalog, utilized 28 units per sliding scale over the past 24 hours .      Lantus 10 units  Pressors: levophed (currently stopped)  Stress ulcer prophylaxis: pantoprazole 40 mg IV daily  Antimicrobial therapy: vancomycin, meropenem. ID on consult.   Core measures assessed/met    Dick Moore RPH PharmD

## 2025-01-21 NOTE — INTERDISCIPLINARY ROUNDS
Spiritual Care Services     Summary of Visit:    Attended ICU Rounds. Patient is coping, making continual progress at this time, resting, no family present, steven tradition is Gnosticist.     Encounter Summary  Encounter Overview/Reason: Interdisciplinary rounds  Service Provided For: Patient  Support System: Spouse  Complexity of Encounter: Low  Begin Time: 0945  End Time : 1000  Total Time Calculated: 15 min     Crisis  Type: Rapid Response  Spiritual/Emotional needs  Type: Spiritual Support              Palliative Care  Type: Palliative Care, Follow-up       Spiritual Assessment/Intervention/Outcomes:    Assessment: Calm, Peaceful, Tearful, Coping, Other (Comment) (patient remains intubated, communicates with blinking of eyes)    Intervention: Empowerment, Prayer (assurance of)/Adams, Nurtured Hope, Sustaining Presence/Ministry of presence    Outcome: Peace, Comfort, Receptive      Care Plan:    Plan and Referrals  Plan/Referrals: Continue Support (comment)          Spiritual Care Services   Electronically signed by Chaplain Camilo on 1/21/2025 at 11:19 AM.    To reach a  for emotional and spiritual support, place an EPIC consult request.   If a  is needed immediately, dial “0” and ask to page the on-call .

## 2025-01-21 NOTE — PROGRESS NOTES
PROGRESS NOTE    CONSULTANT  Shashank Pena DO      REQUESTING PHYSICIAN  Brittney Narvaez MD                REASON FOR CONSULTATION  No chief complaint on file.        Hospital Day: 1         Patient is a 74 y.o. male who presents with a chief complaint of A-fib with RVR, pyrexia. Patient is followed on a regular basis by Tico Guzman DO.  Patient with past medical history of diabetes, hypertension, end-stage renal disease status post renal transplant, tobacco abuse.  Recently hospitalized for pneumonia and discharged to LTAC.  Apparently was noted to have atrial fibrillation with rapid ventricle response as well as pyrexia and sent back to the hospital for further evaluation and treatment.  He was placed on amiodarone drip.  Also noted to be significant hemic with a hemoglobin of 6.4.  Status post 1 unit packed red blood cells.  Patient also on Levophed 2 mcg/min at this time.     He has persistent AF and s/p Watchman device 2023 at  due to prior Extensive bleed and inability to anticoagulate. 7/2024 FREDERIC at  EF 55-60 and stable Watchman position.      He has no known CAD. He is non compliant with f/u.    1-16-25: Remains on Levophed.  Amiodarone drip.  A-fib on telemetry heart rate 126 bpm.  Patient is on trach    1-17-25: Patient remains on Levophed.  Also on amiodarone drip  Atrial fibrillation on telemetry 90s  On trach    Dig level 3.6    1/18/25 coverng Dr. Pena. Tele SR 81. Remains vented via Tache 30% FIO2. On Amio gtt.  Awake     1/19/25 Tele AF/AFL 82 Remains vented via trache 3L out w HD yesterday. Pt opens eyes.  Remains on low dose Levo.     1/20/25: Tele afib/aflutter controlled in the 80s.   Off of levophed at this time, /56  On trach, does open eyes  Amio drip dc'd-- now given via peg tube.    1/21/2025: continues to be on vent via trache  HD now  AM labs reviewed,  BP stable          Past Medical History        Past Medical History:   Diagnosis Date    Diabetes mellitus (HCC)    Problems    Diagnosis Date Noted    Atrial fibrillation (HCC) [I48.91] 09/14/2022     Priority: Medium    Shock [R57.9] 01/16/2025     Priority: Low    Gastrostomy tube dysfunction (HCC) [K94.23] 01/15/2025     Priority: Low    Nosocomial pneumonia [J18.9, Y95] 03/16/2018     Priority: Low        I reviewed and agree with the findings and plan documented in her note .      ASSESSMENT:     Chronic atrial fibrillation with rapid ventricle response status post Watchman device.-- remains in controlled ventricular rate   Sepsis/septic shock.  - Continue PEG Amiodarone   History of CAD  End-stage renal disease status post renal transplant  Normal LV function ejection fraction of 55 to 60%.  Diabetes mellitus  History of COPD  Severe anemia  Chronic Respiraory failure - vented via Trache.      PLAN:      As always, aggressive risk factor modification is strongly recommended. We should adhere to the JNC VIII guidelines for HTN management and the NCEPATP III guidelines for LDL-C management.  Continue amio via peg as patient remains in CVR  No anticoagulation secondary to anemia. Hgb 7.8.. Patient with prior high risk of bleeding status post Watchman device  Monitor I's and O's and renal function.  Maintain potassium between 4-5 magnesium greater than 2  Nephrology recommendations--  patient on HD  GI/DVT prophylaxis  EF of 55 to 60 percent by echo on December 2024  Awaiting LTAC approval at this time. Further recs pending clinical course.        Electronically signed by Shashank Pena DO on 1/21/25 at 2:03 PM EST

## 2025-01-22 LAB
ALBUMIN SERPL-MCNC: 3.7 G/DL (ref 3.5–4.6)
ANION GAP SERPL CALCULATED.3IONS-SCNC: 12 MEQ/L (ref 9–15)
BASOPHILS # BLD: 0 K/UL (ref 0–0.2)
BASOPHILS NFR BLD: 0.1 %
BUN SERPL-MCNC: 58 MG/DL (ref 8–23)
CALCIUM SERPL-MCNC: 9.1 MG/DL (ref 8.5–9.9)
CHLORIDE SERPL-SCNC: 93 MEQ/L (ref 95–107)
CO2 SERPL-SCNC: 30 MEQ/L (ref 20–31)
CREAT SERPL-MCNC: 1.45 MG/DL (ref 0.7–1.2)
EOSINOPHIL # BLD: 0 K/UL (ref 0–0.7)
EOSINOPHIL NFR BLD: 0 %
ERYTHROCYTE [DISTWIDTH] IN BLOOD BY AUTOMATED COUNT: 16.9 % (ref 11.5–14.5)
GLUCOSE BLD-MCNC: 205 MG/DL (ref 70–99)
GLUCOSE BLD-MCNC: 245 MG/DL (ref 70–99)
GLUCOSE BLD-MCNC: 248 MG/DL (ref 70–99)
GLUCOSE BLD-MCNC: 258 MG/DL (ref 70–99)
GLUCOSE BLD-MCNC: 292 MG/DL (ref 70–99)
GLUCOSE SERPL-MCNC: 239 MG/DL (ref 70–99)
HCT VFR BLD AUTO: 25.7 % (ref 42–52)
HGB BLD-MCNC: 8.1 G/DL (ref 14–18)
LYMPHOCYTES # BLD: 0.6 K/UL (ref 1–4.8)
LYMPHOCYTES NFR BLD: 6.6 %
MAGNESIUM SERPL-MCNC: 2.3 MG/DL (ref 1.7–2.4)
MCH RBC QN AUTO: 29 PG (ref 27–31.3)
MCHC RBC AUTO-ENTMCNC: 31.5 % (ref 33–37)
MCV RBC AUTO: 92.1 FL (ref 79–92.2)
MONOCYTES # BLD: 0.3 K/UL (ref 0.2–0.8)
MONOCYTES NFR BLD: 3.4 %
NEUTROPHILS # BLD: 8.4 K/UL (ref 1.4–6.5)
NEUTS SEG NFR BLD: 88.4 %
PERFORMED ON: ABNORMAL
PHOSPHATE SERPL-MCNC: 2.7 MG/DL (ref 2.3–4.8)
PLATELET # BLD AUTO: 274 K/UL (ref 130–400)
POTASSIUM SERPL-SCNC: 4.1 MEQ/L (ref 3.4–4.9)
RBC # BLD AUTO: 2.79 M/UL (ref 4.7–6.1)
SODIUM SERPL-SCNC: 135 MEQ/L (ref 135–144)
WBC # BLD AUTO: 9.5 K/UL (ref 4.8–10.8)

## 2025-01-22 PROCEDURE — 83735 ASSAY OF MAGNESIUM: CPT

## 2025-01-22 PROCEDURE — 2000000000 HC ICU R&B

## 2025-01-22 PROCEDURE — 6360000002 HC RX W HCPCS: Performed by: INTERNAL MEDICINE

## 2025-01-22 PROCEDURE — 6370000000 HC RX 637 (ALT 250 FOR IP): Performed by: INTERNAL MEDICINE

## 2025-01-22 PROCEDURE — 6370000000 HC RX 637 (ALT 250 FOR IP): Performed by: NURSE PRACTITIONER

## 2025-01-22 PROCEDURE — 94660 CPAP INITIATION&MGMT: CPT

## 2025-01-22 PROCEDURE — 94003 VENT MGMT INPAT SUBQ DAY: CPT

## 2025-01-22 PROCEDURE — 6370000000 HC RX 637 (ALT 250 FOR IP): Performed by: STUDENT IN AN ORGANIZED HEALTH CARE EDUCATION/TRAINING PROGRAM

## 2025-01-22 PROCEDURE — 85025 COMPLETE CBC W/AUTO DIFF WBC: CPT

## 2025-01-22 PROCEDURE — 2580000003 HC RX 258: Performed by: INTERNAL MEDICINE

## 2025-01-22 PROCEDURE — 99232 SBSQ HOSP IP/OBS MODERATE 35: CPT | Performed by: INTERNAL MEDICINE

## 2025-01-22 PROCEDURE — 99291 CRITICAL CARE FIRST HOUR: CPT | Performed by: INTERNAL MEDICINE

## 2025-01-22 PROCEDURE — 80069 RENAL FUNCTION PANEL: CPT

## 2025-01-22 PROCEDURE — 2500000003 HC RX 250 WO HCPCS: Performed by: INTERNAL MEDICINE

## 2025-01-22 RX ORDER — INSULIN GLARGINE 100 [IU]/ML
12 INJECTION, SOLUTION SUBCUTANEOUS NIGHTLY
Status: DISCONTINUED | OUTPATIENT
Start: 2025-01-22 | End: 2025-01-23

## 2025-01-22 RX ORDER — HYDROCORTISONE SODIUM SUCCINATE 100 MG/2ML
50 INJECTION INTRAMUSCULAR; INTRAVENOUS DAILY
Status: COMPLETED | OUTPATIENT
Start: 2025-01-23 | End: 2025-01-23

## 2025-01-22 RX ORDER — HYDROCORTISONE SODIUM SUCCINATE 100 MG/2ML
50 INJECTION INTRAMUSCULAR; INTRAVENOUS 2 TIMES DAILY
Status: COMPLETED | OUTPATIENT
Start: 2025-01-22 | End: 2025-01-22

## 2025-01-22 RX ADMIN — HEPARIN SODIUM 5000 UNITS: 5000 INJECTION INTRAVENOUS; SUBCUTANEOUS at 21:32

## 2025-01-22 RX ADMIN — MIDODRINE HYDROCHLORIDE 20 MG: 10 TABLET ORAL at 10:03

## 2025-01-22 RX ADMIN — HYDROCORTISONE SODIUM SUCCINATE 50 MG: 100 INJECTION, POWDER, FOR SOLUTION INTRAMUSCULAR; INTRAVENOUS at 10:03

## 2025-01-22 RX ADMIN — HYDROCORTISONE SODIUM SUCCINATE 50 MG: 100 INJECTION, POWDER, FOR SOLUTION INTRAMUSCULAR; INTRAVENOUS at 21:15

## 2025-01-22 RX ADMIN — FINASTERIDE 5 MG: 5 TABLET, FILM COATED ORAL at 10:10

## 2025-01-22 RX ADMIN — HYDROCORTISONE SODIUM SUCCINATE 50 MG: 100 INJECTION, POWDER, FOR SOLUTION INTRAMUSCULAR; INTRAVENOUS at 04:07

## 2025-01-22 RX ADMIN — INSULIN GLARGINE 12 UNITS: 100 INJECTION, SOLUTION SUBCUTANEOUS at 21:14

## 2025-01-22 RX ADMIN — MIDODRINE HYDROCHLORIDE 20 MG: 10 TABLET ORAL at 14:40

## 2025-01-22 RX ADMIN — INSULIN LISPRO 4 UNITS: 100 INJECTION, SOLUTION INTRAVENOUS; SUBCUTANEOUS at 04:06

## 2025-01-22 RX ADMIN — INSULIN LISPRO 4 UNITS: 100 INJECTION, SOLUTION INTRAVENOUS; SUBCUTANEOUS at 17:43

## 2025-01-22 RX ADMIN — SODIUM CHLORIDE 40 MG: 9 INJECTION INTRAMUSCULAR; INTRAVENOUS; SUBCUTANEOUS at 10:04

## 2025-01-22 RX ADMIN — INSULIN LISPRO 4 UNITS: 100 INJECTION, SOLUTION INTRAVENOUS; SUBCUTANEOUS at 21:13

## 2025-01-22 RX ADMIN — HEPARIN SODIUM 5000 UNITS: 5000 INJECTION INTRAVENOUS; SUBCUTANEOUS at 05:42

## 2025-01-22 RX ADMIN — Medication 10 ML: at 21:15

## 2025-01-22 RX ADMIN — HEPARIN SODIUM 5000 UNITS: 5000 INJECTION INTRAVENOUS; SUBCUTANEOUS at 14:49

## 2025-01-22 RX ADMIN — MEROPENEM 500 MG: 500 INJECTION, POWDER, FOR SOLUTION INTRAVENOUS at 14:53

## 2025-01-22 RX ADMIN — AMIODARONE HYDROCHLORIDE 200 MG: 200 TABLET ORAL at 10:04

## 2025-01-22 RX ADMIN — Medication 10 ML: at 10:10

## 2025-01-22 RX ADMIN — INSULIN LISPRO 8 UNITS: 100 INJECTION, SOLUTION INTRAVENOUS; SUBCUTANEOUS at 10:04

## 2025-01-22 RX ADMIN — MIDODRINE HYDROCHLORIDE 20 MG: 10 TABLET ORAL at 17:43

## 2025-01-22 RX ADMIN — AMIODARONE HYDROCHLORIDE 200 MG: 200 TABLET ORAL at 21:14

## 2025-01-22 RX ADMIN — INSULIN LISPRO 8 UNITS: 100 INJECTION, SOLUTION INTRAVENOUS; SUBCUTANEOUS at 14:48

## 2025-01-22 ASSESSMENT — PULMONARY FUNCTION TESTS
PIF_VALUE: 17
PIF_VALUE: 16
PIF_VALUE: 21

## 2025-01-22 ASSESSMENT — PAIN SCALES - GENERAL
PAINLEVEL_OUTOF10: 0
PAINLEVEL_OUTOF10: 0

## 2025-01-22 NOTE — CARE COORDINATION
0917- TEAM ICU QUALITY ROUNDS AT BEDSIDE. NO FAMILY PRESENT. ON VENTILATOR W/TRACHEOSTOMY. PT IS AWAKE AND NODDING HEAD TO QUESTIONS. DISCHARGE PLAN REMAINS [facility] PENDING EXPEDITED APPEAL. CM/LSW FOLLOWING FOR OUTCOME. PALLIATIVE CARE FOLLOWING.

## 2025-01-22 NOTE — PROGRESS NOTES
Critical Care Progress Note    2025 11:51 AM    Subjective:   Admit Date: 2025  PCP: Tico Rodriguez DO    No chief complaint on file.    Interval History: No major issues overnight.  Continues to be on mechanical ventilation via tracheostomy.  Vitals are overall okay.  Still on hydrocortisone.  Sugars are up a bit.    Medications:   Scheduled Meds:   hydrocortisone sodium succinate PF  50 mg IntraVENous BID    Followed by    [START ON 2025] hydrocortisone sodium succinate PF  50 mg IntraVENous Daily    meropenem  500 mg IntraVENous Q24H    insulin glargine  10 Units SubCUTAneous Nightly    amiodarone  200 mg Oral BID    LORazepam  1 mg IntraVENous Once    midodrine  20 mg Oral TID WC    insulin lispro  0-16 Units SubCUTAneous Q4H    finasteride  5 mg Oral Daily    sodium chloride flush  5-40 mL IntraVENous 2 times per day    heparin (porcine)  5,000 Units SubCUTAneous 3 times per day    vancomycin (VANCOCIN) intermittent dosing (placeholder)   Other RX Placeholder    pantoprazole (PROTONIX) 40 mg in sodium chloride (PF) 0.9 % 10 mL injection  40 mg IntraVENous Daily     Continuous Infusions:   sodium chloride      dextrose      sodium chloride           Objective:   Vitals:   Temp (24hrs), Av °F (36.7 °C), Min:97.8 °F (36.6 °C), Max:98.3 °F (36.8 °C)    BP (!) 124/42   Pulse 75   Temp 98 °F (36.7 °C) (Axillary)   Resp 19   Wt 76.8 kg (169 lb 4.8 oz)   SpO2 100%   BMI 21.74 kg/m²   I/O:24HR INTAKE/OUTPUT:    Intake/Output Summary (Last 24 hours) at 2025 1151  Last data filed at 2025 0500  Gross per 24 hour   Intake 2000.09 ml   Output 450 ml   Net 1550.09 ml     0701 -  0700  In: .1   Out: 450 [Urine:250]  CVP:               Vent Mode: (S) CPAP/PS  Resp Rate (Set): 14 bpm   Vt (Set, mL): 500 mL       PEEP/CPAP (cmH2O): (S) 5   FiO2 : 30 %     Physical Exam:  General appearance -ill appearing  Mental status - alert, oriented to person, place, and time  Eyes -

## 2025-01-22 NOTE — FLOWSHEET NOTE
Assessment completed. Patient awake, tracks with eyes, nods head yes/no. Able to follow commands. Trach and mouth suctioned. FMS repositioned due to leaking.    PICC dressing changed.

## 2025-01-22 NOTE — PROGRESS NOTES
Infectious Disease     Patient Name: Remy Upton  Date: 1/22/2025  YOB: 1950  Medical Record Number: [id number]        Right lower lobe pneumonia      Diabetes hypertension end-stage renal disease kidney transplant    Recent hospitalization from [facility] 2/1/2013 required hemodialysis had a rhinovirus infection aspiration pneumonia intubation and mechanical ventilation  Underwent tracheostomy on 12/30/2024    Discharged to nursing home return to hospital because of fever 101 °F pulled out PEG tube  Hypotensive requiring Levophed on admission and amiodarone for A-fib              CT ABDOMEN PELVIS WO CONTRAST Additional Contrast? None [[id number]]  Status: Final result     PACS Images     Show images for CT ABDOMEN PELVIS WO CONTRAST Additional Contrast? None  CT ABDOMEN PELVIS WO CONTRAST Additional Contrast? None  Order: [id number]  Status: Final result       Visible to patient: No (not released)       Next appt: 05/13/2025 at 03:45 PM in Neurology (Kadeem Aleman MD)    0 Result Notes       1  Topic  Details    Reading Physician Reading Date Result Priority   Harinder Alexandra MD  [phone] 1/15/2025      Narrative & Impression  EXAMINATION:  CT OF THE ABDOMEN AND PELVIS WITHOUT CONTRAST 1/15/2025 2:23 pm     TECHNIQUE:  CT of the abdomen and pelvis was performed without the administration of  intravenous contrast. Multiplanar reformatted images are provided for review.  Automated exposure control, iterative reconstruction, and/or weight based  adjustment of the mA/kV was utilized to reduce the radiation dose to as low  as reasonably achievable.     COMPARISON:  CT of the abdomen and pelvis with contrast from 10/19/2022.     HISTORY:  ORDERING SYSTEM PROVIDED HISTORY: sepsis  TECHNOLOGIST PROVIDED HISTORY:  Reason for exam:->sepsis  Additional Contrast?->None  What reading provider will be dictating this exam?->CRC     FINDINGS:  During the interval, a gastrostomy tube has been placed with its tip

## 2025-01-22 NOTE — PLAN OF CARE
Problem: Chronic Conditions and Co-morbidities  Goal: Patient's chronic conditions and co-morbidity symptoms are monitored and maintained or improved  Outcome: Progressing  Flowsheets (Taken 1/21/2025 2000)  Care Plan - Patient's Chronic Conditions and Co-Morbidity Symptoms are Monitored and Maintained or Improved: Monitor and assess patient's chronic conditions and comorbid symptoms for stability, deterioration, or improvement     Problem: Discharge Planning  Goal: Discharge to home or other facility with appropriate resources  Outcome: Progressing  Flowsheets (Taken 1/21/2025 2000)  Discharge to home or other facility with appropriate resources: Identify barriers to discharge with patient and caregiver     Problem: Pain  Goal: Verbalizes/displays adequate comfort level or baseline comfort level  Outcome: Progressing     Problem: Safety - Adult  Goal: Free from fall injury  Outcome: Progressing     Problem: Skin/Tissue Integrity  Goal: Absence of new skin breakdown  Description: 1.  Monitor for areas of redness and/or skin breakdown  2.  Assess vascular access sites hourly  3.  Every 4-6 hours minimum:  Change oxygen saturation probe site  4.  Every 4-6 hours:  If on nasal continuous positive airway pressure, respiratory therapy assess nares and determine need for appliance change or resting period.  Outcome: Progressing     Problem: ABCDS Injury Assessment  Goal: Absence of physical injury  Outcome: Progressing     Problem: Nutrition Deficit:  Goal: Optimize nutritional status  Outcome: Progressing     Problem: Neurosensory - Adult  Goal: Achieves stable or improved neurological status  Outcome: Progressing  Flowsheets (Taken 1/21/2025 2000)  Achieves stable or improved neurological status: Assess for and report changes in neurological status     Problem: Respiratory - Adult  Goal: Achieves optimal ventilation and oxygenation  Outcome: Progressing  Flowsheets (Taken 1/21/2025 2000)  Achieves optimal ventilation  discharge  Outcome: Progressing  Flowsheets (Taken 1/21/2025 2000)  Absence of infection at discharge: Assess and monitor for signs and symptoms of infection     Problem: Metabolic/Fluid and Electrolytes - Adult  Goal: Electrolytes maintained within normal limits  Outcome: Progressing  Flowsheets (Taken 1/21/2025 2000)  Electrolytes maintained within normal limits: Monitor labs and assess patient for signs and symptoms of electrolyte imbalances  Goal: Hemodynamic stability and optimal renal function maintained  Outcome: Progressing  Flowsheets (Taken 1/21/2025 2000)  Hemodynamic stability and optimal renal function maintained: Monitor labs and assess for signs and symptoms of volume excess or deficit  Goal: Glucose maintained within prescribed range  Outcome: Progressing  Flowsheets (Taken 1/21/2025 2000)  Glucose maintained within prescribed range: Monitor blood glucose as ordered     Problem: Hematologic - Adult  Goal: Maintains hematologic stability  Outcome: Progressing  Flowsheets (Taken 1/21/2025 2000)  Maintains hematologic stability: Assess for signs and symptoms of bleeding or hemorrhage     Problem: Decision Making  Goal: Pt/Family able to effectively weigh alternatives and participate in decision making related to treatment and care  Description: INTERVENTIONS:  1. Determine when there are differences between patient's view, family's view, and healthcare provider's view of condition  2. Facilitate patient and family articulation of goals for care  3. Help patient and family identify pros/cons of alternative solutions  4. Provide information as requested by patient/family  5. Respect patient/family right to receive or not to receive information  6. Serve as a liaison between patient and family and health care team  7. Initiate Consults from Ethics, Palliative Care or initiate Family Care Conference as is appropriate  Outcome: Progressing  Flowsheets (Taken 1/21/2025 2000)  Patient/family able to

## 2025-01-22 NOTE — PROGRESS NOTES
Hospitalist Progress Note      PCP: Tico Rodriguez DO    Date of Admission: 1/14/2025    Chief Complaint:  no acute events, afebrile, off of pressors this morning.     Medications:  Reviewed    Infusion Medications    sodium chloride      dextrose      sodium chloride       Scheduled Medications    hydrocortisone sodium succinate PF  50 mg IntraVENous BID    Followed by    [START ON 1/23/2025] hydrocortisone sodium succinate PF  50 mg IntraVENous Daily    meropenem  500 mg IntraVENous Q24H    insulin glargine  10 Units SubCUTAneous Nightly    amiodarone  200 mg Oral BID    LORazepam  1 mg IntraVENous Once    midodrine  20 mg Oral TID WC    insulin lispro  0-16 Units SubCUTAneous Q4H    finasteride  5 mg Oral Daily    sodium chloride flush  5-40 mL IntraVENous 2 times per day    heparin (porcine)  5,000 Units SubCUTAneous 3 times per day    vancomycin (VANCOCIN) intermittent dosing (placeholder)   Other RX Placeholder    pantoprazole (PROTONIX) 40 mg in sodium chloride (PF) 0.9 % 10 mL injection  40 mg IntraVENous Daily     PRN Meds: metoprolol, oxyCODONE-acetaminophen **OR** oxyCODONE-acetaminophen, sodium chloride flush, sodium chloride, acetaminophen **OR** acetaminophen, glucose, dextrose bolus **OR** dextrose bolus, glucagon (rDNA), dextrose, sodium chloride      Intake/Output Summary (Last 24 hours) at 1/22/2025 1210  Last data filed at 1/22/2025 0500  Gross per 24 hour   Intake 1575.09 ml   Output 450 ml   Net 1125.09 ml       Exam:    BP (!) 124/42   Pulse 75   Temp 98 °F (36.7 °C) (Axillary)   Resp 19   Wt 76.8 kg (169 lb 4.8 oz)   SpO2 100%   BMI 21.74 kg/m²     General appearance: awake, intubated via trach  Lungs: coarse sounds bilaterally  Heart: S1/S2, irregular  Abdomen: soft  Extremities: no edema      Labs:   Recent Labs     01/20/25  0547 01/21/25  0414 01/22/25  0407   WBC 8.6 9.8 9.5   HGB 7.8* 7.8* 8.1*   HCT 25.2* 24.8* 25.7*    281 274     Recent Labs     01/20/25  0547

## 2025-01-22 NOTE — PROGRESS NOTES
Nephrology Progress Note    Assessment:  Oliguric RF CKD-5  Respiratory failure  Failure to thrive  PEG/TRACH  Hx kidney transplant chronic rejection  DM type-2  OHD AF    Plan:will continue dialysis treatments  follow labs  try to to get to [facility]    Patient Active Problem List:     Nosocomial pneumonia     Abdominal pain, other specified site     Acute pyelonephritis without lesion of renal medullary necrosis     Anemia     Essential hypertension     Nonspecific abnormal results of thyroid function study     Mixed hyperlipidemia     Kidney replaced by transplant     Intra-abdominal abscess post-procedure (HCC)     Infection due to Enterococcus     Fever and other physiologic disturbances of temperature regulation     Chronic kidney disease (CKD)     Type 2 diabetes mellitus with stage 3b chronic kidney disease, without long-term current use of insulin (HCC)     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 (HCC)     Abnormal CT of the chest     Bronchiectasis without complication (HCC)     GI bleeding     Complication of transplanted kidney     Muscle weakness (generalized)     Difficulty in walking     Elevated BUN     Urinary retention     Immunosuppression due to drug therapy (HCC)     DELORES (acute kidney injury) (HCC)     Adjustment disorder     Gross hematuria     Bilateral lower extremity edema     Dysuria     Acute cystitis without hematuria     Bilateral hearing loss     Abscess, toe     Acute hyperkalemia     Acute pain of left shoulder     Astigmatism     Astigmatism of both eyes with presbyopia     At risk for stroke     Benign enlargement of prostate

## 2025-01-22 NOTE — PROGRESS NOTES
PROGRESS NOTE    CONSULTANT  Shashank Pena DO      REQUESTING PHYSICIAN  Brittney Narvaez MD                REASON FOR CONSULTATION  No chief complaint on file.        Hospital Day: 1         Patient is a 74 y.o. male who presents with a chief complaint of A-fib with RVR, pyrexia. Patient is followed on a regular basis by Tico Guzman DO.  Patient with past medical history of diabetes, hypertension, end-stage renal disease status post renal transplant, tobacco abuse.  Recently hospitalized for pneumonia and discharged to LTAC.  Apparently was noted to have atrial fibrillation with rapid ventricle response as well as pyrexia and sent back to the hospital for further evaluation and treatment.  He was placed on amiodarone drip.  Also noted to be significant hemic with a hemoglobin of 6.4.  Status post 1 unit packed red blood cells.  Patient also on Levophed 2 mcg/min at this time.     He has persistent AF and s/p Watchman device 2023 at  due to prior Extensive bleed and inability to anticoagulate. 7/2024 FREDERIC at  EF 55-60 and stable Watchman position.      He has no known CAD. He is non compliant with f/u.    1-16-25: Remains on Levophed.  Amiodarone drip.  A-fib on telemetry heart rate 126 bpm.  Patient is on trach    1-17-25: Patient remains on Levophed.  Also on amiodarone drip  Atrial fibrillation on telemetry 90s  On trach    Dig level 3.6    1/18/25 coverng Dr. Pena. Tele SR 81. Remains vented via Tache 30% FIO2. On Amio gtt.  Awake     1/19/25 Tele AF/AFL 82 Remains vented via trache 3L out w HD yesterday. Pt opens eyes.  Remains on low dose Levo.     1/20/25: Tele afib/aflutter controlled in the 80s.   Off of levophed at this time, /56  On trach, does open eyes  Amio drip dc'd-- now given via peg tube.    1/21/2025: continues to be on vent via trache  HD now  AM labs reviewed,  BP stable    1/22/25: Patient resting in bed comfortably.  Stable on vent via trache  AM labs reviewed, BP  MD   10 mL at 01/15/25 1033    sodium chloride flush 0.9 % injection 5-40 mL  5-40 mL IntraVENous PRN Brittney Narvaez MD        0.9 % sodium chloride infusion   IntraVENous PRN Brittney Narvaez MD        acetaminophen (TYLENOL) tablet 650 mg  650 mg Oral Q6H PRN Brittney Narvaez MD         Or    acetaminophen (TYLENOL) suppository 650 mg  650 mg Rectal Q6H PRN Brittney Narvaez MD        heparin (porcine) injection 5,000 Units  5,000 Units SubCUTAneous 3 times per day Brittney Narvaez MD        vancomycin (VANCOCIN) intermittent dosing (placeholder)   Other RX Placeholder Brittney Narvaez MD        norepinephrine (LEVOPHED) 16 mg in sodium chloride 0.9 % 250 mL infusion  1-100 mcg/min IntraVENous Continuous Brittney Narvaez MD 3.8 mL/hr at 01/15/25 0657 4 mcg/min at 01/15/25 0657    glucose chewable tablet 16 g  4 tablet Oral PRN Brittney Narvaez MD        dextrose bolus 10% 125 mL  125 mL IntraVENous PRN Brittney Narvaez MD   Stopped at 01/14/25 1958     Or    dextrose bolus 10% 250 mL  250 mL IntraVENous PRN Brittney Narvaez MD        glucagon injection 1 mg  1 mg SubCUTAneous PRN Brittney Narvaez MD        dextrose 10 % infusion   IntraVENous Continuous PRN Brittney Narvaez MD        insulin lispro (HUMALOG,ADMELOG) injection vial 0-4 Units  0-4 Units SubCUTAneous 4 times per day Brittney Narvaez MD        0.9 % sodium chloride infusion   IntraVENous PRN Brittney Narvaez MD        pantoprazole (PROTONIX) 40 mg in sodium chloride (PF) 0.9 % 10 mL injection  40 mg IntraVENous Daily Brittney Narvaez MD   40 mg at 01/15/25 1028    amiodarone (NEXTERONE) 360 mg in dextrose 5% 200 ml  1 mg/min IntraVENous Continuous Nawaf Breaux MD 33.3 mL/hr at 01/15/25 0803 1 mg/min at 01/15/25 0803            ALLERGIES: Statins     Review of Systems   Unable to perform full ROS: Intubated   Constitutional:  Positive for fever. Negative for chills.   Eyes: Negative.    Respiratory:  Negative for shortness of breath and wheezing.

## 2025-01-22 NOTE — INTERDISCIPLINARY ROUNDS
Spiritual Care Services     Summary of Visit:    Attended ICU Rounds. Patient awake, alert, responds to commands, continues to make progress at this time. No family present, steven tradition is Scientologist.     Encounter Summary  Encounter Overview/Reason: Interdisciplinary rounds  Service Provided For: Patient  Support System: Spouse  Complexity of Encounter: Low  Begin Time: 0945  End Time : 1000  Total Time Calculated: 15 min     Crisis  Type: Rapid Response  Spiritual/Emotional needs  Type: Spiritual Support              Palliative Care  Type: Palliative Care, Follow-up       Spiritual Assessment/Intervention/Outcomes:    Assessment: Calm, Peaceful, Tearful, Coping, Other (Comment) (patient remains intubated, communicates with blinking of eyes)    Intervention: Empowerment, Prayer (assurance of)/[place], Nurtured Hope, Sustaining Presence/Ministry of presence    Outcome: Peace, Comfort, Receptive      Care Plan:    Plan and Referrals  Plan/Referrals: Continue Support (comment)        Spiritual Care Services   Electronically signed by Chaplain Camilo on 1/22/2025 at 1:27 PM.    To reach a  for emotional and spiritual support, place an EPIC consult request.   If a  is needed immediately, dial “0” and ask to page the on-call .

## 2025-01-22 NOTE — CARE COORDINATION
This LSW completed quality rounds with ICU care management team.  Patient remains on vent, alert and nods yes or no to questions.    Electronically signed by LOLI Muniz on 1/22/25 at 11:02 AM EST

## 2025-01-22 NOTE — PROGRESS NOTES
Spiritual Health History and Assessment/Progress Note  [facility]    (P) Follow-up, Palliative Care, Rapid Response,  ,      Name: Remy Upton MRN: [id number]    Age: 74 y.o.     Sex: male   Language: English   Shinto: Uatsdin   Atrial fibrillation (HCC)     Date: 1/22/2025            Total Time Calculated: (P) 15 min              Spiritual Assessment continued in [facility] ICU            Encounter Overview/Reason: (P) Follow-up, Palliative Care  Service Provided For: (P) Patient     reports, continued palliative care follow-up with patient. Patient awake, alert, and responsive. Patient was resting and looking out of window at time of chaplains of visit, while nurse checked his meds. Patient continues to make progress at this time.      supported patient with presence, silence and prayer as patient looked out of the window.      chose to allow patient to remain still and focused on what he was looking at.  continues to offer spiritual care at request of patients spouse.     Marian, Belief, Meaning:   Patient has beliefs or practices that help with coping during difficult times  Family/Friends No family/friends present      Importance and Influence:  Patient has spiritual/personal beliefs that influence decisions regarding their health  Family/Friends No family/friends present    Community:  Patient is connected with a spiritual community and feels well-supported. Support system includes: Spouse/Partner  Family/Friends No family/friends present    Assessment and Plan of Care:     Patient Interventions include: Other: prayer and ministry of presence, ,spent time just standing and observing patient today.   Family/Friends Interventions include: No family/friends present    Patient Plan of Care: Spiritual Care available upon further referral  Family/Friends Plan of Care: Spiritual Care available upon further referral    Electronically signed by Chaplain Camilo

## 2025-01-23 LAB
ALBUMIN SERPL-MCNC: 3.6 G/DL (ref 3.5–4.6)
ANION GAP SERPL CALCULATED.3IONS-SCNC: 14 MEQ/L (ref 9–15)
BASOPHILS # BLD: 0 K/UL (ref 0–0.2)
BASOPHILS NFR BLD: 0 %
BUN SERPL-MCNC: 84 MG/DL (ref 8–23)
CALCIUM SERPL-MCNC: 9 MG/DL (ref 8.5–9.9)
CHLORIDE SERPL-SCNC: 94 MEQ/L (ref 95–107)
CO2 SERPL-SCNC: 28 MEQ/L (ref 20–31)
CREAT SERPL-MCNC: 1.79 MG/DL (ref 0.7–1.2)
EOSINOPHIL # BLD: 0 K/UL (ref 0–0.7)
EOSINOPHIL NFR BLD: 0 %
ERYTHROCYTE [DISTWIDTH] IN BLOOD BY AUTOMATED COUNT: 17.2 % (ref 11.5–14.5)
GLUCOSE BLD-MCNC: 152 MG/DL (ref 70–99)
GLUCOSE BLD-MCNC: 175 MG/DL (ref 70–99)
GLUCOSE BLD-MCNC: 247 MG/DL (ref 70–99)
GLUCOSE BLD-MCNC: 252 MG/DL (ref 70–99)
GLUCOSE BLD-MCNC: 267 MG/DL (ref 70–99)
GLUCOSE BLD-MCNC: 279 MG/DL (ref 70–99)
GLUCOSE SERPL-MCNC: 248 MG/DL (ref 70–99)
HCT VFR BLD AUTO: 26.3 % (ref 42–52)
HGB BLD-MCNC: 8.5 G/DL (ref 14–18)
LYMPHOCYTES # BLD: 0.7 K/UL (ref 1–4.8)
LYMPHOCYTES NFR BLD: 5.2 %
MAGNESIUM SERPL-MCNC: 2.4 MG/DL (ref 1.7–2.4)
MCH RBC QN AUTO: 29.2 PG (ref 27–31.3)
MCHC RBC AUTO-ENTMCNC: 32.3 % (ref 33–37)
MCV RBC AUTO: 90.4 FL (ref 79–92.2)
MONOCYTES # BLD: 0.7 K/UL (ref 0.2–0.8)
MONOCYTES NFR BLD: 5.2 %
NEUTROPHILS # BLD: 11.3 K/UL (ref 1.4–6.5)
NEUTS SEG NFR BLD: 88.6 %
PERFORMED ON: ABNORMAL
PHOSPHATE SERPL-MCNC: 3.8 MG/DL (ref 2.3–4.8)
PLATELET # BLD AUTO: 313 K/UL (ref 130–400)
POTASSIUM SERPL-SCNC: 4.2 MEQ/L (ref 3.4–4.9)
RBC # BLD AUTO: 2.91 M/UL (ref 4.7–6.1)
SODIUM SERPL-SCNC: 136 MEQ/L (ref 135–144)
VANCOMYCIN SERPL-MCNC: 15.2 UG/ML (ref 10–40)
WBC # BLD AUTO: 12.7 K/UL (ref 4.8–10.8)

## 2025-01-23 PROCEDURE — 6360000002 HC RX W HCPCS: Performed by: INTERNAL MEDICINE

## 2025-01-23 PROCEDURE — 85025 COMPLETE CBC W/AUTO DIFF WBC: CPT

## 2025-01-23 PROCEDURE — 94660 CPAP INITIATION&MGMT: CPT

## 2025-01-23 PROCEDURE — 80202 ASSAY OF VANCOMYCIN: CPT

## 2025-01-23 PROCEDURE — 6370000000 HC RX 637 (ALT 250 FOR IP): Performed by: INTERNAL MEDICINE

## 2025-01-23 PROCEDURE — 2500000003 HC RX 250 WO HCPCS: Performed by: INTERNAL MEDICINE

## 2025-01-23 PROCEDURE — 99291 CRITICAL CARE FIRST HOUR: CPT | Performed by: INTERNAL MEDICINE

## 2025-01-23 PROCEDURE — 2580000003 HC RX 258: Performed by: INTERNAL MEDICINE

## 2025-01-23 PROCEDURE — 94003 VENT MGMT INPAT SUBQ DAY: CPT

## 2025-01-23 PROCEDURE — 99232 SBSQ HOSP IP/OBS MODERATE 35: CPT | Performed by: INTERNAL MEDICINE

## 2025-01-23 PROCEDURE — 2000000000 HC ICU R&B

## 2025-01-23 PROCEDURE — 80069 RENAL FUNCTION PANEL: CPT

## 2025-01-23 PROCEDURE — 8010000000 HC HEMODIALYSIS ACUTE INPT

## 2025-01-23 PROCEDURE — 83735 ASSAY OF MAGNESIUM: CPT

## 2025-01-23 PROCEDURE — 94761 N-INVAS EAR/PLS OXIMETRY MLT: CPT

## 2025-01-23 PROCEDURE — 6370000000 HC RX 637 (ALT 250 FOR IP): Performed by: NURSE PRACTITIONER

## 2025-01-23 PROCEDURE — 99233 SBSQ HOSP IP/OBS HIGH 50: CPT | Performed by: INTERNAL MEDICINE

## 2025-01-23 RX ORDER — INSULIN GLARGINE 100 [IU]/ML
18 INJECTION, SOLUTION SUBCUTANEOUS NIGHTLY
Status: DISCONTINUED | OUTPATIENT
Start: 2025-01-23 | End: 2025-01-29 | Stop reason: HOSPADM

## 2025-01-23 RX ORDER — VANCOMYCIN 1 G/200ML
1000 INJECTION, SOLUTION INTRAVENOUS ONCE
Status: COMPLETED | OUTPATIENT
Start: 2025-01-23 | End: 2025-01-23

## 2025-01-23 RX ADMIN — VANCOMYCIN 1000 MG: 1 INJECTION, SOLUTION INTRAVENOUS at 17:06

## 2025-01-23 RX ADMIN — HEPARIN SODIUM 5000 UNITS: 5000 INJECTION INTRAVENOUS; SUBCUTANEOUS at 21:33

## 2025-01-23 RX ADMIN — MIDODRINE HYDROCHLORIDE 20 MG: 10 TABLET ORAL at 17:09

## 2025-01-23 RX ADMIN — FINASTERIDE 5 MG: 5 TABLET, FILM COATED ORAL at 08:47

## 2025-01-23 RX ADMIN — INSULIN LISPRO 8 UNITS: 100 INJECTION, SOLUTION INTRAVENOUS; SUBCUTANEOUS at 17:21

## 2025-01-23 RX ADMIN — HEPARIN SODIUM 5000 UNITS: 5000 INJECTION INTRAVENOUS; SUBCUTANEOUS at 05:30

## 2025-01-23 RX ADMIN — AMIODARONE HYDROCHLORIDE 200 MG: 200 TABLET ORAL at 08:46

## 2025-01-23 RX ADMIN — MIDODRINE HYDROCHLORIDE 20 MG: 10 TABLET ORAL at 08:47

## 2025-01-23 RX ADMIN — INSULIN GLARGINE 18 UNITS: 100 INJECTION, SOLUTION SUBCUTANEOUS at 20:40

## 2025-01-23 RX ADMIN — HYDROCORTISONE SODIUM SUCCINATE 50 MG: 100 INJECTION, POWDER, FOR SOLUTION INTRAMUSCULAR; INTRAVENOUS at 08:47

## 2025-01-23 RX ADMIN — SODIUM CHLORIDE 40 MG: 9 INJECTION INTRAMUSCULAR; INTRAVENOUS; SUBCUTANEOUS at 08:48

## 2025-01-23 RX ADMIN — INSULIN LISPRO 8 UNITS: 100 INJECTION, SOLUTION INTRAVENOUS; SUBCUTANEOUS at 08:47

## 2025-01-23 RX ADMIN — INSULIN LISPRO 4 UNITS: 100 INJECTION, SOLUTION INTRAVENOUS; SUBCUTANEOUS at 05:01

## 2025-01-23 RX ADMIN — Medication 20 ML: at 08:48

## 2025-01-23 RX ADMIN — AMIODARONE HYDROCHLORIDE 200 MG: 200 TABLET ORAL at 20:53

## 2025-01-23 RX ADMIN — Medication 10 ML: at 20:37

## 2025-01-23 RX ADMIN — INSULIN LISPRO 8 UNITS: 100 INJECTION, SOLUTION INTRAVENOUS; SUBCUTANEOUS at 01:25

## 2025-01-23 RX ADMIN — HEPARIN SODIUM 5000 UNITS: 5000 INJECTION INTRAVENOUS; SUBCUTANEOUS at 17:09

## 2025-01-23 ASSESSMENT — PULMONARY FUNCTION TESTS
PIF_VALUE: 21
PIF_VALUE: 22
PIF_VALUE: 19
PIF_VALUE: 15
PIF_VALUE: 18

## 2025-01-23 ASSESSMENT — PAIN SCALES - GENERAL
PAINLEVEL_OUTOF10: 0
PAINLEVEL_OUTOF10: 0

## 2025-01-23 NOTE — PROGRESS NOTES
Nephrology Progress Note    Assessment:  ESRDX oliguria  Rhino virus admission            Hypotension now off pressors  Malnutrition  Ventilator dependent CAF  Anemia  PEG/TRACH  Nosocomial pneumonia       Plan:intermitant dialysis  continue Antibiotics  poor prognosis    Patient Active Problem List:     Nosocomial pneumonia     Abdominal pain, other specified site     Acute pyelonephritis without lesion of renal medullary necrosis     Anemia     Essential hypertension     Nonspecific abnormal results of thyroid function study     Mixed hyperlipidemia     Kidney replaced by transplant     Intra-abdominal abscess post-procedure (HCC)     Infection due to Enterococcus     Fever and other physiologic disturbances of temperature regulation     Chronic kidney disease (CKD)     Type 2 diabetes mellitus with stage 3b chronic kidney disease, without long-term current use of insulin (HCC)     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 (HCC)     Abnormal CT of the chest     Bronchiectasis without complication (HCC)     GI bleeding     Complication of transplanted kidney     Muscle weakness (generalized)     Difficulty in walking     Elevated BUN     Urinary retention     Immunosuppression due to drug therapy (HCC)     DELORES (acute kidney injury) (HCC)     Adjustment disorder     Gross hematuria     Bilateral lower extremity edema     Dysuria     Acute cystitis without hematuria     Bilateral hearing loss     Abscess, toe     Acute hyperkalemia     Acute pain of left shoulder     Astigmatism     Astigmatism of both eyes with presbyopia     At risk for stroke     Benign        CBC:   Recent Labs     01/22/25  0407 01/23/25  0501   WBC 9.5 12.7*   HGB 8.1* 8.5*    313     CMP:    Recent Labs     01/21/25  0414 01/22/25  0407 01/23/25  0501    135 136   K 4.6 4.1 4.2   CL 94* 93* 94*   CO2 29 30 28   BUN 72* 58* 84*   CREATININE 1.96* 1.45* 1.79*   GLUCOSE 289* 239* 248*   CALCIUM 9.0 9.1 9.0   LABGLOM 35.4* 50.9* 39.5*     Troponin: No results for input(s): \"TROPONINI\" in the last 72 hours.  BNP: No results for input(s): \"BNP\" in the last 72 hours.  INR: No results for input(s): \"INR\" in the last 72 hours.  Lipids: No results for input(s): \"CHOL\", \"LDLDIRECT\", \"TRIG\", \"HDL\", \"AMYLASE\", \"LIPASE\" in the last 72 hours.  Liver: No results for input(s): \"AST\", \"ALT\", \"ALKPHOS\", \"LABALBU\", \"BILITOT\" in the last 72 hours.    Invalid input(s): \"PROT\", \"BILDIR\"  Iron:  No results for input(s): \"FERRITIN\" in the last 72 hours.    Invalid input(s): \"IRONS\", \"LABIRONS\"  Urinalysis: No results for input(s): \"UA\" in the last 72 hours.    Objective:  Vitals: BP (!) 102/54   Pulse 77   Temp 97.9 °F (36.6 °C) (Axillary)   Resp 23   Ht 1.88 m (6' 2\")   Wt 78.1 kg (172 lb 1.6 oz)   SpO2 100%   BMI 22.10 kg/m²    Wt Readings from Last 3 Encounters:   01/23/25 78.1 kg (172 lb 1.6 oz)   01/13/25 80 kg (176 lb 5.9 oz)   11/04/24 75.9 kg (167 lb 6.4 oz)      24HR INTAKE/OUTPUT:    Intake/Output Summary (Last 24 hours) at 1/23/2025 0751  Last data filed at 1/23/2025 0600  Gross per 24 hour   Intake 1796.98 ml   Output 450 ml   Net 1346.98 ml       General: alert, in no apparent distress  HEENT: normocephalic, atraumatic, anicteric  Neck: supple, no mass TRACH  Lungs: non-labored respirations, clear to auscultation bilaterally  Heart: regular rate and rhythm, no murmurs or rubs  Abdomen: soft, non-tender, non-distended PEG  Ext: no cyanosis, no peripheral edema muscle mass loss  Neuro: alert and oriented, no gross abnormalities  Psych: normal mood and affect  Skin: no

## 2025-01-23 NOTE — PROGRESS NOTES
[facility]   Pharmacy Pharmacokinetic Monitoring Service - Vancomycin  Consulting Provider: Dr Huntley   Indication: sepsis  Target Concentration: Pre-Dialysis Concentration 15-20 mg/L  Day of Therapy: 9    Pertinent Laboratory Values:   Wt Readings from Last 1 Encounters:   01/23/25 78.1 kg (172 lb 1.6 oz)     Temp Readings from Last 1 Encounters:   01/23/25 97.9 °F (36.6 °C) (Axillary)     Recent Labs     01/22/25  0407 01/23/25  0501   CREATININE 1.45* 1.79*   BUN 58* 84*   WBC 9.5 12.7*       Recent vancomycin administrations                     vancomycin 1000 mg IVPB in 250 mL NS addavial (mg) 1,000 mg New Bag 01/21/25 1424                    Assessment:  Date/Time Current Dose Concentration Dialysis Session   1-23-25 1100 1000mg post HD 15.2 today     Plan:  Concentration-guided dosing due to renal impairment and intermittent hemodialysis   Current dosing regimen of 1000mg  is therapeutic   Continue current dose of 1000mg post HD x1 today  Pharmacy will continue to monitor patient and adjust therapy as indicated.  Note- no set HD schedule in Epic at this time.    Thank you for the consult,HEAVEN Schrader [facility]  1/23/2025 11:01 AM

## 2025-01-23 NOTE — PROGRESS NOTES
Patient alert, nods heads yes/no to answer questions.     Patient did have dialysis today, 2L of fluid removed, patient tolerated well.     Patient placed on CPAP with trach for large portion of the day.     Trach care completed, patient noted to have redness/irritation/and drainage from lower portion of trach site. Inner cannula and trach ties changed.     Wound care completed to buttocks/coccyx/sacrum, cleansed and triad cream applied. Wound care to right heel completed with betadine wet-to-dry dressing. Betadine paint to healing right leg wounds.     Bolus tube feedings given, patient tolerating well.     Patient's wife in to visit, she was updated at bedside.

## 2025-01-23 NOTE — PROGRESS NOTES
Patient alert and follows commands.     Patient able to CPAP with trach twice during shift. Patient tires easily.    Wounds cleansed and dressings changed per order. Patient turned side to side throughout shift.     Patient's wife in to visit and updated.

## 2025-01-23 NOTE — FLOWSHEET NOTE
Patient following commands, nods head yes/no. Reports no pain. Turned side to side throughout shift. Bathed, linens changed.    No other significant events throughout shift.

## 2025-01-23 NOTE — PLAN OF CARE
Problem: Chronic Conditions and Co-morbidities  Goal: Patient's chronic conditions and co-morbidity symptoms are monitored and maintained or improved  Outcome: Progressing  Flowsheets (Taken 1/22/2025 2000)  Care Plan - Patient's Chronic Conditions and Co-Morbidity Symptoms are Monitored and Maintained or Improved: Monitor and assess patient's chronic conditions and comorbid symptoms for stability, deterioration, or improvement     Problem: Discharge Planning  Goal: Discharge to home or other facility with appropriate resources  Outcome: Progressing  Flowsheets (Taken 1/22/2025 2000)  Discharge to home or other facility with appropriate resources: Identify barriers to discharge with patient and caregiver     Problem: Pain  Goal: Verbalizes/displays adequate comfort level or baseline comfort level  Outcome: Progressing     Problem: Safety - Adult  Goal: Free from fall injury  Outcome: Progressing     Problem: Skin/Tissue Integrity  Goal: Absence of new skin breakdown  Description: 1.  Monitor for areas of redness and/or skin breakdown  2.  Assess vascular access sites hourly  3.  Every 4-6 hours minimum:  Change oxygen saturation probe site  4.  Every 4-6 hours:  If on nasal continuous positive airway pressure, respiratory therapy assess nares and determine need for appliance change or resting period.  Outcome: Progressing     Problem: ABCDS Injury Assessment  Goal: Absence of physical injury  Outcome: Progressing     Problem: Nutrition Deficit:  Goal: Optimize nutritional status  Outcome: Progressing     Problem: Neurosensory - Adult  Goal: Achieves stable or improved neurological status  Outcome: Progressing  Flowsheets (Taken 1/22/2025 2000)  Achieves stable or improved neurological status: Assess for and report changes in neurological status     Problem: Respiratory - Adult  Goal: Achieves optimal ventilation and oxygenation  Outcome: Progressing  Flowsheets (Taken 1/22/2025 2000)  Achieves optimal ventilation  effectively weigh alternatives and participate in decision making related to treatment and care: Determine when there are differences between patient's view, family's view, and healthcare provider's view of condition

## 2025-01-23 NOTE — PROGRESS NOTES
Infectious Disease     Patient Name: Remy Upton  Date: 1/23/2025  YOB: 1950  Medical Record Number: [id number]        Right lower lobe pneumonia      Diabetes hypertension end-stage renal disease kidney transplant    Recent hospitalization from [facility] 2/1/2013 required hemodialysis had a rhinovirus infection aspiration pneumonia intubation and mechanical ventilation  Underwent tracheostomy on 12/30/2024    Discharged to nursing home return to hospital because of fever 101 °F pulled out PEG tube  Hypotensive requiring Levophed on admission and amiodarone for A-fib              CT ABDOMEN PELVIS WO CONTRAST Additional Contrast? None [[id number]]  Status: Final result     PACS Images     Show images for CT ABDOMEN PELVIS WO CONTRAST Additional Contrast? None  CT ABDOMEN PELVIS WO CONTRAST Additional Contrast? None  Order: [id number]  Status: Final result       Visible to patient: No (not released)       Next appt: 05/13/2025 at 03:45 PM in Neurology (Kadeem Aleman MD)    0 Result Notes       1  Topic  Details    Reading Physician Reading Date Result Priority   Harinder Alexandra MD  [phone] 1/15/2025      Narrative & Impression  EXAMINATION:  CT OF THE ABDOMEN AND PELVIS WITHOUT CONTRAST 1/15/2025 2:23 pm     TECHNIQUE:  CT of the abdomen and pelvis was performed without the administration of  intravenous contrast. Multiplanar reformatted images are provided for review.  Automated exposure control, iterative reconstruction, and/or weight based  adjustment of the mA/kV was utilized to reduce the radiation dose to as low  as reasonably achievable.     COMPARISON:  CT of the abdomen and pelvis with contrast from 10/19/2022.     HISTORY:  ORDERING SYSTEM PROVIDED HISTORY: sepsis  TECHNOLOGIST PROVIDED HISTORY:  Reason for exam:->sepsis  Additional Contrast?->None  What reading provider will be dictating this exam?->CRC     FINDINGS:  During the interval, a gastrostomy tube has been placed with its tip

## 2025-01-23 NOTE — CARE COORDINATION
I called [facility] and they do not have beds for patient. [facility] does not take HD with vent patients and [facility] is full for HD patients. I called wife to let her know that [facility] and [facility] to meet [facility] needs. I let her know [facility] will likely be where he has to return to. I did message Jessica to let her know.

## 2025-01-23 NOTE — CARE COORDINATION
ICU team rounds done this am at bedside and family not present. I received call from Kira at [facility] and appeal remains with the denial upheld. Pt will need to go to SNF level. I will call wife and let her know. Pt was at [facility] prior and will likely have to return there. I will check with other SNFs as well.

## 2025-01-23 NOTE — INTERDISCIPLINARY ROUNDS
Spiritual Care Services     Summary of Visit:    Attended ICU Rounds. Patient coping, continues to make progress, tolerating feeding tube, sugars are high, no family present, steven tradition is Religion.     Encounter Summary  Encounter Overview/Reason: Interdisciplinary rounds  Service Provided For: Patient  Support System: Spouse  Complexity of Encounter: Low  Begin Time: 1000  End Time : 1015  Total Time Calculated: 15 min     Crisis  Type: Rapid Response  Spiritual/Emotional needs  Type: Spiritual Support              Palliative Care  Type: Palliative Care, Follow-up       Spiritual Assessment/Intervention/Outcomes:    Assessment: Calm, Peaceful, Hopeful, Coping (alert oriented to chaplains voice and presence)    Intervention: Prayer (assurance of)/[place]    Outcome: Coping, Receptive, Comfort, Encouraged      Care Plan:    Plan and Referrals  Plan/Referrals: Continue Support (comment)          Spiritual Care Services   Electronically signed by Chaplain Camilo on 1/23/2025 at 10:52 AM.    To reach a  for emotional and spiritual support, place an EPIC consult request.   If a  is needed immediately, dial “0” and ask to page the on-call .

## 2025-01-23 NOTE — PROGRESS NOTES
PROGRESS NOTE    CONSULTANT  Shashank Pena DO      REQUESTING PHYSICIAN  Brittney Narvaez MD                REASON FOR CONSULTATION  No chief complaint on file.        Hospital Day: 1         Patient is a 74 y.o. male who presents with a chief complaint of A-fib with RVR, pyrexia. Patient is followed on a regular basis by Tico Guzman DO.  Patient with past medical history of diabetes, hypertension, end-stage renal disease status post renal transplant, tobacco abuse.  Recently hospitalized for pneumonia and discharged to LTAC.  Apparently was noted to have atrial fibrillation with rapid ventricle response as well as pyrexia and sent back to the hospital for further evaluation and treatment.  He was placed on amiodarone drip.  Also noted to be significant hemic with a hemoglobin of 6.4.  Status post 1 unit packed red blood cells.  Patient also on Levophed 2 mcg/min at this time.     He has persistent AF and s/p Watchman device 2023 at  due to prior Extensive bleed and inability to anticoagulate. 7/2024 FREDERIC at  EF 55-60 and stable Watchman position.      He has no known CAD. He is non compliant with f/u.    1-16-25: Remains on Levophed.  Amiodarone drip.  A-fib on telemetry heart rate 126 bpm.  Patient is on trach    1-17-25: Patient remains on Levophed.  Also on amiodarone drip  Atrial fibrillation on telemetry 90s  On trach    Dig level 3.6    1/18/25 coverng Dr. Pena. Tele SR 81. Remains vented via Tache 30% FIO2. On Amio gtt.  Awake     1/19/25 Tele AF/AFL 82 Remains vented via trache 3L out w HD yesterday. Pt opens eyes.  Remains on low dose Levo.     1/20/25: Tele afib/aflutter controlled in the 80s.   Off of levophed at this time, /56  On trach, does open eyes  Amio drip dc'd-- now given via peg tube.    1/21/2025: continues to be on vent via trache  HD now  AM labs reviewed,  BP stable    1/22/25: Patient resting in bed comfortably.  Stable on vent via trache  AM labs reviewed, BP  thickness. Normal wall motion.    Tricuspid Valve: Mild regurgitation. Mildly elevated RVSP 46mmHg, consistent with mild pulmonary hypertension.    Image quality is adequate.        No results found for this or any previous visit from the past 365 days.       ASSESSMENT:     Chronic atrial fibrillation with rapid ventricle response status post Watchman device.-- remains in controlled ventricular rate   Sepsis/septic shock.  - Continue PEG Amiodarone   History of CAD  End-stage renal disease status post renal transplant  Normal LV function ejection fraction of 55 to 60%.  Diabetes mellitus  History of COPD  Severe anemia  Chronic Respiraory failure - vented via Trache.      PLAN:      As always, aggressive risk factor modification is strongly recommended. We should adhere to the JNC VIII guidelines for HTN management and the NCEPATP III guidelines for LDL-C management.  Continue amio via peg as patient remains in CVR  No anticoagulation secondary to anemia. Hgb 7.8.. Patient with prior high risk of bleeding status post Watchman device  Wean midodrine off as tolerated  Monitor I's and O's and renal function.  Maintain potassium between 4-5 magnesium greater than 2  Nephrology recommendations--  patient on HD  GI/DVT prophylaxis  EF of 55 to 60 percent by echo on December 2024  Continue to await LTAC approval at this time. Further recs pending clinical course.      Attending Supervising Physician's Attestation Statement  The patient is a 74 y.o. male. I have performed a history and physical examination of the patient. I discussed the case with the nurse practitioner.     I reviewed the patient's Past Medical History, Past Surgical History, Medications, and Allergies.      Physical Exam:  Vitals          Vitals:     01/21/25 1000 01/21/25 1100 01/21/25 1200 01/21/25 1320   BP: 137/85   (!) 145/70     Pulse: 89 93 90 95   Resp: 22 24 15 18   Temp:     97.8 °F (36.6 °C)     TempSrc:     Oral     SpO2: 100% 100% 100% 100%

## 2025-01-23 NOTE — PROGRESS NOTES
Critical Care Progress Note    2025 11:38 AM    Subjective:   Admit Date: 2025  PCP: Tico Rodriguez DO    No chief complaint on file.    Interval History: Was on CPAP for less than 2 hours yesterday.  Became labored.  Currently on mechanical ventilation via tracheostomy.  Continues to be on antibiotics.  Undergoing hemodialysis.    Medications:   Scheduled Meds:   insulin glargine  18 Units SubCUTAneous Nightly    vancomycin  1,000 mg IntraVENous Once    amiodarone  200 mg Oral BID    LORazepam  1 mg IntraVENous Once    midodrine  20 mg Oral TID WC    insulin lispro  0-16 Units SubCUTAneous Q4H    finasteride  5 mg Oral Daily    sodium chloride flush  5-40 mL IntraVENous 2 times per day    heparin (porcine)  5,000 Units SubCUTAneous 3 times per day    vancomycin (VANCOCIN) intermittent dosing (placeholder)   Other RX Placeholder    pantoprazole (PROTONIX) 40 mg in sodium chloride (PF) 0.9 % 10 mL injection  40 mg IntraVENous Daily     Continuous Infusions:   sodium chloride      dextrose      sodium chloride           Objective:   Vitals:   Temp (24hrs), Av.7 °F (36.5 °C), Min:97.5 °F (36.4 °C), Max:97.9 °F (36.6 °C)    BP (!) 130/45   Pulse 89   Temp 97.9 °F (36.6 °C) (Axillary)   Resp 23   Ht 1.88 m (6' 2\")   Wt 78.1 kg (172 lb 1.6 oz)   SpO2 100%   BMI 22.10 kg/m²   I/O:24HR INTAKE/OUTPUT:    Intake/Output Summary (Last 24 hours) at 2025 1138  Last data filed at 2025 0600  Gross per 24 hour   Intake 1796.98 ml   Output 450 ml   Net 1346.98 ml      0701 -  0700  In: 1797   Out: 450 [Urine:400]  CVP:               Vent Mode: CPAP/PS  Resp Rate (Set): 14 bpm   Vt (Set, mL): 500 mL       PEEP/CPAP (cmH2O): 5   FiO2 : 30 %     Physical Exam:  General appearance -ill appearing  Mental status - alert, oriented to person, place, and time  Eyes - pupils equal and reactive, extraocular eye movements intact  Nose - normal and patent   Neck -trach in place, on mechanical

## 2025-01-23 NOTE — PROGRESS NOTES
Comprehensive Nutrition Assessment    Type and Reason for Visit:  Reassess    Nutrition Recommendations/Plan:   Continue with current EN regimen : renal tube feeding (Nepro or equivalent), this formula is comparable in carbohydrate content as the diabetic tube feeding  Bolus schedule : 325 ml, given 4 x daily by gravity through PEG  Water flushes 50 ml before and after each bolus  Dietitian Recommendations : consider daily renal MVI with minerals to aid in meeting micronutrient needs to support wound healing       Malnutrition Assessment:  Malnutrition Status:  Severe malnutrition (01/15/25 1617)    Context:  Chronic Illness     Findings of the 6 clinical characteristics of malnutrition:  Energy Intake:  Mild decrease in energy intake  Weight Loss:  Greater than 10% over 6 months     Body Fat Loss:  Severe body fat loss Orbital, Triceps, Fat Overlying Ribs   Muscle Mass Loss:  Severe muscle mass loss Temples (temporalis), Clavicles (pectoralis & deltoids), Scapula (trapezius)  Fluid Accumulation:  Unable to assess     Strength:  Not Performed    Nutrition Assessment:    Severe protein calorie malnutrition related to chronic illness, severe fat loss and muscle wasting present. Pt recently discharged after prolonged hospitalization requiring mechanical ventilation, s/p tracheostomy and PEG tube placement. Energy and protein needs met with bolus PEG tube feedings.. Currently tolerating bolus tube feeding schedule.    Nutrition Related Findings:    Hx significant for: DM2, ESRD/CKD4,started HD (12/2024), Intubated (12/18), Trach (12/30), PEG (1/2). TF at goal since (12/26), transitioned to bolus schedule (1/3), Labs reviewed: glu > 200,  elevated BUN/creat. Meds reviewed , 1-2+ gen edema noted, urine output < 300 ml per day per I/O, + loose stools, primarily on vent support per rounds Wound Type: Multiple, Stage III, Deep Tissue Injury, Skin Tears       Current Nutrition Intake & Therapies:    Average Meal Intake:

## 2025-01-23 NOTE — PROGRESS NOTES
Hospitalist Progress Note      PCP: Tico Rodriguez DO    Date of Admission: 1/14/2025    Chief Complaint:  no acute events, afebrile, remains off of pressors this morning.     Medications:  Reviewed    Infusion Medications    sodium chloride      dextrose      sodium chloride       Scheduled Medications    insulin glargine  18 Units SubCUTAneous Nightly    vancomycin  1,000 mg IntraVENous Once    amiodarone  200 mg Oral BID    LORazepam  1 mg IntraVENous Once    midodrine  20 mg Oral TID WC    insulin lispro  0-16 Units SubCUTAneous Q4H    finasteride  5 mg Oral Daily    sodium chloride flush  5-40 mL IntraVENous 2 times per day    heparin (porcine)  5,000 Units SubCUTAneous 3 times per day    vancomycin (VANCOCIN) intermittent dosing (placeholder)   Other RX Placeholder    pantoprazole (PROTONIX) 40 mg in sodium chloride (PF) 0.9 % 10 mL injection  40 mg IntraVENous Daily     PRN Meds: metoprolol, oxyCODONE-acetaminophen **OR** oxyCODONE-acetaminophen, sodium chloride flush, sodium chloride, acetaminophen **OR** acetaminophen, glucose, dextrose bolus **OR** dextrose bolus, glucagon (rDNA), dextrose, sodium chloride      Intake/Output Summary (Last 24 hours) at 1/23/2025 1126  Last data filed at 1/23/2025 0600  Gross per 24 hour   Intake 1796.98 ml   Output 450 ml   Net 1346.98 ml       Exam:    BP (!) 130/45   Pulse 89   Temp 97.9 °F (36.6 °C) (Axillary)   Resp 23   Ht 1.88 m (6' 2\")   Wt 78.1 kg (172 lb 1.6 oz)   SpO2 100%   BMI 22.10 kg/m²     General appearance: awake, intubated via trach  Lungs: coarse sounds bilaterally  Heart: S1/S2, irregular  Abdomen: soft  Extremities: no edema      Labs:   Recent Labs     01/21/25  0414 01/22/25  0407 01/23/25  0501   WBC 9.8 9.5 12.7*   HGB 7.8* 8.1* 8.5*   HCT 24.8* 25.7* 26.3*    274 313     Recent Labs     01/21/25  0414 01/22/25  0407 01/23/25  0501    135 136   K 4.6 4.1 4.2   CL 94* 93* 94*   CO2 29 30 28   BUN 72* 58* 84*   CREATININE 1.96*

## 2025-01-23 NOTE — PROGRESS NOTES
Spiritual Health History and Assessment/Progress Note  [facility]    (P) Palliative Care, Follow-up, Rapid Response,  ,      Name: Remy Upton MRN: [id number]    Age: 74 y.o.     Sex: male   Language: English   [employer]ist: [employer]   Atrial fibrillation (HCC)     Date: 1/23/2025            Total Time Calculated: (P) 15 min              Spiritual Assessment continued in [facility] ICU            Encounter Overview/Reason: (P) Palliative Care, Follow-up  Service Provided For: (P) Patient     reports, patient on vent and collar, taking dialysis, continues to make progress, receptive to chaplains voice, words and prayers. Continues to make eye contact and uses blinking of eyes  to communicate with .      continues to offer prayer and wellness on behalf of patient. Initial Care Encounter Completed.    Marian, Belief, Meaning:   Patient has beliefs or practices that help with coping during difficult times  Family/Friends No family/friends present      Importance and Influence:  Patient has spiritual/personal beliefs that influence decisions regarding their health  Family/Friends No family/friends present    Community:  Patient is connected with a spiritual community and feels well-supported. Support system includes: Spouse/Partner and [place] Community  Family/Friends No family/friends present    Assessment and Plan of Care:     Patient Interventions include: Facilitated expression of thoughts and feelings and Affirmed coping skills/support systems  Family/Friends Interventions include: No family/friends present    Patient Plan of Care: Spiritual Care available upon further referral  Family/Friends Plan of Care: No family/friends present    Electronically signed by Chaplain Camilo on 1/23/2025 at 3:39 PM

## 2025-01-23 NOTE — DIALYSIS
HD tx Completed.  Ran as ordered 3.0 hrs   Tolerated well  Removed 2 liter of fluid as ordered.   Pressure dressing applied to AVF, stopped bleeding within 10 min.

## 2025-01-24 LAB
ALBUMIN SERPL-MCNC: 3.6 G/DL (ref 3.5–4.6)
ANION GAP SERPL CALCULATED.3IONS-SCNC: 12 MEQ/L (ref 9–15)
BASOPHILS # BLD: 0 K/UL (ref 0–0.2)
BASOPHILS NFR BLD: 0.1 %
BUN SERPL-MCNC: 55 MG/DL (ref 8–23)
CALCIUM SERPL-MCNC: 9 MG/DL (ref 8.5–9.9)
CHLORIDE SERPL-SCNC: 94 MEQ/L (ref 95–107)
CO2 SERPL-SCNC: 31 MEQ/L (ref 20–31)
CREAT SERPL-MCNC: 1.27 MG/DL (ref 0.7–1.2)
EOSINOPHIL # BLD: 0 K/UL (ref 0–0.7)
EOSINOPHIL NFR BLD: 0.3 %
ERYTHROCYTE [DISTWIDTH] IN BLOOD BY AUTOMATED COUNT: 17.3 % (ref 11.5–14.5)
GLUCOSE BLD-MCNC: 147 MG/DL (ref 70–99)
GLUCOSE BLD-MCNC: 156 MG/DL (ref 70–99)
GLUCOSE BLD-MCNC: 185 MG/DL (ref 70–99)
GLUCOSE BLD-MCNC: 212 MG/DL (ref 70–99)
GLUCOSE BLD-MCNC: 231 MG/DL (ref 70–99)
GLUCOSE BLD-MCNC: 95 MG/DL (ref 70–99)
GLUCOSE SERPL-MCNC: 95 MG/DL (ref 70–99)
HCT VFR BLD AUTO: 30.1 % (ref 42–52)
HGB BLD-MCNC: 9.5 G/DL (ref 14–18)
LYMPHOCYTES # BLD: 0.8 K/UL (ref 1–4.8)
LYMPHOCYTES NFR BLD: 5.5 %
MAGNESIUM SERPL-MCNC: 2.4 MG/DL (ref 1.7–2.4)
MCH RBC QN AUTO: 29 PG (ref 27–31.3)
MCHC RBC AUTO-ENTMCNC: 31.6 % (ref 33–37)
MCV RBC AUTO: 91.8 FL (ref 79–92.2)
MONOCYTES # BLD: 1.3 K/UL (ref 0.2–0.8)
MONOCYTES NFR BLD: 9 %
NEUTROPHILS # BLD: 12.3 K/UL (ref 1.4–6.5)
NEUTS SEG NFR BLD: 83.9 %
PERFORMED ON: ABNORMAL
PERFORMED ON: NORMAL
PHOSPHATE SERPL-MCNC: 3.2 MG/DL (ref 2.3–4.8)
PLATELET # BLD AUTO: 370 K/UL (ref 130–400)
POTASSIUM SERPL-SCNC: 3.8 MEQ/L (ref 3.4–4.9)
RBC # BLD AUTO: 3.28 M/UL (ref 4.7–6.1)
SODIUM SERPL-SCNC: 137 MEQ/L (ref 135–144)
WBC # BLD AUTO: 14.7 K/UL (ref 4.8–10.8)

## 2025-01-24 PROCEDURE — 99232 SBSQ HOSP IP/OBS MODERATE 35: CPT | Performed by: INTERNAL MEDICINE

## 2025-01-24 PROCEDURE — 2500000003 HC RX 250 WO HCPCS: Performed by: INTERNAL MEDICINE

## 2025-01-24 PROCEDURE — 2580000003 HC RX 258: Performed by: INTERNAL MEDICINE

## 2025-01-24 PROCEDURE — 6370000000 HC RX 637 (ALT 250 FOR IP): Performed by: INTERNAL MEDICINE

## 2025-01-24 PROCEDURE — 99291 CRITICAL CARE FIRST HOUR: CPT | Performed by: INTERNAL MEDICINE

## 2025-01-24 PROCEDURE — 6370000000 HC RX 637 (ALT 250 FOR IP): Performed by: STUDENT IN AN ORGANIZED HEALTH CARE EDUCATION/TRAINING PROGRAM

## 2025-01-24 PROCEDURE — 2000000000 HC ICU R&B

## 2025-01-24 PROCEDURE — 83735 ASSAY OF MAGNESIUM: CPT

## 2025-01-24 PROCEDURE — 80069 RENAL FUNCTION PANEL: CPT

## 2025-01-24 PROCEDURE — 6360000002 HC RX W HCPCS: Performed by: INTERNAL MEDICINE

## 2025-01-24 PROCEDURE — 94660 CPAP INITIATION&MGMT: CPT

## 2025-01-24 PROCEDURE — 85025 COMPLETE CBC W/AUTO DIFF WBC: CPT

## 2025-01-24 PROCEDURE — 6370000000 HC RX 637 (ALT 250 FOR IP): Performed by: NURSE PRACTITIONER

## 2025-01-24 PROCEDURE — 2700000000 HC OXYGEN THERAPY PER DAY

## 2025-01-24 PROCEDURE — 94003 VENT MGMT INPAT SUBQ DAY: CPT

## 2025-01-24 RX ORDER — MECOBALAMIN 5000 MCG
5 TABLET,DISINTEGRATING ORAL NIGHTLY
Status: DISCONTINUED | OUTPATIENT
Start: 2025-01-24 | End: 2025-01-29 | Stop reason: HOSPADM

## 2025-01-24 RX ADMIN — SODIUM CHLORIDE 40 MG: 9 INJECTION INTRAMUSCULAR; INTRAVENOUS; SUBCUTANEOUS at 08:25

## 2025-01-24 RX ADMIN — HEPARIN SODIUM 5000 UNITS: 5000 INJECTION INTRAVENOUS; SUBCUTANEOUS at 06:00

## 2025-01-24 RX ADMIN — INSULIN LISPRO 4 UNITS: 100 INJECTION, SOLUTION INTRAVENOUS; SUBCUTANEOUS at 13:04

## 2025-01-24 RX ADMIN — Medication 5 MG: at 22:43

## 2025-01-24 RX ADMIN — HEPARIN SODIUM 5000 UNITS: 5000 INJECTION INTRAVENOUS; SUBCUTANEOUS at 13:05

## 2025-01-24 RX ADMIN — AMIODARONE HYDROCHLORIDE 200 MG: 200 TABLET ORAL at 22:43

## 2025-01-24 RX ADMIN — MIDODRINE HYDROCHLORIDE 20 MG: 10 TABLET ORAL at 13:05

## 2025-01-24 RX ADMIN — INSULIN LISPRO 4 UNITS: 100 INJECTION, SOLUTION INTRAVENOUS; SUBCUTANEOUS at 22:43

## 2025-01-24 RX ADMIN — Medication 10 ML: at 22:43

## 2025-01-24 RX ADMIN — FINASTERIDE 5 MG: 5 TABLET, FILM COATED ORAL at 08:25

## 2025-01-24 RX ADMIN — INSULIN LISPRO 4 UNITS: 100 INJECTION, SOLUTION INTRAVENOUS; SUBCUTANEOUS at 00:19

## 2025-01-24 RX ADMIN — MIDODRINE HYDROCHLORIDE 20 MG: 10 TABLET ORAL at 17:43

## 2025-01-24 RX ADMIN — MIDODRINE HYDROCHLORIDE 20 MG: 10 TABLET ORAL at 08:25

## 2025-01-24 RX ADMIN — INSULIN GLARGINE 18 UNITS: 100 INJECTION, SOLUTION SUBCUTANEOUS at 22:44

## 2025-01-24 RX ADMIN — HEPARIN SODIUM 5000 UNITS: 5000 INJECTION INTRAVENOUS; SUBCUTANEOUS at 22:43

## 2025-01-24 RX ADMIN — AMIODARONE HYDROCHLORIDE 200 MG: 200 TABLET ORAL at 08:25

## 2025-01-24 ASSESSMENT — PULMONARY FUNCTION TESTS
PIF_VALUE: 22
PIF_VALUE: 26
PIF_VALUE: 19
PIF_VALUE: 22
PIF_VALUE: 24

## 2025-01-24 ASSESSMENT — PAIN SCALES - GENERAL: PAINLEVEL_OUTOF10: 0

## 2025-01-24 NOTE — INTERDISCIPLINARY ROUNDS
Spiritual Care Services     Summary of Visit:   participated in ICU rounds. Patient is trached and waiting on long term placement. No family were present.    Encounter Summary  Encounter Overview/Reason: Interdisciplinary rounds, Palliative Care  Service Provided For: Patient  Support System: Spouse  Complexity of Encounter: Low  Begin Time: 1200  End Time : 1215  Total Time Calculated: 15 min     Crisis  Type: Rapid Response  Spiritual/Emotional needs  Type: Spiritual Support              Palliative Care  Type: Palliative Care, Interdisciplinary Rounds       Spiritual Assessment/Intervention/Outcomes:    Assessment: Calm, Peaceful, Hopeful, Coping (Finishing Dialysis)    Intervention: Nurtured Hope, Discussed belief system/Islam practices/steven, Prayer (assurance of)/[place]    Outcome: Receptive, Comfort      Care Plan:    Plan and Referrals  Plan/Referrals: Continue Support (comment)     to provide additional support as needed or requested      Spiritual Care Services   Electronically signed by Chaplain Neeta on 1/24/2025 at 10:38 AM.    To reach a  for emotional and spiritual support, place an EPIC consult request.   If a  is needed immediately, dial “0” and ask to page the on-call .

## 2025-01-24 NOTE — CARE COORDINATION
Quality round completed with care management team. Plan to [facility]. Due to Expedited appeal for [facility] been upheld.     Per Jessica, pre-cert remain pending at this time.     Electronically signed by LOLI Hodge on 1/24/2025 at 10:13 AM    Family requested to speak with LSW.  LSW met with patient and and wife at bedside.  Wife is very confused and frustrated at this time about the DC plan for patient.  Does not understand why patient have to return to [facility].     LSW spend approximately 40 minutes explaining to the wife.  Wife is in agreement at this time for [facility].   Wife brought in patient's updated devoted insurance would like LSW to make a copy of it and put it on his chart.  Copy made and placed on chart.     Wife is requesting LSW to return when her brother gets here.     DC plan: [facility] pending pre-CERT at this time.     Electronically signed by LOLI Hodge on 1/24/2025 at 2:05 PM

## 2025-01-24 NOTE — PROGRESS NOTES
in  satisfactory position in the body of the stomach.     Lower Chest: There are new mild right greater than left pleural effusions  with new moderate nodular consolidation of the posterior right lower lobe  consistent with pneumonia or atelectasis.  There is new mild compressive  atelectasis of the posterior left lower lobe adjacent to the effusion.     There is moderate coronary calcification.  The heart is normal in size.     Organs: The liver is normal in appearance without mass and without evidence  of intrahepatic biliary ductal dilatation. The spleen and pancreas are  unremarkable. The adrenal glands are unremarkable.     The gallbladder is moderately distended with high density material consistent  with sludge and is otherwise normal in appearance.     There continues to be prominent atrophy of the native kidneys consistent with  chronic renal failure.  There is no change in a 1.2 cm indeterminate density  nodule arising from the posterior interpolar right kidney, most likely a high  density cyst since it has not changed in size during the interval.     GI/Bowel: There is no evidence of bowel obstruction. No evidence of abnormal  bowel wall thickening or distension. The appendix is visualized and is  unremarkable.  No evidence of acute appendicitis.     Pelvis: Again seen is the left pelvic transplant kidney with appropriate  excretion of contrast and normal cortical thickness, suggesting a functioning  renal transplant.     There is stable appearance of a moderately atrophic right renal transplant  kidney with a small amount of excreted contrast in the right collecting  system.     The prostate is again seen to be small.     The urinary bladder is mildly distended with excreted intravenous contrast.  There is new moderate irregular thickening of its wall, suggesting cystitis.     there is no sign of pelvic or inguinal mass or adenopathy.     Peritoneum/Retroperitoneum: There is a tiny amount of free fluid  exam:->fever  What reading provider will be dictating this exam?->CRC     FINDINGS:  The tracheostomy tube remains in satisfactory position.     There is new mild patchy infiltrate in the right mid and lower lung,  consistent with a new mild right lower lobe pneumonia.  There is no change in  blunting of the right costophrenic angle, consistent with scarring from  previous inflammatory disease.     New minimal patchy infiltrate in the left lung base which could be either  atelectasis or early left lower lobe pneumonia.     The rest of the chest remains clear.     The heart remains top-normal in size.  The mediastinum is normal in  appearance.     The osseous structures are normal in appearance for the patient's age.     IMPRESSION:  1. New mild patchy infiltrate in the right mid and lower lung, consistent  with a new mild right lower lobe pneumonia.  2. New minimal patchy infiltrate in the left lung base which could be either  atelectasis or early left lower lobe pneumonia.  3. No change in blunting of the right costophrenic angle, consistent with  scarring from previous inflammatory disease.              Exam Ended: 01/14/25 17:53 EST Last Resulted: 01/14/25 18:53 EST        Review of Systems   Unable to perform ROS: Intubated       Physical Exam:      Physical Exam  Constitutional:       Appearance: He is not ill-appearing or diaphoretic.   Cardiovascular:      Heart sounds: Normal heart sounds. No murmur heard.  Pulmonary:      Breath sounds: No wheezing, rhonchi or rales.   Abdominal:      General: There is no distension.      Palpations: There is no mass.      Tenderness: There is no rebound.         Blood pressure (!) 142/41, pulse 90, temperature 98.1 °F (36.7 °C), temperature source Axillary, resp. rate 24, height 1.88 m (6' 2\"), weight 76.3 kg (168 lb 4.8 oz), SpO2 100%.      .   Lab Results   Component Value Date    WBC 14.7 (H) 01/24/2025    HGB 9.5 (L) 01/24/2025    HCT 30.1 (L) 01/24/2025    MCV 91.8

## 2025-01-24 NOTE — PROGRESS NOTES
Nephrology Progress Note    Assessment:  ESRDX  Ventilator dependent  Anemia  DM type-2  Severe muscle wasting  Sacral ulcer  Preior kidney transplant off immuosuppresives had chronic rejection prior to admission      Plan: dialysis tomorrow   maintain nutrition     Patient Active Problem List:     Nosocomial pneumonia     Abdominal pain, other specified site     Acute pyelonephritis without lesion of renal medullary necrosis     Anemia     Essential hypertension     Nonspecific abnormal results of thyroid function study     Mixed hyperlipidemia     Kidney replaced by transplant     Intra-abdominal abscess post-procedure (HCC)     Infection due to Enterococcus     Fever and other physiologic disturbances of temperature regulation     Chronic kidney disease (CKD)     Type 2 diabetes mellitus with stage 3b chronic kidney disease, without long-term current use of insulin (HCC)     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 (HCC)     Abnormal CT of the chest     Bronchiectasis without complication (HCC)     GI bleeding     Complication of transplanted kidney     Muscle weakness (generalized)     Difficulty in walking     Elevated BUN     Urinary retention     Immunosuppression due to drug therapy (HCC)     DELORES (acute kidney injury) (HCC)     Adjustment disorder     Gross hematuria     Bilateral lower extremity edema     Dysuria     Acute cystitis without hematuria     Bilateral hearing loss     Abscess, toe     Acute hyperkalemia     Acute pain of left shoulder     Astigmatism     Astigmatism of both eyes with presbyopia     At risk for stroke     Benign enlargement of

## 2025-01-24 NOTE — PLAN OF CARE
Problem: Chronic Conditions and Co-morbidities  Goal: Patient's chronic conditions and co-morbidity symptoms are monitored and maintained or improved  Outcome: Progressing  Flowsheets (Taken 1/23/2025 2000)  Care Plan - Patient's Chronic Conditions and Co-Morbidity Symptoms are Monitored and Maintained or Improved: Monitor and assess patient's chronic conditions and comorbid symptoms for stability, deterioration, or improvement     Problem: Discharge Planning  Goal: Discharge to home or other facility with appropriate resources  Outcome: Progressing  Flowsheets (Taken 1/23/2025 2000)  Discharge to home or other facility with appropriate resources: Identify barriers to discharge with patient and caregiver     Problem: Pain  Goal: Verbalizes/displays adequate comfort level or baseline comfort level  Outcome: Progressing  Flowsheets (Taken 1/23/2025 2000)  Verbalizes/displays adequate comfort level or baseline comfort level: Encourage patient to monitor pain and request assistance     Problem: Safety - Adult  Goal: Free from fall injury  Outcome: Progressing     Problem: Skin/Tissue Integrity  Goal: Absence of new skin breakdown  Description: 1.  Monitor for areas of redness and/or skin breakdown  2.  Assess vascular access sites hourly  3.  Every 4-6 hours minimum:  Change oxygen saturation probe site  4.  Every 4-6 hours:  If on nasal continuous positive airway pressure, respiratory therapy assess nares and determine need for appliance change or resting period.  Outcome: Progressing     Problem: ABCDS Injury Assessment  Goal: Absence of physical injury  Outcome: Progressing     Problem: Nutrition Deficit:  Goal: Optimize nutritional status  1/23/2025 2127 by Rosario King RN  Outcome: Progressing  1/23/2025 1102 by Shiloh Marie, PAUL, LD  Outcome: Adequate for Discharge  Flowsheets (Taken 1/15/2025 1625 by Gayle Monsalve RD, LD)  Nutrient intake appropriate for improving, restoring, or maintaining nutritional

## 2025-01-24 NOTE — PROGRESS NOTES
[facility]   Pharmacy Pharmacokinetic Monitoring Service - Vancomycin  Consulting Provider: Dr Huntley   Indication: sepsis  Target Concentration: Pre-Dialysis Concentration 15-20 mg/L  Day of Therapy: 10     Per ID notes of 1-23-25, vancomycin d/c after today     Plan:   Vancomycin 1000mg give post HD session 1-23-25.   HD tomorrow per nephrology note.       Will follow for now until vanco d/c'd    Avery Schrader [facility]  01/24/25  10:37 AM

## 2025-01-24 NOTE — PROGRESS NOTES
Pulmonary & Critical Care Medicine ICU Progress Note    Subjective:     No overnight events reported.  He does remain on support of the ventilator.  Nephrology does continue to follow and does plan dialysis again tomorrow.  He is currently waiting placement at [facility].    EXAM:  General: No acute distress, resting in bed  HEENT: Normocephalic, atraumatic, tracheostomy tube in place  Lungs : Clear to auscultation anteriorly, no wheezes, no rales, no rhonchi, no respiratory distress, no ventilator dyssynchrony  Heart: Regular rate  ABD: Soft, positive bowel sounds, nontender to palpation  Extremities : Warm, dry, muscle wasting noted  Neuro: Awake, alert, weak  Skin: No rashes appreciated    MV Settings:  Vent Mode: AC/VC Resp Rate (Set): 14 bpm/Vt (Set, mL): 500 mL/ /FiO2 : 30 %     IV:   sodium chloride      dextrose      sodium chloride         Vitals:  BP (!) 133/51   Pulse (!) 101   Temp 98.1 °F (36.7 °C) (Axillary)   Resp 24   Ht 1.88 m (6' 2\")   Wt 76.3 kg (168 lb 4.8 oz)   SpO2 99%   BMI 21.61 kg/m²          Intake/Output Summary (Last 24 hours) at 1/24/2025 0941  Last data filed at 1/24/2025 0800  Gross per 24 hour   Intake 2358.52 ml   Output 375 ml   Net 1983.52 ml       Medications:  Scheduled Meds:   insulin glargine  18 Units SubCUTAneous Nightly    amiodarone  200 mg Oral BID    LORazepam  1 mg IntraVENous Once    midodrine  20 mg Oral TID WC    insulin lispro  0-16 Units SubCUTAneous Q4H    finasteride  5 mg Oral Daily    sodium chloride flush  5-40 mL IntraVENous 2 times per day    heparin (porcine)  5,000 Units SubCUTAneous 3 times per day    vancomycin (VANCOCIN) intermittent dosing (placeholder)   Other RX Placeholder    pantoprazole (PROTONIX) 40 mg in sodium chloride (PF) 0.9 % 10 mL injection  40 mg IntraVENous Daily       Labs:   CBC:   Recent Labs     01/22/25  0407 01/23/25  0501 01/24/25  0410   WBC 9.5 12.7* 14.7*   HGB 8.1* 8.5* 9.5*   HCT 25.7* 26.3* 30.1*   MCV 92.1

## 2025-01-24 NOTE — PROGRESS NOTES
PROGRESS NOTE    CONSULTANT  Shashank Pena DO      REQUESTING PHYSICIAN  Brittney Narvaez MD                REASON FOR CONSULTATION  No chief complaint on file.        Hospital Day: 1         Patient is a 74 y.o. male who presents with a chief complaint of A-fib with RVR, pyrexia. Patient is followed on a regular basis by Tico Guzman DO.  Patient with past medical history of diabetes, hypertension, end-stage renal disease status post renal transplant, tobacco abuse.  Recently hospitalized for pneumonia and discharged to LTAC.  Apparently was noted to have atrial fibrillation with rapid ventricle response as well as pyrexia and sent back to the hospital for further evaluation and treatment.  He was placed on amiodarone drip.  Also noted to be significant hemic with a hemoglobin of 6.4.  Status post 1 unit packed red blood cells.  Patient also on Levophed 2 mcg/min at this time.     He has persistent AF and s/p Watchman device 2023 at  due to prior Extensive bleed and inability to anticoagulate. 7/2024 FREDERIC at  EF 55-60 and stable Watchman position.      He has no known CAD. He is non compliant with f/u.    1-16-25: Remains on Levophed.  Amiodarone drip.  A-fib on telemetry heart rate 126 bpm.  Patient is on trach    1-17-25: Patient remains on Levophed.  Also on amiodarone drip  Atrial fibrillation on telemetry 90s  On trach    Dig level 3.6    1/18/25 coverng Dr. Pena. Tele SR 81. Remains vented via Tache 30% FIO2. On Amio gtt.  Awake     1/19/25 Tele AF/AFL 82 Remains vented via trache 3L out w HD yesterday. Pt opens eyes.  Remains on low dose Levo.     1/20/25: Tele afib/aflutter controlled in the 80s.   Off of levophed at this time, /56  On trach, does open eyes  Amio drip dc'd-- now given via peg tube.    1/21/2025: continues to be on vent via trache  HD now  AM labs reviewed,  BP stable    1/22/25: Patient resting in bed comfortably.  Stable on vent via trache  AM labs reviewed, BP  PM EST

## 2025-01-24 NOTE — FLOWSHEET NOTE
Patient alert, nods head yes/no to answer questions. Following commands. Bolus tube feeding given x2. Oral suctioning done frequently. Turned side to side.

## 2025-01-24 NOTE — PROGRESS NOTES
Hospitalist Progress Note      PCP: Tico Rodriguez DO    Date of Admission: 1/14/2025    Chief Complaint:  no acute events, afebrile, remains off of pressors this morning. Updated wife extensively at bedside about medical plan as well as difficult discharge plan. Answered all questions     Medications:  Reviewed    Infusion Medications    sodium chloride      dextrose      sodium chloride       Scheduled Medications    insulin glargine  18 Units SubCUTAneous Nightly    amiodarone  200 mg Oral BID    LORazepam  1 mg IntraVENous Once    midodrine  20 mg Oral TID WC    insulin lispro  0-16 Units SubCUTAneous Q4H    finasteride  5 mg Oral Daily    sodium chloride flush  5-40 mL IntraVENous 2 times per day    heparin (porcine)  5,000 Units SubCUTAneous 3 times per day    vancomycin (VANCOCIN) intermittent dosing (placeholder)   Other RX Placeholder    pantoprazole (PROTONIX) 40 mg in sodium chloride (PF) 0.9 % 10 mL injection  40 mg IntraVENous Daily     PRN Meds: metoprolol, oxyCODONE-acetaminophen **OR** oxyCODONE-acetaminophen, sodium chloride flush, sodium chloride, acetaminophen **OR** acetaminophen, glucose, dextrose bolus **OR** dextrose bolus, glucagon (rDNA), dextrose, sodium chloride      Intake/Output Summary (Last 24 hours) at 1/24/2025 1159  Last data filed at 1/24/2025 0800  Gross per 24 hour   Intake 2358.52 ml   Output 375 ml   Net 1983.52 ml       Exam:    BP (!) 142/41   Pulse 90   Temp 98.1 °F (36.7 °C) (Axillary)   Resp 24   Ht 1.88 m (6' 2\")   Wt 76.3 kg (168 lb 4.8 oz)   SpO2 100%   BMI 21.61 kg/m²     General appearance: awake, intubated via trach  Lungs: coarse sounds bilaterally  Heart: S1/S2, irregular  Abdomen: soft  Extremities: no edema      Labs:   Recent Labs     01/22/25  0407 01/23/25  0501 01/24/25  0410   WBC 9.5 12.7* 14.7*   HGB 8.1* 8.5* 9.5*   HCT 25.7* 26.3* 30.1*    313 370     Recent Labs     01/22/25  0407 01/23/25  0501 01/24/25  0409    136 137   K 4.1  4.2 3.8   CL 93* 94* 94*   CO2 30 28 31   BUN 58* 84* 55*   CREATININE 1.45* 1.79* 1.27*   CALCIUM 9.1 9.0 9.0   PHOS 2.7 3.8 3.2     No results for input(s): \"AST\", \"ALT\", \"BILIDIR\", \"BILITOT\", \"ALKPHOS\" in the last 72 hours.    No results for input(s): \"INR\" in the last 72 hours.  No results for input(s): \"CKTOTAL\", \"TROPONINI\" in the last 72 hours.    Urinalysis:      Lab Results   Component Value Date/Time    NITRU Negative 01/17/2025 07:40 PM    WBCUA 10-20 01/17/2025 07:40 PM    WBCUA 13 08/11/2023 09:49 AM    BACTERIA Negative 01/17/2025 07:40 PM    RBCUA 0-2 01/17/2025 07:40 PM    RBCUA 2 08/11/2023 09:49 AM    BLOODU SMALL 01/17/2025 07:40 PM    SPECGRAV 1.015 08/17/2021 11:00 AM    GLUCOSEU 100 01/17/2025 07:40 PM    GLUCOSEU GT 1 09/14/2011 08:57 AM       Radiology:  CT ABDOMEN PELVIS WO CONTRAST Additional Contrast? None   Final Result   1. New mild right greater than left pleural effusions with new moderate   nodular consolidation of the posterior right lower lobe consistent with   pneumonia or atelectasis.   2. New mild compressive atelectasis of the posterior left lower lobe adjacent   to the effusion.   3. Stable appearance of a functioning left pelvic transplant kidney. Stable   appearance of a moderately atrophic right renal transplant kidney.   4. Stable prominent atrophy of the native kidneys consistent with chronic   renal failure.   5. Stable 1.2 cm indeterminate density nodule arising from the posterior   interpolar right kidney, most likely a high density cyst since it has not   changed in size during the interval.   6. Tiny amount of free fluid in the pelvis, nonspecific.   7. New moderate irregular thickening of the urinary bladder wall, suggesting   cystitis.         XR CHEST PORTABLE   Final Result   1. New mild patchy infiltrate in the right mid and lower lung, consistent   with a new mild right lower lobe pneumonia.   2. New minimal patchy infiltrate in the left lung base which could be

## 2025-01-25 LAB
ANION GAP SERPL CALCULATED.3IONS-SCNC: 13 MEQ/L (ref 9–15)
BUN SERPL-MCNC: 79 MG/DL (ref 8–23)
CALCIUM SERPL-MCNC: 8.8 MG/DL (ref 8.5–9.9)
CHLORIDE SERPL-SCNC: 95 MEQ/L (ref 95–107)
CO2 SERPL-SCNC: 29 MEQ/L (ref 20–31)
CREAT SERPL-MCNC: 1.78 MG/DL (ref 0.7–1.2)
ERYTHROCYTE [DISTWIDTH] IN BLOOD BY AUTOMATED COUNT: 17.5 % (ref 11.5–14.5)
GLUCOSE BLD-MCNC: 145 MG/DL (ref 70–99)
GLUCOSE BLD-MCNC: 153 MG/DL (ref 70–99)
GLUCOSE BLD-MCNC: 155 MG/DL (ref 70–99)
GLUCOSE BLD-MCNC: 160 MG/DL (ref 70–99)
GLUCOSE BLD-MCNC: 87 MG/DL (ref 70–99)
GLUCOSE BLD-MCNC: 97 MG/DL (ref 70–99)
GLUCOSE SERPL-MCNC: 85 MG/DL (ref 70–99)
HCT VFR BLD AUTO: 28.7 % (ref 42–52)
HGB BLD-MCNC: 8.8 G/DL (ref 14–18)
MCH RBC QN AUTO: 28.3 PG (ref 27–31.3)
MCHC RBC AUTO-ENTMCNC: 30.7 % (ref 33–37)
MCV RBC AUTO: 92.3 FL (ref 79–92.2)
PERFORMED ON: ABNORMAL
PERFORMED ON: NORMAL
PERFORMED ON: NORMAL
PLATELET # BLD AUTO: 301 K/UL (ref 130–400)
POTASSIUM SERPL-SCNC: 4.4 MEQ/L (ref 3.4–4.9)
RBC # BLD AUTO: 3.11 M/UL (ref 4.7–6.1)
SODIUM SERPL-SCNC: 137 MEQ/L (ref 135–144)
WBC # BLD AUTO: 11.1 K/UL (ref 4.8–10.8)

## 2025-01-25 PROCEDURE — 6370000000 HC RX 637 (ALT 250 FOR IP): Performed by: INTERNAL MEDICINE

## 2025-01-25 PROCEDURE — 80048 BASIC METABOLIC PNL TOTAL CA: CPT

## 2025-01-25 PROCEDURE — 2580000003 HC RX 258: Performed by: INTERNAL MEDICINE

## 2025-01-25 PROCEDURE — 94660 CPAP INITIATION&MGMT: CPT

## 2025-01-25 PROCEDURE — 2700000000 HC OXYGEN THERAPY PER DAY

## 2025-01-25 PROCEDURE — 2000000000 HC ICU R&B

## 2025-01-25 PROCEDURE — 2500000003 HC RX 250 WO HCPCS: Performed by: INTERNAL MEDICINE

## 2025-01-25 PROCEDURE — 6370000000 HC RX 637 (ALT 250 FOR IP): Performed by: STUDENT IN AN ORGANIZED HEALTH CARE EDUCATION/TRAINING PROGRAM

## 2025-01-25 PROCEDURE — 6360000002 HC RX W HCPCS: Performed by: INTERNAL MEDICINE

## 2025-01-25 PROCEDURE — 99291 CRITICAL CARE FIRST HOUR: CPT | Performed by: INTERNAL MEDICINE

## 2025-01-25 PROCEDURE — 85027 COMPLETE CBC AUTOMATED: CPT

## 2025-01-25 PROCEDURE — 99232 SBSQ HOSP IP/OBS MODERATE 35: CPT | Performed by: INTERNAL MEDICINE

## 2025-01-25 PROCEDURE — 94003 VENT MGMT INPAT SUBQ DAY: CPT

## 2025-01-25 PROCEDURE — 6370000000 HC RX 637 (ALT 250 FOR IP): Performed by: NURSE PRACTITIONER

## 2025-01-25 RX ADMIN — AMIODARONE HYDROCHLORIDE 200 MG: 200 TABLET ORAL at 07:57

## 2025-01-25 RX ADMIN — SODIUM CHLORIDE 40 MG: 9 INJECTION INTRAMUSCULAR; INTRAVENOUS; SUBCUTANEOUS at 07:58

## 2025-01-25 RX ADMIN — MIDODRINE HYDROCHLORIDE 20 MG: 10 TABLET ORAL at 07:57

## 2025-01-25 RX ADMIN — AMIODARONE HYDROCHLORIDE 200 MG: 200 TABLET ORAL at 20:17

## 2025-01-25 RX ADMIN — Medication 10 ML: at 07:58

## 2025-01-25 RX ADMIN — FINASTERIDE 5 MG: 5 TABLET, FILM COATED ORAL at 07:58

## 2025-01-25 RX ADMIN — Medication 10 ML: at 20:18

## 2025-01-25 RX ADMIN — INSULIN GLARGINE 18 UNITS: 100 INJECTION, SOLUTION SUBCUTANEOUS at 20:17

## 2025-01-25 RX ADMIN — EPOETIN ALFA-EPBX 10000 UNITS: 10000 INJECTION, SOLUTION INTRAVENOUS; SUBCUTANEOUS at 17:06

## 2025-01-25 RX ADMIN — Medication 5 MG: at 20:17

## 2025-01-25 RX ADMIN — MIDODRINE HYDROCHLORIDE 20 MG: 10 TABLET ORAL at 17:08

## 2025-01-25 RX ADMIN — HEPARIN SODIUM 5000 UNITS: 5000 INJECTION INTRAVENOUS; SUBCUTANEOUS at 22:30

## 2025-01-25 RX ADMIN — HEPARIN SODIUM 5000 UNITS: 5000 INJECTION INTRAVENOUS; SUBCUTANEOUS at 14:20

## 2025-01-25 RX ADMIN — HEPARIN SODIUM 5000 UNITS: 5000 INJECTION INTRAVENOUS; SUBCUTANEOUS at 06:17

## 2025-01-25 RX ADMIN — MIDODRINE HYDROCHLORIDE 20 MG: 10 TABLET ORAL at 11:52

## 2025-01-25 ASSESSMENT — PAIN SCALES - GENERAL
PAINLEVEL_OUTOF10: 0

## 2025-01-25 ASSESSMENT — PULMONARY FUNCTION TESTS
PIF_VALUE: 21
PIF_VALUE: 23
PIF_VALUE: 22
PIF_VALUE: 20
PIF_VALUE: 23
PIF_VALUE: 23

## 2025-01-25 NOTE — PROGRESS NOTES
Pulmonary & Critical Care Medicine ICU Progress Note    Subjective:     No events reported overnight.  He does continue on support the ventilator, FiO2 is currently at 30% with a PEEP of 5.  He did tolerate CPAP for several hours throughout the day yesterday.  He did receive hemodialysis this morning.  He did have approximately 3 L of fluid removed.  He is currently awaiting placement at LTAC facility.    EXAM:  General: Resting comfortably in bed.  No signs of distress noted.  HEENT: Normocephalic, atraumatic.  Pupils equal round and reactive to light.  Tracheostomy tube in place  Lungs : Coarse breath sounds bilaterally.  No wheezes or rales.  Heart: Regular rate and rhythm  ABD: Soft, nontender, nondistended.  Bowel sounds within normal limits  Extremities : No edema noted  Neuro: Drowsy but arousable to verbal stimulation  Skin: No rashes    MV Settings:  Vent Mode: AC/VC Resp Rate (Set): 14 bpm/Vt (Set, mL): 500 mL/ /FiO2 : 30 %     No results for input(s): \"PHART\", \"NZM5MVO\", \"PO2ART\" in the last 72 hours.    IV:   sodium chloride      dextrose      sodium chloride       Vitals:  /74   Pulse 81   Temp 97.7 °F (36.5 °C) (Axillary)   Resp 16   Ht 1.88 m (6' 2\")   Wt 74.1 kg (163 lb 4.8 oz)   SpO2 100%   BMI 20.97 kg/m²      Intake/Output Summary (Last 24 hours) at 1/25/2025 1338  Last data filed at 1/25/2025 0630  Gross per 24 hour   Intake 525 ml   Output 300 ml   Net 225 ml     Medications:  Scheduled Meds:   epoetin megan-epbx  10,000 Units SubCUTAneous Weekly    melatonin  5 mg Oral Nightly    insulin glargine  18 Units SubCUTAneous Nightly    amiodarone  200 mg Oral BID    LORazepam  1 mg IntraVENous Once    midodrine  20 mg Oral TID WC    insulin lispro  0-16 Units SubCUTAneous Q4H    finasteride  5 mg Oral Daily    sodium chloride flush  5-40 mL IntraVENous 2 times per day    heparin (porcine)  5,000 Units SubCUTAneous 3 times per day    vancomycin (VANCOCIN) intermittent dosing (placeholder)   patchy infiltrate in the right mid and lower lung, consistent  with a new mild right lower lobe pneumonia.  2. New minimal patchy infiltrate in the left lung base which could be either  atelectasis or early left lower lobe pneumonia.  3. No change in blunting of the right costophrenic angle, consistent with  scarring from previous inflammatory disease.    Assessment/Plan:    Acute on chronic hypoxic respiratory failure-he does continue on supportive the ventilator, FiO2 is currently at 30% with a PEEP of 5.  He did tolerate CPAP for several hours yesterday.  We will trial him on CPAP again today.  He is awaiting placement at LTAC facility.    Septic shock- his shock has resolved and he is off of vasopressor support at this time. He does continue to receive midodrine.     DELORES -he did receive hemodialysis today.  Per nephrology's note he likely will not need dialysis again until Tuesday.  Nephrology continues to follow.    Healthcare acquired pneumonia-he did complete a course of vancomycin and meropenem.  These have been completed.  Infectious disease has signed off.    A-fib with RVR-he does continue on oral amiodarone.  He does have a watchman in place.  Cardiology continues to follow.    Nutrition: Tube feeds as tolerated    Prophylaxis: Subcu heparin for DVT prophylaxis.  Protonix for GI prophylaxis.    Code Status: Limited code    This is a 74 y.o. male who is critically ill secondary to respiratory failure, sepsis, DELORES, pneumonia, A-fib.  I have spent a total of 30 minutes of critical care time with this patient, review of the chart, and discussion with the bedside staff; excluding any billable procedures.    Electronically signed by HAJA Grove CNP, on 1/25/2025 at 1:38 PM

## 2025-01-25 NOTE — PLAN OF CARE
Problem: Chronic Conditions and Co-morbidities  Goal: Patient's chronic conditions and co-morbidity symptoms are monitored and maintained or improved  Recent Flowsheet Documentation  Taken 1/25/2025 0900 by Katie Smith RN  Care Plan - Patient's Chronic Conditions and Co-Morbidity Symptoms are Monitored and Maintained or Improved: Monitor and assess patient's chronic conditions and comorbid symptoms for stability, deterioration, or improvement  1/24/2025 1933 by Dianelys Monsalve, RN  Outcome: Progressing  1/24/2025 1933 by Dianelys Monsalve, RN  Outcome: Progressing  Flowsheets (Taken 1/24/2025 0800)  Care Plan - Patient's Chronic Conditions and Co-Morbidity Symptoms are Monitored and Maintained or Improved:   Monitor and assess patient's chronic conditions and comorbid symptoms for stability, deterioration, or improvement   Collaborate with multidisciplinary team to address chronic and comorbid conditions and prevent exacerbation or deterioration   Update acute care plan with appropriate goals if chronic or comorbid symptoms are exacerbated and prevent overall improvement and discharge     Problem: Safety - Adult  Goal: Free from fall injury  1/24/2025 1933 by Dianelys Monsalve, RN  Outcome: Progressing  1/24/2025 1933 by Dianelys Monsalve, RN  Outcome: Progressing

## 2025-01-25 NOTE — FLOWSHEET NOTE
Am nursing  assessment completed.    Pt eyes open easily, follows w eyes: Alert    Diet: PER PEG/ renal formula bolus and flush  Code Status: LIMITED       Oxygen: CPAP per Trach collar  Complaints of:                         Pain: denies  IV: PICC             patent/ flushed/ capped, no signs of infiltration noted, dressing clean/dry/intact.  TELE:     af 93            Dressings: see chart    TRACH: 8 shiley   PEG: dressing changed. Bolus feedings  RT AVF: hemodialysis today  FMS: liquid brown stool  Prevalon boots/ multiple mepilex/ triad to maurizio area                          Precautions:              Falls:  75      Jeremi: 12  Chart and meds reviewed.           Noted Labs:   Na 137 k 4.4 bun 79/cr1.78 w 11.1 h 8.8/28.7 glu 75  Plan for today: 2 hr turning and oral care    Bed wheels locked and in lowest position. Call light and bedside table within reach.   NOTES:     1130 bedside dialysis completed. Tolerated well. 2 l removed.     1736 repositioned. Pericare and oral care. Loose stool around FMS. Excoriated skin cleansed, zinc applied. Wife assist at bedside. Electronically signed by Katie Smith RN on 1/25/2025 at 5:37 PM

## 2025-01-25 NOTE — FLOWSHEET NOTE
Shift summary    Vss throughout shift.     Trach care done, IC changed.    CPAP from approx 10am to 5pm. Pt tolerated well.     Tolerating TF.     FMS in place, draining. No urine this shift.     Triad cream to buttocks wounds. Mepliex to bony promiinence of sacrum (not on open areas), new mepliex to L heel.     Repositioned every 2-3 hours.     Wife at bedside and updated. Electronically signed by Elizabeth Martínez RN on 1/24/2025 at 7:34 PM

## 2025-01-25 NOTE — PROGRESS NOTES
Hospitalist Progress Note      PCP: Tico Rodriguez DO    Date of Admission: 1/14/2025    Chief Complaint:  no acute events, afebrile, remains off of pressors this morning.      Medications:  Reviewed    Infusion Medications    sodium chloride      dextrose      sodium chloride       Scheduled Medications    epoetin megan-epbx  10,000 Units SubCUTAneous Weekly    melatonin  5 mg Oral Nightly    insulin glargine  18 Units SubCUTAneous Nightly    amiodarone  200 mg Oral BID    LORazepam  1 mg IntraVENous Once    midodrine  20 mg Oral TID WC    insulin lispro  0-16 Units SubCUTAneous Q4H    finasteride  5 mg Oral Daily    sodium chloride flush  5-40 mL IntraVENous 2 times per day    heparin (porcine)  5,000 Units SubCUTAneous 3 times per day    vancomycin (VANCOCIN) intermittent dosing (placeholder)   Other RX Placeholder    pantoprazole (PROTONIX) 40 mg in sodium chloride (PF) 0.9 % 10 mL injection  40 mg IntraVENous Daily     PRN Meds: metoprolol, oxyCODONE-acetaminophen **OR** oxyCODONE-acetaminophen, sodium chloride flush, sodium chloride, acetaminophen **OR** acetaminophen, glucose, dextrose bolus **OR** dextrose bolus, glucagon (rDNA), dextrose, sodium chloride      Intake/Output Summary (Last 24 hours) at 1/25/2025 1426  Last data filed at 1/25/2025 0630  Gross per 24 hour   Intake 525 ml   Output 300 ml   Net 225 ml       Exam:    /74   Pulse 86   Temp 97.7 °F (36.5 °C) (Axillary)   Resp 19   Ht 1.88 m (6' 2\")   Wt 74.1 kg (163 lb 4.8 oz)   SpO2 100%   BMI 20.97 kg/m²     General appearance: awake, intubated via trach  Lungs: coarse sounds bilaterally  Heart: S1/S2, irregular  Abdomen: soft  Extremities: no edema      Labs:   Recent Labs     01/23/25  0501 01/24/25  0410 01/25/25  0622   WBC 12.7* 14.7* 11.1*   HGB 8.5* 9.5* 8.8*   HCT 26.3* 30.1* 28.7*    370 301     Recent Labs     01/23/25  0501 01/24/25  0409 01/25/25  0622    137 137   K 4.2 3.8 4.4   CL 94* 94* 95   CO2 28 31 29

## 2025-01-25 NOTE — PLAN OF CARE
Problem: Chronic Conditions and Co-morbidities  Goal: Patient's chronic conditions and co-morbidity symptoms are monitored and maintained or improved  1/24/2025 1933 by Dianelys Monsalve RN  Outcome: Progressing  1/24/2025 1933 by Dianelys Monsalve RN  Outcome: Progressing  Flowsheets (Taken 1/24/2025 0800)  Care Plan - Patient's Chronic Conditions and Co-Morbidity Symptoms are Monitored and Maintained or Improved:   Monitor and assess patient's chronic conditions and comorbid symptoms for stability, deterioration, or improvement   Collaborate with multidisciplinary team to address chronic and comorbid conditions and prevent exacerbation or deterioration   Update acute care plan with appropriate goals if chronic or comorbid symptoms are exacerbated and prevent overall improvement and discharge     Problem: Discharge Planning  Goal: Discharge to home or other facility with appropriate resources  1/24/2025 1933 by Dianelys Monsalve RN  Outcome: Progressing  1/24/2025 1933 by Dianelys Monsalve RN  Outcome: Progressing  Flowsheets (Taken 1/24/2025 0800)  Discharge to home or other facility with appropriate resources:   Identify barriers to discharge with patient and caregiver   Arrange for needed discharge resources and transportation as appropriate   Identify discharge learning needs (meds, wound care, etc)   Arrange for interpreters to assist at discharge as needed   Refer to discharge planning if patient needs post-hospital services based on physician order or complex needs related to functional status, cognitive ability or social support system     Problem: Pain  Goal: Verbalizes/displays adequate comfort level or baseline comfort level  1/24/2025 1933 by Dianelys Monsalve RN  Outcome: Progressing  1/24/2025 1933 by Dianelys Monsalve RN  Outcome: Progressing     Problem: Safety - Adult  Goal: Free from fall injury  1/24/2025 1933 by Dianelys Monsalve RN  Outcome: Progressing  1/24/2025 1933 by Bello

## 2025-01-25 NOTE — PROGRESS NOTES
Patient had an uneventful day. Vitals all remained stable patient was placed on cpap at 9am and tolerated it throughout the shift with no signs of distress, he was placed back on AC/VC at 1700. Patient without pain. Patient turned and repositioned every 2 hours to maintain skin integrity.

## 2025-01-25 NOTE — PROGRESS NOTES
Infectious Disease     Patient Name: Remy Upton  Date: 1/25/2025  YOB: 1950  Medical Record Number: [id number]        Right lower lobe pneumonia      Diabetes hypertension end-stage renal disease kidney transplant    Recent hospitalization from [facility] 2/1/2013 required hemodialysis had a rhinovirus infection aspiration pneumonia intubation and mechanical ventilation  Underwent tracheostomy on 12/30/2024    Discharged to nursing home return to hospital because of fever 101 °F pulled out PEG tube  Hypotensive requiring Levophed on admission and amiodarone for A-fib              CT ABDOMEN PELVIS WO CONTRAST Additional Contrast? None [[id number]]  Status: Final result     PACS Images     Show images for CT ABDOMEN PELVIS WO CONTRAST Additional Contrast? None  CT ABDOMEN PELVIS WO CONTRAST Additional Contrast? None  Order: [id number]  Status: Final result       Visible to patient: No (not released)       Next appt: 05/13/2025 at 03:45 PM in Neurology (Kadeem Aleman MD)    0 Result Notes       1  Topic  Details    Reading Physician Reading Date Result Priority   Harinder Alexandra MD  [phone] 1/15/2025      Narrative & Impression  EXAMINATION:  CT OF THE ABDOMEN AND PELVIS WITHOUT CONTRAST 1/15/2025 2:23 pm     TECHNIQUE:  CT of the abdomen and pelvis was performed without the administration of  intravenous contrast. Multiplanar reformatted images are provided for review.  Automated exposure control, iterative reconstruction, and/or weight based  adjustment of the mA/kV was utilized to reduce the radiation dose to as low  as reasonably achievable.     COMPARISON:  CT of the abdomen and pelvis with contrast from 10/19/2022.     HISTORY:  ORDERING SYSTEM PROVIDED HISTORY: sepsis  TECHNOLOGIST PROVIDED HISTORY:  Reason for exam:->sepsis  Additional Contrast?->None  What reading provider will be dictating this exam?->CRC     FINDINGS:  During the interval, a gastrostomy tube has been placed with its tip

## 2025-01-25 NOTE — PROGRESS NOTES
Nephrology Progress Note    Assessment:  ESRDX  Ventilator dependent  Anemia  DM type-2  Severe muscle wasting  Sacral ulcer  Preior kidney transplant off immuosuppresives had chronic rejection prior to admission      Plan: dialysis next probably Tuesday  Labs are ok but may be due to muscle wasting.  If making urine may look to hold hd  On high dose midodrine  epo    Patient Active Problem List:     Nosocomial pneumonia     Abdominal pain, other specified site     Acute pyelonephritis without lesion of renal medullary necrosis     Anemia     Essential hypertension     Nonspecific abnormal results of thyroid function study     Mixed hyperlipidemia     Kidney replaced by transplant     Intra-abdominal abscess post-procedure (HCC)     Infection due to Enterococcus     Fever and other physiologic disturbances of temperature regulation     Chronic kidney disease (CKD)     Type 2 diabetes mellitus with stage 3b chronic kidney disease, without long-term current use of insulin (HCC)     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 (HCC)     Abnormal CT of the chest     Bronchiectasis without complication (HCC)     GI bleeding     Complication of transplanted kidney     Muscle weakness (generalized)     Difficulty in walking     Elevated BUN     Urinary retention     Immunosuppression due to drug therapy (HCC)     DELORES (acute kidney injury) (HCC)     Adjustment disorder     Gross hematuria     Bilateral lower extremity edema     Dysuria     Acute cystitis without hematuria     Bilateral hearing loss     Abscess, toe     Acute hyperkalemia     Acute pain of left shoulder      Astigmatism     Astigmatism of both eyes with presbyopia     At risk for stroke     Benign enlargement of prostate     Benign prostatic hyperplasia with urinary frequency     Rhinovirus     Chronic right shoulder pain     Condition not found     Deep vein thrombosis (DVT) of lower extremity (HCC)     Diabetes mellitus type 2 without retinopathy (HCC)     Edema     Gait difficulty     GERD (gastroesophageal reflux disease)     H/O lower gastrointestinal bleeding     History of blood transfusion     Incomplete emptying of bladder     Insulin long-term use (HCC)     Leg weakness, bilateral     Localized swelling of both lower legs     Peripheral nerve disease     Pseudophakia of both eyes     PTDM (post-transplant diabetes mellitus) ([facility])     Posterior vitreous detachment     Rhabdomyolysis     Seizure disorder (HCC)     Stage 5 chronic kidney disease with transplanted kidney (HCC)     Leukocytes in urine     Atrial fibrillation with RVR ([facility])     Moderate malnutrition (HCC)     Palliative care encounter     Goals of care, counseling/discussion     Advanced care planning/counseling discussion     Encounter for hospice care discussion     Lower GI bleed     Unable to eat     Gastrostomy tube dysfunction (HCC)     Shock      Subjective:  Admit Date: 1/14/2025    Interval History: moves appropriate.  Seen on hd.  Uf 2 liters.  Access avf    Medications:  Scheduled Meds:   melatonin  5 mg Oral Nightly    insulin glargine  18 Units SubCUTAneous Nightly    amiodarone  200 mg Oral BID    LORazepam  1 mg IntraVENous Once    midodrine  20 mg Oral TID WC    insulin lispro  0-16 Units SubCUTAneous Q4H    finasteride  5 mg Oral Daily    sodium chloride flush  5-40 mL IntraVENous 2 times per day    heparin (porcine)  5,000 Units SubCUTAneous 3 times per day    vancomycin (VANCOCIN) intermittent dosing (placeholder)   Other RX Placeholder    pantoprazole (PROTONIX) 40 mg in sodium chloride (PF) 0.9 % 10 mL injection  40 mg

## 2025-01-26 LAB
ANION GAP SERPL CALCULATED.3IONS-SCNC: 12 MEQ/L (ref 9–15)
BUN SERPL-MCNC: 61 MG/DL (ref 8–23)
CALCIUM SERPL-MCNC: 8.5 MG/DL (ref 8.5–9.9)
CHLORIDE SERPL-SCNC: 93 MEQ/L (ref 95–107)
CO2 SERPL-SCNC: 29 MEQ/L (ref 20–31)
CREAT SERPL-MCNC: 1.54 MG/DL (ref 0.7–1.2)
ERYTHROCYTE [DISTWIDTH] IN BLOOD BY AUTOMATED COUNT: 17.2 % (ref 11.5–14.5)
GLUCOSE BLD-MCNC: 161 MG/DL (ref 70–99)
GLUCOSE BLD-MCNC: 165 MG/DL (ref 70–99)
GLUCOSE BLD-MCNC: 185 MG/DL (ref 70–99)
GLUCOSE BLD-MCNC: 200 MG/DL (ref 70–99)
GLUCOSE SERPL-MCNC: 166 MG/DL (ref 70–99)
HCT VFR BLD AUTO: 27.1 % (ref 42–52)
HGB BLD-MCNC: 8.3 G/DL (ref 14–18)
MCH RBC QN AUTO: 28.5 PG (ref 27–31.3)
MCHC RBC AUTO-ENTMCNC: 30.6 % (ref 33–37)
MCV RBC AUTO: 93.1 FL (ref 79–92.2)
PERFORMED ON: ABNORMAL
PLATELET # BLD AUTO: 257 K/UL (ref 130–400)
POTASSIUM SERPL-SCNC: 4.2 MEQ/L (ref 3.4–4.9)
RBC # BLD AUTO: 2.91 M/UL (ref 4.7–6.1)
SODIUM SERPL-SCNC: 134 MEQ/L (ref 135–144)
WBC # BLD AUTO: 10.3 K/UL (ref 4.8–10.8)

## 2025-01-26 PROCEDURE — 6370000000 HC RX 637 (ALT 250 FOR IP): Performed by: STUDENT IN AN ORGANIZED HEALTH CARE EDUCATION/TRAINING PROGRAM

## 2025-01-26 PROCEDURE — 6360000002 HC RX W HCPCS: Performed by: INTERNAL MEDICINE

## 2025-01-26 PROCEDURE — 6370000000 HC RX 637 (ALT 250 FOR IP): Performed by: NURSE PRACTITIONER

## 2025-01-26 PROCEDURE — 2580000003 HC RX 258: Performed by: INTERNAL MEDICINE

## 2025-01-26 PROCEDURE — 2000000000 HC ICU R&B

## 2025-01-26 PROCEDURE — 94003 VENT MGMT INPAT SUBQ DAY: CPT

## 2025-01-26 PROCEDURE — 99232 SBSQ HOSP IP/OBS MODERATE 35: CPT | Performed by: INTERNAL MEDICINE

## 2025-01-26 PROCEDURE — 2700000000 HC OXYGEN THERAPY PER DAY

## 2025-01-26 PROCEDURE — 94660 CPAP INITIATION&MGMT: CPT

## 2025-01-26 PROCEDURE — 6370000000 HC RX 637 (ALT 250 FOR IP): Performed by: INTERNAL MEDICINE

## 2025-01-26 PROCEDURE — 2500000003 HC RX 250 WO HCPCS: Performed by: INTERNAL MEDICINE

## 2025-01-26 PROCEDURE — 80048 BASIC METABOLIC PNL TOTAL CA: CPT

## 2025-01-26 PROCEDURE — 85027 COMPLETE CBC AUTOMATED: CPT

## 2025-01-26 PROCEDURE — 99233 SBSQ HOSP IP/OBS HIGH 50: CPT | Performed by: INTERNAL MEDICINE

## 2025-01-26 RX ADMIN — Medication 10 ML: at 08:52

## 2025-01-26 RX ADMIN — MIDODRINE HYDROCHLORIDE 20 MG: 10 TABLET ORAL at 17:06

## 2025-01-26 RX ADMIN — SODIUM CHLORIDE 40 MG: 9 INJECTION INTRAMUSCULAR; INTRAVENOUS; SUBCUTANEOUS at 08:51

## 2025-01-26 RX ADMIN — ACETAMINOPHEN 650 MG: 325 TABLET ORAL at 20:58

## 2025-01-26 RX ADMIN — INSULIN LISPRO 4 UNITS: 100 INJECTION, SOLUTION INTRAVENOUS; SUBCUTANEOUS at 12:15

## 2025-01-26 RX ADMIN — MIDODRINE HYDROCHLORIDE 20 MG: 10 TABLET ORAL at 08:51

## 2025-01-26 RX ADMIN — Medication 10 ML: at 21:01

## 2025-01-26 RX ADMIN — INSULIN LISPRO 4 UNITS: 100 INJECTION, SOLUTION INTRAVENOUS; SUBCUTANEOUS at 21:00

## 2025-01-26 RX ADMIN — Medication 5 MG: at 20:58

## 2025-01-26 RX ADMIN — HEPARIN SODIUM 5000 UNITS: 5000 INJECTION INTRAVENOUS; SUBCUTANEOUS at 20:58

## 2025-01-26 RX ADMIN — AMIODARONE HYDROCHLORIDE 200 MG: 200 TABLET ORAL at 08:51

## 2025-01-26 RX ADMIN — AMIODARONE HYDROCHLORIDE 200 MG: 200 TABLET ORAL at 20:58

## 2025-01-26 RX ADMIN — HEPARIN SODIUM 5000 UNITS: 5000 INJECTION INTRAVENOUS; SUBCUTANEOUS at 14:12

## 2025-01-26 RX ADMIN — INSULIN GLARGINE 18 UNITS: 100 INJECTION, SOLUTION SUBCUTANEOUS at 20:58

## 2025-01-26 RX ADMIN — HEPARIN SODIUM 5000 UNITS: 5000 INJECTION INTRAVENOUS; SUBCUTANEOUS at 06:00

## 2025-01-26 RX ADMIN — MIDODRINE HYDROCHLORIDE 20 MG: 10 TABLET ORAL at 12:15

## 2025-01-26 RX ADMIN — FINASTERIDE 5 MG: 5 TABLET, FILM COATED ORAL at 08:51

## 2025-01-26 ASSESSMENT — PAIN SCALES - GENERAL
PAINLEVEL_OUTOF10: 0

## 2025-01-26 ASSESSMENT — PULMONARY FUNCTION TESTS
PIF_VALUE: 21
PIF_VALUE: 20
PIF_VALUE: 18
PIF_VALUE: 20
PIF_VALUE: 22
PIF_VALUE: 22

## 2025-01-26 NOTE — PLAN OF CARE
Problem: Chronic Conditions and Co-morbidities  Goal: Patient's chronic conditions and co-morbidity symptoms are monitored and maintained or improved  Outcome: Progressing  Flowsheets  Taken 1/26/2025 0947  Care Plan - Patient's Chronic Conditions and Co-Morbidity Symptoms are Monitored and Maintained or Improved: Monitor and assess patient's chronic conditions and comorbid symptoms for stability, deterioration, or improvement  Taken 1/26/2025 0900  Care Plan - Patient's Chronic Conditions and Co-Morbidity Symptoms are Monitored and Maintained or Improved: Monitor and assess patient's chronic conditions and comorbid symptoms for stability, deterioration, or improvement  Taken 1/26/2025 0400  Care Plan - Patient's Chronic Conditions and Co-Morbidity Symptoms are Monitored and Maintained or Improved: Monitor and assess patient's chronic conditions and comorbid symptoms for stability, deterioration, or improvement     Problem: Discharge Planning  Goal: Discharge to home or other facility with appropriate resources  Outcome: Progressing  Flowsheets  Taken 1/26/2025 0947  Discharge to home or other facility with appropriate resources: Identify barriers to discharge with patient and caregiver  Taken 1/26/2025 0900  Discharge to home or other facility with appropriate resources: Identify barriers to discharge with patient and caregiver  Taken 1/26/2025 0400  Discharge to home or other facility with appropriate resources: Identify barriers to discharge with patient and caregiver     Problem: Pain  Goal: Verbalizes/displays adequate comfort level or baseline comfort level  Outcome: Progressing     Problem: Safety - Adult  Goal: Free from fall injury  Outcome: Progressing     Problem: Skin/Tissue Integrity  Goal: Absence of new skin breakdown  Description: 1.  Monitor for areas of redness and/or skin breakdown  2.  Assess vascular access sites hourly  3.  Every 4-6 hours minimum:  Change oxygen saturation probe site  4.

## 2025-01-26 NOTE — PROGRESS NOTES
Updates received from Jenny FRY. Shift assessments completed, see flow sheets. Medications administered via peg tube. Bolus feedings done at 0000 and 0600, tolerated well. Bed bath and complete linen change done. Wound dressing changed.

## 2025-01-26 NOTE — PROGRESS NOTES
Nephrology Progress Note    Assessment:  ESRDX  Ventilator dependent  Anemia  DM type-2  Severe muscle wasting  Sacral ulcer  Preior kidney transplant off immuosuppresives had chronic rejection prior to admission      Plan: dialysis next probably Tuesday  Labs are ok but may be due to muscle wasting.  If making urine may look to hold hd  On high dose midodrine  epo    Patient Active Problem List:     Nosocomial pneumonia     Abdominal pain, other specified site     Acute pyelonephritis without lesion of renal medullary necrosis     Anemia     Essential hypertension     Nonspecific abnormal results of thyroid function study     Mixed hyperlipidemia     Kidney replaced by transplant     Intra-abdominal abscess post-procedure (HCC)     Infection due to Enterococcus     Fever and other physiologic disturbances of temperature regulation     Chronic kidney disease (CKD)     Type 2 diabetes mellitus with stage 3b chronic kidney disease, without long-term current use of insulin (HCC)     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 (HCC)     Abnormal CT of the chest     Bronchiectasis without complication (HCC)     GI bleeding     Complication of transplanted kidney     Muscle weakness (generalized)     Difficulty in walking     Elevated BUN     Urinary retention     Immunosuppression due to drug therapy (HCC)     DELORES (acute kidney injury) (HCC)     Adjustment disorder     Gross hematuria     Bilateral lower extremity edema     Dysuria     Acute cystitis without hematuria     Bilateral hearing loss     Abscess, toe     Acute hyperkalemia     Acute pain of left shoulder

## 2025-01-26 NOTE — PROGRESS NOTES
Hospitalist Progress Note      PCP: Tico Rodriguez DO    Date of Admission: 1/14/2025    Chief Complaint:  no acute events, afebrile, remains off of pressors. this morning doing well on CPAP    Medications:  Reviewed    Infusion Medications    sodium chloride      dextrose       Scheduled Medications    epoetin megan-epbx  10,000 Units SubCUTAneous Weekly    melatonin  5 mg Oral Nightly    insulin glargine  18 Units SubCUTAneous Nightly    amiodarone  200 mg Oral BID    midodrine  20 mg Oral TID WC    insulin lispro  0-16 Units SubCUTAneous Q4H    finasteride  5 mg Oral Daily    sodium chloride flush  5-40 mL IntraVENous 2 times per day    heparin (porcine)  5,000 Units SubCUTAneous 3 times per day    pantoprazole (PROTONIX) 40 mg in sodium chloride (PF) 0.9 % 10 mL injection  40 mg IntraVENous Daily     PRN Meds: metoprolol, oxyCODONE-acetaminophen **OR** oxyCODONE-acetaminophen, sodium chloride flush, sodium chloride, acetaminophen **OR** acetaminophen, glucose, dextrose bolus **OR** dextrose bolus, glucagon (rDNA), dextrose      Intake/Output Summary (Last 24 hours) at 1/26/2025 1203  Last data filed at 1/26/2025 0600  Gross per 24 hour   Intake 900 ml   Output 250 ml   Net 650 ml       Exam:    BP (!) 142/32   Pulse 80   Temp 99 °F (37.2 °C) (Oral)   Resp (!) 36   Ht 1.88 m (6' 2\")   Wt 75 kg (165 lb 4.8 oz)   SpO2 96%   BMI 21.22 kg/m²     General appearance: awake, intubated via trach  Lungs: coarse sounds bilaterally  Heart: S1/S2, irregular  Abdomen: soft  Extremities: no edema      Labs:   Recent Labs     01/24/25  0410 01/25/25  0622 01/26/25  0510   WBC 14.7* 11.1* 10.3   HGB 9.5* 8.8* 8.3*   HCT 30.1* 28.7* 27.1*    301 257     Recent Labs     01/24/25  0409 01/25/25  0622 01/26/25  0510    137 134*   K 3.8 4.4 4.2   CL 94* 95 93*   CO2 31 29 29   BUN 55* 79* 61*   CREATININE 1.27* 1.78* 1.54*   CALCIUM 9.0 8.8 8.5   PHOS 3.2  --   --      No results for input(s): \"AST\", \"ALT\",

## 2025-01-26 NOTE — PROGRESS NOTES
Pulmonary & Critical Care Medicine ICU Progress Note    Subjective:     No overnight events reported.  He does remain on support of the ventilator.  He is currently doing well on CPAP.  Nephrology does continue to follow.  He is currently waiting placement at [facility].    EXAM:  General: No acute distress, resting in bed  HEENT: Normocephalic, atraumatic, tracheostomy tube in place  Lungs : Clear to auscultation anteriorly, no wheezes, no rales, no rhonchi, no respiratory distress, no ventilator dyssynchrony  Heart: Regular rate  ABD: Soft, positive bowel sounds, nontender to palpation  Extremities : Warm, dry, muscle wasting noted  Neuro: Awake, alert, weak  Skin: No rashes appreciated    MV Settings:  Vent Mode: (S) CPAP/PS Resp Rate (Set): 14 bpm/Vt (Set, mL): 500 mL/ /FiO2 : 30 %     IV:   sodium chloride      dextrose         Vitals:  /63   Pulse 94   Temp 99 °F (37.2 °C) (Oral)   Resp 15   Ht 1.88 m (6' 2\")   Wt 75 kg (165 lb 4.8 oz)   SpO2 100%   BMI 21.22 kg/m²          Intake/Output Summary (Last 24 hours) at 1/26/2025 0937  Last data filed at 1/26/2025 0600  Gross per 24 hour   Intake 1375 ml   Output 250 ml   Net 1125 ml       Medications:  Scheduled Meds:   epoetin megan-epbx  10,000 Units SubCUTAneous Weekly    melatonin  5 mg Oral Nightly    insulin glargine  18 Units SubCUTAneous Nightly    amiodarone  200 mg Oral BID    midodrine  20 mg Oral TID WC    insulin lispro  0-16 Units SubCUTAneous Q4H    finasteride  5 mg Oral Daily    sodium chloride flush  5-40 mL IntraVENous 2 times per day    heparin (porcine)  5,000 Units SubCUTAneous 3 times per day    pantoprazole (PROTONIX) 40 mg in sodium chloride (PF) 0.9 % 10 mL injection  40 mg IntraVENous Daily       Labs:   CBC:   Recent Labs     01/24/25  0410 01/25/25  0622 01/26/25  0510   WBC 14.7* 11.1* 10.3   HGB 9.5* 8.8* 8.3*   HCT 30.1* 28.7* 27.1*   MCV 91.8 92.3* 93.1*    301 257     BMP:   Recent Labs      hospital encounter of 01/14/25    XR CHEST PORTABLE    Narrative  EXAMINATION:  ONE XRAY VIEW OF THE CHEST    1/14/2025 5:53 pm    COMPARISON:  Chest single view from 12/31/2024.    HISTORY:  ORDERING SYSTEM PROVIDED HISTORY: fever  TECHNOLOGIST PROVIDED HISTORY:  Reason for exam:->fever  What reading provider will be dictating this exam?->CRC    FINDINGS:  The tracheostomy tube remains in satisfactory position.    There is new mild patchy infiltrate in the right mid and lower lung,  consistent with a new mild right lower lobe pneumonia.  There is no change in  blunting of the right costophrenic angle, consistent with scarring from  previous inflammatory disease.    New minimal patchy infiltrate in the left lung base which could be either  atelectasis or early left lower lobe pneumonia.    The rest of the chest remains clear.    The heart remains top-normal in size.  The mediastinum is normal in  appearance.    The osseous structures are normal in appearance for the patient's age.    Impression  1. New mild patchy infiltrate in the right mid and lower lung, consistent  with a new mild right lower lobe pneumonia.  2. New minimal patchy infiltrate in the left lung base which could be either  atelectasis or early left lower lobe pneumonia.  3. No change in blunting of the right costophrenic angle, consistent with  scarring from previous inflammatory disease.      Assessment/Plan:    Acute on chronic hypoxic respiratory failure-he does continue on support of the ventilator.  He was placed on CPAP again this morning.  He did tolerate this throughout the day yesterday.  He will need ventilator weaning at his next level of care.  He is currently awaiting placement at extended-care facility with vent and hemodialysis capabilities.    Septic shock-his shock has resolved and he is off of vasopressor support at this time.  He does continue to receive midodrine.    DELORES-nephrology does continue to follow.

## 2025-01-26 NOTE — FLOWSHEET NOTE
Am nursing  assessment completed.    Pt eyes open easily, follows w eyes: Alert    Diet: PER PEG/ renal formula bolus and flush  Code Status: LIMITED       Oxygen: CPAP per Trach collar  Complaints of:                         Pain: denies  IV: PICC             patent/ flushed/ capped, no signs of infiltration noted, dressing clean/dry/intact.  TELE:     af 93            Dressings: see chart    TRACH: 8 shiley   PEG: dressing changed. Bolus feedings  RT AVF: hemodialysis yesterday  FMS: liquid brown stool  Prevalon boots/ multiple mepilex/ triad to maurizio area                          Precautions:              Falls:  75      Jeremi: 12  Chart and meds reviewed.           Noted Labs:   Na 134 k 4.2 bun 61 cr1.54 w 10.3 h 8.3/27.1  Plan for today: 2 hr turning and oral care     Bed wheels locked and in lowest position. Call light and bedside table within reach.   NOTES:     Assist w pericare and oral care. Triad cream to sacral wounds. Electronically signed by Katie Smith RN on 1/26/2025 at 1:55 PM    1600 repositioned, oral care. Spouse at bedside. Electronically signed by Katie Smith RN on 1/26/2025 at 5:49 PM  1730 loose stool cleaned around FMS. Repositioned, triad cream to sacral wounds. .esin    1850 incont large urine. Pericare and reposition. Electronically signed by Katie Smith RN on 1/26/2025 at 7:01 PM

## 2025-01-27 PROBLEM — A41.9 SEPSIS DUE TO PNEUMONIA (HCC): Status: ACTIVE | Noted: 2018-03-16

## 2025-01-27 PROBLEM — J96.00 ACUTE RESPIRATORY FAILURE: Status: ACTIVE | Noted: 2025-01-27

## 2025-01-27 PROBLEM — L89.150 DECUBITUS ULCER OF SACRAL REGION, UNSTAGEABLE (HCC): Status: ACTIVE | Noted: 2025-01-27

## 2025-01-27 LAB
ANION GAP SERPL CALCULATED.3IONS-SCNC: 12 MEQ/L (ref 9–15)
BUN SERPL-MCNC: 78 MG/DL (ref 8–23)
CALCIUM SERPL-MCNC: 8.4 MG/DL (ref 8.5–9.9)
CHLORIDE SERPL-SCNC: 92 MEQ/L (ref 95–107)
CO2 SERPL-SCNC: 28 MEQ/L (ref 20–31)
CREAT SERPL-MCNC: 1.88 MG/DL (ref 0.7–1.2)
ERYTHROCYTE [DISTWIDTH] IN BLOOD BY AUTOMATED COUNT: 17.2 % (ref 11.5–14.5)
GLUCOSE BLD-MCNC: 147 MG/DL (ref 70–99)
GLUCOSE BLD-MCNC: 150 MG/DL (ref 70–99)
GLUCOSE BLD-MCNC: 169 MG/DL (ref 70–99)
GLUCOSE BLD-MCNC: 219 MG/DL (ref 70–99)
GLUCOSE BLD-MCNC: 76 MG/DL (ref 70–99)
GLUCOSE SERPL-MCNC: 128 MG/DL (ref 70–99)
HCT VFR BLD AUTO: 26 % (ref 42–52)
HGB BLD-MCNC: 8.3 G/DL (ref 14–18)
MCH RBC QN AUTO: 29.9 PG (ref 27–31.3)
MCHC RBC AUTO-ENTMCNC: 31.9 % (ref 33–37)
MCV RBC AUTO: 93.5 FL (ref 79–92.2)
PERFORMED ON: ABNORMAL
PERFORMED ON: NORMAL
PLATELET # BLD AUTO: 234 K/UL (ref 130–400)
POTASSIUM SERPL-SCNC: 4.1 MEQ/L (ref 3.4–4.9)
RBC # BLD AUTO: 2.78 M/UL (ref 4.7–6.1)
SODIUM SERPL-SCNC: 132 MEQ/L (ref 135–144)
WBC # BLD AUTO: 8.2 K/UL (ref 4.8–10.8)

## 2025-01-27 PROCEDURE — 94660 CPAP INITIATION&MGMT: CPT

## 2025-01-27 PROCEDURE — 85027 COMPLETE CBC AUTOMATED: CPT

## 2025-01-27 PROCEDURE — 6370000000 HC RX 637 (ALT 250 FOR IP): Performed by: INTERNAL MEDICINE

## 2025-01-27 PROCEDURE — 2580000003 HC RX 258: Performed by: INTERNAL MEDICINE

## 2025-01-27 PROCEDURE — 94761 N-INVAS EAR/PLS OXIMETRY MLT: CPT

## 2025-01-27 PROCEDURE — 6370000000 HC RX 637 (ALT 250 FOR IP): Performed by: STUDENT IN AN ORGANIZED HEALTH CARE EDUCATION/TRAINING PROGRAM

## 2025-01-27 PROCEDURE — 2500000003 HC RX 250 WO HCPCS: Performed by: INTERNAL MEDICINE

## 2025-01-27 PROCEDURE — 80048 BASIC METABOLIC PNL TOTAL CA: CPT

## 2025-01-27 PROCEDURE — 99291 CRITICAL CARE FIRST HOUR: CPT | Performed by: INTERNAL MEDICINE

## 2025-01-27 PROCEDURE — 6370000000 HC RX 637 (ALT 250 FOR IP): Performed by: NURSE PRACTITIONER

## 2025-01-27 PROCEDURE — 6360000002 HC RX W HCPCS: Performed by: INTERNAL MEDICINE

## 2025-01-27 PROCEDURE — 94003 VENT MGMT INPAT SUBQ DAY: CPT

## 2025-01-27 PROCEDURE — 99233 SBSQ HOSP IP/OBS HIGH 50: CPT | Performed by: INTERNAL MEDICINE

## 2025-01-27 PROCEDURE — 2700000000 HC OXYGEN THERAPY PER DAY

## 2025-01-27 PROCEDURE — 2000000000 HC ICU R&B

## 2025-01-27 RX ADMIN — MEROPENEM 1000 MG: 1 INJECTION INTRAVENOUS at 13:35

## 2025-01-27 RX ADMIN — MIDODRINE HYDROCHLORIDE 20 MG: 10 TABLET ORAL at 12:18

## 2025-01-27 RX ADMIN — SODIUM CHLORIDE 40 MG: 9 INJECTION INTRAMUSCULAR; INTRAVENOUS; SUBCUTANEOUS at 08:49

## 2025-01-27 RX ADMIN — FINASTERIDE 5 MG: 5 TABLET, FILM COATED ORAL at 08:49

## 2025-01-27 RX ADMIN — AMIODARONE HYDROCHLORIDE 200 MG: 200 TABLET ORAL at 08:48

## 2025-01-27 RX ADMIN — MIDODRINE HYDROCHLORIDE 20 MG: 10 TABLET ORAL at 08:48

## 2025-01-27 RX ADMIN — Medication 5 MG: at 21:50

## 2025-01-27 RX ADMIN — MIDODRINE HYDROCHLORIDE 20 MG: 10 TABLET ORAL at 16:52

## 2025-01-27 RX ADMIN — INSULIN LISPRO 4 UNITS: 100 INJECTION, SOLUTION INTRAVENOUS; SUBCUTANEOUS at 16:51

## 2025-01-27 RX ADMIN — HEPARIN SODIUM 5000 UNITS: 5000 INJECTION INTRAVENOUS; SUBCUTANEOUS at 05:54

## 2025-01-27 RX ADMIN — HEPARIN SODIUM 5000 UNITS: 5000 INJECTION INTRAVENOUS; SUBCUTANEOUS at 21:50

## 2025-01-27 RX ADMIN — HEPARIN SODIUM 5000 UNITS: 5000 INJECTION INTRAVENOUS; SUBCUTANEOUS at 15:38

## 2025-01-27 RX ADMIN — Medication 10 ML: at 08:52

## 2025-01-27 RX ADMIN — AMIODARONE HYDROCHLORIDE 200 MG: 200 TABLET ORAL at 21:50

## 2025-01-27 RX ADMIN — INSULIN GLARGINE 18 UNITS: 100 INJECTION, SOLUTION SUBCUTANEOUS at 21:50

## 2025-01-27 RX ADMIN — Medication 10 ML: at 21:47

## 2025-01-27 ASSESSMENT — PULMONARY FUNCTION TESTS
PIF_VALUE: 21
PIF_VALUE: 18
PIF_VALUE: 19
PIF_VALUE: 21
PIF_VALUE: 21
PIF_VALUE: 30

## 2025-01-27 ASSESSMENT — PAIN SCALES - GENERAL: PAINLEVEL_OUTOF10: 0

## 2025-01-27 NOTE — CARE COORDINATION
would like re-eval with ID and I placed ambulance on will call status and pt to have ID see and decide if he can dc today. I did call wife back to let her know and that we would also let her know if he did get approved to go.

## 2025-01-27 NOTE — PROGRESS NOTES
Infectious Diseases Inpatient Progress Note          HISTORY OF PRESENT ILLNESS:  Follow up septic shock, healthcare acquired pneumonia, acute respiratory failure requiring tracheostomy and PEG tube placement who had high fevers last night after he was done with a course of IV vancomycin and meropenem.  Currently on no antibiotics.  Patient has not had any fevers today  Persist to have copious amount of thick green endotracheal secretions  Has extensive wounds  Positive diarrhea and Flexi-Seal related to tube feeding  Tolerating tube feeds boluses  Decreasing bilateral upper extremity swelling  Positive tongue ulcerations  Patient has history of failed renal transplant.  Currently with end-stage renal disease on hemodialysis through right upper extremity AV fistula  Current Medications:     epoetin megan-epbx  10,000 Units SubCUTAneous Weekly    melatonin  5 mg Oral Nightly    insulin glargine  18 Units SubCUTAneous Nightly    amiodarone  200 mg Oral BID    midodrine  20 mg Oral TID WC    insulin lispro  0-16 Units SubCUTAneous Q4H    finasteride  5 mg Oral Daily    sodium chloride flush  5-40 mL IntraVENous 2 times per day    heparin (porcine)  5,000 Units SubCUTAneous 3 times per day    pantoprazole (PROTONIX) 40 mg in sodium chloride (PF) 0.9 % 10 mL injection  40 mg IntraVENous Daily       Allergies:  Statins      Review of Systems  unable to provide ROS because of acute illness      Physical Exam  Vitals:    01/27/25 1000 01/27/25 1100 01/27/25 1142 01/27/25 1200   BP: (!) 89/38 (!) 106/58  (!) 107/40   Pulse: 74 (!) 102 93 89   Resp: 15 21 20 17   Temp:    98 °F (36.7 °C)   TempSrc:    Oral   SpO2: 100% 100% 100% 100%   Weight:       Height:         General Appearance: alert, follows simple commands appropriately  Intact tracheostomy, on CPAP 30%  Skin: warm and dry, no rash.   Head: normocephalic and atraumatic  Eyes: anicteric sclerae  ENT: Positive large tongue ulcer at the tip, normal mucous membranes. No  oral thrush  Lungs: Assisted ventilation, bilateral scattered rhonchi  Heart normal S1-S2  Abdomen: soft, no tenderness, intact PEG tube  No leg edema  Right leg posterior aspect of right heel with dry black eschars  Sacral decubitus ulcer is large, most, looks more like stage III surrounding skin excoriations  Flexi-Seal with liquid stools  Bilateral upper extremity edema  Intact left upper extremity PICC line  Intact right wrist AV fistula    DATA:    Lab Results   Component Value Date    WBC 8.2 01/27/2025    HGB 8.3 (L) 01/27/2025    HCT 26.0 (L) 01/27/2025    MCV 93.5 (H) 01/27/2025     01/27/2025     Lab Results   Component Value Date    CREATININE 1.88 (H) 01/27/2025    BUN 78 (HH) 01/27/2025     (L) 01/27/2025    K 4.1 01/27/2025    CL 92 (L) 01/27/2025    CO2 28 01/27/2025       Hepatic Function Panel:  Lab Results   Component Value Date/Time    ALKPHOS 127 01/15/2025 06:53 AM    ALT 24 01/15/2025 06:53 AM    AST 23 01/15/2025 06:53 AM    BILITOT 0.8 01/15/2025 06:53 AM    BILIDIR 0.9 12/22/2024 05:19 AM    IBILI 0.3 12/22/2024 05:19 AM         IMPRESSION:    Sepsis with recurrent fevers after IV vancomycin and meropenem were discontinued.   Healthcare acquired pneumonia with acute respiratory failure requiring tracheostomy  Multiple unstageable wounds including sacral decubitus ulcer, right leg and heel  End-stage renal disease on hemodialysis  History of renal transplant        PLAN:  Resume IV meropenem  Recommend blood cultures if recurrent fevers  Consult surgery for sacral decubitus ulcer/ wound care  CRP in a.m.  Vent support    Discussed with patient, spouse, and RN    Peggy Saab MD

## 2025-01-27 NOTE — PROGRESS NOTES
01/26/2025     Lab Results   Component Value Date/Time     01/26/2025 05:10 AM    K 4.2 01/26/2025 05:10 AM    CL 93 01/26/2025 05:10 AM    CO2 29 01/26/2025 05:10 AM    BUN 61 01/26/2025 05:10 AM    CREATININE 1.54 01/26/2025 05:10 AM    GLUCOSE 166 01/26/2025 05:10 AM    GLUCOSE 156 09/26/2023 12:51 PM    CALCIUM 8.5 01/26/2025 05:10 AM    LABGLOM 47.3 01/26/2025 05:10 AM    LABGLOM 20 10/01/2024 04:41 PM                ASSESSMENT:  PLAN:    Possible right lower lobe pneumonia  Nosocomial pneumonia  Sepsis     vancomycin and meropenem completed      ID will sign off

## 2025-01-27 NOTE — DISCHARGE INSTR - COC
Continuity of Care Form    Patient Name: Remy Upton   :  1950  MRN:  [id number]    Admit date:  2025  Discharge date:  25    Code Status Order: Limited   Advance Directives:   Advance Care Flowsheet Documentation             Admitting Physician:  No admitting provider for patient encounter.  PCP: Tico Rodriguez DO    Discharging Nurse: Ti Richards  Discharging Hospital Unit/Room#: IC08/IC08-01  Discharging Unit Phone Number: [phone]    Emergency Contact:   Extended Emergency Contact Information  Primary Emergency Contact: Simona Upton   [facility]  Home Phone: [phone]  Relation: Spouse  Secondary Emergency Contact: Maria Luisa Upton  Home Phone: [phone]  Relation: Child    Past Surgical History:  Past Surgical History:   Procedure Laterality Date    BRAIN SURGERY Left 1990    to eliminate seizures    COLONOSCOPY N/A 10/24/2022    COLONOSCOPY DIAGNOSTIC performed by Va Ordoñez MD at [facility]    KIDNEY TRANSPLANT      [facility] INCL FLUOR GDNCE DX W/CELL WASHG SPX N/A 2018    BRONCHOSCOPY performed by Cristopher Conde MD at [facility]    TRACHEOSTOMY  2024    UPPER GASTROINTESTINAL ENDOSCOPY N/A 10/21/2022    EGD DIAGNOSTIC ONLY performed by Jaime Martinez MD at [facility]    UPPER GASTROINTESTINAL ENDOSCOPY N/A 2025    Esophagogastroduodenoscopy with percutaneous endoscopic gastrostomy tube/ICU bedside/anesthesia not required  ROOM ICU:1 performed by Syed Juárez MD at [facility] OR    UPPER GASTROINTESTINAL ENDOSCOPY N/A 1/15/2025    Esophagogastroduodenoscopy with replacement of percutaneous endoscopic gastrostomy tube performed by Syed Juárez MD at [facility] OR       Immunization History:   Immunization History   Administered Date(s) Administered    Influenza Virus Vaccine 2003, 2010    Influenza, FLUZONE High Dose (age 65 y+), IM, Quadv, 0.7mL 10/07/2020    Influenza, FLUZONE High Dose,  01/26/25 0400   Number of days: 37       Wound 12/21/24 Ankle Left purple, nonblanchable (Active)   Wound Etiology Deep tissue/Injury 01/26/25 0400   Dressing Status Clean;Dry;Intact 01/27/25 0400   Wound Cleansed Soap and water 01/27/25 0400   Dressing/Treatment Open to air 01/27/25 0400   Offloading for Diabetic Foot Ulcers Offloading ordered 01/27/25 0400   Dressing Change Due 01/11/25 01/10/25 1600   Wound Assessment Purple/maroon 01/26/25 0400   Drainage Amount None (dry) 01/26/25 0400   Odor None 01/26/25 0400   Herlinda-wound Assessment Intact 01/26/25 0400   Margins Defined edges 01/26/25 0400   Number of days: 37       Wound 12/21/24 Ankle Right;Medial (Active)   Wound Image   12/23/24 0950   Wound Etiology Pressure Unstageable 01/26/25 0400   Dressing Status Clean;Dry;Intact 01/27/25 0400   Wound Cleansed Soap and water 01/27/25 0400   Dressing/Treatment Betadine swabs/povidone iodine 01/27/25 0400   Offloading for Diabetic Foot Ulcers Offloading ordered 01/27/25 0400   Dressing Change Due 01/20/25 01/20/25 1600   Wound Length (cm) 4.4 cm 12/23/24 0950   Wound Width (cm) 3 cm 12/23/24 0950   Wound Depth (cm) 0 cm 12/23/24 0950   Wound Surface Area (cm^2) 13.2 cm^2 12/23/24 0950   Wound Volume (cm^3) 0 cm^3 12/23/24 0950   Wound Assessment Eschar dry 01/26/25 0400   Drainage Amount None (dry) 01/26/25 0400   Odor None 01/26/25 0400   Herlinda-wound Assessment Dry/flaky 01/26/25 0400   Margins Defined edges 01/26/25 0400   Number of days: 37       Wound 12/21/24 Leg Distal;Right;Medial (Active)   Wound Image   12/23/24 0950   Wound Etiology Deep tissue/Injury 01/26/25 0400   Dressing Status Dry;Intact;Clean 01/27/25 0400   Wound Cleansed Soap and water 01/27/25 0400   Dressing/Treatment Open to air 01/27/25 0400   Offloading for Diabetic Foot Ulcers Offloading ordered 01/27/25 0400   Dressing Change Due 01/11/25 01/10/25 0400   Wound Length (cm) 11 cm 12/23/24 0950   Wound Width (cm) 4 cm 12/23/24 0950   Wound Depth

## 2025-01-27 NOTE — INTERDISCIPLINARY ROUNDS
Spiritual Care Services     Summary of Visit:    Attended ICU Rounds. Patient continues to make progress, alert, awake, and remains stable at this time. No family present, steven tradition is Confucianist.     Encounter Summary  Encounter Overview/Reason: Interdisciplinary rounds  Service Provided For: Patient  Support System: Spouse  Complexity of Encounter: Low  Begin Time: 1200  End Time : 1215  Total Time Calculated: 15 min     Crisis  Type: Rapid Response  Spiritual/Emotional needs  Type: Spiritual Support              Palliative Care  Type: Palliative Care, Interdisciplinary Rounds       Spiritual Assessment/Intervention/Outcomes:    Assessment: Calm, Peaceful, Hopeful, Coping (Finishing Dialysis)    Intervention: Nurtured Hope, Discussed belief system/Evangelical practices/steven, Prayer (assurance of)/[place]    Outcome: Receptive, Comfort      Care Plan:    Plan and Referrals  Plan/Referrals: Continue Support (comment)          Spiritual Care Services   Electronically signed by Chaplain Camilo on 1/27/2025 at 1:07 PM.    To reach a  for emotional and spiritual support, place an EPIC consult request.   If a  is needed immediately, dial “0” and ask to page the on-call .

## 2025-01-27 NOTE — CARE COORDINATION
ICU rounds done this am and no family present. I spoke to Jessica from [facility] and she is calling insurance to update and will hopefully get auth today and will call back to let us know.

## 2025-01-27 NOTE — CARE COORDINATION
I received call from Jessica with [employer] and she asked if we can send patient sooner rather than later due to the late time last time he went. I called [facility] and set him for 1330. I called wife to inform of second notice IMM and let her know of  and she verbalizes understanding.

## 2025-01-27 NOTE — PROGRESS NOTES
Pulmonary & Critical Care Medicine ICU Progress Note  Chief complaint : Respiratory failure    Subjunctive/24 hour events :   Patient seen and examined during multidisciplinary rounds with RN, charge nurse, RT, pharmacy, dietitian, and social service.     On vent, status post tracheostomy, no issues,  No fever, he is on 30% FiO2 saturation 100%    New information updated in the note today, rest of the examination did not change compared to yesterday.  Social History     Tobacco Use    Smoking status: Former     Current packs/day: 0.00     Types: Cigarettes     Quit date: 2015     Years since quittin.6    Smokeless tobacco: Former   Substance Use Topics    Alcohol use: No     Alcohol/week: 0.0 standard drinks of alcohol         Problem Relation Age of Onset    Colon Cancer Neg Hx        No results for input(s): \"PHART\", \"TRT3UWC\", \"PO2ART\" in the last 72 hours.    MV Settings:  Vent Mode: AC/VC Resp Rate (Set): 14 bpm/Vt (Set, mL): 500 mL/ /FiO2 : 30 %           IV:   sodium chloride      dextrose         Vitals:  BP (!) 114/40   Pulse 77   Temp 99 °F (37.2 °C) (Oral)   Resp 15   Ht 1.88 m (6' 2\")   Wt 75.1 kg (165 lb 8 oz)   SpO2 100%   BMI 21.25 kg/m²    Tmax:        Intake/Output Summary (Last 24 hours) at 2025 1107  Last data filed at 2025 0000  Gross per 24 hour   Intake 850 ml   Output --   Net 850 ml       EXAM:  General: alert, cooperative, no distress  Head: normocephalic, atraumatic  Eyes:No gross abnormalities.  ENT:  MMM no lesions  Neck:  supple and no masses  Chest : clear to auscultation bilaterally- no wheezes, rales or rhonchi, normal air movement, no respiratory distress  Heart:: Heart sounds are normal.  Regular rate and rhythm without murmur, gallop or rub.  ABD:  symmetric, soft, non-tender, no guarding or rebound  Musculoskeletal : no cyanosis, no clubbing, and no edema  Neuro: No acute changes, weak  Skin: No rashes or nodules noted.  Lymph node:  no cervical

## 2025-01-27 NOTE — FLOWSHEET NOTE
Shift summary    0715 report at bedside. Vss. Pt resting, awakens to name, follows commands weakly, nods head yes and no appropriately. Denies pain.     0900 Am assessment complete see flowsheet. Meds per MAR.     0930 ICU rounds done. Awaiting approval from insurance to go to [facility].     1030 flexiseal leaked- linen changed, triad cream to maurizio area.     1230 pt noted to have had a fever last night- ID consult obtained. Per Dr. Saab, pt to stay here 24 hours to monitor for recurrence of fever. Pt wife at bedside, updated. Tolerated TF bolus.     1400 pt requires frequent suctioning via trach and mouth, requires frequent oral care. Electronically signed by Elizabeth Martínez RN on 1/27/2025 at 3:11 PM    1800 repositioned throughout shift. Vss. Electronically signed by Elizabeth Martínez RN on 1/27/2025 at 6:25 PM

## 2025-01-27 NOTE — PROGRESS NOTES
Nephrology Progress Note    Assessment:  DELORES stage 5  Hx kidney transplant S/P PEG-TRACH with hospital stay  DM type-2  Sever muscle wasting  Anemia stable      Plan:dialysis tomorrow  on proamitine  nutrition big problems     Patient Active Problem List:     Nosocomial pneumonia     Abdominal pain, other specified site     Acute pyelonephritis without lesion of renal medullary necrosis     Anemia     Essential hypertension     Nonspecific abnormal results of thyroid function study     Mixed hyperlipidemia     Kidney replaced by transplant     Intra-abdominal abscess post-procedure (HCC)     Infection due to Enterococcus     Fever and other physiologic disturbances of temperature regulation     Chronic kidney disease (CKD)     Type 2 diabetes mellitus with stage 3b chronic kidney disease, without long-term current use of insulin (HCC)     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 (HCC)     Abnormal CT of the chest     Bronchiectasis without complication (HCC)     GI bleeding     Complication of transplanted kidney     Muscle weakness (generalized)     Difficulty in walking     Elevated BUN     Urinary retention     Immunosuppression due to drug therapy (HCC)     DELORES (acute kidney injury) (HCC)     Adjustment disorder     Gross hematuria     Bilateral lower extremity edema     Dysuria     Acute cystitis without hematuria     Bilateral hearing loss     Abscess, toe     Acute hyperkalemia     Acute pain of left shoulder     Astigmatism     Astigmatism of both eyes with presbyopia     At risk for stroke     Benign enlargement of prostate     Benign prostatic hyperplasia

## 2025-01-27 NOTE — CARE COORDINATION
I did check with Jessica if [id number] is needed and she states no because pt has not been dc to community since last one.

## 2025-01-27 NOTE — PROGRESS NOTES
Hospitalist Progress Note      PCP: Tico Rodriguez DO    Date of Admission: 1/14/2025    Chief Complaint:  is tired; nodding his head but appears tired    Medications:  Reviewed    Infusion Medications    sodium chloride      dextrose       Scheduled Medications    epoetin megan-epbx  10,000 Units SubCUTAneous Weekly    melatonin  5 mg Oral Nightly    insulin glargine  18 Units SubCUTAneous Nightly    amiodarone  200 mg Oral BID    midodrine  20 mg Oral TID WC    insulin lispro  0-16 Units SubCUTAneous Q4H    finasteride  5 mg Oral Daily    sodium chloride flush  5-40 mL IntraVENous 2 times per day    heparin (porcine)  5,000 Units SubCUTAneous 3 times per day    pantoprazole (PROTONIX) 40 mg in sodium chloride (PF) 0.9 % 10 mL injection  40 mg IntraVENous Daily     PRN Meds: metoprolol, oxyCODONE-acetaminophen **OR** oxyCODONE-acetaminophen, sodium chloride flush, sodium chloride, acetaminophen **OR** acetaminophen, glucose, dextrose bolus **OR** dextrose bolus, glucagon (rDNA), dextrose      Intake/Output Summary (Last 24 hours) at 1/27/2025 1042  Last data filed at 1/27/2025 0000  Gross per 24 hour   Intake 850 ml   Output --   Net 850 ml       Exam:    BP (!) 114/40   Pulse 77   Temp 99 °F (37.2 °C) (Oral)   Resp 15   Ht 1.88 m (6' 2\")   Wt 75.1 kg (165 lb 8 oz)   SpO2 100%   BMI 21.25 kg/m²     General appearance: awake, intubated via trach  Lungs: coarse sounds bilaterally  Heart: S1/S2, irregular  Abdomen: soft  Extremities: no edema    Labs:   Recent Labs     01/25/25  0622 01/26/25  0510 01/27/25  0439   WBC 11.1* 10.3 8.2   HGB 8.8* 8.3* 8.3*   HCT 28.7* 27.1* 26.0*    257 234     Recent Labs     01/25/25  0622 01/26/25  0510 01/27/25  0439    134* 132*   K 4.4 4.2 4.1   CL 95 93* 92*   CO2 29 29 28   BUN 79* 61* 78*   CREATININE 1.78* 1.54* 1.88*   CALCIUM 8.8 8.5 8.4*     No results for input(s): \"AST\", \"ALT\", \"BILIDIR\", \"BILITOT\", \"ALKPHOS\" in the last 72 hours.    No results for  with history of HTN, PAF off OAC s/p Watchman, DM2, ESRD/CKD4 s/p kidney transplant x 2, BPH / urine retention, anemia due to lower GI bleed requiring PRBC transfusion, depression, who ws managed at [facility] from 12/18 -1/13 for DELORES/CKD4 requiring HD, acute hypoxic respiratory failure related to rhinovirus infection, pneumonia with possible aspiration, requiring prolonged intubation and mechanical ventilation s/p tracheostomy on 12/30, anemia requiring PRBC transfusion, who presented with:     Fevers, hypotension and AF with RVR  - due to septic shock, bibasilar pneumonia  - requiring Norepinephrine infusion/Midodrine, currently off  - CXR showed RLL infiltrate per report  - respiratory panel was negative, u/a not strongly suggestive of UTI  - blood cultures no growth  - Febrile last night; ID is on board  - IV Meropenem/Vancomycin (EOT: 1/24)  - followed by critical care/pulmonology and ID     AF with RVR  - requiring Amiodarone infusion and Digoxin  - switched to PO Amiodarone  - no OAC due to hx of GI bleed s/p Watchman  - followed by cardiology     Anemia   - Hb down to 6.4, no overt bleeding  - improved s/p transfusion of 1 unit of PRBCs  - monitor H/H closely     Acute hypoxic respiratory failure   - s/p tracheostomy on 12/30  - critical care service following     ESRD on IHD  - followed by nephrology     DM2 with hyperglycemia - in setting of IV steroid use   - on ISS, hypoglycemia protocol  - monitor glucose closely  -continue lantus      BPH with urine retention  - resumed home meds       Diet: ADULT TUBE FEEDING; PEG; Renal Formula; Bolus; 4 Times Daily; 325; Gravity; 50; Before and after each bolus  - PEG tube replaced by surgical service   - resumed TFs    Code Status: Limited, followed by palliative care      Disposition - LTAC still denied by insurance after completing P2P. Denied appeal as well. Precert to [facility] pending          Electronically signed by Demarcus Higginbotham MD on 1/27/2025 at

## 2025-01-27 NOTE — PROGRESS NOTES
Updates received from Jenny FRY, skin assessed, patient repositioned. Shift assessments completed, patient able to follow commands but very weakly. Bed bath and complete linen change done, tolerated well. Bolus feedings done at 0000 and 0600, tolerated well. Report given to oncoming RN.

## 2025-01-28 LAB
ANION GAP SERPL CALCULATED.3IONS-SCNC: 13 MEQ/L (ref 9–15)
B PARAP IS1001 DNA NPH QL NAA+NON-PROBE: NOT DETECTED
B PERT.PT PRMT NPH QL NAA+NON-PROBE: NOT DETECTED
BUN SERPL-MCNC: 89 MG/DL (ref 8–23)
C PNEUM DNA NPH QL NAA+NON-PROBE: NOT DETECTED
CALCIUM SERPL-MCNC: 8.5 MG/DL (ref 8.5–9.9)
CHLORIDE SERPL-SCNC: 93 MEQ/L (ref 95–107)
CO2 SERPL-SCNC: 27 MEQ/L (ref 20–31)
CREAT SERPL-MCNC: 2.13 MG/DL (ref 0.7–1.2)
CRP SERPL HS-MCNC: 86.1 MG/L (ref 0–5)
ERYTHROCYTE [DISTWIDTH] IN BLOOD BY AUTOMATED COUNT: 17.2 % (ref 11.5–14.5)
FLUAV RNA NPH QL NAA+NON-PROBE: NOT DETECTED
FLUBV RNA NPH QL NAA+NON-PROBE: NOT DETECTED
GLUCOSE BLD-MCNC: 109 MG/DL (ref 70–99)
GLUCOSE BLD-MCNC: 130 MG/DL (ref 70–99)
GLUCOSE BLD-MCNC: 154 MG/DL (ref 70–99)
GLUCOSE BLD-MCNC: 157 MG/DL (ref 70–99)
GLUCOSE BLD-MCNC: 158 MG/DL (ref 70–99)
GLUCOSE SERPL-MCNC: 168 MG/DL (ref 70–99)
HADV DNA NPH QL NAA+NON-PROBE: NOT DETECTED
HCOV 229E RNA NPH QL NAA+NON-PROBE: NOT DETECTED
HCOV HKU1 RNA NPH QL NAA+NON-PROBE: NOT DETECTED
HCOV NL63 RNA NPH QL NAA+NON-PROBE: NOT DETECTED
HCOV OC43 RNA NPH QL NAA+NON-PROBE: NOT DETECTED
HCT VFR BLD AUTO: 24.7 % (ref 42–52)
HGB BLD-MCNC: 7.9 G/DL (ref 14–18)
HMPV RNA NPH QL NAA+NON-PROBE: NOT DETECTED
HPIV1 RNA NPH QL NAA+NON-PROBE: NOT DETECTED
HPIV2 RNA NPH QL NAA+NON-PROBE: NOT DETECTED
HPIV3 RNA NPH QL NAA+NON-PROBE: NOT DETECTED
HPIV4 RNA NPH QL NAA+NON-PROBE: NOT DETECTED
M PNEUMO DNA NPH QL NAA+NON-PROBE: NOT DETECTED
MCH RBC QN AUTO: 29.7 PG (ref 27–31.3)
MCHC RBC AUTO-ENTMCNC: 32 % (ref 33–37)
MCV RBC AUTO: 92.9 FL (ref 79–92.2)
PERFORMED ON: ABNORMAL
PLATELET # BLD AUTO: 250 K/UL (ref 130–400)
POTASSIUM SERPL-SCNC: 4.4 MEQ/L (ref 3.4–4.9)
RBC # BLD AUTO: 2.66 M/UL (ref 4.7–6.1)
RSV RNA NPH QL NAA+NON-PROBE: NOT DETECTED
RV+EV RNA NPH QL NAA+NON-PROBE: NOT DETECTED
SARS-COV-2 RNA NPH QL NAA+NON-PROBE: NOT DETECTED
SODIUM SERPL-SCNC: 133 MEQ/L (ref 135–144)
WBC # BLD AUTO: 7.9 K/UL (ref 4.8–10.8)

## 2025-01-28 PROCEDURE — 94003 VENT MGMT INPAT SUBQ DAY: CPT

## 2025-01-28 PROCEDURE — 6370000000 HC RX 637 (ALT 250 FOR IP): Performed by: INTERNAL MEDICINE

## 2025-01-28 PROCEDURE — 6370000000 HC RX 637 (ALT 250 FOR IP): Performed by: STUDENT IN AN ORGANIZED HEALTH CARE EDUCATION/TRAINING PROGRAM

## 2025-01-28 PROCEDURE — 99291 CRITICAL CARE FIRST HOUR: CPT | Performed by: INTERNAL MEDICINE

## 2025-01-28 PROCEDURE — 6370000000 HC RX 637 (ALT 250 FOR IP): Performed by: NURSE PRACTITIONER

## 2025-01-28 PROCEDURE — 94660 CPAP INITIATION&MGMT: CPT

## 2025-01-28 PROCEDURE — 6360000002 HC RX W HCPCS: Performed by: INTERNAL MEDICINE

## 2025-01-28 PROCEDURE — 2700000000 HC OXYGEN THERAPY PER DAY

## 2025-01-28 PROCEDURE — 86140 C-REACTIVE PROTEIN: CPT

## 2025-01-28 PROCEDURE — 99232 SBSQ HOSP IP/OBS MODERATE 35: CPT | Performed by: INTERNAL MEDICINE

## 2025-01-28 PROCEDURE — 2000000000 HC ICU R&B

## 2025-01-28 PROCEDURE — 2580000003 HC RX 258: Performed by: INTERNAL MEDICINE

## 2025-01-28 PROCEDURE — 80048 BASIC METABOLIC PNL TOTAL CA: CPT

## 2025-01-28 PROCEDURE — 0202U NFCT DS 22 TRGT SARS-COV-2: CPT

## 2025-01-28 PROCEDURE — 2500000003 HC RX 250 WO HCPCS: Performed by: INTERNAL MEDICINE

## 2025-01-28 RX ORDER — POLYETHYLENE GLYCOL 3350 17 G/17G
17 POWDER, FOR SOLUTION ORAL DAILY
Status: DISCONTINUED | OUTPATIENT
Start: 2025-01-28 | End: 2025-01-29 | Stop reason: HOSPADM

## 2025-01-28 RX ORDER — SODIUM CHLORIDE 9 MG/ML
INJECTION, SOLUTION INTRAVENOUS
Status: DISPENSED
Start: 2025-01-28 | End: 2025-01-28

## 2025-01-28 RX ADMIN — Medication 5 MG: at 21:18

## 2025-01-28 RX ADMIN — SODIUM CHLORIDE 40 MG: 9 INJECTION INTRAMUSCULAR; INTRAVENOUS; SUBCUTANEOUS at 07:53

## 2025-01-28 RX ADMIN — Medication 10 ML: at 21:19

## 2025-01-28 RX ADMIN — AMIODARONE HYDROCHLORIDE 200 MG: 200 TABLET ORAL at 07:53

## 2025-01-28 RX ADMIN — POLYETHYLENE GLYCOL 3350 17 G: 17 POWDER, FOR SOLUTION ORAL at 11:11

## 2025-01-28 RX ADMIN — HEPARIN SODIUM 5000 UNITS: 5000 INJECTION INTRAVENOUS; SUBCUTANEOUS at 15:48

## 2025-01-28 RX ADMIN — MEROPENEM 1000 MG: 1 INJECTION INTRAVENOUS at 15:49

## 2025-01-28 RX ADMIN — MIDODRINE HYDROCHLORIDE 20 MG: 10 TABLET ORAL at 15:48

## 2025-01-28 RX ADMIN — HEPARIN SODIUM 5000 UNITS: 5000 INJECTION INTRAVENOUS; SUBCUTANEOUS at 06:20

## 2025-01-28 RX ADMIN — Medication 10 ML: at 07:53

## 2025-01-28 RX ADMIN — FINASTERIDE 5 MG: 5 TABLET, FILM COATED ORAL at 07:53

## 2025-01-28 RX ADMIN — MIDODRINE HYDROCHLORIDE 20 MG: 10 TABLET ORAL at 11:11

## 2025-01-28 RX ADMIN — HEPARIN SODIUM 5000 UNITS: 5000 INJECTION INTRAVENOUS; SUBCUTANEOUS at 22:05

## 2025-01-28 RX ADMIN — MIDODRINE HYDROCHLORIDE 20 MG: 10 TABLET ORAL at 07:53

## 2025-01-28 RX ADMIN — AMIODARONE HYDROCHLORIDE 200 MG: 200 TABLET ORAL at 21:18

## 2025-01-28 RX ADMIN — INSULIN GLARGINE 18 UNITS: 100 INJECTION, SOLUTION SUBCUTANEOUS at 21:18

## 2025-01-28 RX ADMIN — DOCUSATE SODIUM 100 MG: 50 LIQUID ORAL at 21:18

## 2025-01-28 RX ADMIN — DOCUSATE SODIUM 100 MG: 50 LIQUID ORAL at 11:11

## 2025-01-28 ASSESSMENT — PAIN SCALES - GENERAL
PAINLEVEL_OUTOF10: 0

## 2025-01-28 ASSESSMENT — PULMONARY FUNCTION TESTS
PIF_VALUE: 19
PIF_VALUE: 22
PIF_VALUE: 20

## 2025-01-28 NOTE — PROGRESS NOTES
Nephrology Progress Note    Assessment:  CKD-5 IHD  CAF  DM type-2  S/P PEG-TRACH    Nutrition poor      Plan: dialysis needs prn  poor urine out-puts  reviewed labs     Patient Active Problem List:     Nosocomial pneumonia     Abdominal pain, other specified site     Acute pyelonephritis without lesion of renal medullary necrosis     Anemia     Essential hypertension     Nonspecific abnormal results of thyroid function study     Mixed hyperlipidemia     Kidney replaced by transplant     Intra-abdominal abscess post-procedure (HCC)     Infection due to Enterococcus     Fever and other physiologic disturbances of temperature regulation     Chronic kidney disease (CKD)     Type 2 diabetes mellitus with stage 3b chronic kidney disease, without long-term current use of insulin (HCC)     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 (HCC)     Abnormal CT of the chest     Bronchiectasis without complication (HCC)     GI bleeding     Complication of transplanted kidney     Muscle weakness (generalized)     Difficulty in walking     Elevated BUN     Urinary retention     Immunosuppression due to drug therapy (HCC)     DELORES (acute kidney injury) (HCC)     Adjustment disorder     Gross hematuria     Bilateral lower extremity edema     Dysuria     Acute cystitis without hematuria     Bilateral hearing loss     Abscess, toe     Acute hyperkalemia     Acute pain of left shoulder     Astigmatism     Astigmatism of both eyes with presbyopia     At risk for stroke     Benign enlargement of prostate     Benign prostatic hyperplasia with urinary frequency     Rhinovirus     Chronic right    Recent Labs     01/26/25  0510 01/27/25  0439   WBC 10.3 8.2   HGB 8.3* 8.3*    234     CMP:    Recent Labs     01/26/25  0510 01/27/25  0439 01/28/25  0450   * 132* 133*   K 4.2 4.1 4.4   CL 93* 92* 93*   CO2 29 28 27   BUN 61* 78* 89*   CREATININE 1.54* 1.88* 2.13*   GLUCOSE 166* 128* 168*   CALCIUM 8.5 8.4* 8.5   LABGLOM 47.3* 37.2* 32.1*     Troponin: No results for input(s): \"TROPONINI\" in the last 72 hours.  BNP: No results for input(s): \"BNP\" in the last 72 hours.  INR: No results for input(s): \"INR\" in the last 72 hours.  Lipids: No results for input(s): \"CHOL\", \"LDLDIRECT\", \"TRIG\", \"HDL\", \"AMYLASE\", \"LIPASE\" in the last 72 hours.  Liver: No results for input(s): \"AST\", \"ALT\", \"ALKPHOS\", \"LABALBU\", \"BILITOT\" in the last 72 hours.    Invalid input(s): \"PROT\", \"BILDIR\"  Iron:  No results for input(s): \"FERRITIN\" in the last 72 hours.    Invalid input(s): \"IRONS\", \"LABIRONS\"  Urinalysis: No results for input(s): \"UA\" in the last 72 hours.    Objective:  Vitals: BP (!) 108/28   Pulse 86   Temp 97.7 °F (36.5 °C) (Axillary)   Resp 22   Ht 1.88 m (6' 2\")   Wt 75.1 kg (165 lb 8 oz)   SpO2 100%   BMI 21.25 kg/m²    Wt Readings from Last 3 Encounters:   01/27/25 75.1 kg (165 lb 8 oz)   01/13/25 80 kg (176 lb 5.9 oz)   11/04/24 75.9 kg (167 lb 6.4 oz)      24HR INTAKE/OUTPUT:    Intake/Output Summary (Last 24 hours) at 1/28/2025 1132  Last data filed at 1/28/2025 0400  Gross per 24 hour   Intake 1489.04 ml   Output 150 ml   Net 1339.04 ml       General: alert, in no apparent distress  HEENT: normocephalic, atraumatic, anicteric  Neck: supple, no mass TRACH  Lungs: non-labored respirations, clear to auscultation bilaterally  Heart: irregular rate and rhythm, no murmurs or rubs  Abdomen: soft, non-tender, non-distended PEG  Ext: no cyanosis, no peripheral edema muscle wasting  Neuro: alert and oriented, no gross abnormalities  Psych: normal mood and affect  Skin: no rash      Electronically signed

## 2025-01-28 NOTE — CARE COORDINATION
Met at length with patients brother Juan to answer all questions re:dc plan and options. I let him know how I called all facilities that take ventilator patients and that can do Hemodialysis and how [facility] was only facility with a bed and that could take patient. I gave him my number and let him know patients daughters can call me anytime with any questions or concerns re: his dc plan. I let him know pt is not cleared yet at this time for SNF due to consults with surg and ID and that we will let wife and family know when pt will be going to next level of care at [facility].

## 2025-01-28 NOTE — PLAN OF CARE
Problem: Chronic Conditions and Co-morbidities  Goal: Patient's chronic conditions and co-morbidity symptoms are monitored and maintained or improved  Outcome: Progressing  Flowsheets (Taken 1/27/2025 2000)  Care Plan - Patient's Chronic Conditions and Co-Morbidity Symptoms are Monitored and Maintained or Improved: Monitor and assess patient's chronic conditions and comorbid symptoms for stability, deterioration, or improvement     Problem: Discharge Planning  Goal: Discharge to home or other facility with appropriate resources  Outcome: Progressing  Flowsheets (Taken 1/27/2025 2000)  Discharge to home or other facility with appropriate resources: Identify barriers to discharge with patient and caregiver     Problem: Pain  Goal: Verbalizes/displays adequate comfort level or baseline comfort level  Outcome: Progressing     Problem: Safety - Adult  Goal: Free from fall injury  Outcome: Progressing     Problem: Skin/Tissue Integrity  Goal: Absence of new skin breakdown  Description: 1.  Monitor for areas of redness and/or skin breakdown  2.  Assess vascular access sites hourly  3.  Every 4-6 hours minimum:  Change oxygen saturation probe site  4.  Every 4-6 hours:  If on nasal continuous positive airway pressure, respiratory therapy assess nares and determine need for appliance change or resting period.  Outcome: Progressing     Problem: ABCDS Injury Assessment  Goal: Absence of physical injury  Outcome: Progressing     Problem: Nutrition Deficit:  Goal: Optimize nutritional status  Outcome: Progressing     Problem: Neurosensory - Adult  Goal: Achieves stable or improved neurological status  Outcome: Progressing  Flowsheets (Taken 1/27/2025 2000)  Achieves stable or improved neurological status: Assess for and report changes in neurological status     Problem: Respiratory - Adult  Goal: Achieves optimal ventilation and oxygenation  Outcome: Progressing  Flowsheets (Taken 1/27/2025 2000)  Achieves optimal ventilation

## 2025-01-28 NOTE — PROGRESS NOTES
Hospitalist Progress Note      PCP: Tico Rodriguez DO    Date of Admission: 1/14/2025    Chief Complaint:  NAD; denies complaints    Medications:  Reviewed    Infusion Medications    sodium chloride      dextrose       Scheduled Medications    meropenem  1,000 mg IntraVENous Q24H    epoetin megan-epbx  10,000 Units SubCUTAneous Weekly    melatonin  5 mg Oral Nightly    insulin glargine  18 Units SubCUTAneous Nightly    amiodarone  200 mg Oral BID    midodrine  20 mg Oral TID WC    insulin lispro  0-16 Units SubCUTAneous Q4H    finasteride  5 mg Oral Daily    sodium chloride flush  5-40 mL IntraVENous 2 times per day    heparin (porcine)  5,000 Units SubCUTAneous 3 times per day    pantoprazole (PROTONIX) 40 mg in sodium chloride (PF) 0.9 % 10 mL injection  40 mg IntraVENous Daily     PRN Meds: metoprolol, oxyCODONE-acetaminophen **OR** oxyCODONE-acetaminophen, sodium chloride flush, sodium chloride, acetaminophen **OR** acetaminophen, glucose, dextrose bolus **OR** dextrose bolus, glucagon (rDNA), dextrose      Intake/Output Summary (Last 24 hours) at 1/28/2025 0933  Last data filed at 1/28/2025 0400  Gross per 24 hour   Intake 1489.04 ml   Output 150 ml   Net 1339.04 ml       Exam:    BP (!) 108/28   Pulse 86   Temp 97.7 °F (36.5 °C) (Axillary)   Resp 22   Ht 1.88 m (6' 2\")   Wt 75.1 kg (165 lb 8 oz)   SpO2 100%   BMI 21.25 kg/m²     General appearance: awake, intubated via trach  Lungs: coarse sounds bilaterally  Heart: S1/S2, irregular  Abdomen: soft  Extremities: no edema    Labs:   Recent Labs     01/26/25  0510 01/27/25  0439   WBC 10.3 8.2   HGB 8.3* 8.3*   HCT 27.1* 26.0*    234     Recent Labs     01/26/25  0510 01/27/25  0439 01/28/25  0450   * 132* 133*   K 4.2 4.1 4.4   CL 93* 92* 93*   CO2 29 28 27   BUN 61* 78* 89*   CREATININE 1.54* 1.88* 2.13*   CALCIUM 8.5 8.4* 8.5     No results for input(s): \"AST\", \"ALT\", \"BILIDIR\", \"BILITOT\", \"ALKPHOS\" in the last 72 hours.    No results for  input(s): \"INR\" in the last 72 hours.  No results for input(s): \"CKTOTAL\", \"TROPONINI\" in the last 72 hours.    Urinalysis:      Lab Results   Component Value Date/Time    NITRU Negative 01/17/2025 07:40 PM    WBCUA 10-20 01/17/2025 07:40 PM    WBCUA 13 08/11/2023 09:49 AM    BACTERIA Negative 01/17/2025 07:40 PM    RBCUA 0-2 01/17/2025 07:40 PM    RBCUA 2 08/11/2023 09:49 AM    BLOODU SMALL 01/17/2025 07:40 PM    SPECGRAV 1.015 08/17/2021 11:00 AM    GLUCOSEU 100 01/17/2025 07:40 PM    GLUCOSEU GT 1 09/14/2011 08:57 AM       Radiology:  CT ABDOMEN PELVIS WO CONTRAST Additional Contrast? None   Final Result   1. New mild right greater than left pleural effusions with new moderate   nodular consolidation of the posterior right lower lobe consistent with   pneumonia or atelectasis.   2. New mild compressive atelectasis of the posterior left lower lobe adjacent   to the effusion.   3. Stable appearance of a functioning left pelvic transplant kidney. Stable   appearance of a moderately atrophic right renal transplant kidney.   4. Stable prominent atrophy of the native kidneys consistent with chronic   renal failure.   5. Stable 1.2 cm indeterminate density nodule arising from the posterior   interpolar right kidney, most likely a high density cyst since it has not   changed in size during the interval.   6. Tiny amount of free fluid in the pelvis, nonspecific.   7. New moderate irregular thickening of the urinary bladder wall, suggesting   cystitis.         XR CHEST PORTABLE   Final Result   1. New mild patchy infiltrate in the right mid and lower lung, consistent   with a new mild right lower lobe pneumonia.   2. New minimal patchy infiltrate in the left lung base which could be either   atelectasis or early left lower lobe pneumonia.   3. No change in blunting of the right costophrenic angle, consistent with   scarring from previous inflammatory disease.                 Assessment/Plan:    74 y.o. male NH resident

## 2025-01-28 NOTE — CARE COORDINATION
Spoke to bedside nurse Ti and he will check with surg re: if they are seeing pt today and if any intervention needs to occur on this stay vs as outpatient. I messaged Jessica from [facility] to make sure they are aware he will need IV Merrem 10 days post dc.   1620- I Spoke to Earnestine and Dr. Higginbotham re: dc for tomorrow . Dr. Higginbotham wants us to set for 12pm to make sure we have everything ready for dc. I also checked with [facility] to make sure time is okay with them.

## 2025-01-28 NOTE — CARE COORDINATION
Quality round completed with care management team. Plan remain to [facility]. Pending medical clearance at this time.   Per Jessica, Auth is good through 2/3/2025.    Electronically signed by LOLI Hodge on 1/28/2025 at 10:44 AM

## 2025-01-28 NOTE — PROGRESS NOTES
Patient seen and examined.  Wife present at bedside    I was asked to see him regarding a decubitus ulcer he has developed over the past few weeks    Patient has been afebrile over 24 hours    With the aid of the nurse I examined his decubitus area.  There is slough present.  No acute abscess present requiring any drainage.    I do not feel debridement is in his best interest at this point.  He has a skilled nursing facility bed available for reconditioning.  Pressure relieving interventions recommended.    Wife was in agreement.  She does not want any debridement and would like to transition him to skilled nursing facility.    Discussed with ICU nurse practitioner

## 2025-01-28 NOTE — CARE COORDINATION
I called [facility] and set up transport for 12pm tomorrow. Wife at bedside and I did 2nd notice IMM and let her know that he will go tomorrow around 12pm. Wife is agreeable to plan.

## 2025-01-28 NOTE — PROGRESS NOTES
Pulmonary & Critical Care Medicine ICU Progress Note  Chief complaint : Respiratory failure    Subjunctive/24 hour events :   Patient seen and examined during multidisciplinary rounds with RN, charge nurse, RT, pharmacy, dietitian, and social service.     Open his eyes, weak but follows commands, no significant change, no fever overnight.  Urine output 0, bowels moving    New information updated in the note today, rest of the examination did not change compared to yesterday.  Social History     Tobacco Use    Smoking status: Former     Current packs/day: 0.00     Types: Cigarettes     Quit date: 2015     Years since quittin.6    Smokeless tobacco: Former   Substance Use Topics    Alcohol use: No     Alcohol/week: 0.0 standard drinks of alcohol         Problem Relation Age of Onset    Colon Cancer Neg Hx        No results for input(s): \"PHART\", \"YDL2CWX\", \"PO2ART\" in the last 72 hours.    MV Settings:  Vent Mode: AC/VC Resp Rate (Set): 14 bpm/Vt (Set, mL): 500 mL/ /FiO2 : 30 %           IV:   sodium chloride      dextrose         Vitals:  /72   Pulse 87   Temp 97.9 °F (36.6 °C) (Axillary)   Resp 22   Ht 1.88 m (6' 2\")   Wt 75.1 kg (165 lb 8 oz)   SpO2 100%   BMI 21.25 kg/m²    Tmax:        Intake/Output Summary (Last 24 hours) at 2025 0830  Last data filed at 2025 0400  Gross per 24 hour   Intake 1539.04 ml   Output 150 ml   Net 1389.04 ml       EXAM:  General: alert, cooperative, no distress  Head: normocephalic, atraumatic  Eyes:No gross abnormalities.  ENT:  MMM no lesions  Neck:  supple and no masses  Chest : clear to auscultation bilaterally- no wheezes, rales or rhonchi, normal air movement, no respiratory distress  Heart:: Heart sounds are normal.  Regular rate and rhythm without murmur, gallop or rub.  ABD:  symmetric, soft, non-tender, no guarding or rebound  Musculoskeletal : no cyanosis, no clubbing, and no edema  Neuro: No acute changes, weak  Skin: No rashes or nodules

## 2025-01-28 NOTE — FLOWSHEET NOTE
Shift assessment completed. Patient denies pain. Q2hr turns. No fevers through night. Pt slept fair.

## 2025-01-28 NOTE — PLAN OF CARE
Problem: Chronic Conditions and Co-morbidities  Goal: Patient's chronic conditions and co-morbidity symptoms are monitored and maintained or improved  1/28/2025 0852 by Derik Richards RN  Outcome: Progressing  Flowsheets (Taken 1/28/2025 0800)  Care Plan - Patient's Chronic Conditions and Co-Morbidity Symptoms are Monitored and Maintained or Improved: Monitor and assess patient's chronic conditions and comorbid symptoms for stability, deterioration, or improvement  1/28/2025 0653 by Rosario King RN  Outcome: Progressing  Flowsheets (Taken 1/27/2025 2000)  Care Plan - Patient's Chronic Conditions and Co-Morbidity Symptoms are Monitored and Maintained or Improved: Monitor and assess patient's chronic conditions and comorbid symptoms for stability, deterioration, or improvement     Problem: Discharge Planning  Goal: Discharge to home or other facility with appropriate resources  1/28/2025 0852 by Derik Richards RN  Outcome: Progressing  Flowsheets (Taken 1/28/2025 0800)  Discharge to home or other facility with appropriate resources: Identify barriers to discharge with patient and caregiver  1/28/2025 0653 by Rosario King RN  Outcome: Progressing  Flowsheets (Taken 1/27/2025 2000)  Discharge to home or other facility with appropriate resources: Identify barriers to discharge with patient and caregiver

## 2025-01-28 NOTE — PROGRESS NOTES
Infectious Diseases Inpatient Progress Note          HISTORY OF PRESENT ILLNESS:  Follow up septic shock, healthcare acquired pneumonia, acute respiratory failure requiring tracheostomy and PEG tube placement, stage III sacral decubitus ulcer with concern for superimposed infection, back on IV meropenem after patient started spiking fevers once antibiotics was stopped.  patient has not had any fevers today  Currently with left upper extremity PICC line  Currently connected to hemodialysis  Decreasing endotracheal secretions  Has extensive wounds  Positive diarrhea and Flexi-Seal related to tube feeding  Tolerating tube feed boluses  Decreasing bilateral upper extremity swelling  Positive tongue ulcerations  Patient has history of failed renal transplant.  Currently with end-stage renal disease on hemodialysis through right upper extremity AV fistula  Current Medications:     docusate  100 mg Oral BID    polyethylene glycol  17 g Oral Daily    meropenem  1,000 mg IntraVENous Q24H    epoetin megan-epbx  10,000 Units SubCUTAneous Weekly    melatonin  5 mg Oral Nightly    insulin glargine  18 Units SubCUTAneous Nightly    amiodarone  200 mg Oral BID    midodrine  20 mg Oral TID WC    insulin lispro  0-16 Units SubCUTAneous Q4H    finasteride  5 mg Oral Daily    sodium chloride flush  5-40 mL IntraVENous 2 times per day    heparin (porcine)  5,000 Units SubCUTAneous 3 times per day    pantoprazole (PROTONIX) 40 mg in sodium chloride (PF) 0.9 % 10 mL injection  40 mg IntraVENous Daily       Allergies:  Statins      Review of Systems  unable to provide ROS because of acute illness      Physical Exam  Vitals:    01/28/25 0800 01/28/25 0815 01/28/25 0830 01/28/25 1150   BP: (!) 108/28   (!) 115/29   Pulse: 81 86 86 87   Resp: 15 20 22 18   Temp: 97.7 °F (36.5 °C)      TempSrc: Axillary      SpO2: 100% 100% 100% 100%   Weight:    75.1 kg (165 lb 9.1 oz)   Height:         General Appearance: alert, follows simple commands

## 2025-01-28 NOTE — FLOWSHEET NOTE
Tx complete 2000 ml uf removed tolerated well       01/28/25 1450   Vital Signs   BP (!) 112/34   Pulse (!) 103   Respirations 16   SpO2 100 %   Weight - Scale 73 kg (160 lb 15 oz)   Percent Weight Change -2.8   Post-Hemodialysis Assessment   Post-Treatment Procedures Blood returned;Access bleeding time < 10 minutes   Machine Disinfection Process Acid/Vinegar Clean;Heat Disinfect;Exterior Machine Disinfection   Rinseback Volume (ml) 300 ml   Blood Volume Processed (Liters) 69 L   Dialyzer Clearance Lightly streaked   Duration of Treatment (minutes) 180 minutes   Heparin Amount Administered During Treatment (mL) 0 mL   Hemodialysis Intake (ml) 800 ml   Hemodialysis Output (ml) 2800 ml   NET Removed (ml) 2000   Tolerated Treatment Good   Bilateral Breath Sounds Diminished   Edema Generalized   Edema Generalized Non-pitting   Observations & Evaluations   Level of Consciousness 3   Heart Rhythm Regular   Respiratory Quality/Effort Unlabored   O2 Device Ventilator   Skin Condition/Temp Dry;Warm   Abdomen Inspection Flat;Soft;Surgical scar   Bowel Sounds (All Quadrants) Present   Comments tx coomplete

## 2025-01-29 VITALS
RESPIRATION RATE: 20 BRPM | WEIGHT: 164 LBS | TEMPERATURE: 97.5 F | BODY MASS INDEX: 21.05 KG/M2 | SYSTOLIC BLOOD PRESSURE: 159 MMHG | HEART RATE: 84 BPM | DIASTOLIC BLOOD PRESSURE: 52 MMHG | HEIGHT: 74 IN | OXYGEN SATURATION: 99 %

## 2025-01-29 LAB
ANION GAP SERPL CALCULATED.3IONS-SCNC: 8 MEQ/L (ref 9–15)
BUN SERPL-MCNC: 53 MG/DL (ref 8–23)
CALCIUM SERPL-MCNC: 8.5 MG/DL (ref 8.5–9.9)
CHLORIDE SERPL-SCNC: 92 MEQ/L (ref 95–107)
CO2 SERPL-SCNC: 30 MEQ/L (ref 20–31)
CREAT SERPL-MCNC: 1.46 MG/DL (ref 0.7–1.2)
ERYTHROCYTE [DISTWIDTH] IN BLOOD BY AUTOMATED COUNT: 16.8 % (ref 11.5–14.5)
GLUCOSE BLD-MCNC: 131 MG/DL (ref 70–99)
GLUCOSE BLD-MCNC: 168 MG/DL (ref 70–99)
GLUCOSE SERPL-MCNC: 142 MG/DL (ref 70–99)
HCT VFR BLD AUTO: 25.9 % (ref 42–52)
HGB BLD-MCNC: 8.4 G/DL (ref 14–18)
MCH RBC QN AUTO: 30.2 PG (ref 27–31.3)
MCHC RBC AUTO-ENTMCNC: 32.4 % (ref 33–37)
MCV RBC AUTO: 93.2 FL (ref 79–92.2)
PERFORMED ON: ABNORMAL
PERFORMED ON: ABNORMAL
PLATELET # BLD AUTO: 226 K/UL (ref 130–400)
POTASSIUM SERPL-SCNC: 4.2 MEQ/L (ref 3.4–4.9)
RBC # BLD AUTO: 2.78 M/UL (ref 4.7–6.1)
REASON FOR REJECTION: NORMAL
REJECTED TEST: NORMAL
SODIUM SERPL-SCNC: 130 MEQ/L (ref 135–144)
WBC # BLD AUTO: 7.4 K/UL (ref 4.8–10.8)

## 2025-01-29 PROCEDURE — 94660 CPAP INITIATION&MGMT: CPT

## 2025-01-29 PROCEDURE — 2500000003 HC RX 250 WO HCPCS: Performed by: INTERNAL MEDICINE

## 2025-01-29 PROCEDURE — 6360000002 HC RX W HCPCS: Performed by: INTERNAL MEDICINE

## 2025-01-29 PROCEDURE — 6370000000 HC RX 637 (ALT 250 FOR IP): Performed by: NURSE PRACTITIONER

## 2025-01-29 PROCEDURE — 6370000000 HC RX 637 (ALT 250 FOR IP): Performed by: INTERNAL MEDICINE

## 2025-01-29 PROCEDURE — 99291 CRITICAL CARE FIRST HOUR: CPT | Performed by: INTERNAL MEDICINE

## 2025-01-29 PROCEDURE — 2700000000 HC OXYGEN THERAPY PER DAY

## 2025-01-29 PROCEDURE — 80048 BASIC METABOLIC PNL TOTAL CA: CPT

## 2025-01-29 PROCEDURE — 94003 VENT MGMT INPAT SUBQ DAY: CPT

## 2025-01-29 PROCEDURE — 85027 COMPLETE CBC AUTOMATED: CPT

## 2025-01-29 PROCEDURE — 2580000003 HC RX 258: Performed by: INTERNAL MEDICINE

## 2025-01-29 RX ORDER — POLYETHYLENE GLYCOL 3350 17 G/17G
17 POWDER, FOR SOLUTION ORAL DAILY
Status: ON HOLD | DISCHARGE
Start: 2025-01-30 | End: 2025-03-01

## 2025-01-29 RX ORDER — MIDODRINE HYDROCHLORIDE 10 MG/1
20 TABLET ORAL
Status: ON HOLD | DISCHARGE
Start: 2025-01-29

## 2025-01-29 RX ADMIN — AMIODARONE HYDROCHLORIDE 200 MG: 200 TABLET ORAL at 07:52

## 2025-01-29 RX ADMIN — HEPARIN SODIUM 5000 UNITS: 5000 INJECTION INTRAVENOUS; SUBCUTANEOUS at 06:31

## 2025-01-29 RX ADMIN — MIDODRINE HYDROCHLORIDE 20 MG: 10 TABLET ORAL at 07:52

## 2025-01-29 RX ADMIN — FINASTERIDE 5 MG: 5 TABLET, FILM COATED ORAL at 07:53

## 2025-01-29 RX ADMIN — SODIUM CHLORIDE 40 MG: 9 INJECTION INTRAMUSCULAR; INTRAVENOUS; SUBCUTANEOUS at 07:53

## 2025-01-29 RX ADMIN — POLYETHYLENE GLYCOL 3350 17 G: 17 POWDER, FOR SOLUTION ORAL at 07:52

## 2025-01-29 RX ADMIN — DOCUSATE SODIUM 100 MG: 50 LIQUID ORAL at 07:53

## 2025-01-29 RX ADMIN — Medication 10 ML: at 07:34

## 2025-01-29 ASSESSMENT — PULMONARY FUNCTION TESTS
PIF_VALUE: 23
PIF_VALUE: 28
PIF_VALUE: 26
PIF_VALUE: 23

## 2025-01-29 NOTE — PROGRESS NOTES
Pulmonary & Critical Care Medicine ICU Progress Note  Chief complaint : Respiratory failure    Subjunctive/24 hour events :   Patient seen and examined during multidisciplinary rounds with RN, charge nurse, RT, pharmacy, dietitian, and social service.     No issues, open his eyes, weak but follows commands, on 30% 5 2 saturation 90%, no fever    New information updated in the note today, rest of the examination did not change compared to yesterday.  Social History     Tobacco Use    Smoking status: Former     Current packs/day: 0.00     Types: Cigarettes     Quit date: 2015     Years since quittin.6    Smokeless tobacco: Former   Substance Use Topics    Alcohol use: No     Alcohol/week: 0.0 standard drinks of alcohol         Problem Relation Age of Onset    Colon Cancer Neg Hx        No results for input(s): \"PHART\", \"YXV6UPP\", \"PO2ART\" in the last 72 hours.    MV Settings:  Vent Mode: (S) CPAP Resp Rate (Set): 14 bpm/Vt (Set, mL): 500 mL/ /FiO2 : 30 %           IV:   sodium chloride      dextrose         Vitals:  BP (!) 148/57   Pulse 91   Temp 97.8 °F (36.6 °C) (Axillary)   Resp 23   Ht 1.88 m (6' 2\")   Wt 74.4 kg (164 lb)   SpO2 100%   BMI 21.06 kg/m²    Tmax:        Intake/Output Summary (Last 24 hours) at 2025 0818  Last data filed at 2025 0600  Gross per 24 hour   Intake 1752.19 ml   Output 2800 ml   Net -1047.81 ml       EXAM:  General: alert, cooperative, no distress  Head: normocephalic, atraumatic  Eyes:No gross abnormalities.  ENT:  MMM no lesions  Neck:  supple and no masses  Chest : clear to auscultation bilaterally- no wheezes, rales or rhonchi, normal air movement, no respiratory distress  Heart:: Heart sounds are normal.  Regular rate and rhythm without murmur, gallop or rub.  ABD:  symmetric, soft, non-tender, no guarding or rebound  Musculoskeletal : no cyanosis, no clubbing, and no edema  Neuro: No acute changes, weak  Skin: No rashes or nodules noted.  Lymph node:  no

## 2025-01-29 NOTE — CARE COORDINATION
Team rounds done earlier this am and pt set up for 12pm  to [facility]. Nurse did call report and Dr. Higginbotham is aware of pickup time. Wife is also aware .

## 2025-01-29 NOTE — PROGRESS NOTES
01/29/25 0805   Patient Observation   Pulse 98   Respirations 20   SpO2 100 %   Vent Information   Vent Mode (S)  AC/VC   Ventilator Settings   Vt (Set, mL) 500 mL   Resp Rate (Set) 14 bpm   PEEP/CPAP (cmH2O) 5   FiO2  30 %

## 2025-01-29 NOTE — INTERDISCIPLINARY ROUNDS
Spiritual Care Services     Summary of Visit:   participated in ICU rounds. Patient trached and going for long term placement later today.    Encounter Summary  Encounter Overview/Reason: Interdisciplinary rounds, Palliative Care  Service Provided For: Patient  Support System: Spouse  Complexity of Encounter: Low  Begin Time: 1200  End Time : 1215  Total Time Calculated: 15 min     Crisis  Type: Rapid Response  Spiritual/Emotional needs  Type: Spiritual Support              Palliative Care  Type: Palliative Care, Interdisciplinary Rounds       Spiritual Assessment/Intervention/Outcomes:    Assessment: Calm, Peaceful, Hopeful, Coping (Finishing Dialysis)    Intervention: Nurtured Hope, Discussed belief system/Latter day practices/steven, Prayer (assurance of)/Odin    Outcome: Receptive, Comfort      Care Plan:    Plan and Referrals  Plan/Referrals: Continue Support (comment)     to provide spiritual support as needed or requested      Spiritual Care Services   Electronically signed by Chaplain Neeta on 1/29/2025 at 10:52 AM.    To reach a  for emotional and spiritual support, place an EPIC consult request.   If a  is needed immediately, dial “0” and ask to page the on-call .

## 2025-01-29 NOTE — PROGRESS NOTES
Nephrology Progress Note    Assessment:  CKD-5  S/P Trach & PEG  Hx Hypertension  DM type-2  Kidney transplant      Plan: dialysis scheduled for tomorrow   to go back to NH skilled    Patient Active Problem List:     Sepsis due to pneumonia (HCC)     Abdominal pain, other specified site     Acute pyelonephritis without lesion of renal medullary necrosis     Anemia     Essential hypertension     Nonspecific abnormal results of thyroid function study     Mixed hyperlipidemia     Kidney replaced by transplant     Intra-abdominal abscess post-procedure (HCC)     Infection due to Enterococcus     Fever and other physiologic disturbances of temperature regulation     Chronic kidney disease (CKD)     Type 2 diabetes mellitus with stage 3b chronic kidney disease, without long-term current use of insulin (HCC)     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 (HCC)     Abnormal CT of the chest     Bronchiectasis without complication (HCC)     GI bleeding     Complication of transplanted kidney     Muscle weakness (generalized)     Difficulty in walking     Elevated BUN     Urinary retention     Immunosuppression due to drug therapy (HCC)     DELORES (acute kidney injury) (HCC)     Adjustment disorder     Gross hematuria     Bilateral lower extremity edema     Dysuria     Acute cystitis without hematuria     Bilateral hearing loss     Abscess, toe     Acute hyperkalemia     Acute pain of left shoulder     Astigmatism     Astigmatism of both eyes with presbyopia     At risk for stroke     Benign enlargement of prostate     Benign prostatic hyperplasia with urinary frequency      Rhinovirus     Chronic right shoulder pain     Condition not found     Deep vein thrombosis (DVT) of lower extremity (HCC)     Diabetes mellitus type 2 without retinopathy (HCC)     Edema     Gait difficulty     GERD (gastroesophageal reflux disease)     H/O lower gastrointestinal bleeding     History of blood transfusion     Incomplete emptying of bladder     Insulin long-term use (HCC)     Leg weakness, bilateral     Localized swelling of both lower legs     Peripheral nerve disease     Pseudophakia of both eyes     PTDM (post-transplant diabetes mellitus) ([facility])     Posterior vitreous detachment     Rhabdomyolysis     Seizure disorder (HCC)     Stage 5 chronic kidney disease with transplanted kidney (HCC)     Leukocytes in urine     Atrial fibrillation with RVR ([facility])     Moderate malnutrition ([facility])     Palliative care encounter     Goals of care, counseling/discussion     Advanced care planning/counseling discussion     Encounter for hospice care discussion     Lower GI bleed     Unable to eat     Gastrostomy tube dysfunction (HCC)     Shock     Acute respiratory failure     Decubitus ulcer of sacral region, unstageable ([facility])      Subjective:  Admit Date: 1/14/2025    Interval History: minimal activity    Medications:  Scheduled Meds:   docusate  100 mg Oral BID    polyethylene glycol  17 g Oral Daily    meropenem  1,000 mg IntraVENous Q24H    epoetin megan-epbx  10,000 Units SubCUTAneous Weekly    melatonin  5 mg Oral Nightly    insulin glargine  18 Units SubCUTAneous Nightly    amiodarone  200 mg Oral BID    midodrine  20 mg Oral TID WC    insulin lispro  0-16 Units SubCUTAneous Q4H    finasteride  5 mg Oral Daily    sodium chloride flush  5-40 mL IntraVENous 2 times per day    heparin (porcine)  5,000 Units SubCUTAneous 3 times per day    pantoprazole (PROTONIX) 40 mg in sodium chloride (PF) 0.9 % 10 mL injection  40 mg IntraVENous Daily     Continuous Infusions:   sodium chloride      dextrose         CBC:

## 2025-01-29 NOTE — PLAN OF CARE
Problem: Chronic Conditions and Co-morbidities  Goal: Patient's chronic conditions and co-morbidity symptoms are monitored and maintained or improved  Outcome: Progressing  Flowsheets (Taken 1/28/2025 2000)  Care Plan - Patient's Chronic Conditions and Co-Morbidity Symptoms are Monitored and Maintained or Improved: Monitor and assess patient's chronic conditions and comorbid symptoms for stability, deterioration, or improvement     Problem: Discharge Planning  Goal: Discharge to home or other facility with appropriate resources  Outcome: Progressing  Flowsheets (Taken 1/28/2025 2000)  Discharge to home or other facility with appropriate resources: Identify barriers to discharge with patient and caregiver     Problem: Pain  Goal: Verbalizes/displays adequate comfort level or baseline comfort level  Outcome: Progressing     Problem: Safety - Adult  Goal: Free from fall injury  Outcome: Progressing     Problem: Skin/Tissue Integrity  Goal: Absence of new skin breakdown  Description: 1.  Monitor for areas of redness and/or skin breakdown  2.  Assess vascular access sites hourly  3.  Every 4-6 hours minimum:  Change oxygen saturation probe site  4.  Every 4-6 hours:  If on nasal continuous positive airway pressure, respiratory therapy assess nares and determine need for appliance change or resting period.  Outcome: Progressing     Problem: ABCDS Injury Assessment  Goal: Absence of physical injury  Outcome: Progressing     Problem: Nutrition Deficit:  Goal: Optimize nutritional status  Outcome: Progressing     Problem: Neurosensory - Adult  Goal: Achieves stable or improved neurological status  Outcome: Progressing  Flowsheets (Taken 1/28/2025 2000)  Achieves stable or improved neurological status: Assess for and report changes in neurological status     Problem: Respiratory - Adult  Goal: Achieves optimal ventilation and oxygenation  Outcome: Progressing     Problem: Cardiovascular - Adult  Goal: Maintains optimal

## 2025-01-29 NOTE — CARE COORDINATION
Received call from patients wife to check on time he is leaving today. I let her know he is scheduled for 12pm pick-up. I also messaged Jessica again to ask for report number and make sure she is aware of his time for leaving here.

## 2025-01-29 NOTE — FLOWSHEET NOTE
Patient remained afebrile through night. Wound dressings changed. Q2hr turns side to side. No other significant events.

## 2025-01-29 NOTE — PROGRESS NOTES
01/29/25 0703   Patient Observation   Pulse 94   Respirations 28   SpO2 100 %   Vent Information   Ventilator ID [email or web address]   Vent Mode (S)  CPAP   $Ventilation $Subsequent Day   Ventilator Settings   PEEP/CPAP (cmH2O) 5   FiO2  30 %   Pressure Support (cm H2O) 16 cm H2O   Vent Patient Data (Readings)   Vt (Measured) 353 mL   Peak Inspiratory Pressure (cmH2O) 26 cmH2O   Rate Measured 22 br/min   Minute Volume (L/min) 8.54 Liters   Mean Airway Pressure (cmH2O) 10 cmH20   Vent Alarm Settings   High Pressure (cmH2O) 40 cmH2O   Low Minute Volume (lpm) 2 L/min   High Minute Volume (lpm) 20 L/min   High Exhaled Vt (ml) 1200 mL   RR High (bpm) 40 br/min   Additional Respiratoray Assessments   Humidification Source HME   Surgical Airway  12/30/24 Shiley Cuffed   Placement Date/Time: 12/30/24 1130   Placed By: (c) Licensed provider  Surgical Airway Type: Tracheostomy  Brand: Muriel  Style: Cuffed  Size: 8   Status Secured   B: Both Spontaneous Awakening and Breathing Trials   Safety Screening Spontaneous Breathing Trial (SBT) Proceed with SBT - no exclusion criteria met   RT Notified Ready for SBT Yes   Spontaneous  mL   RSBI Calculated 62.32   Airway Procedures   $$ Airway Procedures CPAP study

## 2025-01-29 NOTE — DISCHARGE INSTR - DIET

## 2025-01-29 NOTE — DISCHARGE SUMMARY
Hospital Medicine Discharge Summary    Remy Upton  :  1950  MRN:  [id number]    Admit date:  2025  Discharge date:  2025    Admitting Physician:  No admitting provider for patient encounter.  Primary Care Physician:  Tico Rodriguez,       Discharge Diagnoses:    ***    No chief complaint on file.    Hospital Course:         ***    Exam on discharge: ***  BP (!) 159/52   Pulse 84   Temp 97.5 °F (36.4 °C) (Axillary)   Resp 20   Ht 1.88 m (6' 2\")   Wt 74.4 kg (164 lb)   SpO2 99%   BMI 21.06 kg/m²   General appearance: No apparent distress, appears stated age and cooperative.  HEENT: Pupils equal, round, and reactive to light. Conjunctivae/corneas clear.  Neck: Supple, with full range of motion. No jugular venous distention. Trachea midline.  Respiratory:  Normal respiratory effort. Clear to auscultation, bilaterally without Rales/Wheezes/Rhonchi.  Cardiovascular: Regular rate and rhythm with normal S1/S2 without murmurs, rubs or gallops.  Abdomen: Soft, non-tender, non-distended with normal bowel sounds.  Musculoskeletal: No clubbing, cyanosis or edema bilaterally.  Full range of motion without deformity.  Skin: Skin color, texture, turgor normal.  No rashes or lesions.  Neuro: Non Focal. Symetrical motor and tone. Nl Comprehension, Alert,awake and oriented. NL CN. Symetrical tone and reflexes.  Psychiatric: Alert and oriented, thought content appropriate, normal insight  Capillary Refill: Brisk,< 3 seconds   Peripheral Pulses: +2 palpable, equal bilaterally     Patient was seen by the following consultants   Consults:  IP CONSULT TO CRITICAL CARE  IP CONSULT TO NEPHROLOGY  PHARMACY TO DOSE VANCOMYCIN  IP CONSULT TO CARDIOLOGY  IP CONSULT TO PALLIATIVE CARE  IP CONSULT TO DIETITIAN  IP CONSULT TO PALLIATIVE CARE  IP CONSULT TO INFECTIOUS DISEASES  IP CONSULT TO PHARMACY  IP CONSULT TO INFECTIOUS DISEASES  IP CONSULT TO GENERAL SURGERY    Significant Diagnostic Studies:    Refer to

## 2025-01-31 ENCOUNTER — HOSPITAL ENCOUNTER (INPATIENT)
Age: 75
LOS: 5 days | Discharge: NURSING, REHAB OR LONG-TERM CARE | DRG: 870 | End: 2025-02-05
Attending: INTERNAL MEDICINE | Admitting: INTERNAL MEDICINE
Payer: COMMERCIAL

## 2025-01-31 ENCOUNTER — APPOINTMENT (OUTPATIENT)
Dept: GENERAL RADIOLOGY | Age: 75
DRG: 870 | End: 2025-01-31
Attending: INTERNAL MEDICINE
Payer: COMMERCIAL

## 2025-01-31 DIAGNOSIS — A41.89 SEPSIS DUE TO OTHER ETIOLOGY (HCC): Primary | ICD-10-CM

## 2025-01-31 DIAGNOSIS — E11.9 DIABETES MELLITUS TYPE 2 WITHOUT RETINOPATHY (HCC): ICD-10-CM

## 2025-01-31 DIAGNOSIS — L89.150 DECUBITUS ULCER OF SACRAL REGION, UNSTAGEABLE (HCC): ICD-10-CM

## 2025-01-31 LAB
BASOPHILS # BLD: 0 K/UL (ref 0–0.2)
BASOPHILS NFR BLD: 0.5 %
EOSINOPHIL # BLD: 0.1 K/UL (ref 0–0.7)
EOSINOPHIL NFR BLD: 0.9 %
ERYTHROCYTE [DISTWIDTH] IN BLOOD BY AUTOMATED COUNT: 16.5 % (ref 11.5–14.5)
HCT VFR BLD AUTO: 27 % (ref 42–52)
HGB BLD-MCNC: 8.5 G/DL (ref 14–18)
LYMPHOCYTES # BLD: 0.5 K/UL (ref 1–4.8)
LYMPHOCYTES NFR BLD: 6.4 %
Lab: 14
Lab: 16
Lab: 500
Lab: 8.1
Lab: ABNORMAL
MCH RBC QN AUTO: 29.3 PG (ref 27–31.3)
MCHC RBC AUTO-ENTMCNC: 31.5 % (ref 33–37)
MCV RBC AUTO: 93.1 FL (ref 79–92.2)
MONOCYTES # BLD: 0.7 K/UL (ref 0.2–0.8)
MONOCYTES NFR BLD: 9.5 %
NEUTROPHILS # BLD: 6.1 K/UL (ref 1.4–6.5)
NEUTS SEG NFR BLD: 82.3 %
NON-UH HIE A/A RATIO ART: 57.7 %
NON-UH HIE AADO2 ART: 73.2 MMHG (ref 5–15)
NON-UH HIE ALANINE AMINOTRANSFERASE:CCNC:PT:SER/PLAS:QN:NO ADDITION OF P-5': 24 INT._UNIT/L (ref 6–46)
NON-UH HIE ALBUMIN/GLOBULIN:MCRTO:PT:SER:QN:: 0.9 (ref 1.1–2.2)
NON-UH HIE ALBUMIN:MCNC:PT:SER/PLAS:QN:: 3.1 GM/DL (ref 3.3–5)
NON-UH HIE ALKALINE PHOSPHATASE:CCNC:PT:SER/PLAS:QN:: 151 INT._UNIT/L (ref 21–98)
NON-UH HIE ALLENS TEST: POSITIVE
NON-UH HIE ANION GAP:SCNC:PT:SER/PLAS:QN:: 14 MEQ/L (ref 6–16)
NON-UH HIE ASPARTATE AMINOTRANSFERASE:CCNC:PT:SER/PLAS:QN:: 33 INT._UNIT/L (ref 5–43)
NON-UH HIE BASE EXCESS ARTERIAL: 7.9 MMOL/L
NON-UH HIE BASOPHILS/LEUKOCYTES:NFR.DF:PT:BLD:QN:AUTOMATED COUNT: 0 E9/L (ref 0–0.2)
NON-UH HIE BASOPHILS:NCNC:PT:BLD:QN:AUTOMATED COUNT: 0.3 % (ref 0–2)
NON-UH HIE BILIRUBIN:MCNC:PT:SER/PLAS:QN:: 0.9 MG/DL (ref 0–1.1)
NON-UH HIE CALCIUM:MCNC:PT:SER/PLAS:QN:: 8.8 MG/DL (ref 8.9–11.1)
NON-UH HIE CARBON DIOXIDE:SCNC:PT:SER/PLAS:QN:: 29 MMOL/L (ref 21–31)
NON-UH HIE CCA2+ ART: 4.67 MG/DL (ref 4.4–5.3)
NON-UH HIE CCL- ART: 93 MMOL/L (ref 101–111)
NON-UH HIE CGLU ART: 183 MG/DL (ref 55–99)
NON-UH HIE CHLORIDE:SCNC:PT:SER/PLAS:QN:: 94 MMOL/L (ref 101–111)
NON-UH HIE CK+ ART: 3.9 MMOL/L (ref 3.5–5.3)
NON-UH HIE CLAC ART: 0.8 MMOL/L (ref 0.5–2.2)
NON-UH HIE CNA+ ART: 134 MMOL/L (ref 135–145)
NON-UH HIE COAGULATION SURFACE INDUCED:TIME:PT:PPP:QN:COAG: 31.6 SECOND(S) (ref 25.1–36.5)
NON-UH HIE COAGULATION TISSUE FACTOR INDUCED.INR:RELTIME:PT:PPP:QN:COAG: 1.11
NON-UH HIE COAGULATION TISSUE FACTOR INDUCED:TIME:PT:PPP:QN:COAG: 12.5 SECOND(S) (ref 9.4–12.5)
NON-UH HIE CREATININE:MCNC:PT:SER/PLAS:QN:: 1.8 MG/DL (ref 0.5–1.3)
NON-UH HIE DEVICE: ABNORMAL
NON-UH HIE DRAWN BY: ABNORMAL
NON-UH HIE EGFR: 39 ML/MIN/1.73 M2
NON-UH HIE EOSINOPHILS/100 LEUKOCYTES:NFR:PT:BLD:QN:AUTOMATED COUNT: 0.7 % (ref 0–8)
NON-UH HIE EOSINOPHILS:NCNC:PT:BLD:QN:: 0.1 E9/L (ref 0–0.5)
NON-UH HIE ERYTHROCYTE DISTRIBUTION WIDTH:RATIO:PT:RBC:QN:AUTOMATED COUNT: 16.9 % (ref 10.9–14.2)
NON-UH HIE ERYTHROCYTE MEAN CORPUSCULAR HEMOGLOBIN CONCENTRATION:MCNC:PT:RB: 32.7 GM/DL (ref 31.4–36)
NON-UH HIE ERYTHROCYTE MEAN CORPUSCULAR HEMOGLOBIN:ENTMASS:PT:RBC:QN:AUTOMA: 29.5 PG (ref 27–34)
NON-UH HIE ERYTHROCYTE MEAN CORPUSCULAR VOLUME:ENTVOL:PT:RBC:QN:AUTOMATED C: 90.2 FL (ref 80–100)
NON-UH HIE ERYTHROCYTES:NCNC:PT:BLD:QN:AUTOMATED COUNT: 2.9 E12/L (ref 4.3–5.9)
NON-UH HIE FCOHB ART: 1.5 % (ref 1.5–4.9)
NON-UH HIE FIO2 BG: 32
NON-UH HIE FMETHB ART: <1 % (ref 0–1.9)
NON-UH HIE FO2HB ART: 97.8 % (ref 93–100)
NON-UH HIE GLOBULIN:MCNC:PT:SER:QN:CALCULATED: 3.3 GM/DL (ref 1.4–4)
NON-UH HIE GLUCOSE:MCNC:PT:SER/PLAS:QN:: 200 MG/DL (ref 55–199)
NON-UH HIE HEMATOCRIT:VFR:PT:BLD:QN:AUTOMATED COUNT: 26.3 % (ref 37.7–49)
NON-UH HIE HEMOGLOBIN:MCNC:PT:BLD:QN:: 8.6 GM/DL (ref 13.5–17.5)
NON-UH HIE INFLUENZAE A AG: NEGATIVE
NON-UH HIE INFLUENZAE B AG: NEGATIVE
NON-UH HIE LACTIC ACID LVL: 0.8 MMOL/L (ref 0.5–2.2)
NON-UH HIE LACTIC ACID LVL: 0.9 MMOL/L (ref 0.5–2.2)
NON-UH HIE LEUKOCYTES: 9.2 E9/L (ref 4–11)
NON-UH HIE LYMPHOCYTES:NCNC:PT:BLD:QN:: 0.5 E9/L (ref 1–4)
NON-UH HIE LYMPHOCYTES:NCNC:PT:BLD:QN:AUTOMATED COUNT: 5.6 % (ref 14–50)
NON-UH HIE MONOCYTES:NCNC:PT:BLD:QN:AUTOMATED COUNT: 0.6 E9/L (ref 0.2–1)
NON-UH HIE NEUTROPHILS/100 LEUKOCYTES:NFR:PT:BLD:QN:: 86.5 % (ref 36–75)
NON-UH HIE NEUTROPHILS:NCNC:PT:BLD:QN:AUTOMATED COUNT: 7.9 E9/L (ref 2–7.5)
NON-UH HIE O2 SAT ART: >99.2 % (ref 95–100)
NON-UH HIE P CO2 ARTERIAL: 42.1 MMHG (ref 35–45)
NON-UH HIE P O2 ARTERIAL: 99.7 MMHG (ref 80–100)
NON-UH HIE PEEP: 5
NON-UH HIE PH ARTERIAL: 7.49 (ref 7.35–7.45)
NON-UH HIE PLATELET MEAN VOLUME:ENTVOL:PT:BLD:QN:AUTOMATED COUNT: 8.5 FL (ref 6.4–10.8)
NON-UH HIE PLATELETS:NCNC:PT:BLD:QN:AUTOMATED COUNT: 223 E9/L (ref 150–500)
NON-UH HIE POTASSIUM:SCNC:PT:SER/PLAS:QN:: 4.1 MMOL/L (ref 3.5–5.3)
NON-UH HIE PROTEIN:MCNC:PT:SER/PLAS:QN:: 6.4 GM/DL (ref 6–7.8)
NON-UH HIE RAPID COV INT NEG CTL: NORMAL
NON-UH HIE RAPID COV INT POS CTL: NORMAL
NON-UH HIE SAMPLE SITE: ABNORMAL
NON-UH HIE SAMPLE TYPE: ABNORMAL
NON-UH HIE SARS CORONAVIRUS+SARS CORONAVIRUS 2 AG:PRTHR:PT:RESPIRATORY:ORD:: NOT DETECTED
NON-UH HIE SODIUM:SCNC:PT:SER/PLAS:QN:: 133 MMOL/L (ref 135–145)
NON-UH HIE TOTAL HGB ART: 8.8 GM/DL (ref 12–17)
NON-UH HIE TROPONIN: 39.1 PG/ML (ref 15.9–38.4)
NON-UH HIE UREA NITROGEN/CREATININE:MRTO:PT:SER/PLAS:QN:: 34 NO UNITS (ref 10–20)
NON-UH HIE UREA NITROGEN:MCNC:PT:SER/PLAS:QN:: 62 MG/DL (ref 5–21)
PLATELET # BLD AUTO: 216 K/UL (ref 130–400)
RBC # BLD AUTO: 2.9 M/UL (ref 4.7–6.1)
WBC # BLD AUTO: 7.5 K/UL (ref 4.8–10.8)

## 2025-01-31 PROCEDURE — 94002 VENT MGMT INPAT INIT DAY: CPT

## 2025-01-31 PROCEDURE — 0202U NFCT DS 22 TRGT SARS-COV-2: CPT

## 2025-01-31 PROCEDURE — 36415 COLL VENOUS BLD VENIPUNCTURE: CPT

## 2025-01-31 PROCEDURE — 85025 COMPLETE CBC W/AUTO DIFF WBC: CPT

## 2025-01-31 PROCEDURE — 71045 X-RAY EXAM CHEST 1 VIEW: CPT

## 2025-01-31 PROCEDURE — 51701 INSERT BLADDER CATHETER: CPT

## 2025-01-31 PROCEDURE — 87040 BLOOD CULTURE FOR BACTERIA: CPT

## 2025-01-31 PROCEDURE — 84145 PROCALCITONIN (PCT): CPT

## 2025-01-31 PROCEDURE — 2000000000 HC ICU R&B

## 2025-01-31 PROCEDURE — 80053 COMPREHEN METABOLIC PANEL: CPT

## 2025-01-31 PROCEDURE — 83605 ASSAY OF LACTIC ACID: CPT

## 2025-01-31 PROCEDURE — 51798 US URINE CAPACITY MEASURE: CPT

## 2025-01-31 PROCEDURE — 5A1955Z RESPIRATORY VENTILATION, GREATER THAN 96 CONSECUTIVE HOURS: ICD-10-PCS | Performed by: INTERNAL MEDICINE

## 2025-01-31 ASSESSMENT — PAIN SCALES - PAIN ASSESSMENT IN ADVANCED DEMENTIA (PAINAD)
BREATHING: NORMAL
FACIALEXPRESSION: SMILING OR INEXPRESSIVE
BODYLANGUAGE: RELAXED
TOTALSCORE: 0
CONSOLABILITY: NO NEED TO CONSOLE

## 2025-01-31 ASSESSMENT — PULMONARY FUNCTION TESTS: PIF_VALUE: 30

## 2025-02-01 ENCOUNTER — APPOINTMENT (OUTPATIENT)
Dept: GENERAL RADIOLOGY | Age: 75
DRG: 870 | End: 2025-02-01
Attending: INTERNAL MEDICINE
Payer: COMMERCIAL

## 2025-02-01 PROBLEM — R50.9 FEVER IN ADULT: Status: ACTIVE | Noted: 2025-02-01

## 2025-02-01 PROBLEM — J96.10 CHRONIC RESPIRATORY FAILURE: Status: ACTIVE | Noted: 2025-02-01

## 2025-02-01 PROBLEM — L89.153 SACRAL DECUBITUS ULCER, STAGE III (HCC): Status: ACTIVE | Noted: 2025-02-01

## 2025-02-01 LAB
ALBUMIN SERPL-MCNC: 3 G/DL (ref 3.5–4.6)
ALP SERPL-CCNC: 167 U/L (ref 35–104)
ALT SERPL-CCNC: 21 U/L (ref 0–41)
ANION GAP SERPL CALCULATED.3IONS-SCNC: 13 MEQ/L (ref 9–15)
ANION GAP SERPL CALCULATED.3IONS-SCNC: 15 MEQ/L (ref 9–15)
AST SERPL-CCNC: 27 U/L (ref 0–40)
B PARAP IS1001 DNA NPH QL NAA+NON-PROBE: NOT DETECTED
B PERT.PT PRMT NPH QL NAA+NON-PROBE: NOT DETECTED
BACTERIA URNS QL MICRO: ABNORMAL /HPF
BILIRUB SERPL-MCNC: 0.6 MG/DL (ref 0.2–0.7)
BILIRUB UR QL STRIP: NEGATIVE
BUN SERPL-MCNC: 64 MG/DL (ref 8–23)
BUN SERPL-MCNC: 66 MG/DL (ref 8–23)
C PNEUM DNA NPH QL NAA+NON-PROBE: NOT DETECTED
CALCIUM SERPL-MCNC: 8.7 MG/DL (ref 8.5–9.9)
CALCIUM SERPL-MCNC: 8.8 MG/DL (ref 8.5–9.9)
CHLORIDE SERPL-SCNC: 93 MEQ/L (ref 95–107)
CHLORIDE SERPL-SCNC: 95 MEQ/L (ref 95–107)
CLARITY UR: ABNORMAL
CO2 SERPL-SCNC: 25 MEQ/L (ref 20–31)
CO2 SERPL-SCNC: 26 MEQ/L (ref 20–31)
COLOR UR: ABNORMAL
CREAT SERPL-MCNC: 1.94 MG/DL (ref 0.7–1.2)
CREAT SERPL-MCNC: 1.99 MG/DL (ref 0.7–1.2)
EPI CELLS #/AREA URNS AUTO: ABNORMAL /HPF (ref 0–5)
ERYTHROCYTE [DISTWIDTH] IN BLOOD BY AUTOMATED COUNT: 16.3 % (ref 11.5–14.5)
FLUAV RNA NPH QL NAA+NON-PROBE: NOT DETECTED
FLUBV RNA NPH QL NAA+NON-PROBE: NOT DETECTED
GLOBULIN SER CALC-MCNC: 3.5 G/DL (ref 2.3–3.5)
GLUCOSE BLD-MCNC: 103 MG/DL (ref 70–99)
GLUCOSE BLD-MCNC: 123 MG/DL (ref 70–99)
GLUCOSE BLD-MCNC: 135 MG/DL (ref 70–99)
GLUCOSE BLD-MCNC: 139 MG/DL (ref 70–99)
GLUCOSE BLD-MCNC: 155 MG/DL (ref 70–99)
GLUCOSE SERPL-MCNC: 135 MG/DL (ref 70–99)
GLUCOSE SERPL-MCNC: 143 MG/DL (ref 70–99)
GLUCOSE UR STRIP-MCNC: NEGATIVE MG/DL
HADV DNA NPH QL NAA+NON-PROBE: NOT DETECTED
HCOV 229E RNA NPH QL NAA+NON-PROBE: NOT DETECTED
HCOV HKU1 RNA NPH QL NAA+NON-PROBE: NOT DETECTED
HCOV NL63 RNA NPH QL NAA+NON-PROBE: NOT DETECTED
HCOV OC43 RNA NPH QL NAA+NON-PROBE: NOT DETECTED
HCT VFR BLD AUTO: 26.9 % (ref 42–52)
HGB BLD-MCNC: 8.6 G/DL (ref 14–18)
HGB UR QL STRIP: ABNORMAL
HMPV RNA NPH QL NAA+NON-PROBE: NOT DETECTED
HPIV1 RNA NPH QL NAA+NON-PROBE: NOT DETECTED
HPIV2 RNA NPH QL NAA+NON-PROBE: NOT DETECTED
HPIV3 RNA NPH QL NAA+NON-PROBE: NOT DETECTED
HPIV4 RNA NPH QL NAA+NON-PROBE: NOT DETECTED
HYALINE CASTS #/AREA URNS AUTO: ABNORMAL /HPF (ref 0–5)
KETONES UR STRIP-MCNC: ABNORMAL MG/DL
LACTATE BLDV-SCNC: 1.4 MMOL/L (ref 0.5–2.2)
LEUKOCYTE ESTERASE UR QL STRIP: ABNORMAL
M PNEUMO DNA NPH QL NAA+NON-PROBE: NOT DETECTED
MCH RBC QN AUTO: 29.9 PG (ref 27–31.3)
MCHC RBC AUTO-ENTMCNC: 32 % (ref 33–37)
MCV RBC AUTO: 93.4 FL (ref 79–92.2)
NITRITE UR QL STRIP: NEGATIVE
PERFORMED ON: ABNORMAL
PH UR STRIP: 7.5 [PH] (ref 5–9)
PLATELET # BLD AUTO: 219 K/UL (ref 130–400)
POTASSIUM SERPL-SCNC: 4.1 MEQ/L (ref 3.4–4.9)
POTASSIUM SERPL-SCNC: 4.2 MEQ/L (ref 3.4–4.9)
PROCALCITONIN SERPL IA-MCNC: 0.89 NG/ML (ref 0–0.15)
PROT SERPL-MCNC: 6.5 G/DL (ref 6.3–8)
PROT UR STRIP-MCNC: 100 MG/DL
RBC # BLD AUTO: 2.88 M/UL (ref 4.7–6.1)
RBC #/AREA URNS AUTO: ABNORMAL /HPF (ref 0–5)
RSV RNA NPH QL NAA+NON-PROBE: NOT DETECTED
RV+EV RNA NPH QL NAA+NON-PROBE: NOT DETECTED
SARS-COV-2 RNA NPH QL NAA+NON-PROBE: NOT DETECTED
SODIUM SERPL-SCNC: 133 MEQ/L (ref 135–144)
SODIUM SERPL-SCNC: 134 MEQ/L (ref 135–144)
SP GR UR STRIP: 1.01 (ref 1–1.03)
URINE REFLEX TO CULTURE: YES
UROBILINOGEN UR STRIP-ACNC: 1 E.U./DL
WBC # BLD AUTO: 7.2 K/UL (ref 4.8–10.8)
WBC #/AREA URNS AUTO: >100 /HPF (ref 0–5)
WBC #/AREA URNS HPF: >100 /HPF (ref 0–5)
YEAST URNS QL MICRO: PRESENT /HPF

## 2025-02-01 PROCEDURE — 87070 CULTURE OTHR SPECIMN AEROBIC: CPT

## 2025-02-01 PROCEDURE — 99291 CRITICAL CARE FIRST HOUR: CPT | Performed by: INTERNAL MEDICINE

## 2025-02-01 PROCEDURE — 94660 CPAP INITIATION&MGMT: CPT

## 2025-02-01 PROCEDURE — 94761 N-INVAS EAR/PLS OXIMETRY MLT: CPT

## 2025-02-01 PROCEDURE — 6360000002 HC RX W HCPCS: Performed by: INTERNAL MEDICINE

## 2025-02-01 PROCEDURE — 6370000000 HC RX 637 (ALT 250 FOR IP): Performed by: INTERNAL MEDICINE

## 2025-02-01 PROCEDURE — 81001 URINALYSIS AUTO W/SCOPE: CPT

## 2025-02-01 PROCEDURE — 51701 INSERT BLADDER CATHETER: CPT

## 2025-02-01 PROCEDURE — 3E033XZ INTRODUCTION OF VASOPRESSOR INTO PERIPHERAL VEIN, PERCUTANEOUS APPROACH: ICD-10-PCS | Performed by: INTERNAL MEDICINE

## 2025-02-01 PROCEDURE — 2000000000 HC ICU R&B

## 2025-02-01 PROCEDURE — 89220 SPUTUM SPECIMEN COLLECTION: CPT

## 2025-02-01 PROCEDURE — 2700000000 HC OXYGEN THERAPY PER DAY

## 2025-02-01 PROCEDURE — 87077 CULTURE AEROBIC IDENTIFY: CPT

## 2025-02-01 PROCEDURE — 2500000003 HC RX 250 WO HCPCS: Performed by: INTERNAL MEDICINE

## 2025-02-01 PROCEDURE — 36415 COLL VENOUS BLD VENIPUNCTURE: CPT

## 2025-02-01 PROCEDURE — 80048 BASIC METABOLIC PNL TOTAL CA: CPT

## 2025-02-01 PROCEDURE — 2580000003 HC RX 258: Performed by: INTERNAL MEDICINE

## 2025-02-01 PROCEDURE — 5A1D70Z PERFORMANCE OF URINARY FILTRATION, INTERMITTENT, LESS THAN 6 HOURS PER DAY: ICD-10-PCS | Performed by: INTERNAL MEDICINE

## 2025-02-01 PROCEDURE — 85027 COMPLETE CBC AUTOMATED: CPT

## 2025-02-01 PROCEDURE — 87086 URINE CULTURE/COLONY COUNT: CPT

## 2025-02-01 PROCEDURE — 94003 VENT MGMT INPAT SUBQ DAY: CPT

## 2025-02-01 PROCEDURE — 99222 1ST HOSP IP/OBS MODERATE 55: CPT | Performed by: INTERNAL MEDICINE

## 2025-02-01 PROCEDURE — 51798 US URINE CAPACITY MEASURE: CPT

## 2025-02-01 PROCEDURE — 73630 X-RAY EXAM OF FOOT: CPT

## 2025-02-01 RX ORDER — MECOBALAMIN 5000 MCG
5 TABLET,DISINTEGRATING ORAL NIGHTLY
Status: DISCONTINUED | OUTPATIENT
Start: 2025-02-01 | End: 2025-02-05 | Stop reason: HOSPADM

## 2025-02-01 RX ORDER — HEPARIN SODIUM 5000 [USP'U]/ML
5000 INJECTION, SOLUTION INTRAVENOUS; SUBCUTANEOUS EVERY 8 HOURS SCHEDULED
Status: DISCONTINUED | OUTPATIENT
Start: 2025-02-01 | End: 2025-02-05 | Stop reason: HOSPADM

## 2025-02-01 RX ORDER — DEXTROSE MONOHYDRATE 100 MG/ML
INJECTION, SOLUTION INTRAVENOUS CONTINUOUS PRN
Status: DISCONTINUED | OUTPATIENT
Start: 2025-02-01 | End: 2025-02-05 | Stop reason: HOSPADM

## 2025-02-01 RX ORDER — METOPROLOL TARTRATE 1 MG/ML
5 INJECTION, SOLUTION INTRAVENOUS EVERY 4 HOURS PRN
Status: DISCONTINUED | OUTPATIENT
Start: 2025-02-01 | End: 2025-02-05 | Stop reason: HOSPADM

## 2025-02-01 RX ORDER — ACETAMINOPHEN 650 MG/1
650 SUPPOSITORY RECTAL EVERY 6 HOURS PRN
Status: DISCONTINUED | OUTPATIENT
Start: 2025-02-01 | End: 2025-02-05 | Stop reason: HOSPADM

## 2025-02-01 RX ORDER — AMIODARONE HYDROCHLORIDE 200 MG/1
200 TABLET ORAL 2 TIMES DAILY
Status: DISCONTINUED | OUTPATIENT
Start: 2025-02-01 | End: 2025-02-05 | Stop reason: HOSPADM

## 2025-02-01 RX ORDER — INSULIN LISPRO 100 [IU]/ML
0-16 INJECTION, SOLUTION INTRAVENOUS; SUBCUTANEOUS EVERY 4 HOURS
Status: DISCONTINUED | OUTPATIENT
Start: 2025-02-01 | End: 2025-02-05 | Stop reason: HOSPADM

## 2025-02-01 RX ORDER — NOREPINEPHRINE BITARTRATE 0.06 MG/ML
1-100 INJECTION, SOLUTION INTRAVENOUS CONTINUOUS
Status: DISCONTINUED | OUTPATIENT
Start: 2025-02-01 | End: 2025-02-04

## 2025-02-01 RX ORDER — INSULIN GLARGINE 100 [IU]/ML
18 INJECTION, SOLUTION SUBCUTANEOUS NIGHTLY
Status: DISCONTINUED | OUTPATIENT
Start: 2025-02-01 | End: 2025-02-05 | Stop reason: HOSPADM

## 2025-02-01 RX ORDER — OXYCODONE AND ACETAMINOPHEN 5; 325 MG/1; MG/1
1 TABLET ORAL EVERY 4 HOURS PRN
Status: DISCONTINUED | OUTPATIENT
Start: 2025-02-01 | End: 2025-02-05 | Stop reason: HOSPADM

## 2025-02-01 RX ORDER — GLUCAGON 1 MG/ML
1 KIT INJECTION PRN
Status: DISCONTINUED | OUTPATIENT
Start: 2025-02-01 | End: 2025-02-05 | Stop reason: HOSPADM

## 2025-02-01 RX ORDER — MIDODRINE HYDROCHLORIDE 10 MG/1
20 TABLET ORAL
Status: DISCONTINUED | OUTPATIENT
Start: 2025-02-01 | End: 2025-02-02

## 2025-02-01 RX ORDER — SODIUM CHLORIDE 9 MG/ML
INJECTION, SOLUTION INTRAVENOUS PRN
Status: DISCONTINUED | OUTPATIENT
Start: 2025-02-01 | End: 2025-02-05 | Stop reason: HOSPADM

## 2025-02-01 RX ORDER — FINASTERIDE 5 MG/1
5 TABLET, FILM COATED ORAL DAILY
Status: DISCONTINUED | OUTPATIENT
Start: 2025-02-01 | End: 2025-02-05 | Stop reason: HOSPADM

## 2025-02-01 RX ORDER — POLYETHYLENE GLYCOL 3350 17 G/17G
17 POWDER, FOR SOLUTION ORAL DAILY
Status: DISCONTINUED | OUTPATIENT
Start: 2025-02-01 | End: 2025-02-05 | Stop reason: HOSPADM

## 2025-02-01 RX ORDER — OXYCODONE AND ACETAMINOPHEN 5; 325 MG/1; MG/1
2 TABLET ORAL EVERY 4 HOURS PRN
Status: DISCONTINUED | OUTPATIENT
Start: 2025-02-01 | End: 2025-02-05 | Stop reason: HOSPADM

## 2025-02-01 RX ORDER — ACETAMINOPHEN 325 MG/1
650 TABLET ORAL EVERY 6 HOURS PRN
Status: DISCONTINUED | OUTPATIENT
Start: 2025-02-01 | End: 2025-02-05 | Stop reason: HOSPADM

## 2025-02-01 RX ORDER — SODIUM CHLORIDE 0.9 % (FLUSH) 0.9 %
5-40 SYRINGE (ML) INJECTION EVERY 12 HOURS SCHEDULED
Status: DISCONTINUED | OUTPATIENT
Start: 2025-02-01 | End: 2025-02-05 | Stop reason: HOSPADM

## 2025-02-01 RX ORDER — SODIUM CHLORIDE 0.9 % (FLUSH) 0.9 %
5-40 SYRINGE (ML) INJECTION PRN
Status: DISCONTINUED | OUTPATIENT
Start: 2025-02-01 | End: 2025-02-05 | Stop reason: HOSPADM

## 2025-02-01 RX ADMIN — POLYETHYLENE GLYCOL 3350 17 G: 17 POWDER, FOR SOLUTION ORAL at 09:52

## 2025-02-01 RX ADMIN — Medication 10 ML: at 09:53

## 2025-02-01 RX ADMIN — ACETAMINOPHEN 650 MG: 325 TABLET ORAL at 23:13

## 2025-02-01 RX ADMIN — HEPARIN SODIUM 5000 UNITS: 5000 INJECTION INTRAVENOUS; SUBCUTANEOUS at 23:12

## 2025-02-01 RX ADMIN — MIDODRINE HYDROCHLORIDE 20 MG: 10 TABLET ORAL at 09:52

## 2025-02-01 RX ADMIN — Medication 5 MG: at 03:01

## 2025-02-01 RX ADMIN — MIDODRINE HYDROCHLORIDE 20 MG: 10 TABLET ORAL at 15:58

## 2025-02-01 RX ADMIN — FINASTERIDE 5 MG: 5 TABLET, FILM COATED ORAL at 09:52

## 2025-02-01 RX ADMIN — MIDODRINE HYDROCHLORIDE 20 MG: 10 TABLET ORAL at 12:25

## 2025-02-01 RX ADMIN — AMIODARONE HYDROCHLORIDE 200 MG: 200 TABLET ORAL at 21:03

## 2025-02-01 RX ADMIN — HEPARIN SODIUM 5000 UNITS: 5000 INJECTION INTRAVENOUS; SUBCUTANEOUS at 12:24

## 2025-02-01 RX ADMIN — AMIODARONE HYDROCHLORIDE 200 MG: 200 TABLET ORAL at 09:52

## 2025-02-01 RX ADMIN — INSULIN GLARGINE 18 UNITS: 100 INJECTION, SOLUTION SUBCUTANEOUS at 21:04

## 2025-02-01 RX ADMIN — Medication 5 MG: at 21:03

## 2025-02-01 RX ADMIN — INSULIN GLARGINE 18 UNITS: 100 INJECTION, SOLUTION SUBCUTANEOUS at 03:01

## 2025-02-01 RX ADMIN — HEPARIN SODIUM 5000 UNITS: 5000 INJECTION INTRAVENOUS; SUBCUTANEOUS at 06:39

## 2025-02-01 RX ADMIN — DOCUSATE SODIUM 100 MG: 50 LIQUID ORAL at 21:04

## 2025-02-01 RX ADMIN — MEROPENEM 1000 MG: 1 INJECTION INTRAVENOUS at 10:14

## 2025-02-01 RX ADMIN — Medication 10 ML: at 21:05

## 2025-02-01 RX ADMIN — AMIODARONE HYDROCHLORIDE 200 MG: 200 TABLET ORAL at 03:01

## 2025-02-01 RX ADMIN — SODIUM CHLORIDE 40 MG: 9 INJECTION INTRAMUSCULAR; INTRAVENOUS; SUBCUTANEOUS at 09:51

## 2025-02-01 RX ADMIN — DOCUSATE SODIUM 100 MG: 50 LIQUID ORAL at 03:01

## 2025-02-01 RX ADMIN — DOCUSATE SODIUM 100 MG: 50 LIQUID ORAL at 09:52

## 2025-02-01 ASSESSMENT — PAIN SCALES - PAIN ASSESSMENT IN ADVANCED DEMENTIA (PAINAD)
CONSOLABILITY: NO NEED TO CONSOLE
TOTALSCORE: 0
FACIALEXPRESSION: SMILING OR INEXPRESSIVE
BREATHING: NORMAL
BREATHING: NORMAL
BODYLANGUAGE: RELAXED
FACIALEXPRESSION: SMILING OR INEXPRESSIVE
BREATHING: NORMAL
BODYLANGUAGE: RELAXED
CONSOLABILITY: NO NEED TO CONSOLE
BREATHING: NORMAL
FACIALEXPRESSION: SMILING OR INEXPRESSIVE
FACIALEXPRESSION: SMILING OR INEXPRESSIVE
CONSOLABILITY: NO NEED TO CONSOLE
TOTALSCORE: 0
BREATHING: NORMAL
BODYLANGUAGE: RELAXED
BODYLANGUAGE: RELAXED
TOTALSCORE: 0
FACIALEXPRESSION: SMILING OR INEXPRESSIVE
TOTALSCORE: 0
BREATHING: NORMAL
FACIALEXPRESSION: SMILING OR INEXPRESSIVE
FACIALEXPRESSION: SMILING OR INEXPRESSIVE
BODYLANGUAGE: RELAXED
TOTALSCORE: 0
CONSOLABILITY: NO NEED TO CONSOLE
BREATHING: NORMAL
BREATHING: NORMAL
TOTALSCORE: 0
BODYLANGUAGE: RELAXED
TOTALSCORE: 0
TOTALSCORE: 0
FACIALEXPRESSION: SMILING OR INEXPRESSIVE
CONSOLABILITY: NO NEED TO CONSOLE
CONSOLABILITY: NO NEED TO CONSOLE
BREATHING: NORMAL
BODYLANGUAGE: RELAXED
BODYLANGUAGE: RELAXED
TOTALSCORE: 0
CONSOLABILITY: NO NEED TO CONSOLE
BODYLANGUAGE: RELAXED
BODYLANGUAGE: RELAXED
TOTALSCORE: 0
FACIALEXPRESSION: SMILING OR INEXPRESSIVE
CONSOLABILITY: NO NEED TO CONSOLE
CONSOLABILITY: NO NEED TO CONSOLE
FACIALEXPRESSION: SMILING OR INEXPRESSIVE
TOTALSCORE: 0
BREATHING: NORMAL
TOTALSCORE: 0
BODYLANGUAGE: RELAXED
CONSOLABILITY: NO NEED TO CONSOLE
BODYLANGUAGE: RELAXED
CONSOLABILITY: NO NEED TO CONSOLE
FACIALEXPRESSION: SMILING OR INEXPRESSIVE
FACIALEXPRESSION: SMILING OR INEXPRESSIVE
CONSOLABILITY: NO NEED TO CONSOLE

## 2025-02-01 ASSESSMENT — PAIN DESCRIPTION - LOCATION: LOCATION: GENERALIZED

## 2025-02-01 ASSESSMENT — PAIN SCALES - GENERAL
PAINLEVEL_OUTOF10: 0

## 2025-02-01 ASSESSMENT — PULMONARY FUNCTION TESTS
PIF_VALUE: 30
PIF_VALUE: 27
PIF_VALUE: 26
PIF_VALUE: 30
PIF_VALUE: 36

## 2025-02-01 ASSESSMENT — PAIN SCALES - WONG BAKER: WONGBAKER_NUMERICALRESPONSE: NO HURT

## 2025-02-01 ASSESSMENT — PAIN DESCRIPTION - DESCRIPTORS: DESCRIPTORS: SORE

## 2025-02-01 NOTE — PLAN OF CARE
Problem: Chronic Conditions and Co-morbidities  Goal: Patient's chronic conditions and co-morbidity symptoms are monitored and maintained or improved  2/1/2025 1415 by Jyoti Bhatt RN  Outcome: Progressing     Problem: Discharge Planning  Goal: Discharge to home or other facility with appropriate resources  2/1/2025 1415 by Jyoti Bhatt RN  Outcome: Progressing     Problem: ABCDS Injury Assessment  Goal: Absence of physical injury  2/1/2025 1415 by Jyoti Bhatt RN  Outcome: Progressing     Problem: Safety - Adult  Goal: Free from fall injury  2/1/2025 1415 by Jyoti Bhatt RN  Outcome: Progressing     Problem: Skin/Tissue Integrity  Goal: Skin integrity remains intact  Description: 1.  Monitor for areas of redness and/or skin breakdown  2.  Assess vascular access sites hourly  3.  Every 4-6 hours minimum:  Change oxygen saturation probe site  4.  Every 4-6 hours:  If on nasal continuous positive airway pressure, respiratory therapy assess nares and determine need for appliance change or resting period  2/1/2025 1415 by Jyoti Bhatt RN  Outcome: Progressing     Problem: Pain  Goal: Verbalizes/displays adequate comfort level or baseline comfort level  2/1/2025 1415 by Jyoti Bhatt RN  Outcome: Progressing  Flowsheets  Taken 2/1/2025 0500 by Teena Freire RN  Verbalizes/displays adequate comfort level or baseline comfort level: Assess pain using appropriate pain scale  Taken 2/1/2025 0400 by Teena Freire RN  Verbalizes/displays adequate comfort level or baseline comfort level: Assess pain using appropriate pain scale

## 2025-02-01 NOTE — H&P
Hospitalist Group   History and Physical      CHIEF COMPLAINT: Fever    History of Present Illness:  74 y.o. male with a history of diabetes, hypertension, seizures, renal transplant, ESRD on dialysis, A-fib, chronic respiratory failure status post tracheostomy, presents from LTAC to [facility] with fever.  Patient was sent here from [facility] for continued of care.  Patient is unable to provide any history.  It was reported the patient spiked fever as high as 105F.  He did receive IV Tylenol at [facility].  He is supposed to be on meropenem for sacral decubitus ulcer.  He received meropenem dose in the ER at [facility].    REVIEW OF SYSTEMS:  Unable to obtain      PMH:  Past Medical History:   Diagnosis Date    Diabetes mellitus (HCC)     Hemodialysis access, fistula mature (HCC) 12/2002    Hypertension     Rhinovirus 12/2024    Seizures ([facility])     no seizure since 1995    Smoking        Surgical History:  Past Surgical History:   Procedure Laterality Date    BRAIN SURGERY Left 12/06/1990    to eliminate seizures    COLONOSCOPY N/A 10/24/2022    COLONOSCOPY DIAGNOSTIC performed by Va Ordoñez MD at [facility]    KIDNEY TRANSPLANT  2015    [facility] INCL FLUOR GDNCE DX W/CELL WASHG SPX N/A 03/20/2018    BRONCHOSCOPY performed by Cristopehr Conde MD at [facility] [place]    TRACHEOSTOMY  12/30/2024    UPPER GASTROINTESTINAL ENDOSCOPY N/A 10/21/2022    EGD DIAGNOSTIC ONLY performed by Jaime Martinez MD at [facility]    UPPER GASTROINTESTINAL ENDOSCOPY N/A 01/02/2025    Esophagogastroduodenoscopy with percutaneous endoscopic gastrostomy tube/ICU bedside/anesthesia not required  ROOM ICU:1 performed by Syed Juárez MD at [facility] [place]    UPPER GASTROINTESTINAL ENDOSCOPY N/A 1/15/2025    Esophagogastroduodenoscopy with replacement of percutaneous endoscopic gastrostomy tube performed by Syed Juárez MD at [facility] [place]       Medications Prior to Admission:    Prior to Admission medications

## 2025-02-01 NOTE — DISCHARGE INSTR - COC
Continuity of Care Form    Patient Name: Remy Upton   :  1950  MRN:  [id number]    Admit date:  2025  Discharge date:  2025    Code Status Order: Limited   Advance Directives:   Advance Care Flowsheet Documentation             Admitting Physician:  Nik Nevarez MD  PCP: Tico Rodriguez DO    Discharging Nurse: Darby  Discharging Hospital Unit/Room#: IC04/IC04-01  Discharging Unit Phone Number: [phone]    Emergency Contact:   Extended Emergency Contact Information  Primary Emergency Contact: Simona Upton   [facility]  Home Phone: [phone]  Relation: Spouse  Secondary Emergency Contact: Maria Luisa Upton  Home Phone: [phone]  Relation: Child    Past Surgical History:  Past Surgical History:   Procedure Laterality Date    BRAIN SURGERY Left 1990    to eliminate seizures    COLONOSCOPY N/A 10/24/2022    COLONOSCOPY DIAGNOSTIC performed by Va Ordoñez MD at [facility]    KIDNEY TRANSPLANT      [facility] INCL FLUOR GDNCE DX W/CELL WASHG SPX N/A 2018    BRONCHOSCOPY performed by Cristopher Conde MD at [facility] [place]    TRACHEOSTOMY  2024    UPPER GASTROINTESTINAL ENDOSCOPY N/A 10/21/2022    EGD DIAGNOSTIC ONLY performed by Jaime Martinez MD at [facility]    UPPER GASTROINTESTINAL ENDOSCOPY N/A 2025    Esophagogastroduodenoscopy with percutaneous endoscopic gastrostomy tube/ICU bedside/anesthesia not required  ROOM ICU:1 performed by Syed Juárez MD at [facility] [place]    UPPER GASTROINTESTINAL ENDOSCOPY N/A 1/15/2025    Esophagogastroduodenoscopy with replacement of percutaneous endoscopic gastrostomy tube performed by Syed Juárez MD at [facility] [place] OR       Immunization History:   Immunization History   Administered Date(s) Administered    Influenza Virus Vaccine 2003, 2010    Influenza, FLUZONE High Dose (age 65 y+), IM, Quadv, 0.7mL 10/07/2020    Influenza, FLUZONE High Dose, (age 65 y+), IM, Trivalent

## 2025-02-01 NOTE — PLAN OF CARE
Nutrition Problem #1: Severe malnutrition, in context of chronic illness  Intervention: Food and/or Nutrient Delivery: Continue NPO, Continue Current Tube Feeding    Problem: Nutrition Deficit:  Goal: Optimize nutritional status  Flowsheets (Taken 2/1/2025 1256)  Nutrient intake appropriate for improving, restoring, or maintaining nutritional needs:   Assess nutritional status and recommend course of action   Monitor oral intake, labs, and treatment plans   Recommend appropriate diets, oral nutritional supplements, and vitamin/mineral supplements   Provide specific nutrition education to patient or family as appropriate

## 2025-02-01 NOTE — PLAN OF CARE
Problem: Chronic Conditions and Co-morbidities  Goal: Patient's chronic conditions and co-morbidity symptoms are monitored and maintained or improved  Outcome: Progressing     Problem: Discharge Planning  Goal: Discharge to home or other facility with appropriate resources  Outcome: Progressing  Flowsheets (Taken 2/1/2025 0157)  Discharge to home or other facility with appropriate resources:   Arrange for needed discharge resources and transportation as appropriate   Identify barriers to discharge with patient and caregiver     Problem: ABCDS Injury Assessment  Goal: Absence of physical injury  Outcome: Progressing  Flowsheets (Taken 2/1/2025 0049)  Absence of Physical Injury: Implement safety measures based on patient assessment     Problem: Safety - Adult  Goal: Free from fall injury  Outcome: Progressing  Flowsheets (Taken 2/1/2025 0144)  Free From Fall Injury: Instruct family/caregiver on patient safety     Problem: Skin/Tissue Integrity  Goal: Skin integrity remains intact  Description: 1.  Monitor for areas of redness and/or skin breakdown  2.  Assess vascular access sites hourly  3.  Every 4-6 hours minimum:  Change oxygen saturation probe site  4.  Every 4-6 hours:  If on nasal continuous positive airway pressure, respiratory therapy assess nares and determine need for appliance change or resting period  Outcome: Progressing  Flowsheets (Taken 2/1/2025 0250)  Skin Integrity Remains Intact: Monitor for areas of redness and/or skin breakdown     Problem: Pain  Goal: Verbalizes/displays adequate comfort level or baseline comfort level  Outcome: Progressing

## 2025-02-02 ENCOUNTER — APPOINTMENT (OUTPATIENT)
Dept: ULTRASOUND IMAGING | Age: 75
DRG: 870 | End: 2025-02-02
Attending: INTERNAL MEDICINE
Payer: COMMERCIAL

## 2025-02-02 LAB
ANION GAP SERPL CALCULATED.3IONS-SCNC: 14 MEQ/L (ref 9–15)
BACTERIA UR CULT: NORMAL
BUN SERPL-MCNC: 71 MG/DL (ref 8–23)
CALCIUM SERPL-MCNC: 8.6 MG/DL (ref 8.5–9.9)
CHLORIDE SERPL-SCNC: 94 MEQ/L (ref 95–107)
CO2 SERPL-SCNC: 26 MEQ/L (ref 20–31)
CREAT SERPL-MCNC: 2.18 MG/DL (ref 0.7–1.2)
ERYTHROCYTE [DISTWIDTH] IN BLOOD BY AUTOMATED COUNT: 16.2 % (ref 11.5–14.5)
GLUCOSE BLD-MCNC: 132 MG/DL (ref 70–99)
GLUCOSE BLD-MCNC: 140 MG/DL (ref 70–99)
GLUCOSE BLD-MCNC: 167 MG/DL (ref 70–99)
GLUCOSE BLD-MCNC: 173 MG/DL (ref 70–99)
GLUCOSE BLD-MCNC: 183 MG/DL (ref 70–99)
GLUCOSE BLD-MCNC: 192 MG/DL (ref 70–99)
GLUCOSE SERPL-MCNC: 136 MG/DL (ref 70–99)
HCT VFR BLD AUTO: 39.4 % (ref 42–52)
HGB BLD-MCNC: 12.5 G/DL (ref 14–18)
MCH RBC QN AUTO: 29.1 PG (ref 27–31.3)
MCHC RBC AUTO-ENTMCNC: 31.7 % (ref 33–37)
MCV RBC AUTO: 91.6 FL (ref 79–92.2)
PERFORMED ON: ABNORMAL
PLATELET # BLD AUTO: 139 K/UL (ref 130–400)
POTASSIUM SERPL-SCNC: 4 MEQ/L (ref 3.4–4.9)
POTASSIUM SERPL-SCNC: 4 MEQ/L (ref 3.4–4.9)
PROCALCITONIN SERPL IA-MCNC: 0.98 NG/ML (ref 0–0.15)
RBC # BLD AUTO: 4.3 M/UL (ref 4.7–6.1)
SODIUM SERPL-SCNC: 134 MEQ/L (ref 135–144)
WBC # BLD AUTO: 4.8 K/UL (ref 4.8–10.8)

## 2025-02-02 PROCEDURE — 51798 US URINE CAPACITY MEASURE: CPT

## 2025-02-02 PROCEDURE — 85027 COMPLETE CBC AUTOMATED: CPT

## 2025-02-02 PROCEDURE — 84145 PROCALCITONIN (PCT): CPT

## 2025-02-02 PROCEDURE — 2700000000 HC OXYGEN THERAPY PER DAY

## 2025-02-02 PROCEDURE — 94660 CPAP INITIATION&MGMT: CPT

## 2025-02-02 PROCEDURE — 94003 VENT MGMT INPAT SUBQ DAY: CPT

## 2025-02-02 PROCEDURE — 2580000003 HC RX 258: Performed by: INTERNAL MEDICINE

## 2025-02-02 PROCEDURE — 2500000003 HC RX 250 WO HCPCS: Performed by: INTERNAL MEDICINE

## 2025-02-02 PROCEDURE — 51701 INSERT BLADDER CATHETER: CPT

## 2025-02-02 PROCEDURE — 6360000002 HC RX W HCPCS: Performed by: INTERNAL MEDICINE

## 2025-02-02 PROCEDURE — 99232 SBSQ HOSP IP/OBS MODERATE 35: CPT | Performed by: INTERNAL MEDICINE

## 2025-02-02 PROCEDURE — 2000000000 HC ICU R&B

## 2025-02-02 PROCEDURE — 80048 BASIC METABOLIC PNL TOTAL CA: CPT

## 2025-02-02 PROCEDURE — 93925 LOWER EXTREMITY STUDY: CPT

## 2025-02-02 PROCEDURE — 6370000000 HC RX 637 (ALT 250 FOR IP): Performed by: INTERNAL MEDICINE

## 2025-02-02 PROCEDURE — 99291 CRITICAL CARE FIRST HOUR: CPT | Performed by: INTERNAL MEDICINE

## 2025-02-02 RX ORDER — MIDODRINE HYDROCHLORIDE 5 MG/1
5 TABLET ORAL
Status: DISCONTINUED | OUTPATIENT
Start: 2025-02-02 | End: 2025-02-03

## 2025-02-02 RX ORDER — LABETALOL HYDROCHLORIDE 5 MG/ML
5 INJECTION, SOLUTION INTRAVENOUS EVERY 4 HOURS PRN
Status: DISCONTINUED | OUTPATIENT
Start: 2025-02-02 | End: 2025-02-05 | Stop reason: HOSPADM

## 2025-02-02 RX ADMIN — INSULIN LISPRO 4 UNITS: 100 INJECTION, SOLUTION INTRAVENOUS; SUBCUTANEOUS at 21:52

## 2025-02-02 RX ADMIN — INSULIN GLARGINE 18 UNITS: 100 INJECTION, SOLUTION SUBCUTANEOUS at 21:51

## 2025-02-02 RX ADMIN — SODIUM CHLORIDE 40 MG: 9 INJECTION INTRAMUSCULAR; INTRAVENOUS; SUBCUTANEOUS at 08:29

## 2025-02-02 RX ADMIN — HEPARIN SODIUM 5000 UNITS: 5000 INJECTION INTRAVENOUS; SUBCUTANEOUS at 21:53

## 2025-02-02 RX ADMIN — DOCUSATE SODIUM 100 MG: 50 LIQUID ORAL at 08:28

## 2025-02-02 RX ADMIN — HEPARIN SODIUM 5000 UNITS: 5000 INJECTION INTRAVENOUS; SUBCUTANEOUS at 06:17

## 2025-02-02 RX ADMIN — MIDODRINE HYDROCHLORIDE 20 MG: 10 TABLET ORAL at 08:29

## 2025-02-02 RX ADMIN — OXYCODONE AND ACETAMINOPHEN 1 TABLET: 5; 325 TABLET ORAL at 08:34

## 2025-02-02 RX ADMIN — AMIODARONE HYDROCHLORIDE 200 MG: 200 TABLET ORAL at 21:54

## 2025-02-02 RX ADMIN — Medication 10 ML: at 08:29

## 2025-02-02 RX ADMIN — MEROPENEM 1000 MG: 1 INJECTION INTRAVENOUS at 10:44

## 2025-02-02 RX ADMIN — Medication 5 MG: at 21:54

## 2025-02-02 RX ADMIN — FINASTERIDE 5 MG: 5 TABLET, FILM COATED ORAL at 08:29

## 2025-02-02 RX ADMIN — AMIODARONE HYDROCHLORIDE 200 MG: 200 TABLET ORAL at 08:28

## 2025-02-02 RX ADMIN — HEPARIN SODIUM 5000 UNITS: 5000 INJECTION INTRAVENOUS; SUBCUTANEOUS at 17:07

## 2025-02-02 RX ADMIN — POLYETHYLENE GLYCOL 3350 17 G: 17 POWDER, FOR SOLUTION ORAL at 08:28

## 2025-02-02 RX ADMIN — DOCUSATE SODIUM 100 MG: 50 LIQUID ORAL at 21:54

## 2025-02-02 RX ADMIN — INSULIN LISPRO 4 UNITS: 100 INJECTION, SOLUTION INTRAVENOUS; SUBCUTANEOUS at 17:15

## 2025-02-02 RX ADMIN — Medication 10 ML: at 21:54

## 2025-02-02 ASSESSMENT — PAIN SCALES - PAIN ASSESSMENT IN ADVANCED DEMENTIA (PAINAD)
CONSOLABILITY: NO NEED TO CONSOLE
BREATHING: NORMAL
CONSOLABILITY: NO NEED TO CONSOLE
CONSOLABILITY: NO NEED TO CONSOLE
BODYLANGUAGE: RELAXED
BODYLANGUAGE: RELAXED
BREATHING: NORMAL
BREATHING: NORMAL
TOTALSCORE: 0
CONSOLABILITY: NO NEED TO CONSOLE
FACIALEXPRESSION: SMILING OR INEXPRESSIVE
TOTALSCORE: 0
BODYLANGUAGE: RELAXED
BREATHING: NORMAL
BREATHING: NORMAL
TOTALSCORE: 0
TOTALSCORE: 0
BODYLANGUAGE: RELAXED
FACIALEXPRESSION: SMILING OR INEXPRESSIVE
FACIALEXPRESSION: SMILING OR INEXPRESSIVE
BODYLANGUAGE: RELAXED
FACIALEXPRESSION: SMILING OR INEXPRESSIVE
CONSOLABILITY: NO NEED TO CONSOLE
FACIALEXPRESSION: SMILING OR INEXPRESSIVE
TOTALSCORE: 0

## 2025-02-02 ASSESSMENT — PAIN SCALES - GENERAL
PAINLEVEL_OUTOF10: 0
PAINLEVEL_OUTOF10: 0
PAINLEVEL_OUTOF10: 2
PAINLEVEL_OUTOF10: 0
PAINLEVEL_OUTOF10: 0

## 2025-02-02 ASSESSMENT — PULMONARY FUNCTION TESTS
PIF_VALUE: 22
PIF_VALUE: 38
PIF_VALUE: 24
PIF_VALUE: 26
PIF_VALUE: 27

## 2025-02-02 NOTE — ACP (ADVANCE CARE PLANNING)
I discussed goals of care with patient's wife at bedside.  She feels that he would like to continue aggressive care at this time.  Today, he looks much improved compared to yesterday.  She understands that if he appears to be suffering and requires more repeat hospitalizations, she has comfort care strategy and hospice as a potential option.    10min ACP planning discussing the above.

## 2025-02-02 NOTE — PLAN OF CARE
Problem: Chronic Conditions and Co-morbidities  Goal: Patient's chronic conditions and co-morbidity symptoms are monitored and maintained or improved  2/2/2025 0159 by Teena Freire RN  Outcome: Progressing  Flowsheets (Taken 2/1/2025 2000)  Care Plan - Patient's Chronic Conditions and Co-Morbidity Symptoms are Monitored and Maintained or Improved: Monitor and assess patient's chronic conditions and comorbid symptoms for stability, deterioration, or improvement  2/1/2025 1415 by Jyoti Bhatt RN  Outcome: Progressing     Problem: Discharge Planning  Goal: Discharge to home or other facility with appropriate resources  2/2/2025 0159 by Teena Freire RN  Outcome: Progressing  Flowsheets (Taken 2/1/2025 2000)  Discharge to home or other facility with appropriate resources:   Arrange for needed discharge resources and transportation as appropriate   Identify barriers to discharge with patient and caregiver  2/1/2025 1415 by Jyoti Bhatt RN  Outcome: Progressing     Problem: ABCDS Injury Assessment  Goal: Absence of physical injury  2/2/2025 0159 by Teena Freire RN  Outcome: Progressing  2/1/2025 1415 by Jyoti Bhatt RN  Outcome: Progressing     Problem: Safety - Adult  Goal: Free from fall injury  2/2/2025 0159 by Teena Freire RN  Outcome: Progressing  2/1/2025 1415 by Jyoti Bhatt RN  Outcome: Progressing     Problem: Skin/Tissue Integrity  Goal: Skin integrity remains intact  Description: 1.  Monitor for areas of redness and/or skin breakdown  2.  Assess vascular access sites hourly  3.  Every 4-6 hours minimum:  Change oxygen saturation probe site  4.  Every 4-6 hours:  If on nasal continuous positive airway pressure, respiratory therapy assess nares and determine need for appliance change or resting period  2/2/2025 0159 by Teena Freire RN  Outcome: Progressing  Flowsheets  Taken 2/2/2025 0159  Skin Integrity Remains Intact: Monitor for areas of redness and/or skin breakdown  Taken

## 2025-02-02 NOTE — FLOWSHEET NOTE
Bladder scan for 451ml. Straight cath for dark tea colored urine.Electronically signed by Jyoti Bhatt RN on 2/1/2025 at 7:27 PM

## 2025-02-03 PROBLEM — A41.89 SEPSIS DUE TO OTHER ETIOLOGY (HCC): Status: ACTIVE | Noted: 2018-03-16

## 2025-02-03 LAB
ALBUMIN SERPL-MCNC: 3.4 G/DL (ref 3.5–4.6)
ANION GAP SERPL CALCULATED.3IONS-SCNC: 12 MEQ/L (ref 9–15)
BACTERIA SPEC RESP CULT: NORMAL
BASOPHILS # BLD: 0 K/UL (ref 0–0.2)
BASOPHILS NFR BLD: 0.3 %
BUN SERPL-MCNC: 35 MG/DL (ref 8–23)
CALCIUM SERPL-MCNC: 9 MG/DL (ref 8.5–9.9)
CHLORIDE SERPL-SCNC: 96 MEQ/L (ref 95–107)
CO2 SERPL-SCNC: 29 MEQ/L (ref 20–31)
CREAT SERPL-MCNC: 1.15 MG/DL (ref 0.7–1.2)
EOSINOPHIL # BLD: 0.1 K/UL (ref 0–0.7)
EOSINOPHIL NFR BLD: 1.4 %
ERYTHROCYTE [DISTWIDTH] IN BLOOD BY AUTOMATED COUNT: 16.1 % (ref 11.5–14.5)
GLUCOSE BLD-MCNC: 115 MG/DL (ref 70–99)
GLUCOSE BLD-MCNC: 142 MG/DL (ref 70–99)
GLUCOSE BLD-MCNC: 186 MG/DL (ref 70–99)
GLUCOSE BLD-MCNC: 274 MG/DL (ref 70–99)
GLUCOSE BLD-MCNC: 67 MG/DL (ref 70–99)
GLUCOSE BLD-MCNC: 75 MG/DL (ref 70–99)
GLUCOSE SERPL-MCNC: 131 MG/DL (ref 70–99)
HCT VFR BLD AUTO: 26.6 % (ref 42–52)
HGB BLD-MCNC: 8.3 G/DL (ref 14–18)
LYMPHOCYTES # BLD: 0.4 K/UL (ref 1–4.8)
LYMPHOCYTES NFR BLD: 5.5 %
MCH RBC QN AUTO: 28.7 PG (ref 27–31.3)
MCHC RBC AUTO-ENTMCNC: 31.2 % (ref 33–37)
MCV RBC AUTO: 92 FL (ref 79–92.2)
MONOCYTES # BLD: 0.4 K/UL (ref 0.2–0.8)
MONOCYTES NFR BLD: 6.8 %
NEUTROPHILS # BLD: 5.5 K/UL (ref 1.4–6.5)
NEUTS SEG NFR BLD: 85.2 %
PERFORMED ON: ABNORMAL
PERFORMED ON: NORMAL
PHOSPHATE SERPL-MCNC: 1.8 MG/DL (ref 2.3–4.8)
PLATELET # BLD AUTO: 185 K/UL (ref 130–400)
POTASSIUM SERPL-SCNC: 3.5 MEQ/L (ref 3.4–4.9)
RBC # BLD AUTO: 2.89 M/UL (ref 4.7–6.1)
SODIUM SERPL-SCNC: 137 MEQ/L (ref 135–144)
WBC # BLD AUTO: 6.5 K/UL (ref 4.8–10.8)

## 2025-02-03 PROCEDURE — 94761 N-INVAS EAR/PLS OXIMETRY MLT: CPT

## 2025-02-03 PROCEDURE — 6370000000 HC RX 637 (ALT 250 FOR IP): Performed by: INTERNAL MEDICINE

## 2025-02-03 PROCEDURE — 8010000000 HC HEMODIALYSIS ACUTE INPT

## 2025-02-03 PROCEDURE — 2500000003 HC RX 250 WO HCPCS: Performed by: INTERNAL MEDICINE

## 2025-02-03 PROCEDURE — 99232 SBSQ HOSP IP/OBS MODERATE 35: CPT | Performed by: INTERNAL MEDICINE

## 2025-02-03 PROCEDURE — 99291 CRITICAL CARE FIRST HOUR: CPT | Performed by: INTERNAL MEDICINE

## 2025-02-03 PROCEDURE — 85025 COMPLETE CBC W/AUTO DIFF WBC: CPT

## 2025-02-03 PROCEDURE — 94003 VENT MGMT INPAT SUBQ DAY: CPT

## 2025-02-03 PROCEDURE — 2700000000 HC OXYGEN THERAPY PER DAY

## 2025-02-03 PROCEDURE — 80069 RENAL FUNCTION PANEL: CPT

## 2025-02-03 PROCEDURE — 94660 CPAP INITIATION&MGMT: CPT

## 2025-02-03 PROCEDURE — 89220 SPUTUM SPECIMEN COLLECTION: CPT

## 2025-02-03 PROCEDURE — 6360000002 HC RX W HCPCS: Performed by: INTERNAL MEDICINE

## 2025-02-03 PROCEDURE — 2000000000 HC ICU R&B

## 2025-02-03 PROCEDURE — 2580000003 HC RX 258: Performed by: INTERNAL MEDICINE

## 2025-02-03 RX ORDER — MYCOPHENOLATE MOFETIL 250 MG/1
500 CAPSULE ORAL 2 TIMES DAILY
Status: DISCONTINUED | OUTPATIENT
Start: 2025-02-03 | End: 2025-02-05 | Stop reason: HOSPADM

## 2025-02-03 RX ORDER — CYCLOSPORINE 25 MG/1
25 CAPSULE, GELATIN COATED ORAL 3 TIMES DAILY
Status: DISCONTINUED | OUTPATIENT
Start: 2025-02-03 | End: 2025-02-05 | Stop reason: HOSPADM

## 2025-02-03 RX ORDER — MIDODRINE HYDROCHLORIDE 10 MG/1
10 TABLET ORAL
Status: DISCONTINUED | OUTPATIENT
Start: 2025-02-03 | End: 2025-02-04

## 2025-02-03 RX ORDER — PREDNISONE 10 MG/1
5 TABLET ORAL DAILY
Status: DISCONTINUED | OUTPATIENT
Start: 2025-02-03 | End: 2025-02-05 | Stop reason: HOSPADM

## 2025-02-03 RX ADMIN — INSULIN LISPRO 4 UNITS: 100 INJECTION, SOLUTION INTRAVENOUS; SUBCUTANEOUS at 01:18

## 2025-02-03 RX ADMIN — Medication 16 G: at 04:48

## 2025-02-03 RX ADMIN — INSULIN LISPRO 8 UNITS: 100 INJECTION, SOLUTION INTRAVENOUS; SUBCUTANEOUS at 16:31

## 2025-02-03 RX ADMIN — Medication 10 ML: at 08:43

## 2025-02-03 RX ADMIN — FINASTERIDE 5 MG: 5 TABLET, FILM COATED ORAL at 11:44

## 2025-02-03 RX ADMIN — HEPARIN SODIUM 5000 UNITS: 5000 INJECTION INTRAVENOUS; SUBCUTANEOUS at 14:58

## 2025-02-03 RX ADMIN — CYCLOSPORINE 25 MG: 25 CAPSULE, GELATIN COATED ORAL at 20:26

## 2025-02-03 RX ADMIN — CYCLOSPORINE 25 MG: 25 CAPSULE, GELATIN COATED ORAL at 17:44

## 2025-02-03 RX ADMIN — OXYCODONE AND ACETAMINOPHEN 2 TABLET: 5; 325 TABLET ORAL at 13:50

## 2025-02-03 RX ADMIN — PREDNISONE 5 MG: 10 TABLET ORAL at 11:44

## 2025-02-03 RX ADMIN — MIDODRINE HYDROCHLORIDE 5 MG: 5 TABLET ORAL at 08:27

## 2025-02-03 RX ADMIN — AMIODARONE HYDROCHLORIDE 200 MG: 200 TABLET ORAL at 08:27

## 2025-02-03 RX ADMIN — MYCOPHENOLATE MOFETIL 500 MG: 250 CAPSULE ORAL at 20:26

## 2025-02-03 RX ADMIN — HEPARIN SODIUM 5000 UNITS: 5000 INJECTION INTRAVENOUS; SUBCUTANEOUS at 07:48

## 2025-02-03 RX ADMIN — MIDODRINE HYDROCHLORIDE 5 MG: 5 TABLET ORAL at 11:44

## 2025-02-03 RX ADMIN — Medication 10 ML: at 20:27

## 2025-02-03 RX ADMIN — HEPARIN SODIUM 5000 UNITS: 5000 INJECTION INTRAVENOUS; SUBCUTANEOUS at 20:26

## 2025-02-03 RX ADMIN — SODIUM CHLORIDE 40 MG: 9 INJECTION INTRAMUSCULAR; INTRAVENOUS; SUBCUTANEOUS at 08:27

## 2025-02-03 RX ADMIN — CYCLOSPORINE 25 MG: 25 CAPSULE, GELATIN COATED ORAL at 11:48

## 2025-02-03 RX ADMIN — MEROPENEM 1000 MG: 1 INJECTION INTRAVENOUS at 08:26

## 2025-02-03 RX ADMIN — NOREPINEPHRINE BITARTRATE 2 MCG/MIN: 64 SOLUTION INTRAVENOUS at 13:06

## 2025-02-03 RX ADMIN — Medication 5 MG: at 20:26

## 2025-02-03 RX ADMIN — DOCUSATE SODIUM 100 MG: 50 LIQUID ORAL at 20:26

## 2025-02-03 RX ADMIN — INSULIN GLARGINE 18 UNITS: 100 INJECTION, SOLUTION SUBCUTANEOUS at 21:59

## 2025-02-03 RX ADMIN — MIDODRINE HYDROCHLORIDE 10 MG: 10 TABLET ORAL at 16:31

## 2025-02-03 RX ADMIN — AMIODARONE HYDROCHLORIDE 200 MG: 200 TABLET ORAL at 20:26

## 2025-02-03 ASSESSMENT — PULMONARY FUNCTION TESTS
PIF_VALUE: 23
PIF_VALUE: 21
PIF_VALUE: 23
PIF_VALUE: 23
PIF_VALUE: 25
PIF_VALUE: 24
PIF_VALUE: 31
PIF_VALUE: 28
PIF_VALUE: 26
PIF_VALUE: 24
PIF_VALUE: 23
PIF_VALUE: 28
PIF_VALUE: 24
PIF_VALUE: 23
PIF_VALUE: 23
PIF_VALUE: 25
PIF_VALUE: 25
PIF_VALUE: 24
PIF_VALUE: 41
PIF_VALUE: 23
PIF_VALUE: 22
PIF_VALUE: 21
PIF_VALUE: 22
PIF_VALUE: 25
PIF_VALUE: 25
PIF_VALUE: 15
PIF_VALUE: 24

## 2025-02-03 ASSESSMENT — PAIN SCALES - GENERAL
PAINLEVEL_OUTOF10: 0
PAINLEVEL_OUTOF10: 0

## 2025-02-03 ASSESSMENT — PAIN DESCRIPTION - LOCATION: LOCATION: COCCYX

## 2025-02-03 ASSESSMENT — PAIN DESCRIPTION - DESCRIPTORS: DESCRIPTORS: OTHER (COMMENT)

## 2025-02-03 NOTE — FLOWSHEET NOTE
Shift summary    0715 report at bedside. Ppt just had flexiseal put in by night shift RN, repositioned/linen changed. Triad cream to sacral wound. Pt awake, nods yes/no weakly, follows commands weakly (wiggles toes/fingers). Requiring frequent suctioning from nose and mouth. Remains on CPAP from overnight per respiratory. Occasionally RR in upper 30s/low 40s but pt able to quickly correct.     3123-6383 meds per MAR, TF bolus given.     Plan for HD today, HD nurse here to set up for treatment.     0945 ICU rounds done see orders. Electronically signed by Elizabeth Martínez RN on 2/3/2025 at 10:52 AM    1200 hypotensive after HD- levophed started.    No other changes to assessment.     1330 WOC RN here, see note- dressings changed.     1600 incontinent of urine. Herlinda care- triad cream applied, pad changed.     Repositioned throughout shift every 2 hours see flowsheet.     1800 pt wife at bedside, updated on today's events. Requesting a different facility for pt to be discharged to instead of [facility]. Electronically signed by Elizabeth Martínez RN on 2/3/2025 at 6:45 PM    1900 report to LISANDRA Ragland. Electronically signed by Elizabeth Martínez RN on 2/3/2025 at 7:03 PM    I did reach out to Dr. Blair re: phos 1.8- no need to correct at this time. Electronically signed by Elizabeth Martínez RN on 2/3/2025 at 7:06 PM

## 2025-02-03 NOTE — PLAN OF CARE
Problem: Skin/Tissue Integrity  Goal: Skin integrity remains intact  Description: 1.  Monitor for areas of redness and/or skin breakdown  2.  Assess vascular access sites hourly  3.  Every 4-6 hours minimum:  Change oxygen saturation probe site  4.  Every 4-6 hours:  If on nasal continuous positive airway pressure, respiratory therapy assess nares and determine need for appliance change or resting period  Outcome: Not Progressing     Worsening sacral wound (see WOC note)- change to treatment plan (plurogel)

## 2025-02-03 NOTE — PLAN OF CARE
PODIATRIC MEDICINE AND SURGERY   PLAN OF CARE    NAME: Remy Upton  MRN: [id number]  DATE: February 3, 2025  TIME: 9:22 AM    PLAN AND RECOMMENDATIONS:      ASSESSMENT:  74 y.o. male with PMH significant for  diabetes, hypertension, seizures, renal transplant, ESRD on dialysis, A-fib, chronic respiratory failure status post tracheostomy, for who podiatry was consulted for management of  Right LE wounds. On examination, wounds to the right heel and right lower extremity are stable, with stable eschar formation. No drainage or purulence noted.      PLAN AND RECOMMENDATIONS::  Patient's case to be discussed with staff, Dr. Bernabe, who will provide final recommendations going forward  Wound care recommendation: Betadine wet to dry to all wounds Right LE, DSD, Merary'kerlix, lightly wrapped Ace or tape  WBAT to RLE  Recommend Vascular evaluation in the setting of PAD  Antibiotic coverage per Primary/ID   Pain management per primary team   Recommend Offloading Prevalon boots in bed at all times.  Podiatry will sign off. No current surgical plan this admission. Re-consult if any new concern arise. Thank you.  Patient will need follow up with Dr Bernabe within 7-10 days of discharge       HISTORY:  ORDERING SYSTEM PROVIDED HISTORY: RLE wounds and heel eschar, Ulceration  TECHNOLOGIST PROVIDED HISTORY:  What reading provider will be dictating this exam?->CRC     FINDINGS:  Atherosclerotic plaque identified in the right and left arterial vessels of  the lower extremities.  All vessels do appear patent.     The bilateral posterior tibial arteries and portions of the peroneal arteries  were not seen.     The remaining vessels show color flow.  There is some turbulent flow around  areas of atherosclerotic plaque.  Predominantly monophasic waveforms on the  right with biphasic waveform seen on the left through to the popliteal  artery.  Anterior tibial artery on the left shows a monophasic waveform.        Right:     Flow

## 2025-02-04 LAB
ALBUMIN SERPL-MCNC: 3.1 G/DL (ref 3.5–4.6)
ALP SERPL-CCNC: 163 U/L (ref 35–104)
ALT SERPL-CCNC: 16 U/L (ref 0–41)
ANION GAP SERPL CALCULATED.3IONS-SCNC: 10 MEQ/L (ref 9–15)
AST SERPL-CCNC: 28 U/L (ref 0–40)
BASOPHILS # BLD: 0 K/UL (ref 0–0.2)
BASOPHILS NFR BLD: 0.5 %
BILIRUB SERPL-MCNC: 0.4 MG/DL (ref 0.2–0.7)
BUN SERPL-MCNC: 54 MG/DL (ref 8–23)
CALCIUM SERPL-MCNC: 9 MG/DL (ref 8.5–9.9)
CHLORIDE SERPL-SCNC: 97 MEQ/L (ref 95–107)
CO2 SERPL-SCNC: 30 MEQ/L (ref 20–31)
CREAT SERPL-MCNC: 1.65 MG/DL (ref 0.7–1.2)
EOSINOPHIL # BLD: 0.1 K/UL (ref 0–0.7)
EOSINOPHIL NFR BLD: 1 %
ERYTHROCYTE [DISTWIDTH] IN BLOOD BY AUTOMATED COUNT: 16.1 % (ref 11.5–14.5)
GLOBULIN SER CALC-MCNC: 3.3 G/DL (ref 2.3–3.5)
GLUCOSE BLD-MCNC: 106 MG/DL (ref 70–99)
GLUCOSE BLD-MCNC: 117 MG/DL (ref 70–99)
GLUCOSE BLD-MCNC: 127 MG/DL (ref 70–99)
GLUCOSE BLD-MCNC: 154 MG/DL (ref 70–99)
GLUCOSE BLD-MCNC: 165 MG/DL (ref 70–99)
GLUCOSE BLD-MCNC: 191 MG/DL (ref 70–99)
GLUCOSE SERPL-MCNC: 126 MG/DL (ref 70–99)
HCT VFR BLD AUTO: 25.2 % (ref 42–52)
HGB BLD-MCNC: 7.9 G/DL (ref 14–18)
LYMPHOCYTES # BLD: 0.6 K/UL (ref 1–4.8)
LYMPHOCYTES NFR BLD: 10.1 %
MCH RBC QN AUTO: 28.8 PG (ref 27–31.3)
MCHC RBC AUTO-ENTMCNC: 31.3 % (ref 33–37)
MCV RBC AUTO: 92 FL (ref 79–92.2)
MONOCYTES # BLD: 0.5 K/UL (ref 0.2–0.8)
MONOCYTES NFR BLD: 7.7 %
NEUTROPHILS # BLD: 5 K/UL (ref 1.4–6.5)
NEUTS SEG NFR BLD: 80.1 %
PERFORMED ON: ABNORMAL
PLATELET # BLD AUTO: 193 K/UL (ref 130–400)
POTASSIUM SERPL-SCNC: 3.6 MEQ/L (ref 3.4–4.9)
PROT SERPL-MCNC: 6.4 G/DL (ref 6.3–8)
RBC # BLD AUTO: 2.74 M/UL (ref 4.7–6.1)
SODIUM SERPL-SCNC: 137 MEQ/L (ref 135–144)
WBC # BLD AUTO: 6.2 K/UL (ref 4.8–10.8)

## 2025-02-04 PROCEDURE — 99291 CRITICAL CARE FIRST HOUR: CPT | Performed by: INTERNAL MEDICINE

## 2025-02-04 PROCEDURE — 99222 1ST HOSP IP/OBS MODERATE 55: CPT | Performed by: NURSE PRACTITIONER

## 2025-02-04 PROCEDURE — 6370000000 HC RX 637 (ALT 250 FOR IP): Performed by: INTERNAL MEDICINE

## 2025-02-04 PROCEDURE — 99232 SBSQ HOSP IP/OBS MODERATE 35: CPT | Performed by: INTERNAL MEDICINE

## 2025-02-04 PROCEDURE — 85025 COMPLETE CBC W/AUTO DIFF WBC: CPT

## 2025-02-04 PROCEDURE — 80053 COMPREHEN METABOLIC PANEL: CPT

## 2025-02-04 PROCEDURE — 94003 VENT MGMT INPAT SUBQ DAY: CPT

## 2025-02-04 PROCEDURE — 2700000000 HC OXYGEN THERAPY PER DAY

## 2025-02-04 PROCEDURE — 2500000003 HC RX 250 WO HCPCS: Performed by: INTERNAL MEDICINE

## 2025-02-04 PROCEDURE — 2000000000 HC ICU R&B

## 2025-02-04 PROCEDURE — 94660 CPAP INITIATION&MGMT: CPT

## 2025-02-04 PROCEDURE — 6360000002 HC RX W HCPCS: Performed by: INTERNAL MEDICINE

## 2025-02-04 PROCEDURE — 2580000003 HC RX 258: Performed by: INTERNAL MEDICINE

## 2025-02-04 RX ORDER — MIDODRINE HYDROCHLORIDE 10 MG/1
20 TABLET ORAL
Status: DISCONTINUED | OUTPATIENT
Start: 2025-02-04 | End: 2025-02-05 | Stop reason: HOSPADM

## 2025-02-04 RX ADMIN — MYCOPHENOLATE MOFETIL 500 MG: 250 CAPSULE ORAL at 08:46

## 2025-02-04 RX ADMIN — CYCLOSPORINE 25 MG: 25 CAPSULE, GELATIN COATED ORAL at 21:55

## 2025-02-04 RX ADMIN — OXYCODONE AND ACETAMINOPHEN 1 TABLET: 5; 325 TABLET ORAL at 22:06

## 2025-02-04 RX ADMIN — CYCLOSPORINE 25 MG: 25 CAPSULE, GELATIN COATED ORAL at 08:46

## 2025-02-04 RX ADMIN — SODIUM CHLORIDE 40 MG: 9 INJECTION INTRAMUSCULAR; INTRAVENOUS; SUBCUTANEOUS at 08:42

## 2025-02-04 RX ADMIN — HEPARIN SODIUM 5000 UNITS: 5000 INJECTION INTRAVENOUS; SUBCUTANEOUS at 21:55

## 2025-02-04 RX ADMIN — Medication 10 ML: at 22:17

## 2025-02-04 RX ADMIN — PREDNISONE 5 MG: 10 TABLET ORAL at 08:46

## 2025-02-04 RX ADMIN — AMIODARONE HYDROCHLORIDE 200 MG: 200 TABLET ORAL at 08:46

## 2025-02-04 RX ADMIN — MEROPENEM 1000 MG: 1 INJECTION INTRAVENOUS at 08:41

## 2025-02-04 RX ADMIN — MIDODRINE HYDROCHLORIDE 10 MG: 10 TABLET ORAL at 08:46

## 2025-02-04 RX ADMIN — MYCOPHENOLATE MOFETIL 500 MG: 250 CAPSULE ORAL at 21:55

## 2025-02-04 RX ADMIN — DOCUSATE SODIUM 100 MG: 50 LIQUID ORAL at 21:55

## 2025-02-04 RX ADMIN — HEPARIN SODIUM 5000 UNITS: 5000 INJECTION INTRAVENOUS; SUBCUTANEOUS at 13:58

## 2025-02-04 RX ADMIN — Medication 10 ML: at 08:42

## 2025-02-04 RX ADMIN — CYCLOSPORINE 25 MG: 25 CAPSULE, GELATIN COATED ORAL at 13:58

## 2025-02-04 RX ADMIN — FINASTERIDE 5 MG: 5 TABLET, FILM COATED ORAL at 08:45

## 2025-02-04 RX ADMIN — AMIODARONE HYDROCHLORIDE 200 MG: 200 TABLET ORAL at 21:55

## 2025-02-04 RX ADMIN — HEPARIN SODIUM 5000 UNITS: 5000 INJECTION INTRAVENOUS; SUBCUTANEOUS at 06:41

## 2025-02-04 RX ADMIN — Medication 5 MG: at 21:55

## 2025-02-04 RX ADMIN — INSULIN GLARGINE 18 UNITS: 100 INJECTION, SOLUTION SUBCUTANEOUS at 23:56

## 2025-02-04 RX ADMIN — INSULIN LISPRO 4 UNITS: 100 INJECTION, SOLUTION INTRAVENOUS; SUBCUTANEOUS at 12:11

## 2025-02-04 ASSESSMENT — PULMONARY FUNCTION TESTS
PIF_VALUE: 21
PIF_VALUE: 20
PIF_VALUE: 26
PIF_VALUE: 22
PIF_VALUE: 21
PIF_VALUE: 27
PIF_VALUE: 19
PIF_VALUE: 19
PIF_VALUE: 20
PIF_VALUE: 22
PIF_VALUE: 22
PIF_VALUE: 21
PIF_VALUE: 23
PIF_VALUE: 21
PIF_VALUE: 21
PIF_VALUE: 30
PIF_VALUE: 28
PIF_VALUE: 21
PIF_VALUE: 20
PIF_VALUE: 21
PIF_VALUE: 23
PIF_VALUE: 20
PIF_VALUE: 23
PIF_VALUE: 25
PIF_VALUE: 22
PIF_VALUE: 21
PIF_VALUE: 21

## 2025-02-04 ASSESSMENT — PAIN DESCRIPTION - LOCATION: LOCATION: SACRUM

## 2025-02-04 ASSESSMENT — PAIN SCALES - WONG BAKER: WONGBAKER_NUMERICALRESPONSE: NO HURT

## 2025-02-04 ASSESSMENT — PAIN SCALES - GENERAL
PAINLEVEL_OUTOF10: 0
PAINLEVEL_OUTOF10: 6

## 2025-02-04 NOTE — CONSULTS
[facility]                   [address] [place] [address]                              CONSULTATION      PATIENT NAME: LUL QUILES           : 1950  MED REC NO: [id number]                        ROOM: 04  ACCOUNT NO: [id number]                       ADMIT DATE: 2025  PROVIDER: Tico Rodriguez DO    RENAL CONSULT    CONSULT DATE: 2025    REFERRING PHYSICIAN:  TANNER BARRETO      HISTORY OF PRESENT ILLNESS:  74-year-old cachectic-appearing male, admitted to the hospital from [facility] due to temperature elevation.  He has a history of kidney transplant with chronic rejection requiring p.r.n. dialysis.  The patient has respiratory insufficiency, and consequently has a trach in place in addition to a PEG for nutrition.  According to records, he spiked a temperature of 105 degrees at [facility].  He has known diabetes mellitus type 2 and history of seizures.  The patient is staring at the space.  The patient is being treated also for large sacral decubitus ulcer with meropenem.  Minimal response is noted with him.    ALLERGIES:  ALLERGIES TO MEDICATIONS:  STATINS.      MEDICATIONS:  At the time of his admission:  ProAmatine, meropenem, DuoNeb, Lantus insulin coverage, CellCept, Neoral, Cordarone, Flonase, Proscar, Deltasone.    HABITS:  Quit smoking 10 years ago.  No alcohol use.    PAST SURGICAL HISTORY:  He had a brain surgery to eliminate seizure activity, kidney transplant in , multiple upper endoscopies, trach and PEG in place.    PHYSICAL EXAMINATION:  VITAL SIGNS:  The patient is 6 feet 2 inches, 158 pounds.  Blood pressure is 100/60, heart rate 100, respirations 19, temperature 99.9.  HEENT:  Normocephalic.  Facial muscle wasting.  Trach in place with oxygen.   HEART:  Regular, 1/6 systolic murmur.  ABDOMEN:  Soft, scaphoid.  PEG is noted.  EXTREMITIES:  No edema.   SKIN:  Warm and dry.  Obvious muscle wasting is present.    IMPRESSIONS:  
    PODIATRIC MEDICINE AND SURGERY  CONSULT HISTORY AND PHYSICAL    Consulting Service:  Primary  Requesting Provider: N/A  Opinion/advice regarding: RLE wounds, RLE heel wound   Staff Doctor:  Dr. Bernabe    ASSESSMENT:  74 y.o. male with PMH significant for  diabetes, hypertension, seizures, renal transplant, ESRD on dialysis, A-fib, chronic respiratory failure status post tracheostomy, for who podiatry was consulted for management of  Right LE wounds. On examination, wounds to the right heel and right lower extremity are stable, with stable eschar formation. No drainage or purulence noted.     PLAN AND RECOMMENDATIONS::  Patient's case to be discussed with staff, Dr. Bernabe, who will provide final recommendations going forward  Wound care recommendation: Betadine wet to dry to all wounds Right LE, DSD, Merary'kerlix, lightly wrapped Ace or tape  WBAT to RLE  X-rays of RLE  ordered for baseline  Arterial duplex ultrasound of RLE ordered for baseline.   Antibiotic coverage per Primary/ID   Pain management per primary team   Recommend Offloading Prevalon boots in bed at all times.  Podiatry to follow results of Arterial duplex and X-rays peripherally. No current surgical plan this admission. Will wait on results of above studies and will likely sign off with outpatient follow-up.  Patient will need follow up with Dr Bernabe within 7-10 days of discharge      HPI: This 74 y.o. year old male was seen today for Right LE wounds.  Patient's family states that he has had the wounds for quire some time now. Admits it started as a blister to the right heel, but then turned into a wound. Has not been following with anyone outpatient yet.  Patient denies nausea, vomiting, diarrhea, fevers, chills, chest pain, shortness of breath, head ache, or calf pain.  No other pedal complaints.     Past Medical History:   Diagnosis Date    Diabetes mellitus (HCC)     Hemodialysis access, fistula mature (HCC) 12/2002    Hypertension     
Infectious Diseases Inpatient Consult Note      Reason for Consult:   Fevers the patient was discharged few days ago  Requesting Physician:   Dr Higginbotham  Primary Care Physician:  Tico Rodriguez DO  History Obtained From:   Pt, EPIC    Admit Date: 1/31/2025  Hospital Day: 2      HISTORY OF PRESENT ILLNESS:  This is a 74 y.o. male with past medical history of diabetes mellitus 2, end-stage renal disease on hemodialysis, hypertension, failed renal transplant, seizures, respiratory failure status post tracheostomy and PEG tube placement, discharged on January 29 on IV meropenem to [facility].  Wife was called that patient had 105 fever.  After admission patient had low-grade fevers with Tmax of 100.2.  Has no significant endotracheal secretions.  Extensive wounds including sacral decubitus ulcer and right leg wounds are unchanged.   Patient does not have a Walters catheter.  He gets hemodialysis intermittently through right upper extremity AV fistula  Patient's vital signs have been stable, currently on no pressors.  Currently on assist-control 40%.   Patient is unable to provide review of system or history because of expressive aphasia    Past medical surgical and social history were reviewed    Past Medical History:   Diagnosis Date    Diabetes mellitus (HCC)     Hemodialysis access, fistula mature (HCC) 12/2002    Hypertension     Rhinovirus 12/2024    Seizures (HCC)     no seizure since 1995    Smoking        Past Surgical History:   Procedure Laterality Date    BRAIN SURGERY Left 12/06/1990    to eliminate seizures    COLONOSCOPY N/A 10/24/2022    COLONOSCOPY DIAGNOSTIC performed by Va Ordoñez MD at [facility]    KIDNEY TRANSPLANT  2015    [facility] INCL FLUOR GDNCE DX W/CELL WASHG SPX N/A 03/20/2018    BRONCHOSCOPY [place] Cristopher Conde [place] [facility]    TRACHEOSTOMY  12/30/2024    UPPER GASTROINTESTINAL ENDOSCOPY N/A 10/21/2022    EGD DIAGNOSTIC ONLY performed by Jaime Martinez MD at [facility] 
Palliative Care Consult Note  Patient: Remy Upton  Gender: male  YOB: 1950  Unit/Bed: IC04/IC04-01  CodeStatus: Limited  Inpatient Treatment Team: Treatment Team:   Isauro Weaver, Tico Vergara DO Abbud, Rita A, MD O'Donnell, MD Noah Walden Lisa, RN Zaizafoun, Manaf, MD West, LISANDRA Lou, LISANDRA Bryan, Lily Dinh, Carolina LEUNG RN  Admit Date:  1/31/2025    Chief Complaint: Fever    History of Presenting Illness:      Remy Upton is a 74 y.o. male on hospital day 4 with a history of diabetes, hypertension, seizures, renal transplant, ESRD on dialysis, A-fib, chronic respiratory failure status post tracheostomy.    Patient presented to [facility] from [facility] with fever. Sent to [facility] for continuity of care. Admitted for sepsis due to pneumonia versus sacral wound infection.     Palliative care consulted for goals of care. Multiple hospitalizations in the last 6 months. Was at nursing home for extended portion of 2024. Required trach/PEG placement during an admission in December. Patient is currently alert, following some commands. Able to answer some simple questions via eye movements and nodding head. Spouse at bedside.       Review of Systems:       Review of Systems   Unable to perform ROS: Mental status change       Physical Examination:       BP (!) 121/52   Pulse 78   Temp 97.6 °F (36.4 °C) (Oral)   Resp 14   Ht 1.88 m (6' 2\")   Wt 68.4 kg (150 lb 12.7 oz)   SpO2 100%   BMI 19.36 kg/m²    Physical Exam  Constitutional:       General: He is not in acute distress.     Appearance: He is ill-appearing.   HENT:      Head: Normocephalic and atraumatic.      Nose: No rhinorrhea.   Eyes:      General:         Right eye: No discharge.         Left eye: No discharge.   Cardiovascular:      Rate and Rhythm: Normal rate. Rhythm irregular.   Pulmonary:      Breath sounds: Rhonchi present. No rales.   Abdominal:      General: Bowel sounds are 
Renal consult dictated    
culture  Pressure support as tolerated  DVT prophylaxis currently on heparin subcu  Resume Eliquis 2.5 twice daily  Vent support lung protective strategy  head of the bed 30°  Pressure support trials as tolerated  Watch for ICU delirium: TV on, natural light, avoid benzos, pain control, early mobility, and family engagement  PUD prophylaxis  Target blood sugar 140-180  Monitor and replace electrolyte as needed       DW Dr Rodriguez       Thank you for consultation        Due to the immediate potential for life-threatening deterioration due to sepsis, I spent 35 minutes providing critical care.  This time is excluding time spent performing procedures.    Electronically signed by Carlotta Yadav MD, [facility],  on 2/1/2025 at 8:32 AM

## 2025-02-04 NOTE — PLAN OF CARE
Problem: Chronic Conditions and Co-morbidities  Goal: Patient's chronic conditions and co-morbidity symptoms are monitored and maintained or improved  2/3/2025 1934 by Ellyn Aguilar RN  Outcome: Progressing  2/3/2025 1850 by Elizabeth Martínez RN  Outcome: Progressing     Problem: Discharge Planning  Goal: Discharge to home or other facility with appropriate resources  2/3/2025 1934 by Ellyn Aguilar RN  Outcome: Progressing  2/3/2025 1850 by Elizabeth Martínez RN  Outcome: Progressing     Problem: ABCDS Injury Assessment  Goal: Absence of physical injury  2/3/2025 1934 by Ellyn Aguilar RN  Outcome: Progressing  2/3/2025 1850 by Elizabeth Martínez RN  Outcome: Progressing     Problem: Safety - Adult  Goal: Free from fall injury  2/3/2025 1934 by Ellyn Aguilar RN  Outcome: Progressing  2/3/2025 1850 by Elizabeth Martínez RN  Outcome: Progressing     Problem: Skin/Tissue Integrity  Goal: Skin integrity remains intact  Description: 1.  Monitor for areas of redness and/or skin breakdown  2.  Assess vascular access sites hourly  3.  Every 4-6 hours minimum:  Change oxygen saturation probe site  4.  Every 4-6 hours:  If on nasal continuous positive airway pressure, respiratory therapy assess nares and determine need for appliance change or resting period  2/3/2025 1934 by Ellyn Aguilar RN  Outcome: Progressing  2/3/2025 1850 by Elizabeth Martínez RN  Outcome: Not Progressing     Problem: Pain  Goal: Verbalizes/displays adequate comfort level or baseline comfort level  2/3/2025 1934 by Ellyn Aguilar RN  Outcome: Progressing  2/3/2025 1850 by Elizabeth Martínez RN  Outcome: Progressing     Problem: Nutrition Deficit:  Goal: Optimize nutritional status  2/3/2025 1934 by Ellyn Aguilar RN  Outcome: Progressing  2/3/2025 1850 by Elizabeth Martínez RN  Outcome: Progressing     Problem: Skin/Tissue Integrity  Goal: Skin integrity remains intact  Description: 1.  Monitor for areas of redness and/or skin breakdown  2.

## 2025-02-04 NOTE — INTERDISCIPLINARY ROUNDS
Spiritual Care Services     Summary of Visit:   participated in ICU rounds. Patient trached and no family were present. Staff are working on long term placement issues for patient.    Encounter Summary  Encounter Overview/Reason: Interdisciplinary rounds  Service Provided For: Patient  Referral/Consult From: Rounding  Support System: Children, Family members  Complexity of Encounter: Low  Begin Time: 1100  End Time : 1115  Total Time Calculated: 15 min        Spiritual/Emotional needs  Type: Spiritual Support                      Spiritual Assessment/Intervention/Outcomes:    Assessment: Unable to assess (Patient trached)    Intervention: Sustaining Presence/Ministry of presence    Outcome: Did not respond      Care Plan:    Plan and Referrals  Plan/Referrals: Continue Support (comment)     to provide continued support as needed or requested      Spiritual Care Services   Electronically signed by Chaplain Neeta on 2/4/2025 at 10:40 AM.    To reach a  for emotional and spiritual support, place an EPIC consult request.   If a  is needed immediately, dial “0” and ask to page the on-call .

## 2025-02-05 VITALS
HEART RATE: 76 BPM | TEMPERATURE: 97.9 F | OXYGEN SATURATION: 100 % | DIASTOLIC BLOOD PRESSURE: 135 MMHG | RESPIRATION RATE: 16 BRPM | HEIGHT: 74 IN | SYSTOLIC BLOOD PRESSURE: 161 MMHG | WEIGHT: 161.82 LBS | BODY MASS INDEX: 20.77 KG/M2

## 2025-02-05 LAB
ALBUMIN SERPL-MCNC: 3.1 G/DL (ref 3.5–4.6)
ANION GAP SERPL CALCULATED.3IONS-SCNC: 13 MEQ/L (ref 9–15)
BASOPHILS # BLD: 0 K/UL (ref 0–0.2)
BASOPHILS NFR BLD: 0.6 %
BUN SERPL-MCNC: 62 MG/DL (ref 8–23)
CALCIUM SERPL-MCNC: 9 MG/DL (ref 8.5–9.9)
CHLORIDE SERPL-SCNC: 96 MEQ/L (ref 95–107)
CO2 SERPL-SCNC: 28 MEQ/L (ref 20–31)
CREAT SERPL-MCNC: 1.79 MG/DL (ref 0.7–1.2)
EOSINOPHIL # BLD: 0.1 K/UL (ref 0–0.7)
EOSINOPHIL NFR BLD: 1.3 %
ERYTHROCYTE [DISTWIDTH] IN BLOOD BY AUTOMATED COUNT: 16 % (ref 11.5–14.5)
GLUCOSE BLD-MCNC: 124 MG/DL (ref 70–99)
GLUCOSE BLD-MCNC: 130 MG/DL (ref 70–99)
GLUCOSE BLD-MCNC: 136 MG/DL (ref 70–99)
GLUCOSE BLD-MCNC: 164 MG/DL (ref 70–99)
GLUCOSE SERPL-MCNC: 107 MG/DL (ref 70–99)
HCT VFR BLD AUTO: 25.7 % (ref 42–52)
HGB BLD-MCNC: 8.1 G/DL (ref 14–18)
LYMPHOCYTES # BLD: 0.6 K/UL (ref 1–4.8)
LYMPHOCYTES NFR BLD: 9.1 %
MCH RBC QN AUTO: 28.8 PG (ref 27–31.3)
MCHC RBC AUTO-ENTMCNC: 31.5 % (ref 33–37)
MCV RBC AUTO: 91.5 FL (ref 79–92.2)
MONOCYTES # BLD: 0.5 K/UL (ref 0.2–0.8)
MONOCYTES NFR BLD: 7.6 %
NEUTROPHILS # BLD: 5.4 K/UL (ref 1.4–6.5)
NEUTS SEG NFR BLD: 81 %
PERFORMED ON: ABNORMAL
PHOSPHATE SERPL-MCNC: 2.4 MG/DL (ref 2.3–4.8)
PLATELET # BLD AUTO: 207 K/UL (ref 130–400)
POTASSIUM SERPL-SCNC: 3.9 MEQ/L (ref 3.4–4.9)
RBC # BLD AUTO: 2.81 M/UL (ref 4.7–6.1)
SODIUM SERPL-SCNC: 137 MEQ/L (ref 135–144)
WBC # BLD AUTO: 6.7 K/UL (ref 4.8–10.8)

## 2025-02-05 PROCEDURE — 80069 RENAL FUNCTION PANEL: CPT

## 2025-02-05 PROCEDURE — 6370000000 HC RX 637 (ALT 250 FOR IP): Performed by: INTERNAL MEDICINE

## 2025-02-05 PROCEDURE — 94003 VENT MGMT INPAT SUBQ DAY: CPT

## 2025-02-05 PROCEDURE — 2700000000 HC OXYGEN THERAPY PER DAY

## 2025-02-05 PROCEDURE — 6370000000 HC RX 637 (ALT 250 FOR IP): Performed by: NURSE PRACTITIONER

## 2025-02-05 PROCEDURE — 99232 SBSQ HOSP IP/OBS MODERATE 35: CPT | Performed by: INTERNAL MEDICINE

## 2025-02-05 PROCEDURE — 6360000002 HC RX W HCPCS: Performed by: INTERNAL MEDICINE

## 2025-02-05 PROCEDURE — 85025 COMPLETE CBC W/AUTO DIFF WBC: CPT

## 2025-02-05 PROCEDURE — 2580000003 HC RX 258: Performed by: INTERNAL MEDICINE

## 2025-02-05 PROCEDURE — 99233 SBSQ HOSP IP/OBS HIGH 50: CPT | Performed by: INTERNAL MEDICINE

## 2025-02-05 PROCEDURE — 2500000003 HC RX 250 WO HCPCS: Performed by: INTERNAL MEDICINE

## 2025-02-05 PROCEDURE — 94660 CPAP INITIATION&MGMT: CPT

## 2025-02-05 RX ORDER — MIDODRINE HYDROCHLORIDE 10 MG/1
10 TABLET ORAL
DISCHARGE
Start: 2025-02-05

## 2025-02-05 RX ORDER — INSULIN GLARGINE 100 [IU]/ML
18 INJECTION, SOLUTION SUBCUTANEOUS DAILY
DISCHARGE
Start: 2025-02-05

## 2025-02-05 RX ADMIN — MIDODRINE HYDROCHLORIDE 20 MG: 10 TABLET ORAL at 08:11

## 2025-02-05 RX ADMIN — HEPARIN SODIUM 5000 UNITS: 5000 INJECTION INTRAVENOUS; SUBCUTANEOUS at 13:20

## 2025-02-05 RX ADMIN — MEROPENEM 1000 MG: 1 INJECTION INTRAVENOUS at 08:07

## 2025-02-05 RX ADMIN — PREDNISONE 5 MG: 10 TABLET ORAL at 08:12

## 2025-02-05 RX ADMIN — OXYCODONE AND ACETAMINOPHEN 1 TABLET: 5; 325 TABLET ORAL at 08:11

## 2025-02-05 RX ADMIN — HEPARIN SODIUM 5000 UNITS: 5000 INJECTION INTRAVENOUS; SUBCUTANEOUS at 06:11

## 2025-02-05 RX ADMIN — AMIODARONE HYDROCHLORIDE 200 MG: 200 TABLET ORAL at 08:12

## 2025-02-05 RX ADMIN — DOCUSATE SODIUM 100 MG: 50 LIQUID ORAL at 08:11

## 2025-02-05 RX ADMIN — FINASTERIDE 5 MG: 5 TABLET, FILM COATED ORAL at 08:12

## 2025-02-05 RX ADMIN — CYCLOSPORINE 25 MG: 25 CAPSULE, GELATIN COATED ORAL at 08:12

## 2025-02-05 RX ADMIN — CYCLOSPORINE 25 MG: 25 CAPSULE, GELATIN COATED ORAL at 13:20

## 2025-02-05 RX ADMIN — SODIUM CHLORIDE 40 MG: 9 INJECTION INTRAMUSCULAR; INTRAVENOUS; SUBCUTANEOUS at 08:11

## 2025-02-05 RX ADMIN — Medication 10 ML: at 08:12

## 2025-02-05 RX ADMIN — MIDODRINE HYDROCHLORIDE 20 MG: 10 TABLET ORAL at 13:20

## 2025-02-05 RX ADMIN — POLYETHYLENE GLYCOL 3350 17 G: 17 POWDER, FOR SOLUTION ORAL at 08:11

## 2025-02-05 RX ADMIN — MYCOPHENOLATE MOFETIL 500 MG: 250 CAPSULE ORAL at 08:12

## 2025-02-05 ASSESSMENT — PULMONARY FUNCTION TESTS
PIF_VALUE: 21
PIF_VALUE: 20
PIF_VALUE: 23
PIF_VALUE: 19
PIF_VALUE: 25
PIF_VALUE: 23
PIF_VALUE: 20
PIF_VALUE: 21
PIF_VALUE: 22
PIF_VALUE: 24
PIF_VALUE: 19
PIF_VALUE: 20
PIF_VALUE: 26
PIF_VALUE: 20
PIF_VALUE: 23
PIF_VALUE: 20
PIF_VALUE: 21
PIF_VALUE: 21
PIF_VALUE: 23
PIF_VALUE: 21
PIF_VALUE: 27
PIF_VALUE: 26
PIF_VALUE: 22
PIF_VALUE: 20
PIF_VALUE: 25
PIF_VALUE: 23
PIF_VALUE: 20
PIF_VALUE: 19
PIF_VALUE: 25
PIF_VALUE: 20
PIF_VALUE: 19

## 2025-02-05 ASSESSMENT — PAIN SCALES - GENERAL: PAINLEVEL_OUTOF10: 4

## 2025-02-05 ASSESSMENT — PAIN DESCRIPTION - LOCATION: LOCATION: SACRUM;BACK

## 2025-02-05 NOTE — INTERDISCIPLINARY ROUNDS
Spiritual Care Services     Summary of Visit:   participated in interdisciplinary rounds, pt was not responsive, no family was present, pt will possible be transferred to another facility, Spiritual Services will continue as needed.     Encounter Summary  Encounter Overview/Reason: Interdisciplinary rounds  Service Provided For: Patient  Referral/Consult From: Rounding  Support System: Children, Family members  Complexity of Encounter: Low  Begin Time: 1100  End Time : 1115  Total Time Calculated: 15 min        Spiritual/Emotional needs  Type: Spiritual Support                      Spiritual Assessment/Intervention/Outcomes:    Assessment: Unable to assess (Patient trached)    Intervention: Sustaining Presence/Ministry of presence    Outcome: Did not respond      Care Plan:    Plan and Referrals  Plan/Referrals: Continue Support (comment)          Spiritual Care Services   Electronically signed by Chaplain Chin on 2/5/2025 at 1:08 PM.    To reach a  for emotional and spiritual support, place an EPIC consult request.   If a  is needed immediately, dial “0” and ask to page the on-call .C

## 2025-02-05 NOTE — DISCHARGE SUMMARY
[facility] Medicine Discharge Summary    Remy Upton  :  1950  MRN:  [id number]    Admit date:  2025    Discharge date:  2025    Admitting Physician:  No admitting provider for patient encounter.  Primary Care Physician:  Tico Rodriguez, DO    Discharge Diagnoses:    Principal Problem:    Fever in adult  Active Problems:    Sepsis due to other etiology (HCC)    Sacral decubitus ulcer, stage III (HCC)    Chronic respiratory failure  Resolved Problems:    * No resolved hospital problems. *    No chief complaint on file.    Condition: improved  Activity: no strenuous activity   Diet: TF  Disposition: [facility]  Functional Status: bed bound    Significant Findings:     Recent Labs     25  0652 25  0633 25  1249   WBC 6.7 6.2 6.5   HGB 8.1* 7.9* 8.3*   HCT 25.7* 25.2* 26.6*   MCV 91.5 92.0 92.0    193 185         Hospital Course:   Very ill 74-year-old male currently bedbound with a history of renal transplant x2, ESRD, chronic respiratory failure with hypoxia on ventilator via tracheostomy, diabetes, atrial fibrillation, multiple sacral decubitus ulcers was hospitalized for septic shock suspected to be secondary to infected sacral decubitus ulcer.  He had previously been hospitalized  -  and before that was hospitalized  - .     General surgery discussed potential debridement of ulcers with patient's wife who was not interested- surgery also did not believe it was in his best interest and recommended continued wound care.    Infectious disease recommend he continue on IV meropenem for 2 weeks after discharge.    The patient was also seen by palliative care throughout the hospitalization-family wishes to continue aggressive care at this time however in the event of a cardiac arrest, they do not want CPR.        Exam on Discharge:   BP (!) 161/135   Pulse 76   Temp 97.9 °F (36.6 °C) (Axillary)   Resp 16

## 2025-02-05 NOTE — PLAN OF CARE
Problem: Chronic Conditions and Co-morbidities  Goal: Patient's chronic conditions and co-morbidity symptoms are monitored and maintained or improved  2/4/2025 2037 by Ellyn Aguilar RN  Outcome: Progressing  2/4/2025 1116 by Carolina Dinh RN  Outcome: Progressing     Problem: Discharge Planning  Goal: Discharge to home or other facility with appropriate resources  2/4/2025 2037 by Ellyn Aguilar RN  Outcome: Progressing  2/4/2025 1116 by Carolina Dinh RN  Outcome: Progressing     Problem: ABCDS Injury Assessment  Goal: Absence of physical injury  2/4/2025 2037 by Ellyn Aguilar RN  Outcome: Progressing  2/4/2025 1116 by Carolina Dinh RN  Outcome: Progressing     Problem: Safety - Adult  Goal: Free from fall injury  2/4/2025 2037 by Ellyn Aguilar RN  Outcome: Progressing  2/4/2025 1116 by Carolina Dinh RN  Outcome: Progressing     Problem: Skin/Tissue Integrity  Goal: Skin integrity remains intact  Description: 1.  Monitor for areas of redness and/or skin breakdown  2.  Assess vascular access sites hourly  3.  Every 4-6 hours minimum:  Change oxygen saturation probe site  4.  Every 4-6 hours:  If on nasal continuous positive airway pressure, respiratory therapy assess nares and determine need for appliance change or resting period  2/4/2025 2037 by Ellyn Aguilar RN  Outcome: Progressing  2/4/2025 1116 by Carolina Dinh RN  Outcome: Progressing  Flowsheets (Taken 2/4/2025 0800)  Skin Integrity Remains Intact: Monitor for areas of redness and/or skin breakdown     Problem: Pain  Goal: Verbalizes/displays adequate comfort level or baseline comfort level  2/4/2025 2037 by Ellyn Aguilar RN  Outcome: Progressing  2/4/2025 1116 by Carolina Dinh RN  Outcome: Progressing     Problem: Nutrition Deficit:  Goal: Optimize nutritional status  2/4/2025 2037 by Ellyn Aguilar RN  Outcome: Progressing  2/4/2025 1116 by Carolina Dinh RN  Outcome: Progressing

## 2025-02-05 NOTE — PROGRESS NOTES
02/04/25 0712   Vent Information   Vent Mode CPAP/PS   Ventilator Settings   FiO2  28 %   PEEP/CPAP (cmH2O) 5   Pressure Support (cm H2O) 12 cm H2O   Weaning Parameters   Respiratory Rate Observed 27   Ve 7.99      RSBI 86   Airway Procedures   $$ Airway Procedures CPAP study       
   02/04/25 1539   Patient Observation   Pulse 87   SpO2 100 %   Vent Information   Vent Mode AC/VC  (Patient changed back to AC mode at this time. Patient is asleep and keeps going apneic.)   Ventilator Settings   FiO2  28 %   Insp Time (sec) 1 sec   Vt (Set, mL) 500 mL   Resp Rate (Set) 14 bpm   PEEP/CPAP (cmH2O) 5       
   02/05/25 1440   Vent Information   Vent Mode   (pt placed on transfer vent by [facility] to TOF)   Ventilator Settings   FiO2  28 %   PEEP/CPAP (cmH2O) 5   Vent Patient Data (Readings)   Vt (Measured) 305 mL   Peak Inspiratory Pressure (cmH2O) 22 cmH2O   Rate Measured 23 br/min   Minute Volume (L/min) 0 Liters   Mean Airway Pressure (cmH2O) 11 cmH20       
  Critical Care Progress Note    2025 9:15 AM    Subjective:   Admit Date: 2025  PCP: Tico Rodriguez DO    No chief complaint on file.    Interval History: Had to go on Levophed after he finished dialysis yesterday.  He has been on it at night as well.  Levophed was as high as 7 mcg.      Medications:   Scheduled Meds:   mycophenolate  500 mg Oral BID    predniSONE  5 mg Oral Daily    cycloSPORINE  25 mg Oral TID    midodrine  10 mg Oral TID WC    sodium chloride flush  5-40 mL IntraVENous 2 times per day    heparin (porcine)  5,000 Units SubCUTAneous 3 times per day    pantoprazole (PROTONIX) 40 mg in sodium chloride (PF) 0.9 % 10 mL injection  40 mg IntraVENous Daily    insulin lispro  0-16 Units SubCUTAneous Q4H    finasteride  5 mg Oral Daily    amiodarone  200 mg Oral BID    insulin glargine  18 Units SubCUTAneous Nightly    melatonin  5 mg Oral Nightly    meropenem  1,000 mg IntraVENous Q24H    docusate  100 mg Oral BID    polyethylene glycol  17 g Oral Daily     Continuous Infusions:   sodium chloride      dextrose      norepinephrine 7 mcg/min (25 185)         Objective:   Vitals:   Temp (24hrs), Av.2 °F (36.8 °C), Min:97.8 °F (36.6 °C), Max:99.2 °F (37.3 °C)    BP (!) 126/47   Pulse 86   Temp 97.8 °F (36.6 °C) (Axillary)   Resp 14   Ht 1.88 m (6' 2\")   Wt 68.4 kg (150 lb 12.7 oz)   SpO2 100%   BMI 19.36 kg/m²   I/O:24HR INTAKE/OUTPUT:    Intake/Output Summary (Last 24 hours) at 2025 0915  Last data filed at 2/3/2025 2000  Gross per 24 hour   Intake 975.02 ml   Output 100 ml   Net 875.02 ml          Ventilator Settings:  Vent Mode: CPAP/PS  Resp Rate (Set): 14 bpm   Vt (Set, mL): 500 mL       PEEP/CPAP (cmH2O): 5   FiO2 : 28 %     Physical Exam:     General appearance -ill appearing  Mental status - alert, oriented to person, place, and time  Eyes - pupils equal and reactive, extraocular eye movements intact  Nose - normal and patent   Neck -trach in place, on mechanical 
  Critical Care Progress Note    2025 9:20 AM    Subjective:   Admit Date: 2025  PCP: Tico Rodriguez DO    No chief complaint on file.    Interval History: No major issues overnight.  Has been off of Levophed.  No fever or chills.  Blood pressure is marginal.    12 point review of systems has been obtained and negative except as mentioned in HPI.      Medications:   Scheduled Meds:   midodrine  20 mg Oral TID WC    mycophenolate  500 mg Oral BID    predniSONE  5 mg Oral Daily    cycloSPORINE  25 mg Oral TID    sodium chloride flush  5-40 mL IntraVENous 2 times per day    heparin (porcine)  5,000 Units SubCUTAneous 3 times per day    pantoprazole (PROTONIX) 40 mg in sodium chloride (PF) 0.9 % 10 mL injection  40 mg IntraVENous Daily    insulin lispro  0-16 Units SubCUTAneous Q4H    finasteride  5 mg Oral Daily    amiodarone  200 mg Oral BID    insulin glargine  18 Units SubCUTAneous Nightly    melatonin  5 mg Oral Nightly    meropenem  1,000 mg IntraVENous Q24H    docusate  100 mg Oral BID    polyethylene glycol  17 g Oral Daily     Continuous Infusions:   sodium chloride      dextrose           Objective:   Vitals:   Temp (24hrs), Av.7 °F (36.5 °C), Min:97.6 °F (36.4 °C), Max:97.9 °F (36.6 °C)    BP (!) 108/25   Pulse (!) 104   Temp 97.9 °F (36.6 °C) (Axillary)   Resp 18   Ht 1.88 m (6' 2\")   Wt 73.4 kg (161 lb 13.1 oz)   SpO2 100%   BMI 20.78 kg/m²   I/O:24HR INTAKE/OUTPUT:    Intake/Output Summary (Last 24 hours) at 2025 0920  Last data filed at 2025 0728  Gross per 24 hour   Intake 1313.17 ml   Output --   Net 1313.17 ml          Ventilator Settings:  Vent Mode: (S) CPAP/PS  Resp Rate (Set): 14 bpm   Vt (Set, mL): 500 mL       PEEP/CPAP (cmH2O): 5   FiO2 : 28 %     Physical Exam:     General appearance -ill appearing  Mental status - alert, oriented to person, place, and time  Eyes - pupils equal and reactive, extraocular eye movements intact  Nose - normal and patent   Neck -trach 
  Physician Progress Note      PATIENT:               LUL QUILES  CSN #:                  [id number]  :                       1950  ADMIT DATE:       2025 10:33 PM  DISCH DATE:  RESPONDING  PROVIDER #:        Isauro Weaver DO          QUERY TEXT:    Patient admitted with Sepsis due to pneumonia versus sacral wound infection.   Pneumonia ruled out.  On admission  temp 98.6,  WBC 7.2, lactic acid 1.4,   procalcitonin  0.89.  In order to support the diagnosis of sepsis, please   include additional clinical indicators in your documentation.  Or please   document if the diagnosis of sepsis has been ruled out after further study    The medical record reflects the following:  Risk Factors: 74 yom, Infected  stage III decubiti ulcers, current   tracheostomy and vent dependent, chronic respiratory failure,  ESRD on HD,    PEG in place  Clinical Indicators:  temp 98.6,  WBC 7.2, lactic acid 1.4, procalcitonin    0.89, presents with reported fever as high as 105F, Urine and blood cultures,   NGTD,  Treatment: ID, renal, intensivist, Podiatry, General surgeon, dietitian   consult, Meropenem, Vancomycin, urine, blood cultures, TF, labs, IVF    Thank you  Alona Virk BSN RN CDS   M-F 6am-2pm  Options provided:  -- Sepsis present as evidenced by, Please document evidence.  -- Sepsis was ruled out after study  -- Other - I will add my own diagnosis  -- Disagree - Not applicable / Not valid  -- Disagree - Clinically unable to determine / Unknown  -- Refer to Clinical Documentation Reviewer    PROVIDER RESPONSE TEXT:    Sepsis is present as evidenced by HR>90, change in mentation    Query created by: Alona Virk on 2/3/2025 9:00 AM      QUERY TEXT:    Patient admitted with sepsis. Noted to have dietician assessment with  severe   malnutrition on 25. If possible, please document in progress notes and   discharge summary if you are evaluating and /or treating any of the 
3 hours of HD was completed. Net uf 2 L. Pt tolerated well. Report given to bedside nurse Elizabeth Cooper RN.   
Beta paint to BLE per orders.  Wet to dry dressing to Rt. Heel with Kerlix wrap.  BLE/feet elevated on pillows. Triad cream to Sacral, buttocks, and buttock folds wounds as per physician order.  
Call placed to patients wife, was informed of patient arrival.  She states that she will come to see him in the AM.  
Comprehensive Nutrition Assessment    Type and Reason for Visit:  Initial, Positive nutrition screen    Nutrition Recommendations/Plan:   Continue current EN regimen: Renal TF (Nepro), 325 ml Bolus QID via PEG  Water flush 50 ml before and after each bolus     Malnutrition Assessment:  Malnutrition Status:  Severe malnutrition (02/01/25 1244)    Context:  Chronic Illness     Findings of the 6 clinical characteristics of malnutrition:  Energy Intake:  No decrease in energy intake  Weight Loss:  Greater than 10% over 6 months (10.9% x 6 months)     Body Fat Loss:  Severe body fat loss Orbital, Triceps, Fat Overlying Ribs   Muscle Mass Loss:  Severe muscle mass loss Temples (temporalis), Clavicles (pectoralis & deltoids), Scapula (trapezius)  Fluid Accumulation:  Unable to assess     Strength:  Not Performed    Nutrition Assessment:    Severe protein calorie malnutrition related to chronic illness, severe fat loss and muscle wasting present. Pt recently discharged after prolonged hospitalization requiring mechanical ventilation, s/p tracheostomy and PEG tube placement. Energy and protein needs met with bolus PEG tube feedings. RD to continue to monitor tolerance.    Nutrition Related Findings:    Pt presents with 105 fever. Pmhx: DM2, ESRD/CKD4 on HD, Trach (12/30/24), PEG (1/2/25). Pt tolerating TF at goal x 1 month. Labs reviewed: Gluc <160; elevated BUN/Cr; hyponatremia. Meds reviewed. +2 BUE edema noted. BM PTA. Glycolax ordered. Extensive wounds. Wound Type: Multiple, Pressure Injury       Current Nutrition Intake & Therapies:    Average Meal Intake: NPO  Average Supplements Intake: NPO  ADULT TUBE FEEDING; PEG; Renal Formula; Bolus; 4 Times Daily; 325; Gravity; 50; Before and after each bolus  Current Tube Feeding (TF) Orders:  Feeding Route: PEG  Formula: Renal Formula  Schedule: Bolus  Feeding Regimen: 325 ml QID (1300 ml)  Water Flushes: 50 ml before and after each bolus (400 ml)  Current TF Provides: 2340 
Dr. Lawson at bedside.  Dressings removed from BLE and sacrum.  Patient has multiple areas of scabbing to wounds of BLE.  Large scabbed area noted to Rt. Heel. No drainage noted from these areas.  BLE left DIONICIO and heels elevated on pillows.  Sacrum has very large, deep (Stg 3/4) decubitus along with multiple smaller decubitus to bilateral buttocks and bilateral gluteal folds (Stg. 2).  Serous/yellow drainage noted on linens from areas.  
Dr. Lawson at bedside.  See new orders.    
Infectious Diseases Inpatient Progress Note          HISTORY OF PRESENT ILLNESS:  Follow up persistent low-grade fever in a patient who was recently treated for septic shock, healthcare acquired pneumonia, acute respiratory failure requiring tracheostomy and PEG tube placement, stage III sacral decubitus ulcer with prolonged hospitalization, on IV meropenem  Patient continues to have low-grade fever with Tmax being 100.4, currently without fevers.   Thank you currently on assist-control at 28%  No endotracheal secretions being suctioned  Eyes are open but nonverbal  Currently with left upper extremity PICC line  Gets hemodialysis through  right forearm AV fistula.  Decreasing right arm swelling since he was dialyzed.  Needed low-dose Levophed after dialysis  Incontinent of liquid stools, currently with Flexi-Seal  Tolerating tube feeding  Has extensive wounds of right leg and sacrum  Patient has history of failed renal transplant.  Currently with end-stage renal disease on hemodialysis through right upper extremity AV fistula  Current Medications:     mycophenolate  500 mg Oral BID    predniSONE  5 mg Oral Daily    cycloSPORINE  25 mg Oral TID    midodrine  5 mg Oral TID WC    sodium chloride flush  5-40 mL IntraVENous 2 times per day    heparin (porcine)  5,000 Units SubCUTAneous 3 times per day    pantoprazole (PROTONIX) 40 mg in sodium chloride (PF) 0.9 % 10 mL injection  40 mg IntraVENous Daily    insulin lispro  0-16 Units SubCUTAneous Q4H    finasteride  5 mg Oral Daily    amiodarone  200 mg Oral BID    insulin glargine  18 Units SubCUTAneous Nightly    melatonin  5 mg Oral Nightly    meropenem  1,000 mg IntraVENous Q24H    docusate  100 mg Oral BID    polyethylene glycol  17 g Oral Daily       Allergies:  Statins      Review of Systems  unable to provide ROS because of acute illness  Shakes head yes with question about pain    Physical Exam  Vitals:    02/03/25 1000 02/03/25 1030 02/03/25 1145 02/03/25 1149 
Infectious Diseases Inpatient Progress Note          HISTORY OF PRESENT ILLNESS:  Follow up persistent low-grade fever in a patient who was recently treated for septic shock, healthcare acquired pneumonia, acute respiratory failure requiring tracheostomy and PEG tube placement, stage III sacral decubitus ulcer with prolonged hospitalization, on IV meropenem  Patient continues to have low-grade fever with Tmax being 100.4.   Thank you currently on CPAP at 28%  No endotracheal secretions being suctioned  Eyes are open but nonverbal  Currently with left upper extremity PICC line  Gets hemodialysis through  right forearm AV fistula.  Persistent right arm swelling  Incontinent of liquid stools  Tolerating tube feeding  Has extensive wounds of right leg and sacrum  Patient has history of failed renal transplant.  Currently with end-stage renal disease on hemodialysis through right upper extremity AV fistula  Current Medications:     sodium chloride flush  5-40 mL IntraVENous 2 times per day    heparin (porcine)  5,000 Units SubCUTAneous 3 times per day    pantoprazole (PROTONIX) 40 mg in sodium chloride (PF) 0.9 % 10 mL injection  40 mg IntraVENous Daily    midodrine  20 mg Oral TID WC    insulin lispro  0-16 Units SubCUTAneous Q4H    finasteride  5 mg Oral Daily    amiodarone  200 mg Oral BID    insulin glargine  18 Units SubCUTAneous Nightly    melatonin  5 mg Oral Nightly    meropenem  1,000 mg IntraVENous Q24H    docusate  100 mg Oral BID    polyethylene glycol  17 g Oral Daily       Allergies:  Statins      Review of Systems  unable to provide ROS because of acute illness      Physical Exam  Vitals:    02/02/25 0655 02/02/25 0700 02/02/25 0824 02/02/25 1200   BP:  (!) 156/73  (!) 148/65   Pulse: 97 94 (!) 102 92   Resp: 15 16 15 14   Temp:   99.6 °F (37.6 °C) 98 °F (36.7 °C)   TempSrc:   Oral Oral   SpO2:  100%  100%   Weight:       Height:         General Appearance: alert, follows simple commands 
Infectious Diseases Inpatient Progress Note          HISTORY OF PRESENT ILLNESS:  Follow up persistent low-grade fever in a patient who was recently treated for septic shock, healthcare acquired pneumonia, acute respiratory failure requiring tracheostomy and PEG tube placement, stage III sacral decubitus ulcer with prolonged hospitalization, on IV meropenem  Patient has been afebrile.    currently on CPAP at 28%  No endotracheal secretions being suctioned  Eyes are open but nonverbal.  Denies any pain or shortness of breath  Currently with left upper extremity PICC line  Gets hemodialysis through  right forearm AV fistula.  Decreasing right arm swelling since he was dialyzed.  Needed low-dose Levophed after dialysis  Incontinent of liquid stools, currently with Flexi-Seal  Tolerating tube feeding  Has extensive wounds of right leg and sacrum  Patient has history of failed renal transplant.  Currently with end-stage renal disease on hemodialysis through right upper extremity AV fistula  Current Medications:     midodrine  20 mg Oral TID WC    mycophenolate  500 mg Oral BID    predniSONE  5 mg Oral Daily    cycloSPORINE  25 mg Oral TID    sodium chloride flush  5-40 mL IntraVENous 2 times per day    heparin (porcine)  5,000 Units SubCUTAneous 3 times per day    pantoprazole (PROTONIX) 40 mg in sodium chloride (PF) 0.9 % 10 mL injection  40 mg IntraVENous Daily    insulin lispro  0-16 Units SubCUTAneous Q4H    finasteride  5 mg Oral Daily    amiodarone  200 mg Oral BID    insulin glargine  18 Units SubCUTAneous Nightly    melatonin  5 mg Oral Nightly    meropenem  1,000 mg IntraVENous Q24H    docusate  100 mg Oral BID    polyethylene glycol  17 g Oral Daily       Allergies:  Statins      Review of Systems  unable to provide ROS because of acute illness  Shakes head yes with question about pain    Physical Exam  Vitals:    02/04/25 1119 02/04/25 1200 02/04/25 1300 02/04/25 1400   BP:  (!) 161/24 117/62 (!) 141/66 
Nephrology Progress Note    Assessment:  CKD 3a-b  Cachexia  Trach & PEG  Hx KIdney transplant  DM type-2  AF      Plan: no dialysis today     Patient Active Problem List:     Sepsis due to other etiology (HCC)     Abdominal pain, other specified site     Acute pyelonephritis without lesion of renal medullary necrosis     Anemia     Essential hypertension     Nonspecific abnormal results of thyroid function study     Mixed hyperlipidemia     Kidney replaced by transplant     Intra-abdominal abscess post-procedure (HCC)     Infection due to Enterococcus     Fever and other physiologic disturbances of temperature regulation     Chronic kidney disease (CKD)     Type 2 diabetes mellitus with stage 3b chronic kidney disease, without long-term current use of insulin (HCC)     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 (HCC)     Abnormal CT of the chest     Bronchiectasis without complication (HCC)     GI bleeding     Complication of transplanted kidney     Muscle weakness (generalized)     Difficulty in walking     Elevated BUN     Urinary retention     Immunosuppression due to drug therapy (HCC)     DELORES (acute kidney injury) (HCC)     Adjustment disorder     Gross hematuria     Bilateral lower extremity edema     Dysuria     Acute cystitis without hematuria     Bilateral hearing loss     Abscess, toe     Acute hyperkalemia     Acute pain of left shoulder     Astigmatism     Astigmatism of both eyes with presbyopia     At risk for stroke     Benign enlargement of prostate     Benign prostatic hyperplasia with urinary frequency     Rhinovirus     Chronic right shoulder 
Nephrology Progress Note    Assessment:  CKD 3b    Kidney transplanr  DM type-2      Plan:    Patient Active Problem List:     Sepsis due to pneumonia (HCC)     Abdominal pain, other specified site     Acute pyelonephritis without lesion of renal medullary necrosis     Anemia     Essential hypertension     Nonspecific abnormal results of thyroid function study     Mixed hyperlipidemia     Kidney replaced by transplant     Intra-abdominal abscess post-procedure (HCC)     Infection due to Enterococcus     Fever and other physiologic disturbances of temperature regulation     Chronic kidney disease (CKD)     Type 2 diabetes mellitus with stage 3b chronic kidney disease, without long-term current use of insulin (HCC)     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 (HCC)     Abnormal CT of the chest     Bronchiectasis without complication (HCC)     GI bleeding     Complication of transplanted kidney     Muscle weakness (generalized)     Difficulty in walking     Elevated BUN     Urinary retention     Immunosuppression due to drug therapy (HCC)     DELORES (acute kidney injury) (HCC)     Adjustment disorder     Gross hematuria     Bilateral lower extremity edema     Dysuria     Acute cystitis without hematuria     Bilateral hearing loss     Abscess, toe     Acute hyperkalemia     Acute pain of left shoulder     Astigmatism     Astigmatism of both eyes with presbyopia     At risk for stroke     Benign enlargement of prostate     Benign prostatic hyperplasia with urinary frequency     Rhinovirus     Chronic right shoulder pain     Condition not found     Deep vein thrombosis 
PHARMACY NOTE:   ICU Rounds Attended (10-15 minutes in patient room):    Pt diagnosis: fever    IV Fluids: none   Renal:   Recent Labs     01/31/25 2310 02/01/25 0400 02/02/25  0503   CREATININE 1.94* 1.99* 2.18*    Estimated Creatinine Clearance: 29 mL/min (A) (based on SCr of 2.18 mg/dL (H)).        Antimicrobial Therapy:   Day 3/7   Antimicrobial agents: merrem  Cultures no growth   ID on consult: yes    Recent Labs     01/31/25 2310 02/01/25 0400 02/02/25  0505   WBC 7.5 7.2 4.8      Pressors:   Norepinephrine-follow up if running not on MAR     Sedation:   none    Insulin Therapy (goal: 140-180): high SSI   12 units given past 24 hours   Lantus 18 units   Recent Labs     01/31/25 2310 02/01/25 0400 02/02/25  0503   GLUCOSE 143* 135* 136*       Steroid Therapy: prednisone 5mg (home dose)    Stress Ulcer Prophylaxis:   Pantoprazole      DVT Prophylaxis/Anticoagulant Therapy: heparin TID  Recent Labs     01/31/25 2310 02/01/25 0400 02/02/25  0505    219 139     No results for input(s): \"INR\" in the last 72 hours.      Bowel Regimen:   Colace BID  Miralax     Follow up/Changes:   -no changes made on rounds     Digna Morgan RPH PharmD   2/3/2025 1:01 PM  
PHARMACY NOTE:   Interdisciplinary Rounds Completed     Changes made today by Pharmacy:   Increased midodrine to 20mg TID per verbal from Dr. Toscano on rounds  Dc levo per verbal on rounds from Earnestine SMYTH      Additional information:   Steroid: prednisone 5mg (home dose)  Insulin coverage: high sliding scale with Humalog, utilized 8 units per sliding scale over the past 24 hours. Lantus 18 units  Pressors: dc'd  Sedation: none  Antimicrobial therapy, day #4/7 (during admission): Merrem. ID on consult  Core measures assessed/met    Digna Morgan RPH PharmD  2/4/2025 10:35 AM    
Patient arrived to Room 4 from [facility].  Arrives oriented to self (baseline). Trach/vent dependent.  RT at bedside to set up vent.  Report received from [facility] at this time.  Patient noted to have BLE dressings and sacral dressings that are clean, dry, intact at this time.  Will assess areas.    
Patient has had low grade fever during the night.  Medicated with Tylenol prn.  Split-gauze dressing removed for trach care.  Noted 2 wounds at area of trach insertion site.  Sanguinous and purulent drainage noted from areas.  Cleansed with Normal saline, patted dry, new split-gauze dressing applied.  All other dressings completed as per orders.  Triad cream to sacral, buttocks, and gluteal fold wounds as per order.  Side to side turning maintained through night.    
Patient hypertensive with /81, 201/104 and 156/108. Primary RN at bedside with another patient. Per her request, attending physician notified of blood pressures and request for PRN medication via secure message. Awaiting orders at this time.     Electronically signed by Farrah Lam RN on 2/2/2025 at 2:30 PM    
Patient incontinent of B + B.  Incontinent care rendered.  All wound care completed as per orders.  Patient slightly febrile.  See flow sheets.  A-fib on telemetry.  No acute distress observed.  Report to Cinda FRY at this time.  
Patient noted to have low grade temp.  Has just had episodes of sustained hypotension   Dr. DELMY vásquez and ordered Norepinephrine, however patient returned to normotensive without intervention.  Patient is A-fib on telemetry with known history.    
Patient readmitted for low-grade fevers from his nursing home.  Patient has been readmitted a number of times.    He has a current tracheostomy and a PEG tube in place.  He is bedbound.  He has stage II/III decubitus ulcerations with fibrinous exudate present.    The last time I talked with his wife, she was not interested in proceeding with sacral decubitus debridement.  I do not believe it is in his best interest nor the solution to his issues.    When I see the wife again we will have further discussions regarding repeat consultations for sacral decubitus debridement.  I think current wound care is most appropriate  
Patient was bladder scanned for 677 ml in bladder.  Straight cath for dark/tea colored urine with flex noted and malodorous.  Specimen obtained and sent to lab.  Swab for respiratory panel also obtained and sent to lab.  All other blood specimens were obtained by phlebotomy.    
Physician Progress Note    2/1/2025   3:07 PM    Name:  Remy Upton  MRN:    [id number]     IP Day: 1     Admit Date: 1/31/2025 10:33 PM  PCP: Tico Rodriguez DO    Code Status:  Limited    Assessment and Plan:        1.  Septic shock.  DDx-UTI, infected ulcer  -IV meropenem per infectious disease  -Pressor support to maintain MAP greater than 65  -Currently DNR with intubation.  Should have more formal goals of care discussion when family is present at bedside     ESRD  Chronic respiratory failure with hypoxia on ventilator via tracheostomy  Type 2 diabetes  Atrial fibrillation  Functional debility  BPH    Diet: ADULT TUBE FEEDING; PEG; Renal Formula; Bolus; 4 Times Daily; 325; Gravity; 50; Before and after each bolus  Limited    ICU    Electronically signed by Isauro Weaver DO on 2/1/2025 at 3:07 PM    Subjective:     Does not provide subjective history    Current Facility-Administered Medications   Medication Dose Route Frequency Provider Last Rate Last Admin    sodium chloride flush 0.9 % injection 5-40 mL  5-40 mL IntraVENous 2 times per day Fredi Lawson MD   10 mL at 02/01/25 0953    sodium chloride flush 0.9 % injection 5-40 mL  5-40 mL IntraVENous PRN Fredi Lawson MD        0.9 % sodium chloride infusion   IntraVENous PRN Fredi Lawson MD        acetaminophen (TYLENOL) tablet 650 mg  650 mg Oral Q6H PRN Fredi Lawson MD        Or    acetaminophen (TYLENOL) suppository 650 mg  650 mg Rectal Q6H PRN Fredi Lawson MD        heparin (porcine) injection 5,000 Units  5,000 Units SubCUTAneous 3 times per day Fredi Lawson MD   5,000 Units at 02/01/25 1224    glucose chewable tablet 16 g  4 tablet Oral PRN Fredi Lawson MD        dextrose bolus 10% 125 mL  125 mL IntraVENous PRN Fredi Lawson MD        Or    dextrose bolus 10% 250 mL  250 mL IntraVENous PRN Fredi Lawson MD        glucagon injection 1 mg  1 mg SubCUTAneous PRN Fredi Lawson MD        dextrose 10 % infusion   
Physician Progress Note    2/2/2025   12:48 PM    Name:  Remy Upton  MRN:    [id number]     IP Day: 2     Admit Date: 1/31/2025 10:33 PM  PCP: Tico Rodriguez DO    Code Status:  Limited    Assessment and Plan:        1.  Septic shock suspect secondary to UTI: Improved.  Off pressors  -IV meropenem per infectious disease.  Follow cultures     H/o renal transplant resume transplant meds  ESRD  Chronic respiratory failure with hypoxia on ventilator via tracheostomy  Type 2 diabetes  Atrial fibrillation  Functional debility  BPH  Sacral decubitus ulcers present on admission    Diet: ADULT TUBE FEEDING; PEG; Renal Formula; Bolus; 4 Times Daily; 325; Gravity; 50; Before and after each bolus  Limited    ICU.  Return LTAC at discharge    Electronically signed by Isauro Weaver DO on 2/2/2025 at 12:48 PM    Subjective:     Color improved.  Off pressors    Current Facility-Administered Medications   Medication Dose Route Frequency Provider Last Rate Last Admin    sodium chloride flush 0.9 % injection 5-40 mL  5-40 mL IntraVENous 2 times per day Fredi Lawson MD   10 mL at 02/02/25 0829    sodium chloride flush 0.9 % injection 5-40 mL  5-40 mL IntraVENous PRN Fredi Lawson MD        0.9 % sodium chloride infusion   IntraVENous PRN Fredi Lawson MD        acetaminophen (TYLENOL) tablet 650 mg  650 mg Oral Q6H PRN Fredi Lawson MD   650 mg at 02/01/25 2313    Or    acetaminophen (TYLENOL) suppository 650 mg  650 mg Rectal Q6H PRN Fredi Lawson MD        heparin (porcine) injection 5,000 Units  5,000 Units SubCUTAneous 3 times per day Fredi Lawson MD   5,000 Units at 02/02/25 0617    glucose chewable tablet 16 g  4 tablet Oral PRN Fredi Lawson MD        dextrose bolus 10% 125 mL  125 mL IntraVENous PRN Fredi Lawson MD        Or    dextrose bolus 10% 250 mL  250 mL IntraVENous PRN Fredi Lawson MD        glucagon injection 1 mg  1 mg SubCUTAneous PRN Fredi Lawson MD        dextrose 10 % infusion  
Physician Progress Note    2/4/2025   3:31 PM    Name:  Remy Upton  MRN:    [id number]     IP Day: 4     Admit Date: 1/31/2025 10:33 PM  PCP: Tico Rodriguez DO    Code Status:  Limited    Assessment and Plan:        1.  Septic shock secondary to UTI versus sacral decubitus ulcer: Improved.  Off pressors  -IV meropenem per infectious disease.  Follow cultures  -Palliative care following  -Awaiting discharge plan-patient does not qualify for LTAC but may return to [facility] which is a nursing facility that takes patients on ventilator.  Care coordination is looking into this-awaiting decision from patient's wife     H/o renal transplant resume transplant meds  ESRD  Chronic respiratory failure with hypoxia on ventilator via tracheostomy  Type 2 diabetes  Atrial fibrillation  Functional debility  BPH  Sacral decubitus ulcers present on admission    Diet: ADULT TUBE FEEDING; PEG; Renal Formula; Bolus; 4 Times Daily; 325; Gravity; 50; Before and after each bolus  Limited    ICU.  Return LTAC at discharge    Electronically signed by Isauro Weaver DO on 2/4/2025 at 3:31 PM    Subjective:     Resting comfortably    Current Facility-Administered Medications   Medication Dose Route Frequency Provider Last Rate Last Admin    midodrine (PROAMATINE) tablet 20 mg  20 mg Oral TID WC Earnestine Reddy, APRN - CNP        mycophenolate (CELLCEPT) capsule 500 mg  500 mg Oral BID Isauro Weaver DO   500 mg at 02/04/25 0846    predniSONE (DELTASONE) tablet 5 mg  5 mg Oral Daily Isauro Weaver DO   5 mg at 02/04/25 0846    cycloSPORINE (SANDIMMUNE) capsule 25 mg  25 mg Oral TID Isauro Weaver DO   25 mg at 02/04/25 1358    labetalol (NORMODYNE;TRANDATE) injection 5 mg  5 mg IntraVENous Q4H PRN Isauro Weaver DO        sodium chloride flush 0.9 % injection 5-40 mL  5-40 mL IntraVENous 2 times per day Fredi Lawson MD   10 mL at 02/04/25 0842    sodium chloride flush 0.9 % injection 5-40 mL  5-40 mL 
Pt. Trached on vent. On cpap throughout day. Wife at bedside most of day.   Pt. Afib rhythm, rate elevated at times, mostly with repositioning/discomfort. BP elevated at times, BP cuff adjusted. New orders placed. BP improved without further intervention.  Temp 99's at beginning of shift, came down to 98's. Receiving IV ATB. Picc dressing changed per unit policy.  Trach care provided during shift. Pt. Does have abrasions/open areas under trach site. Cleaned and dressing applied.   Wound dressings to BLE in place.   Pt. Has multiple wounds to sacrum, buttocks, gluteal folds. Cleaned x2 during shift after BM's. Triad cream applied. Pt. Repositioned throughout shift to offload pressure.  Pt. Had US BLE during shift.   Received TF boluses. Tolerating well.   
Pulmonary & Critical Care Medicine ICU Progress Note  Chief complaint : Respiratory failure    Subjunctive/24 hour events :   Patient seen and examined during multidisciplinary rounds with RN, charge nurse, RT, pharmacy, dietitian, and social service.     Awake on vent, eyes open, weak, did not follow commands, he is on 28% FiO2 saturation 90%, no fever.    New information updated in the note today, rest of the examination did not change compared to yesterday.  Social History     Tobacco Use    Smoking status: Former     Current packs/day: 0.00     Types: Cigarettes     Quit date: 2015     Years since quittin.6    Smokeless tobacco: Former   Substance Use Topics    Alcohol use: No     Alcohol/week: 0.0 standard drinks of alcohol         Problem Relation Age of Onset    Colon Cancer Neg Hx        No results for input(s): \"PHART\", \"XAH7AXD\", \"PO2ART\" in the last 72 hours.    MV Settings:  Vent Mode: CPAP/PS Resp Rate (Set): 14 bpm/Vt (Set, mL): 500 mL/ /FiO2 : 28 %           IV:   sodium chloride      dextrose      norepinephrine         Vitals:  BP (!) 156/73   Pulse (!) 102   Temp 99.6 °F (37.6 °C) (Oral)   Resp 15   Ht 1.88 m (6' 2\")   Wt 68.4 kg (150 lb 12.7 oz)   SpO2 100%   BMI 19.36 kg/m²    Tmax:        Intake/Output Summary (Last 24 hours) at 2025 0845  Last data filed at 2025 0655  Gross per 24 hour   Intake 10 ml   Output 1000 ml   Net -990 ml       EXAM:  General: alert, cooperative, no distress  Head: normocephalic, atraumatic  Eyes:No gross abnormalities.  ENT:  MMM no lesions  Neck:  supple and no masses, tracheostomy in place, pressure ulcer end of the trach  Chest : clear to auscultation bilaterally- no wheezes, rales or rhonchi, normal air movement, no respiratory distress  Heart:: Heart sounds are normal.  Regular rate and rhythm without murmur, gallop or rub.  ABD:  symmetric, soft, non-tender, no guarding or rebound  Musculoskeletal : no cyanosis, no clubbing, and no 
Spiritual Health History and Assessment/Progress Note  [facility]    Initial Encounter,  ,  ,      Name: Remy Upton MRN: [id number]    Age: 74 y.o.     Sex: male   Language: English   Taoism: Jainism   Fever in adult     Date: 2/3/2025            Total Time Calculated: 15 min              Spiritual Assessment began in [facility] [place] [place] ICU        Referral/Consult From: Rounding   Encounter Overview/Reason: Initial Encounter  Service Provided For: Patient     visited patient in ICU. Patient currently trached. No family were present.  unable to make further assessment at this time.     Marian, Belief, Meaning:   Patient unable to assess at this time  Family/Friends No family/friends present      Importance and Influence:  Patient unable to assess at this time  Family/Friends No family/friends present    Community:  Patient Other: Unable to assess  Family/Friends No family/friends present    Assessment and Plan of Care:     Patient Interventions include: Other: Ministry of presence and support  Family/Friends Interventions include: No family/friends present    Patient Plan of Care: Spiritual Care available upon further referral  Family/Friends Plan of Care: Spiritual Care available upon further referral    Electronically signed by Chaplain Neeta on 2/3/2025 at 11:18 AM   
Appearance: alert, follows simple commands appropriately  Intact tracheostomy, on CPAP 28%  Skin: warm and dry, no rash.   Head: normocephalic and atraumatic  Eyes: anicteric sclerae  ENT: Positive large tongue ulcer at the tip, normal mucous membranes. No oral thrush  Lungs: Assisted ventilation, clear lungs  Heart normal S1-S2, regular rhythm  Abdomen: soft, no tenderness, intact PEG tube  No leg edema  Right leg with intact dressing  Sacral decubitus ulcer not visualized today   Positive bilateral upper extremity edema, decreasing  Intact left upper extremity PICC line, placed on January 10  Intact right wrist AV fistula  Flexi-Seal with loose stools  DATA:    Lab Results   Component Value Date    WBC 6.7 02/05/2025    HGB 8.1 (L) 02/05/2025    HCT 25.7 (L) 02/05/2025    MCV 91.5 02/05/2025     02/05/2025     Lab Results   Component Value Date    CREATININE 1.79 (H) 02/05/2025    BUN 62 (H) 02/05/2025     02/05/2025    K 3.9 02/05/2025    CL 96 02/05/2025    CO2 28 02/05/2025       Hepatic Function Panel:  Lab Results   Component Value Date/Time    ALKPHOS 163 02/04/2025 06:33 AM    ALT 16 02/04/2025 06:33 AM    AST 28 02/04/2025 06:33 AM    BILITOT 0.4 02/04/2025 06:33 AM    BILIDIR 0.9 12/22/2024 05:19 AM    IBILI 0.3 12/22/2024 05:19 AM     Blood cultures are negative  Sputum cultures positive for diphtheroids  Urine culture is negative  IMPRESSION:    Sepsis with persistent low-grade fever in the patient was treated recently for sepsis and healthcare acquired pneumonia with acute respiratory failure requiring tracheostomy  Multiple unstageable wounds including sacral decubitus ulcer, right leg and heel  End-stage renal disease on hemodialysis  History of renal transplant        PLAN:  May discharge today on IV meropenem for 2 weeks for probable infected sacral decubitus ulcer  DC PICC line in 2 weeks  Follow-up CBC BMP and CRP weekly   Follow-up in 2 weeks  DC Flexi-Seal prior to 
BID Fredi Lawson MD   100 mg at 25 2154    polyethylene glycol (GLYCOLAX) packet 17 g  17 g Oral Daily Fredi Lawson MD   17 g at 25 0828    norepinephrine (LEVOPHED) 16 mg in sodium chloride 0.9 % 250 mL infusion (premix)  1-100 mcg/min IntraVENous Continuous Carlotta Yadav MD 1.9 mL/hr at 25 1306 2 mcg/min at 25 1306       Physical Examination:      Vitals:  BP (!) 103/30   Pulse (!) 102   Temp 97.9 °F (36.6 °C)   Resp 12   Ht 1.88 m (6' 2\")   Wt 68.4 kg (150 lb 12.7 oz)   SpO2 100%   BMI 19.36 kg/m²   Temp (24hrs), Av.7 °F (37.1 °C), Min:97.6 °F (36.4 °C), Max:100.4 °F (38 °C)      General appearance: color improved.  Nods yes/no slowly.  Anasarca present.  Tracheostomy noted  Lungs: clear to auscultation bilaterally, normal effort  Heart: regular rate and rhythm, no murmur  Abdomen: soft, nontender, nondistended, bowel sounds present, no masses  Extremities: no edema, redness, tenderness in the calves. Cap refill <2s  Skin: no gross lesions, rashes    Data:     Labs:  Recent Labs     25  0505 25  1249   WBC 4.8 6.5   HGB 12.5* 8.3*    185     Recent Labs     25  0503 25  1249   * 137   K 4.0  4.0 3.5   CL 94* 96   CO2 26 29   BUN 71* 35*   CREATININE 2.18* 1.15   GLUCOSE 136* 131*     Recent Labs     25  2310   AST 27   ALT 21   BILITOT 0.6   ALKPHOS 167*       
Daily     Continuous Infusions:   sodium chloride      dextrose      norepinephrine         CBC:   Recent Labs     02/01/25  0400 02/02/25  0505   WBC 7.2 4.8   HGB 8.6* 12.5*    139     CMP:    Recent Labs     01/31/25  2310 02/01/25  0400 02/02/25  0503   * 133* 134*   K 4.1 4.2 4.0  4.0   CL 95 93* 94*   CO2 26 25 26   BUN 64* 66* 71*   CREATININE 1.94* 1.99* 2.18*   GLUCOSE 143* 135* 136*   CALCIUM 8.7 8.8 8.6   LABGLOM 35.9* 34.8* 31.2*     Troponin: No results for input(s): \"TROPONINI\" in the last 72 hours.  BNP: No results for input(s): \"BNP\" in the last 72 hours.  INR: No results for input(s): \"INR\" in the last 72 hours.  Lipids: No results for input(s): \"CHOL\", \"LDLDIRECT\", \"TRIG\", \"HDL\", \"AMYLASE\", \"LIPASE\" in the last 72 hours.  Liver:   Recent Labs     01/31/25  2310   AST 27   ALT 21   ALKPHOS 167*   BILITOT 0.6     Iron:  No results for input(s): \"FERRITIN\" in the last 72 hours.    Invalid input(s): \"IRONS\", \"LABIRONS\"  Urinalysis: No results for input(s): \"UA\" in the last 72 hours.    Objective:  Vitals: BP (!) 156/73   Pulse (!) 102   Temp 99.6 °F (37.6 °C) (Oral)   Resp 15   Ht 1.88 m (6' 2\")   Wt 68.4 kg (150 lb 12.7 oz)   SpO2 100%   BMI 19.36 kg/m²    Wt Readings from Last 3 Encounters:   02/02/25 68.4 kg (150 lb 12.7 oz)   01/29/25 74.4 kg (164 lb)   01/13/25 80 kg (176 lb 5.9 oz)      24HR INTAKE/OUTPUT:    Intake/Output Summary (Last 24 hours) at 2/2/2025 0906  Last data filed at 2/2/2025 0655  Gross per 24 hour   Intake 10 ml   Output 1000 ml   Net -990 ml       General:not alert, in no apparent distress  HEENT: normocephalic, atraumatic, anicteric  Neck: supple, no mass Trach  Lungs: non-labored respirations, clear to auscultation bilaterally  Heart: regular rate and rhythm, no murmurs or rubsPEG  Ext: no cyanosis, no peripheral edema  Neuro: alert and oriented, no gross abnormalities  Psych: normal mood and affect  Skin: no rash      Electronically signed by Tico ELDRIDGE 
meropenem  1,000 mg IntraVENous Q24H    docusate  100 mg Oral BID    polyethylene glycol  17 g Oral Daily     Continuous Infusions:   sodium chloride      dextrose         CBC:   Recent Labs     02/04/25  0633 02/05/25  0652   WBC 6.2 6.7   HGB 7.9* 8.1*    207     CMP:    Recent Labs     02/03/25  1249 02/04/25  0633 02/05/25  0652    137 137   K 3.5 3.6 3.9   CL 96 97 96   CO2 29 30 28   BUN 35* 54* 62*   CREATININE 1.15 1.65* 1.79*   GLUCOSE 131* 126* 107*   CALCIUM 9.0 9.0 9.0   LABGLOM 67.2 43.5* 39.5*     Troponin: No results for input(s): \"TROPONINI\" in the last 72 hours.  BNP: No results for input(s): \"BNP\" in the last 72 hours.  INR: No results for input(s): \"INR\" in the last 72 hours.  Lipids: No results for input(s): \"CHOL\", \"LDLDIRECT\", \"TRIG\", \"HDL\", \"AMYLASE\", \"LIPASE\" in the last 72 hours.  Liver:   Recent Labs     02/04/25  0633   AST 28   ALT 16   ALKPHOS 163*   BILITOT 0.4     Iron:  No results for input(s): \"FERRITIN\" in the last 72 hours.    Invalid input(s): \"IRONS\", \"LABIRONS\"  Urinalysis: No results for input(s): \"UA\" in the last 72 hours.    Objective:  Vitals: BP (!) 108/25   Pulse (!) 104   Temp 97.9 °F (36.6 °C) (Axillary)   Resp 18   Ht 1.88 m (6' 2\")   Wt 73.4 kg (161 lb 13.1 oz)   SpO2 100%   BMI 20.78 kg/m²    Wt Readings from Last 3 Encounters:   02/04/25 73.4 kg (161 lb 13.1 oz)   01/29/25 74.4 kg (164 lb)   01/13/25 80 kg (176 lb 5.9 oz)      24HR INTAKE/OUTPUT:    Intake/Output Summary (Last 24 hours) at 2/5/2025 1030  Last data filed at 2/5/2025 0728  Gross per 24 hour   Intake 1313.17 ml   Output --   Net 1313.17 ml       General: alert, in no apparent distress  HEENT: normocephalic, atraumatic, anicteric  Neck: supple, no mass  Lungs: non-labored respirations, clear to auscultation bilaterally  Heart: regular rate and rhythm, no murmurs or rubs  Abdomen: soft, non-tender, non-distended  Ext: no cyanosis, no peripheral edema  Neuro: alert and oriented, no 
time caring for this patient with life threatening, unstable organ failure, including direct patient contact, review of medical record, management of life support systems, review of data including imaging and labs, discussions with other team members, patient's family and physicians at least 31 minutes so far today.        Electronically signed by Becky Toscano MD on 2/3/2025 at 8:57 AM            
toe    Acute hyperkalemia    Acute pain of left shoulder    Astigmatism    Astigmatism of both eyes with presbyopia    At risk for stroke    Benign enlargement of prostate    Benign prostatic hyperplasia with urinary frequency    Rhinovirus    Chronic right shoulder pain    Condition not found    Deep vein thrombosis (DVT) of lower extremity (HCC)    Diabetes mellitus type 2 without retinopathy ([facility])    Edema    Gait difficulty    GERD (gastroesophageal reflux disease)    H/O lower gastrointestinal bleeding    History of blood transfusion    Incomplete emptying of bladder    Insulin long-term use (HCC)    Leg weakness, bilateral    Localized swelling of both lower legs    Peripheral nerve disease    Pseudophakia of both eyes    PTDM (post-transplant diabetes mellitus) ([facility])    Posterior vitreous detachment    Rhabdomyolysis    Seizure disorder ([facility])    Stage 5 chronic kidney disease with transplanted kidney ([facility])    Leukocytes in urine    Atrial fibrillation with RVR ([facility])    Moderate malnutrition ([facility])    Palliative care encounter    Goals of care, counseling/discussion    Advanced care planning/counseling discussion    Encounter for hospice care discussion    Lower GI bleed    Unable to eat    Gastrostomy tube dysfunction ([facility])    Shock    Acute respiratory failure    Decubitus ulcer of sacral region, unstageable ([facility])    Fever in adult    Sacral decubitus ulcer, stage III ([facility])    Chronic respiratory failure       Measurements:  Wound 12/21/24 Ankle Left;Medial purple, nonblanchable (Active)   Wound Image    02/03/25 1344   Wound Etiology Deep tissue/Injury 02/03/25 1344   Dressing Status Reinforced dressing 02/03/25 1344   Wound Cleansed Soap and water 02/01/25 2000   Dressing/Treatment Foam 02/03/25 1344   Offloading for Diabetic Foot Ulcers Offloading ordered 02/01/25 2000   Dressing Change Due 01/11/25 02/01/25 2000   Wound Assessment Purple/maroon;Non-blanchable erythema 02/03/25 1344   Drainage Amount None

## 2025-02-05 NOTE — CARE COORDINATION
I called wife and spoke at length to see if she would like [facility] and I gave her the address. I also brought up [facility]. She wants to speak to her daughters and family and will let us know. She has spoke to [facility] and may want him to return there and will speak to family and let us know. If wife does want [facility] we can fax info to Susy from admissions at [phone]. Wife also states her power was out for 4 days and she had to deal with that. [facility] also does not have contract with [facility] and they are not sure they could get one. I will wait to fax info until Mrs. Upton lets me know.   
ICU team rounds done earlier this am and family not present. I called [facility] and spoke to Edil cedeño : possible referral on patient. They do not take [facility] or do 1 time contract for [facility] patients.   
Team ICU rounds done this am and pt has Pall care referral. I received call from patients brother Juan who states they would like a family meeting to include patients brothers ,wife,nephew and they would like to know his prognosis. We spoke at length re: pt status and I also let Juan know I  messaged Dr. JILLIAN Rodriguez to also speak to wife as she trusts what he says for pt.   
Team ICU rounds done this am and pt is cleared to go to [facility]. I called and they can take him today, I set up [facility] for 1400 today. I called and spoke to both daughters on speaker phone and we talked at length re: his status and plan. Both daughters are agreeable to [facility]. I did try to call wife and she did not answer and I am unable to leave message.   
recommended. I did message Dr. Rodriguez to ask if he can also speak to wife re:prognosis and hospice if he feels that is appropriate. If SNF needed again we will try for [facility] per wife's request.     The Plan for Transition of Care is related to the following treatment goals of Fever in adult [R50.9]    IF APPLICABLE: The Patient and/or patient representative Remy and his family were provided with a choice of provider and agrees with the discharge plan. Freedom of choice list with basic dialogue that supports the patient's individualized plan of care/goals and shares the quality data associated with the providers was provided to:     Patient Representative Name:       The Patient and/or Patient Representative Agree with the Discharge Plan?      Lily MEADOWS Jovani  Case Management Department  Ph: [phone]

## 2025-02-06 LAB
BACTERIA BLD CULT ORG #2: NORMAL
BACTERIA BLD CULT: NORMAL

## 2025-04-16 ENCOUNTER — APPOINTMENT (OUTPATIENT)
Dept: OTOLARYNGOLOGY | Facility: CLINIC | Age: 75
End: 2025-04-16

## 2025-04-20 NOTE — PLAN OF CARE
Problem: Chronic Conditions and Co-morbidities  Goal: Patient's chronic conditions and co-morbidity symptoms are monitored and maintained or improved  Outcome: Progressing  Flowsheets (Taken 11/4/2024 2348)  Care Plan - Patient's Chronic Conditions and Co-Morbidity Symptoms are Monitored and Maintained or Improved:   Monitor and assess patient's chronic conditions and comorbid symptoms for stability, deterioration, or improvement   Collaborate with multidisciplinary team to address chronic and comorbid conditions and prevent exacerbation or deterioration   Update acute care plan with appropriate goals if chronic or comorbid symptoms are exacerbated and prevent overall improvement and discharge     Problem: Discharge Planning  Goal: Discharge to home or other facility with appropriate resources  Outcome: Progressing  Flowsheets (Taken 11/4/2024 2348)  Discharge to home or other facility with appropriate resources:   Identify barriers to discharge with patient and caregiver   Arrange for needed discharge resources and transportation as appropriate   Identify discharge learning needs (meds, wound care, etc)     Problem: Skin/Tissue Integrity  Goal: Absence of new skin breakdown  Description: 1.  Monitor for areas of redness and/or skin breakdown  2.  Assess vascular access sites hourly  3.  Every 4-6 hours minimum:  Change oxygen saturation probe site  4.  Every 4-6 hours:  If on nasal continuous positive airway pressure, respiratory therapy assess nares and determine need for appliance change or resting period.  Outcome: Progressing     Problem: Safety - Adult  Goal: Free from fall injury  Outcome: Progressing      No indicators present

## (undated) DEVICE — STERILE POLYISOPRENE POWDER-FREE SURGICAL GLOVES: Brand: PROTEXIS

## (undated) DEVICE — AIRLIFE™ MISTY MAX 10™ NEBULIZER WITH 7 FOOT (2.1 M) CRUSH RESISTANT OXYGEN TUBING, BAFFLED TEE ADAPTER (22 MM I.D./22 MM O.D.), MOUTHPIECE AND 6 INCH (15 CM) FLEXTUBE: Brand: AIRLIFE™

## (undated) DEVICE — ENDO CARRY-ON PROCEDURE KIT: Brand: ENDO CARRY-ON PROCEDURE KIT

## (undated) DEVICE — COVER,MAYO STAND,STERILE: Brand: MEDLINE

## (undated) DEVICE — TUBING, SUCTION, 9/32" X 12', STRAIGHT: Brand: MEDLINE INDUSTRIES, INC.

## (undated) DEVICE — SPECIMEN TRAP: Brand: ARGYLE

## (undated) DEVICE — TUBING, SUCTION, 1/4" X 10', STRAIGHT: Brand: MEDLINE

## (undated) DEVICE — 6 ML SYRINGE LUER-LOCK TIP: Brand: MONOJECT

## (undated) DEVICE — CONMED SCOPE SAVER BITE BLOCK, 20X27 MM: Brand: SCOPE SAVER

## (undated) DEVICE — CANNULA NSL SM AD L7FT 6LPM CLR 3 CHN CRV TAPR LTWT OVR THE

## (undated) DEVICE — AIRLIFE™ ADULT AEROSOL MASK VINYL, UNDER-THE-CHIN STYLE: Brand: AIRLIFE™

## (undated) DEVICE — ENDOVIVE SFT PEG KIT PULL WENFIT 20F BX2

## (undated) DEVICE — TUBE SET 96 MM 64 MM H2O PERISTALTIC STD AUX CHANNEL

## (undated) DEVICE — BRUSH ENDO CLN L90.5IN SHTH DIA1.7MM BRIST DIA5-7MM 2-6MM

## (undated) DEVICE — Device: Brand: ENDO SMARTCAP

## (undated) DEVICE — SYRINGE MED 10ML LUERLOCK TIP W/O SFTY DISP

## (undated) DEVICE — CONMED CHANNEL MASTER COMBINATION CLEANING BRUSH, 230 CM X 2.0 MM: Brand: CONMED

## (undated) DEVICE — PAD,NON-ADHERENT,3X8,STERILE,LF,1/PK: Brand: MEDLINE

## (undated) DEVICE — TUBING IRRIGATION 140/160/180/190 SER GI ENDOSCP SMARTCAP

## (undated) DEVICE — SINGLE PORT MANIFOLD: Brand: NEPTUNE 2

## (undated) DEVICE — MEDI-VAC NON-CONDUCTIVE SUCTION TUBING: Brand: CARDINAL HEALTH

## (undated) DEVICE — Z CONVERTED USE 2271043 CONTAINER SPEC COLL 4OZ SCR ON LID PEEL PCH

## (undated) DEVICE — ENDO CARRY-ON PROCEDURE KIT INCLUDES LUBRICANT, DEFENDO OLYMPUS AIR, WATER, SUCTION, BIOPSY VALVE KIT, ENZYMATIC SPONGE, AND BASIN.: Brand: ENDO CARRY-ON PROCEDURE KIT

## (undated) DEVICE — ADAPTER FLSH PMP FLD MGMT GI IRRIG OFP 2 DISPOSABLE

## (undated) DEVICE — RESTRAINT EXTREMITY LIMB HOLDER SFT FOAM SINGLE STRP DISP

## (undated) DEVICE — GLOVE SURG 7.5 LTX TRIFLEX WIDE FINGER PWDR

## (undated) DEVICE — INSTINCT PLUS ENDOSCOPIC CLIPPING DEVICE: Brand: INSTINCT

## (undated) DEVICE — APPLICATOR MEDICATED 10.5 CC SOLUTION HI LT ORNG CHLORAPREP

## (undated) DEVICE — APPLICATOR COTTONTIP 6IN NS 1000 CA

## (undated) DEVICE — NEEDLE HYPO 18GA L1.5IN THN WALL PIVOTING SHLD BVL ORIENTED

## (undated) DEVICE — CATHETER SCLERO L240CM NDL 25GA L4MM SHTH DIA2.3MM CNTRST